# Patient Record
Sex: MALE | Race: WHITE | NOT HISPANIC OR LATINO | Employment: OTHER | URBAN - METROPOLITAN AREA
[De-identification: names, ages, dates, MRNs, and addresses within clinical notes are randomized per-mention and may not be internally consistent; named-entity substitution may affect disease eponyms.]

---

## 2023-02-27 LAB
ALANINE AMINOTRANSFERASE (SGPT) (U/L) IN SER/PLAS: NORMAL
ALBUMIN (G/DL) IN SER/PLAS: NORMAL
ALKALINE PHOSPHATASE (U/L) IN SER/PLAS: NORMAL
ANION GAP IN SER/PLAS: NORMAL
ASPARTATE AMINOTRANSFERASE (SGOT) (U/L) IN SER/PLAS: NORMAL
BASOPHILS (10*3/UL) IN BLOOD BY AUTOMATED COUNT: NORMAL
BASOPHILS/100 LEUKOCYTES IN BLOOD BY AUTOMATED COUNT: NORMAL
BILIRUBIN TOTAL (MG/DL) IN SER/PLAS: NORMAL
CALCIUM (MG/DL) IN SER/PLAS: NORMAL
CARBON DIOXIDE, TOTAL (MMOL/L) IN SER/PLAS: NORMAL
CHLORIDE (MMOL/L) IN SER/PLAS: NORMAL
CREATININE (MG/DL) IN SER/PLAS: NORMAL
EOSINOPHILS (10*3/UL) IN BLOOD BY AUTOMATED COUNT: NORMAL
EOSINOPHILS/100 LEUKOCYTES IN BLOOD BY AUTOMATED COUNT: NORMAL
ERYTHROCYTE DISTRIBUTION WIDTH (RATIO) BY AUTOMATED COUNT: NORMAL
ERYTHROCYTE MEAN CORPUSCULAR HEMOGLOBIN CONCENTRATION (G/DL) BY AUTOMATED: NORMAL
ERYTHROCYTE MEAN CORPUSCULAR VOLUME (FL) BY AUTOMATED COUNT: NORMAL
ERYTHROCYTES (10*6/UL) IN BLOOD BY AUTOMATED COUNT: NORMAL
GFR FEMALE: NORMAL
GFR MALE: NORMAL
GLUCOSE (MG/DL) IN SER/PLAS: NORMAL
HEMATOCRIT (%) IN BLOOD BY AUTOMATED COUNT: NORMAL
HEMOGLOBIN (G/DL) IN BLOOD: NORMAL
IMMATURE GRANULOCYTES/100 LEUKOCYTES IN BLOOD BY AUTOMATED COUNT: NORMAL
LEUKOCYTES (10*3/UL) IN BLOOD BY AUTOMATED COUNT: NORMAL
LYMPHOCYTES (10*3/UL) IN BLOOD BY AUTOMATED COUNT: NORMAL
LYMPHOCYTES/100 LEUKOCYTES IN BLOOD BY AUTOMATED COUNT: NORMAL
MANUAL DIFFERENTIAL Y/N: NORMAL
MONOCYTES (10*3/UL) IN BLOOD BY AUTOMATED COUNT: NORMAL
MONOCYTES/100 LEUKOCYTES IN BLOOD BY AUTOMATED COUNT: NORMAL
NEUTROPHILS (10*3/UL) IN BLOOD BY AUTOMATED COUNT: NORMAL
NEUTROPHILS/100 LEUKOCYTES IN BLOOD BY AUTOMATED COUNT: NORMAL
NRBC (PER 100 WBCS) BY AUTOMATED COUNT: NORMAL
PLATELETS (10*3/UL) IN BLOOD AUTOMATED COUNT: NORMAL
POTASSIUM (MMOL/L) IN SER/PLAS: NORMAL
PROTEIN TOTAL: NORMAL
SODIUM (MMOL/L) IN SER/PLAS: NORMAL
UREA NITROGEN (MG/DL) IN SER/PLAS: NORMAL

## 2023-09-13 VITALS — WEIGHT: 162.04 LBS | BODY MASS INDEX: 25.43 KG/M2 | HEIGHT: 67 IN

## 2023-09-13 DIAGNOSIS — C15.9 STAGE IV MALIGNANT NEOPLASM OF ESOPHAGUS (MULTI): ICD-10-CM

## 2023-09-13 DIAGNOSIS — C78.7 METASTASES TO THE LIVER (MULTI): Primary | ICD-10-CM

## 2023-09-21 RX ORDER — FLUOROURACIL 50 MG/ML
400 INJECTION, SOLUTION INTRAVENOUS ONCE
Status: CANCELLED | OUTPATIENT
Start: 2023-10-30

## 2023-09-21 RX ORDER — PROCHLORPERAZINE EDISYLATE 5 MG/ML
10 INJECTION INTRAMUSCULAR; INTRAVENOUS EVERY 6 HOURS PRN
Status: CANCELLED | OUTPATIENT
Start: 2024-02-12

## 2023-09-21 RX ORDER — DIPHENHYDRAMINE HYDROCHLORIDE 50 MG/ML
50 INJECTION INTRAMUSCULAR; INTRAVENOUS AS NEEDED
Status: CANCELLED | OUTPATIENT
Start: 2024-01-08

## 2023-09-21 RX ORDER — LORAZEPAM 2 MG/ML
1 INJECTION INTRAMUSCULAR AS NEEDED
Status: CANCELLED | OUTPATIENT
Start: 2023-11-13

## 2023-09-21 RX ORDER — DIPHENHYDRAMINE HYDROCHLORIDE 50 MG/ML
50 INJECTION INTRAMUSCULAR; INTRAVENOUS AS NEEDED
Status: CANCELLED | OUTPATIENT
Start: 2023-10-16

## 2023-09-21 RX ORDER — PROCHLORPERAZINE MALEATE 10 MG
10 TABLET ORAL EVERY 6 HOURS PRN
Status: CANCELLED | OUTPATIENT
Start: 2024-03-04

## 2023-09-21 RX ORDER — LORAZEPAM 2 MG/ML
1 INJECTION INTRAMUSCULAR AS NEEDED
Status: CANCELLED | OUTPATIENT
Start: 2023-10-30

## 2023-09-21 RX ORDER — ALBUTEROL SULFATE 0.83 MG/ML
3 SOLUTION RESPIRATORY (INHALATION) AS NEEDED
Status: CANCELLED | OUTPATIENT
Start: 2023-11-13

## 2023-09-21 RX ORDER — FLUOROURACIL 50 MG/ML
400 INJECTION, SOLUTION INTRAVENOUS ONCE
Status: CANCELLED | OUTPATIENT
Start: 2023-12-25

## 2023-09-21 RX ORDER — PALONOSETRON 0.05 MG/ML
0.25 INJECTION, SOLUTION INTRAVENOUS ONCE
Status: CANCELLED | OUTPATIENT
Start: 2024-01-22

## 2023-09-21 RX ORDER — FAMOTIDINE 10 MG/ML
20 INJECTION INTRAVENOUS ONCE AS NEEDED
Status: CANCELLED | OUTPATIENT
Start: 2024-01-22

## 2023-09-21 RX ORDER — PALONOSETRON 0.05 MG/ML
0.25 INJECTION, SOLUTION INTRAVENOUS ONCE
Status: CANCELLED | OUTPATIENT
Start: 2023-12-11

## 2023-09-21 RX ORDER — ALBUTEROL SULFATE 0.83 MG/ML
3 SOLUTION RESPIRATORY (INHALATION) AS NEEDED
Status: CANCELLED | OUTPATIENT
Start: 2023-10-16

## 2023-09-21 RX ORDER — EPINEPHRINE 0.3 MG/.3ML
0.3 INJECTION SUBCUTANEOUS EVERY 5 MIN PRN
Status: CANCELLED | OUTPATIENT
Start: 2023-10-16

## 2023-09-21 RX ORDER — FLUOROURACIL 50 MG/ML
400 INJECTION, SOLUTION INTRAVENOUS ONCE
Status: CANCELLED | OUTPATIENT
Start: 2023-10-16

## 2023-09-21 RX ORDER — EPINEPHRINE 0.3 MG/.3ML
0.3 INJECTION SUBCUTANEOUS EVERY 5 MIN PRN
Status: CANCELLED | OUTPATIENT
Start: 2024-03-04

## 2023-09-21 RX ORDER — PALONOSETRON 0.05 MG/ML
0.25 INJECTION, SOLUTION INTRAVENOUS ONCE
Status: CANCELLED | OUTPATIENT
Start: 2024-02-12

## 2023-09-21 RX ORDER — PROCHLORPERAZINE EDISYLATE 5 MG/ML
10 INJECTION INTRAMUSCULAR; INTRAVENOUS EVERY 6 HOURS PRN
Status: CANCELLED | OUTPATIENT
Start: 2023-12-11

## 2023-09-21 RX ORDER — PROCHLORPERAZINE MALEATE 10 MG
10 TABLET ORAL EVERY 6 HOURS PRN
Status: CANCELLED | OUTPATIENT
Start: 2023-12-25

## 2023-09-21 RX ORDER — PALONOSETRON 0.05 MG/ML
0.25 INJECTION, SOLUTION INTRAVENOUS ONCE
Status: CANCELLED | OUTPATIENT
Start: 2023-11-13

## 2023-09-21 RX ORDER — DIPHENHYDRAMINE HYDROCHLORIDE 50 MG/ML
50 INJECTION INTRAMUSCULAR; INTRAVENOUS AS NEEDED
Status: CANCELLED | OUTPATIENT
Start: 2024-03-04

## 2023-09-21 RX ORDER — DIPHENHYDRAMINE HYDROCHLORIDE 50 MG/ML
50 INJECTION INTRAMUSCULAR; INTRAVENOUS AS NEEDED
Status: CANCELLED | OUTPATIENT
Start: 2023-12-25

## 2023-09-21 RX ORDER — DIPHENHYDRAMINE HYDROCHLORIDE 50 MG/ML
50 INJECTION INTRAMUSCULAR; INTRAVENOUS AS NEEDED
Status: CANCELLED | OUTPATIENT
Start: 2024-01-22

## 2023-09-21 RX ORDER — PALONOSETRON 0.05 MG/ML
0.25 INJECTION, SOLUTION INTRAVENOUS ONCE
Status: CANCELLED | OUTPATIENT
Start: 2023-10-30

## 2023-09-21 RX ORDER — FAMOTIDINE 10 MG/ML
20 INJECTION INTRAVENOUS ONCE AS NEEDED
Status: CANCELLED | OUTPATIENT
Start: 2024-02-12

## 2023-09-21 RX ORDER — FLUOROURACIL 50 MG/ML
400 INJECTION, SOLUTION INTRAVENOUS ONCE
Status: CANCELLED | OUTPATIENT
Start: 2024-03-04

## 2023-09-21 RX ORDER — DIPHENHYDRAMINE HYDROCHLORIDE 50 MG/ML
50 INJECTION INTRAMUSCULAR; INTRAVENOUS AS NEEDED
Status: CANCELLED | OUTPATIENT
Start: 2023-11-13

## 2023-09-21 RX ORDER — PROCHLORPERAZINE EDISYLATE 5 MG/ML
10 INJECTION INTRAMUSCULAR; INTRAVENOUS EVERY 6 HOURS PRN
Status: CANCELLED | OUTPATIENT
Start: 2023-11-27

## 2023-09-21 RX ORDER — PROCHLORPERAZINE MALEATE 10 MG
10 TABLET ORAL EVERY 6 HOURS PRN
Status: CANCELLED | OUTPATIENT
Start: 2023-11-27

## 2023-09-21 RX ORDER — PROCHLORPERAZINE EDISYLATE 5 MG/ML
10 INJECTION INTRAMUSCULAR; INTRAVENOUS EVERY 6 HOURS PRN
Status: CANCELLED | OUTPATIENT
Start: 2024-01-08

## 2023-09-21 RX ORDER — LORAZEPAM 2 MG/ML
1 INJECTION INTRAMUSCULAR AS NEEDED
Status: CANCELLED | OUTPATIENT
Start: 2024-01-08

## 2023-09-21 RX ORDER — PROCHLORPERAZINE EDISYLATE 5 MG/ML
10 INJECTION INTRAMUSCULAR; INTRAVENOUS EVERY 6 HOURS PRN
Status: CANCELLED | OUTPATIENT
Start: 2023-10-30

## 2023-09-21 RX ORDER — FAMOTIDINE 10 MG/ML
20 INJECTION INTRAVENOUS ONCE AS NEEDED
Status: CANCELLED | OUTPATIENT
Start: 2024-03-04

## 2023-09-21 RX ORDER — EPINEPHRINE 0.3 MG/.3ML
0.3 INJECTION SUBCUTANEOUS EVERY 5 MIN PRN
Status: CANCELLED | OUTPATIENT
Start: 2023-12-25

## 2023-09-21 RX ORDER — PALONOSETRON 0.05 MG/ML
0.25 INJECTION, SOLUTION INTRAVENOUS ONCE
Status: CANCELLED | OUTPATIENT
Start: 2023-10-16

## 2023-09-21 RX ORDER — PROCHLORPERAZINE EDISYLATE 5 MG/ML
10 INJECTION INTRAMUSCULAR; INTRAVENOUS EVERY 6 HOURS PRN
Status: CANCELLED | OUTPATIENT
Start: 2024-03-04

## 2023-09-21 RX ORDER — PROCHLORPERAZINE EDISYLATE 5 MG/ML
10 INJECTION INTRAMUSCULAR; INTRAVENOUS EVERY 6 HOURS PRN
Status: CANCELLED | OUTPATIENT
Start: 2023-12-25

## 2023-09-21 RX ORDER — EPINEPHRINE 0.3 MG/.3ML
0.3 INJECTION SUBCUTANEOUS EVERY 5 MIN PRN
Status: CANCELLED | OUTPATIENT
Start: 2024-01-08

## 2023-09-21 RX ORDER — ALBUTEROL SULFATE 0.83 MG/ML
3 SOLUTION RESPIRATORY (INHALATION) AS NEEDED
Status: CANCELLED | OUTPATIENT
Start: 2023-11-27

## 2023-09-21 RX ORDER — PROCHLORPERAZINE MALEATE 10 MG
10 TABLET ORAL EVERY 6 HOURS PRN
Status: CANCELLED | OUTPATIENT
Start: 2023-10-16

## 2023-09-21 RX ORDER — EPINEPHRINE 0.3 MG/.3ML
0.3 INJECTION SUBCUTANEOUS EVERY 5 MIN PRN
Status: CANCELLED | OUTPATIENT
Start: 2023-11-13

## 2023-09-21 RX ORDER — PALONOSETRON 0.05 MG/ML
0.25 INJECTION, SOLUTION INTRAVENOUS ONCE
Status: CANCELLED | OUTPATIENT
Start: 2023-11-27

## 2023-09-21 RX ORDER — PROCHLORPERAZINE MALEATE 10 MG
10 TABLET ORAL EVERY 6 HOURS PRN
Status: CANCELLED | OUTPATIENT
Start: 2023-10-30

## 2023-09-21 RX ORDER — FLUOROURACIL 50 MG/ML
400 INJECTION, SOLUTION INTRAVENOUS ONCE
Status: CANCELLED | OUTPATIENT
Start: 2023-12-11

## 2023-09-21 RX ORDER — FAMOTIDINE 10 MG/ML
20 INJECTION INTRAVENOUS ONCE AS NEEDED
Status: CANCELLED | OUTPATIENT
Start: 2023-10-30

## 2023-09-21 RX ORDER — ALBUTEROL SULFATE 0.83 MG/ML
3 SOLUTION RESPIRATORY (INHALATION) AS NEEDED
Status: CANCELLED | OUTPATIENT
Start: 2023-12-25

## 2023-09-21 RX ORDER — ALBUTEROL SULFATE 0.83 MG/ML
3 SOLUTION RESPIRATORY (INHALATION) AS NEEDED
Status: CANCELLED | OUTPATIENT
Start: 2024-01-08

## 2023-09-21 RX ORDER — DIPHENHYDRAMINE HYDROCHLORIDE 50 MG/ML
50 INJECTION INTRAMUSCULAR; INTRAVENOUS AS NEEDED
Status: CANCELLED | OUTPATIENT
Start: 2024-02-12

## 2023-09-21 RX ORDER — DIPHENHYDRAMINE HYDROCHLORIDE 50 MG/ML
50 INJECTION INTRAMUSCULAR; INTRAVENOUS AS NEEDED
Status: CANCELLED | OUTPATIENT
Start: 2023-11-27

## 2023-09-21 RX ORDER — PROCHLORPERAZINE EDISYLATE 5 MG/ML
10 INJECTION INTRAMUSCULAR; INTRAVENOUS EVERY 6 HOURS PRN
Status: CANCELLED | OUTPATIENT
Start: 2023-10-16

## 2023-09-21 RX ORDER — PROCHLORPERAZINE MALEATE 10 MG
10 TABLET ORAL EVERY 6 HOURS PRN
Status: CANCELLED | OUTPATIENT
Start: 2024-02-12

## 2023-09-21 RX ORDER — EPINEPHRINE 0.3 MG/.3ML
0.3 INJECTION SUBCUTANEOUS EVERY 5 MIN PRN
Status: CANCELLED | OUTPATIENT
Start: 2023-11-27

## 2023-09-21 RX ORDER — FLUOROURACIL 50 MG/ML
400 INJECTION, SOLUTION INTRAVENOUS ONCE
Status: CANCELLED | OUTPATIENT
Start: 2024-01-22

## 2023-09-21 RX ORDER — EPINEPHRINE 0.3 MG/.3ML
0.3 INJECTION SUBCUTANEOUS EVERY 5 MIN PRN
Status: CANCELLED | OUTPATIENT
Start: 2024-01-22

## 2023-09-21 RX ORDER — PROCHLORPERAZINE MALEATE 10 MG
10 TABLET ORAL EVERY 6 HOURS PRN
Status: CANCELLED | OUTPATIENT
Start: 2024-01-08

## 2023-09-21 RX ORDER — FAMOTIDINE 10 MG/ML
20 INJECTION INTRAVENOUS ONCE AS NEEDED
Status: CANCELLED | OUTPATIENT
Start: 2023-11-27

## 2023-09-21 RX ORDER — DIPHENHYDRAMINE HYDROCHLORIDE 50 MG/ML
50 INJECTION INTRAMUSCULAR; INTRAVENOUS AS NEEDED
Status: CANCELLED | OUTPATIENT
Start: 2023-10-30

## 2023-09-21 RX ORDER — ALBUTEROL SULFATE 0.83 MG/ML
3 SOLUTION RESPIRATORY (INHALATION) AS NEEDED
Status: CANCELLED | OUTPATIENT
Start: 2023-10-30

## 2023-09-21 RX ORDER — LORAZEPAM 2 MG/ML
1 INJECTION INTRAMUSCULAR AS NEEDED
Status: CANCELLED | OUTPATIENT
Start: 2023-10-16

## 2023-09-21 RX ORDER — ALBUTEROL SULFATE 0.83 MG/ML
3 SOLUTION RESPIRATORY (INHALATION) AS NEEDED
Status: CANCELLED | OUTPATIENT
Start: 2023-12-11

## 2023-09-21 RX ORDER — PALONOSETRON 0.05 MG/ML
0.25 INJECTION, SOLUTION INTRAVENOUS ONCE
Status: CANCELLED | OUTPATIENT
Start: 2023-12-25

## 2023-09-21 RX ORDER — LORAZEPAM 2 MG/ML
1 INJECTION INTRAMUSCULAR AS NEEDED
Status: CANCELLED | OUTPATIENT
Start: 2024-01-22

## 2023-09-21 RX ORDER — FLUOROURACIL 50 MG/ML
400 INJECTION, SOLUTION INTRAVENOUS ONCE
Status: CANCELLED | OUTPATIENT
Start: 2023-11-13

## 2023-09-21 RX ORDER — PROCHLORPERAZINE MALEATE 10 MG
10 TABLET ORAL EVERY 6 HOURS PRN
Status: CANCELLED | OUTPATIENT
Start: 2024-01-22

## 2023-09-21 RX ORDER — EPINEPHRINE 0.3 MG/.3ML
0.3 INJECTION SUBCUTANEOUS EVERY 5 MIN PRN
Status: CANCELLED | OUTPATIENT
Start: 2023-12-11

## 2023-09-21 RX ORDER — LORAZEPAM 2 MG/ML
1 INJECTION INTRAMUSCULAR AS NEEDED
Status: CANCELLED | OUTPATIENT
Start: 2023-12-25

## 2023-09-21 RX ORDER — LORAZEPAM 2 MG/ML
1 INJECTION INTRAMUSCULAR AS NEEDED
Status: CANCELLED | OUTPATIENT
Start: 2024-03-04

## 2023-09-21 RX ORDER — FAMOTIDINE 10 MG/ML
20 INJECTION INTRAVENOUS ONCE AS NEEDED
Status: CANCELLED | OUTPATIENT
Start: 2023-10-16

## 2023-09-21 RX ORDER — LORAZEPAM 2 MG/ML
1 INJECTION INTRAMUSCULAR AS NEEDED
Status: CANCELLED | OUTPATIENT
Start: 2023-10-02

## 2023-09-21 RX ORDER — FAMOTIDINE 10 MG/ML
20 INJECTION INTRAVENOUS ONCE AS NEEDED
Status: CANCELLED | OUTPATIENT
Start: 2023-12-25

## 2023-09-21 RX ORDER — PROCHLORPERAZINE MALEATE 10 MG
10 TABLET ORAL EVERY 6 HOURS PRN
Status: CANCELLED | OUTPATIENT
Start: 2023-11-13

## 2023-09-21 RX ORDER — ALBUTEROL SULFATE 0.83 MG/ML
3 SOLUTION RESPIRATORY (INHALATION) AS NEEDED
Status: CANCELLED | OUTPATIENT
Start: 2024-02-12

## 2023-09-21 RX ORDER — FAMOTIDINE 10 MG/ML
20 INJECTION INTRAVENOUS ONCE AS NEEDED
Status: CANCELLED | OUTPATIENT
Start: 2023-12-11

## 2023-09-21 RX ORDER — LORAZEPAM 2 MG/ML
1 INJECTION INTRAMUSCULAR AS NEEDED
Status: CANCELLED | OUTPATIENT
Start: 2024-02-12

## 2023-09-21 RX ORDER — DIPHENHYDRAMINE HYDROCHLORIDE 50 MG/ML
50 INJECTION INTRAMUSCULAR; INTRAVENOUS AS NEEDED
Status: CANCELLED | OUTPATIENT
Start: 2023-12-11

## 2023-09-21 RX ORDER — FAMOTIDINE 10 MG/ML
20 INJECTION INTRAVENOUS ONCE AS NEEDED
Status: CANCELLED | OUTPATIENT
Start: 2023-11-13

## 2023-09-21 RX ORDER — EPINEPHRINE 0.3 MG/.3ML
0.3 INJECTION SUBCUTANEOUS EVERY 5 MIN PRN
Status: CANCELLED | OUTPATIENT
Start: 2023-10-30

## 2023-09-21 RX ORDER — EPINEPHRINE 0.3 MG/.3ML
0.3 INJECTION SUBCUTANEOUS EVERY 5 MIN PRN
Status: CANCELLED | OUTPATIENT
Start: 2024-02-12

## 2023-09-21 RX ORDER — FLUOROURACIL 50 MG/ML
400 INJECTION, SOLUTION INTRAVENOUS ONCE
Status: CANCELLED | OUTPATIENT
Start: 2023-11-27

## 2023-09-21 RX ORDER — FLUOROURACIL 50 MG/ML
400 INJECTION, SOLUTION INTRAVENOUS ONCE
Status: CANCELLED | OUTPATIENT
Start: 2024-01-08

## 2023-09-21 RX ORDER — PROCHLORPERAZINE MALEATE 10 MG
10 TABLET ORAL EVERY 6 HOURS PRN
Status: CANCELLED | OUTPATIENT
Start: 2023-12-11

## 2023-09-21 RX ORDER — PALONOSETRON 0.05 MG/ML
0.25 INJECTION, SOLUTION INTRAVENOUS ONCE
Status: CANCELLED | OUTPATIENT
Start: 2024-01-08

## 2023-09-21 RX ORDER — ALBUTEROL SULFATE 0.83 MG/ML
3 SOLUTION RESPIRATORY (INHALATION) AS NEEDED
Status: CANCELLED | OUTPATIENT
Start: 2024-01-22

## 2023-09-21 RX ORDER — LORAZEPAM 2 MG/ML
1 INJECTION INTRAMUSCULAR AS NEEDED
Status: CANCELLED | OUTPATIENT
Start: 2023-12-11

## 2023-09-21 RX ORDER — PROCHLORPERAZINE EDISYLATE 5 MG/ML
10 INJECTION INTRAMUSCULAR; INTRAVENOUS EVERY 6 HOURS PRN
Status: CANCELLED | OUTPATIENT
Start: 2024-01-22

## 2023-09-21 RX ORDER — ALBUTEROL SULFATE 0.83 MG/ML
3 SOLUTION RESPIRATORY (INHALATION) AS NEEDED
Status: CANCELLED | OUTPATIENT
Start: 2024-03-04

## 2023-09-21 RX ORDER — FLUOROURACIL 50 MG/ML
400 INJECTION, SOLUTION INTRAVENOUS ONCE
Status: CANCELLED | OUTPATIENT
Start: 2024-02-12

## 2023-09-21 RX ORDER — PALONOSETRON 0.05 MG/ML
0.25 INJECTION, SOLUTION INTRAVENOUS ONCE
Status: CANCELLED | OUTPATIENT
Start: 2024-03-04

## 2023-09-21 RX ORDER — FAMOTIDINE 10 MG/ML
20 INJECTION INTRAVENOUS ONCE AS NEEDED
Status: CANCELLED | OUTPATIENT
Start: 2024-01-08

## 2023-09-21 RX ORDER — PROCHLORPERAZINE EDISYLATE 5 MG/ML
10 INJECTION INTRAMUSCULAR; INTRAVENOUS EVERY 6 HOURS PRN
Status: CANCELLED | OUTPATIENT
Start: 2023-11-13

## 2023-09-21 RX ORDER — LORAZEPAM 2 MG/ML
1 INJECTION INTRAMUSCULAR AS NEEDED
Status: CANCELLED | OUTPATIENT
Start: 2023-11-27

## 2023-09-25 PROBLEM — K21.9 GASTROESOPHAGEAL REFLUX DISEASE: Status: ACTIVE | Noted: 2023-09-25

## 2023-09-25 PROBLEM — R07.89 CHEST HEAVINESS: Status: ACTIVE | Noted: 2023-09-25

## 2023-09-25 PROBLEM — K40.31: Status: ACTIVE | Noted: 2023-09-25

## 2023-09-25 RX ORDER — OXYCODONE HYDROCHLORIDE 10 MG/1
10 TABLET ORAL EVERY 8 HOURS PRN
COMMUNITY
Start: 2023-08-14 | End: 2023-10-30 | Stop reason: ALTCHOICE

## 2023-09-25 RX ORDER — CLOTRIMAZOLE 10 MG/1
LOZENGE ORAL; TOPICAL
COMMUNITY
Start: 2016-09-30 | End: 2023-11-02 | Stop reason: ALTCHOICE

## 2023-09-25 RX ORDER — MULTIVIT-MIN/IRON FUM/FOLIC AC 7.5 MG-4
2 TABLET ORAL EVERY MORNING
Status: ON HOLD | COMMUNITY

## 2023-09-25 RX ORDER — OXYCODONE AND ACETAMINOPHEN 5; 325 MG/1; MG/1
1 TABLET ORAL EVERY 6 HOURS
COMMUNITY
Start: 2014-11-12 | End: 2023-11-02 | Stop reason: ALTCHOICE

## 2023-09-25 RX ORDER — PANTOPRAZOLE SODIUM 40 MG/1
40 TABLET, DELAYED RELEASE ORAL 2 TIMES DAILY
COMMUNITY
End: 2024-01-26

## 2023-09-25 RX ORDER — LIDOCAINE HYDROCHLORIDE 20 MG/ML
SOLUTION ORAL; TOPICAL
COMMUNITY
Start: 2023-03-07 | End: 2023-11-13 | Stop reason: HOSPADM

## 2023-09-25 RX ORDER — METOPROLOL TARTRATE 25 MG/1
25 TABLET, FILM COATED ORAL EVERY 12 HOURS
COMMUNITY
Start: 2014-11-07 | End: 2023-11-02 | Stop reason: ALTCHOICE

## 2023-09-25 RX ORDER — CODEINE PHOSPHATE AND GUAIFENESIN 10; 100 MG/5ML; MG/5ML
10-15 SOLUTION ORAL NIGHTLY PRN
COMMUNITY
End: 2023-11-05

## 2023-09-25 RX ORDER — POTASSIUM CHLORIDE 750 MG/1
10 TABLET, EXTENDED RELEASE ORAL DAILY
COMMUNITY
Start: 2023-04-20 | End: 2023-11-05

## 2023-09-25 RX ORDER — CHOLECALCIFEROL (VITAMIN D3) 50 MCG
TABLET ORAL
COMMUNITY
End: 2023-11-28 | Stop reason: ALTCHOICE

## 2023-09-25 RX ORDER — LIDOCAINE AND PRILOCAINE 25; 25 MG/G; MG/G
CREAM TOPICAL
COMMUNITY
Start: 2023-01-30 | End: 2023-11-05

## 2023-09-25 RX ORDER — MELOXICAM 15 MG/1
TABLET ORAL
COMMUNITY
End: 2023-11-02 | Stop reason: ALTCHOICE

## 2023-09-25 RX ORDER — APIXABAN 5 MG/1
TABLET, FILM COATED ORAL
COMMUNITY
Start: 2023-07-26 | End: 2023-11-05

## 2023-09-25 RX ORDER — ATORVASTATIN CALCIUM 40 MG/1
40 TABLET, FILM COATED ORAL DAILY
COMMUNITY
Start: 2014-11-07 | End: 2023-11-02 | Stop reason: ALTCHOICE

## 2023-09-25 RX ORDER — OXYCODONE HCL 20 MG/1
TABLET, FILM COATED, EXTENDED RELEASE ORAL
COMMUNITY
Start: 2016-09-26 | End: 2023-10-30 | Stop reason: ALTCHOICE

## 2023-09-25 RX ORDER — SUCRALFATE 1 G/1
1 TABLET ORAL DAILY PRN
COMMUNITY
Start: 2023-06-02 | End: 2024-05-24 | Stop reason: HOSPADM

## 2023-09-25 RX ORDER — GABAPENTIN 300 MG/1
CAPSULE ORAL
COMMUNITY
Start: 2023-07-31 | End: 2023-11-02 | Stop reason: ALTCHOICE

## 2023-09-25 RX ORDER — BACLOFEN 10 MG/1
TABLET ORAL
COMMUNITY
Start: 2016-06-30 | End: 2023-11-02 | Stop reason: ALTCHOICE

## 2023-09-25 RX ORDER — ALBUTEROL SULFATE 90 UG/1
1-2 AEROSOL, METERED RESPIRATORY (INHALATION)
COMMUNITY
Start: 2018-03-06 | End: 2023-11-05

## 2023-09-28 ENCOUNTER — TELEPHONE (OUTPATIENT)
Dept: HEMATOLOGY/ONCOLOGY | Facility: CLINIC | Age: 63
End: 2023-09-28
Payer: COMMERCIAL

## 2023-10-01 ENCOUNTER — NUTRITION (OUTPATIENT)
Dept: HEMATOLOGY/ONCOLOGY | Facility: CLINIC | Age: 63
End: 2023-10-01
Payer: COMMERCIAL

## 2023-10-02 ENCOUNTER — INFUSION (OUTPATIENT)
Dept: HEMATOLOGY/ONCOLOGY | Facility: CLINIC | Age: 63
End: 2023-10-02
Payer: COMMERCIAL

## 2023-10-02 ENCOUNTER — OFFICE VISIT (OUTPATIENT)
Dept: HEMATOLOGY/ONCOLOGY | Facility: CLINIC | Age: 63
End: 2023-10-02
Payer: COMMERCIAL

## 2023-10-02 ENCOUNTER — LAB (OUTPATIENT)
Dept: LAB | Facility: HOSPITAL | Age: 63
End: 2023-10-02
Payer: COMMERCIAL

## 2023-10-02 VITALS
WEIGHT: 164.46 LBS | OXYGEN SATURATION: 99 % | DIASTOLIC BLOOD PRESSURE: 76 MMHG | TEMPERATURE: 98.1 F | BODY MASS INDEX: 25.69 KG/M2 | SYSTOLIC BLOOD PRESSURE: 128 MMHG | HEART RATE: 79 BPM

## 2023-10-02 VITALS — BODY MASS INDEX: 25.81 KG/M2 | HEIGHT: 67 IN | WEIGHT: 164.46 LBS

## 2023-10-02 DIAGNOSIS — C78.7 METASTASES TO THE LIVER (MULTI): ICD-10-CM

## 2023-10-02 DIAGNOSIS — C78.7 METASTASES TO THE LIVER (MULTI): Primary | ICD-10-CM

## 2023-10-02 DIAGNOSIS — C15.9 STAGE IV MALIGNANT NEOPLASM OF ESOPHAGUS (MULTI): ICD-10-CM

## 2023-10-02 LAB
ALBUMIN SERPL BCP-MCNC: 3.8 G/DL (ref 3.4–5)
ALP SERPL-CCNC: 159 U/L (ref 33–136)
ALT SERPL W P-5'-P-CCNC: 23 U/L (ref 10–52)
ANION GAP SERPL CALC-SCNC: 13 MMOL/L (ref 10–20)
AST SERPL W P-5'-P-CCNC: 34 U/L (ref 9–39)
BASOPHILS # BLD AUTO: 0.01 X10*3/UL (ref 0–0.1)
BASOPHILS NFR BLD AUTO: 0.3 %
BILIRUB SERPL-MCNC: 0.8 MG/DL (ref 0–1.2)
BUN SERPL-MCNC: 14 MG/DL (ref 6–23)
CALCIUM SERPL-MCNC: 9 MG/DL (ref 8.6–10.3)
CHLORIDE SERPL-SCNC: 106 MMOL/L (ref 98–107)
CO2 SERPL-SCNC: 26 MMOL/L (ref 21–32)
CREAT SERPL-MCNC: 0.77 MG/DL (ref 0.5–1.3)
EOSINOPHIL # BLD AUTO: 0.05 X10*3/UL (ref 0–0.7)
EOSINOPHIL NFR BLD AUTO: 1.4 %
ERYTHROCYTE [DISTWIDTH] IN BLOOD BY AUTOMATED COUNT: 14.6 % (ref 11.5–14.5)
GFR SERPL CREATININE-BSD FRML MDRD: >90 ML/MIN/1.73M*2
GLUCOSE SERPL-MCNC: 107 MG/DL (ref 74–99)
HCT VFR BLD AUTO: 34.1 % (ref 41–52)
HGB BLD-MCNC: 11.3 G/DL (ref 13.5–17.5)
IMM GRANULOCYTES # BLD AUTO: 0.01 X10*3/UL (ref 0–0.7)
IMM GRANULOCYTES NFR BLD AUTO: 0.3 % (ref 0–0.9)
LYMPHOCYTES # BLD AUTO: 0.62 X10*3/UL (ref 1.2–4.8)
LYMPHOCYTES NFR BLD AUTO: 17.5 %
MCH RBC QN AUTO: 28.7 PG (ref 26–34)
MCHC RBC AUTO-ENTMCNC: 33.1 G/DL (ref 32–36)
MCV RBC AUTO: 87 FL (ref 80–100)
MONOCYTES # BLD AUTO: 0.33 X10*3/UL (ref 0.1–1)
MONOCYTES NFR BLD AUTO: 9.3 %
NEUTROPHILS # BLD AUTO: 2.53 X10*3/UL (ref 1.2–7.7)
NEUTROPHILS NFR BLD AUTO: 71.2 %
NRBC BLD-RTO: ABNORMAL /100{WBCS}
PLATELET # BLD AUTO: 69 X10*3/UL (ref 150–450)
PMV BLD AUTO: 10 FL (ref 7.5–11.5)
POTASSIUM SERPL-SCNC: 3.6 MMOL/L (ref 3.5–5.3)
PROT SERPL-MCNC: 6.6 G/DL (ref 6.4–8.2)
RBC # BLD AUTO: 3.94 X10*6/UL (ref 4.5–5.9)
SODIUM SERPL-SCNC: 141 MMOL/L (ref 136–145)
WBC # BLD AUTO: 3.6 X10*3/UL (ref 4.4–11.3)

## 2023-10-02 PROCEDURE — 99214 OFFICE O/P EST MOD 30 MIN: CPT | Performed by: INTERNAL MEDICINE

## 2023-10-02 PROCEDURE — 84075 ASSAY ALKALINE PHOSPHATASE: CPT

## 2023-10-02 PROCEDURE — 80053 COMPREHEN METABOLIC PANEL: CPT

## 2023-10-02 PROCEDURE — 2500000004 HC RX 250 GENERAL PHARMACY W/ HCPCS (ALT 636 FOR OP/ED): Performed by: INTERNAL MEDICINE

## 2023-10-02 PROCEDURE — 85025 COMPLETE CBC W/AUTO DIFF WBC: CPT

## 2023-10-02 PROCEDURE — 96411 CHEMO IV PUSH ADDL DRUG: CPT

## 2023-10-02 PROCEDURE — 96367 TX/PROPH/DG ADDL SEQ IV INF: CPT

## 2023-10-02 PROCEDURE — 36415 COLL VENOUS BLD VENIPUNCTURE: CPT

## 2023-10-02 PROCEDURE — 96375 TX/PRO/DX INJ NEW DRUG ADDON: CPT

## 2023-10-02 PROCEDURE — 2500000004 HC RX 250 GENERAL PHARMACY W/ HCPCS (ALT 636 FOR OP/ED)

## 2023-10-02 PROCEDURE — 96413 CHEMO IV INFUSION 1 HR: CPT

## 2023-10-02 RX ORDER — DEXAMETHASONE SODIUM PHOSPHATE 100 MG/10ML
8 INJECTION INTRAMUSCULAR; INTRAVENOUS ONCE
Status: CANCELLED | OUTPATIENT
Start: 2023-10-02

## 2023-10-02 RX ORDER — PROCHLORPERAZINE MALEATE 10 MG
10 TABLET ORAL EVERY 6 HOURS PRN
Status: DISCONTINUED | OUTPATIENT
Start: 2023-10-02 | End: 2023-10-02 | Stop reason: HOSPADM

## 2023-10-02 RX ORDER — FAMOTIDINE 10 MG/ML
20 INJECTION INTRAVENOUS ONCE AS NEEDED
Status: DISCONTINUED | OUTPATIENT
Start: 2023-10-02 | End: 2023-10-02 | Stop reason: HOSPADM

## 2023-10-02 RX ORDER — HEPARIN 100 UNIT/ML
SYRINGE INTRAVENOUS
Status: COMPLETED
Start: 2023-10-02 | End: 2023-10-02

## 2023-10-02 RX ORDER — EPINEPHRINE 0.3 MG/.3ML
0.3 INJECTION SUBCUTANEOUS EVERY 5 MIN PRN
Status: DISCONTINUED | OUTPATIENT
Start: 2023-10-02 | End: 2023-10-02 | Stop reason: HOSPADM

## 2023-10-02 RX ORDER — DIPHENHYDRAMINE HYDROCHLORIDE 50 MG/ML
50 INJECTION INTRAMUSCULAR; INTRAVENOUS AS NEEDED
Status: DISCONTINUED | OUTPATIENT
Start: 2023-10-02 | End: 2023-10-02 | Stop reason: HOSPADM

## 2023-10-02 RX ORDER — DEXAMETHASONE SODIUM PHOSPHATE 4 MG/ML
8 INJECTION, SOLUTION INTRA-ARTICULAR; INTRALESIONAL; INTRAMUSCULAR; INTRAVENOUS; SOFT TISSUE ONCE
Status: COMPLETED | OUTPATIENT
Start: 2023-10-02 | End: 2023-10-02

## 2023-10-02 RX ORDER — FLUOROURACIL 50 MG/ML
400 INJECTION, SOLUTION INTRAVENOUS ONCE
Status: COMPLETED | OUTPATIENT
Start: 2023-10-02 | End: 2023-10-02

## 2023-10-02 RX ORDER — ALBUTEROL SULFATE 0.83 MG/ML
3 SOLUTION RESPIRATORY (INHALATION) AS NEEDED
Status: DISCONTINUED | OUTPATIENT
Start: 2023-10-02 | End: 2023-10-02 | Stop reason: HOSPADM

## 2023-10-02 RX ORDER — PALONOSETRON 0.05 MG/ML
0.25 INJECTION, SOLUTION INTRAVENOUS ONCE
Status: COMPLETED | OUTPATIENT
Start: 2023-10-02 | End: 2023-10-02

## 2023-10-02 RX ORDER — PROCHLORPERAZINE EDISYLATE 5 MG/ML
10 INJECTION INTRAMUSCULAR; INTRAVENOUS EVERY 6 HOURS PRN
Status: DISCONTINUED | OUTPATIENT
Start: 2023-10-02 | End: 2023-10-02 | Stop reason: HOSPADM

## 2023-10-02 RX ADMIN — DEXAMETHASONE SODIUM PHOSPHATE 8 MG: 4 INJECTION, SOLUTION INTRAMUSCULAR; INTRAVENOUS at 11:50

## 2023-10-02 RX ADMIN — FLUOROURACIL 750 MG: 50 INJECTION, SOLUTION INTRAVENOUS at 15:05

## 2023-10-02 RX ADMIN — PALONOSETRON 250 MCG: 0.05 INJECTION, SOLUTION INTRAVENOUS at 11:46

## 2023-10-02 RX ADMIN — LEUCOVORIN CALCIUM 748 MG: 350 INJECTION, POWDER, LYOPHILIZED, FOR SOLUTION INTRAMUSCULAR; INTRAVENOUS at 14:09

## 2023-10-02 RX ADMIN — TRASTUZUMAB-ANNS 300 MG: 150 INJECTION, POWDER, LYOPHILIZED, FOR SOLUTION INTRAVENOUS at 13:15

## 2023-10-02 RX ADMIN — SODIUM CHLORIDE, PRESERVATIVE FREE: 5 INJECTION INTRAVENOUS at 15:20

## 2023-10-02 ASSESSMENT — PROMIS GLOBAL HEALTH SCALE
IN GENERAL, WOULD YOU SAY YOUR QUALITY OF LIFE IS...[ON A SCALE OF 1 (POOR) TO 5 (EXCELLENT)]: GOOD
TO WHAT EXTENT ARE YOU ABLE TO CARRY OUT YOUR EVERYDAY PHYSICAL ACTIVITIES SUCH AS WALKING, CLIMBING STAIRS, CARRYING GROCERIES, OR MOVING A CHAIR [ON A SCALE OF 1 (NOT AT ALL) TO 5 (COMPLETELY)]?: MOSTLY
IN GENERAL, HOW WOULD YOU RATE YOUR MENTAL HEALTH, INCLUDING YOUR MOOD AND YOUR ABILITY TO THINK [ON A SCALE OF 1 (POOR) TO 5 (EXCELLENT)]?: GOOD
IN GENERAL, PLEASE RATE HOW WELL YOU CARRY OUT YOUR USUAL SOCIAL ACTIVITIES (INCLUDES ACTIVITIES AT HOME, AT WORK, AND IN YOUR COMMUNITY, AND RESPONSIBILITIES AS A PARENT, CHILD, SPOUSE, EMPLOYEE, FRIEND, ETC) [ON A SCALE OF 1 (POOR) TO 5 (EXCELLENT)]?: VERY GOOD
IN THE PAST 7 DAYS, HOW WOULD YOU RATE YOUR FATIGUE ON AVERAGE [ON A SCALE FROM 1 (NONE) TO 5 (VERY SEVERE)]?: MILD
IN THE PAST 7 DAYS, HOW OFTEN HAVE YOU BEEN BOTHERED BY EMOTIONAL PROBLEMS, SUCH AS FEELING ANXIOUS, DEPRESSED, OR IRRITABLE [ON A SCALE FROM 1 (NEVER) TO 5 (ALWAYS)]?: RARELY
IN GENERAL, HOW WOULD YOU RATE YOUR PHYSICAL HEALTH [ON A SCALE OF 1 (POOR) TO 5 (EXCELLENT)]?: FAIR
IN THE PAST 7 DAYS, HOW WOULD YOU RATE YOUR PAIN ON AVERAGE [ON A SCALE FROM 0 (NO PAIN) TO 10 (WORST IMAGINABLE PAIN)]?: 2
IN GENERAL, HOW WOULD YOU RATE YOUR SATISFACTION WITH YOUR SOCIAL ACTIVITIES AND RELATIONSHIPS [ON A SCALE OF 1 (POOR) TO 5 (EXCELLENT)]?: VERY GOOD

## 2023-10-02 ASSESSMENT — PAIN SCALES - GENERAL: PAINLEVEL: 2

## 2023-10-02 NOTE — PROGRESS NOTES
Visit Type: Follow Up Visit      Cancer History:   Treatment Synopsis:    GETACHEW MG is a 62 year old Male who was consulted by the primary team for abdominal pain.  He presented to the emergency room with intractable abdominal pain and  radiographic imaging revealed that he had extensive liver metastases.  CT scan was associated with thickening of the esophageal lining and was also noted to have portal vein thrombosis.  Hematology oncology was consulted.     Patient seen and history confirmed.  He has a family history of his father having been diagnosed also with colon\esophageal cancer in the past.  At this time there is no definitive pathological diagnosis and I discussed with him IR guided liver biopsy  versus endoscopic biopsy of the esophageal lesion.  I have subsequently discussed with gastroenterology in regard to the biopsy and EGD is planned.  I will hold off IR guided liver biopsy until the results of EGD and biopsy results are known.  Patient  to follow in the outpatient setting.  I will discuss with him anticoagulation in the outpatient setting.  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      GI endoscopy reports esophageal mass which has been biopsied.      Patient seen today 1/19/2023: Clinical diagnosis is that of metastatic esophageal carcinoma HER2 positive.  PET scan has been ordered Mediport placement also ordered.  Plan is to treat with 5-FU leucovorin and oxaliplatin based therapy with trastuzumab.   Patient has signed consent at this time.  Patient has significant abdominal pain and early satiety.  PET CT scan will reveal anatomy and also metabolic activity in that regard.  I discussed that after the PET CT scan if indicated patient may have radiation  oncology evaluation for palliative radiation therapy if so indicated.     For C1 FOLFOX Herceptin today 1/30/2023.        History of  Present Illness:      ID Statement:    GETACHEW MG is a 62 year old Male        Interval History:    Patient presents for treatment accompanied by his wife. On assessment, overall reports feeling well aside from worsening neuropathy. He started Gabapentin 300 mg  ER last week and reports no difference in neuropathy. Reports taste buds are improving, eating meat again. Otherwise doing well.      Review of Systems:   Review of Systems:    Afebrile.  Patient on opiate pain medication appears to have slight alteration in mental status related to opiate pain medication and anxiety.  Complaining of heat  and cold sensation sweaty in the palms.  CBC ordered today 1/19/2023.  Hold Eliquis.     ·  Constitutional POSITIVE: Anorexia, Weight Loss     NEGATIVE: Fever, Chills, Malaise      · Eyes NEGATIVE: Blurry Vision, Drainage, Diploplia, Redness, Vision Loss/ Change      · ENMT NEGATIVE: Nasal Discharge, Nasal Congestion, Ear Pain, Mouth Pain, Throat Pain      · Respiratory NEGATIVE: Dry Cough, Productive Cough, Hemoptysis, Wheezing, Shortness of Breath      · Cardiology NEGATIVE: Chest Pain, Dyspnea on Exertion, Orthopnea, Palpitations, Syncope      ·  Gastrointestinal POSITIVE: Abdominal Pain      · Genitourinary NEGATIVE: Discharge, Dysuria, Flank Pain, Frequency, Hematuria      · Musculoskeletal NEGATIVE: Decreased ROM, Pain, Swelling, Stiffness, Weakness      ·  Skin POSITIVE: Rash     NEGATIVE: Mass, Pain, Pruritus, Ulcer            Allergies and Intolerances:       Allergies:         NKDA: Active         Bee Stings: Environment, Anaphylaxis, Active     Outpatient Medication Profile:  * Patient Currently Takes Medications as of 21-Aug-2023 09:05 documented in Structured Notes         oxyCODONE 10 mg oral tablet : Last Dose Taken:  , 1 tab(s) orally every 8 hours, As Needed , Start Date: 14-Aug-2023         gabapentin 300 mg oral capsule: Last Dose Taken:  , 2 cap(s) orally once  a day (at bedtime) , Start Date:  31-Jul-2023         Ativan 0.5 mg oral tablet: Last Dose Taken:  , Take one tablet 30 mins  before the procedure and repeat 5 mins before procedure. Extra tablet to be used PRN. No driving day of procedure., Start Date: 19-Jun-2023         amoxicillin-clavulanate 875 mg-125 mg oral tablet: Last Dose Taken:   , 1 tab(s) orally every 12 hours , Start Date: 17-Jun-2023         BMX (Benadryl, Maalox, Viscous Lidocaine) : Last Dose Taken:  , 10 milliliter(s)  orally 3 times a day, As Needed , Start Date: 08-May-2023         sucralfate 1 g oral tablet: Last Dose Taken:  , 1 tab(s) orally once  a day , Start Date: 24-Apr-2023         pantoprazole 40 mg oral delayed release tablet: Last Dose Taken:  , 1  tab(s) orally 2 times a day x 90 days , Start Date: 27-Mar-2023         Potassium Chloride (Eqv-Klor-Con 10) 10 mEq oral tablet, extended release : Last Dose Taken:  , 1 tab(s) orally once a day , Start Date: 15-Mar-2023         Eliquis 5 mg oral tablet: Last Dose Taken:  , 2 tab(s) orally 2 times  a day , Start Date: 13-Feb-2023         lidocaine-prilocaine 2.5%-2.5% topical cream: Last Dose Taken:  , Apply  topically to affected area 30 minutes prior to mediport access as needed, Start Date: 30-Jan-2023         LORazepam 1 mg oral tablet: Last Dose Taken:  , 1 tab(s) orally once  a day, As Needed                  Last OARRS fill: 1/30/23 #2 for 2 days                  Pt not yet stated         Multiple Vitamins with Minerals oral tablet: Last Dose Taken:  , 2 tab(s)  orally once a day         ascorbic acid: Last Dose Taken:  , 2 tab(s) orally once a day                  Pt unsure of strength         cholecalciferol oral tablet: Last Dose Taken:  , 2 tab(s) orally once  a day                  Pt unsure of stregth         chemotherapy: Last Dose Taken:  , Monday-Wednesday pt wears a pump, the  following monday start infusions         Augmentin 875 mg-125 mg oral tablet: Last Dose Taken:  , 1 tab(s) orally  every 12 hours              Medical History:         Thrombocytopenia: ICD-10: D69.6, Status: Active         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      Social History:   Social Substance History:  ·  Social History denies smoking, alcohol and drug use   ·  Smoking Status never smoker (1)   ·  Alcohol Use occasionally            Vitals and Measurements:   Vitals: Temp: 36.8  HR: 92  RR: 18  BP: 119/70  SPO2%:   98   Measurements: HT(cm): 170.4  WT(kg): 71.1  BSA: 1.83   BMI:  24.4      Physical Exam:      Constitutional: Deferred due to telehealth         Lab Results:     ·  Results        CBC date/time       WBC     HGB     HCT     PLT     Neut      21-Aug-2023 09:38   3.5(L)  11.9(L) 35.9(L) 91(L)   2.49     BMP date/time       NA              K               CL              CO2             BUN             CREAT             21-Aug-2023 09:38   139             3.6             106             N/A             15              0.81     LDH date/time       LDH     25-May-2023 05:58   N/A     Assessment and Plan:      Assessment and Plan:   Assessment:    GETACHEW MG is a 62 year old Male who was consulted by the primary team for abdominal pain.  He presented to the emergency room with intractable abdominal pain and  radiographic imaging revealed that he had extensive liver metastases.  CT scan was associated with thickening of the esophageal lining and was also noted to have portal vein thrombosis.  Hematology oncology was consulted.     Patient seen and history confirmed.  He has a family history of his father having been diagnosed also with colon\esophageal cancer in the past.  At this time there is no definitive pathological diagnosis and I  discussed with him IR guided liver biopsy  versus endoscopic biopsy of the esophageal lesion.  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     Had endoscopic biopsy of the esophageal mass which confirmed esophageal adenocarcinoma. 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  GI endoscopy reports esophageal mass which has been biopsied.      Patient seen today 1/19/2023: Clinical diagnosis is that of metastatic esophageal carcinoma HER2 positive.  PET scan has been ordered Mediport placement also ordered.  Plan is to treat with 5-FU  leucovorin and oxaliplatin based therapy with trastuzumab.  Patient has signed consent at this time.  Patient has significant abdominal pain and early satiety.  PET CT scan will reveal anatomy and also  metabolic activity in that regard.  I discussed that after the PET CT scan if indicated patient may have radiation oncology evaluation for palliative radiation therapy if so indicated.       For C1 FOLFOX Herceptin today 1/30/2023. PET 1/27/23: Unsigned: IMPRESSION:  [Intense hypermetabolic distal esophageal lesion  consistent with patient's reported history of esophageal adenocarcinoma.] Numerous centrally photopenic and peripherally intensely hypermetabolic liver lesions consistent with hepatic metastasis. Numerous moderate to intensely hypermetabolic periaortic  and aortocaval lymph nodes consistent with metastatic ralph involvement.  Single left pulmonary ligament lymph node with mild hypermetabolic activity which may represent additional metastatic ralph involvement.  Patient complains of shortness of breath  and CTA ordered stat.  Ultrasound of abdomen ordered on account of abdominal distention.  I will follow closely every 2 weeks.     CTA confirms bilateral pulmonary embolism.  Recommend patient placed on direct oral anticoagulation.       With Eliquis patient continue follow-up in the outpatient setting.  Complains of  nosebleeds as needed.     2/13/2023: C2 2/13/23: Orders placed: Patient much improved: 5-FU leucovorin and oxaliplatin based therapy with trastuzumab. Continue Eliquis.  02/27/2023:: No complaints of note.  Patient feels much improved.  Shortness of breath secondary to bilateral PEs is much improved on Eliquis.  Patient actually as per his wife doing chores around  the house.  Has a slight bilateral tremor.  He is cleared for chemotherapy CBC has been reviewed.  I will see him before his next chemotherapy in 2 weeks I want to evaluate status of his tremor and to see whether there is any other symptoms of note.   We will continue chemotherapy with trastuzumab FOLFOX.  C3.     3/13/2023: C1  1/30/23 C2 2/13/2023 C3  2/27/2023 C4 3/13/2023: Trastuzumab FOLFOX: No complaints: Oxali dosed at 65mg/M2 today: Proceed with chemotherapy: No pain anywhere today.        04/10/2023 C6 trastuzumab FOLFOX today.  Proceed with chemotherapy at this time.  CT chest abdomen and pelvis ordered.     4/20/2023: CT c/a/p w/good response in pulmonary and liver nodules, adenopathy seen in the abdomen and presumed adrenal metastasis.     4/24/2023: Cycle 7 trastuzumab FOLFOX 04/24/2023.      5/8/2023: Proceed with cycle 8 of FOLFOX + Herceptin. Due to mild neutropenia and thrombocytopenia, Oxaliplatin at 65 mg/m2 and will hold 5FU pump this cycle. ontinue w/nystatin, MMW and pp for n/v. IVF 2x/week. Will try to obtain precert for Neupogen  ppx.      06/05/2023 patient presents for cycle 10 Herceptin plus FOLFOX.  Much improved has neutropenia.  We will proceed with therapy today.  Platelet count is 64 guidelines indicate 75,000 lower limit.  We will approve and proceed with chemotherapy today.     06/19/2023: Patient presents for cycle 10 Herceptin FOLFOX.  Has a rash on his chest.  No itching.  We will review the rash in the middle of the week when he comes for 5-FU pump discontinuation.  Platelet count is slightly below 75 at 72,000.   Proceed  with chemotherapy.  We will continue to follow.  PET CT ordered.     07/03/2023: Patient presents today for cycle 12 Herceptin FOLFOX.  Complaining of neuropathy and desirous of after 12 cycles going on less stringent regimen.  We will continue Herceptin plus 5-FU push after cycle 12 of Herceptin FOLFOX.  Cleared for chemotherapy  today.  PET CT discussed.  Patient had a extremely markedly good response with resolution of most of her hepatic lesions and reduction in all areas of initial PET avidity.     7/31/2023: will hold cycle 2 of Herceptin + 5FU pump due to thrombocytopenia. Gabapentin 300 mg ER bedtime for neuropathy. Continue KCL PO. RTC in 1 week for treatment.      8/7/2023: Will continue cycle 2 of maintenance Herceptin + 5FU today (as per Dr. Blake, ok to treat with plt count of 69). Wll increase Gabapentin to 600 mg ER bedtime.      RTCin 2 weeks.     08/21/2023:PET CT discussed.  Patient had a extremely markedly good response with resolution of most of her hepatic lesions and reduction in all areas of initial PET avidity.  Proceed with chemotherapy Herceptin and 5-FU.  Plan is to continue until unacceptable  toxicity or progression of disease.  Visit Type: Follow Up Visit      Cancer History:   Treatment Synopsis:    GETACHEW MG is a 62 year old Male who was consulted by the primary team for abdominal pain.  He presented to the emergency room with intractable abdominal pain and  radiographic imaging revealed that he had extensive liver metastases.  CT scan was associated with thickening of the esophageal lining and was also noted to have portal vein thrombosis.  Hematology oncology was consulted.     Patient seen and history confirmed.  He has a family history of his father having been diagnosed also with colon\esophageal cancer in the past.  At this time there is no definitive pathological diagnosis and I discussed with him IR guided liver biopsy  versus endoscopic biopsy of the esophageal  lesion.  I have subsequently discussed with gastroenterology in regard to the biopsy and EGD is planned.  I will hold off IR guided liver biopsy until the results of EGD and biopsy results are known.  Patient  to follow in the outpatient setting.  I will discuss with him anticoagulation in the outpatient setting.  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      GI endoscopy reports esophageal mass which has been biopsied.      Patient seen today 1/19/2023: Clinical diagnosis is that of metastatic esophageal carcinoma HER2 positive.  PET scan has been ordered Mediport placement also ordered.  Plan is to treat with 5-FU leucovorin and oxaliplatin based therapy with trastuzumab.   Patient has signed consent at this time.  Patient has significant abdominal pain and early satiety.  PET CT scan will reveal anatomy and also metabolic activity in that regard.  I discussed that after the PET CT scan if indicated patient may have radiation  oncology evaluation for palliative radiation therapy if so indicated.     For C1 FOLFOX Herceptin today 1/30/2023.        History of Present Illness:      ID Statement:    GETACHEW MG is a 62 year old Male        Interval History:    Patient presents for treatment accompanied by his wife. On assessment, overall reports feeling well aside from worsening neuropathy. He started Gabapentin 300 mg  ER last week and reports no difference in neuropathy. Reports taste buds are improving, eating meat again. Otherwise doing well.      Review of Systems:   Review of Systems:    Afebrile.  Patient on opiate pain medication appears to have slight alteration in mental status related to opiate pain medication and anxiety.  Complaining of heat  and cold sensation sweaty in the palms.  CBC ordered today 1/19/2023.  Hold Eliquis.     ·  Constitutional POSITIVE: Anorexia, Weight Loss     NEGATIVE:  Fever, Chills, Malaise      · Eyes NEGATIVE: Blurry Vision, Drainage, Diploplia, Redness, Vision Loss/ Change      · ENMT NEGATIVE: Nasal Discharge, Nasal Congestion, Ear Pain, Mouth Pain, Throat Pain      · Respiratory NEGATIVE: Dry Cough, Productive Cough, Hemoptysis, Wheezing, Shortness of Breath      · Cardiology NEGATIVE: Chest Pain, Dyspnea on Exertion, Orthopnea, Palpitations, Syncope      ·  Gastrointestinal POSITIVE: Abdominal Pain      · Genitourinary NEGATIVE: Discharge, Dysuria, Flank Pain, Frequency, Hematuria      · Musculoskeletal NEGATIVE: Decreased ROM, Pain, Swelling, Stiffness, Weakness      ·  Skin POSITIVE: Rash     NEGATIVE: Mass, Pain, Pruritus, Ulcer            Allergies and Intolerances:       Allergies:         NKDA: Active         Bee Stings: Environment, Anaphylaxis, Active     Outpatient Medication Profile:  * Patient Currently Takes Medications as of 21-Aug-2023 09:05 documented in Structured Notes         oxyCODONE 10 mg oral tablet : Last Dose Taken:  , 1 tab(s) orally every 8 hours, As Needed , Start Date: 14-Aug-2023         gabapentin 300 mg oral capsule: Last Dose Taken:  , 2 cap(s) orally once  a day (at bedtime) , Start Date: 31-Jul-2023         Ativan 0.5 mg oral tablet: Last Dose Taken:  , Take one tablet 30 mins  before the procedure and repeat 5 mins before procedure. Extra tablet to be used PRN. No driving day of procedure., Start Date: 19-Jun-2023         amoxicillin-clavulanate 875 mg-125 mg oral tablet: Last Dose Taken:   , 1 tab(s) orally every 12 hours , Start Date: 17-Jun-2023         BMX (Benadryl, Maalox, Viscous Lidocaine) : Last Dose Taken:  , 10 milliliter(s)  orally 3 times a day, As Needed , Start Date: 08-May-2023         sucralfate 1 g oral tablet: Last Dose Taken:  , 1 tab(s) orally once  a day , Start Date: 24-Apr-2023         pantoprazole 40 mg oral delayed release tablet: Last Dose Taken:  , 1  tab(s) orally 2 times a day x 90 days , Start Date:  27-Mar-2023         Potassium Chloride (Eqv-Klor-Con 10) 10 mEq oral tablet, extended release : Last Dose Taken:  , 1 tab(s) orally once a day , Start Date: 15-Mar-2023         Eliquis 5 mg oral tablet: Last Dose Taken:  , 2 tab(s) orally 2 times  a day , Start Date: 13-Feb-2023         lidocaine-prilocaine 2.5%-2.5% topical cream: Last Dose Taken:  , Apply  topically to affected area 30 minutes prior to mediport access as needed, Start Date: 30-Jan-2023         LORazepam 1 mg oral tablet: Last Dose Taken:  , 1 tab(s) orally once  a day, As Needed                  Last OARRS fill: 1/30/23 #2 for 2 days                  Pt not yet stated         Multiple Vitamins with Minerals oral tablet: Last Dose Taken:  , 2 tab(s)  orally once a day         ascorbic acid: Last Dose Taken:  , 2 tab(s) orally once a day                  Pt unsure of strength         cholecalciferol oral tablet: Last Dose Taken:  , 2 tab(s) orally once  a day                  Pt unsure of stregth         chemotherapy: Last Dose Taken:  , Monday-Wednesday pt wears a pump, the  following monday start infusions         Augmentin 875 mg-125 mg oral tablet: Last Dose Taken:  , 1 tab(s) orally  every 12 hours             Medical History:         Thrombocytopenia: ICD-10: D69.6, Status: Active         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      Social History:   Social Substance History:  ·  Social History denies smoking, alcohol and drug use   ·  Smoking Status never smoker (1)   ·  Alcohol Use occasionally            Vitals and Measurements:   Vitals: Temp: 36.8  HR: 92  RR: 18  BP: 119/70  SPO2%:   98   Measurements: HT(cm): 170.4  WT(kg): 71.1  BSA: 1.83    BMI:  24.4      Physical Exam:      Constitutional: Deferred due to telehealth         Lab Results:     ·  Results        CBC date/time       WBC     HGB     HCT     PLT     Neut      21-Aug-2023 09:38   3.5(L)  11.9(L) 35.9(L) 91(L)   2.49     BMP date/time       NA              K               CL              CO2             BUN             CREAT             21-Aug-2023 09:38   139             3.6             106             N/A             15              0.81     LDH date/time       LDH     25-May-2023 05:58   N/A     Assessment and Plan:      Assessment and Plan:   Assessment:    GETACHEW MG is a 62 year old Male who was consulted by the primary team for abdominal pain.  He presented to the emergency room with intractable abdominal pain and  radiographic imaging revealed that he had extensive liver metastases.  CT scan was associated with thickening of the esophageal lining and was also noted to have portal vein thrombosis.  Hematology oncology was consulted.     Patient seen and history confirmed.  He has a family history of his father having been diagnosed also with colon\esophageal cancer in the past.  At this time there is no definitive pathological diagnosis and I discussed with him IR guided liver biopsy  versus endoscopic biopsy of the esophageal lesion.  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     Had endoscopic biopsy of the esophageal mass which confirmed esophageal adenocarcinoma. 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  GI endoscopy reports esophageal mass which has been biopsied.      Patient seen today 1/19/2023: Clinical diagnosis is that of metastatic esophageal carcinoma HER2 positive.  PET scan has been ordered Mediport placement also ordered.  Plan is to treat with 5-FU  leucovorin and oxaliplatin based therapy with trastuzumab.  Patient has signed consent at this time.  Patient has significant abdominal pain and early  satiety.  PET CT scan will reveal anatomy and also  metabolic activity in that regard.  I discussed that after the PET CT scan if indicated patient may have radiation oncology evaluation for palliative radiation therapy if so indicated.       For C1 FOLFOX Herceptin today 1/30/2023. PET 1/27/23: Unsigned: IMPRESSION:  [Intense hypermetabolic distal esophageal lesion  consistent with patient's reported history of esophageal adenocarcinoma.] Numerous centrally photopenic and peripherally intensely hypermetabolic liver lesions consistent with hepatic metastasis. Numerous moderate to intensely hypermetabolic periaortic  and aortocaval lymph nodes consistent with metastatic ralph involvement.  Single left pulmonary ligament lymph node with mild hypermetabolic activity which may represent additional metastatic ralph involvement.  Patient complains of shortness of breath  and CTA ordered stat.  Ultrasound of abdomen ordered on account of abdominal distention.  I will follow closely every 2 weeks.     CTA confirms bilateral pulmonary embolism.  Recommend patient placed on direct oral anticoagulation.       With Eliquis patient continue follow-up in the outpatient setting.  Complains of nosebleeds as needed.     2/13/2023: C2 2/13/23: Orders placed: Patient much improved: 5-FU leucovorin and oxaliplatin based therapy with trastuzumab. Continue Eliquis.  02/27/2023:: No complaints of note.  Patient feels much improved.  Shortness of breath secondary to bilateral PEs is much improved on Eliquis.  Patient actually as per his wife doing chores around  the house.  Has a slight bilateral tremor.  He is cleared for chemotherapy CBC has been reviewed.  I will see him before his next chemotherapy in 2 weeks I want to evaluate status of his tremor and to see whether there is any other symptoms of note.   We will continue chemotherapy with trastuzumab FOLFOX.  C3.     3/13/2023: C1  1/30/23 C2 2/13/2023 C3  2/27/2023 C4 3/13/2023:  Trastuzumab FOLFOX: No complaints: Oxali dosed at 65mg/M2 today: Proceed with chemotherapy: No pain anywhere today.        04/10/2023 C6 trastuzumab FOLFOX today.  Proceed with chemotherapy at this time.  CT chest abdomen and pelvis ordered.     4/20/2023: CT c/a/p w/good response in pulmonary and liver nodules, adenopathy seen in the abdomen and presumed adrenal metastasis.     4/24/2023: Cycle 7 trastuzumab FOLFOX 04/24/2023.      5/8/2023: Proceed with cycle 8 of FOLFOX + Herceptin. Due to mild neutropenia and thrombocytopenia, Oxaliplatin at 65 mg/m2 and will hold 5FU pump this cycle. ontinue w/nystatin, MMW and pp for n/v. IVF 2x/week. Will try to obtain precert for Neupogen  ppx.      06/05/2023 patient presents for cycle 10 Herceptin plus FOLFOX.  Much improved has neutropenia.  We will proceed with therapy today.  Platelet count is 64 guidelines indicate 75,000 lower limit.  We will approve and proceed with chemotherapy today.     06/19/2023: Patient presents for cycle 10 Herceptin FOLFOX.  Has a rash on his chest.  No itching.  We will review the rash in the middle of the week when he comes for 5-FU pump discontinuation.  Platelet count is slightly below 75 at 72,000.  Proceed  with chemotherapy.  We will continue to follow.  PET CT ordered.     07/03/2023: Patient presents today for cycle 12 Herceptin FOLFOX.  Complaining of neuropathy and desirous of after 12 cycles going on less stringent regimen.  We will continue Herceptin plus 5-FU push after cycle 12 of Herceptin FOLFOX.  Cleared for chemotherapy  today.  PET CT discussed.  Patient had a extremely markedly good response with resolution of most of her hepatic lesions and reduction in all areas of initial PET avidity.     7/31/2023: will hold cycle 2 of Herceptin + 5FU pump due to thrombocytopenia. Gabapentin 300 mg ER bedtime for neuropathy. Continue KCL PO. RTC in 1 week for treatment.      8/7/2023: Will continue cycle 2 of maintenance Herceptin +  5FU today (as per Dr. Blake, ok to treat with plt count of 69). Wll increase Gabapentin to 600 mg ER bedtime.      RTCin 2 weeks.     08/21/2023: PET CT discussed.  Patient had a extremely markedly good response with resolution of most of her hepatic lesions and reduction in all areas of initial PET avidity.  Proceed with chemotherapy Herceptin and 5-FU.  Plan is to continue until unacceptable  toxicity or progression of disease.    10/2/2023: Patient continues chemotherapy for metastatic esophageal cancer plus Herceptin and 5-FU leucovorin based treatment at this time.  CT scan chest abdomen and pelvis reviewed with patient showing disease response to chemotherapy.  Cleared for chemotherapy today.

## 2023-10-02 NOTE — PROGRESS NOTES
Patient ID: Massimo Garcia is a 62 y.o. male.    Subjective    HPI      Objective    BSA: 1.88 meters squared  /76 (BP Location: Right arm, Patient Position: Sitting)   Pulse 79   Temp 36.7 °C (98.1 °F) (Temporal)   Wt 74.6 kg (164 lb 7.4 oz)   SpO2 99%   BMI 25.69 kg/m²      Physical Exam    Performance Status:  {ECOG performance status:61342}      Assessment/Plan        Cancer Staging   No matching staging information was found for the patient.    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

## 2023-10-02 NOTE — PROGRESS NOTES
"NUTRITION Follow-up NOTE  Reason for Visit:  Massimo Garcia is a 62 y.o. male who presents for HER2 positive adenocarcinoma of the esophagus, metastatic to  liver. Being seen for early satiety and abdominal pain.     Anthropometrics:  Anthropometrics  Height: 170.4 cm (5' 7.09\")  Weight: 74.6 kg (164 lb 7.4 oz)  BMI (Calculated): 25.69  IBW/kg (Dietitian Calculated): 68 kg  Weight Change  Weight History / % Weight Change: Stable since 9/18/23  Significant Weight Loss: No  Interpretation of Weight Loss: Other (see comment) (previous weight loss in 3 months from April to July 2023; Recently 3% weight loss in the last 6 months, noted)        Wt Readings from Last 10 Encounters:   10/02/23 74.6 kg (164 lb 7.4 oz)   10/02/23 74.6 kg (164 lb 7.4 oz)   09/13/23 73.5 kg (162 lb 0.6 oz)   06/27/23 72.8 kg (160 lb 7.9 oz)   03/29/23 78.6 kg (173 lb 4.5 oz)   03/27/23 76.4 kg (168 lb 6.9 oz)   03/15/23 78.2 kg (172 lb 6.4 oz)   03/13/23 77 kg (169 lb 12.1 oz)   03/01/23 78.2 kg (172 lb 6.4 oz)   02/27/23 77 kg (169 lb 12.1 oz)         Food And Nutrient Intake:  Food and Nutrient History  Food and Nutrient History: Taste is good, has desire to eat Meals include: cream of wheat (honey), chicken thighs for dinner; ate a late lunch - bologna cheese sandwich, ice cream; 4-5 bottles tea/ water daily, not avoiding anything, sometimes more like 4-5 meals a day; uses miralax as needed for constipation  Energy Intake: Good > 75 %  GI Symptoms: constipation  GI Symptoms greater than 2 weeks: intermittent  Oral Problems: denies           Food Supplement Intake  Oral Nutrition Supplements: Boost Very High Calorie (has at home- was consuming previously but had not need to use recently.)           Nutrition Focused Physical Exam:  Subcutaneous Fat Loss  Orbital Fat Pads: Mild-Moderate (slight dark circles and slight hollowing)  Buccal Fat Pads: Mild-Moderate (flat cheeks, minimal bounce)  Triceps: Defer  Ribs: Defer  Muscle Wasting  Temporalis: " "Mild-Moderate (slight depression)  Pectoralis (Clavicular Region): Defer  Deltoid/Trapezius: Defer  Interosseous: Defer  Trapezius/Infraspinatus/Supraspinatus (Scapular Region): Defer  Quadriceps: Defer  Gastrocnemius: Defer  Edema  Edema: none  Physical Findings (Nutrition Deficiency/Toxicity)  Hair: Negative  Eyes: Negative  Mouth: Negative  Nails: Negative  Skin: Negative        Energy Needs  Calculated Energy Needs Using Equations  Height: 170.4 cm (5' 7.09\")  Weight Used for Equation Calculations: 74.6 kg (164 lb 7.4 oz)  [place] Equation (Overweight or Obese Patients): 1496  Estimated Energy Needs  Total Energy Estimated Needs (kCal): 2238 kCal  Total Estimated Energy Need per Day (kCal/kg): 30 kCal/kg  Estimated Fluid Needs  Total Fluid Estimated Needs (mL): 2300 mL  Estimated Protein Needs  Total Protein Estimated Needs (g): 89.52 g  Total Protein Estimated Needs (g/kg): 1.2 g/kg        Diagnosis   Malnutrition Diagnosis  Patient has Malnutrition Diagnosis: No  Nutrition Diagnosis  Patient has Nutrition Diagnosis: Yes  Diagnosis Status (1): New  Nutrition Diagnosis 1: Predicted inadequate energy intake  Related to (1): pathophysiology of diease/ treatment  As Evidenced by (1): pt with mild/ moderate muscle/ adipose losses and metastatic esophageal cancer undergoing chemotherapy with potential for nutrition impact symptoms.    Interventions/Recommendations   Food and Nutrition Delivery  Meals & Snacks: Energy-modified diet, Protein-modified diet  Goals: Provided education previously on high calorie/ high proteins foods and how to incorporate. Pt will be able to add high calorie foods and high protein foods to most meals and snacks. Use of ONS as needed depending on oral intake.        There are no Patient Instructions on file for this visit.    Monitoring and Evaluation   Food/Nutrient Related History Monitoring  Monitoring and Evaluation Plan: Energy intake, Protein intake  Energy Intake: Estimated " energy intake  Criteria: will incorporate high calorie options at least 75% of the time  Estimated protein intake: Estimated protein intake  Criteria: will incorporate protein source at least 50% of the time at meals and snacks        Time Spent  Prep time on day of patient encounter: 10 minutes  Time spent directly with patient, family or caregiver: 10 minutes  Additional Time Spent on Patient Care Activities: 0 minutes  Documentation Time: 10 minutes  Other Time Spent: 0 minutes  Total: 30 minutes

## 2023-10-04 ENCOUNTER — APPOINTMENT (OUTPATIENT)
Dept: HEMATOLOGY/ONCOLOGY | Facility: CLINIC | Age: 63
End: 2023-10-04
Payer: COMMERCIAL

## 2023-10-12 ENCOUNTER — PHARMACY VISIT (OUTPATIENT)
Dept: PHARMACY | Facility: CLINIC | Age: 63
End: 2023-10-12
Payer: MEDICARE

## 2023-10-12 PROCEDURE — RXMED WILLOW AMBULATORY MEDICATION CHARGE

## 2023-10-16 ENCOUNTER — OFFICE VISIT (OUTPATIENT)
Dept: HEMATOLOGY/ONCOLOGY | Facility: CLINIC | Age: 63
End: 2023-10-16
Payer: COMMERCIAL

## 2023-10-16 ENCOUNTER — INFUSION (OUTPATIENT)
Dept: HEMATOLOGY/ONCOLOGY | Facility: CLINIC | Age: 63
End: 2023-10-16
Payer: COMMERCIAL

## 2023-10-16 VITALS
RESPIRATION RATE: 18 BRPM | HEART RATE: 92 BPM | HEIGHT: 68 IN | SYSTOLIC BLOOD PRESSURE: 125 MMHG | WEIGHT: 160.05 LBS | DIASTOLIC BLOOD PRESSURE: 74 MMHG | BODY MASS INDEX: 24.26 KG/M2 | OXYGEN SATURATION: 99 % | TEMPERATURE: 97.5 F

## 2023-10-16 DIAGNOSIS — C15.9 STAGE IV MALIGNANT NEOPLASM OF ESOPHAGUS (MULTI): Primary | ICD-10-CM

## 2023-10-16 DIAGNOSIS — C15.9 STAGE IV MALIGNANT NEOPLASM OF ESOPHAGUS (MULTI): ICD-10-CM

## 2023-10-16 DIAGNOSIS — C78.7 METASTASES TO THE LIVER (MULTI): ICD-10-CM

## 2023-10-16 LAB
ALBUMIN SERPL BCP-MCNC: 3.9 G/DL (ref 3.4–5)
ALP SERPL-CCNC: 212 U/L (ref 33–136)
ALT SERPL W P-5'-P-CCNC: 38 U/L (ref 10–52)
ANION GAP SERPL CALC-SCNC: 14 MMOL/L (ref 10–20)
AST SERPL W P-5'-P-CCNC: 47 U/L (ref 9–39)
BASOPHILS # BLD AUTO: 0.01 X10*3/UL (ref 0–0.1)
BASOPHILS NFR BLD AUTO: 0.1 %
BILIRUB SERPL-MCNC: 0.9 MG/DL (ref 0–1.2)
BUN SERPL-MCNC: 14 MG/DL (ref 6–23)
CALCIUM SERPL-MCNC: 9 MG/DL (ref 8.6–10.3)
CHLORIDE SERPL-SCNC: 106 MMOL/L (ref 98–107)
CO2 SERPL-SCNC: 24 MMOL/L (ref 21–32)
CREAT SERPL-MCNC: 0.78 MG/DL (ref 0.5–1.3)
EOSINOPHIL # BLD AUTO: 0.04 X10*3/UL (ref 0–0.7)
EOSINOPHIL NFR BLD AUTO: 0.5 %
ERYTHROCYTE [DISTWIDTH] IN BLOOD BY AUTOMATED COUNT: 14.7 % (ref 11.5–14.5)
GFR SERPL CREATININE-BSD FRML MDRD: >90 ML/MIN/1.73M*2
GLUCOSE SERPL-MCNC: 205 MG/DL (ref 74–99)
HCT VFR BLD AUTO: 35.5 % (ref 41–52)
HGB BLD-MCNC: 12.1 G/DL (ref 13.5–17.5)
IMM GRANULOCYTES # BLD AUTO: 0.01 X10*3/UL (ref 0–0.7)
IMM GRANULOCYTES NFR BLD AUTO: 0.1 % (ref 0–0.9)
LYMPHOCYTES # BLD AUTO: 0.75 X10*3/UL (ref 1.2–4.8)
LYMPHOCYTES NFR BLD AUTO: 8.5 %
MCH RBC QN AUTO: 29.3 PG (ref 26–34)
MCHC RBC AUTO-ENTMCNC: 34.1 G/DL (ref 32–36)
MCV RBC AUTO: 86 FL (ref 80–100)
MONOCYTES # BLD AUTO: 0.53 X10*3/UL (ref 0.1–1)
MONOCYTES NFR BLD AUTO: 6 %
NEUTROPHILS # BLD AUTO: 7.46 X10*3/UL (ref 1.2–7.7)
NEUTROPHILS NFR BLD AUTO: 84.8 %
PLATELET # BLD AUTO: 84 X10*3/UL (ref 150–450)
PMV BLD AUTO: 10.7 FL (ref 7.5–11.5)
POTASSIUM SERPL-SCNC: 3.5 MMOL/L (ref 3.5–5.3)
PROT SERPL-MCNC: 6.6 G/DL (ref 6.4–8.2)
RBC # BLD AUTO: 4.13 X10*6/UL (ref 4.5–5.9)
SODIUM SERPL-SCNC: 140 MMOL/L (ref 136–145)
WBC # BLD AUTO: 8.8 X10*3/UL (ref 4.4–11.3)

## 2023-10-16 PROCEDURE — 80053 COMPREHEN METABOLIC PANEL: CPT

## 2023-10-16 PROCEDURE — 96375 TX/PRO/DX INJ NEW DRUG ADDON: CPT

## 2023-10-16 PROCEDURE — 99214 OFFICE O/P EST MOD 30 MIN: CPT | Mod: 25 | Performed by: INTERNAL MEDICINE

## 2023-10-16 PROCEDURE — 96413 CHEMO IV INFUSION 1 HR: CPT

## 2023-10-16 PROCEDURE — 2500000004 HC RX 250 GENERAL PHARMACY W/ HCPCS (ALT 636 FOR OP/ED): Performed by: INTERNAL MEDICINE

## 2023-10-16 PROCEDURE — 85025 COMPLETE CBC W/AUTO DIFF WBC: CPT

## 2023-10-16 PROCEDURE — 96367 TX/PROPH/DG ADDL SEQ IV INF: CPT

## 2023-10-16 PROCEDURE — 99214 OFFICE O/P EST MOD 30 MIN: CPT | Performed by: INTERNAL MEDICINE

## 2023-10-16 PROCEDURE — 96411 CHEMO IV PUSH ADDL DRUG: CPT

## 2023-10-16 PROCEDURE — 1036F TOBACCO NON-USER: CPT | Performed by: INTERNAL MEDICINE

## 2023-10-16 PROCEDURE — 96372 THER/PROPH/DIAG INJ SC/IM: CPT

## 2023-10-16 RX ORDER — ALBUTEROL SULFATE 0.83 MG/ML
3 SOLUTION RESPIRATORY (INHALATION) AS NEEDED
Status: DISCONTINUED | OUTPATIENT
Start: 2023-10-16 | End: 2023-10-16 | Stop reason: HOSPADM

## 2023-10-16 RX ORDER — HEPARIN 100 UNIT/ML
500 SYRINGE INTRAVENOUS AS NEEDED
Status: DISCONTINUED | OUTPATIENT
Start: 2023-10-16 | End: 2023-10-16 | Stop reason: HOSPADM

## 2023-10-16 RX ORDER — ACETAMINOPHEN 325 MG/1
650 TABLET ORAL ONCE
Status: COMPLETED | OUTPATIENT
Start: 2023-10-16 | End: 2023-10-16

## 2023-10-16 RX ORDER — HEPARIN SODIUM,PORCINE/PF 10 UNIT/ML
50 SYRINGE (ML) INTRAVENOUS AS NEEDED
Status: CANCELLED | OUTPATIENT
Start: 2023-10-16

## 2023-10-16 RX ORDER — PROCHLORPERAZINE EDISYLATE 5 MG/ML
10 INJECTION INTRAMUSCULAR; INTRAVENOUS EVERY 6 HOURS PRN
Status: DISCONTINUED | OUTPATIENT
Start: 2023-10-16 | End: 2023-10-16 | Stop reason: HOSPADM

## 2023-10-16 RX ORDER — PROCHLORPERAZINE MALEATE 10 MG
10 TABLET ORAL EVERY 6 HOURS PRN
Status: DISCONTINUED | OUTPATIENT
Start: 2023-10-16 | End: 2023-10-16 | Stop reason: HOSPADM

## 2023-10-16 RX ORDER — CYANOCOBALAMIN 1000 UG/ML
1000 INJECTION, SOLUTION INTRAMUSCULAR; SUBCUTANEOUS ONCE
Status: COMPLETED | OUTPATIENT
Start: 2023-10-16 | End: 2023-10-16

## 2023-10-16 RX ORDER — PALONOSETRON 0.05 MG/ML
0.25 INJECTION, SOLUTION INTRAVENOUS ONCE
Status: COMPLETED | OUTPATIENT
Start: 2023-10-16 | End: 2023-10-16

## 2023-10-16 RX ORDER — DIPHENHYDRAMINE HYDROCHLORIDE 50 MG/ML
50 INJECTION INTRAMUSCULAR; INTRAVENOUS AS NEEDED
Status: CANCELLED | OUTPATIENT
Start: 2023-10-16

## 2023-10-16 RX ORDER — EPINEPHRINE 0.3 MG/.3ML
0.3 INJECTION SUBCUTANEOUS EVERY 5 MIN PRN
Status: CANCELLED | OUTPATIENT
Start: 2023-10-16

## 2023-10-16 RX ORDER — DEXAMETHASONE SODIUM PHOSPHATE 4 MG/ML
8 INJECTION, SOLUTION INTRA-ARTICULAR; INTRALESIONAL; INTRAMUSCULAR; INTRAVENOUS; SOFT TISSUE ONCE
Status: COMPLETED | OUTPATIENT
Start: 2023-10-16 | End: 2023-10-16

## 2023-10-16 RX ORDER — FAMOTIDINE 10 MG/ML
20 INJECTION INTRAVENOUS ONCE AS NEEDED
Status: CANCELLED | OUTPATIENT
Start: 2023-11-13

## 2023-10-16 RX ORDER — DIPHENHYDRAMINE HYDROCHLORIDE 50 MG/ML
50 INJECTION INTRAMUSCULAR; INTRAVENOUS AS NEEDED
Status: CANCELLED | OUTPATIENT
Start: 2023-11-13

## 2023-10-16 RX ORDER — ALBUTEROL SULFATE 0.83 MG/ML
3 SOLUTION RESPIRATORY (INHALATION) AS NEEDED
Status: CANCELLED | OUTPATIENT
Start: 2023-11-13

## 2023-10-16 RX ORDER — ALBUTEROL SULFATE 0.83 MG/ML
3 SOLUTION RESPIRATORY (INHALATION) AS NEEDED
Status: CANCELLED | OUTPATIENT
Start: 2023-10-16

## 2023-10-16 RX ORDER — EPINEPHRINE 0.3 MG/.3ML
0.3 INJECTION SUBCUTANEOUS EVERY 5 MIN PRN
Status: CANCELLED | OUTPATIENT
Start: 2023-11-13

## 2023-10-16 RX ORDER — EPINEPHRINE 0.3 MG/.3ML
0.3 INJECTION SUBCUTANEOUS EVERY 5 MIN PRN
Status: DISCONTINUED | OUTPATIENT
Start: 2023-10-16 | End: 2023-10-16 | Stop reason: HOSPADM

## 2023-10-16 RX ORDER — FAMOTIDINE 10 MG/ML
20 INJECTION INTRAVENOUS ONCE AS NEEDED
Status: CANCELLED | OUTPATIENT
Start: 2023-10-16

## 2023-10-16 RX ORDER — DIPHENHYDRAMINE HYDROCHLORIDE 50 MG/ML
25 INJECTION INTRAMUSCULAR; INTRAVENOUS ONCE
Status: COMPLETED | OUTPATIENT
Start: 2023-10-16 | End: 2023-10-16

## 2023-10-16 RX ORDER — CYANOCOBALAMIN 1000 UG/ML
1000 INJECTION, SOLUTION INTRAMUSCULAR; SUBCUTANEOUS ONCE
Status: CANCELLED | OUTPATIENT
Start: 2023-11-13

## 2023-10-16 RX ORDER — FLUOROURACIL 50 MG/ML
400 INJECTION, SOLUTION INTRAVENOUS ONCE
Status: COMPLETED | OUTPATIENT
Start: 2023-10-16 | End: 2023-10-16

## 2023-10-16 RX ORDER — FAMOTIDINE 10 MG/ML
20 INJECTION INTRAVENOUS ONCE AS NEEDED
Status: DISCONTINUED | OUTPATIENT
Start: 2023-10-16 | End: 2023-10-16 | Stop reason: HOSPADM

## 2023-10-16 RX ORDER — DIPHENHYDRAMINE HYDROCHLORIDE 50 MG/ML
50 INJECTION INTRAMUSCULAR; INTRAVENOUS AS NEEDED
Status: DISCONTINUED | OUTPATIENT
Start: 2023-10-16 | End: 2023-10-16 | Stop reason: HOSPADM

## 2023-10-16 RX ORDER — LORAZEPAM 2 MG/ML
1 INJECTION INTRAMUSCULAR AS NEEDED
Status: DISCONTINUED | OUTPATIENT
Start: 2023-10-16 | End: 2023-10-16 | Stop reason: HOSPADM

## 2023-10-16 RX ORDER — HEPARIN 100 UNIT/ML
500 SYRINGE INTRAVENOUS AS NEEDED
Status: CANCELLED | OUTPATIENT
Start: 2023-10-16

## 2023-10-16 RX ADMIN — PALONOSETRON 250 MCG: 0.05 INJECTION, SOLUTION INTRAVENOUS at 09:56

## 2023-10-16 RX ADMIN — LEUCOVORIN CALCIUM 750 MG: 500 INJECTION, POWDER, LYOPHILIZED, FOR SOLUTION INTRAMUSCULAR; INTRAVENOUS at 11:40

## 2023-10-16 RX ADMIN — DIPHENHYDRAMINE HYDROCHLORIDE 25 MG: 50 INJECTION, SOLUTION INTRAMUSCULAR; INTRAVENOUS at 10:49

## 2023-10-16 RX ADMIN — CYANOCOBALAMIN 1000 MCG: 1000 INJECTION INTRAMUSCULAR; SUBCUTANEOUS at 11:40

## 2023-10-16 RX ADMIN — DEXAMETHASONE SODIUM PHOSPHATE 8 MG: 4 INJECTION INTRA-ARTICULAR; INTRALESIONAL; INTRAMUSCULAR; INTRAVENOUS; SOFT TISSUE at 09:56

## 2023-10-16 RX ADMIN — TRASTUZUMAB-ANNS 300 MG: 150 INJECTION, POWDER, LYOPHILIZED, FOR SOLUTION INTRAVENOUS at 11:02

## 2023-10-16 RX ADMIN — ACETAMINOPHEN 650 MG: 325 TABLET ORAL at 10:49

## 2023-10-16 RX ADMIN — FLUOROURACIL 750 MG: 50 INJECTION, SOLUTION INTRAVENOUS at 12:20

## 2023-10-16 ASSESSMENT — COLUMBIA-SUICIDE SEVERITY RATING SCALE - C-SSRS
1. IN THE PAST MONTH, HAVE YOU WISHED YOU WERE DEAD OR WISHED YOU COULD GO TO SLEEP AND NOT WAKE UP?: NO
6. HAVE YOU EVER DONE ANYTHING, STARTED TO DO ANYTHING, OR PREPARED TO DO ANYTHING TO END YOUR LIFE?: NO
2. HAVE YOU ACTUALLY HAD ANY THOUGHTS OF KILLING YOURSELF?: NO

## 2023-10-16 ASSESSMENT — ENCOUNTER SYMPTOMS
LOSS OF SENSATION IN FEET: 0
DEPRESSION: 0
OCCASIONAL FEELINGS OF UNSTEADINESS: 0

## 2023-10-16 ASSESSMENT — PATIENT HEALTH QUESTIONNAIRE - PHQ9
SUM OF ALL RESPONSES TO PHQ9 QUESTIONS 1 AND 2: 0
2. FEELING DOWN, DEPRESSED OR HOPELESS: NOT AT ALL
1. LITTLE INTEREST OR PLEASURE IN DOING THINGS: NOT AT ALL

## 2023-10-16 ASSESSMENT — PAIN SCALES - GENERAL: PAINLEVEL: 2

## 2023-10-16 NOTE — PATIENT INSTRUCTIONS
Today you met with Dr. Blake.  Hoping your pre-meds return to normal and today's treatment.  Dr. Blake will order imaging in December with and MD to follow to review.  Call for any concerns.

## 2023-10-16 NOTE — PROGRESS NOTES
Patient ID: Massimo Garcia is a 62 y.o. male.  Referring Physician: Tomasa Blake MD  [id number] [place] [address]  Primary Care Provider: DO Ian Anna    HPI  Cancer History:   Treatment Synopsis:    MASSIMO GARCIA is a 62 year old Male who was consulted by the primary team for abdominal pain.  He presented to the emergency room with intractable abdominal pain and  radiographic imaging revealed that he had extensive liver metastases.  CT scan was associated with thickening of the esophageal lining and was also noted to have portal vein thrombosis.  Hematology oncology was consulted.     Patient seen and history confirmed.  He has a family history of his father having been diagnosed also with colon\esophageal cancer in the past.  At this time there is no definitive pathological diagnosis and I discussed with him IR guided liver biopsy  versus endoscopic biopsy of the esophageal lesion.  I have subsequently discussed with gastroenterology in regard to the biopsy and EGD is planned.  I will hold off IR guided liver biopsy until the results of EGD and biopsy results are known.  Patient  to follow in the outpatient setting.  I will discuss with him anticoagulation in the outpatient setting.  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      GI endoscopy reports esophageal mass which has been biopsied.      Patient seen today 1/19/2023: Clinical diagnosis is that of metastatic esophageal carcinoma HER2 positive.  PET scan has been ordered Mediport placement also ordered.  Plan is to treat with 5-FU leucovorin and oxaliplatin based therapy with trastuzumab.   Patient has signed consent at this time.  Patient has significant abdominal pain and early satiety.  PET CT scan will reveal anatomy and also metabolic activity in that regard.  I discussed that after the PET CT  "scan if indicated patient may have radiation  oncology evaluation for palliative radiation therapy if so indicated.     For C1 FOLFOX Herceptin today 1/30/2023.  Review of Systems   Constitutional: Negative.    HENT:  Negative.     Eyes: Negative.    Respiratory: Negative.     Cardiovascular: Negative.         Objective   BSA: 1.86 meters squared  /74 (BP Location: Right arm)   Pulse 92   Temp 36.4 °C (97.5 °F)   Resp 18   Ht (S) 1.716 m (5' 7.56\")   Wt 72.6 kg (160 lb 0.9 oz)   SpO2 99%   BMI 24.65 kg/m²     No family history on file.  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  He presented to the emergency room with intractable abdominal pain and  radiographic imaging revealed that he had extensive liver metastases.  CT scan was associated with thickening of the esophageal lining and was also noted to have portal vein thrombosis.  Hematology oncology was consulted.     Patient seen and history confirmed.  He has a family history of his father having been diagnosed also with colon\esophageal cancer in the past.  At this time there is no definitive pathological diagnosis and I discussed with him IR guided liver biopsy  versus endoscopic biopsy of the esophageal lesion.  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     Had endoscopic biopsy of the esophageal mass which confirmed esophageal " adenocarcinoma. 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  GI endoscopy reports esophageal mass which has been biopsied.      Patient seen today 1/19/2023: Clinical diagnosis is that of metastatic esophageal carcinoma HER2 positive.  PET scan has been ordered Mediport placement also ordered.  Plan is to treat with 5-FU  leucovorin and oxaliplatin based therapy with trastuzumab.  Patient has signed consent at this time.  Patient has significant abdominal pain and early satiety.  PET CT scan will reveal anatomy and also  metabolic activity in that regard.  I discussed that after the PET CT scan if indicated patient may have radiation oncology evaluation for palliative radiation therapy if so indicated.       For C1 FOLFOX Herceptin today 1/30/2023. PET 1/27/23: Unsigned: IMPRESSION:  [Intense hypermetabolic distal esophageal lesion  consistent with patient's reported history of esophageal adenocarcinoma.] Numerous centrally photopenic and peripherally intensely hypermetabolic liver lesions consistent with hepatic metastasis. Numerous moderate to intensely hypermetabolic periaortic  and aortocaval lymph nodes consistent with metastatic ralph involvement.  Single left pulmonary ligament lymph node with mild hypermetabolic activity which may represent additional metastatic ralph involvement.  Patient complains of shortness of breath  and CTA ordered stat.  Ultrasound of abdomen ordered on account of abdominal distention.  I will follow closely every 2 weeks.     CTA confirms bilateral pulmonary embolism.  Recommend patient placed on direct oral anticoagulation.       With Eliquis patient continue follow-up in the outpatient setting.  Complains of nosebleeds as needed.     2/13/2023: C2 2/13/23: Orders placed: Patient much improved: 5-FU leucovorin and oxaliplatin based therapy with trastuzumab. Continue Eliquis.  02/27/2023:: No complaints of note.  Patient feels much improved.  Shortness of breath  secondary to bilateral PEs is much improved on Eliquis.  Patient actually as per his wife doing chores around  the house.  Has a slight bilateral tremor.  He is cleared for chemotherapy CBC has been reviewed.  I will see him before his next chemotherapy in 2 weeks I want to evaluate status of his tremor and to see whether there is any other symptoms of note.   We will continue chemotherapy with trastuzumab FOLFOX.  C3.     3/13/2023: C1  1/30/23 C2 2/13/2023 C3  2/27/2023 C4 3/13/2023: Trastuzumab FOLFOX: No complaints: Oxali dosed at 65mg/M2 today: Proceed with chemotherapy: No pain anywhere today.        04/10/2023 C6 trastuzumab FOLFOX today.  Proceed with chemotherapy at this time.  CT chest abdomen and pelvis ordered.     4/20/2023: CT c/a/p w/good response in pulmonary and liver nodules, adenopathy seen in the abdomen and presumed adrenal metastasis.     4/24/2023: Cycle 7 trastuzumab FOLFOX 04/24/2023.      5/8/2023: Proceed with cycle 8 of FOLFOX + Herceptin. Due to mild neutropenia and thrombocytopenia, Oxaliplatin at 65 mg/m2 and will hold 5FU pump this cycle. ontinue w/nystatin, MMW and pp for n/v. IVF 2x/week. Will try to obtain precert for Neupogen  ppx.      06/05/2023 patient presents for cycle 10 Herceptin plus FOLFOX.  Much improved has neutropenia.  We will proceed with therapy today.  Platelet count is 64 guidelines indicate 75,000 lower limit.  We will approve and proceed with chemotherapy today.     06/19/2023: Patient presents for cycle 10 Herceptin FOLFOX.  Has a rash on his chest.  No itching.  We will review the rash in the middle of the week when he comes for 5-FU pump discontinuation.  Platelet count is slightly below 75 at 72,000.  Proceed  with chemotherapy.  We will continue to follow.  PET CT ordered.     07/03/2023: Patient presents today for cycle 12 Herceptin FOLFOX.  Complaining of neuropathy and desirous of after 12 cycles going on less stringent regimen.  We will continue  Herceptin plus 5-FU push after cycle 12 of Herceptin FOLFOX.  Cleared for chemotherapy  today.  PET CT discussed.  Patient had a extremely markedly good response with resolution of most of her hepatic lesions and reduction in all areas of initial PET avidity.     7/31/2023: will hold cycle 2 of Herceptin + 5FU pump due to thrombocytopenia. Gabapentin 300 mg ER bedtime for neuropathy. Continue KCL PO. RTC in 1 week for treatment.      8/7/2023: Will continue cycle 2 of maintenance Herceptin + 5FU today (as per Dr. Blake, ok to treat with plt count of 69). Wll increase Gabapentin to 600 mg ER bedtime.      RTCin 2 weeks.     08/21/2023:PET CT discussed.  Patient had a extremely markedly good response with resolution of most of her hepatic lesions and reduction in all areas of initial PET avidity.  Proceed with chemotherapy Herceptin and 5-FU.  Plan is to continue until unacceptable  toxicity or progression of disease.  Visit Type: Follow Up Visit      Cancer History:   Treatment Synopsis:    GETACHEW MG is a 62 year old Male who was consulted by the primary team for abdominal pain.  He presented to the emergency room with intractable abdominal pain and  radiographic imaging revealed that he had extensive liver metastases.  CT scan was associated with thickening of the esophageal lining and was also noted to have portal vein thrombosis.  Hematology oncology was consulted.     Patient seen and history confirmed.  He has a family history of his father having been diagnosed also with colon\esophageal cancer in the past.  At this time there is no definitive pathological diagnosis and I discussed with him IR guided liver biopsy  versus endoscopic biopsy of the esophageal lesion.  I have subsequently discussed with gastroenterology in regard to the biopsy and EGD is planned.  I will hold off IR guided liver biopsy until the results of EGD and biopsy results are known.  Patient  to follow in the outpatient setting.  I will  discuss with him anticoagulation in the outpatient setting.  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      GI endoscopy reports esophageal mass which has been biopsied.      Patient seen today 1/19/2023: Clinical diagnosis is that of metastatic esophageal carcinoma HER2 positive.  PET scan has been ordered Mediport placement also ordered.  Plan is to treat with 5-FU leucovorin and oxaliplatin based therapy with trastuzumab.   Patient has signed consent at this time.  Patient has significant abdominal pain and early satiety.  PET CT scan will reveal anatomy and also metabolic activity in that regard.  I discussed that after the PET CT scan if indicated patient may have radiation  oncology evaluation for palliative radiation therapy if so indicated.     For C1 FOLFOX Herceptin today 1/30/2023.    Massimo Garcia  reports that he has quit smoking. His smoking use included cigarettes. He has never been exposed to tobacco smoke. He has never used smokeless tobacco.  He  reports that he does not currently use alcohol.  He  reports no history of drug use.    Physical Exam  Constitutional:       Appearance: Normal appearance.   HENT:      Head: Normocephalic and atraumatic.      Nose: Nose normal.   Cardiovascular:      Rate and Rhythm: Normal rate and regular rhythm.   Musculoskeletal:      Cervical back: Normal range of motion and neck supple.   Neurological:      Mental Status: He is alert.         Performance Status:  Symptomatic; fully ambulatory    Assessment/Plan    Continue with Trastuzumab 5FU.  CT ordered.      Diagnoses and all orders for this visit:  Metastases to the liver (CMS/HCC)  -     Clinic Appointment Request  -     CT chest abdomen pelvis w and wo IV contrast; Future  -     Clinic Appointment Request Follow Up; Future  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)  -     Clinic Appointment  Request  -     CT chest abdomen pelvis w and wo IV contrast; Future  -     Clinic Appointment Request Follow Up; Future             Davis-Sidney Blake MD

## 2023-10-16 NOTE — SIGNIFICANT EVENT
10/16/23 0857   Prechemo Checklist   Has the patient been in the hospital, ED, or urgent care since last date of service No   Chemo/Immuno Consent Signed Yes   Protocol/Indications Verified Yes   Confirmed to previous date/time of medication Yes   Compared to previous dose Yes   All medications are dated accurately Yes   Pregnancy Test Negative Not applicable   Parameters Met Yes   Provider Notified Yes   Provider Name Hayden   Is Patient Proceeding With Treatment? Yes   BSA/Weight-Height Verified Yes   Dose Calculations Verified Yes

## 2023-10-17 ENCOUNTER — PHARMACY VISIT (OUTPATIENT)
Dept: PHARMACY | Facility: CLINIC | Age: 63
End: 2023-10-17
Payer: MEDICARE

## 2023-10-17 PROCEDURE — RXMED WILLOW AMBULATORY MEDICATION CHARGE

## 2023-10-18 ASSESSMENT — ENCOUNTER SYMPTOMS
CONSTITUTIONAL NEGATIVE: 1
RESPIRATORY NEGATIVE: 1
EYES NEGATIVE: 1
CARDIOVASCULAR NEGATIVE: 1

## 2023-10-23 ENCOUNTER — PHARMACY VISIT (OUTPATIENT)
Dept: PHARMACY | Facility: CLINIC | Age: 63
End: 2023-10-23
Payer: MEDICARE

## 2023-10-23 ENCOUNTER — TELEPHONE (OUTPATIENT)
Dept: HEMATOLOGY/ONCOLOGY | Facility: CLINIC | Age: 63
End: 2023-10-23
Payer: COMMERCIAL

## 2023-10-23 DIAGNOSIS — C15.9 STAGE IV MALIGNANT NEOPLASM OF ESOPHAGUS (MULTI): ICD-10-CM

## 2023-10-23 PROCEDURE — RXMED WILLOW AMBULATORY MEDICATION CHARGE

## 2023-10-23 NOTE — TELEPHONE ENCOUNTER
Pt called and told per Dr. Blake to call his PCP and see if he will refill his pain medication.  Told to call if PCP will not fill.  Also discussed possibility of seeing Palliative Med. And he was open to this.

## 2023-10-24 RX ORDER — DEXTROMETHORPHAN HYDROBROMIDE, GUAIFENESIN 5; 100 MG/5ML; MG/5ML
650 LIQUID ORAL ONCE
Status: CANCELLED
Start: 2024-01-08 | End: 2024-01-08

## 2023-10-24 RX ORDER — DEXAMETHASONE SODIUM PHOSPHATE 4 MG/ML
8 INJECTION, SOLUTION INTRA-ARTICULAR; INTRALESIONAL; INTRAMUSCULAR; INTRAVENOUS; SOFT TISSUE ONCE
Status: CANCELLED | OUTPATIENT
Start: 2024-01-22

## 2023-10-24 RX ORDER — DIPHENHYDRAMINE HYDROCHLORIDE 50 MG/ML
25 INJECTION INTRAMUSCULAR; INTRAVENOUS ONCE
Status: CANCELLED
Start: 2023-11-27 | End: 2023-11-27

## 2023-10-24 RX ORDER — DEXAMETHASONE SODIUM PHOSPHATE 4 MG/ML
8 INJECTION, SOLUTION INTRA-ARTICULAR; INTRALESIONAL; INTRAMUSCULAR; INTRAVENOUS; SOFT TISSUE ONCE
Status: CANCELLED | OUTPATIENT
Start: 2023-11-27

## 2023-10-24 RX ORDER — DIPHENHYDRAMINE HYDROCHLORIDE 50 MG/ML
25 INJECTION INTRAMUSCULAR; INTRAVENOUS ONCE
Status: CANCELLED
Start: 2024-01-08 | End: 2024-01-08

## 2023-10-24 RX ORDER — DEXTROMETHORPHAN HYDROBROMIDE, GUAIFENESIN 5; 100 MG/5ML; MG/5ML
650 LIQUID ORAL ONCE
Status: CANCELLED
Start: 2023-12-25 | End: 2023-12-25

## 2023-10-24 RX ORDER — DEXTROMETHORPHAN HYDROBROMIDE, GUAIFENESIN 5; 100 MG/5ML; MG/5ML
650 LIQUID ORAL ONCE
Status: CANCELLED
Start: 2023-11-13 | End: 2023-11-13

## 2023-10-24 RX ORDER — DEXAMETHASONE SODIUM PHOSPHATE 4 MG/ML
8 INJECTION, SOLUTION INTRA-ARTICULAR; INTRALESIONAL; INTRAMUSCULAR; INTRAVENOUS; SOFT TISSUE ONCE
Status: CANCELLED | OUTPATIENT
Start: 2023-12-25

## 2023-10-24 RX ORDER — DIPHENHYDRAMINE HYDROCHLORIDE 50 MG/ML
25 INJECTION INTRAMUSCULAR; INTRAVENOUS ONCE
Status: CANCELLED
Start: 2023-11-13 | End: 2023-11-13

## 2023-10-24 RX ORDER — DEXAMETHASONE SODIUM PHOSPHATE 4 MG/ML
8 INJECTION, SOLUTION INTRA-ARTICULAR; INTRALESIONAL; INTRAMUSCULAR; INTRAVENOUS; SOFT TISSUE ONCE
Status: CANCELLED | OUTPATIENT
Start: 2024-01-08

## 2023-10-24 RX ORDER — DEXAMETHASONE SODIUM PHOSPHATE 4 MG/ML
8 INJECTION, SOLUTION INTRA-ARTICULAR; INTRALESIONAL; INTRAMUSCULAR; INTRAVENOUS; SOFT TISSUE ONCE
Status: CANCELLED | OUTPATIENT
Start: 2023-12-11

## 2023-10-24 RX ORDER — DEXAMETHASONE SODIUM PHOSPHATE 4 MG/ML
8 INJECTION, SOLUTION INTRA-ARTICULAR; INTRALESIONAL; INTRAMUSCULAR; INTRAVENOUS; SOFT TISSUE ONCE
Status: CANCELLED | OUTPATIENT
Start: 2023-11-13

## 2023-10-24 RX ORDER — DIPHENHYDRAMINE HYDROCHLORIDE 50 MG/ML
25 INJECTION INTRAMUSCULAR; INTRAVENOUS ONCE
Status: CANCELLED
Start: 2024-01-22 | End: 2024-01-22

## 2023-10-24 RX ORDER — DEXTROMETHORPHAN HYDROBROMIDE, GUAIFENESIN 5; 100 MG/5ML; MG/5ML
650 LIQUID ORAL ONCE
Status: CANCELLED
Start: 2023-12-11 | End: 2023-12-11

## 2023-10-24 RX ORDER — DEXTROMETHORPHAN HYDROBROMIDE, GUAIFENESIN 5; 100 MG/5ML; MG/5ML
650 LIQUID ORAL ONCE
Status: CANCELLED
Start: 2023-11-27 | End: 2023-11-27

## 2023-10-24 RX ORDER — DEXTROMETHORPHAN HYDROBROMIDE, GUAIFENESIN 5; 100 MG/5ML; MG/5ML
650 LIQUID ORAL ONCE
Status: CANCELLED
Start: 2024-01-22 | End: 2024-01-22

## 2023-10-24 RX ORDER — DIPHENHYDRAMINE HYDROCHLORIDE 50 MG/ML
25 INJECTION INTRAMUSCULAR; INTRAVENOUS ONCE
Status: CANCELLED
Start: 2023-12-25 | End: 2023-12-25

## 2023-10-24 RX ORDER — DIPHENHYDRAMINE HYDROCHLORIDE 50 MG/ML
25 INJECTION INTRAMUSCULAR; INTRAVENOUS ONCE
Status: CANCELLED
Start: 2023-12-11 | End: 2023-12-11

## 2023-10-30 ENCOUNTER — INFUSION (OUTPATIENT)
Dept: HEMATOLOGY/ONCOLOGY | Facility: CLINIC | Age: 63
End: 2023-10-30
Payer: COMMERCIAL

## 2023-10-30 ENCOUNTER — OFFICE VISIT (OUTPATIENT)
Dept: HEMATOLOGY/ONCOLOGY | Facility: CLINIC | Age: 63
End: 2023-10-30
Payer: COMMERCIAL

## 2023-10-30 VITALS
WEIGHT: 160.83 LBS | DIASTOLIC BLOOD PRESSURE: 77 MMHG | TEMPERATURE: 98.6 F | OXYGEN SATURATION: 100 % | RESPIRATION RATE: 18 BRPM | HEART RATE: 93 BPM | SYSTOLIC BLOOD PRESSURE: 124 MMHG | BODY MASS INDEX: 24.77 KG/M2

## 2023-10-30 DIAGNOSIS — C78.7 METASTASES TO THE LIVER (MULTI): ICD-10-CM

## 2023-10-30 DIAGNOSIS — C15.9 STAGE IV MALIGNANT NEOPLASM OF ESOPHAGUS (MULTI): ICD-10-CM

## 2023-10-30 DIAGNOSIS — C15.9 STAGE IV MALIGNANT NEOPLASM OF ESOPHAGUS (MULTI): Primary | ICD-10-CM

## 2023-10-30 LAB
ALBUMIN SERPL BCP-MCNC: 4 G/DL (ref 3.4–5)
ALP SERPL-CCNC: 177 U/L (ref 33–136)
ALT SERPL W P-5'-P-CCNC: 31 U/L (ref 10–52)
ANION GAP SERPL CALC-SCNC: 12 MMOL/L (ref 10–20)
AST SERPL W P-5'-P-CCNC: 38 U/L (ref 9–39)
BASOPHILS # BLD AUTO: 0.02 X10*3/UL (ref 0–0.1)
BASOPHILS NFR BLD AUTO: 0.3 %
BILIRUB SERPL-MCNC: 1.2 MG/DL (ref 0–1.2)
BUN SERPL-MCNC: 13 MG/DL (ref 6–23)
CALCIUM SERPL-MCNC: 9.5 MG/DL (ref 8.6–10.3)
CHLORIDE SERPL-SCNC: 105 MMOL/L (ref 98–107)
CO2 SERPL-SCNC: 25 MMOL/L (ref 21–32)
CREAT SERPL-MCNC: 0.81 MG/DL (ref 0.5–1.3)
EOSINOPHIL # BLD AUTO: 0.06 X10*3/UL (ref 0–0.7)
EOSINOPHIL NFR BLD AUTO: 0.8 %
ERYTHROCYTE [DISTWIDTH] IN BLOOD BY AUTOMATED COUNT: 15 % (ref 11.5–14.5)
GFR SERPL CREATININE-BSD FRML MDRD: >90 ML/MIN/1.73M*2
GLUCOSE SERPL-MCNC: 153 MG/DL (ref 74–99)
HCT VFR BLD AUTO: 36.9 % (ref 41–52)
HGB BLD-MCNC: 12.4 G/DL (ref 13.5–17.5)
IMM GRANULOCYTES # BLD AUTO: 0.01 X10*3/UL (ref 0–0.7)
IMM GRANULOCYTES NFR BLD AUTO: 0.1 % (ref 0–0.9)
LYMPHOCYTES # BLD AUTO: 1.02 X10*3/UL (ref 1.2–4.8)
LYMPHOCYTES NFR BLD AUTO: 13.7 %
MAGNESIUM SERPL-MCNC: 1.89 MG/DL (ref 1.6–2.4)
MCH RBC QN AUTO: 28.8 PG (ref 26–34)
MCHC RBC AUTO-ENTMCNC: 33.6 G/DL (ref 32–36)
MCV RBC AUTO: 86 FL (ref 80–100)
MONOCYTES # BLD AUTO: 0.55 X10*3/UL (ref 0.1–1)
MONOCYTES NFR BLD AUTO: 7.4 %
NEUTROPHILS # BLD AUTO: 5.79 X10*3/UL (ref 1.2–7.7)
NEUTROPHILS NFR BLD AUTO: 77.7 %
PLATELET # BLD AUTO: 110 X10*3/UL (ref 150–450)
PMV BLD AUTO: 10.3 FL (ref 7.5–11.5)
POTASSIUM SERPL-SCNC: 3.3 MMOL/L (ref 3.5–5.3)
PROT SERPL-MCNC: 7.2 G/DL (ref 6.4–8.2)
RBC # BLD AUTO: 4.3 X10*6/UL (ref 4.5–5.9)
SODIUM SERPL-SCNC: 139 MMOL/L (ref 136–145)
WBC # BLD AUTO: 7.5 X10*3/UL (ref 4.4–11.3)

## 2023-10-30 PROCEDURE — 80053 COMPREHEN METABOLIC PANEL: CPT

## 2023-10-30 PROCEDURE — 99214 OFFICE O/P EST MOD 30 MIN: CPT | Performed by: INTERNAL MEDICINE

## 2023-10-30 PROCEDURE — 96375 TX/PRO/DX INJ NEW DRUG ADDON: CPT | Mod: INF

## 2023-10-30 PROCEDURE — 1036F TOBACCO NON-USER: CPT | Performed by: INTERNAL MEDICINE

## 2023-10-30 PROCEDURE — 2500000004 HC RX 250 GENERAL PHARMACY W/ HCPCS (ALT 636 FOR OP/ED): Mod: JZ | Performed by: INTERNAL MEDICINE

## 2023-10-30 PROCEDURE — 85025 COMPLETE CBC W/AUTO DIFF WBC: CPT

## 2023-10-30 PROCEDURE — 2500000004 HC RX 250 GENERAL PHARMACY W/ HCPCS (ALT 636 FOR OP/ED): Performed by: INTERNAL MEDICINE

## 2023-10-30 PROCEDURE — 83735 ASSAY OF MAGNESIUM: CPT

## 2023-10-30 PROCEDURE — 2500000004 HC RX 250 GENERAL PHARMACY W/ HCPCS (ALT 636 FOR OP/ED)

## 2023-10-30 PROCEDURE — 2500000004 HC RX 250 GENERAL PHARMACY W/ HCPCS (ALT 636 FOR OP/ED): Mod: JW | Performed by: INTERNAL MEDICINE

## 2023-10-30 PROCEDURE — 96413 CHEMO IV INFUSION 1 HR: CPT

## 2023-10-30 PROCEDURE — 96411 CHEMO IV PUSH ADDL DRUG: CPT

## 2023-10-30 PROCEDURE — 96367 TX/PROPH/DG ADDL SEQ IV INF: CPT

## 2023-10-30 RX ORDER — POTASSIUM CHLORIDE 750 MG/1
10 TABLET, FILM COATED, EXTENDED RELEASE ORAL DAILY
Qty: 30 TABLET | Refills: 3 | Status: SHIPPED | OUTPATIENT
Start: 2023-10-30 | End: 2023-11-05

## 2023-10-30 RX ORDER — PROCHLORPERAZINE MALEATE 10 MG
10 TABLET ORAL EVERY 6 HOURS PRN
Status: DISCONTINUED | OUTPATIENT
Start: 2023-10-30 | End: 2023-10-30 | Stop reason: HOSPADM

## 2023-10-30 RX ORDER — LORAZEPAM 2 MG/ML
1 INJECTION INTRAMUSCULAR AS NEEDED
Status: DISCONTINUED | OUTPATIENT
Start: 2023-10-30 | End: 2023-10-30 | Stop reason: HOSPADM

## 2023-10-30 RX ORDER — OXYCODONE HYDROCHLORIDE 10 MG/1
10 TABLET ORAL EVERY 8 HOURS PRN
Qty: 20 TABLET | Refills: 0 | Status: SHIPPED | OUTPATIENT
Start: 2023-10-30 | End: 2023-11-02 | Stop reason: ALTCHOICE

## 2023-10-30 RX ORDER — ACETAMINOPHEN 325 MG/1
650 TABLET ORAL ONCE
Status: COMPLETED | OUTPATIENT
Start: 2023-10-30 | End: 2023-10-30

## 2023-10-30 RX ORDER — DIPHENHYDRAMINE HYDROCHLORIDE 50 MG/ML
50 INJECTION INTRAMUSCULAR; INTRAVENOUS AS NEEDED
Status: DISCONTINUED | OUTPATIENT
Start: 2023-10-30 | End: 2023-10-30 | Stop reason: HOSPADM

## 2023-10-30 RX ORDER — PALONOSETRON 0.05 MG/ML
0.25 INJECTION, SOLUTION INTRAVENOUS ONCE
Status: COMPLETED | OUTPATIENT
Start: 2023-10-30 | End: 2023-10-30

## 2023-10-30 RX ORDER — EPINEPHRINE 0.3 MG/.3ML
0.3 INJECTION SUBCUTANEOUS EVERY 5 MIN PRN
Status: DISCONTINUED | OUTPATIENT
Start: 2023-10-30 | End: 2023-10-30 | Stop reason: HOSPADM

## 2023-10-30 RX ORDER — DEXAMETHASONE SODIUM PHOSPHATE 4 MG/ML
8 INJECTION, SOLUTION INTRA-ARTICULAR; INTRALESIONAL; INTRAMUSCULAR; INTRAVENOUS; SOFT TISSUE ONCE
Status: COMPLETED | OUTPATIENT
Start: 2023-10-30 | End: 2023-10-30

## 2023-10-30 RX ORDER — ALBUTEROL SULFATE 0.83 MG/ML
3 SOLUTION RESPIRATORY (INHALATION) AS NEEDED
Status: DISCONTINUED | OUTPATIENT
Start: 2023-10-30 | End: 2023-10-30 | Stop reason: HOSPADM

## 2023-10-30 RX ORDER — HEPARIN 100 UNIT/ML
SYRINGE INTRAVENOUS
Status: COMPLETED
Start: 2023-10-30 | End: 2023-10-30

## 2023-10-30 RX ORDER — FAMOTIDINE 10 MG/ML
20 INJECTION INTRAVENOUS ONCE AS NEEDED
Status: DISCONTINUED | OUTPATIENT
Start: 2023-10-30 | End: 2023-10-30 | Stop reason: HOSPADM

## 2023-10-30 RX ORDER — FLUOROURACIL 50 MG/ML
400 INJECTION, SOLUTION INTRAVENOUS ONCE
Status: COMPLETED | OUTPATIENT
Start: 2023-10-30 | End: 2023-10-30

## 2023-10-30 RX ORDER — DIPHENHYDRAMINE HYDROCHLORIDE 50 MG/ML
25 INJECTION INTRAMUSCULAR; INTRAVENOUS ONCE
Status: COMPLETED | OUTPATIENT
Start: 2023-10-30 | End: 2023-10-30

## 2023-10-30 RX ORDER — PROCHLORPERAZINE EDISYLATE 5 MG/ML
10 INJECTION INTRAMUSCULAR; INTRAVENOUS EVERY 6 HOURS PRN
Status: DISCONTINUED | OUTPATIENT
Start: 2023-10-30 | End: 2023-10-30 | Stop reason: HOSPADM

## 2023-10-30 RX ADMIN — HEPARIN 500 UNITS: 100 SYRINGE at 13:48

## 2023-10-30 RX ADMIN — LEUCOVORIN CALCIUM 740 MG: 350 INJECTION, POWDER, LYOPHILIZED, FOR SOLUTION INTRAMUSCULAR; INTRAVENOUS at 12:36

## 2023-10-30 RX ADMIN — DIPHENHYDRAMINE HYDROCHLORIDE 25 MG: 50 INJECTION INTRAMUSCULAR; INTRAVENOUS at 10:40

## 2023-10-30 RX ADMIN — DEXAMETHASONE SODIUM PHOSPHATE 8 MG: 4 INJECTION INTRA-ARTICULAR; INTRALESIONAL; INTRAMUSCULAR; INTRAVENOUS; SOFT TISSUE at 10:39

## 2023-10-30 RX ADMIN — PALONOSETRON 250 MCG: 0.05 INJECTION, SOLUTION INTRAVENOUS at 10:39

## 2023-10-30 RX ADMIN — TRASTUZUMAB-ANNS 300.3 MG: 150 INJECTION, POWDER, LYOPHILIZED, FOR SOLUTION INTRAVENOUS at 11:43

## 2023-10-30 RX ADMIN — FLUOROURACIL 750 MG: 50 INJECTION, SOLUTION INTRAVENOUS at 13:45

## 2023-10-30 RX ADMIN — ACETAMINOPHEN 650 MG: 325 TABLET ORAL at 10:38

## 2023-10-30 ASSESSMENT — ENCOUNTER SYMPTOMS
LOSS OF SENSATION IN FEET: 1
OCCASIONAL FEELINGS OF UNSTEADINESS: 0
DEPRESSION: 0

## 2023-10-30 ASSESSMENT — PAIN SCALES - GENERAL: PAINLEVEL: 3

## 2023-10-30 NOTE — PROGRESS NOTES
Pt received herceptin, leucovorin and 5-fu push without incident. Positive Blood return prior to and upon completion of each chemotherapy. Reviewed updated schedule with patient and wife including MRI and Echo. aCndace FERRARO stated she would place a reminder to check results of MRI and have Dr. Blake followup. Educated pt on increase in dosage of potassium. Confirmed it was sent to pharmacy. Pt left infusion center with wife stable.

## 2023-10-30 NOTE — PROGRESS NOTES
Patient ID: Massimo Garcia is a 62 y.o. male.    Subjective    HPI  62-year-old male with metastatic stage IV esophageal cancer HER2 positive.  Has done well on trastuzumab FOLFOX regimen.  Today complains of dizziness and presyncopal symptoms.  Metastatic burden is liver some lung lesions as well as abdominal lymphadenopathy.  We will exclude metastatic disease to the brain by ordering an MRI of the brain with and without contrast.  Patient scheduled for chemotherapy with FOLFOX trastuzumab today cleared for therapy CBC reviewed CMP reviewed.  Patient also complaining of abdominal pain And pain relief with oxycodone prescribed.  Patient to establish care with pain management.    Objective    BSA: 1.87 meters squared  /77 (BP Location: Left arm, Patient Position: Sitting, BP Cuff Size: Adult)   Pulse 93   Temp 37 °C (98.6 °F) (Temporal)   Resp 18   Wt 73 kg (160 lb 13.2 oz)   SpO2 100%   BMI 24.77 kg/m²      Physical Exam  Constitutional:       Appearance: Normal appearance.   HENT:      Head: Normocephalic and atraumatic.      Mouth/Throat:      Mouth: Mucous membranes are dry.   Abdominal:      General: Abdomen is flat.   Musculoskeletal:         General: Normal range of motion.   Neurological:      Mental Status: He is alert.         Performance Status:  Symptomatic; fully ambulatory      Assessment/Plan       Cancer Staging   No matching staging information was found for the patient.    Oncology History   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)   9/13/2023 Initial Diagnosis    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)     9/13/2023 -  Chemotherapy    Trastuzumab + mFOLFOX6 (Fluorouracil Continuous Infusion / Leucovorin / Oxaliplatin), 14 Day Cycles     Metastases to the liver (CMS/HCC)   9/13/2023 Initial Diagnosis    Metastases to the liver (CMS/HCC)     9/13/2023 -  Chemotherapy    Trastuzumab + mFOLFOX6 (Fluorouracil Continuous Infusion / Leucovorin / Oxaliplatin), 14 Day Cycles        Currently being presents for chemotherapy trastuzumab FOLFOX.  Cleared for therapy today.  Noted to have hypokalemia and oral potassium will be prescribed and patient to take daily.    As per HPI patient has presyncopal symptoms MRI of the brain ordered.  Patient will be seen after MRI has been resulted.  Continue with therapy today.  Diagnoses and all orders for this visit:  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)  -     Clinic Appointment Request  -     MR brain w and wo IV contrast; Future  -     potassium chloride CR (Klor-Con) 10 mEq ER tablet; TAKE 1 TABLET BY MOUTH ONCE DAILY  -     Magnesium; Future  Metastases to the liver (CMS/HCC)  -     Clinic Appointment Request  -     MR brain w and wo IV contrast; Future  -     potassium chloride CR (Klor-Con) 10 mEq ER tablet; TAKE 1 TABLET BY MOUTH ONCE DAILY  -     Magnesium; Future           Davis-Sidney Blake MD

## 2023-10-30 NOTE — PROGRESS NOTES
"Pt c/o feeling of \"fogginess\" in brain. Some episodes of lightheadedness and dizziness while sitting. Appetite and hydration WNL. Numbness and tingling same as previous assessments and not increasing in intensity. Bilateral feet feel like \"walking on blisters\" which has been unchanged. Dr. Blake aware of all the above symptoms.   "

## 2023-10-30 NOTE — PROGRESS NOTES
Talked to Dr. Blake again because pt had an syncopal episode sitting in chair before infusion even started. He described it as lightheadedness, hot flush but no heart palpitations. Potassium = 3.3. Dr. Blake stated he would order an MRI and increase home po potassium, but to proceed with treatment. Educated pt and wife on plan of care.

## 2023-10-30 NOTE — PATIENT INSTRUCTIONS
Today you met with Dr. Blake.  He will refill your pain meds until you see Palliative Medicine this week.  Dr. Blake will see you next after the CT in December.

## 2023-11-02 ENCOUNTER — PHARMACY VISIT (OUTPATIENT)
Dept: PHARMACY | Facility: CLINIC | Age: 63
End: 2023-11-02
Payer: MEDICARE

## 2023-11-02 ENCOUNTER — OFFICE VISIT (OUTPATIENT)
Dept: HEMATOLOGY/ONCOLOGY | Facility: CLINIC | Age: 63
End: 2023-11-02
Payer: COMMERCIAL

## 2023-11-02 VITALS
SYSTOLIC BLOOD PRESSURE: 147 MMHG | BODY MASS INDEX: 25.27 KG/M2 | OXYGEN SATURATION: 100 % | DIASTOLIC BLOOD PRESSURE: 68 MMHG | WEIGHT: 164.02 LBS | HEART RATE: 91 BPM | RESPIRATION RATE: 17 BRPM | TEMPERATURE: 97.9 F

## 2023-11-02 DIAGNOSIS — Z51.5 PALLIATIVE CARE ENCOUNTER: Primary | ICD-10-CM

## 2023-11-02 DIAGNOSIS — R04.0 EPISTAXIS: ICD-10-CM

## 2023-11-02 DIAGNOSIS — T40.2X5A THERAPEUTIC OPIOID-INDUCED CONSTIPATION (OIC): ICD-10-CM

## 2023-11-02 DIAGNOSIS — C15.9 STAGE IV MALIGNANT NEOPLASM OF ESOPHAGUS (MULTI): ICD-10-CM

## 2023-11-02 DIAGNOSIS — K59.03 THERAPEUTIC OPIOID-INDUCED CONSTIPATION (OIC): ICD-10-CM

## 2023-11-02 DIAGNOSIS — G62.0 DRUG-INDUCED POLYNEUROPATHY (MULTI): ICD-10-CM

## 2023-11-02 DIAGNOSIS — G89.3 CANCER RELATED PAIN: ICD-10-CM

## 2023-11-02 LAB
AMPHETAMINES UR QL SCN: ABNORMAL
BARBITURATES UR QL SCN: ABNORMAL
BENZODIAZ UR QL SCN: ABNORMAL
BZE UR QL SCN: ABNORMAL
CANNABINOIDS UR QL SCN: ABNORMAL
FENTANYL+NORFENTANYL UR QL SCN: ABNORMAL
OPIATES UR QL SCN: ABNORMAL
OXYCODONE+OXYMORPHONE UR QL SCN: ABNORMAL
PCP UR QL SCN: ABNORMAL

## 2023-11-02 PROCEDURE — 80361 OPIATES 1 OR MORE: CPT | Performed by: NURSE PRACTITIONER

## 2023-11-02 PROCEDURE — 80307 DRUG TEST PRSMV CHEM ANLYZR: CPT | Performed by: NURSE PRACTITIONER

## 2023-11-02 PROCEDURE — RXMED WILLOW AMBULATORY MEDICATION CHARGE

## 2023-11-02 PROCEDURE — 99205 OFFICE O/P NEW HI 60 MIN: CPT | Performed by: NURSE PRACTITIONER

## 2023-11-02 PROCEDURE — 80349 CANNABINOIDS NATURAL: CPT | Performed by: NURSE PRACTITIONER

## 2023-11-02 PROCEDURE — 1036F TOBACCO NON-USER: CPT | Performed by: NURSE PRACTITIONER

## 2023-11-02 PROCEDURE — 99215 OFFICE O/P EST HI 40 MIN: CPT | Performed by: NURSE PRACTITIONER

## 2023-11-02 PROCEDURE — 80365 DRUG SCREENING OXYCODONE: CPT | Performed by: NURSE PRACTITIONER

## 2023-11-02 RX ORDER — NALOXONE HYDROCHLORIDE 4 MG/.1ML
4 SPRAY NASAL AS NEEDED
Qty: 2 EACH | Refills: 0 | Status: SHIPPED | OUTPATIENT
Start: 2023-11-02 | End: 2023-11-13 | Stop reason: HOSPADM

## 2023-11-02 RX ORDER — LACTULOSE 10 G/15ML
20 SOLUTION ORAL 4 TIMES DAILY PRN
Qty: 960 ML | Refills: 5 | Status: ON HOLD | OUTPATIENT
Start: 2023-11-02 | End: 2024-03-19 | Stop reason: ENTERED-IN-ERROR

## 2023-11-02 RX ORDER — FENTANYL 12.5 UG/1
1 PATCH TRANSDERMAL
Qty: 10 PATCH | Refills: 0 | Status: SHIPPED | OUTPATIENT
Start: 2023-11-02 | End: 2023-12-04 | Stop reason: SDUPTHER

## 2023-11-02 RX ORDER — GABAPENTIN 300 MG/1
300 CAPSULE ORAL 2 TIMES DAILY
Qty: 60 CAPSULE | Refills: 0 | Status: SHIPPED | OUTPATIENT
Start: 2023-11-02 | End: 2024-01-22 | Stop reason: WASHOUT

## 2023-11-02 RX ORDER — OXYCODONE HYDROCHLORIDE 10 MG/1
10 TABLET ORAL EVERY 4 HOURS PRN
Qty: 180 TABLET | Refills: 0 | Status: SHIPPED | OUTPATIENT
Start: 2023-11-02 | End: 2023-11-13 | Stop reason: HOSPADM

## 2023-11-02 ASSESSMENT — PAIN SCALES - GENERAL: PAINLEVEL: 2

## 2023-11-02 ASSESSMENT — ENCOUNTER SYMPTOMS
DEPRESSION: 0
LOSS OF SENSATION IN FEET: 0
OCCASIONAL FEELINGS OF UNSTEADINESS: 0

## 2023-11-02 NOTE — PATIENT INSTRUCTIONS
For your bowels:    -take colace 100mg 2x/day as needed. This is your stool softener.     -take senna 8.6mg, 1-2tabs, 1-2x/day as needed. This is your mild laxative.     -take bisacodyl 10mg daily at bedtime as needed. Mild laxative.     -if your hydration status is good, you may take miralax daily.     -if no bowel movement x2 days, take milk of magnesia 15mL up to 4x/day    -if no bowel movement x3 days, take lactulose 20grams every 4 hrs x 4 doses, or until bowel movement achieved    2. For your pain:     -start fentanyl patch 12mcg every 3 days. Needs to be applied to an area of your body with some fat tissue. Do not submerge in water, but can remain in place for showers. You may sleepy for the first few days you wear it, this is normal. If day 7 and still excessively tired, call me.     -take oxycodone 10mg every 4 hrs as needed.

## 2023-11-02 NOTE — PROGRESS NOTES
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 male with PmHx of HTN, and recently diagnosed metastatic esophageal cancer.     He presents to supportive oncology clinic for pain and symptom management.     Patient presents for new patient visit accompanied by his wife, Jane, today.  He complains of pain in his abdomen, describing it as a constant aching pain.  Some days are worse than others, but pain is typically in the lower and mid abdominal area.  He is currently taking oxycodone 10 mg 2-3 times per day, and also takes Tylenol as needed.  He does have neuropathy in bilateral hands and feet, although he notes that hands have felt a little bit better recently.  His feet feel like he is \"walking on blisters.\"  He is moving his bowels every day, taking senna as needed, and drinking cider.  He is having intermittent nausea, no vomiting.  He recently tried a scopolamine patch due to prolonged QTc.  He currently denies symptoms of reflux.  Appetite is okay, not great, he has lost a few pounds over the past few months, but reports having lost a significant amount of weight over the past 6 months.  His mood is good, patient states he has a good mindset.  He is sleeping okay, does note difficulty falling asleep, but does not think pain is a factor.  Sometimes his stomach \"does not feel right\" and he needs to change positions frequently during the night.  His " energy levels are pretty good, he continues to do yard work, states that he has a busy body and always has something to do.  Also notes more frequent bloody noses lately.    Pain Assessment:  Pain Score:  3  Location:  abdomen      Symptom Assessment:  Pain:somewhat  Headache: none  Dizziness:none  Lack of energy: none  Difficulty sleeping: a little  Worrying: none  Anxiety: none  Depression: none  Pain in mouth/swallowing: none  Dry mouth: none  Taste changes: none  Shortness of breath: none  Lack of appetite: a little   Nausea: a little  Vomiting: none  Constipation: none  Diarrhea: none  Sore muscles: none  Numbness or tingling in hands/feet/other: somewhat  Weight loss: a little      Information obtained from: interview of patient and interview of family  ______________________________________________________________________     Oncology History   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)   9/13/2023 Initial Diagnosis    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)     9/13/2023 -  Chemotherapy    Trastuzumab + mFOLFOX6 (Fluorouracil Continuous Infusion / Leucovorin / Oxaliplatin), 14 Day Cycles     Metastases to the liver (CMS/HCC)   9/13/2023 Initial Diagnosis    Metastases to the liver (CMS/HCC)     9/13/2023 -  Chemotherapy    Trastuzumab + mFOLFOX6 (Fluorouracil Continuous Infusion / Leucovorin / Oxaliplatin), 14 Day Cycles         Past Medical History:   Diagnosis Date    Essential (primary) hypertension 12/21/2013    Hypertension    Personal history of other diseases of the circulatory system     History of right bundle branch block (RBBB)     Past Surgical History:   Procedure Laterality Date    TOTAL KNEE ARTHROPLASTY  09/30/2016    Total Knee Replacement Left    TOTAL KNEE ARTHROPLASTY  09/30/2016    Total Knee Replacement Right     No family history on file.     SOCIAL HISTORY  - (27 yrs), lives in a house with his wife. One daughter at home currently (24 yo). Children: three- 2  daughters (Abbey and Annia) and 1 son (Gigi); 2 dogs (theresa and xiomarag), 2 cats   -he and his wife both just retired, worked together at [employer], he worked in fire safety  Social History: quit smoking cigarettes, previously smoked cigars here and there. Hx of ETOH abuse x 20 yrs (a few 12 packs/ week), no alcohol since January 2023. No illicits. Has medical marijuana card.     REVIEW OF SYSTEMS  Review of systems negative unless noted in HPI.       Objective     Palliative Performance Scale % (PPS)       Current Outpatient Medications   Medication Instructions    albuterol (Proventil HFA) 90 mcg/actuation inhaler 1-2 puffs, inhalation, EVERY 4 TO 6 HOURS AS NEEDED.    apixaban (Eliquis) 5 mg tablet TAKE 2 TABLETS BY MOUTH TWO TIMES A DAY    apixaban (Eliquis) 5 mg tablet TAKE 1 TABLET BY MOUTH TWO TIMES A DAY    atorvastatin (LIPITOR) 40 mg, oral, Daily    baclofen (Lioresal) 10 mg tablet oral    cholecalciferol (Thera-D) 50 MCG (2000 UT) tablet oral    clotrimazole (Mycelex) 10 mg debby ALLOW 1 TO DISSOLVE SLOWLY IN MOUTH 5 TIMES DAILY AS DIRECTED.    codeine-guaifenesin (Robitussin-AC)  mg/5 mL syrup 10-15 mL, oral, Nightly PRN    Eliquis 5 mg tablet     gabapentin (Neurontin) 300 mg capsule     gabapentin (Neurontin) 300 mg capsule TAKE 2 CAPSULES BY MOUTH ONCE DAILY AT BEDTIME    gabapentin (Neurontin) 300 mg capsule TAKE 1 CAPSULE BY MOUTH EVERY NIGHT AT BEDTIME    Lidocaine Viscous 2 % solution     lidocaine-prilocaine (Emla) 2.5-2.5 % cream Topical,  Apply topically to affected area 30 minutes prior to mediport access as needed    meloxicam (Mobic) 15 mg tablet No dose, route, or frequency recorded.    metoprolol tartrate (LOPRESSOR) 25 mg, oral, Every 12 hours    multivitamin with minerals (multivit-min-iron fum-folic ac) tablet 2 tablets, oral, Daily    oxyCODONE (ROXICODONE) 10 mg, oral, Every 8 hours PRN    oxyCODONE-acetaminophen (Percocet) 5-325 mg tablet 1 tablet, oral, Every 6 hours     pantoprazole (PROTONIX) 40 mg, oral, 2 times daily    potassium chloride CR (Klor-Con) 10 mEq ER tablet TAKE 1 TABLET BY MOUTH ONCE DAILY    potassium chloride CR (Klor-Con) 10 mEq ER tablet TAKE 1 TABLET BY MOUTH ONCE DAILY    potassium chloride CR 10 mEq ER tablet 10 mEq, oral, Daily    sucralfate (CARAFATE) 1 g, oral, Daily       Allergies:   Allergies   Allergen Reactions    Bee Venom Protein (Honey Bee) Anaphylaxis                PHYSICAL EXAMINATION  Vital Signs:   Vital signs reviewed  There were no vitals filed for this visit.  Pain Score:           Physical Exam  Vitals reviewed.   Constitutional:       Appearance: He is ill-appearing.   HENT:      Head: Normocephalic.   Cardiovascular:      Rate and Rhythm: Normal rate and regular rhythm.      Pulses: Normal pulses.      Heart sounds: Normal heart sounds.   Pulmonary:      Effort: Pulmonary effort is normal.      Breath sounds: Normal breath sounds.   Abdominal:      General: Abdomen is flat. Bowel sounds are normal.      Palpations: Abdomen is soft.   Musculoskeletal:         General: Normal range of motion.   Skin:     General: Skin is warm and dry.   Neurological:      General: No focal deficit present.      Mental Status: He is alert and oriented to person, place, and time. Mental status is at baseline.   Psychiatric:         Mood and Affect: Mood normal.         Behavior: Behavior normal.         Thought Content: Thought content normal.         Judgment: Judgment normal.         ASSESSMENT/PLAN    Pain  Pain is: cancer related pain  Type: visceral  Pain control: sub-optimally controlled  Home regimen:   -start fentanyl patch 12mcg q72hrs. Rx sent today.   -increase oxycodone 10mg to q4hrs PRN. Rx sent today.   -continue tylenol PRN   Intolerances/previously tried:   -previously received morphine with poor efficacy (while inpatient)    Opioid Use  Medication Management:   - OARRS report reviewed with no aberrant behavior; consistent with   prescriptions/records and patient history  - MED 43.  Overdose Risk Score 240.   This has been discussed with patient.   - We will continue to closely monitor the patient for signs of prescription misuse including UDS, OARRS review and subjective reports at each visit.  - no concurrent benzodiazepine use   - I am a provider who either is or has consulted and collaborated with a provider certified in Hospice and Palliative Medicine and have conducted a face-face visit and examination for this patient.  - Routine Urine Drug Screen completed 11/2/23 appropriately positive for opioids and negative for illicit substances  - Controlled Substance Agreement completed 11/2/23  - Specifically discussed that controlled substance prescriptions will only be provided by our group as outlined in the completed agreement  - Prescribed naloxone 11/2/23  - Red Flags: hx of ETOH abuse    Neuropathy b/l hands and feet  -continue gabapentin 300mg bid. Rx sent for fill on 11/21.     Nausea/ Reflux   Intermittent nausea without vomiting related to chemotherapy and opioids   -continue protonix daily  -continue carafate PRN    Constipation   At risk for constipation related to opioids,  currently not constipated  -educated pt on importance of maintaining regular bowel schedule  Current regimen:   -start colace 100mg bid PRN.   -start senna 8.6mg, 1-2tabs, 1-2x/day  -start MOM 15mL 4x/day PRN  -start lactulose 20grams q4hrs x 4 doses PRN     Decreased appetite  Related to malignancy, chemotherapy, and disease process  -encouraged smaller, more frequent meals through out the day  -encouraged use of supplements such as Boost, Ensure, Breeze  -encouraged use of anti-emetics around meal times   -pt interested in integrative medicine-- dietary changes, etc.   -referral to Dr. Mays's office     Epistaxis r/t thrombocytopenia  -start ocean spray PRN. Rx sent today.       Introduction to Supportive and Palliative Oncology:  Spoke with pt and wife     Introduced the role and philosophy of Supportive and Palliative oncology in the evaluation and management of symptoms during cancer treatment  Palliative care was introduced as a service for patients with serious illness to help with symptoms, assist with goals of care conversations, navigate complex decision making, improve quality of life for patients, and provide support both patients and families.  Patient seemed to appreciate the extra layer of support.    Advance Directives  Existence of Advance Directives:No - has interest  -SW referral for assitance with ADs  Decision maker: HCPOA is wife  Code Status: Full code        Next Follow-Up Visit:  Return to clinic in 1 month     Signature and billing  Thank you for allowing us to participate in the care of this patient. Recommendations will be communicated back to the consulting service by way of shared electronic medical record or face-to-face.    Medical complexity was high level due to due to complexity of problems, extensive data review, and high risk of management/treatment.  Time was spent on the following: Prep Time, Time Directly with Patient/Family/Caregiver, Documentation Time. Total time spent: 60 mins            Some elements copied from Dr. Blake's note on 10/30/23, the elements have been updated and all reflect current decision making from today, 11/2/2023.      Plan of Care discussed with: Patient and Family/Significant Other: wife      SIGNATURE: JASPER Ferguson    Contact information:  Supportive and Palliative Oncology  Monday-Friday 8 AM-5 PM  Phone:  [phone], press option #5, then option #1.   Or Epic Secure Chat

## 2023-11-03 ENCOUNTER — SOCIAL WORK (OUTPATIENT)
Dept: CASE MANAGEMENT | Facility: HOSPITAL | Age: 63
End: 2023-11-03
Payer: COMMERCIAL

## 2023-11-03 DIAGNOSIS — C15.9 STAGE IV MALIGNANT NEOPLASM OF ESOPHAGUS (MULTI): ICD-10-CM

## 2023-11-03 NOTE — PROGRESS NOTES
SW was asked to reach out to patient to discuss and provide information and resource materials on advance care planning.   SW introduced self and explained role within the multidisciplinary team at [facility]. SW discussed with patient the value and worth of initiating both the Living Will and the Durable Power of  of Healthcare. SW informed patient about the importance of having a discussion with family as to the patient's wishes. Following this discussion, documents can be initiated and do not need to be notarized. These can be witnessed by two unrelated people to the patient and health care proxy who are named in documents. SW provided educational materials and blank copies of both the Living Will and Durable Power of  for Healthcare documents along with SW contact information. Patient requested materials be left for him at the desk at [facility]: [facility] as he has other things to . SW available as needed. Patient appreciative of the follow up.

## 2023-11-05 ENCOUNTER — HOSPITAL ENCOUNTER (INPATIENT)
Facility: HOSPITAL | Age: 63
LOS: 7 days | Discharge: HOME | DRG: 243 | End: 2023-11-13
Attending: EMERGENCY MEDICINE | Admitting: STUDENT IN AN ORGANIZED HEALTH CARE EDUCATION/TRAINING PROGRAM
Payer: MEDICARE

## 2023-11-05 ENCOUNTER — APPOINTMENT (OUTPATIENT)
Dept: RADIOLOGY | Facility: HOSPITAL | Age: 63
DRG: 243 | End: 2023-11-05
Payer: MEDICARE

## 2023-11-05 DIAGNOSIS — I31.39 OTHER PERICARDIAL EFFUSION (NONINFLAMMATORY) (HHS-HCC): ICD-10-CM

## 2023-11-05 DIAGNOSIS — I47.20 V-TACH (MULTI): ICD-10-CM

## 2023-11-05 DIAGNOSIS — R00.1 BRADYCARDIA: ICD-10-CM

## 2023-11-05 DIAGNOSIS — E87.6 HYPOKALEMIA: ICD-10-CM

## 2023-11-05 DIAGNOSIS — I10 PRIMARY HYPERTENSION: ICD-10-CM

## 2023-11-05 DIAGNOSIS — R42 DIZZINESS: ICD-10-CM

## 2023-11-05 DIAGNOSIS — G89.3 CANCER RELATED PAIN: ICD-10-CM

## 2023-11-05 DIAGNOSIS — R07.89 CHEST HEAVINESS: Primary | ICD-10-CM

## 2023-11-05 DIAGNOSIS — I44.2 THIRD DEGREE HEART BLOCK (MULTI): ICD-10-CM

## 2023-11-05 LAB
ALBUMIN SERPL BCP-MCNC: 4.2 G/DL (ref 3.4–5)
ALP SERPL-CCNC: 169 U/L (ref 33–136)
ALT SERPL W P-5'-P-CCNC: 29 U/L (ref 10–52)
ANION GAP SERPL CALC-SCNC: 15 MMOL/L (ref 10–20)
APPEARANCE UR: ABNORMAL
AST SERPL W P-5'-P-CCNC: 32 U/L (ref 9–39)
BASOPHILS # BLD AUTO: 0.02 X10*3/UL (ref 0–0.1)
BASOPHILS NFR BLD AUTO: 0.3 %
BILIRUB SERPL-MCNC: 1.2 MG/DL (ref 0–1.2)
BILIRUB UR STRIP.AUTO-MCNC: NEGATIVE MG/DL
BUN SERPL-MCNC: 16 MG/DL (ref 6–23)
CALCIUM SERPL-MCNC: 9.8 MG/DL (ref 8.6–10.3)
CARDIAC TROPONIN I PNL SERPL HS: 6 NG/L (ref 0–20)
CARDIAC TROPONIN I PNL SERPL HS: 8 NG/L (ref 0–20)
CHLORIDE SERPL-SCNC: 102 MMOL/L (ref 98–107)
CO2 SERPL-SCNC: 24 MMOL/L (ref 21–32)
COLOR UR: YELLOW
CREAT SERPL-MCNC: 0.9 MG/DL (ref 0.5–1.3)
EOSINOPHIL # BLD AUTO: 0.04 X10*3/UL (ref 0–0.7)
EOSINOPHIL NFR BLD AUTO: 0.5 %
ERYTHROCYTE [DISTWIDTH] IN BLOOD BY AUTOMATED COUNT: 14.7 % (ref 11.5–14.5)
GFR SERPL CREATININE-BSD FRML MDRD: >90 ML/MIN/1.73M*2
GLUCOSE BLD MANUAL STRIP-MCNC: 122 MG/DL (ref 74–99)
GLUCOSE SERPL-MCNC: 122 MG/DL (ref 74–99)
GLUCOSE UR STRIP.AUTO-MCNC: NEGATIVE MG/DL
HCT VFR BLD AUTO: 34.8 % (ref 41–52)
HGB BLD-MCNC: 11.7 G/DL (ref 13.5–17.5)
HOLD SPECIMEN: NORMAL
IMM GRANULOCYTES # BLD AUTO: 0.03 X10*3/UL (ref 0–0.7)
IMM GRANULOCYTES NFR BLD AUTO: 0.4 % (ref 0–0.9)
KETONES UR STRIP.AUTO-MCNC: NEGATIVE MG/DL
LACTATE SERPL-SCNC: 1.8 MMOL/L (ref 0.4–2)
LEUKOCYTE ESTERASE UR QL STRIP.AUTO: NEGATIVE
LYMPHOCYTES # BLD AUTO: 1.11 X10*3/UL (ref 1.2–4.8)
LYMPHOCYTES NFR BLD AUTO: 15.2 %
MAGNESIUM SERPL-MCNC: 1.73 MG/DL (ref 1.6–2.4)
MCH RBC QN AUTO: 28.6 PG (ref 26–34)
MCHC RBC AUTO-ENTMCNC: 33.6 G/DL (ref 32–36)
MCV RBC AUTO: 85 FL (ref 80–100)
MONOCYTES # BLD AUTO: 0.58 X10*3/UL (ref 0.1–1)
MONOCYTES NFR BLD AUTO: 7.9 %
NEUTROPHILS # BLD AUTO: 5.52 X10*3/UL (ref 1.2–7.7)
NEUTROPHILS NFR BLD AUTO: 75.7 %
NITRITE UR QL STRIP.AUTO: NEGATIVE
NRBC BLD-RTO: 0 /100 WBCS (ref 0–0)
PH UR STRIP.AUTO: 9 [PH]
PLATELET # BLD AUTO: 119 X10*3/UL (ref 150–450)
POTASSIUM SERPL-SCNC: 3.2 MMOL/L (ref 3.5–5.3)
PROT SERPL-MCNC: 7.2 G/DL (ref 6.4–8.2)
PROT UR STRIP.AUTO-MCNC: NEGATIVE MG/DL
RBC # BLD AUTO: 4.09 X10*6/UL (ref 4.5–5.9)
RBC # UR STRIP.AUTO: NEGATIVE /UL
SODIUM SERPL-SCNC: 138 MMOL/L (ref 136–145)
SP GR UR STRIP.AUTO: 1
TSH SERPL-ACNC: 1.27 MIU/L (ref 0.44–3.98)
UROBILINOGEN UR STRIP.AUTO-MCNC: <2 MG/DL
WBC # BLD AUTO: 7.3 X10*3/UL (ref 4.4–11.3)

## 2023-11-05 PROCEDURE — 2500000004 HC RX 250 GENERAL PHARMACY W/ HCPCS (ALT 636 FOR OP/ED): Performed by: EMERGENCY MEDICINE

## 2023-11-05 PROCEDURE — 70450 CT HEAD/BRAIN W/O DYE: CPT | Performed by: RADIOLOGY

## 2023-11-05 PROCEDURE — 2550000001 HC RX 255 CONTRASTS: Performed by: EMERGENCY MEDICINE

## 2023-11-05 PROCEDURE — 84443 ASSAY THYROID STIM HORMONE: CPT

## 2023-11-05 PROCEDURE — 80053 COMPREHEN METABOLIC PANEL: CPT | Performed by: EMERGENCY MEDICINE

## 2023-11-05 PROCEDURE — 83735 ASSAY OF MAGNESIUM: CPT | Performed by: EMERGENCY MEDICINE

## 2023-11-05 PROCEDURE — 85025 COMPLETE CBC W/AUTO DIFF WBC: CPT | Performed by: EMERGENCY MEDICINE

## 2023-11-05 PROCEDURE — 2500000004 HC RX 250 GENERAL PHARMACY W/ HCPCS (ALT 636 FOR OP/ED)

## 2023-11-05 PROCEDURE — 2500000001 HC RX 250 WO HCPCS SELF ADMINISTERED DRUGS (ALT 637 FOR MEDICARE OP)

## 2023-11-05 PROCEDURE — 71045 X-RAY EXAM CHEST 1 VIEW: CPT | Mod: FY

## 2023-11-05 PROCEDURE — 74177 CT ABD & PELVIS W/CONTRAST: CPT

## 2023-11-05 PROCEDURE — 99223 1ST HOSP IP/OBS HIGH 75: CPT

## 2023-11-05 PROCEDURE — 82947 ASSAY GLUCOSE BLOOD QUANT: CPT

## 2023-11-05 PROCEDURE — 84484 ASSAY OF TROPONIN QUANT: CPT | Performed by: EMERGENCY MEDICINE

## 2023-11-05 PROCEDURE — 96374 THER/PROPH/DIAG INJ IV PUSH: CPT

## 2023-11-05 PROCEDURE — 83605 ASSAY OF LACTIC ACID: CPT | Performed by: EMERGENCY MEDICINE

## 2023-11-05 PROCEDURE — 83036 HEMOGLOBIN GLYCOSYLATED A1C: CPT | Mod: GEALAB

## 2023-11-05 PROCEDURE — 99285 EMERGENCY DEPT VISIT HI MDM: CPT | Mod: 25 | Performed by: EMERGENCY MEDICINE

## 2023-11-05 PROCEDURE — 74177 CT ABD & PELVIS W/CONTRAST: CPT | Performed by: RADIOLOGY

## 2023-11-05 PROCEDURE — 71045 X-RAY EXAM CHEST 1 VIEW: CPT | Performed by: RADIOLOGY

## 2023-11-05 PROCEDURE — 36415 COLL VENOUS BLD VENIPUNCTURE: CPT | Performed by: EMERGENCY MEDICINE

## 2023-11-05 PROCEDURE — 70450 CT HEAD/BRAIN W/O DYE: CPT

## 2023-11-05 PROCEDURE — G0378 HOSPITAL OBSERVATION PER HR: HCPCS

## 2023-11-05 PROCEDURE — 81003 URINALYSIS AUTO W/O SCOPE: CPT | Performed by: EMERGENCY MEDICINE

## 2023-11-05 RX ORDER — SODIUM CHLORIDE 0.9 % (FLUSH) 0.9 %
10 SYRINGE (ML) INJECTION EVERY 12 HOURS
Status: DISCONTINUED | OUTPATIENT
Start: 2023-11-05 | End: 2023-11-13 | Stop reason: HOSPADM

## 2023-11-05 RX ORDER — KETOROLAC TROMETHAMINE 15 MG/ML
10 INJECTION, SOLUTION INTRAMUSCULAR; INTRAVENOUS ONCE
Status: CANCELLED | OUTPATIENT
Start: 2023-11-05 | End: 2023-11-05

## 2023-11-05 RX ORDER — POTASSIUM CHLORIDE 750 MG/1
10 TABLET, FILM COATED, EXTENDED RELEASE ORAL DAILY
Status: DISCONTINUED | OUTPATIENT
Start: 2023-11-06 | End: 2023-11-07

## 2023-11-05 RX ORDER — PANTOPRAZOLE SODIUM 40 MG/1
40 TABLET, DELAYED RELEASE ORAL 2 TIMES DAILY
Status: DISCONTINUED | OUTPATIENT
Start: 2023-11-05 | End: 2023-11-13 | Stop reason: HOSPADM

## 2023-11-05 RX ORDER — HYDROXYZINE HYDROCHLORIDE 25 MG/1
25 TABLET, FILM COATED ORAL 3 TIMES DAILY PRN
Status: DISCONTINUED | OUTPATIENT
Start: 2023-11-05 | End: 2023-11-07

## 2023-11-05 RX ORDER — OXYCODONE HYDROCHLORIDE 5 MG/1
10 TABLET ORAL EVERY 6 HOURS PRN
Status: DISCONTINUED | OUTPATIENT
Start: 2023-11-05 | End: 2023-11-08

## 2023-11-05 RX ORDER — SODIUM CHLORIDE 0.9 % (FLUSH) 0.9 %
10 SYRINGE (ML) INJECTION AS NEEDED
Status: DISCONTINUED | OUTPATIENT
Start: 2023-11-05 | End: 2023-11-13 | Stop reason: HOSPADM

## 2023-11-05 RX ORDER — KETOROLAC TROMETHAMINE 15 MG/ML
15 INJECTION, SOLUTION INTRAMUSCULAR; INTRAVENOUS ONCE
Status: COMPLETED | OUTPATIENT
Start: 2023-11-05 | End: 2023-11-05

## 2023-11-05 RX ORDER — GABAPENTIN 300 MG/1
300 CAPSULE ORAL 2 TIMES DAILY
Status: DISCONTINUED | OUTPATIENT
Start: 2023-11-05 | End: 2023-11-07

## 2023-11-05 RX ORDER — KETOROLAC TROMETHAMINE 10 MG/1
10 TABLET, FILM COATED ORAL ONCE
Status: COMPLETED | OUTPATIENT
Start: 2023-11-05 | End: 2023-11-05

## 2023-11-05 RX ORDER — POTASSIUM CHLORIDE 20 MEQ/1
40 TABLET, EXTENDED RELEASE ORAL DAILY
Status: DISCONTINUED | OUTPATIENT
Start: 2023-11-05 | End: 2023-11-07

## 2023-11-05 RX ORDER — ACETAMINOPHEN 325 MG/1
650 TABLET ORAL EVERY 6 HOURS PRN
Status: DISCONTINUED | OUTPATIENT
Start: 2023-11-05 | End: 2023-11-08

## 2023-11-05 RX ORDER — POLYETHYLENE GLYCOL 3350 17 G/17G
17 POWDER, FOR SOLUTION ORAL DAILY PRN
Status: DISCONTINUED | OUTPATIENT
Start: 2023-11-05 | End: 2023-11-13 | Stop reason: HOSPADM

## 2023-11-05 RX ORDER — OXYCODONE HYDROCHLORIDE 5 MG/1
5 TABLET ORAL EVERY 6 HOURS PRN
Status: DISCONTINUED | OUTPATIENT
Start: 2023-11-05 | End: 2023-11-08

## 2023-11-05 RX ORDER — SUCRALFATE 1 G/1
1 TABLET ORAL DAILY
Status: DISCONTINUED | OUTPATIENT
Start: 2023-11-06 | End: 2023-11-13 | Stop reason: HOSPADM

## 2023-11-05 RX ADMIN — APIXABAN 5 MG: 5 TABLET, FILM COATED ORAL at 20:34

## 2023-11-05 RX ADMIN — SODIUM CHLORIDE 1000 ML: 9 INJECTION, SOLUTION INTRAVENOUS at 15:30

## 2023-11-05 RX ADMIN — Medication 10 ML: at 20:36

## 2023-11-05 RX ADMIN — KETOROLAC TROMETHAMINE 10 MG: 10 TABLET, FILM COATED ORAL at 22:39

## 2023-11-05 RX ADMIN — HYDROXYZINE HYDROCHLORIDE 25 MG: 25 TABLET ORAL at 20:34

## 2023-11-05 RX ADMIN — KETOROLAC TROMETHAMINE 15 MG: 15 INJECTION, SOLUTION INTRAMUSCULAR; INTRAVENOUS at 15:58

## 2023-11-05 RX ADMIN — GABAPENTIN 300 MG: 300 CAPSULE ORAL at 20:34

## 2023-11-05 RX ADMIN — POTASSIUM CHLORIDE 40 MEQ: 1500 TABLET, EXTENDED RELEASE ORAL at 17:18

## 2023-11-05 RX ADMIN — OXYCODONE HYDROCHLORIDE 10 MG: 5 TABLET ORAL at 20:34

## 2023-11-05 RX ADMIN — PANTOPRAZOLE SODIUM 40 MG: 40 TABLET, DELAYED RELEASE ORAL at 20:34

## 2023-11-05 RX ADMIN — IOHEXOL 75 ML: 350 INJECTION, SOLUTION INTRAVENOUS at 16:39

## 2023-11-05 SDOH — SOCIAL STABILITY: SOCIAL INSECURITY: HAS ANYONE EVER THREATENED TO HURT YOUR FAMILY OR YOUR PETS?: NO

## 2023-11-05 SDOH — SOCIAL STABILITY: SOCIAL INSECURITY: DO YOU FEEL ANYONE HAS EXPLOITED OR TAKEN ADVANTAGE OF YOU FINANCIALLY OR OF YOUR PERSONAL PROPERTY?: NO

## 2023-11-05 SDOH — SOCIAL STABILITY: SOCIAL INSECURITY: DOES ANYONE TRY TO KEEP YOU FROM HAVING/CONTACTING OTHER FRIENDS OR DOING THINGS OUTSIDE YOUR HOME?: NO

## 2023-11-05 SDOH — SOCIAL STABILITY: SOCIAL INSECURITY: HAVE YOU HAD THOUGHTS OF HARMING ANYONE ELSE?: NO

## 2023-11-05 SDOH — SOCIAL STABILITY: SOCIAL INSECURITY: ARE YOU OR HAVE YOU BEEN THREATENED OR ABUSED PHYSICALLY, EMOTIONALLY, OR SEXUALLY BY ANYONE?: NO

## 2023-11-05 SDOH — SOCIAL STABILITY: SOCIAL INSECURITY: ARE THERE ANY APPARENT SIGNS OF INJURIES/BEHAVIORS THAT COULD BE RELATED TO ABUSE/NEGLECT?: NO

## 2023-11-05 SDOH — SOCIAL STABILITY: SOCIAL INSECURITY: WERE YOU ABLE TO COMPLETE ALL THE BEHAVIORAL HEALTH SCREENINGS?: YES

## 2023-11-05 SDOH — SOCIAL STABILITY: SOCIAL INSECURITY: ABUSE: ADULT

## 2023-11-05 SDOH — SOCIAL STABILITY: SOCIAL INSECURITY: DO YOU FEEL UNSAFE GOING BACK TO THE PLACE WHERE YOU ARE LIVING?: NO

## 2023-11-05 ASSESSMENT — COGNITIVE AND FUNCTIONAL STATUS - GENERAL
DAILY ACTIVITIY SCORE: 24
DAILY ACTIVITIY SCORE: 24
PATIENT BASELINE BEDBOUND: NO
MOBILITY SCORE: 24
MOBILITY SCORE: 24

## 2023-11-05 ASSESSMENT — PATIENT HEALTH QUESTIONNAIRE - PHQ9
2. FEELING DOWN, DEPRESSED OR HOPELESS: NOT AT ALL
1. LITTLE INTEREST OR PLEASURE IN DOING THINGS: NOT AT ALL
SUM OF ALL RESPONSES TO PHQ9 QUESTIONS 1 & 2: 0

## 2023-11-05 ASSESSMENT — COLUMBIA-SUICIDE SEVERITY RATING SCALE - C-SSRS
2. HAVE YOU ACTUALLY HAD ANY THOUGHTS OF KILLING YOURSELF?: NO
6. HAVE YOU EVER DONE ANYTHING, STARTED TO DO ANYTHING, OR PREPARED TO DO ANYTHING TO END YOUR LIFE?: NO
1. IN THE PAST MONTH, HAVE YOU WISHED YOU WERE DEAD OR WISHED YOU COULD GO TO SLEEP AND NOT WAKE UP?: NO

## 2023-11-05 ASSESSMENT — LIFESTYLE VARIABLES
HAVE PEOPLE ANNOYED YOU BY CRITICIZING YOUR DRINKING: NO
REASON UNABLE TO ASSESS: NO
HOW OFTEN DO YOU HAVE 6 OR MORE DRINKS ON ONE OCCASION: NEVER
AUDIT-C TOTAL SCORE: 0
HOW MANY STANDARD DRINKS CONTAINING ALCOHOL DO YOU HAVE ON A TYPICAL DAY: PATIENT DOES NOT DRINK
EVER FELT BAD OR GUILTY ABOUT YOUR DRINKING: NO
HOW OFTEN DO YOU HAVE A DRINK CONTAINING ALCOHOL: NEVER
EVER HAD A DRINK FIRST THING IN THE MORNING TO STEADY YOUR NERVES TO GET RID OF A HANGOVER: NO
SUBSTANCE_ABUSE_PAST_12_MONTHS: NO
HAVE YOU EVER FELT YOU SHOULD CUT DOWN ON YOUR DRINKING: NO
PRESCIPTION_ABUSE_PAST_12_MONTHS: NO
SKIP TO QUESTIONS 9-10: 1
AUDIT-C TOTAL SCORE: 0

## 2023-11-05 ASSESSMENT — PAIN SCALES - GENERAL
PAINLEVEL_OUTOF10: 7
PAINLEVEL_OUTOF10: 9
PAINLEVEL_OUTOF10: 5 - MODERATE PAIN
PAINLEVEL_OUTOF10: 10 - WORST POSSIBLE PAIN

## 2023-11-05 ASSESSMENT — PAIN - FUNCTIONAL ASSESSMENT
PAIN_FUNCTIONAL_ASSESSMENT: 0-10

## 2023-11-05 ASSESSMENT — ACTIVITIES OF DAILY LIVING (ADL)
GROOMING: INDEPENDENT
LACK_OF_TRANSPORTATION: NO
HEARING - RIGHT EAR: FUNCTIONAL
ADEQUATE_TO_COMPLETE_ADL: YES
FEEDING YOURSELF: INDEPENDENT
WALKS IN HOME: INDEPENDENT
DRESSING YOURSELF: INDEPENDENT
BATHING: INDEPENDENT
HEARING - LEFT EAR: FUNCTIONAL
TOILETING: INDEPENDENT
JUDGMENT_ADEQUATE_SAFELY_COMPLETE_DAILY_ACTIVITIES: YES
PATIENT'S MEMORY ADEQUATE TO SAFELY COMPLETE DAILY ACTIVITIES?: YES

## 2023-11-05 NOTE — Clinical Note
Temporary pacemaker left in place secured. Temporary pacemaker location through right internal jugular vein. Temporary pacemaker connected to demand mode. Heart rate: 50. Current 3 (mA). Centimeters 38.

## 2023-11-05 NOTE — Clinical Note
Temporary pacemaker tested. Temporary pacemaker location through right internal jugular vein. Temporary pacemaker connected to demand mode. Heart rate: 100. Current 5 (mA).

## 2023-11-05 NOTE — Clinical Note
Temporary pacemaker repositioned. Temporary pacemaker location through right internal jugular vein. Temporary pacemaker connected to demand mode.

## 2023-11-05 NOTE — Clinical Note
The ECG shows a paced rhythm. Pacer turned on. The patient has temporary pacemaker. Pacemaker at 3 mA. Pacer on demand mode. ECG rate  = 50 bpm.

## 2023-11-05 NOTE — Clinical Note
Patient Clipped and Prepped: right groin and right neck. Prepped with ChloraPrep, a minimum of 3 minute dry time, longer if needed, no pooling noted, patient draped in sterile fashion.

## 2023-11-05 NOTE — Clinical Note
Temporary pacemaker tested. Temporary pacemaker location through right internal jugular vein. Temporary pacemaker connected to demand mode. Heart rate: 120. Current 1 (mA).

## 2023-11-05 NOTE — Clinical Note
Patient Clipped and Prepped: left chest and left neck. Prepped with ChloraPrep, a minimum of 3 minute dry time, longer if needed, no pooling noted, patient draped in sterile fashion.

## 2023-11-05 NOTE — Clinical Note
"Massimo Garcia is a 62 year old male with a past medical history significant for metastatic stage IV esophageal cancer HER2 positive with liver mets (currently on chemo with trastuzumab FOLFOX regimen), hx PE and portal vein thrombosis on Eliquis who [facility] on 11/5/23 with dizzy spells. Last week he felt lightheaded and had a headache which improved then came back. Last chemo session was about one week ago on 10/30/23. . Also admits to abdominal pain.     12 Point ROS negative unless otherwise specified above.     ED COURSE:   VS: T 99.0 F, HR 81, RR 18, /80, SpO2 99% RA     Labs  - CBC: Hgb 11.7, Plt 119   - CMP: Glucose 122, K 3.2,    - Urine Analysis: Hazy, pH 9.0     Imaging:  - EKG: Normal sinus rhythm rate 77 bpm with RBBB (similar to previous)   - CT head wo con: Unremarkable   - Chest X-ray: Unremarkable   - CT abd pel w/con: Diffuse hepatic metastatic disease is not significantly changed from   9/27/23 allowing for technical differences. Retroperitoneal and Mireille portal lymphadenopathy has mildly increased from 9/27/23. Mild distal colonic diverticulosis. Too small to characterize right renal hypodensity. Mild splenomegaly. A periaortic lymph node and aortocaval lymph node or mildly increased in size from the previous study.     Interventions: Toradol 15mg IV, NS 1L bolus, Kcl 40mEq tablet     Past Medical History: Metastatic stage IV esophageal cancer HER2 positive with liver mets, hx PE and portal vein thrombosis on Eliquis   Past Surgical History: total knee replacements b/l. shoulder replacements b/l   Allergies: Bee sting causing anaphylaxis   Family History: Esophageal cancer (father)   Home Medications: See med rec     Social History:  - Coming from home, lives with wife   - Tobacco: Smoked \"20 cigars a year\", quit Jan 2023  - Alcohol: Drank 3-4 beers per day for 30 years, quit Jan 2023   - Illicit Drug: Denies     Code Status: Full     Massimo Garcia is a 62 year old male with a " past medical history significant for metastatic stage IV esophageal cancer HER2 positive with liver mets (currently on chemo with trastuzumab FOLFOX regimen), hx PE and portal vein thrombosis on Eliquis who presented to [facility] ED on 11/5/23 with dizzy spells. Admitted for further management of dizziness.

## 2023-11-05 NOTE — ED PROVIDER NOTES
HPI   Chief Complaint   Patient presents with    Dizziness     Heart palpitations, sweatiness        Massimo is a 62-year-old male presents emergency room with a complaint of dizzy spells.  Last week he felt little lightheaded had a headache seem to feel better and then the symptoms came back.  He complains of some stomach pains and a headache.  Got worse since last night.  He notes that he his last chemo was about a week ago.  He says it happens when he sitting happens when he is laying down.  History of esophageal and liver cancer which she is getting chemo.  No diarrhea occasional nausea no vomiting.  He still feels little lightheaded right now.  No spinning sensation.  No chest pain.  History is from patient and significant other and EMR.                          Mendoza Coma Scale Score: 15                  Patient History   Past Medical History:   Diagnosis Date    Essential (primary) hypertension 12/21/2013    Hypertension    Personal history of other diseases of the circulatory system     History of right bundle branch block (RBBB)     Past Surgical History:   Procedure Laterality Date    TOTAL KNEE ARTHROPLASTY  09/30/2016    Total Knee Replacement Left    TOTAL KNEE ARTHROPLASTY  09/30/2016    Total Knee Replacement Right     No family history on file.  Social History     Tobacco Use    Smoking status: Former     Types: Cigarettes     Passive exposure: Never    Smokeless tobacco: Never   Substance Use Topics    Alcohol use: Not Currently    Drug use: Never       Physical Exam   ED Triage Vitals [11/05/23 1402]   Temp Heart Rate Resp BP   37.2 °C (99 °F) 81 18 138/80      SpO2 Temp src Heart Rate Source Patient Position   99 % -- -- --      BP Location FiO2 (%)     -- --       Physical Exam  Vitals reviewed.   Constitutional:       General: He is awake.      Appearance: Normal appearance.   HENT:      Head: Normocephalic.      Nose: Nose normal.   Cardiovascular:      Rate and Rhythm: Normal rate and regular  rhythm.   Pulmonary:      Effort: Pulmonary effort is normal.      Breath sounds: Normal breath sounds.   Abdominal:      Palpations: Abdomen is soft.   Musculoskeletal:      Cervical back: Normal range of motion.   Skin:     General: Skin is warm.      Capillary Refill: Capillary refill takes less than 2 seconds.   Neurological:      Mental Status: He is alert.         ED Course & MDM   ED Course as of 11/21/23 0557   Sun Nov 05, 2023   1450 EKG done at 1358 interpreted by myself shows normal sinus rhythm rate of 77 bpm with right bundle branch block.  This is similar to old EKG May 22, 2023 which also showed a bundle. [RZ]   1453 Patient will be worked up in ED for his lightheadedness.  He will be given some IV fluids labs and imaging requested.  His abdomen is soft nondistended but he complains of discomfort we will also have a CT. [RZ]   1558 Repeat EKG done at 1527 interpreted by me shows normal sinus rhythm rate of 73 bpm with a right bundle branch block similar to previous no obvious ischemia [RZ]   1642 Went to CT scan low back had another episode last 30 seconds where he felt lightheaded.  It went away and he is awaiting results. [RZ]   1757 Long discussion about the patient and results.  Cancer is essentially unchanged according to the imaging.  Patient is very concerned about the symptoms that keep recurring.  They seem to be happening more frequently.  I did not witness it but it happened while he was here in the ED.  We talked about the risk benefits alternatives of staying versus going home and decided ultimately to put him in overnight for further evaluation.  I spoke to the hospitalist Dr. Carty who agrees with plans to observe.  Patient and family updated. [RZ]      ED Course User Index  [RZ] Gavin Batres MD         Diagnoses as of 11/21/23 0557   Hypokalemia   Dizziness   Bradycardia       Medical Decision Making      Procedure  Procedures     Gavin Batres MD  11/05/23 0973        Gavin Batres MD  11/21/23 0557

## 2023-11-05 NOTE — ED TRIAGE NOTES
Pt arrived to ED via triage for complaints of heart palpitations, dizziness and sweaty for about a week

## 2023-11-06 ENCOUNTER — APPOINTMENT (OUTPATIENT)
Dept: RADIOLOGY | Facility: HOSPITAL | Age: 63
DRG: 243 | End: 2023-11-06
Payer: MEDICARE

## 2023-11-06 ENCOUNTER — DOCUMENTATION (OUTPATIENT)
Dept: CARDIOLOGY | Facility: HOSPITAL | Age: 63
End: 2023-11-06
Payer: MEDICARE

## 2023-11-06 PROBLEM — R00.1 BRADYCARDIA: Status: ACTIVE | Noted: 2023-11-06

## 2023-11-06 PROBLEM — I44.2 THIRD DEGREE HEART BLOCK (MULTI): Status: ACTIVE | Noted: 2023-11-06

## 2023-11-06 LAB
ALBUMIN SERPL BCP-MCNC: 3.7 G/DL (ref 3.4–5)
ANION GAP SERPL CALC-SCNC: 12 MMOL/L (ref 10–20)
BUN SERPL-MCNC: 17 MG/DL (ref 6–23)
CALCIUM SERPL-MCNC: 9.1 MG/DL (ref 8.6–10.3)
CARBOXYTHC UR-MCNC: 474 NG/ML
CHLORIDE SERPL-SCNC: 108 MMOL/L (ref 98–107)
CO2 SERPL-SCNC: 24 MMOL/L (ref 21–32)
CREAT SERPL-MCNC: 1.02 MG/DL (ref 0.5–1.3)
ERYTHROCYTE [DISTWIDTH] IN BLOOD BY AUTOMATED COUNT: 15.5 % (ref 11.5–14.5)
EST. AVERAGE GLUCOSE BLD GHB EST-MCNC: 94 MG/DL
GFR SERPL CREATININE-BSD FRML MDRD: 82 ML/MIN/1.73M*2
GLUCOSE SERPL-MCNC: 71 MG/DL (ref 74–99)
HBA1C MFR BLD: 4.9 %
HCT VFR BLD AUTO: 33.4 % (ref 41–52)
HGB BLD-MCNC: 10.9 G/DL (ref 13.5–17.5)
MAGNESIUM SERPL-MCNC: 1.88 MG/DL (ref 1.6–2.4)
MCH RBC QN AUTO: 28.6 PG (ref 26–34)
MCHC RBC AUTO-ENTMCNC: 32.6 G/DL (ref 32–36)
MCV RBC AUTO: 88 FL (ref 80–100)
NRBC BLD-RTO: 0 /100 WBCS (ref 0–0)
PHOSPHATE SERPL-MCNC: 4.8 MG/DL (ref 2.5–4.9)
PLATELET # BLD AUTO: 99 X10*3/UL (ref 150–450)
POTASSIUM SERPL-SCNC: 3.9 MMOL/L (ref 3.5–5.3)
RBC # BLD AUTO: 3.81 X10*6/UL (ref 4.5–5.9)
SODIUM SERPL-SCNC: 140 MMOL/L (ref 136–145)
WBC # BLD AUTO: 4.7 X10*3/UL (ref 4.4–11.3)

## 2023-11-06 PROCEDURE — 80069 RENAL FUNCTION PANEL: CPT

## 2023-11-06 PROCEDURE — 83735 ASSAY OF MAGNESIUM: CPT

## 2023-11-06 PROCEDURE — 2500000001 HC RX 250 WO HCPCS SELF ADMINISTERED DRUGS (ALT 637 FOR MEDICARE OP)

## 2023-11-06 PROCEDURE — 2500000004 HC RX 250 GENERAL PHARMACY W/ HCPCS (ALT 636 FOR OP/ED)

## 2023-11-06 PROCEDURE — 2060000001 HC INTERMEDIATE ICU ROOM DAILY

## 2023-11-06 PROCEDURE — A9575 INJ GADOTERATE MEGLUMI 0.1ML: HCPCS | Performed by: STUDENT IN AN ORGANIZED HEALTH CARE EDUCATION/TRAINING PROGRAM

## 2023-11-06 PROCEDURE — 87476 LYME DIS DNA AMP PROBE: CPT

## 2023-11-06 PROCEDURE — 70553 MRI BRAIN STEM W/O & W/DYE: CPT

## 2023-11-06 PROCEDURE — 99223 1ST HOSP IP/OBS HIGH 75: CPT | Performed by: STUDENT IN AN ORGANIZED HEALTH CARE EDUCATION/TRAINING PROGRAM

## 2023-11-06 PROCEDURE — 2550000001 HC RX 255 CONTRASTS: Performed by: STUDENT IN AN ORGANIZED HEALTH CARE EDUCATION/TRAINING PROGRAM

## 2023-11-06 PROCEDURE — 99233 SBSQ HOSP IP/OBS HIGH 50: CPT | Performed by: STUDENT IN AN ORGANIZED HEALTH CARE EDUCATION/TRAINING PROGRAM

## 2023-11-06 PROCEDURE — 70553 MRI BRAIN STEM W/O & W/DYE: CPT | Performed by: STUDENT IN AN ORGANIZED HEALTH CARE EDUCATION/TRAINING PROGRAM

## 2023-11-06 PROCEDURE — 99222 1ST HOSP IP/OBS MODERATE 55: CPT | Performed by: INTERNAL MEDICINE

## 2023-11-06 PROCEDURE — 85027 COMPLETE CBC AUTOMATED: CPT

## 2023-11-06 RX ORDER — LORAZEPAM 2 MG/ML
0.5 INJECTION INTRAMUSCULAR
Status: COMPLETED | OUTPATIENT
Start: 2023-11-06 | End: 2023-11-06

## 2023-11-06 RX ORDER — GADOTERATE MEGLUMINE 376.9 MG/ML
0.2 INJECTION INTRAVENOUS
Status: COMPLETED | OUTPATIENT
Start: 2023-11-06 | End: 2023-11-06

## 2023-11-06 RX ORDER — LORAZEPAM 0.5 MG/1
0.5 TABLET ORAL
Status: ACTIVE | OUTPATIENT
Start: 2023-11-06 | End: 2023-11-06

## 2023-11-06 RX ADMIN — OXYCODONE HYDROCHLORIDE 10 MG: 5 TABLET ORAL at 20:43

## 2023-11-06 RX ADMIN — Medication 10 ML: at 09:09

## 2023-11-06 RX ADMIN — LORAZEPAM 0.5 MG: 2 INJECTION INTRAMUSCULAR; INTRAVENOUS at 09:14

## 2023-11-06 RX ADMIN — Medication 10 ML: at 20:42

## 2023-11-06 RX ADMIN — APIXABAN 5 MG: 5 TABLET, FILM COATED ORAL at 20:42

## 2023-11-06 RX ADMIN — POTASSIUM CHLORIDE 40 MEQ: 1500 TABLET, EXTENDED RELEASE ORAL at 09:08

## 2023-11-06 RX ADMIN — SUCRALFATE 1 G: 1 TABLET ORAL at 09:08

## 2023-11-06 RX ADMIN — GADOTERATE MEGLUMINE 14 ML: 376.9 INJECTION INTRAVENOUS at 09:45

## 2023-11-06 RX ADMIN — GABAPENTIN 300 MG: 300 CAPSULE ORAL at 09:08

## 2023-11-06 RX ADMIN — APIXABAN 5 MG: 5 TABLET, FILM COATED ORAL at 09:08

## 2023-11-06 RX ADMIN — POTASSIUM CHLORIDE 10 MEQ: 750 TABLET, EXTENDED RELEASE ORAL at 09:09

## 2023-11-06 RX ADMIN — PANTOPRAZOLE SODIUM 40 MG: 40 TABLET, DELAYED RELEASE ORAL at 09:08

## 2023-11-06 RX ADMIN — PANTOPRAZOLE SODIUM 40 MG: 40 TABLET, DELAYED RELEASE ORAL at 20:42

## 2023-11-06 ASSESSMENT — ACTIVITIES OF DAILY LIVING (ADL): LACK_OF_TRANSPORTATION: NO

## 2023-11-06 ASSESSMENT — PAIN SCALES - GENERAL: PAINLEVEL_OUTOF10: 3

## 2023-11-06 NOTE — HOSPITAL COURSE
Massimo Garcia is a 62 y.o. 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  Patient follows up with heme-onc (Dr. Blake) when he gets his chemotherapy regimen.  Last infusion was 10/30.  Patient stated that he was stable however in the last 7 days prior to presentation, he has been having recurrent episodes of dizzy spells.       Patient was initially admitted to the floors, with telemetry was noted to be consistent with symptomatic bradycardia.  Electrophysiologist was contacted, and after evaluation of EKG strip and telemetry readings, a diagnosis of third-degree heart block was made.  Patient was scheduled to have a pacemaker placed on Thursday 11/9.  However while on the floors, patient's was noted to bradycardia down with symptomatic pauses of about 5 seconds.  A rapid was called, and patient was evaluated.  Decision was made to transfer patient to the ICU for further monitoring.      While in the ICU, patient was noted to bradycardia down initially to 35 bpm and then subsequently had a sinus pause of about 3 seconds.  The transvenous pacer was activated initially at 10 MV, however there was no capture mechanically.  Patient was titrated up up until about 120 mV with capture was consistent at a rate of 70 bpm.  Patient was given 0.2 mg of Dilaudid for the discomfort and pain.  Cardiology was reconsulted, and decision was made for a stat transvenous pacer.  Transvenous pacemaker was placed on 11/8/2023.  Subsequently a dual-chamber permanent pacemaker was placed with Dr. Anderson on 11/9/2023.  He received 1 dose of intraoperative cefazolin at this time.  There were no intraoperative complications, and the patient tolerated the procedure well.  However, upon return to the ICU after Cath Lab the patient had a  10-minute isolated episode of left sided severe chest pain.  No inciting trauma, or significant events.  Patient was sitting in bed when the pain began.  The pain was resolved with administration of Percocet and Dilaudid.  There were no further episodes of chest pain.  Upon discharge it will be important to follow-up with the wound clinic for pacemaker check.  At this time the patient can be scheduled for future cardiology appointments.  Due to the patient's history of portal vein thrombosis, it would be essential to restart anticoagulation.  Patient was previously on Eliquis 5 mg twice daily, and was transitioned to heparin drip before pacemaker placement.  When cleared by cardiology or upon discharge patient can be started on previous Eliquis 5 mg twice daily    On the morning of 11/10/2023, chest x-ray was ordered to confirm placement of the pacemaker and the patient was found to have small left-sided pneumothorax. The patient remained asymptomatic and did not have any complaints of shortness of breath, dyspnea or respiratory complications.  He continued to saturate at 97 on room air.  With the new pneumothorax patient was started on nonrebreather and repeat chest x-ray was ordered for 1200.  Repeat chest x-ray showed no improvement in the pneumothorax.  The lateral pneumothorax measurement at 1.2 cm remained similar to previous CXR.  There was questionable expansion of the pneumothorax in the left lung apex.  To continue with monitoring, a repeat chest x-ray was ordered for 1600.  If there was notable improvement in the pneumothorax, the patient could be transferred to the floor.  If there was worsening of the pneumothorax, the patient will receive a chest tube.

## 2023-11-06 NOTE — PROGRESS NOTES
"Massimo Garcia is a 62 y.o. male on day 0 of admission presenting with Dizziness.    Subjective   Patient seen at bedside.  Patient had a heart rate drop in 30s seen on telemetry.  The patient remained asymptomatic and states he feels back to baseline.       Objective     Physical Exam  Constitutional: Appears stated age. In NAD.   HEENT: NC/AT, EOMI, clear sclera, moist mucous membranes, NGT intact  CV: RRR, No M/R/G  PULM: CTAB, no coughing or wheezing  ABDOMEN: Soft, non distended, no TTP. + BSx4.  SKIN: Normal Color, Warm, Dry, No Rashes   EXTREMITIES: Non-Tender, Full ROM, No Pedal Edema  NEURO: A&O x 4, nonfocal neurological exam.  PSYCH: Normal Mood & Behavior     Last Recorded Vitals  Blood pressure 127/74, pulse 77, temperature 35.9 °C (96.6 °F), temperature source Temporal, resp. rate 18, height 1.727 m (5' 7.99\"), weight 70.2 kg (154 lb 12.2 oz), SpO2 97 %.  Intake/Output last 3 Shifts:  I/O last 3 completed shifts:  In: 1010 (14.4 mL/kg) [I.V.:10 (0.1 mL/kg); IV Piggyback:1000]  Out: - (0 mL/kg)   Weight: 70.2 kg     Relevant Results                             Assessment/Plan   Principal Problem:    Dizziness    Massimo Garcia is a 62 year old male with a past medical history significant for metastatic stage IV esophageal cancer HER2 positive with liver mets (currently on chemo with trastuzumab FOLFOX regimen), hx PE and portal vein thrombosis on Eliquis who presented to [facility] ED on 11/5/23 with dizzy spells. Admitted for further management of dizziness.      Acute Medical Issues:      # Dizziness / Pre-Syncope   # Symptomatic bradycardia  # Possibly medication Induced   - HR in 30s seen on telemtry.  Bradycardia congruent with reported history  - Currently asymptomatic while in the hospital   - Most recent Echo 8/31/23: EF 60%, mild MR   - MRI brain showed no acute lesions or metastasis   - On telemetry   -Echo ordered  -Cardiology consulted     #Anxiety  -Wife states he is much more anxious than " baseline  -hydroxyzine prn     #Cancer pain management  - baseline pain med oxycodone 10 bid  -tylenol, oxycodone 5, oxycodone 10 prn for pain       #Neuropathy  - Continue Gabapentin 300 mg bid     #GI ppx  -Protonix 40 mg bids     #DVT ppx  -Previous history of PE in January 2/2 cancer  - Continue home Eliquis     Chronic Medical Issues:      # Metastatic stage IV Esophageal Cancer   - Follows with Heme/Onc Dr. Blake   - Last chemo 10/30 with Trastuzumab FOLFOX   -Lidocaine cream for port use  -sucralfate 1 gram daily  -Oncology following     #hypokalemia  - Kcl 10 mEq bid     Fluids: PRN bolus   Electrolytes: replete as needed   Nutrition: Regular   GI PPx: None DVT   PPx: Eliquis   Oxygenation: RA   Antibiotics: None   Code: Full     Dispo: Admitted for dizziness/pre-syncope, possibly medication induced. eLOS < 48 hrs.            Artie Marks, DO

## 2023-11-06 NOTE — PROGRESS NOTES
11/06/23 1338   Discharge Planning   Living Arrangements Spouse/significant other   Support Systems Spouse/significant other   Assistance Needed Alert and oirented x 3, Indpendent with ADL's, Drives, No DME used   Type of Residence Private residence   Number of Stairs to Enter Residence 3   Number of Stairs Within Residence 10   Do you have animals or pets at home? Yes   Type of Animals or Pets 2 dogs, 2 cats, and [place]   Who is requesting discharge planning? Provider   Home or Post Acute Services None   Patient expects to be discharged to: Home with spouse   Does the patient need discharge transport arranged? No   Financial Resource Strain   How hard is it for you to pay for the very basics like food, housing, medical care, and heating? Not hard   Housing Stability   In the last 12 months, was there a time when you were not able to pay the mortgage or rent on time? N   In the last 12 months, how many places have you lived? 1   In the last 12 months, was there a time when you did not have a steady place to sleep or slept in a shelter (including now)? N   Transportation Needs   In the past 12 months, has lack of transportation kept you from medical appointments or from getting medications? no   In the past 12 months, has lack of transportation kept you from meetings, work, or from getting things needed for daily living? No

## 2023-11-06 NOTE — CONSULTS
Reason For Consult  Frequency of falls.  Patient has stage IV esophageal cancer.  Currently on chemotherapy Herceptin based.  Trastuzumab plus FOLFOX 6.    History Of Present Illness  Massimo Garcia is a 62 y.o. male presenting with metastatic stage IV esophageal cancer.  Initially seen in the clinic and work-up including full CT scans and PET scanning revealing metastatic disease.  Patient has done well on systemic palliative chemotherapy as the tumor is HER2 positive and anti-HER2 therapy seems to be very effective in this patient.  More recently he has developed dizziness and as per patient frequency of falls.  This raises the concern for intracranial metastatic disease and when seen this morning he gave me a history of associated musculoskeletal this metria associated with his symptoms.    Differential diagnosis includes seizure disorder.  Also noted is electrolyte abnormalities associated with a seizure disorder.  Patient has recently been placed on oxycodone and dysmetria or movement disorder associated with opiate pain administration is also a differential.     Past Medical History  He has a past medical history of Essential (primary) hypertension (12/21/2013) and Personal history of other diseases of the circulatory system.    Surgical History  He has a past surgical history that includes Total knee arthroplasty (09/30/2016) and Total knee arthroplasty (09/30/2016).     Social History  He reports that he has quit smoking. His smoking use included cigarettes. He has never been exposed to tobacco smoke. He has never used smokeless tobacco. He reports that he does not currently use alcohol. He reports that he does not use drugs.    Family History  No family history on file.     Allergies  Bee venom protein (honey bee)    Review of Systems  No cough no nausea no diarrhea no vomiting no chest pain or palpitations.  Patient concerned about symptoms of loss of consciousness and falling.     Physical Exam  Pupils equal  "round reactive to light and accommodation extraocular muscles are intact.    Chest good air entry bilaterally.  Abdomen is soft nontender.  CNS patient is lucid.   no urinary obstruction of note.  GI no nausea no vomiting.  No lymphadenopathy of note.     Last Recorded Vitals  Blood pressure 127/74, pulse 77, temperature 35.9 °C (96.6 °F), temperature source Temporal, resp. rate 18, height 1.727 m (5' 7.99\"), weight 70.2 kg (154 lb 12.2 oz), SpO2 97 %.    Relevant Results  MR brain w and wo IV contrast    Result Date: 11/6/2023  Interpreted By:  Aba Serarno, STUDY: MR BRAIN W AND WO IV CONTRAST;  11/6/2023 9:58 am   INDICATION: Signs/Symptoms: Esophageal cancer with mets,dizziness.   COMPARISON: Head CT, 11/05/2023   ACCESSION NUMBER(S): [id number]   ORDERING CLINICIAN: TARI LEES   TECHNIQUE: Axial T2, FLAIR, DWI, gradient echo T2 and T1 weighted images of the brain were acquired. Post contrast T1 weighted images were acquired after administration of 14 mL Dotarem gadolinium based intravenous contrast. Image quality is somewhat degraded by motion.   FINDINGS: CSF Spaces: The ventricles, sulci and basal cisterns are within normal limits. No abnormal extra-axial collection   Parenchyma: There is no restricted diffusion to suggest acute infarction.  Nonspecific T2 hyperintense signal in the cerebral white matter, mild for age. No evidence of intracranial hemorrhage. There is no mass effect or midline shift. No abnormal enhancement.   Paranasal Sinuses and Mastoids: Scattered paranasal sinus mucosal thickening. The mastoid air cells are clear.   Orbits: Bilateral lens replacement.       Mildly motion limited exam, no acute intracranial pathology or evidence of intracranial metastatic disease.   MACRO: None   Signed by: Aba Serrano 11/6/2023 12:38 PM Dictation workstation:   [id number]    CT abdomen pelvis w IV contrast    Result Date: 11/5/2023  Interpreted By:  Rosalio Bishop, STUDY: CT ABDOMEN " PELVIS W IV CONTRAST;  11/5/2023 4:37 pm   INDICATION: Signs/Symptoms:abd pain.   COMPARISON: None.   ACCESSION NUMBER(S): [id number]   ORDERING CLINICIAN: GLORIA SLOAN   TECHNIQUE: Contiguous axial CT sections are performed from the lung bases to the lesser trochanters following the uneventful administration of 75 cc of intravenous Omnipaque 350.   The study is supplemented with coronal and sagittal reformatted images.   FINDINGS: The lung bases are clear bilaterally.   There are multilevel discogenic degenerative changes of the lumbar spine with dextroscoliosis. There is multilevel bulging disc and central canal narrowing, more pronounced at L2-3 and L3-4. The visualized osseous structures are intact. There are mild osteoarthritic changes of the hips and sacroiliac joints.   There are multiple hypodense peripherally enhancing masses throughout the liver. The largest is identified centrally in the right hepatic lobe and measures 3.3 x 2.8 cm in diameter. Allowing for differences in sectioning plane and some differences in the phase of contrast enhancement, these findings are not appreciably altered from the previous study of 09/27/2023. The appearance is compatible with metastatic disease. The liver has a slightly lobular contour.   The spleen is mildly enlarged and unchanged. The spleen uniformly enhances. There is no space-occupying mass.   There is some nodularity of the adrenal glands which is unchanged from the previous study.   The gallbladder and pancreas are unremarkable and unchanged.   The kidneys enhance symmetrically and are symmetric in size. There is a too small to characterize hypodensity in the mid right kidney posteriorly measuring 5 mm. There is no renal calculus or hydronephrosis bilaterally. The perinephric fat is preserved. There is no hydroureter or obstructing ureteral calculus. The urinary bladder is not opacified though is partially distended and otherwise unremarkable. There is no  bladder calculus.   The abdominal aorta is of normal caliber and enhancement and enhances normally. The IVC is unremarkable.   There is a pathologically enlarged lymph node just anterior to the left of the upper aorta measuring 1.9 x 1.6 cm in diameter. This finding is unchanged from 09/27/2023. A portacaval lymph node has slightly increased in size and measures 2.3 x 1.5 cm. A portacaval lymph node is also mildly increased in the previous study and measures 2.8 x 2.0 cm in diameter.   There is no bowel distension or infiltration of the bowel mesentery. The appendix has a normal appearance. There are multiple diverticula in the distal colon though no evidence of diverticulitis. There is no free air free fluid collection in the abdomen or pelvis. There is no herniated bowel. The prostate is unremarkable.   A 7 mm sclerotic focus is identified in the right sacrum at the S2 level which could reflect a bone island and is unchanged.       Diffuse hepatic metastatic disease is not significantly changed from 09/27/2023 allowing for technical differences. Retroperitoneal and Mireille portal lymphadenopathy has mildly increased from 09/27/2023.   Mild distal colonic diverticulosis.   Too small to characterize right renal hypodensity.   Mild splenomegaly.   A periaortic lymph node and aortocaval lymph node or mildly increased in size from the previous study   Signed by: Rosalio Bishop 11/5/2023 5:19 PM Dictation workstation:   [id number]    CT head wo IV contrast    Result Date: 11/5/2023  Interpreted By:  Rosalio Bishop, STUDY: CT HEAD WO IV CONTRAST;  11/5/2023 4:37 pm   INDICATION: HA and lightheaded.   COMPARISON: None.   ACCESSION NUMBER(S): [id number]   ORDERING CLINICIAN: GLORIA SLOAN   TECHNIQUE: Contiguous unenhanced axial CT sections are performed from the skull base to the vertex.   FINDINGS: The osseous structures are intact. The visualized portions of the paranasal sinuses and mastoid air cells are  clear.   The cortical sulci and CSF spaces are symmetric in appearance. There is no sign of parenchymal hematoma or dense extra-axial fluid collection. There is no localized edema, mass effect, or shift of the midline. The gray matter/white-matter differentiation is preserved.       No CT evidence of acute intracranial abnormality.   Signed by: Rosalio Bishop 11/5/2023 5:08 PM Dictation workstation:   [id number]    XR chest 1 view    Result Date: 11/5/2023  Interpreted By:  Sumit Melgar, STUDY: XR CHEST 1 VIEW  11/5/2023 3:02 pm   INDICATION: Signs/Symptoms:Dizzy   COMPARISON: 05/22/2023   ACCESSION NUMBER(S): [id number]   ORDERING CLINICIAN: GLORIA SLOAN   TECHNIQUE: A single AP portable radiograph of the chest was obtained.   FINDINGS: Multiple cardiac monitoring leads are seen over the chest.  A right internal jugular implanted port catheter is unchanged in position. No focal infiltrate, pleural effusion or pneumothorax is identified. The cardiac silhouette is within normal limits for size.       No focal infiltrate or pneumothorax is identified.   MACRO: None.   Signed by: Sumit Melgar 11/5/2023 3:04 PM Dictation workstation:   [id number]       Assessment/Plan     Massimo Garcia is a 62 y.o. male presenting with metastatic stage IV esophageal cancer.  Initially seen in the clinic and work-up including full CT scans and PET scanning revealing metastatic disease.  Patient has done well on systemic palliative chemotherapy as the tumor is HER2 positive and anti-HER2 therapy seems to be very effective in this patient.  More recently he has developed dizziness and as per patient frequency of falls.    1: Etiology of patient's falls remains elusive but there are 3 differentials electrolyte abnormalities including magnesium potassium and calcium dysfunction: Patient is on opiate pain medications and dysmetria secondary to opiate pain medications is a definite differential in this patient: Third differential  includes a seizure disorder NOS.    MRI of the brain thus far is negative for identifying cerebral metastases.  I will defer to the primary team in terms of electrolyte correction but patient may benefit from neurological evaluation at this time to rule out a paraneoplastic phenomenon versus possible seizure disorder.    I spent 30 minutes in the professional and overall care of this patient.      Tomasa Blake MD

## 2023-11-06 NOTE — H&P
History Of Present Illness  Massimo Garcia is a 62 year old male with a past medical history significant for metastatic stage IV esophageal cancer HER2 positive with liver mets (currently on chemo with trastuzumab FOLFOX regimen), hx PE and portal vein thrombosis on Eliquis who presented to [facility] ED on 11/5/23 with dizzy spells. Last week he started to experience brief episodes of feeling lightheaded lasting between 2-6 seconds each the day before his last chemo treatment 10/30.  These were concerning and consistent in nature so he reported this to his oncologist Dr. Blake who ordered an MRI of the patient's brain.  Patient was able to go about his normal daily function in between episodes.  He decided to come to the hospital due to the consistent nature of his symptoms.       During his episodes he feels States that he loses conciousness during these episodes but he does not appear to lose consciousness to family and friends.  States that he has a headache in between episodes.  Most events are felt when he is laying down or sitting. He does not feel these events when he is being active.  Stated that when he feels these events he feels warm throughout his body and his palms become sweaty. He states he feels more anxious than normal and that he has abdominal pain at base line but it feels a little worse than normal.  Patient denies double vision, blurry vision or spinning sensation.    12 Point ROS negative unless otherwise specified above.     ED COURSE: VS: T 99.0 F, HR 81, RR 18, /80, SpO2 99% RA     Labs   - CBC: Hgb 11.7, Plt 119   - CMP: Glucose 122, K 3.2,    - Urine Analysis: Hazy, pH 9.0     Imaging:   - EKG: Normal sinus rhythm rate 77 bpm with RBBB (similar to previous)   - CT head wo con: Unremarkable   - Chest X-ray: Unremarkable   - CT abd pel w/con: Diffuse hepatic metastatic disease is not significantly changed from 9/27/23 allowing for technical differences. Retroperitoneal and Mireille portal  "lymphadenopathy has mildly increased from 9/27/23. Mild distal colonic diverticulosis. Too small to characterize right renal hypodensity. Mild splenomegaly. A periaortic lymph node and aortocaval lymph node or mildly increased in size from the previous study.     Interventions: Toradol 15mg IV, NS 1L bolus, Kcl 40mEq tablet     Past Medical History: Metastatic stage IV esophageal cancer HER2 positive with liver mets, hx PE and portal vein thrombosis on Eliquis Past Surgical History: total knee replacements b/l. shoulder replacements b/l Allergies: Bee sting causing anaphylaxis Family History: Esophageal cancer (father)     Home Medications: See med rec     Social History:   - Coming from home, lives with wife   - Tobacco: Smoked \"20 cigars a year\", quit Jan 2023   - Alcohol: Drank 3-4 beers per day for 30 years, quit Jan 2023   - Illicit Drug: CBD oil    Code Status: Full        Physical Exam  Constitutional: Appears stated age. In NAD.   HEENT: NC/AT, EOMI, clear sclera, moist mucous membranes, NGT intact  CV: RRR, No M/R/G  PULM: CTAB, no coughing or wheezing  ABDOMEN: Soft, non distended, tender to palpation RLQ. + BSx4.  SKIN: Normal Color, Warm, Dry, No Rashes   EXTREMITIES: Non-Tender, Full ROM, No Pedal Edema  NEURO: A&O x 4, nonfocal neurological exam.  PSYCH: Normal Mood & Behavior      Last Recorded Vitals  Blood pressure 138/72, pulse 78, temperature 36.7 °C (98.1 °F), temperature source Temporal, resp. rate 20, height 1.727 m (5' 8\"), weight 74.8 kg (165 lb), SpO2 98 %.    Relevant Results        CXR  No focal infiltrate or pneumothorax is identified.     CT head w/o  No CT evidence of acute intracranial abnormality.     CT abdomen w/  Diffuse hepatic metastatic disease is not significantly changed from  09/27/2023 allowing for technical differences. Retroperitoneal and  Mireille portal lymphadenopathy has mildly increased from 09/27/2023.      Mild distal colonic diverticulosis.      Too small to " characterize right renal hypodensity.      Mild splenomegaly.      A periaortic lymph node and aortocaval lymph node or mildly increased  in size from the previous study             Assessment/Plan   Principal Problem:    Dizziness      Massimo Garcia is a 62 year old male with a past medical history significant for metastatic stage IV esophageal cancer HER2 positive with liver mets (currently on chemo with trastuzumab FOLFOX regimen), hx PE and portal vein thrombosis on Eliquis who presented to [facility] ED on 11/5/23 with dizzy spells. Admitted for further management of dizziness.     Acute Medical Issues:     # Dizziness / Pre-Syncope   # Rule out Seizure?   # Medication Induced   - Concern for vasovagal, although BP elevated  - May be chemo induced or caused by autonomic neuropathy   - CT head in ED unremarkable   - Most recent Echo 8/31/23: EF 60%, mild MR   - Given 1L NS in ED   - MRI brain w and wo con scheduled outpatient, will order   - Othostats ordered   - Check TSH, HgbA1c   - On telemetry     #Anxiety  -Wife states he is much more anxious than baseline  -May be playing into his dizziness episodes    #Cancer pain management  -Oxycodone 10mg bid at home baseline  -Start Tylenol    #Neuropathy  - Continue Gabapentin 300 mg bid    #GI ppx  -Protonix 40 mg bids    #DVT ppx  -Previous history of PE in January 2/2 cancer  - Continue home Eliquis    Chronic Medical Issues:     # Metastatic stage IV Esophageal Cancer   - Follows with Heme/Onc Dr. Blake   - Last chemo 10/30 with Trastuzumab FOLFOX   - Consider Heme/Onc consult   -Lidocaine cream for port use  -sucralfate 1 gram daily    #hypokalemia  - Kcl 10 mEq bid    Fluids: PRN bolus   Electrolytes: replete as needed   Nutrition: Regular   GI PPx: None DVT   PPx: Eliquis   Oxygenation: RA   Antibiotics: None   Code: Full    Dispo: Admitted for dizziness/pre-syncope, possibly medication induced. eLOS < 48 hrs.            Artie Marks DO

## 2023-11-07 ENCOUNTER — APPOINTMENT (OUTPATIENT)
Dept: HEMATOLOGY/ONCOLOGY | Facility: CLINIC | Age: 63
End: 2023-11-07
Payer: COMMERCIAL

## 2023-11-07 ENCOUNTER — PHARMACY VISIT (OUTPATIENT)
Dept: PHARMACY | Facility: CLINIC | Age: 63
End: 2023-11-07
Payer: MEDICARE

## 2023-11-07 ENCOUNTER — HOSPITAL ENCOUNTER (OUTPATIENT)
Dept: CARDIOLOGY | Facility: HOSPITAL | Age: 63
Discharge: HOME | End: 2023-11-07
Payer: MEDICARE

## 2023-11-07 ENCOUNTER — APPOINTMENT (OUTPATIENT)
Dept: CARDIOLOGY | Facility: HOSPITAL | Age: 63
DRG: 243 | End: 2023-11-07
Payer: MEDICARE

## 2023-11-07 DIAGNOSIS — R00.1 BRADYCARDIA: Primary | ICD-10-CM

## 2023-11-07 LAB
ALBUMIN SERPL BCP-MCNC: 3.8 G/DL (ref 3.4–5)
ANION GAP SERPL CALC-SCNC: 11 MMOL/L (ref 10–20)
AORTIC VALVE MEAN GRADIENT: 4
AORTIC VALVE PEAK VELOCITY: 1.23
AV PEAK GRADIENT: 6.1
AVA (PEAK VEL): 2.82
AVA (VTI): 2.74
BUN SERPL-MCNC: 15 MG/DL (ref 6–23)
CALCIUM SERPL-MCNC: 8.9 MG/DL (ref 8.6–10.3)
CHLORIDE SERPL-SCNC: 105 MMOL/L (ref 98–107)
CO2 SERPL-SCNC: 26 MMOL/L (ref 21–32)
CREAT SERPL-MCNC: 0.84 MG/DL (ref 0.5–1.3)
EJECTION FRACTION APICAL 4 CHAMBER: 56.1
EJECTION FRACTION: 56
ERYTHROCYTE [DISTWIDTH] IN BLOOD BY AUTOMATED COUNT: 15.5 % (ref 11.5–14.5)
GFR SERPL CREATININE-BSD FRML MDRD: >90 ML/MIN/1.73M*2
GLUCOSE BLD MANUAL STRIP-MCNC: 108 MG/DL (ref 74–99)
GLUCOSE BLD MANUAL STRIP-MCNC: 191 MG/DL (ref 74–99)
GLUCOSE SERPL-MCNC: 80 MG/DL (ref 74–99)
HCT VFR BLD AUTO: 33.3 % (ref 41–52)
HGB BLD-MCNC: 11.2 G/DL (ref 13.5–17.5)
LEFT ATRIUM VOLUME AREA LENGTH INDEX BSA: 30.1
LEFT VENTRICLE INTERNAL DIMENSION DIASTOLE: 4.52 (ref 3.5–6)
LEFT VENTRICULAR OUTFLOW TRACT DIAMETER: 2.1
MAGNESIUM SERPL-MCNC: 1.7 MG/DL (ref 1.6–2.4)
MCH RBC QN AUTO: 29.3 PG (ref 26–34)
MCHC RBC AUTO-ENTMCNC: 33.6 G/DL (ref 32–36)
MCV RBC AUTO: 87 FL (ref 80–100)
MITRAL VALVE E/A RATIO: 0.7
MITRAL VALVE E/E' RATIO: 5.44
NRBC BLD-RTO: 0 /100 WBCS (ref 0–0)
PHOSPHATE SERPL-MCNC: 4.3 MG/DL (ref 2.5–4.9)
PLATELET # BLD AUTO: 84 X10*3/UL (ref 150–450)
POTASSIUM SERPL-SCNC: 3.7 MMOL/L (ref 3.5–5.3)
RBC # BLD AUTO: 3.82 X10*6/UL (ref 4.5–5.9)
RIGHT VENTRICLE FREE WALL PEAK S': 11
RIGHT VENTRICLE PEAK SYSTOLIC PRESSURE: 18.5
SODIUM SERPL-SCNC: 138 MMOL/L (ref 136–145)
TRICUSPID ANNULAR PLANE SYSTOLIC EXCURSION: 2.2
WBC # BLD AUTO: 5 X10*3/UL (ref 4.4–11.3)

## 2023-11-07 PROCEDURE — 99291 CRITICAL CARE FIRST HOUR: CPT | Performed by: INTERNAL MEDICINE

## 2023-11-07 PROCEDURE — 85027 COMPLETE CBC AUTOMATED: CPT

## 2023-11-07 PROCEDURE — C1769 GUIDE WIRE: HCPCS | Performed by: INTERNAL MEDICINE

## 2023-11-07 PROCEDURE — 2500000001 HC RX 250 WO HCPCS SELF ADMINISTERED DRUGS (ALT 637 FOR MEDICARE OP): Performed by: INTERNAL MEDICINE

## 2023-11-07 PROCEDURE — 2500000004 HC RX 250 GENERAL PHARMACY W/ HCPCS (ALT 636 FOR OP/ED)

## 2023-11-07 PROCEDURE — 87081 CULTURE SCREEN ONLY: CPT | Mod: GEALAB

## 2023-11-07 PROCEDURE — 2500000004 HC RX 250 GENERAL PHARMACY W/ HCPCS (ALT 636 FOR OP/ED): Performed by: INTERNAL MEDICINE

## 2023-11-07 PROCEDURE — 82947 ASSAY GLUCOSE BLOOD QUANT: CPT

## 2023-11-07 PROCEDURE — 33210 INSERT ELECTRD/PM CATH SNGL: CPT | Performed by: INTERNAL MEDICINE

## 2023-11-07 PROCEDURE — 99233 SBSQ HOSP IP/OBS HIGH 50: CPT

## 2023-11-07 PROCEDURE — 80069 RENAL FUNCTION PANEL: CPT

## 2023-11-07 PROCEDURE — C1894 INTRO/SHEATH, NON-LASER: HCPCS | Performed by: INTERNAL MEDICINE

## 2023-11-07 PROCEDURE — 83735 ASSAY OF MAGNESIUM: CPT

## 2023-11-07 PROCEDURE — 2500000001 HC RX 250 WO HCPCS SELF ADMINISTERED DRUGS (ALT 637 FOR MEDICARE OP)

## 2023-11-07 PROCEDURE — 36415 COLL VENOUS BLD VENIPUNCTURE: CPT

## 2023-11-07 PROCEDURE — 76000 FLUOROSCOPY <1 HR PHYS/QHP: CPT | Performed by: INTERNAL MEDICINE

## 2023-11-07 PROCEDURE — 93306 TTE W/DOPPLER COMPLETE: CPT

## 2023-11-07 PROCEDURE — 2500000005 HC RX 250 GENERAL PHARMACY W/O HCPCS: Performed by: INTERNAL MEDICINE

## 2023-11-07 PROCEDURE — 93010 ELECTROCARDIOGRAM REPORT: CPT | Performed by: INTERNAL MEDICINE

## 2023-11-07 PROCEDURE — 93005 ELECTROCARDIOGRAM TRACING: CPT

## 2023-11-07 PROCEDURE — 93005 ELECTROCARDIOGRAM TRACING: CPT | Mod: MUE

## 2023-11-07 PROCEDURE — 93306 TTE W/DOPPLER COMPLETE: CPT | Performed by: STUDENT IN AN ORGANIZED HEALTH CARE EDUCATION/TRAINING PROGRAM

## 2023-11-07 PROCEDURE — 99292 CRITICAL CARE ADDL 30 MIN: CPT | Performed by: STUDENT IN AN ORGANIZED HEALTH CARE EDUCATION/TRAINING PROGRAM

## 2023-11-07 PROCEDURE — RXMED WILLOW AMBULATORY MEDICATION CHARGE

## 2023-11-07 PROCEDURE — 2500000001 HC RX 250 WO HCPCS SELF ADMINISTERED DRUGS (ALT 637 FOR MEDICARE OP): Performed by: STUDENT IN AN ORGANIZED HEALTH CARE EDUCATION/TRAINING PROGRAM

## 2023-11-07 PROCEDURE — 2720000007 HC OR 272 NO HCPCS: Performed by: INTERNAL MEDICINE

## 2023-11-07 PROCEDURE — 99291 CRITICAL CARE FIRST HOUR: CPT | Performed by: STUDENT IN AN ORGANIZED HEALTH CARE EDUCATION/TRAINING PROGRAM

## 2023-11-07 PROCEDURE — 76942 ECHO GUIDE FOR BIOPSY: CPT | Performed by: INTERNAL MEDICINE

## 2023-11-07 PROCEDURE — 2020000001 HC ICU ROOM DAILY

## 2023-11-07 PROCEDURE — 99152 MOD SED SAME PHYS/QHP 5/>YRS: CPT | Performed by: INTERNAL MEDICINE

## 2023-11-07 PROCEDURE — 5A1213Z PERFORMANCE OF CARDIAC PACING, INTERMITTENT: ICD-10-PCS | Performed by: STUDENT IN AN ORGANIZED HEALTH CARE EDUCATION/TRAINING PROGRAM

## 2023-11-07 RX ORDER — POTASSIUM CHLORIDE 20 MEQ/1
20 TABLET, EXTENDED RELEASE ORAL DAILY
Status: DISCONTINUED | OUTPATIENT
Start: 2023-11-07 | End: 2023-11-07

## 2023-11-07 RX ORDER — HYDROMORPHONE HYDROCHLORIDE 1 MG/ML
0.2 INJECTION, SOLUTION INTRAMUSCULAR; INTRAVENOUS; SUBCUTANEOUS ONCE
Status: COMPLETED | OUTPATIENT
Start: 2023-11-07 | End: 2023-11-07

## 2023-11-07 RX ORDER — MORPHINE SULFATE 2 MG/ML
2 INJECTION, SOLUTION INTRAMUSCULAR; INTRAVENOUS ONCE
Status: COMPLETED | OUTPATIENT
Start: 2023-11-07 | End: 2023-11-07

## 2023-11-07 RX ORDER — GABAPENTIN 100 MG/1
100 CAPSULE ORAL 2 TIMES DAILY
Status: DISCONTINUED | OUTPATIENT
Start: 2023-11-07 | End: 2023-11-13 | Stop reason: HOSPADM

## 2023-11-07 RX ORDER — MIDAZOLAM HYDROCHLORIDE 1 MG/ML
INJECTION INTRAMUSCULAR; INTRAVENOUS AS NEEDED
Status: DISCONTINUED | OUTPATIENT
Start: 2023-11-07 | End: 2023-11-07 | Stop reason: HOSPADM

## 2023-11-07 RX ORDER — LIDOCAINE HYDROCHLORIDE 20 MG/ML
INJECTION, SOLUTION INFILTRATION; PERINEURAL AS NEEDED
Status: DISCONTINUED | OUTPATIENT
Start: 2023-11-07 | End: 2023-11-07 | Stop reason: HOSPADM

## 2023-11-07 RX ORDER — SODIUM CHLORIDE 9 MG/ML
INJECTION, SOLUTION INTRAVENOUS CONTINUOUS PRN
Status: COMPLETED | OUTPATIENT
Start: 2023-11-07 | End: 2023-11-07

## 2023-11-07 RX ORDER — ATROPINE SULFATE 0.1 MG/ML
INJECTION INTRAVENOUS
Status: DISPENSED
Start: 2023-11-07 | End: 2023-11-08

## 2023-11-07 RX ORDER — GABAPENTIN 100 MG/1
100 CAPSULE ORAL ONCE
Status: COMPLETED | OUTPATIENT
Start: 2023-11-07 | End: 2023-11-07

## 2023-11-07 RX ADMIN — PANTOPRAZOLE SODIUM 40 MG: 40 TABLET, DELAYED RELEASE ORAL at 20:03

## 2023-11-07 RX ADMIN — Medication 10 ML: at 07:55

## 2023-11-07 RX ADMIN — APIXABAN 5 MG: 5 TABLET, FILM COATED ORAL at 07:54

## 2023-11-07 RX ADMIN — APIXABAN 5 MG: 5 TABLET, FILM COATED ORAL at 20:03

## 2023-11-07 RX ADMIN — GABAPENTIN 100 MG: 100 CAPSULE ORAL at 20:06

## 2023-11-07 RX ADMIN — HYDROMORPHONE HYDROCHLORIDE 0.2 MG: 1 INJECTION, SOLUTION INTRAMUSCULAR; INTRAVENOUS; SUBCUTANEOUS at 16:10

## 2023-11-07 RX ADMIN — OXYCODONE HYDROCHLORIDE 10 MG: 5 TABLET ORAL at 20:03

## 2023-11-07 RX ADMIN — SUCRALFATE 1 G: 1 TABLET ORAL at 07:53

## 2023-11-07 RX ADMIN — Medication 10 ML: at 20:30

## 2023-11-07 RX ADMIN — POTASSIUM CHLORIDE 20 MEQ: 1500 TABLET, EXTENDED RELEASE ORAL at 12:59

## 2023-11-07 RX ADMIN — PANTOPRAZOLE SODIUM 40 MG: 40 TABLET, DELAYED RELEASE ORAL at 07:54

## 2023-11-07 RX ADMIN — GABAPENTIN 100 MG: 100 CAPSULE ORAL at 14:53

## 2023-11-07 RX ADMIN — MORPHINE SULFATE 2 MG: 2 INJECTION, SOLUTION INTRAMUSCULAR; INTRAVENOUS at 23:58

## 2023-11-07 RX ADMIN — HYDROXYZINE HYDROCHLORIDE 25 MG: 25 TABLET ORAL at 12:59

## 2023-11-07 ASSESSMENT — COGNITIVE AND FUNCTIONAL STATUS - GENERAL
MOBILITY SCORE: 24
DAILY ACTIVITIY SCORE: 24

## 2023-11-07 ASSESSMENT — PAIN SCALES - GENERAL
PAINLEVEL_OUTOF10: 0 - NO PAIN
PAINLEVEL_OUTOF10: 0 - NO PAIN
PAINLEVEL_OUTOF10: 6
PAINLEVEL_OUTOF10: 0 - NO PAIN
PAINLEVEL_OUTOF10: 0 - NO PAIN

## 2023-11-07 ASSESSMENT — ENCOUNTER SYMPTOMS
DIZZINESS: 1
WEAKNESS: 1
LIGHT-HEADEDNESS: 1
APNEA: 0
ABDOMINAL DISTENTION: 0
ARTHRALGIAS: 0
ACTIVITY CHANGE: 0
CHILLS: 0
CHEST TIGHTNESS: 0
ABDOMINAL PAIN: 0
APPETITE CHANGE: 0
DIFFICULTY URINATING: 0
VOMITING: 0
HEADACHES: 1
DYSURIA: 0
BACK PAIN: 0
NAUSEA: 1
CONFUSION: 0
AGITATION: 0

## 2023-11-07 ASSESSMENT — PAIN - FUNCTIONAL ASSESSMENT
PAIN_FUNCTIONAL_ASSESSMENT: 0-10

## 2023-11-07 NOTE — POST-PROCEDURE NOTE
Physician Transition of Care Summary  Invasive Cardiovascular Lab    Procedure Date: 11/7/2023  Attending:    * Alexis Shook - Primary  Resident/Fellow/Other Assistant: Surgeon(s) and Role:    Indications:   Pre-op Diagnosis     * Bradycardia [R00.1]     * Third degree heart block (CMS/HCC) [I44.2]    Post-procedure diagnosis:   Post-op Diagnosis     * Bradycardia [R00.1]     * Third degree heart block (CMS/HCC) [I44.2]    Procedure(s):     * Temporary Pacemaker Insertion      Procedure Findings:   Complete heart block    Description of the Procedure:   TVP    Complications:   None    Stents/Implants:       Anticoagulation/Antiplatelet Plan:   None    Estimated Blood Loss:   5 mL    Anesthesia: Moderate Sedation Anesthesia Staff: No anesthesia staff entered.    Any Specimen(s) Removed:   No specimens collected during this procedure.    Disposition:   Sp TVP. PPM per Dr. Anderson. At 0.1 mA, still did not lose capture. Set to 50 bpm at 3 mA.      Electronically signed by: Alexis Shook MD, 11/7/2023 5:00 PM

## 2023-11-07 NOTE — SIGNIFICANT EVENT
Pt transferred to icu 7 from [facility].  Residents at bedside with patient.  Pt connected to external pacer.  Residents notified cardiology of pauses and given EKG's and strips of pauses.  Family at bedside.

## 2023-11-07 NOTE — CONSULTS
Inpatient consult to Cardiology  Consult performed by: Corazon Marx PA-C  Consult ordered by: Serge Carty MD        History Of Present Illness:    Massimo Garcia is a 62 y.o. male presenting with concern for near syncope/ loss of consciousness. Report of bradycardia in ED and after admission w/ concern for CHB, unable to verify with printed strips. Patient currently Denies any chest pain, chest pressure, palpitations, dizziness, cough, shortness of breath, orthopnea, edema.       Last Recorded Vitals:  Vitals:    11/06/23 1347 11/06/23 2002 11/07/23 0523 11/07/23 1133   BP: 127/74 138/73 117/53 125/81   BP Location:    Left arm   Patient Position:    Sitting   Pulse: 77 85 74 84   Resp: 18 22 20 19   Temp: 35.9 °C (96.6 °F) 36.6 °C (97.9 °F) 36.7 °C (98.1 °F) 36 °C (96.8 °F)   TempSrc: Temporal  Temporal Temporal   SpO2: 97% 99% 98% 98%   Weight:       Height:           Last Labs:  CBC - 11/7/2023:  5:31 AM  5.0 11.2 84    33.3      CMP - 11/7/2023:  5:31 AM  8.9 7.2 32 --- 1.2   4.3 3.8 29 169      PTT - 1/8/2023:  3:00 PM  1.3   15.6 36     Troponin I, High Sensitivity   Date/Time Value Ref Range Status   11/05/2023 04:08 PM 6 0 - 20 ng/L Final   11/05/2023 02:56 PM 8 0 - 20 ng/L Final     Troponin I   Date/Time Value Ref Range Status   05/22/2023 09:58 PM 9 0 - 20 ng/L Final     Comment:     .  Less than 99th percentile of normal range cutoff-  Female and children under 18 years old <14 ng/L; Male <21 ng/L: Negative  Repeat testing should be performed if clinically indicated.   .  Female and children under 18 years old 14-50 ng/L; Male 21-50 ng/L:  Consistent with possible cardiac damage and possible increased clinical   risk. Serial measurements may help to assess extent of myocardial damage.   .  >50 ng/L: Consistent with cardiac damage, increased clinical risk and  myocardial infarction. Serial measurements may help assess extent of   myocardial damage.   .   NOTE: Children less than 1 year old may have  higher baseline troponin   levels and results should be interpreted in conjunction with the overall   clinical context.   .  NOTE: Troponin I testing is performed using a different   testing methodology at [facility] than at other   system [facility]. Direct result comparisons should only   be made within the same method.     01/31/2023 06:51 PM 84 (H) 0 - 20 ng/L Final     Comment:     .  Less than 99th percentile of normal range cutoff-  Female and children under 18 years old <14 ng/L; Male <21 ng/L: Negative  Repeat testing should be performed if clinically indicated.   .  Female and children under 18 years old 14-50 ng/L; Male 21-50 ng/L:  Consistent with possible cardiac damage and possible increased clinical   risk. Serial measurements may help to assess extent of myocardial damage.   .  >50 ng/L: Consistent with cardiac damage, increased clinical risk and  myocardial infarction. Serial measurements may help assess extent of   myocardial damage.   .   NOTE: Children less than 1 year old may have higher baseline troponin   levels and results should be interpreted in conjunction with the overall   clinical context.   .  NOTE: Troponin I testing is performed using a different   testing methodology at [facility] than at other   [facility]. Direct result comparisons should only   be made within the same method.  This is a critical result.    Per Laboratory policy, critical results for this test   only qualify to the call list once per 24 hours.      01/31/2023 02:40 PM 72 (H) 0 - 20 ng/L Final     Comment:     .  Less than 99th percentile of normal range cutoff-  Female and children under 18 years old <14 ng/L; Male <21 ng/L: Negative  Repeat testing should be performed if clinically indicated.   .  Female and children under 18 years old 14-50 ng/L; Male 21-50 ng/L:  Consistent with possible cardiac damage and possible increased clinical   risk. Serial measurements may help to  assess extent of myocardial damage.   .  >50 ng/L: Consistent with cardiac damage, increased clinical risk and  myocardial infarction. Serial measurements may help assess extent of   myocardial damage.   .   NOTE: Children less than 1 year old may have higher baseline troponin   levels and results should be interpreted in conjunction with the overall   clinical context.   .  NOTE: Troponin I testing is performed using a different   testing methodology at [facility] than at other   system hospitals. Direct result comparisons should only   be made within the same method.  This is a critical result.    Per Laboratory policy, critical results for this test   only qualify to the call list once per 24 hours.        BNP   Date/Time Value Ref Range Status   01/31/2023 12:55 PM 37 0 - 99 pg/mL Final     Comment:     .  <100 pg/mL - Heart failure unlikely  100-299 pg/mL - Intermediate probability of acute heart  .               failure exacerbation. Correlate with clinical  .               context and patient history.    >=300 pg/mL - Heart Failure likely. Correlate with clinical  .               context and patient history.  BNP testing is performed using different testing   methodology at [facility] than at other   system hospitals. Direct result comparisons should   only be made within the same method.     01/08/2023 01:00 PM 32 0 - 99 pg/mL Final     Comment:     .  <100 pg/mL - Heart failure unlikely  100-299 pg/mL - Intermediate probability of acute heart  .               failure exacerbation. Correlate with clinical  .               context and patient history.    >=300 pg/mL - Heart Failure likely. Correlate with clinical  .               context and patient history.  BNP testing is performed using different testing   methodology at [facility] than at other   system hospitals. Direct result comparisons should   only be made within the same method.       Hemoglobin A1C    Date/Time Value Ref Range Status   11/05/2023 02:56 PM 4.9 see below % Final      Last I/O:  I/O last 3 completed shifts:  In: 490 (7 mL/kg) [P.O.:480; I.V.:10 (0.1 mL/kg)]  Out: - (0 mL/kg)   Weight: 70.2 kg     Past Cardiology Tests (Last 3 Years):  EKG:  No results found for this or any previous visit from the past 1095 days.    Echo:  Transthoracic Echo (TTE) Complete 11/7/2023  CONCLUSIONS:   1. Left ventricular systolic function is normal with a 55-60% estimated ejection fraction.   2. Spectral Doppler shows an impaired relaxation pattern of left ventricular diastolic filling.    Ejection Fractions:  EF   Date/Time Value Ref Range Status   11/07/2023 11:31 AM 56       Cath:  No results found for this or any previous visit from the past 1095 days.    Stress Test:  No results found for this or any previous visit from the past 1095 days.    Cardiac Imaging:  No results found for this or any previous visit from the past 1095 days.      Past Medical History:  He has a past medical history of Essential (primary) hypertension (12/21/2013) and Personal history of other diseases of the circulatory system.    Past Surgical History:  He has a past surgical history that includes Total knee arthroplasty (09/30/2016) and Total knee arthroplasty (09/30/2016).      Social History:  He reports that he has quit smoking. His smoking use included cigarettes. He has never been exposed to tobacco smoke. He has never used smokeless tobacco. He reports that he does not currently use alcohol. He reports that he does not use drugs.    Family History:  No family history on file.     Allergies:  Bee venom protein (honey bee)    Inpatient Medications:  Scheduled medications   Medication Dose Route Frequency    apixaban  5 mg oral BID    gabapentin  100 mg oral BID    gabapentin  100 mg oral Once    pantoprazole  40 mg oral BID    perflutren lipid microspheres  0.5-10 mL of dilution intravenous Once in imaging    perflutren protein A  microsphere  0.5 mL intravenous Once in imaging    potassium chloride CR  20 mEq oral Daily    sodium chloride 0.9%  10 mL intra-catheter q12h    sucralfate  1 g oral Daily    sulfur hexafluoride microsphr  2 mL intravenous Once in imaging     PRN medications   Medication    acetaminophen    alteplase    hydrOXYzine HCL    oxyCODONE    oxyCODONE    polyethylene glycol    sodium chloride 0.9%     Continuous Medications   Medication Dose Last Rate     Outpatient Medications:  Current Outpatient Medications   Medication Instructions    apixaban (Eliquis) 5 mg tablet TAKE 1 TABLET BY MOUTH TWO TIMES A DAY    cholecalciferol (Thera-D) 50 MCG (2000 UT) tablet oral    fentaNYL (Duragesic) 12 mcg/hr patch 1 patch, transdermal, Every 72 hours    gabapentin (NEURONTIN) 300 mg, oral, 2 times daily    lactulose 20 g, oral, 4 times daily PRN    Lidocaine Viscous 2 % solution     multivitamin with minerals (multivit-min-iron fum-folic ac) tablet 2 tablets, oral, Daily    oxyCODONE (ROXICODONE) 10 mg, oral, Every 4 hours PRN    pantoprazole (PROTONIX) 40 mg, oral, 2 times daily    potassium chloride CR (Klor-Con) 10 mEq ER tablet TAKE 1 TABLET BY MOUTH ONCE DAILY    sodium chloride (Ocean) 0.65 % nasal spray 1 spray, Each Nostril, As needed    sucralfate (CARAFATE) 1 g, oral, Daily       Physical Exam:  Physical Exam  Constitutional:       Appearance: Normal appearance.   HENT:      Head: Normocephalic.      Nose: Nose normal.      Mouth/Throat:      Mouth: Mucous membranes are dry.   Eyes:      Conjunctiva/sclera: Conjunctivae normal.   Neck:      Comments: No JVD  Cardiovascular:      Rate and Rhythm: Normal rate and regular rhythm.      Heart sounds: No murmur heard.     No friction rub. No gallop.   Pulmonary:      Effort: Pulmonary effort is normal.      Breath sounds: Normal breath sounds.   Abdominal:      Palpations: Abdomen is soft.   Musculoskeletal:         General: No swelling. Normal range of motion.   Skin:      General: Skin is warm and dry.   Neurological:      General: No focal deficit present.      Mental Status: He is alert and oriented to person, place, and time. Mental status is at baseline.   Psychiatric:         Mood and Affect: Mood normal.         Behavior: Behavior normal.            Assessment/Plan   Massimo Garcia is a 61 yo male with a PMH of metastatic esophageal cancer, portal vein thrombosis who was admitted w/ complaint of near syncope, bradycardia, c/f CHB.     #Bradycardia, symptomatic  -Likely related to polypharmacy  -EP consulted  -Echo recommended, reviewed with normal LVSF, diastolic dysfunction  -Recommend Zio at discharge    Implantable Port 01/24/23 Right Chest Single lumen port (Active)   Site Assessment Clean;Intact;Dry 11/06/23 2338   Dressing Status Clean;Dry 11/06/23 2338   Number of days: 287       Code Status:  Full Code    I spent  minutes in the professional and overall care of this patient.        Corazon Marx PA-C

## 2023-11-07 NOTE — PROGRESS NOTES
"Massimo Garcia is a 62 y.o. male on day 1 of admission presenting with Third degree heart block (CMS/HCC).    Subjective   Patient  had no acute events overnight with no episodes of bradycardia seen on telemetry.    This morning the patient was seen at bedside.  He is concerned about getting a pacemaker and wants to be sure that is what is causing his symptoms.  He would like to go down on gabapentin to see if that is causing his symptoms.       Objective     Physical Exam  Constitutional: Appears stated age. In NAD.   HEENT: NC/AT, EOMI, clear sclera, moist mucous membranes, poor dentition.  CV: RRR, No M/R/G  PULM: CTAB, no coughing or wheezing  ABDOMEN: Soft, non distended, no TTP. + BSx4.  SKIN: Normal Color, Warm, Dry, No Rashes   EXTREMITIES: Non-Tender, Full ROM, No Pedal Edema  NEURO: A&O x 4, nonfocal neurological exam.  PSYCH: Normal Mood & Behavior     Last Recorded Vitals  Blood pressure 125/81, pulse 84, temperature 36 °C (96.8 °F), temperature source Temporal, resp. rate 19, height 1.727 m (5' 7.99\"), weight 70.2 kg (154 lb 12.2 oz), SpO2 98 %.  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 Admitted for further management of dizziness.      Acute Medical Issues:      # Dizziness / Pre-Syncope   # Symptomatic bradycardia  # Possibly medication Induced   - HR in 30s seen on telemtry.  Bradycardia congruent with reported history  - Currently " asymptomatic while in the hospital   - Most recent Echo 8/31/23: EF 60%, mild MR   - MRI brain showed no acute lesions or metastasis   - On telemetry   -Echo today, impaired relaxation of L ventricle, EF 55-60%  -Cardiology consulted, will give them yesterdays telemetry readings     #Anxiety  -Wife states he is much more anxious than baseline  -hydroxyzine prn     #Cancer pain management  - baseline pain med oxycodone 10 bid  -tylenol, oxycodone 5, oxycodone 10 prn for pain       #Neuropathy  - Reduce Gabapentin to 100 mg bid     #GI ppx  -Protonix 40 mg bids     #DVT ppx  -Previous history of PE in January 2/2 cancer  - Continue home Eliquis     Chronic Medical Issues:      # Metastatic stage IV Esophageal Cancer   - Follows with Heme/Onc Dr. Blake   - Last chemo 10/30 with Trastuzumab FOLFOX   -Lidocaine cream for port use  -sucralfate 1 gram daily  -Oncology following     #hypokalemia  - Kcl 10 mEq bid     Fluids: PRN bolus   Electrolytes: replete as needed   Nutrition: Regular   GI PPx: None DVT   PPx: Eliquis   Oxygenation: RA   Antibiotics: None   Code: Full     Dispo: Admitted for dizziness/pre-syncope, possibly medication induced. eLOS < 48 hrs.            Artie Marks DO

## 2023-11-07 NOTE — CARE PLAN
The patient's goals for the shift include  no dizziness    The clinical goals for the shift include Pt will have no bradycardia symptoms throughout shift  Pt transferred to icu for closer monitoring for bradycardia

## 2023-11-07 NOTE — H&P (VIEW-ONLY)
Inpatient consult to Cardiology  Consult performed by: Elias Connolly MD  Consult ordered by: Serge Carty MD        History Of Present Illness:    Massimo Garcia is a 62 y.o. 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 States that he has a headache in between episodes.  Most events are felt when he is laying down or sitting. He does not feel these events when he is being active.  States that during these events he feels warm sensation.     EP was consulted for episodes of slow heart rates, CHB. No strips available demonstrating bradycardia or CHB. ECGs show sinus rhythm, normal RI interval, RBBB, LPFB, normal HR. Labs with hypokalemia, treated with IV K. Patient positive for THC on 11/2/2023. Home meds could also be causing bradycardia/hypotension. Echo was performed but not read.      Past Medical History: Metastatic stage IV esophageal cancer HER2 positive with liver mets, hx PE and portal vein thrombosis on Eliquis Past Surgical History: total knee replacements b/l. shoulder replacements b/l Allergies: Bee sting causing anaphylaxis Family History: Esophageal cancer (father)      Home Medications: See med rec .     Last Recorded Vitals:  Vitals:    11/06/23 1033 11/06/23 1034 11/06/23 1347 11/06/23 2002   BP: 130/70 154/77 127/74 138/73   Patient Position: Sitting Standing     Pulse: 90 100 77 85   Resp:   18 22   Temp:   35.9 °C (96.6 °F) 36.6 °C (97.9 °F)   TempSrc:   Temporal    SpO2:   97% 99%   Weight:       Height:           Last Labs:  CBC - 11/6/2023:  6:20 AM  4.7 10.9 99    33.4      CMP - 11/6/2023:  6:20 AM  9.1 7.2 32 --- 1.2   4.8 3.7 29 169      PTT - 1/8/2023:  3:00 PM  1.3   15.6 36     Troponin I, High Sensitivity    Date/Time Value Ref Range Status   11/05/2023 04:08 PM 6 0 - 20 ng/L Final   11/05/2023 02:56 PM 8 0 - 20 ng/L Final     Troponin I   Date/Time Value Ref Range Status   05/22/2023 09:58 PM 9 0 - 20 ng/L Final     Comment:     .  Less than 99th percentile of normal range cutoff-  Female and children under 18 years old <14 ng/L; Male <21 ng/L: Negative  Repeat testing should be performed if clinically indicated.   .  Female and children under 18 years old 14-50 ng/L; Male 21-50 ng/L:  Consistent with possible cardiac damage and possible increased clinical   risk. Serial measurements may help to assess extent of myocardial damage.   .  >50 ng/L: Consistent with cardiac damage, increased clinical risk and  myocardial infarction. Serial measurements may help assess extent of   myocardial damage.   .   NOTE: Children less than 1 year old may have higher baseline troponin   levels and results should be interpreted in conjunction with the overall   clinical context.   .  NOTE: Troponin I testing is performed using a different   testing methodology at [facility] than at [facility]. Direct result comparisons should only   be made within the same method.     01/31/2023 06:51 PM 84 (H) 0 - 20 ng/L Final     Comment:     .  Less than 99th percentile of normal range cutoff-  Female and children under 18 years old <14 ng/L; Male <21 ng/L: Negative  Repeat testing should be performed if clinically indicated.   .  Female and children under 18 years old 14-50 ng/L; Male 21-50 ng/L:  Consistent with possible cardiac damage and possible increased clinical   risk. Serial measurements may help to assess extent of myocardial damage.   .  >50 ng/L: Consistent with cardiac damage, increased clinical risk and  myocardial infarction. Serial measurements may help assess extent of   myocardial damage.   .   NOTE: Children less than 1 year old may have higher baseline troponin   levels and results should be  interpreted in conjunction with the overall   clinical context.   .  NOTE: Troponin I testing is performed using a different   testing methodology at [facility] than at other   system hospitals. Direct result comparisons should only   be made within the same method.  This is a critical result.    Per Laboratory policy, critical results for this test   only qualify to the call list once per 24 hours.      01/31/2023 02:40 PM 72 (H) 0 - 20 ng/L Final     Comment:     .  Less than 99th percentile of normal range cutoff-  Female and children under 18 years old <14 ng/L; Male <21 ng/L: Negative  Repeat testing should be performed if clinically indicated.   .  Female and children under 18 years old 14-50 ng/L; Male 21-50 ng/L:  Consistent with possible cardiac damage and possible increased clinical   risk. Serial measurements may help to assess extent of myocardial damage.   .  >50 ng/L: Consistent with cardiac damage, increased clinical risk and  myocardial infarction. Serial measurements may help assess extent of   myocardial damage.   .   NOTE: Children less than 1 year old may have higher baseline troponin   levels and results should be interpreted in conjunction with the overall   clinical context.   .  NOTE: Troponin I testing is performed using a different   testing methodology at [facility] than at other   [facility]. Direct result comparisons should only   be made within the same method.  This is a critical result.    Per Laboratory policy, critical results for this test   only qualify to the call list once per 24 hours.        BNP   Date/Time Value Ref Range Status   01/31/2023 12:55 PM 37 0 - 99 pg/mL Final     Comment:     .  <100 pg/mL - Heart failure unlikely  100-299 pg/mL - Intermediate probability of acute heart  .               failure exacerbation. Correlate with clinical  .               context and patient history.    >=300 pg/mL - Heart Failure likely. Correlate with  clinical  .               context and patient history.  BNP testing is performed using different testing   methodology at [facility] than at other   system hospitals. Direct result comparisons should   only be made within the same method.     01/08/2023 01:00 PM 32 0 - 99 pg/mL Final     Comment:     .  <100 pg/mL - Heart failure unlikely  100-299 pg/mL - Intermediate probability of acute heart  .               failure exacerbation. Correlate with clinical  .               context and patient history.    >=300 pg/mL - Heart Failure likely. Correlate with clinical  .               context and patient history.  BNP testing is performed using different testing   methodology at [facility] than at other   system hospitals. Direct result comparisons should   only be made within the same method.       Hemoglobin A1C   Date/Time Value Ref Range Status   11/05/2023 02:56 PM 4.9 see below % Final      Last I/O:  I/O last 3 completed shifts:  In: 1490 (21.2 mL/kg) [P.O.:480; I.V.:10 (0.1 mL/kg); IV Piggyback:1000]  Out: - (0 mL/kg)   Weight: 70.2 kg       Past Medical History:  He has a past medical history of Essential (primary) hypertension (12/21/2013) and Personal history of other diseases of the circulatory system.    Past Surgical History:  He has a past surgical history that includes Total knee arthroplasty (09/30/2016) and Total knee arthroplasty (09/30/2016).      Social History:  He reports that he has quit smoking. His smoking use included cigarettes. He has never been exposed to tobacco smoke. He has never used smokeless tobacco. He reports that he does not currently use alcohol. He reports that he does not use drugs.    Family History:  No family history on file.     Allergies:  Bee venom protein (honey bee)    Inpatient Medications:  Scheduled medications   Medication Dose Route Frequency    apixaban  5 mg oral BID    gabapentin  300 mg oral BID    pantoprazole  40 mg oral BID     perflutren lipid microspheres  0.5-10 mL of dilution intravenous Once in imaging    perflutren protein A microsphere  0.5 mL intravenous Once in imaging    potassium chloride CR  40 mEq oral Daily    potassium chloride CR  10 mEq oral Daily    sodium chloride 0.9%  10 mL intra-catheter q12h    sucralfate  1 g oral Daily    sulfur hexafluoride microsphr  2 mL intravenous Once in imaging     PRN medications   Medication    acetaminophen    alteplase    hydrOXYzine HCL    oxyCODONE    oxyCODONE    polyethylene glycol    sodium chloride 0.9%     Continuous Medications   Medication Dose Last Rate     Outpatient Medications:  Current Outpatient Medications   Medication Instructions    apixaban (Eliquis) 5 mg tablet TAKE 1 TABLET BY MOUTH TWO TIMES A DAY    cholecalciferol (Thera-D) 50 MCG (2000 UT) tablet oral    gabapentin (NEURONTIN) 300 mg, oral, 2 times daily    Lidocaine Viscous 2 % solution     multivitamin with minerals (multivit-min-iron fum-folic ac) tablet 2 tablets, oral, Daily    oxyCODONE (ROXICODONE) 10 mg, oral, Every 4 hours PRN    pantoprazole (PROTONIX) 40 mg, oral, 2 times daily    potassium chloride CR (Klor-Con) 10 mEq ER tablet TAKE 1 TABLET BY MOUTH ONCE DAILY    sodium chloride (Ocean) 0.65 % nasal spray 1 spray, Each Nostril, As needed    sucralfate (CARAFATE) 1 g, oral, Daily       12 Point ROS negative unless otherwise specified above.     Physical Exam  Constitutional: Appears stated age. In NAD.   HEENT: NC/AT, EOMI, clear sclera, moist mucous membranes, NGT intact  CV: RRR, No M/R/G  PULM: CTAB, no coughing or wheezing  ABDOMEN: Soft, non distended, tender to palpation RLQ. + BSx4.  SKIN: Normal Color, Warm, Dry, No Rashes   EXTREMITIES: Non-Tender, Full ROM, No Pedal Edema  NEURO: A&O x 4, nonfocal neurological exam.  PSYCH: Normal Mood & Behavior         Assessment/Plan   EP was consulted for episodes of slow heart rates, CHB. No strips available demonstrating bradycardia or CHB. ECGs show  sinus rhythm, normal NC interval, RBBB, LPFB, normal HR. Labs with hypokalemia, treated with IV K. Patient positive for THC on 11/2/2023. Home meds could also be causing bradycardia/hypotension. Echo was performed but not read.    Patient currently on telemetry. No clear indication for a pacemaker at this moment. Will reassess.    Implantable Port 01/24/23 Right Chest Single lumen port (Active)   Site Assessment Clean;Dry;Intact 11/06/23 1600   Dressing Status Clean;Dry 11/06/23 1600   Number of days: 286       Code Status:  Full Code          Elias Anderson MD

## 2023-11-07 NOTE — SIGNIFICANT EVENT
"Subjective Data:  Pt dizzy, diaphoretic, uncomfortable, feels as if he may pass out.   Notified by nursing staff that patient reported he \"felt it coming on again\". HR then began to drop, CHB noted on telemetry. Occurred 15:07, 15:21, 15:32. Transcutaneous pacing pads applied, patient transferred to ICU. Interventionalist and cath lab as well as ICU team notified. Upon arrival to ICU patient noted to have increased episode of CHB. Trancutaneous pacing initiated by primary team.     Objective Data:  Last Recorded Vitals:  Vitals:    11/06/23 2002 11/07/23 0523 11/07/23 1133 11/07/23 1600   BP:  117/53 125/81 169/74   BP Location:   Left arm    Patient Position:   Sitting    Pulse:  74 84 110   Resp: 22 20 19    Temp: 36.6 °C (97.9 °F) 36.7 °C (98.1 °F) 36 °C (96.8 °F)    TempSrc:  Temporal Temporal    SpO2:  98% 98% 100%   Weight:       Height:           Last Labs:  CBC - 11/7/2023:  5:31 AM  5.0 11.2 84    33.3      CMP - 11/7/2023:  5:31 AM  8.9 7.2 32 --- 1.2   4.3 3.8 29 169      PTT - 1/8/2023:  3:00 PM  1.3   15.6 36     TROPHS   Date/Time Value Ref Range Status   11/05/2023 04:08 PM 6 0 - 20 ng/L Final   11/05/2023 02:56 PM 8 0 - 20 ng/L Final   05/22/2023 09:58 PM 9 0 - 20 ng/L Final     Comment:     .  Less than 99th percentile of normal range cutoff-  Female and children under 18 years old <14 ng/L; Male <21 ng/L: Negative  Repeat testing should be performed if clinically indicated.   .  Female and children under 18 years old 14-50 ng/L; Male 21-50 ng/L:  Consistent with possible cardiac damage and possible increased clinical   risk. Serial measurements may help to assess extent of myocardial damage.   .  >50 ng/L: Consistent with cardiac damage, increased clinical risk and  myocardial infarction. Serial measurements may help assess extent of   myocardial damage.   .   NOTE: Children less than 1 year old may have higher baseline troponin   levels and results should be interpreted in conjunction with the " overall   clinical context.   .  NOTE: Troponin I testing is performed using a different   testing methodology at [facility] than at other   system [facility]. Direct result comparisons should only   be made within the same method.     01/31/2023 06:51 PM 84 0 - 20 ng/L Final     Comment:     .  Less than 99th percentile of normal range cutoff-  Female and children under 18 years old <14 ng/L; Male <21 ng/L: Negative  Repeat testing should be performed if clinically indicated.   .  Female and children under 18 years old 14-50 ng/L; Male 21-50 ng/L:  Consistent with possible cardiac damage and possible increased clinical   risk. Serial measurements may help to assess extent of myocardial damage.   .  >50 ng/L: Consistent with cardiac damage, increased clinical risk and  myocardial infarction. Serial measurements may help assess extent of   myocardial damage.   .   NOTE: Children less than 1 year old may have higher baseline troponin   levels and results should be interpreted in conjunction with the overall   clinical context.   .  NOTE: Troponin I testing is performed using a different   testing methodology at [facility] than at other   [facility]. Direct result comparisons should only   be made within the same method.  This is a critical result.    Per Laboratory policy, critical results for this test   only qualify to the call list once per 24 hours.      01/31/2023 02:40 PM 72 0 - 20 ng/L Final     Comment:     .  Less than 99th percentile of normal range cutoff-  Female and children under 18 years old <14 ng/L; Male <21 ng/L: Negative  Repeat testing should be performed if clinically indicated.   .  Female and children under 18 years old 14-50 ng/L; Male 21-50 ng/L:  Consistent with possible cardiac damage and possible increased clinical   risk. Serial measurements may help to assess extent of myocardial damage.   .  >50 ng/L: Consistent with cardiac damage, increased clinical  risk and  myocardial infarction. Serial measurements may help assess extent of   myocardial damage.   .   NOTE: Children less than 1 year old may have higher baseline troponin   levels and results should be interpreted in conjunction with the overall   clinical context.   .  NOTE: Troponin I testing is performed using a different   testing methodology at [facility] than at other   system hospitals. Direct result comparisons should only   be made within the same method.  This is a critical result.    Per Laboratory policy, critical results for this test   only qualify to the call list once per 24 hours.        BNP   Date/Time Value Ref Range Status   01/31/2023 12:55 PM 37 0 - 99 pg/mL Final     Comment:     .  <100 pg/mL - Heart failure unlikely  100-299 pg/mL - Intermediate probability of acute heart  .               failure exacerbation. Correlate with clinical  .               context and patient history.    >=300 pg/mL - Heart Failure likely. Correlate with clinical  .               context and patient history.  BNP testing is performed using different testing   methodology at [facility] than at other   system hospitals. Direct result comparisons should   only be made within the same method.     01/08/2023 01:00 PM 32 0 - 99 pg/mL Final     Comment:     .  <100 pg/mL - Heart failure unlikely  100-299 pg/mL - Intermediate probability of acute heart  .               failure exacerbation. Correlate with clinical  .               context and patient history.    >=300 pg/mL - Heart Failure likely. Correlate with clinical  .               context and patient history.  BNP testing is performed using different testing   methodology at [facility] than at other   system hospitals. Direct result comparisons should   only be made within the same method.       HGBA1C   Date/Time Value Ref Range Status   11/05/2023 02:56 PM 4.9 see below % Final      Last I/O:  I/O last 3 completed  shifts:  In: 490 (7 mL/kg) [P.O.:480; I.V.:10 (0.1 mL/kg)]  Out: - (0 mL/kg)   Weight: 70.2 kg       Inpatient Medications:  Scheduled medications   Medication Dose Route Frequency    apixaban  5 mg oral BID    atropine        gabapentin  100 mg oral BID    HYDROmorphone  0.2 mg intravenous Once    pantoprazole  40 mg oral BID    perflutren lipid microspheres  0.5-10 mL of dilution intravenous Once in imaging    perflutren protein A microsphere  0.5 mL intravenous Once in imaging    potassium chloride CR  20 mEq oral Daily    sodium chloride 0.9%  10 mL intra-catheter q12h    sucralfate  1 g oral Daily    sulfur hexafluoride microsphr  2 mL intravenous Once in imaging     PRN medications   Medication    acetaminophen    alteplase    atropine    hydrOXYzine HCL    oxyCODONE    oxyCODONE    polyethylene glycol    sodium chloride 0.9%     Continuous Medications   Medication Dose Last Rate     Implantable Port 01/24/23 Right Chest Single lumen port (Active)   Site Assessment Dry;Clean 11/07/23 0800   Dressing Status Clean 11/07/23 0800   Number of days: 287       Code Status:  Full Code    Disposition: Patient requires more than 2 inpatient days. This critically ill patient continues to be at-risk for clinically significant deterioration / failure due to the above mentioned dysfunctional, unstable organ systems.  I have personally identified and managed all complex critical care issues to prevent aforementioned clinical deterioration.  Critical care time is spent at bedside and/or the immediate area and has included, but is not limited to, the review of diagnostic tests, labs, radiographs, serial assessments of hemodynamics, respiratory status, ventilatory management, and family updates.  Time spent in procedures and teaching are reported separately.   -Critical Care 45 minutes    Code Status: FULL          Corazon Marx PA-C

## 2023-11-07 NOTE — CONSULTS
Inpatient consult to Cardiology  Consult performed by: Elias Connolly MD  Consult ordered by: Serge Carty MD        History Of Present Illness:    Massimo Garcia is a 62 y.o. 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 States that he has a headache in between episodes.  Most events are felt when he is laying down or sitting. He does not feel these events when he is being active.  States that during these events he feels warm sensation.     EP was consulted for episodes of slow heart rates, CHB. No strips available demonstrating bradycardia or CHB. ECGs show sinus rhythm, normal PA interval, RBBB, LPFB, normal HR. Labs with hypokalemia, treated with IV K. Patient positive for THC on 11/2/2023. Home meds could also be causing bradycardia/hypotension. Echo was performed but not read.      Past Medical History: Metastatic stage IV esophageal cancer HER2 positive with liver mets, hx PE and portal vein thrombosis on Eliquis Past Surgical History: total knee replacements b/l. shoulder replacements b/l Allergies: Bee sting causing anaphylaxis Family History: Esophageal cancer (father)      Home Medications: See med rec .     Last Recorded Vitals:  Vitals:    11/06/23 1033 11/06/23 1034 11/06/23 1347 11/06/23 2002   BP: 130/70 154/77 127/74 138/73   Patient Position: Sitting Standing     Pulse: 90 100 77 85   Resp:   18 22   Temp:   35.9 °C (96.6 °F) 36.6 °C (97.9 °F)   TempSrc:   Temporal    SpO2:   97% 99%   Weight:       Height:           Last Labs:  CBC - 11/6/2023:  6:20 AM  4.7 10.9 99    33.4      CMP - 11/6/2023:  6:20 AM  9.1 7.2 32 --- 1.2   4.8 3.7 29 169      PTT - 1/8/2023:  3:00 PM  1.3   15.6 36     Troponin I, High Sensitivity    Date/Time Value Ref Range Status   11/05/2023 04:08 PM 6 0 - 20 ng/L Final   11/05/2023 02:56 PM 8 0 - 20 ng/L Final     Troponin I   Date/Time Value Ref Range Status   05/22/2023 09:58 PM 9 0 - 20 ng/L Final     Comment:     .  Less than 99th percentile of normal range cutoff-  Female and children under 18 years old <14 ng/L; Male <21 ng/L: Negative  Repeat testing should be performed if clinically indicated.   .  Female and children under 18 years old 14-50 ng/L; Male 21-50 ng/L:  Consistent with possible cardiac damage and possible increased clinical   risk. Serial measurements may help to assess extent of myocardial damage.   .  >50 ng/L: Consistent with cardiac damage, increased clinical risk and  myocardial infarction. Serial measurements may help assess extent of   myocardial damage.   .   NOTE: Children less than 1 year old may have higher baseline troponin   levels and results should be interpreted in conjunction with the overall   clinical context.   .  NOTE: Troponin I testing is performed using a different   testing methodology at [facility] than at [facility]. Direct result comparisons should only   be made within the same method.     01/31/2023 06:51 PM 84 (H) 0 - 20 ng/L Final     Comment:     .  Less than 99th percentile of normal range cutoff-  Female and children under 18 years old <14 ng/L; Male <21 ng/L: Negative  Repeat testing should be performed if clinically indicated.   .  Female and children under 18 years old 14-50 ng/L; Male 21-50 ng/L:  Consistent with possible cardiac damage and possible increased clinical   risk. Serial measurements may help to assess extent of myocardial damage.   .  >50 ng/L: Consistent with cardiac damage, increased clinical risk and  myocardial infarction. Serial measurements may help assess extent of   myocardial damage.   .   NOTE: Children less than 1 year old may have higher baseline troponin   levels and results should be  interpreted in conjunction with the overall   clinical context.   .  NOTE: Troponin I testing is performed using a different   testing methodology at [facility] than at other   system hospitals. Direct result comparisons should only   be made within the same method.  This is a critical result.    Per Laboratory policy, critical results for this test   only qualify to the call list once per 24 hours.      01/31/2023 02:40 PM 72 (H) 0 - 20 ng/L Final     Comment:     .  Less than 99th percentile of normal range cutoff-  Female and children under 18 years old <14 ng/L; Male <21 ng/L: Negative  Repeat testing should be performed if clinically indicated.   .  Female and children under 18 years old 14-50 ng/L; Male 21-50 ng/L:  Consistent with possible cardiac damage and possible increased clinical   risk. Serial measurements may help to assess extent of myocardial damage.   .  >50 ng/L: Consistent with cardiac damage, increased clinical risk and  myocardial infarction. Serial measurements may help assess extent of   myocardial damage.   .   NOTE: Children less than 1 year old may have higher baseline troponin   levels and results should be interpreted in conjunction with the overall   clinical context.   .  NOTE: Troponin I testing is performed using a different   testing methodology at [facility] than at other   [facility]. Direct result comparisons should only   be made within the same method.  This is a critical result.    Per Laboratory policy, critical results for this test   only qualify to the call list once per 24 hours.        BNP   Date/Time Value Ref Range Status   01/31/2023 12:55 PM 37 0 - 99 pg/mL Final     Comment:     .  <100 pg/mL - Heart failure unlikely  100-299 pg/mL - Intermediate probability of acute heart  .               failure exacerbation. Correlate with clinical  .               context and patient history.    >=300 pg/mL - Heart Failure likely. Correlate with  clinical  .               context and patient history.  BNP testing is performed using different testing   methodology at [facility] than at other   system hospitals. Direct result comparisons should   only be made within the same method.     01/08/2023 01:00 PM 32 0 - 99 pg/mL Final     Comment:     .  <100 pg/mL - Heart failure unlikely  100-299 pg/mL - Intermediate probability of acute heart  .               failure exacerbation. Correlate with clinical  .               context and patient history.    >=300 pg/mL - Heart Failure likely. Correlate with clinical  .               context and patient history.  BNP testing is performed using different testing   methodology at [facility] than at other   system hospitals. Direct result comparisons should   only be made within the same method.       Hemoglobin A1C   Date/Time Value Ref Range Status   11/05/2023 02:56 PM 4.9 see below % Final      Last I/O:  I/O last 3 completed shifts:  In: 1490 (21.2 mL/kg) [P.O.:480; I.V.:10 (0.1 mL/kg); IV Piggyback:1000]  Out: - (0 mL/kg)   Weight: 70.2 kg       Past Medical History:  He has a past medical history of Essential (primary) hypertension (12/21/2013) and Personal history of other diseases of the circulatory system.    Past Surgical History:  He has a past surgical history that includes Total knee arthroplasty (09/30/2016) and Total knee arthroplasty (09/30/2016).      Social History:  He reports that he has quit smoking. His smoking use included cigarettes. He has never been exposed to tobacco smoke. He has never used smokeless tobacco. He reports that he does not currently use alcohol. He reports that he does not use drugs.    Family History:  No family history on file.     Allergies:  Bee venom protein (honey bee)    Inpatient Medications:  Scheduled medications   Medication Dose Route Frequency    apixaban  5 mg oral BID    gabapentin  300 mg oral BID    pantoprazole  40 mg oral BID     perflutren lipid microspheres  0.5-10 mL of dilution intravenous Once in imaging    perflutren protein A microsphere  0.5 mL intravenous Once in imaging    potassium chloride CR  40 mEq oral Daily    potassium chloride CR  10 mEq oral Daily    sodium chloride 0.9%  10 mL intra-catheter q12h    sucralfate  1 g oral Daily    sulfur hexafluoride microsphr  2 mL intravenous Once in imaging     PRN medications   Medication    acetaminophen    alteplase    hydrOXYzine HCL    oxyCODONE    oxyCODONE    polyethylene glycol    sodium chloride 0.9%     Continuous Medications   Medication Dose Last Rate     Outpatient Medications:  Current Outpatient Medications   Medication Instructions    apixaban (Eliquis) 5 mg tablet TAKE 1 TABLET BY MOUTH TWO TIMES A DAY    cholecalciferol (Thera-D) 50 MCG (2000 UT) tablet oral    gabapentin (NEURONTIN) 300 mg, oral, 2 times daily    Lidocaine Viscous 2 % solution     multivitamin with minerals (multivit-min-iron fum-folic ac) tablet 2 tablets, oral, Daily    oxyCODONE (ROXICODONE) 10 mg, oral, Every 4 hours PRN    pantoprazole (PROTONIX) 40 mg, oral, 2 times daily    potassium chloride CR (Klor-Con) 10 mEq ER tablet TAKE 1 TABLET BY MOUTH ONCE DAILY    sodium chloride (Ocean) 0.65 % nasal spray 1 spray, Each Nostril, As needed    sucralfate (CARAFATE) 1 g, oral, Daily       12 Point ROS negative unless otherwise specified above.     Physical Exam  Constitutional: Appears stated age. In NAD.   HEENT: NC/AT, EOMI, clear sclera, moist mucous membranes, NGT intact  CV: RRR, No M/R/G  PULM: CTAB, no coughing or wheezing  ABDOMEN: Soft, non distended, tender to palpation RLQ. + BSx4.  SKIN: Normal Color, Warm, Dry, No Rashes   EXTREMITIES: Non-Tender, Full ROM, No Pedal Edema  NEURO: A&O x 4, nonfocal neurological exam.  PSYCH: Normal Mood & Behavior         Assessment/Plan   EP was consulted for episodes of slow heart rates, CHB. No strips available demonstrating bradycardia or CHB. ECGs show  sinus rhythm, normal [place] interval, RBBB, LPFB, normal HR. Labs with hypokalemia, treated with IV K. Patient positive for THC on 11/2/2023. Home meds could also be causing bradycardia/hypotension. Echo was performed but not read.    Patient currently on telemetry. No clear indication for a pacemaker at this moment. Will reassess.    Implantable Port 01/24/23 Right Chest Single lumen port (Active)   Site Assessment Clean;Dry;Intact 11/06/23 1600   Dressing Status Clean;Dry 11/06/23 1600   Number of days: 286       Code Status:  Full Code          Elias Anderson MD

## 2023-11-07 NOTE — INTERVAL H&P NOTE
H&P reviewed. The patient was examined and there are no changes to the H&P.    Patient with high grade AV block since 11/5/2023, multiple episodes today. Plan for transvenous pacemaker. Risks, benefits, and alternatives explained in full. Written informed consent obtained and placed in the chart. Proceed with TVP.

## 2023-11-07 NOTE — CONSULTS
Consults    Reason For Consult  Symptomatic bradycardia    History Of Present Illness  Massimo Garcia is a 62 y.o. 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  Patient follows up with heme-onc (Dr. Blake) when he gets his chemotherapy regimen.  Last infusion was 10/30.  Patient stated that he was stable however in the last 7 days prior to presentation, he has been having recurrent episodes of dizzy spells    Patient was evaluated in the heme-onc clinic, where an MRI of the head was ordered for same complaint.  He however at that time was stable enough to go about with his regular ADLs.  However 2 days prior to presentation patient noted worsening dizziness, nausea, and concerns for loss of consciousness.  He states that episodes where interjected with his periods of severe headache.  There is associated warmth and diaphoresis with episodes.  Patient states that he is also having anxiety on account of symptoms.  While he denies blurring of vision, tinnitus, admits to nausea but denies vomiting.  There is no chest pain, shortness of breath, change in bowel habits, abdominal distention, or concerns for melena stools.    Patient was initially admitted to the floors, with telemetry was noted to be consistent with symptomatic bradycardia.  Electrophysiologist was contacted, and after evaluation of EKG strip and telemetry readings, a diagnosis of third-degree heart block was made.  Patient was scheduled to have a pacemaker placed on Thursday 11/9.  However while on the floors, patient's was noted to bradycardia down with symptomatic pauses of about 5 seconds.  A rapid was called, and patient was evaluated.  Decision was made to transfer patient to the ICU for further monitoring.  While in the ICU, patient was noted to  bradycardia down initially to 35 bpm and then subsequently had a sinus pause of about 3 seconds.  The transvenous pacer was activated initially at 10 MV, however there was no capture mechanically.  Patient was titrated up up until about 120 mV with capture was consistent at a rate of 70 bpm.  Patient was given 0.2 mg of Dilaudid for the discomfort and pain.  Cardiology was reconsulted, and decision was made for a stat transvenous pacer.     Past Medical History  He has a past medical history of Essential (primary) hypertension (12/21/2013) and Personal history of other diseases of the circulatory system.    Surgical History  He has a past surgical history that includes Total knee arthroplasty (09/30/2016) and Total knee arthroplasty (09/30/2016).     Social History  He reports that he has quit smoking. His smoking use included cigarettes. He has never been exposed to tobacco smoke. He has never used smokeless tobacco. He reports that he does not currently use alcohol. He reports that he does not use drugs.    Family History  No family history on file.     Allergies  Bee venom protein (honey bee)    Review of Systems  Review of Systems   Constitutional:  Negative for activity change, appetite change and chills.   HENT:  Negative for congestion.    Respiratory:  Negative for apnea and chest tightness.    Cardiovascular:  Negative for chest pain and leg swelling.   Gastrointestinal:  Positive for nausea. Negative for abdominal distention, abdominal pain and vomiting.   Genitourinary:  Negative for difficulty urinating and dysuria.   Musculoskeletal:  Negative for arthralgias and back pain.   Neurological:  Positive for dizziness, syncope, weakness, light-headedness and headaches.   Psychiatric/Behavioral:  Negative for agitation, behavioral problems and confusion.          Physical Exam  Physical Exam  Constitutional:       Appearance: Normal appearance.   Cardiovascular:      Rate and Rhythm: Regular rhythm.  Bradycardia present.      Pulses: Normal pulses.      Heart sounds: Normal heart sounds.   Pulmonary:      Effort: Pulmonary effort is normal.      Breath sounds: Normal breath sounds.   Neurological:      General: No focal deficit present.      Mental Status: He is alert and oriented to person, place, and time.   Psychiatric:         Mood and Affect: Mood normal.          Last Recorded Vitals  /75   Pulse (!) 115   Temp 36 °C (96.8 °F) (Temporal)   Resp 20   Wt 73 kg (160 lb 15 oz)   SpO2 100%     Relevant Results  Results for orders placed or performed during the hospital encounter of 11/05/23 (from the past 24 hour(s))   CBC   Result Value Ref Range    WBC 5.0 4.4 - 11.3 x10*3/uL    nRBC 0.0 0.0 - 0.0 /100 WBCs    RBC 3.82 (L) 4.50 - 5.90 x10*6/uL    Hemoglobin 11.2 (L) 13.5 - 17.5 g/dL    Hematocrit 33.3 (L) 41.0 - 52.0 %    MCV 87 80 - 100 fL    MCH 29.3 26.0 - 34.0 pg    MCHC 33.6 32.0 - 36.0 g/dL    RDW 15.5 (H) 11.5 - 14.5 %    Platelets 84 (L) 150 - 450 x10*3/uL   Renal function panel   Result Value Ref Range    Glucose 80 74 - 99 mg/dL    Sodium 138 136 - 145 mmol/L    Potassium 3.7 3.5 - 5.3 mmol/L    Chloride 105 98 - 107 mmol/L    Bicarbonate 26 21 - 32 mmol/L    Anion Gap 11 10 - 20 mmol/L    Urea Nitrogen 15 6 - 23 mg/dL    Creatinine 0.84 0.50 - 1.30 mg/dL    eGFR >90 >60 mL/min/1.73m*2    Calcium 8.9 8.6 - 10.3 mg/dL    Phosphorus 4.3 2.5 - 4.9 mg/dL    Albumin 3.8 3.4 - 5.0 g/dL   Magnesium   Result Value Ref Range    Magnesium 1.70 1.60 - 2.40 mg/dL   Transthoracic Echo (TTE) Complete   Result Value Ref Range    AV pk ana 1.23     AV mn grad 4.0     LVOT diam 2.10     LV biplane EF 56     MV avg E/e' ratio 5.44     MV E/A ratio 0.70     LA vol index A/L 30.1     Tricuspid annular plane systolic excursion 2.2     RV free wall pk S' 11.00     LVIDd 4.52     RVSP 18.5     Aortic Valve Area by Continuity of VTI 2.74     Aortic Valve Area by Continuity of Peak Velocity 2.82     AV pk grad  6.1     LV A4C EF 56.1    POCT GLUCOSE   Result Value Ref Range    POCT Glucose 108 (H) 74 - 99 mg/dL   POCT GLUCOSE   Result Value Ref Range    POCT Glucose 191 (H) 74 - 99 mg/dL      Transthoracic Echo (TTE) Complete    Result Date: 11/7/2023   [facility], [address] [address]               Tel [phone] and Fax [phone] TRANSTHORACIC ECHOCARDIOGRAM REPORT  Patient Name:      GETACHEW Lira Physician:    [id number] Lior Hunter MD Study Date:        11/7/2023            Ordering Provider:    [id number] DEVON CHEUNG MRN/PID:           [id number]             Fellow: Accession#:        [id number]         Nurse: Date of Birth/Age: 1960 / 62      Sonographer:          Ceci Hui RDCS                    years Gender:            M                    Additional Staff: Height:            170.18 cm            Admit Date:           11/5/2023 Weight:            69.85 kg             Admission Status:     Inpatient -                                                               Routine BSA:               1.81 m2              Encounter#:           [id number]                                         Department Location:  [facility] Non                                                               Invasive Blood Pressure: 154 /77 mmHg Study Type:    TRANSTHORACIC ECHO (TTE) COMPLETE Diagnosis/ICD: Dizziness and giddiness-R42 Indication:    Dizziness CPT Code:      Echo Complete w Full Doppler-[id number] Patient History: Pertinent History: Cancer and PE. Chemotherapy. Study Detail: The following Echo studies were performed: 2D, M-Mode, Doppler and               color flow. Technically challenging study due to prominent lung               artifact. The patient was awake.  PHYSICIAN INTERPRETATION: Left Ventricle: The left ventricular systolic function is  normal, with an estimated ejection fraction of 55-60%. The left ventricular cavity size is normal. Spectral Doppler shows an impaired relaxation pattern of left ventricular diastolic filling. Left Atrium: The left atrium is normal in size. Right Ventricle: The right ventricle is normal in size. There is normal right ventricular global systolic function. Right Atrium: The right atrium is normal in size. Aortic Valve: The aortic valve was not well visualized. There is no evidence of aortic valve stenosis. There is trivial aortic valve regurgitation. The peak instantaneous gradient of the aortic valve is 6.1 mmHg. The mean gradient of the aortic valve is 4.0 mmHg. Mitral Valve: The mitral valve is mildly thickened. There is mild mitral valve regurgitation. Tricuspid Valve: The tricuspid valve is structurally normal. There is trace tricuspid regurgitation. The right ventricular systolic pressure is unable to be estimated. Pulmonic Valve: The pulmonic valve is not well visualized. There is physiologic pulmonic valve regurgitation. Pericardium: There is no pericardial effusion noted. Aorta: The aortic root is normal. Systemic Veins: The inferior vena cava appears to be of normal size. There is IVC inspiratory collapse greater than 50%. In comparison to the previous echocardiogram(s): Compared with study from 8/31/2023, no significant change.  CONCLUSIONS:  1. Left ventricular systolic function is normal with a 55-60% estimated ejection fraction.  2. Spectral Doppler shows an impaired relaxation pattern of left ventricular diastolic filling. QUANTITATIVE DATA SUMMARY: 2D MEASUREMENTS:                          Normal Ranges: Ao Root d:     3.70 cm   (2.0-3.7cm) IVSd:          1.03 cm   (0.6-1.1cm) LVPWd:         0.98 cm   (0.6-1.1cm) LVIDd:         4.52 cm   (3.9-5.9cm) LVIDs:         3.16 cm LV Mass Index: 86.0 g/m2 LV % FS        30.1 % LA VOLUME:                               Normal Ranges: LA Vol A4C:        54.1 ml     (22+/-6mL/m2) LA Vol A2C:        53.9 ml LA Vol BP:         54.4 ml LA Vol Index A4C:  29.9ml/m2 LA Vol Index A2C:  29.8 ml/m2 LA Vol Index BP:   30.1 ml/m2 LA Area A4C:       19.3 cm2 LA Area A2C:       19.4 cm2 LA Major Axis A4C: 5.8 cm LA Major Axis A2C: 5.9 cm LA Volume Index:   27.6 ml/m2 LA Vol A4C:        48.0 ml LA Vol A2C:        51.0 ml RA VOLUME BY A/L METHOD:                       Normal Ranges: RA Area A4C: 16.0 cm2 M-MODE MEASUREMENTS:                  Normal Ranges: Ao Root: 4.00 cm (2.0-3.7cm) LAs:     3.00 cm (2.7-4.0cm) AORTA MEASUREMENTS:                      Normal Ranges: Ao Sinus, d: 3.70 cm (2.1-3.5cm) LV SYSTOLIC FUNCTION BY 2D PLANIMETRY (MOD):                     Normal Ranges: EF-A4C View: 56.1 % (>=55%) EF-A2C View: 56.1 % EF-Biplane:  56.1 % LV DIASTOLIC FUNCTION:                               Normal Ranges: MV Peak E:        0.54 m/s    (0.7-1.2 m/s) MV Peak A:        0.78 m/s    (0.42-0.7 m/s) E/A Ratio:        0.70        (1.0-2.2) MV e'             0.10 m/s    (>8.0) MV lateral e'     0.10 m/s MV medial e'      0.08 m/s MV A Dur:         121.00 msec E/e' Ratio:       5.44        (<8.0) PulmV Sys Angelito:    57.80 cm/s PulmV Gillette Angelito:   40.10 cm/s PulmV S/D Angelito:    1.40 PulmV A Revs Angelito: 32.00 cm/s PulmV A Revs Dur: 135.00 msec MITRAL VALVE:                Normal Ranges: MV DT: 92 msec (150-240msec) AORTIC VALVE:                                   Normal Ranges: AoV Vmax:                1.23 m/s (<=1.7m/s) AoV Peak P.1 mmHg (<20mmHg) AoV Mean P.0 mmHg (1.7-11.5mmHg) LVOT Max Angelito:            1.00 m/s (<=1.1m/s) AoV VTI:                 22.50 cm (18-25cm) LVOT VTI:                17.80 cm LVOT Diameter:           2.10 cm  (1.8-2.4cm) AoV Area, VTI:           2.74 cm2 (2.5-5.5cm2) AoV Area,Vmax:           2.82 cm2 (2.5-4.5cm2) AoV Dimensionless Index: 0.79  RIGHT VENTRICLE: RV Basal 3.71 cm RV Mid   2.74 cm RV Major 8.1 cm TAPSE:   22.0 mm RV s'    0.11 m/s  TRICUSPID VALVE/RVSP:                             Normal Ranges: Peak TR Velocity: 1.97 m/s RV Syst Pressure: 18.5 mmHg (< 30mmHg) IVC Diam:         1.56 cm PULMONIC VALVE:                      Normal Ranges: PV Max Angelito: 0.8 m/s  (0.6-0.9m/s) PV Max P.7 mmHg Pulmonary Veins: PulmV A Revs Dur: 135.00 msec PulmV A Revs Angelito: 32.00 cm/s PulmV Gillette Angelito:   40.10 cm/s PulmV S/D Angelito:    1.40 PulmV Sys Angelito:    57.80 cm/s  [id number] Lior Hunter MD Electronically signed on 2023 at 12:11:27 PM  ** Final **     MR brain w and wo IV contrast    Result Date: 2023  Interpreted By:  Aba Serrano, STUDY: MR BRAIN W AND WO IV CONTRAST;  2023 9:58 am   INDICATION: Signs/Symptoms: Esophageal cancer with mets,dizziness.   COMPARISON: Head CT, 2023   ACCESSION NUMBER(S): [id number]   ORDERING CLINICIAN: TARI LEES   TECHNIQUE: Axial T2, FLAIR, DWI, gradient echo T2 and T1 weighted images of the brain were acquired. Post contrast T1 weighted images were acquired after administration of 14 mL Dotarem gadolinium based intravenous contrast. Image quality is somewhat degraded by motion.   FINDINGS: CSF Spaces: The ventricles, sulci and basal cisterns are within normal limits. No abnormal extra-axial collection   Parenchyma: There is no restricted diffusion to suggest acute infarction.  Nonspecific T2 hyperintense signal in the cerebral white matter, mild for age. No evidence of intracranial hemorrhage. There is no mass effect or midline shift. No abnormal enhancement.   Paranasal Sinuses and Mastoids: Scattered paranasal sinus mucosal thickening. The mastoid air cells are clear.   Orbits: Bilateral lens replacement.       Mildly motion limited exam, no acute intracranial pathology or evidence of intracranial metastatic disease.   MACRO: None   Signed by: Aba Serrano 2023 12:38 PM Dictation workstation:   [id number]    CT abdomen pelvis w IV contrast    Result Date: 2023  Interpreted By:   Rosalio Bishop, STUDY: CT ABDOMEN PELVIS W IV CONTRAST;  11/5/2023 4:37 pm   INDICATION: Signs/Symptoms:abd pain.   COMPARISON: None.   ACCESSION NUMBER(S): [id number]   ORDERING CLINICIAN: GLORIA SLOAN   TECHNIQUE: Contiguous axial CT sections are performed from the lung bases to the lesser trochanters following the uneventful administration of 75 cc of intravenous Omnipaque 350.   The study is supplemented with coronal and sagittal reformatted images.   FINDINGS: The lung bases are clear bilaterally.   There are multilevel discogenic degenerative changes of the lumbar spine with dextroscoliosis. There is multilevel bulging disc and central canal narrowing, more pronounced at L2-3 and L3-4. The visualized osseous structures are intact. There are mild osteoarthritic changes of the hips and sacroiliac joints.   There are multiple hypodense peripherally enhancing masses throughout the liver. The largest is identified centrally in the right hepatic lobe and measures 3.3 x 2.8 cm in diameter. Allowing for differences in sectioning plane and some differences in the phase of contrast enhancement, these findings are not appreciably altered from the previous study of 09/27/2023. The appearance is compatible with metastatic disease. The liver has a slightly lobular contour.   The spleen is mildly enlarged and unchanged. The spleen uniformly enhances. There is no space-occupying mass.   There is some nodularity of the adrenal glands which is unchanged from the previous study.   The gallbladder and pancreas are unremarkable and unchanged.   The kidneys enhance symmetrically and are symmetric in size. There is a too small to characterize hypodensity in the mid right kidney posteriorly measuring 5 mm. There is no renal calculus or hydronephrosis bilaterally. The perinephric fat is preserved. There is no hydroureter or obstructing ureteral calculus. The urinary bladder is not opacified though is partially distended  and otherwise unremarkable. There is no bladder calculus.   The abdominal aorta is of normal caliber and enhancement and enhances normally. The IVC is unremarkable.   There is a pathologically enlarged lymph node just anterior to the left of the upper aorta measuring 1.9 x 1.6 cm in diameter. This finding is unchanged from 09/27/2023. A portacaval lymph node has slightly increased in size and measures 2.3 x 1.5 cm. A portacaval lymph node is also mildly increased in the previous study and measures 2.8 x 2.0 cm in diameter.   There is no bowel distension or infiltration of the bowel mesentery. The appendix has a normal appearance. There are multiple diverticula in the distal colon though no evidence of diverticulitis. There is no free air free fluid collection in the abdomen or pelvis. There is no herniated bowel. The prostate is unremarkable.   A 7 mm sclerotic focus is identified in the right sacrum at the S2 level which could reflect a bone island and is unchanged.       Diffuse hepatic metastatic disease is not significantly changed from 09/27/2023 allowing for technical differences. Retroperitoneal and Mireille portal lymphadenopathy has mildly increased from 09/27/2023.   Mild distal colonic diverticulosis.   Too small to characterize right renal hypodensity.   Mild splenomegaly.   A periaortic lymph node and aortocaval lymph node or mildly increased in size from the previous study   Signed by: Rosalio Bishop 11/5/2023 5:19 PM Dictation workstation:   [id number]    CT head wo IV contrast    Result Date: 11/5/2023  Interpreted By:  Rosalio Bishop, STUDY: CT HEAD WO IV CONTRAST;  11/5/2023 4:37 pm   INDICATION: HA and lightheaded.   COMPARISON: None.   ACCESSION NUMBER(S): [id number]   ORDERING CLINICIAN: GLORIA SLOAN   TECHNIQUE: Contiguous unenhanced axial CT sections are performed from the skull base to the vertex.   FINDINGS: The osseous structures are intact. The visualized portions of the  paranasal sinuses and mastoid air cells are clear.   The cortical sulci and CSF spaces are symmetric in appearance. There is no sign of parenchymal hematoma or dense extra-axial fluid collection. There is no localized edema, mass effect, or shift of the midline. The gray matter/white-matter differentiation is preserved.       No CT evidence of acute intracranial abnormality.   Signed by: Rosalio Bishop 11/5/2023 5:08 PM Dictation workstation:   [id number]    XR chest 1 view    Result Date: 11/5/2023  Interpreted By:  Sumit Melgar, STUDY: XR CHEST 1 VIEW  11/5/2023 3:02 pm   INDICATION: Signs/Symptoms:Dizzy   COMPARISON: 05/22/2023   ACCESSION NUMBER(S): [id number]   ORDERING CLINICIAN: GLORIA SLOAN   TECHNIQUE: A single AP portable radiograph of the chest was obtained.   FINDINGS: Multiple cardiac monitoring leads are seen over the chest.  A right internal jugular implanted port catheter is unchanged in position. No focal infiltrate, pleural effusion or pneumothorax is identified. The cardiac silhouette is within normal limits for size.       No focal infiltrate or pneumothorax is identified.   MACRO: None.   Signed by: Sumit Melgar 11/5/2023 3:04 PM Dictation workstation:   [id number]       Assessment/Plan   Massimo Garcia is a 62 year old male with a past medical history significant for metastatic stage IV esophageal cancer HER2 positive with liver mets (currently on chemo with trastuzumab FOLFOX regimen), hx PE and portal vein thrombosis on Eliquis who presented to [facility] ED on 11/5/23 with dizzy spells. Admitted for further management of dizziness.     Neurology   #Dizziness and Lightheadedness  #Nausea  - Likely in the setting of symptomatic bradycardia  - Will control dizziness with management of for symptomatic bradycardia   - Consider antiemetics if persisting nausea     #Neuropathy   - Continue gabapentin at 100mg BID     Cardiology  #Symptomatic Bradycardia   - Patients having recurrent episodes  of btradycardia with HR in the range of 35-49  - Associated sinus pauses on telemetry with longest pause being about 5secs  - etiology is unknown at this time, there is no concern for b-blocker or lymes. Patient however has been on trastuzumab for an esophageal cancer, which may have had some cardio toxicity   - s/p transcutaneous pacing with voltage of 120mV and mechanical capture with rate of 80bpm   - Pending TVP   - For PPM 11/9     #HTN  - patient has a hx of HTN, not currently on medications   - BP currently in the 140's systolic   - Will monitor at this time     Respiratory   No active issues at this time     Gastroenterology   #GI ppx  -Protonix 40 mg daily     Hematology/Oncology   # Metastatic stage IV Esophageal Cancer   - Follows with Heme/Onc Dr. Blake   - Last chemo 10/30 with Trastuzumab FOLFOX   - Lidocaine cream for port use  -Oxycodone 10mg bid at home baseline for Cancer pain management    #Anemia   - Hb is 11.2  - Will monitor at this time     #Hx of Portal Vein thrombosis   - Continue Eliquis 5mg BID     Renal   #hypokalemia   - Monitor and replete     Fluids: PRN bolus   Electrolytes: replete as needed   Nutrition: Regular   GI PPx: None   DVT PPx: Eliquis   Lines: PIV   Antibiotics: None   Code: Full    Dispo: Patient is admitted for the management of symptomatic bradycardia s/p TVP, will continue to monitor in the ICU pending PPM. Estimated LOS >2 days     Aleks Amado MD

## 2023-11-07 NOTE — PRE-SEDATION DOCUMENTATION
Sedation Plan    ASA 3     Mallampati class: II.    Risks, benefits, and alternatives discussed with patient.

## 2023-11-08 ENCOUNTER — HOSPITAL ENCOUNTER (OUTPATIENT)
Dept: RADIOLOGY | Facility: HOSPITAL | Age: 63
Discharge: HOME | End: 2023-11-08
Payer: COMMERCIAL

## 2023-11-08 ENCOUNTER — APPOINTMENT (OUTPATIENT)
Dept: RADIOLOGY | Facility: HOSPITAL | Age: 63
DRG: 243 | End: 2023-11-08
Payer: MEDICARE

## 2023-11-08 DIAGNOSIS — R00.1 BRADYCARDIA: Primary | ICD-10-CM

## 2023-11-08 LAB
ALBUMIN SERPL BCP-MCNC: 3.9 G/DL (ref 3.4–5)
ANION GAP SERPL CALC-SCNC: 13 MMOL/L (ref 10–20)
BUN SERPL-MCNC: 15 MG/DL (ref 6–23)
CALCIUM SERPL-MCNC: 9.2 MG/DL (ref 8.6–10.3)
CHLORIDE SERPL-SCNC: 106 MMOL/L (ref 98–107)
CO2 SERPL-SCNC: 24 MMOL/L (ref 21–32)
CREAT SERPL-MCNC: 0.86 MG/DL (ref 0.5–1.3)
ERYTHROCYTE [DISTWIDTH] IN BLOOD BY AUTOMATED COUNT: 15.7 % (ref 11.5–14.5)
GFR SERPL CREATININE-BSD FRML MDRD: >90 ML/MIN/1.73M*2
GLUCOSE SERPL-MCNC: 78 MG/DL (ref 74–99)
HCT VFR BLD AUTO: 36.8 % (ref 41–52)
HGB BLD-MCNC: 12.6 G/DL (ref 13.5–17.5)
MAGNESIUM SERPL-MCNC: 1.73 MG/DL (ref 1.6–2.4)
MCH RBC QN AUTO: 29.2 PG (ref 26–34)
MCHC RBC AUTO-ENTMCNC: 34.2 G/DL (ref 32–36)
MCV RBC AUTO: 85 FL (ref 80–100)
NRBC BLD-RTO: 0 /100 WBCS (ref 0–0)
PHOSPHATE SERPL-MCNC: 3.8 MG/DL (ref 2.5–4.9)
PLATELET # BLD AUTO: 132 X10*3/UL (ref 150–450)
POTASSIUM SERPL-SCNC: 3.4 MMOL/L (ref 3.5–5.3)
RBC # BLD AUTO: 4.31 X10*6/UL (ref 4.5–5.9)
SODIUM SERPL-SCNC: 140 MMOL/L (ref 136–145)
UFH PPP CHRO-ACNC: 1.1 IU/ML
UFH PPP CHRO-ACNC: 1.1 IU/ML
UFH PPP CHRO-ACNC: 1.4 IU/ML
WBC # BLD AUTO: 9.5 X10*3/UL (ref 4.4–11.3)

## 2023-11-08 PROCEDURE — 2020000001 HC ICU ROOM DAILY

## 2023-11-08 PROCEDURE — 2500000001 HC RX 250 WO HCPCS SELF ADMINISTERED DRUGS (ALT 637 FOR MEDICARE OP)

## 2023-11-08 PROCEDURE — 99232 SBSQ HOSP IP/OBS MODERATE 35: CPT | Performed by: PHYSICIAN ASSISTANT

## 2023-11-08 PROCEDURE — 2500000004 HC RX 250 GENERAL PHARMACY W/ HCPCS (ALT 636 FOR OP/ED)

## 2023-11-08 PROCEDURE — 99291 CRITICAL CARE FIRST HOUR: CPT | Performed by: INTERNAL MEDICINE

## 2023-11-08 PROCEDURE — 71045 X-RAY EXAM CHEST 1 VIEW: CPT | Performed by: RADIOLOGY

## 2023-11-08 PROCEDURE — 2500000001 HC RX 250 WO HCPCS SELF ADMINISTERED DRUGS (ALT 637 FOR MEDICARE OP): Performed by: INTERNAL MEDICINE

## 2023-11-08 PROCEDURE — 85027 COMPLETE CBC AUTOMATED: CPT | Performed by: INTERNAL MEDICINE

## 2023-11-08 PROCEDURE — 2500000004 HC RX 250 GENERAL PHARMACY W/ HCPCS (ALT 636 FOR OP/ED): Performed by: INTERNAL MEDICINE

## 2023-11-08 PROCEDURE — 71045 X-RAY EXAM CHEST 1 VIEW: CPT | Mod: FY

## 2023-11-08 PROCEDURE — 2500000005 HC RX 250 GENERAL PHARMACY W/O HCPCS

## 2023-11-08 PROCEDURE — 83735 ASSAY OF MAGNESIUM: CPT | Performed by: INTERNAL MEDICINE

## 2023-11-08 PROCEDURE — 80069 RENAL FUNCTION PANEL: CPT | Performed by: INTERNAL MEDICINE

## 2023-11-08 PROCEDURE — 85520 HEPARIN ASSAY: CPT

## 2023-11-08 PROCEDURE — 37799 UNLISTED PX VASCULAR SURGERY: CPT

## 2023-11-08 PROCEDURE — 37799 UNLISTED PX VASCULAR SURGERY: CPT | Performed by: INTERNAL MEDICINE

## 2023-11-08 RX ORDER — HEPARIN SODIUM 5000 [USP'U]/ML
4000 INJECTION, SOLUTION INTRAVENOUS; SUBCUTANEOUS ONCE
Status: DISCONTINUED | OUTPATIENT
Start: 2023-11-08 | End: 2023-11-08

## 2023-11-08 RX ORDER — POTASSIUM CHLORIDE 20 MEQ/1
40 TABLET, EXTENDED RELEASE ORAL ONCE
Status: COMPLETED | OUTPATIENT
Start: 2023-11-08 | End: 2023-11-08

## 2023-11-08 RX ORDER — LIDOCAINE 560 MG/1
2 PATCH PERCUTANEOUS; TOPICAL; TRANSDERMAL DAILY
Status: DISCONTINUED | OUTPATIENT
Start: 2023-11-08 | End: 2023-11-13 | Stop reason: HOSPADM

## 2023-11-08 RX ORDER — HYDROMORPHONE HYDROCHLORIDE 1 MG/ML
0.2 INJECTION, SOLUTION INTRAMUSCULAR; INTRAVENOUS; SUBCUTANEOUS
Status: DISCONTINUED | OUTPATIENT
Start: 2023-11-08 | End: 2023-11-13 | Stop reason: HOSPADM

## 2023-11-08 RX ORDER — SODIUM CHLORIDE 0.9 % (FLUSH) 0.9 %
10 SYRINGE (ML) INJECTION EVERY 12 HOURS
Status: DISCONTINUED | OUTPATIENT
Start: 2023-11-08 | End: 2023-11-10

## 2023-11-08 RX ORDER — OXYCODONE AND ACETAMINOPHEN 5; 325 MG/1; MG/1
2 TABLET ORAL EVERY 6 HOURS PRN
Status: DISCONTINUED | OUTPATIENT
Start: 2023-11-08 | End: 2023-11-09

## 2023-11-08 RX ORDER — HYDROMORPHONE HYDROCHLORIDE 1 MG/ML
0.2 INJECTION, SOLUTION INTRAMUSCULAR; INTRAVENOUS; SUBCUTANEOUS ONCE
Status: COMPLETED | OUTPATIENT
Start: 2023-11-08 | End: 2023-11-08

## 2023-11-08 RX ORDER — HEPARIN SODIUM 10000 [USP'U]/100ML
0-4000 INJECTION, SOLUTION INTRAVENOUS CONTINUOUS
Status: DISCONTINUED | OUTPATIENT
Start: 2023-11-08 | End: 2023-11-10

## 2023-11-08 RX ADMIN — PANTOPRAZOLE SODIUM 40 MG: 40 TABLET, DELAYED RELEASE ORAL at 08:06

## 2023-11-08 RX ADMIN — LIDOCAINE 2 PATCH: 4 PATCH TOPICAL at 11:21

## 2023-11-08 RX ADMIN — PANTOPRAZOLE SODIUM 40 MG: 40 TABLET, DELAYED RELEASE ORAL at 20:39

## 2023-11-08 RX ADMIN — APIXABAN 5 MG: 5 TABLET, FILM COATED ORAL at 08:06

## 2023-11-08 RX ADMIN — HYDROMORPHONE HYDROCHLORIDE 0.2 MG: 1 INJECTION, SOLUTION INTRAMUSCULAR; INTRAVENOUS; SUBCUTANEOUS at 06:30

## 2023-11-08 RX ADMIN — OXYCODONE HYDROCHLORIDE AND ACETAMINOPHEN 2 TABLET: 5; 325 TABLET ORAL at 18:52

## 2023-11-08 RX ADMIN — HYDROMORPHONE HYDROCHLORIDE 0.2 MG: 1 INJECTION, SOLUTION INTRAMUSCULAR; INTRAVENOUS; SUBCUTANEOUS at 23:27

## 2023-11-08 RX ADMIN — HEPARIN SODIUM 900 UNITS/HR: 10000 INJECTION, SOLUTION INTRAVENOUS at 11:22

## 2023-11-08 RX ADMIN — HYDROMORPHONE HYDROCHLORIDE 0.2 MG: 1 INJECTION, SOLUTION INTRAMUSCULAR; INTRAVENOUS; SUBCUTANEOUS at 16:02

## 2023-11-08 RX ADMIN — SALINE NASAL SPRAY 1 SPRAY: 1.5 SOLUTION NASAL at 13:31

## 2023-11-08 RX ADMIN — GABAPENTIN 100 MG: 100 CAPSULE ORAL at 20:39

## 2023-11-08 RX ADMIN — POTASSIUM CHLORIDE 40 MEQ: 1500 TABLET, EXTENDED RELEASE ORAL at 08:05

## 2023-11-08 RX ADMIN — SUCRALFATE 1 G: 1 TABLET ORAL at 08:05

## 2023-11-08 RX ADMIN — Medication 10 ML: at 11:26

## 2023-11-08 RX ADMIN — Medication 10 ML: at 08:06

## 2023-11-08 RX ADMIN — GABAPENTIN 100 MG: 100 CAPSULE ORAL at 08:06

## 2023-11-08 RX ADMIN — Medication 10 ML: at 22:15

## 2023-11-08 RX ADMIN — OXYCODONE HYDROCHLORIDE AND ACETAMINOPHEN 2 TABLET: 5; 325 TABLET ORAL at 11:20

## 2023-11-08 ASSESSMENT — PAIN - FUNCTIONAL ASSESSMENT
PAIN_FUNCTIONAL_ASSESSMENT: 0-10

## 2023-11-08 ASSESSMENT — PAIN SCALES - GENERAL
PAINLEVEL_OUTOF10: 8
PAINLEVEL_OUTOF10: 0 - NO PAIN
PAINLEVEL_OUTOF10: 6
PAINLEVEL_OUTOF10: 8
PAINLEVEL_OUTOF10: 10 - WORST POSSIBLE PAIN
PAINLEVEL_OUTOF10: 3
PAINLEVEL_OUTOF10: 7
PAINLEVEL_OUTOF10: 10 - WORST POSSIBLE PAIN
PAINLEVEL_OUTOF10: 0 - NO PAIN
PAINLEVEL_OUTOF10: 5 - MODERATE PAIN

## 2023-11-08 NOTE — PROGRESS NOTES
"Massimo Garcia is a 62 y.o. male on day 2 of admission presenting with Third degree heart block (CMS/HCC).    Subjective   Pt reports that he did not sleep well due to pain overnight. He states the pain is from the transcutaneous pacing and he is having chest and rib pain as a result of the pacing. He received 0.2 mg Dilaudid this AM with significant relief of pain. He does not believe the Oxy 10mg is helping him.     Pt had 2 BM yesterday and is making adequate urine output. Pt is otherwise denying any dizziness, nausea, vomiting, or diarrhea.    Objective     Physical Exam  HENT:      Head: Normocephalic and atraumatic.      Mouth/Throat:      Mouth: Mucous membranes are moist.      Pharynx: Oropharynx is clear.   Eyes:      Pupils: Pupils are equal, round, and reactive to light.   Cardiovascular:      Rate and Rhythm: Bradycardia present.      Comments: Right sided chemo port and right internal jugular line in place. Clean and dry.    Pulmonary:      Effort: Pulmonary effort is normal.      Breath sounds: Normal breath sounds.   Abdominal:      General: Abdomen is flat.      Palpations: Abdomen is soft.   Skin:     General: Skin is warm and dry.   Neurological:      Mental Status: He is alert.     Last Recorded Vitals  Blood pressure 105/57, pulse 50, temperature 37.1 °C (98.8 °F), temperature source Temporal, resp. rate 15, height 1.727 m (5' 7.99\"), weight 70.7 kg (155 lb 13.8 oz), SpO2 100 %.  Intake/Output last 3 Shifts:  I/O last 3 completed shifts:  In: 450 (6.4 mL/kg) [P.O.:410; I.V.:40 (0.6 mL/kg)]  Out: 705 (10 mL/kg) [Urine:700 (0.3 mL/kg/hr); Blood:5]  Weight: 70.7 kg     Relevant Results  Scheduled medications  [Held by provider] apixaban, 5 mg, oral, BID  gabapentin, 100 mg, oral, BID  lidocaine, 2 patch, transdermal, Daily  pantoprazole, 40 mg, oral, BID  perflutren lipid microspheres, 0.5-10 mL of dilution, intravenous, Once in imaging  perflutren protein A microsphere, 0.5 mL, intravenous, Once in " imaging  sodium chloride 0.9%, 10 mL, intra-catheter, q12h  sodium chloride 0.9%, 10 mL, intra-catheter, q12h  sucralfate, 1 g, oral, Daily  sulfur hexafluoride microsphr, 2 mL, intravenous, Once in imaging    Continuous medications  heparin, 0-4,000 Units/hr    PRN medications  PRN medications: alteplase, HYDROmorphone, oxyCODONE-acetaminophen, polyethylene glycol, sodium chloride 0.9%  Results for orders placed or performed during the hospital encounter of 11/05/23 (from the past 24 hour(s))   Transthoracic Echo (TTE) Complete   Result Value Ref Range    AV pk ana 1.23     AV mn grad 4.0     LVOT diam 2.10     LV biplane EF 56     MV avg E/e' ratio 5.44     MV E/A ratio 0.70     LA vol index A/L 30.1     Tricuspid annular plane systolic excursion 2.2     RV free wall pk S' 11.00     LVIDd 4.52     RVSP 18.5     Aortic Valve Area by Continuity of VTI 2.74     Aortic Valve Area by Continuity of Peak Velocity 2.82     AV pk grad 6.1     LV A4C EF 56.1    POCT GLUCOSE   Result Value Ref Range    POCT Glucose 108 (H) 74 - 99 mg/dL   POCT GLUCOSE   Result Value Ref Range    POCT Glucose 191 (H) 74 - 99 mg/dL   CBC   Result Value Ref Range    WBC 9.5 4.4 - 11.3 x10*3/uL    nRBC 0.0 0.0 - 0.0 /100 WBCs    RBC 4.31 (L) 4.50 - 5.90 x10*6/uL    Hemoglobin 12.6 (L) 13.5 - 17.5 g/dL    Hematocrit 36.8 (L) 41.0 - 52.0 %    MCV 85 80 - 100 fL    MCH 29.2 26.0 - 34.0 pg    MCHC 34.2 32.0 - 36.0 g/dL    RDW 15.7 (H) 11.5 - 14.5 %    Platelets 132 (L) 150 - 450 x10*3/uL   Renal Function Panel   Result Value Ref Range    Glucose 78 74 - 99 mg/dL    Sodium 140 136 - 145 mmol/L    Potassium 3.4 (L) 3.5 - 5.3 mmol/L    Chloride 106 98 - 107 mmol/L    Bicarbonate 24 21 - 32 mmol/L    Anion Gap 13 10 - 20 mmol/L    Urea Nitrogen 15 6 - 23 mg/dL    Creatinine 0.86 0.50 - 1.30 mg/dL    eGFR >90 >60 mL/min/1.73m*2    Calcium 9.2 8.6 - 10.3 mg/dL    Phosphorus 3.8 2.5 - 4.9 mg/dL    Albumin 3.9 3.4 - 5.0 g/dL   Magnesium   Result Value Ref  Range    Magnesium 1.73 1.60 - 2.40 mg/dL    XR chest 1 view    Result Date: 11/8/2023  Interpreted By:  Piyush Isidro, STUDY: XR CHEST 1 VIEW;  11/8/2023 6:20 am   INDICATION: Signs/Symptoms:TVP location.   COMPARISON: 11/05/2023 portable AP chest x-ray and the 04/19/2023 enhanced thoracic CT.   ACCESSION NUMBER(S): [id number]   ORDERING CLINICIAN: RICH WREN   FINDINGS: Portable AP semi-erect chest x-ray 11/08/2023:   Port projected over the lateral right upper lobe with its catheter tip over the SVC. A new right supraclavicular catheter has its tip at or near the SVC origin. A lead extends inferiorly from this with its tip projected over the base of the heart, most likely in the inferior right ventricle.   CARDIOMEDIASTINAL SILHOUETTE: Cardiomediastinal silhouette is normal in size and configuration. Coronary artery calcification demonstrated on CT is difficult to appreciate.   LUNGS: New 10 mm left and 5 mm right lower lobe nodules andtiny bilateral lung nodules reported on the 09/27/2023 abdominal and 04/19/2023 thoracic CTs respectively can not be identified, but would probably go undetected on this portable chest x-ray. Normal pulmonary inflation without evidence of an infiltrate. Sharp lateral costophrenic angles. No pneumothorax is apparent.   ABDOMEN: No remarkable upper abdominal findings.   BONES: No acute osseous changes. Bilateral total shoulder prostheses, minimal scoliosis and tiny to moderate size spinal osteophytes are noted.       1.  No acute disease. Lung metastases demonstrated on prior studies can not be appreciated.       MACRO: None   Signed by: Piyush Isidro 11/8/2023 9:49 AM Dictation workstation:   [id number]    Transthoracic Echo (TTE) Complete    Result Date: 11/7/2023   [facility], [address] [address]               Tel [phone] and Fax [phone] TRANSTHORACIC ECHOCARDIOGRAM REPORT  Patient Name:      GETACHEW MG             Reading Physician:    [id number] Lior Hunter MD Study Date:        11/7/2023            Ordering Provider:    [id number] DEVON CHEUNG MRN/PID:           [id number]             Fellow: Accession#:        [id number]         Nurse: Date of Birth/Age: 1960 / 62      Sonographer:          Ceci Hui RDCS                    years Gender:            M                    Additional Staff: Height:            170.18 cm            Admit Date:           11/5/2023 Weight:            69.85 kg             Admission Status:     Inpatient -                                                               Routine BSA:               1.81 m2              Encounter#:           [id number]                                         Department Location:  [facility] Non                                                               Invasive Blood Pressure: 154 /77 mmHg Study Type:    TRANSTHORACIC ECHO (TTE) COMPLETE Diagnosis/ICD: Dizziness and giddiness-R42 Indication:    Dizziness CPT Code:      Echo Complete w Full Doppler-[id number] Patient History: Pertinent History: Cancer and PE. Chemotherapy. Study Detail: The following Echo studies were performed: 2D, M-Mode, Doppler and               color flow. Technically challenging study due to prominent lung               artifact. The patient was awake.  PHYSICIAN INTERPRETATION: Left Ventricle: The left ventricular systolic function is normal, with an estimated ejection fraction of 55-60%. The left ventricular cavity size is normal. Spectral Doppler shows an impaired relaxation pattern of left ventricular diastolic filling. Left Atrium: The left atrium is normal in size. Right Ventricle: The right ventricle is normal in size. There is normal right ventricular global systolic function. Right Atrium: The right atrium is normal in size. Aortic Valve: The aortic valve was not  well visualized. There is no evidence of aortic valve stenosis. There is trivial aortic valve regurgitation. The peak instantaneous gradient of the aortic valve is 6.1 mmHg. The mean gradient of the aortic valve is 4.0 mmHg. Mitral Valve: The mitral valve is mildly thickened. There is mild mitral valve regurgitation. Tricuspid Valve: The tricuspid valve is structurally normal. There is trace tricuspid regurgitation. The right ventricular systolic pressure is unable to be estimated. Pulmonic Valve: The pulmonic valve is not well visualized. There is physiologic pulmonic valve regurgitation. Pericardium: There is no pericardial effusion noted. Aorta: The aortic root is normal. Systemic Veins: The inferior vena cava appears to be of normal size. There is IVC inspiratory collapse greater than 50%. In comparison to the previous echocardiogram(s): Compared with study from 8/31/2023, no significant change.  CONCLUSIONS:  1. Left ventricular systolic function is normal with a 55-60% estimated ejection fraction.  2. Spectral Doppler shows an impaired relaxation pattern of left ventricular diastolic filling. QUANTITATIVE DATA SUMMARY: 2D MEASUREMENTS:                          Normal Ranges: Ao Root d:     3.70 cm   (2.0-3.7cm) IVSd:          1.03 cm   (0.6-1.1cm) LVPWd:         0.98 cm   (0.6-1.1cm) LVIDd:         4.52 cm   (3.9-5.9cm) LVIDs:         3.16 cm LV Mass Index: 86.0 g/m2 LV % FS        30.1 % LA VOLUME:                               Normal Ranges: LA Vol A4C:        54.1 ml    (22+/-6mL/m2) LA Vol A2C:        53.9 ml LA Vol BP:         54.4 ml LA Vol Index A4C:  29.9ml/m2 LA Vol Index A2C:  29.8 ml/m2 LA Vol Index BP:   30.1 ml/m2 LA Area A4C:       19.3 cm2 LA Area A2C:       19.4 cm2 LA Major Axis A4C: 5.8 cm LA Major Axis A2C: 5.9 cm LA Volume Index:   27.6 ml/m2 LA Vol A4C:        48.0 ml LA Vol A2C:        51.0 ml RA VOLUME BY A/L METHOD:                       Normal Ranges: RA Area A4C: 16.0 cm2 M-MODE  MEASUREMENTS:                  Normal Ranges: Ao Root: 4.00 cm (2.0-3.7cm) LAs:     3.00 cm (2.7-4.0cm) AORTA MEASUREMENTS:                      Normal Ranges: Ao Sinus, d: 3.70 cm (2.1-3.5cm) LV SYSTOLIC FUNCTION BY 2D PLANIMETRY (MOD):                     Normal Ranges: EF-A4C View: 56.1 % (>=55%) EF-A2C View: 56.1 % EF-Biplane:  56.1 % LV DIASTOLIC FUNCTION:                               Normal Ranges: MV Peak E:        0.54 m/s    (0.7-1.2 m/s) MV Peak A:        0.78 m/s    (0.42-0.7 m/s) E/A Ratio:        0.70        (1.0-2.2) MV e'             0.10 m/s    (>8.0) MV lateral e'     0.10 m/s MV medial e'      0.08 m/s MV A Dur:         121.00 msec E/e' Ratio:       5.44        (<8.0) PulmV Sys Angelito:    57.80 cm/s PulmV Gillette Angelito:   40.10 cm/s PulmV S/D Angelito:    1.40 PulmV A Revs Angelito: 32.00 cm/s PulmV A Revs Dur: 135.00 msec MITRAL VALVE:                Normal Ranges: MV DT: 92 msec (150-240msec) AORTIC VALVE:                                   Normal Ranges: AoV Vmax:                1.23 m/s (<=1.7m/s) AoV Peak P.1 mmHg (<20mmHg) AoV Mean P.0 mmHg (1.7-11.5mmHg) LVOT Max Angelito:            1.00 m/s (<=1.1m/s) AoV VTI:                 22.50 cm (18-25cm) LVOT VTI:                17.80 cm LVOT Diameter:           2.10 cm  (1.8-2.4cm) AoV Area, VTI:           2.74 cm2 (2.5-5.5cm2) AoV Area,Vmax:           2.82 cm2 (2.5-4.5cm2) AoV Dimensionless Index: 0.79  RIGHT VENTRICLE: RV Basal 3.71 cm RV Mid   2.74 cm RV Major 8.1 cm TAPSE:   22.0 mm RV s'    0.11 m/s TRICUSPID VALVE/RVSP:                             Normal Ranges: Peak TR Velocity: 1.97 m/s RV Syst Pressure: 18.5 mmHg (< 30mmHg) IVC Diam:         1.56 cm PULMONIC VALVE:                      Normal Ranges: PV Max Angelito: 0.8 m/s  (0.6-0.9m/s) PV Max P.7 mmHg Pulmonary Veins: PulmV A Revs Dur: 135.00 msec PulmV A Revs Angelito: 32.00 cm/s PulmV Gillette Angelito:   40.10 cm/s PulmV S/D Angelito:    1.40 PulmV Sys Angelito:    57.80 cm/s  [id number] Lior Hunter  MD Electronically signed on 11/7/2023 at 12:11:27 PM  ** Final **     MR brain w and wo IV contrast    Result Date: 11/6/2023  Interpreted By:  Aba Serrano, STUDY: MR BRAIN W AND WO IV CONTRAST;  11/6/2023 9:58 am   INDICATION: Signs/Symptoms: Esophageal cancer with mets,dizziness.   COMPARISON: Head CT, 11/05/2023   ACCESSION NUMBER(S): [id number]   ORDERING CLINICIAN: TARI LEES   TECHNIQUE: Axial T2, FLAIR, DWI, gradient echo T2 and T1 weighted images of the brain were acquired. Post contrast T1 weighted images were acquired after administration of 14 mL Dotarem gadolinium based intravenous contrast. Image quality is somewhat degraded by motion.   FINDINGS: CSF Spaces: The ventricles, sulci and basal cisterns are within normal limits. No abnormal extra-axial collection   Parenchyma: There is no restricted diffusion to suggest acute infarction.  Nonspecific T2 hyperintense signal in the cerebral white matter, mild for age. No evidence of intracranial hemorrhage. There is no mass effect or midline shift. No abnormal enhancement.   Paranasal Sinuses and Mastoids: Scattered paranasal sinus mucosal thickening. The mastoid air cells are clear.   Orbits: Bilateral lens replacement.       Mildly motion limited exam, no acute intracranial pathology or evidence of intracranial metastatic disease.   MACRO: None   Signed by: Aba Serrano 11/6/2023 12:38 PM Dictation workstation:   [id number]    CT abdomen pelvis w IV contrast    Result Date: 11/5/2023  Interpreted By:  Rosalio Bishop, STUDY: CT ABDOMEN PELVIS W IV CONTRAST;  11/5/2023 4:37 pm   INDICATION: Signs/Symptoms:abd pain.   COMPARISON: None.   ACCESSION NUMBER(S): [id number]   ORDERING CLINICIAN: GLORIA SLOAN   TECHNIQUE: Contiguous axial CT sections are performed from the lung bases to the lesser trochanters following the uneventful administration of 75 cc of intravenous Omnipaque 350.   The study is supplemented with coronal and sagittal  reformatted images.   FINDINGS: The lung bases are clear bilaterally.   There are multilevel discogenic degenerative changes of the lumbar spine with dextroscoliosis. There is multilevel bulging disc and central canal narrowing, more pronounced at L2-3 and L3-4. The visualized osseous structures are intact. There are mild osteoarthritic changes of the hips and sacroiliac joints.   There are multiple hypodense peripherally enhancing masses throughout the liver. The largest is identified centrally in the right hepatic lobe and measures 3.3 x 2.8 cm in diameter. Allowing for differences in sectioning plane and some differences in the phase of contrast enhancement, these findings are not appreciably altered from the previous study of 09/27/2023. The appearance is compatible with metastatic disease. The liver has a slightly lobular contour.   The spleen is mildly enlarged and unchanged. The spleen uniformly enhances. There is no space-occupying mass.   There is some nodularity of the adrenal glands which is unchanged from the previous study.   The gallbladder and pancreas are unremarkable and unchanged.   The kidneys enhance symmetrically and are symmetric in size. There is a too small to characterize hypodensity in the mid right kidney posteriorly measuring 5 mm. There is no renal calculus or hydronephrosis bilaterally. The perinephric fat is preserved. There is no hydroureter or obstructing ureteral calculus. The urinary bladder is not opacified though is partially distended and otherwise unremarkable. There is no bladder calculus.   The abdominal aorta is of normal caliber and enhancement and enhances normally. The IVC is unremarkable.   There is a pathologically enlarged lymph node just anterior to the left of the upper aorta measuring 1.9 x 1.6 cm in diameter. This finding is unchanged from 09/27/2023. A portacaval lymph node has slightly increased in size and measures 2.3 x 1.5 cm. A portacaval lymph node is also  mildly increased in the previous study and measures 2.8 x 2.0 cm in diameter.   There is no bowel distension or infiltration of the bowel mesentery. The appendix has a normal appearance. There are multiple diverticula in the distal colon though no evidence of diverticulitis. There is no free air free fluid collection in the abdomen or pelvis. There is no herniated bowel. The prostate is unremarkable.   A 7 mm sclerotic focus is identified in the right sacrum at the S2 level which could reflect a bone island and is unchanged.       Diffuse hepatic metastatic disease is not significantly changed from 09/27/2023 allowing for technical differences. Retroperitoneal and Mireille portal lymphadenopathy has mildly increased from 09/27/2023.   Mild distal colonic diverticulosis.   Too small to characterize right renal hypodensity.   Mild splenomegaly.   A periaortic lymph node and aortocaval lymph node or mildly increased in size from the previous study   Signed by: Rosalio Bishop 11/5/2023 5:19 PM Dictation workstation:   [id number]    CT head wo IV contrast    Result Date: 11/5/2023  Interpreted By:  Rosalio Bishop, STUDY: CT HEAD WO IV CONTRAST;  11/5/2023 4:37 pm   INDICATION: HA and lightheaded.   COMPARISON: None.   ACCESSION NUMBER(S): [id number]   ORDERING CLINICIAN: GLORIA SLOAN   TECHNIQUE: Contiguous unenhanced axial CT sections are performed from the skull base to the vertex.   FINDINGS: The osseous structures are intact. The visualized portions of the paranasal sinuses and mastoid air cells are clear.   The cortical sulci and CSF spaces are symmetric in appearance. There is no sign of parenchymal hematoma or dense extra-axial fluid collection. There is no localized edema, mass effect, or shift of the midline. The gray matter/white-matter differentiation is preserved.       No CT evidence of acute intracranial abnormality.   Signed by: Rosalio Bishop 11/5/2023 5:08 PM Dictation workstation:    [id number]    XR chest 1 view    Result Date: 11/5/2023  Interpreted By:  Sumit Melgar, STUDY: XR CHEST 1 VIEW  11/5/2023 3:02 pm   INDICATION: Signs/Symptoms:Dizzy   COMPARISON: 05/22/2023   ACCESSION NUMBER(S): [id number]   ORDERING CLINICIAN: GLORIA SLOAN   TECHNIQUE: A single AP portable radiograph of the chest was obtained.   FINDINGS: Multiple cardiac monitoring leads are seen over the chest.  A right internal jugular implanted port catheter is unchanged in position. No focal infiltrate, pleural effusion or pneumothorax is identified. The cardiac silhouette is within normal limits for size.       No focal infiltrate or pneumothorax is identified.   MACRO: None.   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 Pt was admitted to the ICU for symptomatic bradycardia progressing to complete heart block requiring transvenous pacing, and is now pending permanent pacemaker placement.      Neurology   #Dizziness and Lightheadedness (Resolved)  #Nausea (Resolved)  - Likely in the setting of symptomatic bradycardia  - Will control dizziness with management of for symptomatic bradycardia   - Consider antiemetics if persisting nausea      #Neuropathy   - Continue gabapentin at 100mg BID      Cardiology  #Symptomatic Bradycardia   - Patients having recurrent episodes of bradycardia with HR in the range of 35-49  - Associated sinus pauses on telemetry with longest pause being about 5 seconds  - Etiology is unknown at this time, there is no concern for b-blocker or  sherrie. Patient however has been on trastuzumab for an esophageal cancer, which may lead to some cardio toxicity   - s/p transcutaneous pacing with voltage of 120mV and mechanical capture with rate of 80bpm   - s/p TVP 11/8  - For PPM on 11/9      #HTN  - Patient has a hx of HTN, not currently on medications   - Currently normotensive  - Will monitor at this time      Respiratory   No active issues at this time      Gastroenterology   #GI ppx  -Protonix 40 mg BID     Hematology/Oncology   # Metastatic stage IV Esophageal Cancer   - Follows with Heme/Onc Dr. Blake   - Last chemo 10/30 with Trastuzumab FOLFOX   - Lidocaine cream for port use  - Oxycodone 10mg bid at home baseline for Cancer pain management  - Pain regimen:   - Percocet 5-325mg, 2 tabs q6h prn for severe pain   - Dilaudid 0.2mg q3h prn for breakthrough pain   - Lidocaine patch, 2, at sites of transcutaneous pad placement    #Anemia   - Hb is 12.6, uptrending  - Will monitor at this time      #Hx of Portal Vein thrombosis   - Hold Eliquis for PPM tomorrow     Renal   #Hypokalemia   - Monitor and replete      Fluids: PRN bolus   Electrolytes: replete as needed   Nutrition: Regular, NPO after midnight   GI PPx: Protonix 40mg BID  DVT PPx: Eliquis, hold for PPM tomorrow. Heparin drip.  Lines: right sided chemo port, right internal jugular access  Antibiotics: None   Code: Full     Dispo: Patient is admitted for the management of symptomatic bradycardia s/p TVP and we will continue to monitor in the ICU pending PPM, scheduled for 11/9. Estimated LOS >2 days.    Patient was seen and staffed with attending physician Dr. Payton.     Jaylyn Mancuso,   Family Medicine, PGY-2

## 2023-11-08 NOTE — PROGRESS NOTES
11/08/23 1315   Discharge Planning   Living Arrangements Spouse/significant other   Support Systems Spouse/significant other   Assistance Needed Alert and oirented x 3, Indpendent with ADL's, Drives, No DME used   Type of Residence Private residence   Number of Stairs to Enter Residence 3   Number of Stairs Within Residence 10   Do you have animals or pets at home? Yes   Type of Animals or Pets 2 dogs, 2 cats, and [place]   Who is requesting discharge planning? Provider   Home or Post Acute Services None   Patient expects to be discharged to: Home with spouse and no DC needs   Does the patient need discharge transport arranged? No

## 2023-11-08 NOTE — PROGRESS NOTES
Subjective Data:  Patient complains of pain in R side of neck at site insertion of temp pacing wire. Denies any chest pain, chest pressure, palpitations, dizziness, cough, shortness of breath, orthopnea, edema.      Overnight Events:    Reports poor sleep quality.   *See clinical event notes.      Objective Data:  Last Recorded Vitals:  Vitals:    11/08/23 1000 11/08/23 1100 11/08/23 1200 11/08/23 1300   BP: 105/57 106/65 125/76 121/65   BP Location:       Patient Position:       Pulse: 50 50 50 50   Resp: 15 16 16 16   Temp:       TempSrc:       SpO2: 100% 100% 99% 98%   Weight:       Height:           Last Labs:  CBC - 11/8/2023:  5:01 AM  9.5 12.6 132    36.8      CMP - 11/8/2023:  5:01 AM  9.2 7.2 32 --- 1.2   3.8 3.9 29 169      PTT - 1/8/2023:  3:00 PM  1.3   15.6 36     TROPHS   Date/Time Value Ref Range Status   11/05/2023 04:08 PM 6 0 - 20 ng/L Final   11/05/2023 02:56 PM 8 0 - 20 ng/L Final   05/22/2023 09:58 PM 9 0 - 20 ng/L Final     Comment:     .  Less than 99th percentile of normal range cutoff-  Female and children under 18 years old <14 ng/L; Male <21 ng/L: Negative  Repeat testing should be performed if clinically indicated.   .  Female and children under 18 years old 14-50 ng/L; Male 21-50 ng/L:  Consistent with possible cardiac damage and possible increased clinical   risk. Serial measurements may help to assess extent of myocardial damage.   .  >50 ng/L: Consistent with cardiac damage, increased clinical risk and  myocardial infarction. Serial measurements may help assess extent of   myocardial damage.   .   NOTE: Children less than 1 year old may have higher baseline troponin   levels and results should be interpreted in conjunction with the overall   clinical context.   .  NOTE: Troponin I testing is performed using a different   testing methodology at [facility] than at other   [facility] hospitals. Direct result comparisons should only   be made within the same method.      01/31/2023 06:51 PM 84 0 - 20 ng/L Final     Comment:     .  Less than 99th percentile of normal range cutoff-  Female and children under 18 years old <14 ng/L; Male <21 ng/L: Negative  Repeat testing should be performed if clinically indicated.   .  Female and children under 18 years old 14-50 ng/L; Male 21-50 ng/L:  Consistent with possible cardiac damage and possible increased clinical   risk. Serial measurements may help to assess extent of myocardial damage.   .  >50 ng/L: Consistent with cardiac damage, increased clinical risk and  myocardial infarction. Serial measurements may help assess extent of   myocardial damage.   .   NOTE: Children less than 1 year old may have higher baseline troponin   levels and results should be interpreted in conjunction with the overall   clinical context.   .  NOTE: Troponin I testing is performed using a different   testing methodology at [facility] than at other   [facility]. Direct result comparisons should only   be made within the same method.  This is a critical result.    Per Laboratory policy, critical results for this test   only qualify to the call list once per 24 hours.      01/31/2023 02:40 PM 72 0 - 20 ng/L Final     Comment:     .  Less than 99th percentile of normal range cutoff-  Female and children under 18 years old <14 ng/L; Male <21 ng/L: Negative  Repeat testing should be performed if clinically indicated.   .  Female and children under 18 years old 14-50 ng/L; Male 21-50 ng/L:  Consistent with possible cardiac damage and possible increased clinical   risk. Serial measurements may help to assess extent of myocardial damage.   .  >50 ng/L: Consistent with cardiac damage, increased clinical risk and  myocardial infarction. Serial measurements may help assess extent of   myocardial damage.   .   NOTE: Children less than 1 year old may have higher baseline troponin   levels and results should be interpreted in conjunction with the overall    clinical context.   .  NOTE: Troponin I testing is performed using a different   testing methodology at [facility] than at other   [facility] hospitals. Direct result comparisons should only   be made within the same method.  This is a critical result.    Per Laboratory policy, critical results for this test   only qualify to the call list once per 24 hours.        BNP   Date/Time Value Ref Range Status   01/31/2023 12:55 PM 37 0 - 99 pg/mL Final     Comment:     .  <100 pg/mL - Heart failure unlikely  100-299 pg/mL - Intermediate probability of acute heart  .               failure exacerbation. Correlate with clinical  .               context and patient history.    >=300 pg/mL - Heart Failure likely. Correlate with clinical  .               context and patient history.  BNP testing is performed using different testing   methodology at [facility] than at other   [facility] hospitals. Direct result comparisons should   only be made within the same method.     01/08/2023 01:00 PM 32 0 - 99 pg/mL Final     Comment:     .  <100 pg/mL - Heart failure unlikely  100-299 pg/mL - Intermediate probability of acute heart  .               failure exacerbation. Correlate with clinical  .               context and patient history.    >=300 pg/mL - Heart Failure likely. Correlate with clinical  .               context and patient history.  BNP testing is performed using different testing   methodology at [facility] than at other   [facility] hospitals. Direct result comparisons should   only be made within the same method.       HGBA1C   Date/Time Value Ref Range Status   11/05/2023 02:56 PM 4.9 see below % Final      Last I/O:  I/O last 3 completed shifts:  In: 450 (6.4 mL/kg) [P.O.:410; I.V.:40 (0.6 mL/kg)]  Out: 705 (10 mL/kg) [Urine:700 (0.3 mL/kg/hr); Blood:5]  Weight: 70.7 kg     Past Cardiology Tests (Last 3 Years):  EKG:  No results found for this or any previous visit from the past 1095  days.    Echo:  Transthoracic Echo (TTE) Complete 11/7/2023  CONCLUSIONS:   1. Left ventricular systolic function is normal with a 55-60% estimated ejection fraction.   2. Spectral Doppler shows an impaired relaxation pattern of left ventricular diastolic filling.    Ejection Fractions:  EF   Date/Time Value Ref Range Status   11/07/2023 11:31 AM 56       Cath:  No results found for this or any previous visit from the past 1095 days.    Stress Test:  No results found for this or any previous visit from the past 1095 days.    Cardiac Imaging:  No results found for this or any previous visit from the past 1095 days.      Inpatient Medications:  Scheduled medications   Medication Dose Route Frequency    [Held by provider] apixaban  5 mg oral BID    gabapentin  100 mg oral BID    lidocaine  2 patch transdermal Daily    pantoprazole  40 mg oral BID    perflutren lipid microspheres  0.5-10 mL of dilution intravenous Once in imaging    perflutren protein A microsphere  0.5 mL intravenous Once in imaging    sodium chloride 0.9%  10 mL intra-catheter q12h    sodium chloride 0.9%  10 mL intra-catheter q12h    sucralfate  1 g oral Daily    sulfur hexafluoride microsphr  2 mL intravenous Once in imaging     PRN medications   Medication    alteplase    HYDROmorphone    oxyCODONE-acetaminophen    polyethylene glycol    sodium chloride    sodium chloride 0.9%     Continuous Medications   Medication Dose Last Rate    heparin  0-4,000 Units/hr 900 Units/hr (11/08/23 1218)       Physical Exam:  Physical Exam  Constitutional:       Appearance: Normal appearance.   HENT:      Head: Normocephalic.      Mouth/Throat:      Mouth: Mucous membranes are moist.   Eyes:      Conjunctiva/sclera: Conjunctivae normal.   Neck:      Comments: Temp pacing wire insertion site at internal jugular c/d/I.  Cardiovascular:      Rate and Rhythm: Normal rate and regular rhythm.      Heart sounds: No murmur heard.     No friction rub. No gallop.    Pulmonary:      Effort: Pulmonary effort is normal.      Breath sounds: Normal breath sounds.   Abdominal:      Palpations: Abdomen is soft.   Musculoskeletal:         General: No swelling. Normal range of motion.   Skin:     General: Skin is warm and dry.   Neurological:      General: No focal deficit present.      Mental Status: He is alert and oriented to person, place, and time. Mental status is at baseline.   Psychiatric:         Mood and Affect: Mood normal.         Behavior: Behavior normal.            Assessment/Plan     Massimo Garcia is a 63 yo male with a PMH of metastatic esophageal cancer, portal vein thrombosis who was admitted w/ complaint of near syncope, bradycardia c/f CHB. Yesterday afternoon patient with progressive episodes of complete heart block, symptomatic. Required transfer to ICU transcutaneous pacing.      #Symptomatic Bradycardia  #Complete heart block  -EP consulted, Plan for PPM Thursday  -Temp pacing 100% @ 50 bpm  -NPO @ midnight.   -Echo recommended, reviewed with normal LVSF, diastolic dysfunction  -Will follow     Pacer Wires (Active)   Site Assessment Clean;Dry;Intact 11/08/23 0800   Dressing Status Clean;Dry 11/08/23 0800   Number of days: 1       Venous Sheath Other (Comment) Right Other (Comment) (Active)   Site Assessment Clean;Dry;Intact 11/08/23 0800   Number of days: 1       Implantable Port 01/24/23 Right Chest Single lumen port (Active)   Line Necessity Intravenous fluid therapy 11/08/23 0800   Site Assessment Clean;Dry;Intact 11/08/23 0800   Dressing Status Clean;Dry 11/08/23 0800   Number of days: 288       Code Status:  Full Code        Corazon Marx PA-C

## 2023-11-09 ENCOUNTER — APPOINTMENT (OUTPATIENT)
Dept: RADIOLOGY | Facility: HOSPITAL | Age: 63
DRG: 243 | End: 2023-11-09
Payer: MEDICARE

## 2023-11-09 ENCOUNTER — TELEPHONE (OUTPATIENT)
Dept: HEMATOLOGY/ONCOLOGY | Facility: CLINIC | Age: 63
End: 2023-11-09
Payer: COMMERCIAL

## 2023-11-09 DIAGNOSIS — C15.9 STAGE IV MALIGNANT NEOPLASM OF ESOPHAGUS (MULTI): ICD-10-CM

## 2023-11-09 LAB
ALBUMIN SERPL BCP-MCNC: 3.7 G/DL (ref 3.4–5)
ANION GAP SERPL CALC-SCNC: 14 MMOL/L (ref 10–20)
BUN SERPL-MCNC: 19 MG/DL (ref 6–23)
CALCIUM SERPL-MCNC: 8.9 MG/DL (ref 8.6–10.3)
CARDIAC TROPONIN I PNL SERPL HS: 61 NG/L (ref 0–20)
CHLORIDE SERPL-SCNC: 106 MMOL/L (ref 98–107)
CO2 SERPL-SCNC: 24 MMOL/L (ref 21–32)
CREAT SERPL-MCNC: 0.76 MG/DL (ref 0.5–1.3)
ERYTHROCYTE [DISTWIDTH] IN BLOOD BY AUTOMATED COUNT: 15.9 % (ref 11.5–14.5)
GFR SERPL CREATININE-BSD FRML MDRD: >90 ML/MIN/1.73M*2
GLUCOSE SERPL-MCNC: 84 MG/DL (ref 74–99)
HCT VFR BLD AUTO: 34.8 % (ref 41–52)
HGB BLD-MCNC: 12 G/DL (ref 13.5–17.5)
MAGNESIUM SERPL-MCNC: 1.67 MG/DL (ref 1.6–2.4)
MCH RBC QN AUTO: 30 PG (ref 26–34)
MCHC RBC AUTO-ENTMCNC: 34.5 G/DL (ref 32–36)
MCV RBC AUTO: 87 FL (ref 80–100)
NRBC BLD-RTO: 0 /100 WBCS (ref 0–0)
PHOSPHATE SERPL-MCNC: 3.2 MG/DL (ref 2.5–4.9)
PLATELET # BLD AUTO: 109 X10*3/UL (ref 150–450)
POTASSIUM SERPL-SCNC: 3.5 MMOL/L (ref 3.5–5.3)
RBC # BLD AUTO: 4 X10*6/UL (ref 4.5–5.9)
SODIUM SERPL-SCNC: 140 MMOL/L (ref 136–145)
UFH PPP CHRO-ACNC: 0.5 IU/ML
WBC # BLD AUTO: 8.3 X10*3/UL (ref 4.4–11.3)

## 2023-11-09 PROCEDURE — 37799 UNLISTED PX VASCULAR SURGERY: CPT | Performed by: INTERNAL MEDICINE

## 2023-11-09 PROCEDURE — 82565 ASSAY OF CREATININE: CPT | Performed by: INTERNAL MEDICINE

## 2023-11-09 PROCEDURE — 2500000001 HC RX 250 WO HCPCS SELF ADMINISTERED DRUGS (ALT 637 FOR MEDICARE OP)

## 2023-11-09 PROCEDURE — 85520 HEPARIN ASSAY: CPT | Performed by: INTERNAL MEDICINE

## 2023-11-09 PROCEDURE — 37799 UNLISTED PX VASCULAR SURGERY: CPT

## 2023-11-09 PROCEDURE — 2500000004 HC RX 250 GENERAL PHARMACY W/ HCPCS (ALT 636 FOR OP/ED): Performed by: INTERNAL MEDICINE

## 2023-11-09 PROCEDURE — 71045 X-RAY EXAM CHEST 1 VIEW: CPT | Mod: FY

## 2023-11-09 PROCEDURE — C1785 PMKR, DUAL, RATE-RESP: HCPCS | Performed by: STUDENT IN AN ORGANIZED HEALTH CARE EDUCATION/TRAINING PROGRAM

## 2023-11-09 PROCEDURE — 99152 MOD SED SAME PHYS/QHP 5/>YRS: CPT | Performed by: STUDENT IN AN ORGANIZED HEALTH CARE EDUCATION/TRAINING PROGRAM

## 2023-11-09 PROCEDURE — 33234 REMOVAL OF PACEMAKER SYSTEM: CPT | Performed by: STUDENT IN AN ORGANIZED HEALTH CARE EDUCATION/TRAINING PROGRAM

## 2023-11-09 PROCEDURE — 99291 CRITICAL CARE FIRST HOUR: CPT | Performed by: INTERNAL MEDICINE

## 2023-11-09 PROCEDURE — C1898 LEAD, PMKR, OTHER THAN TRANS: HCPCS | Performed by: STUDENT IN AN ORGANIZED HEALTH CARE EDUCATION/TRAINING PROGRAM

## 2023-11-09 PROCEDURE — 2500000004 HC RX 250 GENERAL PHARMACY W/ HCPCS (ALT 636 FOR OP/ED)

## 2023-11-09 PROCEDURE — 2500000005 HC RX 250 GENERAL PHARMACY W/O HCPCS

## 2023-11-09 PROCEDURE — 93010 ELECTROCARDIOGRAM REPORT: CPT | Performed by: INTERNAL MEDICINE

## 2023-11-09 PROCEDURE — 02H63JZ INSERTION OF PACEMAKER LEAD INTO RIGHT ATRIUM, PERCUTANEOUS APPROACH: ICD-10-PCS | Performed by: STUDENT IN AN ORGANIZED HEALTH CARE EDUCATION/TRAINING PROGRAM

## 2023-11-09 PROCEDURE — 84484 ASSAY OF TROPONIN QUANT: CPT

## 2023-11-09 PROCEDURE — 85027 COMPLETE CBC AUTOMATED: CPT | Performed by: INTERNAL MEDICINE

## 2023-11-09 PROCEDURE — 02HK3JZ INSERTION OF PACEMAKER LEAD INTO RIGHT VENTRICLE, PERCUTANEOUS APPROACH: ICD-10-PCS | Performed by: STUDENT IN AN ORGANIZED HEALTH CARE EDUCATION/TRAINING PROGRAM

## 2023-11-09 PROCEDURE — 33208 INSRT HEART PM ATRIAL & VENT: CPT | Performed by: STUDENT IN AN ORGANIZED HEALTH CARE EDUCATION/TRAINING PROGRAM

## 2023-11-09 PROCEDURE — 99233 SBSQ HOSP IP/OBS HIGH 50: CPT | Performed by: INTERNAL MEDICINE

## 2023-11-09 PROCEDURE — 99232 SBSQ HOSP IP/OBS MODERATE 35: CPT | Performed by: PHYSICIAN ASSISTANT

## 2023-11-09 PROCEDURE — 99153 MOD SED SAME PHYS/QHP EA: CPT | Performed by: STUDENT IN AN ORGANIZED HEALTH CARE EDUCATION/TRAINING PROGRAM

## 2023-11-09 PROCEDURE — 0JH606Z INSERTION OF PACEMAKER, DUAL CHAMBER INTO CHEST SUBCUTANEOUS TISSUE AND FASCIA, OPEN APPROACH: ICD-10-PCS | Performed by: STUDENT IN AN ORGANIZED HEALTH CARE EDUCATION/TRAINING PROGRAM

## 2023-11-09 PROCEDURE — 83735 ASSAY OF MAGNESIUM: CPT | Performed by: INTERNAL MEDICINE

## 2023-11-09 PROCEDURE — 2780000003 HC OR 278 NO HCPCS: Performed by: STUDENT IN AN ORGANIZED HEALTH CARE EDUCATION/TRAINING PROGRAM

## 2023-11-09 PROCEDURE — 2500000005 HC RX 250 GENERAL PHARMACY W/O HCPCS: Performed by: STUDENT IN AN ORGANIZED HEALTH CARE EDUCATION/TRAINING PROGRAM

## 2023-11-09 PROCEDURE — 2750000001 HC OR 275 NO HCPCS: Performed by: STUDENT IN AN ORGANIZED HEALTH CARE EDUCATION/TRAINING PROGRAM

## 2023-11-09 PROCEDURE — C1892 INTRO/SHEATH,FIXED,PEEL-AWAY: HCPCS | Performed by: STUDENT IN AN ORGANIZED HEALTH CARE EDUCATION/TRAINING PROGRAM

## 2023-11-09 PROCEDURE — 2720000007 HC OR 272 NO HCPCS: Performed by: STUDENT IN AN ORGANIZED HEALTH CARE EDUCATION/TRAINING PROGRAM

## 2023-11-09 PROCEDURE — 2500000001 HC RX 250 WO HCPCS SELF ADMINISTERED DRUGS (ALT 637 FOR MEDICARE OP): Performed by: INTERNAL MEDICINE

## 2023-11-09 PROCEDURE — 2020000001 HC ICU ROOM DAILY

## 2023-11-09 PROCEDURE — 2500000004 HC RX 250 GENERAL PHARMACY W/ HCPCS (ALT 636 FOR OP/ED): Performed by: STUDENT IN AN ORGANIZED HEALTH CARE EDUCATION/TRAINING PROGRAM

## 2023-11-09 PROCEDURE — 71045 X-RAY EXAM CHEST 1 VIEW: CPT | Performed by: RADIOLOGY

## 2023-11-09 DEVICE — LEAD 5076-52 CAPSUREFIX NOVUS US EN
Type: IMPLANTABLE DEVICE | Site: CHEST | Status: FUNCTIONAL
Brand: CAPSUREFIX® NOVUS

## 2023-11-09 DEVICE — LEAD 5076-58 CAPSUREFIX NOVUS US EN
Type: IMPLANTABLE DEVICE | Site: CHEST | Status: FUNCTIONAL
Brand: CAPSUREFIX® NOVUS

## 2023-11-09 DEVICE — IPG W1DR01 AZURE XT DR MRI WL USA BCP
Type: IMPLANTABLE DEVICE | Site: CHEST | Status: FUNCTIONAL
Brand: AZURE™ XT DR MRI SURESCAN™

## 2023-11-09 RX ORDER — NITROGLYCERIN 0.4 MG/1
0.4 TABLET SUBLINGUAL EVERY 5 MIN PRN
Status: DISCONTINUED | OUTPATIENT
Start: 2023-11-09 | End: 2023-11-13 | Stop reason: HOSPADM

## 2023-11-09 RX ORDER — SODIUM CHLORIDE 9 MG/ML
INJECTION, SOLUTION INTRAVENOUS CONTINUOUS PRN
Status: COMPLETED | OUTPATIENT
Start: 2023-11-09 | End: 2023-11-09

## 2023-11-09 RX ORDER — LIDOCAINE HYDROCHLORIDE 20 MG/ML
INJECTION, SOLUTION INFILTRATION; PERINEURAL AS NEEDED
Status: DISCONTINUED | OUTPATIENT
Start: 2023-11-09 | End: 2023-11-09 | Stop reason: HOSPADM

## 2023-11-09 RX ORDER — FENTANYL CITRATE 50 UG/ML
INJECTION, SOLUTION INTRAMUSCULAR; INTRAVENOUS AS NEEDED
Status: DISCONTINUED | OUTPATIENT
Start: 2023-11-09 | End: 2023-11-09 | Stop reason: HOSPADM

## 2023-11-09 RX ORDER — OXYCODONE AND ACETAMINOPHEN 5; 325 MG/1; MG/1
1 TABLET ORAL EVERY 6 HOURS
Status: DISCONTINUED | OUTPATIENT
Start: 2023-11-09 | End: 2023-11-10

## 2023-11-09 RX ORDER — CEFAZOLIN SODIUM 2 G/100ML
INJECTION, SOLUTION INTRAVENOUS CONTINUOUS PRN
Status: COMPLETED | OUTPATIENT
Start: 2023-11-09 | End: 2023-11-09

## 2023-11-09 RX ORDER — MIDAZOLAM HYDROCHLORIDE 1 MG/ML
INJECTION, SOLUTION INTRAMUSCULAR; INTRAVENOUS AS NEEDED
Status: DISCONTINUED | OUTPATIENT
Start: 2023-11-09 | End: 2023-11-09 | Stop reason: HOSPADM

## 2023-11-09 RX ORDER — HYDROMORPHONE HYDROCHLORIDE 1 MG/ML
0.2 INJECTION, SOLUTION INTRAMUSCULAR; INTRAVENOUS; SUBCUTANEOUS
Status: DISCONTINUED | OUTPATIENT
Start: 2023-11-09 | End: 2023-11-13 | Stop reason: HOSPADM

## 2023-11-09 RX ORDER — NITROGLYCERIN 0.4 MG/1
TABLET SUBLINGUAL
Status: COMPLETED
Start: 2023-11-09 | End: 2023-11-09

## 2023-11-09 RX ADMIN — Medication 10 ML: at 09:49

## 2023-11-09 RX ADMIN — OXYCODONE HYDROCHLORIDE AND ACETAMINOPHEN 1 TABLET: 5; 325 TABLET ORAL at 18:06

## 2023-11-09 RX ADMIN — HYDROMORPHONE HYDROCHLORIDE 0.2 MG: 1 INJECTION, SOLUTION INTRAMUSCULAR; INTRAVENOUS; SUBCUTANEOUS at 05:11

## 2023-11-09 RX ADMIN — Medication 10 ML: at 08:28

## 2023-11-09 RX ADMIN — NITROGLYCERIN 0.4 MG: 0.4 TABLET SUBLINGUAL at 17:52

## 2023-11-09 RX ADMIN — LIDOCAINE 2 PATCH: 4 PATCH TOPICAL at 08:25

## 2023-11-09 RX ADMIN — PANTOPRAZOLE SODIUM 40 MG: 40 TABLET, DELAYED RELEASE ORAL at 20:20

## 2023-11-09 RX ADMIN — GABAPENTIN 100 MG: 100 CAPSULE ORAL at 08:25

## 2023-11-09 RX ADMIN — HYDROMORPHONE HYDROCHLORIDE 0.2 MG: 1 INJECTION, SOLUTION INTRAMUSCULAR; INTRAVENOUS; SUBCUTANEOUS at 17:47

## 2023-11-09 RX ADMIN — Medication 10 ML: at 20:23

## 2023-11-09 RX ADMIN — PANTOPRAZOLE SODIUM 40 MG: 40 TABLET, DELAYED RELEASE ORAL at 08:25

## 2023-11-09 RX ADMIN — NITROGLYCERIN 0.4 MG: 0.4 TABLET SUBLINGUAL at 17:58

## 2023-11-09 RX ADMIN — GABAPENTIN 100 MG: 100 CAPSULE ORAL at 20:21

## 2023-11-09 RX ADMIN — HYDROMORPHONE HYDROCHLORIDE 0.2 MG: 1 INJECTION, SOLUTION INTRAMUSCULAR; INTRAVENOUS; SUBCUTANEOUS at 21:58

## 2023-11-09 RX ADMIN — SUCRALFATE 1 G: 1 TABLET ORAL at 08:25

## 2023-11-09 ASSESSMENT — PAIN SCALES - GENERAL
PAINLEVEL_OUTOF10: 4
PAINLEVEL_OUTOF10: 10 - WORST POSSIBLE PAIN
PAINLEVEL_OUTOF10: 6
PAINLEVEL_OUTOF10: 8

## 2023-11-09 ASSESSMENT — PAIN DESCRIPTION - LOCATION: LOCATION: CHEST

## 2023-11-09 ASSESSMENT — PAIN - FUNCTIONAL ASSESSMENT
PAIN_FUNCTIONAL_ASSESSMENT: 0-10
PAIN_FUNCTIONAL_ASSESSMENT: 0-10

## 2023-11-09 NOTE — PROGRESS NOTES
"Massimo Garcia is a 62 y.o. male on day 3 of admission presenting with Third degree heart block (CMS/HCC).    Subjective   Pt reports that his pain is adequately controlled on his current regimen. Rates his pain as 3-4/10. Patient denies any dizziness, nausea, vomiting, or diarrhea.     Objective     Physical Exam  HENT:      Head: Normocephalic and atraumatic.      Nose: Nose normal.   Eyes:      Pupils: Pupils are equal, round, and reactive to light.   Cardiovascular:      Rate and Rhythm: Normal rate and regular rhythm.   Pulmonary:      Effort: Pulmonary effort is normal.      Breath sounds: Normal breath sounds. No wheezing.   Abdominal:      General: Abdomen is flat. Bowel sounds are normal.      Palpations: Abdomen is soft.   Skin:     General: Skin is warm and dry.   Neurological:      General: No focal deficit present.      Mental Status: He is alert and oriented to person, place, and time.   Psychiatric:         Mood and Affect: Mood normal.         Behavior: Behavior normal.     Last Recorded Vitals  Blood pressure 114/58, pulse 50, temperature 37.1 °C (98.8 °F), resp. rate 16, height 1.727 m (5' 7.99\"), weight 71.6 kg (157 lb 13.6 oz), SpO2 96 %.  Intake/Output last 3 Shifts:  I/O last 3 completed shifts:  In: 320.9 (4.5 mL/kg) [P.O.:170; I.V.:150.9 (2.1 mL/kg)]  Out: 1075 (15 mL/kg) [Urine:1075 (0.4 mL/kg/hr)]  Weight: 71.6 kg     Relevant Results  Scheduled medications  [Held by provider] apixaban, 5 mg, oral, BID  gabapentin, 100 mg, oral, BID  lidocaine, 2 patch, transdermal, Daily  pantoprazole, 40 mg, oral, BID  perflutren lipid microspheres, 0.5-10 mL of dilution, intravenous, Once in imaging  perflutren protein A microsphere, 0.5 mL, intravenous, Once in imaging  sodium chloride 0.9%, 10 mL, intra-catheter, q12h  sodium chloride 0.9%, 10 mL, intra-catheter, q12h  sucralfate, 1 g, oral, Daily  sulfur hexafluoride microsphr, 2 mL, intravenous, Once in imaging      Continuous medications  [Held by " provider] heparin, 0-4,000 Units/hr, Last Rate: Stopped (11/09/23 0955)      PRN medications  PRN medications: alteplase, HYDROmorphone, oxyCODONE-acetaminophen, polyethylene glycol, sodium chloride, sodium chloride 0.9%  Results for orders placed or performed during the hospital encounter of 11/05/23 (from the past 24 hour(s))   Heparin Assay, UFH   Result Value Ref Range    Heparin Unfractionated 1.4 (HH) See Comment Below for Therapeutic Ranges IU/mL   Heparin Assay, UFH   Result Value Ref Range    Heparin Unfractionated 1.1 (HH) See Comment Below for Therapeutic Ranges IU/mL   Heparin Assay, UFH   Result Value Ref Range    Heparin Unfractionated 1.1 (HH) See Comment Below for Therapeutic Ranges IU/mL   Heparin Assay, UFH   Result Value Ref Range    Heparin Unfractionated 0.5 See Comment Below for Therapeutic Ranges IU/mL   CBC   Result Value Ref Range    WBC 8.3 4.4 - 11.3 x10*3/uL    nRBC 0.0 0.0 - 0.0 /100 WBCs    RBC 4.00 (L) 4.50 - 5.90 x10*6/uL    Hemoglobin 12.0 (L) 13.5 - 17.5 g/dL    Hematocrit 34.8 (L) 41.0 - 52.0 %    MCV 87 80 - 100 fL    MCH 30.0 26.0 - 34.0 pg    MCHC 34.5 32.0 - 36.0 g/dL    RDW 15.9 (H) 11.5 - 14.5 %    Platelets 109 (L) 150 - 450 x10*3/uL   Renal Function Panel   Result Value Ref Range    Glucose 84 74 - 99 mg/dL    Sodium 140 136 - 145 mmol/L    Potassium 3.5 3.5 - 5.3 mmol/L    Chloride 106 98 - 107 mmol/L    Bicarbonate 24 21 - 32 mmol/L    Anion Gap 14 10 - 20 mmol/L    Urea Nitrogen 19 6 - 23 mg/dL    Creatinine 0.76 0.50 - 1.30 mg/dL    eGFR >90 >60 mL/min/1.73m*2    Calcium 8.9 8.6 - 10.3 mg/dL    Phosphorus 3.2 2.5 - 4.9 mg/dL    Albumin 3.7 3.4 - 5.0 g/dL   Magnesium   Result Value Ref Range    Magnesium 1.67 1.60 - 2.40 mg/dL     XR chest 1 view    Result Date: 11/8/2023  Interpreted By:  Piyush Isidro, STUDY: XR CHEST 1 VIEW;  11/8/2023 6:20 am   INDICATION: Signs/Symptoms:TVP location.   COMPARISON: 11/05/2023 portable AP chest x-ray and the 04/19/2023 enhanced  thoracic CT.   ACCESSION NUMBER(S): [id number]   ORDERING CLINICIAN: RICH WREN   FINDINGS: Portable AP semi-erect chest x-ray 11/08/2023:   Port projected over the lateral right upper lobe with its catheter tip over the SVC. A new right supraclavicular catheter has its tip at or near the SVC origin. A lead extends inferiorly from this with its tip projected over the base of the heart, most likely in the inferior right ventricle.   CARDIOMEDIASTINAL SILHOUETTE: Cardiomediastinal silhouette is normal in size and configuration. Coronary artery calcification demonstrated on CT is difficult to appreciate.   LUNGS: New 10 mm left and 5 mm right lower lobe nodules andtiny bilateral lung nodules reported on the 09/27/2023 abdominal and 04/19/2023 thoracic CTs respectively can not be identified, but would probably go undetected on this portable chest x-ray. Normal pulmonary inflation without evidence of an infiltrate. Sharp lateral costophrenic angles. No pneumothorax is apparent.   ABDOMEN: No remarkable upper abdominal findings.   BONES: No acute osseous changes. Bilateral total shoulder prostheses, minimal scoliosis and tiny to moderate size spinal osteophytes are noted.       1.  No acute disease. Lung metastases demonstrated on prior studies can not be appreciated.       MACRO: None   Signed by: Piyush Isidro 11/8/2023 9:49 AM Dictation workstation:   [id number]    Transthoracic Echo (TTE) Complete    Result Date: 11/7/2023   [facility], [address] [place] [address]               Tel [phone] and Fax [phone] TRANSTHORACIC ECHOCARDIOGRAM REPORT  Patient Name:      GETACHEW Lira Physician:    [id number] Lior Hunter MD Study Date:        11/7/2023            Ordering Provider:    [id number] DEVON CHEUNG MRN/PID:           [id number]              Fellow: Accession#:        [id number]         Nurse: Date of Birth/Age: 1960 / 62      Sonographer:          Ceci Hui RDCS                    years Gender:            M                    Additional Staff: Height:            170.18 cm            Admit Date:           11/5/2023 Weight:            69.85 kg             Admission Status:     Inpatient -                                                               Routine BSA:               1.81 m2              Encounter#:           [id number]                                         Department Location:  [facility]                                                               Invasive Blood Pressure: 154 /77 mmHg Study Type:    TRANSTHORACIC ECHO (TTE) COMPLETE Diagnosis/ICD: Dizziness and giddiness-R42 Indication:    Dizziness CPT Code:      Echo Complete w Full [id number] Patient History: Pertinent History: Cancer and PE. Chemotherapy. Study Detail: The following Echo studies were performed: 2D, M-Mode, Doppler and               color flow. Technically challenging study due to prominent lung               artifact. The patient was awake.  PHYSICIAN INTERPRETATION: Left Ventricle: The left ventricular systolic function is normal, with an estimated ejection fraction of 55-60%. The left ventricular cavity size is normal. Spectral Doppler shows an impaired relaxation pattern of left ventricular diastolic filling. Left Atrium: The left atrium is normal in size. Right Ventricle: The right ventricle is normal in size. There is normal right ventricular global systolic function. Right Atrium: The right atrium is normal in size. Aortic Valve: The aortic valve was not well visualized. There is no evidence of aortic valve stenosis. There is trivial aortic valve regurgitation. The peak instantaneous gradient of the aortic valve is 6.1 mmHg. The mean gradient of the aortic valve is 4.0 mmHg. Mitral Valve: The mitral valve is mildly thickened. There is mild  mitral valve regurgitation. Tricuspid Valve: The tricuspid valve is structurally normal. There is trace tricuspid regurgitation. The right ventricular systolic pressure is unable to be estimated. Pulmonic Valve: The pulmonic valve is not well visualized. There is physiologic pulmonic valve regurgitation. Pericardium: There is no pericardial effusion noted. Aorta: The aortic root is normal. Systemic Veins: The inferior vena cava appears to be of normal size. There is IVC inspiratory collapse greater than 50%. In comparison to the previous echocardiogram(s): Compared with study from 8/31/2023, no significant change.  CONCLUSIONS:  1. Left ventricular systolic function is normal with a 55-60% estimated ejection fraction.  2. Spectral Doppler shows an impaired relaxation pattern of left ventricular diastolic filling. QUANTITATIVE DATA SUMMARY: 2D MEASUREMENTS:                          Normal Ranges: Ao Root d:     3.70 cm   (2.0-3.7cm) IVSd:          1.03 cm   (0.6-1.1cm) LVPWd:         0.98 cm   (0.6-1.1cm) LVIDd:         4.52 cm   (3.9-5.9cm) LVIDs:         3.16 cm LV Mass Index: 86.0 g/m2 LV % FS        30.1 % LA VOLUME:                               Normal Ranges: LA Vol A4C:        54.1 ml    (22+/-6mL/m2) LA Vol A2C:        53.9 ml LA Vol BP:         54.4 ml LA Vol Index A4C:  29.9ml/m2 LA Vol Index A2C:  29.8 ml/m2 LA Vol Index BP:   30.1 ml/m2 LA Area A4C:       19.3 cm2 LA Area A2C:       19.4 cm2 LA Major Axis A4C: 5.8 cm LA Major Axis A2C: 5.9 cm LA Volume Index:   27.6 ml/m2 LA Vol A4C:        48.0 ml LA Vol A2C:        51.0 ml RA VOLUME BY A/L METHOD:                       Normal Ranges: RA Area A4C: 16.0 cm2 M-MODE MEASUREMENTS:                  Normal Ranges: Ao Root: 4.00 cm (2.0-3.7cm) LAs:     3.00 cm (2.7-4.0cm) AORTA MEASUREMENTS:                      Normal Ranges: Ao Sinus, d: 3.70 cm (2.1-3.5cm) LV SYSTOLIC FUNCTION BY 2D PLANIMETRY (MOD):                     Normal Ranges: EF-A4C View: 56.1 %  (>=55%) EF-A2C View: 56.1 % EF-Biplane:  56.1 % LV DIASTOLIC FUNCTION:                               Normal Ranges: MV Peak E:        0.54 m/s    (0.7-1.2 m/s) MV Peak A:        0.78 m/s    (0.42-0.7 m/s) E/A Ratio:        0.70        (1.0-2.2) MV e'             0.10 m/s    (>8.0) MV lateral e'     0.10 m/s MV medial e'      0.08 m/s MV A Dur:         121.00 msec E/e' Ratio:       5.44        (<8.0) PulmV Sys Angelito:    57.80 cm/s PulmV Gillette Angelito:   40.10 cm/s PulmV S/D Angelito:    1.40 PulmV A Revs Angelito: 32.00 cm/s PulmV A Revs Dur: 135.00 msec MITRAL VALVE:                Normal Ranges: MV DT: 92 msec (150-240msec) AORTIC VALVE:                                   Normal Ranges: AoV Vmax:                1.23 m/s (<=1.7m/s) AoV Peak P.1 mmHg (<20mmHg) AoV Mean P.0 mmHg (1.7-11.5mmHg) LVOT Max Angelito:            1.00 m/s (<=1.1m/s) AoV VTI:                 22.50 cm (18-25cm) LVOT VTI:                17.80 cm LVOT Diameter:           2.10 cm  (1.8-2.4cm) AoV Area, VTI:           2.74 cm2 (2.5-5.5cm2) AoV Area,Vmax:           2.82 cm2 (2.5-4.5cm2) AoV Dimensionless Index: 0.79  RIGHT VENTRICLE: RV Basal 3.71 cm RV Mid   2.74 cm RV Major 8.1 cm TAPSE:   22.0 mm RV s'    0.11 m/s TRICUSPID VALVE/RVSP:                             Normal Ranges: Peak TR Velocity: 1.97 m/s RV Syst Pressure: 18.5 mmHg (< 30mmHg) IVC Diam:         1.56 cm PULMONIC VALVE:                      Normal Ranges: PV Max Angelito: 0.8 m/s  (0.6-0.9m/s) PV Max P.7 mmHg Pulmonary Veins: PulmV A Revs Dur: 135.00 msec PulmV A Revs Angelito: 32.00 cm/s PulmV Gillette Angelito:   40.10 cm/s PulmV S/D Angelito:    1.40 PulmV Sys Angelito:    57.80 cm/s  [id number] Lior Hunter MD Electronically signed on 2023 at 12:11:27 PM  ** Final **     MR brain w and wo IV contrast    Result Date: 2023  Interpreted By:  Aba Serrano, STUDY: MR BRAIN W AND WO IV CONTRAST;  2023 9:58 am   INDICATION: Signs/Symptoms: Esophageal cancer with mets,dizziness.    COMPARISON: Head CT, 11/05/2023   ACCESSION NUMBER(S): [id number]   ORDERING CLINICIAN: TARI LEES   TECHNIQUE: Axial T2, FLAIR, DWI, gradient echo T2 and T1 weighted images of the brain were acquired. Post contrast T1 weighted images were acquired after administration of 14 mL Dotarem gadolinium based intravenous contrast. Image quality is somewhat degraded by motion.   FINDINGS: CSF Spaces: The ventricles, sulci and basal cisterns are within normal limits. No abnormal extra-axial collection   Parenchyma: There is no restricted diffusion to suggest acute infarction.  Nonspecific T2 hyperintense signal in the cerebral white matter, mild for age. No evidence of intracranial hemorrhage. There is no mass effect or midline shift. No abnormal enhancement.   Paranasal Sinuses and Mastoids: Scattered paranasal sinus mucosal thickening. The mastoid air cells are clear.   Orbits: Bilateral lens replacement.       Mildly motion limited exam, no acute intracranial pathology or evidence of intracranial metastatic disease.   MACRO: None   Signed by: Aba Serrano 11/6/2023 12:38 PM Dictation workstation:   [id number]    CT abdomen pelvis w IV contrast    Result Date: 11/5/2023  Interpreted By:  Rosalio Bishop, STUDY: CT ABDOMEN PELVIS W IV CONTRAST;  11/5/2023 4:37 pm   INDICATION: Signs/Symptoms:abd pain.   COMPARISON: None.   ACCESSION NUMBER(S): [id number]   ORDERING CLINICIAN: GLORIA SLOAN   TECHNIQUE: Contiguous axial CT sections are performed from the lung bases to the lesser trochanters following the uneventful administration of 75 cc of intravenous Omnipaque 350.   The study is supplemented with coronal and sagittal reformatted images.   FINDINGS: The lung bases are clear bilaterally.   There are multilevel discogenic degenerative changes of the lumbar spine with dextroscoliosis. There is multilevel bulging disc and central canal narrowing, more pronounced at L2-3 and L3-4. The visualized osseous  structures are intact. There are mild osteoarthritic changes of the hips and sacroiliac joints.   There are multiple hypodense peripherally enhancing masses throughout the liver. The largest is identified centrally in the right hepatic lobe and measures 3.3 x 2.8 cm in diameter. Allowing for differences in sectioning plane and some differences in the phase of contrast enhancement, these findings are not appreciably altered from the previous study of 09/27/2023. The appearance is compatible with metastatic disease. The liver has a slightly lobular contour.   The spleen is mildly enlarged and unchanged. The spleen uniformly enhances. There is no space-occupying mass.   There is some nodularity of the adrenal glands which is unchanged from the previous study.   The gallbladder and pancreas are unremarkable and unchanged.   The kidneys enhance symmetrically and are symmetric in size. There is a too small to characterize hypodensity in the mid right kidney posteriorly measuring 5 mm. There is no renal calculus or hydronephrosis bilaterally. The perinephric fat is preserved. There is no hydroureter or obstructing ureteral calculus. The urinary bladder is not opacified though is partially distended and otherwise unremarkable. There is no bladder calculus.   The abdominal aorta is of normal caliber and enhancement and enhances normally. The IVC is unremarkable.   There is a pathologically enlarged lymph node just anterior to the left of the upper aorta measuring 1.9 x 1.6 cm in diameter. This finding is unchanged from 09/27/2023. A portacaval lymph node has slightly increased in size and measures 2.3 x 1.5 cm. A portacaval lymph node is also mildly increased in the previous study and measures 2.8 x 2.0 cm in diameter.   There is no bowel distension or infiltration of the bowel mesentery. The appendix has a normal appearance. There are multiple diverticula in the distal colon though no evidence of diverticulitis. There is no  free air free fluid collection in the abdomen or pelvis. There is no herniated bowel. The prostate is unremarkable.   A 7 mm sclerotic focus is identified in the right sacrum at the S2 level which could reflect a bone island and is unchanged.       Diffuse hepatic metastatic disease is not significantly changed from 09/27/2023 allowing for technical differences. Retroperitoneal and Mireille portal lymphadenopathy has mildly increased from 09/27/2023.   Mild distal colonic diverticulosis.   Too small to characterize right renal hypodensity.   Mild splenomegaly.   A periaortic lymph node and aortocaval lymph node or mildly increased in size from the previous study   Signed by: Rosalio Bishop 11/5/2023 5:19 PM Dictation workstation:   [id number]    CT head wo IV contrast    Result Date: 11/5/2023  Interpreted By:  Rosalio Bishop, STUDY: CT HEAD WO IV CONTRAST;  11/5/2023 4:37 pm   INDICATION: HA and lightheaded.   COMPARISON: None.   ACCESSION NUMBER(S): [id number]   ORDERING CLINICIAN: GLORIA SLOAN   TECHNIQUE: Contiguous unenhanced axial CT sections are performed from the skull base to the vertex.   FINDINGS: The osseous structures are intact. The visualized portions of the paranasal sinuses and mastoid air cells are clear.   The cortical sulci and CSF spaces are symmetric in appearance. There is no sign of parenchymal hematoma or dense extra-axial fluid collection. There is no localized edema, mass effect, or shift of the midline. The gray matter/white-matter differentiation is preserved.       No CT evidence of acute intracranial abnormality.   Signed by: Rosalio Bishop 11/5/2023 5:08 PM Dictation workstation:   [id number]    XR chest 1 view    Result Date: 11/5/2023  Interpreted By:  Sumit Melgar, STUDY: XR CHEST 1 VIEW  11/5/2023 3:02 pm   INDICATION: Signs/Symptoms:Dizzy   COMPARISON: 05/22/2023   ACCESSION NUMBER(S): [id number]   ORDERING CLINICIAN: GLORIA SLOAN   TECHNIQUE: A  single AP portable radiograph of the chest was obtained.   FINDINGS: Multiple cardiac monitoring leads are seen over the chest.  A right internal jugular implanted port catheter is unchanged in position. No focal infiltrate, pleural effusion or pneumothorax is identified. The cardiac silhouette is within normal limits for size.       No focal infiltrate or pneumothorax is identified.   MACRO: None.   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 Pt was admitted to the ICU for symptomatic bradycardia progressing to complete heart block requiring transvenous pacing, and is now pending permanent pacemaker placement.      Neurology   #Dizziness and Lightheadedness (Resolved)  #Nausea (Resolved)  - Likely in the setting of symptomatic bradycardia  - Will control dizziness with management of for symptomatic bradycardia   - Consider antiemetics if persisting nausea      #Neuropathy   - Continue gabapentin at 100mg BID      Cardiology  #Symptomatic Bradycardia   - Patients having recurrent episodes of bradycardia with HR in the range of 35-49  - Associated sinus pauses on telemetry with longest pause being about 5 seconds  - Etiology is unknown at this time, there is no concern for b-blocker or lymes. Patient however has been on trastuzumab for an esophageal cancer, which may lead to some cardio toxicity   - s/p transcutaneous pacing with voltage of 120mV and mechanical capture with rate of 80bpm   - s/p TVP 11/8  - For PPM this AM     #HTN  - Patient has a hx of HTN, not  currently on medications   - Currently normotensive  - Will monitor at this time      Respiratory   No active issues at this time      Gastroenterology   #GI ppx  -Protonix 40 mg BID     Hematology/Oncology   # Metastatic stage IV Esophageal Cancer   - Follows with Heme/Onc Dr. Blake   - Last chemo 10/30 with Trastuzumab FOLFOX   - Lidocaine cream for port use  - Oxycodone 10mg bid at home baseline for Cancer pain management  - Pain regimen:              - Percocet 5-325mg, 2 tabs q6h prn for severe pain              - Dilaudid 0.2mg q3h prn for breakthrough pain              - Lidocaine patch, 2, at sites of transcutaneous pad placement     #Anemia   - Hb is 12, stable  - Will monitor at this time      #Hx of Portal Vein thrombosis   - Hold Eliquis for PPM tomorrow  - Heparin gtt, hold for PPM     Renal   #Hypokalemia   - Monitor and replete      Fluids: PRN bolus   Electrolytes: replete as needed   Nutrition: Regular, NPO after midnight   GI PPx: Protonix 40mg BID  DVT PPx: Eliquis, hold for PPM tomorrow. Heparin drip, hold for PPM.  Lines: right sided chemo port, right internal jugular access  Antibiotics: None   Code: Full     Dispo: Patient is admitted for the management of symptomatic bradycardia s/p TVP and we will continue to monitor in the ICU pending PPM, scheduled for this AM.     Patient was seen and staffed with attending physician Dr. Payton.    Jaylyn Mancuso, DO  Family Medicine, PGY-2

## 2023-11-09 NOTE — POST-PROCEDURE NOTE
Physician Transition of Care Summary  Invasive Cardiovascular Lab    Procedure Date: 11/9/2023  Attending:    * Elias Connolly - Primary  Resident/Fellow/Other Assistant: Surgeon(s) and Role:    Indications:   Pre-op Diagnosis     * Bradycardia [R00.1]     * Third degree heart block (CMS/HCC) [I44.2]    Post-procedure diagnosis:   Post-op Diagnosis     * Bradycardia [R00.1]     * Third degree heart block (CMS/HCC) [I44.2]    Procedure(s):     * PPM IMPLANT DUAL      Procedure Findings:   See below    Description of the Procedure:   Dual chamber pacemaker implantation    Procedures:  Implant of dual chamber PPM ([id number])    Patient history:  Please refer to the detailed history and physical on the patient's medical chart.     Procedure narrative:  The patient was in the fasting state. A grounding pad was placed. The patient was set up for continuous monitoring of surface 12 lead ECG and pulse oximetry. Blood pressure was monitored. The procedure was performed under moderate sedation. The left upper chest was prepped and draped in the usual sterile fashion. Local anesthesia: After preoperative IV antibiotic was completely infused, subcutaneous tissues just medial to the left deltopectoral area, were infiltrated with bupivacaine for local anesthesia.   Using a scalpel, an incision was made, which was extended to the left prepectoral fascia using blunt dissection. A pulse generator pocket was created.  Under Fluoroscopic a Micropuncture needle was used to access the Left Axillary vein using Seldinger technique.   A 7 F sheath was placed over of the guidewires. The RV pacing lead was then advanced to the heart via fluoroscopic guidance. The ventricle was mapped, and the lead was fixed to the right ventricular apex. After lead placement, appropriate sensing and thresholds were obtained. The sheath was peeled away.  A second 7 F sheath was advanced over the guidewire, and the dilator and guidewire were removed. Under  fluoroscopic guidance, a pacing lead was advanced to the heart, and the atrium was mapped. The lead was fixed to the right atrial appendage. The sheath was peeled away.  The leads were sutured in place to the pectoralis muscle using 0 Tycron suture. Lead measurements were obtained.  A dual chamber Medtronic pacemaker pulse generator was attached to the leads and implanted. A Tyrx pouch was used.  The device was interrogated and its parameters recorded; telemetered electrograms and pacing and sensing thresholds were measured.   The pocket was flushed with Vancomycin solution. The wound was closed in three layers using. a 0 Vicryl interrupted layer, followed by a 3.0 Vicryl  continuous layer, and a 4.0 Vicryl continuous layer for the subcuticular layer. The pocket was oozing despite use of cautery. Saida hemostatic powder was used. Surgical glue, Steri-Strips and a dressing were applied. A pressure dressing was applied.    Final Implant Settings:    Lead Parameters  Atrial: 494 ohms, P wave 3.6 mV, threshold 0.5 V@0.4msec  RVent: 760 ohms, R wave 11.8 mV, threshold 0.5 V@0.4msec,    Summary:  Successful implantation of a Medtronic dual chamber pacemaker.      Patient Instructions:  Please do not lift left arm above shoulder level for 4-5 weeks  No repetitive motion or lifting heavy weight for 4-5 weeks  No driving, alcohol or making legal decisions for 24 hours.  Keep wound completely dry for 7 days; may shower but keep bandage as dry as possible.  No soaking in hot tubs or baths for 10 days. May sponge bath  Keep bandage on incision for 1 week.  Allow steristrips underneath to fall off naturally.    Please call our office if you notice any discharge or swelling around incision or fever.    Follow up:   The patient should be alert for bleeding, swelling, or signs of infection. The patient should call the electrophysiologist immediately if symptoms recur, or for any problems. The patient and family (with HIPPA  consent)  have been instructed accordingly.   Follow up in Clinic for wound in 2-3 weeks. Device clinic follow up will then be scheduled. Remote monitoring will be instituted if possible.     See complete procedural log and parameters.     Complications:   None    Stents/Implants:   Cardiovascular Implants       Pacemaker    Lead, Capsurefix Novus, 58 Cm - [id number] - Implanted        Inventory item: LEAD, CAPSUREFIX NOVUS, 58 CM Model/Cat number: 5076-58    Serial number: [id number] : [employer]    Lot number: [id number] Device identifier: [id number]    Implant Date: 11/9/2023        As of 11/9/2023       Status: Implanted                      Lead, Capsurefix Novus, 52 Cm - [id number] - Implanted        Inventory item: LEAD, CAPSUREFIX NOVUS, 52 CM Model/Cat number: 5076-52    Serial number: [id number] : [employer]    Lot number: [id number] Device identifier: [id number]    Implant Date: 11/9/2023        As of 11/9/2023       Status: Implanted                      Pacemaker, Dual Chamber, Oral Mri Xt Dr - [id number] - Implanted        Inventory item: PACEMAKER, DUAL CHAMBER, ABIEL MRI XT DR Model/Cat number: W1DR01    Serial number: [id number] : [employer]    Lot number: [id number] Device identifier: [id number]    Implant Date: 11/9/2023        As of 11/9/2023       Status: Implanted                            Estimated Blood Loss:   50 mL    Anesthesia: Moderate Sedation Anesthesia Staff: No anesthesia staff entered.    Any Specimen(s) Removed:   No specimens collected during this procedure.        Electronically signed by: Elias Anderson MD, 11/9/2023 5:55 PM

## 2023-11-09 NOTE — TELEPHONE ENCOUNTER
Called and spoke to Jane, pt's wife.  Per Dr. Blake, pt is to have treatment delayed by 1 week.  Told appointment schedulers will be calling to schedule appt.  Jane verbalized understanding.

## 2023-11-09 NOTE — PROGRESS NOTES
Subjective Data:  Patient with improved rest. Complains of chest discomfort relieved with lidocaine patch. Denies any chest pressure, palpitations, dizziness, cough, shortness of breath, orthopnea, edema.      Overnight Events:    No acute events overnight.      Objective Data:  Last Recorded Vitals:  Vitals:    11/09/23 1100 11/09/23 1200 11/09/23 1300 11/09/23 1400   BP: 127/65 121/61 125/81 137/73   Patient Position: Lying      Pulse: 53 53 56 79   Resp: 13 10 13 17   Temp:   37.2 °C (99 °F)    TempSrc:   Tympanic    SpO2: 99% 98% 100% 100%   Weight:       Height:           Last Labs:  CBC - 11/9/2023:  5:47 AM  8.3 12.0 109    34.8      CMP - 11/9/2023:  5:47 AM  8.9 7.2 32 --- 1.2   3.2 3.7 29 169      PTT - 1/8/2023:  3:00 PM  1.3   15.6 36     TROPHS   Date/Time Value Ref Range Status   11/05/2023 04:08 PM 6 0 - 20 ng/L Final   11/05/2023 02:56 PM 8 0 - 20 ng/L Final   05/22/2023 09:58 PM 9 0 - 20 ng/L Final     Comment:     .  Less than 99th percentile of normal range cutoff-  Female and children under 18 years old <14 ng/L; Male <21 ng/L: Negative  Repeat testing should be performed if clinically indicated.   .  Female and children under 18 years old 14-50 ng/L; Male 21-50 ng/L:  Consistent with possible cardiac damage and possible increased clinical   risk. Serial measurements may help to assess extent of myocardial damage.   .  >50 ng/L: Consistent with cardiac damage, increased clinical risk and  myocardial infarction. Serial measurements may help assess extent of   myocardial damage.   .   NOTE: Children less than 1 year old may have higher baseline troponin   levels and results should be interpreted in conjunction with the overall   clinical context.   .  NOTE: Troponin I testing is performed using a different   testing methodology at [facility] than at other   [facility]. Direct result comparisons should only   be made within the same method.     01/31/2023 06:51 PM 84 0 - 20 ng/L  Final     Comment:     .  Less than 99th percentile of normal range cutoff-  Female and children under 18 years old <14 ng/L; Male <21 ng/L: Negative  Repeat testing should be performed if clinically indicated.   .  Female and children under 18 years old 14-50 ng/L; Male 21-50 ng/L:  Consistent with possible cardiac damage and possible increased clinical   risk. Serial measurements may help to assess extent of myocardial damage.   .  >50 ng/L: Consistent with cardiac damage, increased clinical risk and  myocardial infarction. Serial measurements may help assess extent of   myocardial damage.   .   NOTE: Children less than 1 year old may have higher baseline troponin   levels and results should be interpreted in conjunction with the overall   clinical context.   .  NOTE: Troponin I testing is performed using a different   testing methodology at [facility] than at other   [facility]. Direct result comparisons should only   be made within the same method.  This is a critical result.    Per Laboratory policy, critical results for this test   only qualify to the call list once per 24 hours.      01/31/2023 02:40 PM 72 0 - 20 ng/L Final     Comment:     .  Less than 99th percentile of normal range cutoff-  Female and children under 18 years old <14 ng/L; Male <21 ng/L: Negative  Repeat testing should be performed if clinically indicated.   .  Female and children under 18 years old 14-50 ng/L; Male 21-50 ng/L:  Consistent with possible cardiac damage and possible increased clinical   risk. Serial measurements may help to assess extent of myocardial damage.   .  >50 ng/L: Consistent with cardiac damage, increased clinical risk and  myocardial infarction. Serial measurements may help assess extent of   myocardial damage.   .   NOTE: Children less than 1 year old may have higher baseline troponin   levels and results should be interpreted in conjunction with the overall   clinical context.   .  NOTE:  Troponin I testing is performed using a different   testing methodology at [facility] than at other   [facility]. Direct result comparisons should only   be made within the same method.  This is a critical result.    Per Laboratory policy, critical results for this test   only qualify to the call list once per 24 hours.        BNP   Date/Time Value Ref Range Status   01/31/2023 12:55 PM 37 0 - 99 pg/mL Final     Comment:     .  <100 pg/mL - Heart failure unlikely  100-299 pg/mL - Intermediate probability of acute heart  .               failure exacerbation. Correlate with clinical  .               context and patient history.    >=300 pg/mL - Heart Failure likely. Correlate with clinical  .               context and patient history.  BNP testing is performed using different testing   methodology at [facility] than at other   [facility] hospitals. Direct result comparisons should   only be made within the same method.     01/08/2023 01:00 PM 32 0 - 99 pg/mL Final     Comment:     .  <100 pg/mL - Heart failure unlikely  100-299 pg/mL - Intermediate probability of acute heart  .               failure exacerbation. Correlate with clinical  .               context and patient history.    >=300 pg/mL - Heart Failure likely. Correlate with clinical  .               context and patient history.  BNP testing is performed using different testing   methodology at [facility] than at other   [facility]. Direct result comparisons should   only be made within the same method.       HGBA1C   Date/Time Value Ref Range Status   11/05/2023 02:56 PM 4.9 see below % Final      Last I/O:  I/O last 3 completed shifts:  In: 320.9 (4.5 mL/kg) [P.O.:170; I.V.:150.9 (2.1 mL/kg)]  Out: 1075 (15 mL/kg) [Urine:1075 (0.4 mL/kg/hr)]  Weight: 71.6 kg     Past Cardiology Tests (Last 3 Years):  EKG:  No results found for this or any previous visit from the past 1095 days.    Echo:  Transthoracic Echo  (TTE) Complete 11/7/2023    Ejection Fractions:  EF   Date/Time Value Ref Range Status   11/07/2023 11:31 AM 56       Cath:  No results found for this or any previous visit from the past 1095 days.    Stress Test:  No results found for this or any previous visit from the past 1095 days.    Cardiac Imaging:  No results found for this or any previous visit from the past 1095 days.      Inpatient Medications:  Scheduled medications   Medication Dose Route Frequency    [Held by provider] apixaban  5 mg oral BID    gabapentin  100 mg oral BID    lidocaine  2 patch transdermal Daily    pantoprazole  40 mg oral BID    perflutren lipid microspheres  0.5-10 mL of dilution intravenous Once in imaging    perflutren protein A microsphere  0.5 mL intravenous Once in imaging    sodium chloride 0.9%  10 mL intra-catheter q12h    sodium chloride 0.9%  10 mL intra-catheter q12h    sucralfate  1 g oral Daily    sulfur hexafluoride microsphr  2 mL intravenous Once in imaging     PRN medications   Medication    alteplase    HYDROmorphone    oxyCODONE-acetaminophen    polyethylene glycol    sodium chloride    sodium chloride 0.9%     Continuous Medications   Medication Dose Last Rate    [Held by provider] heparin  0-4,000 Units/hr Stopped (11/09/23 0955)       Physical Exam:  Physical Exam  Constitutional:       Appearance: Normal appearance.   HENT:      Head: Normocephalic.      Nose: Nose normal.      Mouth/Throat:      Mouth: Mucous membranes are moist.   Eyes:      Conjunctiva/sclera: Conjunctivae normal.   Neck:      Comments: internal jugular dressing c/d/I.  Cardiovascular:      Heart sounds: No murmur heard.     No friction rub. No gallop.      Comments: Paced on tele.     Pulmonary:      Effort: Pulmonary effort is normal.      Breath sounds: Normal breath sounds.   Abdominal:      Palpations: Abdomen is soft.   Musculoskeletal:         General: No swelling. Normal range of motion.   Skin:     General: Skin is warm and dry.    Neurological:      General: No focal deficit present.      Mental Status: He is alert. Mental status is at baseline.   Psychiatric:         Mood and Affect: Mood normal.         Behavior: Behavior normal.            Assessment/Plan     Massimo Garcia is a 61 yo male with a PMH of metastatic esophageal cancer, portal vein thrombosis who was admitted w/ complaint of near syncope, bradycardia c/f CHB. Yesterday afternoon patient with progressive episodes of complete heart block, symptomatic. Required transfer to ICU transcutaneous pacing. NPO for BiV PPM today.      #Symptomatic Bradycardia  #Complete heart block  -EP consulted, Plan for PPM today  -Temp pacing nearly 100%  -Echo recommended, reviewed with normal LVSF, diastolic dysfunction  -Will follow     Pacer Wires (Active)   Site Assessment Clean;Intact;Dry 11/09/23 1425   Dressing Status Clean;Dry 11/09/23 1425   Number of days: 2       Venous Sheath Other (Comment) Right Other (Comment) (Active)   Site Assessment Clean;Dry;Intact 11/09/23 0830   Line Status Intact and in place 11/09/23 0830   Dressing Occlusive 11/09/23 0830   Dressing Status Clean;Dry;Intact 11/09/23 0830   Number of days: 2       Implantable Port 01/24/23 Right Chest Single lumen port (Active)   Line Necessity Intravenous fluid therapy 11/09/23 0800   Site Assessment Clean;Dry;Intact 11/09/23 0800   Dressing Status Clean;Dry 11/09/23 0800   Number of days: 289       Code Status:  Full Code          Corazon Marx PA-C

## 2023-11-10 ENCOUNTER — APPOINTMENT (OUTPATIENT)
Dept: RADIOLOGY | Facility: HOSPITAL | Age: 63
DRG: 243 | End: 2023-11-10
Payer: MEDICARE

## 2023-11-10 ENCOUNTER — APPOINTMENT (OUTPATIENT)
Dept: CARDIOLOGY | Facility: HOSPITAL | Age: 63
DRG: 243 | End: 2023-11-10
Payer: MEDICARE

## 2023-11-10 LAB
6MAM UR CFM-MCNC: <25 NG/ML
ALBUMIN SERPL BCP-MCNC: 4 G/DL (ref 3.4–5)
ANION GAP SERPL CALC-SCNC: 14 MMOL/L (ref 10–20)
B BURGDOR DNA SPEC QL NAA+PROBE: NOT DETECTED
BUN SERPL-MCNC: 20 MG/DL (ref 6–23)
CALCIUM SERPL-MCNC: 9.3 MG/DL (ref 8.6–10.3)
CHLORIDE SERPL-SCNC: 106 MMOL/L (ref 98–107)
CO2 SERPL-SCNC: 23 MMOL/L (ref 21–32)
CODEINE UR CFM-MCNC: <50 NG/ML
CREAT SERPL-MCNC: 0.79 MG/DL (ref 0.5–1.3)
ERYTHROCYTE [DISTWIDTH] IN BLOOD BY AUTOMATED COUNT: 15.8 % (ref 11.5–14.5)
GFR SERPL CREATININE-BSD FRML MDRD: >90 ML/MIN/1.73M*2
GLUCOSE BLD MANUAL STRIP-MCNC: 125 MG/DL (ref 74–99)
GLUCOSE SERPL-MCNC: 86 MG/DL (ref 74–99)
HCT VFR BLD AUTO: 37.9 % (ref 41–52)
HGB BLD-MCNC: 12.8 G/DL (ref 13.5–17.5)
HYDROCODONE CTO UR CFM-MCNC: <25 NG/ML
HYDROMORPHONE UR CFM-MCNC: <25 NG/ML
MAGNESIUM SERPL-MCNC: 1.8 MG/DL (ref 1.6–2.4)
MCH RBC QN AUTO: 29.2 PG (ref 26–34)
MCHC RBC AUTO-ENTMCNC: 33.8 G/DL (ref 32–36)
MCV RBC AUTO: 86 FL (ref 80–100)
MORPHINE UR CFM-MCNC: <50 NG/ML
NORHYDROCODONE UR CFM-MCNC: <25 NG/ML
NOROXYCODONE UR CFM-MCNC: >1000 NG/ML
NRBC BLD-RTO: 0 /100 WBCS (ref 0–0)
OXYCODONE UR CFM-MCNC: 686 NG/ML
OXYMORPHONE UR CFM-MCNC: 1212 NG/ML
PHOSPHATE SERPL-MCNC: 4.2 MG/DL (ref 2.5–4.9)
PLATELET # BLD AUTO: 114 X10*3/UL (ref 150–450)
POTASSIUM SERPL-SCNC: 3.2 MMOL/L (ref 3.5–5.3)
RBC # BLD AUTO: 4.39 X10*6/UL (ref 4.5–5.9)
SODIUM SERPL-SCNC: 140 MMOL/L (ref 136–145)
SPECIMEN SOURCE: NORMAL
STAPHYLOCOCCUS SPEC CULT: NORMAL
WBC # BLD AUTO: 9.9 X10*3/UL (ref 4.4–11.3)

## 2023-11-10 PROCEDURE — 99291 CRITICAL CARE FIRST HOUR: CPT | Performed by: INTERNAL MEDICINE

## 2023-11-10 PROCEDURE — 2060000001 HC INTERMEDIATE ICU ROOM DAILY

## 2023-11-10 PROCEDURE — 2500000001 HC RX 250 WO HCPCS SELF ADMINISTERED DRUGS (ALT 637 FOR MEDICARE OP): Performed by: INTERNAL MEDICINE

## 2023-11-10 PROCEDURE — 2500000001 HC RX 250 WO HCPCS SELF ADMINISTERED DRUGS (ALT 637 FOR MEDICARE OP)

## 2023-11-10 PROCEDURE — 85027 COMPLETE CBC AUTOMATED: CPT | Performed by: INTERNAL MEDICINE

## 2023-11-10 PROCEDURE — 99232 SBSQ HOSP IP/OBS MODERATE 35: CPT | Performed by: PHYSICIAN ASSISTANT

## 2023-11-10 PROCEDURE — 83735 ASSAY OF MAGNESIUM: CPT | Performed by: INTERNAL MEDICINE

## 2023-11-10 PROCEDURE — 2500000004 HC RX 250 GENERAL PHARMACY W/ HCPCS (ALT 636 FOR OP/ED): Performed by: INTERNAL MEDICINE

## 2023-11-10 PROCEDURE — 82947 ASSAY GLUCOSE BLOOD QUANT: CPT

## 2023-11-10 PROCEDURE — 71045 X-RAY EXAM CHEST 1 VIEW: CPT | Mod: 77,FY

## 2023-11-10 PROCEDURE — 2500000005 HC RX 250 GENERAL PHARMACY W/O HCPCS

## 2023-11-10 PROCEDURE — 71045 X-RAY EXAM CHEST 1 VIEW: CPT

## 2023-11-10 PROCEDURE — 93308 TTE F-UP OR LMTD: CPT

## 2023-11-10 PROCEDURE — 71045 X-RAY EXAM CHEST 1 VIEW: CPT | Mod: FY

## 2023-11-10 PROCEDURE — 37799 UNLISTED PX VASCULAR SURGERY: CPT | Performed by: INTERNAL MEDICINE

## 2023-11-10 PROCEDURE — 1200000002 HC GENERAL ROOM WITH TELEMETRY DAILY

## 2023-11-10 PROCEDURE — 80069 RENAL FUNCTION PANEL: CPT | Performed by: INTERNAL MEDICINE

## 2023-11-10 PROCEDURE — 2500000004 HC RX 250 GENERAL PHARMACY W/ HCPCS (ALT 636 FOR OP/ED)

## 2023-11-10 PROCEDURE — 2500000005 HC RX 250 GENERAL PHARMACY W/O HCPCS: Performed by: INTERNAL MEDICINE

## 2023-11-10 PROCEDURE — 93308 TTE F-UP OR LMTD: CPT | Performed by: STUDENT IN AN ORGANIZED HEALTH CARE EDUCATION/TRAINING PROGRAM

## 2023-11-10 PROCEDURE — 71045 X-RAY EXAM CHEST 1 VIEW: CPT | Mod: REPEAT PROCEDURE BY ANOTHER PHYSICIAN | Performed by: RADIOLOGY

## 2023-11-10 RX ORDER — LORAZEPAM 0.5 MG/1
0.5 TABLET ORAL ONCE
Status: COMPLETED | OUTPATIENT
Start: 2023-11-10 | End: 2023-11-10

## 2023-11-10 RX ORDER — OXYCODONE AND ACETAMINOPHEN 5; 325 MG/1; MG/1
1 TABLET ORAL EVERY 6 HOURS PRN
Status: DISCONTINUED | OUTPATIENT
Start: 2023-11-10 | End: 2023-11-13 | Stop reason: HOSPADM

## 2023-11-10 RX ADMIN — OXYCODONE HYDROCHLORIDE AND ACETAMINOPHEN 1 TABLET: 5; 325 TABLET ORAL at 18:51

## 2023-11-10 RX ADMIN — APIXABAN 5 MG: 5 TABLET, FILM COATED ORAL at 08:33

## 2023-11-10 RX ADMIN — Medication 10 ML: at 22:07

## 2023-11-10 RX ADMIN — LORAZEPAM 0.5 MG: 0.5 TABLET ORAL at 23:11

## 2023-11-10 RX ADMIN — PANTOPRAZOLE SODIUM 40 MG: 40 TABLET, DELAYED RELEASE ORAL at 08:33

## 2023-11-10 RX ADMIN — GABAPENTIN 100 MG: 100 CAPSULE ORAL at 21:00

## 2023-11-10 RX ADMIN — Medication 10 ML: at 08:39

## 2023-11-10 RX ADMIN — GABAPENTIN 100 MG: 100 CAPSULE ORAL at 08:33

## 2023-11-10 RX ADMIN — HYDROMORPHONE HYDROCHLORIDE 0.2 MG: 1 INJECTION, SOLUTION INTRAMUSCULAR; INTRAVENOUS; SUBCUTANEOUS at 14:38

## 2023-11-10 RX ADMIN — PANTOPRAZOLE SODIUM 40 MG: 40 TABLET, DELAYED RELEASE ORAL at 22:06

## 2023-11-10 RX ADMIN — OXYCODONE HYDROCHLORIDE AND ACETAMINOPHEN 1 TABLET: 5; 325 TABLET ORAL at 04:41

## 2023-11-10 RX ADMIN — APIXABAN 5 MG: 5 TABLET, FILM COATED ORAL at 22:06

## 2023-11-10 RX ADMIN — HYDROMORPHONE HYDROCHLORIDE 0.2 MG: 1 INJECTION, SOLUTION INTRAMUSCULAR; INTRAVENOUS; SUBCUTANEOUS at 23:11

## 2023-11-10 RX ADMIN — SUCRALFATE 1 G: 1 TABLET ORAL at 08:33

## 2023-11-10 RX ADMIN — Medication 10 L/MIN: at 10:07

## 2023-11-10 RX ADMIN — LIDOCAINE 2 PATCH: 4 PATCH TOPICAL at 08:35

## 2023-11-10 ASSESSMENT — PAIN SCALES - GENERAL
PAINLEVEL_OUTOF10: 0 - NO PAIN
PAINLEVEL_OUTOF10: 6
PAINLEVEL_OUTOF10: 2
PAINLEVEL_OUTOF10: 0 - NO PAIN

## 2023-11-10 ASSESSMENT — PAIN - FUNCTIONAL ASSESSMENT
PAIN_FUNCTIONAL_ASSESSMENT: 0-10

## 2023-11-10 ASSESSMENT — ENCOUNTER SYMPTOMS
WOUND: 0
EYE PAIN: 0
COUGH: 0
POLYDIPSIA: 0
BLOOD IN STOOL: 0
PHOTOPHOBIA: 0
FREQUENCY: 0
WHEEZING: 0
SLEEP DISTURBANCE: 0
MYALGIAS: 0
EYE REDNESS: 0
CHILLS: 0
POLYPHAGIA: 0
DIZZINESS: 0
CONSTIPATION: 0
ABDOMINAL PAIN: 0
FEVER: 0
CONFUSION: 0
VOMITING: 0
FLANK PAIN: 0
PALPITATIONS: 0
BRUISES/BLEEDS EASILY: 0
SHORTNESS OF BREATH: 0
DIARRHEA: 0
ARTHRALGIAS: 0
WEAKNESS: 0
NAUSEA: 0
JOINT SWELLING: 0
HEMATURIA: 0
HEADACHES: 0

## 2023-11-10 ASSESSMENT — COGNITIVE AND FUNCTIONAL STATUS - GENERAL
DAILY ACTIVITIY SCORE: 24
MOBILITY SCORE: 24

## 2023-11-10 ASSESSMENT — PAIN DESCRIPTION - LOCATION
LOCATION: BACK
LOCATION: BACK

## 2023-11-10 NOTE — PROGRESS NOTES
11/10/23 1499   Discharge Planning   Living Arrangements Spouse/significant other   Support Systems Spouse/significant other   Assistance Needed Alert and oirented x 3, Indpendent with ADL's, Drives, No DME used   Type of Residence Private residence   Number of Stairs to Enter Residence 3   Number of Stairs Within Residence 10   Do you have animals or pets at home? Yes   Type of Animals or Pets 2 dogs, 2 cats, and [place]   Who is requesting discharge planning? Provider   Home or Post Acute Services None   Patient expects to be discharged to: Home no needs   Does the patient need discharge transport arranged? No

## 2023-11-10 NOTE — PROGRESS NOTES
Massimo Garcia is a 62 y.o. male on day 4 of admission presenting with Third degree heart block (CMS/HCC) now day 1 status post dual-chamber permanent pacemaker placement.      Subjective   No acute events overnight.  Patient had dual-chamber permanent pacemaker placement on 11/9/2023.  He had one 10-minute episode of left-sided chest pain that occurred when turning to the ICU after Cath Lab.  The pain was relieved with Percocet and Dilaudid. His chest pain is now resolved, he has had no further episodes. States he is having bowel movements and making urine.  Denies any visual changes, chest pain, shortness of breath, cough, palpitations, abdominal pain, change in urinary or bowel frequency, new onset weakness.    Review of Systems   Constitutional:  Negative for chills and fever.   HENT:  Negative for hearing loss and mouth sores.    Eyes:  Negative for photophobia, pain and redness.   Respiratory:  Negative for cough, shortness of breath and wheezing.    Cardiovascular:  Negative for chest pain and palpitations.   Gastrointestinal:  Negative for abdominal pain, blood in stool, constipation, diarrhea, nausea and vomiting.   Endocrine: Negative for cold intolerance, heat intolerance, polydipsia, polyphagia and polyuria.   Genitourinary:  Negative for flank pain, frequency, hematuria and urgency.   Musculoskeletal:  Negative for arthralgias, joint swelling and myalgias.   Skin:  Negative for rash and wound.   Allergic/Immunologic: Negative for environmental allergies, food allergies and immunocompromised state.   Neurological:  Negative for dizziness, weakness and headaches.   Hematological:  Does not bruise/bleed easily.   Psychiatric/Behavioral:  Negative for confusion, self-injury, sleep disturbance and suicidal ideas.           Objective     Last Recorded Vitals  /66 (BP Location: Right arm, Patient Position: Lying)   Pulse 81   Temp 37.3 °C (99.1 °F)   Resp 15   Wt 71.1 kg (156 lb 12 oz)   SpO2 100%      Physical Exam  Constitutional:       General: He is not in acute distress.  HENT:      Head: Normocephalic and atraumatic.      Nose: Nose normal.   Cardiovascular:      Rate and Rhythm: Normal rate and regular rhythm.      Heart sounds: Normal heart sounds.      Comments: Right-sided Chemo-Port with right internal jugular access.  The site is clean dry and intact with no obvious signs of active drainage, bleeding, induration, or signs of infection.      Left-sided pacemaker incision wound covered with bandages.  The site is clean and dry.  There is no signs of active drainage, or bleeding.  Pulmonary:      Effort: Pulmonary effort is normal. No respiratory distress.      Breath sounds: Normal breath sounds. No stridor.   Abdominal:      General: Bowel sounds are normal. There is no distension.      Palpations: Abdomen is soft. There is no mass.      Tenderness: There is no abdominal tenderness.   Musculoskeletal:         General: Normal range of motion.      Right lower leg: No edema.      Left lower leg: No edema.   Skin:     General: Skin is warm.      Capillary Refill: Capillary refill takes less than 2 seconds.   Neurological:      General: No focal deficit present.      Mental Status: He is alert and oriented to person, place, and time. Mental status is at baseline.   Psychiatric:         Mood and Affect: Mood normal.         Behavior: Behavior normal.         Thought Content: Thought content normal.         Judgment: Judgment normal.         Intake/Output last 3 Shifts:    Intake/Output Summary (Last 24 hours) at 11/10/2023 1256  Last data filed at 11/10/2023 0500  Gross per 24 hour   Intake 325.46 ml   Output 905 ml   Net -579.54 ml       Weight: 74.8 kg (165 lb) (11/05/23 1402)  Daily Weight  11/10/23 : 71.1 kg (156 lb 12 oz)      Labs  Results for orders placed or performed during the hospital encounter of 11/05/23 (from the past 24 hour(s))   Troponin I, High Sensitivity   Result Value Ref Range     Troponin I, High Sensitivity 61 (HH) 0 - 20 ng/L   CBC   Result Value Ref Range    WBC 9.9 4.4 - 11.3 x10*3/uL    nRBC 0.0 0.0 - 0.0 /100 WBCs    RBC 4.39 (L) 4.50 - 5.90 x10*6/uL    Hemoglobin 12.8 (L) 13.5 - 17.5 g/dL    Hematocrit 37.9 (L) 41.0 - 52.0 %    MCV 86 80 - 100 fL    MCH 29.2 26.0 - 34.0 pg    MCHC 33.8 32.0 - 36.0 g/dL    RDW 15.8 (H) 11.5 - 14.5 %    Platelets 114 (L) 150 - 450 x10*3/uL   Renal Function Panel   Result Value Ref Range    Glucose 86 74 - 99 mg/dL    Sodium 140 136 - 145 mmol/L    Potassium 3.2 (L) 3.5 - 5.3 mmol/L    Chloride 106 98 - 107 mmol/L    Bicarbonate 23 21 - 32 mmol/L    Anion Gap 14 10 - 20 mmol/L    Urea Nitrogen 20 6 - 23 mg/dL    Creatinine 0.79 0.50 - 1.30 mg/dL    eGFR >90 >60 mL/min/1.73m*2    Calcium 9.3 8.6 - 10.3 mg/dL    Phosphorus 4.2 2.5 - 4.9 mg/dL    Albumin 4.0 3.4 - 5.0 g/dL   Magnesium   Result Value Ref Range    Magnesium 1.80 1.60 - 2.40 mg/dL       Image Results  Transthoracic Echo (TTE) [facility], [address] [place] [address]                Tel [phone] and Fax [phone]    TRANSTHORACIC ECHOCARDIOGRAM REPORT       Patient Name:      GETACHEW MG            Reading           [id number] Lior Hunter                                          Physician:        MD  Study Date:        11/10/2023           Ordering          [id number] LIOR HUNTER                                          Provider:  MRN/PID:           [id number]             Fellow:  Accession#:        [id number]         Nurse:  Date of Birth/Age: 1960 / 62      Sonographer:      Merced Goyal RDCS                     years  Gender:            M                    Additional Staff:  Height:            170.18 cm            Admit Date:  Weight:            80.74 kg             Admission Status:  BSA:               1.92 m2              Encounter#:       [id number]    Study Type:    TRANSTHORACIC ECHO (TTE) LIMITED  Diagnosis/ICD: Other pericardial effusion  (noninflammatory)-I31.39  CPT Code:      Echo Limited-28089   Study Detail: The following Echo studies were performed: 2D, M-Mode and Doppler.       PHYSICIAN INTERPRETATION:  Left Ventricle: The left ventricular systolic function is normal. The left ventricular cavity size was not assessed. Left ventricular diastolic filling was not assessed.  Left Atrium: The left atrium was not assessed.  Right Ventricle: The right ventricle was not assessed. Right ventricular systolic function not assessed. A device is visualized in the right ventricle.  Right Atrium: The right atrium was not assessed. There is a device visualized in the right atrium.  Aortic Valve: The aortic valve was not assessed. Aortic valve regurgitation was not assessed.  Mitral Valve: The mitral valve was not assessed. Mitral valve regurgitation was not assessed.  Tricuspid Valve: The tricuspid valve was not assessed. Tricuspid regurgitation was not assessed.  Pulmonic Valve: The pulmonic valve was not assessed. Pulmonic valve regurgitation was not assessed.  Pericardium: There is no pericardial effusion noted.  Aorta: The aortic root was not assessed.  Systemic Veins: The inferior vena cava appears to be of normal size. There is IVC inspiratory collapse greater than 50%.       CONCLUSIONS:   1. Left ventricular systolic function is normal.   2. There is no pericardial effusion noted.    QUANTITATIVE DATA SUMMARY:  TRICUSPID VALVE/RVSP:                    Normal Ranges:  IVC Diam: 1.60 cm       [id number] Lior Hunter MD  Electronically signed on 11/10/2023 at 12:43:58 PM    ** Final (Updated) **  XR chest 1 view  Narrative: Interpreted By:  Lucas Lambert,   STUDY:  Chest dated  11/10/2023.      INDICATION:  Signs/Symptoms:pacer placement      COMPARISON:  Chest dated 11/09/2023.      ACCESSION NUMBER(S):  [id number]      ORDERING CLINICIAN:  BENJAMIN LAYTON      TECHNIQUE:  One view of the chest.      FINDINGS:  There is a small left pneumothorax measuring  approximately 1.2 cm  from the lung margin to the thoracic cage.  The cardiomediastinal  silhouette is  not enlarged.There is a pulse generator on the left  chest wall with leads tracking over the heart. There are bilateral  shoulder replacements.      Impression: Small left pneumothorax. Short-term follow-up radiograph is  recommended to reassess.      MACRO:  Lucas Lambert discussed the significance and urgency of this  critical finding by EPIC chat with  ARTIE WEIR on 11/10/2023 at  8:56 am.  (**-RCF-**) Findings:  See findings.      Signed by: Lucas Lambert 11/10/2023 8:57 AM  Dictation workstation:   [id number]        Current Facility-Administered Medications:     apixaban (Eliquis) tablet 5 mg, 5 mg, oral, BID, Rosarioife KIMI Amado MD, 5 mg at 11/10/23 0833    gabapentin (Neurontin) capsule 100 mg, 100 mg, oral, BID, Alexis Shook MD, 100 mg at 11/10/23 0833    HYDROmorphone (Dilaudid) injection 0.2 mg, 0.2 mg, intravenous, q3h PRN, Jaylyn Mancuso DO, 0.2 mg at 11/09/23 2158    HYDROmorphone (Dilaudid) injection 0.2 mg, 0.2 mg, intravenous, q3h PRN, Elias Connolly MD    lidocaine 4 % patch 2 patch, 2 patch, transdermal, Daily, Jaylyn Mancuso DO, 2 patch at 11/10/23 0835    nitroglycerin (Nitrostat) SL tablet 0.4 mg, 0.4 mg, sublingual, q5 min PRN, Aleks Amado MD, 0.4 mg at 11/09/23 1758    oxyCODONE-acetaminophen (Percocet) 5-325 mg per tablet 1 tablet, 1 tablet, oral, q6h PRN, Artie Weir MD, 1 tablet at 11/10/23 0441    oxygen (O2) therapy, , inhalation, Continuous PRN - O2/gases, Tim Payton MD, 10 L/min at 11/10/23 1007    pantoprazole (ProtoNix) EC tablet 40 mg, 40 mg, oral, BID, Alexis Shook MD, 40 mg at 11/10/23 0833    perflutren lipid microspheres (Definity) injection 0.5-10 mL of dilution, 0.5-10 mL of dilution, intravenous, Once in imaging, Alexis KHOI Shook MD    perflutren protein A microsphere (Optison) injection 0.5 mL, 0.5 mL, intravenous, Once in imaging, Alexis  KHOI Shook MD    polyethylene glycol (Glycolax, Miralax) packet 17 g, 17 g, oral, Daily PRN, Alexis Shook MD    sodium chloride (Ocean) 0.65 % nasal spray 1 spray, 1 spray, Each Nostril, PRN, Aleks Amado MD, 1 spray at 11/08/23 1331    sodium chloride 0.9% flush 10 mL, 10 mL, intra-catheter, q12h, Alexis Shook MD, 10 mL at 11/10/23 0839    sodium chloride 0.9% flush 10 mL, 10 mL, intra-catheter, PRN, Alexis Shook MD    sodium chloride 0.9% flush 10 mL, 10 mL, intra-catheter, q12h, Aleks Amado MD, 10 mL at 11/09/23 0949    sucralfate (Carafate) tablet 1 g, 1 g, oral, Daily, Alexis Shook MD, 1 g at 11/10/23 0833    sulfur hexafluoride microsphr (Lumason) injection 24.28 mg, 2 mL, intravenous, Once in imaging, Alexis Shook MD        Assessment/Plan   Massimo Garcia is a 62 y.o. male on day 4 admitted for Third degree heart block (CMS/HCC).  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 Pt was admitted to the ICU for symptomatic bradycardia progressing to complete heart block requiring transvenous pacing.  He is now day 1 status post dual-chamber permanent pacemaker placement done by Dr. Anderson on 11/9/2023.     Neuro/psych   #Neuropathy   - Continue gabapentin at 100mg BID      #Dizziness and Lightheadedness (Resolved)  #Nausea (Resolved)  -Likely secondary to symptomatic bradycardia and complete heart block    Cardiovascular  #Symptomatic bradycardia   #New onset complete heart block  - Patients having recurrent episodes of bradycardia with HR in the range of 35-49  - Associated sinus pauses on telemetry with longest pause being about 5 seconds  - Etiology is unknown at this time, there is no concern for b-blocker or lymes. Patient however has been on trastuzumab  for an esophageal cancer, which may lead to some cardio toxicity   - s/p transcutaneous pacing with voltage of 120mV and mechanical capture with rate of 80bpm   - Transvenous pacemaker on 11/8/2023  - Dual-chamber permanent pacemaker placement with Dr. Anderson on 11/9/2023  - Received 1 dose of intraoperative cefazolin  - Interrogation of PPM  - 1 view chest x-ray ordered at 0600 on 11/10/2023 for pacemaker placement, which showed small left-sided pneumothorax  -Repeat imaging showed slight advancement in pneumothorax, will obtain serial imaging.  See pulmonary for more information     #HTN  - Patient has a hx of HTN, not currently on medications   - Currently normotensive  - Will monitor at this time      Pulmonary   #Left sided pneumothorax status post pacemaker placement  -Chest x-ray from 0600 on 11/10/2023 showed small left-sided pneumothorax measuring 1.2 cm laterally  -Bedside ultrasound showed no lung sliding with evidence of barcode sign  -Patient remains asymptomatic, does not complain of any dyspnea, shortness of breath  -Placed on nonrebreather at 0930  -Repeat chest x-ray at 1230 showed similar lateral measurement at 1.2 cm, with questionable enlargement of pneumothorax in the left apex  -Will repeat chest x-ray at 1600     GI/  #GI ppx  -Protonix 40 mg BID     Hematology/Oncology   # Metastatic stage IV Esophageal Cancer   - Follows with Heme/Onc Dr. Blkae   - Last chemo 10/30 with Trastuzumab FOLFOX   - Lidocaine cream for port use  - Oxycodone 10mg bid at home baseline for Cancer pain management  - Pain regimen:              - Percocet 5-325mg, 2 tabs q6h prn for severe pain              - Dilaudid 0.2mg q3h prn for breakthrough pain              - Lidocaine patch, 2, at sites of transcutaneous pad placement     #Anemia   - Hb is 12.8 and stable  - Will monitor at this time      #Hx of Portal Vein thrombosis   - Continuing to hold home Eliquis 5 mg twice daily   - Last received Eliquis 11/8 at  0800  - Will need to restart anticoag with cardio approval or upon discharge    Infectious disease  -No active issues    Renal   #Hypokalemia   - Monitor and replete     MSK/skin  -No active issues      Fluids: PRN bolus   Electrolytes: replete as needed  Nutrition: Regular  GI PPx: Protonix 40mg BID  DVT PPx: Restarted home Eliquis 5 mg twice daily  Lines: right sided chemo port, right internal jugular access  Antibiotics: Completed intraoperative cefazolin  Code: Full     Dispo: Mr. Garcia is a 62-year-old male admitted to the ICU for management of symptomatic bradycardia, found to be in complete heart block now status post permanent pacemaker placement on 11/9/2023.  Small left-sided pneumothorax on chest x-ray.  Started on nonrebreather and will repeat chest x-ray.  If pneumothorax is completely resolved, can discharge to home.  If pneumothorax is still present but not worsened, transferred to floor.  If pneumothorax is worsened, will place chest tube and monitor in ICU      Darian Levy, DO  PGY-1

## 2023-11-10 NOTE — PROGRESS NOTES
Subjective Data:  Patient reports single episode of L sided chest pain, sharp in nature after PPM insertion. Currently Denies any chest pain, chest pressure, palpitations, dizziness, cough, shortness of breath, orthopnea, edema.      Overnight Events:    See above.      Objective Data:  Last Recorded Vitals:  Vitals:    11/10/23 0911 11/10/23 1000 11/10/23 1100 11/10/23 1200   BP: 139/74 123/67 120/86 116/66   BP Location:  Right arm Right arm Right arm   Patient Position:  Lying Lying Lying   Pulse: 82 82 83 81   Resp: 17 13 13 15   Temp:       TempSrc:       SpO2: 100% 100%     Weight: 71.1 kg (156 lb 12 oz)      Height:           Last Labs:  CBC - 11/10/2023:  4:50 AM  9.9 12.8 114    37.9      CMP - 11/10/2023:  4:50 AM  9.3 7.2 32 --- 1.2   4.2 4.0 29 169      PTT - 1/8/2023:  3:00 PM  1.3   15.6 36     TROPHS   Date/Time Value Ref Range Status   11/09/2023 05:58 PM 61 0 - 20 ng/L Final   11/05/2023 04:08 PM 6 0 - 20 ng/L Final   11/05/2023 02:56 PM 8 0 - 20 ng/L Final     BNP   Date/Time Value Ref Range Status   01/31/2023 12:55 PM 37 0 - 99 pg/mL Final     Comment:     .  <100 pg/mL - Heart failure unlikely  100-299 pg/mL - Intermediate probability of acute heart  .               failure exacerbation. Correlate with clinical  .               context and patient history.    >=300 pg/mL - Heart Failure likely. Correlate with clinical  .               context and patient history.  BNP testing is performed using different testing   methodology at [facility] than at other   [facility] hospitals. Direct result comparisons should   only be made within the same method.     01/08/2023 01:00 PM 32 0 - 99 pg/mL Final     Comment:     .  <100 pg/mL - Heart failure unlikely  100-299 pg/mL - Intermediate probability of acute heart  .               failure exacerbation. Correlate with clinical  .               context and patient history.    >=300 pg/mL - Heart Failure likely. Correlate with clinical  .                context and patient history.  BNP testing is performed using different testing   methodology at [facility] than at other   [facility] hospitals. Direct result comparisons should   only be made within the same method.       HGBA1C   Date/Time Value Ref Range Status   11/05/2023 02:56 PM 4.9 see below % Final      Last I/O:  I/O last 3 completed shifts:  In: 406.4 (5 mL/kg) [P.O.:200; I.V.:206.4 (2.5 mL/kg)]  Out: 1805 (22.3 mL/kg) [Urine:1800 (0.6 mL/kg/hr); Blood:5]  Weight: 81.1 kg     Past Cardiology Tests (Last 3 Years):  EKG:  No results found for this or any previous visit from the past 1095 days.    Echo:  Transthoracic Echo (TTE) Complete 11/7/2023    Ejection Fractions:  EF   Date/Time Value Ref Range Status   11/07/2023 11:31 AM 56       Cath:  No results found for this or any previous visit from the past 1095 days.    Stress Test:  No results found for this or any previous visit from the past 1095 days.    Cardiac Imaging:  No results found for this or any previous visit from the past 1095 days.      Inpatient Medications:  Scheduled medications   Medication Dose Route Frequency    apixaban  5 mg oral BID    gabapentin  100 mg oral BID    lidocaine  2 patch transdermal Daily    pantoprazole  40 mg oral BID    perflutren lipid microspheres  0.5-10 mL of dilution intravenous Once in imaging    perflutren protein A microsphere  0.5 mL intravenous Once in imaging    sodium chloride 0.9%  10 mL intra-catheter q12h    sodium chloride 0.9%  10 mL intra-catheter q12h    sucralfate  1 g oral Daily    sulfur hexafluoride microsphr  2 mL intravenous Once in imaging     PRN medications   Medication    HYDROmorphone    HYDROmorphone    nitroglycerin    oxyCODONE-acetaminophen    oxygen    polyethylene glycol    sodium chloride    sodium chloride 0.9%     Continuous Medications   Medication Dose Last Rate       Physical Exam:  Physical Exam  Constitutional:       Appearance: Normal appearance.   HENT:       Head: Normocephalic.      Nose: Nose normal.      Mouth/Throat:      Mouth: Mucous membranes are moist.   Eyes:      Conjunctiva/sclera: Conjunctivae normal.   Neck:      Comments: No JVD. R internal jugular site c/d/I.   Cardiovascular:      Rate and Rhythm: Normal rate and regular rhythm.      Heart sounds: No murmur heard.     No friction rub. No gallop.   Pulmonary:      Effort: Pulmonary effort is normal.   Abdominal:      Palpations: Abdomen is soft.   Musculoskeletal:         General: No swelling. Normal range of motion.   Skin:     General: Skin is warm and dry.   Neurological:      General: No focal deficit present.      Mental Status: He is alert. Mental status is at baseline.   Psychiatric:         Mood and Affect: Mood normal.         Behavior: Behavior normal.            Assessment/Plan     Massimo Garcia is a 63 yo male with a PMH of metastatic esophageal cancer, portal vein thrombosis who was admitted w/ complaint of near syncope, bradycardia c/f CHB. Yesterday afternoon patient with progressive episodes of complete heart block, symptomatic. Required transfer to ICU transcutaneous pacing. NPO for BiV PPM today.      #Symptomatic Bradycardia  #Complete heart block s/p PPM insertion 11/9/23  #L Pneumothorax    -PPM insertion 11/9/23  -Echo recommended, reviewed with normal LVSF, diastolic dysfunction  -Repeated echo with no effusion  -CXR this AM with small ptx, serial imaging. HD stable.     Will sign off   Thank you for the consult     Implantable Port 01/24/23 Right Chest Single lumen port (Active)   Line Necessity Intravenous fluid therapy 11/10/23 0900   Site Assessment Clean;Dry;Intact 11/10/23 0900   Dressing Status Clean;Dry 11/10/23 0900   Number of days: 290       Code Status:  Full Code        Corazon Marx PA-C

## 2023-11-11 ENCOUNTER — APPOINTMENT (OUTPATIENT)
Dept: RADIOLOGY | Facility: HOSPITAL | Age: 63
DRG: 243 | End: 2023-11-11
Payer: MEDICARE

## 2023-11-11 LAB
ALBUMIN SERPL BCP-MCNC: 3.8 G/DL (ref 3.4–5)
ALP SERPL-CCNC: 166 U/L (ref 33–136)
ALT SERPL W P-5'-P-CCNC: 19 U/L (ref 10–52)
ANION GAP SERPL CALC-SCNC: 14 MMOL/L (ref 10–20)
AST SERPL W P-5'-P-CCNC: 27 U/L (ref 9–39)
BILIRUB SERPL-MCNC: 1.1 MG/DL (ref 0–1.2)
BUN SERPL-MCNC: 18 MG/DL (ref 6–23)
CALCIUM SERPL-MCNC: 9.3 MG/DL (ref 8.6–10.3)
CHLORIDE SERPL-SCNC: 107 MMOL/L (ref 98–107)
CO2 SERPL-SCNC: 24 MMOL/L (ref 21–32)
CREAT SERPL-MCNC: 0.68 MG/DL (ref 0.5–1.3)
ERYTHROCYTE [DISTWIDTH] IN BLOOD BY AUTOMATED COUNT: 15.4 % (ref 11.5–14.5)
GFR SERPL CREATININE-BSD FRML MDRD: >90 ML/MIN/1.73M*2
GLUCOSE SERPL-MCNC: 94 MG/DL (ref 74–99)
HCT VFR BLD AUTO: 35.8 % (ref 41–52)
HGB BLD-MCNC: 12.2 G/DL (ref 13.5–17.5)
MAGNESIUM SERPL-MCNC: 1.75 MG/DL (ref 1.6–2.4)
MCH RBC QN AUTO: 28.8 PG (ref 26–34)
MCHC RBC AUTO-ENTMCNC: 34.1 G/DL (ref 32–36)
MCV RBC AUTO: 85 FL (ref 80–100)
NRBC BLD-RTO: 0 /100 WBCS (ref 0–0)
PHOSPHATE SERPL-MCNC: 3.7 MG/DL (ref 2.5–4.9)
PLATELET # BLD AUTO: 85 X10*3/UL (ref 150–450)
POTASSIUM SERPL-SCNC: 3.3 MMOL/L (ref 3.5–5.3)
PROT SERPL-MCNC: 6.3 G/DL (ref 6.4–8.2)
RBC # BLD AUTO: 4.23 X10*6/UL (ref 4.5–5.9)
SODIUM SERPL-SCNC: 142 MMOL/L (ref 136–145)
WBC # BLD AUTO: 8.2 X10*3/UL (ref 4.4–11.3)

## 2023-11-11 PROCEDURE — 71045 X-RAY EXAM CHEST 1 VIEW: CPT | Mod: FY

## 2023-11-11 PROCEDURE — 71045 X-RAY EXAM CHEST 1 VIEW: CPT | Mod: 76

## 2023-11-11 PROCEDURE — 71045 X-RAY EXAM CHEST 1 VIEW: CPT | Performed by: RADIOLOGY

## 2023-11-11 PROCEDURE — 2500000001 HC RX 250 WO HCPCS SELF ADMINISTERED DRUGS (ALT 637 FOR MEDICARE OP)

## 2023-11-11 PROCEDURE — 80053 COMPREHEN METABOLIC PANEL: CPT

## 2023-11-11 PROCEDURE — 2060000001 HC INTERMEDIATE ICU ROOM DAILY

## 2023-11-11 PROCEDURE — 85027 COMPLETE CBC AUTOMATED: CPT | Performed by: INTERNAL MEDICINE

## 2023-11-11 PROCEDURE — 2500000004 HC RX 250 GENERAL PHARMACY W/ HCPCS (ALT 636 FOR OP/ED): Performed by: STUDENT IN AN ORGANIZED HEALTH CARE EDUCATION/TRAINING PROGRAM

## 2023-11-11 PROCEDURE — 99233 SBSQ HOSP IP/OBS HIGH 50: CPT | Performed by: INTERNAL MEDICINE

## 2023-11-11 PROCEDURE — 1200000002 HC GENERAL ROOM WITH TELEMETRY DAILY

## 2023-11-11 PROCEDURE — 99291 CRITICAL CARE FIRST HOUR: CPT | Performed by: STUDENT IN AN ORGANIZED HEALTH CARE EDUCATION/TRAINING PROGRAM

## 2023-11-11 PROCEDURE — 84100 ASSAY OF PHOSPHORUS: CPT | Performed by: INTERNAL MEDICINE

## 2023-11-11 PROCEDURE — 83735 ASSAY OF MAGNESIUM: CPT | Performed by: INTERNAL MEDICINE

## 2023-11-11 PROCEDURE — 2500000001 HC RX 250 WO HCPCS SELF ADMINISTERED DRUGS (ALT 637 FOR MEDICARE OP): Performed by: INTERNAL MEDICINE

## 2023-11-11 PROCEDURE — 37799 UNLISTED PX VASCULAR SURGERY: CPT

## 2023-11-11 PROCEDURE — 2500000004 HC RX 250 GENERAL PHARMACY W/ HCPCS (ALT 636 FOR OP/ED): Performed by: INTERNAL MEDICINE

## 2023-11-11 PROCEDURE — 2500000005 HC RX 250 GENERAL PHARMACY W/O HCPCS

## 2023-11-11 PROCEDURE — 71045 X-RAY EXAM CHEST 1 VIEW: CPT | Mod: REPEAT PROCEDURE BY SAME PHYSICIAN | Performed by: RADIOLOGY

## 2023-11-11 RX ORDER — LORAZEPAM 0.5 MG/1
0.5 TABLET ORAL ONCE
Status: COMPLETED | OUTPATIENT
Start: 2023-11-11 | End: 2023-11-11

## 2023-11-11 RX ORDER — LORAZEPAM 0.5 MG/1
TABLET ORAL
Status: COMPLETED
Start: 2023-11-11 | End: 2023-11-11

## 2023-11-11 RX ADMIN — LORAZEPAM 0.5 MG: 0.5 TABLET ORAL at 21:07

## 2023-11-11 RX ADMIN — GABAPENTIN 100 MG: 100 CAPSULE ORAL at 08:40

## 2023-11-11 RX ADMIN — OXYCODONE HYDROCHLORIDE AND ACETAMINOPHEN 1 TABLET: 5; 325 TABLET ORAL at 05:59

## 2023-11-11 RX ADMIN — PANTOPRAZOLE SODIUM 40 MG: 40 TABLET, DELAYED RELEASE ORAL at 21:45

## 2023-11-11 RX ADMIN — Medication 10 ML: at 08:53

## 2023-11-11 RX ADMIN — HYDROMORPHONE HYDROCHLORIDE 0.2 MG: 1 INJECTION, SOLUTION INTRAMUSCULAR; INTRAVENOUS; SUBCUTANEOUS at 23:47

## 2023-11-11 RX ADMIN — LIDOCAINE 2 PATCH: 4 PATCH TOPICAL at 08:40

## 2023-11-11 RX ADMIN — PANTOPRAZOLE SODIUM 40 MG: 40 TABLET, DELAYED RELEASE ORAL at 08:40

## 2023-11-11 RX ADMIN — APIXABAN 5 MG: 5 TABLET, FILM COATED ORAL at 08:40

## 2023-11-11 RX ADMIN — GABAPENTIN 100 MG: 100 CAPSULE ORAL at 21:46

## 2023-11-11 RX ADMIN — APIXABAN 5 MG: 5 TABLET, FILM COATED ORAL at 21:46

## 2023-11-11 RX ADMIN — OXYCODONE HYDROCHLORIDE AND ACETAMINOPHEN 1 TABLET: 5; 325 TABLET ORAL at 15:27

## 2023-11-11 RX ADMIN — SUCRALFATE 1 G: 1 TABLET ORAL at 08:40

## 2023-11-11 RX ADMIN — HYDROMORPHONE HYDROCHLORIDE 0.2 MG: 1 INJECTION, SOLUTION INTRAMUSCULAR; INTRAVENOUS; SUBCUTANEOUS at 16:57

## 2023-11-11 ASSESSMENT — ENCOUNTER SYMPTOMS
NAUSEA: 0
JOINT SWELLING: 0
CHILLS: 0
BLOOD IN STOOL: 0
SHORTNESS OF BREATH: 0
FEVER: 0
SLEEP DISTURBANCE: 0
WHEEZING: 0
MYALGIAS: 0
CONFUSION: 0
WEAKNESS: 0
POLYDIPSIA: 0
POLYPHAGIA: 0
VOMITING: 0
BRUISES/BLEEDS EASILY: 0
COUGH: 0
PHOTOPHOBIA: 0
DIZZINESS: 0
HEMATURIA: 0
FLANK PAIN: 0
CONSTIPATION: 0
PALPITATIONS: 0
ABDOMINAL PAIN: 0
WOUND: 0
ARTHRALGIAS: 0
HEADACHES: 0
FREQUENCY: 0
DIARRHEA: 0

## 2023-11-11 ASSESSMENT — PAIN - FUNCTIONAL ASSESSMENT: PAIN_FUNCTIONAL_ASSESSMENT: 0-10

## 2023-11-11 ASSESSMENT — PAIN SCALES - GENERAL
PAINLEVEL_OUTOF10: 3
PAINLEVEL_OUTOF10: 0 - NO PAIN
PAINLEVEL_OUTOF10: 5 - MODERATE PAIN
PAINLEVEL_OUTOF10: 0 - NO PAIN
PAINLEVEL_OUTOF10: 0 - NO PAIN
PAINLEVEL_OUTOF10: 5 - MODERATE PAIN

## 2023-11-11 NOTE — PROGRESS NOTES
Subjective Data:  No events.  He had an anxiety attack   He has intemittent pleurtic chest pain improves with pain killers    Overnight Events:    Tele with rare pvcs, one NSVT 5 beats (he felt it)      Objective Data:  Last Recorded Vitals:  Vitals:    11/11/23 0500 11/11/23 0600 11/11/23 0900 11/11/23 0957   BP: 100/70 93/65 101/67    BP Location:   Left arm    Patient Position:   Sitting    Pulse: 83 97 80    Resp: 16 15 20    Temp:   37 °C (98.6 °F)    TempSrc:   Temporal    SpO2: 96% 95% 94%    Weight:  70.6 kg (155 lb 10.3 oz)  70.6 kg (155 lb 10.3 oz)   Height:           Last Labs:  CBC - 11/11/2023:  5:44 AM  8.2 12.2 85    35.8      CMP - 11/11/2023:  5:44 AM  9.3 6.3 27 --- 1.1   3.7 3.8 19 166      PTT - 1/8/2023:  3:00 PM  1.3   15.6 36     TROPHS   Date/Time Value Ref Range Status   11/09/2023 05:58 PM 61 0 - 20 ng/L Final   11/05/2023 04:08 PM 6 0 - 20 ng/L Final   11/05/2023 02:56 PM 8 0 - 20 ng/L Final     BNP   Date/Time Value Ref Range Status   01/31/2023 12:55 PM 37 0 - 99 pg/mL Final     Comment:     .  <100 pg/mL - Heart failure unlikely  100-299 pg/mL - Intermediate probability of acute heart  .               failure exacerbation. Correlate with clinical  .               context and patient history.    >=300 pg/mL - Heart Failure likely. Correlate with clinical  .               context and patient history.  BNP testing is performed using different testing   methodology at [facility] than at other   [facility] hospitals. Direct result comparisons should   only be made within the same method.     01/08/2023 01:00 PM 32 0 - 99 pg/mL Final     Comment:     .  <100 pg/mL - Heart failure unlikely  100-299 pg/mL - Intermediate probability of acute heart  .               failure exacerbation. Correlate with clinical  .               context and patient history.    >=300 pg/mL - Heart Failure likely. Correlate with clinical  .               context and patient history.  BNP testing is  performed using different testing   methodology at [facility] than at other   [facility]. Direct result comparisons should   only be made within the same method.       HGBA1C   Date/Time Value Ref Range Status   11/05/2023 02:56 PM 4.9 see below % Final      Last I/O:  I/O last 3 completed shifts:  In: 680 (9.6 mL/kg) [P.O.:680]  Out: 900 (12.7 mL/kg) [Urine:900 (0.4 mL/kg/hr)]  Weight: 70.6 kg     Past Cardiology Tests (Last 3 Years):  EKG:  No results found for this or any previous visit from the past 1095 days.    Echo:  Transthoracic Echo (TTE) Limited 11/10/2023      Transthoracic Echo (TTE) Complete 11/7/2023    Ejection Fractions:  EF   Date/Time Value Ref Range Status   11/07/2023 11:31 AM 56       Cath:  No results found for this or any previous visit from the past 1095 days.    Stress Test:  No results found for this or any previous visit from the past 1095 days.    Cardiac Imaging:  No results found for this or any previous visit from the past 1095 days.      Inpatient Medications:  Scheduled medications   Medication Dose Route Frequency    apixaban  5 mg oral BID    gabapentin  100 mg oral BID    lidocaine  2 patch transdermal Daily    pantoprazole  40 mg oral BID    perflutren lipid microspheres  0.5-10 mL of dilution intravenous Once in imaging    perflutren protein A microsphere  0.5 mL intravenous Once in imaging    sodium chloride 0.9%  10 mL intra-catheter q12h    sucralfate  1 g oral Daily    sulfur hexafluoride microsphr  2 mL intravenous Once in imaging     PRN medications   Medication    HYDROmorphone    HYDROmorphone    nitroglycerin    oxyCODONE-acetaminophen    oxygen    polyethylene glycol    sodium chloride    sodium chloride 0.9%     Continuous Medications   Medication Dose Last Rate       Constitutional:       Appearance: Normal appearance.   HENT:      Head: Normocephalic.      Nose: Nose normal.      Mouth/Throat:      Mouth: Mucous membranes are moist.   Eyes:       Conjunctiva/sclera: Conjunctivae normal.   Neck:      Comments: No JVD  Cardiovascular:      Rate and Rhythm: Normal rate and regular rhythm.      Heart sounds: No murmur heard.     No friction rub. No gallop.   Pulmonary:      Effort: Pulmonary effort is normal.   Abdominal:      Palpations: Abdomen is soft.   Musculoskeletal:         General: No swelling. Normal range of motion.   Skin:     General: Skin is warm and dry.   Neurological:      General: No focal deficit present.      Mental Status: He is alert. Mental status is at baseline.   Psychiatric:         Mood and Affect: Mood normal.         Behavior: Behavior normal.      Assessment/Plan   Massimo Garcia is a 61 yo male with a PMH of metastatic esophageal cancer, portal vein thrombosis who was admitted w/ complaint of near syncope, bradycardia c/f CHB. Yesterday afternoon patient with progressive episodes of complete heart block, symptomatic. Required transfer to ICU transcutaneous pacing. NPO for BiV PPM today.      #Symptomatic Bradycardia, complete heart block s/p PPM insertion 11/9/23. resolved  #L Pneumothorax, slowly improving   #Pleuritic chest pain     -PPM insertion 11/9/23  -Repeated echo with no effusion  -CXR this AM with small ptx, serial imaging. HD stable. Further management per ICU team  -prn pain killers   -has f/u scheduled on 11/28    Critical care time 50 mins    Discussed with Dr. uK    Will sign off       Implantable Port 01/24/23 Right Chest Single lumen port (Active)   Site Assessment Clean;Dry;Intact 11/11/23 0800   Dressing Status Clean;Dry;Occlusive 11/11/23 0800   Number of days: 291       Code Status:  Full Code    I spent  minutes in the professional and overall care of this patient.        Lior Hunter MD

## 2023-11-11 NOTE — CARE PLAN
Problem: Pain - Adult  Goal: Verbalizes/displays adequate comfort level or baseline comfort level  Outcome: Progressing     Problem: Pain - Adult  Goal: Verbalizes/displays adequate comfort level or baseline comfort level  Outcome: Progressing     Problem: ACS/CP/NSTEMI/STEMI  Goal: Verbalize understanding of procedures/devices  Outcome: Progressing     Problem: ACS/CP/NSTEMI/STEMI  Goal: Verbalize understanding of procedures/devices  Outcome: Progressing     Problem: Safety  Goal: Patient will be injury free during hospitalization  Outcome: Progressing     Problem: Safety  Goal: I will remain free of falls  Outcome: Progressing     Problem: ACS/CP/NSTEMI/STEMI  Goal: Verbalize understanding of procedures/devices  Outcome: Progressing     Problem: ACS/CP/NSTEMI/STEMI  Goal: Verbalize understanding of procedures/devices  Outcome: Progressing     Problem: Safety - Adult  Goal: Free from fall injury  Outcome: Progressing   The patient's goals for the shift include      The clinical goals for the shift include Patient will remain HDS throughout shift.    Over the shift, the patient did not make progress toward the following goals. Barriers to progression include . Recommendations to address these barriers include .

## 2023-11-11 NOTE — PROGRESS NOTES
Massimo Garcia is a 62 y.o. male on day 5 of admission presenting with Third degree heart block (CMS/HCC) now day 1 status post dual-chamber permanent pacemaker placement.      Subjective   No acute events overnight.  Patient had dual-chamber permanent pacemaker placement on 11/9/2023.   Denies any visual changes, chest pain, shortness of breath, cough, palpitations, abdominal pain, change in urinary or bowel frequency, new onset weakness.    Review of Systems   Constitutional:  Negative for chills and fever.   HENT:  Negative for hearing loss and mouth sores.    Eyes:  Negative for photophobia.   Respiratory:  Negative for cough, shortness of breath and wheezing.    Cardiovascular:  Negative for chest pain and palpitations.   Gastrointestinal:  Negative for abdominal pain, blood in stool, constipation, diarrhea, nausea and vomiting.   Endocrine: Negative for cold intolerance, heat intolerance, polydipsia, polyphagia and polyuria.   Genitourinary:  Negative for flank pain, frequency, hematuria and urgency.   Musculoskeletal:  Negative for arthralgias, joint swelling and myalgias.   Skin:  Negative for rash and wound.   Allergic/Immunologic: Negative for environmental allergies, food allergies and immunocompromised state.   Neurological:  Negative for dizziness, weakness and headaches.   Hematological:  Does not bruise/bleed easily.   Psychiatric/Behavioral:  Negative for confusion, self-injury, sleep disturbance and suicidal ideas.           Objective     Last Recorded Vitals  /67 (BP Location: Left arm, Patient Position: Sitting)   Pulse 90   Temp 37 °C (98.6 °F) (Temporal)   Resp 18   Wt 70.6 kg (155 lb 10.3 oz)   SpO2 100%     Physical Exam  Constitutional:       General: He is not in acute distress.  HENT:      Head: Normocephalic and atraumatic.      Nose: Nose normal.   Cardiovascular:      Rate and Rhythm: Normal rate and regular rhythm.      Heart sounds: Normal heart sounds.      Comments: Right-sided  Chemo-Port with right internal jugular access.  The site is clean dry and intact with no obvious signs of active drainage, bleeding, induration, or signs of infection.      Left-sided pacemaker incision wound covered with bandages.  The site is clean and dry.  There is no signs of active drainage, or bleeding.  Pulmonary:      Breath sounds: Normal breath sounds. No stridor.   Abdominal:      General: Bowel sounds are normal. There is no distension.      Palpations: Abdomen is soft. There is no mass.      Tenderness: There is no abdominal tenderness.   Musculoskeletal:         General: Normal range of motion.      Right lower leg: No edema.      Left lower leg: No edema.   Skin:     General: Skin is warm.      Capillary Refill: Capillary refill takes less than 2 seconds.   Neurological:      General: No focal deficit present.      Mental Status: He is alert and oriented to person, place, and time. Mental status is at baseline.   Psychiatric:         Mood and Affect: Mood normal.         Behavior: Behavior normal.         Thought Content: Thought content normal.         Judgment: Judgment normal.         Intake/Output last 3 Shifts:    Intake/Output Summary (Last 24 hours) at 11/11/2023 1333  Last data filed at 11/11/2023 0957  Gross per 24 hour   Intake 960 ml   Output 300 ml   Net 660 ml         Weight: 74.8 kg (165 lb) (11/05/23 1402)  Daily Weight  11/11/23 : 70.6 kg (155 lb 10.3 oz)      Labs  Results for orders placed or performed during the hospital encounter of 11/05/23 (from the past 24 hour(s))   POCT GLUCOSE   Result Value Ref Range    POCT Glucose 125 (H) 74 - 99 mg/dL   CBC   Result Value Ref Range    WBC 8.2 4.4 - 11.3 x10*3/uL    nRBC 0.0 0.0 - 0.0 /100 WBCs    RBC 4.23 (L) 4.50 - 5.90 x10*6/uL    Hemoglobin 12.2 (L) 13.5 - 17.5 g/dL    Hematocrit 35.8 (L) 41.0 - 52.0 %    MCV 85 80 - 100 fL    MCH 28.8 26.0 - 34.0 pg    MCHC 34.1 32.0 - 36.0 g/dL    RDW 15.4 (H) 11.5 - 14.5 %    Platelets 85 (L) 150 -  450 x10*3/uL   Magnesium   Result Value Ref Range    Magnesium 1.75 1.60 - 2.40 mg/dL   Comprehensive Metabolic Panel   Result Value Ref Range    Glucose 94 74 - 99 mg/dL    Sodium 142 136 - 145 mmol/L    Potassium 3.3 (L) 3.5 - 5.3 mmol/L    Chloride 107 98 - 107 mmol/L    Bicarbonate 24 21 - 32 mmol/L    Anion Gap 14 10 - 20 mmol/L    Urea Nitrogen 18 6 - 23 mg/dL    Creatinine 0.68 0.50 - 1.30 mg/dL    eGFR >90 >60 mL/min/1.73m*2    Calcium 9.3 8.6 - 10.3 mg/dL    Albumin 3.8 3.4 - 5.0 g/dL    Alkaline Phosphatase 166 (H) 33 - 136 U/L    Total Protein 6.3 (L) 6.4 - 8.2 g/dL    AST 27 9 - 39 U/L    Bilirubin, Total 1.1 0.0 - 1.2 mg/dL    ALT 19 10 - 52 U/L   Phosphorus   Result Value Ref Range    Phosphorus 3.7 2.5 - 4.9 mg/dL       Image Results  XR chest 1 view  Narrative: Interpreted By:  Crissy Grey,   STUDY:  XR CHEST 1 VIEW;  11/10/2023 10:00 pm      INDICATION:  Signs/Symptoms:chest pain, pneumothorax, rule out worsening pneumo      COMPARISON:  Chest x-ray earlier same date 11/10/2023      ACCESSION NUMBER(S):  [id number]      ORDERING CLINICIAN:  BENJAMIN LAYTON      TECHNIQUE:  Portable upright frontal view of the chest was obtained .      FINDINGS:  There is a left-sided pacemaker.  There is a right-sided Port-A-Cath with the tip overlying the region  of the SVC.      The cardiomediastinal silhouette is within normal limits.      Redemonstration of small-moderate left-sided pneumothorax, appears  mildly increased along the lateral aspect. There is mild atelectasis  at the left lung base. No sizable pleural effusion. There is trace  pneumothorax at the right upper lung.      Orthopedic hardware noted at the bilateral shoulders.      Impression: 1. Redemonstration of small-moderate left-sided pneumothorax, appears  mildly increased along the lateral aspect.  2. Trace right pneumothorax.  3. Mild atelectasis at the left lung base.              MACRO:  None.      Signed by: Crissy Grey 11/10/2023  11:08 PM  Dictation workstation:   [id number]  XR chest 1 view  Narrative: Interpreted By:  Lucas Lambert,   STUDY:  Chest dated  11/10/2023.      INDICATION:  Signs/Symptoms:follow up pneumothorax      COMPARISON:  Chest from earlier on same date.      ACCESSION NUMBER(S):  [id number]      ORDERING CLINICIAN:  FALGUNI WHITING      TECHNIQUE:  One view of the chest.      FINDINGS:  There is a small left pneumothorax. It appears to be similar than the  prior exam. No effusion is evident. The cardiomediastinal silhouette  is  not enlarged.There is a pulse generator on the right chest wall  wtih leads tracking over the heart.There is similar appearance to  right chest wall port. Surgical changes seen of the shoulders.  Degenerative changes seen of the spine and shoulders.      Impression: Similar appearance of small left pneumothorax. Continued monitoring  is recommended.      MACRO:  None      Signed by: Lucas Lambert 11/10/2023 4:25 PM  Dictation workstation:   [id number]  XR chest 1 view  Narrative: Interpreted By:  Lucas Lambert,   STUDY:  Chest dated  11/10/2023.      INDICATION:  Signs/Symptoms:evaluation of pneumothorax      COMPARISON:  Chest from earlier on same date.      ACCESSION NUMBER(S):  [id number]      ORDERING CLINICIAN:  RICH WREN      TECHNIQUE:  One view of the chest.      FINDINGS:  There is redemonstration of a small left pneumothorax. It appears to  be more conspicuous particularly at the lung apex; however, the  current radiograph is up right and prior radiograph with semi  upright. The cardiomediastinal silhouette is  not enlarged.There is a  pulse generator on the left chest wall with leads tracking over the  heart.Degenerative changes seen of the spine and shoulders. Surgical  changes seen of the shoulders. There is a port on the right chest  wall with the tip of the catheter projecting over the mid SVC.      Impression: Given differences in technique the small right  pneumothorax may be  similar to slightly larger than the prior exam.      MACRO:  None      Signed by: Lucas Lambert 11/10/2023 4:23 PM  Dictation workstation:   [id number]  Transthoracic Echo (TTE) Limited     [facility], [address] [place] [address]                Tel [phone] and Fax [phone]    TRANSTHORACIC ECHOCARDIOGRAM REPORT       Patient Name:      GETACHEW MG            Soraya Physician:    Mahamed Key MD  Study Date:        11/10/2023           Ordering Provider:    Mahamed KEY  MRN/PID:           [id number]             Fellow:  Accession#:        [id number]         Nurse:  Date of Birth/Age: 1960 / 62      Sonographer:          Merced collins RDCS  Gender:            M                    Additional Staff:  Height:            170.18 cm            Admit Date:           11/5/2023  Weight:            80.74 kg             Admission Status:     Inpatient -                                                                Routine  BSA:               1.92 m2              Encounter#:           [id number]                                          Department Location:  [facility] ICU  Blood Pressure: 125 /68 mmHg    Study Type:    TRANSTHORACIC ECHO (TTE) LIMITED  Diagnosis/ICD: Other pericardial effusion (noninflammatory)-I31.39  Indication:    Pericardial effusion  CPT Code:      Echo Limited-90593   Study Detail: The following Echo studies were performed: 2D and M-Mode.       PHYSICIAN INTERPRETATION:  Left Ventricle: The left ventricular systolic function is normal. The left ventricular cavity size was not assessed. Left ventricular diastolic filling was not assessed.  Left Atrium: The left atrium was not assessed.  Right Ventricle: The right ventricle was not assessed.  Right ventricular systolic function not assessed. A device is visualized in the right ventricle.  Right Atrium: The right atrium was not assessed. There is a device visualized in the right atrium.  Aortic Valve: The aortic valve was not assessed. Aortic valve regurgitation was not assessed.  Mitral Valve: The mitral valve was not assessed. Mitral valve regurgitation was not assessed.  Tricuspid Valve: The tricuspid valve was not assessed. Tricuspid regurgitation was not assessed.  Pulmonic Valve: The pulmonic valve was not assessed. Pulmonic valve regurgitation was not assessed.  Pericardium: There is no pericardial effusion noted.  Aorta: The aortic root was not assessed.  Systemic Veins: The inferior vena cava appears to be of normal size. There is IVC inspiratory collapse greater than 50%.       CONCLUSIONS:   1. Left ventricular systolic function is normal.   2. There is no pericardial effusion noted.    QUANTITATIVE DATA SUMMARY:  TRICUSPID VALVE/RVSP:                    Normal Ranges:  IVC Diam: 1.60 cm       [id number] Lior Hunter MD  Electronically signed on 11/10/2023 at 2:31:02 PM       ** Final (Updated) **  XR chest 1 view  Narrative: Interpreted By:  Lucas Lambert,   STUDY:  Chest dated  11/10/2023.      INDICATION:  Signs/Symptoms:pacer placement      COMPARISON:  Chest dated 11/09/2023.      ACCESSION NUMBER(S):  [id number]      ORDERING CLINICIAN:  BENJAMIN LAYTON      TECHNIQUE:  One view of the chest.      FINDINGS:  There is a small left pneumothorax measuring approximately 1.2 cm  from the lung margin to the thoracic cage.  The cardiomediastinal  silhouette is  not enlarged.There is a pulse generator on the left  chest wall with leads tracking over the heart. There are bilateral  shoulder replacements.      Impression: Small left pneumothorax. Short-term follow-up radiograph is  recommended to reassess.      MACRO:  Lucas Lambert discussed the significance and urgency of this  critical finding by  EPIC chat with  ARTIE WEIR on 11/10/2023 at  8:56 am.  (**-F-**) Findings:  See findings.      Signed by: Lucas Fausto 11/10/2023 8:57 AM  Dictation workstation:   [id number]        Current Facility-Administered Medications:     apixaban (Eliquis) tablet 5 mg, 5 mg, oral, BID, Rafitaemppatrickife KIMI Amado MD, 5 mg at 11/11/23 0840    gabapentin (Neurontin) capsule 100 mg, 100 mg, oral, BID, Alexis Shook MD, 100 mg at 11/11/23 0840    HYDROmorphone (Dilaudid) injection 0.2 mg, 0.2 mg, intravenous, q3h PRN, Jaylyn Mancuso DO, 0.2 mg at 11/10/23 2311    HYDROmorphone (Dilaudid) injection 0.2 mg, 0.2 mg, intravenous, q3h PRN, Elias Connolly MD    lidocaine 4 % patch 2 patch, 2 patch, transdermal, Daily, Jaylyn Mancuso DO, 2 patch at 11/11/23 0840    nitroglycerin (Nitrostat) SL tablet 0.4 mg, 0.4 mg, sublingual, q5 min PRN, Aleks Amado MD, 0.4 mg at 11/09/23 1758    oxyCODONE-acetaminophen (Percocet) 5-325 mg per tablet 1 tablet, 1 tablet, oral, q6h PRN, Artie Weir MD, 1 tablet at 11/11/23 0559    [START ON 11/12/2023] oxygen (O2) therapy, , inhalation, Continuous - 02/gases, Tim Payton MD    pantoprazole (ProtoNix) EC tablet 40 mg, 40 mg, oral, BID, Alexis Shook MD, 40 mg at 11/11/23 0840    perflutren lipid microspheres (Definity) injection 0.5-10 mL of dilution, 0.5-10 mL of dilution, intravenous, Once in imaging, Alexis Shook MD    perflutren protein A microsphere (Optison) injection 0.5 mL, 0.5 mL, intravenous, Once in imaging, Alexis Shook MD    polyethylene glycol (Glycolax, Miralax) packet 17 g, 17 g, oral, Daily PRN, Alexis Shook MD    sodium chloride (Ocean) 0.65 % nasal spray 1 spray, 1 spray, Each Nostril, PRN, Aleks Amado MD, 1 spray at 11/08/23 1331    sodium chloride 0.9% flush 10 mL, 10 mL, intra-catheter, q12h, Alexis Shook MD, 10 mL at 11/11/23 0853    sodium chloride 0.9% flush 10 mL, 10 mL, intra-catheter, PRN, Alexis Shook MD, 10 mL at  11/10/23 2207    sucralfate (Carafate) tablet 1 g, 1 g, oral, Daily, Alexis Shook MD, 1 g at 11/11/23 0840    sulfur hexafluoride microsphr (Lumason) injection 24.28 mg, 2 mL, intravenous, Once in imaging, Alexis Shook MD        Assessment/Plan   Massimo Garcia is a 62 y.o. male on day 5 admitted for Third degree heart block (CMS/HCC).  He has a past medical history significant for metastatic stage IV esophageal cancer HER2 positive with liver mets (currently on chemo with trastuzumab FOLFOX regimen), hx PE and portal vein thrombosis on Eliquis who presented to [facility] ED on 11/5/23 with c/o syncope. Pt was admitted to the ICU for symptomatic bradycardia progressing to complete heart block requiring transvenous pacing.  He is now day 1 status post dual-chamber permanent pacemaker placement done by Dr. Anderson on 11/9/2023.     Neuro/psych   #Neuropathy   - Continue gabapentin at 100mg BID      #Dizziness and Lightheadedness (Resolved)  #Nausea (Resolved)  -Likely secondary to symptomatic bradycardia and complete heart block    Cardiovascular  #Symptomatic bradycardia   #New onset complete heart block  - Patients having recurrent episodes of bradycardia with HR in the range of 35-49  - Associated sinus pauses on telemetry with longest pause being about 5 seconds  - Etiology is unknown at this time, there is no concern for b-blocker or lymes. Patient however has been on trastuzumab for an esophageal cancer, which may lead to some cardio toxicity   - s/p transcutaneous pacing with voltage of 120mV and mechanical capture with rate of 80bpm   - Transvenous pacemaker on 11/8/2023  - Dual-chamber permanent pacemaker placement with Dr. Anderson on 11/9/2023  - Received 1 dose of intraoperative cefazolin  - Interrogation of PPM  - 1 view chest x-ray ordered at 0600 on 11/10/2023 for pacemaker placement, which showed small left-sided pneumothorax       #HTN  - Patient has a hx of HTN, not currently on medications   -  Currently normotensive  - Will monitor at this time      Pulmonary   #Left sided pneumothorax status post pacemaker placement  -Chest x-ray from 0600 on 11/10/2023 showed small left-sided pneumothorax measuring 1.2 cm laterally  -Bedside ultrasound showed no lung sliding with evidence of barcode sign  -Patient remains asymptomatic, does not complain of any dyspnea, shortness of breath  -Placed on nonrebreather at 0930  -Repeat chest x-ray at 1230 showed similar lateral measurement at 1.2 cm, with questionable enlargement of pneumothorax in the left apex  -Chest X ray at 4 pm 11/11/23      GI/  #GI ppx  -Protonix 40 mg BID     Hematology/Oncology   # Metastatic stage IV Esophageal Cancer   - Follows with Heme/Onc Dr. Blake   - Last chemo 10/30 with Trastuzumab FOLFOX   - Lidocaine cream for port use  - Oxycodone 10mg bid at home baseline for Cancer pain management  - Pain regimen:              - Percocet 5-325mg, 2 tabs q6h prn for severe pain              - Dilaudid 0.2mg q3h prn for breakthrough pain              - Lidocaine patch, 2, at sites of transcutaneous pad placement     #Anemia   - Hb is  and stable  - Will monitor at this time      #Hx of Portal Vein thrombosis   - Continuing to hold home Eliquis 5 mg twice daily   - Last received Eliquis 11/8 at 0800  - Will need to restart anticoag with cardio approval or upon discharge    Infectious disease  -No active issues    Renal   #Hypokalemia   - Monitor and replete     MSK/skin  -No active issues      Fluids: PRN bolus   Electrolytes: replete as needed  Nutrition: Regular  GI PPx: Protonix 40mg BID  DVT PPx: Restarted home Eliquis 5 mg twice daily  Lines: right sided chemo port, right internal jugular access  Antibiotics: Completed intraoperative cefazolin  Code: Full     Dispo: Mr. Garcia is a 62-year-old male admitted to the ICU for management of symptomatic bradycardia, found to be in complete heart block now status post permanent pacemaker placement on  11/9/2023.  Small left-sided pneumothorax on chest x-ray.  Continue nonrebreather and will repeat chest x-ray    Radha Gar MD

## 2023-11-12 ENCOUNTER — APPOINTMENT (OUTPATIENT)
Dept: RADIOLOGY | Facility: HOSPITAL | Age: 63
DRG: 243 | End: 2023-11-12
Payer: MEDICARE

## 2023-11-12 LAB
ALBUMIN SERPL BCP-MCNC: 3.6 G/DL (ref 3.4–5)
ANION GAP SERPL CALC-SCNC: 13 MMOL/L (ref 10–20)
BUN SERPL-MCNC: 17 MG/DL (ref 6–23)
CALCIUM SERPL-MCNC: 9.1 MG/DL (ref 8.6–10.3)
CHLORIDE SERPL-SCNC: 107 MMOL/L (ref 98–107)
CO2 SERPL-SCNC: 26 MMOL/L (ref 21–32)
CREAT SERPL-MCNC: 0.75 MG/DL (ref 0.5–1.3)
ERYTHROCYTE [DISTWIDTH] IN BLOOD BY AUTOMATED COUNT: 15.4 % (ref 11.5–14.5)
GFR SERPL CREATININE-BSD FRML MDRD: >90 ML/MIN/1.73M*2
GLUCOSE SERPL-MCNC: 84 MG/DL (ref 74–99)
HCT VFR BLD AUTO: 33.8 % (ref 41–52)
HGB BLD-MCNC: 11.6 G/DL (ref 13.5–17.5)
MAGNESIUM SERPL-MCNC: 1.65 MG/DL (ref 1.6–2.4)
MCH RBC QN AUTO: 29.3 PG (ref 26–34)
MCHC RBC AUTO-ENTMCNC: 34.3 G/DL (ref 32–36)
MCV RBC AUTO: 85 FL (ref 80–100)
NRBC BLD-RTO: 0 /100 WBCS (ref 0–0)
PHOSPHATE SERPL-MCNC: 4.1 MG/DL (ref 2.5–4.9)
PLATELET # BLD AUTO: 74 X10*3/UL (ref 150–450)
POTASSIUM SERPL-SCNC: 3.5 MMOL/L (ref 3.5–5.3)
RBC # BLD AUTO: 3.96 X10*6/UL (ref 4.5–5.9)
SODIUM SERPL-SCNC: 142 MMOL/L (ref 136–145)
WBC # BLD AUTO: 6.8 X10*3/UL (ref 4.4–11.3)

## 2023-11-12 PROCEDURE — 37799 UNLISTED PX VASCULAR SURGERY: CPT | Performed by: INTERNAL MEDICINE

## 2023-11-12 PROCEDURE — 2500000001 HC RX 250 WO HCPCS SELF ADMINISTERED DRUGS (ALT 637 FOR MEDICARE OP): Performed by: INTERNAL MEDICINE

## 2023-11-12 PROCEDURE — 2500000004 HC RX 250 GENERAL PHARMACY W/ HCPCS (ALT 636 FOR OP/ED): Performed by: STUDENT IN AN ORGANIZED HEALTH CARE EDUCATION/TRAINING PROGRAM

## 2023-11-12 PROCEDURE — 99233 SBSQ HOSP IP/OBS HIGH 50: CPT | Performed by: INTERNAL MEDICINE

## 2023-11-12 PROCEDURE — 83735 ASSAY OF MAGNESIUM: CPT | Performed by: INTERNAL MEDICINE

## 2023-11-12 PROCEDURE — 1200000002 HC GENERAL ROOM WITH TELEMETRY DAILY

## 2023-11-12 PROCEDURE — 85027 COMPLETE CBC AUTOMATED: CPT | Performed by: INTERNAL MEDICINE

## 2023-11-12 PROCEDURE — 2500000004 HC RX 250 GENERAL PHARMACY W/ HCPCS (ALT 636 FOR OP/ED): Performed by: INTERNAL MEDICINE

## 2023-11-12 PROCEDURE — 71046 X-RAY EXAM CHEST 2 VIEWS: CPT | Performed by: RADIOLOGY

## 2023-11-12 PROCEDURE — 2500000001 HC RX 250 WO HCPCS SELF ADMINISTERED DRUGS (ALT 637 FOR MEDICARE OP)

## 2023-11-12 PROCEDURE — 2500000005 HC RX 250 GENERAL PHARMACY W/O HCPCS

## 2023-11-12 PROCEDURE — 71046 X-RAY EXAM CHEST 2 VIEWS: CPT | Mod: FY

## 2023-11-12 PROCEDURE — 2500000004 HC RX 250 GENERAL PHARMACY W/ HCPCS (ALT 636 FOR OP/ED)

## 2023-11-12 PROCEDURE — 93010 ELECTROCARDIOGRAM REPORT: CPT | Performed by: INTERNAL MEDICINE

## 2023-11-12 PROCEDURE — 80069 RENAL FUNCTION PANEL: CPT | Performed by: INTERNAL MEDICINE

## 2023-11-12 RX ORDER — ACETAMINOPHEN 325 MG/1
650 TABLET ORAL EVERY 6 HOURS PRN
Status: DISCONTINUED | OUTPATIENT
Start: 2023-11-12 | End: 2023-11-13 | Stop reason: HOSPADM

## 2023-11-12 RX ORDER — METOPROLOL SUCCINATE 25 MG/1
25 TABLET, EXTENDED RELEASE ORAL DAILY
Status: DISCONTINUED | OUTPATIENT
Start: 2023-11-12 | End: 2023-11-13 | Stop reason: HOSPADM

## 2023-11-12 RX ADMIN — Medication 10 ML: at 23:32

## 2023-11-12 RX ADMIN — METOPROLOL SUCCINATE 25 MG: 25 TABLET, FILM COATED, EXTENDED RELEASE ORAL at 16:31

## 2023-11-12 RX ADMIN — GABAPENTIN 100 MG: 100 CAPSULE ORAL at 09:50

## 2023-11-12 RX ADMIN — OXYCODONE HYDROCHLORIDE AND ACETAMINOPHEN 1 TABLET: 5; 325 TABLET ORAL at 16:04

## 2023-11-12 RX ADMIN — SUCRALFATE 1 G: 1 TABLET ORAL at 09:50

## 2023-11-12 RX ADMIN — HYDROMORPHONE HYDROCHLORIDE 0.2 MG: 1 INJECTION, SOLUTION INTRAMUSCULAR; INTRAVENOUS; SUBCUTANEOUS at 22:46

## 2023-11-12 RX ADMIN — Medication 10 ML: at 16:33

## 2023-11-12 RX ADMIN — APIXABAN 5 MG: 5 TABLET, FILM COATED ORAL at 22:49

## 2023-11-12 RX ADMIN — HYDROMORPHONE HYDROCHLORIDE 0.2 MG: 1 INJECTION, SOLUTION INTRAMUSCULAR; INTRAVENOUS; SUBCUTANEOUS at 06:14

## 2023-11-12 RX ADMIN — HYDROMORPHONE HYDROCHLORIDE 0.2 MG: 1 INJECTION, SOLUTION INTRAMUSCULAR; INTRAVENOUS; SUBCUTANEOUS at 16:31

## 2023-11-12 RX ADMIN — PANTOPRAZOLE SODIUM 40 MG: 40 TABLET, DELAYED RELEASE ORAL at 22:48

## 2023-11-12 RX ADMIN — GABAPENTIN 100 MG: 100 CAPSULE ORAL at 22:48

## 2023-11-12 RX ADMIN — APIXABAN 5 MG: 5 TABLET, FILM COATED ORAL at 09:50

## 2023-11-12 RX ADMIN — PANTOPRAZOLE SODIUM 40 MG: 40 TABLET, DELAYED RELEASE ORAL at 09:50

## 2023-11-12 RX ADMIN — LIDOCAINE 2 PATCH: 4 PATCH TOPICAL at 09:49

## 2023-11-12 ASSESSMENT — COGNITIVE AND FUNCTIONAL STATUS - GENERAL: MOBILITY SCORE: 24

## 2023-11-12 ASSESSMENT — PAIN - FUNCTIONAL ASSESSMENT
PAIN_FUNCTIONAL_ASSESSMENT: 0-10
PAIN_FUNCTIONAL_ASSESSMENT: CRIES (CRYING REQUIRES OXYGEN INCREASED VITAL SIGNS EXPRESSION SLEEP)

## 2023-11-12 ASSESSMENT — PAIN SCALES - GENERAL
PAINLEVEL_OUTOF10: 5 - MODERATE PAIN
PAINLEVEL_OUTOF10: 0 - NO PAIN

## 2023-11-12 NOTE — PROGRESS NOTES
Massimo Garcia is a 62 y.o. male on day 6 of admission presenting with Third degree heart block (CMS/HCC).      Subjective   Patient seen and evaluated today. Patient had a 6 beat run of vtach overnight which was symptomatic and spontaneously resolved. Had a repeat episode this am     Objective     Last Recorded Vitals  /75 (BP Location: Right arm, Patient Position: Lying)   Pulse 95   Temp 36.4 °C (97.5 °F) (Temporal)   Resp 20   Wt 70 kg (154 lb 5.2 oz)   SpO2 97%   Intake/Output last 3 Shifts:    Intake/Output Summary (Last 24 hours) at 11/12/2023 1555  Last data filed at 11/12/2023 1200  Gross per 24 hour   Intake 600 ml   Output 0 ml   Net 600 ml       Admission Weight  Weight: 74.8 kg (165 lb) (11/05/23 1402)    Daily Weight  11/12/23 : 70 kg (154 lb 5.2 oz)    Image Results  XR chest 2 views  Narrative: Interpreted By:  Justyn Rascon,   STUDY:  XR CHEST 2 VIEWS;  11/12/2023 7:35 am      INDICATION:  Signs/Symptoms:Pneumothorax.      COMPARISON:  11/11/2023      ACCESSION NUMBER(S):  [id number]      ORDERING CLINICIAN:  DAKOTA HERNANDEZ      FINDINGS:  Stable small left pneumothorax. Remainder of the exam is stable.      Impression: Stable small left pneumothorax.      Signed by: Justyn Rascon 11/12/2023 1:06 PM  Dictation workstation:   [id number]  XR chest 1 view  Narrative: Interpreted By:  Justyn Rascon,   STUDY:  XR CHEST 1 VIEW;  11/11/2023 4:08 pm      INDICATION:  Signs/Symptoms:Pneumothorax.      COMPARISON:  11/11/2023      ACCESSION NUMBER(S):  [id number]      ORDERING CLINICIAN:  DAKOTA HERNANDEZ      FINDINGS:  No consolidation. No pleural effusion. Stable small left  pneumothorax. Right-sided Port-A-Cath unchanged. Left-sided cardiac  device unchanged. Streaky left lower lung zone atelectasis again  noted. Normal heart size. No acute osseous abnormality.      Impression: Stable small left pneumothorax.      Signed by: Justyn Rascon 11/12/2023 1:06 PM  Dictation  workstation:   [id number]      Physical Exam  Constitutional:       Appearance: Normal appearance.   Cardiovascular:      Rate and Rhythm: Regular rhythm. Tachycardia present.      Pulses: Normal pulses.      Heart sounds: Normal heart sounds.   Pulmonary:      Effort: Pulmonary effort is normal.      Breath sounds: Normal breath sounds.   Abdominal:      General: Abdomen is flat.      Palpations: Abdomen is soft.   Skin:     General: Skin is warm and dry.   Neurological:      General: No focal deficit present.      Mental Status: He is alert and oriented to person, place, and time.         Relevant Results  Results for orders placed or performed during the hospital encounter of 11/05/23 (from the past 24 hour(s))   CBC   Result Value Ref Range    WBC 6.8 4.4 - 11.3 x10*3/uL    nRBC 0.0 0.0 - 0.0 /100 WBCs    RBC 3.96 (L) 4.50 - 5.90 x10*6/uL    Hemoglobin 11.6 (L) 13.5 - 17.5 g/dL    Hematocrit 33.8 (L) 41.0 - 52.0 %    MCV 85 80 - 100 fL    MCH 29.3 26.0 - 34.0 pg    MCHC 34.3 32.0 - 36.0 g/dL    RDW 15.4 (H) 11.5 - 14.5 %    Platelets 74 (L) 150 - 450 x10*3/uL   Renal Function Panel   Result Value Ref Range    Glucose 84 74 - 99 mg/dL    Sodium 142 136 - 145 mmol/L    Potassium 3.5 3.5 - 5.3 mmol/L    Chloride 107 98 - 107 mmol/L    Bicarbonate 26 21 - 32 mmol/L    Anion Gap 13 10 - 20 mmol/L    Urea Nitrogen 17 6 - 23 mg/dL    Creatinine 0.75 0.50 - 1.30 mg/dL    eGFR >90 >60 mL/min/1.73m*2    Calcium 9.1 8.6 - 10.3 mg/dL    Phosphorus 4.1 2.5 - 4.9 mg/dL    Albumin 3.6 3.4 - 5.0 g/dL   Magnesium   Result Value Ref Range    Magnesium 1.65 1.60 - 2.40 mg/dL        Assessment/Plan   Massimo Garcia is a 62 y.o. male on day 5 admitted for Third degree heart block (CMS/HCC).  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 Pt was admitted to the ICU for symptomatic bradycardia progressing to complete heart block requiring transvenous pacing.  He is now day 1 status post dual-chamber permanent pacemaker placement done by Dr. Anderson on 11/9/2023.     Neuro/psych   #Neuropathy   - Continue gabapentin at 100mg BID      #Dizziness and Lightheadedness (Resolved)  #Nausea (Resolved)  -Likely secondary to symptomatic bradycardia and complete heart block     Cardiovascular  #Symptomatic bradycardia   #New onset complete heart block  - Transvenous pacemaker on 11/8/2023  - Dual-chamber permanent pacemaker placement with Dr. Anderson on 11/9/2023  - Received 1 dose of intraoperative cefazolin  - Interrogation of PPM    #Runs of Vtach   - Patient has a new runs of Vtach  - discussed with cardiologists, recommended small metoprolol if persisting   - Patient states that he is symptomatic and wants to be monitored overnight     #HTN  - Patient has a hx of HTN, not currently on medications   - Currently normotensive  - Will monitor at this time      Pulmonary   #Left sided pneumothorax status post pacemaker placement  -Chest x-ray from 0600 on 11/10/2023 showed small left-sided pneumothorax measuring 1.2 cm laterally  -Bedside ultrasound showed no lung sliding with evidence of barcode sign  -Patient remains asymptomatic, does not complain of any dyspnea, shortness of breath  -Placed on nonrebreather at 0930  -Repeat chest x-ray at 1230 showed similar lateral measurement at 1.2 cm, with questionable enlargement of pneumothorax in the left apex  -Chest X ray at 4 pm 11/11/23      GI/  #GI ppx  -Protonix 40 mg BID     Hematology/Oncology   # Metastatic stage IV Esophageal Cancer   - Follows with Heme/Onc Dr. Blake   - Last chemo 10/30 with Trastuzumab FOLFOX   - Lidocaine cream for port use  - Oxycodone 10mg bid at home baseline for Cancer pain management  - Pain regimen:              - Percocet 5-325mg, 2 tabs q6h  prn for severe pain              - Dilaudid 0.2mg q3h prn for breakthrough pain              - Lidocaine patch, 2, at sites of transcutaneous pad placement     #Anemia   - Hb is  and stable  - Will monitor at this time      #Hx of Portal Vein thrombosis   - Continuing  Eliquis 5 mg twice daily      Infectious disease  -No active issues     Renal   #Hypokalemia   - Monitor and replete      MSK/skin  -No active issues        Fluids: PRN bolus   Electrolytes: replete as needed  Nutrition: Regular  GI PPx: Protonix 40mg BID  DVT PPx: Restarted home Eliquis 5 mg twice daily  Lines: right sided chemo port, right internal jugular access  Antibiotics: Completed intraoperative cefazolin  Code: Full     Dispo: Mr. Garcia is a 62-year-old male admitted to the ICU for management of symptomatic bradycardia, found to be in complete heart block now status post permanent pacemaker placement on 11/9/2023.  Small left-sided pneumothorax on chest x-ray.  Patient had runs of vtach, discussed with cardiologists (Dr Hunter and Dr Omalley, no concerns at this time)       Aleks Amado MD

## 2023-11-12 NOTE — DISCHARGE SUMMARY
Discharge Diagnosis  Third degree heart block (CMS/HCC)    Issues Requiring Follow-Up  Follow up Pulmonology Clinic for repeat CXR     Discharge Meds     Your medication list        START taking these medications        Instructions Last Dose Given Next Dose Due   metoprolol succinate XL 25 mg 24 hr tablet  Commonly known as: Toprol-XL  Start taking on: November 14, 2023      Take 1 tablet (25 mg) by mouth once daily. Do not crush or chew. Do not start before November 14, 2023.              CONTINUE taking these medications        Instructions Last Dose Given Next Dose Due   Deep Sea Nasal 0.65 % nasal spray  Generic drug: sodium chloride      Administer 1 spray into each nostril if needed for congestion (or nasal dryness/ bleeding).       Eliquis 5 mg tablet  Generic drug: apixaban      TAKE 1 TABLET BY MOUTH TWO TIMES A DAY       fentaNYL 12 mcg/hr patch  Commonly known as: Duragesic      Place 1 patch over 72 hours on the skin every 3rd day.       gabapentin 300 mg capsule  Commonly known as: Neurontin      Take 1 capsule (300 mg) by mouth 2 times a day.       lactulose 20 gram/30 mL oral solution      Take 30 mL (20 g) by mouth 4 times a day as needed (for constipation, if no relief from colace/ senna/ milk of magnesia).       multivitamin with minerals tablet           pantoprazole 40 mg EC tablet  Commonly known as: ProtoNix           potassium chloride CR 10 mEq ER tablet  Commonly known as: Klor-Con      TAKE 1 TABLET BY MOUTH ONCE DAILY       sucralfate 1 gram tablet  Commonly known as: Carafate           Thera-D 50 MCG (2000 UT) tablet  Generic drug: cholecalciferol                  STOP taking these medications      Lidocaine Viscous 2 % solution  Generic drug: lidocaine        naloxone 4 mg/0.1 mL nasal spray  Commonly known as: Narcan        oxyCODONE 10 mg immediate release tablet  Commonly known as: Roxicodone                  Where to Get Your Medications        These medications were sent to   [facility]  [address] [address] [address]      Hours: 9 AM to 5 PM Mon-Fri Phone: [phone]   metoprolol succinate XL 25 mg 24 hr tablet         Test Results Pending At Discharge  Pending Labs       No current pending labs.            Hospital Course  Massimo Garcia is a 62 y.o. 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  Patient follows up with heme-onc (Dr. Blake) when he gets his chemotherapy regimen.  Last infusion was 10/30.  Patient stated that he was stable however in the last 7 days prior to presentation, he has been having recurrent episodes of dizzy spells.       Patient was initially admitted to the floors, with telemetry was noted to be consistent with symptomatic bradycardia.  Electrophysiologist was contacted, and after evaluation of EKG strip and telemetry readings, a diagnosis of third-degree heart block was made.  Patient was scheduled to have a pacemaker placed on Thursday 11/9.  However while on the floors, patient's was noted to bradycardia down with symptomatic pauses of about 5 seconds.  A rapid was called, and patient was evaluated.  Decision was made to transfer patient to the ICU for further monitoring.      While in the ICU, patient was noted to bradycardia down initially to 35 bpm and then subsequently had a sinus pause of about 3 seconds.  The transvenous pacer was activated initially at 10 MV, however there was no capture mechanically.  Patient was titrated up up until about 120 mV with capture was consistent at a rate of 70 bpm.  Patient was given 0.2 mg of Dilaudid for the discomfort and pain.  Cardiology was reconsulted, and decision was made for a stat transvenous pacer.  Transvenous pacemaker was placed on 11/8/2023.  Subsequently a dual-chamber permanent pacemaker  was placed with Dr. Anderson on 11/9/2023.  He received 1 dose of intraoperative cefazolin at this time.  There were no intraoperative complications, and the patient tolerated the procedure well.  However, upon return to the ICU after Cath Lab the patient had a 10-minute isolated episode of left sided severe chest pain.  No inciting trauma, or significant events.  Patient was sitting in bed when the pain began.  The pain was resolved with administration of Percocet and Dilaudid.  There were no further episodes of chest pain.  Upon discharge it will be important to follow-up with the wound clinic for pacemaker check.  At this time the patient can be scheduled for future cardiology appointments.  Due to the patient's history of portal vein thrombosis, it would be essential to restart anticoagulation.  Patient was previously on Eliquis 5 mg twice daily, and was transitioned to heparin drip before pacemaker placement.  When cleared by cardiology or upon discharge patient can be started on previous Eliquis 5 mg twice daily    On the morning of 11/10/2023, chest x-ray was ordered to confirm placement of the pacemaker and the patient was found to have small left-sided pneumothorax. The patient remained asymptomatic and did not have any complaints of shortness of breath, dyspnea or respiratory complications.  He continued to saturate at 97 on room air.  With the new pneumothorax patient was started on nonrebreather and repeat chest x-ray was ordered for 1200.  Repeat chest x-ray showed no improvement in the pneumothorax.  The lateral pneumothorax measurement at 1.2 cm remained similar to previous CXR.  There was questionable expansion of the pneumothorax in the left lung apex.  To continue with monitoring, a repeat chest x-ray was ordered for 1600.  If there was notable improvement in the pneumothorax, the patient could be transferred to the floor.  If there was worsening of the pneumothorax, the patient will receive a  chest tube. Patients pneumothorax was monitored with serial CXR, with no evidence for expansion. He was subsequently discharged and advised to follow up in the pulmonology clinic     Pertinent Physical Exam At Time of Discharge  Physical Exam  Constitutional:       Appearance: Normal appearance.   Cardiovascular:      Rate and Rhythm: Normal rate and regular rhythm.      Heart sounds: Normal heart sounds.   Pulmonary:      Breath sounds: Normal breath sounds.   Abdominal:      General: Abdomen is flat.      Palpations: Abdomen is soft.   Skin:     General: Skin is warm and dry.   Neurological:      General: No focal deficit present.      Mental Status: He is alert and oriented to person, place, and time.   Psychiatric:         Mood and Affect: Mood normal.         Behavior: Behavior normal.         Outpatient Follow-Up  Future Appointments   Date Time Provider Department Center   11/20/2023  9:30 AM INF 13 GEAUGA SCCGEAINF [facility]   11/28/2023 11:00 AM ALFREDITO Evans-CNP GEACR1 [facility]   12/1/2023 10:00 AM ALFREDITO Ferguson-CNP SCCGEAPAL1 None   12/4/2023  9:00 AM INF 07 GEAUGA SCCGEAINF [facility]   12/5/2023  9:00 AM GEA CT 1 GEACT [facility] [place]    12/18/2023  9:30 AM MD SILVA KothariGEAMOC1 [facility]   12/18/2023 10:00 AM INF 04 GEAUGA SCCGEAINF [facility]   1/2/2024  9:30 AM PETER Jean-BaptisteGEAMOC1 [facility]   1/2/2024  9:30 AM INF 19 GEAUGA [facility]GEAINF [facility]   1/15/2024  9:30 AM Gloria Chand PA-C SCCGEAMOC1 [facility]   1/15/2024 10:00 AM INF 04 GEAUGA SCCGEAINF [facility]   1/29/2024  8:30 AM MD JOSE FRANCISCO KothariOCBekah [facility]   1/29/2024  9:00 AM INF 02 GEAUGA SCCGEAINF [facility]   2/12/2024  9:00 AM MD JOSE FRANCISCO KothariOCBekah [facility]   2/12/2024  9:30 AM INF 19 GEAUGA SCCGEAINF [facility]   2/26/2024  9:00 AM Tomasa Blake MD SCCGEAMOC1 [facility]   2/26/2024  9:30 AM INF 19 GEAUGA SCCGEAINF [facility] KIMI Amado MD

## 2023-11-12 NOTE — CARE PLAN
The patient's goals for the shift include      The clinical goals for the shift include patient will have deecreased anxiety during this shift    .

## 2023-11-13 ENCOUNTER — APPOINTMENT (OUTPATIENT)
Dept: RADIOLOGY | Facility: HOSPITAL | Age: 63
DRG: 243 | End: 2023-11-13
Payer: MEDICARE

## 2023-11-13 ENCOUNTER — APPOINTMENT (OUTPATIENT)
Dept: HEMATOLOGY/ONCOLOGY | Facility: CLINIC | Age: 63
End: 2023-11-13
Payer: COMMERCIAL

## 2023-11-13 ENCOUNTER — PHARMACY VISIT (OUTPATIENT)
Dept: PHARMACY | Facility: CLINIC | Age: 63
End: 2023-11-13
Payer: MEDICARE

## 2023-11-13 VITALS
TEMPERATURE: 97.9 F | SYSTOLIC BLOOD PRESSURE: 135 MMHG | HEIGHT: 68 IN | HEART RATE: 79 BPM | DIASTOLIC BLOOD PRESSURE: 73 MMHG | RESPIRATION RATE: 11 BRPM | WEIGHT: 150.79 LBS | BODY MASS INDEX: 22.85 KG/M2 | OXYGEN SATURATION: 95 %

## 2023-11-13 DIAGNOSIS — J93.83 OTHER PNEUMOTHORAX: ICD-10-CM

## 2023-11-13 PROBLEM — I44.2 THIRD DEGREE HEART BLOCK (MULTI): Status: RESOLVED | Noted: 2023-11-06 | Resolved: 2023-11-13

## 2023-11-13 PROBLEM — R00.1 BRADYCARDIA: Status: RESOLVED | Noted: 2023-11-06 | Resolved: 2023-11-13

## 2023-11-13 PROBLEM — R42 DIZZINESS: Status: RESOLVED | Noted: 2023-11-05 | Resolved: 2023-11-13

## 2023-11-13 LAB
ALBUMIN SERPL BCP-MCNC: 3.7 G/DL (ref 3.4–5)
ANION GAP SERPL CALC-SCNC: 11 MMOL/L (ref 10–20)
ATRIAL RATE: 100 BPM
ATRIAL RATE: 96 BPM
BUN SERPL-MCNC: 20 MG/DL (ref 6–23)
CALCIUM SERPL-MCNC: 9.4 MG/DL (ref 8.6–10.3)
CHLORIDE SERPL-SCNC: 106 MMOL/L (ref 98–107)
CO2 SERPL-SCNC: 28 MMOL/L (ref 21–32)
CREAT SERPL-MCNC: 0.77 MG/DL (ref 0.5–1.3)
ERYTHROCYTE [DISTWIDTH] IN BLOOD BY AUTOMATED COUNT: 15.5 % (ref 11.5–14.5)
GFR SERPL CREATININE-BSD FRML MDRD: >90 ML/MIN/1.73M*2
GLUCOSE BLD MANUAL STRIP-MCNC: 118 MG/DL (ref 74–99)
GLUCOSE SERPL-MCNC: 69 MG/DL (ref 74–99)
HCT VFR BLD AUTO: 35.2 % (ref 41–52)
HGB BLD-MCNC: 11.8 G/DL (ref 13.5–17.5)
INR PPP: 1.6 (ref 0.9–1.1)
MAGNESIUM SERPL-MCNC: 1.84 MG/DL (ref 1.6–2.4)
MCH RBC QN AUTO: 28.7 PG (ref 26–34)
MCHC RBC AUTO-ENTMCNC: 33.5 G/DL (ref 32–36)
MCV RBC AUTO: 86 FL (ref 80–100)
NRBC BLD-RTO: 0 /100 WBCS (ref 0–0)
P AXIS: 58 DEGREES
P AXIS: 62 DEGREES
P OFFSET: 182 MS
P OFFSET: 188 MS
P ONSET: 121 MS
P ONSET: 122 MS
PHOSPHATE SERPL-MCNC: 4.7 MG/DL (ref 2.5–4.9)
PLATELET # BLD AUTO: 96 X10*3/UL (ref 150–450)
POTASSIUM SERPL-SCNC: 3.3 MMOL/L (ref 3.5–5.3)
PR INTERVAL: 178 MS
PR INTERVAL: 180 MS
PROTHROMBIN TIME: 17.6 SECONDS (ref 9.8–12.8)
Q ONSET: 210 MS
Q ONSET: 212 MS
QRS COUNT: 16 BEATS
QRS COUNT: 16 BEATS
QRS DURATION: 160 MS
QRS DURATION: 170 MS
QT INTERVAL: 398 MS
QT INTERVAL: 436 MS
QTC CALCULATION(BAZETT): 513 MS
QTC CALCULATION(BAZETT): 550 MS
QTC FREDERICIA: 472 MS
QTC FREDERICIA: 510 MS
R AXIS: 101 DEGREES
R AXIS: 105 DEGREES
RBC # BLD AUTO: 4.11 X10*6/UL (ref 4.5–5.9)
SODIUM SERPL-SCNC: 142 MMOL/L (ref 136–145)
T AXIS: 14 DEGREES
T AXIS: 6 DEGREES
T OFFSET: 411 MS
T OFFSET: 428 MS
VENTRICULAR RATE: 100 BPM
VENTRICULAR RATE: 96 BPM
WBC # BLD AUTO: 7.5 X10*3/UL (ref 4.4–11.3)

## 2023-11-13 PROCEDURE — 99239 HOSP IP/OBS DSCHRG MGMT >30: CPT | Performed by: INTERNAL MEDICINE

## 2023-11-13 PROCEDURE — 80069 RENAL FUNCTION PANEL: CPT | Performed by: INTERNAL MEDICINE

## 2023-11-13 PROCEDURE — 85610 PROTHROMBIN TIME: CPT

## 2023-11-13 PROCEDURE — 2500000004 HC RX 250 GENERAL PHARMACY W/ HCPCS (ALT 636 FOR OP/ED): Performed by: INTERNAL MEDICINE

## 2023-11-13 PROCEDURE — 2500000001 HC RX 250 WO HCPCS SELF ADMINISTERED DRUGS (ALT 637 FOR MEDICARE OP): Performed by: INTERNAL MEDICINE

## 2023-11-13 PROCEDURE — 71046 X-RAY EXAM CHEST 2 VIEWS: CPT | Mod: FY

## 2023-11-13 PROCEDURE — 2500000004 HC RX 250 GENERAL PHARMACY W/ HCPCS (ALT 636 FOR OP/ED)

## 2023-11-13 PROCEDURE — 85027 COMPLETE CBC AUTOMATED: CPT | Performed by: INTERNAL MEDICINE

## 2023-11-13 PROCEDURE — 2500000001 HC RX 250 WO HCPCS SELF ADMINISTERED DRUGS (ALT 637 FOR MEDICARE OP)

## 2023-11-13 PROCEDURE — RXMED WILLOW AMBULATORY MEDICATION CHARGE

## 2023-11-13 PROCEDURE — 37799 UNLISTED PX VASCULAR SURGERY: CPT | Performed by: INTERNAL MEDICINE

## 2023-11-13 PROCEDURE — 71046 X-RAY EXAM CHEST 2 VIEWS: CPT | Performed by: RADIOLOGY

## 2023-11-13 PROCEDURE — 2500000005 HC RX 250 GENERAL PHARMACY W/O HCPCS

## 2023-11-13 PROCEDURE — 82947 ASSAY GLUCOSE BLOOD QUANT: CPT

## 2023-11-13 PROCEDURE — 83735 ASSAY OF MAGNESIUM: CPT | Performed by: INTERNAL MEDICINE

## 2023-11-13 RX ORDER — HEPARIN SODIUM (PORCINE) LOCK FLUSH IV SOLN 100 UNIT/ML 100 UNIT/ML
5 SOLUTION INTRAVENOUS AS NEEDED
Status: DISCONTINUED | OUTPATIENT
Start: 2023-11-13 | End: 2023-11-13 | Stop reason: HOSPADM

## 2023-11-13 RX ORDER — NALOXONE HYDROCHLORIDE 4 MG/.1ML
4 SPRAY NASAL AS NEEDED
Qty: 2 EACH | Refills: 0 | Status: CANCELLED | OUTPATIENT
Start: 2023-11-13

## 2023-11-13 RX ORDER — POTASSIUM CHLORIDE 20 MEQ/1
40 TABLET, EXTENDED RELEASE ORAL 2 TIMES DAILY
Status: DISCONTINUED | OUTPATIENT
Start: 2023-11-13 | End: 2023-11-13 | Stop reason: HOSPADM

## 2023-11-13 RX ORDER — METOPROLOL SUCCINATE 25 MG/1
25 TABLET, EXTENDED RELEASE ORAL DAILY
Qty: 30 TABLET | Refills: 0 | Status: SHIPPED | OUTPATIENT
Start: 2023-11-14 | End: 2023-12-19 | Stop reason: SDUPTHER

## 2023-11-13 RX ADMIN — SUCRALFATE 1 G: 1 TABLET ORAL at 09:00

## 2023-11-13 RX ADMIN — LIDOCAINE 2 PATCH: 4 PATCH TOPICAL at 09:00

## 2023-11-13 RX ADMIN — Medication 10 ML: at 08:59

## 2023-11-13 RX ADMIN — GABAPENTIN 100 MG: 100 CAPSULE ORAL at 09:00

## 2023-11-13 RX ADMIN — METOPROLOL SUCCINATE 25 MG: 25 TABLET, FILM COATED, EXTENDED RELEASE ORAL at 08:59

## 2023-11-13 RX ADMIN — OXYCODONE HYDROCHLORIDE AND ACETAMINOPHEN 1 TABLET: 5; 325 TABLET ORAL at 09:03

## 2023-11-13 RX ADMIN — APIXABAN 5 MG: 5 TABLET, FILM COATED ORAL at 08:59

## 2023-11-13 RX ADMIN — HEPARIN SODIUM (PORCINE) LOCK FLUSH IV SOLN 100 UNIT/ML 500 UNITS: 100 SOLUTION at 12:50

## 2023-11-13 RX ADMIN — PANTOPRAZOLE SODIUM 40 MG: 40 TABLET, DELAYED RELEASE ORAL at 09:00

## 2023-11-13 RX ADMIN — POTASSIUM CHLORIDE 40 MEQ: 1500 TABLET, EXTENDED RELEASE ORAL at 09:00

## 2023-11-13 ASSESSMENT — PAIN SCALES - GENERAL
PAINLEVEL_OUTOF10: 2
PAINLEVEL_OUTOF10: 2

## 2023-11-13 ASSESSMENT — COGNITIVE AND FUNCTIONAL STATUS - GENERAL: MOBILITY SCORE: 24

## 2023-11-13 ASSESSMENT — PAIN - FUNCTIONAL ASSESSMENT: PAIN_FUNCTIONAL_ASSESSMENT: 0-10

## 2023-11-13 NOTE — CARE PLAN
The patient's goals for the shift include      The clinical goals for the shift include pt will be hemodynamically stable  .

## 2023-11-14 ENCOUNTER — APPOINTMENT (OUTPATIENT)
Dept: CARDIOLOGY | Facility: HOSPITAL | Age: 63
End: 2023-11-14
Payer: COMMERCIAL

## 2023-11-14 ENCOUNTER — DOCUMENTATION (OUTPATIENT)
Dept: PRIMARY CARE | Facility: CLINIC | Age: 63
End: 2023-11-14
Payer: COMMERCIAL

## 2023-11-14 ENCOUNTER — PATIENT OUTREACH (OUTPATIENT)
Dept: PRIMARY CARE | Facility: CLINIC | Age: 63
End: 2023-11-14
Payer: COMMERCIAL

## 2023-11-14 NOTE — PROGRESS NOTES
Discharge Facility: [facility]  Discharge Diagnosis: Third degree heart block  Admission Date: 11/05/2023  Discharge Date: 11/13/2023    PCP Appointment Date: Tasked to office, no contact  Specialist Appointment Date: Hem/Onc 11/20/2023, Cardiology 11/28/2023, Palliative Care 12/01/2023  Hospital Encounter and Summary: Linked

## 2023-11-15 ENCOUNTER — PHARMACY VISIT (OUTPATIENT)
Dept: PHARMACY | Facility: CLINIC | Age: 63
End: 2023-11-15
Payer: MEDICARE

## 2023-11-15 ENCOUNTER — HOSPITAL ENCOUNTER (OUTPATIENT)
Dept: RADIOLOGY | Facility: HOSPITAL | Age: 63
Discharge: HOME | End: 2023-11-15
Payer: COMMERCIAL

## 2023-11-15 DIAGNOSIS — J93.83 OTHER PNEUMOTHORAX: ICD-10-CM

## 2023-11-15 PROCEDURE — 71046 X-RAY EXAM CHEST 2 VIEWS: CPT | Performed by: RADIOLOGY

## 2023-11-15 PROCEDURE — 71046 X-RAY EXAM CHEST 2 VIEWS: CPT

## 2023-11-16 ENCOUNTER — TELEPHONE (OUTPATIENT)
Dept: HEMATOLOGY/ONCOLOGY | Facility: CLINIC | Age: 63
End: 2023-11-16
Payer: COMMERCIAL

## 2023-11-16 DIAGNOSIS — F32.A ANXIETY AND DEPRESSION: Primary | ICD-10-CM

## 2023-11-16 DIAGNOSIS — F41.9 ANXIETY AND DEPRESSION: Primary | ICD-10-CM

## 2023-11-16 RX ORDER — LORAZEPAM 0.5 MG/1
0.5 TABLET ORAL 3 TIMES DAILY PRN
Qty: 90 TABLET | Refills: 0 | Status: SHIPPED | OUTPATIENT
Start: 2023-11-16 | End: 2023-12-04 | Stop reason: SDUPTHER

## 2023-11-20 ENCOUNTER — INFUSION (OUTPATIENT)
Dept: HEMATOLOGY/ONCOLOGY | Facility: CLINIC | Age: 63
End: 2023-11-20
Payer: MEDICARE

## 2023-11-20 VITALS
OXYGEN SATURATION: 99 % | RESPIRATION RATE: 17 BRPM | TEMPERATURE: 97.5 F | HEART RATE: 86 BPM | BODY MASS INDEX: 24.19 KG/M2 | WEIGHT: 159.06 LBS | SYSTOLIC BLOOD PRESSURE: 152 MMHG | DIASTOLIC BLOOD PRESSURE: 78 MMHG

## 2023-11-20 DIAGNOSIS — C15.9 STAGE IV MALIGNANT NEOPLASM OF ESOPHAGUS (MULTI): ICD-10-CM

## 2023-11-20 DIAGNOSIS — C78.7 METASTASES TO THE LIVER (MULTI): ICD-10-CM

## 2023-11-20 LAB
ALBUMIN SERPL BCP-MCNC: 3.9 G/DL (ref 3.4–5)
ALP SERPL-CCNC: 211 U/L (ref 33–136)
ALT SERPL W P-5'-P-CCNC: 38 U/L (ref 10–52)
ANION GAP SERPL CALC-SCNC: 19 MMOL/L (ref 10–20)
AST SERPL W P-5'-P-CCNC: 46 U/L (ref 9–39)
BASOPHILS # BLD AUTO: 0.02 X10*3/UL (ref 0–0.1)
BASOPHILS NFR BLD AUTO: 0.4 %
BILIRUB SERPL-MCNC: 0.6 MG/DL (ref 0–1.2)
BUN SERPL-MCNC: 13 MG/DL (ref 6–23)
CALCIUM SERPL-MCNC: 9.4 MG/DL (ref 8.6–10.3)
CHLORIDE SERPL-SCNC: 104 MMOL/L (ref 98–107)
CO2 SERPL-SCNC: 23 MMOL/L (ref 21–32)
CREAT SERPL-MCNC: 0.78 MG/DL (ref 0.5–1.3)
EOSINOPHIL # BLD AUTO: 0.07 X10*3/UL (ref 0–0.7)
EOSINOPHIL NFR BLD AUTO: 1.3 %
ERYTHROCYTE [DISTWIDTH] IN BLOOD BY AUTOMATED COUNT: 14.8 % (ref 11.5–14.5)
GFR SERPL CREATININE-BSD FRML MDRD: >90 ML/MIN/1.73M*2
GLUCOSE SERPL-MCNC: 130 MG/DL (ref 74–99)
HCT VFR BLD AUTO: 33.6 % (ref 41–52)
HGB BLD-MCNC: 11.3 G/DL (ref 13.5–17.5)
IMM GRANULOCYTES # BLD AUTO: 0.02 X10*3/UL (ref 0–0.7)
IMM GRANULOCYTES NFR BLD AUTO: 0.4 % (ref 0–0.9)
LYMPHOCYTES # BLD AUTO: 0.8 X10*3/UL (ref 1.2–4.8)
LYMPHOCYTES NFR BLD AUTO: 15.2 %
MCH RBC QN AUTO: 29.3 PG (ref 26–34)
MCHC RBC AUTO-ENTMCNC: 33.6 G/DL (ref 32–36)
MCV RBC AUTO: 87 FL (ref 80–100)
MONOCYTES # BLD AUTO: 0.36 X10*3/UL (ref 0.1–1)
MONOCYTES NFR BLD AUTO: 6.8 %
NEUTROPHILS # BLD AUTO: 3.99 X10*3/UL (ref 1.2–7.7)
NEUTROPHILS NFR BLD AUTO: 75.9 %
PLATELET # BLD AUTO: 91 X10*3/UL (ref 150–450)
POTASSIUM SERPL-SCNC: 3.7 MMOL/L (ref 3.5–5.3)
PROT SERPL-MCNC: 6.2 G/DL (ref 6.4–8.2)
RBC # BLD AUTO: 3.86 X10*6/UL (ref 4.5–5.9)
SODIUM SERPL-SCNC: 142 MMOL/L (ref 136–145)
WBC # BLD AUTO: 5.3 X10*3/UL (ref 4.4–11.3)

## 2023-11-20 PROCEDURE — 2500000004 HC RX 250 GENERAL PHARMACY W/ HCPCS (ALT 636 FOR OP/ED)

## 2023-11-20 PROCEDURE — 96372 THER/PROPH/DIAG INJ SC/IM: CPT

## 2023-11-20 PROCEDURE — 80048 BASIC METABOLIC PNL TOTAL CA: CPT

## 2023-11-20 PROCEDURE — 96375 TX/PRO/DX INJ NEW DRUG ADDON: CPT | Mod: INF

## 2023-11-20 PROCEDURE — 2500000004 HC RX 250 GENERAL PHARMACY W/ HCPCS (ALT 636 FOR OP/ED): Mod: JZ | Performed by: INTERNAL MEDICINE

## 2023-11-20 PROCEDURE — 85025 COMPLETE CBC W/AUTO DIFF WBC: CPT

## 2023-11-20 PROCEDURE — 2500000004 HC RX 250 GENERAL PHARMACY W/ HCPCS (ALT 636 FOR OP/ED): Mod: JZ,JG | Performed by: INTERNAL MEDICINE

## 2023-11-20 PROCEDURE — 2500000004 HC RX 250 GENERAL PHARMACY W/ HCPCS (ALT 636 FOR OP/ED): Performed by: INTERNAL MEDICINE

## 2023-11-20 PROCEDURE — 96367 TX/PROPH/DG ADDL SEQ IV INF: CPT

## 2023-11-20 PROCEDURE — 96413 CHEMO IV INFUSION 1 HR: CPT

## 2023-11-20 PROCEDURE — 36415 COLL VENOUS BLD VENIPUNCTURE: CPT

## 2023-11-20 PROCEDURE — 84075 ASSAY ALKALINE PHOSPHATASE: CPT

## 2023-11-20 PROCEDURE — 96411 CHEMO IV PUSH ADDL DRUG: CPT

## 2023-11-20 RX ORDER — FAMOTIDINE 10 MG/ML
20 INJECTION INTRAVENOUS ONCE AS NEEDED
Status: DISCONTINUED | OUTPATIENT
Start: 2023-11-20 | End: 2023-11-20 | Stop reason: HOSPADM

## 2023-11-20 RX ORDER — PALONOSETRON 0.05 MG/ML
0.25 INJECTION, SOLUTION INTRAVENOUS ONCE
Status: COMPLETED | OUTPATIENT
Start: 2023-11-20 | End: 2023-11-20

## 2023-11-20 RX ORDER — FAMOTIDINE 10 MG/ML
20 INJECTION INTRAVENOUS ONCE AS NEEDED
Status: CANCELLED | OUTPATIENT
Start: 2023-12-11

## 2023-11-20 RX ORDER — CYANOCOBALAMIN 1000 UG/ML
1000 INJECTION, SOLUTION INTRAMUSCULAR; SUBCUTANEOUS ONCE
Status: COMPLETED | OUTPATIENT
Start: 2023-11-20 | End: 2023-11-20

## 2023-11-20 RX ORDER — CYANOCOBALAMIN 1000 UG/ML
1000 INJECTION, SOLUTION INTRAMUSCULAR; SUBCUTANEOUS ONCE
Status: CANCELLED | OUTPATIENT
Start: 2023-12-11

## 2023-11-20 RX ORDER — FLUOROURACIL 50 MG/ML
400 INJECTION, SOLUTION INTRAVENOUS ONCE
Status: COMPLETED | OUTPATIENT
Start: 2023-11-20 | End: 2023-11-20

## 2023-11-20 RX ORDER — ALBUTEROL SULFATE 0.83 MG/ML
3 SOLUTION RESPIRATORY (INHALATION) AS NEEDED
Status: DISCONTINUED | OUTPATIENT
Start: 2023-11-20 | End: 2023-11-20 | Stop reason: HOSPADM

## 2023-11-20 RX ORDER — PROCHLORPERAZINE EDISYLATE 5 MG/ML
10 INJECTION INTRAMUSCULAR; INTRAVENOUS EVERY 6 HOURS PRN
Status: DISCONTINUED | OUTPATIENT
Start: 2023-11-20 | End: 2023-11-20 | Stop reason: HOSPADM

## 2023-11-20 RX ORDER — HEPARIN 100 UNIT/ML
500 SYRINGE INTRAVENOUS AS NEEDED
Status: CANCELLED | OUTPATIENT
Start: 2023-11-20

## 2023-11-20 RX ORDER — DIPHENHYDRAMINE HYDROCHLORIDE 50 MG/ML
50 INJECTION INTRAMUSCULAR; INTRAVENOUS AS NEEDED
Status: DISCONTINUED | OUTPATIENT
Start: 2023-11-20 | End: 2023-11-20 | Stop reason: HOSPADM

## 2023-11-20 RX ORDER — DIPHENHYDRAMINE HYDROCHLORIDE 50 MG/ML
50 INJECTION INTRAMUSCULAR; INTRAVENOUS AS NEEDED
Status: CANCELLED | OUTPATIENT
Start: 2023-12-11

## 2023-11-20 RX ORDER — DEXAMETHASONE SODIUM PHOSPHATE 4 MG/ML
8 INJECTION, SOLUTION INTRA-ARTICULAR; INTRALESIONAL; INTRAMUSCULAR; INTRAVENOUS; SOFT TISSUE ONCE
Status: COMPLETED | OUTPATIENT
Start: 2023-11-20 | End: 2023-11-20

## 2023-11-20 RX ORDER — PROCHLORPERAZINE MALEATE 10 MG
10 TABLET ORAL EVERY 6 HOURS PRN
Status: DISCONTINUED | OUTPATIENT
Start: 2023-11-20 | End: 2023-11-20 | Stop reason: HOSPADM

## 2023-11-20 RX ORDER — ALBUTEROL SULFATE 0.83 MG/ML
3 SOLUTION RESPIRATORY (INHALATION) AS NEEDED
Status: CANCELLED | OUTPATIENT
Start: 2023-12-11

## 2023-11-20 RX ORDER — DIPHENHYDRAMINE HYDROCHLORIDE 50 MG/ML
25 INJECTION INTRAMUSCULAR; INTRAVENOUS ONCE
Status: COMPLETED | OUTPATIENT
Start: 2023-11-20 | End: 2023-11-20

## 2023-11-20 RX ORDER — EPINEPHRINE 0.3 MG/.3ML
0.3 INJECTION SUBCUTANEOUS EVERY 5 MIN PRN
Status: CANCELLED | OUTPATIENT
Start: 2023-12-11

## 2023-11-20 RX ORDER — ACETAMINOPHEN 325 MG/1
650 TABLET ORAL ONCE
Status: COMPLETED | OUTPATIENT
Start: 2023-11-20 | End: 2023-11-20

## 2023-11-20 RX ORDER — HEPARIN SODIUM,PORCINE/PF 10 UNIT/ML
50 SYRINGE (ML) INTRAVENOUS AS NEEDED
Status: CANCELLED | OUTPATIENT
Start: 2023-11-20

## 2023-11-20 RX ORDER — EPINEPHRINE 0.3 MG/.3ML
0.3 INJECTION SUBCUTANEOUS EVERY 5 MIN PRN
Status: DISCONTINUED | OUTPATIENT
Start: 2023-11-20 | End: 2023-11-20 | Stop reason: HOSPADM

## 2023-11-20 RX ORDER — LORAZEPAM 2 MG/ML
1 INJECTION INTRAMUSCULAR AS NEEDED
Status: DISCONTINUED | OUTPATIENT
Start: 2023-11-20 | End: 2023-11-20 | Stop reason: HOSPADM

## 2023-11-20 RX ORDER — HEPARIN 100 UNIT/ML
SYRINGE INTRAVENOUS
Status: COMPLETED
Start: 2023-11-20 | End: 2023-11-20

## 2023-11-20 RX ADMIN — SODIUM CHLORIDE, PRESERVATIVE FREE: 5 INJECTION INTRAVENOUS at 15:30

## 2023-11-20 RX ADMIN — DIPHENHYDRAMINE HYDROCHLORIDE 25 MG: 50 INJECTION INTRAMUSCULAR; INTRAVENOUS at 12:23

## 2023-11-20 RX ADMIN — ACETAMINOPHEN 650 MG: 325 TABLET ORAL at 12:15

## 2023-11-20 RX ADMIN — CYANOCOBALAMIN 1000 MCG: 1000 INJECTION INTRAMUSCULAR; SUBCUTANEOUS at 12:31

## 2023-11-20 RX ADMIN — PALONOSETRON 250 MCG: 0.05 INJECTION, SOLUTION INTRAVENOUS at 12:19

## 2023-11-20 RX ADMIN — DEXAMETHASONE SODIUM PHOSPHATE 8 MG: 4 INJECTION INTRA-ARTICULAR; INTRALESIONAL; INTRAMUSCULAR; INTRAVENOUS; SOFT TISSUE at 12:21

## 2023-11-20 RX ADMIN — LEUCOVORIN CALCIUM 748 MG: 350 INJECTION, POWDER, LYOPHILIZED, FOR SOLUTION INTRAMUSCULAR; INTRAVENOUS at 14:08

## 2023-11-20 RX ADMIN — TRASTUZUMAB 300.3 MG: 150 INJECTION, POWDER, LYOPHILIZED, FOR SOLUTION INTRAVENOUS at 13:22

## 2023-11-20 RX ADMIN — FLUOROURACIL 750 MG: 50 INJECTION, SOLUTION INTRAVENOUS at 15:05

## 2023-11-20 ASSESSMENT — PAIN SCALES - GENERAL: PAINLEVEL: 2

## 2023-11-20 NOTE — POST-PROCEDURE NOTE
Physician Transition of Care Summary  Invasive Cardiovascular Lab    Procedure Date: 11/09/2023  Attending:    * Elias Connolly - Primary  Resident/Fellow/Other Assistant: Surgeon(s) and Role:    Indications:   Pre-op Diagnosis     * Bradycardia [R00.1]     * Third degree heart block (CMS/HCC) [I44.2]    Post-procedure diagnosis:   Post-op Diagnosis     * Bradycardia [R00.1]     * Third degree heart block (CMS/HCC) [I44.2]    Procedure(s):     * PPM IMPLANT DUAL  AK INS NEW/RPLCMT PRM PM W/TRANSV ELTRD ATRIAL&VENT [55632]  AK MOD SED SAME PHYS/QHP INITIAL 15 MINS 5/> YRS [43780]  AK MOD SED SAME PHYS/QHP EACH ADDL 15 MINS [16511]    Procedure Findings:   See below    Description of the Procedure:   Dual chamber pacemaker implantation    Procedures:  Implant of dual chamber PPM (46899)    Patient history:  Please refer to the detailed history and physical on the patient's medical chart.     Procedure narrative:  The patient was in the fasting state with a temporary pacing wire from right internal jugular. A grounding pad was placed. The patient was set up for continuous monitoring of surface 12 lead ECG and pulse oximetry. Blood pressure was monitored. The procedure was performed under moderate sedation. The left upper chest was prepped and draped in the usual sterile fashion. Local anesthesia: After preoperative IV antibiotic was completely infused, subcutaneous tissues just medial to the left deltopectoral area, were infiltrated with bupivacaine for local anesthesia.   Using a scalpel, an incision was made, which was extended to the left prepectoral fascia using blunt dissection. A pulse generator pocket was created.  After dissecting the deltopectoral groove, no cephalic vein was found. Under Fluoroscopic a Micropuncture needle was used to access the Left Subclavian vein using Seldinger technique.   A 7 F sheath was placed over of the guidewires. The RV pacing lead was then advanced to the heart via  fluoroscopic guidance. The ventricle was mapped, and the lead was fixed to the right ventricular apex. After lead placement, appropriate sensing and thresholds were obtained. The sheath was peeled away.  A second 7 F sheath was advanced over the guidewire, and the dilator and guidewire were removed. Under fluoroscopic guidance, a pacing lead was advanced to the heart, and the atrium was mapped. The lead was fixed to the right atrial appendage. The sheath was peeled away.  The leads were sutured in place to the pectoralis muscle using 0 Tycron suture. Lead measurements were obtained.  A dual chamber pacemaker pulse generator was attached to the leads and implanted.  The device was interrogated and its parameters recorded; telemetered electrograms and pacing and sensing thresholds were measured.   The pocket was flushed with Vancomycin solution. The wound was closed in three layers using. a 0 Vicryl interrupted layer, followed by a 3.0 Vicryl  continuous layer, and a 4.0 Vicryl continuous layer for the subcuticular layer. Steri-Strips and a dressing were applied.   Under fluoroscopy, the temporary pacing wire was removed.    Final Implant Settings:      Lead Parameters  Atrial: 694ohms, P wave 3.6mV, threshold 0.7V@0.4msec  RVent: 952ohms, R wave 13mV, threshold 0.6V@0.4msec,    Summary:  Successful implantation of a dual chamber pacemaker.      Patient Instructions:  Please do not lift left arm above shoulder level for 4-5 weeks  No repetitive motion or lifting heavy weight for 4-5 weeks  No driving, alcohol or making legal decisions for 24 hours.  Keep wound completely dry for 7 days; may shower but keep bandage as dry as possible.  No soaking in hot tubs or baths for 10 days. May sponge bath  Keep bandage on incision for 1 week.  Allow steristrips underneath to fall off naturally.    Please call our office if you notice any discharge or swelling around incision or fever.    Follow up:   The patient should be alert  for bleeding, swelling, or signs of infection. The patient should call the electrophysiologist immediately if symptoms recur, or for any problems. The patient and family (with HIPPA consent)  have been instructed accordingly.   Follow up in Clinic for wound check as scheduled. Device clinic follow up will then be scheduled. Remote monitoring will be instituted if possible.     See complete procedural log and parameters.      Stents/Implants:   Cardiovascular Implants       Pacemaker    Lead, Capsurefix Novus, 58 Cm - [id number] - [id number] - Implanted        Inventory item: LEAD, CAPSUREFIX NOVUS, 58 CM Model/Cat number: 5076-58    Serial number: [id number] : [employer]    Lot number: [id number] Device identifier: [id number]    Implant Date: 11/9/2023        As of 11/9/2023       Status: Implanted                      Lead, Capsurefix Novus, 52 Cm - [id number] - [id number] - Implanted        Inventory item: LEAD, CAPSUREFIX NOVUS, 52 CM Model/Cat number: [id number]    Serial number: [id number] : [employer]    Lot number: [id number] Device identifier: [id number]    Implant Date: 11/9/2023        As of 11/9/2023       Status: Implanted                      Pacemaker, Dual Chamber, [employer] [address] Xt Dr - [id number] - [id number] - Implanted        Inventory item: PACEMAKER, DUAL CHAMBER, ABIEL MRI XT DR Model/Cat number: W1DR01    Serial number: [id number] : [employer]    Lot number: [id number] Device identifier: [id number]    Implant Date: 11/9/2023        As of 11/9/2023       Status: Implanted                              Estimated Blood Loss:   30 mL    Anesthesia: Moderate Sedation Anesthesia Staff: No anesthesia staff entered.    Any Specimen(s) Removed:   No specimens collected during this procedure.        Electronically signed by: Elias Anderson MD, 11/20/2023 3:13 PM

## 2023-11-20 NOTE — SIGNIFICANT EVENT
11/20/23 1136   Prechemo Checklist   Has the patient been in the hospital, ED, or urgent care since last date of service Yes  (Per Dr Blake, ok to treat today.)   Chemo/Immuno Consent Signed Yes   Protocol/Indications Verified Yes   Confirmed to previous date/time of medication Yes   Compared to previous dose Yes   All medications are dated accurately Yes   Pregnancy Test Negative Not applicable   Parameters Met Yes   Provider Notified Yes   Is Patient Proceeding With Treatment? Yes   Dose Calculations Verified Yes

## 2023-11-21 ENCOUNTER — OFFICE VISIT (OUTPATIENT)
Dept: PRIMARY CARE | Facility: CLINIC | Age: 63
End: 2023-11-21
Payer: MEDICARE

## 2023-11-21 ENCOUNTER — HOSPITAL ENCOUNTER (OUTPATIENT)
Dept: CARDIOLOGY | Facility: HOSPITAL | Age: 63
Discharge: HOME | End: 2023-11-21
Payer: MEDICARE

## 2023-11-21 VITALS
DIASTOLIC BLOOD PRESSURE: 92 MMHG | SYSTOLIC BLOOD PRESSURE: 150 MMHG | HEIGHT: 68 IN | BODY MASS INDEX: 24.61 KG/M2 | TEMPERATURE: 98.7 F | HEART RATE: 84 BPM | WEIGHT: 162.4 LBS | OXYGEN SATURATION: 100 %

## 2023-11-21 DIAGNOSIS — Z09 HOSPITAL DISCHARGE FOLLOW-UP: Primary | ICD-10-CM

## 2023-11-21 LAB
ATRIAL RATE: 49 BPM
Q ONSET: 191 MS
QRS COUNT: 16 BEATS
QRS DURATION: 166 MS
QT INTERVAL: 444 MS
QTC CALCULATION(BAZETT): 563 MS
QTC FREDERICIA: 520 MS
R AXIS: 73 DEGREES
T AXIS: -28 DEGREES
T OFFSET: 413 MS
VENTRICULAR RATE: 97 BPM

## 2023-11-21 PROCEDURE — 1036F TOBACCO NON-USER: CPT | Performed by: FAMILY MEDICINE

## 2023-11-21 PROCEDURE — 1111F DSCHRG MED/CURRENT MED MERGE: CPT | Performed by: FAMILY MEDICINE

## 2023-11-21 PROCEDURE — 93005 ELECTROCARDIOGRAM TRACING: CPT

## 2023-11-21 PROCEDURE — 99213 OFFICE O/P EST LOW 20 MIN: CPT | Performed by: FAMILY MEDICINE

## 2023-11-21 PROCEDURE — 1125F AMNT PAIN NOTED PAIN PRSNT: CPT | Performed by: FAMILY MEDICINE

## 2023-11-21 PROCEDURE — 1159F MED LIST DOCD IN RCRD: CPT | Performed by: FAMILY MEDICINE

## 2023-11-21 NOTE — PROGRESS NOTES
I reviewed and examined the patient. I was present for the key exam elements, and I fully participated in the patient's care. I discussed the management of the care with the resident. I have personally reviewed the pertinent labs and imaging, as well as recent notes, with the patient. I have reviewed the note above and agree with the resident's medical decision making as documented in the resident's note, in addition to the following comments / findings:     Agree with the rest of the plan outlined below by resident physician. No red flags.      The patient understands and agrees to the assessment and plan of care. Patient has also agreed to follow up and comply with the treatment and evaluation as recommended today. Patient was instructed to call the office at [phone] should questions arise regarding their treatment or care.     Dayne Santamaria DO, FAOASM  Family Medicine   [address], [address]  [place] [address]     Dayne Santamaria DO

## 2023-11-21 NOTE — PROGRESS NOTES
"Subjective   Patient ID: Massimo Garcia is a 63 y.o. male who presents for Follow-up (Follow up for being in hospital 3rd degree heart block and pacemaker installed).    Massimo Garcia is a 62-year-old male with past medical history significant for metastatic stage IV esophageal cancer with mets to the liver (on chemotherapy, trastuzumab) who presents to the office for hospital follow-up.  On November 5 patient presented to the ER with dizziness heart palpitations and sweatiness. Patient also with symptomatic bradycardia which eventually progressed to a complete heart block.  Patient was transcutaneously paced and transferred to the ICU.  Patient had a transvenous pacer placed and ultimately a permanent pacemaker placed.  After pacemaker placement patient also developed a left apical pneumothorax that was monitored via serial x-rays.  Most recent x-ray shows that the pneumothorax has resolved.  Patient continues to feel rundown, fatigued, having some discomfort in the site of pacemaker placement.  Patient has a follow-up appointment with electrophysiology on November 28.  Patient also had chemotherapy yesterday.  Patient has a PET scan in December.  It was postulated that the complete heart block could be due to cardiotoxicity from Trastuzumab.  Patient had an otherwise normal echo in the ICU and was negative for Lyme's disease.    Denies new onset headaches, fever, chills, n/v/d, chest pain, SOB, abdominal pain, urinary symptoms, and lower extremity edema.     Review of Systems  All other systems have been reviewed and are negative.    Visit Vitals  BP (!) 150/92 Comment: Repeat 124/70   Pulse 84   Temp 37.1 °C (98.7 °F)   Ht 1.727 m (5' 8\")   Wt 73.7 kg (162 lb 6.4 oz)   SpO2 100%   BMI 24.69 kg/m²   Smoking Status Former   BSA 1.88 m²       Objective   Physical Exam  General: Alert and oriented. Appears well-nourished and in no acute distress.  Eyes: PERRLA. EOMI.  Head/neck: Normocephalic. Supple.  Lymphatics: No cervical " lymphadenopathy.  Respiratory/Thorax: Clear to auscultation bilaterally. No wheezing.   Cardiovascular: Regular rate and rhythm. No murmurs. Site of pacemaker placement healing appropriately. No warmth or erythema. Mild ecchymosis in appropriate stage of healing.   Gastrointestinal: Soft, nontender, nondistended. +BS   Musculoskeletal: ROM intact. No joint swelling. Normal strength   Extremities: Warm and well perfused. No peripheral edema.  Neurological: No gross neurologic deficits.   Psychological: Appropriate mood and affect.   Skin: No visible rashes or lesions.     Assessment/Plan     No red flags.     Problem List Items Addressed This Visit    None  Visit Diagnoses       Hospital discharge follow-up    -  Primary          - Continue medications as prescribed following discharge from hospital  - Encouraged patient to keep appt with electrophysiology on 11/28  - Continue to follow up with Oncology - Dr. Blake  - RTC for annual physical, or sooner if needed    I have personally reviewed all available pertinent labs, imaging, and consult notes with the patient. All questions and concerns were addressed. Patient verbalizes understanding instructions and agrees with established plan of care.     Patient was seen  and discussed with attending physician Dr. Santamaria.    Jaylyn Mancuso, DO  Family Medicine, PGY-2

## 2023-11-22 ENCOUNTER — PHARMACY VISIT (OUTPATIENT)
Dept: PHARMACY | Facility: CLINIC | Age: 63
End: 2023-11-22
Payer: MEDICARE

## 2023-11-22 ENCOUNTER — PATIENT OUTREACH (OUTPATIENT)
Dept: PRIMARY CARE | Facility: CLINIC | Age: 63
End: 2023-11-22
Payer: COMMERCIAL

## 2023-11-22 PROCEDURE — RXMED WILLOW AMBULATORY MEDICATION CHARGE

## 2023-11-27 ENCOUNTER — APPOINTMENT (OUTPATIENT)
Dept: HEMATOLOGY/ONCOLOGY | Facility: CLINIC | Age: 63
End: 2023-11-27
Payer: COMMERCIAL

## 2023-11-27 NOTE — PROGRESS NOTES
Electrophysiology Follow up Visit    Massimo Garcia is a 63 y.o. year old male patient with a PMH of metastatic esophageal cancer, portal vein thrombosis. He was admitted with concern for near syncope/ loss of consciousness. Report of bradycardia in ED and after admission with concern for CHB, unable to verify with printed strips. While on telemetry had recurrent episode of bradycardia with high degree AV block and had TVP placed. He had [employer] dual chamber pacemaker placed on 11/9/2023.     Patient here for follow up post pacemaker implant.  He reports he has been feeling well with no further lightheadedness or near syncope.  He does report more fatigue since he was discharged from the hospital.  He is adhere to activity restrictions.    Medical History  He has a past medical history of Essential (primary) hypertension (12/21/2013) and Personal history of other diseases of the circulatory system.    Social History   reports that he has quit smoking. His smoking use included cigarettes. He has never been exposed to tobacco smoke. He has never used smokeless tobacco. He reports that he does not currently use alcohol. He reports current drug use. Drug: Marijuana.      FamHx: No family history on file.    Allergies   Allergen Reactions    Bee Venom Protein (Honey Bee) Anaphylaxis        Outpatient Medications:  Current Outpatient Medications   Medication Instructions    apixaban (Eliquis) 5 mg tablet TAKE 1 TABLET BY MOUTH TWO TIMES A DAY    cholecalciferol (Thera-D) 50 MCG (2000 UT) tablet oral    fentaNYL (Duragesic) 12 mcg/hr patch 1 patch, transdermal, Every 72 hours    gabapentin (NEURONTIN) 300 mg, oral, 2 times daily    lactulose 20 g, oral, 4 times daily PRN    LORazepam (ATIVAN) 0.5 mg, oral, 3 times daily PRN    metoprolol succinate XL (TOPROL-XL) 25 mg, oral, Daily, Do not crush or chew.    multivitamin with minerals (multivit-min-iron fum-folic ac) tablet 2 tablets, oral, Daily    pantoprazole (PROTONIX) 40  "mg, oral, 2 times daily    potassium chloride CR (Klor-Con) 10 mEq ER tablet TAKE 1 TABLET BY MOUTH ONCE DAILY    sodium chloride (Ocean) 0.65 % nasal spray 1 spray, Each Nostril, As needed    sucralfate (CARAFATE) 1 g, oral, Daily         Last Recorded Vitals: .vital      11/13/2023     8:00 AM 11/13/2023     9:00 AM 11/13/2023    10:00 AM 11/13/2023    10:25 AM 11/13/2023    11:00 AM 11/20/2023     9:39 AM 11/21/2023     1:42 PM   Vitals   Systolic 113 111 135   152 150   Diastolic 87 76 73   78 92   Heart Rate 75 80 76  79 86 84   Temp 36.6 °C (97.9 °F)     36.4 °C (97.5 °F) 37.1 °C (98.7 °F)   Resp 12 13 15  11 17    Height (in)       1.727 m (5' 8\")   Weight (lb)    150.79  159.06 162.4   BMI    22.93 kg/m2  24.19 kg/m2 24.69 kg/m2   BSA (m2)    1.81 m2  1.86 m2 1.88 m2   Visit Report       Report    Visit Vitals  Smoking Status Former        Physical Exam  Constitutional:       Appearance: Normal appearance.   Eyes:      Pupils: Pupils are equal, round, and reactive to light.   Cardiovascular:      Rate and Rhythm: Normal rate and regular rhythm.      Heart sounds: Normal heart sounds.   Pulmonary:      Effort: Pulmonary effort is normal.      Breath sounds: Normal breath sounds.   Musculoskeletal:         General: Normal range of motion.   Skin:     General: Skin is warm and dry.   Neurological:      Mental Status: He is alert and oriented to person, place, and time.      My Interpretation of Reviewed Study(s):  Echo(November/2023):   CONCLUSIONS:   1. Left ventricular systolic function is normal with a 55-60% estimated ejection fraction.   2. Spectral Doppler shows an impaired relaxation pattern of left ventricular diastolic filling.    ECGs (reviewed and my interpretation):   11/28/2023 sinus rhythm with rate of 82 bpm with RBBB    Lab Results   Component Value Date    WBC 5.3 11/20/2023    HGB 11.3 (L) 11/20/2023    HCT 33.6 (L) 11/20/2023    PLT 91 (L) 11/20/2023    ALT 38 11/20/2023    AST 46 (H) " 11/20/2023     11/20/2023    K 3.7 11/20/2023     11/20/2023    CREATININE 0.78 11/20/2023    BUN 13 11/20/2023    CO2 23 11/20/2023    TSH 1.27 11/05/2023    INR 1.6 (H) 11/13/2023       Assessment  Complete heart block s/p pacemaker implant on 11/9/2023.  ECG today shows sinus rhythm.  No device check able to be reviewed.  Patient is linked with his emmie on his phone for pacemaker monitoring.  Left chest site incision well-approximated with no hematoma or drainage.  We reviewed activity restrictions that we will continue for the next few weeks.  I reviewed medications which should include metoprolol which may be causing his fatigue.  Reviewing hospital notes looks that he was tachycardic so metoprolol was started.  I encouraged patient to try to continue to take the low-dose metoprolol.  Patient is scheduled for in clinic device check in January.  We will continue to follow patient through device clinic.  He will continue to follow with general cardiology as scheduled.    PLAN  Device clinic appointment in January is scheduled  We will continue to follow patient through device clinic      Exclusive of any other services or procedures performed, IJulisa, ALFREDITO-CNP , spent 30 minutes in duration for this visit today.  This time consisted of chart review, obtaining history, and/or performing the exam as documented above as well as documenting the clinical information for the encounter in the electronic record, discussing treatment options, plans, and/or goals with patient, family, and/or caregiver, refilling medications, updating the electronic record, ordering medicines, lab work, imaging, referrals, and/or procedures as documented above and communicating with other [facility] professionals. I have discussed the results of laboratory, radiology, and cardiology studies with the patient and their family/caregiver.

## 2023-11-28 ENCOUNTER — OFFICE VISIT (OUTPATIENT)
Dept: CARDIOLOGY | Facility: HOSPITAL | Age: 63
End: 2023-11-28
Payer: MEDICARE

## 2023-11-28 VITALS
WEIGHT: 162.04 LBS | SYSTOLIC BLOOD PRESSURE: 115 MMHG | OXYGEN SATURATION: 94 % | DIASTOLIC BLOOD PRESSURE: 74 MMHG | BODY MASS INDEX: 24.64 KG/M2 | HEART RATE: 78 BPM

## 2023-11-28 DIAGNOSIS — Z95.0 PRESENCE OF CARDIAC PACEMAKER: Primary | ICD-10-CM

## 2023-11-28 DIAGNOSIS — R07.89 CHEST HEAVINESS: ICD-10-CM

## 2023-11-28 DIAGNOSIS — I44.2 COMPLETE HEART BLOCK (MULTI): ICD-10-CM

## 2023-11-28 PROCEDURE — 1125F AMNT PAIN NOTED PAIN PRSNT: CPT | Performed by: NURSE PRACTITIONER

## 2023-11-28 PROCEDURE — 1036F TOBACCO NON-USER: CPT | Performed by: NURSE PRACTITIONER

## 2023-11-28 PROCEDURE — 99214 OFFICE O/P EST MOD 30 MIN: CPT | Mod: PO | Performed by: NURSE PRACTITIONER

## 2023-11-28 PROCEDURE — 1111F DSCHRG MED/CURRENT MED MERGE: CPT | Performed by: NURSE PRACTITIONER

## 2023-11-28 PROCEDURE — 99214 OFFICE O/P EST MOD 30 MIN: CPT | Performed by: NURSE PRACTITIONER

## 2023-11-28 PROCEDURE — 1159F MED LIST DOCD IN RCRD: CPT | Performed by: NURSE PRACTITIONER

## 2023-11-28 PROCEDURE — 93005 ELECTROCARDIOGRAM TRACING: CPT | Mod: PO | Performed by: NURSE PRACTITIONER

## 2023-11-28 RX ORDER — OXYCODONE HCL 10 MG/1
TABLET, FILM COATED, EXTENDED RELEASE ORAL
COMMUNITY
End: 2023-12-01 | Stop reason: WASHOUT

## 2023-12-01 ENCOUNTER — OFFICE VISIT (OUTPATIENT)
Dept: PALLIATIVE MEDICINE | Facility: CLINIC | Age: 63
End: 2023-12-01
Payer: MEDICARE

## 2023-12-01 VITALS
BODY MASS INDEX: 25.07 KG/M2 | DIASTOLIC BLOOD PRESSURE: 73 MMHG | SYSTOLIC BLOOD PRESSURE: 127 MMHG | RESPIRATION RATE: 17 BRPM | TEMPERATURE: 98.1 F | HEART RATE: 80 BPM | WEIGHT: 164.9 LBS | OXYGEN SATURATION: 99 %

## 2023-12-01 DIAGNOSIS — F41.9 ANXIETY AND DEPRESSION: ICD-10-CM

## 2023-12-01 DIAGNOSIS — Z51.5 PALLIATIVE CARE ENCOUNTER: Primary | ICD-10-CM

## 2023-12-01 DIAGNOSIS — F32.A ANXIETY AND DEPRESSION: ICD-10-CM

## 2023-12-01 DIAGNOSIS — C15.9 STAGE IV MALIGNANT NEOPLASM OF ESOPHAGUS (MULTI): ICD-10-CM

## 2023-12-01 DIAGNOSIS — G89.3 CANCER RELATED PAIN: ICD-10-CM

## 2023-12-01 PROCEDURE — 99214 OFFICE O/P EST MOD 30 MIN: CPT | Performed by: NURSE PRACTITIONER

## 2023-12-01 PROCEDURE — 1036F TOBACCO NON-USER: CPT | Performed by: NURSE PRACTITIONER

## 2023-12-01 PROCEDURE — 1159F MED LIST DOCD IN RCRD: CPT | Performed by: NURSE PRACTITIONER

## 2023-12-01 PROCEDURE — 1125F AMNT PAIN NOTED PAIN PRSNT: CPT | Performed by: NURSE PRACTITIONER

## 2023-12-01 PROCEDURE — 1111F DSCHRG MED/CURRENT MED MERGE: CPT | Performed by: NURSE PRACTITIONER

## 2023-12-01 ASSESSMENT — ENCOUNTER SYMPTOMS
LOSS OF SENSATION IN FEET: 0
OCCASIONAL FEELINGS OF UNSTEADINESS: 0
DEPRESSION: 0

## 2023-12-01 ASSESSMENT — PAIN SCALES - GENERAL: PAINLEVEL: 3

## 2023-12-01 NOTE — PROGRESS NOTES
"SUPPORTIVE AND PALLIATIVE ONCOLOGY CONSULT - OUTPATIENT      SERVICE DATE: 12/1/2023    Cancer History  Stage IV metastatic esophageal cancer dx Sept 2023  -mets to liver, lung, abd lymphadenopathy  -treated with FOLFOX and trastuzumab    Subjective   HISTORY OF PRESENT ILLNESS: Massimo Garcia is a 63 y.o. male with PmHx of HTN, and recently diagnosed metastatic esophageal cancer.     He presents to supportive oncology clinic for pain and symptom management.     Patient presents for new patient visit accompanied by his wife, Jane, today.  He complains of pain in his abdomen, describing it as a constant aching pain.  Some days are worse than others, but pain is typically in the lower and mid abdominal area.  He is currently taking oxycodone 10 mg 2-3 times per day, and also takes Tylenol as needed.  He does have neuropathy in bilateral hands and feet, although he notes that hands have felt a little bit better recently.  His feet feel like he is \"walking on blisters.\"  He is moving his bowels every day, taking senna as needed, and drinking cider.  He is having intermittent nausea, no vomiting.  He recently tried a scopolamine patch due to prolonged QTc.  He currently denies symptoms of reflux.  Appetite is okay, not great, he has lost a few pounds over the past few months, but reports having lost a significant amount of weight over the past 6 months.  His mood is good, patient states he has a good mindset.  He is sleeping okay, does note difficulty falling asleep, but does not think pain is a factor.  Sometimes his stomach \"does not feel right\" and he needs to change positions frequently during the night.  His energy levels are pretty good, he continues to do yard work, states that he has a busy body and always has something to do.  Also notes more frequent bloody noses lately.    Pain Assessment:  Pain Score:   3  Location: Generalized      Symptom Assessment:  Pain:somewhat  Headache: none  Dizziness:none  Lack of " energy: none  Difficulty sleeping: a little  Worrying: none  Anxiety: none  Depression: none  Pain in mouth/swallowing: none  Dry mouth: none  Taste changes: none  Shortness of breath: none  Lack of appetite: a little   Nausea: a little  Vomiting: none  Constipation: none  Diarrhea: none  Sore muscles: none  Numbness or tingling in hands/feet/other: somewhat  Weight loss: a little      Information obtained from: interview of patient and interview of family  ______________________________________________________________________     Oncology History   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)   9/13/2023 Initial Diagnosis    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)     9/13/2023 -  Chemotherapy    Trastuzumab + mFOLFOX6 (Fluorouracil Continuous Infusion / Leucovorin / Oxaliplatin), 14 Day Cycles     Metastases to the liver (CMS/HCC)   9/13/2023 Initial Diagnosis    Metastases to the liver (CMS/HCC)     9/13/2023 -  Chemotherapy    Trastuzumab + mFOLFOX6 (Fluorouracil Continuous Infusion / Leucovorin / Oxaliplatin), 14 Day Cycles         Past Medical History:   Diagnosis Date    Essential (primary) hypertension 12/21/2013    Hypertension    Personal history of other diseases of the circulatory system     History of right bundle branch block (RBBB)     Past Surgical History:   Procedure Laterality Date    CARDIAC ELECTROPHYSIOLOGY PROCEDURE N/A 11/7/2023    Procedure: Temporary Pacemaker Insertion;  Surgeon: Alexis Shook MD;  Location: [facility] Cardiac Cath Lab;  Service: Cardiovascular;  Laterality: N/A;    CARDIAC ELECTROPHYSIOLOGY PROCEDURE Left 11/9/2023    Procedure: PPM IMPLANT DUAL;  Surgeon: Elias Connolly MD;  Location: [facility] Cardiac Cath Lab;  Service: Electrophysiology;  Laterality: Left;    TOTAL KNEE ARTHROPLASTY  09/30/2016    Total Knee Replacement Left    TOTAL KNEE ARTHROPLASTY  09/30/2016    Total Knee Replacement Right     No family history on file.     SOCIAL HISTORY  -  (27 yrs), lives in a house with his wife. One daughter at home currently (24 yo). Children: three- 2 daughters (Abbey and Annia) and 1 son (Gigi); 2 dogs (theresa and patricia), 2 cats   -he and his wife both just retired, worked together at [employer], he worked in fire safety  Social History: quit smoking cigarettes, previously smoked cigars here and there. Hx of ETOH abuse x 20 yrs (a few 12 packs/ week), no alcohol since January 2023. No illicits. Has medical marijuana card.     REVIEW OF SYSTEMS  Review of systems negative unless noted in HPI.       Objective     Palliative Performance Scale % (PPS)       Current Outpatient Medications   Medication Instructions    apixaban (Eliquis) 5 mg tablet TAKE 1 TABLET BY MOUTH TWO TIMES A DAY    fentaNYL (Duragesic) 12 mcg/hr patch 1 patch, transdermal, Every 72 hours    gabapentin (NEURONTIN) 300 mg, oral, 2 times daily    lactulose 20 g, oral, 4 times daily PRN    LORazepam (ATIVAN) 0.5 mg, oral, 3 times daily PRN    metoprolol succinate XL (TOPROL-XL) 25 mg, oral, Daily, Do not crush or chew.    multivitamin with minerals (multivit-min-iron fum-folic ac) tablet 2 tablets, oral, Daily    pantoprazole (PROTONIX) 40 mg, oral, 2 times daily    potassium chloride CR (Klor-Con) 10 mEq ER tablet TAKE 1 TABLET BY MOUTH ONCE DAILY    sodium chloride (Ocean) 0.65 % nasal spray 1 spray, Each Nostril, As needed    sucralfate (CARAFATE) 1 g, oral, Daily       Allergies:   Allergies   Allergen Reactions    Bee Venom Protein (Honey Bee) Anaphylaxis                PHYSICAL EXAMINATION  Vital Signs:   Vital signs reviewed  Vitals:    12/01/23 1024   BP: 127/73   Pulse: 80   Resp: 17   Temp: 36.7 °C (98.1 °F)   SpO2: 99%     Pain Score:   3         Physical Exam  Vitals reviewed.   Constitutional:       Appearance: He is ill-appearing.   HENT:      Head: Normocephalic.   Cardiovascular:      Rate and Rhythm: Normal rate and regular rhythm.      Pulses: Normal pulses.      Heart sounds:  Normal heart sounds.   Pulmonary:      Effort: Pulmonary effort is normal.      Breath sounds: Normal breath sounds.   Abdominal:      General: Abdomen is flat. Bowel sounds are normal.      Palpations: Abdomen is soft.   Musculoskeletal:         General: Normal range of motion.   Skin:     General: Skin is warm and dry.   Neurological:      General: No focal deficit present.      Mental Status: He is alert and oriented to person, place, and time. Mental status is at baseline.   Psychiatric:         Mood and Affect: Mood normal.         Behavior: Behavior normal.         Thought Content: Thought content normal.         Judgment: Judgment normal.         ASSESSMENT/PLAN    Pain  Pain is: cancer related pain  Type: visceral  Pain control: sub-optimally controlled  Home regimen:   -continue fentanyl patch 12mcg q72hrs. Rx sent for fill on 12/6  -continue oxycodone 10mg q4hrs PRN. Rx sent for fill on 12/6.    -continue tylenol PRN   Intolerances/previously tried:   -previously received morphine with poor efficacy (while inpatient)    Opioid Use  Medication Management:   - OARRS report reviewed with no aberrant behavior; consistent with  prescriptions/records and patient history  - MED 43.  Overdose Risk Score 240.   This has been discussed with patient.   - We will continue to closely monitor the patient for signs of prescription misuse including UDS, OARRS review and subjective reports at each visit.  - no concurrent benzodiazepine use   - I am a provider who either is or has consulted and collaborated with a provider certified in Hospice and Palliative Medicine and have conducted a face-face visit and examination for this patient.  - Routine Urine Drug Screen completed 11/2/23 appropriately positive for opioids and negative for illicit substances  - Controlled Substance Agreement completed 11/2/23  - Specifically discussed that controlled substance prescriptions will only be provided by our group as outlined in the  completed agreement  - Prescribed naloxone 11/2/23  - Red Flags: hx of ETOH abuse    Neuropathy b/l hands and feet  -continue gabapentin 300mg bid. Rx sent for fill on 11/21.     Nausea/ Reflux   Intermittent nausea without vomiting related to chemotherapy and opioids   -continue protonix daily  -continue carafate PRN    Constipation   At risk for constipation related to opioids,  currently not constipated  -educated pt on importance of maintaining regular bowel schedule  Current regimen:   -start colace 100mg bid PRN.   -start senna 8.6mg, 1-2tabs, 1-2x/day  -start MOM 15mL 4x/day PRN  -start lactulose 20grams q4hrs x 4 doses PRN     Decreased appetite  Related to malignancy, chemotherapy, and disease process  -encouraged smaller, more frequent meals through out the day  -encouraged use of supplements such as Boost, Ensure, Breeze  -encouraged use of anti-emetics around meal times   -pt interested in integrative medicine-- dietary changes, etc.   -referral to Dr. Mays's office     Epistaxis r/t thrombocytopenia  -start ocean spray PRN. Rx sent today.     Mood  -continue ativan 1mg tid PRN. Rx sent for fill on 12/15.       Introduction to Supportive and Palliative Oncology:  Spoke with pt and wife    Introduced the role and philosophy of Supportive and Palliative oncology in the evaluation and management of symptoms during cancer treatment  Palliative care was introduced as a service for patients with serious illness to help with symptoms, assist with goals of care conversations, navigate complex decision making, improve quality of life for patients, and provide support both patients and families.  Patient seemed to appreciate the extra layer of support.    Advance Directives  Existence of Advance Directives:No - has interest  -SW referral for assitance with ADs  Decision maker: DOUGLAS is wife  Code Status: Full code        Next Follow-Up Visit:  Return to clinic in 1 month     Signature and billing  Thank you for  allowing us to participate in the care of this patient. Recommendations will be communicated back to the consulting service by way of shared electronic medical record or face-to-face.    Medical complexity was moderate level due to due to complexity of problems, extensive data review, and high risk of management/treatment.  Time was spent on the following: Prep Time, Time Directly with Patient/Family/Caregiver, Documentation Time. Total time spent: 30 mins            Some elements copied from Dr. Blake's note on 10/30/23, the elements have been updated and all reflect current decision making from today, 12/4/2023.      Plan of Care discussed with: Patient and Family/Significant Other: wife      SIGNATURE: JASPER Ferguson    Contact information:  Supportive and Palliative Oncology  Monday-Friday 8 AM-5 PM  Phone:  [phone], press option #5, then option #1.   Or Epic Secure Chat

## 2023-12-04 ENCOUNTER — INFUSION (OUTPATIENT)
Dept: HEMATOLOGY/ONCOLOGY | Facility: CLINIC | Age: 63
End: 2023-12-04
Payer: MEDICARE

## 2023-12-04 ENCOUNTER — NUTRITION (OUTPATIENT)
Dept: HEMATOLOGY/ONCOLOGY | Facility: CLINIC | Age: 63
End: 2023-12-04
Payer: COMMERCIAL

## 2023-12-04 ENCOUNTER — OFFICE VISIT (OUTPATIENT)
Dept: PULMONOLOGY | Facility: CLINIC | Age: 63
End: 2023-12-04
Payer: MEDICARE

## 2023-12-04 ENCOUNTER — APPOINTMENT (OUTPATIENT)
Dept: PULMONOLOGY | Facility: CLINIC | Age: 63
End: 2023-12-04
Payer: MEDICARE

## 2023-12-04 VITALS
DIASTOLIC BLOOD PRESSURE: 82 MMHG | BODY MASS INDEX: 25.11 KG/M2 | WEIGHT: 163 LBS | OXYGEN SATURATION: 99 % | SYSTOLIC BLOOD PRESSURE: 146 MMHG | HEART RATE: 94 BPM | RESPIRATION RATE: 16 BRPM

## 2023-12-04 VITALS
WEIGHT: 162.48 LBS | OXYGEN SATURATION: 98 % | RESPIRATION RATE: 17 BRPM | DIASTOLIC BLOOD PRESSURE: 82 MMHG | SYSTOLIC BLOOD PRESSURE: 143 MMHG | TEMPERATURE: 97.9 F | HEART RATE: 78 BPM | BODY MASS INDEX: 24.7 KG/M2

## 2023-12-04 VITALS — BODY MASS INDEX: 24.62 KG/M2 | HEIGHT: 68 IN | WEIGHT: 162.48 LBS

## 2023-12-04 DIAGNOSIS — C15.9 STAGE IV MALIGNANT NEOPLASM OF ESOPHAGUS (MULTI): ICD-10-CM

## 2023-12-04 DIAGNOSIS — C78.7 METASTASES TO THE LIVER (MULTI): ICD-10-CM

## 2023-12-04 DIAGNOSIS — J95.811 POSTPROCEDURAL PNEUMOTHORAX: Primary | ICD-10-CM

## 2023-12-04 PROBLEM — J93.9 PNEUMOTHORAX: Status: ACTIVE | Noted: 2023-12-04

## 2023-12-04 PROBLEM — J93.9 PNEUMOTHORAX: Status: RESOLVED | Noted: 2023-12-04 | Resolved: 2023-12-04

## 2023-12-04 LAB
ALBUMIN SERPL BCP-MCNC: 4.3 G/DL (ref 3.4–5)
ALP SERPL-CCNC: 174 U/L (ref 33–136)
ALT SERPL W P-5'-P-CCNC: 37 U/L (ref 10–52)
ANION GAP SERPL CALC-SCNC: 13 MMOL/L (ref 10–20)
AST SERPL W P-5'-P-CCNC: 41 U/L (ref 9–39)
BASOPHILS # BLD AUTO: 0.01 X10*3/UL (ref 0–0.1)
BASOPHILS NFR BLD AUTO: 0.2 %
BILIRUB SERPL-MCNC: 1.3 MG/DL (ref 0–1.2)
BUN SERPL-MCNC: 18 MG/DL (ref 6–23)
CALCIUM SERPL-MCNC: 9.1 MG/DL (ref 8.6–10.3)
CHLORIDE SERPL-SCNC: 104 MMOL/L (ref 98–107)
CO2 SERPL-SCNC: 27 MMOL/L (ref 21–32)
CREAT SERPL-MCNC: 0.74 MG/DL (ref 0.5–1.3)
EOSINOPHIL # BLD AUTO: 0.12 X10*3/UL (ref 0–0.7)
EOSINOPHIL NFR BLD AUTO: 1.9 %
ERYTHROCYTE [DISTWIDTH] IN BLOOD BY AUTOMATED COUNT: 14.9 % (ref 11.5–14.5)
GFR SERPL CREATININE-BSD FRML MDRD: >90 ML/MIN/1.73M*2
GLUCOSE SERPL-MCNC: 98 MG/DL (ref 74–99)
HCT VFR BLD AUTO: 34.6 % (ref 41–52)
HGB BLD-MCNC: 11.5 G/DL (ref 13.5–17.5)
IMM GRANULOCYTES # BLD AUTO: 0.02 X10*3/UL (ref 0–0.7)
IMM GRANULOCYTES NFR BLD AUTO: 0.3 % (ref 0–0.9)
LYMPHOCYTES # BLD AUTO: 0.82 X10*3/UL (ref 1.2–4.8)
LYMPHOCYTES NFR BLD AUTO: 13 %
MCH RBC QN AUTO: 29.4 PG (ref 26–34)
MCHC RBC AUTO-ENTMCNC: 33.2 G/DL (ref 32–36)
MCV RBC AUTO: 89 FL (ref 80–100)
MONOCYTES # BLD AUTO: 0.56 X10*3/UL (ref 0.1–1)
MONOCYTES NFR BLD AUTO: 8.9 %
NEUTROPHILS # BLD AUTO: 4.78 X10*3/UL (ref 1.2–7.7)
NEUTROPHILS NFR BLD AUTO: 75.7 %
PLATELET # BLD AUTO: 81 X10*3/UL (ref 150–450)
POTASSIUM SERPL-SCNC: 3.6 MMOL/L (ref 3.5–5.3)
PROT SERPL-MCNC: 6.3 G/DL (ref 6.4–8.2)
RBC # BLD AUTO: 3.91 X10*6/UL (ref 4.5–5.9)
SODIUM SERPL-SCNC: 140 MMOL/L (ref 136–145)
WBC # BLD AUTO: 6.3 X10*3/UL (ref 4.4–11.3)

## 2023-12-04 PROCEDURE — 2500000004 HC RX 250 GENERAL PHARMACY W/ HCPCS (ALT 636 FOR OP/ED): Performed by: INTERNAL MEDICINE

## 2023-12-04 PROCEDURE — 1159F MED LIST DOCD IN RCRD: CPT | Performed by: INTERNAL MEDICINE

## 2023-12-04 PROCEDURE — 1160F RVW MEDS BY RX/DR IN RCRD: CPT | Performed by: INTERNAL MEDICINE

## 2023-12-04 PROCEDURE — 1111F DSCHRG MED/CURRENT MED MERGE: CPT | Performed by: INTERNAL MEDICINE

## 2023-12-04 PROCEDURE — 85025 COMPLETE CBC W/AUTO DIFF WBC: CPT | Performed by: INTERNAL MEDICINE

## 2023-12-04 PROCEDURE — 80053 COMPREHEN METABOLIC PANEL: CPT | Performed by: INTERNAL MEDICINE

## 2023-12-04 PROCEDURE — 96411 CHEMO IV PUSH ADDL DRUG: CPT

## 2023-12-04 PROCEDURE — 96413 CHEMO IV INFUSION 1 HR: CPT

## 2023-12-04 PROCEDURE — 96367 TX/PROPH/DG ADDL SEQ IV INF: CPT

## 2023-12-04 PROCEDURE — 99212 OFFICE O/P EST SF 10 MIN: CPT | Performed by: INTERNAL MEDICINE

## 2023-12-04 PROCEDURE — 2500000004 HC RX 250 GENERAL PHARMACY W/ HCPCS (ALT 636 FOR OP/ED): Mod: JZ | Performed by: INTERNAL MEDICINE

## 2023-12-04 PROCEDURE — 96375 TX/PRO/DX INJ NEW DRUG ADDON: CPT | Mod: INF

## 2023-12-04 PROCEDURE — 2500000004 HC RX 250 GENERAL PHARMACY W/ HCPCS (ALT 636 FOR OP/ED)

## 2023-12-04 PROCEDURE — 1126F AMNT PAIN NOTED NONE PRSNT: CPT | Performed by: INTERNAL MEDICINE

## 2023-12-04 RX ORDER — FENTANYL 12.5 UG/1
1 PATCH TRANSDERMAL
Qty: 10 PATCH | Refills: 0 | Status: SHIPPED | OUTPATIENT
Start: 2023-12-04 | End: 2024-01-03

## 2023-12-04 RX ORDER — FAMOTIDINE 10 MG/ML
20 INJECTION INTRAVENOUS ONCE AS NEEDED
Status: DISCONTINUED | OUTPATIENT
Start: 2023-12-04 | End: 2023-12-04 | Stop reason: HOSPADM

## 2023-12-04 RX ORDER — HEPARIN SODIUM,PORCINE/PF 10 UNIT/ML
50 SYRINGE (ML) INTRAVENOUS AS NEEDED
Status: CANCELLED | OUTPATIENT
Start: 2023-12-04

## 2023-12-04 RX ORDER — DEXAMETHASONE SODIUM PHOSPHATE 4 MG/ML
INJECTION, SOLUTION INTRA-ARTICULAR; INTRALESIONAL; INTRAMUSCULAR; INTRAVENOUS; SOFT TISSUE
Status: COMPLETED
Start: 2023-12-04 | End: 2023-12-04

## 2023-12-04 RX ORDER — ACETAMINOPHEN 325 MG/1
TABLET ORAL
Status: DISCONTINUED
Start: 2023-12-04 | End: 2023-12-04 | Stop reason: HOSPADM

## 2023-12-04 RX ORDER — DIPHENHYDRAMINE HYDROCHLORIDE 50 MG/ML
25 INJECTION INTRAMUSCULAR; INTRAVENOUS ONCE
Status: COMPLETED | OUTPATIENT
Start: 2023-12-04 | End: 2023-12-04

## 2023-12-04 RX ORDER — OXYCODONE HYDROCHLORIDE 10 MG/1
10 TABLET ORAL EVERY 4 HOURS PRN
Qty: 180 TABLET | Refills: 0 | Status: SHIPPED | OUTPATIENT
Start: 2023-12-04 | End: 2024-01-05 | Stop reason: SDUPTHER

## 2023-12-04 RX ORDER — PALONOSETRON 0.05 MG/ML
INJECTION, SOLUTION INTRAVENOUS
Status: DISCONTINUED
Start: 2023-12-04 | End: 2023-12-04 | Stop reason: HOSPADM

## 2023-12-04 RX ORDER — PROCHLORPERAZINE MALEATE 10 MG
10 TABLET ORAL EVERY 6 HOURS PRN
Status: DISCONTINUED | OUTPATIENT
Start: 2023-12-04 | End: 2023-12-04 | Stop reason: HOSPADM

## 2023-12-04 RX ORDER — PROCHLORPERAZINE EDISYLATE 5 MG/ML
10 INJECTION INTRAMUSCULAR; INTRAVENOUS EVERY 6 HOURS PRN
Status: DISCONTINUED | OUTPATIENT
Start: 2023-12-04 | End: 2023-12-04 | Stop reason: HOSPADM

## 2023-12-04 RX ORDER — ACETAMINOPHEN 325 MG/1
650 TABLET ORAL ONCE
Status: COMPLETED | OUTPATIENT
Start: 2023-12-04 | End: 2023-12-04

## 2023-12-04 RX ORDER — HEPARIN 100 UNIT/ML
500 SYRINGE INTRAVENOUS AS NEEDED
Status: CANCELLED | OUTPATIENT
Start: 2023-12-04

## 2023-12-04 RX ORDER — DEXAMETHASONE SODIUM PHOSPHATE 4 MG/ML
8 INJECTION, SOLUTION INTRA-ARTICULAR; INTRALESIONAL; INTRAMUSCULAR; INTRAVENOUS; SOFT TISSUE ONCE
Status: COMPLETED | OUTPATIENT
Start: 2023-12-04 | End: 2023-12-04

## 2023-12-04 RX ORDER — LORAZEPAM 0.5 MG/1
0.5 TABLET ORAL 3 TIMES DAILY PRN
Qty: 90 TABLET | Refills: 0 | Status: SHIPPED | OUTPATIENT
Start: 2023-12-04 | End: 2024-01-05 | Stop reason: SDUPTHER

## 2023-12-04 RX ORDER — FLUOROURACIL 50 MG/ML
400 INJECTION, SOLUTION INTRAVENOUS ONCE
Status: COMPLETED | OUTPATIENT
Start: 2023-12-04 | End: 2023-12-04

## 2023-12-04 RX ORDER — LORAZEPAM 2 MG/ML
1 INJECTION INTRAMUSCULAR AS NEEDED
Status: DISCONTINUED | OUTPATIENT
Start: 2023-12-04 | End: 2023-12-04 | Stop reason: HOSPADM

## 2023-12-04 RX ORDER — DIPHENHYDRAMINE HYDROCHLORIDE 50 MG/ML
INJECTION INTRAMUSCULAR; INTRAVENOUS
Status: COMPLETED
Start: 2023-12-04 | End: 2023-12-04

## 2023-12-04 RX ORDER — ALBUTEROL SULFATE 0.83 MG/ML
3 SOLUTION RESPIRATORY (INHALATION) AS NEEDED
Status: DISCONTINUED | OUTPATIENT
Start: 2023-12-04 | End: 2023-12-04 | Stop reason: HOSPADM

## 2023-12-04 RX ORDER — EPINEPHRINE 0.3 MG/.3ML
0.3 INJECTION SUBCUTANEOUS EVERY 5 MIN PRN
Status: DISCONTINUED | OUTPATIENT
Start: 2023-12-04 | End: 2023-12-04 | Stop reason: HOSPADM

## 2023-12-04 RX ORDER — DIPHENHYDRAMINE HYDROCHLORIDE 50 MG/ML
50 INJECTION INTRAMUSCULAR; INTRAVENOUS AS NEEDED
Status: DISCONTINUED | OUTPATIENT
Start: 2023-12-04 | End: 2023-12-04 | Stop reason: HOSPADM

## 2023-12-04 RX ORDER — HEPARIN 100 UNIT/ML
500 SYRINGE INTRAVENOUS AS NEEDED
Status: DISCONTINUED | OUTPATIENT
Start: 2023-12-04 | End: 2023-12-04 | Stop reason: HOSPADM

## 2023-12-04 RX ORDER — PALONOSETRON 0.05 MG/ML
0.25 INJECTION, SOLUTION INTRAVENOUS ONCE
Status: COMPLETED | OUTPATIENT
Start: 2023-12-04 | End: 2023-12-04

## 2023-12-04 RX ADMIN — DIPHENHYDRAMINE HYDROCHLORIDE 25 MG: 50 INJECTION INTRAMUSCULAR; INTRAVENOUS at 11:33

## 2023-12-04 RX ADMIN — HEPARIN 500 UNITS: 100 SYRINGE at 13:33

## 2023-12-04 RX ADMIN — DEXAMETHASONE SODIUM PHOSPHATE 8 MG: 4 INJECTION INTRA-ARTICULAR; INTRALESIONAL; INTRAMUSCULAR; INTRAVENOUS; SOFT TISSUE at 11:15

## 2023-12-04 RX ADMIN — TRASTUZUMAB 300 MG: 150 INJECTION, POWDER, LYOPHILIZED, FOR SOLUTION INTRAVENOUS at 11:49

## 2023-12-04 RX ADMIN — DEXAMETHASONE SODIUM PHOSPHATE 8 MG: 4 INJECTION, SOLUTION INTRA-ARTICULAR; INTRALESIONAL; INTRAMUSCULAR; INTRAVENOUS; SOFT TISSUE at 11:15

## 2023-12-04 RX ADMIN — ACETAMINOPHEN 650 MG: 325 TABLET ORAL at 11:10

## 2023-12-04 RX ADMIN — PALONOSETRON 250 MCG: 0.05 INJECTION, SOLUTION INTRAVENOUS at 11:10

## 2023-12-04 RX ADMIN — FLUOROURACIL 750 MG: 50 INJECTION, SOLUTION INTRAVENOUS at 13:18

## 2023-12-04 RX ADMIN — LEUCOVORIN CALCIUM 740 MG: 350 INJECTION, POWDER, LYOPHILIZED, FOR SOLUTION INTRAMUSCULAR; INTRAVENOUS at 12:35

## 2023-12-04 ASSESSMENT — COLUMBIA-SUICIDE SEVERITY RATING SCALE - C-SSRS
2. HAVE YOU ACTUALLY HAD ANY THOUGHTS OF KILLING YOURSELF?: NO
6. HAVE YOU EVER DONE ANYTHING, STARTED TO DO ANYTHING, OR PREPARED TO DO ANYTHING TO END YOUR LIFE?: NO

## 2023-12-04 ASSESSMENT — PATIENT HEALTH QUESTIONNAIRE - PHQ9
1. LITTLE INTEREST OR PLEASURE IN DOING THINGS: NOT AT ALL
2. FEELING DOWN, DEPRESSED OR HOPELESS: NOT AT ALL
SUM OF ALL RESPONSES TO PHQ9 QUESTIONS 1 AND 2: 0

## 2023-12-04 ASSESSMENT — ENCOUNTER SYMPTOMS
RESPIRATORY NEGATIVE: 1
FEVER: 0
SHORTNESS OF BREATH: 0
GASTROINTESTINAL NEGATIVE: 1
DEPRESSION: 0
CHILLS: 0
CARDIOVASCULAR NEGATIVE: 1
NEUROLOGICAL NEGATIVE: 1
PSYCHIATRIC NEGATIVE: 1
COUGH: 0

## 2023-12-04 ASSESSMENT — PAIN SCALES - GENERAL
PAINLEVEL: 2
PAINLEVEL: 0-NO PAIN

## 2023-12-04 NOTE — PROGRESS NOTES
"NUTRITION Follow-up NOTE  Nutrition Assessment     Reason for Visit:  Massimo Garcia is a 63 y.o. male who presents for HER2 positive adenocarcinoma of the esophagus, metastatic to  liver. Being seen for early satiety and abdominal pain.     Visited with pt and wife today for follow-up.     Anthropometrics:  Anthropometrics  Height: 171.6 cm (5' 7.56\")  Weight: 73.7 kg (162 lb 7.7 oz)  BMI (Calculated): 25.03  IBW/kg (Dietitian Calculated): 68 kg  Weight Change  Weight History / % Weight Change: Some minor weight loss noted in the last 2 months, 1kg- otherwise weight remains fairly stable  Significant Weight Loss: No        Wt Readings from Last 20 Encounters:   12/04/23 73.7 kg (162 lb 7.7 oz)   12/04/23 73.7 kg (162 lb 7.7 oz)   12/01/23 74.8 kg (164 lb 14.5 oz)   11/28/23 73.5 kg (162 lb 0.6 oz)   11/21/23 73.7 kg (162 lb 6.4 oz)   11/20/23 72.1 kg (159 lb 1 oz)   11/13/23 68.4 kg (150 lb 12.7 oz)   11/02/23 74.4 kg (164 lb 0.4 oz)   10/30/23 73 kg (160 lb 13.2 oz)   10/16/23 72.6 kg (160 lb 0.9 oz)   10/02/23 74.6 kg (164 lb 7.4 oz)   10/02/23 74.6 kg (164 lb 7.4 oz)   09/13/23 73.5 kg (162 lb 0.6 oz)   06/27/23 72.8 kg (160 lb 7.9 oz)   03/29/23 78.6 kg (173 lb 4.5 oz)   03/27/23 76.4 kg (168 lb 6.9 oz)   03/15/23 78.2 kg (172 lb 6.4 oz)   03/13/23 77 kg (169 lb 12.1 oz)   03/01/23 78.2 kg (172 lb 6.4 oz)   02/27/23 77 kg (169 lb 12.1 oz)       Food And Nutrient Intake:  Food and Nutrient History  Food and Nutrient History: More fatigued. Boost every day. Feel appetite is better than before but not as good as it was prior to recent cardiac event.  Energy Intake: Fair 50-75 %  Fluid Intake: tea, water, [employer]  GI Symptoms: nausea  GI Symptoms greater than 2 weeks: intermittent  Oral Problems: denies     Food Intake  Meal 1: B = Cream of wheat; milk  and honey  Meal 2: L = snacks: nuts  Meal 3: D = filet (5oz) and corn     Food Supplement Intake  Oral Nutrition Supplements: Boost Very High Calorie (smoothies " "a couple times a week)           Nutrition Focused Physical Exam: Completed 10/2/23           Energy Needs  Calculated Energy Needs Using Equations  Height: 171.6 cm (5' 7.56\")  Weight Used for Equation Calculations: 74.6 kg (164 lb 7.4 oz)  Shazia- [place]offey Equation (Overweight or Obese Patients): 1498  Estimated Energy Needs  Total Energy Estimated Needs (kCal): 2238 kCal  Total Estimated Energy Need per Day (kCal/kg): 30 kCal/kg  Estimated Fluid Needs  Total Fluid Estimated Needs (mL): 2300 mL  Estimated Protein Needs  Total Protein Estimated Needs (g): 89.52 g  Total Protein Estimated Needs (g/kg): 1.2 g/kg        Nutrition Diagnosis      Nutrition Diagnosis  Patient has Nutrition Diagnosis: Yes  Diagnosis Status (1): Ongoing  Nutrition Diagnosis 1: Predicted inadequate energy intake  Related to (1): pathophysiology of diease/ treatment  As Evidenced by (1): pt with mild/ moderate muscle/ adipose losses and metastatic esophageal cancer undergoing chemotherapy with potential for nutrition impact symptoms.    Nutrition Interventions/Recommendations   Food and Nutrition Delivery  Meals & Snacks: Energy-modified diet, Protein-modified diet  Goals: Continue high calorie/ high proteins foods as frequently as possible. Continue ONS to help maintain/ gain weight following cardiac event.  Medical Food Supplement: Boost Very High Calorie  Goals: Daily in addition to meals/ snacks        There are no Patient Instructions on file for this visit.    Nutrition Monitoring and Evaluation   Food/Nutrient Related History Monitoring  Monitoring and Evaluation Plan: Energy intake, Protein intake  Energy Intake: Estimated energy intake  Criteria: will incorporate high calorie options at least 75% of the time  Estimated protein intake: Estimated protein intake  Criteria: will incorporate protein source at least 50% of the time at meals and snacks  Additional Plan: Pt has been able to meet goals at least 50% of the time; portions " have declined slightly s/p recent hospitalization but are slowly improving per pt report.        Time Spent  Prep time on day of patient encounter: 10 minutes  Time spent directly with patient, family or caregiver: 10 minutes  Additional Time Spent on Patient Care Activities: 0 minutes  Documentation Time: 10 minutes  Other Time Spent: 0 minutes  Total: 30 minutes

## 2023-12-04 NOTE — PROGRESS NOTES
Subjective   Patient ID: Massimo Garcia is a 63 y.o. male who presents for Lung Eval.  H/o esophageal Ca here for follow-up PTX.  Pt had a PTX in setting of recent PPM placement.  Repeat CXR showed resolution of PTX.  No fevers, chills, cough.  No chest pain.  ET is about unrestricted.    Minimal smoker of cigars.  Does vape CBD occasionally.  Fire .  Has dogs,. Cats and fish.  No family history of lung disease.          Review of Systems   Constitutional:  Negative for chills and fever.   Respiratory: Negative.  Negative for cough and shortness of breath.    Cardiovascular: Negative.    Gastrointestinal: Negative.    Skin:  Negative for rash.   Neurological: Negative.    Psychiatric/Behavioral: Negative.     All other systems reviewed and are negative.      Objective   Physical Exam  Vitals reviewed.   Constitutional:       Appearance: Normal appearance.   HENT:      Head: Normocephalic and atraumatic.   Eyes:      Extraocular Movements: Extraocular movements intact.   Cardiovascular:      Rate and Rhythm: Normal rate and regular rhythm.      Heart sounds: Normal heart sounds.   Pulmonary:      Effort: Pulmonary effort is normal.      Breath sounds: Normal breath sounds.   Abdominal:      Palpations: Abdomen is soft.      Tenderness: There is no abdominal tenderness.   Musculoskeletal:      Cervical back: Normal range of motion.   Skin:     General: Skin is warm.   Neurological:      General: No focal deficit present.      Mental Status: He is alert and oriented to person, place, and time. Mental status is at baseline.   Psychiatric:         Mood and Affect: Mood normal.         Behavior: Behavior normal.         Assessment/Plan   Problem List Items Addressed This Visit       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)    Relevant Orders    US abdomen limited liver    RESOLVED: Pneumothorax - Primary     PTX 2/2 PPM insertion.  Resolved.  No further workup necessary.          RTC as needed

## 2023-12-04 NOTE — PROGRESS NOTES
December 4, 2023 at 10:47 AM    Chemotherapy second verification performed and pharmacist reviewed all pertinent medications, laboratory values, and disease parameters for all oncology treatment medications to ensure appropriate therapy.    I Emil Chris, PharmD hereby acknowledge that the treatment plan indicated above has been verified for correctness to the best of my ability on 12/4/2023 at 10:47 AM.    Comments:

## 2023-12-05 ENCOUNTER — HOSPITAL ENCOUNTER (OUTPATIENT)
Dept: RADIOLOGY | Facility: HOSPITAL | Age: 63
Discharge: HOME | End: 2023-12-05
Payer: MEDICARE

## 2023-12-05 ENCOUNTER — ANCILLARY PROCEDURE (OUTPATIENT)
Dept: RADIOLOGY | Facility: HOSPITAL | Age: 63
End: 2023-12-05
Payer: MEDICARE

## 2023-12-05 DIAGNOSIS — C78.7 METASTASES TO THE LIVER (MULTI): ICD-10-CM

## 2023-12-05 DIAGNOSIS — C15.9 STAGE IV MALIGNANT NEOPLASM OF ESOPHAGUS (MULTI): ICD-10-CM

## 2023-12-05 PROCEDURE — 76705 ECHO EXAM OF ABDOMEN: CPT | Performed by: RADIOLOGY

## 2023-12-05 PROCEDURE — 2550000001 HC RX 255 CONTRASTS: Performed by: INTERNAL MEDICINE

## 2023-12-05 PROCEDURE — 71260 CT THORAX DX C+: CPT | Performed by: RADIOLOGY

## 2023-12-05 PROCEDURE — 74177 CT ABD & PELVIS W/CONTRAST: CPT

## 2023-12-05 PROCEDURE — 76705 ECHO EXAM OF ABDOMEN: CPT

## 2023-12-05 PROCEDURE — 2500000004 HC RX 250 GENERAL PHARMACY W/ HCPCS (ALT 636 FOR OP/ED)

## 2023-12-05 PROCEDURE — 74177 CT ABD & PELVIS W/CONTRAST: CPT | Performed by: RADIOLOGY

## 2023-12-05 RX ORDER — HEPARIN SODIUM,PORCINE/PF 10 UNIT/ML
50 SYRINGE (ML) INTRAVENOUS EVERY 12 HOURS
Status: CANCELLED | OUTPATIENT
Start: 2023-12-05

## 2023-12-05 RX ORDER — HEPARIN 100 UNIT/ML
SYRINGE INTRAVENOUS
Status: COMPLETED
Start: 2023-12-05 | End: 2023-12-05

## 2023-12-05 RX ORDER — HEPARIN SODIUM,PORCINE/PF 10 UNIT/ML
SYRINGE (ML) INTRAVENOUS
Status: DISPENSED
Start: 2023-12-05 | End: 2023-12-05

## 2023-12-05 RX ORDER — HEPARIN SODIUM 1000 [USP'U]/ML
INJECTION, SOLUTION INTRAVENOUS; SUBCUTANEOUS
Status: DISPENSED
Start: 2023-12-05 | End: 2023-12-05

## 2023-12-05 RX ADMIN — SODIUM CHLORIDE, PRESERVATIVE FREE: 5 INJECTION INTRAVENOUS at 09:30

## 2023-12-05 RX ADMIN — IOHEXOL 75 ML: 350 INJECTION, SOLUTION INTRAVENOUS at 09:31

## 2023-12-05 NOTE — OP NOTE
Temporary Pacemaker Insertion Operative Note     Date: 2023  OR Location: [facility] Cardiac Cath Lab    Name: Massimo Garcia, : 1960, Age: 63 y.o., MRN: [id number], Sex: male    Diagnosis  Pre-op Diagnosis     * Bradycardia [R00.1]     * Third degree heart block (CMS/HCC) [I44.2] Post-op Diagnosis     * Bradycardia [R00.1]     * Third degree heart block (CMS/HCC) [I44.2]     Procedures    * Temporary Pacemaker Insertion    Surgeons      * Alexis Shook - Primary    Resident/Fellow/Other Assistant:  Surgeon(s) and Role:    Procedure Summary  Anesthesia: Moderate Sedation  ASA: ASA status not filed in the log.  Anesthesia Staff: No anesthesia staff entered.  Estimated Blood Loss: 5 mL  Intra-op Medications:   Medication Name Total Dose   midazolam (Versed) injection 2 mg   sodium chloride 0.9% infusion Cannot be calculated   lidocaine (Xylocaine) 20 mg/mL (2 %) injection 5 mL         Intraprocedure I/O Totals       None           Specimen: No specimens collected     Staff:   Circulator: Piyush Forbes RN; Ravi Padilla RN  Scrub Person: Yifan Turpin, RT         Drains and/or Catheters: * None in log *    Tourniquet Times:         Implants:     Findings: Complete heart block    Indications: Massimo Garcia is an 63 y.o. male who is having surgery for Bradycardia [R00.1]  Third degree heart block (CMS/HCC) [I44.2].     The patient was seen in the preoperative area. The risks, benefits, complications, treatment options, non-operative alternatives, expected recovery and outcomes were discussed with the patient. Written informed consent obtained and placed in the chart.    Procedure Details: Local anesthesia with 2% lidocaine was given subcutaneously over the venous access site. Using a modified Seldinger technique, a 6 Australian sheath was introduced over a J-tipped guidewire. A 5 Australian temporary pacemaker was advanced to the right ventricle. Threshold capture was determined to be 0.2 mA.       Complications:  None;  patient tolerated the procedure well.    Disposition:  ICU  Condition:  Stable; will need evaluation with electrophysiology for permanent pacemaker placement          Additional Details: None    Attending Attestation: I was present and scrubbed for the entire procedure.    Alexisradha Shook  Phone Number: [phone]

## 2023-12-11 ENCOUNTER — APPOINTMENT (OUTPATIENT)
Dept: HEMATOLOGY/ONCOLOGY | Facility: CLINIC | Age: 63
End: 2023-12-11
Payer: COMMERCIAL

## 2023-12-12 ENCOUNTER — PATIENT OUTREACH (OUTPATIENT)
Dept: PRIMARY CARE | Facility: CLINIC | Age: 63
End: 2023-12-12
Payer: COMMERCIAL

## 2023-12-12 DIAGNOSIS — F32.A ANXIETY AND DEPRESSION: ICD-10-CM

## 2023-12-12 DIAGNOSIS — F41.9 ANXIETY AND DEPRESSION: ICD-10-CM

## 2023-12-12 DIAGNOSIS — G89.3 CANCER RELATED PAIN: ICD-10-CM

## 2023-12-12 DIAGNOSIS — Z51.5 PALLIATIVE CARE ENCOUNTER: ICD-10-CM

## 2023-12-12 RX ORDER — FENTANYL 12.5 UG/1
1 PATCH TRANSDERMAL
Qty: 10 PATCH | Refills: 0 | Status: CANCELLED | OUTPATIENT
Start: 2023-12-12 | End: 2024-01-11

## 2023-12-12 RX ORDER — LORAZEPAM 0.5 MG/1
0.5 TABLET ORAL 3 TIMES DAILY PRN
Qty: 90 TABLET | Refills: 0 | Status: CANCELLED | OUTPATIENT
Start: 2023-12-12 | End: 2024-03-11

## 2023-12-13 ENCOUNTER — HOSPITAL ENCOUNTER (OUTPATIENT)
Dept: CARDIOLOGY | Facility: HOSPITAL | Age: 63
Discharge: HOME | End: 2023-12-13
Payer: MEDICARE

## 2023-12-13 ENCOUNTER — TELEPHONE (OUTPATIENT)
Dept: HEMATOLOGY/ONCOLOGY | Facility: CLINIC | Age: 63
End: 2023-12-13
Payer: COMMERCIAL

## 2023-12-13 PROCEDURE — 93005 ELECTROCARDIOGRAM TRACING: CPT

## 2023-12-13 NOTE — TELEPHONE ENCOUNTER
I spoke with pharmacy and the Fentanyl can be filled. The Ativan cannot be filled until 12/15 as that is what it is dated for. Jane has been updated.

## 2023-12-18 ENCOUNTER — NUTRITION (OUTPATIENT)
Dept: HEMATOLOGY/ONCOLOGY | Facility: CLINIC | Age: 63
End: 2023-12-18
Payer: COMMERCIAL

## 2023-12-18 ENCOUNTER — OFFICE VISIT (OUTPATIENT)
Dept: HEMATOLOGY/ONCOLOGY | Facility: CLINIC | Age: 63
End: 2023-12-18
Payer: MEDICARE

## 2023-12-18 ENCOUNTER — INFUSION (OUTPATIENT)
Dept: HEMATOLOGY/ONCOLOGY | Facility: CLINIC | Age: 63
End: 2023-12-18
Payer: MEDICARE

## 2023-12-18 VITALS
TEMPERATURE: 98.2 F | HEART RATE: 75 BPM | DIASTOLIC BLOOD PRESSURE: 71 MMHG | WEIGHT: 165.9 LBS | BODY MASS INDEX: 25.55 KG/M2 | SYSTOLIC BLOOD PRESSURE: 122 MMHG | RESPIRATION RATE: 18 BRPM | OXYGEN SATURATION: 97 %

## 2023-12-18 VITALS — BODY MASS INDEX: 25.14 KG/M2 | HEIGHT: 68 IN | WEIGHT: 165.9 LBS

## 2023-12-18 DIAGNOSIS — C15.9 STAGE IV MALIGNANT NEOPLASM OF ESOPHAGUS (MULTI): ICD-10-CM

## 2023-12-18 DIAGNOSIS — C78.7 METASTASES TO THE LIVER (MULTI): ICD-10-CM

## 2023-12-18 DIAGNOSIS — F41.9 ANXIETY AND DEPRESSION: ICD-10-CM

## 2023-12-18 DIAGNOSIS — F32.A ANXIETY AND DEPRESSION: ICD-10-CM

## 2023-12-18 DIAGNOSIS — I47.20 V-TACH (MULTI): ICD-10-CM

## 2023-12-18 DIAGNOSIS — I10 PRIMARY HYPERTENSION: ICD-10-CM

## 2023-12-18 LAB
ALBUMIN SERPL BCP-MCNC: 3.9 G/DL (ref 3.4–5)
ALP SERPL-CCNC: 174 U/L (ref 33–136)
ALT SERPL W P-5'-P-CCNC: 35 U/L (ref 10–52)
ANION GAP SERPL CALC-SCNC: 15 MMOL/L (ref 10–20)
AST SERPL W P-5'-P-CCNC: 43 U/L (ref 9–39)
BASOPHILS # BLD AUTO: 0.02 X10*3/UL (ref 0–0.1)
BASOPHILS NFR BLD AUTO: 0.4 %
BILIRUB SERPL-MCNC: 0.6 MG/DL (ref 0–1.2)
BUN SERPL-MCNC: 17 MG/DL (ref 6–23)
CALCIUM SERPL-MCNC: 8.9 MG/DL (ref 8.6–10.3)
CHLORIDE SERPL-SCNC: 105 MMOL/L (ref 98–107)
CO2 SERPL-SCNC: 26 MMOL/L (ref 21–32)
CREAT SERPL-MCNC: 0.85 MG/DL (ref 0.5–1.3)
EOSINOPHIL # BLD AUTO: 0.07 X10*3/UL (ref 0–0.7)
EOSINOPHIL NFR BLD AUTO: 1.3 %
ERYTHROCYTE [DISTWIDTH] IN BLOOD BY AUTOMATED COUNT: 14.5 % (ref 11.5–14.5)
GFR SERPL CREATININE-BSD FRML MDRD: >90 ML/MIN/1.73M*2
GLUCOSE SERPL-MCNC: 127 MG/DL (ref 74–99)
HCT VFR BLD AUTO: 33.6 % (ref 41–52)
HGB BLD-MCNC: 11.2 G/DL (ref 13.5–17.5)
IMM GRANULOCYTES # BLD AUTO: 0.02 X10*3/UL (ref 0–0.7)
IMM GRANULOCYTES NFR BLD AUTO: 0.4 % (ref 0–0.9)
LYMPHOCYTES # BLD AUTO: 0.86 X10*3/UL (ref 1.2–4.8)
LYMPHOCYTES NFR BLD AUTO: 15.6 %
MCH RBC QN AUTO: 29.9 PG (ref 26–34)
MCHC RBC AUTO-ENTMCNC: 33.3 G/DL (ref 32–36)
MCV RBC AUTO: 90 FL (ref 80–100)
MONOCYTES # BLD AUTO: 0.46 X10*3/UL (ref 0.1–1)
MONOCYTES NFR BLD AUTO: 8.3 %
NEUTROPHILS # BLD AUTO: 4.08 X10*3/UL (ref 1.2–7.7)
NEUTROPHILS NFR BLD AUTO: 74 %
NRBC BLD-RTO: ABNORMAL /100{WBCS}
PLATELET # BLD AUTO: 75 X10*3/UL (ref 150–450)
POTASSIUM SERPL-SCNC: 3.8 MMOL/L (ref 3.5–5.3)
PROT SERPL-MCNC: 6.4 G/DL (ref 6.4–8.2)
RBC # BLD AUTO: 3.75 X10*6/UL (ref 4.5–5.9)
RBC MORPH BLD: NORMAL
SODIUM SERPL-SCNC: 142 MMOL/L (ref 136–145)
WBC # BLD AUTO: 5.5 X10*3/UL (ref 4.4–11.3)

## 2023-12-18 PROCEDURE — 96367 TX/PROPH/DG ADDL SEQ IV INF: CPT

## 2023-12-18 PROCEDURE — 2500000004 HC RX 250 GENERAL PHARMACY W/ HCPCS (ALT 636 FOR OP/ED): Performed by: INTERNAL MEDICINE

## 2023-12-18 PROCEDURE — 96413 CHEMO IV INFUSION 1 HR: CPT

## 2023-12-18 PROCEDURE — 96375 TX/PRO/DX INJ NEW DRUG ADDON: CPT | Mod: INF

## 2023-12-18 PROCEDURE — 80053 COMPREHEN METABOLIC PANEL: CPT | Performed by: INTERNAL MEDICINE

## 2023-12-18 PROCEDURE — 96372 THER/PROPH/DIAG INJ SC/IM: CPT

## 2023-12-18 PROCEDURE — 96411 CHEMO IV PUSH ADDL DRUG: CPT

## 2023-12-18 PROCEDURE — 99214 OFFICE O/P EST MOD 30 MIN: CPT | Performed by: INTERNAL MEDICINE

## 2023-12-18 PROCEDURE — 1159F MED LIST DOCD IN RCRD: CPT | Performed by: INTERNAL MEDICINE

## 2023-12-18 PROCEDURE — 2500000004 HC RX 250 GENERAL PHARMACY W/ HCPCS (ALT 636 FOR OP/ED): Mod: JZ | Performed by: INTERNAL MEDICINE

## 2023-12-18 PROCEDURE — 1125F AMNT PAIN NOTED PAIN PRSNT: CPT | Performed by: INTERNAL MEDICINE

## 2023-12-18 PROCEDURE — 1160F RVW MEDS BY RX/DR IN RCRD: CPT | Performed by: INTERNAL MEDICINE

## 2023-12-18 PROCEDURE — 85025 COMPLETE CBC W/AUTO DIFF WBC: CPT | Performed by: INTERNAL MEDICINE

## 2023-12-18 RX ORDER — EPINEPHRINE 0.3 MG/.3ML
0.3 INJECTION SUBCUTANEOUS EVERY 5 MIN PRN
Status: DISCONTINUED | OUTPATIENT
Start: 2023-12-18 | End: 2023-12-18 | Stop reason: HOSPADM

## 2023-12-18 RX ORDER — ALBUTEROL SULFATE 0.83 MG/ML
3 SOLUTION RESPIRATORY (INHALATION) AS NEEDED
Status: CANCELLED | OUTPATIENT
Start: 2024-01-15

## 2023-12-18 RX ORDER — METOPROLOL SUCCINATE 25 MG/1
25 TABLET, EXTENDED RELEASE ORAL DAILY
Qty: 30 TABLET | Refills: 0 | Status: CANCELLED | OUTPATIENT
Start: 2023-12-18 | End: 2024-01-17

## 2023-12-18 RX ORDER — DIPHENHYDRAMINE HYDROCHLORIDE 50 MG/ML
25 INJECTION INTRAMUSCULAR; INTRAVENOUS ONCE
Status: COMPLETED | OUTPATIENT
Start: 2023-12-18 | End: 2023-12-18

## 2023-12-18 RX ORDER — PROCHLORPERAZINE MALEATE 10 MG
10 TABLET ORAL EVERY 6 HOURS PRN
Status: DISCONTINUED | OUTPATIENT
Start: 2023-12-18 | End: 2023-12-18 | Stop reason: HOSPADM

## 2023-12-18 RX ORDER — LORAZEPAM 2 MG/ML
1 INJECTION INTRAMUSCULAR AS NEEDED
Status: DISCONTINUED | OUTPATIENT
Start: 2023-12-18 | End: 2023-12-18 | Stop reason: HOSPADM

## 2023-12-18 RX ORDER — HEPARIN SODIUM,PORCINE/PF 10 UNIT/ML
50 SYRINGE (ML) INTRAVENOUS AS NEEDED
Status: DISCONTINUED | OUTPATIENT
Start: 2023-12-18 | End: 2023-12-18 | Stop reason: HOSPADM

## 2023-12-18 RX ORDER — FAMOTIDINE 10 MG/ML
20 INJECTION INTRAVENOUS ONCE AS NEEDED
Status: CANCELLED | OUTPATIENT
Start: 2024-01-15

## 2023-12-18 RX ORDER — DIPHENHYDRAMINE HYDROCHLORIDE 50 MG/ML
50 INJECTION INTRAMUSCULAR; INTRAVENOUS AS NEEDED
Status: CANCELLED | OUTPATIENT
Start: 2024-01-15

## 2023-12-18 RX ORDER — LORAZEPAM 0.5 MG/1
0.5 TABLET ORAL 3 TIMES DAILY PRN
Qty: 90 TABLET | Refills: 0 | Status: CANCELLED | OUTPATIENT
Start: 2023-12-18 | End: 2024-03-17

## 2023-12-18 RX ORDER — HEPARIN 100 UNIT/ML
500 SYRINGE INTRAVENOUS AS NEEDED
Status: CANCELLED | OUTPATIENT
Start: 2023-12-18

## 2023-12-18 RX ORDER — CYANOCOBALAMIN 1000 UG/ML
1000 INJECTION, SOLUTION INTRAMUSCULAR; SUBCUTANEOUS ONCE
Status: CANCELLED | OUTPATIENT
Start: 2024-01-15

## 2023-12-18 RX ORDER — PROCHLORPERAZINE EDISYLATE 5 MG/ML
10 INJECTION INTRAMUSCULAR; INTRAVENOUS EVERY 6 HOURS PRN
Status: DISCONTINUED | OUTPATIENT
Start: 2023-12-18 | End: 2023-12-18 | Stop reason: HOSPADM

## 2023-12-18 RX ORDER — HEPARIN 100 UNIT/ML
500 SYRINGE INTRAVENOUS AS NEEDED
Status: DISCONTINUED | OUTPATIENT
Start: 2023-12-18 | End: 2023-12-18 | Stop reason: HOSPADM

## 2023-12-18 RX ORDER — ALBUTEROL SULFATE 0.83 MG/ML
3 SOLUTION RESPIRATORY (INHALATION) AS NEEDED
Status: DISCONTINUED | OUTPATIENT
Start: 2023-12-18 | End: 2023-12-18 | Stop reason: HOSPADM

## 2023-12-18 RX ORDER — EPINEPHRINE 0.3 MG/.3ML
0.3 INJECTION SUBCUTANEOUS EVERY 5 MIN PRN
Status: CANCELLED | OUTPATIENT
Start: 2024-01-15

## 2023-12-18 RX ORDER — FLUOROURACIL 50 MG/ML
400 INJECTION, SOLUTION INTRAVENOUS ONCE
Status: COMPLETED | OUTPATIENT
Start: 2023-12-18 | End: 2023-12-18

## 2023-12-18 RX ORDER — DEXAMETHASONE SODIUM PHOSPHATE 4 MG/ML
8 INJECTION, SOLUTION INTRA-ARTICULAR; INTRALESIONAL; INTRAMUSCULAR; INTRAVENOUS; SOFT TISSUE ONCE
Status: COMPLETED | OUTPATIENT
Start: 2023-12-18 | End: 2023-12-18

## 2023-12-18 RX ORDER — HEPARIN SODIUM,PORCINE/PF 10 UNIT/ML
50 SYRINGE (ML) INTRAVENOUS AS NEEDED
Status: CANCELLED | OUTPATIENT
Start: 2023-12-18

## 2023-12-18 RX ORDER — DIPHENHYDRAMINE HYDROCHLORIDE 50 MG/ML
50 INJECTION INTRAMUSCULAR; INTRAVENOUS AS NEEDED
Status: DISCONTINUED | OUTPATIENT
Start: 2023-12-18 | End: 2023-12-18 | Stop reason: HOSPADM

## 2023-12-18 RX ORDER — FAMOTIDINE 10 MG/ML
20 INJECTION INTRAVENOUS ONCE AS NEEDED
Status: DISCONTINUED | OUTPATIENT
Start: 2023-12-18 | End: 2023-12-18 | Stop reason: HOSPADM

## 2023-12-18 RX ORDER — CYANOCOBALAMIN 1000 UG/ML
1000 INJECTION, SOLUTION INTRAMUSCULAR; SUBCUTANEOUS ONCE
Status: COMPLETED | OUTPATIENT
Start: 2023-12-18 | End: 2023-12-18

## 2023-12-18 RX ORDER — PALONOSETRON 0.05 MG/ML
0.25 INJECTION, SOLUTION INTRAVENOUS ONCE
Status: COMPLETED | OUTPATIENT
Start: 2023-12-18 | End: 2023-12-18

## 2023-12-18 RX ORDER — ACETAMINOPHEN 325 MG/1
650 TABLET ORAL ONCE
Status: COMPLETED | OUTPATIENT
Start: 2023-12-18 | End: 2023-12-18

## 2023-12-18 RX ADMIN — HEPARIN 500 UNITS: 100 SYRINGE at 14:56

## 2023-12-18 RX ADMIN — FLUOROURACIL 750 MG: 50 INJECTION, SOLUTION INTRAVENOUS at 14:41

## 2023-12-18 RX ADMIN — ACETAMINOPHEN 650 MG: 325 TABLET ORAL at 11:43

## 2023-12-18 RX ADMIN — TRASTUZUMAB 300 MG: 150 INJECTION, POWDER, LYOPHILIZED, FOR SOLUTION INTRAVENOUS at 13:05

## 2023-12-18 RX ADMIN — CYANOCOBALAMIN 1000 MCG: 1000 INJECTION, SOLUTION INTRAMUSCULAR at 14:41

## 2023-12-18 RX ADMIN — LEUCOVORIN CALCIUM 740 MG: 350 INJECTION, POWDER, LYOPHILIZED, FOR SOLUTION INTRAMUSCULAR; INTRAVENOUS at 13:53

## 2023-12-18 RX ADMIN — DEXAMETHASONE SODIUM PHOSPHATE 8 MG: 4 INJECTION INTRA-ARTICULAR; INTRALESIONAL; INTRAMUSCULAR; INTRAVENOUS; SOFT TISSUE at 11:44

## 2023-12-18 RX ADMIN — DIPHENHYDRAMINE HYDROCHLORIDE 25 MG: 50 INJECTION, SOLUTION INTRAMUSCULAR; INTRAVENOUS at 12:06

## 2023-12-18 RX ADMIN — PALONOSETRON 250 MCG: 0.05 INJECTION, SOLUTION INTRAVENOUS at 11:44

## 2023-12-18 ASSESSMENT — PAIN SCALES - GENERAL: PAINLEVEL: 2

## 2023-12-18 ASSESSMENT — ENCOUNTER SYMPTOMS
DEPRESSION: 0
LOSS OF SENSATION IN FEET: 0
OCCASIONAL FEELINGS OF UNSTEADINESS: 0

## 2023-12-18 NOTE — PROGRESS NOTES
Patient ID: Massimo Garcia is a 63 y.o. male.  Referring Physician: Tomasa Blake MD  [id number] [place] [address]  Primary Care Provider: Dayne Santamaria DO  Visit Type:  Follow Up         Subjective    HPI    Expand All Collapse All    Patient ID: Massimo Garcia is a 62 y.o. male.        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  Has done well on trastuzumab FOLFOX regimen.  Today complains of dizziness and presyncopal symptoms.  Metastatic burden is liver some lung lesions as well as abdominal lymphadenopathy.        LIVER:  Liver measures 14.7 cm in length and demonstrates mildly diffuse  coarsened hepatic echotexture with mild nodular contour which may  indicate cirrhotic morphology. There is an indeterminate hypoechoic  lesion within the right hepatic lobe measuring up. 3.6 x 3.2 x 3.0 cm  and a 2nd 2.6 x 1.8 x 2.7 cm indeterminate heterogeneous hypoechoic  lesion within the left hepatic lobe. Additional scatter presumed  hepatic cysts, largest measuring up to 2.4 x 2.1 x 1.8 cm      GALLBLADDER:  Contracted. No obvious cholelithiasis.      BILIARY SYSTEM:  No evidence of intra hepatic biliary dilatation is identified; the  common bile duct measures 13.2 mm.          PANCREAS:  The pancreas is poorly visualized due to overlying bowel gas.      RIGHT KIDNEY:  The right kidney measures 11.5 cm in length. No hydronephrosis or  renal calculi are seen.          IMPRESSION:  Cirrhotic hepatic morphology with indeterminate hypoechoic lesions of  the liver as above, largest measuring up to 3.6 x 3.2 x 3.0 cm.  Hepatic mass protocol MRI advised for further assessment.      MACRO:  Critical Finding:  See findings. Notification was initiated on  12/6/2023    Review of Systems   Constitutional: Negative.    HENT:  Negative.     Eyes: Negative.    Respiratory:  Positive for cough.         Objective   BSA: 1.89 meters squared  BP  122/71 (BP Location: Right arm, Patient Position: Sitting, BP Cuff Size: Adult)   Pulse 75   Temp 36.8 °C (98.2 °F) (Temporal)   Resp 18   Wt 75.3 kg (165 lb 14.3 oz)   SpO2 97%   BMI 25.55 kg/m²      has a past medical history of Essential (primary) hypertension (12/21/2013), Personal history of other diseases of the circulatory system, and Pneumothorax (12/04/2023).   has a past surgical history that includes Total knee arthroplasty (09/30/2016); Total knee arthroplasty (09/30/2016); Cardiac electrophysiology procedure (N/A, 11/7/2023); and Cardiac electrophysiology procedure (Left, 11/9/2023).  No family history on file.  Oncology History   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)   9/13/2023 Initial Diagnosis    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)     9/13/2023 -  Chemotherapy    Trastuzumab + mFOLFOX6 (Fluorouracil Continuous Infusion / Leucovorin / Oxaliplatin), 14 Day Cycles     Metastases to the liver (CMS/HCC)   9/13/2023 Initial Diagnosis    Metastases to the liver (CMS/HCC)     9/13/2023 -  Chemotherapy    Trastuzumab + mFOLFOX6 (Fluorouracil Continuous Infusion / Leucovorin / Oxaliplatin), 14 Day Cycles         Massimo Garcia  reports that he has been smoking cigarettes and cigars. He has never been exposed to tobacco smoke. He has never used smokeless tobacco.  He  reports that he does not currently use alcohol.  He  reports current drug use. Drug: Marijuana.    Physical Exam  Constitutional:       Appearance: Normal appearance.   HENT:      Head: Normocephalic and atraumatic.   Eyes:      Extraocular Movements: Extraocular movements intact.      Pupils: Pupils are equal, round, and reactive to light.   Musculoskeletal:      Cervical back: Normal range of motion and neck supple.   Neurological:      Mental Status: He is alert.         WBC   Date/Time Value Ref Range Status   12/04/2023 09:37 AM 6.3 4.4 - 11.3 x10*3/uL Final   11/20/2023 09:58 AM 5.3 4.4 - 11.3 x10*3/uL Final    11/13/2023 05:20 AM 7.5 4.4 - 11.3 x10*3/uL Final     nRBC   Date Value Ref Range Status   11/13/2023 0.0 0.0 - 0.0 /100 WBCs Final   11/12/2023 0.0 0.0 - 0.0 /100 WBCs Final   11/11/2023 0.0 0.0 - 0.0 /100 WBCs Final     RBC   Date Value Ref Range Status   12/04/2023 3.91 (L) 4.50 - 5.90 x10*6/uL Final   11/20/2023 3.86 (L) 4.50 - 5.90 x10*6/uL Final   11/13/2023 4.11 (L) 4.50 - 5.90 x10*6/uL Final     Hemoglobin   Date Value Ref Range Status   12/04/2023 11.5 (L) 13.5 - 17.5 g/dL Final   11/20/2023 11.3 (L) 13.5 - 17.5 g/dL Final   11/13/2023 11.8 (L) 13.5 - 17.5 g/dL Final     Hematocrit   Date Value Ref Range Status   12/04/2023 34.6 (L) 41.0 - 52.0 % Final   11/20/2023 33.6 (L) 41.0 - 52.0 % Final   11/13/2023 35.2 (L) 41.0 - 52.0 % Final     MCV   Date/Time Value Ref Range Status   12/04/2023 09:37 AM 89 80 - 100 fL Final   11/20/2023 09:58 AM 87 80 - 100 fL Final   11/13/2023 05:20 AM 86 80 - 100 fL Final     MCH   Date/Time Value Ref Range Status   12/04/2023 09:37 AM 29.4 26.0 - 34.0 pg Final   11/20/2023 09:58 AM 29.3 26.0 - 34.0 pg Final   11/13/2023 05:20 AM 28.7 26.0 - 34.0 pg Final     MCHC   Date/Time Value Ref Range Status   12/04/2023 09:37 AM 33.2 32.0 - 36.0 g/dL Final   11/20/2023 09:58 AM 33.6 32.0 - 36.0 g/dL Final   11/13/2023 05:20 AM 33.5 32.0 - 36.0 g/dL Final     RDW   Date/Time Value Ref Range Status   12/04/2023 09:37 AM 14.9 (H) 11.5 - 14.5 % Final   11/20/2023 09:58 AM 14.8 (H) 11.5 - 14.5 % Final   11/13/2023 05:20 AM 15.5 (H) 11.5 - 14.5 % Final     Platelets   Date/Time Value Ref Range Status   12/04/2023 09:37 AM 81 (L) 150 - 450 x10*3/uL Final   11/20/2023 09:58 AM 91 (L) 150 - 450 x10*3/uL Final   11/13/2023 05:20 AM 96 (L) 150 - 450 x10*3/uL Final     MPV   Date/Time Value Ref Range Status   10/30/2023 09:13 AM 10.3 7.5 - 11.5 fL Final   10/16/2023 08:34 AM 10.7 7.5 - 11.5 fL Final   10/02/2023 09:08 AM 10.0 7.5 - 11.5 fL Final     Neutrophils %   Date/Time Value Ref Range  Status   12/04/2023 09:37 AM 75.7 40.0 - 80.0 % Final   11/20/2023 09:58 AM 75.9 40.0 - 80.0 % Final   11/05/2023 02:56 PM 75.7 40.0 - 80.0 % Final     Immature Granulocytes %, Automated   Date/Time Value Ref Range Status   12/04/2023 09:37 AM 0.3 0.0 - 0.9 % Final     Comment:     Immature Granulocyte Count (IG) includes promyelocytes, myelocytes and metamyelocytes but does not include bands. Percent differential counts (%) should be interpreted in the context of the absolute cell counts (cells/UL).   11/20/2023 09:58 AM 0.4 0.0 - 0.9 % Final     Comment:     Immature Granulocyte Count (IG) includes promyelocytes, myelocytes and metamyelocytes but does not include bands. Percent differential counts (%) should be interpreted in the context of the absolute cell counts (cells/UL).   11/05/2023 02:56 PM 0.4 0.0 - 0.9 % Final     Comment:     Immature Granulocyte Count (IG) includes promyelocytes, myelocytes and metamyelocytes but does not include bands. Percent differential counts (%) should be interpreted in the context of the absolute cell counts (cells/UL).     Lymphocytes %   Date/Time Value Ref Range Status   12/04/2023 09:37 AM 13.0 13.0 - 44.0 % Final   11/20/2023 09:58 AM 15.2 13.0 - 44.0 % Final   11/05/2023 02:56 PM 15.2 13.0 - 44.0 % Final     Monocytes %   Date/Time Value Ref Range Status   12/04/2023 09:37 AM 8.9 2.0 - 10.0 % Final   11/20/2023 09:58 AM 6.8 2.0 - 10.0 % Final   11/05/2023 02:56 PM 7.9 2.0 - 10.0 % Final     Eosinophils %   Date/Time Value Ref Range Status   12/04/2023 09:37 AM 1.9 0.0 - 6.0 % Final   11/20/2023 09:58 AM 1.3 0.0 - 6.0 % Final   11/05/2023 02:56 PM 0.5 0.0 - 6.0 % Final     Basophils %   Date/Time Value Ref Range Status   12/04/2023 09:37 AM 0.2 0.0 - 2.0 % Final   11/20/2023 09:58 AM 0.4 0.0 - 2.0 % Final   11/05/2023 02:56 PM 0.3 0.0 - 2.0 % Final     Neutrophils Absolute   Date/Time Value Ref Range Status   12/04/2023 09:37 AM 4.78 1.20 - 7.70 x10*3/uL Final     Comment:  "    Percent differential counts (%) should be interpreted in the context of the absolute cell counts (cells/uL).   11/20/2023 09:58 AM 3.99 1.20 - 7.70 x10*3/uL Final     Comment:     Percent differential counts (%) should be interpreted in the context of the absolute cell counts (cells/uL).   11/05/2023 02:56 PM 5.52 1.20 - 7.70 x10*3/uL Final     Comment:     Percent differential counts (%) should be interpreted in the context of the absolute cell counts (cells/uL).     Immature Granulocytes Absolute, Automated   Date/Time Value Ref Range Status   12/04/2023 09:37 AM 0.02 0.00 - 0.70 x10*3/uL Final   11/20/2023 09:58 AM 0.02 0.00 - 0.70 x10*3/uL Final   11/05/2023 02:56 PM 0.03 0.00 - 0.70 x10*3/uL Final     Lymphocytes Absolute   Date/Time Value Ref Range Status   12/04/2023 09:37 AM 0.82 (L) 1.20 - 4.80 x10*3/uL Final   11/20/2023 09:58 AM 0.80 (L) 1.20 - 4.80 x10*3/uL Final   11/05/2023 02:56 PM 1.11 (L) 1.20 - 4.80 x10*3/uL Final     Monocytes Absolute   Date/Time Value Ref Range Status   12/04/2023 09:37 AM 0.56 0.10 - 1.00 x10*3/uL Final   11/20/2023 09:58 AM 0.36 0.10 - 1.00 x10*3/uL Final   11/05/2023 02:56 PM 0.58 0.10 - 1.00 x10*3/uL Final     Eosinophils Absolute   Date/Time Value Ref Range Status   12/04/2023 09:37 AM 0.12 0.00 - 0.70 x10*3/uL Final   11/20/2023 09:58 AM 0.07 0.00 - 0.70 x10*3/uL Final   11/05/2023 02:56 PM 0.04 0.00 - 0.70 x10*3/uL Final     Basophils Absolute   Date/Time Value Ref Range Status   12/04/2023 09:37 AM 0.01 0.00 - 0.10 x10*3/uL Final   11/20/2023 09:58 AM 0.02 0.00 - 0.10 x10*3/uL Final   11/05/2023 02:56 PM 0.02 0.00 - 0.10 x10*3/uL Final       No components found for: \"PT\"  aPTT   Date/Time Value Ref Range Status   01/08/2023 03:00 PM 36 26 - 39 sec Final     Comment:       THE APTT IS NO LONGER USED FOR MONITORING     UNFRACTIONATED HEPARIN THERAPY.    FOR MONITORING HEPARIN THERAPY,     USE THE HEPARIN ASSAY.         Assessment/Plan      Patient with stage IV " esophageal cancer with a history of both hepatic and pulmonary metastases at initial diagnosis.  Recently complaining of right upper quadrant pain and today left upper quadrant pain and some nondescript abdominal pains.  Ultrasonography of the right upper quadrant and abdomen has been ordered since his last visit and it has been reported as showing    IMPRESSION:  Cirrhotic hepatic morphology with indeterminate hypoechoic lesions of  the liver as above, largest measuring up to 3.6 x 3.2 x 3.0 cm.  Hepatic mass protocol MRI advised for further assessment.    The recommendation by radiology is hepatic mass with protocol MRI advised for further assess assessment.  This has been ordered and patient will be seen after the MRI for continued evaluation.  Previous scans however showed stability in the hepatic lesions.  It is not clear whether these are progressive or whether these are stable.  MRI will settle the question most likely.  Patient to continue with current regimen trastuzumab 5-FU and oxaliplatin.  I will continue to consider upgrading his treatment to trastuzumab-based capecitabine therapy.     Diagnoses and all orders for this visit:  Metastases to the liver (CMS/HCC)  -     Clinic Appointment Request  -     Disability Placard  -     MR liver w EOVIST contrast; Future  -     MR abdomen w and wo IV contrast; Future  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)  -     Clinic Appointment Request  -     Disability Placard  -     MR liver w EOVIST contrast; Future  -     MR abdomen w and wo IV contrast; Future           Davis-Sidney Blake MD

## 2023-12-18 NOTE — PROGRESS NOTES
"NUTRITION Follow-up NOTE    Nutrition Assessment     Reason for Visit:  Massimo Garcia is a 63 y.o. male who presents for HER2 positive adenocarcinoma of the esophagus, metastatic to  liver. Being seen for early satiety and abdominal pain.     Visited with pt and wife today for weight check/ follow-up.     Lab Results   Component Value Date/Time    GLUCOSE 127 (H) 12/18/2023 0952     12/18/2023 0952    K 3.8 12/18/2023 0952     12/18/2023 0952    CO2 26 12/18/2023 0952    ANIONGAP 15 12/18/2023 0952    BUN 17 12/18/2023 0952    CREATININE 0.85 12/18/2023 0952    EGFR >90 12/18/2023 0952    CALCIUM 8.9 12/18/2023 0952    ALBUMIN 3.9 12/18/2023 0952    ALKPHOS 174 (H) 12/18/2023 0952    PROT 6.4 12/18/2023 0952    AST 43 (H) 12/18/2023 0952    BILITOT 0.6 12/18/2023 0952    ALT 35 12/18/2023 0952     Lab Results   Component Value Date    HGBA1C 4.9 11/05/2023      Lab Results   Component Value Date    POCGLU 118 (H) 11/13/2023    POCGLU 125 (H) 11/10/2023    POCGLU 191 (H) 11/07/2023    POCGLU 108 (H) 11/07/2023    POCGLU 122 (H) 11/05/2023    GLUCOSE 127 (H) 12/18/2023    GLUCOSE 98 12/04/2023    GLUCOSE 130 (H) 11/20/2023    GLUCOSE 69 (L) 11/13/2023    GLUCOSE 84 11/12/2023     Anthropometrics:  Anthropometrics  Height: 171.6 cm (5' 7.56\")  Weight: 75.3 kg (165 lb 14.3 oz)  BMI (Calculated): 25.55  IBW/kg (Dietitian Calculated): 68 kg  Weight Change  Weight History / % Weight Change: 2kg weight increase  Significant Weight Loss: No        Wt Readings from Last 10 Encounters:   12/18/23 75.3 kg (165 lb 14.3 oz)   12/18/23 75.3 kg (165 lb 14.3 oz)   12/04/23 73.9 kg (163 lb)   12/04/23 73.7 kg (162 lb 7.7 oz)   12/04/23 73.7 kg (162 lb 7.7 oz)   12/01/23 74.8 kg (164 lb 14.5 oz)   11/28/23 73.5 kg (162 lb 0.6 oz)   11/21/23 73.7 kg (162 lb 6.4 oz)   11/20/23 72.1 kg (159 lb 1 oz)   11/13/23 68.4 kg (150 lb 12.7 oz)        Food And Nutrient Intake:  Food and Nutrient History  Food and Nutrient History: Pt " "states that appetite is good- has been craving sweets and had questions regarding sugar and cancer.  Energy Intake: Fair 50-75 %, Good > 75 %  Fluid Intake: tea, water, [employer], boost                                                  Food Supplement Intake  Oral Nutrition Supplements: Boost Very High Calorie           Nutrition Focused Physical Exam Findings:  defer: Completed 10/2/2023                        Energy Needs  Calculated Energy Needs Using Equations  Height: 171.6 cm (5' 7.56\")  Weight Used for Equation Calculations: 74.6 kg (164 lb 7.4 oz)  [place]- St. Jeor Equation (Overweight or Obese Patients): 1498  Estimated Energy Needs  Total Energy Estimated Needs (kCal): 2238 kCal  Total Estimated Energy Need per Day (kCal/kg): 30 kCal/kg  Estimated Fluid Needs  Total Fluid Estimated Needs (mL): 2300 mL  Estimated Protein Needs  Total Protein Estimated Needs (g): 89.52 g  Total Protein Estimated Needs (g/kg): 1.2 g/kg        Nutrition Diagnosis   Malnutrition Diagnosis  Patient has Malnutrition Diagnosis: No    Nutrition Diagnosis  Patient has Nutrition Diagnosis: Yes  Diagnosis Status (1): Ongoing  Nutrition Diagnosis 1: Predicted inadequate energy intake  Related to (1): pathophysiology of diease/ treatment  As Evidenced by (1): pt with mild/ moderate muscle/ adipose losses and metastatic esophageal cancer undergoing chemotherapy with potential for nutrition impact symptoms.    Nutrition Interventions/Recommendations   Nutrition Prescription  Individualized Nutrition Prescription Provided for : Nutrition during cancer treatment, mgmt of nutrition impact symptoms    Food and Nutrition Delivery  Food and Nutrition Delivery  Meals & Snacks: Energy-modified diet, Protein-modified diet  Goals: Continue high calorie/ high proteins foods as frequently as possible. Continue ONS to help maintain/ gain weight following cardiac event. Discussed added sugars in diet in moderation= sweets/ treats occasionally is " reynold. Reviewed BGs and WNL.  Medical Food Supplement: Boost Very High Calorie  Goals: Daily in addition to meals/ snacks    Nutrition Education  Nutrition Education  Nutrition Education Content: Content related nutrition education  Goals: High calorie high protein diet    Coordination of Care       There are no Patient Instructions on file for this visit.    Nutrition Monitoring and Evaluation   Food/Nutrient Related History Monitoring  Monitoring and Evaluation Plan: Energy intake, Protein intake  Energy Intake: Estimated energy intake  Criteria: will incorporate high calorie options at least 75% of the time  Estimated protein intake: Estimated protein intake  Criteria: will incorporate protein source at least 50% of the time at meals and snacks  Additional Plan: Pt has been able to meet goals at least 75% of the time; ONS to help meet needs; Added sugars/ sweets in moderation        Time Spent  Prep time on day of patient encounter: 5 minutes  Time spent directly with patient, family or caregiver: 10 minutes  Additional Time Spent on Patient Care Activities: 0 minutes  Documentation Time: 5 minutes  Other Time Spent: 0 minutes  Total: 20 minutes

## 2023-12-18 NOTE — SIGNIFICANT EVENT
12/18/23 1134   Prechemo Checklist   Has the patient been in the hospital, ED, or urgent care since last date of service No   Chemo/Immuno Consent Signed Yes   Protocol/Indications Verified Yes   Confirmed to previous date/time of medication Yes   Compared to previous dose Yes   All medications are dated accurately Yes   Pregnancy Test Negative Not applicable   Parameters Met Yes   Is Patient Proceeding With Treatment? Yes   BSA/Weight-Height Verified Yes   Dose Calculations Verified Yes

## 2023-12-18 NOTE — PROGRESS NOTES
1500 - Trastuzumab and 5FU infusions tolerated without incident. Schedule reviewed then Dc'd via independent / ambulatory

## 2023-12-19 ENCOUNTER — TELEPHONE (OUTPATIENT)
Dept: INTENSIVE CARE | Facility: HOSPITAL | Age: 63
End: 2023-12-19
Payer: COMMERCIAL

## 2023-12-19 DIAGNOSIS — I47.20 V-TACH (MULTI): ICD-10-CM

## 2023-12-19 DIAGNOSIS — I10 PRIMARY HYPERTENSION: ICD-10-CM

## 2023-12-19 RX ORDER — METOPROLOL SUCCINATE 25 MG/1
25 TABLET, EXTENDED RELEASE ORAL DAILY
Qty: 30 TABLET | Refills: 0 | Status: CANCELLED | OUTPATIENT
Start: 2023-12-18 | End: 2024-01-17

## 2023-12-19 NOTE — TELEPHONE ENCOUNTER
Ativan was already sent to pharmacy for fill date 12/15/23. I called spouse and updated her. I also confirmed Oxycodone was sent for fill date 23 and would  today if they did not . Spouse will call pharmacy for these med refills and call us back if any issues.

## 2023-12-22 ENCOUNTER — APPOINTMENT (OUTPATIENT)
Dept: CARDIOLOGY | Facility: HOSPITAL | Age: 63
End: 2023-12-22
Payer: MEDICARE

## 2023-12-22 ENCOUNTER — APPOINTMENT (OUTPATIENT)
Dept: RADIOLOGY | Facility: HOSPITAL | Age: 63
End: 2023-12-22
Payer: MEDICARE

## 2023-12-22 ENCOUNTER — PHARMACY VISIT (OUTPATIENT)
Dept: PHARMACY | Facility: CLINIC | Age: 63
End: 2023-12-22
Payer: MEDICARE

## 2023-12-22 ENCOUNTER — HOSPITAL ENCOUNTER (EMERGENCY)
Facility: HOSPITAL | Age: 63
Discharge: HOME | End: 2023-12-22
Attending: EMERGENCY MEDICINE
Payer: MEDICARE

## 2023-12-22 VITALS
HEART RATE: 85 BPM | WEIGHT: 165 LBS | SYSTOLIC BLOOD PRESSURE: 153 MMHG | HEIGHT: 68 IN | TEMPERATURE: 97 F | BODY MASS INDEX: 25.01 KG/M2 | OXYGEN SATURATION: 99 % | RESPIRATION RATE: 16 BRPM | DIASTOLIC BLOOD PRESSURE: 77 MMHG

## 2023-12-22 DIAGNOSIS — C15.9 MALIGNANT NEOPLASM OF ESOPHAGUS, UNSPECIFIED LOCATION (MULTI): ICD-10-CM

## 2023-12-22 DIAGNOSIS — Z87.09 HISTORY OF PNEUMOTHORAX: Primary | ICD-10-CM

## 2023-12-22 DIAGNOSIS — Z86.711 HISTORY OF PULMONARY EMBOLISM: ICD-10-CM

## 2023-12-22 LAB
ALBUMIN SERPL BCP-MCNC: 4.2 G/DL (ref 3.4–5)
ALP SERPL-CCNC: 152 U/L (ref 33–136)
ALT SERPL W P-5'-P-CCNC: 28 U/L (ref 10–52)
ANION GAP SERPL CALC-SCNC: 17 MMOL/L (ref 10–20)
APTT PPP: 47 SECONDS (ref 27–38)
AST SERPL W P-5'-P-CCNC: 29 U/L (ref 9–39)
BASOPHILS # BLD AUTO: 0.01 X10*3/UL (ref 0–0.1)
BASOPHILS NFR BLD AUTO: 0.2 %
BILIRUB SERPL-MCNC: 1.2 MG/DL (ref 0–1.2)
BUN SERPL-MCNC: 20 MG/DL (ref 6–23)
CALCIUM SERPL-MCNC: 9.6 MG/DL (ref 8.6–10.3)
CARDIAC TROPONIN I PNL SERPL HS: 4 NG/L (ref 0–20)
CARDIAC TROPONIN I PNL SERPL HS: 4 NG/L (ref 0–20)
CHLORIDE SERPL-SCNC: 101 MMOL/L (ref 98–107)
CO2 SERPL-SCNC: 22 MMOL/L (ref 21–32)
CREAT SERPL-MCNC: 0.8 MG/DL (ref 0.5–1.3)
EOSINOPHIL # BLD AUTO: 0.06 X10*3/UL (ref 0–0.7)
EOSINOPHIL NFR BLD AUTO: 0.9 %
ERYTHROCYTE [DISTWIDTH] IN BLOOD BY AUTOMATED COUNT: 14.2 % (ref 11.5–14.5)
GFR SERPL CREATININE-BSD FRML MDRD: >90 ML/MIN/1.73M*2
GLUCOSE SERPL-MCNC: 189 MG/DL (ref 74–99)
HCT VFR BLD AUTO: 36 % (ref 41–52)
HGB BLD-MCNC: 12.5 G/DL (ref 13.5–17.5)
IMM GRANULOCYTES # BLD AUTO: 0.02 X10*3/UL (ref 0–0.7)
IMM GRANULOCYTES NFR BLD AUTO: 0.3 % (ref 0–0.9)
INR PPP: 1.7 (ref 0.9–1.1)
LYMPHOCYTES # BLD AUTO: 0.92 X10*3/UL (ref 1.2–4.8)
LYMPHOCYTES NFR BLD AUTO: 13.8 %
MAGNESIUM SERPL-MCNC: 1.69 MG/DL (ref 1.6–2.4)
MCH RBC QN AUTO: 29.6 PG (ref 26–34)
MCHC RBC AUTO-ENTMCNC: 34.7 G/DL (ref 32–36)
MCV RBC AUTO: 85 FL (ref 80–100)
MONOCYTES # BLD AUTO: 0.36 X10*3/UL (ref 0.1–1)
MONOCYTES NFR BLD AUTO: 5.4 %
NEUTROPHILS # BLD AUTO: 5.29 X10*3/UL (ref 1.2–7.7)
NEUTROPHILS NFR BLD AUTO: 79.4 %
NRBC BLD-RTO: 0 /100 WBCS (ref 0–0)
PLATELET # BLD AUTO: 98 X10*3/UL (ref 150–450)
POTASSIUM SERPL-SCNC: 3.3 MMOL/L (ref 3.5–5.3)
PROT SERPL-MCNC: 7 G/DL (ref 6.4–8.2)
PROTHROMBIN TIME: 18.7 SECONDS (ref 9.8–12.8)
RBC # BLD AUTO: 4.23 X10*6/UL (ref 4.5–5.9)
SODIUM SERPL-SCNC: 137 MMOL/L (ref 136–145)
WBC # BLD AUTO: 6.7 X10*3/UL (ref 4.4–11.3)

## 2023-12-22 PROCEDURE — 71045 X-RAY EXAM CHEST 1 VIEW: CPT | Performed by: RADIOLOGY

## 2023-12-22 PROCEDURE — 2550000001 HC RX 255 CONTRASTS: Performed by: EMERGENCY MEDICINE

## 2023-12-22 PROCEDURE — 99284 EMERGENCY DEPT VISIT MOD MDM: CPT | Performed by: EMERGENCY MEDICINE

## 2023-12-22 PROCEDURE — 96375 TX/PRO/DX INJ NEW DRUG ADDON: CPT

## 2023-12-22 PROCEDURE — RXMED WILLOW AMBULATORY MEDICATION CHARGE

## 2023-12-22 PROCEDURE — 84484 ASSAY OF TROPONIN QUANT: CPT | Performed by: NURSE PRACTITIONER

## 2023-12-22 PROCEDURE — 93005 ELECTROCARDIOGRAM TRACING: CPT

## 2023-12-22 PROCEDURE — 96372 THER/PROPH/DIAG INJ SC/IM: CPT

## 2023-12-22 PROCEDURE — 96374 THER/PROPH/DIAG INJ IV PUSH: CPT | Mod: 59

## 2023-12-22 PROCEDURE — 71045 X-RAY EXAM CHEST 1 VIEW: CPT

## 2023-12-22 PROCEDURE — 85730 THROMBOPLASTIN TIME PARTIAL: CPT | Performed by: NURSE PRACTITIONER

## 2023-12-22 PROCEDURE — 80053 COMPREHEN METABOLIC PANEL: CPT | Performed by: NURSE PRACTITIONER

## 2023-12-22 PROCEDURE — 2500000004 HC RX 250 GENERAL PHARMACY W/ HCPCS (ALT 636 FOR OP/ED): Performed by: NURSE PRACTITIONER

## 2023-12-22 PROCEDURE — 85025 COMPLETE CBC W/AUTO DIFF WBC: CPT | Performed by: NURSE PRACTITIONER

## 2023-12-22 PROCEDURE — 83735 ASSAY OF MAGNESIUM: CPT | Performed by: NURSE PRACTITIONER

## 2023-12-22 PROCEDURE — 71275 CT ANGIOGRAPHY CHEST: CPT | Performed by: RADIOLOGY

## 2023-12-22 PROCEDURE — 71275 CT ANGIOGRAPHY CHEST: CPT

## 2023-12-22 RX ORDER — HEPARIN 100 UNIT/ML
1 SYRINGE INTRAVENOUS ONCE
Status: DISCONTINUED | OUTPATIENT
Start: 2023-12-22 | End: 2023-12-22

## 2023-12-22 RX ORDER — HYDROMORPHONE HYDROCHLORIDE 1 MG/ML
0.6 INJECTION, SOLUTION INTRAMUSCULAR; INTRAVENOUS; SUBCUTANEOUS ONCE
Status: COMPLETED | OUTPATIENT
Start: 2023-12-22 | End: 2023-12-22

## 2023-12-22 RX ORDER — TIZANIDINE HYDROCHLORIDE 2 MG/1
4 CAPSULE, GELATIN COATED ORAL 2 TIMES DAILY
Qty: 40 CAPSULE | Refills: 0 | Status: ON HOLD | OUTPATIENT
Start: 2023-12-22 | End: 2024-03-19 | Stop reason: ENTERED-IN-ERROR

## 2023-12-22 RX ORDER — METOPROLOL SUCCINATE 25 MG/1
25 TABLET, EXTENDED RELEASE ORAL DAILY
Qty: 30 TABLET | Refills: 1 | Status: SHIPPED | OUTPATIENT
Start: 2023-12-22 | End: 2024-02-19 | Stop reason: SDUPTHER

## 2023-12-22 RX ORDER — HEPARIN 100 UNIT/ML
5 SYRINGE INTRAVENOUS ONCE
Status: COMPLETED | OUTPATIENT
Start: 2023-12-22 | End: 2023-12-22

## 2023-12-22 RX ORDER — ORPHENADRINE CITRATE 30 MG/ML
60 INJECTION INTRAMUSCULAR; INTRAVENOUS ONCE
Status: COMPLETED | OUTPATIENT
Start: 2023-12-22 | End: 2023-12-22

## 2023-12-22 RX ADMIN — IOHEXOL 69 ML: 350 INJECTION, SOLUTION INTRAVENOUS at 13:05

## 2023-12-22 RX ADMIN — ORPHENADRINE CITRATE 60 MG: 60 INJECTION INTRAMUSCULAR; INTRAVENOUS at 14:23

## 2023-12-22 RX ADMIN — HEPARIN 500 UNITS: 100 SYRINGE at 14:24

## 2023-12-22 RX ADMIN — HYDROMORPHONE HYDROCHLORIDE 0.6 MG: 1 INJECTION, SOLUTION INTRAMUSCULAR; INTRAVENOUS; SUBCUTANEOUS at 11:39

## 2023-12-22 ASSESSMENT — ENCOUNTER SYMPTOMS
EYES NEGATIVE: 1
COUGH: 1
CONSTITUTIONAL NEGATIVE: 1

## 2023-12-22 ASSESSMENT — COLUMBIA-SUICIDE SEVERITY RATING SCALE - C-SSRS
6. HAVE YOU EVER DONE ANYTHING, STARTED TO DO ANYTHING, OR PREPARED TO DO ANYTHING TO END YOUR LIFE?: NO
2. HAVE YOU ACTUALLY HAD ANY THOUGHTS OF KILLING YOURSELF?: NO
1. IN THE PAST MONTH, HAVE YOU WISHED YOU WERE DEAD OR WISHED YOU COULD GO TO SLEEP AND NOT WAKE UP?: NO

## 2023-12-22 ASSESSMENT — LIFESTYLE VARIABLES
REASON UNABLE TO ASSESS: YES
HAVE YOU EVER FELT YOU SHOULD CUT DOWN ON YOUR DRINKING: NO
EVER HAD A DRINK FIRST THING IN THE MORNING TO STEADY YOUR NERVES TO GET RID OF A HANGOVER: NO
HAVE PEOPLE ANNOYED YOU BY CRITICIZING YOUR DRINKING: NO
EVER FELT BAD OR GUILTY ABOUT YOUR DRINKING: NO

## 2023-12-22 ASSESSMENT — PAIN DESCRIPTION - LOCATION: LOCATION: RIB CAGE

## 2023-12-22 ASSESSMENT — PAIN - FUNCTIONAL ASSESSMENT: PAIN_FUNCTIONAL_ASSESSMENT: 0-10

## 2023-12-22 ASSESSMENT — PAIN SCALES - GENERAL: PAINLEVEL_OUTOF10: 6

## 2023-12-22 ASSESSMENT — PAIN DESCRIPTION - PAIN TYPE: TYPE: ACUTE PAIN

## 2023-12-22 NOTE — ED PROVIDER NOTES
HPI   Chief Complaint   Patient presents with    Chest Pain    Shortness of Breath       63-year-old male with a history of pneumothorax, esophageal cancer metastatic to the liver, and pulmonary embolism presents today with dyspnea, and chest pain.  He rates his chest pain 5 out of 10.  He is on Eliquis.  The pain radiates to his back.  He denies hemoptysis.  He denies posterior calf pain.  He denies abdominal pain, nausea, or vomiting.  His son is one of our nurses in our emergency department he is bedside with patient.  Patient denies any recent trauma or fall.  He denies headache or confusion.  He denies unilateral weakness or facial droop.  He denies any neurodeficit.      History provided by:  Patient and relative   used: No                        Mendoza Coma Scale Score: 15                  Patient History   Past Medical History:   Diagnosis Date    Essential (primary) hypertension 12/21/2013    Hypertension    Personal history of other diseases of the circulatory system     History of right bundle branch block (RBBB)    Pneumothorax 12/04/2023     Past Surgical History:   Procedure Laterality Date    CARDIAC ELECTROPHYSIOLOGY PROCEDURE N/A 11/7/2023    Procedure: Temporary Pacemaker Insertion;  Surgeon: Alexis Shook MD;  Location: [facility] Cardiac Cath Lab;  Service: Cardiovascular;  Laterality: N/A;    CARDIAC ELECTROPHYSIOLOGY PROCEDURE Left 11/9/2023    Procedure: PPM IMPLANT DUAL;  Surgeon: Elias Connolly MD;  Location: [facility] Cardiac Cath Lab;  Service: Electrophysiology;  Laterality: Left;    TOTAL KNEE ARTHROPLASTY  09/30/2016    Total Knee Replacement Left    TOTAL KNEE ARTHROPLASTY  09/30/2016    Total Knee Replacement Right     No family history on file.  Social History     Tobacco Use    Smoking status: Some Days     Types: Cigarettes, Cigars     Passive exposure: Never    Smokeless tobacco: Never   Vaping Use    Vaping Use: Some days    Substances: THC, CBD   Substance Use  Topics    Alcohol use: Not Currently    Drug use: Yes     Types: Marijuana     Comment: medical marjuana card       Physical Exam   ED Triage Vitals   Temp Pulse Resp BP   -- -- -- --      SpO2 Temp src Heart Rate Source Patient Position   -- -- -- --      BP Location FiO2 (%)     -- --       Physical Exam  Constitutional:       Appearance: Normal appearance.   HENT:      Head: Normocephalic and atraumatic.      Right Ear: Tympanic membrane normal.      Left Ear: Tympanic membrane normal.      Nose: Nose normal.      Mouth/Throat:      Mouth: Mucous membranes are moist.   Eyes:      Pupils: Pupils are equal, round, and reactive to light.   Cardiovascular:      Rate and Rhythm: Normal rate and regular rhythm.   Pulmonary:      Effort: Pulmonary effort is normal.      Breath sounds: Normal breath sounds.   Abdominal:      General: Abdomen is flat.      Palpations: Abdomen is soft.   Musculoskeletal:         General: Normal range of motion.      Cervical back: Neck supple.   Skin:     General: Skin is warm and dry.      Capillary Refill: Capillary refill takes less than 2 seconds.   Neurological:      General: No focal deficit present.      Mental Status: He is alert and oriented to person, place, and time.   Psychiatric:         Mood and Affect: Mood normal.         Behavior: Behavior normal.         ED Course & MDM   Diagnoses as of 12/22/23 1506   History of pneumothorax   History of pulmonary embolism   Malignant neoplasm of esophagus, unspecified location (CMS/HCC)       Medical Decision Making  EKG is sinus rhythm with an occasional PVC.  This is unchanged from prior EKG.  QRS is narrow.  P waves are tied to the QRS.  No ST elevation or depression.  HI interval is normal.  QT corrected is slightly prolonged and this is also seen on prior EKG.  Interpreted both by myself and attending.  Vital signs were rechecked 4 times.  At patient's request she was placed on 2 L nasal cannula.  His port was accessed.  His  initial troponin was only 4.  His second troponin was only 4.  His metabolic panel had a glucose of 189 and a potassium of 3.3.  His magnesium was normal.  His coag had an INR 1.7 and he is on Eliquis.  His CBC was negative for leukocytosis or left shift.  His hemoglobin and hematocrit were stable at 12.5 and 36.  CT angio chest for pulmonary embolism was negative for pulmonary embolism and negative for dissection.  The esophageal carcinoma with metastatic paraspinous lymph node 1 may have increased somewhat in size.  There is liver metastasis and portocaval adenopathy was also noted.  Cirrhosis and probable portal hypertension with splenomegaly was also noted.  This was discussed with family.  Chest x-ray initially was negative for pneumothorax.  Patient was seen and staffed with attending and I wrote a letter to his oncologist COLEMAN for close follow-up and she acknowledged my letter.  She understands the importance of close follow-up we could not ascertain why patient was having this pain in his left upper but he already has oxycodone and fentanyl patches to manage his pain from his metastatic esophageal cancer.  Return precautions reviewed and safely discharged home.    Amount and/or Complexity of Data Reviewed  ECG/medicine tests: ordered and independent interpretation performed.     Details: EKG is 91 bpm.  No ST elevation or depression.  The T wave is inverted in V2 and V3 but it was inverted in November 28 EKG as well.  This is unchanged from prior EKG.  Patient has a right bundle branch block.  ME interval is normal.  QT corrected is prolonged at 504 ms and it was prolonged in November 28 EKG.  Interpreted both by attending and myself.        Procedure  Procedures     David Dacosta, APRN-CNP  12/22/23 4913

## 2023-12-26 ENCOUNTER — HOSPITAL ENCOUNTER (OUTPATIENT)
Facility: HOSPITAL | Age: 63
Setting detail: OBSERVATION
Discharge: HOME | End: 2023-12-27
Attending: STUDENT IN AN ORGANIZED HEALTH CARE EDUCATION/TRAINING PROGRAM | Admitting: STUDENT IN AN ORGANIZED HEALTH CARE EDUCATION/TRAINING PROGRAM
Payer: MEDICARE

## 2023-12-26 ENCOUNTER — APPOINTMENT (OUTPATIENT)
Dept: CARDIOLOGY | Facility: HOSPITAL | Age: 63
End: 2023-12-26
Payer: MEDICARE

## 2023-12-26 ENCOUNTER — APPOINTMENT (OUTPATIENT)
Dept: RADIOLOGY | Facility: HOSPITAL | Age: 63
End: 2023-12-26
Payer: MEDICARE

## 2023-12-26 ENCOUNTER — APPOINTMENT (OUTPATIENT)
Dept: HEMATOLOGY/ONCOLOGY | Facility: CLINIC | Age: 63
End: 2023-12-26
Payer: COMMERCIAL

## 2023-12-26 ENCOUNTER — TELEPHONE (OUTPATIENT)
Dept: HEMATOLOGY/ONCOLOGY | Facility: CLINIC | Age: 63
End: 2023-12-26
Payer: COMMERCIAL

## 2023-12-26 DIAGNOSIS — C15.9 STAGE IV MALIGNANT NEOPLASM OF ESOPHAGUS (MULTI): ICD-10-CM

## 2023-12-26 DIAGNOSIS — Z87.09 HISTORY OF PNEUMOTHORAX: ICD-10-CM

## 2023-12-26 DIAGNOSIS — R55 NEAR SYNCOPE: Primary | ICD-10-CM

## 2023-12-26 DIAGNOSIS — Z86.711 HISTORY OF PULMONARY EMBOLISM: ICD-10-CM

## 2023-12-26 LAB
ALBUMIN SERPL BCP-MCNC: 4.3 G/DL (ref 3.4–5)
ALP SERPL-CCNC: 148 U/L (ref 33–136)
ALT SERPL W P-5'-P-CCNC: 26 U/L (ref 10–52)
ANION GAP SERPL CALC-SCNC: 15 MMOL/L (ref 10–20)
APTT PPP: 48 SECONDS (ref 27–38)
AST SERPL W P-5'-P-CCNC: 38 U/L (ref 9–39)
BASOPHILS # BLD AUTO: 0.02 X10*3/UL (ref 0–0.1)
BASOPHILS NFR BLD AUTO: 0.2 %
BILIRUB SERPL-MCNC: 0.8 MG/DL (ref 0–1.2)
BUN SERPL-MCNC: 16 MG/DL (ref 6–23)
CALCIUM SERPL-MCNC: 9.7 MG/DL (ref 8.6–10.3)
CARDIAC TROPONIN I PNL SERPL HS: 6 NG/L (ref 0–20)
CARDIAC TROPONIN I PNL SERPL HS: 7 NG/L (ref 0–20)
CHLORIDE SERPL-SCNC: 102 MMOL/L (ref 98–107)
CO2 SERPL-SCNC: 25 MMOL/L (ref 21–32)
CREAT SERPL-MCNC: 0.88 MG/DL (ref 0.5–1.3)
EOSINOPHIL # BLD AUTO: 0.05 X10*3/UL (ref 0–0.7)
EOSINOPHIL NFR BLD AUTO: 0.6 %
ERYTHROCYTE [DISTWIDTH] IN BLOOD BY AUTOMATED COUNT: 14.5 % (ref 11.5–14.5)
FLUAV RNA RESP QL NAA+PROBE: NOT DETECTED
FLUBV RNA RESP QL NAA+PROBE: NOT DETECTED
GFR SERPL CREATININE-BSD FRML MDRD: >90 ML/MIN/1.73M*2
GLUCOSE SERPL-MCNC: 109 MG/DL (ref 74–99)
HCT VFR BLD AUTO: 34.6 % (ref 41–52)
HGB BLD-MCNC: 11.8 G/DL (ref 13.5–17.5)
IMM GRANULOCYTES # BLD AUTO: 0.04 X10*3/UL (ref 0–0.7)
IMM GRANULOCYTES NFR BLD AUTO: 0.5 % (ref 0–0.9)
INR PPP: 1.5 (ref 0.9–1.1)
LYMPHOCYTES # BLD AUTO: 0.92 X10*3/UL (ref 1.2–4.8)
LYMPHOCYTES NFR BLD AUTO: 10.9 %
MCH RBC QN AUTO: 29.4 PG (ref 26–34)
MCHC RBC AUTO-ENTMCNC: 34.1 G/DL (ref 32–36)
MCV RBC AUTO: 86 FL (ref 80–100)
MONOCYTES # BLD AUTO: 0.86 X10*3/UL (ref 0.1–1)
MONOCYTES NFR BLD AUTO: 10.2 %
NEUTROPHILS # BLD AUTO: 6.57 X10*3/UL (ref 1.2–7.7)
NEUTROPHILS NFR BLD AUTO: 77.6 %
NRBC BLD-RTO: 0 /100 WBCS (ref 0–0)
PLATELET # BLD AUTO: 125 X10*3/UL (ref 150–450)
POTASSIUM SERPL-SCNC: 3.3 MMOL/L (ref 3.5–5.3)
PROT SERPL-MCNC: 6.9 G/DL (ref 6.4–8.2)
PROTHROMBIN TIME: 17 SECONDS (ref 9.8–12.8)
RBC # BLD AUTO: 4.01 X10*6/UL (ref 4.5–5.9)
SARS-COV-2 RNA RESP QL NAA+PROBE: NOT DETECTED
SODIUM SERPL-SCNC: 139 MMOL/L (ref 136–145)
WBC # BLD AUTO: 8.5 X10*3/UL (ref 4.4–11.3)

## 2023-12-26 PROCEDURE — 93005 ELECTROCARDIOGRAM TRACING: CPT

## 2023-12-26 PROCEDURE — 71046 X-RAY EXAM CHEST 2 VIEWS: CPT | Mod: FOREIGN READ | Performed by: RADIOLOGY

## 2023-12-26 PROCEDURE — 80053 COMPREHEN METABOLIC PANEL: CPT | Performed by: PHYSICIAN ASSISTANT

## 2023-12-26 PROCEDURE — 71275 CT ANGIOGRAPHY CHEST: CPT

## 2023-12-26 PROCEDURE — 84484 ASSAY OF TROPONIN QUANT: CPT | Performed by: PHYSICIAN ASSISTANT

## 2023-12-26 PROCEDURE — 85610 PROTHROMBIN TIME: CPT | Performed by: PHYSICIAN ASSISTANT

## 2023-12-26 PROCEDURE — 2500000001 HC RX 250 WO HCPCS SELF ADMINISTERED DRUGS (ALT 637 FOR MEDICARE OP): Performed by: PHYSICIAN ASSISTANT

## 2023-12-26 PROCEDURE — 71275 CT ANGIOGRAPHY CHEST: CPT | Performed by: STUDENT IN AN ORGANIZED HEALTH CARE EDUCATION/TRAINING PROGRAM

## 2023-12-26 PROCEDURE — 2550000001 HC RX 255 CONTRASTS: Performed by: STUDENT IN AN ORGANIZED HEALTH CARE EDUCATION/TRAINING PROGRAM

## 2023-12-26 PROCEDURE — 85730 THROMBOPLASTIN TIME PARTIAL: CPT | Performed by: PHYSICIAN ASSISTANT

## 2023-12-26 PROCEDURE — 99285 EMERGENCY DEPT VISIT HI MDM: CPT | Performed by: STUDENT IN AN ORGANIZED HEALTH CARE EDUCATION/TRAINING PROGRAM

## 2023-12-26 PROCEDURE — 85025 COMPLETE CBC W/AUTO DIFF WBC: CPT | Performed by: PHYSICIAN ASSISTANT

## 2023-12-26 PROCEDURE — 87636 SARSCOV2 & INF A&B AMP PRB: CPT | Performed by: PHYSICIAN ASSISTANT

## 2023-12-26 PROCEDURE — 71046 X-RAY EXAM CHEST 2 VIEWS: CPT | Mod: FR

## 2023-12-26 PROCEDURE — 2500000004 HC RX 250 GENERAL PHARMACY W/ HCPCS (ALT 636 FOR OP/ED): Mod: MUE | Performed by: STUDENT IN AN ORGANIZED HEALTH CARE EDUCATION/TRAINING PROGRAM

## 2023-12-26 RX ORDER — NITROGLYCERIN 0.4 MG/1
0.4 TABLET SUBLINGUAL ONCE
Status: COMPLETED | OUTPATIENT
Start: 2023-12-26 | End: 2023-12-26

## 2023-12-26 RX ORDER — CHOLECALCIFEROL (VITAMIN D3) 50 MCG
100 TABLET ORAL EVERY MORNING
Status: ON HOLD | COMMUNITY

## 2023-12-26 RX ORDER — POTASSIUM CHLORIDE 20 MEQ/1
20 TABLET, EXTENDED RELEASE ORAL DAILY
Status: DISCONTINUED | OUTPATIENT
Start: 2023-12-26 | End: 2023-12-27 | Stop reason: HOSPADM

## 2023-12-26 RX ADMIN — IOHEXOL 75 ML: 350 INJECTION, SOLUTION INTRAVENOUS at 19:34

## 2023-12-26 RX ADMIN — NITROGLYCERIN 0.4 MG: 0.4 TABLET SUBLINGUAL at 17:59

## 2023-12-26 RX ADMIN — POTASSIUM CHLORIDE 20 MEQ: 1500 TABLET, EXTENDED RELEASE ORAL at 21:22

## 2023-12-26 ASSESSMENT — PAIN DESCRIPTION - LOCATION: LOCATION: CHEST

## 2023-12-26 ASSESSMENT — PAIN SCALES - GENERAL
PAINLEVEL_OUTOF10: 6
PAINLEVEL_OUTOF10: 2

## 2023-12-26 ASSESSMENT — PAIN DESCRIPTION - DESCRIPTORS
DESCRIPTORS: BURNING;ACHING
DESCRIPTORS: ACHING

## 2023-12-26 ASSESSMENT — COLUMBIA-SUICIDE SEVERITY RATING SCALE - C-SSRS
1. IN THE PAST MONTH, HAVE YOU WISHED YOU WERE DEAD OR WISHED YOU COULD GO TO SLEEP AND NOT WAKE UP?: NO
2. HAVE YOU ACTUALLY HAD ANY THOUGHTS OF KILLING YOURSELF?: NO
6. HAVE YOU EVER DONE ANYTHING, STARTED TO DO ANYTHING, OR PREPARED TO DO ANYTHING TO END YOUR LIFE?: NO

## 2023-12-26 ASSESSMENT — PAIN DESCRIPTION - PAIN TYPE: TYPE: ACUTE PAIN

## 2023-12-26 ASSESSMENT — PAIN DESCRIPTION - ORIENTATION: ORIENTATION: MID

## 2023-12-26 ASSESSMENT — PAIN - FUNCTIONAL ASSESSMENT: PAIN_FUNCTIONAL_ASSESSMENT: 0-10

## 2023-12-26 NOTE — ED PROVIDER NOTES
HPI   Chief Complaint   Patient presents with    Chest Pain     Onset 2 hrs ago - 1500 -.  Mid sternal 6/10.  Head feels flushed       This is a 63-year-old male with PMH metastatic esophageal cancer, pneumothorax, VTE on Eliquis presenting for evaluation of sudden onset midsternal sharp nonradiating nonpleuritic nonexertional chest pain that started about an hour prior to arrival when he came home after dinner.  He took three 0.5 mg Ativan PTA but it did not alleviate.  Had an episode of feeling flushed and diaphoretic.  Denies shortness of breath, palpitations, vomiting, syncope.      History provided by:  Patient   used: No                        Mendoza Coma Scale Score: 15                  Patient History   Past Medical History:   Diagnosis Date    Essential (primary) hypertension 12/21/2013    Hypertension    Personal history of other diseases of the circulatory system     History of right bundle branch block (RBBB)    Pneumothorax 12/04/2023     Past Surgical History:   Procedure Laterality Date    CARDIAC ELECTROPHYSIOLOGY PROCEDURE N/A 11/7/2023    Procedure: Temporary Pacemaker Insertion;  Surgeon: Alexis Shook MD;  Location: [facility] Cardiac Cath Lab;  Service: Cardiovascular;  Laterality: N/A;    CARDIAC ELECTROPHYSIOLOGY PROCEDURE Left 11/9/2023    Procedure: PPM IMPLANT DUAL;  Surgeon: Elias Connolly MD;  Location: [facility] Cardiac Cath Lab;  Service: Electrophysiology;  Laterality: Left;    TOTAL KNEE ARTHROPLASTY  09/30/2016    Total Knee Replacement Left    TOTAL KNEE ARTHROPLASTY  09/30/2016    Total Knee Replacement Right     No family history on file.  Social History     Tobacco Use    Smoking status: Some Days     Types: Cigarettes, Cigars     Passive exposure: Never    Smokeless tobacco: Never   Vaping Use    Vaping Use: Some days    Substances: THC, CBD   Substance Use Topics    Alcohol use: Not Currently    Drug use: Yes     Types: Marijuana     Comment: medical marjuana  card       Physical Exam   ED Triage Vitals [12/26/23 1704]   Temp Heart Rate Resp BP   36.9 °C (98.4 °F) 93 20 151/90      SpO2 Temp Source Heart Rate Source Patient Position   100 % Skin Monitor --      BP Location FiO2 (%)     -- --       Physical Exam    General: Vitals noted, no distress. Afebrile  Neck: Trachea midline. No JVD appreciated.  Cardiac: Regular rate and rhythm. No murmur  Pulmonary: Lungs clear bilaterally with good aeration. No rales, rhonchi or wheezing  Abdomen: Soft. Nontender  Extremities: No peripheral edema  Skin: No rash  Neuro: No focal neurologic deficits    ED Course & MDM        Medical Decision Making  DDx: ACS, PE, dissection, bertha-/myocarditis, pneumothorax, pneumonia, dysrhythmia, mechanical chest wall pain    Patient is visibly nontoxic-appearing no apparent distress.  Stable hemodynamically.  EKG largely unchanged from baseline.  Nitro was ordered.  Patient is anticoagulated but is high risk so we will obtain CT PE study and chest x-ray and laboratory evaluation.  This visit was staffed with the attending physician Dr. Malik who will determine further care and disposition.      Disclaimer: This note was dictated using speech recognition software. An attempt at proofreading was made to minimize errors. Minor errors in transcription may be present. Please call if questions.    Amount and/or Complexity of Data Reviewed  Labs: ordered.  Radiology: ordered.  ECG/medicine tests: ordered and independent interpretation performed.     Details: EKG interpreted by me: Sinus tachycardia.  Rate 106.  Right bundle elysia block.  Normal axis.  QTc 518 ms.  No STEMI.  EKG interpreted by me: Normal sinus rhythm.  Rate 91.  Normal axis.  Right bundle branch block.  QTc 523 ms.  No STEMI.        Procedure  Procedures     Ajay R WirePETER  12/26/23 1852       Ajay R Wire, PA-C  12/26/23 1900

## 2023-12-27 ENCOUNTER — APPOINTMENT (OUTPATIENT)
Dept: CARDIOLOGY | Facility: HOSPITAL | Age: 63
End: 2023-12-27
Payer: MEDICARE

## 2023-12-27 VITALS
OXYGEN SATURATION: 99 % | RESPIRATION RATE: 18 BRPM | HEIGHT: 71 IN | WEIGHT: 167.8 LBS | DIASTOLIC BLOOD PRESSURE: 65 MMHG | HEART RATE: 68 BPM | TEMPERATURE: 97.5 F | SYSTOLIC BLOOD PRESSURE: 112 MMHG | BODY MASS INDEX: 23.49 KG/M2

## 2023-12-27 LAB
ANION GAP SERPL CALC-SCNC: 12 MMOL/L (ref 10–20)
ATRIAL RATE: 106 BPM
ATRIAL RATE: 117 BPM
ATRIAL RATE: 77 BPM
BNP SERPL-MCNC: 27 PG/ML (ref 0–99)
BUN SERPL-MCNC: 17 MG/DL (ref 6–23)
CALCIUM SERPL-MCNC: 9.3 MG/DL (ref 8.6–10.3)
CHLORIDE SERPL-SCNC: 105 MMOL/L (ref 98–107)
CHOLEST SERPL-MCNC: 145 MG/DL (ref 0–199)
CHOLESTEROL/HDL RATIO: 2.9
CO2 SERPL-SCNC: 27 MMOL/L (ref 21–32)
CREAT SERPL-MCNC: 0.87 MG/DL (ref 0.5–1.3)
ERYTHROCYTE [DISTWIDTH] IN BLOOD BY AUTOMATED COUNT: 14.6 % (ref 11.5–14.5)
GFR SERPL CREATININE-BSD FRML MDRD: >90 ML/MIN/1.73M*2
GLUCOSE SERPL-MCNC: 83 MG/DL (ref 74–99)
HCT VFR BLD AUTO: 33.7 % (ref 41–52)
HDLC SERPL-MCNC: 49.9 MG/DL
HGB BLD-MCNC: 11.4 G/DL (ref 13.5–17.5)
LDLC SERPL CALC-MCNC: 81 MG/DL
MCH RBC QN AUTO: 29.2 PG (ref 26–34)
MCHC RBC AUTO-ENTMCNC: 33.8 G/DL (ref 32–36)
MCV RBC AUTO: 86 FL (ref 80–100)
NON HDL CHOLESTEROL: 95 MG/DL (ref 0–149)
NRBC BLD-RTO: 0 /100 WBCS (ref 0–0)
P AXIS: -7 DEGREES
P AXIS: 19 DEGREES
P OFFSET: 165 MS
P OFFSET: 165 MS
P ONSET: 110 MS
P ONSET: 117 MS
PLATELET # BLD AUTO: 98 X10*3/UL (ref 150–450)
POTASSIUM SERPL-SCNC: 3.7 MMOL/L (ref 3.5–5.3)
PR INTERVAL: 186 MS
PR INTERVAL: 202 MS
Q ONSET: 196 MS
Q ONSET: 210 MS
Q ONSET: 211 MS
QRS COUNT: 13 BEATS
QRS COUNT: 17 BEATS
QRS COUNT: 19 BEATS
QRS DURATION: 144 MS
QRS DURATION: 158 MS
QRS DURATION: 166 MS
QT INTERVAL: 378 MS
QT INTERVAL: 430 MS
QT INTERVAL: 456 MS
QTC CALCULATION(BAZETT): 502 MS
QTC CALCULATION(BAZETT): 516 MS
QTC CALCULATION(BAZETT): 589 MS
QTC FREDERICIA: 456 MS
QTC FREDERICIA: 495 MS
QTC FREDERICIA: 531 MS
R AXIS: -42 DEGREES
R AXIS: -45 DEGREES
R AXIS: 100 DEGREES
RBC # BLD AUTO: 3.9 X10*6/UL (ref 4.5–5.9)
SODIUM SERPL-SCNC: 140 MMOL/L (ref 136–145)
T AXIS: -29 DEGREES
T AXIS: 106 DEGREES
T AXIS: 16 DEGREES
T OFFSET: 399 MS
T OFFSET: 411 MS
T OFFSET: 439 MS
TRIGL SERPL-MCNC: 70 MG/DL (ref 0–149)
VENTRICULAR RATE: 106 BPM
VENTRICULAR RATE: 113 BPM
VENTRICULAR RATE: 77 BPM
VLDL: 14 MG/DL (ref 0–40)
WBC # BLD AUTO: 5 X10*3/UL (ref 4.4–11.3)

## 2023-12-27 PROCEDURE — 85027 COMPLETE CBC AUTOMATED: CPT

## 2023-12-27 PROCEDURE — 99236 HOSP IP/OBS SAME DATE HI 85: CPT

## 2023-12-27 PROCEDURE — 80061 LIPID PANEL: CPT

## 2023-12-27 PROCEDURE — 2500000004 HC RX 250 GENERAL PHARMACY W/ HCPCS (ALT 636 FOR OP/ED): Performed by: STUDENT IN AN ORGANIZED HEALTH CARE EDUCATION/TRAINING PROGRAM

## 2023-12-27 PROCEDURE — G0378 HOSPITAL OBSERVATION PER HR: HCPCS

## 2023-12-27 PROCEDURE — 2500000004 HC RX 250 GENERAL PHARMACY W/ HCPCS (ALT 636 FOR OP/ED): Mod: MUE

## 2023-12-27 PROCEDURE — 93005 ELECTROCARDIOGRAM TRACING: CPT

## 2023-12-27 PROCEDURE — 2500000004 HC RX 250 GENERAL PHARMACY W/ HCPCS (ALT 636 FOR OP/ED)

## 2023-12-27 PROCEDURE — 83880 ASSAY OF NATRIURETIC PEPTIDE: CPT

## 2023-12-27 PROCEDURE — 80048 BASIC METABOLIC PNL TOTAL CA: CPT

## 2023-12-27 PROCEDURE — 2500000001 HC RX 250 WO HCPCS SELF ADMINISTERED DRUGS (ALT 637 FOR MEDICARE OP)

## 2023-12-27 PROCEDURE — 87081 CULTURE SCREEN ONLY: CPT | Mod: GEALAB

## 2023-12-27 RX ORDER — MULTIVIT-MIN/IRON FUM/FOLIC AC 7.5 MG-4
1 TABLET ORAL DAILY
Status: DISCONTINUED | OUTPATIENT
Start: 2023-12-27 | End: 2023-12-27 | Stop reason: HOSPADM

## 2023-12-27 RX ORDER — POTASSIUM CHLORIDE 750 MG/1
10 TABLET, FILM COATED, EXTENDED RELEASE ORAL DAILY
Status: DISCONTINUED | OUTPATIENT
Start: 2023-12-27 | End: 2023-12-27 | Stop reason: HOSPADM

## 2023-12-27 RX ORDER — HEPARIN 100 UNIT/ML
5 SYRINGE INTRAVENOUS ONCE
Status: COMPLETED | OUTPATIENT
Start: 2023-12-27 | End: 2023-12-27

## 2023-12-27 RX ORDER — POTASSIUM CHLORIDE 20 MEQ/1
40 TABLET, EXTENDED RELEASE ORAL ONCE
Status: DISCONTINUED | OUTPATIENT
Start: 2023-12-27 | End: 2023-12-27 | Stop reason: HOSPADM

## 2023-12-27 RX ORDER — LORAZEPAM 0.5 MG/1
0.5 TABLET ORAL 3 TIMES DAILY PRN
Status: DISCONTINUED | OUTPATIENT
Start: 2023-12-27 | End: 2023-12-27 | Stop reason: HOSPADM

## 2023-12-27 RX ORDER — OXYCODONE HYDROCHLORIDE 5 MG/1
10 TABLET ORAL EVERY 4 HOURS PRN
Status: DISCONTINUED | OUTPATIENT
Start: 2023-12-27 | End: 2023-12-27 | Stop reason: HOSPADM

## 2023-12-27 RX ORDER — TIZANIDINE 4 MG/1
4 TABLET ORAL 2 TIMES DAILY
Status: DISCONTINUED | OUTPATIENT
Start: 2023-12-27 | End: 2023-12-27

## 2023-12-27 RX ORDER — HEPARIN 100 UNIT/ML
SYRINGE INTRAVENOUS
Status: DISCONTINUED
Start: 2023-12-27 | End: 2023-12-27 | Stop reason: HOSPADM

## 2023-12-27 RX ORDER — SUCRALFATE 1 G/1
1 TABLET ORAL EVERY MORNING
Status: DISCONTINUED | OUTPATIENT
Start: 2023-12-27 | End: 2023-12-27 | Stop reason: HOSPADM

## 2023-12-27 RX ORDER — FENTANYL 12.5 UG/1
1 PATCH TRANSDERMAL
Status: DISCONTINUED | OUTPATIENT
Start: 2023-12-27 | End: 2023-12-27 | Stop reason: HOSPADM

## 2023-12-27 RX ORDER — PANTOPRAZOLE SODIUM 40 MG/1
40 TABLET, DELAYED RELEASE ORAL 2 TIMES DAILY
Status: DISCONTINUED | OUTPATIENT
Start: 2023-12-27 | End: 2023-12-27 | Stop reason: HOSPADM

## 2023-12-27 RX ORDER — CHOLECALCIFEROL (VITAMIN D3) 25 MCG
4000 TABLET ORAL EVERY MORNING
Status: DISCONTINUED | OUTPATIENT
Start: 2023-12-27 | End: 2023-12-27 | Stop reason: HOSPADM

## 2023-12-27 RX ORDER — SODIUM CHLORIDE 0.9 % (FLUSH) 0.9 %
10 SYRINGE (ML) INJECTION AS NEEDED
Status: DISCONTINUED | OUTPATIENT
Start: 2023-12-27 | End: 2023-12-27 | Stop reason: HOSPADM

## 2023-12-27 RX ORDER — METOPROLOL SUCCINATE 25 MG/1
25 TABLET, EXTENDED RELEASE ORAL DAILY
Status: DISCONTINUED | OUTPATIENT
Start: 2023-12-27 | End: 2023-12-27 | Stop reason: HOSPADM

## 2023-12-27 RX ORDER — SODIUM CHLORIDE 0.9 % (FLUSH) 0.9 %
10 SYRINGE (ML) INJECTION EVERY 12 HOURS
Status: DISCONTINUED | OUTPATIENT
Start: 2023-12-27 | End: 2023-12-27 | Stop reason: HOSPADM

## 2023-12-27 RX ADMIN — Medication 10 ML: at 08:56

## 2023-12-27 RX ADMIN — POTASSIUM CHLORIDE 20 MEQ: 1500 TABLET, EXTENDED RELEASE ORAL at 08:22

## 2023-12-27 RX ADMIN — PANTOPRAZOLE SODIUM 40 MG: 40 TABLET, DELAYED RELEASE ORAL at 08:22

## 2023-12-27 RX ADMIN — TIZANIDINE 4 MG: 4 TABLET ORAL at 08:22

## 2023-12-27 RX ADMIN — FENTANYL 1 PATCH: 12 PATCH, EXTENDED RELEASE TRANSDERMAL at 08:55

## 2023-12-27 RX ADMIN — PANTOPRAZOLE SODIUM 40 MG: 40 TABLET, DELAYED RELEASE ORAL at 00:50

## 2023-12-27 RX ADMIN — SUCRALFATE 1 G: 1 TABLET ORAL at 08:22

## 2023-12-27 RX ADMIN — METOPROLOL SUCCINATE 25 MG: 25 TABLET, EXTENDED RELEASE ORAL at 08:22

## 2023-12-27 RX ADMIN — Medication 500 UNITS: at 16:51

## 2023-12-27 RX ADMIN — OXYCODONE HYDROCHLORIDE 10 MG: 5 TABLET ORAL at 00:50

## 2023-12-27 RX ADMIN — APIXABAN 5 MG: 5 TABLET, FILM COATED ORAL at 08:22

## 2023-12-27 RX ADMIN — POTASSIUM CHLORIDE 10 MEQ: 750 TABLET, EXTENDED RELEASE ORAL at 08:25

## 2023-12-27 RX ADMIN — APIXABAN 5 MG: 5 TABLET, FILM COATED ORAL at 00:50

## 2023-12-27 RX ADMIN — Medication 4000 UNITS: at 08:22

## 2023-12-27 RX ADMIN — Medication 1 TABLET: at 08:22

## 2023-12-27 SDOH — SOCIAL STABILITY: SOCIAL INSECURITY: ARE THERE ANY APPARENT SIGNS OF INJURIES/BEHAVIORS THAT COULD BE RELATED TO ABUSE/NEGLECT?: NO

## 2023-12-27 SDOH — SOCIAL STABILITY: SOCIAL INSECURITY: DO YOU FEEL UNSAFE GOING BACK TO THE PLACE WHERE YOU ARE LIVING?: NO

## 2023-12-27 SDOH — SOCIAL STABILITY: SOCIAL INSECURITY: DOES ANYONE TRY TO KEEP YOU FROM HAVING/CONTACTING OTHER FRIENDS OR DOING THINGS OUTSIDE YOUR HOME?: NO

## 2023-12-27 SDOH — SOCIAL STABILITY: SOCIAL INSECURITY: HAVE YOU HAD THOUGHTS OF HARMING ANYONE ELSE?: NO

## 2023-12-27 SDOH — SOCIAL STABILITY: SOCIAL INSECURITY: ABUSE: ADULT

## 2023-12-27 SDOH — SOCIAL STABILITY: SOCIAL INSECURITY: HAS ANYONE EVER THREATENED TO HURT YOUR FAMILY OR YOUR PETS?: NO

## 2023-12-27 SDOH — SOCIAL STABILITY: SOCIAL INSECURITY: DO YOU FEEL ANYONE HAS EXPLOITED OR TAKEN ADVANTAGE OF YOU FINANCIALLY OR OF YOUR PERSONAL PROPERTY?: NO

## 2023-12-27 SDOH — SOCIAL STABILITY: SOCIAL INSECURITY: ARE YOU OR HAVE YOU BEEN THREATENED OR ABUSED PHYSICALLY, EMOTIONALLY, OR SEXUALLY BY ANYONE?: NO

## 2023-12-27 SDOH — SOCIAL STABILITY: SOCIAL INSECURITY: WERE YOU ABLE TO COMPLETE ALL THE BEHAVIORAL HEALTH SCREENINGS?: YES

## 2023-12-27 ASSESSMENT — PATIENT HEALTH QUESTIONNAIRE - PHQ9
2. FEELING DOWN, DEPRESSED OR HOPELESS: NOT AT ALL
SUM OF ALL RESPONSES TO PHQ9 QUESTIONS 1 & 2: 0
1. LITTLE INTEREST OR PLEASURE IN DOING THINGS: NOT AT ALL

## 2023-12-27 ASSESSMENT — ACTIVITIES OF DAILY LIVING (ADL)
PATIENT'S MEMORY ADEQUATE TO SAFELY COMPLETE DAILY ACTIVITIES?: YES
HEARING - RIGHT EAR: FUNCTIONAL
TOILETING: INDEPENDENT
JUDGMENT_ADEQUATE_SAFELY_COMPLETE_DAILY_ACTIVITIES: YES
ADEQUATE_TO_COMPLETE_ADL: YES
LACK_OF_TRANSPORTATION: NO
BATHING: INDEPENDENT
DRESSING YOURSELF: INDEPENDENT
GROOMING: INDEPENDENT
FEEDING YOURSELF: INDEPENDENT
LACK_OF_TRANSPORTATION: NO
HEARING - LEFT EAR: FUNCTIONAL
WALKS IN HOME: INDEPENDENT

## 2023-12-27 ASSESSMENT — COGNITIVE AND FUNCTIONAL STATUS - GENERAL
DAILY ACTIVITIY SCORE: 24
MOBILITY SCORE: 24
DAILY ACTIVITIY SCORE: 24
MOBILITY SCORE: 24
PATIENT BASELINE BEDBOUND: NO

## 2023-12-27 ASSESSMENT — LIFESTYLE VARIABLES
HOW OFTEN DO YOU HAVE 6 OR MORE DRINKS ON ONE OCCASION: NEVER
HOW MANY STANDARD DRINKS CONTAINING ALCOHOL DO YOU HAVE ON A TYPICAL DAY: PATIENT DOES NOT DRINK
HOW OFTEN DO YOU HAVE A DRINK CONTAINING ALCOHOL: NEVER
AUDIT-C TOTAL SCORE: 0
SKIP TO QUESTIONS 9-10: 1
AUDIT-C TOTAL SCORE: 0

## 2023-12-27 ASSESSMENT — PAIN SCALES - GENERAL: PAINLEVEL_OUTOF10: 2

## 2023-12-27 NOTE — DISCHARGE SUMMARY
Discharge Diagnosis  Near syncope    Issues Requiring Follow-Up  Chest palpitations/pain     Discharge Meds     Your medication list        CHANGE how you take these medications        Instructions Last Dose Given Next Dose Due   fentaNYL 12 mcg/hr patch  Commonly known as: Duragesic  What changed: additional instructions      Place 1 patch over 72 hours on the skin every 3rd day.       LORazepam 0.5 mg tablet  Commonly known as: Ativan  What changed: additional instructions      Take 1 tablet (0.5 mg) by mouth 3 times a day as needed for anxiety.       oxyCODONE 10 mg immediate release tablet  Commonly known as: Roxicodone  What changed: additional instructions      Take 1 tablet (10 mg) by mouth every 4 hours if needed for severe pain (7 - 10).       potassium chloride CR 10 mEq ER tablet  Commonly known as: Klor-Con  What changed: when to take this      TAKE 1 TABLET BY MOUTH ONCE DAILY       tiZANidine 2 mg capsule  Commonly known as: Zanaflex  What changed:   when to take this  reasons to take this      Take 2 capsules (4 mg) by mouth 2 times a day for 10 days.              CONTINUE taking these medications        Instructions Last Dose Given Next Dose Due   cholecalciferol 50 MCG (2000 UT) tablet  Commonly known as: Vitamin D-3           Deep Sea Nasal 0.65 % nasal spray  Generic drug: sodium chloride      Administer 1 spray into each nostril if needed for congestion (or nasal dryness/ bleeding).       Eliquis 5 mg tablet  Generic drug: apixaban      TAKE 1 TABLET BY MOUTH TWO TIMES A DAY       gabapentin 300 mg capsule  Commonly known as: Neurontin      Take 1 capsule (300 mg) by mouth 2 times a day.       lactulose 20 gram/30 mL oral solution      Take 30 mL (20 g) by mouth 4 times a day as needed (for constipation, if no relief from colace/ senna/ milk of magnesia).       medical cannabis           metoprolol succinate XL 25 mg 24 hr tablet  Commonly known as: Toprol-XL      Take 1 tablet (25 mg) by mouth  once daily. Do not crush or chew.       multivitamin with minerals tablet           pantoprazole 40 mg EC tablet  Commonly known as: ProtoNix           sucralfate 1 gram tablet  Commonly known as: Carafate                    Test Results Pending At Discharge  Pending Labs       Order Current Status    Staphylococcus Aureus/MRSA Colonization, Culture In process            Hospital Course  Massimo Garcia is a 63 y.o. male with PMH of PPM (placed in November), esophageal cancer, PE, and portal vein thrombosis (on Eliquis) who presented to the hospital for near syncopal episode with chest pain. On admission, labs were all unremarkable, CT chest angio showed pulmonary nodules and metastatic disease but no PE. Cardiology was consulted for pacemaker device interrogation which did not show any significant events. Orthostats were also done and was negative. Patient symptoms have resolved since admission and states he is feeling well now. He denies any other symptoms currently like CP, sob, cough, n/v, f/c, headaches, or dizziness. He is stable for discharge at this time. Pt was reassured that his symptoms likely were not cardiogenic in nature and may be related to his anxiety. Plan discussed with patient and he is agreeable. He is made aware to return to the ED with any new or worsening symptoms.     Pertinent Physical Exam At Time of Discharge  Physical Exam    Constitutional: Appears stated age. In NAD.   HEENT: NC/AT, EOMI, mild scleral icterus, moist mucous membranes  CV: RRR, No M/R/G  PULM: CTAB, no coughing or wheezing  ABDOMEN: Soft, ND. No TTP.   SKIN: Normal Color, Warm, Dry, No Rashes   EXTREMITIES: Non-Tender, Full ROM, No Pedal Edema  NEURO: A&O x 4, nonfocal neurological exam.  PSYCH: Normal Mood & Behavior     Outpatient Follow-Up  Future Appointments   Date Time Provider Department Center   1/2/2024  9:30 AM Gloria Chand PA-C [id number] [facility]   1/2/2024  9:30 AM INF 19 GEAUGA [facility]   1/3/2024  2:10 PM  Tim Payton MD [id number] [facility]   1/5/2024 10:00 AM Sugar Hernandez, APRN-CNP [id number] None   1/8/2024  1:00 PM Chidi Mays MD DOPLenny [facility]   1/15/2024  9:30 AM Gloria Chand PA-C [id number] [facility]   1/15/2024 10:00 AM INF 04 GEAUGA [facility]   1/29/2024  8:30 AM Tomasa Blake MD [email or web address] [facility]   1/29/2024  9:00 AM INF 02 GEAUGA [facility]   2/12/2024  9:00 AM Tomasa Blake MD [email or web address] [facility]   2/12/2024  9:30 AM INF 19 GEAUGA [facility]   2/26/2024  9:00 AM Tomasa Blake MD [email or web address] [facility]   2/26/2024  9:30 AM INF 19 GEAUGA [facility]   3/5/2024 10:40 AM [id number]

## 2023-12-27 NOTE — HOSPITAL COURSE
Massimo Garcia is a 63 y.o. male with PMH of PPM (placed in November), esophageal cancer, PE, and portal vein thrombosis (on Eliquis) who presented to the hospital for near syncopal episode with chest pain. On admission, labs were all unremarkable, CT chest angio showed pulmonary nodules and metastatic disease but no PE. Cardiology was consulted for pacemaker device interrogation which did not show any significant events. Orthostats were also done and was negative. Patient symptoms have resolved since admission and states he is feeling well now. He denies any other symptoms currently like CP, sob, cough, n/v, f/c, headaches, or dizziness. He is stable for discharge at this time. Pt was reassured that his symptoms likely were not cardiogenic in nature and may be related to his anxiety. Plan discussed with patient and he is agreeable. He is made aware to return to the ED with any new or worsening symptoms.

## 2023-12-27 NOTE — H&P
Patient: Massimo Garcia Age: 63 y.o.   Gender: male Room[facility]bed: [facility]     Attending: Serge Carty MD  Code Status:  Prior    Chief Complaint   Patient: Massimo Garcia is a 63 y.o. male who presented to the hospital for near syncopal episode with chest pain    HPI   Massimo Garcia is a 63 y.o. male with PMH of PPM (placed in November), esophageal cancer, PE, and portal vein thrombosis (on Eliquis) who presented to the hospital for near syncopal episode with chest pain.  Stated he had several episodes like this before his pacemaker was placed in November but since then he has only had minor episodes that have been related to anxiety and generally resolve with ativan, although he has never had chest pain with these episodes.  He stated that it started today about 4 pm after he was urinating he was walking around and felt his heart was fluttering.  He felt hot and started sweating. His hands felt cool.  He states he had some tunneling of his vision.  States that these symptoms lasted until he had been in the ED for 3 hours then they resolved.    His chest pain is located mid sternum, he denies radiating pain.  He took 0.5 mg of ativan x3 without relief.  He had no change in symptoms with position or movement of his arms.  His symptoms were not reproduced on palpation.  Did say he was SOB during this episode. Negative for headache, cough, abdominal pain, nausea/vomiting, constipation, diarrhea, or dysuria.      A 12 point ROS was performed and is negative unless otherwise stated in the ROS      ED Course   Vitals - /79   Pulse 81   Temp 36.9 °C (98.4 °F) (Skin)   Resp 11   Wt 70.3 kg (155 lb)   SpO2 94%     Labs:   Results from last 72 hours   Lab Units 12/26/23  1813   SODIUM mmol/L 139   POTASSIUM mmol/L 3.3*   CHLORIDE mmol/L 102   CO2 mmol/L 25   BUN mg/dL 16   CREATININE mg/dL 0.88   GLUCOSE mg/dL 109*   CALCIUM mg/dL 9.7   ANION GAP mmol/L 15   EGFR mL/min/1.73m*2 >90      Results from last 72 hours   Lab Units  "12/26/23  1813   WBC AUTO x10*3/uL 8.5   HEMOGLOBIN g/dL 11.8*   HEMATOCRIT % 34.6*   PLATELETS AUTO x10*3/uL 125*   NEUTROS PCT AUTO % 77.6   LYMPHS PCT AUTO % 10.9   MONOS PCT AUTO % 10.2   EOS PCT AUTO % 0.6      Lab Results   Component Value Date    CALCIUM 9.7 12/26/2023    PHOS 4.7 11/13/2023      No results found for: \"CRP\"   [unfilled]     Micro/ID:   No results found for the last 90 days.                   No lab exists for component: \"AGALPCRNB\"   .ID  Lab Results   Component Value Date    BLOODCULT  05/22/2023     No Growth at 1 days~No Growth at 2 days~No Growth at 3 days~NO GROWTH at 4 days - FINAL REPORT       Imaging:   No orders to display     Encounter Date: 12/22/23   ECG 12 lead   Result Value    Ventricular Rate 77    Atrial Rate 77    MD Interval 154    QRS Duration 158    QT Interval 444    QTC Calculation(Bazett) 502    P Axis 46    R Axis 102    T Axis 62    QRS Count 13    Q Onset 214    P Onset 137    P Offset 185    T Offset 436    QTC Fredericia 482    Narrative    Sinus rhythm with occasional Premature ventricular complexes  Right bundle branch block  Abnormal ECG  When compared with ECG of 22-DEC-2023 11:15, (unconfirmed)  Premature ventricular complexes are now Present     Transthoracic Echo (TTE) Limited    Result Date: 11/10/2023   [facility], [address] [address]               Tel [phone] and Fax [phone] TRANSTHORACIC ECHOCARDIOGRAM REPORT  Patient Name:      GETACHEW Lira Physician:    Mahamed Key MD Study Date:        11/10/2023           Ordering Provider:    Mahamed KEY MRN/PID:           [id number]             Fellow: Accession#:        [id number]         Nurse: Date of Birth/Age: 1960 / 62      Sonographer:          Merced collins               "                        [facility] Gender:            M                    Additional Staff: Height:            170.18 cm            Admit Date:           11/5/2023 Weight:            80.74 kg             Admission Status:     Inpatient -                                                               Routine BSA:               1.92 m2              Encounter#:           [id number]                                         Department Location:  [facility] Blood Pressure: 125 /68 mmHg Study Type:    TRANSTHORACIC ECHO (TTE) LIMITED Diagnosis/ICD: Other pericardial effusion (noninflammatory)-I31.39 Indication:    Pericardial effusion CPT Code:      Echo Limited-24883  Study Detail: The following Echo studies were performed: 2D and M-Mode.  PHYSICIAN INTERPRETATION: Left Ventricle: The left ventricular systolic function is normal. The left ventricular cavity size was not assessed. Left ventricular diastolic filling was not assessed. Left Atrium: The left atrium was not assessed. Right Ventricle: The right ventricle was not assessed. Right ventricular systolic function not assessed. A device is visualized in the right ventricle. Right Atrium: The right atrium was not assessed. There is a device visualized in the right atrium. Aortic Valve: The aortic valve was not assessed. Aortic valve regurgitation was not assessed. Mitral Valve: The mitral valve was not assessed. Mitral valve regurgitation was not assessed. Tricuspid Valve: The tricuspid valve was not assessed. Tricuspid regurgitation was not assessed. Pulmonic Valve: The pulmonic valve was not assessed. Pulmonic valve regurgitation was not assessed. Pericardium: There is no pericardial effusion noted. Aorta: The aortic root was not assessed. Systemic Veins: The inferior vena cava appears to be of normal size. There is IVC inspiratory collapse greater than 50%.  CONCLUSIONS:  1. Left ventricular systolic function is normal.  2. There is no pericardial effusion noted.  QUANTITATIVE DATA SUMMARY: TRICUSPID VALVE/RVSP:                   Normal Ranges: IVC Diam: 1.60 cm  Mahamed Hunter MD Electronically signed on 11/10/2023 at 2:31:02 PM  ** Final (Updated) **     Transthoracic Echo (TTE) Complete    Result Date: 11/7/2023   [facility], [address] [place] [address]               Tel [phone] and Fax [phone] TRANSTHORACIC ECHOCARDIOGRAM REPORT  Patient Name:      GETACHEW ANANDKHRIS Lira Physician:    Mahamed Hunter MD Study Date:        11/7/2023            Ordering Provider:    [id number] DEVON CHEUNG MRN/PID:           [id number]             Fellow: Accession#:        [id number]         Nurse: Date of Birth/Age: 1960 / 62      Sonographer:          Ceci Hui RDCS                    years Gender:            M                    Additional Staff: Height:            170.18 cm            Admit Date:           11/5/2023 Weight:            69.85 kg             Admission Status:     Inpatient -                                                               Routine BSA:               1.81 m2              Encounter#:           [id number]                                         Department Location:  [facility] Non                                                               Invasive Blood Pressure: 154 /77 mmHg Study Type:    TRANSTHORACIC ECHO (TTE) COMPLETE Diagnosis/ICD: Dizziness and giddiness-R42 Indication:    Dizziness CPT Code:      Echo Complete w Full Doppler-80459 Patient History: Pertinent History: Cancer and PE. Chemotherapy. Study Detail: The following Echo studies were performed: 2D, M-Mode, Doppler and               color flow. Technically challenging study due to prominent lung               artifact. The patient was awake.  PHYSICIAN INTERPRETATION: Left Ventricle: The left ventricular systolic  function is normal, with an estimated ejection fraction of 55-60%. The left ventricular cavity size is normal. Spectral Doppler shows an impaired relaxation pattern of left ventricular diastolic filling. Left Atrium: The left atrium is normal in size. Right Ventricle: The right ventricle is normal in size. There is normal right ventricular global systolic function. Right Atrium: The right atrium is normal in size. Aortic Valve: The aortic valve was not well visualized. There is no evidence of aortic valve stenosis. There is trivial aortic valve regurgitation. The peak instantaneous gradient of the aortic valve is 6.1 mmHg. The mean gradient of the aortic valve is 4.0 mmHg. Mitral Valve: The mitral valve is mildly thickened. There is mild mitral valve regurgitation. Tricuspid Valve: The tricuspid valve is structurally normal. There is trace tricuspid regurgitation. The right ventricular systolic pressure is unable to be estimated. Pulmonic Valve: The pulmonic valve is not well visualized. There is physiologic pulmonic valve regurgitation. Pericardium: There is no pericardial effusion noted. Aorta: The aortic root is normal. Systemic Veins: The inferior vena cava appears to be of normal size. There is IVC inspiratory collapse greater than 50%. In comparison to the previous echocardiogram(s): Compared with study from 8/31/2023, no significant change.  CONCLUSIONS:  1. Left ventricular systolic function is normal with a 55-60% estimated ejection fraction.  2. Spectral Doppler shows an impaired relaxation pattern of left ventricular diastolic filling. QUANTITATIVE DATA SUMMARY: 2D MEASUREMENTS:                          Normal Ranges: Ao Root d:     3.70 cm   (2.0-3.7cm) IVSd:          1.03 cm   (0.6-1.1cm) LVPWd:         0.98 cm   (0.6-1.1cm) LVIDd:         4.52 cm   (3.9-5.9cm) LVIDs:         3.16 cm LV Mass Index: 86.0 g/m2 LV % FS        30.1 % LA VOLUME:                               Normal Ranges: LA Vol A4C:         54.1 ml    (22+/-6mL/m2) LA Vol A2C:        53.9 ml LA Vol BP:         54.4 ml LA Vol Index A4C:  29.9ml/m2 LA Vol Index A2C:  29.8 ml/m2 LA Vol Index BP:   30.1 ml/m2 LA Area A4C:       19.3 cm2 LA Area A2C:       19.4 cm2 LA Major Axis A4C: 5.8 cm LA Major Axis A2C: 5.9 cm LA Volume Index:   27.6 ml/m2 LA Vol A4C:        48.0 ml LA Vol A2C:        51.0 ml RA VOLUME BY A/L METHOD:                       Normal Ranges: RA Area A4C: 16.0 cm2 M-MODE MEASUREMENTS:                  Normal Ranges: Ao Root: 4.00 cm (2.0-3.7cm) LAs:     3.00 cm (2.7-4.0cm) AORTA MEASUREMENTS:                      Normal Ranges: Ao Sinus, d: 3.70 cm (2.1-3.5cm) LV SYSTOLIC FUNCTION BY 2D PLANIMETRY (MOD):                     Normal Ranges: EF-A4C View: 56.1 % (>=55%) EF-A2C View: 56.1 % EF-Biplane:  56.1 % LV DIASTOLIC FUNCTION:                               Normal Ranges: MV Peak E:        0.54 m/s    (0.7-1.2 m/s) MV Peak A:        0.78 m/s    (0.42-0.7 m/s) E/A Ratio:        0.70        (1.0-2.2) MV e'             0.10 m/s    (>8.0) MV lateral e'     0.10 m/s MV medial e'      0.08 m/s MV A Dur:         121.00 msec E/e' Ratio:       5.44        (<8.0) PulmV Sys Angelito:    57.80 cm/s PulmV Gillette Angelito:   40.10 cm/s PulmV S/D Angelito:    1.40 PulmV A Revs Angelito: 32.00 cm/s PulmV A Revs Dur: 135.00 msec MITRAL VALVE:                Normal Ranges: MV DT: 92 msec (150-240msec) AORTIC VALVE:                                   Normal Ranges: AoV Vmax:                1.23 m/s (<=1.7m/s) AoV Peak P.1 mmHg (<20mmHg) AoV Mean P.0 mmHg (1.7-11.5mmHg) LVOT Max Angelito:            1.00 m/s (<=1.1m/s) AoV VTI:                 22.50 cm (18-25cm) LVOT VTI:                17.80 cm LVOT Diameter:           2.10 cm  (1.8-2.4cm) AoV Area, VTI:           2.74 cm2 (2.5-5.5cm2) AoV Area,Vmax:           2.82 cm2 (2.5-4.5cm2) AoV Dimensionless Index: 0.79  RIGHT VENTRICLE: RV Basal 3.71 cm RV Mid   2.74 cm RV Major 8.1 cm TAPSE:   22.0 mm RV s'     0.11 m/s TRICUSPID VALVE/RVSP:                             Normal Ranges: Peak TR Velocity: 1.97 m/s RV Syst Pressure: 18.5 mmHg (< 30mmHg) IVC Diam:         1.56 cm PULMONIC VALVE:                      Normal Ranges: PV Max Angelito: 0.8 m/s  (0.6-0.9m/s) PV Max P.7 mmHg Pulmonary Veins: PulmV A Revs Dur: 135.00 msec PulmV A Revs Angelito: 32.00 cm/s PulmV Gillette Angelito:   40.10 cm/s PulmV S/D Angelito:    1.40 PulmV Sys Angelito:    57.80 cm/s  [id number] Lior Hunter MD Electronically signed on 2023 at 12:11:27 PM  ** Final **    CT angio chest for pulmonary embolism    Result Date: 2023  Interpreted By:  Brittney Orellana, STUDY: CT ANGIO CHEST FOR PULMONARY EMBOLISM;  2023 7:40 pm   INDICATION: Signs/Symptoms:history of esophageal CA, prior VTE, active cp.   COMPARISON: CT PE dated 2023; CT of the chest dated 2023;   ACCESSION NUMBER(S): [id number]   ORDERING CLINICIAN: AREN MAIN   TECHNIQUE: Helical data acquisition of the chest was obtained after intravenous administration of 75 mLof Omnipaque 350, as per PE protocol. Images were reformatted in coronal and sagittal planes. Axial and coronal maximum intensity projection (MIP) images were created and reviewed.   FINDINGS: POTENTIAL LIMITATIONS OF THE STUDY: Exam is degraded by motion, somewhat limiting evaluation of the smaller vessels in the lower lobes.   HEART AND VESSELS: Within limitations of the study, no filling defect is identified in the main pulmonary artery or its larger branches down to the segmental level. Subsegmental vessels in the lower lobes are suboptimally evaluated. Main pulmonary artery and its branches are normal in caliber.   The thoracic aorta normal in course and caliber.Mild vascular calcifications are similar in appearance to prior exam.Although, the study is not tailored for evaluation of aorta, there is no definite evidence of acute aortic pathology. No coronary artery calcifications are seen. Please note, the study is  not optimized for evaluation of coronary arteries.   The cardiac chambers are not enlarged.There are no findings to suggest right heart strain. There is a left subclavian approach dual chamber cardiac pacemaker/ICD seen in placewith leads terminating within right atrium and apical right ventricle. There is no pericardial effusion seen.   MEDIASTINUM AND ALESSANDRO, LOWER NECK AND AXILLA: The visualized thyroid gland is within normal limits. Several mildly prominent mediastinal lymph nodes are unchanged in appearance to prior exam. No new mediastinal lymphadenopathy is evident. Mild wall thickening in the distal esophagus with metastatic lower paraspinal lymph nodes appear similar to prior exam.   LUNGS AND AIRWAYS: The trachea and central airways are patent. No endobronchial lesion is seen.   Subtle subpleural arterial and bud opacities in the upper lungs bilaterally are similar to prior study.   Several solid nodules in the lingula and left lower lobe (series 405, image 205) are similar in appearance to prior exam. No new consolidation or pleural effusion is evident.   UPPER ABDOMEN: Several low-density metastatic liver lesions are similar in appearance to prior exam.   No new abnormalities identified in the subdiaphragmatic visualized abdomen.   CHEST WALL AND OSSEOUS STRUCTURES: Chest wall is within normal limits. No acute osseous pathology.There are no suspicious osseous lesions.Multilevel degenerative changes of the thoracic spine is similar in appearance to prior exam.       1. No evidence of acute pulmonary embolism although evaluation of the smaller subsegmental vessels is somewhat limited by motion. No evidence of new consolidation or pleural effusion. 2. Several solid pulmonary nodules in the lower lobes bilaterally, likely representing metastatic disease, are unchanged in appearance to prior exam. 3. Mild wall thickening of the distal esophagus with several enlarged paraspinal lymph nodes is similar to prior  study. 4. Several low-density metastatic liver lesions are similar in appearance to prior exam on 12/22/2023.     MACRO: None   Signed by: Brittney Orellana 12/26/2023 8:14 PM Dictation workstation:   [id number]    XR chest 2 views    Result Date: 12/26/2023  STUDY: Chest Radiographs;  12/26/2023 at 5:22 PM. INDICATION: Chest pain. COMPARISON: XR chest 12/22/2023, 11/15/2023, 11/13/2023, 11/12/2023. ACCESSION NUMBER(S): [id number] ORDERING CLINICIAN: KERMIT CHEN TECHNIQUE:  Frontal and lateral chest (three images). FINDINGS: CARDIOMEDIASTINAL SILHOUETTE: Cardiomediastinal silhouette is normal in size and configuration. Left subclavian pacer device and right internal jugular [id number] remains stable.  LUNGS: Lungs are clear.  Lung volumes are somewhat increased.  ABDOMEN: No remarkable upper abdominal findings.  BONES: No acute osseous changes.  Bilateral shoulder hemiarthroplasties demonstrated.    No acute cardiopulmonary disease. Signed by James Puente DO      Interventions:  Medications   potassium chloride CR (Klor-Con M20) ER tablet 20 mEq (20 mEq oral Given 12/26/23 2122)   nitroglycerin (Nitrostat) SL tablet 0.4 mg (0.4 mg sublingual Given 12/26/23 1759)   iohexol (OMNIPaque) 350 mg iodine/mL solution 75 mL (75 mL intravenous Given 12/26/23 1934)     Past Medical History     Past Medical History:   Diagnosis Date    Essential (primary) hypertension 12/21/2013    Hypertension    Personal history of other diseases of the circulatory system     History of right bundle branch block (RBBB)    Pneumothorax 12/04/2023        Surgical History     Past Surgical History:   Procedure Laterality Date    CARDIAC ELECTROPHYSIOLOGY PROCEDURE N/A 11/7/2023    Procedure: Temporary Pacemaker Insertion;  Surgeon: Alexis Shook MD;  Location: [facility] Cardiac Cath Lab;  Service: Cardiovascular;  Laterality: N/A;    CARDIAC ELECTROPHYSIOLOGY PROCEDURE Left 11/9/2023    Procedure: PPM IMPLANT DUAL;  Surgeon:  Elias Connolly MD;  Location: [facility] Cardiac Cath Lab;  Service: Electrophysiology;  Laterality: Left;    TOTAL KNEE ARTHROPLASTY  09/30/2016    Total Knee Replacement Left    TOTAL KNEE ARTHROPLASTY  09/30/2016    Total Knee Replacement Right       Family History   No family history on file.    Social History     Social History     Socioeconomic History    Marital status:      Spouse name: Not on file    Number of children: Not on file    Years of education: Not on file    Highest education level: Not on file   Occupational History    Not on file   Tobacco Use    Smoking status: Some Days     Types: Cigarettes, Cigars     Passive exposure: Never    Smokeless tobacco: Never   Vaping Use    Vaping Use: Some days    Substances: THC, CBD   Substance and Sexual Activity    Alcohol use: Not Currently    Drug use: Yes     Types: Marijuana     Comment: medical marjuana card    Sexual activity: Not on file   Other Topics Concern    Not on file   Social History Narrative    Not on file     Social Determinants of Health     Financial Resource Strain: Low Risk  (11/6/2023)    Overall Financial Resource Strain (CARDIA)     Difficulty of Paying Living Expenses: Not hard at all   Food Insecurity: Not on file   Transportation Needs: No Transportation Needs (11/6/2023)    PRAPARE - Transportation     Lack of Transportation (Medical): No     Lack of Transportation (Non-Medical): No   Physical Activity: Not on file   Stress: Not on file   Social Connections: Not on file   Intimate Partner Violence: Not on file   Housing Stability: Low Risk  (11/6/2023)    Housing Stability Vital Sign     Unable to Pay for Housing in the Last Year: No     Number of Places Lived in the Last Year: 1     Unstable Housing in the Last Year: No       Tobacco Use: High Risk (12/22/2023)    Patient History     Smoking Tobacco Use: Some Days     Smokeless Tobacco Use: Never     Passive Exposure: Never        Social History     Substance and Sexual  "Activity   Alcohol Use Not Currently        Allergies     Allergies   Allergen Reactions    Bee Venom Protein (Honey Bee) Anaphylaxis        Objective    Physical Exam   Constitutional: Appears stated age. In NAD.   HEENT: NC/AT, EOMI, mild scleral icterus, moist mucous membranes  CV: RRR, No M/R/G  PULM: CTAB, no coughing or wheezing  ABDOMEN: Soft, ND. No TTP. + BSx4.  SKIN: Normal Color, Warm, Dry, No Rashes   EXTREMITIES: Non-Tender, Full ROM, No Pedal Edema  NEURO: A&O x 4, nonfocal neurological exam.  PSYCH: Normal Mood & Behavior     Visit Vitals  /79   Pulse 81   Temp 36.9 °C (98.4 °F) (Skin)   Resp 11   Ht 1.803 m (5' 11\")   Wt 70.3 kg (155 lb)   SpO2 94%   BMI 21.62 kg/m²   Smoking Status Some Days   BSA 1.88 m²        No intake or output data in the 24 hours ending 12/27/23 0009          Meds    Scheduled medications  potassium chloride CR, 20 mEq, oral, Daily        Continuous medications       PRN medications       Assessment and Plan    Massimo Garcia is a 63 y.o. male with PMH of PPM (placed in November), esophageal cancer, PE, and portal vein thrombosis (on Eliquis) who presented to the hospital for near syncopal episode with chest pain. Treating with metoprolol.    Acute medical issues  #near syncopal episode with chest pain  #possible ? prinzmetal angina, drug induced, muscular pain, anxiety  -Pacemaker placed in November  -Will schedule tizanidine, not taking often with increase in muscle pain  -Will schedule metoprolol, was not taking as prescribed due to pharmacy issues  -Pace maker interrogation  -consider cardiology referral  -Telemetry    #Esophogeal cancer  -Fentanyl patch, oxycodone for pain  -Managed by Dr. Blake  -Treated with 5-Fluorouracil and oxaliplatin        Chronic medical issues  #Anxiety  -0.5 Ativan    # vitamin/electrolyte deficiency  -daily multivitamin, potassium chloride    #GERD  -Protonix, sucralfate      Fluids: PO  Electrolytes: Replete as needed   Nutrition: " regular  GI Ppx: Protonix  DVT Ppx: Eliquis    Oxygenation: None  Antibiotics: None    Dispo: Massimo Garcia is a 63 y.o. male with PMH of PPM (placed in November), esophageal cancer, PE, and portal vein thrombosis (on Eliquis) who presented to the hospital for near syncopal episode with chest pain. Treating with metoprolol. ELOS <48hrs    Artie Marks DO

## 2023-12-27 NOTE — CONSULTS
Consults  History Of Present Illness:    Massimo Garcia is a 63 y.o. male presenting with ***.     Last Recorded Vitals:  Vitals:    12/27/23 0345 12/27/23 1218 12/27/23 1219 12/27/23 1220   BP: 110/67 115/67 111/70 112/65   Patient Position:  Lying Sitting Standing   Pulse: 74 65 66 68   Resp: 18 18 18 18   Temp: 36.4 °C (97.5 °F) 36.4 °C (97.5 °F)     TempSrc:       SpO2: 99% 99%     Weight:       Height:           Last Labs:  CBC - 12/27/2023:  7:21 AM  5.0 11.4 98    33.7      CMP - 12/27/2023:  7:21 AM  9.3 6.9 38 --- 0.8   4.7 4.3 26 148      PTT - 12/26/2023:  6:13 PM  1.5   17.0 48     Troponin I, High Sensitivity   Date/Time Value Ref Range Status   12/26/2023 06:56 PM 7 0 - 20 ng/L Final   12/26/2023 06:13 PM 6 0 - 20 ng/L Final   12/22/2023 12:29 PM 4 0 - 20 ng/L Final     BNP   Date/Time Value Ref Range Status   12/27/2023 07:21 AM 27 0 - 99 pg/mL Final   01/31/2023 12:55 PM 37 0 - 99 pg/mL Final     Comment:     .  <100 pg/mL - Heart failure unlikely  100-299 pg/mL - Intermediate probability of acute heart  .               failure exacerbation. Correlate with clinical  .               context and patient history.    >=300 pg/mL - Heart Failure likely. Correlate with clinical  .               context and patient history.  BNP testing is performed using different testing   methodology at [facility] than at other   [facility]. Direct result comparisons should   only be made within the same method.     01/08/2023 01:00 PM 32 0 - 99 pg/mL Final     Comment:     .  <100 pg/mL - Heart failure unlikely  100-299 pg/mL - Intermediate probability of acute heart  .               failure exacerbation. Correlate with clinical  .               context and patient history.    >=300 pg/mL - Heart Failure likely. Correlate with clinical  .               context and patient history.  BNP testing is performed using different testing   methodology at [facility] than at other   [facility].  Direct result comparisons should   only be made within the same method.       Hemoglobin A1C   Date/Time Value Ref Range Status   11/05/2023 02:56 PM 4.9 see below % Final     LDL Calculated   Date/Time Value Ref Range Status   12/27/2023 07:21 AM 81 <=99 mg/dL Final     Comment:                                 Near   Borderline      AGE      Desirable  Optimal    High     High     Very High     0-19 Y     0 - 109     ---    110-129   >/= 130     ----    20-24 Y     0 - 119     ---    120-159   >/= 160     ----      >24 Y     0 -  99   100-129  130-159   160-189     >/=190       VLDL   Date/Time Value Ref Range Status   12/27/2023 07:21 AM 14 0 - 40 mg/dL Final      Last I/O:  No intake/output data recorded.    Past Cardiology Tests (Last 3 Years):  EKG:  Electrocardiogram, 12-lead PRN ACS symptoms 12/27/2023      ECG 12 lead 12/26/2023 (Preliminary)      ECG 12 lead 12/26/2023 (Preliminary)      ECG 12 lead 12/22/2023 (Preliminary)      ECG 12 lead 12/22/2023 (Preliminary)      ECG 12 lead 12/13/2023      ECG 12 lead 12/13/2023      Electrocardiogram, 12-lead PRN ACS symptoms 11/21/2023      ECG 12 Lead       ECG 12 Lead       ECG 12 lead 11/07/2023      ECG 12 lead 11/07/2023      ECG 12 Lead     Echo:  Transthoracic Echo (TTE) Limited 11/10/2023      Transthoracic Echo (TTE) Complete 11/07/2023    Ejection Fractions:  EF   Date/Time Value Ref Range Status   11/07/2023 11:31 AM 56       Cath:  No results found for this or any previous visit from the past 1095 days.    Stress Test:  No results found for this or any previous visit from the past 1095 days.    Cardiac Imaging:  No results found for this or any previous visit from the past 1095 days.      Past Medical History:  He has a past medical history of Essential (primary) hypertension (12/21/2013), Personal history of other diseases of the circulatory system, and Pneumothorax (12/04/2023).    Past Surgical History:  He has a past surgical history that includes  Total knee arthroplasty (09/30/2016); Total knee arthroplasty (09/30/2016); Cardiac electrophysiology procedure (N/A, 11/7/2023); and Cardiac electrophysiology procedure (Left, 11/9/2023).      Social History:  He reports that he has been smoking cigarettes and cigars. He has never been exposed to tobacco smoke. He has never used smokeless tobacco. He reports that he does not currently use alcohol. He reports current drug use. Drug: Marijuana.    Family History:  No family history on file.     Allergies:  Bee venom protein (honey bee)    Inpatient Medications:  Scheduled medications   Medication Dose Route Frequency    apixaban  5 mg oral BID    cholecalciferol  4,000 Units oral q AM    fentaNYL  1 patch transdermal q72h    metoprolol succinate XL  25 mg oral Daily    multivitamin with minerals  1 tablet oral Daily    pantoprazole  40 mg oral BID    potassium chloride CR  20 mEq oral Daily    potassium chloride CR  40 mEq oral Once    potassium chloride CR  10 mEq oral Daily    sodium chloride 0.9%  10 mL intra-catheter q12h    sucralfate  1 g oral q AM     PRN medications   Medication    alteplase    LORazepam    oxyCODONE    sodium chloride 0.9%     Continuous Medications   Medication Dose Last Rate     Outpatient Medications:  Current Outpatient Medications   Medication Instructions    apixaban (Eliquis) 5 mg tablet TAKE 1 TABLET BY MOUTH TWO TIMES A DAY    cholecalciferol (VITAMIN D-3) 100 mcg, oral, Every morning    fentaNYL (Duragesic) 12 mcg/hr patch 1 patch, transdermal, Every 72 hours    gabapentin (NEURONTIN) 300 mg, oral, 2 times daily    lactulose 20 g, oral, 4 times daily PRN    LORazepam (ATIVAN) 0.5 mg, oral, 3 times daily PRN    medical cannabis 1 each, oral, Last OARRS fills:<BR>12/9/23 Oint Top Admin-16.6-30 Mjm 590.00/ 2 days<BR>12/9/23 Oint Top Admin-16.6-30 Mjm 590.00/ 2 days<BR>12/3/23 Tin Oral Admin-16.6-30 Mjm 590.00/ 2 days    metoprolol succinate XL (TOPROL-XL) 25 mg, oral, Daily, Do not  crush or chew.    multivitamin with minerals (multivit-min-iron fum-folic ac) tablet 2 tablets, oral, Daily    oxyCODONE (ROXICODONE) 10 mg, oral, Every 4 hours PRN    pantoprazole (PROTONIX) 40 mg, oral, 2 times daily    potassium chloride CR (Klor-Con) 10 mEq ER tablet TAKE 1 TABLET BY MOUTH ONCE DAILY    sodium chloride (Ocean) 0.65 % nasal spray 1 spray, Each Nostril, As needed    sucralfate (CARAFATE) 1 g, oral, Every morning    tiZANidine (ZANAFLEX) 4 mg, oral, 2 times daily       Physical Exam:  ***     Assessment/Plan   ***  Implantable Port 01/24/23 Right Chest Single lumen port (Active)   Line Necessity Intravenous fluid therapy 12/27/23 0800   Site Assessment Clean;Dry 12/27/23 0800   Dressing Status Clean;Dry 12/27/23 0800   Number of days: 337       Code Status:  Full Code    I spent *** minutes in the professional and overall care of this patient.        Kiesha Stephenson, APRN-CNP

## 2023-12-27 NOTE — PROGRESS NOTES
Pharmacy Medication History Review    Massimo Garcia is a 63 y.o. male admitted for Near syncope. Pharmacy reviewed the patient's [id number] medications and allergies for accuracy.    The list below reflectives the updated PTA list. Please review each medication in order reconciliation for additional clarification and justification.  (Not in a hospital admission)       The list below reflectives the updated allergy list. Please review each documented allergy for additional clarification and justification.  Allergies  Reviewed by Brent M Politzer, EMT on 12/26/2023        Severity Reactions Comments    Bee Venom Protein (honey Bee) High Anaphylaxis             Below are additional concerns with the patient's PTA list.      Norma Fontanez CPhT

## 2023-12-27 NOTE — CARE PLAN
The patient's goals for the shift include      The clinical goals for the shift include patient will have no chest pain this shift

## 2023-12-27 NOTE — PROGRESS NOTES
12/27/23 0915   Discharge Planning   Living Arrangements Spouse/significant other;Children  (Home with spouse and daughter)   Support Systems Spouse/significant other   Assistance Needed X3, independent in ADLs, ambulates without assistive devices, drives, No O2 baseline   Type of Residence Private residence   Number of Stairs to Enter Residence 4   Number of Stairs Within Residence 14  (1 Flight of stairs to basement)   Do you have animals or pets at home? Yes   Type of Animals or Pets 2 dogs, 1 cat   Who is requesting discharge planning? Provider   Home or Post Acute Services None   Patient expects to be discharged to: Home with family, no needs-denied need for HHC   Does the patient need discharge transport arranged? No   Financial Resource Strain   How hard is it for you to pay for the very basics like food, housing, medical care, and heating? Not hard   Housing Stability   In the last 12 months, was there a time when you were not able to pay the mortgage or rent on time? N   In the last 12 months, how many places have you lived? 1   In the last 12 months, was there a time when you did not have a steady place to sleep or slept in a shelter (including now)? N   Transportation Needs   In the past 12 months, has lack of transportation kept you from medical appointments or from getting medications? no   In the past 12 months, has lack of transportation kept you from meetings, work, or from getting things needed for daily living? No

## 2023-12-27 NOTE — SIGNIFICANT EVENT
Subjective:   Pt seen and examined at bedside. He reports feeling well this morning and denies any chest pain at this time. He denies any new or worsening symptoms like SOB, cough, n/v, f/c, headaches, or abdominal pain.     PE:  Constitutional: Appears stated age. In NAD.   HEENT: NC/AT, EOMI, mild scleral icterus, moist mucous membranes  CV: RRR, No M/R/G  PULM: CTAB, no coughing or wheezing  ABDOMEN: Soft, ND. No TTP.   SKIN: Normal Color, Warm, Dry, No Rashes   EXTREMITIES: Non-Tender, Full ROM, No Pedal Edema  NEURO: A&O x 4, nonfocal neurological exam.  PSYCH: Normal Mood & Behavior    A/P:  Massimo Garcia is a 63 y.o. male with PMH of PPM (placed in November), esophageal cancer, PE, and portal vein thrombosis (on Eliquis) who presented to the hospital for near syncopal episode with chest pain. Cardiology consulted for device interrogation, on metoprolol.      Acute medical issues  #near syncopal episode with chest pain  #possible ? prinzmetal angina, drug induced, muscular pain, anxiety  -Pacemaker placed in November  -Cardiology consulted for pacemaker interrogation.   -Orthostats ordered  -will discontinue tizanidine.   -On metoprolol succinate 25 mg PO daily.   -Telemetry     #Esophogeal cancer  -Fentanyl patch, oxycodone for pain  -Managed by Dr. Blake  -Treated with 5-Fluorouracil and oxaliplatin     Chronic medical issues  #Anxiety  -0.5 Ativan     # vitamin/electrolyte deficiency  -daily multivitamin, potassium chloride     #GERD  -Protonix, sucralfate     Fluids: PO  Electrolytes: Replete as needed   Nutrition: regular  GI Ppx: Protonix  DVT Ppx: Eliquis  Oxygenation: None  Antibiotics: None     Dispo: Massimo Garcia is a 63 y.o. male with PMH of PPM (placed in November), esophageal cancer, PE, and portal vein thrombosis (on Eliquis) who presented to the hospital for near syncopal episode with chest pain. Cardiology consulted for device interrogation.

## 2023-12-28 ENCOUNTER — DOCUMENTATION (OUTPATIENT)
Dept: PRIMARY CARE | Facility: CLINIC | Age: 63
End: 2023-12-28
Payer: COMMERCIAL

## 2023-12-28 ENCOUNTER — HOSPITAL ENCOUNTER (OUTPATIENT)
Dept: CARDIOLOGY | Facility: CLINIC | Age: 63
Discharge: HOME | End: 2023-12-28
Payer: MEDICARE

## 2023-12-28 ENCOUNTER — PATIENT OUTREACH (OUTPATIENT)
Dept: PRIMARY CARE | Facility: CLINIC | Age: 63
End: 2023-12-28
Payer: COMMERCIAL

## 2023-12-28 DIAGNOSIS — C15.9 STAGE IV MALIGNANT NEOPLASM OF ESOPHAGUS (MULTI): Primary | ICD-10-CM

## 2023-12-28 DIAGNOSIS — I44.2 CHB (COMPLETE HEART BLOCK) (MULTI): ICD-10-CM

## 2023-12-28 NOTE — SIGNIFICANT EVENT
Follow Up Phone Call    Outgoing phone call    Spoke to: Massimo Garcia Relationship:self   Phone number: [phone]      Outcome: contacted patient/ family   Chief Complaint   Patient presents with    Chest Pain     Onset 2 hrs ago - 1500 -.  Mid sternal 6/10.  Head feels flushed          Diagnosis:Not applicable    Follow Up Phone Call    Outgoing phone call    Spoke to: Massimo Garcia Relationship:self   Phone number: [phone]      Outcome: contacted patient/ family   Chief Complaint   Patient presents with    Chest Pain     Onset 2 hrs ago - 1500 -.  Mid sternal 6/10.  Head feels flushed          Diagnosis:Not applicable    States he is feeling better. No further questions or concerns.

## 2023-12-28 NOTE — PROGRESS NOTES
Discharge Facility: [facility]  Discharge Diagnosis: Near syncope  Admission Date: 12/27/2023  Discharge Date: 12/27/2023    PCP Appointment Date: Tasked to office  Specialist Appointment Date: Cardiology 12/27/2023. Hem/Onc 01/02/2024,  Pulmonology 01/03/2024, Palliative Med 01/05/2024  Hospital Encounter and Summary: Linked    See discharge assessment below for further details    Engagement  Call Start Time: 1340 (12/28/2023  1:43 PM)    Medications  Medications reviewed with patient/caregiver?: Yes (12/28/2023  1:43 PM)  Is the patient having any side effects they believe may be caused by any medication additions or changes?: No (12/28/2023  1:43 PM)  Does the patient have all medications ordered at discharge?: Not applicable (12/28/2023  1:43 PM)  Prescription Comments: No new scripts given at discharge (12/28/2023  1:43 PM)  Is the patient taking all medications as directed (includes completed medication regime)?: Yes (12/28/2023  1:43 PM)  Medication Comments: Patient and wife deny any issues affording medication (12/28/2023  1:43 PM)    Appointments  Does the patient have a primary care provider?: Yes (12/28/2023  1:43 PM)  Care Management Interventions: -- (Tasked to office) (12/28/2023  1:43 PM)    Self Management  What is the home health agency?: N/A (12/28/2023  1:43 PM)  What Durable Medical Equipment (DME) was ordered?: N/A (12/28/2023  1:43 PM)    Patient Teaching  Does the patient have access to their discharge instructions?: Yes (12/28/2023  1:43 PM)  Care Management Interventions: Reviewed instructions with patient (12/28/2023  1:43 PM)  What is the patient's perception of their health status since discharge?: Improving (12/28/2023  1:43 PM)  Is the patient/caregiver able to teach back the hierarchy of who to call/visit for symptoms/problems? PCP, Specialist, Home Health nurse, Urgent Care, ED, 911: Yes (12/28/2023  1:43 PM)  Patient/Caregiver Education Comments: CM spoke to patient and wife today  via phone. They states that he is doing okay at home. No new scripts were given at discharge and they are aware of the dosage changes on existing medication. PCP follow up has been tasked to the office. They were thanful for this call. (12/28/2023  1:43 PM)

## 2023-12-29 LAB — STAPHYLOCOCCUS SPEC CULT: ABNORMAL

## 2023-12-29 NOTE — TELEPHONE ENCOUNTER
----- Message from Jeni San sent at 12/28/2023  1:49 PM EST -----  Regarding: TCM Appointment    Can you please contact pt to schedule hosp  follow up within 14 days of discharge.  This patient was discharged from: [facility]  Discharge diagnosis:  Near syncope   Date of discharge: 12/27/2023        Thank you

## 2023-12-29 NOTE — TELEPHONE ENCOUNTER
Called patient to see if he wanted to do a hospital follow up with the office and he stated that he has appointments with other doctors to try to figure out what is going on.  If he feels that he needs to come in to the office he stated that he will contact us.

## 2024-01-01 ENCOUNTER — DOCUMENTATION (OUTPATIENT)
Dept: HEMATOLOGY/ONCOLOGY | Facility: CLINIC | Age: 64
End: 2024-01-01
Payer: MEDICARE

## 2024-01-01 ENCOUNTER — APPOINTMENT (OUTPATIENT)
Dept: RADIATION ONCOLOGY | Facility: HOSPITAL | Age: 64
End: 2024-01-01
Payer: MEDICARE

## 2024-01-01 ENCOUNTER — APPOINTMENT (OUTPATIENT)
Dept: CARDIOLOGY | Facility: HOSPITAL | Age: 64
DRG: 808 | End: 2024-01-01
Payer: MEDICARE

## 2024-01-01 ENCOUNTER — APPOINTMENT (OUTPATIENT)
Dept: CARDIOLOGY | Facility: CLINIC | Age: 64
End: 2024-01-01
Payer: MEDICARE

## 2024-01-01 ENCOUNTER — NURSING HOME VISIT (OUTPATIENT)
Dept: POST ACUTE CARE | Facility: EXTERNAL LOCATION | Age: 64
End: 2024-01-01
Payer: MEDICARE

## 2024-01-01 ENCOUNTER — APPOINTMENT (OUTPATIENT)
Dept: RADIOLOGY | Facility: HOSPITAL | Age: 64
DRG: 808 | End: 2024-01-01
Payer: MEDICARE

## 2024-01-01 ENCOUNTER — LAB REQUISITION (OUTPATIENT)
Dept: LAB | Facility: HOSPITAL | Age: 64
End: 2024-01-01

## 2024-01-01 ENCOUNTER — HOSPITAL ENCOUNTER (INPATIENT)
Facility: HOSPITAL | Age: 64
LOS: 3 days | End: 2024-07-07
Attending: EMERGENCY MEDICINE | Admitting: INTERNAL MEDICINE
Payer: MEDICARE

## 2024-01-01 ENCOUNTER — DOCUMENTATION (OUTPATIENT)
Dept: RADIATION ONCOLOGY | Facility: HOSPITAL | Age: 64
End: 2024-01-01

## 2024-01-01 ENCOUNTER — HOSPITAL ENCOUNTER (INPATIENT)
Facility: HOSPITAL | Age: 64
End: 2024-01-01
Attending: INTERNAL MEDICINE | Admitting: INTERNAL MEDICINE
Payer: MEDICARE

## 2024-01-01 VITALS
BODY MASS INDEX: 23.86 KG/M2 | TEMPERATURE: 98.1 F | HEIGHT: 68 IN | DIASTOLIC BLOOD PRESSURE: 44 MMHG | RESPIRATION RATE: 12 BRPM | HEART RATE: 66 BPM | OXYGEN SATURATION: 13 % | SYSTOLIC BLOOD PRESSURE: 73 MMHG | WEIGHT: 157.41 LBS

## 2024-01-01 DIAGNOSIS — G89.3 CANCER ASSOCIATED PAIN: ICD-10-CM

## 2024-01-01 DIAGNOSIS — I48.91 ATRIAL FIBRILLATION, UNSPECIFIED TYPE (MULTI): ICD-10-CM

## 2024-01-01 DIAGNOSIS — I81 PORTAL VEIN THROMBOSIS: ICD-10-CM

## 2024-01-01 DIAGNOSIS — B37.9 YEAST INFECTION: ICD-10-CM

## 2024-01-01 DIAGNOSIS — K52.9 NONINFECTIVE GASTROENTERITIS AND COLITIS, UNSPECIFIED: ICD-10-CM

## 2024-01-01 DIAGNOSIS — Z95.0 PRESENCE OF CARDIAC PACEMAKER: ICD-10-CM

## 2024-01-01 DIAGNOSIS — C15.9 STAGE IV MALIGNANT NEOPLASM OF ESOPHAGUS (MULTI): ICD-10-CM

## 2024-01-01 DIAGNOSIS — F41.9 ANXIETY: ICD-10-CM

## 2024-01-01 DIAGNOSIS — I10 ESSENTIAL (PRIMARY) HYPERTENSION: ICD-10-CM

## 2024-01-01 DIAGNOSIS — C78.1 SECONDARY MALIGNANT NEOPLASM OF MEDIASTINUM (MULTI): ICD-10-CM

## 2024-01-01 DIAGNOSIS — E46 PROTEIN-CALORIE MALNUTRITION, UNSPECIFIED SEVERITY (MULTI): ICD-10-CM

## 2024-01-01 DIAGNOSIS — I44.2 CHB (COMPLETE HEART BLOCK) (MULTI): ICD-10-CM

## 2024-01-01 DIAGNOSIS — C78.7 SECONDARY MALIGNANT NEOPLASM OF LIVER AND INTRAHEPATIC BILE DUCT (MULTI): ICD-10-CM

## 2024-01-01 DIAGNOSIS — I26.99 PE (PULMONARY THROMBOEMBOLISM) (MULTI): ICD-10-CM

## 2024-01-01 DIAGNOSIS — E87.1 HYPO-OSMOLALITY AND HYPONATREMIA: ICD-10-CM

## 2024-01-01 DIAGNOSIS — F41.8 MIXED ANXIETY DEPRESSIVE DISORDER: ICD-10-CM

## 2024-01-01 DIAGNOSIS — R06.02 SHORTNESS OF BREATH: Primary | ICD-10-CM

## 2024-01-01 DIAGNOSIS — Z85.01 PERSONAL HISTORY OF MALIGNANT NEOPLASM OF ESOPHAGUS: ICD-10-CM

## 2024-01-01 DIAGNOSIS — I45.9 HEART BLOCK: ICD-10-CM

## 2024-01-01 DIAGNOSIS — C15.9 MALIGNANT NEOPLASM OF ESOPHAGUS, UNSPECIFIED (MULTI): ICD-10-CM

## 2024-01-01 DIAGNOSIS — M48.05 SPINAL STENOSIS OF THORACOLUMBAR REGION: ICD-10-CM

## 2024-01-01 DIAGNOSIS — M62.81 MUSCLE WEAKNESS (GENERALIZED): Primary | ICD-10-CM

## 2024-01-01 DIAGNOSIS — S21.209S: ICD-10-CM

## 2024-01-01 DIAGNOSIS — C15.9 PRIMARY MALIGNANT NEOPLASM OF ESOPHAGUS (MULTI): ICD-10-CM

## 2024-01-01 DIAGNOSIS — K58.9 IRRITABLE BOWEL SYNDROME, UNSPECIFIED TYPE: ICD-10-CM

## 2024-01-01 DIAGNOSIS — E56.9 VITAMIN DEFICIENCY: ICD-10-CM

## 2024-01-01 LAB
ABO GROUP (TYPE) IN BLOOD: NORMAL
ALBUMIN SERPL BCP-MCNC: 2.2 G/DL (ref 3.4–5)
ALBUMIN SERPL BCP-MCNC: 2.5 G/DL (ref 3.4–5)
ALBUMIN SERPL BCP-MCNC: 2.7 G/DL (ref 3.4–5)
ALP SERPL-CCNC: 196 U/L (ref 33–136)
ALP SERPL-CCNC: 250 U/L (ref 33–136)
ALP SERPL-CCNC: 298 U/L (ref 33–136)
ALT SERPL W P-5'-P-CCNC: 29 U/L (ref 10–52)
ALT SERPL W P-5'-P-CCNC: 35 U/L (ref 10–52)
ALT SERPL W P-5'-P-CCNC: 48 U/L (ref 10–52)
ANION GAP BLDV CALCULATED.4IONS-SCNC: 16 MMOL/L (ref 10–25)
ANION GAP SERPL CALC-SCNC: 10 MMOL/L (ref 10–20)
ANION GAP SERPL CALC-SCNC: 12 MMOL/L (ref 10–20)
ANION GAP SERPL CALC-SCNC: 15 MMOL/L (ref 10–20)
ANION GAP SERPL CALC-SCNC: 16 MMOL/L (ref 10–20)
ANTIBODY SCREEN: NORMAL
APPEARANCE UR: CLEAR
AST SERPL W P-5'-P-CCNC: 47 U/L (ref 9–39)
AST SERPL W P-5'-P-CCNC: 63 U/L (ref 9–39)
AST SERPL W P-5'-P-CCNC: 66 U/L (ref 9–39)
BACTERIA BLD CULT: NORMAL
BASE EXCESS BLDV CALC-SCNC: -1.5 MMOL/L (ref -2–3)
BASOPHILS # BLD AUTO: 0 X10*3/UL (ref 0–0.1)
BASOPHILS # BLD AUTO: 0.03 X10*3/UL (ref 0–0.1)
BASOPHILS NFR BLD AUTO: 0 %
BASOPHILS NFR BLD AUTO: 0.3 %
BILIRUB SERPL-MCNC: 1 MG/DL (ref 0–1.2)
BILIRUB SERPL-MCNC: 1 MG/DL (ref 0–1.2)
BILIRUB SERPL-MCNC: 1.3 MG/DL (ref 0–1.2)
BILIRUB UR STRIP.AUTO-MCNC: NEGATIVE MG/DL
BLOOD EXPIRATION DATE: NORMAL
BNP SERPL-MCNC: 39 PG/ML (ref 0–99)
BNP SERPL-MCNC: 46 PG/ML (ref 0–99)
BODY SURFACE AREA: 1.88 M2
BODY TEMPERATURE: ABNORMAL
BUN SERPL-MCNC: 53 MG/DL (ref 6–23)
BUN SERPL-MCNC: 53 MG/DL (ref 6–23)
BUN SERPL-MCNC: 56 MG/DL (ref 6–23)
BUN SERPL-MCNC: 66 MG/DL (ref 6–23)
CA-I BLDV-SCNC: 1.25 MMOL/L (ref 1.1–1.33)
CALCIUM SERPL-MCNC: 8.2 MG/DL (ref 8.6–10.3)
CALCIUM SERPL-MCNC: 8.2 MG/DL (ref 8.6–10.3)
CALCIUM SERPL-MCNC: 8.3 MG/DL (ref 8.6–10.3)
CALCIUM SERPL-MCNC: 8.3 MG/DL (ref 8.6–10.3)
CARDIAC TROPONIN I PNL SERPL HS: 42 NG/L (ref 0–20)
CARDIAC TROPONIN I PNL SERPL HS: 45 NG/L (ref 0–20)
CARDIAC TROPONIN I PNL SERPL HS: 51 NG/L (ref 0–20)
CHLORIDE BLDV-SCNC: 101 MMOL/L (ref 98–107)
CHLORIDE SERPL-SCNC: 101 MMOL/L (ref 98–107)
CHLORIDE SERPL-SCNC: 102 MMOL/L (ref 98–107)
CHLORIDE SERPL-SCNC: 103 MMOL/L (ref 98–107)
CHLORIDE SERPL-SCNC: 99 MMOL/L (ref 98–107)
CO2 SERPL-SCNC: 20 MMOL/L (ref 21–32)
CO2 SERPL-SCNC: 23 MMOL/L (ref 21–32)
CO2 SERPL-SCNC: 26 MMOL/L (ref 21–32)
CO2 SERPL-SCNC: 27 MMOL/L (ref 21–32)
COLOR UR: YELLOW
CREAT SERPL-MCNC: 0.53 MG/DL (ref 0.5–1.3)
CREAT SERPL-MCNC: 0.57 MG/DL (ref 0.5–1.3)
CREAT SERPL-MCNC: 0.59 MG/DL (ref 0.5–1.3)
CREAT SERPL-MCNC: 0.65 MG/DL (ref 0.5–1.3)
DISPENSE STATUS: NORMAL
EGFRCR SERPLBLD CKD-EPI 2021: >90 ML/MIN/1.73M*2
EOSINOPHIL # BLD AUTO: 0 X10*3/UL (ref 0–0.7)
EOSINOPHIL # BLD AUTO: 0.01 X10*3/UL (ref 0–0.7)
EOSINOPHIL NFR BLD AUTO: 0 %
EOSINOPHIL NFR BLD AUTO: 0.8 %
ERYTHROCYTE [DISTWIDTH] IN BLOOD BY AUTOMATED COUNT: 17.2 % (ref 11.5–14.5)
ERYTHROCYTE [DISTWIDTH] IN BLOOD BY AUTOMATED COUNT: 17.4 % (ref 11.5–14.5)
ERYTHROCYTE [DISTWIDTH] IN BLOOD BY AUTOMATED COUNT: 17.9 % (ref 11.5–14.5)
ERYTHROCYTE [DISTWIDTH] IN BLOOD BY AUTOMATED COUNT: 18 % (ref 11.5–14.5)
GLUCOSE BLDV-MCNC: 170 MG/DL (ref 74–99)
GLUCOSE SERPL-MCNC: 120 MG/DL (ref 74–99)
GLUCOSE SERPL-MCNC: 148 MG/DL (ref 74–99)
GLUCOSE SERPL-MCNC: 168 MG/DL (ref 74–99)
GLUCOSE SERPL-MCNC: 189 MG/DL (ref 74–99)
GLUCOSE UR STRIP.AUTO-MCNC: NORMAL MG/DL
HCO3 BLDV-SCNC: 20.4 MMOL/L (ref 22–26)
HCT VFR BLD AUTO: 23.2 % (ref 41–52)
HCT VFR BLD AUTO: 28.1 % (ref 41–52)
HCT VFR BLD AUTO: 31.2 % (ref 41–52)
HCT VFR BLD AUTO: 33.6 % (ref 41–52)
HCT VFR BLD EST: 29 % (ref 41–52)
HGB BLD-MCNC: 10.7 G/DL (ref 13.5–17.5)
HGB BLD-MCNC: 7.3 G/DL (ref 13.5–17.5)
HGB BLD-MCNC: 9.1 G/DL (ref 13.5–17.5)
HGB BLD-MCNC: 9.8 G/DL (ref 13.5–17.5)
HGB BLDV-MCNC: 9.8 G/DL (ref 13.5–17.5)
HOLD SPECIMEN: NORMAL
IMM GRANULOCYTES # BLD AUTO: 0.01 X10*3/UL (ref 0–0.7)
IMM GRANULOCYTES # BLD AUTO: 0.1 X10*3/UL (ref 0–0.7)
IMM GRANULOCYTES NFR BLD AUTO: 0.8 % (ref 0–0.9)
IMM GRANULOCYTES NFR BLD AUTO: 1 % (ref 0–0.9)
INHALED O2 CONCENTRATION: 0 %
INR PPP: 1.8 (ref 0.9–1.1)
KETONES UR STRIP.AUTO-MCNC: NEGATIVE MG/DL
LACTATE BLDV-SCNC: 3.3 MMOL/L (ref 0.4–2)
LACTATE SERPL-SCNC: 2.1 MMOL/L (ref 0.4–2)
LACTATE SERPL-SCNC: 2.8 MMOL/L (ref 0.4–2)
LACTATE SERPL-SCNC: 3.3 MMOL/L (ref 0.4–2)
LACTATE SERPL-SCNC: 3.5 MMOL/L (ref 0.4–2)
LEUKOCYTE ESTERASE UR QL STRIP.AUTO: NEGATIVE
LYMPHOCYTES # BLD AUTO: 0.05 X10*3/UL (ref 1.2–4.8)
LYMPHOCYTES # BLD AUTO: 0.06 X10*3/UL (ref 1.2–4.8)
LYMPHOCYTES NFR BLD AUTO: 0.5 %
LYMPHOCYTES NFR BLD AUTO: 4.9 %
MAGNESIUM SERPL-MCNC: 1.85 MG/DL (ref 1.6–2.4)
MAGNESIUM SERPL-MCNC: 1.94 MG/DL (ref 1.6–2.4)
MCH RBC QN AUTO: 28.1 PG (ref 26–34)
MCH RBC QN AUTO: 28.2 PG (ref 26–34)
MCH RBC QN AUTO: 28.8 PG (ref 26–34)
MCH RBC QN AUTO: 28.9 PG (ref 26–34)
MCHC RBC AUTO-ENTMCNC: 31.4 G/DL (ref 32–36)
MCHC RBC AUTO-ENTMCNC: 31.5 G/DL (ref 32–36)
MCHC RBC AUTO-ENTMCNC: 31.8 G/DL (ref 32–36)
MCHC RBC AUTO-ENTMCNC: 32.4 G/DL (ref 32–36)
MCV RBC AUTO: 89 FL (ref 80–100)
MCV RBC AUTO: 89 FL (ref 80–100)
MCV RBC AUTO: 90 FL (ref 80–100)
MCV RBC AUTO: 90 FL (ref 80–100)
MONOCYTES # BLD AUTO: 0.01 X10*3/UL (ref 0.1–1)
MONOCYTES # BLD AUTO: 0.02 X10*3/UL (ref 0.1–1)
MONOCYTES NFR BLD AUTO: 0.2 %
MONOCYTES NFR BLD AUTO: 0.8 %
MUCOUS THREADS #/AREA URNS AUTO: NORMAL /LPF
NEUTROPHILS # BLD AUTO: 1.13 X10*3/UL (ref 1.2–7.7)
NEUTROPHILS # BLD AUTO: 9.8 X10*3/UL (ref 1.2–7.7)
NEUTROPHILS NFR BLD AUTO: 92.7 %
NEUTROPHILS NFR BLD AUTO: 98 %
NITRITE UR QL STRIP.AUTO: NEGATIVE
NRBC BLD-RTO: 0 /100 WBCS (ref 0–0)
OXYHGB MFR BLDV: 52 % (ref 45–75)
PCO2 BLDV: 25 MM HG (ref 41–51)
PH BLDV: 7.52 PH (ref 7.33–7.43)
PH UR STRIP.AUTO: 5.5 [PH]
PHOSPHATE SERPL-MCNC: 3.5 MG/DL (ref 2.5–4.9)
PLATELET # BLD AUTO: 13 X10*3/UL (ref 150–450)
PLATELET # BLD AUTO: 26 X10*3/UL (ref 150–450)
PLATELET # BLD AUTO: 36 X10*3/UL (ref 150–450)
PLATELET # BLD AUTO: 59 X10*3/UL (ref 150–450)
PO2 BLDV: 35 MM HG (ref 35–45)
POTASSIUM BLDV-SCNC: 4 MMOL/L (ref 3.5–5.3)
POTASSIUM SERPL-SCNC: 3.8 MMOL/L (ref 3.5–5.3)
POTASSIUM SERPL-SCNC: 3.9 MMOL/L (ref 3.5–5.3)
POTASSIUM SERPL-SCNC: 4.3 MMOL/L (ref 3.5–5.3)
POTASSIUM SERPL-SCNC: 4.6 MMOL/L (ref 3.5–5.3)
PRODUCT BLOOD TYPE: 5100
PRODUCT CODE: NORMAL
PROT SERPL-MCNC: 4.4 G/DL (ref 6.4–8.2)
PROT SERPL-MCNC: 4.6 G/DL (ref 6.4–8.2)
PROT SERPL-MCNC: 4.7 G/DL (ref 6.4–8.2)
PROT UR STRIP.AUTO-MCNC: ABNORMAL MG/DL
PROTHROMBIN TIME: 20 SECONDS (ref 9.8–12.8)
RBC # BLD AUTO: 2.6 X10*6/UL (ref 4.5–5.9)
RBC # BLD AUTO: 3.15 X10*6/UL (ref 4.5–5.9)
RBC # BLD AUTO: 3.48 X10*6/UL (ref 4.5–5.9)
RBC # BLD AUTO: 3.72 X10*6/UL (ref 4.5–5.9)
RBC # UR STRIP.AUTO: ABNORMAL /UL
RBC #/AREA URNS AUTO: NORMAL /HPF
RBC MORPH BLD: NORMAL
RH FACTOR (ANTIGEN D): NORMAL
SAO2 % BLDV: 54 % (ref 45–75)
SODIUM BLDV-SCNC: 133 MMOL/L (ref 136–145)
SODIUM SERPL-SCNC: 132 MMOL/L (ref 136–145)
SODIUM SERPL-SCNC: 134 MMOL/L (ref 136–145)
SODIUM SERPL-SCNC: 135 MMOL/L (ref 136–145)
SODIUM SERPL-SCNC: 136 MMOL/L (ref 136–145)
SP GR UR STRIP.AUTO: 1.03
STAPHYLOCOCCUS SPEC CULT: NORMAL
UNIT ABO: NORMAL
UNIT NUMBER: NORMAL
UNIT RH: NORMAL
UNIT VOLUME: 263
UROBILINOGEN UR STRIP.AUTO-MCNC: NORMAL MG/DL
WBC # BLD AUTO: 0.1 X10*3/UL (ref 4.4–11.3)
WBC # BLD AUTO: 1.2 X10*3/UL (ref 4.4–11.3)
WBC # BLD AUTO: 10 X10*3/UL (ref 4.4–11.3)
WBC # BLD AUTO: 3.2 X10*3/UL (ref 4.4–11.3)
WBC #/AREA URNS AUTO: NORMAL /HPF

## 2024-01-01 PROCEDURE — 87040 BLOOD CULTURE FOR BACTERIA: CPT | Mod: OUT,GEALAB | Performed by: NURSE PRACTITIONER

## 2024-01-01 PROCEDURE — 1200000002 HC GENERAL ROOM WITH TELEMETRY DAILY

## 2024-01-01 PROCEDURE — 93005 ELECTROCARDIOGRAM TRACING: CPT

## 2024-01-01 PROCEDURE — 83735 ASSAY OF MAGNESIUM: CPT

## 2024-01-01 PROCEDURE — 99231 SBSQ HOSP IP/OBS SF/LOW 25: CPT

## 2024-01-01 PROCEDURE — 84484 ASSAY OF TROPONIN QUANT: CPT

## 2024-01-01 PROCEDURE — 2500000004 HC RX 250 GENERAL PHARMACY W/ HCPCS (ALT 636 FOR OP/ED)

## 2024-01-01 PROCEDURE — 84484 ASSAY OF TROPONIN QUANT: CPT | Performed by: PHYSICIAN ASSISTANT

## 2024-01-01 PROCEDURE — 84132 ASSAY OF SERUM POTASSIUM: CPT | Performed by: PHYSICIAN ASSISTANT

## 2024-01-01 PROCEDURE — 71045 X-RAY EXAM CHEST 1 VIEW: CPT | Performed by: RADIOLOGY

## 2024-01-01 PROCEDURE — 1100000001 HC PRIVATE ROOM DAILY

## 2024-01-01 PROCEDURE — 86901 BLOOD TYPING SEROLOGIC RH(D): CPT

## 2024-01-01 PROCEDURE — 83880 ASSAY OF NATRIURETIC PEPTIDE: CPT | Performed by: PHYSICIAN ASSISTANT

## 2024-01-01 PROCEDURE — 99310 SBSQ NF CARE HIGH MDM 45: CPT | Performed by: NURSE PRACTITIONER

## 2024-01-01 PROCEDURE — 85027 COMPLETE CBC AUTOMATED: CPT

## 2024-01-01 PROCEDURE — 83605 ASSAY OF LACTIC ACID: CPT | Performed by: PHYSICIAN ASSISTANT

## 2024-01-01 PROCEDURE — 2500000004 HC RX 250 GENERAL PHARMACY W/ HCPCS (ALT 636 FOR OP/ED): Performed by: INTERNAL MEDICINE

## 2024-01-01 PROCEDURE — 82565 ASSAY OF CREATININE: CPT | Mod: OUT | Performed by: NURSE PRACTITIONER

## 2024-01-01 PROCEDURE — 70450 CT HEAD/BRAIN W/O DYE: CPT

## 2024-01-01 PROCEDURE — 99238 HOSP IP/OBS DSCHRG MGMT 30/<: CPT | Performed by: INTERNAL MEDICINE

## 2024-01-01 PROCEDURE — 2500000001 HC RX 250 WO HCPCS SELF ADMINISTERED DRUGS (ALT 637 FOR MEDICARE OP)

## 2024-01-01 PROCEDURE — 96374 THER/PROPH/DIAG INJ IV PUSH: CPT

## 2024-01-01 PROCEDURE — 99223 1ST HOSP IP/OBS HIGH 75: CPT

## 2024-01-01 PROCEDURE — 83735 ASSAY OF MAGNESIUM: CPT | Performed by: PHYSICIAN ASSISTANT

## 2024-01-01 PROCEDURE — 2500000005 HC RX 250 GENERAL PHARMACY W/O HCPCS: Performed by: EMERGENCY MEDICINE

## 2024-01-01 PROCEDURE — 85025 COMPLETE CBC W/AUTO DIFF WBC: CPT | Mod: OUT | Performed by: NURSE PRACTITIONER

## 2024-01-01 PROCEDURE — 85025 COMPLETE CBC W/AUTO DIFF WBC: CPT | Performed by: PHYSICIAN ASSISTANT

## 2024-01-01 PROCEDURE — 99305 1ST NF CARE MODERATE MDM 35: CPT | Performed by: FAMILY MEDICINE

## 2024-01-01 PROCEDURE — 2500000005 HC RX 250 GENERAL PHARMACY W/O HCPCS

## 2024-01-01 PROCEDURE — 85610 PROTHROMBIN TIME: CPT | Performed by: PHYSICIAN ASSISTANT

## 2024-01-01 PROCEDURE — 85027 COMPLETE CBC AUTOMATED: CPT | Mod: OUT | Performed by: EMERGENCY MEDICINE

## 2024-01-01 PROCEDURE — 87040 BLOOD CULTURE FOR BACTERIA: CPT | Mod: GEALAB | Performed by: INTERNAL MEDICINE

## 2024-01-01 PROCEDURE — 71045 X-RAY EXAM CHEST 1 VIEW: CPT

## 2024-01-01 PROCEDURE — 80053 COMPREHEN METABOLIC PANEL: CPT

## 2024-01-01 PROCEDURE — 83605 ASSAY OF LACTIC ACID: CPT | Mod: OUT | Performed by: EMERGENCY MEDICINE

## 2024-01-01 PROCEDURE — 81001 URINALYSIS AUTO W/SCOPE: CPT | Performed by: PHYSICIAN ASSISTANT

## 2024-01-01 PROCEDURE — 2500000004 HC RX 250 GENERAL PHARMACY W/ HCPCS (ALT 636 FOR OP/ED): Performed by: PHYSICIAN ASSISTANT

## 2024-01-01 PROCEDURE — 99291 CRITICAL CARE FIRST HOUR: CPT | Performed by: PHYSICIAN ASSISTANT

## 2024-01-01 PROCEDURE — 96375 TX/PRO/DX INJ NEW DRUG ADDON: CPT

## 2024-01-01 PROCEDURE — 87081 CULTURE SCREEN ONLY: CPT | Mod: GEALAB

## 2024-01-01 PROCEDURE — 2500000002 HC RX 250 W HCPCS SELF ADMINISTERED DRUGS (ALT 637 FOR MEDICARE OP, ALT 636 FOR OP/ED)

## 2024-01-01 PROCEDURE — 82330 ASSAY OF CALCIUM: CPT | Performed by: PHYSICIAN ASSISTANT

## 2024-01-01 PROCEDURE — 70450 CT HEAD/BRAIN W/O DYE: CPT | Performed by: RADIOLOGY

## 2024-01-01 PROCEDURE — P9073 PLATELETS PHERESIS PATH REDU: HCPCS

## 2024-01-01 PROCEDURE — 83605 ASSAY OF LACTIC ACID: CPT | Mod: OUT | Performed by: NURSE PRACTITIONER

## 2024-01-01 PROCEDURE — 82374 ASSAY BLOOD CARBON DIOXIDE: CPT | Mod: OUT | Performed by: EMERGENCY MEDICINE

## 2024-01-01 PROCEDURE — 84100 ASSAY OF PHOSPHORUS: CPT

## 2024-01-01 PROCEDURE — 2500000004 HC RX 250 GENERAL PHARMACY W/ HCPCS (ALT 636 FOR OP/ED): Performed by: EMERGENCY MEDICINE

## 2024-01-01 PROCEDURE — 36430 TRANSFUSION BLD/BLD COMPNT: CPT

## 2024-01-01 PROCEDURE — 83880 ASSAY OF NATRIURETIC PEPTIDE: CPT | Mod: OUT | Performed by: NURSE PRACTITIONER

## 2024-01-01 RX ORDER — ACETAMINOPHEN 325 MG/1
650 TABLET ORAL EVERY 4 HOURS PRN
COMMUNITY
End: 2024-01-01 | Stop reason: HOSPADM

## 2024-01-01 RX ORDER — HYDROMORPHONE HYDROCHLORIDE 2 MG/1
4 TABLET ORAL EVERY 4 HOURS PRN
Status: DISCONTINUED | OUTPATIENT
Start: 2024-01-01 | End: 2024-01-01

## 2024-01-01 RX ORDER — MORPHINE SULFATE 2 MG/ML
2 INJECTION, SOLUTION INTRAMUSCULAR; INTRAVENOUS
Status: DISCONTINUED | OUTPATIENT
Start: 2024-01-01 | End: 2024-01-01

## 2024-01-01 RX ORDER — AMOXICILLIN 250 MG
2 CAPSULE ORAL 2 TIMES DAILY
COMMUNITY
End: 2024-01-01 | Stop reason: HOSPADM

## 2024-01-01 RX ORDER — ACETAMINOPHEN 325 MG/1
650 TABLET ORAL EVERY 4 HOURS PRN
Status: DISCONTINUED | OUTPATIENT
Start: 2024-01-01 | End: 2024-01-01

## 2024-01-01 RX ORDER — LORAZEPAM 2 MG/ML
0.5 INJECTION INTRAMUSCULAR EVERY 4 HOURS PRN
Status: DISCONTINUED | OUTPATIENT
Start: 2024-01-01 | End: 2024-01-01 | Stop reason: HOSPADM

## 2024-01-01 RX ORDER — LORAZEPAM 0.5 MG/1
0.5 TABLET ORAL EVERY 8 HOURS PRN
Status: DISCONTINUED | OUTPATIENT
Start: 2024-01-01 | End: 2024-01-01

## 2024-01-01 RX ORDER — HYDROMORPHONE HYDROCHLORIDE 1 MG/ML
1 INJECTION, SOLUTION INTRAMUSCULAR; INTRAVENOUS; SUBCUTANEOUS ONCE
Status: COMPLETED | OUTPATIENT
Start: 2024-01-01 | End: 2024-01-01

## 2024-01-01 RX ORDER — DULOXETIN HYDROCHLORIDE 60 MG/1
60 CAPSULE, DELAYED RELEASE ORAL DAILY
Status: DISCONTINUED | OUTPATIENT
Start: 2024-01-01 | End: 2024-01-01

## 2024-01-01 RX ORDER — HALOPERIDOL 5 MG/ML
1 INJECTION INTRAMUSCULAR EVERY 4 HOURS PRN
Status: DISCONTINUED | OUTPATIENT
Start: 2024-01-01 | End: 2024-01-01 | Stop reason: HOSPADM

## 2024-01-01 RX ORDER — MORPHINE SULFATE 2 MG/ML
2 INJECTION, SOLUTION INTRAMUSCULAR; INTRAVENOUS EVERY 4 HOURS
Status: DISCONTINUED | OUTPATIENT
Start: 2024-01-01 | End: 2024-01-01

## 2024-01-01 RX ORDER — EAR PLUGS
1 EACH OTIC (EAR)
Status: DISCONTINUED | OUTPATIENT
Start: 2024-01-01 | End: 2024-01-01 | Stop reason: HOSPADM

## 2024-01-01 RX ORDER — DEXAMETHASONE 2 MG/1
2 TABLET ORAL DAILY
Status: DISCONTINUED | OUTPATIENT
Start: 2024-01-01 | End: 2024-01-01

## 2024-01-01 RX ORDER — DOCUSATE SODIUM 100 MG/1
100 CAPSULE, LIQUID FILLED ORAL 2 TIMES DAILY
Status: DISCONTINUED | OUTPATIENT
Start: 2024-01-01 | End: 2024-01-01

## 2024-01-01 RX ORDER — KETOROLAC TROMETHAMINE 15 MG/ML
15 INJECTION, SOLUTION INTRAMUSCULAR; INTRAVENOUS EVERY 6 HOURS PRN
Status: DISCONTINUED | OUTPATIENT
Start: 2024-01-01 | End: 2024-01-01 | Stop reason: HOSPADM

## 2024-01-01 RX ORDER — POTASSIUM CHLORIDE 750 MG/1
10 TABLET, FILM COATED, EXTENDED RELEASE ORAL DAILY
Status: DISCONTINUED | OUTPATIENT
Start: 2024-01-01 | End: 2024-01-01

## 2024-01-01 RX ORDER — HYDROXYZINE HYDROCHLORIDE 25 MG/1
25 TABLET, FILM COATED ORAL EVERY 6 HOURS PRN
COMMUNITY
End: 2024-01-01 | Stop reason: HOSPADM

## 2024-01-01 RX ORDER — LORAZEPAM 2 MG/ML
2 INJECTION INTRAMUSCULAR EVERY 10 MIN PRN
Status: DISCONTINUED | OUTPATIENT
Start: 2024-01-01 | End: 2024-01-01 | Stop reason: HOSPADM

## 2024-01-01 RX ORDER — FUROSEMIDE 10 MG/ML
20 INJECTION INTRAMUSCULAR; INTRAVENOUS ONCE
Status: COMPLETED | OUTPATIENT
Start: 2024-01-01 | End: 2024-01-01

## 2024-01-01 RX ORDER — MORPHINE SULFATE/0.9% NACL/PF 1 MG/ML
0-10 PLASTIC BAG, INJECTION (ML) INTRAVENOUS CONTINUOUS
Status: DISCONTINUED | OUTPATIENT
Start: 2024-01-01 | End: 2024-01-01 | Stop reason: HOSPADM

## 2024-01-01 RX ORDER — MORPHINE SULFATE 4 MG/ML
4 INJECTION INTRAVENOUS
Status: DISCONTINUED | OUTPATIENT
Start: 2024-01-01 | End: 2024-01-01 | Stop reason: HOSPADM

## 2024-01-01 RX ORDER — LORAZEPAM 2 MG/ML
0.5 INJECTION INTRAMUSCULAR ONCE
Status: COMPLETED | OUTPATIENT
Start: 2024-01-01 | End: 2024-01-01

## 2024-01-01 RX ORDER — PANTOPRAZOLE SODIUM 40 MG/1
40 TABLET, DELAYED RELEASE ORAL
Status: DISCONTINUED | OUTPATIENT
Start: 2024-01-01 | End: 2024-01-01

## 2024-01-01 RX ORDER — GLYCOPYRROLATE 0.2 MG/ML
0.2 INJECTION INTRAMUSCULAR; INTRAVENOUS EVERY 4 HOURS PRN
Status: DISCONTINUED | OUTPATIENT
Start: 2024-01-01 | End: 2024-01-01 | Stop reason: HOSPADM

## 2024-01-01 RX ORDER — LACTULOSE 10 G/15ML
20 SOLUTION ORAL 3 TIMES DAILY PRN
Status: DISCONTINUED | OUTPATIENT
Start: 2024-01-01 | End: 2024-01-01

## 2024-01-01 RX ORDER — DICYCLOMINE HYDROCHLORIDE 10 MG/1
10 CAPSULE ORAL
Status: DISCONTINUED | OUTPATIENT
Start: 2024-01-01 | End: 2024-01-01

## 2024-01-01 RX ORDER — MORPHINE SULFATE 2 MG/ML
2 INJECTION, SOLUTION INTRAMUSCULAR; INTRAVENOUS ONCE
Status: DISCONTINUED | OUTPATIENT
Start: 2024-01-01 | End: 2024-01-01

## 2024-01-01 RX ORDER — CHOLECALCIFEROL (VITAMIN D3) 25 MCG
4000 TABLET ORAL EVERY MORNING
Status: DISCONTINUED | OUTPATIENT
Start: 2024-01-01 | End: 2024-01-01

## 2024-01-01 RX ORDER — FENTANYL 100 UG/H
1 PATCH TRANSDERMAL
COMMUNITY
End: 2024-01-01 | Stop reason: HOSPADM

## 2024-01-01 RX ORDER — ADHESIVE BANDAGE
30 BANDAGE TOPICAL DAILY PRN
COMMUNITY
End: 2024-01-01 | Stop reason: HOSPADM

## 2024-01-01 RX ORDER — NALOXONE HYDROCHLORIDE 0.4 MG/ML
0.2 INJECTION, SOLUTION INTRAMUSCULAR; INTRAVENOUS; SUBCUTANEOUS EVERY 5 MIN PRN
Status: DISCONTINUED | OUTPATIENT
Start: 2024-01-01 | End: 2024-01-01

## 2024-01-01 RX ORDER — AMOXICILLIN 250 MG
1 CAPSULE ORAL 2 TIMES DAILY
Status: DISCONTINUED | OUTPATIENT
Start: 2024-01-01 | End: 2024-01-01

## 2024-01-01 RX ORDER — ONDANSETRON 4 MG/1
4 TABLET, FILM COATED ORAL EVERY 6 HOURS PRN
Status: DISCONTINUED | OUTPATIENT
Start: 2024-01-01 | End: 2024-01-01

## 2024-01-01 RX ORDER — FENTANYL 100 UG/H
1 PATCH TRANSDERMAL
Status: DISCONTINUED | OUTPATIENT
Start: 2024-01-01 | End: 2024-01-01 | Stop reason: HOSPADM

## 2024-01-01 RX ORDER — POLYETHYLENE GLYCOL 3350 17 G/17G
17 POWDER, FOR SOLUTION ORAL 2 TIMES DAILY
Status: DISCONTINUED | OUTPATIENT
Start: 2024-01-01 | End: 2024-01-01

## 2024-01-01 RX ORDER — CYCLOBENZAPRINE HCL 10 MG
10 TABLET ORAL 3 TIMES DAILY
Status: DISCONTINUED | OUTPATIENT
Start: 2024-01-01 | End: 2024-01-01

## 2024-01-01 RX ORDER — ADHESIVE BANDAGE
30 BANDAGE TOPICAL DAILY PRN
Status: DISCONTINUED | OUTPATIENT
Start: 2024-01-01 | End: 2024-01-01

## 2024-01-01 RX ORDER — BISACODYL 10 MG/1
10 SUPPOSITORY RECTAL DAILY PRN
COMMUNITY
End: 2024-01-01 | Stop reason: HOSPADM

## 2024-01-01 RX ORDER — HYDROXYZINE HYDROCHLORIDE 25 MG/1
25 TABLET, FILM COATED ORAL EVERY 6 HOURS PRN
Status: DISCONTINUED | OUTPATIENT
Start: 2024-01-01 | End: 2024-01-01

## 2024-01-01 RX ORDER — ONDANSETRON HYDROCHLORIDE 2 MG/ML
4 INJECTION, SOLUTION INTRAVENOUS ONCE
Status: COMPLETED | OUTPATIENT
Start: 2024-01-01 | End: 2024-01-01

## 2024-01-01 RX ORDER — DEXAMETHASONE 2 MG/1
2 TABLET ORAL 2 TIMES DAILY
Status: DISCONTINUED | OUTPATIENT
Start: 2024-01-01 | End: 2024-01-01

## 2024-01-01 RX ADMIN — DICYCLOMINE HYDROCHLORIDE 10 MG: 10 CAPSULE ORAL at 09:28

## 2024-01-01 RX ADMIN — KETOROLAC TROMETHAMINE 15 MG: 15 INJECTION, SOLUTION INTRAMUSCULAR; INTRAVENOUS at 00:50

## 2024-01-01 RX ADMIN — MORPHINE SULFATE 4 MG: 4 INJECTION INTRAVENOUS at 20:28

## 2024-01-01 RX ADMIN — MORPHINE SULFATE 4 MG: 4 INJECTION INTRAVENOUS at 23:35

## 2024-01-01 RX ADMIN — Medication 2 L/MIN: at 19:28

## 2024-01-01 RX ADMIN — MORPHINE SULFATE 4 MG: 4 INJECTION INTRAVENOUS at 16:02

## 2024-01-01 RX ADMIN — CARBOXYMETHYLCELLULOSE SODIUM 1 DROP: 5 SOLUTION/ DROPS OPHTHALMIC at 08:03

## 2024-01-01 RX ADMIN — LORAZEPAM 0.5 MG: 0.5 TABLET ORAL at 12:37

## 2024-01-01 RX ADMIN — CARBOXYMETHYLCELLULOSE SODIUM 1 DROP: 5 SOLUTION/ DROPS OPHTHALMIC at 20:31

## 2024-01-01 RX ADMIN — MORPHINE SULFATE 4 MG: 4 INJECTION INTRAVENOUS at 02:00

## 2024-01-01 RX ADMIN — FUROSEMIDE 20 MG: 10 INJECTION, SOLUTION INTRAMUSCULAR; INTRAVENOUS at 20:26

## 2024-01-01 RX ADMIN — DOCUSATE SODIUM 100 MG: 100 CAPSULE, LIQUID FILLED ORAL at 01:57

## 2024-01-01 RX ADMIN — CARBOXYMETHYLCELLULOSE SODIUM 1 DROP: 5 SOLUTION/ DROPS OPHTHALMIC at 14:37

## 2024-01-01 RX ADMIN — LORAZEPAM 0.5 MG: 2 INJECTION INTRAMUSCULAR; INTRAVENOUS at 18:12

## 2024-01-01 RX ADMIN — LORAZEPAM 0.5 MG: 2 INJECTION INTRAMUSCULAR; INTRAVENOUS at 13:03

## 2024-01-01 RX ADMIN — LORAZEPAM 0.5 MG: 2 INJECTION INTRAMUSCULAR; INTRAVENOUS at 14:31

## 2024-01-01 RX ADMIN — DEXAMETHASONE 2 MG: 2 TABLET ORAL at 09:22

## 2024-01-01 RX ADMIN — MORPHINE SULFATE 2 MG: 2 INJECTION, SOLUTION INTRAMUSCULAR; INTRAVENOUS at 07:26

## 2024-01-01 RX ADMIN — Medication 2 L/MIN: at 05:30

## 2024-01-01 RX ADMIN — Medication 2 L/MIN: at 03:55

## 2024-01-01 RX ADMIN — LORAZEPAM 0.5 MG: 2 INJECTION INTRAMUSCULAR; INTRAVENOUS at 08:00

## 2024-01-01 RX ADMIN — LORAZEPAM 0.5 MG: 0.5 TABLET ORAL at 04:39

## 2024-01-01 RX ADMIN — DEXAMETHASONE 2 MG: 2 TABLET ORAL at 03:05

## 2024-01-01 RX ADMIN — PANTOPRAZOLE SODIUM 40 MG: 40 TABLET, DELAYED RELEASE ORAL at 09:28

## 2024-01-01 RX ADMIN — SENNOSIDES AND DOCUSATE SODIUM 1 TABLET: 50; 8.6 TABLET ORAL at 09:23

## 2024-01-01 RX ADMIN — Medication 2 L/MIN: at 03:00

## 2024-01-01 RX ADMIN — ONDANSETRON 4 MG: 2 INJECTION INTRAMUSCULAR; INTRAVENOUS at 22:33

## 2024-01-01 RX ADMIN — Medication 4000 UNITS: at 09:23

## 2024-01-01 RX ADMIN — DULOXETINE HYDROCHLORIDE 60 MG: 60 CAPSULE, DELAYED RELEASE ORAL at 09:23

## 2024-01-01 RX ADMIN — LORAZEPAM 0.5 MG: 2 INJECTION INTRAMUSCULAR; INTRAVENOUS at 02:14

## 2024-01-01 RX ADMIN — MORPHINE SULFATE 2 MG: 2 INJECTION, SOLUTION INTRAMUSCULAR; INTRAVENOUS at 15:23

## 2024-01-01 RX ADMIN — Medication 2 L/MIN: at 07:42

## 2024-01-01 RX ADMIN — Medication 2 L/MIN: at 02:46

## 2024-01-01 RX ADMIN — LORAZEPAM 0.5 MG: 2 INJECTION INTRAMUSCULAR; INTRAVENOUS at 18:07

## 2024-01-01 RX ADMIN — FENTANYL 1 PATCH: 100 PATCH TRANSDERMAL at 01:57

## 2024-01-01 RX ADMIN — SODIUM CHLORIDE 500 ML: 9 INJECTION, SOLUTION INTRAVENOUS at 23:12

## 2024-01-01 RX ADMIN — Medication 2 MG/HR: at 18:12

## 2024-01-01 RX ADMIN — HYDROMORPHONE HYDROCHLORIDE 4 MG: 2 TABLET ORAL at 03:10

## 2024-01-01 RX ADMIN — POTASSIUM CHLORIDE 10 MEQ: 750 TABLET, EXTENDED RELEASE ORAL at 09:23

## 2024-01-01 RX ADMIN — CYCLOBENZAPRINE 10 MG: 10 TABLET, FILM COATED ORAL at 09:23

## 2024-01-01 RX ADMIN — Medication 1 APPLICATION: at 22:13

## 2024-01-01 RX ADMIN — MORPHINE SULFATE 2 MG: 2 INJECTION, SOLUTION INTRAMUSCULAR; INTRAVENOUS at 14:37

## 2024-01-01 RX ADMIN — GLYCOPYRROLATE 0.2 MG: 0.2 INJECTION, SOLUTION INTRAMUSCULAR; INTRAVENOUS at 02:14

## 2024-01-01 RX ADMIN — MORPHINE SULFATE 4 MG: 4 INJECTION INTRAVENOUS at 17:24

## 2024-01-01 RX ADMIN — HYDROMORPHONE HYDROCHLORIDE 1 MG: 1 INJECTION, SOLUTION INTRAMUSCULAR; INTRAVENOUS; SUBCUTANEOUS at 21:38

## 2024-01-01 SDOH — SOCIAL STABILITY: SOCIAL INSECURITY: DO YOU FEEL UNSAFE GOING BACK TO THE PLACE WHERE YOU ARE LIVING?: NO

## 2024-01-01 SDOH — SOCIAL STABILITY: SOCIAL INSECURITY: ABUSE: ADULT

## 2024-01-01 SDOH — SOCIAL STABILITY: SOCIAL INSECURITY: HAVE YOU HAD ANY THOUGHTS OF HARMING ANYONE ELSE?: NO

## 2024-01-01 SDOH — SOCIAL STABILITY: SOCIAL INSECURITY: ARE THERE ANY APPARENT SIGNS OF INJURIES/BEHAVIORS THAT COULD BE RELATED TO ABUSE/NEGLECT?: NO

## 2024-01-01 SDOH — SOCIAL STABILITY: SOCIAL INSECURITY: DOES ANYONE TRY TO KEEP YOU FROM HAVING/CONTACTING OTHER FRIENDS OR DOING THINGS OUTSIDE YOUR HOME?: NO

## 2024-01-01 SDOH — SOCIAL STABILITY: SOCIAL INSECURITY: ARE YOU OR HAVE YOU BEEN THREATENED OR ABUSED PHYSICALLY, EMOTIONALLY, OR SEXUALLY BY ANYONE?: NO

## 2024-01-01 SDOH — SOCIAL STABILITY: SOCIAL INSECURITY: WERE YOU ABLE TO COMPLETE ALL THE BEHAVIORAL HEALTH SCREENINGS?: YES

## 2024-01-01 SDOH — SOCIAL STABILITY: SOCIAL INSECURITY: DO YOU FEEL ANYONE HAS EXPLOITED OR TAKEN ADVANTAGE OF YOU FINANCIALLY OR OF YOUR PERSONAL PROPERTY?: NO

## 2024-01-01 SDOH — SOCIAL STABILITY: SOCIAL INSECURITY: HAVE YOU HAD THOUGHTS OF HARMING ANYONE ELSE?: NO

## 2024-01-01 SDOH — SOCIAL STABILITY: SOCIAL INSECURITY: HAS ANYONE EVER THREATENED TO HURT YOUR FAMILY OR YOUR PETS?: NO

## 2024-01-01 ASSESSMENT — PAIN SCALES - PAIN ASSESSMENT IN ADVANCED DEMENTIA (PAINAD)
BREATHING: NOISY LABORED BREATHING, LONG PERIODS OF HYPERVENTILATION, CHEYNE-STOKES RESPIRATIONS
FACIALEXPRESSION: SMILING OR INEXPRESSIVE
FACIALEXPRESSION: SAD, FRIGHTENED, FROWN
TOTALSCORE: 8
NEGVOCALIZATION: OCCASIONAL MOAN/GROAN, LOW SPEECH, NEGATIVE/DISAPPROVING QUALITY
TOTALSCORE: MEDICATION (SEE MAR)
CONSOLABILITY: DISTRACTED OR REASSURED BY VOICE/TOUCH
TOTALSCORE: MEDICATION (SEE MAR)
CONSOLABILITY: DISTRACTED OR REASSURED BY VOICE/TOUCH
BREATHING: OCCASIONAL LABORED BREATHING, SHORT PERIOD OF HYPERVENTILATION
NEGVOCALIZATION: OCCASIONAL MOAN/GROAN, LOW SPEECH, NEGATIVE/DISAPPROVING QUALITY
BODYLANGUAGE: TENSE, DISTRESSED PACING, FIDGETING
NEGVOCALIZATION: REPEATED TROUBLED CALLING OUT, LOUD MOANING/GROANING, CRYING
CONSOLABILITY: NO NEED TO CONSOLE
TOTALSCORE: 7
BODYLANGUAGE: TENSE, DISTRESSED PACING, FIDGETING
BODYLANGUAGE: RIGID, FISTS CLENCHED, KNEES UP, PUSHING/PULLING AWAY, STRIKES OUT
FACIALEXPRESSION: SAD, FRIGHTENED, FROWN
NEGVOCALIZATION: OCCASIONAL MOAN/GROAN, LOW SPEECH, NEGATIVE/DISAPPROVING QUALITY
TOTALSCORE: 5
CONSOLABILITY: DISTRACTED OR REASSURED BY VOICE/TOUCH
BODYLANGUAGE: RELAXED
FACIALEXPRESSION: FACIAL GRIMACING
FACIALEXPRESSION: SMILING OR INEXPRESSIVE
CONSOLABILITY: DISTRACTED OR REASSURED BY VOICE/TOUCH
FACIALEXPRESSION: SAD, FRIGHTENED, FROWN
BODYLANGUAGE: RIGID, FISTS CLENCHED, KNEES UP, PUSHING/PULLING AWAY, STRIKES OUT
FACIALEXPRESSION: SAD, FRIGHTENED, FROWN
NEGVOCALIZATION: OCCASIONAL MOAN/GROAN, LOW SPEECH, NEGATIVE/DISAPPROVING QUALITY
BODYLANGUAGE: RIGID, FISTS CLENCHED, KNEES UP, PUSHING/PULLING AWAY, STRIKES OUT
BREATHING: NOISY LABORED BREATHING, LONG PERIODS OF HYPERVENTILATION, CHEYNE-STOKES RESPIRATIONS
BREATHING: OCCASIONAL LABORED BREATHING, SHORT PERIOD OF HYPERVENTILATION
CONSOLABILITY: NO NEED TO CONSOLE
BREATHING: OCCASIONAL LABORED BREATHING, SHORT PERIOD OF HYPERVENTILATION
TOTALSCORE: 7
TOTALSCORE: MEDICATION (SEE MAR)
BODYLANGUAGE: RELAXED
FACIALEXPRESSION: SAD, FRIGHTENED, FROWN
BODYLANGUAGE: TENSE, DISTRESSED PACING, FIDGETING
NEGVOCALIZATION: OCCASIONAL MOAN/GROAN, LOW SPEECH, NEGATIVE/DISAPPROVING QUALITY
TOTALSCORE: 6
CONSOLABILITY: DISTRACTED OR REASSURED BY VOICE/TOUCH
TOTALSCORE: MD NOTIFIED (COMMENT)
TOTALSCORE: 9
FACIALEXPRESSION: FACIAL GRIMACING
TOTALSCORE: MEDICATION (SEE MAR)
TOTALSCORE: 1
BODYLANGUAGE: RELAXED
BREATHING: NOISY LABORED BREATHING, LONG PERIODS OF HYPERVENTILATION, CHEYNE-STOKES RESPIRATIONS
CONSOLABILITY: UNABLE TO CONSOLE, DISTRACT OR REASSURE
BREATHING: NOISY LABORED BREATHING, LONG PERIODS OF HYPERVENTILATION, CHEYNE-STOKES RESPIRATIONS
NEGVOCALIZATION: REPEATED TROUBLED CALLING OUT, LOUD MOANING/GROANING, CRYING
CONSOLABILITY: DISTRACTED OR REASSURED BY VOICE/TOUCH

## 2024-01-01 ASSESSMENT — PAIN - FUNCTIONAL ASSESSMENT
PAIN_FUNCTIONAL_ASSESSMENT: 0-10
PAIN_FUNCTIONAL_ASSESSMENT: 0-10
PAIN_FUNCTIONAL_ASSESSMENT: PAINAD (PAIN ASSESSMENT IN ADVANCED DEMENTIA SCALE)
PAIN_FUNCTIONAL_ASSESSMENT: 0-10
PAIN_FUNCTIONAL_ASSESSMENT: PAINAD (PAIN ASSESSMENT IN ADVANCED DEMENTIA SCALE)
PAIN_FUNCTIONAL_ASSESSMENT: 0-10
PAIN_FUNCTIONAL_ASSESSMENT: 0-10

## 2024-01-01 ASSESSMENT — COGNITIVE AND FUNCTIONAL STATUS - GENERAL
DAILY ACTIVITIY SCORE: 6
STANDING UP FROM CHAIR USING ARMS: TOTAL
PATIENT BASELINE BEDBOUND: NO
PERSONAL GROOMING: TOTAL
EATING MEALS: TOTAL
TURNING FROM BACK TO SIDE WHILE IN FLAT BAD: A LOT
DAILY ACTIVITIY SCORE: 6
EATING MEALS: TOTAL
HELP NEEDED FOR BATHING: TOTAL
MOVING TO AND FROM BED TO CHAIR: TOTAL
MOVING FROM LYING ON BACK TO SITTING ON SIDE OF FLAT BED WITH BEDRAILS: TOTAL
EATING MEALS: A LOT
MOVING FROM LYING ON BACK TO SITTING ON SIDE OF FLAT BED WITH BEDRAILS: TOTAL
STANDING UP FROM CHAIR USING ARMS: A LOT
MOVING TO AND FROM BED TO CHAIR: TOTAL
MOVING TO AND FROM BED TO CHAIR: TOTAL
MOBILITY SCORE: 6
HELP NEEDED FOR BATHING: A LOT
DRESSING REGULAR LOWER BODY CLOTHING: TOTAL
WALKING IN HOSPITAL ROOM: TOTAL
WALKING IN HOSPITAL ROOM: TOTAL
TURNING FROM BACK TO SIDE WHILE IN FLAT BAD: TOTAL
TURNING FROM BACK TO SIDE WHILE IN FLAT BAD: TOTAL
MOBILITY SCORE: 6
DAILY ACTIVITIY SCORE: 6
WALKING IN HOSPITAL ROOM: TOTAL
TOILETING: TOTAL
PERSONAL GROOMING: TOTAL
DRESSING REGULAR LOWER BODY CLOTHING: TOTAL
STANDING UP FROM CHAIR USING ARMS: TOTAL
CLIMB 3 TO 5 STEPS WITH RAILING: TOTAL
TOILETING: TOTAL
DAILY ACTIVITIY SCORE: 12
STANDING UP FROM CHAIR USING ARMS: TOTAL
DRESSING REGULAR UPPER BODY CLOTHING: A LOT
CLIMB 3 TO 5 STEPS WITH RAILING: TOTAL
MOVING FROM LYING ON BACK TO SITTING ON SIDE OF FLAT BED WITH BEDRAILS: TOTAL
DRESSING REGULAR LOWER BODY CLOTHING: TOTAL
CLIMB 3 TO 5 STEPS WITH RAILING: TOTAL
PERSONAL GROOMING: TOTAL
DRESSING REGULAR UPPER BODY CLOTHING: TOTAL
TOILETING: TOTAL
MOVING TO AND FROM BED TO CHAIR: A LOT
HELP NEEDED FOR BATHING: TOTAL
DRESSING REGULAR UPPER BODY CLOTHING: TOTAL
DRESSING REGULAR UPPER BODY CLOTHING: TOTAL
EATING MEALS: TOTAL
PERSONAL GROOMING: A LOT
MOBILITY SCORE: 6
TURNING FROM BACK TO SIDE WHILE IN FLAT BAD: TOTAL
MOBILITY SCORE: 10
DRESSING REGULAR LOWER BODY CLOTHING: A LOT
WALKING IN HOSPITAL ROOM: TOTAL
TOILETING: A LOT
HELP NEEDED FOR BATHING: TOTAL
CLIMB 3 TO 5 STEPS WITH RAILING: TOTAL
MOVING FROM LYING ON BACK TO SITTING ON SIDE OF FLAT BED WITH BEDRAILS: A LOT

## 2024-01-01 ASSESSMENT — LIFESTYLE VARIABLES
EVER FELT BAD OR GUILTY ABOUT YOUR DRINKING: NO
EVER HAD A DRINK FIRST THING IN THE MORNING TO STEADY YOUR NERVES TO GET RID OF A HANGOVER: NO
HAVE YOU EVER FELT YOU SHOULD CUT DOWN ON YOUR DRINKING: NO
SKIP TO QUESTIONS 9-10: 1
HOW OFTEN DO YOU HAVE A DRINK CONTAINING ALCOHOL: NEVER
TOTAL SCORE: 0
AUDIT-C TOTAL SCORE: 0
HOW OFTEN DO YOU HAVE 6 OR MORE DRINKS ON ONE OCCASION: NEVER
HAVE PEOPLE ANNOYED YOU BY CRITICIZING YOUR DRINKING: NO
AUDIT-C TOTAL SCORE: 0
HOW MANY STANDARD DRINKS CONTAINING ALCOHOL DO YOU HAVE ON A TYPICAL DAY: PATIENT DOES NOT DRINK

## 2024-01-01 ASSESSMENT — RESPIRATORY DISTRESS OBSERVATION SCALE (RDOS)
RDOS TOTAL SCORE: 2
INVOLUNTARY NASAL FLARING: 0 - NONE
RESTLESS NONPURPOSEFUL MOVEMENTS: 1 - OCCASIONAL, SLIGHT MOVEMENTS
GRUNTING AT END OF EXPIRATION: 0 - NONE
ACCESSORY MUSCLE RISE IN CLAVICLE DURING INSPIRATION: 1 - SLIGHT RISE
LOOK OF FEAR: 0 - NONE
HEART RATE PER MINUTE: 0 - <90 BEATS
PARADOXICAL BREATHING PATTERN: 0 - NONE
RESPIRATORY RATE PER MINUTE: 0 - <19 BREATHS

## 2024-01-01 ASSESSMENT — PAIN SCALES - GENERAL
PAINLEVEL_OUTOF10: 7
PAINLEVEL_OUTOF10: 7
PAINLEVEL_OUTOF10: 5 - MODERATE PAIN
PAINLEVEL_OUTOF10: 0 - NO PAIN
PAINLEVEL_OUTOF10: 0 - NO PAIN
PAINLEVEL_OUTOF10: 7
PAINLEVEL_OUTOF10: 0 - NO PAIN
PAINLEVEL_OUTOF10: 0 - NO PAIN
PAINLEVEL_OUTOF10: 8
PAINLEVEL_OUTOF10: 10 - WORST POSSIBLE PAIN
PAINLEVEL_OUTOF10: 0 - NO PAIN
PAINLEVEL_OUTOF10: 0 - NO PAIN

## 2024-01-01 ASSESSMENT — ACTIVITIES OF DAILY LIVING (ADL)
PATIENT'S MEMORY ADEQUATE TO SAFELY COMPLETE DAILY ACTIVITIES?: YES
WALKS IN HOME: NEEDS ASSISTANCE
BATHING: NEEDS ASSISTANCE
JUDGMENT_ADEQUATE_SAFELY_COMPLETE_DAILY_ACTIVITIES: YES
LACK_OF_TRANSPORTATION: NO
HEARING - LEFT EAR: FUNCTIONAL
HEARING - RIGHT EAR: FUNCTIONAL
ADEQUATE_TO_COMPLETE_ADL: YES
DRESSING YOURSELF: NEEDS ASSISTANCE
GROOMING: NEEDS ASSISTANCE
TOILETING: NEEDS ASSISTANCE
ASSISTIVE_DEVICE: EYEGLASSES
FEEDING YOURSELF: NEEDS ASSISTANCE

## 2024-01-01 ASSESSMENT — PAIN DESCRIPTION - LOCATION
LOCATION: BACK
LOCATION: ABDOMEN
LOCATION: BACK

## 2024-01-01 ASSESSMENT — PAIN SCALES - WONG BAKER: WONGBAKER_NUMERICALRESPONSE: HURTS LITTLE MORE

## 2024-01-02 ENCOUNTER — OFFICE VISIT (OUTPATIENT)
Dept: HEMATOLOGY/ONCOLOGY | Facility: CLINIC | Age: 64
End: 2024-01-02
Payer: MEDICARE

## 2024-01-02 ENCOUNTER — INFUSION (OUTPATIENT)
Dept: HEMATOLOGY/ONCOLOGY | Facility: CLINIC | Age: 64
End: 2024-01-02
Payer: MEDICARE

## 2024-01-02 VITALS
RESPIRATION RATE: 16 BRPM | WEIGHT: 165.34 LBS | BODY MASS INDEX: 23.06 KG/M2 | OXYGEN SATURATION: 98 % | TEMPERATURE: 98.4 F | DIASTOLIC BLOOD PRESSURE: 55 MMHG | SYSTOLIC BLOOD PRESSURE: 90 MMHG | HEART RATE: 82 BPM

## 2024-01-02 DIAGNOSIS — E86.0 DEHYDRATION: ICD-10-CM

## 2024-01-02 DIAGNOSIS — C15.9 STAGE IV MALIGNANT NEOPLASM OF ESOPHAGUS (MULTI): ICD-10-CM

## 2024-01-02 DIAGNOSIS — C78.7 METASTASES TO THE LIVER (MULTI): ICD-10-CM

## 2024-01-02 LAB
ALBUMIN SERPL BCP-MCNC: 4.1 G/DL (ref 3.4–5)
ALP SERPL-CCNC: 157 U/L (ref 33–136)
ALT SERPL W P-5'-P-CCNC: 25 U/L (ref 10–52)
ANION GAP SERPL CALC-SCNC: 17 MMOL/L (ref 10–20)
AST SERPL W P-5'-P-CCNC: 34 U/L (ref 9–39)
BASOPHILS # BLD AUTO: 0.03 X10*3/UL (ref 0–0.1)
BASOPHILS NFR BLD AUTO: 0.5 %
BILIRUB SERPL-MCNC: 0.9 MG/DL (ref 0–1.2)
BUN SERPL-MCNC: 20 MG/DL (ref 6–23)
CALCIUM SERPL-MCNC: 9.4 MG/DL (ref 8.6–10.3)
CHLORIDE SERPL-SCNC: 106 MMOL/L (ref 98–107)
CO2 SERPL-SCNC: 22 MMOL/L (ref 21–32)
CREAT SERPL-MCNC: 0.93 MG/DL (ref 0.5–1.3)
EOSINOPHIL # BLD AUTO: 0.08 X10*3/UL (ref 0–0.7)
EOSINOPHIL NFR BLD AUTO: 1.3 %
ERYTHROCYTE [DISTWIDTH] IN BLOOD BY AUTOMATED COUNT: 14.5 % (ref 11.5–14.5)
GFR SERPL CREATININE-BSD FRML MDRD: >90 ML/MIN/1.73M*2
GLUCOSE SERPL-MCNC: 156 MG/DL (ref 74–99)
HCT VFR BLD AUTO: 34.9 % (ref 41–52)
HGB BLD-MCNC: 11.7 G/DL (ref 13.5–17.5)
IMM GRANULOCYTES # BLD AUTO: 0.02 X10*3/UL (ref 0–0.7)
IMM GRANULOCYTES NFR BLD AUTO: 0.3 % (ref 0–0.9)
LYMPHOCYTES # BLD AUTO: 1.05 X10*3/UL (ref 1.2–4.8)
LYMPHOCYTES NFR BLD AUTO: 16.5 %
MCH RBC QN AUTO: 29.6 PG (ref 26–34)
MCHC RBC AUTO-ENTMCNC: 33.5 G/DL (ref 32–36)
MCV RBC AUTO: 88 FL (ref 80–100)
MONOCYTES # BLD AUTO: 0.46 X10*3/UL (ref 0.1–1)
MONOCYTES NFR BLD AUTO: 7.2 %
NEUTROPHILS # BLD AUTO: 4.72 X10*3/UL (ref 1.2–7.7)
NEUTROPHILS NFR BLD AUTO: 74.2 %
PLATELET # BLD AUTO: 90 X10*3/UL (ref 150–450)
POTASSIUM SERPL-SCNC: 3.9 MMOL/L (ref 3.5–5.3)
PROT SERPL-MCNC: 6.5 G/DL (ref 6.4–8.2)
RBC # BLD AUTO: 3.95 X10*6/UL (ref 4.5–5.9)
SODIUM SERPL-SCNC: 141 MMOL/L (ref 136–145)
WBC # BLD AUTO: 6.4 X10*3/UL (ref 4.4–11.3)

## 2024-01-02 PROCEDURE — 96413 CHEMO IV INFUSION 1 HR: CPT

## 2024-01-02 PROCEDURE — 96375 TX/PRO/DX INJ NEW DRUG ADDON: CPT | Mod: INF

## 2024-01-02 PROCEDURE — 2500000004 HC RX 250 GENERAL PHARMACY W/ HCPCS (ALT 636 FOR OP/ED): Performed by: INTERNAL MEDICINE

## 2024-01-02 PROCEDURE — 1125F AMNT PAIN NOTED PAIN PRSNT: CPT | Performed by: PHYSICIAN ASSISTANT

## 2024-01-02 PROCEDURE — 96411 CHEMO IV PUSH ADDL DRUG: CPT

## 2024-01-02 PROCEDURE — 80053 COMPREHEN METABOLIC PANEL: CPT | Performed by: INTERNAL MEDICINE

## 2024-01-02 PROCEDURE — 99214 OFFICE O/P EST MOD 30 MIN: CPT | Performed by: PHYSICIAN ASSISTANT

## 2024-01-02 PROCEDURE — 2500000004 HC RX 250 GENERAL PHARMACY W/ HCPCS (ALT 636 FOR OP/ED): Mod: JZ | Performed by: INTERNAL MEDICINE

## 2024-01-02 PROCEDURE — 2500000004 HC RX 250 GENERAL PHARMACY W/ HCPCS (ALT 636 FOR OP/ED)

## 2024-01-02 PROCEDURE — 96367 TX/PROPH/DG ADDL SEQ IV INF: CPT

## 2024-01-02 PROCEDURE — 85025 COMPLETE CBC W/AUTO DIFF WBC: CPT | Performed by: INTERNAL MEDICINE

## 2024-01-02 PROCEDURE — 1159F MED LIST DOCD IN RCRD: CPT | Performed by: PHYSICIAN ASSISTANT

## 2024-01-02 RX ORDER — FAMOTIDINE 10 MG/ML
20 INJECTION INTRAVENOUS ONCE AS NEEDED
Status: CANCELLED | OUTPATIENT
Start: 2024-01-02

## 2024-01-02 RX ORDER — DIPHENHYDRAMINE HYDROCHLORIDE 50 MG/ML
25 INJECTION INTRAMUSCULAR; INTRAVENOUS ONCE
Status: COMPLETED | OUTPATIENT
Start: 2024-01-02 | End: 2024-01-02

## 2024-01-02 RX ORDER — DEXAMETHASONE SODIUM PHOSPHATE 4 MG/ML
8 INJECTION, SOLUTION INTRA-ARTICULAR; INTRALESIONAL; INTRAMUSCULAR; INTRAVENOUS; SOFT TISSUE ONCE
Status: COMPLETED | OUTPATIENT
Start: 2024-01-02 | End: 2024-01-02

## 2024-01-02 RX ORDER — DIPHENHYDRAMINE HYDROCHLORIDE 50 MG/ML
50 INJECTION INTRAMUSCULAR; INTRAVENOUS AS NEEDED
Status: CANCELLED | OUTPATIENT
Start: 2024-01-02

## 2024-01-02 RX ORDER — LORAZEPAM 2 MG/ML
1 INJECTION INTRAMUSCULAR AS NEEDED
Status: DISCONTINUED | OUTPATIENT
Start: 2024-01-02 | End: 2024-01-02 | Stop reason: HOSPADM

## 2024-01-02 RX ORDER — HEPARIN 100 UNIT/ML
SYRINGE INTRAVENOUS
Status: COMPLETED
Start: 2024-01-02 | End: 2024-01-02

## 2024-01-02 RX ORDER — EPINEPHRINE 0.3 MG/.3ML
0.3 INJECTION SUBCUTANEOUS EVERY 5 MIN PRN
Status: CANCELLED | OUTPATIENT
Start: 2024-01-02

## 2024-01-02 RX ORDER — ALBUTEROL SULFATE 0.83 MG/ML
3 SOLUTION RESPIRATORY (INHALATION) AS NEEDED
Status: DISCONTINUED | OUTPATIENT
Start: 2024-01-02 | End: 2024-01-02 | Stop reason: HOSPADM

## 2024-01-02 RX ORDER — ALBUTEROL SULFATE 0.83 MG/ML
3 SOLUTION RESPIRATORY (INHALATION) AS NEEDED
Status: CANCELLED | OUTPATIENT
Start: 2024-01-02

## 2024-01-02 RX ORDER — PROCHLORPERAZINE MALEATE 10 MG
10 TABLET ORAL EVERY 6 HOURS PRN
Status: DISCONTINUED | OUTPATIENT
Start: 2024-01-02 | End: 2024-01-02 | Stop reason: HOSPADM

## 2024-01-02 RX ORDER — FAMOTIDINE 10 MG/ML
20 INJECTION INTRAVENOUS ONCE AS NEEDED
Status: DISCONTINUED | OUTPATIENT
Start: 2024-01-02 | End: 2024-01-02 | Stop reason: HOSPADM

## 2024-01-02 RX ORDER — ACETAMINOPHEN 325 MG/1
650 TABLET ORAL ONCE
Status: COMPLETED | OUTPATIENT
Start: 2024-01-02 | End: 2024-01-02

## 2024-01-02 RX ORDER — FLUOROURACIL 50 MG/ML
400 INJECTION, SOLUTION INTRAVENOUS ONCE
Status: COMPLETED | OUTPATIENT
Start: 2024-01-02 | End: 2024-01-02

## 2024-01-02 RX ORDER — EPINEPHRINE 0.3 MG/.3ML
0.3 INJECTION SUBCUTANEOUS EVERY 5 MIN PRN
Status: DISCONTINUED | OUTPATIENT
Start: 2024-01-02 | End: 2024-01-02 | Stop reason: HOSPADM

## 2024-01-02 RX ORDER — PROCHLORPERAZINE EDISYLATE 5 MG/ML
10 INJECTION INTRAMUSCULAR; INTRAVENOUS EVERY 6 HOURS PRN
Status: DISCONTINUED | OUTPATIENT
Start: 2024-01-02 | End: 2024-01-02 | Stop reason: HOSPADM

## 2024-01-02 RX ORDER — DIPHENHYDRAMINE HYDROCHLORIDE 50 MG/ML
50 INJECTION INTRAMUSCULAR; INTRAVENOUS AS NEEDED
Status: DISCONTINUED | OUTPATIENT
Start: 2024-01-02 | End: 2024-01-02 | Stop reason: HOSPADM

## 2024-01-02 RX ORDER — PALONOSETRON 0.05 MG/ML
0.25 INJECTION, SOLUTION INTRAVENOUS ONCE
Status: COMPLETED | OUTPATIENT
Start: 2024-01-02 | End: 2024-01-02

## 2024-01-02 RX ADMIN — DEXTROSE MONOHYDRATE 740 MG: 50 INJECTION, SOLUTION INTRAVENOUS at 16:04

## 2024-01-02 RX ADMIN — FLUOROURACIL 750 MG: 50 INJECTION, SOLUTION INTRAVENOUS at 16:49

## 2024-01-02 RX ADMIN — ACETAMINOPHEN 650 MG: 325 TABLET ORAL at 13:21

## 2024-01-02 RX ADMIN — HEPARIN 500 UNITS: 100 SYRINGE at 17:01

## 2024-01-02 RX ADMIN — PALONOSETRON 250 MCG: 0.05 INJECTION, SOLUTION INTRAVENOUS at 13:22

## 2024-01-02 RX ADMIN — DEXAMETHASONE SODIUM PHOSPHATE 8 MG: 4 INJECTION INTRA-ARTICULAR; INTRALESIONAL; INTRAMUSCULAR; INTRAVENOUS; SOFT TISSUE at 13:24

## 2024-01-02 RX ADMIN — DIPHENHYDRAMINE HYDROCHLORIDE 25 MG: 50 INJECTION, SOLUTION INTRAMUSCULAR; INTRAVENOUS at 13:40

## 2024-01-02 RX ADMIN — TRASTUZUMAB 300 MG: 150 INJECTION, POWDER, LYOPHILIZED, FOR SOLUTION INTRAVENOUS at 15:05

## 2024-01-02 ASSESSMENT — ENCOUNTER SYMPTOMS
COUGH: 1
CONSTITUTIONAL NEGATIVE: 1
EYES NEGATIVE: 1

## 2024-01-02 ASSESSMENT — PAIN SCALES - GENERAL: PAINLEVEL: 2

## 2024-01-02 NOTE — SIGNIFICANT EVENT
01/02/24 1254   Prechemo Checklist   Has the patient been in the hospital, ED, or urgent care since last date of service Yes   Chemo/Immuno Consent Signed Yes   Protocol/Indications Verified Yes   Confirmed to previous date/time of medication Yes   Compared to previous dose Yes   All medications are dated accurately Yes   Pregnancy Test Negative Not applicable   Parameters Met Yes   Is Patient Proceeding With Treatment? Yes   BSA/Weight-Height Verified Yes   Dose Calculations Verified Yes

## 2024-01-02 NOTE — PROGRESS NOTES
Patient ID: Massimo Garcia is a 63 y.o. male.  Referring Physician: Tomasa Blake MD  [id number] [place] [address]  Primary Care Provider: Dayne Santamaria DO  Visit Type:  Follow Up         Subjective    HPI    Expand All Collapse All    Patient ID: Massimo Garcia is a 62 y.o. male.        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  Has done well on trastuzumab FOLFOX regimen.  Today complains of dizziness and presyncopal symptoms.  Metastatic burden is liver some lung lesions as well as abdominal lymphadenopathy.        LIVER:  Liver measures 14.7 cm in length and demonstrates mildly diffuse  coarsened hepatic echotexture with mild nodular contour which may  indicate cirrhotic morphology. There is an indeterminate hypoechoic  lesion within the right hepatic lobe measuring up. 3.6 x 3.2 x 3.0 cm  and a 2nd 2.6 x 1.8 x 2.7 cm indeterminate heterogeneous hypoechoic  lesion within the left hepatic lobe. Additional scatter presumed  hepatic cysts, largest measuring up to 2.4 x 2.1 x 1.8 cm      GALLBLADDER:  Contracted. No obvious cholelithiasis.      BILIARY SYSTEM:  No evidence of intra hepatic biliary dilatation is identified; the  common bile duct measures 13.2 mm.          PANCREAS:  The pancreas is poorly visualized due to overlying bowel gas.      RIGHT KIDNEY:  The right kidney measures 11.5 cm in length. No hydronephrosis or  renal calculi are seen.          IMPRESSION:  Cirrhotic hepatic morphology with indeterminate hypoechoic lesions of  the liver as above, largest measuring up to 3.6 x 3.2 x 3.0 cm.  Hepatic mass protocol MRI advised for further assessment.      MACRO:  Critical Finding:  See findings. Notification was initiated on  12/6/2023    Review of Systems   Constitutional: Negative.    HENT:  Negative.     Eyes: Negative.    Respiratory:  Positive for cough.         Objective   BSA: 1.94 meters squared  BP  90/55 (BP Location: Left arm)   Pulse 82   Temp 36.9 °C (98.4 °F)   Resp 16   Wt 75 kg (165 lb 5.5 oz)   SpO2 98%   BMI 23.06 kg/m²      has a past medical history of Essential (primary) hypertension (12/21/2013), Personal history of other diseases of the circulatory system, and Pneumothorax (12/04/2023).   has a past surgical history that includes Total knee arthroplasty (09/30/2016); Total knee arthroplasty (09/30/2016); Cardiac electrophysiology procedure (N/A, 11/7/2023); and Cardiac electrophysiology procedure (Left, 11/9/2023).  No family history on file.  Oncology History   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)   9/13/2023 Initial Diagnosis    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)     9/13/2023 -  Chemotherapy    Trastuzumab + mFOLFOX6 (Fluorouracil Continuous Infusion / Leucovorin / Oxaliplatin), 14 Day Cycles     Metastases to the liver (CMS/HCC)   9/13/2023 Initial Diagnosis    Metastases to the liver (CMS/HCC)     9/13/2023 -  Chemotherapy    Trastuzumab + mFOLFOX6 (Fluorouracil Continuous Infusion / Leucovorin / Oxaliplatin), 14 Day Cycles         Massimo Garcia  reports that he has been smoking cigarettes and cigars. He has never been exposed to tobacco smoke. He has never used smokeless tobacco.  He  reports that he does not currently use alcohol.  He  reports current drug use. Drug: Marijuana.    Physical Exam  Constitutional:       Appearance: Normal appearance.   HENT:      Head: Normocephalic and atraumatic.   Eyes:      Extraocular Movements: Extraocular movements intact.      Pupils: Pupils are equal, round, and reactive to light.   Musculoskeletal:      Cervical back: Normal range of motion and neck supple.   Neurological:      Mental Status: He is alert.         WBC   Date/Time Value Ref Range Status   01/02/2024 10:01 AM 6.4 4.4 - 11.3 x10*3/uL Final   12/27/2023 07:21 AM 5.0 4.4 - 11.3 x10*3/uL Final   12/26/2023 06:13 PM 8.5 4.4 - 11.3 x10*3/uL Final     nRBC    Date Value Ref Range Status   12/27/2023 0.0 0.0 - 0.0 /100 WBCs Final   12/26/2023 0.0 0.0 - 0.0 /100 WBCs Final   12/22/2023 0.0 0.0 - 0.0 /100 WBCs Final     RBC   Date Value Ref Range Status   01/02/2024 3.95 (L) 4.50 - 5.90 x10*6/uL Final   12/27/2023 3.90 (L) 4.50 - 5.90 x10*6/uL Final   12/26/2023 4.01 (L) 4.50 - 5.90 x10*6/uL Final     Hemoglobin   Date Value Ref Range Status   01/02/2024 11.7 (L) 13.5 - 17.5 g/dL Final   12/27/2023 11.4 (L) 13.5 - 17.5 g/dL Final   12/26/2023 11.8 (L) 13.5 - 17.5 g/dL Final     Hematocrit   Date Value Ref Range Status   01/02/2024 34.9 (L) 41.0 - 52.0 % Final   12/27/2023 33.7 (L) 41.0 - 52.0 % Final   12/26/2023 34.6 (L) 41.0 - 52.0 % Final     MCV   Date/Time Value Ref Range Status   01/02/2024 10:01 AM 88 80 - 100 fL Final   12/27/2023 07:21 AM 86 80 - 100 fL Final   12/26/2023 06:13 PM 86 80 - 100 fL Final     MCH   Date/Time Value Ref Range Status   01/02/2024 10:01 AM 29.6 26.0 - 34.0 pg Final   12/27/2023 07:21 AM 29.2 26.0 - 34.0 pg Final   12/26/2023 06:13 PM 29.4 26.0 - 34.0 pg Final     MCHC   Date/Time Value Ref Range Status   01/02/2024 10:01 AM 33.5 32.0 - 36.0 g/dL Final   12/27/2023 07:21 AM 33.8 32.0 - 36.0 g/dL Final   12/26/2023 06:13 PM 34.1 32.0 - 36.0 g/dL Final     RDW   Date/Time Value Ref Range Status   01/02/2024 10:01 AM 14.5 11.5 - 14.5 % Final   12/27/2023 07:21 AM 14.6 (H) 11.5 - 14.5 % Final   12/26/2023 06:13 PM 14.5 11.5 - 14.5 % Final     Platelets   Date/Time Value Ref Range Status   01/02/2024 10:01 AM 90 (L) 150 - 450 x10*3/uL Final   12/27/2023 07:21 AM 98 (L) 150 - 450 x10*3/uL Final   12/26/2023 06:13  (L) 150 - 450 x10*3/uL Final     MPV   Date/Time Value Ref Range Status   10/30/2023 09:13 AM 10.3 7.5 - 11.5 fL Final   10/16/2023 08:34 AM 10.7 7.5 - 11.5 fL Final   10/02/2023 09:08 AM 10.0 7.5 - 11.5 fL Final     Neutrophils %   Date/Time Value Ref Range Status   01/02/2024 10:01 AM 74.2 40.0 - 80.0 % Final   12/26/2023  06:13 PM 77.6 40.0 - 80.0 % Final   12/22/2023 11:31 AM 79.4 40.0 - 80.0 % Final     Immature Granulocytes %, Automated   Date/Time Value Ref Range Status   01/02/2024 10:01 AM 0.3 0.0 - 0.9 % Final     Comment:     Immature Granulocyte Count (IG) includes promyelocytes, myelocytes and metamyelocytes but does not include bands. Percent differential counts (%) should be interpreted in the context of the absolute cell counts (cells/UL).   12/26/2023 06:13 PM 0.5 0.0 - 0.9 % Final     Comment:     Immature Granulocyte Count (IG) includes promyelocytes, myelocytes and metamyelocytes but does not include bands. Percent differential counts (%) should be interpreted in the context of the absolute cell counts (cells/UL).   12/22/2023 11:31 AM 0.3 0.0 - 0.9 % Final     Comment:     Immature Granulocyte Count (IG) includes promyelocytes, myelocytes and metamyelocytes but does not include bands. Percent differential counts (%) should be interpreted in the context of the absolute cell counts (cells/UL).     Lymphocytes %   Date/Time Value Ref Range Status   01/02/2024 10:01 AM 16.5 13.0 - 44.0 % Final   12/26/2023 06:13 PM 10.9 13.0 - 44.0 % Final   12/22/2023 11:31 AM 13.8 13.0 - 44.0 % Final     Monocytes %   Date/Time Value Ref Range Status   01/02/2024 10:01 AM 7.2 2.0 - 10.0 % Final   12/26/2023 06:13 PM 10.2 2.0 - 10.0 % Final   12/22/2023 11:31 AM 5.4 2.0 - 10.0 % Final     Eosinophils %   Date/Time Value Ref Range Status   01/02/2024 10:01 AM 1.3 0.0 - 6.0 % Final   12/26/2023 06:13 PM 0.6 0.0 - 6.0 % Final   12/22/2023 11:31 AM 0.9 0.0 - 6.0 % Final     Basophils %   Date/Time Value Ref Range Status   01/02/2024 10:01 AM 0.5 0.0 - 2.0 % Final   12/26/2023 06:13 PM 0.2 0.0 - 2.0 % Final   12/22/2023 11:31 AM 0.2 0.0 - 2.0 % Final     Neutrophils Absolute   Date/Time Value Ref Range Status   01/02/2024 10:01 AM 4.72 1.20 - 7.70 x10*3/uL Final     Comment:     Percent differential counts (%) should be interpreted in the  "context of the absolute cell counts (cells/uL).   12/26/2023 06:13 PM 6.57 1.20 - 7.70 x10*3/uL Final     Comment:     Percent differential counts (%) should be interpreted in the context of the absolute cell counts (cells/uL).   12/22/2023 11:31 AM 5.29 1.20 - 7.70 x10*3/uL Final     Comment:     Percent differential counts (%) should be interpreted in the context of the absolute cell counts (cells/uL).     Immature Granulocytes Absolute, Automated   Date/Time Value Ref Range Status   01/02/2024 10:01 AM 0.02 0.00 - 0.70 x10*3/uL Final   12/26/2023 06:13 PM 0.04 0.00 - 0.70 x10*3/uL Final   12/22/2023 11:31 AM 0.02 0.00 - 0.70 x10*3/uL Final     Lymphocytes Absolute   Date/Time Value Ref Range Status   01/02/2024 10:01 AM 1.05 (L) 1.20 - 4.80 x10*3/uL Final   12/26/2023 06:13 PM 0.92 (L) 1.20 - 4.80 x10*3/uL Final   12/22/2023 11:31 AM 0.92 (L) 1.20 - 4.80 x10*3/uL Final     Monocytes Absolute   Date/Time Value Ref Range Status   01/02/2024 10:01 AM 0.46 0.10 - 1.00 x10*3/uL Final   12/26/2023 06:13 PM 0.86 0.10 - 1.00 x10*3/uL Final   12/22/2023 11:31 AM 0.36 0.10 - 1.00 x10*3/uL Final     Eosinophils Absolute   Date/Time Value Ref Range Status   01/02/2024 10:01 AM 0.08 0.00 - 0.70 x10*3/uL Final   12/26/2023 06:13 PM 0.05 0.00 - 0.70 x10*3/uL Final   12/22/2023 11:31 AM 0.06 0.00 - 0.70 x10*3/uL Final     Basophils Absolute   Date/Time Value Ref Range Status   01/02/2024 10:01 AM 0.03 0.00 - 0.10 x10*3/uL Final   12/26/2023 06:13 PM 0.02 0.00 - 0.10 x10*3/uL Final   12/22/2023 11:31 AM 0.01 0.00 - 0.10 x10*3/uL Final       No components found for: \"PT\"  aPTT   Date/Time Value Ref Range Status   12/26/2023 06:13 PM 48 (H) 27 - 38 seconds Final   12/22/2023 11:31 AM 47 (H) 27 - 38 seconds Final   01/08/2023 03:00 PM 36 26 - 39 sec Final     Comment:       THE APTT IS NO LONGER USED FOR MONITORING     UNFRACTIONATED HEPARIN THERAPY.    FOR MONITORING HEPARIN THERAPY,     USE THE HEPARIN ASSAY.   "       Assessment/Plan      Patient with stage IV esophageal cancer with a history of both hepatic and pulmonary metastases at initial diagnosis.  Recently complaining of right upper quadrant pain and today left upper quadrant pain and some nondescript abdominal pains.  Ultrasonography of the right upper quadrant and abdomen has been ordered since his last visit and it has been reported as showing    IMPRESSION:  Cirrhotic hepatic morphology with indeterminate hypoechoic lesions of  the liver as above, largest measuring up to 3.6 x 3.2 x 3.0 cm.  Hepatic mass protocol MRI advised for further assessment.    The recommendation by radiology is hepatic mass with protocol MRI advised for further assess assessment.  This has been ordered and patient will be seen after the MRI for continued evaluation.  Previous scans however showed stability in the hepatic lesions.  It is not clear whether these are progressive or whether these are stable.  MRI will settle the question most likely.  Patient to continue with current regimen trastuzumab 5-FU and oxaliplatin.  I will continue to consider upgrading his treatment to trastuzumab-based capecitabine therapy.     1/2/2024: Admitted in 12/2023 for near syncopal episode with chest pain. On admission, labs were all unremarkable, CT chest angio showed pulmonary nodules and metastatic disease but no PE. Cardiology was consulted for pacemaker device interrogation which did not show any significant events. Orthostats were also done and was negative. Patient reports that day he went to ED he was feeling well and had a great day out with his wife. When they came home he felt weak and had chest pain. Symptoms resolved on their own. Patient to have follow up with cardiology soon. Today, patient reports feeling well, eating and drinking appropriately. Weight stable. Will proceed with treatment today. Ordered follow up PET/CT and MRI liver (not scheduled due to pacemaker needing clearance). RTC  after imaging.     Patient verbalized understanding, and all his questions were answered to her satisfaction    30 min spent with patient greater than 50 % of which was spent in consultation and coordination of care.

## 2024-01-03 ENCOUNTER — OFFICE VISIT (OUTPATIENT)
Dept: PULMONOLOGY | Facility: CLINIC | Age: 64
End: 2024-01-03
Payer: MEDICARE

## 2024-01-03 VITALS
BODY MASS INDEX: 23.01 KG/M2 | DIASTOLIC BLOOD PRESSURE: 79 MMHG | WEIGHT: 165 LBS | SYSTOLIC BLOOD PRESSURE: 160 MMHG | OXYGEN SATURATION: 99 % | HEART RATE: 100 BPM | RESPIRATION RATE: 16 BRPM

## 2024-01-03 ASSESSMENT — PATIENT HEALTH QUESTIONNAIRE - PHQ9
1. LITTLE INTEREST OR PLEASURE IN DOING THINGS: NOT AT ALL
SUM OF ALL RESPONSES TO PHQ9 QUESTIONS 1 AND 2: 0
2. FEELING DOWN, DEPRESSED OR HOPELESS: NOT AT ALL

## 2024-01-03 ASSESSMENT — PAIN SCALES - GENERAL: PAINLEVEL: 0-NO PAIN

## 2024-01-03 ASSESSMENT — COLUMBIA-SUICIDE SEVERITY RATING SCALE - C-SSRS
6. HAVE YOU EVER DONE ANYTHING, STARTED TO DO ANYTHING, OR PREPARED TO DO ANYTHING TO END YOUR LIFE?: NO
2. HAVE YOU ACTUALLY HAD ANY THOUGHTS OF KILLING YOURSELF?: NO

## 2024-01-03 ASSESSMENT — ENCOUNTER SYMPTOMS: DEPRESSION: 0

## 2024-01-05 ENCOUNTER — TELEMEDICINE (OUTPATIENT)
Dept: PALLIATIVE MEDICINE | Facility: CLINIC | Age: 64
End: 2024-01-05
Payer: MEDICARE

## 2024-01-05 ENCOUNTER — TELEPHONE (OUTPATIENT)
Dept: PALLIATIVE MEDICINE | Facility: HOSPITAL | Age: 64
End: 2024-01-05
Payer: COMMERCIAL

## 2024-01-05 DIAGNOSIS — Z51.5 PALLIATIVE CARE ENCOUNTER: Primary | ICD-10-CM

## 2024-01-05 DIAGNOSIS — C15.9 STAGE IV MALIGNANT NEOPLASM OF ESOPHAGUS (MULTI): ICD-10-CM

## 2024-01-05 DIAGNOSIS — G89.3 CANCER RELATED PAIN: ICD-10-CM

## 2024-01-05 DIAGNOSIS — F41.9 ANXIETY AND DEPRESSION: ICD-10-CM

## 2024-01-05 DIAGNOSIS — F32.A ANXIETY AND DEPRESSION: ICD-10-CM

## 2024-01-05 PROCEDURE — 99213 OFFICE O/P EST LOW 20 MIN: CPT | Performed by: NURSE PRACTITIONER

## 2024-01-05 RX ORDER — OXYCODONE HYDROCHLORIDE 10 MG/1
10 TABLET ORAL EVERY 4 HOURS PRN
Qty: 180 TABLET | Refills: 0 | Status: SHIPPED | OUTPATIENT
Start: 2024-01-05 | End: 2024-02-08 | Stop reason: SDUPTHER

## 2024-01-05 RX ORDER — PAROXETINE 10 MG/1
10 TABLET, FILM COATED ORAL EVERY MORNING
Qty: 30 TABLET | Refills: 3 | Status: SHIPPED | OUTPATIENT
Start: 2024-01-05 | End: 2024-03-07 | Stop reason: HOSPADM

## 2024-01-05 RX ORDER — LORAZEPAM 0.5 MG/1
0.5 TABLET ORAL 3 TIMES DAILY PRN
Qty: 90 TABLET | Refills: 0 | Status: SHIPPED | OUTPATIENT
Start: 2024-01-05 | End: 2024-02-08 | Stop reason: DRUGHIGH

## 2024-01-05 RX ORDER — FENTANYL 25 UG/1
1 PATCH TRANSDERMAL
Qty: 10 PATCH | Refills: 0 | Status: SHIPPED | OUTPATIENT
Start: 2024-01-05 | End: 2024-01-09 | Stop reason: SDUPTHER

## 2024-01-05 NOTE — PROGRESS NOTES
SUPPORTIVE AND PALLIATIVE ONCOLOGY OUTPATIENT FOLLOW-UP      SERVICE DATE: 1/5/2024    Cancer History  Stage IV metastatic esophageal cancer dx Sept 2023  -mets to liver, lung, abd lymphadenopathy  -treated with FOLFOX and trastuzumab    Med Onc: [facility]       Subjective   HISTORY OF PRESENT ILLNESS: Massimo Garcia is a 63 y.o. male with PmHx of HTN, and recently diagnosed metastatic esophageal cancer.      He presents to supportive oncology clinic for pain and symptom management.    Spoke with pt on the phone for follow up today. Still having pain in his abdomen, rates it 3/10. Taking oxycodone 10mg 1-2x/day. Moving his bowels well, miralax is helping. No N/V. Appetite is pretty good. He has had an increase in his anxiety lately, taking ativan 0.5mg tid PRN. Overall sleeping well, occasionally has a rough night.      Pain Assessment:  Pain Score:  3  Location:  stomach  Education:      Symptom Assessment:  Pain:a little  Headache: none  Dizziness:none  Lack of energy: none  Difficulty sleeping: somewhat  Worrying: none  Anxiety: somewhat  Depression: none  Pain in mouth/swallowing: none  Dry mouth: none  Taste changes: none  Shortness of breath: none  Lack of appetite: none   Nausea: none  Vomiting: none  Constipation: none  Diarrhea: none  Sore muscles: none  Numbness or tingling in hands/feet/other: none  Weight loss: none      Information obtained from: interview of patient  ______________________________________________________________________        Objective                PHYSICAL EXAMINATION   Vital Signs:   Vital signs reviewed  There were no vitals filed for this visit.  Pain Score:        Physical Exam    ASSESSMENT/PLAN    Pain  Pain is: cancer related pain  Type: visceral  Pain control: sub-optimally controlled  Home regimen:   -continue fentanyl patch 25mcg q72hrs. Rx sent today.   -continue oxycodone 10mg q4hrs PRN. Rx sent for fill on 1/17   -continue tylenol PRN   Intolerances/previously tried:    -previously received morphine with poor efficacy (while inpatient)    Opioid Use  Medication Management:   - OARRS report reviewed with no aberrant behavior; consistent with  prescriptions/records and patient history  - .  Overdose Risk Score 460.   This has been discussed with patient.   - We will continue to closely monitor the patient for signs of prescription misuse including UDS, OARRS review and subjective reports at each visit.  - concurrent benzodiazepine use with ativan, educated pt on medication safety when used in combination with opiates    - I am a provider who either is or has consulted and collaborated with a provider certified in Hospice and Palliative Medicine and have conducted a face-face visit and examination for this patient.  - Routine Urine Drug Screen completed 11/2/23 appropriately positive for opioids and negative for illicit substances  - Controlled Substance Agreement completed 11/2/23  - Specifically discussed that controlled substance prescriptions will only be provided by our group as outlined in the completed agreement  - Prescribed naloxone 11/2/23  - Red Flags: hx of ETOH abuse     Neuropathy b/l hands and feet  -pt self discontinued gabapentin 300mg bid.     Nausea/ Reflux   Intermittent nausea without vomiting related to chemotherapy and opioids   -continue protonix daily  -continue carafate PRN    Constipation   At risk for constipation related to opioids,  currently not constipated  -educated pt on importance of maintaining regular bowel schedule  Current regimen:   -continue colace 100mg bid PRN.   -continue senna 8.6mg, 1-2tabs, 1-2x/day  -continueMOM 15mL 4x/day PRN  -continue lactulose 20grams q4hrs x 4 doses PRN     Altered Mood  Acute on chronic anxiety related to health concerns   controlled with home regimen  Home regimen:   -continue paxil 10mg daily. Rx sent today.   -continue ativan 0.5mg tid PRN. Rx sent for fill on 1/17.     Decreased appetite  Related to  malignancy, chemotherapy, and disease process  -encouraged smaller, more frequent meals through out the day  -encouraged use of supplements such as Boost, Ensure, Breeze  -encouraged use of anti-emetics around meal times   -pt interested in integrative medicine-- dietary changes, etc.   -referral to Dr. Mays's office     Advance Directives  Existence of Advance Directives:No - has interest  -SW referral for assitance with ADs  Decision maker: HCPOA is wife  Code Status: Full code        Next Follow-Up Visit:  Return to clinic in 1 month    Signature and billing  Medical complexity was low level due to due to complexity of problems, extensive data review, and high risk of management/treatment.  Time was spent on the following: Prep Time, Time Directly with Patient/Family/Caregiver, Documentation Time. Total time spent: 25 mins              Some elements copied from my note on 12/1/23, the elements have been updated and all reflect current decision making from today, 1/22/2024.      Plan of Care discussed with: Patient    SIGNATURE: ALFREDITO Ferguson-CNP    Contact information:  [facility]ive [facility] Pall[facility] Oncology  Monday-Friday 8 AM-5 PM  Phone:  [phone], press option #5, then option #1.   Or Epic Secure Chat

## 2024-01-05 NOTE — TELEPHONE ENCOUNTER
Pharmacy confirms no PA needed on Fentanyl dose increase but has to order in (patient will not need until next Wed.).

## 2024-01-08 ENCOUNTER — APPOINTMENT (OUTPATIENT)
Dept: INTEGRATIVE MEDICINE | Facility: CLINIC | Age: 64
End: 2024-01-08
Payer: MEDICARE

## 2024-01-08 ENCOUNTER — ALLIED HEALTH (OUTPATIENT)
Dept: INTEGRATIVE MEDICINE | Facility: CLINIC | Age: 64
End: 2024-01-08
Payer: MEDICARE

## 2024-01-08 ENCOUNTER — APPOINTMENT (OUTPATIENT)
Dept: HEMATOLOGY/ONCOLOGY | Facility: CLINIC | Age: 64
End: 2024-01-08
Payer: COMMERCIAL

## 2024-01-08 DIAGNOSIS — C15.9 STAGE IV MALIGNANT NEOPLASM OF ESOPHAGUS (MULTI): Primary | ICD-10-CM

## 2024-01-08 DIAGNOSIS — C78.7 METASTASES TO THE LIVER (MULTI): ICD-10-CM

## 2024-01-08 PROCEDURE — 99215 OFFICE O/P EST HI 40 MIN: CPT | Performed by: HOSPITALIST

## 2024-01-08 NOTE — PROGRESS NOTES
Patient ID: Massimo Garcia is a 63 y.o. male.  Referring Physician: No referring provider defined for this encounter.  Primary Care Provider: Dayne Santamaria DO  An interactive audio and video telecommunication system which permits real time communications between the patient (at the originating site) and provider (at the distant site) was utilized to provide this telehealth service.    Verbal consent was requested and obtained on this date for a telehealth visit.     CANCER HISTORY:   64 yo man with stage IV esophageal cancer met to liver/lungs, cirrhotic also on imaging    Treatment: 5FU/oxaliplatin/trastuzumab - stopped oxaliplatin 6 mo ago    1/2/24 near syncope  CT with pulm nodules, no PE  Cardiology eval neg  11/23 heart block - pacemaker placed    INTEGRATIVE HISTORY:  Symptoms:  Neuropathy - fentanyl patch for pain, oxycodone  Started 6 mo ago with tingling in feet etc.  Feels like walking on blisters, intermittent but more consistent  Tried gabapentin not helping    Stomach discomfort and fatigue    Diet: overall eating well, weight stable, usually cream of wheat in am, lunch with sandwich with some meat.  Eats what he can for supper    PA: overall trying to stay active, works with wood outside    Sleep: sleeping well usually    Stress: some anxiety, overall doing well, intermittent  Tries to manage through relaxation by lying down    Natural Products:    Balance of Nature vitamins  CBD    ROS:  no ha, visual symptoms, hearing loss  no sob, chest pain, palp  ROS o/w non contributory, please see HPI    Objective    BSA: There is no height or weight on file to calculate BSA.  There were no vitals taken for this visit.    PHYSICAL EXAM:  NAD, awake/alert  HEENT, NCAT, OP clear, no oral lesions  CTA bilat  RRR no mgr  Abd soft/nt/nd+bs  No c/c/e/ttp  Motor/sensory intact, CN 2-12 intact     RESULTS:  Lab Results   Component Value Date    WBC 6.4 01/02/2024    HGB 11.7 (L) 01/02/2024    HCT 34.9 (L) 01/02/2024    PLT  90 (L) 01/02/2024    CREATININE 0.93 01/02/2024    AST 34 01/02/2024       Assessment/Plan   Cancer Staging   No matching staging information was found for the patient.    CANCER SPECIFIC RECCS:  62 yo man with stage IV esophageal cancer met to liver/lungs, cirrhotic also on imaging    Treatment: 5FU/oxaliplatin/trastuzumab - stopped oxaliplatin 6 mo ago    1/2/24 near syncope  CT with pulm nodules, no PE  Cardiology eval neg  11/23 heart block - pacemaker placed    INTEGRATIVE HISTORY:  Symptoms:  Neuropathy - fentanyl patch for pain, oxycodone  Started 6 mo ago with tingling in feet etc.  Feels like walking on blisters, intermittent but more consistent    LIFESTYLE:  Diet - 5-9 fruits/veg/day (7 veg to 2 fruits)  Cruciferous Vegetables - Brussel Sprouts, Kale, Broccoli, Cauliflower  PHYSICAL ACTIVITY 30 MIN/DAY    Reading:  [employer]  Cancer Fighting Kitchen    Websites:  Cook for your Life  [email or web address]    SYMPTOM MANAGEMENT:  Neuropathy - acupuncture in Symptom Management  Scrambler therapy    Nausea - acupuncture  Keri - tea with honey  Acupressure    Anxiety - continue relaxation method    Follow up: Symptom management acupuncture    Thank you for consulting me in for this patient  Chidi Mays MD

## 2024-01-09 ENCOUNTER — TELEPHONE (OUTPATIENT)
Dept: ADMISSION | Facility: HOSPITAL | Age: 64
End: 2024-01-09

## 2024-01-09 ENCOUNTER — HOSPITAL ENCOUNTER (OUTPATIENT)
Dept: RADIOLOGY | Facility: HOSPITAL | Age: 64
Discharge: HOME | End: 2024-01-09
Payer: MEDICARE

## 2024-01-09 ENCOUNTER — PATIENT OUTREACH (OUTPATIENT)
Dept: PRIMARY CARE | Facility: CLINIC | Age: 64
End: 2024-01-09

## 2024-01-09 ENCOUNTER — INFUSION (OUTPATIENT)
Dept: HEMATOLOGY/ONCOLOGY | Facility: CLINIC | Age: 64
End: 2024-01-09
Payer: MEDICARE

## 2024-01-09 VITALS
WEIGHT: 164.9 LBS | DIASTOLIC BLOOD PRESSURE: 76 MMHG | BODY MASS INDEX: 23 KG/M2 | SYSTOLIC BLOOD PRESSURE: 131 MMHG | RESPIRATION RATE: 17 BRPM | OXYGEN SATURATION: 98 % | HEART RATE: 79 BPM | TEMPERATURE: 98.1 F

## 2024-01-09 DIAGNOSIS — C15.9 STAGE IV MALIGNANT NEOPLASM OF ESOPHAGUS (MULTI): ICD-10-CM

## 2024-01-09 DIAGNOSIS — G89.3 CANCER RELATED PAIN: ICD-10-CM

## 2024-01-09 PROCEDURE — 2500000004 HC RX 250 GENERAL PHARMACY W/ HCPCS (ALT 636 FOR OP/ED)

## 2024-01-09 PROCEDURE — 78815 PET IMAGE W/CT SKULL-THIGH: CPT | Mod: PET TUMOR SUBSQ TX STRATEGY | Performed by: RADIOLOGY

## 2024-01-09 PROCEDURE — 96523 IRRIG DRUG DELIVERY DEVICE: CPT

## 2024-01-09 PROCEDURE — A9552 F18 FDG: HCPCS | Mod: MUE | Performed by: PHYSICIAN ASSISTANT

## 2024-01-09 PROCEDURE — 3430000001 HC RX 343 DIAGNOSTIC RADIOPHARMACEUTICALS: Mod: MUE | Performed by: PHYSICIAN ASSISTANT

## 2024-01-09 PROCEDURE — 78815 PET IMAGE W/CT SKULL-THIGH: CPT | Mod: PS

## 2024-01-09 PROCEDURE — RXMED WILLOW AMBULATORY MEDICATION CHARGE

## 2024-01-09 RX ORDER — FENTANYL 25 UG/1
1 PATCH TRANSDERMAL
Qty: 10 PATCH | Refills: 0 | Status: SHIPPED | OUTPATIENT
Start: 2024-01-09 | End: 2024-02-08 | Stop reason: SDUPTHER

## 2024-01-09 RX ORDER — HEPARIN 100 UNIT/ML
500 SYRINGE INTRAVENOUS AS NEEDED
Status: CANCELLED | OUTPATIENT
Start: 2024-01-09

## 2024-01-09 RX ORDER — FLUDEOXYGLUCOSE F 18 200 MCI/ML
10.6 INJECTION, SOLUTION INTRAVENOUS
Status: COMPLETED | OUTPATIENT
Start: 2024-01-09 | End: 2024-01-09

## 2024-01-09 RX ORDER — HEPARIN SODIUM,PORCINE/PF 10 UNIT/ML
50 SYRINGE (ML) INTRAVENOUS AS NEEDED
Status: CANCELLED | OUTPATIENT
Start: 2024-01-09

## 2024-01-09 RX ORDER — HEPARIN 100 UNIT/ML
SYRINGE INTRAVENOUS
Status: COMPLETED
Start: 2024-01-09 | End: 2024-01-09

## 2024-01-09 RX ADMIN — FLUDEOXYGLUCOSE F 18 10.6 MILLICURIE: 200 INJECTION, SOLUTION INTRAVENOUS at 08:12

## 2024-01-09 RX ADMIN — HEPARIN 500 UNITS: 100 SYRINGE at 09:41

## 2024-01-09 ASSESSMENT — PAIN SCALES - GENERAL: PAINLEVEL: 0-NO PAIN

## 2024-01-09 NOTE — PROGRESS NOTES
14 day follow up call to patient after hospitalization. Patient did not see PCP within 14 days of discharge.  At time of outreach call the patient feels as if their condition has returned to baseline.    Patient is active with Palliative care. He states that he is doing okay at home.

## 2024-01-09 NOTE — TELEPHONE ENCOUNTER
Pt's [facility] pharmacy does not have fentanyl 25mcg in stock.  He asked to have recent prescription re-sent to [facility] retail pharmacy.

## 2024-01-10 ENCOUNTER — OFFICE VISIT (OUTPATIENT)
Dept: HEMATOLOGY/ONCOLOGY | Facility: CLINIC | Age: 64
End: 2024-01-10
Payer: MEDICARE

## 2024-01-10 ENCOUNTER — PHARMACY VISIT (OUTPATIENT)
Dept: PHARMACY | Facility: CLINIC | Age: 64
End: 2024-01-10
Payer: MEDICARE

## 2024-01-10 VITALS
WEIGHT: 164.24 LBS | TEMPERATURE: 98.4 F | RESPIRATION RATE: 18 BRPM | BODY MASS INDEX: 22.91 KG/M2 | HEART RATE: 85 BPM | SYSTOLIC BLOOD PRESSURE: 114 MMHG | DIASTOLIC BLOOD PRESSURE: 76 MMHG | OXYGEN SATURATION: 98 %

## 2024-01-10 DIAGNOSIS — C15.9 STAGE IV MALIGNANT NEOPLASM OF ESOPHAGUS (MULTI): ICD-10-CM

## 2024-01-10 LAB
ATRIAL RATE: 106 BPM
ATRIAL RATE: 77 BPM
ATRIAL RATE: 91 BPM
ATRIAL RATE: 91 BPM
P AXIS: 46 DEGREES
P AXIS: 52 DEGREES
P AXIS: 60 DEGREES
P AXIS: 87 DEGREES
P OFFSET: 176 MS
P OFFSET: 178 MS
P OFFSET: 183 MS
P OFFSET: 185 MS
P ONSET: 125 MS
P ONSET: 126 MS
P ONSET: 130 MS
P ONSET: 137 MS
PR INTERVAL: 154 MS
PR INTERVAL: 166 MS
PR INTERVAL: 168 MS
PR INTERVAL: 170 MS
Q ONSET: 208 MS
Q ONSET: 211 MS
Q ONSET: 214 MS
Q ONSET: 214 MS
QRS COUNT: 13 BEATS
QRS COUNT: 15 BEATS
QRS COUNT: 15 BEATS
QRS COUNT: 18 BEATS
QRS DURATION: 154 MS
QRS DURATION: 156 MS
QRS DURATION: 158 MS
QRS DURATION: 158 MS
QT INTERVAL: 390 MS
QT INTERVAL: 410 MS
QT INTERVAL: 426 MS
QT INTERVAL: 444 MS
QTC CALCULATION(BAZETT): 502 MS
QTC CALCULATION(BAZETT): 504 MS
QTC CALCULATION(BAZETT): 518 MS
QTC CALCULATION(BAZETT): 523 MS
QTC FREDERICIA: 471 MS
QTC FREDERICIA: 471 MS
QTC FREDERICIA: 482 MS
QTC FREDERICIA: 489 MS
R AXIS: 102 DEGREES
R AXIS: 105 DEGREES
T AXIS: 29 DEGREES
T AXIS: 54 DEGREES
T AXIS: 61 DEGREES
T AXIS: 62 DEGREES
T OFFSET: 403 MS
T OFFSET: 419 MS
T OFFSET: 424 MS
T OFFSET: 436 MS
VENTRICULAR RATE: 106 BPM
VENTRICULAR RATE: 77 BPM
VENTRICULAR RATE: 91 BPM
VENTRICULAR RATE: 91 BPM

## 2024-01-10 PROCEDURE — 99215 OFFICE O/P EST HI 40 MIN: CPT | Performed by: INTERNAL MEDICINE

## 2024-01-10 PROCEDURE — 1160F RVW MEDS BY RX/DR IN RCRD: CPT | Performed by: INTERNAL MEDICINE

## 2024-01-10 PROCEDURE — 1159F MED LIST DOCD IN RCRD: CPT | Performed by: INTERNAL MEDICINE

## 2024-01-10 PROCEDURE — 1125F AMNT PAIN NOTED PAIN PRSNT: CPT | Performed by: INTERNAL MEDICINE

## 2024-01-10 ASSESSMENT — ENCOUNTER SYMPTOMS
DEPRESSION: 0
OCCASIONAL FEELINGS OF UNSTEADINESS: 0
LOSS OF SENSATION IN FEET: 0

## 2024-01-10 ASSESSMENT — PAIN SCALES - GENERAL: PAINLEVEL: 0-NO PAIN

## 2024-01-10 NOTE — PATIENT INSTRUCTIONS
Today you met with your hematologist/oncologist.  Recent treatment options were discussed and questions answered.  Appointment for the next treatment is scheduled on 1/15.  While we appreciate that you verbalized understanding, if any questions arise after leaving, please do not hesitate to call the office to discuss.  [phone] Asha Albert

## 2024-01-10 NOTE — PROGRESS NOTES
Patient ID: Massimo Garcia is a 63 y.o. male.  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  Has done well on trastuzumab FOLFOX regimen.  Today complains of dizziness and presyncopal symptoms.  Metastatic burden is liver some lung lesions as well as abdominal lymphadenopathy.          LIVER:  Liver measures 14.7 cm in length and demonstrates mildly diffuse  coarsened hepatic echotexture with mild nodular contour which may  indicate cirrhotic morphology. There is an indeterminate hypoechoic  lesion within the right hepatic lobe measuring up. 3.6 x 3.2 x 3.0 cm  and a 2nd 2.6 x 1.8 x 2.7 cm indeterminate heterogeneous hypoechoic  lesion within the left hepatic lobe. Additional scatter presumed  hepatic cysts, largest measuring up to 2.4 x 2.1 x 1.8 cm      GALLBLADDER:  Contracted. No obvious cholelithiasis.      BILIARY SYSTEM:  No evidence of intra hepatic biliary dilatation is identified; the  common bile duct measures 13.2 mm.          PANCREAS:  The pancreas is poorly visualized due to overlying bowel gas.      RIGHT KIDNEY:  The right kidney measures 11.5 cm in length. No hydronephrosis or  renal calculi are seen.          IMPRESSION:  Cirrhotic hepatic morphology with indeterminate hypoechoic lesions of  the liver as above, largest measuring up to 3.6 x 3.2 x 3.0 cm.  Hepatic mass protocol MRI advised for further assessment.      MACRO:  Critical Finding:  See findings. Notification was initiated on  12/6/2023  Review of Systems - Oncology     Objective   BSA: 1.93 meters squared  /76 (BP Location: Left arm, Patient Position: Sitting, BP Cuff Size: Adult)   Pulse 85   Temp 36.9 °C (98.4 °F) (Temporal)   Resp 18   Wt 74.5 kg (164 lb 3.9 oz)   SpO2 98%   BMI 22.91 kg/m²      has a past medical history of  Essential (primary) hypertension (12/21/2013), Personal history of other diseases of the circulatory system, and Pneumothorax (12/04/2023).   has a past surgical history that includes Total knee arthroplasty (09/30/2016); Total knee arthroplasty (09/30/2016); Cardiac electrophysiology procedure (N/A, 11/7/2023); and Cardiac electrophysiology procedure (Left, 11/9/2023).  No family history on file.  Oncology History   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)   9/13/2023 Initial Diagnosis    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)     9/13/2023 -  Chemotherapy    Trastuzumab + mFOLFOX6 (Fluorouracil Continuous Infusion / Leucovorin / Oxaliplatin), 14 Day Cycles     Metastases to the liver (CMS/HCC)   9/13/2023 Initial Diagnosis    Metastases to the liver (CMS/HCC)     9/13/2023 -  Chemotherapy    Trastuzumab + mFOLFOX6 (Fluorouracil Continuous Infusion / Leucovorin / Oxaliplatin), 14 Day Cycles         Massimo Garcia  reports that he has been smoking cigarettes and cigars. He has never been exposed to tobacco smoke. He has never used smokeless tobacco.  He  reports that he does not currently use alcohol.  He  reports current drug use. Drug: Marijuana.    Physical Exam    WBC   Date/Time Value Ref Range Status   01/02/2024 10:01 AM 6.4 4.4 - 11.3 x10*3/uL Final   12/27/2023 07:21 AM 5.0 4.4 - 11.3 x10*3/uL Final   12/26/2023 06:13 PM 8.5 4.4 - 11.3 x10*3/uL Final     nRBC   Date Value Ref Range Status   12/27/2023 0.0 0.0 - 0.0 /100 WBCs Final   12/26/2023 0.0 0.0 - 0.0 /100 WBCs Final   12/22/2023 0.0 0.0 - 0.0 /100 WBCs Final     RBC   Date Value Ref Range Status   01/02/2024 3.95 (L) 4.50 - 5.90 x10*6/uL Final   12/27/2023 3.90 (L) 4.50 - 5.90 x10*6/uL Final   12/26/2023 4.01 (L) 4.50 - 5.90 x10*6/uL Final     Hemoglobin   Date Value Ref Range Status   01/02/2024 11.7 (L) 13.5 - 17.5 g/dL Final   12/27/2023 11.4 (L) 13.5 - 17.5 g/dL Final   12/26/2023 11.8 (L) 13.5 - 17.5 g/dL Final     Hematocrit    Date Value Ref Range Status   01/02/2024 34.9 (L) 41.0 - 52.0 % Final   12/27/2023 33.7 (L) 41.0 - 52.0 % Final   12/26/2023 34.6 (L) 41.0 - 52.0 % Final     MCV   Date/Time Value Ref Range Status   01/02/2024 10:01 AM 88 80 - 100 fL Final   12/27/2023 07:21 AM 86 80 - 100 fL Final   12/26/2023 06:13 PM 86 80 - 100 fL Final     MCH   Date/Time Value Ref Range Status   01/02/2024 10:01 AM 29.6 26.0 - 34.0 pg Final   12/27/2023 07:21 AM 29.2 26.0 - 34.0 pg Final   12/26/2023 06:13 PM 29.4 26.0 - 34.0 pg Final     MCHC   Date/Time Value Ref Range Status   01/02/2024 10:01 AM 33.5 32.0 - 36.0 g/dL Final   12/27/2023 07:21 AM 33.8 32.0 - 36.0 g/dL Final   12/26/2023 06:13 PM 34.1 32.0 - 36.0 g/dL Final     RDW   Date/Time Value Ref Range Status   01/02/2024 10:01 AM 14.5 11.5 - 14.5 % Final   12/27/2023 07:21 AM 14.6 (H) 11.5 - 14.5 % Final   12/26/2023 06:13 PM 14.5 11.5 - 14.5 % Final     Platelets   Date/Time Value Ref Range Status   01/02/2024 10:01 AM 90 (L) 150 - 450 x10*3/uL Final   12/27/2023 07:21 AM 98 (L) 150 - 450 x10*3/uL Final   12/26/2023 06:13  (L) 150 - 450 x10*3/uL Final     MPV   Date/Time Value Ref Range Status   10/30/2023 09:13 AM 10.3 7.5 - 11.5 fL Final   10/16/2023 08:34 AM 10.7 7.5 - 11.5 fL Final   10/02/2023 09:08 AM 10.0 7.5 - 11.5 fL Final     Neutrophils %   Date/Time Value Ref Range Status   01/02/2024 10:01 AM 74.2 40.0 - 80.0 % Final   12/26/2023 06:13 PM 77.6 40.0 - 80.0 % Final   12/22/2023 11:31 AM 79.4 40.0 - 80.0 % Final     Immature Granulocytes %, Automated   Date/Time Value Ref Range Status   01/02/2024 10:01 AM 0.3 0.0 - 0.9 % Final     Comment:     Immature Granulocyte Count (IG) includes promyelocytes, myelocytes and metamyelocytes but does not include bands. Percent differential counts (%) should be interpreted in the context of the absolute cell counts (cells/UL).   12/26/2023 06:13 PM 0.5 0.0 - 0.9 % Final     Comment:     Immature Granulocyte Count (IG) includes  promyelocytes, myelocytes and metamyelocytes but does not include bands. Percent differential counts (%) should be interpreted in the context of the absolute cell counts (cells/UL).   12/22/2023 11:31 AM 0.3 0.0 - 0.9 % Final     Comment:     Immature Granulocyte Count (IG) includes promyelocytes, myelocytes and metamyelocytes but does not include bands. Percent differential counts (%) should be interpreted in the context of the absolute cell counts (cells/UL).     Lymphocytes %   Date/Time Value Ref Range Status   01/02/2024 10:01 AM 16.5 13.0 - 44.0 % Final   12/26/2023 06:13 PM 10.9 13.0 - 44.0 % Final   12/22/2023 11:31 AM 13.8 13.0 - 44.0 % Final     Monocytes %   Date/Time Value Ref Range Status   01/02/2024 10:01 AM 7.2 2.0 - 10.0 % Final   12/26/2023 06:13 PM 10.2 2.0 - 10.0 % Final   12/22/2023 11:31 AM 5.4 2.0 - 10.0 % Final     Eosinophils %   Date/Time Value Ref Range Status   01/02/2024 10:01 AM 1.3 0.0 - 6.0 % Final   12/26/2023 06:13 PM 0.6 0.0 - 6.0 % Final   12/22/2023 11:31 AM 0.9 0.0 - 6.0 % Final     Basophils %   Date/Time Value Ref Range Status   01/02/2024 10:01 AM 0.5 0.0 - 2.0 % Final   12/26/2023 06:13 PM 0.2 0.0 - 2.0 % Final   12/22/2023 11:31 AM 0.2 0.0 - 2.0 % Final     Neutrophils Absolute   Date/Time Value Ref Range Status   01/02/2024 10:01 AM 4.72 1.20 - 7.70 x10*3/uL Final     Comment:     Percent differential counts (%) should be interpreted in the context of the absolute cell counts (cells/uL).   12/26/2023 06:13 PM 6.57 1.20 - 7.70 x10*3/uL Final     Comment:     Percent differential counts (%) should be interpreted in the context of the absolute cell counts (cells/uL).   12/22/2023 11:31 AM 5.29 1.20 - 7.70 x10*3/uL Final     Comment:     Percent differential counts (%) should be interpreted in the context of the absolute cell counts (cells/uL).     Immature Granulocytes Absolute, Automated   Date/Time Value Ref Range Status   01/02/2024 10:01 AM 0.02 0.00 - 0.70 x10*3/uL Final  "  12/26/2023 06:13 PM 0.04 0.00 - 0.70 x10*3/uL Final   12/22/2023 11:31 AM 0.02 0.00 - 0.70 x10*3/uL Final     Lymphocytes Absolute   Date/Time Value Ref Range Status   01/02/2024 10:01 AM 1.05 (L) 1.20 - 4.80 x10*3/uL Final   12/26/2023 06:13 PM 0.92 (L) 1.20 - 4.80 x10*3/uL Final   12/22/2023 11:31 AM 0.92 (L) 1.20 - 4.80 x10*3/uL Final     Monocytes Absolute   Date/Time Value Ref Range Status   01/02/2024 10:01 AM 0.46 0.10 - 1.00 x10*3/uL Final   12/26/2023 06:13 PM 0.86 0.10 - 1.00 x10*3/uL Final   12/22/2023 11:31 AM 0.36 0.10 - 1.00 x10*3/uL Final     Eosinophils Absolute   Date/Time Value Ref Range Status   01/02/2024 10:01 AM 0.08 0.00 - 0.70 x10*3/uL Final   12/26/2023 06:13 PM 0.05 0.00 - 0.70 x10*3/uL Final   12/22/2023 11:31 AM 0.06 0.00 - 0.70 x10*3/uL Final     Basophils Absolute   Date/Time Value Ref Range Status   01/02/2024 10:01 AM 0.03 0.00 - 0.10 x10*3/uL Final   12/26/2023 06:13 PM 0.02 0.00 - 0.10 x10*3/uL Final   12/22/2023 11:31 AM 0.01 0.00 - 0.10 x10*3/uL Final       No components found for: \"PT\"  aPTT   Date/Time Value Ref Range Status   12/26/2023 06:13 PM 48 (H) 27 - 38 seconds Final   12/22/2023 11:31 AM 47 (H) 27 - 38 seconds Final   01/08/2023 03:00 PM 36 26 - 39 sec Final     Comment:       THE APTT IS NO LONGER USED FOR MONITORING     UNFRACTIONATED HEPARIN THERAPY.    FOR MONITORING HEPARIN THERAPY,     USE THE HEPARIN ASSAY.         Assessment/Plan      Patient seen and PET CT scan radiography evaluated.  Patient showing evidence of disease progression.  Overall clinical radiographic impression is disease progression.  They have given these findings patient has been placed back on oxaliplatin 5-FU trastuzumab and leucovorin.  Patient's next chemotherapy will be with the addition of Oxaliplatin dosed at 85 mg/m² every 2 weeks.    Discussed with patient.  Patient had initial consent for FOLFOX.  And has not been reconsented but is concerned has been reemphasized.  Will continue to " follow.     Diagnoses and all orders for this visit:  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)  -     Clinic Appointment Request Chemo Follow Up; ROSETTA RODRIGUEZ MD

## 2024-01-11 ENCOUNTER — TELEPHONE (OUTPATIENT)
Dept: PRIMARY CARE | Facility: CLINIC | Age: 64
End: 2024-01-11
Payer: COMMERCIAL

## 2024-01-11 DIAGNOSIS — J01.20 ACUTE ETHMOIDAL SINUSITIS, RECURRENCE NOT SPECIFIED: Primary | ICD-10-CM

## 2024-01-11 RX ORDER — AMOXICILLIN AND CLAVULANATE POTASSIUM 875; 125 MG/1; MG/1
875 TABLET, FILM COATED ORAL 2 TIMES DAILY
Qty: 14 TABLET | Refills: 0 | Status: SHIPPED | OUTPATIENT
Start: 2024-01-11 | End: 2024-01-21

## 2024-01-11 NOTE — TELEPHONE ENCOUNTER
Patients wife called stating  has been sick for about a week.  Covid negative.  Terrible head congestion.  Drainage.  Not in his lungs.  Asking if you could call something in or if he could be seen?    ANTIONE Olsen

## 2024-01-15 ENCOUNTER — INFUSION (OUTPATIENT)
Dept: HEMATOLOGY/ONCOLOGY | Facility: CLINIC | Age: 64
End: 2024-01-15
Payer: MEDICARE

## 2024-01-15 ENCOUNTER — APPOINTMENT (OUTPATIENT)
Dept: HEMATOLOGY/ONCOLOGY | Facility: CLINIC | Age: 64
End: 2024-01-15
Payer: MEDICARE

## 2024-01-15 VITALS
RESPIRATION RATE: 17 BRPM | DIASTOLIC BLOOD PRESSURE: 76 MMHG | WEIGHT: 167.22 LBS | HEART RATE: 82 BPM | OXYGEN SATURATION: 99 % | BODY MASS INDEX: 23.32 KG/M2 | SYSTOLIC BLOOD PRESSURE: 145 MMHG | TEMPERATURE: 97.9 F

## 2024-01-15 DIAGNOSIS — C15.9 STAGE IV MALIGNANT NEOPLASM OF ESOPHAGUS (MULTI): ICD-10-CM

## 2024-01-15 DIAGNOSIS — C78.7 METASTASES TO THE LIVER (MULTI): ICD-10-CM

## 2024-01-15 LAB
ALBUMIN SERPL BCP-MCNC: 4 G/DL (ref 3.4–5)
ALP SERPL-CCNC: 190 U/L (ref 33–136)
ALT SERPL W P-5'-P-CCNC: 32 U/L (ref 10–52)
ANION GAP SERPL CALC-SCNC: 19 MMOL/L (ref 10–20)
AST SERPL W P-5'-P-CCNC: 43 U/L (ref 9–39)
BASOPHILS # BLD AUTO: 0.02 X10*3/UL (ref 0–0.1)
BASOPHILS NFR BLD AUTO: 0.2 %
BILIRUB SERPL-MCNC: 0.5 MG/DL (ref 0–1.2)
BUN SERPL-MCNC: 14 MG/DL (ref 6–23)
CALCIUM SERPL-MCNC: 9.2 MG/DL (ref 8.6–10.3)
CHLORIDE SERPL-SCNC: 104 MMOL/L (ref 98–107)
CO2 SERPL-SCNC: 22 MMOL/L (ref 21–32)
CREAT SERPL-MCNC: 0.86 MG/DL (ref 0.5–1.3)
EGFRCR SERPLBLD CKD-EPI 2021: >90 ML/MIN/1.73M*2
EOSINOPHIL # BLD AUTO: 0.11 X10*3/UL (ref 0–0.7)
EOSINOPHIL NFR BLD AUTO: 1.2 %
ERYTHROCYTE [DISTWIDTH] IN BLOOD BY AUTOMATED COUNT: 14.5 % (ref 11.5–14.5)
GLUCOSE SERPL-MCNC: 107 MG/DL (ref 74–99)
HCT VFR BLD AUTO: 35.3 % (ref 41–52)
HGB BLD-MCNC: 11.9 G/DL (ref 13.5–17.5)
IMM GRANULOCYTES # BLD AUTO: 0.08 X10*3/UL (ref 0–0.7)
IMM GRANULOCYTES NFR BLD AUTO: 0.9 % (ref 0–0.9)
LYMPHOCYTES # BLD AUTO: 1 X10*3/UL (ref 1.2–4.8)
LYMPHOCYTES NFR BLD AUTO: 10.8 %
MCH RBC QN AUTO: 30 PG (ref 26–34)
MCHC RBC AUTO-ENTMCNC: 33.7 G/DL (ref 32–36)
MCV RBC AUTO: 89 FL (ref 80–100)
MONOCYTES # BLD AUTO: 0.54 X10*3/UL (ref 0.1–1)
MONOCYTES NFR BLD AUTO: 5.9 %
NEUTROPHILS # BLD AUTO: 7.48 X10*3/UL (ref 1.2–7.7)
NEUTROPHILS NFR BLD AUTO: 81 %
PLATELET # BLD AUTO: 90 X10*3/UL (ref 150–450)
POTASSIUM SERPL-SCNC: 3.9 MMOL/L (ref 3.5–5.3)
PROT SERPL-MCNC: 6.3 G/DL (ref 6.4–8.2)
RBC # BLD AUTO: 3.97 X10*6/UL (ref 4.5–5.9)
SODIUM SERPL-SCNC: 141 MMOL/L (ref 136–145)
WBC # BLD AUTO: 9.2 X10*3/UL (ref 4.4–11.3)

## 2024-01-15 PROCEDURE — 96415 CHEMO IV INFUSION ADDL HR: CPT

## 2024-01-15 PROCEDURE — 96417 CHEMO IV INFUS EACH ADDL SEQ: CPT

## 2024-01-15 PROCEDURE — 96372 THER/PROPH/DIAG INJ SC/IM: CPT

## 2024-01-15 PROCEDURE — 96413 CHEMO IV INFUSION 1 HR: CPT

## 2024-01-15 PROCEDURE — 96375 TX/PRO/DX INJ NEW DRUG ADDON: CPT | Mod: INF

## 2024-01-15 PROCEDURE — 2500000004 HC RX 250 GENERAL PHARMACY W/ HCPCS (ALT 636 FOR OP/ED): Performed by: INTERNAL MEDICINE

## 2024-01-15 PROCEDURE — 2500000004 HC RX 250 GENERAL PHARMACY W/ HCPCS (ALT 636 FOR OP/ED)

## 2024-01-15 PROCEDURE — 96372 THER/PROPH/DIAG INJ SC/IM: CPT | Performed by: INTERNAL MEDICINE

## 2024-01-15 PROCEDURE — 84075 ASSAY ALKALINE PHOSPHATASE: CPT | Performed by: INTERNAL MEDICINE

## 2024-01-15 PROCEDURE — 85025 COMPLETE CBC W/AUTO DIFF WBC: CPT | Performed by: INTERNAL MEDICINE

## 2024-01-15 PROCEDURE — 96411 CHEMO IV PUSH ADDL DRUG: CPT

## 2024-01-15 PROCEDURE — 2500000004 HC RX 250 GENERAL PHARMACY W/ HCPCS (ALT 636 FOR OP/ED): Mod: JZ | Performed by: INTERNAL MEDICINE

## 2024-01-15 PROCEDURE — 96368 THER/DIAG CONCURRENT INF: CPT

## 2024-01-15 RX ORDER — ACETAMINOPHEN 325 MG/1
650 TABLET ORAL ONCE
Status: COMPLETED | OUTPATIENT
Start: 2024-01-15 | End: 2024-01-15

## 2024-01-15 RX ORDER — DEXTROMETHORPHAN HYDROBROMIDE, GUAIFENESIN 5; 100 MG/5ML; MG/5ML
650 LIQUID ORAL ONCE
Status: CANCELLED
Start: 2024-02-12 | End: 2024-02-05

## 2024-01-15 RX ORDER — HEPARIN SODIUM,PORCINE/PF 10 UNIT/ML
50 SYRINGE (ML) INTRAVENOUS AS NEEDED
Status: CANCELLED | OUTPATIENT
Start: 2024-01-15

## 2024-01-15 RX ORDER — CYANOCOBALAMIN 1000 UG/ML
1000 INJECTION, SOLUTION INTRAMUSCULAR; SUBCUTANEOUS ONCE
Status: CANCELLED | OUTPATIENT
Start: 2024-02-12

## 2024-01-15 RX ORDER — FAMOTIDINE 10 MG/ML
20 INJECTION INTRAVENOUS ONCE AS NEEDED
Status: DISCONTINUED | OUTPATIENT
Start: 2024-01-15 | End: 2024-01-15 | Stop reason: HOSPADM

## 2024-01-15 RX ORDER — PALONOSETRON 0.05 MG/ML
0.25 INJECTION, SOLUTION INTRAVENOUS ONCE
Status: COMPLETED | OUTPATIENT
Start: 2024-01-15 | End: 2024-01-15

## 2024-01-15 RX ORDER — HEPARIN 100 UNIT/ML
500 SYRINGE INTRAVENOUS AS NEEDED
Status: CANCELLED | OUTPATIENT
Start: 2024-01-15

## 2024-01-15 RX ORDER — FAMOTIDINE 10 MG/ML
20 INJECTION INTRAVENOUS ONCE AS NEEDED
Status: CANCELLED | OUTPATIENT
Start: 2024-02-12

## 2024-01-15 RX ORDER — DEXAMETHASONE SODIUM PHOSPHATE 4 MG/ML
8 INJECTION, SOLUTION INTRA-ARTICULAR; INTRALESIONAL; INTRAMUSCULAR; INTRAVENOUS; SOFT TISSUE ONCE
Status: COMPLETED | OUTPATIENT
Start: 2024-01-15 | End: 2024-01-15

## 2024-01-15 RX ORDER — FLUOROURACIL 50 MG/ML
400 INJECTION, SOLUTION INTRAVENOUS ONCE
Status: COMPLETED | OUTPATIENT
Start: 2024-01-15 | End: 2024-01-15

## 2024-01-15 RX ORDER — DIPHENHYDRAMINE HYDROCHLORIDE 50 MG/ML
50 INJECTION INTRAMUSCULAR; INTRAVENOUS AS NEEDED
Status: CANCELLED | OUTPATIENT
Start: 2024-02-12

## 2024-01-15 RX ORDER — HEPARIN 100 UNIT/ML
SYRINGE INTRAVENOUS
Status: COMPLETED
Start: 2024-01-15 | End: 2024-01-15

## 2024-01-15 RX ORDER — EPINEPHRINE 0.3 MG/.3ML
0.3 INJECTION SUBCUTANEOUS EVERY 5 MIN PRN
Status: DISCONTINUED | OUTPATIENT
Start: 2024-01-15 | End: 2024-01-15 | Stop reason: HOSPADM

## 2024-01-15 RX ORDER — ALBUTEROL SULFATE 0.83 MG/ML
3 SOLUTION RESPIRATORY (INHALATION) AS NEEDED
Status: DISCONTINUED | OUTPATIENT
Start: 2024-01-15 | End: 2024-01-15 | Stop reason: HOSPADM

## 2024-01-15 RX ORDER — EPINEPHRINE 0.3 MG/.3ML
0.3 INJECTION SUBCUTANEOUS EVERY 5 MIN PRN
Status: CANCELLED | OUTPATIENT
Start: 2024-02-12

## 2024-01-15 RX ORDER — ALBUTEROL SULFATE 0.83 MG/ML
3 SOLUTION RESPIRATORY (INHALATION) AS NEEDED
Status: CANCELLED | OUTPATIENT
Start: 2024-02-12

## 2024-01-15 RX ORDER — DIPHENHYDRAMINE HYDROCHLORIDE 50 MG/ML
50 INJECTION INTRAMUSCULAR; INTRAVENOUS AS NEEDED
Status: DISCONTINUED | OUTPATIENT
Start: 2024-01-15 | End: 2024-01-15 | Stop reason: HOSPADM

## 2024-01-15 RX ORDER — DIPHENHYDRAMINE HYDROCHLORIDE 50 MG/ML
25 INJECTION INTRAMUSCULAR; INTRAVENOUS ONCE
Status: CANCELLED
Start: 2024-02-12 | End: 2024-02-05

## 2024-01-15 RX ORDER — CYANOCOBALAMIN 1000 UG/ML
1000 INJECTION, SOLUTION INTRAMUSCULAR; SUBCUTANEOUS ONCE
Status: COMPLETED | OUTPATIENT
Start: 2024-01-15 | End: 2024-01-15

## 2024-01-15 RX ORDER — DEXAMETHASONE SODIUM PHOSPHATE 4 MG/ML
8 INJECTION, SOLUTION INTRA-ARTICULAR; INTRALESIONAL; INTRAMUSCULAR; INTRAVENOUS; SOFT TISSUE ONCE
Status: CANCELLED | OUTPATIENT
Start: 2024-02-12

## 2024-01-15 RX ORDER — PROCHLORPERAZINE EDISYLATE 5 MG/ML
10 INJECTION INTRAMUSCULAR; INTRAVENOUS EVERY 6 HOURS PRN
Status: DISCONTINUED | OUTPATIENT
Start: 2024-01-15 | End: 2024-01-15 | Stop reason: HOSPADM

## 2024-01-15 RX ORDER — DIPHENHYDRAMINE HYDROCHLORIDE 50 MG/ML
25 INJECTION INTRAMUSCULAR; INTRAVENOUS ONCE
Status: COMPLETED | OUTPATIENT
Start: 2024-01-15 | End: 2024-01-15

## 2024-01-15 RX ORDER — LORAZEPAM 2 MG/ML
1 INJECTION INTRAMUSCULAR AS NEEDED
Status: DISCONTINUED | OUTPATIENT
Start: 2024-01-15 | End: 2024-01-15 | Stop reason: HOSPADM

## 2024-01-15 RX ORDER — PROCHLORPERAZINE MALEATE 10 MG
10 TABLET ORAL EVERY 6 HOURS PRN
Status: DISCONTINUED | OUTPATIENT
Start: 2024-01-15 | End: 2024-01-15 | Stop reason: HOSPADM

## 2024-01-15 RX ADMIN — OXALIPLATIN 160 MG: 5 INJECTION, SOLUTION INTRAVENOUS at 14:12

## 2024-01-15 RX ADMIN — FLUOROURACIL 750 MG: 50 INJECTION, SOLUTION INTRAVENOUS at 16:43

## 2024-01-15 RX ADMIN — DEXAMETHASONE SODIUM PHOSPHATE 8 MG: 4 INJECTION INTRA-ARTICULAR; INTRALESIONAL; INTRAMUSCULAR; INTRAVENOUS; SOFT TISSUE at 12:20

## 2024-01-15 RX ADMIN — CYANOCOBALAMIN 1000 MCG: 1000 INJECTION INTRAMUSCULAR; SUBCUTANEOUS at 12:30

## 2024-01-15 RX ADMIN — PALONOSETRON 250 MCG: 0.05 INJECTION, SOLUTION INTRAVENOUS at 12:20

## 2024-01-15 RX ADMIN — LEUCOVORIN CALCIUM 740 MG: 350 INJECTION, POWDER, LYOPHILIZED, FOR SOLUTION INTRAMUSCULAR; INTRAVENOUS at 14:13

## 2024-01-15 RX ADMIN — ACETAMINOPHEN 650 MG: 325 TABLET ORAL at 12:20

## 2024-01-15 RX ADMIN — TRASTUZUMAB 300 MG: 150 INJECTION, POWDER, LYOPHILIZED, FOR SOLUTION INTRAVENOUS at 13:23

## 2024-01-15 RX ADMIN — DIPHENHYDRAMINE HYDROCHLORIDE 25 MG: 50 INJECTION INTRAMUSCULAR; INTRAVENOUS at 12:20

## 2024-01-15 RX ADMIN — HEPARIN 500 UNITS: 100 SYRINGE at 16:58

## 2024-01-15 ASSESSMENT — PAIN SCALES - GENERAL: PAINLEVEL: 2

## 2024-01-15 NOTE — SIGNIFICANT EVENT
01/15/24 1035   Prechemo Checklist   Has the patient been in the hospital, ED, or urgent care since last date of service No   Chemo/Immuno Consent Signed Yes   Protocol/Indications Verified Yes   Confirmed to previous date/time of medication Yes   Compared to previous dose Yes   All medications are dated accurately Yes   Pregnancy Test Negative Not applicable   Parameters Met Yes   Provider Notified Yes   Is Patient Proceeding With Treatment? Yes   BSA/Weight-Height Verified Yes   Dose Calculations Verified Yes

## 2024-01-17 ENCOUNTER — ALLIED HEALTH (OUTPATIENT)
Dept: INTEGRATIVE MEDICINE | Facility: CLINIC | Age: 64
End: 2024-01-17
Payer: MEDICARE

## 2024-01-17 DIAGNOSIS — C78.7 METASTASES TO THE LIVER (MULTI): ICD-10-CM

## 2024-01-17 DIAGNOSIS — C15.9 STAGE IV MALIGNANT NEOPLASM OF ESOPHAGUS (MULTI): Primary | ICD-10-CM

## 2024-01-17 PROCEDURE — 99214 OFFICE O/P EST MOD 30 MIN: CPT | Performed by: HOSPITALIST

## 2024-01-17 ASSESSMENT — ENCOUNTER SYMPTOMS
FEVER: 0
WHEEZING: 0
CHILLS: 0
PALPITATIONS: 0
SHORTNESS OF BREATH: 0
HEADACHES: 1
DIFFICULTY URINATING: 0
TROUBLE SWALLOWING: 0
RHINORRHEA: 1
COUGH: 0

## 2024-01-17 NOTE — PROGRESS NOTES
Patient ID: Massimo Garcia is a 63 y.o. male.  Referring Physician: No referring provider defined for this encounter.  Primary Care Provider: Dayne Santamaria DO    CANCER HISTORY:   64 yo man with stage IV esophageal cancer met to liver/lungs, cirrhotic also on imaging     Treatment: 5FU/oxaliplatin/trastuzumab - stopped oxaliplatin 6 mo ago     1/2/24 near syncope  CT with pulm nodules, no PE  Cardiology eval neg  11/23 heart block - pacemaker placed     INTEGRATIVE HISTORY:  Symptoms:  Neuropathy - fentanyl patch for pain, oxycodone  Started 6 mo ago with tingling in feet etc.  Feels like walking on blisters, intermittent but more consistent  Tried gabapentin not helping     Stomach discomfort and fatigue     Diet: overall eating well, weight stable, usually cream of wheat in am, lunch with sandwich with some meat.  Eats what he can for supper     PA: overall trying to stay active, works with wood outside    Sleep: sleeping well usually     Stress: some anxiety, overall doing well, intermittent  Tries to manage through relaxation by lying down     Natural Products:    Balance of Nature vitamins  CBD    ROS:  no ha, visual symptoms, hearing loss  no sob, chest pain, palp  ROS o/w non contributory, please see HPI    Objective    BSA: There is no height or weight on file to calculate BSA.  There were no vitals taken for this visit.    PHYSICAL EXAM:  NAD, awake/alert  HEENT, NCAT, OP clear, no oral lesions  CTA bilat  RRR no mgr  Abd soft/nt/nd+bs  No c/c/e/ttp  Motor/sensory intact, CN 2-12 intact     RESULTS:  Lab Results   Component Value Date    WBC 9.2 01/15/2024    HGB 11.9 (L) 01/15/2024    HCT 35.3 (L) 01/15/2024    PLT 90 (L) 01/15/2024    CREATININE 0.86 01/15/2024    AST 43 (H) 01/15/2024       Assessment/Plan   Cancer Staging   No matching staging information was found for the patient.    CANCER SPECIFIC RECCS:  64 yo man with stage IV esophageal cancer met to liver/lungs, cirrhotic also on imaging      Treatment: 5FU/oxaliplatin/trastuzumab - stopped oxaliplatin 6 mo ago     1/2/24 near syncope  CT with pulm nodules, no PE  Cardiology eval neg  11/23 heart block - pacemaker placed     INTEGRATIVE HISTORY:  Symptoms:  Neuropathy - fentanyl patch for pain, oxycodone  Started 6 mo ago with tingling in feet etc.  Feels like walking on blisters, intermittent but more consistent     LIFESTYLE:  Diet - 5-9 fruits/veg/day (7 veg to 2 fruits)  Cruciferous Vegetables - Brussel Sprouts, Kale, Broccoli, Cauliflower  PHYSICAL ACTIVITY 30 MIN/DAY     Reading:  [employer]  Cancer Fighting Kitchen     Websites:  [employer]  [email or web address]     SYMPTOM MANAGEMENT:  Neuropathy - acupuncture in Symptom Management  Scrambler therapy     Nausea - acupuncture  Keri - tea with honey  Acupressure     Anxiety - continue relaxation method     Follow up: Symptom management acupuncture     Thank you for consulting me in for this patient  Chidi Mays MD         SYMPTOM MANAGEMENT:  Integrative Oncology Symptom Management:    [facility] Integrative Oncology Symptom Management clinic offers multi-disciplinary supervised care of cancer patients using Integrative Modalities billed to insurance using NCCN and SIO/ASCO guideline-driven practices.  ESAS is obtained prior to and after each treatment by the practitioner    Symptoms Managed:  Neuropathy - bilat feet, some in fingertips/hands, used to be on oxaliplatin resumed after 6 mo off, improved overall  Gabapentin did not help    Nausea - currently ok since switching protocol incr gas, no vomiting    Anxiety  Fatigue - with therapy, 5/10    Natural Products utilized:  Balance of Nature vitamins  CBD    Integrative Treatment: Acupuncture  Session #: 1  Frequency: weekly    Referrals:   Recommendations:    Follow Up:  Symptom Management: weekly  Integrative Oncology:     I have personally seen the patient and supervised the treatment by the integrative  practitioner during this visit.  Pt had symptoms discussed and I was present for the patient's 60 minutes of direct patient care.

## 2024-01-17 NOTE — PROGRESS NOTES
Acupuncture Visit:     Subjective   Patient ID: Massiom Garcia is a 63 y.o. male who presents for No chief complaint on file.  Neuropathy -  Bottom of feet, equal both fee, feel in toes and to mid foot  Fingertips as well  Sicne starting chemo  Worse with recent protocol.   7/10 oftten  Can be more intense walkung  Does not afftect sleep much.   Gabapentin, no help   No OTC      Stomach discomfort - more gas recently  Worse since chemo on Monday  Swithced protocol  Bowels consistnet, bm daily  3-4/10  Some nausea, no vomiting    Diet:  Good, eating whatever he can    Fatigue- hard in winter in the cold  5-6/10  Worse in the evening    Supp-s  Balance in nature  Alive mult  Vitamin D  Vit c                      Review of Systems   Constitutional:  Negative for chills and fever.   HENT:  Positive for rhinorrhea and tinnitus. Negative for trouble swallowing.    Eyes:  Negative for visual disturbance.   Respiratory:  Negative for cough, shortness of breath and wheezing.    Cardiovascular:  Negative for chest pain and palpitations.        Has pacemaker, feels it occ   Genitourinary:  Negative for difficulty urinating.   Skin:  Negative for rash.   Neurological:  Positive for headaches (occasional).            Provider reviewed plan for the acupuncture session, precautions and contraindications. Patient/guardian/hospital staff has given consent to treat with full understanding of what to expect during the session. Before acupuncture began, provider explained to the patient to communicate at any time if the procedure was causing discomfort past their tolerance level. Patient agreed to advise acupuncturist. The acupuncturist counseled the patient on the risks of acupuncture treatment including pain, infection, bleeding, and no relief of pain. The patient was positioned comfortably. There was no evidence of infection at the site of needle insertions.    Objective   Physical Exam                             Assessment/Plan

## 2024-01-18 ASSESSMENT — PAIN SCALES - GENERAL: PAINLEVEL_OUTOF10: 3

## 2024-01-19 DIAGNOSIS — Z95.0 PACEMAKER: Primary | ICD-10-CM

## 2024-01-19 DIAGNOSIS — R00.1 BRADYCARDIA ON ECG: ICD-10-CM

## 2024-01-22 ENCOUNTER — OFFICE VISIT (OUTPATIENT)
Dept: PAIN MEDICINE | Facility: CLINIC | Age: 64
End: 2024-01-22
Payer: MEDICARE

## 2024-01-22 ENCOUNTER — APPOINTMENT (OUTPATIENT)
Dept: HEMATOLOGY/ONCOLOGY | Facility: CLINIC | Age: 64
End: 2024-01-22
Payer: COMMERCIAL

## 2024-01-22 VITALS
BODY MASS INDEX: 25.31 KG/M2 | RESPIRATION RATE: 18 BRPM | WEIGHT: 167 LBS | HEIGHT: 68 IN | SYSTOLIC BLOOD PRESSURE: 106 MMHG | HEART RATE: 80 BPM | DIASTOLIC BLOOD PRESSURE: 62 MMHG

## 2024-01-22 DIAGNOSIS — Z79.01 CURRENT USE OF LONG TERM ANTICOAGULATION: ICD-10-CM

## 2024-01-22 DIAGNOSIS — M79.672 BILATERAL FOOT PAIN: Primary | ICD-10-CM

## 2024-01-22 DIAGNOSIS — G62.0 CHEMOTHERAPY-INDUCED NEUROPATHY (MULTI): ICD-10-CM

## 2024-01-22 DIAGNOSIS — T45.1X5A CHEMOTHERAPY-INDUCED NEUROPATHY (MULTI): ICD-10-CM

## 2024-01-22 DIAGNOSIS — Z79.891 LONG TERM CURRENT USE OF OPIATE ANALGESIC: ICD-10-CM

## 2024-01-22 DIAGNOSIS — M79.671 BILATERAL FOOT PAIN: Primary | ICD-10-CM

## 2024-01-22 PROCEDURE — 1125F AMNT PAIN NOTED PAIN PRSNT: CPT | Performed by: ANESTHESIOLOGY

## 2024-01-22 PROCEDURE — 1159F MED LIST DOCD IN RCRD: CPT | Performed by: ANESTHESIOLOGY

## 2024-01-22 PROCEDURE — 1160F RVW MEDS BY RX/DR IN RCRD: CPT | Performed by: ANESTHESIOLOGY

## 2024-01-22 PROCEDURE — 99214 OFFICE O/P EST MOD 30 MIN: CPT | Performed by: ANESTHESIOLOGY

## 2024-01-22 PROCEDURE — 99204 OFFICE O/P NEW MOD 45 MIN: CPT | Performed by: ANESTHESIOLOGY

## 2024-01-22 ASSESSMENT — ENCOUNTER SYMPTOMS
CARDIOVASCULAR NEGATIVE: 1
EYES NEGATIVE: 1
NUMBNESS: 1
CONSTITUTIONAL NEGATIVE: 1
RESPIRATORY NEGATIVE: 1
ENDOCRINE NEGATIVE: 1
GASTROINTESTINAL NEGATIVE: 1
HEMATOLOGIC/LYMPHATIC NEGATIVE: 1
PSYCHIATRIC NEGATIVE: 1

## 2024-01-22 ASSESSMENT — PATIENT HEALTH QUESTIONNAIRE - PHQ9
SUM OF ALL RESPONSES TO PHQ9 QUESTIONS 1 AND 2: 0
1. LITTLE INTEREST OR PLEASURE IN DOING THINGS: NOT AT ALL
2. FEELING DOWN, DEPRESSED OR HOPELESS: NOT AT ALL

## 2024-01-22 ASSESSMENT — PAIN - FUNCTIONAL ASSESSMENT: PAIN_FUNCTIONAL_ASSESSMENT: 0-10

## 2024-01-22 ASSESSMENT — PAIN DESCRIPTION - DESCRIPTORS: DESCRIPTORS: BURNING;TINGLING;NUMBNESS

## 2024-01-22 ASSESSMENT — PAIN SCALES - GENERAL
PAINLEVEL: 4
PAINLEVEL_OUTOF10: 4

## 2024-01-22 ASSESSMENT — LIFESTYLE VARIABLES: TOTAL SCORE: 2

## 2024-01-22 NOTE — PROGRESS NOTES
"PAIN MANAGEMENT NEW PATIENT OFFICE NOTE    Date of Service: 1/22/2024    SUBJECTIVE    CHIEF COMPLAINT: BL foot pain    HISTORY OF PRESENT ILLNESS    Massimo Garcia is a 63 y.o. male with PMH CHB on BB s/p PPM, MDD, hx PE on ELIQUIS, chronic abdominal pain 2/2 ca on chronic opioids, OA S/P BL TKA/BL HSA, social cigar smoke who presents as with BL foot pain pain.    Pt describes BL foot neuropathic pain worsening in last 8 mo. Pt has been on chemo for metastatic esophageal ca to liver and lymph nodes. He is currently still on chemo. No current plan for RT or surgery. Neuropathic sx described as \"walking on blisters\" from mid foot to toes. Pain is worse with standing/walking and better off feet. Pt has similar sx in fingertips, but sx are worse in feet and he would like to focus on that.    Pt has tried Tylenol, NSAIDs, gabapentin, aquapuncture, opioids, m relaxant with minimal relief.     Pt denies new-onset weakness, bowel/bladder incontinence.  Pt denies recent infection, allergy to Latex/iodine/contrast. Patient is currently taking the following blood thinner(s): ELIQUIS    REVIEW OF SYSTEMS  Review of Systems   Constitutional: Negative.    HENT: Negative.     Eyes: Negative.    Respiratory: Negative.     Cardiovascular: Negative.    Gastrointestinal: Negative.    Endocrine: Negative.    Skin: Negative.    Neurological:  Positive for numbness.   Hematological: Negative.    Psychiatric/Behavioral: Negative.         PAST MEDICAL HISTORY  Past Medical History:   Diagnosis Date    Essential (primary) hypertension 12/21/2013    Hypertension    Pacemaker     Peripheral neuropathy     Personal history of other diseases of the circulatory system     History of right bundle branch block (RBBB)    Pneumothorax 12/04/2023     Past Surgical History:   Procedure Laterality Date    CARDIAC ELECTROPHYSIOLOGY PROCEDURE N/A 11/7/2023    Procedure: Temporary Pacemaker Insertion;  Surgeon: Alexis Shook MD;  Location: [facility] Cardiac Cath " Lab;  Service: Cardiovascular;  Laterality: N/A;    CARDIAC ELECTROPHYSIOLOGY PROCEDURE Left 11/9/2023    Procedure: PPM IMPLANT DUAL;  Surgeon: Elias Connolly MD;  Location: [facility] Cardiac [facility] Lab;  Service: Electrophysiology;  Laterality: Left;    TOTAL KNEE ARTHROPLASTY  09/30/2016    Total Knee Replacement Left    TOTAL KNEE ARTHROPLASTY  09/30/2016    Total Knee Replacement Right     Family History   Problem Relation Name Age of Onset    Cancer Father         CURRENT MEDICATIONS  Current Outpatient Medications   Medication Sig Dispense Refill    apixaban (Eliquis) 5 mg tablet TAKE 1 TABLET BY MOUTH TWO TIMES A DAY 60 tablet 6    cholecalciferol (Vitamin D-3) 50 MCG (2000 UT) tablet Take 2 tablets (100 mcg) by mouth once daily in the morning.      fentaNYL (Duragesic) 25 mcg/hr patch Place 1 patch over 72 hours on the skin every 3rd day. 10 patch 0    lactulose 20 gram/30 mL oral solution Take 30 mL (20 g) by mouth 4 times a day as needed (for constipation, if no relief from colace/ senna/ milk of magnesia). 960 mL 5    LORazepam (Ativan) 0.5 mg tablet Take 1 tablet (0.5 mg) by mouth 3 times a day as needed for anxiety. 90 tablet 0    medical cannabis Take 1 each by mouth. Last OARRS fills:  12/9/23 Oint Top Admin-16.6-30 Mjm 590.00/ 2 days  12/9/23 Oint Top Admin-16.6-30 Mjm 590.00/ 2 days  12/3/23 Tin Oral Admin-16.6-30 Mjm 590.00/ 2 days      metoprolol succinate XL (Toprol-XL) 25 mg 24 hr tablet Take 1 tablet (25 mg) by mouth once daily. Do not crush or chew. 30 tablet 1    multivitamin with minerals (multivit-min-iron fum-folic ac) tablet Take 2 tablets by mouth once daily.      oxyCODONE (Roxicodone) 10 mg immediate release tablet Take 1 tablet (10 mg) by mouth every 4 hours if needed for severe pain (7 - 10). 180 tablet 0    pantoprazole (ProtoNix) 40 mg EC tablet Take 1 tablet (40 mg) by mouth 2 times a day.      potassium chloride CR (Klor-Con) 10 mEq ER tablet TAKE 1 TABLET BY MOUTH ONCE DAILY  "(Patient taking differently: Take 1 tablet (10 mEq) by mouth once daily in the morning.) 30 tablet 3    sodium chloride (Ocean) 0.65 % nasal spray Administer 1 spray into each nostril if needed for congestion (or nasal dryness/ bleeding). 30 mL 12    sucralfate (Carafate) 1 gram tablet Take 1 tablet (1 g) by mouth once daily in the morning.      gabapentin (Neurontin) 300 mg capsule Take 1 capsule (300 mg) by mouth 2 times a day. (Patient not taking: Reported on 12/26/2023) 60 capsule 0    PARoxetine (Paxil) 10 mg tablet Take 1 tablet (10 mg) by mouth once daily in the morning. (Patient not taking: Reported on 1/22/2024) 30 tablet 3    tiZANidine (Zanaflex) 2 mg capsule Take 2 capsules (4 mg) by mouth 2 times a day for 10 days. 40 capsule 0     No current facility-administered medications for this visit.       ALLERGIES AND DRUG REACTIONS  Allergies   Allergen Reactions    Bee Venom Protein (Honey Bee) Anaphylaxis          OBJECTIVE  Visit Vitals  /62   Pulse 80   Resp 18   Ht 1.727 m (5' 8\")   Wt 75.8 kg (167 lb)   BMI 25.39 kg/m²   Smoking Status Former   BSA 1.91 m²       Last Recorded Pain Score (if available):                Physical Exam  General: Sitting in chair, NAD  Head: NCAT  Eyes: Sclera/conjunctiva clear, EOMI, PERRL  Nose/mouth: MMM  CV: Good distal pulses  Lungs: Good/equal chest excursion  Abdomen: Soft, ND  Ext: No cyanosis/edema  MSK: Able to move ext   Neuro: AAOx3   Dermatome sensation to light touch  LEFT L1 (lower pelvis/upper thigh): WNL    RIGHT L1: WNL      LEFT L2 (upper thigh): WNL       RIGHT: L2:WNL      LEFT L3 (medial knee): WNL       RIGHT L3: WNL      Dec'd sensation in BL stocking fashion    Motor strength  LEFT L2 (hip flexion): 5/5   RIGHT L2: 5/5  LEFT L3 (knee extension): 5/5     RIGHT L3: 5/5  LEFT L4 (dorsiflexion): 5/5     RIGHT L4: 5/5  LEFT L5 (EHL extension): 5/5     RIGHT L5: 5/5  LEFT S1 (plantarflexion): 5/5     RIGHT S1: 5/5  LEFT S2 (knee flexion): 5/5      " RIGHT S2: 5/5    Special testing  DTR unremarkable  Seated slump test neg BL  Clonus: neg BL  Babinski: neg BL    Psych: affect nl  Skin: no rash/lesions      REVIEW OF LABORATORY DATA  I have reviewed the following lab results:  WBC   Date Value Ref Range Status   01/15/2024 9.2 4.4 - 11.3 x10*3/uL Final     RBC   Date Value Ref Range Status   01/15/2024 3.97 (L) 4.50 - 5.90 x10*6/uL Final     Hemoglobin   Date Value Ref Range Status   01/15/2024 11.9 (L) 13.5 - 17.5 g/dL Final     Hematocrit   Date Value Ref Range Status   01/15/2024 35.3 (L) 41.0 - 52.0 % Final     MCV   Date Value Ref Range Status   01/15/2024 89 80 - 100 fL Final     MCH   Date Value Ref Range Status   01/15/2024 30.0 26.0 - 34.0 pg Final     MCHC   Date Value Ref Range Status   01/15/2024 33.7 32.0 - 36.0 g/dL Final     RDW   Date Value Ref Range Status   01/15/2024 14.5 11.5 - 14.5 % Final     Platelets   Date Value Ref Range Status   01/15/2024 90 (L) 150 - 450 x10*3/uL Final     MPV   Date Value Ref Range Status   10/30/2023 10.3 7.5 - 11.5 fL Final     Sodium   Date Value Ref Range Status   01/15/2024 141 136 - 145 mmol/L Final     Potassium   Date Value Ref Range Status   01/15/2024 3.9 3.5 - 5.3 mmol/L Final     Bicarbonate   Date Value Ref Range Status   01/15/2024 22 21 - 32 mmol/L Final     Urea Nitrogen   Date Value Ref Range Status   01/15/2024 14 6 - 23 mg/dL Final     Calcium   Date Value Ref Range Status   01/15/2024 9.2 8.6 - 10.3 mg/dL Final     Protime   Date Value Ref Range Status   12/26/2023 17.0 (H) 9.8 - 12.8 seconds Final     INR   Date Value Ref Range Status   12/26/2023 1.5 (H) 0.9 - 1.1 Final         REVIEW OF RADIOLOGY   I have reviewed the following:  Radiology Studies           N/A       ASSESSMENT & PLAN  Massimo Garcia is a 63 y.o. 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 Would need clearance to hold OAC                 Harpreet Rice MD  Anesthesiologist & Interventional Pain Physician   [facility] [place]  O: [phone]  F: [phone]  2:50 PM  01/22/24

## 2024-01-23 ENCOUNTER — HOSPITAL ENCOUNTER (OUTPATIENT)
Dept: CARDIOLOGY | Facility: HOSPITAL | Age: 64
Discharge: HOME | End: 2024-01-23
Payer: MEDICARE

## 2024-01-23 DIAGNOSIS — I44.2 CHB (COMPLETE HEART BLOCK) (MULTI): ICD-10-CM

## 2024-01-23 PROBLEM — M79.671 BILATERAL FOOT PAIN: Status: ACTIVE | Noted: 2024-01-23

## 2024-01-23 PROBLEM — Z79.891 LONG TERM CURRENT USE OF OPIATE ANALGESIC: Status: ACTIVE | Noted: 2024-01-23

## 2024-01-23 PROBLEM — G62.0 CHEMOTHERAPY-INDUCED NEUROPATHY (MULTI): Status: ACTIVE | Noted: 2024-01-23

## 2024-01-23 PROBLEM — M79.672 BILATERAL FOOT PAIN: Status: ACTIVE | Noted: 2024-01-23

## 2024-01-23 PROBLEM — T45.1X5A CHEMOTHERAPY-INDUCED NEUROPATHY (MULTI): Status: ACTIVE | Noted: 2024-01-23

## 2024-01-23 PROCEDURE — 93280 PM DEVICE PROGR EVAL DUAL: CPT | Performed by: NURSE PRACTITIONER

## 2024-01-23 PROCEDURE — 93280 PM DEVICE PROGR EVAL DUAL: CPT

## 2024-01-23 PROCEDURE — 93290 INTERROG DEV EVAL ICPMS IP: CPT | Performed by: NURSE PRACTITIONER

## 2024-01-25 ENCOUNTER — PATIENT OUTREACH (OUTPATIENT)
Dept: PRIMARY CARE | Facility: CLINIC | Age: 64
End: 2024-01-25
Payer: COMMERCIAL

## 2024-01-25 PROCEDURE — RXMED WILLOW AMBULATORY MEDICATION CHARGE

## 2024-01-26 ENCOUNTER — PHARMACY VISIT (OUTPATIENT)
Dept: PHARMACY | Facility: CLINIC | Age: 64
End: 2024-01-26
Payer: MEDICARE

## 2024-01-26 DIAGNOSIS — C78.7 METASTASES TO THE LIVER (MULTI): Primary | ICD-10-CM

## 2024-01-26 DIAGNOSIS — K21.00 GASTROESOPHAGEAL REFLUX DISEASE WITH ESOPHAGITIS WITHOUT HEMORRHAGE: ICD-10-CM

## 2024-01-26 RX ORDER — PANTOPRAZOLE SODIUM 40 MG/1
40 TABLET, DELAYED RELEASE ORAL 2 TIMES DAILY
Qty: 180 TABLET | Refills: 3 | Status: ON HOLD | OUTPATIENT
Start: 2024-01-26 | End: 2024-03-23 | Stop reason: SDUPTHER

## 2024-01-29 ENCOUNTER — INFUSION (OUTPATIENT)
Dept: HEMATOLOGY/ONCOLOGY | Facility: CLINIC | Age: 64
End: 2024-01-29
Payer: MEDICARE

## 2024-01-29 ENCOUNTER — OFFICE VISIT (OUTPATIENT)
Dept: HEMATOLOGY/ONCOLOGY | Facility: CLINIC | Age: 64
End: 2024-01-29
Payer: MEDICARE

## 2024-01-29 ENCOUNTER — NUTRITION (OUTPATIENT)
Dept: HEMATOLOGY/ONCOLOGY | Facility: CLINIC | Age: 64
End: 2024-01-29
Payer: COMMERCIAL

## 2024-01-29 VITALS — HEIGHT: 68 IN | BODY MASS INDEX: 25.23 KG/M2 | WEIGHT: 166.45 LBS

## 2024-01-29 VITALS
TEMPERATURE: 97.3 F | BODY MASS INDEX: 25.31 KG/M2 | HEART RATE: 83 BPM | WEIGHT: 166.45 LBS | OXYGEN SATURATION: 97 % | SYSTOLIC BLOOD PRESSURE: 133 MMHG | DIASTOLIC BLOOD PRESSURE: 74 MMHG | RESPIRATION RATE: 16 BRPM

## 2024-01-29 DIAGNOSIS — C78.7 METASTASES TO THE LIVER (MULTI): ICD-10-CM

## 2024-01-29 DIAGNOSIS — C15.9 STAGE IV MALIGNANT NEOPLASM OF ESOPHAGUS (MULTI): ICD-10-CM

## 2024-01-29 LAB
ALBUMIN SERPL BCP-MCNC: 3.9 G/DL (ref 3.4–5)
ALP SERPL-CCNC: 140 U/L (ref 33–136)
ALT SERPL W P-5'-P-CCNC: 24 U/L (ref 10–52)
ANION GAP SERPL CALC-SCNC: 13 MMOL/L (ref 10–20)
AST SERPL W P-5'-P-CCNC: 39 U/L (ref 9–39)
BASOPHILS # BLD AUTO: 0.03 X10*3/UL (ref 0–0.1)
BASOPHILS NFR BLD AUTO: 0.4 %
BILIRUB SERPL-MCNC: 1 MG/DL (ref 0–1.2)
BUN SERPL-MCNC: 14 MG/DL (ref 6–23)
CALCIUM SERPL-MCNC: 9.2 MG/DL (ref 8.6–10.3)
CHLORIDE SERPL-SCNC: 103 MMOL/L (ref 98–107)
CO2 SERPL-SCNC: 26 MMOL/L (ref 21–32)
CREAT SERPL-MCNC: 0.82 MG/DL (ref 0.5–1.3)
EGFRCR SERPLBLD CKD-EPI 2021: >90 ML/MIN/1.73M*2
EOSINOPHIL # BLD AUTO: 0.08 X10*3/UL (ref 0–0.7)
EOSINOPHIL NFR BLD AUTO: 1.1 %
ERYTHROCYTE [DISTWIDTH] IN BLOOD BY AUTOMATED COUNT: 14.3 % (ref 11.5–14.5)
GLUCOSE SERPL-MCNC: 126 MG/DL (ref 74–99)
HCT VFR BLD AUTO: 33.7 % (ref 41–52)
HGB BLD-MCNC: 11.5 G/DL (ref 13.5–17.5)
IMM GRANULOCYTES # BLD AUTO: 0.01 X10*3/UL (ref 0–0.7)
IMM GRANULOCYTES NFR BLD AUTO: 0.1 % (ref 0–0.9)
LYMPHOCYTES # BLD AUTO: 0.93 X10*3/UL (ref 1.2–4.8)
LYMPHOCYTES NFR BLD AUTO: 12.6 %
MCH RBC QN AUTO: 29.5 PG (ref 26–34)
MCHC RBC AUTO-ENTMCNC: 34.1 G/DL (ref 32–36)
MCV RBC AUTO: 86 FL (ref 80–100)
MONOCYTES # BLD AUTO: 0.56 X10*3/UL (ref 0.1–1)
MONOCYTES NFR BLD AUTO: 7.6 %
NEUTROPHILS # BLD AUTO: 5.79 X10*3/UL (ref 1.2–7.7)
NEUTROPHILS NFR BLD AUTO: 78.2 %
PLATELET # BLD AUTO: 71 X10*3/UL (ref 150–450)
POTASSIUM SERPL-SCNC: 3.4 MMOL/L (ref 3.5–5.3)
PROT SERPL-MCNC: 6.6 G/DL (ref 6.4–8.2)
RBC # BLD AUTO: 3.9 X10*6/UL (ref 4.5–5.9)
SODIUM SERPL-SCNC: 139 MMOL/L (ref 136–145)
WBC # BLD AUTO: 7.4 X10*3/UL (ref 4.4–11.3)

## 2024-01-29 PROCEDURE — 99215 OFFICE O/P EST HI 40 MIN: CPT | Performed by: INTERNAL MEDICINE

## 2024-01-29 PROCEDURE — 96368 THER/DIAG CONCURRENT INF: CPT

## 2024-01-29 PROCEDURE — 2500000004 HC RX 250 GENERAL PHARMACY W/ HCPCS (ALT 636 FOR OP/ED): Performed by: INTERNAL MEDICINE

## 2024-01-29 PROCEDURE — 96413 CHEMO IV INFUSION 1 HR: CPT

## 2024-01-29 PROCEDURE — 96417 CHEMO IV INFUS EACH ADDL SEQ: CPT

## 2024-01-29 PROCEDURE — 96415 CHEMO IV INFUSION ADDL HR: CPT

## 2024-01-29 PROCEDURE — 2500000004 HC RX 250 GENERAL PHARMACY W/ HCPCS (ALT 636 FOR OP/ED)

## 2024-01-29 PROCEDURE — 1160F RVW MEDS BY RX/DR IN RCRD: CPT | Performed by: INTERNAL MEDICINE

## 2024-01-29 PROCEDURE — 1126F AMNT PAIN NOTED NONE PRSNT: CPT | Performed by: INTERNAL MEDICINE

## 2024-01-29 PROCEDURE — 1159F MED LIST DOCD IN RCRD: CPT | Performed by: INTERNAL MEDICINE

## 2024-01-29 PROCEDURE — 80053 COMPREHEN METABOLIC PANEL: CPT | Performed by: INTERNAL MEDICINE

## 2024-01-29 PROCEDURE — 85025 COMPLETE CBC W/AUTO DIFF WBC: CPT | Performed by: INTERNAL MEDICINE

## 2024-01-29 PROCEDURE — 96375 TX/PRO/DX INJ NEW DRUG ADDON: CPT | Mod: INF

## 2024-01-29 PROCEDURE — 96411 CHEMO IV PUSH ADDL DRUG: CPT

## 2024-01-29 PROCEDURE — 2500000004 HC RX 250 GENERAL PHARMACY W/ HCPCS (ALT 636 FOR OP/ED): Mod: JZ | Performed by: INTERNAL MEDICINE

## 2024-01-29 RX ORDER — FLUOROURACIL 50 MG/ML
400 INJECTION, SOLUTION INTRAVENOUS ONCE
Status: COMPLETED | OUTPATIENT
Start: 2024-01-29 | End: 2024-01-29

## 2024-01-29 RX ORDER — DIPHENHYDRAMINE HYDROCHLORIDE 50 MG/ML
25 INJECTION INTRAMUSCULAR; INTRAVENOUS ONCE
Status: COMPLETED | OUTPATIENT
Start: 2024-01-29 | End: 2024-01-29

## 2024-01-29 RX ORDER — HEPARIN SODIUM,PORCINE/PF 10 UNIT/ML
50 SYRINGE (ML) INTRAVENOUS AS NEEDED
Status: CANCELLED | OUTPATIENT
Start: 2024-01-29

## 2024-01-29 RX ORDER — ALBUTEROL SULFATE 0.83 MG/ML
3 SOLUTION RESPIRATORY (INHALATION) AS NEEDED
Status: DISCONTINUED | OUTPATIENT
Start: 2024-01-29 | End: 2024-01-29 | Stop reason: HOSPADM

## 2024-01-29 RX ORDER — DEXAMETHASONE SODIUM PHOSPHATE 4 MG/ML
8 INJECTION, SOLUTION INTRA-ARTICULAR; INTRALESIONAL; INTRAMUSCULAR; INTRAVENOUS; SOFT TISSUE ONCE
Status: COMPLETED | OUTPATIENT
Start: 2024-01-29 | End: 2024-01-29

## 2024-01-29 RX ORDER — PROCHLORPERAZINE MALEATE 10 MG
10 TABLET ORAL EVERY 6 HOURS PRN
Status: DISCONTINUED | OUTPATIENT
Start: 2024-01-29 | End: 2024-01-29 | Stop reason: HOSPADM

## 2024-01-29 RX ORDER — LORAZEPAM 2 MG/ML
1 INJECTION INTRAMUSCULAR AS NEEDED
Status: DISCONTINUED | OUTPATIENT
Start: 2024-01-29 | End: 2024-01-29 | Stop reason: HOSPADM

## 2024-01-29 RX ORDER — HEPARIN 100 UNIT/ML
500 SYRINGE INTRAVENOUS AS NEEDED
Status: CANCELLED | OUTPATIENT
Start: 2024-01-29

## 2024-01-29 RX ORDER — HEPARIN 100 UNIT/ML
500 SYRINGE INTRAVENOUS AS NEEDED
Status: DISCONTINUED | OUTPATIENT
Start: 2024-01-29 | End: 2024-01-29 | Stop reason: HOSPADM

## 2024-01-29 RX ORDER — PALONOSETRON 0.05 MG/ML
0.25 INJECTION, SOLUTION INTRAVENOUS ONCE
Status: COMPLETED | OUTPATIENT
Start: 2024-01-29 | End: 2024-01-29

## 2024-01-29 RX ORDER — EPINEPHRINE 0.3 MG/.3ML
0.3 INJECTION SUBCUTANEOUS EVERY 5 MIN PRN
Status: DISCONTINUED | OUTPATIENT
Start: 2024-01-29 | End: 2024-01-29 | Stop reason: HOSPADM

## 2024-01-29 RX ORDER — ACETAMINOPHEN 325 MG/1
650 TABLET ORAL ONCE
Status: COMPLETED | OUTPATIENT
Start: 2024-01-29 | End: 2024-01-29

## 2024-01-29 RX ORDER — PROCHLORPERAZINE EDISYLATE 5 MG/ML
10 INJECTION INTRAMUSCULAR; INTRAVENOUS EVERY 6 HOURS PRN
Status: DISCONTINUED | OUTPATIENT
Start: 2024-01-29 | End: 2024-01-29 | Stop reason: HOSPADM

## 2024-01-29 RX ORDER — FAMOTIDINE 10 MG/ML
20 INJECTION INTRAVENOUS ONCE AS NEEDED
Status: DISCONTINUED | OUTPATIENT
Start: 2024-01-29 | End: 2024-01-29 | Stop reason: HOSPADM

## 2024-01-29 RX ORDER — DIPHENHYDRAMINE HYDROCHLORIDE 50 MG/ML
50 INJECTION INTRAMUSCULAR; INTRAVENOUS AS NEEDED
Status: DISCONTINUED | OUTPATIENT
Start: 2024-01-29 | End: 2024-01-29 | Stop reason: HOSPADM

## 2024-01-29 RX ADMIN — ACETAMINOPHEN 650 MG: 325 TABLET ORAL at 10:29

## 2024-01-29 RX ADMIN — TRASTUZUMAB 300 MG: 150 INJECTION, POWDER, LYOPHILIZED, FOR SOLUTION INTRAVENOUS at 11:39

## 2024-01-29 RX ADMIN — HEPARIN 500 UNITS: 100 SYRINGE at 15:02

## 2024-01-29 RX ADMIN — DIPHENHYDRAMINE HYDROCHLORIDE 25 MG: 50 INJECTION, SOLUTION INTRAMUSCULAR; INTRAVENOUS at 11:02

## 2024-01-29 RX ADMIN — DEXAMETHASONE SODIUM PHOSPHATE 8 MG: 4 INJECTION, SOLUTION INTRA-ARTICULAR; INTRALESIONAL; INTRAMUSCULAR; INTRAVENOUS; SOFT TISSUE at 10:37

## 2024-01-29 RX ADMIN — PALONOSETRON HYDROCHLORIDE 250 MCG: 0.25 INJECTION INTRAVENOUS at 10:34

## 2024-01-29 RX ADMIN — OXALIPLATIN 160 MG: 5 INJECTION, SOLUTION INTRAVENOUS at 12:22

## 2024-01-29 RX ADMIN — FLUOROURACIL 750 MG: 50 INJECTION, SOLUTION INTRAVENOUS at 15:00

## 2024-01-29 ASSESSMENT — PAIN SCALES - GENERAL: PAINLEVEL: 0-NO PAIN

## 2024-01-29 NOTE — SIGNIFICANT EVENT
01/29/24 0956   Prechemo Checklist   Has the patient been in the hospital, ED, or urgent care since last date of service No   Chemo/Immuno Consent Signed Yes   Protocol/Indications Verified Yes   Confirmed to previous date/time of medication Yes   Compared to previous dose Yes   All medications are dated accurately Yes   Parameters Met Yes   Provider Notified Yes   Is Patient Proceeding With Treatment? Yes   BSA/Weight-Height Verified Yes   Dose Calculations Verified Yes

## 2024-01-29 NOTE — PROGRESS NOTES
"NUTRITION Follow-up NOTE    Nutrition Assessment     Reason for Visit:  Massimo Garcia is a 63 y.o. male who presents for HER2 positive adenocarcinoma of the esophagus, metastatic to  liver. Being seen for early satiety and abdominal pain.     Pt with recent scans that showed progression - pt restarted FOLFOX with trastuzumab; started 1/15/24     Visited with pt and wife today for follow-up.     Lab Results   Component Value Date/Time    GLUCOSE 126 (H) 01/29/2024 0850     01/29/2024 0850    K 3.4 (L) 01/29/2024 0850     01/29/2024 0850    CO2 26 01/29/2024 0850    ANIONGAP 13 01/29/2024 0850    BUN 14 01/29/2024 0850    CREATININE 0.82 01/29/2024 0850    EGFR >90 01/29/2024 0850    CALCIUM 9.2 01/29/2024 0850    ALBUMIN 3.9 01/29/2024 0850    ALKPHOS 140 (H) 01/29/2024 0850    PROT 6.6 01/29/2024 0850    AST 39 01/29/2024 0850    BILITOT 1.0 01/29/2024 0850    ALT 24 01/29/2024 0850     No results found for: \"VITD25\"    Anthropometrics:  Anthropometrics  Height: 171.6 cm (5' 7.56\")  Weight: 75.5 kg (166 lb 7.2 oz)  BMI (Calculated): 25.64  IBW/kg (Dietitian Calculated): 68 kg  Weight Change  Weight History / % Weight Change: weight has remained stable over the last few weeks  Significant Weight Loss: No        Wt Readings from Last 10 Encounters:   01/29/24 75.5 kg (166 lb 7.2 oz)   01/29/24 75.5 kg (166 lb 7.2 oz)   01/22/24 75.8 kg (167 lb)   01/15/24 75.8 kg (167 lb 3.5 oz)   01/10/24 74.5 kg (164 lb 3.9 oz)   01/09/24 74.8 kg (164 lb 14.5 oz)   01/03/24 74.8 kg (165 lb)   01/02/24 75 kg (165 lb 5.5 oz)   12/27/23 76.1 kg (167 lb 12.8 oz)   12/22/23 74.8 kg (165 lb)        Food And Nutrient Intake:  Food and Nutrient History  Food and Nutrient History: Appetite is good. Last infusion - cold sensitivity very mild; s/p 1 cycle and doing well with restarting FOLFOX.  Energy Intake: Good > 75 %  Fluid Intake: Tea decaf, occasional boost VHC; water  GI Symptoms: none  Oral Problems: denies     Food " "Intake  Amount of Food: depends on day may have smaller meals or 3 meals  Meal 1: B = cream of wheat  Meal 2: L= just snacked on some cookies  Meal 3: D = 7-10 wings  Snacks: Ice cream, fruit, some nuts                                            Food Supplement Intake  Oral Nutrition Supplements: Boost Very High Calorie           Nutrition Focused Physical Exam Findings:  Completed 10/2/2023                        Energy Needs  Calculated Energy Needs Using Equations  Height: 171.6 cm (5' 7.56\")  Weight Used for Equation Calculations: 74.6 kg (164 lb 7.4 oz)  [place]- [place] Equation (Overweight or Obese Patients): 1498  Estimated Energy Needs  Total Energy Estimated Needs (kCal): 2238 kCal  Total Estimated Energy Need per Day (kCal/kg): 30 kCal/kg  Estimated Fluid Needs  Total Fluid Estimated Needs (mL): 2300 mL  Estimated Protein Needs  Total Protein Estimated Needs (g): 89.52 g  Total Protein Estimated Needs (g/kg): 1.2 g/kg        Nutrition Diagnosis   Malnutrition Diagnosis  Patient has Malnutrition Diagnosis: No    Nutrition Diagnosis  Patient has Nutrition Diagnosis: Yes  Diagnosis Status (1): Ongoing  Nutrition Diagnosis 1: Predicted inadequate energy intake  Related to (1): pathophysiology of diease/ treatment  As Evidenced by (1): pt with mild/ moderate muscle/ adipose losses and metastatic esophageal cancer undergoing chemotherapy with potential for nutrition impact symptoms.    Nutrition Interventions/Recommendations   Nutrition Prescription  Individualized Nutrition Prescription Provided for : Nutrition during cancer treatment, mgmt of nutrition impact symptoms    Food and Nutrition Delivery  Food and Nutrition Delivery  Meals & Snacks: Energy-modified diet, Protein-modified diet  Goals: Discussed/ reviewed cold sensitivity and anorexia that may come with restarting treatment. Reviewed dietary changes to help manage- pt understood- encouraged frequent meals/ snacks. ENcouraged used of high calorie " food options.  Medical Food Supplement: Boost Very High Calorie  Goals: May need to increase to daily; encouraged use in smoothies/ shakes/ jello/ pudding, etc.    Nutrition Education  Nutrition Education  Nutrition Education Content: Content related nutrition education  Goals: High calorie high protein diet    Coordination of Care       There are no Patient Instructions on file for this visit.    Nutrition Monitoring and Evaluation   Food/Nutrient Related History Monitoring  Monitoring and Evaluation Plan: Energy intake, Protein intake  Energy Intake: Estimated energy intake  Criteria: pt will meet >/= 75% of caloric needs  Estimated protein intake: Estimated protein intake  Criteria: will meet >/= 75% of protein needs  Additional Plan: Encouraged use of ONS, increase if appetite decreases  Body Composition/Growth/Weight History  Monitoring and Evaluation Plan: Weight  Weight: Measured weight  Criteria: maintain weight          Time Spent  Prep time on day of patient encounter: 10 minutes  Time spent directly with patient, family or caregiver: 10 minutes  Additional Time Spent on Patient Care Activities: 0 minutes  Documentation Time: 10 minutes  Other Time Spent: 0 minutes  Total: 30 minutes

## 2024-01-29 NOTE — PATIENT INSTRUCTIONS
Today you met with Dr. Blake.  No concerns.  Staying on treatment plan.  Hoping you have the MRI next week then you will see Dr. Blake with your next visit.  If No MRI the MD will be changed to after the reschedule.  Keep doing what you are doing.  Sounds like the Holistic procedures that you are participating are helping with your symptoms.      Please call the office for any questions or concerns.  We ask that you notify us 5-7 days before your medications need refilled.  [facility] is strongly encouraging all patients to access their [facility] for all results and to communicate with their provider for non-urgent messages.  If an urgent/same day/sick concern response is needed, please call the office at [phone]. Thank You!

## 2024-01-29 NOTE — PROGRESS NOTES
Patient ID: Massimo Garcia is a 63 y.o. male.  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  Has done well on trastuzumab FOLFOX regimen.  Today complains of dizziness and presyncopal symptoms.  Metastatic burden is liver some lung lesions as well as abdominal lymphadenopathy.          LIVER:  Liver measures 14.7 cm in length and demonstrates mildly diffuse  coarsened hepatic echotexture with mild nodular contour which may  indicate cirrhotic morphology. There is an indeterminate hypoechoic  lesion within the right hepatic lobe measuring up. 3.6 x 3.2 x 3.0 cm  and a 2nd 2.6 x 1.8 x 2.7 cm indeterminate heterogeneous hypoechoic  lesion within the left hepatic lobe. Additional scatter presumed  hepatic cysts, largest measuring up to 2.4 x 2.1 x 1.8 cm      GALLBLADDER:  Contracted. No obvious cholelithiasis.      BILIARY SYSTEM:  No evidence of intra hepatic biliary dilatation is identified; the  common bile duct measures 13.2 mm.          PANCREAS:  The pancreas is poorly visualized due to overlying bowel gas.      RIGHT KIDNEY:  The right kidney measures 11.5 cm in length. No hydronephrosis or  renal calculi are seen.          IMPRESSION:  Cirrhotic hepatic morphology with indeterminate hypoechoic lesions of  the liver as above, largest measuring up to 3.6 x 3.2 x 3.0 cm.  Hepatic mass protocol MRI advised for further assessment.      12/6/2023  Review of Systems   Constitutional: Negative.    HENT:  Negative.     Eyes: Negative.    Respiratory: Negative.     Cardiovascular: Negative.         Objective   BSA: There is no height or weight on file to calculate BSA.  There were no vitals taken for this visit.     has a past medical history of Essential (primary) hypertension (12/21/2013), Pacemaker,  Peripheral neuropathy, Personal history of other diseases of the circulatory system, and Pneumothorax (12/04/2023).   has a past surgical history that includes Total knee arthroplasty (09/30/2016); Total knee arthroplasty (09/30/2016); Cardiac electrophysiology procedure (N/A, 11/7/2023); and Cardiac electrophysiology procedure (Left, 11/9/2023).  Family History   Problem Relation Name Age of Onset    Cancer Father       Oncology History   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)   9/13/2023 Initial Diagnosis    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)     9/13/2023 -  Chemotherapy    Trastuzumab + mFOLFOX6 (Fluorouracil Continuous Infusion / Leucovorin / Oxaliplatin), 14 Day Cycles     Metastases to the liver (CMS/HCC)   9/13/2023 Initial Diagnosis    Metastases to the liver (CMS/HCC)     9/13/2023 -  Chemotherapy    Trastuzumab + mFOLFOX6 (Fluorouracil Continuous Infusion / Leucovorin / Oxaliplatin), 14 Day Cycles         Massimo Garcia  reports that he has quit smoking. His smoking use included cigarettes and cigars. He has never been exposed to tobacco smoke. He has never used smokeless tobacco.  He  reports that he does not currently use alcohol.  He  reports current drug use. Drug: Marijuana.    Physical Exam  Constitutional:       Appearance: Normal appearance.   HENT:      Head: Normocephalic and atraumatic.      Right Ear: Tympanic membrane normal.      Left Ear: Tympanic membrane normal.   Eyes:      Extraocular Movements: Extraocular movements intact.      Pupils: Pupils are equal, round, and reactive to light.   Cardiovascular:      Rate and Rhythm: Normal rate and regular rhythm.   Musculoskeletal:      Cervical back: Normal range of motion and neck supple.   Neurological:      Mental Status: He is alert.         WBC   Date/Time Value Ref Range Status   01/15/2024 09:54 AM 9.2 4.4 - 11.3 x10*3/uL Final   01/02/2024 10:01 AM 6.4 4.4 - 11.3 x10*3/uL Final   12/27/2023 07:21 AM 5.0 4.4 -  11.3 x10*3/uL Final     nRBC   Date Value Ref Range Status   12/27/2023 0.0 0.0 - 0.0 /100 WBCs Final   12/26/2023 0.0 0.0 - 0.0 /100 WBCs Final   12/22/2023 0.0 0.0 - 0.0 /100 WBCs Final     RBC   Date Value Ref Range Status   01/15/2024 3.97 (L) 4.50 - 5.90 x10*6/uL Final   01/02/2024 3.95 (L) 4.50 - 5.90 x10*6/uL Final   12/27/2023 3.90 (L) 4.50 - 5.90 x10*6/uL Final     Hemoglobin   Date Value Ref Range Status   01/15/2024 11.9 (L) 13.5 - 17.5 g/dL Final   01/02/2024 11.7 (L) 13.5 - 17.5 g/dL Final   12/27/2023 11.4 (L) 13.5 - 17.5 g/dL Final     Hematocrit   Date Value Ref Range Status   01/15/2024 35.3 (L) 41.0 - 52.0 % Final   01/02/2024 34.9 (L) 41.0 - 52.0 % Final   12/27/2023 33.7 (L) 41.0 - 52.0 % Final     MCV   Date/Time Value Ref Range Status   01/15/2024 09:54 AM 89 80 - 100 fL Final   01/02/2024 10:01 AM 88 80 - 100 fL Final   12/27/2023 07:21 AM 86 80 - 100 fL Final     MCH   Date/Time Value Ref Range Status   01/15/2024 09:54 AM 30.0 26.0 - 34.0 pg Final   01/02/2024 10:01 AM 29.6 26.0 - 34.0 pg Final   12/27/2023 07:21 AM 29.2 26.0 - 34.0 pg Final     MCHC   Date/Time Value Ref Range Status   01/15/2024 09:54 AM 33.7 32.0 - 36.0 g/dL Final   01/02/2024 10:01 AM 33.5 32.0 - 36.0 g/dL Final   12/27/2023 07:21 AM 33.8 32.0 - 36.0 g/dL Final     RDW   Date/Time Value Ref Range Status   01/15/2024 09:54 AM 14.5 11.5 - 14.5 % Final   01/02/2024 10:01 AM 14.5 11.5 - 14.5 % Final   12/27/2023 07:21 AM 14.6 (H) 11.5 - 14.5 % Final     Platelets   Date/Time Value Ref Range Status   01/15/2024 09:54 AM 90 (L) 150 - 450 x10*3/uL Final   01/02/2024 10:01 AM 90 (L) 150 - 450 x10*3/uL Final   12/27/2023 07:21 AM 98 (L) 150 - 450 x10*3/uL Final     MPV   Date/Time Value Ref Range Status   10/30/2023 09:13 AM 10.3 7.5 - 11.5 fL Final   10/16/2023 08:34 AM 10.7 7.5 - 11.5 fL Final   10/02/2023 09:08 AM 10.0 7.5 - 11.5 fL Final     Neutrophils %   Date/Time Value Ref Range Status   01/15/2024 09:54 AM 81.0 40.0 -  80.0 % Final   01/02/2024 10:01 AM 74.2 40.0 - 80.0 % Final   12/26/2023 06:13 PM 77.6 40.0 - 80.0 % Final     Immature Granulocytes %, Automated   Date/Time Value Ref Range Status   01/15/2024 09:54 AM 0.9 0.0 - 0.9 % Final     Comment:     Immature Granulocyte Count (IG) includes promyelocytes, myelocytes and metamyelocytes but does not include bands. Percent differential counts (%) should be interpreted in the context of the absolute cell counts (cells/UL).   01/02/2024 10:01 AM 0.3 0.0 - 0.9 % Final     Comment:     Immature Granulocyte Count (IG) includes promyelocytes, myelocytes and metamyelocytes but does not include bands. Percent differential counts (%) should be interpreted in the context of the absolute cell counts (cells/UL).   12/26/2023 06:13 PM 0.5 0.0 - 0.9 % Final     Comment:     Immature Granulocyte Count (IG) includes promyelocytes, myelocytes and metamyelocytes but does not include bands. Percent differential counts (%) should be interpreted in the context of the absolute cell counts (cells/UL).     Lymphocytes %   Date/Time Value Ref Range Status   01/15/2024 09:54 AM 10.8 13.0 - 44.0 % Final   01/02/2024 10:01 AM 16.5 13.0 - 44.0 % Final   12/26/2023 06:13 PM 10.9 13.0 - 44.0 % Final     Monocytes %   Date/Time Value Ref Range Status   01/15/2024 09:54 AM 5.9 2.0 - 10.0 % Final   01/02/2024 10:01 AM 7.2 2.0 - 10.0 % Final   12/26/2023 06:13 PM 10.2 2.0 - 10.0 % Final     Eosinophils %   Date/Time Value Ref Range Status   01/15/2024 09:54 AM 1.2 0.0 - 6.0 % Final   01/02/2024 10:01 AM 1.3 0.0 - 6.0 % Final   12/26/2023 06:13 PM 0.6 0.0 - 6.0 % Final     Basophils %   Date/Time Value Ref Range Status   01/15/2024 09:54 AM 0.2 0.0 - 2.0 % Final   01/02/2024 10:01 AM 0.5 0.0 - 2.0 % Final   12/26/2023 06:13 PM 0.2 0.0 - 2.0 % Final     Neutrophils Absolute   Date/Time Value Ref Range Status   01/15/2024 09:54 AM 7.48 1.20 - 7.70 x10*3/uL Final     Comment:     Percent differential counts (%)  "should be interpreted in the context of the absolute cell counts (cells/uL).   01/02/2024 10:01 AM 4.72 1.20 - 7.70 x10*3/uL Final     Comment:     Percent differential counts (%) should be interpreted in the context of the absolute cell counts (cells/uL).   12/26/2023 06:13 PM 6.57 1.20 - 7.70 x10*3/uL Final     Comment:     Percent differential counts (%) should be interpreted in the context of the absolute cell counts (cells/uL).     Immature Granulocytes Absolute, Automated   Date/Time Value Ref Range Status   01/15/2024 09:54 AM 0.08 0.00 - 0.70 x10*3/uL Final   01/02/2024 10:01 AM 0.02 0.00 - 0.70 x10*3/uL Final   12/26/2023 06:13 PM 0.04 0.00 - 0.70 x10*3/uL Final     Lymphocytes Absolute   Date/Time Value Ref Range Status   01/15/2024 09:54 AM 1.00 (L) 1.20 - 4.80 x10*3/uL Final   01/02/2024 10:01 AM 1.05 (L) 1.20 - 4.80 x10*3/uL Final   12/26/2023 06:13 PM 0.92 (L) 1.20 - 4.80 x10*3/uL Final     Monocytes Absolute   Date/Time Value Ref Range Status   01/15/2024 09:54 AM 0.54 0.10 - 1.00 x10*3/uL Final   01/02/2024 10:01 AM 0.46 0.10 - 1.00 x10*3/uL Final   12/26/2023 06:13 PM 0.86 0.10 - 1.00 x10*3/uL Final     Eosinophils Absolute   Date/Time Value Ref Range Status   01/15/2024 09:54 AM 0.11 0.00 - 0.70 x10*3/uL Final   01/02/2024 10:01 AM 0.08 0.00 - 0.70 x10*3/uL Final   12/26/2023 06:13 PM 0.05 0.00 - 0.70 x10*3/uL Final     Basophils Absolute   Date/Time Value Ref Range Status   01/15/2024 09:54 AM 0.02 0.00 - 0.10 x10*3/uL Final   01/02/2024 10:01 AM 0.03 0.00 - 0.10 x10*3/uL Final   12/26/2023 06:13 PM 0.02 0.00 - 0.10 x10*3/uL Final       No components found for: \"PT\"  aPTT   Date/Time Value Ref Range Status   12/26/2023 06:13 PM 48 (H) 27 - 38 seconds Final   12/22/2023 11:31 AM 47 (H) 27 - 38 seconds Final   01/08/2023 03:00 PM 36 26 - 39 sec Final     Comment:       THE APTT IS NO LONGER USED FOR MONITORING     UNFRACTIONATED HEPARIN THERAPY.    FOR MONITORING HEPARIN THERAPY,     USE THE HEPARIN " ASSAY.         Assessment/Plan      Patient seen and PET CT scan radiography evaluated.  Patient showing evidence of disease progression.  Overall clinical radiographic impression is disease progression.  They have given these findings patient has been placed back on oxaliplatin 5-FU trastuzumab and leucovorin.  Patient's next chemotherapy will be with the addition of anxiety dosed at 85 mg/m² every 2 weeks.     1/29/2024: Has received cycle 1 horizontally 5-FU trastuzumab and leucovorin.  Presents today for second cycle day 15.  No complaints no weight loss.  Slight fatigue but patient is very functional.  Will pursue 2 cycles and then we will reimage.     Diagnoses and all orders for this visit:  Metastases to the liver (CMS/HCC)  -     Clinic Appointment Request  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)  -     Clinic Appointment Request           Tomasa Blake MD

## 2024-01-31 ASSESSMENT — ENCOUNTER SYMPTOMS
CONSTITUTIONAL NEGATIVE: 1
CARDIOVASCULAR NEGATIVE: 1
EYES NEGATIVE: 1
RESPIRATORY NEGATIVE: 1

## 2024-02-01 ENCOUNTER — TELEPHONE (OUTPATIENT)
Dept: HEMATOLOGY/ONCOLOGY | Facility: CLINIC | Age: 64
End: 2024-02-01
Payer: COMMERCIAL

## 2024-02-01 ENCOUNTER — APPOINTMENT (OUTPATIENT)
Dept: RADIOLOGY | Facility: HOSPITAL | Age: 64
End: 2024-02-01
Payer: MEDICARE

## 2024-02-01 PROBLEM — I81 PORTAL VEIN THROMBOSIS: Status: ACTIVE | Noted: 2023-02-01

## 2024-02-01 PROBLEM — J20.9 ACUTE BRONCHITIS WITH BRONCHOSPASM: Status: ACTIVE | Noted: 2024-02-01

## 2024-02-01 PROBLEM — E87.20 ACIDOSIS, UNSPECIFIED: Status: ACTIVE | Noted: 2023-05-24

## 2024-02-01 PROBLEM — I47.20 VENTRICULAR TACHYCARDIA (MULTI): Status: ACTIVE | Noted: 2024-02-01

## 2024-02-01 PROBLEM — R04.0 EPISTAXIS: Status: ACTIVE | Noted: 2023-11-02

## 2024-02-01 PROBLEM — M19.019 OSTEOARTHRITIS OF SHOULDER: Status: ACTIVE | Noted: 2024-02-01

## 2024-02-01 PROBLEM — I10 HYPERTENSION: Status: ACTIVE | Noted: 2023-02-01

## 2024-02-01 PROBLEM — J02.9 ACUTE PHARYNGITIS: Status: ACTIVE | Noted: 2024-02-01

## 2024-02-01 PROBLEM — G62.0 DRUG-INDUCED POLYNEUROPATHY (MULTI): Status: ACTIVE | Noted: 2023-11-02

## 2024-02-01 PROBLEM — C78.7 MALIGNANT NEOPLASM METASTATIC TO LIVER (MULTI): Status: ACTIVE | Noted: 2023-09-13

## 2024-02-01 PROBLEM — B37.0 CANDIDIASIS OF MOUTH: Status: ACTIVE | Noted: 2024-02-01

## 2024-02-01 PROBLEM — T40.2X5A CONSTIPATION DUE TO OPIOID THERAPY: Status: ACTIVE | Noted: 2023-11-02

## 2024-02-01 PROBLEM — G62.2 NEUROPATHY DUE TO CHEMICAL SUBSTANCE (MULTI): Status: ACTIVE | Noted: 2024-01-23

## 2024-02-01 PROBLEM — K59.03 CONSTIPATION DUE TO OPIOID THERAPY: Status: ACTIVE | Noted: 2023-11-02

## 2024-02-01 PROBLEM — C15.9: Status: ACTIVE | Noted: 2023-01-08

## 2024-02-01 PROBLEM — Z20.822 CONTACT WITH AND (SUSPECTED) EXPOSURE TO COVID-19: Status: ACTIVE | Noted: 2023-05-24

## 2024-02-01 PROBLEM — K21.9 GERD (GASTROESOPHAGEAL REFLUX DISEASE): Status: ACTIVE | Noted: 2024-02-01

## 2024-02-01 PROBLEM — F41.8 MIXED ANXIETY DEPRESSIVE DISORDER: Status: ACTIVE | Noted: 2023-05-24

## 2024-02-01 PROBLEM — K22.9 LESION OF ESOPHAGUS: Status: ACTIVE | Noted: 2023-08-21

## 2024-02-01 PROBLEM — G89.3 PAIN DUE TO NEOPLASM: Status: ACTIVE | Noted: 2023-05-24

## 2024-02-01 PROBLEM — J95.89: Status: ACTIVE | Noted: 2024-02-01

## 2024-02-01 PROBLEM — R55 PRE-SYNCOPE: Status: ACTIVE | Noted: 2023-12-26

## 2024-02-01 PROBLEM — E87.6 HYPOKALEMIA: Status: ACTIVE | Noted: 2023-05-10

## 2024-02-01 PROBLEM — Z86.711 HISTORY OF PULMONARY EMBOLISM: Status: ACTIVE | Noted: 2023-02-01

## 2024-02-01 PROBLEM — K40.30 INGUINAL HERNIA WITH OBSTRUCTION: Status: ACTIVE | Noted: 2023-09-25

## 2024-02-01 PROBLEM — I31.39 NONINFLAMMATORY PERICARDIAL EFFUSION (HHS-HCC): Status: ACTIVE | Noted: 2024-02-01

## 2024-02-01 PROBLEM — Z96.612 STATUS POST LEFT SHOULDER HEMIARTHROPLASTY: Status: ACTIVE | Noted: 2021-03-03

## 2024-02-02 ENCOUNTER — APPOINTMENT (OUTPATIENT)
Dept: PALLIATIVE MEDICINE | Facility: CLINIC | Age: 64
End: 2024-02-02
Payer: MEDICARE

## 2024-02-05 ENCOUNTER — APPOINTMENT (OUTPATIENT)
Dept: HEMATOLOGY/ONCOLOGY | Facility: CLINIC | Age: 64
End: 2024-02-05
Payer: COMMERCIAL

## 2024-02-05 NOTE — TELEPHONE ENCOUNTER
Called and LM on well identified answering machine.  Asked to call and let us know he is feeling ok.  Contact information given.

## 2024-02-06 ENCOUNTER — NURSE TRIAGE (OUTPATIENT)
Dept: HEMATOLOGY/ONCOLOGY | Facility: CLINIC | Age: 64
End: 2024-02-06
Payer: MEDICARE

## 2024-02-06 DIAGNOSIS — R21 RASH: Primary | ICD-10-CM

## 2024-02-06 RX ORDER — DEXAMETHASONE 4 MG/1
4 TABLET ORAL DAILY
Qty: 10 TABLET | Refills: 0 | Status: SHIPPED | OUTPATIENT
Start: 2024-02-06 | End: 2024-03-07 | Stop reason: HOSPADM

## 2024-02-06 NOTE — TELEPHONE ENCOUNTER
Let pt know that Dr. Blake calling in steroid to his pharmacy and pt educated to call our office is sx worsen, Patient agreed to plan and verbalized understanding using teach back method.

## 2024-02-06 NOTE — TELEPHONE ENCOUNTER
Was unable to reach patient, left VM with our office contact information and encouraged pt to call our office back to further discuss.

## 2024-02-06 NOTE — TELEPHONE ENCOUNTER
Called and spoke to pt and his wife, he stated that he was recently out of town in [place] and about 4 days ago he started to get generalized hives, spoke to on call provider over the weekend and was advised to get benadryl and OTC hydrocortisone cream, both of these helped but pt still getting hives off and on, wondering if this could be related to getting oxaliplatin a couple weeks ago. Referred to Dr. Blake to advise how he would like to proceed.

## 2024-02-08 ENCOUNTER — TELEMEDICINE (OUTPATIENT)
Dept: PALLIATIVE MEDICINE | Facility: CLINIC | Age: 64
End: 2024-02-08
Payer: MEDICARE

## 2024-02-08 DIAGNOSIS — M79.2 NEUROPATHIC PAIN: ICD-10-CM

## 2024-02-08 DIAGNOSIS — C15.9 STAGE IV MALIGNANT NEOPLASM OF ESOPHAGUS (MULTI): ICD-10-CM

## 2024-02-08 DIAGNOSIS — F41.9 ANXIETY AND DEPRESSION: ICD-10-CM

## 2024-02-08 DIAGNOSIS — Z51.5 PALLIATIVE CARE ENCOUNTER: Primary | ICD-10-CM

## 2024-02-08 DIAGNOSIS — G89.3 CANCER RELATED PAIN: ICD-10-CM

## 2024-02-08 DIAGNOSIS — F32.A ANXIETY AND DEPRESSION: ICD-10-CM

## 2024-02-08 PROCEDURE — 1160F RVW MEDS BY RX/DR IN RCRD: CPT | Performed by: NURSE PRACTITIONER

## 2024-02-08 PROCEDURE — 99213 OFFICE O/P EST LOW 20 MIN: CPT | Performed by: NURSE PRACTITIONER

## 2024-02-08 PROCEDURE — 1159F MED LIST DOCD IN RCRD: CPT | Performed by: NURSE PRACTITIONER

## 2024-02-08 PROCEDURE — 1126F AMNT PAIN NOTED NONE PRSNT: CPT | Performed by: NURSE PRACTITIONER

## 2024-02-08 RX ORDER — FENTANYL 25 UG/1
1 PATCH TRANSDERMAL
Qty: 10 PATCH | Refills: 0 | Status: SHIPPED | OUTPATIENT
Start: 2024-02-08 | End: 2024-03-09

## 2024-02-08 RX ORDER — LORAZEPAM 1 MG/1
1 TABLET ORAL EVERY 8 HOURS PRN
Qty: 90 TABLET | Refills: 0 | Status: SHIPPED | OUTPATIENT
Start: 2024-02-08 | End: 2024-03-26 | Stop reason: SDUPTHER

## 2024-02-08 RX ORDER — OXYCODONE HYDROCHLORIDE 10 MG/1
10 TABLET ORAL EVERY 4 HOURS PRN
Qty: 180 TABLET | Refills: 0 | Status: SHIPPED | OUTPATIENT
Start: 2024-02-08 | End: 2024-03-09

## 2024-02-08 NOTE — PROGRESS NOTES
[facility]IVE [facility] PALLIATIVE ONCOLOGY OUTPATIENT FOLLOW-UP      SERVICE DATE: 2/8/2024    Cancer History  Stage IV metastatic esophageal cancer dx Sept 2023  -mets to liver, lung, abd lymphadenopathy  -treated with FOLFOX and trastuzumab  -PET/CT 12/26/23 shows evidence of disease progression  -s/p 1 cycle of oxaliplatin, 5-FU, trastuzumab, leucovorin    Med Onc: [facility]       Subjective   HISTORY OF PRESENT ILLNESS: Massimo Garcia is a 63 y.o. male with PmHx of HTN, and recently diagnosed metastatic esophageal cancer.      He presents to supportive oncology clinic for pain and symptom management.    Spoke with pt on the phone for follow up today. States that he recently developed hive like rash on back and bilateral arms, taking benadryl, decadron and using cortisone cream per onc. May be related to new treatment he started approx 1 month ago. Pain is stable, wearing fentanyl patch 25 mcg, taking oxycodone 10mg 1-2x/day. Also using medical THC. Neuropathy is the same, scheduled for scrambler therapy in July. Moving bowels well. Has nausea here and there, also having frequent hiccups. States they go away when he drinks milk. Appetite is stable. Anxiety is stable, never started paxil, managing with ativan daily.     Pain Assessment:  Pain Score:  2  Location:  stomach  Education:      Symptom Assessment:  Pain:a little  Headache: none  Dizziness:none  Lack of energy: none  Difficulty sleeping: somewhat  Worrying: none  Anxiety: somewhat  Depression: none  Pain in mouth/swallowing: none  Dry mouth: none  Taste changes: none  Shortness of breath: none  Lack of appetite: none   Nausea: none  Vomiting: none  Constipation: none  Diarrhea: none  Sore muscles: none  Numbness or tingling in hands/feet/other: none  Weight loss: none      Information obtained from: interview of patient  ______________________________________________________________________        Objective                PHYSICAL EXAMINATION   Vital Signs:    Vital signs reviewed  There were no vitals filed for this visit.  Pain Score:        Physical Exam    ASSESSMENT/PLAN    Pain  Pain is: cancer related pain  Type: visceral  Pain control: sub-optimally controlled  Home regimen:   -continue fentanyl patch 25mcg q72hrs. Rx sent today.   -continue oxycodone 10mg q4hrs PRN. Rx sent for fill on 2/15  -continue tylenol PRN   Intolerances/previously tried:   -previously received morphine with poor efficacy (while inpatient)    Opioid Use  Medication Management:   - OARRS report reviewed with no aberrant behavior; consistent with  prescriptions/records and patient history  - .  Overdose Risk Score 480.   This has been discussed with patient.   - We will continue to closely monitor the patient for signs of prescription misuse including UDS, OARRS review and subjective reports at each visit.  - concurrent benzodiazepine use with ativan, educated pt on medication safety when used in combination with opiates    - I am a provider who either is or has consulted and collaborated with a provider certified in Hospice and Palliative Medicine and have conducted a face-face visit and examination for this patient.  - Routine Urine Drug Screen completed 11/2/23 appropriately positive for opioids and negative for illicit substances  - Controlled Substance Agreement completed 11/2/23  - Specifically discussed that controlled substance prescriptions will only be provided by our group as outlined in the completed agreement  - Prescribed naloxone 11/2/23  - Red Flags: hx of ETOH abuse     Neuropathy b/l hands and feet  -pt self discontinued gabapentin 300mg bid.  -scheduled for scrambler therapy in July 2024     Nausea/ Reflux   Intermittent nausea without vomiting related to chemotherapy and opioids   -continue protonix daily  -continue carafate PRN    Constipation   At risk for constipation related to opioids,  currently not constipated  -educated pt on importance of maintaining  regular bowel schedule  Current regimen:   -continue colace 100mg bid PRN.   -continue senna 8.6mg, 1-2tabs, 1-2x/day  -continueMOM 15mL 4x/day PRN  -continue lactulose 20grams q4hrs x 4 doses PRN     Altered Mood  Acute on chronic anxiety related to health concerns   controlled with home regimen  Home regimen:   -pt never started paxil 10mg daily, would like to hold off for now  -increase ativan to 1mg tid PRN. Rx sent for fill on 2/11.    Decreased appetite  Related to malignancy, chemotherapy, and disease process  -encouraged smaller, more frequent meals through out the day  -encouraged use of supplements such as Boost, Ensure, Breeze  -encouraged use of anti-emetics around meal times   -pt interested in integrative medicine-- dietary changes, etc.   -referral to Dr. Mays's office     Advance Directives  Existence of Advance Directives:No - has interest  -SW referral for assitance with ADs  Decision maker: HCPOA is wife  Code Status: Full code        Next Follow-Up Visit:  Return to clinic in 1 month    Signature and billing  Medical complexity was low level due to due to complexity of problems, extensive data review, and high risk of management/treatment.  Time was spent on the following: Prep Time, Time Directly with Patient/Family/Caregiver, Documentation Time. Total time spent: 25 mins              Some elements copied from my note on 1/5/24, the elements have been updated and all reflect current decision making from today, 2/8/2024.      Plan of Care discussed with: Patient    SIGNATURE: ALFREDITO Ferguson-CNP    Contact information:  [facility] Pall[facility]  Monday-Friday 8 AM-5 PM  Phone:  [phone], press option #5, then option #1.   Or Epic Secure Chat

## 2024-02-09 ENCOUNTER — APPOINTMENT (OUTPATIENT)
Dept: CARDIOLOGY | Facility: HOSPITAL | Age: 64
End: 2024-02-09
Payer: MEDICARE

## 2024-02-09 ENCOUNTER — TELEPHONE (OUTPATIENT)
Dept: HEMATOLOGY/ONCOLOGY | Facility: CLINIC | Age: 64
End: 2024-02-09
Payer: MEDICARE

## 2024-02-09 ENCOUNTER — INFUSION (OUTPATIENT)
Dept: HEMATOLOGY/ONCOLOGY | Facility: CLINIC | Age: 64
End: 2024-02-09
Payer: MEDICARE

## 2024-02-09 ENCOUNTER — APPOINTMENT (OUTPATIENT)
Dept: RADIOLOGY | Facility: HOSPITAL | Age: 64
End: 2024-02-09
Payer: MEDICARE

## 2024-02-09 ENCOUNTER — HOSPITAL ENCOUNTER (EMERGENCY)
Facility: HOSPITAL | Age: 64
Discharge: HOME | End: 2024-02-09
Attending: STUDENT IN AN ORGANIZED HEALTH CARE EDUCATION/TRAINING PROGRAM
Payer: MEDICARE

## 2024-02-09 VITALS
RESPIRATION RATE: 18 BRPM | BODY MASS INDEX: 25.47 KG/M2 | SYSTOLIC BLOOD PRESSURE: 115 MMHG | WEIGHT: 165.34 LBS | TEMPERATURE: 98.1 F | DIASTOLIC BLOOD PRESSURE: 75 MMHG | OXYGEN SATURATION: 98 % | HEART RATE: 76 BPM

## 2024-02-09 VITALS
TEMPERATURE: 97.5 F | SYSTOLIC BLOOD PRESSURE: 135 MMHG | HEART RATE: 86 BPM | DIASTOLIC BLOOD PRESSURE: 75 MMHG | OXYGEN SATURATION: 97 % | RESPIRATION RATE: 17 BRPM | BODY MASS INDEX: 25.95 KG/M2 | HEIGHT: 67 IN | WEIGHT: 165.34 LBS

## 2024-02-09 DIAGNOSIS — C15.9 STAGE IV MALIGNANT NEOPLASM OF ESOPHAGUS (MULTI): ICD-10-CM

## 2024-02-09 DIAGNOSIS — C78.7 METASTASES TO THE LIVER (MULTI): ICD-10-CM

## 2024-02-09 DIAGNOSIS — E86.0 DEHYDRATION: ICD-10-CM

## 2024-02-09 DIAGNOSIS — R10.32 LEFT LOWER QUADRANT ABDOMINAL PAIN: Primary | ICD-10-CM

## 2024-02-09 DIAGNOSIS — C78.7 METASTASES TO THE LIVER (MULTI): Primary | ICD-10-CM

## 2024-02-09 LAB
ALBUMIN SERPL BCP-MCNC: 3.5 G/DL (ref 3.4–5)
ALP SERPL-CCNC: 127 U/L (ref 33–136)
ALT SERPL W P-5'-P-CCNC: 19 U/L (ref 10–52)
ANION GAP SERPL CALC-SCNC: 17 MMOL/L (ref 10–20)
APPEARANCE UR: ABNORMAL
AST SERPL W P-5'-P-CCNC: 41 U/L (ref 9–39)
BASOPHILS # BLD AUTO: 0.02 X10*3/UL (ref 0–0.1)
BASOPHILS NFR BLD AUTO: 0.4 %
BILIRUB DIRECT SERPL-MCNC: 0.1 MG/DL (ref 0–0.3)
BILIRUB SERPL-MCNC: 0.9 MG/DL (ref 0–1.2)
BILIRUB UR STRIP.AUTO-MCNC: NEGATIVE MG/DL
BUN SERPL-MCNC: 9 MG/DL (ref 6–23)
CALCIUM SERPL-MCNC: 8.5 MG/DL (ref 8.6–10.3)
CARDIAC TROPONIN I PNL SERPL HS: 8 NG/L (ref 0–20)
CHLORIDE SERPL-SCNC: 103 MMOL/L (ref 98–107)
CO2 SERPL-SCNC: 24 MMOL/L (ref 21–32)
COLOR UR: YELLOW
CREAT SERPL-MCNC: 0.73 MG/DL (ref 0.5–1.3)
EGFRCR SERPLBLD CKD-EPI 2021: >90 ML/MIN/1.73M*2
EOSINOPHIL # BLD AUTO: 0.06 X10*3/UL (ref 0–0.7)
EOSINOPHIL NFR BLD AUTO: 1.1 %
ERYTHROCYTE [DISTWIDTH] IN BLOOD BY AUTOMATED COUNT: 15.2 % (ref 11.5–14.5)
GLUCOSE SERPL-MCNC: 98 MG/DL (ref 74–99)
GLUCOSE UR STRIP.AUTO-MCNC: NEGATIVE MG/DL
HCT VFR BLD AUTO: 30.7 % (ref 41–52)
HGB BLD-MCNC: 10.6 G/DL (ref 13.5–17.5)
IMM GRANULOCYTES # BLD AUTO: 0.03 X10*3/UL (ref 0–0.7)
IMM GRANULOCYTES NFR BLD AUTO: 0.6 % (ref 0–0.9)
KETONES UR STRIP.AUTO-MCNC: NEGATIVE MG/DL
LACTATE SERPL-SCNC: 1.9 MMOL/L (ref 0.4–2)
LEUKOCYTE ESTERASE UR QL STRIP.AUTO: NEGATIVE
LIPASE SERPL-CCNC: 29 U/L (ref 9–82)
LYMPHOCYTES # BLD AUTO: 1.01 X10*3/UL (ref 1.2–4.8)
LYMPHOCYTES NFR BLD AUTO: 19 %
MAGNESIUM SERPL-MCNC: 1.63 MG/DL (ref 1.6–2.4)
MCH RBC QN AUTO: 29.6 PG (ref 26–34)
MCHC RBC AUTO-ENTMCNC: 34.5 G/DL (ref 32–36)
MCV RBC AUTO: 86 FL (ref 80–100)
MONOCYTES # BLD AUTO: 0.59 X10*3/UL (ref 0.1–1)
MONOCYTES NFR BLD AUTO: 11.1 %
NEUTROPHILS # BLD AUTO: 3.61 X10*3/UL (ref 1.2–7.7)
NEUTROPHILS NFR BLD AUTO: 67.8 %
NITRITE UR QL STRIP.AUTO: NEGATIVE
NRBC BLD-RTO: 0 /100 WBCS (ref 0–0)
PH UR STRIP.AUTO: 7 [PH]
PLATELET # BLD AUTO: 55 X10*3/UL (ref 150–450)
POTASSIUM SERPL-SCNC: 3.5 MMOL/L (ref 3.5–5.3)
PROT SERPL-MCNC: 7 G/DL (ref 6.4–8.2)
PROT UR STRIP.AUTO-MCNC: NEGATIVE MG/DL
RBC # BLD AUTO: 3.58 X10*6/UL (ref 4.5–5.9)
RBC # UR STRIP.AUTO: NEGATIVE /UL
SODIUM SERPL-SCNC: 140 MMOL/L (ref 136–145)
SP GR UR STRIP.AUTO: 1.01
UROBILINOGEN UR STRIP.AUTO-MCNC: <2 MG/DL
WBC # BLD AUTO: 5.3 X10*3/UL (ref 4.4–11.3)

## 2024-02-09 PROCEDURE — 80048 BASIC METABOLIC PNL TOTAL CA: CPT | Performed by: PHYSICIAN ASSISTANT

## 2024-02-09 PROCEDURE — 83735 ASSAY OF MAGNESIUM: CPT | Performed by: PHYSICIAN ASSISTANT

## 2024-02-09 PROCEDURE — 83605 ASSAY OF LACTIC ACID: CPT | Performed by: PHYSICIAN ASSISTANT

## 2024-02-09 PROCEDURE — 2500000004 HC RX 250 GENERAL PHARMACY W/ HCPCS (ALT 636 FOR OP/ED): Performed by: PHYSICIAN ASSISTANT

## 2024-02-09 PROCEDURE — 96375 TX/PRO/DX INJ NEW DRUG ADDON: CPT

## 2024-02-09 PROCEDURE — 81003 URINALYSIS AUTO W/O SCOPE: CPT | Performed by: PHYSICIAN ASSISTANT

## 2024-02-09 PROCEDURE — 93005 ELECTROCARDIOGRAM TRACING: CPT

## 2024-02-09 PROCEDURE — 2550000001 HC RX 255 CONTRASTS: Performed by: PHYSICIAN ASSISTANT

## 2024-02-09 PROCEDURE — 2500000004 HC RX 250 GENERAL PHARMACY W/ HCPCS (ALT 636 FOR OP/ED): Performed by: EMERGENCY MEDICINE

## 2024-02-09 PROCEDURE — 96361 HYDRATE IV INFUSION ADD-ON: CPT

## 2024-02-09 PROCEDURE — 2500000004 HC RX 250 GENERAL PHARMACY W/ HCPCS (ALT 636 FOR OP/ED): Performed by: INTERNAL MEDICINE

## 2024-02-09 PROCEDURE — 84484 ASSAY OF TROPONIN QUANT: CPT | Performed by: PHYSICIAN ASSISTANT

## 2024-02-09 PROCEDURE — 99285 EMERGENCY DEPT VISIT HI MDM: CPT | Mod: 25

## 2024-02-09 PROCEDURE — 74177 CT ABD & PELVIS W/CONTRAST: CPT | Mod: FOREIGN READ | Performed by: RADIOLOGY

## 2024-02-09 PROCEDURE — 85025 COMPLETE CBC W/AUTO DIFF WBC: CPT | Performed by: PHYSICIAN ASSISTANT

## 2024-02-09 PROCEDURE — 96374 THER/PROPH/DIAG INJ IV PUSH: CPT | Mod: 59

## 2024-02-09 PROCEDURE — 82248 BILIRUBIN DIRECT: CPT | Performed by: PHYSICIAN ASSISTANT

## 2024-02-09 PROCEDURE — 2500000004 HC RX 250 GENERAL PHARMACY W/ HCPCS (ALT 636 FOR OP/ED)

## 2024-02-09 PROCEDURE — 83690 ASSAY OF LIPASE: CPT | Performed by: PHYSICIAN ASSISTANT

## 2024-02-09 PROCEDURE — 96360 HYDRATION IV INFUSION INIT: CPT | Mod: INF

## 2024-02-09 PROCEDURE — 99284 EMERGENCY DEPT VISIT MOD MDM: CPT | Mod: 25 | Performed by: STUDENT IN AN ORGANIZED HEALTH CARE EDUCATION/TRAINING PROGRAM

## 2024-02-09 PROCEDURE — 74177 CT ABD & PELVIS W/CONTRAST: CPT

## 2024-02-09 RX ORDER — FAMOTIDINE 10 MG/ML
20 INJECTION INTRAVENOUS ONCE AS NEEDED
Status: CANCELLED | OUTPATIENT
Start: 2024-02-09

## 2024-02-09 RX ORDER — MORPHINE SULFATE 4 MG/ML
4 INJECTION INTRAVENOUS ONCE
Status: COMPLETED | OUTPATIENT
Start: 2024-02-09 | End: 2024-02-09

## 2024-02-09 RX ORDER — DIPHENHYDRAMINE HYDROCHLORIDE 50 MG/ML
50 INJECTION INTRAMUSCULAR; INTRAVENOUS AS NEEDED
Status: CANCELLED | OUTPATIENT
Start: 2024-02-09

## 2024-02-09 RX ORDER — HEPARIN 100 UNIT/ML
SYRINGE INTRAVENOUS
Status: COMPLETED
Start: 2024-02-09 | End: 2024-02-09

## 2024-02-09 RX ORDER — EPINEPHRINE 0.3 MG/.3ML
0.3 INJECTION SUBCUTANEOUS EVERY 5 MIN PRN
Status: CANCELLED | OUTPATIENT
Start: 2024-02-09

## 2024-02-09 RX ORDER — ALBUTEROL SULFATE 0.83 MG/ML
3 SOLUTION RESPIRATORY (INHALATION) AS NEEDED
Status: CANCELLED | OUTPATIENT
Start: 2024-02-09

## 2024-02-09 RX ORDER — ONDANSETRON HYDROCHLORIDE 2 MG/ML
4 INJECTION, SOLUTION INTRAVENOUS ONCE
Status: COMPLETED | OUTPATIENT
Start: 2024-02-09 | End: 2024-02-09

## 2024-02-09 RX ORDER — HEPARIN 100 UNIT/ML
500 SYRINGE INTRAVENOUS AS NEEDED
Status: CANCELLED | OUTPATIENT
Start: 2024-02-09

## 2024-02-09 RX ORDER — HEPARIN SODIUM,PORCINE/PF 10 UNIT/ML
50 SYRINGE (ML) INTRAVENOUS AS NEEDED
Status: CANCELLED | OUTPATIENT
Start: 2024-02-09

## 2024-02-09 RX ORDER — HEPARIN 100 UNIT/ML
5 SYRINGE INTRAVENOUS ONCE
Status: COMPLETED | OUTPATIENT
Start: 2024-02-09 | End: 2024-02-09

## 2024-02-09 RX ADMIN — ONDANSETRON 4 MG: 2 INJECTION INTRAMUSCULAR; INTRAVENOUS at 16:49

## 2024-02-09 RX ADMIN — SODIUM CHLORIDE 1000 ML: 9 INJECTION, SOLUTION INTRAVENOUS at 14:35

## 2024-02-09 RX ADMIN — MORPHINE SULFATE 4 MG: 4 INJECTION INTRAVENOUS at 16:54

## 2024-02-09 RX ADMIN — Medication 500 UNITS: at 20:14

## 2024-02-09 RX ADMIN — SODIUM CHLORIDE 1000 ML: 9 INJECTION, SOLUTION INTRAVENOUS at 16:56

## 2024-02-09 RX ADMIN — HEPARIN 500 UNITS: 100 SYRINGE at 14:35

## 2024-02-09 RX ADMIN — IOHEXOL 75 ML: 350 INJECTION, SOLUTION INTRAVENOUS at 18:22

## 2024-02-09 ASSESSMENT — PAIN SCALES - GENERAL
PAINLEVEL: 5
PAINLEVEL_OUTOF10: 0 - NO PAIN
PAINLEVEL_OUTOF10: 1
PAINLEVEL_OUTOF10: 6
PAINLEVEL_OUTOF10: 8

## 2024-02-09 ASSESSMENT — LIFESTYLE VARIABLES
HAVE PEOPLE ANNOYED YOU BY CRITICIZING YOUR DRINKING: NO
HAVE YOU EVER FELT YOU SHOULD CUT DOWN ON YOUR DRINKING: NO
EVER HAD A DRINK FIRST THING IN THE MORNING TO STEADY YOUR NERVES TO GET RID OF A HANGOVER: NO
EVER FELT BAD OR GUILTY ABOUT YOUR DRINKING: NO

## 2024-02-09 ASSESSMENT — PAIN DESCRIPTION - LOCATION
LOCATION: ABDOMEN

## 2024-02-09 ASSESSMENT — PAIN DESCRIPTION - DESCRIPTORS
DESCRIPTORS: SHARP
DESCRIPTORS: CRAMPING

## 2024-02-09 ASSESSMENT — PAIN - FUNCTIONAL ASSESSMENT
PAIN_FUNCTIONAL_ASSESSMENT: 0-10
PAIN_FUNCTIONAL_ASSESSMENT: 0-10

## 2024-02-09 ASSESSMENT — PAIN DESCRIPTION - ORIENTATION
ORIENTATION: LEFT;LOWER
ORIENTATION: LOWER;LEFT;RIGHT

## 2024-02-09 NOTE — ED PROVIDER NOTES
HPI   Chief Complaint   Patient presents with    Abdominal Pain     Sent from [facility] after getting fluids for lower abdominal pain.  Pain for several days       This is a 63-year-old male coming in for left lower quadrant abdominal pain.  He states that he has had it for the last week.  Currently rates it at a 5 or 6 out of 10.  He states he occasionally gets his pain however this 1 slightly feels different.  He has not been eating or drinking as much.  He does feel more fatigued.  He did recently switch chemotherapy and this is his third infusion of new chemotherapy that he is using to fight esophageal liver cancer.  He is still having normal bowel movements.  No urinary symptoms.  He denies any fevers.  No chills.  Patient reports no nausea or vomiting.      History provided by:  Patient and spouse                      No data recorded                   Patient History   Past Medical History:   Diagnosis Date    Essential (primary) hypertension 12/21/2013    Hypertension    Pacemaker     Peripheral neuropathy     Personal history of other diseases of the circulatory system     History of right bundle branch block (RBBB)    Pneumothorax 12/04/2023     Past Surgical History:   Procedure Laterality Date    CARDIAC ELECTROPHYSIOLOGY PROCEDURE N/A 11/7/2023    Procedure: Temporary Pacemaker Insertion;  Surgeon: Alexis Shook MD;  Location: [facility] Cardiac Cath Lab;  Service: Cardiovascular;  Laterality: N/A;    CARDIAC ELECTROPHYSIOLOGY PROCEDURE Left 11/9/2023    Procedure: PPM IMPLANT DUAL;  Surgeon: Elias Connolly MD;  Location: [facility] Cardiac Cath Lab;  Service: Electrophysiology;  Laterality: Left;    TOTAL KNEE ARTHROPLASTY  09/30/2016    Total Knee Replacement Left    TOTAL KNEE ARTHROPLASTY  09/30/2016    Total Knee Replacement Right     Family History   Problem Relation Name Age of Onset    Cancer Father       Social History     Tobacco Use    Smoking status: Former     Types: Cigarettes, Cigars     Passive  exposure: Never    Smokeless tobacco: Never   Vaping Use    Vaping Use: Unknown   Substance Use Topics    Alcohol use: Not Currently    Drug use: Yes     Types: Marijuana     [facility]       Physical Exam   ED Triage Vitals [02/09/24 1622]   Temperature Heart Rate Respirations BP   36.7 °C (98.1 °F) 73 20 115/73      Pulse Ox Temp Source Heart Rate Source Patient Position   100 % Skin Monitor Sitting      BP Location FiO2 (%)     Left arm --       Physical Exam  Vitals and nursing note reviewed.   Constitutional:       General: He is not in acute distress.     Appearance: Normal appearance. He is not toxic-appearing.   HENT:      Head: Normocephalic and atraumatic.      Nose: Nose normal.      Mouth/Throat:      Mouth: Mucous membranes are moist.      Pharynx: Oropharynx is clear.   Eyes:      Extraocular Movements: Extraocular movements intact.      Conjunctiva/sclera: Conjunctivae normal.      Pupils: Pupils are equal, round, and reactive to light.   Cardiovascular:      Rate and Rhythm: Regular rhythm.      Pulses: Normal pulses.      Heart sounds: Normal heart sounds.   Pulmonary:      Effort: Pulmonary effort is normal. No respiratory distress.      Breath sounds: Normal breath sounds.   Abdominal:      General: Abdomen is flat. Bowel sounds are normal.      Palpations: Abdomen is soft.      Tenderness: There is abdominal tenderness in the left lower quadrant.      Comments: Left lower quadrant abdominal pain as well as left mid abdominal pain.   Musculoskeletal:         General: Normal range of motion.      Cervical back: Normal range of motion and neck supple.   Skin:     General: Skin is warm and dry.      Coloration: Skin is not jaundiced or pale.      Findings: No bruising.   Neurological:      General: No focal deficit present.      Mental Status: He is alert and oriented to person, place, and time. Mental status is at baseline.   Psychiatric:         Mood and Affect: Mood normal.          Behavior: Behavior normal.         ED Course & MDM   Diagnoses as of 02/09/24 1906   Left lower quadrant abdominal pain       Medical Decision Making  Summary:  Medical Decision Making:   Patient presented as described in HPI. Patient case including ROS, PE, and treatment and plan discussed with ED attending if attached as cosigner. Results from labs and or imaging included below if completed. Massimo Garcia  is a 63 y.o. coming in for Patient presents with:  Abdominal Pain: Sent from [facility] after getting fluids for lower abdominal pain.  Pain for several days  .  Patient was sent over from [facility] for left lower quadrant abdominal pain.  Upon further questioning patient states that he does occasionally get left lower quadrant abdominal pain.  This is not completely new for him.  He has been present for a little bit of time as well for few days.  He denies any fevers or chills.  Patient states normal bowel movements.  No vomiting.  Patient has had a recent change in his chemotherapy medications.  He does wear fentanyl patch.  Patient reports that currently the pain is a 5 out of 10 and after IV morphine his pain came down to a 1 out of 10 and he states this is his baseline normal pain.  Due to his symptoms lab work was completed.  Lab work showed no acute concerning abnormalities.  Patient's white blood cell count was normal.  His electrolytes showed no abnormalities of concern.  H&H is stable.  CT scan showed possible illus however questionable and no other acute changes. Patient is not having NVD. He said pain improved and shared decision making is completed on disposition of discharge and follow up with oncologist and PCP.      Patient was advised to follow up with PCP or recommended provider in 2-3 days for another evaluation and exam. I advised patient/guardian to return or go to closest emergency room immediately if symptoms change, get worse, new symptoms develop prior to follow up. If there is  no improvement in symptoms in the next 24 hours they are advised to return for further evaluation and exam. I also explained the plan and treatment course. Patient/guardian is in agreement with plan, treatment course, and follow up and states verbally that they will comply.    Labs Reviewed  CBC WITH AUTO DIFFERENTIAL - Abnormal     WBC                           5.3                    nRBC                          0.0                    RBC                           3.58 (*)               Hemoglobin                    10.6 (*)               Hematocrit                    30.7 (*)               MCV                           86                     MCH                           29.6                   MCHC                          34.5                   RDW                           15.2 (*)               Platelets                     55 (*)                 Neutrophils %                 67.8                   Immature Granulocytes %, Automated   0.6                    Lymphocytes %                 19.0                   Monocytes %                   11.1                   Eosinophils %                 1.1                    Basophils %                   0.4                    Neutrophils Absolute          3.61                   Immature Granulocytes Absolute, Au*   0.03                   Lymphocytes Absolute          1.01 (*)               Monocytes Absolute            0.59                   Eosinophils Absolute          0.06                   Basophils Absolute            0.02                BASIC METABOLIC PANEL - Abnormal     Glucose                       98                     Sodium                        140                    Potassium                     3.5                    Chloride                      103                    Bicarbonate                   24                     Anion Gap                     17                     Urea Nitrogen                 9                      Creatinine                     0.73                   eGFR                          >90                    Calcium                       8.5 (*)             HEPATIC FUNCTION PANEL - Abnormal     Albumin                       3.5                    Bilirubin, Total              0.9                    Bilirubin, Direct             0.1                    Alkaline Phosphatase          127                    ALT                           19                     AST                           41 (*)                 Total Protein                 7.0                 URINALYSIS WITH REFLEX CULTURE AND MICROSCOPIC - Abnormal     Color, Urine                  Yellow                 Appearance, Urine             Hazy (*)               Specific Gravity, Urine       1.008                  pH, Urine                     7.0                    Protein, Urine                NEGATIVE                Glucose, Urine                NEGATIVE                Blood, Urine                  NEGATIVE                Ketones, Urine                NEGATIVE                Bilirubin, Urine              NEGATIVE                Urobilinogen, Urine           <2.0                   Nitrite, Urine                NEGATIVE                Leukocyte Esterase, Urine     NEGATIVE             MAGNESIUM - Normal     Magnesium                     1.63                LIPASE - Normal     Lipase                        29                       Narrative: Venipuncture immediately after or during the administration of Metamizole may lead to falsely low results. Testing should be performed immediately prior to Metamizole dosing.  LACTATE - Normal     Lactate                       1.9                      Narrative: Venipuncture immediately after or during the administration of Metamizole may lead to falsely low results. Testing should be performed immediately                prior to Metamizole dosing.  TROPONIN I, HIGH SENSITIVITY - Normal     Troponin I, High Sensitivity   8                         Narrative: Less than 99th percentile of normal range cutoff-                Female and children under 18 years old <14 ng/L; Male <21 ng/L: Negative                Repeat testing should be performed if clinically indicated.                                 Female and children under 18 years old 14-50 ng/L; Male 21-50 ng/L:                Consistent with possible cardiac damage and possible increased clinical                 risk. Serial measurements may help to assess extent of myocardial damage.                                 >50 ng/L: Consistent with cardiac damage, increased clinical risk and                myocardial infarction. Serial measurements may help assess extent of                 myocardial damage.                                  NOTE: Children less than 1 year old may have higher baseline troponin                 levels and results should be interpreted in conjunction with the overall                 clinical context.                                 NOTE: Troponin I testing is performed using a different                 testing methodology at [facility] than at other                 [facility]. Direct result comparisons should only                 be made within the same method.  URINALYSIS WITH REFLEX CULTURE AND MICROSCOPIC       Narrative: The following orders were created for panel order Urinalysis with Reflex Culture and Microscopic.                Procedure                               Abnormality         Status                                   ---------                               -----------         ------                                   Urinalysis with Reflex C...[[id number]]  Abnormal            Final result                             Extra Urine Gray Tube[[id number]]                            Final result                                             Please view results for these tests on the individual orders.  EXTRA URINE GRAY TUBE   CT abdomen pelvis w IV  contrast   Final Result    There is an increase in size and number of lung nodules in the lung    bases and new periaortic adenopathy in the lower chest when compared    to the prior CT of December 5.  Also increased is some adenopathy in    the periaortic upper abdomen.  The size and number of space-occupying    lesions in the liver has not changed significantly..  Significant    splenomegaly remains but the spleen is homogeneous in density.    On today's exam there appears to be a mild ileus in the small bowel    but no evidence of obstruction or inflammatory change in the bowel is    appreciated.    Signed by Artie Davis MD         Tests/Medications/Escalations of Care considered but not given: As in MDM    Patient care discussed with: N/A  Social Determinants affecting care: N/A    Final diagnosis and disposition as documented     Diagnoses as of 02/09/24 1907  Left lower quadrant abdominal pain       Homegoing. I discussed the differential; results and discharge plan with the patient and/or family/friend/caregiver if present.  I emphasized the importance of follow-up with the physician I referred them to in the timeframe recommended.  I explained reasons for the patient to return to the Emergency Department. They agreed that if they feel their condition is worsening or if they have any other concern they should call 911 immediately for further assistance. I gave the patient an opportunity to ask all questions they had and answered all of them accordingly. They understand return precautions and discharge instructions. The patient and/or family/friend/caregiver expressed understanding verbally and that they would comply.     Disposition: Discharge      This note has been transcribed using voice recognition and may contain grammatical errors, misplaced words, incorrect words, incorrect phrases or other errors.         Procedure  Procedures     Gavin Castro PA-C  02/13/24 1113

## 2024-02-09 NOTE — PROGRESS NOTES
1558. Seen by Dr Blake and determined he needs to go to the ED. MD to MD and RN to RN reports were given Pt to the ED via WC.

## 2024-02-09 NOTE — TELEPHONE ENCOUNTER
Pt to come in today for IVF per Dr. Blake, IVF ordered and CRN aware. Pt agreed to plan and verbalized understanding using teach back method.

## 2024-02-10 LAB — HOLD SPECIMEN: NORMAL

## 2024-02-12 ENCOUNTER — INFUSION (OUTPATIENT)
Dept: HEMATOLOGY/ONCOLOGY | Facility: CLINIC | Age: 64
End: 2024-02-12
Payer: MEDICARE

## 2024-02-12 ENCOUNTER — OFFICE VISIT (OUTPATIENT)
Dept: HEMATOLOGY/ONCOLOGY | Facility: CLINIC | Age: 64
End: 2024-02-12
Payer: MEDICARE

## 2024-02-12 VITALS
RESPIRATION RATE: 18 BRPM | SYSTOLIC BLOOD PRESSURE: 133 MMHG | DIASTOLIC BLOOD PRESSURE: 75 MMHG | BODY MASS INDEX: 26.01 KG/M2 | WEIGHT: 167.66 LBS | OXYGEN SATURATION: 97 % | HEART RATE: 61 BPM | TEMPERATURE: 96.8 F

## 2024-02-12 DIAGNOSIS — C15.9 STAGE IV MALIGNANT NEOPLASM OF ESOPHAGUS (MULTI): ICD-10-CM

## 2024-02-12 DIAGNOSIS — C15.9 STAGE IV MALIGNANT NEOPLASM OF ESOPHAGUS (MULTI): Primary | ICD-10-CM

## 2024-02-12 DIAGNOSIS — C78.7 METASTASES TO THE LIVER (MULTI): ICD-10-CM

## 2024-02-12 LAB
ALBUMIN SERPL BCP-MCNC: 3.7 G/DL (ref 3.4–5)
ALP SERPL-CCNC: 159 U/L (ref 33–136)
ALT SERPL W P-5'-P-CCNC: 26 U/L (ref 10–52)
ANION GAP SERPL CALC-SCNC: 12 MMOL/L (ref 10–20)
AST SERPL W P-5'-P-CCNC: 46 U/L (ref 9–39)
ATRIAL RATE: 66 BPM
BASOPHILS # BLD AUTO: 0.01 X10*3/UL (ref 0–0.1)
BASOPHILS NFR BLD AUTO: 0.1 %
BILIRUB SERPL-MCNC: 0.6 MG/DL (ref 0–1.2)
BUN SERPL-MCNC: 13 MG/DL (ref 6–23)
CALCIUM SERPL-MCNC: 9.4 MG/DL (ref 8.6–10.3)
CHLORIDE SERPL-SCNC: 105 MMOL/L (ref 98–107)
CO2 SERPL-SCNC: 23 MMOL/L (ref 21–32)
CREAT SERPL-MCNC: 0.76 MG/DL (ref 0.5–1.3)
EGFRCR SERPLBLD CKD-EPI 2021: >90 ML/MIN/1.73M*2
EOSINOPHIL # BLD AUTO: 0 X10*3/UL (ref 0–0.7)
EOSINOPHIL NFR BLD AUTO: 0 %
ERYTHROCYTE [DISTWIDTH] IN BLOOD BY AUTOMATED COUNT: 15.5 % (ref 11.5–14.5)
GLUCOSE SERPL-MCNC: 136 MG/DL (ref 74–99)
HCT VFR BLD AUTO: 32.8 % (ref 41–52)
HGB BLD-MCNC: 10.8 G/DL (ref 13.5–17.5)
IMM GRANULOCYTES # BLD AUTO: 0.02 X10*3/UL (ref 0–0.7)
IMM GRANULOCYTES NFR BLD AUTO: 0.2 % (ref 0–0.9)
LYMPHOCYTES # BLD AUTO: 0.72 X10*3/UL (ref 1.2–4.8)
LYMPHOCYTES NFR BLD AUTO: 8.1 %
MCH RBC QN AUTO: 29.3 PG (ref 26–34)
MCHC RBC AUTO-ENTMCNC: 32.9 G/DL (ref 32–36)
MCV RBC AUTO: 89 FL (ref 80–100)
MONOCYTES # BLD AUTO: 0.56 X10*3/UL (ref 0.1–1)
MONOCYTES NFR BLD AUTO: 6.3 %
NEUTROPHILS # BLD AUTO: 7.55 X10*3/UL (ref 1.2–7.7)
NEUTROPHILS NFR BLD AUTO: 85.3 %
P AXIS: 21 DEGREES
P OFFSET: 170 MS
P ONSET: 137 MS
PLATELET # BLD AUTO: 64 X10*3/UL (ref 150–450)
POTASSIUM SERPL-SCNC: 3.7 MMOL/L (ref 3.5–5.3)
PR INTERVAL: 152 MS
PROT SERPL-MCNC: 7.2 G/DL (ref 6.4–8.2)
Q ONSET: 213 MS
QRS COUNT: 11 BEATS
QRS DURATION: 152 MS
QT INTERVAL: 462 MS
QTC CALCULATION(BAZETT): 484 MS
QTC FREDERICIA: 477 MS
R AXIS: 102 DEGREES
RBC # BLD AUTO: 3.69 X10*6/UL (ref 4.5–5.9)
SODIUM SERPL-SCNC: 136 MMOL/L (ref 136–145)
T AXIS: 76 DEGREES
T OFFSET: 444 MS
VENTRICULAR RATE: 66 BPM
WBC # BLD AUTO: 8.9 X10*3/UL (ref 4.4–11.3)

## 2024-02-12 PROCEDURE — 2500000004 HC RX 250 GENERAL PHARMACY W/ HCPCS (ALT 636 FOR OP/ED): Performed by: INTERNAL MEDICINE

## 2024-02-12 PROCEDURE — 96415 CHEMO IV INFUSION ADDL HR: CPT

## 2024-02-12 PROCEDURE — 96411 CHEMO IV PUSH ADDL DRUG: CPT

## 2024-02-12 PROCEDURE — 3078F DIAST BP <80 MM HG: CPT | Performed by: INTERNAL MEDICINE

## 2024-02-12 PROCEDURE — 80053 COMPREHEN METABOLIC PANEL: CPT | Performed by: INTERNAL MEDICINE

## 2024-02-12 PROCEDURE — 96368 THER/DIAG CONCURRENT INF: CPT

## 2024-02-12 PROCEDURE — 96413 CHEMO IV INFUSION 1 HR: CPT

## 2024-02-12 PROCEDURE — 1160F RVW MEDS BY RX/DR IN RCRD: CPT | Performed by: INTERNAL MEDICINE

## 2024-02-12 PROCEDURE — 96375 TX/PRO/DX INJ NEW DRUG ADDON: CPT | Mod: INF

## 2024-02-12 PROCEDURE — 99215 OFFICE O/P EST HI 40 MIN: CPT | Performed by: INTERNAL MEDICINE

## 2024-02-12 PROCEDURE — 1159F MED LIST DOCD IN RCRD: CPT | Performed by: INTERNAL MEDICINE

## 2024-02-12 PROCEDURE — 2500000004 HC RX 250 GENERAL PHARMACY W/ HCPCS (ALT 636 FOR OP/ED)

## 2024-02-12 PROCEDURE — 1126F AMNT PAIN NOTED NONE PRSNT: CPT | Performed by: INTERNAL MEDICINE

## 2024-02-12 PROCEDURE — 96372 THER/PROPH/DIAG INJ SC/IM: CPT

## 2024-02-12 PROCEDURE — 96372 THER/PROPH/DIAG INJ SC/IM: CPT | Performed by: INTERNAL MEDICINE

## 2024-02-12 PROCEDURE — 3075F SYST BP GE 130 - 139MM HG: CPT | Performed by: INTERNAL MEDICINE

## 2024-02-12 PROCEDURE — 85025 COMPLETE CBC W/AUTO DIFF WBC: CPT | Performed by: INTERNAL MEDICINE

## 2024-02-12 PROCEDURE — 96417 CHEMO IV INFUS EACH ADDL SEQ: CPT

## 2024-02-12 PROCEDURE — 2500000004 HC RX 250 GENERAL PHARMACY W/ HCPCS (ALT 636 FOR OP/ED): Mod: JZ | Performed by: INTERNAL MEDICINE

## 2024-02-12 RX ORDER — CYANOCOBALAMIN 1000 UG/ML
1000 INJECTION, SOLUTION INTRAMUSCULAR; SUBCUTANEOUS ONCE
Status: COMPLETED | OUTPATIENT
Start: 2024-02-12 | End: 2024-02-12

## 2024-02-12 RX ORDER — DIPHENHYDRAMINE HYDROCHLORIDE 50 MG/ML
50 INJECTION INTRAMUSCULAR; INTRAVENOUS AS NEEDED
Status: DISCONTINUED | OUTPATIENT
Start: 2024-02-12 | End: 2024-02-13 | Stop reason: HOSPADM

## 2024-02-12 RX ORDER — EPINEPHRINE 0.3 MG/.3ML
0.3 INJECTION SUBCUTANEOUS EVERY 5 MIN PRN
Status: DISCONTINUED | OUTPATIENT
Start: 2024-02-12 | End: 2024-02-13 | Stop reason: HOSPADM

## 2024-02-12 RX ORDER — DIPHENHYDRAMINE HYDROCHLORIDE 50 MG/ML
50 INJECTION INTRAMUSCULAR; INTRAVENOUS AS NEEDED
Status: CANCELLED | OUTPATIENT
Start: 2024-03-11

## 2024-02-12 RX ORDER — PROCHLORPERAZINE EDISYLATE 5 MG/ML
10 INJECTION INTRAMUSCULAR; INTRAVENOUS EVERY 6 HOURS PRN
Status: DISCONTINUED | OUTPATIENT
Start: 2024-02-12 | End: 2024-02-13 | Stop reason: HOSPADM

## 2024-02-12 RX ORDER — CYANOCOBALAMIN 1000 UG/ML
1000 INJECTION, SOLUTION INTRAMUSCULAR; SUBCUTANEOUS ONCE
Status: CANCELLED | OUTPATIENT
Start: 2024-03-11

## 2024-02-12 RX ORDER — FAMOTIDINE 10 MG/ML
20 INJECTION INTRAVENOUS ONCE AS NEEDED
Status: CANCELLED | OUTPATIENT
Start: 2024-03-11

## 2024-02-12 RX ORDER — FLUOROURACIL 50 MG/ML
400 INJECTION, SOLUTION INTRAVENOUS ONCE
Status: COMPLETED | OUTPATIENT
Start: 2024-02-12 | End: 2024-02-12

## 2024-02-12 RX ORDER — PROCHLORPERAZINE MALEATE 10 MG
10 TABLET ORAL EVERY 6 HOURS PRN
Status: DISCONTINUED | OUTPATIENT
Start: 2024-02-12 | End: 2024-02-13 | Stop reason: HOSPADM

## 2024-02-12 RX ORDER — ALBUTEROL SULFATE 0.83 MG/ML
3 SOLUTION RESPIRATORY (INHALATION) AS NEEDED
Status: CANCELLED | OUTPATIENT
Start: 2024-03-11

## 2024-02-12 RX ORDER — EPINEPHRINE 0.3 MG/.3ML
0.3 INJECTION SUBCUTANEOUS EVERY 5 MIN PRN
Status: CANCELLED | OUTPATIENT
Start: 2024-03-11

## 2024-02-12 RX ORDER — DEXAMETHASONE SODIUM PHOSPHATE 4 MG/ML
8 INJECTION, SOLUTION INTRA-ARTICULAR; INTRALESIONAL; INTRAMUSCULAR; INTRAVENOUS; SOFT TISSUE ONCE
Status: COMPLETED | OUTPATIENT
Start: 2024-02-12 | End: 2024-02-12

## 2024-02-12 RX ORDER — DIPHENHYDRAMINE HYDROCHLORIDE 50 MG/ML
25 INJECTION INTRAMUSCULAR; INTRAVENOUS ONCE
Status: COMPLETED | OUTPATIENT
Start: 2024-02-12 | End: 2024-02-12

## 2024-02-12 RX ORDER — FAMOTIDINE 10 MG/ML
20 INJECTION INTRAVENOUS ONCE AS NEEDED
Status: DISCONTINUED | OUTPATIENT
Start: 2024-02-12 | End: 2024-02-13 | Stop reason: HOSPADM

## 2024-02-12 RX ORDER — ACETAMINOPHEN 325 MG/1
650 TABLET ORAL ONCE
Status: COMPLETED | OUTPATIENT
Start: 2024-02-12 | End: 2024-02-12

## 2024-02-12 RX ORDER — ALBUTEROL SULFATE 0.83 MG/ML
3 SOLUTION RESPIRATORY (INHALATION) AS NEEDED
Status: DISCONTINUED | OUTPATIENT
Start: 2024-02-12 | End: 2024-02-13 | Stop reason: HOSPADM

## 2024-02-12 RX ORDER — PALONOSETRON 0.05 MG/ML
0.25 INJECTION, SOLUTION INTRAVENOUS ONCE
Status: COMPLETED | OUTPATIENT
Start: 2024-02-12 | End: 2024-02-12

## 2024-02-12 RX ORDER — HEPARIN 100 UNIT/ML
SYRINGE INTRAVENOUS
Status: COMPLETED
Start: 2024-02-12 | End: 2024-02-12

## 2024-02-12 RX ORDER — LORAZEPAM 2 MG/ML
1 INJECTION INTRAMUSCULAR AS NEEDED
Status: DISCONTINUED | OUTPATIENT
Start: 2024-02-12 | End: 2024-02-13 | Stop reason: HOSPADM

## 2024-02-12 RX ADMIN — HEPARIN 500 UNITS: 100 SYRINGE at 16:00

## 2024-02-12 RX ADMIN — OXALIPLATIN 160 MG: 5 INJECTION, SOLUTION INTRAVENOUS at 13:08

## 2024-02-12 RX ADMIN — PALONOSETRON HYDROCHLORIDE 250 MCG: 0.25 INJECTION INTRAVENOUS at 11:17

## 2024-02-12 RX ADMIN — DIPHENHYDRAMINE HYDROCHLORIDE 25 MG: 50 INJECTION INTRAMUSCULAR; INTRAVENOUS at 11:43

## 2024-02-12 RX ADMIN — LEUCOVORIN CALCIUM 740 MG: 350 INJECTION, POWDER, LYOPHILIZED, FOR SUSPENSION INTRAMUSCULAR; INTRAVENOUS at 13:08

## 2024-02-12 RX ADMIN — DEXAMETHASONE SODIUM PHOSPHATE 8 MG: 4 INJECTION INTRA-ARTICULAR; INTRALESIONAL; INTRAMUSCULAR; INTRAVENOUS; SOFT TISSUE at 11:21

## 2024-02-12 RX ADMIN — FLUOROURACIL 750 MG: 50 INJECTION, SOLUTION INTRAVENOUS at 15:39

## 2024-02-12 RX ADMIN — ACETAMINOPHEN 650 MG: 325 TABLET ORAL at 11:17

## 2024-02-12 RX ADMIN — TRASTUZUMAB 300 MG: 150 INJECTION, POWDER, LYOPHILIZED, FOR SOLUTION INTRAVENOUS at 12:10

## 2024-02-12 RX ADMIN — CYANOCOBALAMIN 1000 MCG: 1000 INJECTION INTRAMUSCULAR; SUBCUTANEOUS at 11:28

## 2024-02-12 ASSESSMENT — PAIN SCALES - GENERAL: PAINLEVEL: 0-NO PAIN

## 2024-02-12 NOTE — PROGRESS NOTES
Patient ID: Massimo Garcia is a 63 y.o. male.  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  Has done well on trastuzumab FOLFOX regimen.  Today complains of dizziness and presyncopal symptoms. Metastatic burden is liver some lung lesions as well as abdominal lymphadenopathy.          LIVER:  Liver measures 14.7 cm in length and demonstrates mildly diffuse coarsened hepatic echotexture with mild nodular contour which may indicate cirrhotic morphology. There is an indeterminate hypoechoic  lesion within the right hepatic lobe measuring up. 3.6 x 3.2 x 3.0 cm and a 2nd 2.6 x 1.8 x 2.7 cm indeterminate heterogeneous hypoechoic lesion within the left hepatic lobe. Additional scatter presumed hepatic cysts, largest measuring up to 2.4 x 2.1 x 1.8 cm      GALLBLADDER:  Contracted. No obvious cholelithiasis.      BILIARY SYSTEM:  No evidence of intra hepatic biliary dilatation is identified; the common bile duct measures 13.2 mm.          PANCREAS:  The pancreas is poorly visualized due to overlying bowel gas.      RIGHT KIDNEY:  The right kidney measures 11.5 cm in length. No hydronephrosis or renal calculi are seen.          IMPRESSION:  Cirrhotic hepatic morphology with indeterminate hypoechoic lesions of the liver as above, largest measuring up to 3.6 x 3.2 x 3.0 cm. Hepatic mass protocol MRI advised for further assessment.  Review of Systems   Constitutional:  Positive for appetite change and fatigue.   HENT:  Negative.     Eyes: Negative.    Respiratory: Negative.     Cardiovascular: Negative.    Gastrointestinal:  Positive for abdominal pain.   Skin: Negative.         Objective   BSA: 1.9 meters squared  /75 (BP Location: Left arm, Patient Position: Sitting, BP Cuff Size: Adult)   Pulse 61   Temp  36 °C (96.8 °F) (Temporal)   Resp 18   Wt 76.1 kg (167 lb 10.6 oz)   SpO2 97%   BMI 26.01 kg/m²      has a past medical history of Essential (primary) hypertension (12/21/2013), Pacemaker, Peripheral neuropathy, Personal history of other diseases of the circulatory system, and Pneumothorax (12/04/2023).   has a past surgical history that includes Total knee arthroplasty (09/30/2016); Total knee arthroplasty (09/30/2016); Cardiac electrophysiology procedure (N/A, 11/7/2023); and Cardiac electrophysiology procedure (Left, 11/9/2023).  Family History   Problem Relation Name Age of Onset    Cancer Father       Oncology History   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)   9/13/2023 Initial Diagnosis    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)     9/13/2023 -  Chemotherapy    Trastuzumab + mFOLFOX6 (Fluorouracil Continuous Infusion / Leucovorin / Oxaliplatin), 14 Day Cycles     Metastases to the liver (CMS/HCC)   9/13/2023 Initial Diagnosis    Metastases to the liver (CMS/HCC)     9/13/2023 -  Chemotherapy    Trastuzumab + mFOLFOX6 (Fluorouracil Continuous Infusion / Leucovorin / Oxaliplatin), 14 Day Cycles         Massimo Garcia  reports that he has quit smoking. His smoking use included cigarettes and cigars. He has never been exposed to tobacco smoke. He has never used smokeless tobacco.  He  reports that he does not currently use alcohol.  He  reports current drug use. Drug: Marijuana.    Physical Exam  Constitutional:       Appearance: Normal appearance.   HENT:      Head: Normocephalic and atraumatic.   Eyes:      Extraocular Movements: Extraocular movements intact.      Pupils: Pupils are equal, round, and reactive to light.   Abdominal:      Tenderness: There is abdominal tenderness.   Neurological:      Mental Status: He is alert.         WBC   Date/Time Value Ref Range Status   02/12/2024 09:18 AM 8.9 4.4 - 11.3 x10*3/uL Final   02/09/2024 04:48 PM 5.3 4.4 - 11.3 x10*3/uL Final   01/29/2024  08:50 AM 7.4 4.4 - 11.3 x10*3/uL Final     nRBC   Date Value Ref Range Status   02/09/2024 0.0 0.0 - 0.0 /100 WBCs Final   12/27/2023 0.0 0.0 - 0.0 /100 WBCs Final   12/26/2023 0.0 0.0 - 0.0 /100 WBCs Final     RBC   Date Value Ref Range Status   02/12/2024 3.69 (L) 4.50 - 5.90 x10*6/uL Final   02/09/2024 3.58 (L) 4.50 - 5.90 x10*6/uL Final   01/29/2024 3.90 (L) 4.50 - 5.90 x10*6/uL Final     Hemoglobin   Date Value Ref Range Status   02/12/2024 10.8 (L) 13.5 - 17.5 g/dL Final   02/09/2024 10.6 (L) 13.5 - 17.5 g/dL Final   01/29/2024 11.5 (L) 13.5 - 17.5 g/dL Final     Hematocrit   Date Value Ref Range Status   02/12/2024 32.8 (L) 41.0 - 52.0 % Final   02/09/2024 30.7 (L) 41.0 - 52.0 % Final   01/29/2024 33.7 (L) 41.0 - 52.0 % Final     MCV   Date/Time Value Ref Range Status   02/12/2024 09:18 AM 89 80 - 100 fL Final   02/09/2024 04:48 PM 86 80 - 100 fL Final   01/29/2024 08:50 AM 86 80 - 100 fL Final     MCH   Date/Time Value Ref Range Status   02/12/2024 09:18 AM 29.3 26.0 - 34.0 pg Final   02/09/2024 04:48 PM 29.6 26.0 - 34.0 pg Final   01/29/2024 08:50 AM 29.5 26.0 - 34.0 pg Final     MCHC   Date/Time Value Ref Range Status   02/12/2024 09:18 AM 32.9 32.0 - 36.0 g/dL Final   02/09/2024 04:48 PM 34.5 32.0 - 36.0 g/dL Final   01/29/2024 08:50 AM 34.1 32.0 - 36.0 g/dL Final     RDW   Date/Time Value Ref Range Status   02/12/2024 09:18 AM 15.5 (H) 11.5 - 14.5 % Final   02/09/2024 04:48 PM 15.2 (H) 11.5 - 14.5 % Final   01/29/2024 08:50 AM 14.3 11.5 - 14.5 % Final     Platelets   Date/Time Value Ref Range Status   02/12/2024 09:18 AM 64 (L) 150 - 450 x10*3/uL Final   02/09/2024 04:48 PM 55 (L) 150 - 450 x10*3/uL Final   01/29/2024 08:50 AM 71 (L) 150 - 450 x10*3/uL Final     MPV   Date/Time Value Ref Range Status   10/30/2023 09:13 AM 10.3 7.5 - 11.5 fL Final   10/16/2023 08:34 AM 10.7 7.5 - 11.5 fL Final   10/02/2023 09:08 AM 10.0 7.5 - 11.5 fL Final     Neutrophils %   Date/Time Value Ref Range Status   02/12/2024  09:18 AM 85.3 40.0 - 80.0 % Final   02/09/2024 04:48 PM 67.8 40.0 - 80.0 % Final   01/29/2024 08:50 AM 78.2 40.0 - 80.0 % Final     Immature Granulocytes %, Automated   Date/Time Value Ref Range Status   02/12/2024 09:18 AM 0.2 0.0 - 0.9 % Final     Comment:     Immature Granulocyte Count (IG) includes promyelocytes, myelocytes and metamyelocytes but does not include bands. Percent differential counts (%) should be interpreted in the context of the absolute cell counts (cells/UL).   02/09/2024 04:48 PM 0.6 0.0 - 0.9 % Final     Comment:     Immature Granulocyte Count (IG) includes promyelocytes, myelocytes and metamyelocytes but does not include bands. Percent differential counts (%) should be interpreted in the context of the absolute cell counts (cells/UL).   01/29/2024 08:50 AM 0.1 0.0 - 0.9 % Final     Comment:     Immature Granulocyte Count (IG) includes promyelocytes, myelocytes and metamyelocytes but does not include bands. Percent differential counts (%) should be interpreted in the context of the absolute cell counts (cells/UL).     Lymphocytes %   Date/Time Value Ref Range Status   02/12/2024 09:18 AM 8.1 13.0 - 44.0 % Final   02/09/2024 04:48 PM 19.0 13.0 - 44.0 % Final   01/29/2024 08:50 AM 12.6 13.0 - 44.0 % Final     Monocytes %   Date/Time Value Ref Range Status   02/12/2024 09:18 AM 6.3 2.0 - 10.0 % Final   02/09/2024 04:48 PM 11.1 2.0 - 10.0 % Final   01/29/2024 08:50 AM 7.6 2.0 - 10.0 % Final     Eosinophils %   Date/Time Value Ref Range Status   02/12/2024 09:18 AM 0.0 0.0 - 6.0 % Final   02/09/2024 04:48 PM 1.1 0.0 - 6.0 % Final   01/29/2024 08:50 AM 1.1 0.0 - 6.0 % Final     Basophils %   Date/Time Value Ref Range Status   02/12/2024 09:18 AM 0.1 0.0 - 2.0 % Final   02/09/2024 04:48 PM 0.4 0.0 - 2.0 % Final   01/29/2024 08:50 AM 0.4 0.0 - 2.0 % Final     Neutrophils Absolute   Date/Time Value Ref Range Status   02/12/2024 09:18 AM 7.55 1.20 - 7.70 x10*3/uL Final     Comment:     Percent  "differential counts (%) should be interpreted in the context of the absolute cell counts (cells/uL).   02/09/2024 04:48 PM 3.61 1.20 - 7.70 x10*3/uL Final     Comment:     Percent differential counts (%) should be interpreted in the context of the absolute cell counts (cells/uL).   01/29/2024 08:50 AM 5.79 1.20 - 7.70 x10*3/uL Final     Comment:     Percent differential counts (%) should be interpreted in the context of the absolute cell counts (cells/uL).     Immature Granulocytes Absolute, Automated   Date/Time Value Ref Range Status   02/12/2024 09:18 AM 0.02 0.00 - 0.70 x10*3/uL Final   02/09/2024 04:48 PM 0.03 0.00 - 0.70 x10*3/uL Final   01/29/2024 08:50 AM 0.01 0.00 - 0.70 x10*3/uL Final     Lymphocytes Absolute   Date/Time Value Ref Range Status   02/12/2024 09:18 AM 0.72 (L) 1.20 - 4.80 x10*3/uL Final   02/09/2024 04:48 PM 1.01 (L) 1.20 - 4.80 x10*3/uL Final   01/29/2024 08:50 AM 0.93 (L) 1.20 - 4.80 x10*3/uL Final     Monocytes Absolute   Date/Time Value Ref Range Status   02/12/2024 09:18 AM 0.56 0.10 - 1.00 x10*3/uL Final   02/09/2024 04:48 PM 0.59 0.10 - 1.00 x10*3/uL Final   01/29/2024 08:50 AM 0.56 0.10 - 1.00 x10*3/uL Final     Eosinophils Absolute   Date/Time Value Ref Range Status   02/12/2024 09:18 AM 0.00 0.00 - 0.70 x10*3/uL Final   02/09/2024 04:48 PM 0.06 0.00 - 0.70 x10*3/uL Final   01/29/2024 08:50 AM 0.08 0.00 - 0.70 x10*3/uL Final     Basophils Absolute   Date/Time Value Ref Range Status   02/12/2024 09:18 AM 0.01 0.00 - 0.10 x10*3/uL Final   02/09/2024 04:48 PM 0.02 0.00 - 0.10 x10*3/uL Final   01/29/2024 08:50 AM 0.03 0.00 - 0.10 x10*3/uL Final       No components found for: \"PT\"  aPTT   Date/Time Value Ref Range Status   12/26/2023 06:13 PM 48 (H) 27 - 38 seconds Final   12/22/2023 11:31 AM 47 (H) 27 - 38 seconds Final   01/08/2023 03:00 PM 36 26 - 39 sec Final     Comment:       THE APTT IS NO LONGER USED FOR MONITORING     UNFRACTIONATED HEPARIN THERAPY.    FOR MONITORING HEPARIN " THERAPY,     USE THE HEPARIN ASSAY.         Assessment/Plan      Patient seen and PET CT scan radiography evaluated.  Patient showing evidence of disease progression.  Overall clinical radiographic impression is disease progression.  They have given these findings patient has been placed back on oxaliplatin 5-FU trastuzumab and leucovorin.  Patient's next chemotherapy will be with the addition of anxiety dosed at 85 mg/m² every 2 weeks.     1/29/2024: Has received cycle 1 horizontally 5-FU trastuzumab and leucovorin.  Presents today for second cycle day 15.  No complaints no weight loss.  Slight fatigue but patient is very functional.  Will pursue 2 cycles and then we will reimage.    Given the evidence of probable progression I have added pembrolizumab to the patient's treatment schedule.  As per NCCN guidelines patient is eligible to receive trastuzumab pembrolizumab 5-FU leucovorin and oxaliplatin.  Pembrolizumab is scheduled for every 42 days.     Diagnoses and all orders for this visit:  Metastases to the liver (CMS/HCC)  -     Clinic Appointment Request  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)  -     Clinic Appointment Request           Tomasa Blake MD

## 2024-02-12 NOTE — PROGRESS NOTES
Patient ID: Massimo Garcia is a 63 y.o. male.  Referring Physician: Tomasa Blake MD  [id number] [address]  Primary Care Provider: Dayne Santamaria DO  Visit Type:  Follow Up         Subjective    HPI    Review of Systems - Oncology     Objective   BSA: There is no height or weight on file to calculate BSA.  There were no vitals taken for this visit.     has a past medical history of Essential (primary) hypertension (12/21/2013), Pacemaker, Peripheral neuropathy, Personal history of other diseases of the circulatory system, and Pneumothorax (12/04/2023).   has a past surgical history that includes Total knee arthroplasty (09/30/2016); Total knee arthroplasty (09/30/2016); Cardiac electrophysiology procedure (N/A, 11/7/2023); and Cardiac electrophysiology procedure (Left, 11/9/2023).  Family History   Problem Relation Name Age of Onset    Cancer Father       Oncology History   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)   9/13/2023 Initial Diagnosis    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)     9/13/2023 -  Chemotherapy    Trastuzumab + mFOLFOX6 (Fluorouracil Continuous Infusion / Leucovorin / Oxaliplatin), 14 Day Cycles     Metastases to the liver (CMS/HCC)   9/13/2023 Initial Diagnosis    Metastases to the liver (CMS/HCC)     9/13/2023 -  Chemotherapy    Trastuzumab + mFOLFOX6 (Fluorouracil Continuous Infusion / Leucovorin / Oxaliplatin), 14 Day Cycles         Massimo Garcia  reports that he has quit smoking. His smoking use included cigarettes and cigars. He has never been exposed to tobacco smoke. He has never used smokeless tobacco.  He  reports that he does not currently use alcohol.  He  reports current drug use. Drug: Marijuana.    Physical Exam    WBC   Date/Time Value Ref Range Status   02/12/2024 09:18 AM 8.9 4.4 - 11.3 x10*3/uL Final   02/09/2024 04:48 PM 5.3 4.4 - 11.3 x10*3/uL Final   01/29/2024 08:50 AM 7.4 4.4 - 11.3 x10*3/uL Final     [facility]   Date Value  Ref Range Status   02/09/2024 0.0 0.0 - 0.0 /100 WBCs Final   12/27/2023 0.0 0.0 - 0.0 /100 WBCs Final   12/26/2023 0.0 0.0 - 0.0 /100 WBCs Final     RBC   Date Value Ref Range Status   02/12/2024 3.69 (L) 4.50 - 5.90 x10*6/uL Final   02/09/2024 3.58 (L) 4.50 - 5.90 x10*6/uL Final   01/29/2024 3.90 (L) 4.50 - 5.90 x10*6/uL Final     Hemoglobin   Date Value Ref Range Status   02/12/2024 10.8 (L) 13.5 - 17.5 g/dL Final   02/09/2024 10.6 (L) 13.5 - 17.5 g/dL Final   01/29/2024 11.5 (L) 13.5 - 17.5 g/dL Final     Hematocrit   Date Value Ref Range Status   02/12/2024 32.8 (L) 41.0 - 52.0 % Final   02/09/2024 30.7 (L) 41.0 - 52.0 % Final   01/29/2024 33.7 (L) 41.0 - 52.0 % Final     MCV   Date/Time Value Ref Range Status   02/12/2024 09:18 AM 89 80 - 100 fL Final   02/09/2024 04:48 PM 86 80 - 100 fL Final   01/29/2024 08:50 AM 86 80 - 100 fL Final     MCH   Date/Time Value Ref Range Status   02/12/2024 09:18 AM 29.3 26.0 - 34.0 pg Final   02/09/2024 04:48 PM 29.6 26.0 - 34.0 pg Final   01/29/2024 08:50 AM 29.5 26.0 - 34.0 pg Final     MCHC   Date/Time Value Ref Range Status   02/12/2024 09:18 AM 32.9 32.0 - 36.0 g/dL Final   02/09/2024 04:48 PM 34.5 32.0 - 36.0 g/dL Final   01/29/2024 08:50 AM 34.1 32.0 - 36.0 g/dL Final     RDW   Date/Time Value Ref Range Status   02/12/2024 09:18 AM 15.5 (H) 11.5 - 14.5 % Final   02/09/2024 04:48 PM 15.2 (H) 11.5 - 14.5 % Final   01/29/2024 08:50 AM 14.3 11.5 - 14.5 % Final     Platelets   Date/Time Value Ref Range Status   02/12/2024 09:18 AM 64 (L) 150 - 450 x10*3/uL Final   02/09/2024 04:48 PM 55 (L) 150 - 450 x10*3/uL Final   01/29/2024 08:50 AM 71 (L) 150 - 450 x10*3/uL Final     MPV   Date/Time Value Ref Range Status   10/30/2023 09:13 AM 10.3 7.5 - 11.5 fL Final   10/16/2023 08:34 AM 10.7 7.5 - 11.5 fL Final   10/02/2023 09:08 AM 10.0 7.5 - 11.5 fL Final     Neutrophils %   Date/Time Value Ref Range Status   02/12/2024 09:18 AM 85.3 40.0 - 80.0 % Final   02/09/2024 04:48 PM 67.8  40.0 - 80.0 % Final   01/29/2024 08:50 AM 78.2 40.0 - 80.0 % Final     Immature Granulocytes %, Automated   Date/Time Value Ref Range Status   02/12/2024 09:18 AM 0.2 0.0 - 0.9 % Final     Comment:     Immature Granulocyte Count (IG) includes promyelocytes, myelocytes and metamyelocytes but does not include bands. Percent differential counts (%) should be interpreted in the context of the absolute cell counts (cells/UL).   02/09/2024 04:48 PM 0.6 0.0 - 0.9 % Final     Comment:     Immature Granulocyte Count (IG) includes promyelocytes, myelocytes and metamyelocytes but does not include bands. Percent differential counts (%) should be interpreted in the context of the absolute cell counts (cells/UL).   01/29/2024 08:50 AM 0.1 0.0 - 0.9 % Final     Comment:     Immature Granulocyte Count (IG) includes promyelocytes, myelocytes and metamyelocytes but does not include bands. Percent differential counts (%) should be interpreted in the context of the absolute cell counts (cells/UL).     Lymphocytes %   Date/Time Value Ref Range Status   02/12/2024 09:18 AM 8.1 13.0 - 44.0 % Final   02/09/2024 04:48 PM 19.0 13.0 - 44.0 % Final   01/29/2024 08:50 AM 12.6 13.0 - 44.0 % Final     Monocytes %   Date/Time Value Ref Range Status   02/12/2024 09:18 AM 6.3 2.0 - 10.0 % Final   02/09/2024 04:48 PM 11.1 2.0 - 10.0 % Final   01/29/2024 08:50 AM 7.6 2.0 - 10.0 % Final     Eosinophils %   Date/Time Value Ref Range Status   02/12/2024 09:18 AM 0.0 0.0 - 6.0 % Final   02/09/2024 04:48 PM 1.1 0.0 - 6.0 % Final   01/29/2024 08:50 AM 1.1 0.0 - 6.0 % Final     Basophils %   Date/Time Value Ref Range Status   02/12/2024 09:18 AM 0.1 0.0 - 2.0 % Final   02/09/2024 04:48 PM 0.4 0.0 - 2.0 % Final   01/29/2024 08:50 AM 0.4 0.0 - 2.0 % Final     Neutrophils Absolute   Date/Time Value Ref Range Status   02/12/2024 09:18 AM 7.55 1.20 - 7.70 x10*3/uL Final     Comment:     Percent differential counts (%) should be interpreted in the context of the  "absolute cell counts (cells/uL).   02/09/2024 04:48 PM 3.61 1.20 - 7.70 x10*3/uL Final     Comment:     Percent differential counts (%) should be interpreted in the context of the absolute cell counts (cells/uL).   01/29/2024 08:50 AM 5.79 1.20 - 7.70 x10*3/uL Final     Comment:     Percent differential counts (%) should be interpreted in the context of the absolute cell counts (cells/uL).     Immature Granulocytes Absolute, Automated   Date/Time Value Ref Range Status   02/12/2024 09:18 AM 0.02 0.00 - 0.70 x10*3/uL Final   02/09/2024 04:48 PM 0.03 0.00 - 0.70 x10*3/uL Final   01/29/2024 08:50 AM 0.01 0.00 - 0.70 x10*3/uL Final     Lymphocytes Absolute   Date/Time Value Ref Range Status   02/12/2024 09:18 AM 0.72 (L) 1.20 - 4.80 x10*3/uL Final   02/09/2024 04:48 PM 1.01 (L) 1.20 - 4.80 x10*3/uL Final   01/29/2024 08:50 AM 0.93 (L) 1.20 - 4.80 x10*3/uL Final     Monocytes Absolute   Date/Time Value Ref Range Status   02/12/2024 09:18 AM 0.56 0.10 - 1.00 x10*3/uL Final   02/09/2024 04:48 PM 0.59 0.10 - 1.00 x10*3/uL Final   01/29/2024 08:50 AM 0.56 0.10 - 1.00 x10*3/uL Final     Eosinophils Absolute   Date/Time Value Ref Range Status   02/12/2024 09:18 AM 0.00 0.00 - 0.70 x10*3/uL Final   02/09/2024 04:48 PM 0.06 0.00 - 0.70 x10*3/uL Final   01/29/2024 08:50 AM 0.08 0.00 - 0.70 x10*3/uL Final     Basophils Absolute   Date/Time Value Ref Range Status   02/12/2024 09:18 AM 0.01 0.00 - 0.10 x10*3/uL Final   02/09/2024 04:48 PM 0.02 0.00 - 0.10 x10*3/uL Final   01/29/2024 08:50 AM 0.03 0.00 - 0.10 x10*3/uL Final       No components found for: \"PT\"  aPTT   Date/Time Value Ref Range Status   12/26/2023 06:13 PM 48 (H) 27 - 38 seconds Final   12/22/2023 11:31 AM 47 (H) 27 - 38 seconds Final   01/08/2023 03:00 PM 36 26 - 39 sec Final     Comment:       THE APTT IS NO LONGER USED FOR MONITORING     UNFRACTIONATED HEPARIN THERAPY.    FOR MONITORING HEPARIN THERAPY,     USE THE HEPARIN ASSAY.         Assessment/Plan         "            [facility]

## 2024-02-12 NOTE — SIGNIFICANT EVENT
02/12/24 1027   Prechemo Checklist   Has the patient been in the hospital, ED, or urgent care since last date of service Yes   Chemo/Immuno Consent Signed Yes   Protocol/Indications Verified Yes   Confirmed to previous date/time of medication Yes   Compared to previous dose Yes   All medications are dated accurately Yes   Pregnancy Test Negative Not applicable   Parameters Met Yes   Provider Notified Yes   Is Patient Proceeding With Treatment? Yes   BSA/Weight-Height Verified Yes   Dose Calculations Verified Yes

## 2024-02-12 NOTE — PATIENT INSTRUCTIONS
Today you met with Dr. Blake.  Your recent ER visit was discussed.  With the progression, Pembrolizumab will be added to your next cycle.  The Pembrolizumab will be every 6 weeks.  The FOLFOX will remain every 2 weeks as is.  You were consented for this new medication.  Information was sent to your MyChart regarding the new medication.    Please call the office for any questions or concerns.  We ask that you notify us 5-7 days before your medications need refilled.  [facility] is strongly encouraging all patients to access their MyChart for all results and to communicate with their provider for non-urgent messages.  If an urgent/same day/sick concern response is needed, please call the office at [phone]. Thank You!

## 2024-02-13 ENCOUNTER — TELEPHONE (OUTPATIENT)
Dept: HEMATOLOGY/ONCOLOGY | Facility: CLINIC | Age: 64
End: 2024-02-13
Payer: MEDICARE

## 2024-02-13 DIAGNOSIS — C15.9 STAGE IV MALIGNANT NEOPLASM OF ESOPHAGUS (MULTI): ICD-10-CM

## 2024-02-13 DIAGNOSIS — C78.7 METASTASES TO THE LIVER (MULTI): Primary | ICD-10-CM

## 2024-02-13 RX ORDER — FLUOROURACIL 50 MG/ML
400 INJECTION, SOLUTION INTRAVENOUS ONCE
Status: CANCELLED | OUTPATIENT
Start: 2024-04-01

## 2024-02-13 RX ORDER — DEXAMETHASONE SODIUM PHOSPHATE 4 MG/ML
8 INJECTION, SOLUTION INTRA-ARTICULAR; INTRALESIONAL; INTRAMUSCULAR; INTRAVENOUS; SOFT TISSUE ONCE
Status: CANCELLED | OUTPATIENT
Start: 2024-04-15

## 2024-02-13 RX ORDER — ALBUTEROL SULFATE 0.83 MG/ML
3 SOLUTION RESPIRATORY (INHALATION) AS NEEDED
Status: CANCELLED | OUTPATIENT
Start: 2024-04-29

## 2024-02-13 RX ORDER — DEXTROMETHORPHAN HYDROBROMIDE, GUAIFENESIN 5; 100 MG/5ML; MG/5ML
650 LIQUID ORAL ONCE
Status: CANCELLED
Start: 2024-04-15 | End: 2024-04-08

## 2024-02-13 RX ORDER — DIPHENHYDRAMINE HYDROCHLORIDE 50 MG/ML
25 INJECTION INTRAMUSCULAR; INTRAVENOUS ONCE
Status: CANCELLED
Start: 2024-04-15 | End: 2024-04-08

## 2024-02-13 RX ORDER — PALONOSETRON 0.05 MG/ML
0.25 INJECTION, SOLUTION INTRAVENOUS ONCE
Status: CANCELLED | OUTPATIENT
Start: 2024-03-18

## 2024-02-13 RX ORDER — LORAZEPAM 2 MG/ML
1 INJECTION INTRAMUSCULAR AS NEEDED
Status: CANCELLED | OUTPATIENT
Start: 2024-03-18

## 2024-02-13 RX ORDER — METOCLOPRAMIDE 10 MG/1
10 TABLET ORAL 4 TIMES DAILY PRN
Qty: 60 TABLET | Refills: 2 | Status: ON HOLD | OUTPATIENT
Start: 2024-02-13 | End: 2024-03-19 | Stop reason: ENTERED-IN-ERROR

## 2024-02-13 RX ORDER — FLUOROURACIL 50 MG/ML
400 INJECTION, SOLUTION INTRAVENOUS ONCE
Status: CANCELLED | OUTPATIENT
Start: 2024-04-15

## 2024-02-13 RX ORDER — DIPHENHYDRAMINE HYDROCHLORIDE 50 MG/ML
50 INJECTION INTRAMUSCULAR; INTRAVENOUS AS NEEDED
Status: CANCELLED | OUTPATIENT
Start: 2024-03-18

## 2024-02-13 RX ORDER — CHLORPROMAZINE HYDROCHLORIDE 50 MG/1
50 TABLET, FILM COATED ORAL 3 TIMES DAILY
Qty: 90 TABLET | Refills: 1 | Status: ON HOLD | OUTPATIENT
Start: 2024-02-13 | End: 2024-03-06 | Stop reason: ENTERED-IN-ERROR

## 2024-02-13 RX ORDER — DEXTROMETHORPHAN HYDROBROMIDE, GUAIFENESIN 5; 100 MG/5ML; MG/5ML
650 LIQUID ORAL ONCE
Status: CANCELLED
Start: 2024-04-01 | End: 2024-03-25

## 2024-02-13 RX ORDER — PALONOSETRON 0.05 MG/ML
0.25 INJECTION, SOLUTION INTRAVENOUS ONCE
Status: CANCELLED | OUTPATIENT
Start: 2024-04-15

## 2024-02-13 RX ORDER — LORAZEPAM 2 MG/ML
1 INJECTION INTRAMUSCULAR AS NEEDED
Status: CANCELLED | OUTPATIENT
Start: 2024-04-01

## 2024-02-13 RX ORDER — DEXTROMETHORPHAN HYDROBROMIDE, GUAIFENESIN 5; 100 MG/5ML; MG/5ML
650 LIQUID ORAL ONCE
Status: CANCELLED
Start: 2024-03-04 | End: 2024-02-26

## 2024-02-13 RX ORDER — FLUOROURACIL 50 MG/ML
400 INJECTION, SOLUTION INTRAVENOUS ONCE
Status: CANCELLED | OUTPATIENT
Start: 2024-04-29

## 2024-02-13 RX ORDER — DEXAMETHASONE SODIUM PHOSPHATE 4 MG/ML
8 INJECTION, SOLUTION INTRA-ARTICULAR; INTRALESIONAL; INTRAMUSCULAR; INTRAVENOUS; SOFT TISSUE ONCE
Status: CANCELLED | OUTPATIENT
Start: 2024-04-01

## 2024-02-13 RX ORDER — DEXAMETHASONE SODIUM PHOSPHATE 4 MG/ML
8 INJECTION, SOLUTION INTRA-ARTICULAR; INTRALESIONAL; INTRAMUSCULAR; INTRAVENOUS; SOFT TISSUE ONCE
Status: CANCELLED | OUTPATIENT
Start: 2024-03-04

## 2024-02-13 RX ORDER — DIPHENHYDRAMINE HYDROCHLORIDE 50 MG/ML
25 INJECTION INTRAMUSCULAR; INTRAVENOUS ONCE
Status: CANCELLED
Start: 2024-04-29 | End: 2024-04-22

## 2024-02-13 RX ORDER — FAMOTIDINE 10 MG/ML
20 INJECTION INTRAVENOUS ONCE AS NEEDED
Status: CANCELLED | OUTPATIENT
Start: 2024-04-15

## 2024-02-13 RX ORDER — EPINEPHRINE 0.3 MG/.3ML
0.3 INJECTION SUBCUTANEOUS EVERY 5 MIN PRN
Status: CANCELLED | OUTPATIENT
Start: 2024-04-29

## 2024-02-13 RX ORDER — PROCHLORPERAZINE MALEATE 10 MG
10 TABLET ORAL EVERY 6 HOURS PRN
Status: CANCELLED | OUTPATIENT
Start: 2024-04-01

## 2024-02-13 RX ORDER — ALBUTEROL SULFATE 0.83 MG/ML
3 SOLUTION RESPIRATORY (INHALATION) AS NEEDED
Status: CANCELLED | OUTPATIENT
Start: 2024-03-18

## 2024-02-13 RX ORDER — FAMOTIDINE 10 MG/ML
20 INJECTION INTRAVENOUS ONCE AS NEEDED
Status: CANCELLED | OUTPATIENT
Start: 2024-04-29

## 2024-02-13 RX ORDER — PROCHLORPERAZINE MALEATE 10 MG
10 TABLET ORAL EVERY 6 HOURS PRN
Status: CANCELLED | OUTPATIENT
Start: 2024-03-18

## 2024-02-13 RX ORDER — PROCHLORPERAZINE EDISYLATE 5 MG/ML
10 INJECTION INTRAMUSCULAR; INTRAVENOUS EVERY 6 HOURS PRN
Status: CANCELLED | OUTPATIENT
Start: 2024-04-29

## 2024-02-13 RX ORDER — FAMOTIDINE 10 MG/ML
20 INJECTION INTRAVENOUS ONCE AS NEEDED
Status: CANCELLED | OUTPATIENT
Start: 2024-03-18

## 2024-02-13 RX ORDER — DEXAMETHASONE SODIUM PHOSPHATE 4 MG/ML
8 INJECTION, SOLUTION INTRA-ARTICULAR; INTRALESIONAL; INTRAMUSCULAR; INTRAVENOUS; SOFT TISSUE ONCE
Status: CANCELLED | OUTPATIENT
Start: 2024-03-18

## 2024-02-13 RX ORDER — PROCHLORPERAZINE MALEATE 10 MG
10 TABLET ORAL EVERY 6 HOURS PRN
Status: CANCELLED | OUTPATIENT
Start: 2024-04-15

## 2024-02-13 RX ORDER — DIPHENHYDRAMINE HYDROCHLORIDE 50 MG/ML
50 INJECTION INTRAMUSCULAR; INTRAVENOUS AS NEEDED
Status: CANCELLED | OUTPATIENT
Start: 2024-04-15

## 2024-02-13 RX ORDER — EPINEPHRINE 0.3 MG/.3ML
0.3 INJECTION SUBCUTANEOUS EVERY 5 MIN PRN
Status: CANCELLED | OUTPATIENT
Start: 2024-04-15

## 2024-02-13 RX ORDER — EPINEPHRINE 0.3 MG/.3ML
0.3 INJECTION SUBCUTANEOUS EVERY 5 MIN PRN
Status: CANCELLED | OUTPATIENT
Start: 2024-03-18

## 2024-02-13 RX ORDER — PALONOSETRON 0.05 MG/ML
0.25 INJECTION, SOLUTION INTRAVENOUS ONCE
Status: CANCELLED | OUTPATIENT
Start: 2024-04-29

## 2024-02-13 RX ORDER — FLUOROURACIL 50 MG/ML
400 INJECTION, SOLUTION INTRAVENOUS ONCE
Status: CANCELLED | OUTPATIENT
Start: 2024-03-18

## 2024-02-13 RX ORDER — DIPHENHYDRAMINE HYDROCHLORIDE 50 MG/ML
50 INJECTION INTRAMUSCULAR; INTRAVENOUS AS NEEDED
Status: CANCELLED | OUTPATIENT
Start: 2024-04-01

## 2024-02-13 RX ORDER — PROCHLORPERAZINE EDISYLATE 5 MG/ML
10 INJECTION INTRAMUSCULAR; INTRAVENOUS EVERY 6 HOURS PRN
Status: CANCELLED | OUTPATIENT
Start: 2024-04-15

## 2024-02-13 RX ORDER — DIPHENHYDRAMINE HYDROCHLORIDE 50 MG/ML
50 INJECTION INTRAMUSCULAR; INTRAVENOUS AS NEEDED
Status: CANCELLED | OUTPATIENT
Start: 2024-04-29

## 2024-02-13 RX ORDER — DEXAMETHASONE SODIUM PHOSPHATE 4 MG/ML
8 INJECTION, SOLUTION INTRA-ARTICULAR; INTRALESIONAL; INTRAMUSCULAR; INTRAVENOUS; SOFT TISSUE ONCE
Status: CANCELLED | OUTPATIENT
Start: 2024-04-29

## 2024-02-13 RX ORDER — DEXTROMETHORPHAN HYDROBROMIDE, GUAIFENESIN 5; 100 MG/5ML; MG/5ML
650 LIQUID ORAL ONCE
Status: CANCELLED
Start: 2024-03-18 | End: 2024-03-11

## 2024-02-13 RX ORDER — DIPHENHYDRAMINE HYDROCHLORIDE 50 MG/ML
25 INJECTION INTRAMUSCULAR; INTRAVENOUS ONCE
Status: CANCELLED
Start: 2024-03-04 | End: 2024-02-26

## 2024-02-13 RX ORDER — FAMOTIDINE 10 MG/ML
20 INJECTION INTRAVENOUS ONCE AS NEEDED
Status: CANCELLED | OUTPATIENT
Start: 2024-04-01

## 2024-02-13 RX ORDER — DEXTROMETHORPHAN HYDROBROMIDE, GUAIFENESIN 5; 100 MG/5ML; MG/5ML
650 LIQUID ORAL ONCE
Status: CANCELLED
Start: 2024-04-29 | End: 2024-04-22

## 2024-02-13 RX ORDER — PALONOSETRON 0.05 MG/ML
0.25 INJECTION, SOLUTION INTRAVENOUS ONCE
Status: CANCELLED | OUTPATIENT
Start: 2024-04-01

## 2024-02-13 RX ORDER — DIPHENHYDRAMINE HYDROCHLORIDE 50 MG/ML
25 INJECTION INTRAMUSCULAR; INTRAVENOUS ONCE
Status: CANCELLED
Start: 2024-03-18 | End: 2024-03-11

## 2024-02-13 RX ORDER — PROCHLORPERAZINE EDISYLATE 5 MG/ML
10 INJECTION INTRAMUSCULAR; INTRAVENOUS EVERY 6 HOURS PRN
Status: CANCELLED | OUTPATIENT
Start: 2024-03-18

## 2024-02-13 RX ORDER — LORAZEPAM 2 MG/ML
1 INJECTION INTRAMUSCULAR AS NEEDED
Status: CANCELLED | OUTPATIENT
Start: 2024-04-29

## 2024-02-13 RX ORDER — EPINEPHRINE 0.3 MG/.3ML
0.3 INJECTION SUBCUTANEOUS EVERY 5 MIN PRN
Status: CANCELLED | OUTPATIENT
Start: 2024-04-01

## 2024-02-13 RX ORDER — PROCHLORPERAZINE MALEATE 10 MG
10 TABLET ORAL EVERY 6 HOURS PRN
Status: CANCELLED | OUTPATIENT
Start: 2024-04-29

## 2024-02-13 RX ORDER — ALBUTEROL SULFATE 0.83 MG/ML
3 SOLUTION RESPIRATORY (INHALATION) AS NEEDED
Status: CANCELLED | OUTPATIENT
Start: 2024-04-15

## 2024-02-13 RX ORDER — ALBUTEROL SULFATE 0.83 MG/ML
3 SOLUTION RESPIRATORY (INHALATION) AS NEEDED
Status: CANCELLED | OUTPATIENT
Start: 2024-04-01

## 2024-02-13 RX ORDER — LORAZEPAM 2 MG/ML
1 INJECTION INTRAMUSCULAR AS NEEDED
Status: CANCELLED | OUTPATIENT
Start: 2024-04-15

## 2024-02-13 RX ORDER — DIPHENHYDRAMINE HYDROCHLORIDE 50 MG/ML
25 INJECTION INTRAMUSCULAR; INTRAVENOUS ONCE
Status: CANCELLED
Start: 2024-04-01 | End: 2024-03-25

## 2024-02-13 RX ORDER — PROCHLORPERAZINE EDISYLATE 5 MG/ML
10 INJECTION INTRAMUSCULAR; INTRAVENOUS EVERY 6 HOURS PRN
Status: CANCELLED | OUTPATIENT
Start: 2024-04-01

## 2024-02-13 NOTE — TELEPHONE ENCOUNTER
Called and spoke to pt.  He states he has had hiccups since he started treatment, but they get worse after the treatment, they come and go, but mostly they are present.  Is there anything he can take for them?    He had a stool last evening with blood in it.  Pt states he was straining when pushing, had some constipation.  He had another stool this am, regular brown in color, maybe a dark spot, but no bleeding and did not strain.  He is taking senna now.  He has not had blood in a stool prior.  Just wanted to let you know.

## 2024-02-14 ASSESSMENT — ENCOUNTER SYMPTOMS
EYES NEGATIVE: 1
FATIGUE: 1
ABDOMINAL PAIN: 1
CARDIOVASCULAR NEGATIVE: 1
APPETITE CHANGE: 1
RESPIRATORY NEGATIVE: 1

## 2024-02-19 ENCOUNTER — APPOINTMENT (OUTPATIENT)
Dept: HEMATOLOGY/ONCOLOGY | Facility: CLINIC | Age: 64
End: 2024-02-19
Payer: COMMERCIAL

## 2024-02-19 DIAGNOSIS — I47.20 V-TACH (MULTI): ICD-10-CM

## 2024-02-19 DIAGNOSIS — I10 PRIMARY HYPERTENSION: ICD-10-CM

## 2024-02-19 PROCEDURE — RXMED WILLOW AMBULATORY MEDICATION CHARGE

## 2024-02-19 RX ORDER — METOPROLOL SUCCINATE 25 MG/1
25 TABLET, EXTENDED RELEASE ORAL DAILY
Qty: 90 TABLET | Refills: 0 | Status: SHIPPED | OUTPATIENT
Start: 2024-02-19 | End: 2024-05-24 | Stop reason: HOSPADM

## 2024-02-21 ENCOUNTER — ALLIED HEALTH (OUTPATIENT)
Dept: INTEGRATIVE MEDICINE | Facility: CLINIC | Age: 64
End: 2024-02-21
Payer: MEDICARE

## 2024-02-21 DIAGNOSIS — C78.7 METASTASES TO THE LIVER (MULTI): Primary | ICD-10-CM

## 2024-02-21 DIAGNOSIS — C15.9 STAGE IV MALIGNANT NEOPLASM OF ESOPHAGUS (MULTI): ICD-10-CM

## 2024-02-21 NOTE — PROGRESS NOTES
Patient ID: Massimo Garcia is a 63 y.o. male.  Referring Physician: No referring provider defined for this encounter.  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  Feels like walking on blisters, intermittent but more consistent  Tried gabapentin not helping     Stomach discomfort and fatigue     Diet: overall eating well, weight stable, usually cream of wheat in am, lunch with sandwich with some meat.  Eats what he can for supper     PA: overall trying to stay active, works with wood outside    Sleep: sleeping well usually     Stress: some anxiety, overall doing well, intermittent  Tries to manage through relaxation by lying down     Natural Products:    Balance of Nature vitamins  CBD    ROS:  no ha, visual symptoms, hearing loss  no sob, chest pain, palp  ROS o/w non contributory, please see HPI    Objective    BSA: There is no height or weight on file to calculate BSA.  There were no vitals taken for this visit.    PHYSICAL EXAM:  NAD, awake/alert  HEENT, NCAT, OP clear, no oral lesions  CTA bilat  RRR no mgr  Abd soft/nt/nd+bs  No c/c/e/ttp  Motor/sensory intact, CN 2-12 intact     RESULTS:  Lab Results   Component Value Date    WBC 8.9 02/12/2024    HGB 10.8 (L) 02/12/2024    HCT 32.8 (L) 02/12/2024    PLT 64 (L) 02/12/2024    CREATININE 0.76 02/12/2024    AST 46 (H) 02/12/2024       Assessment/Plan   Cancer Staging   No matching staging information was found for the patient.    CANCER SPECIFIC RECCS:  62 yo man with stage IV esophageal cancer met to liver/lungs, cirrhotic also on imaging      Treatment: 5FU/oxaliplatin/trastuzumab - stopped oxaliplatin 6 mo ago     1/2/24 near syncope  CT with pulm nodules, no PE  Cardiology eval neg  11/23 heart block - pacemaker placed     INTEGRATIVE HISTORY:  Symptoms:  Neuropathy - fentanyl patch for pain, oxycodone  Started 6 mo ago with tingling in feet etc.  Feels like walking on blisters, intermittent but more consistent     LIFESTYLE:  Diet - 5-9 fruits/veg/day (7 veg to 2 fruits)  Cruciferous Vegetables - Brussel Sprouts, Kale, Broccoli, Cauliflower  PHYSICAL ACTIVITY 30 MIN/DAY     Reading:  [employer]  Cancer Fighting Kitchen     Websites:  [employer]  [email or web address]     SYMPTOM MANAGEMENT:  Neuropathy - acupuncture in Symptom Management  Scrambler therapy     Nausea - acupuncture  Keri - tea with honey  Acupressure     Anxiety - continue relaxation method     Follow up: Symptom management acupuncture     Thank you for consulting me in for this patient  Chidi Mays MD            SYMPTOM MANAGEMENT:  Integrative Oncology Symptom Management:     [facility] Integrative Oncology Symptom Management clinic offers multi-disciplinary supervised care of cancer patients using Integrative Modalities billed to insurance using NCCN and SIO/ASCO guideline-driven practices.  ESAS is obtained prior to and after each treatment by the practitioner     Symptoms Managed:  Neuropathy - bilat feet, some in fingertips/hands, used to be on oxaliplatin resumed after 6 mo off, improved overall  Gabapentin did not help     Nausea - currently ok since switching protocol incr gas, no vomiting     Anxiety  Fatigue - with therapy, 5/10     Natural Products utilized:  Balance of Nature vitamins  CBD     Integrative Treatment: Acupuncture  Session #: 2  Frequency: weekly     Referrals:   Recommendations:     Follow Up:  Symptom Management: weekly  Integrative Oncology:      I have personally seen the patient and supervised the treatment by the  integrative practitioner during this visit.  Chidi Mays MD

## 2024-02-22 ENCOUNTER — PHARMACY VISIT (OUTPATIENT)
Dept: PHARMACY | Facility: CLINIC | Age: 64
End: 2024-02-22
Payer: MEDICARE

## 2024-02-22 ENCOUNTER — HOSPITAL ENCOUNTER (OUTPATIENT)
Dept: RADIOLOGY | Facility: HOSPITAL | Age: 64
Discharge: HOME | End: 2024-02-22
Payer: MEDICARE

## 2024-02-22 ENCOUNTER — INFUSION (OUTPATIENT)
Dept: HEMATOLOGY/ONCOLOGY | Facility: CLINIC | Age: 64
End: 2024-02-22
Payer: MEDICARE

## 2024-02-22 VITALS
RESPIRATION RATE: 17 BRPM | BODY MASS INDEX: 25.48 KG/M2 | OXYGEN SATURATION: 98 % | DIASTOLIC BLOOD PRESSURE: 71 MMHG | SYSTOLIC BLOOD PRESSURE: 110 MMHG | TEMPERATURE: 98.1 F | HEART RATE: 84 BPM | WEIGHT: 164.24 LBS

## 2024-02-22 VITALS — HEART RATE: 78 BPM | OXYGEN SATURATION: 99 %

## 2024-02-22 DIAGNOSIS — C78.7 METASTASES TO THE LIVER (MULTI): ICD-10-CM

## 2024-02-22 DIAGNOSIS — C15.9 STAGE IV MALIGNANT NEOPLASM OF ESOPHAGUS (MULTI): ICD-10-CM

## 2024-02-22 PROCEDURE — A9581 GADOXETATE DISODIUM INJ: HCPCS | Performed by: INTERNAL MEDICINE

## 2024-02-22 PROCEDURE — 96523 IRRIG DRUG DELIVERY DEVICE: CPT

## 2024-02-22 PROCEDURE — 2500000004 HC RX 250 GENERAL PHARMACY W/ HCPCS (ALT 636 FOR OP/ED): Performed by: INTERNAL MEDICINE

## 2024-02-22 PROCEDURE — 74183 MRI ABD W/O CNTR FLWD CNTR: CPT

## 2024-02-22 RX ORDER — HEPARIN 100 UNIT/ML
500 SYRINGE INTRAVENOUS AS NEEDED
Status: DISCONTINUED | OUTPATIENT
Start: 2024-02-22 | End: 2024-02-22 | Stop reason: HOSPADM

## 2024-02-22 RX ORDER — HEPARIN 100 UNIT/ML
500 SYRINGE INTRAVENOUS AS NEEDED
Status: CANCELLED | OUTPATIENT
Start: 2024-02-22

## 2024-02-22 RX ORDER — HEPARIN SODIUM,PORCINE/PF 10 UNIT/ML
50 SYRINGE (ML) INTRAVENOUS AS NEEDED
Status: CANCELLED | OUTPATIENT
Start: 2024-02-22

## 2024-02-22 RX ADMIN — GADOXETATE DISODIUM 1.87 MMOL: 181.43 INJECTION, SOLUTION INTRAVENOUS at 14:27

## 2024-02-22 RX ADMIN — HEPARIN 500 UNITS: 100 SYRINGE at 14:55

## 2024-02-22 ASSESSMENT — PAIN SCALES - GENERAL: PAINLEVEL: 0-NO PAIN

## 2024-02-26 ENCOUNTER — INFUSION (OUTPATIENT)
Dept: HEMATOLOGY/ONCOLOGY | Facility: CLINIC | Age: 64
End: 2024-02-26
Payer: MEDICARE

## 2024-02-26 ENCOUNTER — OFFICE VISIT (OUTPATIENT)
Dept: HEMATOLOGY/ONCOLOGY | Facility: CLINIC | Age: 64
End: 2024-02-26
Payer: MEDICARE

## 2024-02-26 VITALS
SYSTOLIC BLOOD PRESSURE: 106 MMHG | TEMPERATURE: 96.8 F | DIASTOLIC BLOOD PRESSURE: 71 MMHG | OXYGEN SATURATION: 97 % | BODY MASS INDEX: 25.41 KG/M2 | RESPIRATION RATE: 17 BRPM | HEART RATE: 85 BPM | WEIGHT: 163.8 LBS

## 2024-02-26 DIAGNOSIS — C78.7 METASTASES TO THE LIVER (MULTI): ICD-10-CM

## 2024-02-26 DIAGNOSIS — C15.9 STAGE IV MALIGNANT NEOPLASM OF ESOPHAGUS (MULTI): ICD-10-CM

## 2024-02-26 DIAGNOSIS — T36.5X5A ADVERSE EFFECT OF AMINOGLYCOSIDES, INITIAL ENCOUNTER: ICD-10-CM

## 2024-02-26 DIAGNOSIS — R04.0 EPISTAXIS: Primary | ICD-10-CM

## 2024-02-26 LAB
ALBUMIN SERPL BCP-MCNC: 3.5 G/DL (ref 3.4–5)
ALP SERPL-CCNC: 152 U/L (ref 33–136)
ALT SERPL W P-5'-P-CCNC: 31 U/L (ref 10–52)
ANION GAP SERPL CALC-SCNC: 11 MMOL/L (ref 10–20)
AST SERPL W P-5'-P-CCNC: 55 U/L (ref 9–39)
BASOPHILS # BLD AUTO: 0.03 X10*3/UL (ref 0–0.1)
BASOPHILS NFR BLD AUTO: 0.4 %
BILIRUB SERPL-MCNC: 1.5 MG/DL (ref 0–1.2)
BUN SERPL-MCNC: 11 MG/DL (ref 6–23)
CALCIUM SERPL-MCNC: 8.9 MG/DL (ref 8.6–10.3)
CHLORIDE SERPL-SCNC: 102 MMOL/L (ref 98–107)
CO2 SERPL-SCNC: 27 MMOL/L (ref 21–32)
CREAT SERPL-MCNC: 0.72 MG/DL (ref 0.5–1.3)
EGFRCR SERPLBLD CKD-EPI 2021: >90 ML/MIN/1.73M*2
EOSINOPHIL # BLD AUTO: 0.07 X10*3/UL (ref 0–0.7)
EOSINOPHIL NFR BLD AUTO: 0.9 %
ERYTHROCYTE [DISTWIDTH] IN BLOOD BY AUTOMATED COUNT: 15.5 % (ref 11.5–14.5)
GLUCOSE SERPL-MCNC: 138 MG/DL (ref 74–99)
HCT VFR BLD AUTO: 33.7 % (ref 41–52)
HGB BLD-MCNC: 11.3 G/DL (ref 13.5–17.5)
IMM GRANULOCYTES # BLD AUTO: 0.02 X10*3/UL (ref 0–0.7)
IMM GRANULOCYTES NFR BLD AUTO: 0.3 % (ref 0–0.9)
LYMPHOCYTES # BLD AUTO: 1.02 X10*3/UL (ref 1.2–4.8)
LYMPHOCYTES NFR BLD AUTO: 13.1 %
MCH RBC QN AUTO: 29.4 PG (ref 26–34)
MCHC RBC AUTO-ENTMCNC: 33.5 G/DL (ref 32–36)
MCV RBC AUTO: 88 FL (ref 80–100)
MONOCYTES # BLD AUTO: 0.76 X10*3/UL (ref 0.1–1)
MONOCYTES NFR BLD AUTO: 9.8 %
NEUTROPHILS # BLD AUTO: 5.87 X10*3/UL (ref 1.2–7.7)
NEUTROPHILS NFR BLD AUTO: 75.5 %
PLATELET # BLD AUTO: 57 X10*3/UL (ref 150–450)
POTASSIUM SERPL-SCNC: 3.5 MMOL/L (ref 3.5–5.3)
PROT SERPL-MCNC: 7 G/DL (ref 6.4–8.2)
RBC # BLD AUTO: 3.85 X10*6/UL (ref 4.5–5.9)
SODIUM SERPL-SCNC: 136 MMOL/L (ref 136–145)
TSH SERPL-ACNC: 2.27 MIU/L (ref 0.44–3.98)
WBC # BLD AUTO: 7.8 X10*3/UL (ref 4.4–11.3)

## 2024-02-26 PROCEDURE — 99215 OFFICE O/P EST HI 40 MIN: CPT | Performed by: INTERNAL MEDICINE

## 2024-02-26 PROCEDURE — 85025 COMPLETE CBC W/AUTO DIFF WBC: CPT | Performed by: INTERNAL MEDICINE

## 2024-02-26 PROCEDURE — 84443 ASSAY THYROID STIM HORMONE: CPT | Performed by: INTERNAL MEDICINE

## 2024-02-26 PROCEDURE — 36591 DRAW BLOOD OFF VENOUS DEVICE: CPT

## 2024-02-26 PROCEDURE — 1126F AMNT PAIN NOTED NONE PRSNT: CPT | Performed by: INTERNAL MEDICINE

## 2024-02-26 PROCEDURE — 2500000004 HC RX 250 GENERAL PHARMACY W/ HCPCS (ALT 636 FOR OP/ED): Performed by: INTERNAL MEDICINE

## 2024-02-26 PROCEDURE — 80053 COMPREHEN METABOLIC PANEL: CPT | Performed by: INTERNAL MEDICINE

## 2024-02-26 PROCEDURE — 1159F MED LIST DOCD IN RCRD: CPT | Performed by: INTERNAL MEDICINE

## 2024-02-26 PROCEDURE — 3074F SYST BP LT 130 MM HG: CPT | Performed by: INTERNAL MEDICINE

## 2024-02-26 PROCEDURE — 1160F RVW MEDS BY RX/DR IN RCRD: CPT | Performed by: INTERNAL MEDICINE

## 2024-02-26 PROCEDURE — 3078F DIAST BP <80 MM HG: CPT | Performed by: INTERNAL MEDICINE

## 2024-02-26 RX ORDER — HEPARIN 100 UNIT/ML
500 SYRINGE INTRAVENOUS AS NEEDED
Status: CANCELLED | OUTPATIENT
Start: 2024-02-26

## 2024-02-26 RX ORDER — HEPARIN SODIUM,PORCINE/PF 10 UNIT/ML
50 SYRINGE (ML) INTRAVENOUS AS NEEDED
Status: CANCELLED | OUTPATIENT
Start: 2024-02-26

## 2024-02-26 RX ORDER — PHYTONADIONE 5 MG/1
2.5 TABLET ORAL ONCE
Qty: 1 TABLET | Refills: 0 | Status: SHIPPED | OUTPATIENT
Start: 2024-02-26 | End: 2024-02-26

## 2024-02-26 RX ORDER — HEPARIN 100 UNIT/ML
500 SYRINGE INTRAVENOUS AS NEEDED
Status: DISCONTINUED | OUTPATIENT
Start: 2024-02-26 | End: 2024-02-26 | Stop reason: HOSPADM

## 2024-02-26 RX ADMIN — HEPARIN 500 UNITS: 100 SYRINGE at 10:30

## 2024-02-26 ASSESSMENT — NCCN CANCER DISTRESS MANAGEMENT: NCCN PHYSICAL CONCERNS: 3

## 2024-02-26 ASSESSMENT — PAIN SCALES - GENERAL: PAINLEVEL: 0-NO PAIN

## 2024-02-26 NOTE — PROGRESS NOTES
Patient ID: Massimo Garcia is a 63 y.o. male.  Referring Physician: Tomasa Blake MD  [id number] [place] [address]  Primary Care Provider: Dayne Santamaria DO  Visit Type:  Follow Up     Subjective    HPI  Episodic epistaxis:   62-year-old male with metastatic stage IV esophageal cancer HER2 positive.  Has done well on trastuzumab FOLFOX regimen.  Today complains of dizziness and presyncopal symptoms. Metastatic burden is liver some lung lesions as well as abdominal lymphadenopathy.          LIVER:  Liver measures 14.7 cm in length and demonstrates mildly diffuse coarsened hepatic echotexture with mild nodular contour which may indicate cirrhotic morphology. There is an indeterminate hypoechoic  lesion within the right hepatic lobe measuring up. 3.6 x 3.2 x 3.0 cm and a 2nd 2.6 x 1.8 x 2.7 cm indeterminate heterogeneous hypoechoic lesion within the left hepatic lobe. Additional scatter presumed hepatic cysts, largest measuring up to 2.4 x 2.1 x 1.8 cm      GALLBLADDER:  Contracted. No obvious cholelithiasis.      BILIARY SYSTEM:  No evidence of intra hepatic biliary dilatation is identified; the common bile duct measures 13.2 mm.          PANCREAS:  The pancreas is poorly visualized due to overlying bowel gas.      RIGHT KIDNEY:  The right kidney measures 11.5 cm in length. No hydronephrosis or renal calculi are seen.          IMPRESSION:  Cirrhotic hepatic morphology with indeterminate hypoechoic lesions of the liver as above, largest measuring up to 3.6 x 3.2 x 3.0 cm. Hepatic mass protocol MRI advised for further assessment.    Review of Systems - Oncology   IMPRESSION:  1.  Compared to CT 2.6 months ago, there is overall progression of  metastatic disease.  2. Although the majority hepatic masses appear diffusely necrotic and  without significant change in size compared to prior exam, there are  at least four enlarging viable hepatic metastases still present.  3. Abdominal  lymphadenopathy has progressed also.        Signed by: Sai Mccarthy 2/22/2024  Objective   BSA: 1.88 meters squared  /71 (BP Location: Left arm)   Pulse 85   Temp 36 °C (96.8 °F)   Resp 17   Wt 74.3 kg (163 lb 12.8 oz)   SpO2 97%   BMI 25.41 kg/m²      has a past medical history of Essential (primary) hypertension (12/21/2013), Pacemaker, Peripheral neuropathy, Personal history of other diseases of the circulatory system, and Pneumothorax (12/04/2023).   has a past surgical history that includes Total knee arthroplasty (09/30/2016); Total knee arthroplasty (09/30/2016); Cardiac electrophysiology procedure (N/A, 11/7/2023); and Cardiac electrophysiology procedure (Left, 11/9/2023).  Family History   Problem Relation Name Age of Onset    Cancer Father       Oncology History   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)   9/13/2023 Initial Diagnosis    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)     9/13/2023 -  Chemotherapy    Trastuzumab + mFOLFOX6 (Fluorouracil Continuous Infusion / Leucovorin / Oxaliplatin), 14 Day Cycles     Metastases to the liver (CMS/HCC)   9/13/2023 Initial Diagnosis    Metastases to the liver (CMS/HCC)     9/13/2023 -  Chemotherapy    Trastuzumab + mFOLFOX6 (Fluorouracil Continuous Infusion / Leucovorin / Oxaliplatin), 14 Day Cycles         Massimo Garcia  reports that he has quit smoking. His smoking use included cigarettes and cigars. He has never been exposed to tobacco smoke. He has never used smokeless tobacco.  He  reports that he does not currently use alcohol.  He  reports current drug use. Drug: Marijuana.    Physical Exam    WBC   Date/Time Value Ref Range Status   02/12/2024 09:18 AM 8.9 4.4 - 11.3 x10*3/uL Final   02/09/2024 04:48 PM 5.3 4.4 - 11.3 x10*3/uL Final   01/29/2024 08:50 AM 7.4 4.4 - 11.3 x10*3/uL Final     nRBC   Date Value Ref Range Status   02/09/2024 0.0 0.0 - 0.0 /100 WBCs Final   12/27/2023 0.0 0.0 - 0.0 /100 WBCs Final   12/26/2023 0.0 0.0 -  0.0 /100 WBCs Final     RBC   Date Value Ref Range Status   02/12/2024 3.69 (L) 4.50 - 5.90 x10*6/uL Final   02/09/2024 3.58 (L) 4.50 - 5.90 x10*6/uL Final   01/29/2024 3.90 (L) 4.50 - 5.90 x10*6/uL Final     Hemoglobin   Date Value Ref Range Status   02/12/2024 10.8 (L) 13.5 - 17.5 g/dL Final   02/09/2024 10.6 (L) 13.5 - 17.5 g/dL Final   01/29/2024 11.5 (L) 13.5 - 17.5 g/dL Final     Hematocrit   Date Value Ref Range Status   02/12/2024 32.8 (L) 41.0 - 52.0 % Final   02/09/2024 30.7 (L) 41.0 - 52.0 % Final   01/29/2024 33.7 (L) 41.0 - 52.0 % Final     MCV   Date/Time Value Ref Range Status   02/12/2024 09:18 AM 89 80 - 100 fL Final   02/09/2024 04:48 PM 86 80 - 100 fL Final   01/29/2024 08:50 AM 86 80 - 100 fL Final     MCH   Date/Time Value Ref Range Status   02/12/2024 09:18 AM 29.3 26.0 - 34.0 pg Final   02/09/2024 04:48 PM 29.6 26.0 - 34.0 pg Final   01/29/2024 08:50 AM 29.5 26.0 - 34.0 pg Final     MCHC   Date/Time Value Ref Range Status   02/12/2024 09:18 AM 32.9 32.0 - 36.0 g/dL Final   02/09/2024 04:48 PM 34.5 32.0 - 36.0 g/dL Final   01/29/2024 08:50 AM 34.1 32.0 - 36.0 g/dL Final     RDW   Date/Time Value Ref Range Status   02/12/2024 09:18 AM 15.5 (H) 11.5 - 14.5 % Final   02/09/2024 04:48 PM 15.2 (H) 11.5 - 14.5 % Final   01/29/2024 08:50 AM 14.3 11.5 - 14.5 % Final     Platelets   Date/Time Value Ref Range Status   02/12/2024 09:18 AM 64 (L) 150 - 450 x10*3/uL Final   02/09/2024 04:48 PM 55 (L) 150 - 450 x10*3/uL Final   01/29/2024 08:50 AM 71 (L) 150 - 450 x10*3/uL Final     MPV   Date/Time Value Ref Range Status   10/30/2023 09:13 AM 10.3 7.5 - 11.5 fL Final   10/16/2023 08:34 AM 10.7 7.5 - 11.5 fL Final   10/02/2023 09:08 AM 10.0 7.5 - 11.5 fL Final     Neutrophils %   Date/Time Value Ref Range Status   02/12/2024 09:18 AM 85.3 40.0 - 80.0 % Final   02/09/2024 04:48 PM 67.8 40.0 - 80.0 % Final   01/29/2024 08:50 AM 78.2 40.0 - 80.0 % Final     Immature Granulocytes %, Automated   Date/Time Value  Ref Range Status   02/12/2024 09:18 AM 0.2 0.0 - 0.9 % Final     Comment:     Immature Granulocyte Count (IG) includes promyelocytes, myelocytes and metamyelocytes but does not include bands. Percent differential counts (%) should be interpreted in the context of the absolute cell counts (cells/UL).   02/09/2024 04:48 PM 0.6 0.0 - 0.9 % Final     Comment:     Immature Granulocyte Count (IG) includes promyelocytes, myelocytes and metamyelocytes but does not include bands. Percent differential counts (%) should be interpreted in the context of the absolute cell counts (cells/UL).   01/29/2024 08:50 AM 0.1 0.0 - 0.9 % Final     Comment:     Immature Granulocyte Count (IG) includes promyelocytes, myelocytes and metamyelocytes but does not include bands. Percent differential counts (%) should be interpreted in the context of the absolute cell counts (cells/UL).     Lymphocytes %   Date/Time Value Ref Range Status   02/12/2024 09:18 AM 8.1 13.0 - 44.0 % Final   02/09/2024 04:48 PM 19.0 13.0 - 44.0 % Final   01/29/2024 08:50 AM 12.6 13.0 - 44.0 % Final     Monocytes %   Date/Time Value Ref Range Status   02/12/2024 09:18 AM 6.3 2.0 - 10.0 % Final   02/09/2024 04:48 PM 11.1 2.0 - 10.0 % Final   01/29/2024 08:50 AM 7.6 2.0 - 10.0 % Final     Eosinophils %   Date/Time Value Ref Range Status   02/12/2024 09:18 AM 0.0 0.0 - 6.0 % Final   02/09/2024 04:48 PM 1.1 0.0 - 6.0 % Final   01/29/2024 08:50 AM 1.1 0.0 - 6.0 % Final     Basophils %   Date/Time Value Ref Range Status   02/12/2024 09:18 AM 0.1 0.0 - 2.0 % Final   02/09/2024 04:48 PM 0.4 0.0 - 2.0 % Final   01/29/2024 08:50 AM 0.4 0.0 - 2.0 % Final     Neutrophils Absolute   Date/Time Value Ref Range Status   02/12/2024 09:18 AM 7.55 1.20 - 7.70 x10*3/uL Final     Comment:     Percent differential counts (%) should be interpreted in the context of the absolute cell counts (cells/uL).   02/09/2024 04:48 PM 3.61 1.20 - 7.70 x10*3/uL Final     Comment:     Percent differential  "counts (%) should be interpreted in the context of the absolute cell counts (cells/uL).   01/29/2024 08:50 AM 5.79 1.20 - 7.70 x10*3/uL Final     Comment:     Percent differential counts (%) should be interpreted in the context of the absolute cell counts (cells/uL).     Immature Granulocytes Absolute, Automated   Date/Time Value Ref Range Status   02/12/2024 09:18 AM 0.02 0.00 - 0.70 x10*3/uL Final   02/09/2024 04:48 PM 0.03 0.00 - 0.70 x10*3/uL Final   01/29/2024 08:50 AM 0.01 0.00 - 0.70 x10*3/uL Final     Lymphocytes Absolute   Date/Time Value Ref Range Status   02/12/2024 09:18 AM 0.72 (L) 1.20 - 4.80 x10*3/uL Final   02/09/2024 04:48 PM 1.01 (L) 1.20 - 4.80 x10*3/uL Final   01/29/2024 08:50 AM 0.93 (L) 1.20 - 4.80 x10*3/uL Final     Monocytes Absolute   Date/Time Value Ref Range Status   02/12/2024 09:18 AM 0.56 0.10 - 1.00 x10*3/uL Final   02/09/2024 04:48 PM 0.59 0.10 - 1.00 x10*3/uL Final   01/29/2024 08:50 AM 0.56 0.10 - 1.00 x10*3/uL Final     Eosinophils Absolute   Date/Time Value Ref Range Status   02/12/2024 09:18 AM 0.00 0.00 - 0.70 x10*3/uL Final   02/09/2024 04:48 PM 0.06 0.00 - 0.70 x10*3/uL Final   01/29/2024 08:50 AM 0.08 0.00 - 0.70 x10*3/uL Final     Basophils Absolute   Date/Time Value Ref Range Status   02/12/2024 09:18 AM 0.01 0.00 - 0.10 x10*3/uL Final   02/09/2024 04:48 PM 0.02 0.00 - 0.10 x10*3/uL Final   01/29/2024 08:50 AM 0.03 0.00 - 0.10 x10*3/uL Final       No components found for: \"PT\"  aPTT   Date/Time Value Ref Range Status   12/26/2023 06:13 PM 48 (H) 27 - 38 seconds Final   12/22/2023 11:31 AM 47 (H) 27 - 38 seconds Final   01/08/2023 03:00 PM 36 26 - 39 sec Final     Comment:       THE APTT IS NO LONGER USED FOR MONITORING     UNFRACTIONATED HEPARIN THERAPY.    FOR MONITORING HEPARIN THERAPY,     USE THE HEPARIN ASSAY.         Assessment/Plan      Given the evidence of probable progression I have added pembrolizumab to the patient's treatment schedule. As per NCCN guidelines " patient is eligible to receive trastuzumab pembrolizumab 5-FU leucovorin and oxaliplatin. Pembrolizumab is scheduled for every 42 days.      Diagnoses and all orders for this visit:  Epistaxis  -     phytonadione (Vitamin K) 5 mg tablet; Take 0.5 tablets (2.5 mg) by mouth 1 time for 1 dose.  -     Onco-Echo Complete (Strain & 3D); Future  Metastases to the liver (CMS/HCC)  -     Clinic Appointment Request  -     phytonadione (Vitamin K) 5 mg tablet; Take 0.5 tablets (2.5 mg) by mouth 1 time for 1 dose.  -     Infusion Appointment Request; Future  -     TSH with reflex to Free T4 if abnormal; Future  -     CBC and Auto Differential; Future  -     Comprehensive metabolic panel; Future  -     Onco-Echo Complete (Strain & 3D); Future  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)  -     Clinic Appointment Request  -     phytonadione (Vitamin K) 5 mg tablet; Take 0.5 tablets (2.5 mg) by mouth 1 time for 1 dose.  -     Infusion Appointment Request; Future  -     TSH with reflex to Free T4 if abnormal; Future  -     CBC and Auto Differential; Future  -     Comprehensive metabolic panel; Future  -     Onco-Echo Complete (Strain & 3D); Future  Adverse effect of aminoglycosides, initial encounter  -     Onco-Echo Complete (Strain & 3D); Future           Davis-Sidney Blake MD

## 2024-02-26 NOTE — PROGRESS NOTES
Patient ID: Massimo Garcia is a 63 y.o. male.  Referring Physician: Tomasa Blake MD  [id number] [place] [address]  Primary Care Provider: Dayne Santamaria DO  Visit Type:  Follow Up       Subjective    HPI    Review of Systems - Oncology     Objective   BSA: 1.88 meters squared  /71 (BP Location: Left arm)   Pulse 85   Temp 36 °C (96.8 °F)   Resp 17   Wt 74.3 kg (163 lb 12.8 oz)   SpO2 97%   BMI 25.41 kg/m²      has a past medical history of Essential (primary) hypertension (12/21/2013), Pacemaker, Peripheral neuropathy, Personal history of other diseases of the circulatory system, and Pneumothorax (12/04/2023).   has a past surgical history that includes Total knee arthroplasty (09/30/2016); Total knee arthroplasty (09/30/2016); Cardiac electrophysiology procedure (N/A, 11/7/2023); and Cardiac electrophysiology procedure (Left, 11/9/2023).  Family History   Problem Relation Name Age of Onset    Cancer Father       Oncology History   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)   9/13/2023 Initial Diagnosis    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)     9/13/2023 -  Chemotherapy    Trastuzumab + mFOLFOX6 (Fluorouracil Continuous Infusion / Leucovorin / Oxaliplatin), 14 Day Cycles     Metastases to the liver (CMS/HCC)   9/13/2023 Initial Diagnosis    Metastases to the liver (CMS/HCC)     9/13/2023 -  Chemotherapy    Trastuzumab + mFOLFOX6 (Fluorouracil Continuous Infusion / Leucovorin / Oxaliplatin), 14 Day Cycles         Massimo Garcia  reports that he has quit smoking. His smoking use included cigarettes and cigars. He has never been exposed to tobacco smoke. He has never used smokeless tobacco.  He  reports that he does not currently use alcohol.  He  reports current drug use. Drug: Marijuana.    Physical Exam    WBC   Date/Time Value Ref Range Status   02/12/2024 09:18 AM 8.9 4.4 - 11.3 x10*3/uL Final   02/09/2024 04:48 PM 5.3 4.4 - 11.3 x10*3/uL Final    01/29/2024 08:50 AM 7.4 4.4 - 11.3 x10*3/uL Final     nRBC   Date Value Ref Range Status   02/09/2024 0.0 0.0 - 0.0 /100 WBCs Final   12/27/2023 0.0 0.0 - 0.0 /100 WBCs Final   12/26/2023 0.0 0.0 - 0.0 /100 WBCs Final     RBC   Date Value Ref Range Status   02/12/2024 3.69 (L) 4.50 - 5.90 x10*6/uL Final   02/09/2024 3.58 (L) 4.50 - 5.90 x10*6/uL Final   01/29/2024 3.90 (L) 4.50 - 5.90 x10*6/uL Final     Hemoglobin   Date Value Ref Range Status   02/12/2024 10.8 (L) 13.5 - 17.5 g/dL Final   02/09/2024 10.6 (L) 13.5 - 17.5 g/dL Final   01/29/2024 11.5 (L) 13.5 - 17.5 g/dL Final     Hematocrit   Date Value Ref Range Status   02/12/2024 32.8 (L) 41.0 - 52.0 % Final   02/09/2024 30.7 (L) 41.0 - 52.0 % Final   01/29/2024 33.7 (L) 41.0 - 52.0 % Final     MCV   Date/Time Value Ref Range Status   02/12/2024 09:18 AM 89 80 - 100 fL Final   02/09/2024 04:48 PM 86 80 - 100 fL Final   01/29/2024 08:50 AM 86 80 - 100 fL Final     MCH   Date/Time Value Ref Range Status   02/12/2024 09:18 AM 29.3 26.0 - 34.0 pg Final   02/09/2024 04:48 PM 29.6 26.0 - 34.0 pg Final   01/29/2024 08:50 AM 29.5 26.0 - 34.0 pg Final     MCHC   Date/Time Value Ref Range Status   02/12/2024 09:18 AM 32.9 32.0 - 36.0 g/dL Final   02/09/2024 04:48 PM 34.5 32.0 - 36.0 g/dL Final   01/29/2024 08:50 AM 34.1 32.0 - 36.0 g/dL Final     RDW   Date/Time Value Ref Range Status   02/12/2024 09:18 AM 15.5 (H) 11.5 - 14.5 % Final   02/09/2024 04:48 PM 15.2 (H) 11.5 - 14.5 % Final   01/29/2024 08:50 AM 14.3 11.5 - 14.5 % Final     Platelets   Date/Time Value Ref Range Status   02/12/2024 09:18 AM 64 (L) 150 - 450 x10*3/uL Final   02/09/2024 04:48 PM 55 (L) 150 - 450 x10*3/uL Final   01/29/2024 08:50 AM 71 (L) 150 - 450 x10*3/uL Final     MPV   Date/Time Value Ref Range Status   10/30/2023 09:13 AM 10.3 7.5 - 11.5 fL Final   10/16/2023 08:34 AM 10.7 7.5 - 11.5 fL Final   10/02/2023 09:08 AM 10.0 7.5 - 11.5 fL Final     Neutrophils %   Date/Time Value Ref Range Status    02/12/2024 09:18 AM 85.3 40.0 - 80.0 % Final   02/09/2024 04:48 PM 67.8 40.0 - 80.0 % Final   01/29/2024 08:50 AM 78.2 40.0 - 80.0 % Final     Immature Granulocytes %, Automated   Date/Time Value Ref Range Status   02/12/2024 09:18 AM 0.2 0.0 - 0.9 % Final     Comment:     Immature Granulocyte Count (IG) includes promyelocytes, myelocytes and metamyelocytes but does not include bands. Percent differential counts (%) should be interpreted in the context of the absolute cell counts (cells/UL).   02/09/2024 04:48 PM 0.6 0.0 - 0.9 % Final     Comment:     Immature Granulocyte Count (IG) includes promyelocytes, myelocytes and metamyelocytes but does not include bands. Percent differential counts (%) should be interpreted in the context of the absolute cell counts (cells/UL).   01/29/2024 08:50 AM 0.1 0.0 - 0.9 % Final     Comment:     Immature Granulocyte Count (IG) includes promyelocytes, myelocytes and metamyelocytes but does not include bands. Percent differential counts (%) should be interpreted in the context of the absolute cell counts (cells/UL).     Lymphocytes %   Date/Time Value Ref Range Status   02/12/2024 09:18 AM 8.1 13.0 - 44.0 % Final   02/09/2024 04:48 PM 19.0 13.0 - 44.0 % Final   01/29/2024 08:50 AM 12.6 13.0 - 44.0 % Final     Monocytes %   Date/Time Value Ref Range Status   02/12/2024 09:18 AM 6.3 2.0 - 10.0 % Final   02/09/2024 04:48 PM 11.1 2.0 - 10.0 % Final   01/29/2024 08:50 AM 7.6 2.0 - 10.0 % Final     Eosinophils %   Date/Time Value Ref Range Status   02/12/2024 09:18 AM 0.0 0.0 - 6.0 % Final   02/09/2024 04:48 PM 1.1 0.0 - 6.0 % Final   01/29/2024 08:50 AM 1.1 0.0 - 6.0 % Final     Basophils %   Date/Time Value Ref Range Status   02/12/2024 09:18 AM 0.1 0.0 - 2.0 % Final   02/09/2024 04:48 PM 0.4 0.0 - 2.0 % Final   01/29/2024 08:50 AM 0.4 0.0 - 2.0 % Final     Neutrophils Absolute   Date/Time Value Ref Range Status   02/12/2024 09:18 AM 7.55 1.20 - 7.70 x10*3/uL Final     Comment:      "Percent differential counts (%) should be interpreted in the context of the absolute cell counts (cells/uL).   02/09/2024 04:48 PM 3.61 1.20 - 7.70 x10*3/uL Final     Comment:     Percent differential counts (%) should be interpreted in the context of the absolute cell counts (cells/uL).   01/29/2024 08:50 AM 5.79 1.20 - 7.70 x10*3/uL Final     Comment:     Percent differential counts (%) should be interpreted in the context of the absolute cell counts (cells/uL).     Immature Granulocytes Absolute, Automated   Date/Time Value Ref Range Status   02/12/2024 09:18 AM 0.02 0.00 - 0.70 x10*3/uL Final   02/09/2024 04:48 PM 0.03 0.00 - 0.70 x10*3/uL Final   01/29/2024 08:50 AM 0.01 0.00 - 0.70 x10*3/uL Final     Lymphocytes Absolute   Date/Time Value Ref Range Status   02/12/2024 09:18 AM 0.72 (L) 1.20 - 4.80 x10*3/uL Final   02/09/2024 04:48 PM 1.01 (L) 1.20 - 4.80 x10*3/uL Final   01/29/2024 08:50 AM 0.93 (L) 1.20 - 4.80 x10*3/uL Final     Monocytes Absolute   Date/Time Value Ref Range Status   02/12/2024 09:18 AM 0.56 0.10 - 1.00 x10*3/uL Final   02/09/2024 04:48 PM 0.59 0.10 - 1.00 x10*3/uL Final   01/29/2024 08:50 AM 0.56 0.10 - 1.00 x10*3/uL Final     Eosinophils Absolute   Date/Time Value Ref Range Status   02/12/2024 09:18 AM 0.00 0.00 - 0.70 x10*3/uL Final   02/09/2024 04:48 PM 0.06 0.00 - 0.70 x10*3/uL Final   01/29/2024 08:50 AM 0.08 0.00 - 0.70 x10*3/uL Final     Basophils Absolute   Date/Time Value Ref Range Status   02/12/2024 09:18 AM 0.01 0.00 - 0.10 x10*3/uL Final   02/09/2024 04:48 PM 0.02 0.00 - 0.10 x10*3/uL Final   01/29/2024 08:50 AM 0.03 0.00 - 0.10 x10*3/uL Final       No components found for: \"PT\"  aPTT   Date/Time Value Ref Range Status   12/26/2023 06:13 PM 48 (H) 27 - 38 seconds Final   12/22/2023 11:31 AM 47 (H) 27 - 38 seconds Final   01/08/2023 03:00 PM 36 26 - 39 sec Final     Comment:       THE APTT IS NO LONGER USED FOR MONITORING     UNFRACTIONATED HEPARIN THERAPY.    FOR MONITORING " HEPARIN THERAPY,     USE THE HEPARIN ASSAY.         Assessment/Plan                    Tomasa Blake MD

## 2024-02-26 NOTE — PATIENT INSTRUCTIONS
Today you met with Dr. Blake and your treatment will be delayed since your platelets are only 57.  We reviewed risk for bleeding and falls.  We will see you back in 1 week.    Please call the office for any questions or concerns.  We ask that you notify us 5-7 days before your medications need refilled.  [facility] is strongly encouraging all patients to access their MyChart for all results and to communicate with their provider for non-urgent messages.  If an urgent/same day/sick concern response is needed, please call the office at [phone]. Thank You!

## 2024-02-28 ENCOUNTER — HOSPITAL ENCOUNTER (EMERGENCY)
Facility: HOSPITAL | Age: 64
Discharge: HOME | End: 2024-02-28
Attending: EMERGENCY MEDICINE
Payer: MEDICARE

## 2024-02-28 ENCOUNTER — APPOINTMENT (OUTPATIENT)
Dept: RADIOLOGY | Facility: HOSPITAL | Age: 64
End: 2024-02-28
Payer: MEDICARE

## 2024-02-28 ENCOUNTER — HOSPITAL ENCOUNTER (OUTPATIENT)
Dept: CARDIOLOGY | Facility: HOSPITAL | Age: 64
Discharge: HOME | End: 2024-02-28
Payer: MEDICARE

## 2024-02-28 ENCOUNTER — TELEPHONE (OUTPATIENT)
Dept: HEMATOLOGY/ONCOLOGY | Facility: CLINIC | Age: 64
End: 2024-02-28

## 2024-02-28 ENCOUNTER — APPOINTMENT (OUTPATIENT)
Dept: CARDIOLOGY | Facility: HOSPITAL | Age: 64
End: 2024-02-28
Payer: MEDICARE

## 2024-02-28 VITALS
WEIGHT: 164 LBS | BODY MASS INDEX: 24.29 KG/M2 | OXYGEN SATURATION: 99 % | DIASTOLIC BLOOD PRESSURE: 62 MMHG | RESPIRATION RATE: 10 BRPM | TEMPERATURE: 98.7 F | SYSTOLIC BLOOD PRESSURE: 105 MMHG | HEIGHT: 69 IN | HEART RATE: 70 BPM

## 2024-02-28 DIAGNOSIS — C15.9 MALIGNANT NEOPLASM OF ESOPHAGUS, UNSPECIFIED LOCATION (MULTI): ICD-10-CM

## 2024-02-28 DIAGNOSIS — Z51.81 ENCOUNTER FOR THERAPEUTIC DRUG LEVEL MONITORING: ICD-10-CM

## 2024-02-28 DIAGNOSIS — R42 VERTIGO: Primary | ICD-10-CM

## 2024-02-28 DIAGNOSIS — C78.7 METASTASES TO THE LIVER (MULTI): ICD-10-CM

## 2024-02-28 DIAGNOSIS — R04.0 EPISTAXIS: ICD-10-CM

## 2024-02-28 DIAGNOSIS — S09.90XA INJURY OF HEAD, INITIAL ENCOUNTER: ICD-10-CM

## 2024-02-28 DIAGNOSIS — T36.5X5A ADVERSE EFFECT OF AMINOGLYCOSIDES, INITIAL ENCOUNTER: ICD-10-CM

## 2024-02-28 DIAGNOSIS — C15.9 STAGE IV MALIGNANT NEOPLASM OF ESOPHAGUS (MULTI): ICD-10-CM

## 2024-02-28 LAB
ALBUMIN SERPL BCP-MCNC: 3.4 G/DL (ref 3.4–5)
ALP SERPL-CCNC: 156 U/L (ref 33–136)
ALT SERPL W P-5'-P-CCNC: 27 U/L (ref 10–52)
ANION GAP SERPL CALC-SCNC: 10 MMOL/L (ref 10–20)
AST SERPL W P-5'-P-CCNC: 56 U/L (ref 9–39)
BASOPHILS # BLD AUTO: 0.02 X10*3/UL (ref 0–0.1)
BASOPHILS NFR BLD AUTO: 0.4 %
BILIRUB SERPL-MCNC: 1.2 MG/DL (ref 0–1.2)
BUN SERPL-MCNC: 12 MG/DL (ref 6–23)
CALCIUM SERPL-MCNC: 9.1 MG/DL (ref 8.6–10.3)
CARDIAC TROPONIN I PNL SERPL HS: 5 NG/L (ref 0–20)
CHLORIDE SERPL-SCNC: 103 MMOL/L (ref 98–107)
CO2 SERPL-SCNC: 27 MMOL/L (ref 21–32)
CREAT SERPL-MCNC: 0.71 MG/DL (ref 0.5–1.3)
EGFRCR SERPLBLD CKD-EPI 2021: >90 ML/MIN/1.73M*2
EOSINOPHIL # BLD AUTO: 0.05 X10*3/UL (ref 0–0.7)
EOSINOPHIL NFR BLD AUTO: 0.9 %
ERYTHROCYTE [DISTWIDTH] IN BLOOD BY AUTOMATED COUNT: 15.8 % (ref 11.5–14.5)
GIANT PLATELETS BLD QL SMEAR: NORMAL
GLUCOSE SERPL-MCNC: 127 MG/DL (ref 74–99)
HCT VFR BLD AUTO: 32.5 % (ref 41–52)
HGB BLD-MCNC: 10.8 G/DL (ref 13.5–17.5)
IMM GRANULOCYTES # BLD AUTO: 0.02 X10*3/UL (ref 0–0.7)
IMM GRANULOCYTES NFR BLD AUTO: 0.4 % (ref 0–0.9)
LIPASE SERPL-CCNC: 14 U/L (ref 9–82)
LYMPHOCYTES # BLD AUTO: 1.05 X10*3/UL (ref 1.2–4.8)
LYMPHOCYTES NFR BLD AUTO: 19.1 %
MAGNESIUM SERPL-MCNC: 1.8 MG/DL (ref 1.6–2.4)
MCH RBC QN AUTO: 29.3 PG (ref 26–34)
MCHC RBC AUTO-ENTMCNC: 33.2 G/DL (ref 32–36)
MCV RBC AUTO: 88 FL (ref 80–100)
MONOCYTES # BLD AUTO: 0.57 X10*3/UL (ref 0.1–1)
MONOCYTES NFR BLD AUTO: 10.4 %
NEUTROPHILS # BLD AUTO: 3.78 X10*3/UL (ref 1.2–7.7)
NEUTROPHILS NFR BLD AUTO: 68.8 %
NRBC BLD-RTO: 0 /100 WBCS (ref 0–0)
PLATELET # BLD AUTO: 63 X10*3/UL (ref 150–450)
POTASSIUM SERPL-SCNC: 3.7 MMOL/L (ref 3.5–5.3)
PROT SERPL-MCNC: 7.3 G/DL (ref 6.4–8.2)
RBC # BLD AUTO: 3.68 X10*6/UL (ref 4.5–5.9)
RBC MORPH BLD: NORMAL
SODIUM SERPL-SCNC: 136 MMOL/L (ref 136–145)
WBC # BLD AUTO: 5.5 X10*3/UL (ref 4.4–11.3)

## 2024-02-28 PROCEDURE — 70450 CT HEAD/BRAIN W/O DYE: CPT

## 2024-02-28 PROCEDURE — 93356 MYOCRD STRAIN IMG SPCKL TRCK: CPT

## 2024-02-28 PROCEDURE — 93306 TTE W/DOPPLER COMPLETE: CPT | Performed by: INTERNAL MEDICINE

## 2024-02-28 PROCEDURE — 84484 ASSAY OF TROPONIN QUANT: CPT | Performed by: NURSE PRACTITIONER

## 2024-02-28 PROCEDURE — 71045 X-RAY EXAM CHEST 1 VIEW: CPT | Mod: FOREIGN READ | Performed by: RADIOLOGY

## 2024-02-28 PROCEDURE — 83690 ASSAY OF LIPASE: CPT | Performed by: NURSE PRACTITIONER

## 2024-02-28 PROCEDURE — 83735 ASSAY OF MAGNESIUM: CPT | Performed by: NURSE PRACTITIONER

## 2024-02-28 PROCEDURE — 2500000001 HC RX 250 WO HCPCS SELF ADMINISTERED DRUGS (ALT 637 FOR MEDICARE OP): Performed by: NURSE PRACTITIONER

## 2024-02-28 PROCEDURE — 84520 ASSAY OF UREA NITROGEN: CPT | Performed by: NURSE PRACTITIONER

## 2024-02-28 PROCEDURE — 85025 COMPLETE CBC W/AUTO DIFF WBC: CPT | Performed by: NURSE PRACTITIONER

## 2024-02-28 PROCEDURE — 93005 ELECTROCARDIOGRAM TRACING: CPT

## 2024-02-28 PROCEDURE — 2500000004 HC RX 250 GENERAL PHARMACY W/ HCPCS (ALT 636 FOR OP/ED): Performed by: NURSE PRACTITIONER

## 2024-02-28 PROCEDURE — 96375 TX/PRO/DX INJ NEW DRUG ADDON: CPT

## 2024-02-28 PROCEDURE — 99285 EMERGENCY DEPT VISIT HI MDM: CPT | Mod: 25

## 2024-02-28 PROCEDURE — 71045 X-RAY EXAM CHEST 1 VIEW: CPT

## 2024-02-28 PROCEDURE — 96374 THER/PROPH/DIAG INJ IV PUSH: CPT

## 2024-02-28 PROCEDURE — 72125 CT NECK SPINE W/O DYE: CPT

## 2024-02-28 PROCEDURE — 72125 CT NECK SPINE W/O DYE: CPT | Performed by: RADIOLOGY

## 2024-02-28 PROCEDURE — 93356 MYOCRD STRAIN IMG SPCKL TRCK: CPT | Performed by: INTERNAL MEDICINE

## 2024-02-28 PROCEDURE — 70450 CT HEAD/BRAIN W/O DYE: CPT | Performed by: RADIOLOGY

## 2024-02-28 RX ORDER — ONDANSETRON HYDROCHLORIDE 2 MG/ML
4 INJECTION, SOLUTION INTRAVENOUS ONCE
Status: DISCONTINUED | OUTPATIENT
Start: 2024-02-28 | End: 2024-02-28 | Stop reason: HOSPADM

## 2024-02-28 RX ORDER — MECLIZINE HYDROCHLORIDE 25 MG/1
25 TABLET ORAL 3 TIMES DAILY PRN
Qty: 30 TABLET | Refills: 0 | Status: SHIPPED | OUTPATIENT
Start: 2024-02-28 | End: 2024-03-09

## 2024-02-28 RX ORDER — HEPARIN 100 UNIT/ML
5 SYRINGE INTRAVENOUS ONCE
Status: COMPLETED | OUTPATIENT
Start: 2024-02-28 | End: 2024-02-28

## 2024-02-28 RX ORDER — MECLIZINE HYDROCHLORIDE 25 MG/1
25 TABLET ORAL ONCE
Status: COMPLETED | OUTPATIENT
Start: 2024-02-28 | End: 2024-02-28

## 2024-02-28 RX ORDER — LORAZEPAM 2 MG/ML
1 INJECTION INTRAMUSCULAR ONCE
Status: COMPLETED | OUTPATIENT
Start: 2024-02-28 | End: 2024-02-28

## 2024-02-28 RX ADMIN — MECLIZINE HYDROCHLORIDE 25 MG: 25 TABLET ORAL at 17:19

## 2024-02-28 RX ADMIN — SODIUM CHLORIDE 500 ML: 9 INJECTION, SOLUTION INTRAVENOUS at 15:25

## 2024-02-28 RX ADMIN — LORAZEPAM 1 MG: 2 INJECTION INTRAMUSCULAR; INTRAVENOUS at 15:23

## 2024-02-28 RX ADMIN — HEPARIN 500 UNITS: 100 SYRINGE at 17:20

## 2024-02-28 ASSESSMENT — LIFESTYLE VARIABLES
EVER HAD A DRINK FIRST THING IN THE MORNING TO STEADY YOUR NERVES TO GET RID OF A HANGOVER: NO
EVER FELT BAD OR GUILTY ABOUT YOUR DRINKING: NO
HAVE PEOPLE ANNOYED YOU BY CRITICIZING YOUR DRINKING: NO
HAVE YOU EVER FELT YOU SHOULD CUT DOWN ON YOUR DRINKING: NO

## 2024-02-28 ASSESSMENT — PAIN SCALES - GENERAL: PAINLEVEL_OUTOF10: 0 - NO PAIN

## 2024-02-28 ASSESSMENT — PAIN - FUNCTIONAL ASSESSMENT: PAIN_FUNCTIONAL_ASSESSMENT: 0-10

## 2024-02-28 NOTE — ED TRIAGE NOTES
Patient with a hx of esophageal and liver CA had chemo on Monday, since then he has felt light headed and weaker than normal. Patient states he also noticed a lump on the left side of his neck last night and his lab work showed a low platelet count.

## 2024-02-28 NOTE — ED NOTES
Pt came into the ED today due to his platelets are really low and he found a lump on the left lateral border of his neck.  The lump is tender to palpate  He has a HX of esophageal CA and was not able to have chemo Monday due to his blood results.  He also states that he hit his head on Saturday and he denies any LOC he is on Eliquis  He does state that he feels shaky at this time     Abilio Rodriguez RN  02/28/24 6433

## 2024-02-28 NOTE — TELEPHONE ENCOUNTER
Called back, spoke with patient's wife Jane, she said Massimo has red eye, looks like bleeding into his eye, blood streak, also, he woke up this morning with lump on the left side of his neck below the ear. The lump is hard and not painful. Patient c/o feeling nauseated, . Also, per his wife, feels like he slurring his words and sometimes feels unsteady. She said he is still taking Eliquis 5 mg. He took Vit K 1 tab yesterday. Referred to Dr Blake who recommended the patient to go to the ER immediately.

## 2024-02-28 NOTE — ED NOTES
Pt came into the ED due to he found a lump on the posterior lateral aspect of his neck.  It is tender to palpate.  He did not have chemo Monday as his platelet count was too low.  He also states that he hit his head on Saturday or Sunday and he is on Eliquis  He denies any LOC

## 2024-02-28 NOTE — TELEPHONE ENCOUNTER
Return  phone call to patient's wife. After explaining all the symptoms to Dr Blake slurring words and being unsteady on his feet, bleeding into the eye , low platelets count, Dr Blake strongly advice the patient to go to the emergency room. Patient's wife agree with the plan, and said they will go to [facility] ED.

## 2024-02-28 NOTE — ED PROVIDER NOTES
"HPI   Chief Complaint   Patient presents with    Dizziness       63-year-old male presents today with acute onset vertigo that started on Saturday.  He excellently struck his head on his car door on Saturday of last weekend.  He is on Eliquis and he took his Eliquis this morning.  He has had multiple episodes of vertigo but he is also feeling nauseous and he was sent in by his oncologist with concern for head injury on Eliquis.  Patient has a history of esophageal cancer with mets to his liver lungs and he is also cirrhotic.  He stopped his oxaliplatin 6 months ago.  He is on 5-FU/trastuzamab and they recently added pembrolizamab on 2/26/24.  He was actually supposed to start his new chemo medication on Monday but his platelets were too low and they were unable to give him his chemo medication.  He had a near syncopal event on January 2 and a CT was completed but there was no PE and noted lung nodule.  He had a history of heart block on November 23 of last year and a pacemaker was placed.  He denies chest pain.  He denies dyspnea.  He endorses posterior back pain but that is chronic.  He states that he presently feels like he is not \"chemo fog\".  He has not noted hard concerning nodule to the left lateral neck that he has not had before.  He is wondering if this is metastatic disease.  He endorses abdominal discomfort but presently denies right upper, right lower, or left lower pain and endorses left upper quadrant pain.  The lump on his neck is also painful to the touch.  He had an MRI today for staging his cancer.  It was completed at [facility].      History provided by:  Patient   used: No                        [place] Coma Scale Score: 15                     Patient History   Past Medical History:   Diagnosis Date    Essential (primary) hypertension 12/21/2013    Hypertension    Pacemaker     Peripheral neuropathy     Personal history of other diseases of the circulatory system  "    History of right bundle branch block (RBBB)    Pneumothorax 12/04/2023     Past Surgical History:   Procedure Laterality Date    CARDIAC ELECTROPHYSIOLOGY PROCEDURE N/A 11/7/2023    Procedure: Temporary Pacemaker Insertion;  Surgeon: Alexis Shook MD;  Location: [facility] Cardiac Cath Lab;  Service: Cardiovascular;  Laterality: N/A;    CARDIAC ELECTROPHYSIOLOGY PROCEDURE Left 11/9/2023    Procedure: PPM IMPLANT DUAL;  Surgeon: Elias Connolly MD;  Location: [facility] Cardiac Cath Lab;  Service: Electrophysiology;  Laterality: Left;    TOTAL KNEE ARTHROPLASTY  09/30/2016    Total Knee Replacement Left    TOTAL KNEE ARTHROPLASTY  09/30/2016    Total Knee Replacement Right     Family History   Problem Relation Name Age of Onset    Cancer Father       Social History     Tobacco Use    Smoking status: Former     Types: Cigarettes, Cigars     Passive exposure: Never    Smokeless tobacco: Never   Vaping Use    Vaping Use: Unknown   Substance Use Topics    Alcohol use: Not Currently    Drug use: Yes     Types: Marijuana     Comment: medical [facility] card       Physical Exam   ED Triage Vitals [02/28/24 1419]   Temperature Heart Rate Respirations BP   37.1 °C (98.7 °F) 74 18 107/66      Pulse Ox Temp Source Heart Rate Source Patient Position   97 % Temporal Monitor --      BP Location FiO2 (%)     -- --       Physical Exam  Constitutional:       Appearance: Normal appearance.   HENT:      Head: Normocephalic and atraumatic.      Right Ear: Tympanic membrane normal.      Left Ear: Tympanic membrane normal.      Nose: Nose normal.      Mouth/Throat:      Mouth: Mucous membranes are dry.   Eyes:      Extraocular Movements: Extraocular movements intact.      Pupils: Pupils are equal, round, and reactive to light.   Neck:      Comments: There is a large lump appreciated to the lateral aspect of his neck  Cardiovascular:      Rate and Rhythm: Normal rate and regular rhythm.      Pulses: Normal pulses.      Heart sounds: Normal heart  sounds.   Pulmonary:      Effort: Pulmonary effort is normal.      Breath sounds: Normal breath sounds.   Abdominal:      General: Abdomen is flat.      Palpations: Abdomen is soft.      Tenderness: There is abdominal tenderness.      Comments: Tenderness only to the left upper quadrant note appears dull during palpation   Musculoskeletal:         General: Normal range of motion.      Cervical back: Normal range of motion. Tenderness present.   Skin:     General: Skin is warm and dry.      Capillary Refill: Capillary refill takes less than 2 seconds.   Neurological:      General: No focal deficit present.      Mental Status: He is alert and oriented to person, place, and time.   Psychiatric:         Mood and Affect: Mood normal.         Behavior: Behavior normal.         ED Course & MDM   Diagnoses as of 02/28/24 1807   Vertigo   Malignant neoplasm of esophagus, unspecified location (CMS/HCC)   Injury of head, initial encounter       Medical Decision Making  I notified attending that this patient struck his head on Saturday on his car door and he is on Eliquis he took this morning.  I did not call in HIA but staffed with attending.  Basic labs were ordered.  Tympanic membrane was normal.  He had a history of epistaxis on Monday but I did not appreciate any blood to the nares or oropharynx.  His skin was normal in color and his conjunctiva was normal in color.  He was alert and oriented and there was no neurodeficit during exam.  His EKG was normal sinus rhythm and did not appear to be paced at this time although he does have a pacemaker.  It is unchanged from his prior EKG.  Basic labs were ordered.  Chest x-ray CT head and CT cervical and I put a note on the cervical to ask if they felt he needed contrast for this hard mass that was appreciated to his neck.    Following update sent to Dr Sawant who indicated that he does not believe [facility] or our hospital could do anything extra for patient at this time.  He  offered patient discharge home and follow-up with his service.  Both patient and wife are comfortable with this plan.  He received 1 dose of meclizine before discharge and then I sent meclizine to his pharmacy.  Patient was seen and staffed with attending.    Darshan Blake Massimo Garcia is here patient who we are treating for metastatic esophageal cancer. He came in feeling weak and dizzy. He also has a mass that was appreciated to the left lateral neck and it shows concern for metastatic disease now at level 4 and level 5 lymph nodes on the left side of his neck. These are new. His vital signs are very stable. He does not appear to be in acute distress. He just had an echocardiogram completed today at [facility]. He was also concerned that he bumped his head on his door jam on Saturday and he was weak and feeling nauseous. I did a CT of his head and there was no acute intercranial bleed at this time. He is on Eliquis and he took his Eliquis this morning. His last chemo was 3 weeks ago. He was post to receive chemo on Monday I believe you are going to start him on Pembrolizamab on Monday but that infusion did not take place. His esophageal cancer already has mets to the liver lungs and is cirrhotic. He had a CT for another near syncopal episode on January 2 and there was no PE on CT. His pacemaker was placed on November 23, 2023. Would appreciate your input on your patient if at all possible. Thanks Joseph KONG    Amount and/or Complexity of Data Reviewed  Labs: ordered.  Radiology: ordered and independent interpretation performed.  ECG/medicine tests: ordered and independent interpretation performed.     Details: EKG is 70 bpm.  Right axis.  No ST elevation or depression.  Unchanged from prior EKG.  GA interval is normal.  QT corrected is normal.  Interpreted both by attending and myself.        Procedure  Procedures     David Dacosta, AFLREDITO-CNP  02/28/24 4848

## 2024-02-29 LAB
AORTIC VALVE MEAN GRADIENT: 2.8 MMHG
AORTIC VALVE PEAK VELOCITY: 1.19 M/S
AV PEAK GRADIENT: 5.7 MMHG
AVA (PEAK VEL): 3.97 CM2
AVA (VTI): 4.12 CM2
GLOBAL LONGITUDINAL STRAIN: 17.7 %
LEFT ATRIUM VOLUME AREA LENGTH INDEX BSA: 21.3 ML/M2
LEFT VENTRICLE INTERNAL DIMENSION DIASTOLE: 4.9 CM (ref 3.5–6)
LEFT VENTRICULAR OUTFLOW TRACT DIAMETER: 2.3 CM
MITRAL VALVE E/A RATIO: 0.81
MITRAL VALVE E/E' RATIO: 9
RIGHT VENTRICLE FREE WALL PEAK S': 12 CM/S
RIGHT VENTRICLE PEAK SYSTOLIC PRESSURE: 24.1 MMHG
TRICUSPID ANNULAR PLANE SYSTOLIC EXCURSION: 2.1 CM

## 2024-02-29 NOTE — TELEPHONE ENCOUNTER
Called patient's wife this morning asking her what had happened in ER visit yesterday. She said Massimo had CT scans and that redness in his eye was just irritation not a bleed. I told her to hold Eliquis till I talk to Dr Blake today.   After talking to Dr Blake, he said to hold Eliquis due to low platelets. Platelets were redrawn in ED 63,000, were still low.   I called back to patient's wife told her to hold Eliquis till Monday. Dr Blake will reevaluate blood test results.Patient's wife verbalized understanding and agree with the plan of care.

## 2024-03-03 LAB
ATRIAL RATE: 70 BPM
P AXIS: 10 DEGREES
P OFFSET: 173 MS
P ONSET: 131 MS
PR INTERVAL: 164 MS
Q ONSET: 213 MS
QRS COUNT: 12 BEATS
QRS DURATION: 152 MS
QT INTERVAL: 434 MS
QTC CALCULATION(BAZETT): 468 MS
QTC FREDERICIA: 457 MS
R AXIS: 102 DEGREES
T AXIS: 56 DEGREES
T OFFSET: 430 MS
VENTRICULAR RATE: 70 BPM

## 2024-03-04 ENCOUNTER — INFUSION (OUTPATIENT)
Dept: HEMATOLOGY/ONCOLOGY | Facility: CLINIC | Age: 64
End: 2024-03-04
Payer: MEDICARE

## 2024-03-04 ENCOUNTER — OFFICE VISIT (OUTPATIENT)
Dept: HEMATOLOGY/ONCOLOGY | Facility: CLINIC | Age: 64
End: 2024-03-04
Payer: MEDICARE

## 2024-03-04 ENCOUNTER — HOSPITAL ENCOUNTER (INPATIENT)
Facility: HOSPITAL | Age: 64
LOS: 2 days | Discharge: HOME | DRG: 872 | End: 2024-03-07
Attending: STUDENT IN AN ORGANIZED HEALTH CARE EDUCATION/TRAINING PROGRAM | Admitting: STUDENT IN AN ORGANIZED HEALTH CARE EDUCATION/TRAINING PROGRAM
Payer: MEDICARE

## 2024-03-04 ENCOUNTER — SOCIAL WORK (OUTPATIENT)
Dept: CASE MANAGEMENT | Facility: HOSPITAL | Age: 64
End: 2024-03-04
Payer: MEDICARE

## 2024-03-04 VITALS
BODY MASS INDEX: 24.29 KG/M2 | SYSTOLIC BLOOD PRESSURE: 124 MMHG | OXYGEN SATURATION: 99 % | TEMPERATURE: 98.2 F | DIASTOLIC BLOOD PRESSURE: 78 MMHG | WEIGHT: 164.46 LBS | RESPIRATION RATE: 18 BRPM | HEART RATE: 80 BPM

## 2024-03-04 DIAGNOSIS — C78.7 METASTASES TO THE LIVER (MULTI): ICD-10-CM

## 2024-03-04 DIAGNOSIS — C78.7 MALIGNANT NEOPLASM METASTATIC TO LIVER (MULTI): Primary | ICD-10-CM

## 2024-03-04 DIAGNOSIS — C15.9 PRIMARY MALIGNANT NEOPLASM OF ESOPHAGUS (MULTI): ICD-10-CM

## 2024-03-04 DIAGNOSIS — C15.9 STAGE IV MALIGNANT NEOPLASM OF ESOPHAGUS (MULTI): ICD-10-CM

## 2024-03-04 DIAGNOSIS — A41.9 SEPSIS, DUE TO UNSPECIFIED ORGANISM, UNSPECIFIED WHETHER ACUTE ORGAN DYSFUNCTION PRESENT (MULTI): Primary | ICD-10-CM

## 2024-03-04 DIAGNOSIS — K52.9 ENTERITIS: ICD-10-CM

## 2024-03-04 DIAGNOSIS — C78.7 MALIGNANT NEOPLASM METASTATIC TO LIVER (MULTI): ICD-10-CM

## 2024-03-04 DIAGNOSIS — E86.0 DEHYDRATION: ICD-10-CM

## 2024-03-04 DIAGNOSIS — R93.89 ABNORMAL CT SCAN: ICD-10-CM

## 2024-03-04 DIAGNOSIS — Z85.01 HISTORY OF ESOPHAGEAL CANCER: ICD-10-CM

## 2024-03-04 DIAGNOSIS — A41.9 SEPSIS (MULTI): ICD-10-CM

## 2024-03-04 LAB
ALBUMIN SERPL BCP-MCNC: 3.4 G/DL (ref 3.4–5)
ALP SERPL-CCNC: 175 U/L (ref 33–136)
ALT SERPL W P-5'-P-CCNC: 24 U/L (ref 10–52)
ANION GAP SERPL CALC-SCNC: 11 MMOL/L (ref 10–20)
AST SERPL W P-5'-P-CCNC: 59 U/L (ref 9–39)
BASOPHILS # BLD AUTO: 0.01 X10*3/UL (ref 0–0.1)
BASOPHILS NFR BLD AUTO: 0.2 %
BILIRUB SERPL-MCNC: 0.8 MG/DL (ref 0–1.2)
BUN SERPL-MCNC: 12 MG/DL (ref 6–23)
CALCIUM SERPL-MCNC: 9 MG/DL (ref 8.6–10.3)
CHLORIDE SERPL-SCNC: 104 MMOL/L (ref 98–107)
CO2 SERPL-SCNC: 26 MMOL/L (ref 21–32)
CREAT SERPL-MCNC: 0.71 MG/DL (ref 0.5–1.3)
EGFRCR SERPLBLD CKD-EPI 2021: >90 ML/MIN/1.73M*2
EOSINOPHIL # BLD AUTO: 0.07 X10*3/UL (ref 0–0.7)
EOSINOPHIL NFR BLD AUTO: 1.6 %
ERYTHROCYTE [DISTWIDTH] IN BLOOD BY AUTOMATED COUNT: 15.7 % (ref 11.5–14.5)
GLUCOSE SERPL-MCNC: 128 MG/DL (ref 74–99)
HCT VFR BLD AUTO: 31.7 % (ref 41–52)
HGB BLD-MCNC: 10.6 G/DL (ref 13.5–17.5)
IMM GRANULOCYTES # BLD AUTO: 0.03 X10*3/UL (ref 0–0.7)
IMM GRANULOCYTES NFR BLD AUTO: 0.7 % (ref 0–0.9)
LYMPHOCYTES # BLD AUTO: 0.79 X10*3/UL (ref 1.2–4.8)
LYMPHOCYTES NFR BLD AUTO: 18.3 %
MCH RBC QN AUTO: 29.4 PG (ref 26–34)
MCHC RBC AUTO-ENTMCNC: 33.4 G/DL (ref 32–36)
MCV RBC AUTO: 88 FL (ref 80–100)
MONOCYTES # BLD AUTO: 0.48 X10*3/UL (ref 0.1–1)
MONOCYTES NFR BLD AUTO: 11.1 %
NEUTROPHILS # BLD AUTO: 2.93 X10*3/UL (ref 1.2–7.7)
NEUTROPHILS NFR BLD AUTO: 68.1 %
PLATELET # BLD AUTO: 70 X10*3/UL (ref 150–450)
POTASSIUM SERPL-SCNC: 3.7 MMOL/L (ref 3.5–5.3)
PROT SERPL-MCNC: 7.3 G/DL (ref 6.4–8.2)
RBC # BLD AUTO: 3.6 X10*6/UL (ref 4.5–5.9)
SODIUM SERPL-SCNC: 137 MMOL/L (ref 136–145)
TSH SERPL-ACNC: 0.45 MIU/L (ref 0.44–3.98)
WBC # BLD AUTO: 4.3 X10*3/UL (ref 4.4–11.3)

## 2024-03-04 PROCEDURE — 99285 EMERGENCY DEPT VISIT HI MDM: CPT

## 2024-03-04 PROCEDURE — 2500000004 HC RX 250 GENERAL PHARMACY W/ HCPCS (ALT 636 FOR OP/ED): Performed by: INTERNAL MEDICINE

## 2024-03-04 PROCEDURE — 84443 ASSAY THYROID STIM HORMONE: CPT | Performed by: INTERNAL MEDICINE

## 2024-03-04 PROCEDURE — 99215 OFFICE O/P EST HI 40 MIN: CPT | Mod: 25 | Performed by: INTERNAL MEDICINE

## 2024-03-04 PROCEDURE — 96413 CHEMO IV INFUSION 1 HR: CPT

## 2024-03-04 PROCEDURE — 99291 CRITICAL CARE FIRST HOUR: CPT | Performed by: STUDENT IN AN ORGANIZED HEALTH CARE EDUCATION/TRAINING PROGRAM

## 2024-03-04 PROCEDURE — 96368 THER/DIAG CONCURRENT INF: CPT

## 2024-03-04 PROCEDURE — 2500000004 HC RX 250 GENERAL PHARMACY W/ HCPCS (ALT 636 FOR OP/ED): Performed by: PHYSICIAN ASSISTANT

## 2024-03-04 PROCEDURE — 2500000001 HC RX 250 WO HCPCS SELF ADMINISTERED DRUGS (ALT 637 FOR MEDICARE OP): Performed by: INTERNAL MEDICINE

## 2024-03-04 PROCEDURE — 85025 COMPLETE CBC W/AUTO DIFF WBC: CPT | Performed by: INTERNAL MEDICINE

## 2024-03-04 PROCEDURE — 96375 TX/PRO/DX INJ NEW DRUG ADDON: CPT | Mod: INF

## 2024-03-04 PROCEDURE — 3074F SYST BP LT 130 MM HG: CPT | Performed by: INTERNAL MEDICINE

## 2024-03-04 PROCEDURE — 1160F RVW MEDS BY RX/DR IN RCRD: CPT | Performed by: INTERNAL MEDICINE

## 2024-03-04 PROCEDURE — 1126F AMNT PAIN NOTED NONE PRSNT: CPT | Performed by: INTERNAL MEDICINE

## 2024-03-04 PROCEDURE — 3078F DIAST BP <80 MM HG: CPT | Performed by: INTERNAL MEDICINE

## 2024-03-04 PROCEDURE — 96411 CHEMO IV PUSH ADDL DRUG: CPT

## 2024-03-04 PROCEDURE — 99215 OFFICE O/P EST HI 40 MIN: CPT | Performed by: INTERNAL MEDICINE

## 2024-03-04 PROCEDURE — 87637 SARSCOV2&INF A&B&RSV AMP PRB: CPT | Performed by: PHYSICIAN ASSISTANT

## 2024-03-04 PROCEDURE — 1159F MED LIST DOCD IN RCRD: CPT | Performed by: INTERNAL MEDICINE

## 2024-03-04 PROCEDURE — 2500000004 HC RX 250 GENERAL PHARMACY W/ HCPCS (ALT 636 FOR OP/ED): Mod: JZ,JG | Performed by: INTERNAL MEDICINE

## 2024-03-04 PROCEDURE — 80053 COMPREHEN METABOLIC PANEL: CPT | Performed by: INTERNAL MEDICINE

## 2024-03-04 PROCEDURE — 96417 CHEMO IV INFUS EACH ADDL SEQ: CPT

## 2024-03-04 PROCEDURE — 96415 CHEMO IV INFUSION ADDL HR: CPT

## 2024-03-04 RX ORDER — ALBUTEROL SULFATE 0.83 MG/ML
3 SOLUTION RESPIRATORY (INHALATION) AS NEEDED
Status: CANCELLED | OUTPATIENT
Start: 2024-03-11

## 2024-03-04 RX ORDER — FAMOTIDINE 10 MG/ML
20 INJECTION INTRAVENOUS ONCE AS NEEDED
Status: DISCONTINUED | OUTPATIENT
Start: 2024-03-04 | End: 2024-03-04 | Stop reason: HOSPADM

## 2024-03-04 RX ORDER — EPINEPHRINE 0.3 MG/.3ML
0.3 INJECTION SUBCUTANEOUS EVERY 5 MIN PRN
Status: DISCONTINUED | OUTPATIENT
Start: 2024-03-04 | End: 2024-03-04 | Stop reason: HOSPADM

## 2024-03-04 RX ORDER — FAMOTIDINE 10 MG/ML
20 INJECTION INTRAVENOUS ONCE AS NEEDED
Status: CANCELLED | OUTPATIENT
Start: 2024-03-04

## 2024-03-04 RX ORDER — ALBUTEROL SULFATE 0.83 MG/ML
3 SOLUTION RESPIRATORY (INHALATION) AS NEEDED
Status: DISCONTINUED | OUTPATIENT
Start: 2024-03-04 | End: 2024-03-04 | Stop reason: HOSPADM

## 2024-03-04 RX ORDER — EPINEPHRINE 0.3 MG/.3ML
0.3 INJECTION SUBCUTANEOUS EVERY 5 MIN PRN
OUTPATIENT
Start: 2024-03-04

## 2024-03-04 RX ORDER — FAMOTIDINE 10 MG/ML
20 INJECTION INTRAVENOUS ONCE AS NEEDED
OUTPATIENT
Start: 2024-03-04

## 2024-03-04 RX ORDER — DIPHENHYDRAMINE HYDROCHLORIDE 50 MG/ML
50 INJECTION INTRAMUSCULAR; INTRAVENOUS AS NEEDED
Status: CANCELLED | OUTPATIENT
Start: 2024-03-04

## 2024-03-04 RX ORDER — PROCHLORPERAZINE MALEATE 10 MG
10 TABLET ORAL EVERY 6 HOURS PRN
Status: DISCONTINUED | OUTPATIENT
Start: 2024-03-04 | End: 2024-03-04 | Stop reason: HOSPADM

## 2024-03-04 RX ORDER — EPINEPHRINE 0.3 MG/.3ML
0.3 INJECTION SUBCUTANEOUS EVERY 5 MIN PRN
Status: CANCELLED | OUTPATIENT
Start: 2024-03-04

## 2024-03-04 RX ORDER — CYANOCOBALAMIN 1000 UG/ML
1000 INJECTION, SOLUTION INTRAMUSCULAR; SUBCUTANEOUS ONCE
Status: CANCELLED | OUTPATIENT
Start: 2024-03-11

## 2024-03-04 RX ORDER — FLUOROURACIL 50 MG/ML
400 INJECTION, SOLUTION INTRAVENOUS ONCE
Status: COMPLETED | OUTPATIENT
Start: 2024-03-04 | End: 2024-03-04

## 2024-03-04 RX ORDER — PALONOSETRON 0.05 MG/ML
0.25 INJECTION, SOLUTION INTRAVENOUS ONCE
Status: COMPLETED | OUTPATIENT
Start: 2024-03-04 | End: 2024-03-04

## 2024-03-04 RX ORDER — EPINEPHRINE 0.3 MG/.3ML
0.3 INJECTION SUBCUTANEOUS EVERY 5 MIN PRN
Status: CANCELLED | OUTPATIENT
Start: 2024-03-11

## 2024-03-04 RX ORDER — HEPARIN SODIUM,PORCINE/PF 10 UNIT/ML
50 SYRINGE (ML) INTRAVENOUS AS NEEDED
Status: CANCELLED | OUTPATIENT
Start: 2024-03-04

## 2024-03-04 RX ORDER — DIPHENHYDRAMINE HYDROCHLORIDE 50 MG/ML
50 INJECTION INTRAMUSCULAR; INTRAVENOUS AS NEEDED
OUTPATIENT
Start: 2024-03-04

## 2024-03-04 RX ORDER — ALBUTEROL SULFATE 0.83 MG/ML
3 SOLUTION RESPIRATORY (INHALATION) AS NEEDED
Status: CANCELLED | OUTPATIENT
Start: 2024-03-04

## 2024-03-04 RX ORDER — ALBUTEROL SULFATE 0.83 MG/ML
3 SOLUTION RESPIRATORY (INHALATION) AS NEEDED
OUTPATIENT
Start: 2024-03-04

## 2024-03-04 RX ORDER — HEPARIN 100 UNIT/ML
500 SYRINGE INTRAVENOUS AS NEEDED
Status: DISCONTINUED | OUTPATIENT
Start: 2024-03-04 | End: 2024-03-04 | Stop reason: HOSPADM

## 2024-03-04 RX ORDER — HEPARIN 100 UNIT/ML
500 SYRINGE INTRAVENOUS AS NEEDED
Status: CANCELLED | OUTPATIENT
Start: 2024-03-04

## 2024-03-04 RX ORDER — ACETAMINOPHEN 325 MG/1
650 TABLET ORAL ONCE
Status: COMPLETED | OUTPATIENT
Start: 2024-03-04 | End: 2024-03-04

## 2024-03-04 RX ORDER — DEXAMETHASONE SODIUM PHOSPHATE 4 MG/ML
8 INJECTION, SOLUTION INTRA-ARTICULAR; INTRALESIONAL; INTRAMUSCULAR; INTRAVENOUS; SOFT TISSUE ONCE
Status: COMPLETED | OUTPATIENT
Start: 2024-03-04 | End: 2024-03-04

## 2024-03-04 RX ORDER — DIPHENHYDRAMINE HYDROCHLORIDE 50 MG/ML
25 INJECTION INTRAMUSCULAR; INTRAVENOUS ONCE
Status: COMPLETED | OUTPATIENT
Start: 2024-03-04 | End: 2024-03-04

## 2024-03-04 RX ORDER — FAMOTIDINE 10 MG/ML
20 INJECTION INTRAVENOUS ONCE AS NEEDED
Status: CANCELLED | OUTPATIENT
Start: 2024-03-11

## 2024-03-04 RX ORDER — PROCHLORPERAZINE EDISYLATE 5 MG/ML
10 INJECTION INTRAMUSCULAR; INTRAVENOUS EVERY 6 HOURS PRN
Status: DISCONTINUED | OUTPATIENT
Start: 2024-03-04 | End: 2024-03-04 | Stop reason: HOSPADM

## 2024-03-04 RX ORDER — DIPHENHYDRAMINE HYDROCHLORIDE 50 MG/ML
50 INJECTION INTRAMUSCULAR; INTRAVENOUS AS NEEDED
Status: DISCONTINUED | OUTPATIENT
Start: 2024-03-04 | End: 2024-03-04 | Stop reason: HOSPADM

## 2024-03-04 RX ORDER — DIPHENHYDRAMINE HYDROCHLORIDE 50 MG/ML
50 INJECTION INTRAMUSCULAR; INTRAVENOUS AS NEEDED
Status: CANCELLED | OUTPATIENT
Start: 2024-03-11

## 2024-03-04 RX ORDER — LORAZEPAM 2 MG/ML
1 INJECTION INTRAMUSCULAR AS NEEDED
Status: DISCONTINUED | OUTPATIENT
Start: 2024-03-04 | End: 2024-03-04 | Stop reason: HOSPADM

## 2024-03-04 RX ADMIN — PALONOSETRON 250 MCG: 0.05 INJECTION, SOLUTION INTRAVENOUS at 12:48

## 2024-03-04 RX ADMIN — TRASTUZUMAB 300.3 MG: 150 INJECTION, POWDER, LYOPHILIZED, FOR SOLUTION INTRAVENOUS at 13:22

## 2024-03-04 RX ADMIN — SODIUM CHLORIDE 400 MG: 9 INJECTION, SOLUTION INTRAVENOUS at 11:57

## 2024-03-04 RX ADMIN — HEPARIN 500 UNITS: 100 SYRINGE at 16:32

## 2024-03-04 RX ADMIN — OXALIPLATIN 125 MG: 5 INJECTION, SOLUTION INTRAVENOUS at 14:14

## 2024-03-04 RX ADMIN — DEXTROSE MONOHYDRATE 740 MG: 50 INJECTION, SOLUTION INTRAVENOUS at 14:14

## 2024-03-04 RX ADMIN — DEXAMETHASONE SODIUM PHOSPHATE 8 MG: 4 INJECTION INTRA-ARTICULAR; INTRALESIONAL; INTRAMUSCULAR; INTRAVENOUS; SOFT TISSUE at 12:51

## 2024-03-04 RX ADMIN — ACETAMINOPHEN 650 MG: 325 TABLET ORAL at 11:35

## 2024-03-04 RX ADMIN — FLUOROURACIL 750 MG: 50 INJECTION, SOLUTION INTRAVENOUS at 16:32

## 2024-03-04 RX ADMIN — SODIUM CHLORIDE 500 ML: 9 INJECTION, SOLUTION INTRAVENOUS at 11:23

## 2024-03-04 RX ADMIN — ACETAMINOPHEN 650 MG: 325 TABLET ORAL at 23:36

## 2024-03-04 RX ADMIN — DIPHENHYDRAMINE HYDROCHLORIDE 25 MG: 50 INJECTION INTRAMUSCULAR; INTRAVENOUS at 12:43

## 2024-03-04 RX ADMIN — PROCHLORPERAZINE MALEATE 10 MG: 10 TABLET ORAL at 16:21

## 2024-03-04 ASSESSMENT — PAIN DESCRIPTION - DESCRIPTORS: DESCRIPTORS: BURNING

## 2024-03-04 ASSESSMENT — PAIN DESCRIPTION - LOCATION: LOCATION: ABDOMEN

## 2024-03-04 ASSESSMENT — PAIN SCALES - GENERAL
PAINLEVEL_OUTOF10: 6
PAINLEVEL: 0-NO PAIN

## 2024-03-04 ASSESSMENT — PAIN - FUNCTIONAL ASSESSMENT: PAIN_FUNCTIONAL_ASSESSMENT: 0-10

## 2024-03-04 NOTE — PATIENT INSTRUCTIONS
Today you met with Dr. Blake.  You will have your regular treatment plus adding the Keytruda today.  We reviewed the common side effects of the Keytruda.  Our fingers are crossed that this extra addition will show some good results.  Dr. Blake will see you back in 4 weeks however your treatment will remain every 2 weeks.  Call for any concerns.  Continue to watch for bleeding.      Please call the office for any questions or concerns.  We ask that you notify us 5-7 days before your medications need refilled.  [facility] is strongly encouraging all patients to access their MyChart for all results and to communicate with their provider for non-urgent messages.  If an urgent/same day/sick concern response is needed, please call the office at [phone]. Thank You!

## 2024-03-04 NOTE — SIGNIFICANT EVENT
03/04/24 1045   Prechemo Checklist   Has the patient been in the hospital, ED, or urgent care since last date of service Yes   Chemo/Immuno Consent Signed Yes   Protocol/Indications Verified Yes   Confirmed to previous date/time of medication Yes   Compared to previous dose Yes   All medications are dated accurately Yes   Pregnancy Test Negative Not applicable   Parameters Met Yes   Provider Notified Yes   Is Patient Proceeding With Treatment? Yes   BSA/Weight-Height Verified Yes   Dose Calculations Verified Yes

## 2024-03-04 NOTE — PROGRESS NOTES
Patient ID: Massimo Garcia is a 63 y.o. male.  Referring Physician: Tomasa Blake MD  [id number] [place] [address]  Primary Care Provider: Dayne Santamaria DO  Visit Type:  Follow Up     Subjective    HPI  Episodic epistaxis: Hold anticoagulation.    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  Has done well on trastuzumab FOLFOX regimen.  Today complains of dizziness and presyncopal symptoms. Metastatic burden is liver some lung lesions as well as abdominal lymphadenopathy.        LIVER:  Liver measures 14.7 cm in length and demonstrates mildly diffuse coarsened hepatic echotexture with mild nodular contour which may indicate cirrhotic morphology. There is an indeterminate hypoechoic  lesion within the right hepatic lobe measuring up. 3.6 x 3.2 x 3.0 cm and a 2nd 2.6 x 1.8 x 2.7 cm indeterminate heterogeneous hypoechoic lesion within the left hepatic lobe. Additional scatter presumed hepatic cysts, largest measuring up to 2.4 x 2.1 x 1.8 cm      GALLBLADDER:  Contracted. No obvious cholelithiasis.      BILIARY SYSTEM:  No evidence of intra hepatic biliary dilatation is identified; the common bile duct measures 13.2 mm.  PANCREAS:  The pancreas is poorly visualized due to overlying bowel gas.    RIGHT KIDNEY:  The right kidney measures 11.5 cm in length. No hydronephrosis or renal calculi are seen.       IMPRESSION:  Cirrhotic hepatic morphology with indeterminate hypoechoic lesions of the liver as above, largest measuring up to 3.6 x 3.2 x 3.0 cm. Hepatic mass protocol MRI advised for further assessment.    Review of Systems   Constitutional: Negative.    HENT:  Negative.     Eyes: Negative.    Respiratory: Negative.     Gastrointestinal:  Positive for abdominal pain.        Objective   BSA: 1.91 meters squared  /78 (BP Location: Left arm, Patient Position: Sitting, BP Cuff Size: Adult)   Pulse 80   Temp 36.8 °C (98.2 °F) (Temporal)   Resp 18   Wt  74.6 kg (164 lb 7.4 oz)   SpO2 99%   BMI 24.29 kg/m²      has a past medical history of Essential (primary) hypertension (12/21/2013), Pacemaker, Peripheral neuropathy, Personal history of other diseases of the circulatory system, and Pneumothorax (12/04/2023).   has a past surgical history that includes Total knee arthroplasty (09/30/2016); Total knee arthroplasty (09/30/2016); Cardiac electrophysiology procedure (N/A, 11/7/2023); and Cardiac electrophysiology procedure (Left, 11/9/2023).  Family History   Problem Relation Name Age of Onset    Cancer Father       Oncology History   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)   9/13/2023 Initial Diagnosis    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)     9/13/2023 -  Chemotherapy    Trastuzumab + mFOLFOX6 (Fluorouracil Continuous Infusion / Leucovorin / Oxaliplatin), 14 Day Cycles     Metastases to the liver (CMS/HCC)   9/13/2023 Initial Diagnosis    Metastases to the liver (CMS/HCC)     9/13/2023 -  Chemotherapy    Trastuzumab + mFOLFOX6 (Fluorouracil Continuous Infusion / Leucovorin / Oxaliplatin), 14 Day Cycles         Massimo Garcia  reports that he has quit smoking. His smoking use included cigarettes and cigars. He has never been exposed to tobacco smoke. He has never used smokeless tobacco.  He  reports that he does not currently use alcohol.  He  reports current drug use. Drug: Marijuana.    Physical Exam  Constitutional:       Appearance: Normal appearance. He is ill-appearing.   HENT:      Head: Normocephalic and atraumatic.   Eyes:      Extraocular Movements: Extraocular movements intact.      Pupils: Pupils are equal, round, and reactive to light.   Neurological:      Mental Status: He is alert.         WBC   Date/Time Value Ref Range Status   02/28/2024 03:18 PM 5.5 4.4 - 11.3 x10*3/uL Final   02/26/2024 09:48 AM 7.8 4.4 - 11.3 x10*3/uL Final   02/12/2024 09:18 AM 8.9 4.4 - 11.3 x10*3/uL Final     nRBC   Date Value Ref Range Status    02/28/2024 0.0 0.0 - 0.0 /100 WBCs Final   02/09/2024 0.0 0.0 - 0.0 /100 WBCs Final   12/27/2023 0.0 0.0 - 0.0 /100 WBCs Final     RBC   Date Value Ref Range Status   02/28/2024 3.68 (L) 4.50 - 5.90 x10*6/uL Final   02/26/2024 3.85 (L) 4.50 - 5.90 x10*6/uL Final   02/12/2024 3.69 (L) 4.50 - 5.90 x10*6/uL Final     Hemoglobin   Date Value Ref Range Status   02/28/2024 10.8 (L) 13.5 - 17.5 g/dL Final   02/26/2024 11.3 (L) 13.5 - 17.5 g/dL Final   02/12/2024 10.8 (L) 13.5 - 17.5 g/dL Final     Hematocrit   Date Value Ref Range Status   02/28/2024 32.5 (L) 41.0 - 52.0 % Final   02/26/2024 33.7 (L) 41.0 - 52.0 % Final   02/12/2024 32.8 (L) 41.0 - 52.0 % Final     MCV   Date/Time Value Ref Range Status   02/28/2024 03:18 PM 88 80 - 100 fL Final   02/26/2024 09:48 AM 88 80 - 100 fL Final   02/12/2024 09:18 AM 89 80 - 100 fL Final     MCH   Date/Time Value Ref Range Status   02/28/2024 03:18 PM 29.3 26.0 - 34.0 pg Final   02/26/2024 09:48 AM 29.4 26.0 - 34.0 pg Final   02/12/2024 09:18 AM 29.3 26.0 - 34.0 pg Final     MCHC   Date/Time Value Ref Range Status   02/28/2024 03:18 PM 33.2 32.0 - 36.0 g/dL Final   02/26/2024 09:48 AM 33.5 32.0 - 36.0 g/dL Final   02/12/2024 09:18 AM 32.9 32.0 - 36.0 g/dL Final     RDW   Date/Time Value Ref Range Status   02/28/2024 03:18 PM 15.8 (H) 11.5 - 14.5 % Final   02/26/2024 09:48 AM 15.5 (H) 11.5 - 14.5 % Final   02/12/2024 09:18 AM 15.5 (H) 11.5 - 14.5 % Final     Platelets   Date/Time Value Ref Range Status   02/28/2024 03:18 PM 63 (L) 150 - 450 x10*3/uL Final     Comment:     Platelet count verified by smear review.   02/26/2024 09:48 AM 57 (L) 150 - 450 x10*3/uL Final   02/12/2024 09:18 AM 64 (L) 150 - 450 x10*3/uL Final     MPV   Date/Time Value Ref Range Status   10/30/2023 09:13 AM 10.3 7.5 - 11.5 fL Final   10/16/2023 08:34 AM 10.7 7.5 - 11.5 fL Final   10/02/2023 09:08 AM 10.0 7.5 - 11.5 fL Final     Neutrophils %   Date/Time Value Ref Range Status   02/28/2024 03:18 PM 68.8  40.0 - 80.0 % Final   02/26/2024 09:48 AM 75.5 40.0 - 80.0 % Final   02/12/2024 09:18 AM 85.3 40.0 - 80.0 % Final     Immature Granulocytes %, Automated   Date/Time Value Ref Range Status   02/28/2024 03:18 PM 0.4 0.0 - 0.9 % Final     Comment:     Immature Granulocyte Count (IG) includes promyelocytes, myelocytes and metamyelocytes but does not include bands. Percent differential counts (%) should be interpreted in the context of the absolute cell counts (cells/UL).   02/26/2024 09:48 AM 0.3 0.0 - 0.9 % Final     Comment:     Immature Granulocyte Count (IG) includes promyelocytes, myelocytes and metamyelocytes but does not include bands. Percent differential counts (%) should be interpreted in the context of the absolute cell counts (cells/UL).   02/12/2024 09:18 AM 0.2 0.0 - 0.9 % Final     Comment:     Immature Granulocyte Count (IG) includes promyelocytes, myelocytes and metamyelocytes but does not include bands. Percent differential counts (%) should be interpreted in the context of the absolute cell counts (cells/UL).     Lymphocytes %   Date/Time Value Ref Range Status   02/28/2024 03:18 PM 19.1 13.0 - 44.0 % Final   02/26/2024 09:48 AM 13.1 13.0 - 44.0 % Final   02/12/2024 09:18 AM 8.1 13.0 - 44.0 % Final     Monocytes %   Date/Time Value Ref Range Status   02/28/2024 03:18 PM 10.4 2.0 - 10.0 % Final   02/26/2024 09:48 AM 9.8 2.0 - 10.0 % Final   02/12/2024 09:18 AM 6.3 2.0 - 10.0 % Final     Eosinophils %   Date/Time Value Ref Range Status   02/28/2024 03:18 PM 0.9 0.0 - 6.0 % Final   02/26/2024 09:48 AM 0.9 0.0 - 6.0 % Final   02/12/2024 09:18 AM 0.0 0.0 - 6.0 % Final     Basophils %   Date/Time Value Ref Range Status   02/28/2024 03:18 PM 0.4 0.0 - 2.0 % Final   02/26/2024 09:48 AM 0.4 0.0 - 2.0 % Final   02/12/2024 09:18 AM 0.1 0.0 - 2.0 % Final     Neutrophils Absolute   Date/Time Value Ref Range Status   02/28/2024 03:18 PM 3.78 1.20 - 7.70 x10*3/uL Final     Comment:     Percent differential counts  "(%) should be interpreted in the context of the absolute cell counts (cells/uL).   02/26/2024 09:48 AM 5.87 1.20 - 7.70 x10*3/uL Final     Comment:     Percent differential counts (%) should be interpreted in the context of the absolute cell counts (cells/uL).   02/12/2024 09:18 AM 7.55 1.20 - 7.70 x10*3/uL Final     Comment:     Percent differential counts (%) should be interpreted in the context of the absolute cell counts (cells/uL).     Immature Granulocytes Absolute, Automated   Date/Time Value Ref Range Status   02/28/2024 03:18 PM 0.02 0.00 - 0.70 x10*3/uL Final   02/26/2024 09:48 AM 0.02 0.00 - 0.70 x10*3/uL Final   02/12/2024 09:18 AM 0.02 0.00 - 0.70 x10*3/uL Final     Lymphocytes Absolute   Date/Time Value Ref Range Status   02/28/2024 03:18 PM 1.05 (L) 1.20 - 4.80 x10*3/uL Final   02/26/2024 09:48 AM 1.02 (L) 1.20 - 4.80 x10*3/uL Final   02/12/2024 09:18 AM 0.72 (L) 1.20 - 4.80 x10*3/uL Final     Monocytes Absolute   Date/Time Value Ref Range Status   02/28/2024 03:18 PM 0.57 0.10 - 1.00 x10*3/uL Final     Comment:     Automated WBC differential has been confirmed by manual smear.   02/26/2024 09:48 AM 0.76 0.10 - 1.00 x10*3/uL Final   02/12/2024 09:18 AM 0.56 0.10 - 1.00 x10*3/uL Final     Eosinophils Absolute   Date/Time Value Ref Range Status   02/28/2024 03:18 PM 0.05 0.00 - 0.70 x10*3/uL Final   02/26/2024 09:48 AM 0.07 0.00 - 0.70 x10*3/uL Final   02/12/2024 09:18 AM 0.00 0.00 - 0.70 x10*3/uL Final     Basophils Absolute   Date/Time Value Ref Range Status   02/28/2024 03:18 PM 0.02 0.00 - 0.10 x10*3/uL Final   02/26/2024 09:48 AM 0.03 0.00 - 0.10 x10*3/uL Final   02/12/2024 09:18 AM 0.01 0.00 - 0.10 x10*3/uL Final       No components found for: \"PT\"  aPTT   Date/Time Value Ref Range Status   12/26/2023 06:13 PM 48 (H) 27 - 38 seconds Final   12/22/2023 11:31 AM 47 (H) 27 - 38 seconds Final   01/08/2023 03:00 PM 36 26 - 39 sec Final     Comment:       THE APTT IS NO LONGER USED FOR MONITORING     " UNFRACTIONATED HEPARIN THERAPY.    FOR MONITORING HEPARIN THERAPY,     USE THE HEPARIN ASSAY.         Assessment/Plan      Proceed with chemotherapy today: ECHO report reviewed. 3 cm left neck LN. Will monitor. No AMS at this time. Added pembrolizumab to the patient's treatment schedule. As per NCCN guidelines patient is eligible to receive trastuzumab pembrolizumab 5-FU leucovorin and oxaliplatin. Pembrolizumab is scheduled for every 42 days.      Diagnoses and all orders for this visit:  Malignant neoplasm metastatic to liver (CMS/HCC)  Metastases to the liver (CMS/HCC)  -     Clinic Appointment Request Chemo Follow Up; TOMASA RODRIGUEZ  -     Clinic Appointment Request Follow up; Future  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)  -     Clinic Appointment Request Chemo Follow Up; TOMASA RODRIGUEZ  -     Clinic Appointment Request Follow up; Future  Primary malignant neoplasm of esophagus (CMS/HCC)  Other orders  -     sodium chloride 0.9 % bolus 500 mL  -     dextrose 5 % in water (D5W) bolus  -     diphenhydrAMINE (BENADryl) injection 50 mg  -     methylPREDNISolone sod succinate (PF) (SOLU-Medrol) 40 mg/mL injection 40 mg  -     famotidine PF (Pepcid) injection 20 mg  -     EPINEPHrine (Epipen) injection syringe 0.3 mg  -     albuterol 2.5 mg /3 mL (0.083 %) nebulizer solution 3 mL  -     sodium chloride 0.9 % bolus 500 mL           Tomasa Rodriguez MD

## 2024-03-04 NOTE — PROGRESS NOTES
Met with pt and his daughter Abbey in infusion room to introduce self and check in. Pt reports that he lives with his wife Twila. Pt is retired and is independent in functioning. Pt denied any financial concerns, food insecurity, or SA issues. Discussed advanced directives, which pt is interested in. Provided pt with paper copy for he and family to look over.  Pt shared that he has been struggling emotionally. Pt says his sister-in-law passed away last week and services will be held this Thursday and Friday. Pt became emotional while discussing the loss; provided support. Shared info on the [facility] and encouraged him and family to reach out for support groups and other programs to help with coping during this difficult time. Provided pt with writer's contact information and encouraged them to reach out should any additional questions or concerns arise.   BERTIN Luong, LSW    3/18/2024: Met with pt and his wife in infusion room to check in. Pt says things have not improved much for him and he is struggling with hiccoughs for the last few weeks. Discussed the new treatment he is on and how it is important to maintain hope when facing a cancer diagnosis and treatment. Asked about his sister-in-laws service that he was eager to attend. Pt's wife says pt was hospitalized and he was not able to go to the service. Pt and his wife became a bit upset at this point and wished to be left alone. Ensured that they have writers contact info and encouraged them to reach out if SW can be of further assistance.   Clarita Alva MSW, LSW

## 2024-03-05 ENCOUNTER — APPOINTMENT (OUTPATIENT)
Dept: RADIOLOGY | Facility: HOSPITAL | Age: 64
DRG: 872 | End: 2024-03-05
Payer: MEDICARE

## 2024-03-05 ENCOUNTER — APPOINTMENT (OUTPATIENT)
Dept: CARDIOLOGY | Facility: HOSPITAL | Age: 64
End: 2024-03-05
Payer: MEDICARE

## 2024-03-05 ENCOUNTER — APPOINTMENT (OUTPATIENT)
Dept: CARDIOLOGY | Facility: HOSPITAL | Age: 64
DRG: 872 | End: 2024-03-05
Payer: MEDICARE

## 2024-03-05 PROBLEM — A41.9 SEPSIS (MULTI): Status: ACTIVE | Noted: 2024-03-05

## 2024-03-05 LAB
ALBUMIN SERPL BCP-MCNC: 3.3 G/DL (ref 3.4–5)
ALBUMIN SERPL BCP-MCNC: 3.3 G/DL (ref 3.4–5)
ALP SERPL-CCNC: 162 U/L (ref 33–136)
ALP SERPL-CCNC: 169 U/L (ref 33–136)
ALT SERPL W P-5'-P-CCNC: 33 U/L (ref 10–52)
ALT SERPL W P-5'-P-CCNC: 36 U/L (ref 10–52)
ANION GAP SERPL CALC-SCNC: 14 MMOL/L (ref 10–20)
ANION GAP SERPL CALC-SCNC: 18 MMOL/L (ref 10–20)
APPEARANCE UR: CLEAR
AST SERPL W P-5'-P-CCNC: 68 U/L (ref 9–39)
AST SERPL W P-5'-P-CCNC: 80 U/L (ref 9–39)
BASOPHILS # BLD AUTO: 0.01 X10*3/UL (ref 0–0.1)
BASOPHILS # BLD AUTO: 0.01 X10*3/UL (ref 0–0.1)
BASOPHILS NFR BLD AUTO: 0.1 %
BASOPHILS NFR BLD AUTO: 0.1 %
BILIRUB DIRECT SERPL-MCNC: 0.4 MG/DL (ref 0–0.3)
BILIRUB SERPL-MCNC: 1.4 MG/DL (ref 0–1.2)
BILIRUB SERPL-MCNC: 1.6 MG/DL (ref 0–1.2)
BILIRUB UR STRIP.AUTO-MCNC: NEGATIVE MG/DL
BUN SERPL-MCNC: 13 MG/DL (ref 6–23)
BUN SERPL-MCNC: 16 MG/DL (ref 6–23)
CALCIUM SERPL-MCNC: 8.7 MG/DL (ref 8.6–10.3)
CALCIUM SERPL-MCNC: 8.8 MG/DL (ref 8.6–10.3)
CHLORIDE SERPL-SCNC: 102 MMOL/L (ref 98–107)
CHLORIDE SERPL-SCNC: 103 MMOL/L (ref 98–107)
CO2 SERPL-SCNC: 20 MMOL/L (ref 21–32)
CO2 SERPL-SCNC: 21 MMOL/L (ref 21–32)
COLOR UR: YELLOW
CREAT SERPL-MCNC: 0.8 MG/DL (ref 0.5–1.3)
CREAT SERPL-MCNC: 0.86 MG/DL (ref 0.5–1.3)
CREAT SERPL-MCNC: 0.86 MG/DL (ref 0.5–1.3)
EGFRCR SERPLBLD CKD-EPI 2021: >90 ML/MIN/1.73M*2
EOSINOPHIL # BLD AUTO: 0 X10*3/UL (ref 0–0.7)
EOSINOPHIL # BLD AUTO: 0 X10*3/UL (ref 0–0.7)
EOSINOPHIL NFR BLD AUTO: 0 %
EOSINOPHIL NFR BLD AUTO: 0 %
ERYTHROCYTE [DISTWIDTH] IN BLOOD BY AUTOMATED COUNT: 15.4 % (ref 11.5–14.5)
ERYTHROCYTE [DISTWIDTH] IN BLOOD BY AUTOMATED COUNT: 15.5 % (ref 11.5–14.5)
FLUAV RNA RESP QL NAA+PROBE: NOT DETECTED
FLUBV RNA RESP QL NAA+PROBE: NOT DETECTED
GLUCOSE BLD MANUAL STRIP-MCNC: 119 MG/DL (ref 74–99)
GLUCOSE BLD MANUAL STRIP-MCNC: 145 MG/DL (ref 74–99)
GLUCOSE BLD MANUAL STRIP-MCNC: 149 MG/DL (ref 74–99)
GLUCOSE SERPL-MCNC: 139 MG/DL (ref 74–99)
GLUCOSE SERPL-MCNC: 145 MG/DL (ref 74–99)
GLUCOSE UR STRIP.AUTO-MCNC: NEGATIVE MG/DL
HAV IGM SER QL: NONREACTIVE
HBV CORE IGM SER QL: NONREACTIVE
HBV SURFACE AG SERPL QL IA: NONREACTIVE
HCT VFR BLD AUTO: 29.9 % (ref 41–52)
HCT VFR BLD AUTO: 30.3 % (ref 41–52)
HCV AB SER QL: NONREACTIVE
HGB BLD-MCNC: 10.1 G/DL (ref 13.5–17.5)
HGB BLD-MCNC: 10.1 G/DL (ref 13.5–17.5)
HOLD SPECIMEN: NORMAL
IMM GRANULOCYTES # BLD AUTO: 0.03 X10*3/UL (ref 0–0.7)
IMM GRANULOCYTES # BLD AUTO: 0.07 X10*3/UL (ref 0–0.7)
IMM GRANULOCYTES NFR BLD AUTO: 0.4 % (ref 0–0.9)
IMM GRANULOCYTES NFR BLD AUTO: 0.8 % (ref 0–0.9)
KETONES UR STRIP.AUTO-MCNC: NEGATIVE MG/DL
LACTATE SERPL-SCNC: 1.8 MMOL/L (ref 0.4–2)
LACTATE SERPL-SCNC: 2.9 MMOL/L (ref 0.4–2)
LACTATE SERPL-SCNC: 4.2 MMOL/L (ref 0.4–2)
LEUKOCYTE ESTERASE UR QL STRIP.AUTO: NEGATIVE
LIPASE SERPL-CCNC: 25 U/L (ref 9–82)
LYMPHOCYTES # BLD AUTO: 0.07 X10*3/UL (ref 1.2–4.8)
LYMPHOCYTES # BLD AUTO: 0.1 X10*3/UL (ref 1.2–4.8)
LYMPHOCYTES NFR BLD AUTO: 0.8 %
LYMPHOCYTES NFR BLD AUTO: 1.1 %
MAGNESIUM SERPL-MCNC: 1.27 MG/DL (ref 1.6–2.4)
MCH RBC QN AUTO: 29.9 PG (ref 26–34)
MCH RBC QN AUTO: 29.9 PG (ref 26–34)
MCHC RBC AUTO-ENTMCNC: 33.3 G/DL (ref 32–36)
MCHC RBC AUTO-ENTMCNC: 33.8 G/DL (ref 32–36)
MCV RBC AUTO: 89 FL (ref 80–100)
MCV RBC AUTO: 90 FL (ref 80–100)
MONOCYTES # BLD AUTO: 0.54 X10*3/UL (ref 0.1–1)
MONOCYTES # BLD AUTO: 0.75 X10*3/UL (ref 0.1–1)
MONOCYTES NFR BLD AUTO: 6.1 %
MONOCYTES NFR BLD AUTO: 8.9 %
NEUTROPHILS # BLD AUTO: 7.53 X10*3/UL (ref 1.2–7.7)
NEUTROPHILS # BLD AUTO: 8.15 X10*3/UL (ref 1.2–7.7)
NEUTROPHILS NFR BLD AUTO: 89.8 %
NEUTROPHILS NFR BLD AUTO: 91.9 %
NITRITE UR QL STRIP.AUTO: NEGATIVE
NRBC BLD-RTO: 0 /100 WBCS (ref 0–0)
NRBC BLD-RTO: 0 /100 WBCS (ref 0–0)
PH UR STRIP.AUTO: 5 [PH]
PLATELET # BLD AUTO: 52 X10*3/UL (ref 150–450)
PLATELET # BLD AUTO: 59 X10*3/UL (ref 150–450)
POTASSIUM SERPL-SCNC: 3.5 MMOL/L (ref 3.5–5.3)
POTASSIUM SERPL-SCNC: 3.6 MMOL/L (ref 3.5–5.3)
PROT SERPL-MCNC: 6.8 G/DL (ref 6.4–8.2)
PROT SERPL-MCNC: 6.9 G/DL (ref 6.4–8.2)
PROT UR STRIP.AUTO-MCNC: NEGATIVE MG/DL
RBC # BLD AUTO: 3.38 X10*6/UL (ref 4.5–5.9)
RBC # BLD AUTO: 3.38 X10*6/UL (ref 4.5–5.9)
RBC # UR STRIP.AUTO: NEGATIVE /UL
RSV RNA RESP QL NAA+PROBE: NOT DETECTED
SARS-COV-2 RNA RESP QL NAA+PROBE: NOT DETECTED
SODIUM SERPL-SCNC: 133 MMOL/L (ref 136–145)
SODIUM SERPL-SCNC: 137 MMOL/L (ref 136–145)
SP GR UR STRIP.AUTO: 1.04
UROBILINOGEN UR STRIP.AUTO-MCNC: <2 MG/DL
WBC # BLD AUTO: 8.4 X10*3/UL (ref 4.4–11.3)
WBC # BLD AUTO: 8.9 X10*3/UL (ref 4.4–11.3)

## 2024-03-05 PROCEDURE — 80074 ACUTE HEPATITIS PANEL: CPT | Mod: GEALAB

## 2024-03-05 PROCEDURE — 83605 ASSAY OF LACTIC ACID: CPT

## 2024-03-05 PROCEDURE — 1200000002 HC GENERAL ROOM WITH TELEMETRY DAILY

## 2024-03-05 PROCEDURE — 2500000002 HC RX 250 W HCPCS SELF ADMINISTERED DRUGS (ALT 637 FOR MEDICARE OP, ALT 636 FOR OP/ED)

## 2024-03-05 PROCEDURE — 2550000001 HC RX 255 CONTRASTS: Performed by: STUDENT IN AN ORGANIZED HEALTH CARE EDUCATION/TRAINING PROGRAM

## 2024-03-05 PROCEDURE — 82565 ASSAY OF CREATININE: CPT | Performed by: PHYSICIAN ASSISTANT

## 2024-03-05 PROCEDURE — 80053 COMPREHEN METABOLIC PANEL: CPT | Performed by: PHYSICIAN ASSISTANT

## 2024-03-05 PROCEDURE — 87506 IADNA-DNA/RNA PROBE TQ 6-11: CPT | Mod: GEALAB

## 2024-03-05 PROCEDURE — 85025 COMPLETE CBC W/AUTO DIFF WBC: CPT | Mod: 91

## 2024-03-05 PROCEDURE — 96376 TX/PRO/DX INJ SAME DRUG ADON: CPT

## 2024-03-05 PROCEDURE — 71045 X-RAY EXAM CHEST 1 VIEW: CPT

## 2024-03-05 PROCEDURE — 82565 ASSAY OF CREATININE: CPT | Mod: 91

## 2024-03-05 PROCEDURE — 74177 CT ABD & PELVIS W/CONTRAST: CPT

## 2024-03-05 PROCEDURE — 93010 ELECTROCARDIOGRAM REPORT: CPT | Performed by: INTERNAL MEDICINE

## 2024-03-05 PROCEDURE — 83605 ASSAY OF LACTIC ACID: CPT | Performed by: PHYSICIAN ASSISTANT

## 2024-03-05 PROCEDURE — 71045 X-RAY EXAM CHEST 1 VIEW: CPT | Mod: FOREIGN READ | Performed by: RADIOLOGY

## 2024-03-05 PROCEDURE — 99223 1ST HOSP IP/OBS HIGH 75: CPT | Performed by: STUDENT IN AN ORGANIZED HEALTH CARE EDUCATION/TRAINING PROGRAM

## 2024-03-05 PROCEDURE — 83605 ASSAY OF LACTIC ACID: CPT | Mod: 91 | Performed by: PHYSICIAN ASSISTANT

## 2024-03-05 PROCEDURE — 87040 BLOOD CULTURE FOR BACTERIA: CPT | Mod: GEALAB | Performed by: PHYSICIAN ASSISTANT

## 2024-03-05 PROCEDURE — 96367 TX/PROPH/DG ADDL SEQ IV INF: CPT

## 2024-03-05 PROCEDURE — 83690 ASSAY OF LIPASE: CPT | Performed by: PHYSICIAN ASSISTANT

## 2024-03-05 PROCEDURE — 83735 ASSAY OF MAGNESIUM: CPT

## 2024-03-05 PROCEDURE — 2500000004 HC RX 250 GENERAL PHARMACY W/ HCPCS (ALT 636 FOR OP/ED)

## 2024-03-05 PROCEDURE — 87493 C DIFF AMPLIFIED PROBE: CPT | Mod: GEALAB,59

## 2024-03-05 PROCEDURE — 74177 CT ABD & PELVIS W/CONTRAST: CPT | Mod: FOREIGN READ | Performed by: RADIOLOGY

## 2024-03-05 PROCEDURE — 85025 COMPLETE CBC W/AUTO DIFF WBC: CPT | Performed by: PHYSICIAN ASSISTANT

## 2024-03-05 PROCEDURE — 96372 THER/PROPH/DIAG INJ SC/IM: CPT

## 2024-03-05 PROCEDURE — 96375 TX/PRO/DX INJ NEW DRUG ADDON: CPT

## 2024-03-05 PROCEDURE — 87075 CULTR BACTERIA EXCEPT BLOOD: CPT | Mod: GEALAB,59,91 | Performed by: PHYSICIAN ASSISTANT

## 2024-03-05 PROCEDURE — 82947 ASSAY GLUCOSE BLOOD QUANT: CPT

## 2024-03-05 PROCEDURE — 96365 THER/PROPH/DIAG IV INF INIT: CPT

## 2024-03-05 PROCEDURE — 96366 THER/PROPH/DIAG IV INF ADDON: CPT

## 2024-03-05 PROCEDURE — 81003 URINALYSIS AUTO W/O SCOPE: CPT | Performed by: PHYSICIAN ASSISTANT

## 2024-03-05 PROCEDURE — 2500000001 HC RX 250 WO HCPCS SELF ADMINISTERED DRUGS (ALT 637 FOR MEDICARE OP)

## 2024-03-05 PROCEDURE — A4217 STERILE WATER/SALINE, 500 ML: HCPCS | Performed by: PHYSICIAN ASSISTANT

## 2024-03-05 PROCEDURE — 2500000004 HC RX 250 GENERAL PHARMACY W/ HCPCS (ALT 636 FOR OP/ED): Performed by: STUDENT IN AN ORGANIZED HEALTH CARE EDUCATION/TRAINING PROGRAM

## 2024-03-05 PROCEDURE — 99223 1ST HOSP IP/OBS HIGH 75: CPT | Performed by: INTERNAL MEDICINE

## 2024-03-05 PROCEDURE — 2500000004 HC RX 250 GENERAL PHARMACY W/ HCPCS (ALT 636 FOR OP/ED): Performed by: PHYSICIAN ASSISTANT

## 2024-03-05 PROCEDURE — 36415 COLL VENOUS BLD VENIPUNCTURE: CPT | Performed by: PHYSICIAN ASSISTANT

## 2024-03-05 PROCEDURE — 82248 BILIRUBIN DIRECT: CPT | Mod: GEALAB

## 2024-03-05 PROCEDURE — 82947 ASSAY GLUCOSE BLOOD QUANT: CPT | Mod: 91

## 2024-03-05 PROCEDURE — 93005 ELECTROCARDIOGRAM TRACING: CPT

## 2024-03-05 RX ORDER — METOCLOPRAMIDE 10 MG/1
10 TABLET ORAL 4 TIMES DAILY PRN
Status: DISCONTINUED | OUTPATIENT
Start: 2024-03-05 | End: 2024-03-07 | Stop reason: HOSPADM

## 2024-03-05 RX ORDER — ACETAMINOPHEN 500 MG
5 TABLET ORAL NIGHTLY
Status: DISCONTINUED | OUTPATIENT
Start: 2024-03-05 | End: 2024-03-07 | Stop reason: HOSPADM

## 2024-03-05 RX ORDER — AMOXICILLIN 250 MG
2 CAPSULE ORAL 2 TIMES DAILY
Status: DISCONTINUED | OUTPATIENT
Start: 2024-03-05 | End: 2024-03-06

## 2024-03-05 RX ORDER — OXYCODONE HYDROCHLORIDE 10 MG/1
10 TABLET ORAL EVERY 6 HOURS PRN
Status: DISCONTINUED | OUTPATIENT
Start: 2024-03-05 | End: 2024-03-07 | Stop reason: HOSPADM

## 2024-03-05 RX ORDER — HEPARIN 100 UNIT/ML
500 SYRINGE INTRAVENOUS ONCE AS NEEDED
Status: COMPLETED | OUTPATIENT
Start: 2024-03-05 | End: 2024-03-07

## 2024-03-05 RX ORDER — MAGNESIUM HYDROXIDE 2400 MG/10ML
10 SUSPENSION ORAL 2 TIMES DAILY PRN
Status: DISCONTINUED | OUTPATIENT
Start: 2024-03-05 | End: 2024-03-07 | Stop reason: HOSPADM

## 2024-03-05 RX ORDER — MAGNESIUM SULFATE HEPTAHYDRATE 40 MG/ML
2 INJECTION, SOLUTION INTRAVENOUS ONCE
Status: COMPLETED | OUTPATIENT
Start: 2024-03-05 | End: 2024-03-05

## 2024-03-05 RX ORDER — VANCOMYCIN HYDROCHLORIDE 1 G/200ML
1000 INJECTION, SOLUTION INTRAVENOUS EVERY 12 HOURS
Status: DISCONTINUED | OUTPATIENT
Start: 2024-03-05 | End: 2024-03-07 | Stop reason: HOSPADM

## 2024-03-05 RX ORDER — OXYCODONE HYDROCHLORIDE 5 MG/1
5 TABLET ORAL EVERY 6 HOURS PRN
Status: DISCONTINUED | OUTPATIENT
Start: 2024-03-05 | End: 2024-03-07 | Stop reason: HOSPADM

## 2024-03-05 RX ORDER — ENOXAPARIN SODIUM 100 MG/ML
40 INJECTION SUBCUTANEOUS EVERY 24 HOURS
Status: DISCONTINUED | OUTPATIENT
Start: 2024-03-05 | End: 2024-03-07 | Stop reason: HOSPADM

## 2024-03-05 RX ORDER — ONDANSETRON HYDROCHLORIDE 2 MG/ML
4 INJECTION, SOLUTION INTRAVENOUS ONCE
Status: COMPLETED | OUTPATIENT
Start: 2024-03-05 | End: 2024-03-05

## 2024-03-05 RX ORDER — POTASSIUM CHLORIDE 20 MEQ/1
40 TABLET, EXTENDED RELEASE ORAL ONCE
Status: COMPLETED | OUTPATIENT
Start: 2024-03-05 | End: 2024-03-05

## 2024-03-05 RX ORDER — LORAZEPAM 1 MG/1
1 TABLET ORAL EVERY 8 HOURS PRN
Status: DISCONTINUED | OUTPATIENT
Start: 2024-03-05 | End: 2024-03-07 | Stop reason: HOSPADM

## 2024-03-05 RX ORDER — CHLORPROMAZINE HYDROCHLORIDE 25 MG/1
50 TABLET, FILM COATED ORAL 3 TIMES DAILY
Status: DISCONTINUED | OUTPATIENT
Start: 2024-03-05 | End: 2024-03-07 | Stop reason: HOSPADM

## 2024-03-05 RX ORDER — METOPROLOL SUCCINATE 25 MG/1
25 TABLET, EXTENDED RELEASE ORAL DAILY
Status: DISCONTINUED | OUTPATIENT
Start: 2024-03-05 | End: 2024-03-07 | Stop reason: HOSPADM

## 2024-03-05 RX ORDER — POLYETHYLENE GLYCOL 3350 17 G/17G
17 POWDER, FOR SOLUTION ORAL DAILY
Status: DISCONTINUED | OUTPATIENT
Start: 2024-03-05 | End: 2024-03-06

## 2024-03-05 RX ORDER — SCOLOPAMINE TRANSDERMAL SYSTEM 1 MG/1
1 PATCH, EXTENDED RELEASE TRANSDERMAL
Status: DISCONTINUED | OUTPATIENT
Start: 2024-03-05 | End: 2024-03-07 | Stop reason: HOSPADM

## 2024-03-05 RX ORDER — PAROXETINE HYDROCHLORIDE 20 MG/1
10 TABLET, FILM COATED ORAL EVERY MORNING
Status: DISCONTINUED | OUTPATIENT
Start: 2024-03-05 | End: 2024-03-07 | Stop reason: HOSPADM

## 2024-03-05 RX ORDER — VANCOMYCIN HYDROCHLORIDE 1 G/20ML
INJECTION, POWDER, LYOPHILIZED, FOR SOLUTION INTRAVENOUS DAILY PRN
Status: DISCONTINUED | OUTPATIENT
Start: 2024-03-05 | End: 2024-03-07 | Stop reason: HOSPADM

## 2024-03-05 RX ORDER — ACETAMINOPHEN 325 MG/1
650 TABLET ORAL EVERY 8 HOURS PRN
Status: DISCONTINUED | OUTPATIENT
Start: 2024-03-05 | End: 2024-03-07 | Stop reason: HOSPADM

## 2024-03-05 RX ORDER — HEPARIN SODIUM,PORCINE/PF 10 UNIT/ML
50 SYRINGE (ML) INTRAVENOUS EVERY 12 HOURS
Status: DISCONTINUED | OUTPATIENT
Start: 2024-03-05 | End: 2024-03-07 | Stop reason: HOSPADM

## 2024-03-05 RX ORDER — ACETAMINOPHEN 325 MG/1
650 TABLET ORAL EVERY 6 HOURS PRN
Status: DISCONTINUED | OUTPATIENT
Start: 2024-03-05 | End: 2024-03-05

## 2024-03-05 RX ORDER — MORPHINE SULFATE 4 MG/ML
4 INJECTION INTRAVENOUS ONCE
Status: COMPLETED | OUTPATIENT
Start: 2024-03-05 | End: 2024-03-05

## 2024-03-05 RX ORDER — FENTANYL 25 UG/1
1 PATCH TRANSDERMAL
Status: DISCONTINUED | OUTPATIENT
Start: 2024-03-05 | End: 2024-03-07 | Stop reason: HOSPADM

## 2024-03-05 RX ORDER — PANTOPRAZOLE SODIUM 40 MG/1
40 TABLET, DELAYED RELEASE ORAL 2 TIMES DAILY
Status: DISCONTINUED | OUTPATIENT
Start: 2024-03-05 | End: 2024-03-07 | Stop reason: HOSPADM

## 2024-03-05 RX ORDER — OLANZAPINE 5 MG/1
5 TABLET ORAL DAILY PRN
Status: DISCONTINUED | OUTPATIENT
Start: 2024-03-05 | End: 2024-03-05

## 2024-03-05 RX ORDER — MECLIZINE HYDROCHLORIDE 25 MG/1
25 TABLET ORAL 3 TIMES DAILY PRN
Status: DISCONTINUED | OUTPATIENT
Start: 2024-03-05 | End: 2024-03-07 | Stop reason: HOSPADM

## 2024-03-05 RX ADMIN — MAGNESIUM SULFATE HEPTAHYDRATE 2 G: 2 INJECTION, SOLUTION INTRAVENOUS at 08:10

## 2024-03-05 RX ADMIN — ENOXAPARIN SODIUM 40 MG: 40 INJECTION SUBCUTANEOUS at 08:10

## 2024-03-05 RX ADMIN — SODIUM CHLORIDE 2244 ML: 9 INJECTION, SOLUTION INTRAVENOUS at 01:13

## 2024-03-05 RX ADMIN — Medication 5 MG: at 20:25

## 2024-03-05 RX ADMIN — PANTOPRAZOLE SODIUM 40 MG: 40 TABLET, DELAYED RELEASE ORAL at 20:25

## 2024-03-05 RX ADMIN — DOCUSATE SODIUM 50MG AND SENNOSIDES 8.6MG 2 TABLET: 8.6; 5 TABLET, FILM COATED ORAL at 11:08

## 2024-03-05 RX ADMIN — VANCOMYCIN HYDROCHLORIDE 2000 MG: 10 INJECTION, POWDER, LYOPHILIZED, FOR SOLUTION INTRAVENOUS at 01:14

## 2024-03-05 RX ADMIN — POLYETHYLENE GLYCOL 3350 17 G: 17 POWDER, FOR SOLUTION ORAL at 10:04

## 2024-03-05 RX ADMIN — PAROXETINE 10 MG: 10 TABLET, FILM COATED ORAL at 11:09

## 2024-03-05 RX ADMIN — PIPERACILLIN SODIUM AND TAZOBACTAM SODIUM 4.5 G: 4; .5 INJECTION, SOLUTION INTRAVENOUS at 11:30

## 2024-03-05 RX ADMIN — MORPHINE SULFATE 4 MG: 4 INJECTION INTRAVENOUS at 01:14

## 2024-03-05 RX ADMIN — LORAZEPAM 1 MG: 1 TABLET ORAL at 20:24

## 2024-03-05 RX ADMIN — OXYCODONE HYDROCHLORIDE 5 MG: 5 TABLET ORAL at 22:43

## 2024-03-05 RX ADMIN — MAGNESIUM SULFATE HEPTAHYDRATE 2 G: 2 INJECTION, SOLUTION INTRAVENOUS at 11:09

## 2024-03-05 RX ADMIN — ONDANSETRON 4 MG: 2 INJECTION INTRAMUSCULAR; INTRAVENOUS at 01:15

## 2024-03-05 RX ADMIN — IOHEXOL 75 ML: 350 INJECTION, SOLUTION INTRAVENOUS at 00:54

## 2024-03-05 RX ADMIN — POTASSIUM CHLORIDE 40 MEQ: 1500 TABLET, EXTENDED RELEASE ORAL at 10:05

## 2024-03-05 RX ADMIN — PANTOPRAZOLE SODIUM 40 MG: 40 TABLET, DELAYED RELEASE ORAL at 10:04

## 2024-03-05 RX ADMIN — PIPERACILLIN SODIUM AND TAZOBACTAM SODIUM 4.5 G: 4; .5 INJECTION, SOLUTION INTRAVENOUS at 20:24

## 2024-03-05 RX ADMIN — PIPERACILLIN SODIUM AND TAZOBACTAM SODIUM 4.5 G: 4; .5 INJECTION, SOLUTION INTRAVENOUS at 00:19

## 2024-03-05 RX ADMIN — OXYCODONE HYDROCHLORIDE 5 MG: 5 TABLET ORAL at 04:32

## 2024-03-05 RX ADMIN — OXYCODONE HYDROCHLORIDE 10 MG: 10 TABLET ORAL at 10:13

## 2024-03-05 RX ADMIN — PIPERACILLIN SODIUM AND TAZOBACTAM SODIUM 4.5 G: 4; .5 INJECTION, SOLUTION INTRAVENOUS at 07:03

## 2024-03-05 RX ADMIN — METOPROLOL SUCCINATE 25 MG: 50 TABLET, EXTENDED RELEASE ORAL at 10:05

## 2024-03-05 RX ADMIN — VANCOMYCIN HYDROCHLORIDE 1000 MG: 1 INJECTION, SOLUTION INTRAVENOUS at 14:16

## 2024-03-05 RX ADMIN — SODIUM CHLORIDE 1000 ML: 9 INJECTION, SOLUTION INTRAVENOUS at 00:05

## 2024-03-05 SDOH — SOCIAL STABILITY: SOCIAL INSECURITY: ABUSE: ADULT

## 2024-03-05 SDOH — SOCIAL STABILITY: SOCIAL INSECURITY: DOES ANYONE TRY TO KEEP YOU FROM HAVING/CONTACTING OTHER FRIENDS OR DOING THINGS OUTSIDE YOUR HOME?: NO

## 2024-03-05 SDOH — SOCIAL STABILITY: SOCIAL INSECURITY: DO YOU FEEL UNSAFE GOING BACK TO THE PLACE WHERE YOU ARE LIVING?: NO

## 2024-03-05 SDOH — SOCIAL STABILITY: SOCIAL INSECURITY: DO YOU FEEL ANYONE HAS EXPLOITED OR TAKEN ADVANTAGE OF YOU FINANCIALLY OR OF YOUR PERSONAL PROPERTY?: NO

## 2024-03-05 SDOH — SOCIAL STABILITY: SOCIAL INSECURITY: ARE THERE ANY APPARENT SIGNS OF INJURIES/BEHAVIORS THAT COULD BE RELATED TO ABUSE/NEGLECT?: NO

## 2024-03-05 SDOH — SOCIAL STABILITY: SOCIAL INSECURITY: HAVE YOU HAD THOUGHTS OF HARMING ANYONE ELSE?: NO

## 2024-03-05 SDOH — SOCIAL STABILITY: SOCIAL INSECURITY: HAS ANYONE EVER THREATENED TO HURT YOUR FAMILY OR YOUR PETS?: NO

## 2024-03-05 SDOH — SOCIAL STABILITY: SOCIAL INSECURITY: WERE YOU ABLE TO COMPLETE ALL THE BEHAVIORAL HEALTH SCREENINGS?: YES

## 2024-03-05 SDOH — SOCIAL STABILITY: SOCIAL INSECURITY: ARE YOU OR HAVE YOU BEEN THREATENED OR ABUSED PHYSICALLY, EMOTIONALLY, OR SEXUALLY BY ANYONE?: NO

## 2024-03-05 ASSESSMENT — COGNITIVE AND FUNCTIONAL STATUS - GENERAL
DAILY ACTIVITIY SCORE: 24
MOBILITY SCORE: 24
DAILY ACTIVITIY SCORE: 24
PATIENT BASELINE BEDBOUND: NO
MOBILITY SCORE: 24
DAILY ACTIVITIY SCORE: 24
MOBILITY SCORE: 24

## 2024-03-05 ASSESSMENT — LIFESTYLE VARIABLES
HOW OFTEN DO YOU HAVE A DRINK CONTAINING ALCOHOL: NEVER
SKIP TO QUESTIONS 9-10: 1
HOW MANY STANDARD DRINKS CONTAINING ALCOHOL DO YOU HAVE ON A TYPICAL DAY: PATIENT DOES NOT DRINK
AUDIT-C TOTAL SCORE: 0
EVER HAD A DRINK FIRST THING IN THE MORNING TO STEADY YOUR NERVES TO GET RID OF A HANGOVER: NO
EVER FELT BAD OR GUILTY ABOUT YOUR DRINKING: NO
AUDIT-C TOTAL SCORE: 0
HAVE YOU EVER FELT YOU SHOULD CUT DOWN ON YOUR DRINKING: NO
HOW OFTEN DO YOU HAVE 6 OR MORE DRINKS ON ONE OCCASION: NEVER
HAVE PEOPLE ANNOYED YOU BY CRITICIZING YOUR DRINKING: NO

## 2024-03-05 ASSESSMENT — PAIN SCALES - GENERAL
PAINLEVEL_OUTOF10: 4
PAINLEVEL_OUTOF10: 6

## 2024-03-05 ASSESSMENT — ACTIVITIES OF DAILY LIVING (ADL)
FEEDING YOURSELF: INDEPENDENT
GROOMING: INDEPENDENT
PATIENT'S MEMORY ADEQUATE TO SAFELY COMPLETE DAILY ACTIVITIES?: YES
JUDGMENT_ADEQUATE_SAFELY_COMPLETE_DAILY_ACTIVITIES: YES
ADEQUATE_TO_COMPLETE_ADL: YES
TOILETING: INDEPENDENT
LACK_OF_TRANSPORTATION: NO
HEARING - RIGHT EAR: FUNCTIONAL
WALKS IN HOME: INDEPENDENT
DRESSING YOURSELF: INDEPENDENT
BATHING: INDEPENDENT
HEARING - LEFT EAR: FUNCTIONAL
LACK_OF_TRANSPORTATION: NO

## 2024-03-05 ASSESSMENT — ENCOUNTER SYMPTOMS
CONSTITUTIONAL NEGATIVE: 1
DIFFICULTY URINATING: 0
RESPIRATORY NEGATIVE: 1
DIAPHORESIS: 1
CHILLS: 1
CONSTIPATION: 0
FREQUENCY: 0
ABDOMINAL PAIN: 1
SHORTNESS OF BREATH: 0
COUGH: 0
HEMATURIA: 0
EYES NEGATIVE: 1
CHEST TIGHTNESS: 0
FEVER: 1
DIARRHEA: 0
DYSURIA: 0
ABDOMINAL PAIN: 1

## 2024-03-05 ASSESSMENT — PATIENT HEALTH QUESTIONNAIRE - PHQ9
1. LITTLE INTEREST OR PLEASURE IN DOING THINGS: NOT AT ALL
SUM OF ALL RESPONSES TO PHQ9 QUESTIONS 1 & 2: 0
2. FEELING DOWN, DEPRESSED OR HOPELESS: NOT AT ALL

## 2024-03-05 ASSESSMENT — PAIN - FUNCTIONAL ASSESSMENT
PAIN_FUNCTIONAL_ASSESSMENT: 0-10
PAIN_FUNCTIONAL_ASSESSMENT: 0-10

## 2024-03-05 NOTE — H&P
Patient: Massimo Garcia  Room/bed: AC02/AC02  Admitted on: 3/4/2024    Age: 63 y.o. Gender: Male  Code Status:  Full Code   Admitting Dx: No admission diagnoses are documented for this encounter.    MRN: [id number]  PCP: Dayne Santamaria DO  Preferred Pharm: [unfilled]    Attending: Serge Carty MD Resident: Delvin Woo DO  TCC: No care team member to display      Chief Complaint   Patient: Massimo Garcia is a 63 y.o. male who presented to the hospital for fever and chills after chemotherapy infusion.    HPI   HPI: Patient is a 64yo male with pmhx of third degree heart block s/p PPM (placed in November), esophageal cancer with mets to liver and lungs, PE, peripheral neuropathy, and portal vein thrombosis (Eliquis recently held) presenting with fever and chills after chemotherapy infusion. Per patient, he received infusion of pembrolizumab, fluorouracil, and oxaliplatin yesterday morning at approximately 9 am and finished by 5 pm. Of note, patient reports that this is the first time he's been on pembrolizumab.    After his infusion, he returned home, wasn't feeling well and went to sleep. His wife woke him as she found that he was having chills, sweats, and a fever > 100.4 F. Additionally patient reports abdominal pain in the lower center area of his abdomen that was 5/10 then and 6/10 now. He denies any diarrhea or vomiting.    Per patient, oncology wanted to hold most of his anti-nausea medications due to a long QTc they spotted on his EKG. Additionally, he has been holding his Eliquis for at least a week after he was found to be thrombocytopenic.    ROS  Review of Systems   Constitutional:  Positive for chills, diaphoresis and fever.   Respiratory:  Negative for cough, chest tightness and shortness of breath.    Cardiovascular:  Negative for chest pain and leg swelling.   Gastrointestinal:  Positive for abdominal pain. Negative for constipation and diarrhea.   Genitourinary:  Negative for difficulty urinating,  "dysuria, frequency and hematuria.      ED Course: Patient with T 101.7 F, , RR 22 on arrival. Sepsis alert called. Patient given IV Vanc and Zosyn as well as a 1 L bolus of IV fluids. Further fluids not given due to patient remaining normotensive.  CT abdomen/pelvis w/ shows concern for possible low-grade infectious or inflammatory colitis (cocentric wall thickening of decompressed sigmoid colon) as well as findings consistent with patient's known malignancy and metastatic disease and progression of extensive periaortic and upper abdominal lymphadenopathy. Patient admitted to inpatient for further treatment.    ED Course   Vitals - /64   Pulse 88   Temp 37.8 °C (100 °F) (Temporal)   Resp 16   Wt 74.8 kg (165 lb)   SpO2 96%   Labs -   Lab Results   Component Value Date    WBC 8.4 03/05/2024    HGB 10.1 (L) 03/05/2024    HCT 29.9 (L) 03/05/2024    MCV 89 03/05/2024    PLT 59 (L) 03/05/2024     Lab Results   Component Value Date    GLUCOSE 145 (H) 03/05/2024    CALCIUM 8.8 03/05/2024     (L) 03/05/2024    K 3.5 03/05/2024    CO2 21 03/05/2024     03/05/2024    BUN 16 03/05/2024    CREATININE 0.86 03/05/2024    CREATININE 0.86 03/05/2024     Lab Results   Component Value Date    TROPHS 5 02/28/2024     Lab Results   Component Value Date    BNP 27 12/27/2023   No results found for: \"DDIMERVTE\"  Imaging -   CT abdomen pelvis w IV contrast   Final Result   Mild concentric wall thickening of the decompressed sigmoid colon   without significant inflammation.  Low-grade infectious or   inflammatory colitis is a possibility but not definitive.   Findings consistent with the patient's known malignancy and metastatic   disease with numerous pulmonary nodules, numerous hepatic metastases,   and extensive periaortic and upper abdominal lymphadenopathy which has   progressed when compared to the prior study dated January 9, 2024.   Mildly cirrhotic morphology of the liver with splenomegaly.   Signed by " Nik Alvarado      XR chest 1 view   Final Result   No definite acute cardiopulmonary disease.   Signed by Nik Alvarado         Interventions -   Medications   piperacillin-tazobactam-dextrose (Zosyn) IV 4.5 g (0 g intravenous Stopped 3/5/24 0049)   HYDROmorphone (Dilaudid) injection 0.4 mg (has no administration in time range)   oxyCODONE (Roxicodone) immediate release tablet 10 mg (has no administration in time range)   oxyCODONE (Roxicodone) immediate release tablet 5 mg (5 mg oral Given 3/5/24 0432)   scopolamine (Transderm-Scop) patch 1 patch (has no administration in time range)   OLANZapine (ZyPREXA) tablet 5 mg (has no administration in time range)   trimethobenzamide (Tigan) injection 200 mg (has no administration in time range)   polyethylene glycol (Glycolax, Miralax) packet 17 g (has no administration in time range)   sennosides-docusate sodium (Mireille-Colace) 8.6-50 mg per tablet 2 tablet (has no administration in time range)   magnesium hydroxide (Milk of Magnesia) 2,400 mg/10 mL suspension 10 mL (has no administration in time range)   sodium chloride 0.9 % bolus 1,000 mL (0 mL intravenous Stopped 3/5/24 0035)   acetaminophen (Tylenol) tablet 650 mg (650 mg oral Given 3/4/24 2336)   vancomycin (Vancocin) 2000 mg/500 ml in D5W 500 mL IV piggyback 2,000 mg (2,000 mg intravenous Given 3/5/24 0114)   morphine injection 4 mg (4 mg intravenous Given 3/5/24 0114)   ondansetron (Zofran) injection 4 mg (4 mg intravenous Given 3/5/24 0115)   sodium chloride 0.9 % bolus 2,244 mL (0 mL intravenous Stopped 3/5/24 0143)   iohexol (OMNIPaque) 350 mg iodine/mL solution 75 mL (75 mL intravenous Given 3/5/24 0054)       Past Medical History     Past Medical History:   Diagnosis Date    Essential (primary) hypertension 12/21/2013    Hypertension    Pacemaker     Peripheral neuropathy     Personal history of other diseases of the circulatory system     History of right bundle branch block (RBBB)    Pneumothorax 12/04/2023         Surgical History     Past Surgical History:   Procedure Laterality Date    CARDIAC ELECTROPHYSIOLOGY PROCEDURE N/A 11/7/2023    Procedure: Temporary Pacemaker Insertion;  Surgeon: Alexis Shook MD;  Location: [facility] Cardiac Cath Lab;  Service: Cardiovascular;  Laterality: N/A;    CARDIAC ELECTROPHYSIOLOGY PROCEDURE Left 11/9/2023    Procedure: PPM IMPLANT DUAL;  Surgeon: Elias Connolly MD;  Location: [facility] Cardiac Cath Lab;  Service: Electrophysiology;  Laterality: Left;    TOTAL KNEE ARTHROPLASTY  09/30/2016    Total Knee Replacement Left    TOTAL KNEE ARTHROPLASTY  09/30/2016    Total Knee Replacement Right       Family History     Family History   Problem Relation Name Age of Onset    Cancer Father         Social History     Social History     Socioeconomic History    Marital status:      Spouse name: Not on file    Number of children: Not on file    Years of education: Not on file    Highest education level: Not on file   Occupational History    Not on file   Tobacco Use    Smoking status: Former     Types: Cigarettes, Cigars     Passive exposure: Never    Smokeless tobacco: Never   Vaping Use    Vaping Use: Unknown   Substance and Sexual Activity    Alcohol use: Not Currently    Drug use: Yes     Types: Marijuana     Comment: medical marjuana card    Sexual activity: Not on file   Other Topics Concern    Not on file   Social History Narrative    Not on file     Social Determinants of Health     Financial Resource Strain: Low Risk  (12/27/2023)    Overall Financial Resource Strain (CARDIA)     Difficulty of Paying Living Expenses: Not hard at all   Food Insecurity: Not on file   Transportation Needs: No Transportation Needs (12/27/2023)    PRAPARE - Transportation     Lack of Transportation (Medical): No     Lack of Transportation (Non-Medical): No   Physical Activity: Not on file   Stress: Not on file   Social Connections: Not on file   Intimate Partner Violence: Not on file   Housing Stability:  "Low Risk  (12/27/2023)    Housing Stability Vital Sign     Unable to Pay for Housing in the Last Year: No     Number of Places Lived in the Last Year: 1     Unstable Housing in the Last Year: No       Tobacco Use: Medium Risk (2/28/2024)    Patient History     Smoking Tobacco Use: Former     Smokeless Tobacco Use: Never     Passive Exposure: Never        Social History     Substance and Sexual Activity   Alcohol Use Not Currently        Allergies     Allergies   Allergen Reactions    Bee Venom Protein (Honey Bee) Anaphylaxis        Objective    Physical Exam  Constitutional:       General: He is not in acute distress.     Appearance: He is ill-appearing.   HENT:      Mouth/Throat:      Mouth: Mucous membranes are moist.      Pharynx: Oropharynx is clear.   Eyes:      Conjunctiva/sclera: Conjunctivae normal.   Cardiovascular:      Rate and Rhythm: Normal rate and regular rhythm.      Pulses: Normal pulses.      Heart sounds: Normal heart sounds.   Pulmonary:      Effort: Pulmonary effort is normal.      Breath sounds: Normal breath sounds.   Abdominal:      General: Bowel sounds are decreased.      Tenderness: There is abdominal tenderness in the periumbilical area and left lower quadrant.      Comments: Palpable firm mass left lower quadrant.   Musculoskeletal:      Right lower leg: No edema.      Left lower leg: No edema.   Skin:     General: Skin is warm and dry.   Neurological:      Mental Status: He is alert and oriented to person, place, and time. Mental status is at baseline.   Psychiatric:         Mood and Affect: Mood normal.         Behavior: Behavior normal.          Visit Vitals  /64   Pulse 88   Temp 37.8 °C (100 °F) (Temporal)   Resp 16   Ht 1.702 m (5' 7\")   Wt 74.8 kg (165 lb)   SpO2 96%   BMI 25.84 kg/m²   Smoking Status Former   BSA 1.88 m²          Intake/Output Summary (Last 24 hours) at 3/5/2024 5489  Last data filed at 3/5/2024 0143  Gross per 24 hour   Intake 3344 ml   Output --   Net " "3344 ml             Meds    Scheduled medications  piperacillin-tazobactam, 4.5 g, intravenous, q6h  polyethylene glycol, 17 g, oral, Daily  sennosides-docusate sodium, 2 tablet, oral, BID      Continuous medications     PRN medications  PRN medications: HYDROmorphone, magnesium hydroxide, OLANZapine, oxyCODONE, oxyCODONE, scopolamine, trimethobenzamide     Labs:   Results from last 72 hours   Lab Units 03/05/24  0005 03/04/24  0946   SODIUM mmol/L 133* 137   POTASSIUM mmol/L 3.5 3.7   CHLORIDE mmol/L 102 104   CO2 mmol/L 21 26   BUN mg/dL 16 12   CREATININE mg/dL 0.86  0.86 0.71   GLUCOSE mg/dL 145* 128*   CALCIUM mg/dL 8.8 9.0   ANION GAP mmol/L 14 11   EGFR mL/min/1.73m*2 >90  >90 >90      Results from last 72 hours   Lab Units 03/05/24  0005 03/04/24  0946   WBC AUTO x10*3/uL 8.4 4.3*   HEMOGLOBIN g/dL 10.1* 10.6*   HEMATOCRIT % 29.9* 31.7*   PLATELETS AUTO x10*3/uL 59* 70*   NEUTROS PCT AUTO % 89.8 68.1   LYMPHS PCT AUTO % 0.8 18.3   MONOS PCT AUTO % 8.9 11.1   EOS PCT AUTO % 0.0 1.6      Lab Results   Component Value Date    CALCIUM 8.8 03/05/2024    PHOS 4.7 11/13/2023      No results found for: \"CRP\"    [unfilled]     Micro/ID:   Susceptibility data from last 90 days.  Collected Specimen Info Organism   12/27/23 Swab from Anterior Nares Methicillin Susceptible Staphylococcus aureus (MSSA)                    No lab exists for component: \"AGALPCRNB\"   .ID  Lab Results   Component Value Date    BLOODCULT Loaded on Instrument - Culture in progress 03/05/2024    BLOODCULT Loaded on Instrument - Culture in progress 03/05/2024       Images    CT abdomen pelvis w IV contrast    Result Date: 3/5/2024  STUDY: CT Abdomen and Pelvis with IV Contrast; 03/05/2024 1:05 am INDICATION: Left lower abdominal pain.  Nausea.  Fever. COMPARISON: MR Abdomen 02/22/2024;  CT A/P 02/09/2024. ACCESSION NUMBER(S): [id number] ORDERING CLINICIAN: BENJAMIN COBURN TECHNIQUE: CT of the abdomen and pelvis was performed.  Contiguous " axial images were obtained at 3 mm slice thickness through the abdomen and pelvis. Coronal and sagittal reconstructions at 3 mm slice thickness were performed.  Omnipaque 350, 75 mL was administered intravenously.  FINDINGS: LOWER CHEST: Cardiac size is normal with pacer leads in the right atrium and right ventricle.  No pericardial effusion.  Numerous pulmonary nodules are seen in the bilateral lung bases, measuring up to a maximum of approximately 1.2 cm in diameter.  ABDOMEN:  LIVER: No hepatomegaly.  Normal attenuation.  Numerous hypodense lesions are identified throughout the liver, suspicious for metastatic disease with the largest lesion centrally in the right hepatic lobe measuring 2.9 x 3.9 cm in diameter.  There is a nodular hepatic contour suggesting morphologic changes of cirrhosis.   BILE DUCTS: No intrahepatic or extrahepatic biliary ductal dilatation.  GALLBLADDER: Tiny stones are suspected in the gallbladder neck with mild pericholecystic fat stranding.    STOMACH: No abnormalities identified.  PANCREAS: There is moderate pancreatic atrophy.   SPLEEN: Spleen is mildly enlarged.   ADRENAL GLANDS: No thickening or nodules.  KIDNEYS AND URETERS: Kidneys are normal in size and location.  No renal or ureteral calculi.  PELVIS:  BLADDER: No abnormalities identified.  REPRODUCTIVE ORGANS: No abnormalities identified.  BOWEL: Appendix appears normal.  Terminal ileum is unremarkable.  Minimal diverticulosis is seen in the sigmoid colon.  Mild concentric wall thickening of the decompressed sigmoid colon is noted.   VESSELS: No abnormalities identified.  Mild calcified atheromatous plaque is seen in the abdominal aorta and iliac arteries.   PERITONEUM/RETROPERITONEUM/LYMPH NODES: No free fluid.  No pneumoperitoneum.  Hernia mesh is seen in the anterior aspect of the pelvis.  Extensive periaortic lymphadenopathy is identified with the largest node appearing necrotic and measuring approximately 1.8 x 2.6 cm in  diameter on axial image 70 of series 201 anterior to the mid abdominal aorta.   ABDOMINAL WALL: No abnormalities identified. SOFT TISSUES: Soft tissue densities are seen in the left paraspinal soft tissues adjacent to the lower thoracic spine measuring up to a maximum of 1.8 x 3.7 cm in diameter.   BONES: No acute fracture or aggressive osseous lesion.  There is a mild S-shaped curvature of the lower thoracic and lumbar spine with moderate multilevel disc disease throughout the visualized spine, most prominent at L3-4 and L4-5.    Mild concentric wall thickening of the decompressed sigmoid colon without significant inflammation.  Low-grade infectious or inflammatory colitis is a possibility but not definitive. Findings consistent with the patient's known malignancy and metastatic disease with numerous pulmonary nodules, numerous hepatic metastases, and extensive periaortic and upper abdominal lymphadenopathy which has progressed when compared to the prior study dated January 9, 2024. Mildly cirrhotic morphology of the liver with splenomegaly. Signed by Nik Alvarado    XR chest 1 view    Result Date: 3/5/2024  STUDY: Chest Radiograph;  3/5/2024 INDICATION: Sepsis. COMPARISON: 2/28/2024 XR Chest. ACCESSION NUMBER(S): [id number] ORDERING CLINICIAN: AFIA STALLINGS TECHNIQUE:  Frontal chest was obtained at 00:16 hours. FINDINGS: CARDIOMEDIASTINAL SILHOUETTE: Cardiomediastinal silhouette is normal in size and configuration. Right IJ Port-A-Cath is seen with the tip in the lower SVC.  LUNGS: Lungs are clear.  ABDOMEN: No remarkable upper abdominal findings.  BONES: No acute osseous changes.  Bilateral shoulder arthroplasties are noted.    No definite acute cardiopulmonary disease. Signed by Nik Alvarado    ECG 12 lead    Result Date: 3/3/2024  Normal sinus rhythm Rightward axis Nonspecific intraventricular block Abnormal ECG When compared with ECG of 09-FEB-2024 17:13, No significant change was found See ED provider note  for full interpretation and clinical correlation Confirmed by Kristie Syeks ([id number]) on 3/3/2024 1:46:23 PM    Onco-Echo Complete (Strain & 3D)    Result Date: 2/29/2024   [facility], [address] [place] [address]              Tel [phone] and Fax [phone] TRANSTHORACIC ECHOCARDIOGRAM REPORT  Patient Name:      GETACHEW Lira Physician:    [id number] Yosi Raymond MD Study Date:        2/28/2024            Ordering Provider:    [id number] ROSETTA RODRIGUEZ MRN/PID:           [id number]             Fellow: Accession#:        [id number]         Nurse: Date of Birth/Age: 1960 / 63      Sonographer:          Richy Allen RDCS                    years Gender:            M                    Additional Staff: Height:            170.18 cm            Admit Date: Weight:            80.74 kg             Admission Status:     Outpatient BSA / BMI:         1.92 m2 / 27.88      Encounter#:           [id number]                    kg/m2                                         Department Location:  [facility]                                                               Invasive Blood Pressure: 106 /71 mmHg Study Type:    ONCO-ECHO COMPLETE (STRAIN AND 3D) Diagnosis/ICD: Encounter for monitoring cardiotoxic drug therapy-Z51.81 Indication:    Cardiotoxic Miedication CPT Code:      Echo Complete w Full Doppler-16515  Study Detail: The following Echo studies were performed: 2D, M-Mode, Doppler,               Strain and color flow.  PHYSICIAN INTERPRETATION: Left Ventricle: The left ventricular systolic function is low normal, with an estimated ejection fraction of 50-55%. There are no regional wall motion abnormalities. The left ventricular cavity size is normal. Left Ventricular Global Longitudinal Strain - 17.7 %. Spectral Doppler shows a normal pattern of left  ventricular diastolic filling. Left Atrium: The left atrium is normal in size. Right Ventricle: The right ventricle is normal in size. There is normal right ventricular global systolic function. A device is visualized in the right ventricle. Right Atrium: The right atrium is normal in size. There is a device visualized in the right atrium. Aortic Valve: The aortic valve is trileaflet. There is no evidence of aortic valve regurgitation. The peak instantaneous gradient of the aortic valve is 5.7 mmHg. The mean gradient of the aortic valve is 2.8 mmHg. Mitral Valve: The mitral valve is normal in structure. There is trace mitral valve regurgitation. Tricuspid Valve: The tricuspid valve is structurally normal. There is trace tricuspid regurgitation. The Doppler estimated RVSP is within normal limits at 24.1 mmHg. Pulmonic Valve: The pulmonic valve is not well visualized. There is no indication of pulmonic valve regurgitation. Pericardium: There is no pericardial effusion noted. Aorta: The aortic root is normal. The aortic root is at the upper limits of normal size. Systemic Veins: The inferior vena cava size appears small. In comparison to the previous echocardiogram(s): Compared with study from 11/7/2023, the ejection fraction is slightly lower.  CONCLUSIONS:  1. Left ventricular systolic function is low normal with a 50-55% estimated ejection fraction.  2. RVSP within normal limits.  3. Left Ventricular Global Longitudinal Strain - 17.7 %. QUANTITATIVE DATA SUMMARY: 2D MEASUREMENTS:                          Normal Ranges: IVSd:          1.09 cm   (0.6-1.1cm) LVPWd:         0.68 cm   (0.6-1.1cm) LVIDd:         4.90 cm   (3.9-5.9cm) LVIDs:         2.77 cm LV Mass Index: 77.3 g/m2 LV % FS        43.6 % LA VOLUME:                               Normal Ranges: LA Vol A4C:        44.4 ml    (22+/-6mL/m2) LA Vol A2C:        33.9 ml LA Vol BP:         41.0 ml LA Vol Index A4C:  23.1ml/m2 LA Vol Index A2C:  17.6 ml/m2 LA Vol  Index BP:   21.3 ml/m2 LA Area A4C:       17.4 cm2 LA Area A2C:       14.4 cm2 LA Major Axis A4C: 5.8 cm LA Major Axis A2C: 5.2 cm LA Volume Index:   21.3 ml/m2 LA Vol A4C:        40.2 ml LA Vol A2C:        30.8 ml RA VOLUME BY A/L METHOD:                       Normal Ranges: RA Area A4C: 14.7 cm2 M-MODE MEASUREMENTS:              Normal Ranges: LAs: 3.24 cm (2.7-4.0cm) AORTA MEASUREMENTS:                      Normal Ranges: Ao Sinus, d: 3.60 cm (2.1-3.5cm) Ao STJ, d:   2.90 cm (1.7-3.4cm) LV SYSTOLIC FUNCTION BY 2D PLANIMETRY (MOD):                                          Normal Ranges: Global Longitudinal Strain (GLS): 17.7 % LV DIASTOLIC FUNCTION:                               Normal Ranges: MV Peak E:        0.54 m/s    (0.7-1.2 m/s) MV Peak A:        0.67 m/s    (0.42-0.7 m/s) E/A Ratio:        0.81        (1.0-2.2) MV e'             0.06 m/s    (>8.0) MV lateral e'     0.10 m/s MV medial e'      0.06 m/s MV A Dur:         140.72 msec E/e' Ratio:       9.00        (<8.0) a'                0.07 m/s PulmV Sys Angelito:    50.53 cm/s PulmV Gillette Angelito:   36.04 cm/s PulmV S/D Angelito:    1.40 PulmV A Revs Angelito: 31.15 cm/s PulmV A Revs Dur: 110.73 msec MITRAL VALVE:                 Normal Ranges: MV DT: 280 msec (150-240msec) AORTIC VALVE:                                   Normal Ranges: AoV Vmax:                1.19 m/s (<=1.7m/s) AoV Peak P.7 mmHg (<20mmHg) AoV Mean P.8 mmHg (1.7-11.5mmHg) LVOT Max Angelito:            1.14 m/s (<=1.1m/s) AoV VTI:                 21.18 cm (18-25cm) LVOT VTI:                21.02 cm LVOT Diameter:           2.30 cm  (1.8-2.4cm) AoV Area, VTI:           4.12 cm2 (2.5-5.5cm2) AoV Area,Vmax:           3.97 cm2 (2.5-4.5cm2) AoV Dimensionless Index: 0.99  RIGHT VENTRICLE: RV Basal 4.56 cm RV Mid   3.45 cm TAPSE:   21.0 mm RV s'    0.12 m/s TRICUSPID VALVE/RVSP:                             Normal Ranges: Peak TR Velocity: 2.30 m/s Est. RA Pressure: 3 mmHg RV Syst Pressure:  24.1 mmHg (< 30mmHg) IVC Diam:         1.00 cm PULMONIC VALVE:                         Normal Ranges: PV Accel Time: 83 msec  (>120ms) PV Max Angelito:    1.0 m/s  (0.6-0.9m/s) PV Max PG:     3.8 mmHg Pulmonary Veins: PulmV A Revs Dur: 110.73 msec PulmV A Revs Angelito: 31.15 cm/s PulmV Gillette Angelito:   36.04 cm/s PulmV S/D Angelito:    1.40 PulmV Sys Angelito:    50.53 cm/s  [id number] Yosi Raymond MD Electronically signed on 2/29/2024 at 5:48:19 PM  ** Final **     XR chest 1 view    Result Date: 2/28/2024  STUDY: Chest Radiograph;  2/28/2024 3:38 PM. INDICATION: Weakness.  History of stage IV esophageal cancer. COMPARISON: PET/CT 1/9/2024.  Chest radiographs and CTA chest 12/22/2023.  CT abdomen and pelvis June 9, 2024 ACCESSION NUMBER(S): [id number] ORDERING CLINICIAN: AFIA KOVACS TECHNIQUE:  Frontal chest was obtained at 15:37 hours. FINDINGS: CARDIOMEDIASTINAL SILHOUETTE: The cardiac silhouette is normal in size and configuration.  There is a left subclavian approach dual-chamber pacemaker with intact appearing leads extending to the projection of the right atrium and ventricle.  A right sided central venous powerport is in place with the tip extending to the right atrium.  These findings were also present previously.  Mediastinal adenopathy is not well visualized radiographically.  LUNGS: The pulmonary nodules seen on CT and PET/CT are not well visualized radiographically.  There is a questionable faint 7 mm nodular density in the left mid to lower lung field.  No dense consolidation, pleural effusion, or pneumothorax.  ABDOMEN: No remarkable upper abdominal findings.  BONES: No acute osseous changes.  Bilateral shoulder hemiarthroplasties also present previously.    1.Normal heart size with dual-chamber pacemaker and powerport in place. 2.Known pulmonary nodules not well visualized radiographically. There is a questionable faint 7 mm nodule in the left lung. Signed by Shaneka Pyle DO    CT cervical spine wo IV contrast    Result  Date: 2/28/2024  Interpreted By:  Gavin Dixon, STUDY: CT CERVICAL SPINE WO IV CONTRAST;  2/28/2024 3:47 pm   INDICATION: Signs/Symptoms:Patient has metastatic lung cancer and I feel a mass on the left lateral side of his neck.  Please let me know if he needs contrast I am trying to do it noncontrast at the same time we do the CT head.   COMPARISON: CT PA dated 12/26/2023.   ACCESSION NUMBER(S): [id number]   ORDERING CLINICIAN: AFIA KOVACS   TECHNIQUE: Axial CT images of the cervical spine are obtained. Axial, coronal and sagittal reconstructions are provided for review.   FINDINGS: Trauma: There is no evidence for an acute fracture of the cervical spine. No prevertebral soft tissue swelling.   Alignment: Equivocal/trace anterolisthesis of C7 on T1. Otherwise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

## 2024-03-05 NOTE — CONSULTS
"Vancomycin Dosing by Pharmacy- INITIAL    Massimo Garcia is a 63 y.o. year old male who Pharmacy has been consulted for vancomycin dosing for other abdominal infection/concern for sepsis/sepsis protocol ongoing . Based on the patient's indication and renal status this patient will be dosed based on a goal AUC of 500-600.     Renal function is currently stable; CrCl = 86 mL/min.    Visit Vitals  /64   Pulse 88   Temp 37.8 °C (100 °F) (Temporal)   Resp 16        Lab Results   Component Value Date    CREATININE 0.80 03/05/2024    CREATININE 0.86 03/05/2024    CREATININE 0.86 03/05/2024    CREATININE 0.71 03/04/2024        Patient weight is 74.8 kg.    No results found for: \"CULTURE\"     No intake/output data recorded.  [unfilled]    No results found for: \"PATIENTTEMP\"       Assessment/Plan     Patient has already been given a loading dose of 2,000 mg in the ER 3/5/24 at 01:14.  Will initiate vancomycin maintenance,  1,000 mg every 12 hours.    This dosing regimen is predicted by [employer] to result in the following pharmacokinetic parameters:  Regimen: 1000 mg IV every 12 hours.  Start time: 13:14 on 03/05/2024  Exposure target: AUC24 (range)500-600 mg/L.hr   AUC24,ss: 522 mg/L.hr  Probability of AUC24 > 400: 76 %  Ctrough,ss: 16.3 mg/L  Probability of Ctrough,ss > 20: 34 %  Probability of nephrotoxicity (Lodise VLAD 2009): 12 %      Follow-up level will be ordered on 3/6/24 at first am draw unless clinically indicated sooner.  Will continue to monitor renal function daily while on vancomycin and order serum creatinine at least every 48 hours if not already ordered.  Follow for continued vancomycin needs, clinical response, and signs/symptoms of toxicity.       Nicole Moore, PharmD       "

## 2024-03-05 NOTE — HOSPITAL COURSE
Brief HPI: Patient is a 62yo male with pmhx of third degree heart block s/p PPM (placed in November), esophageal cancer with mets to liver and lungs, PE, peripheral neuropathy, and portal vein thrombosis (Eliquis recently held) presenting with fever and chills after chemotherapy infusion.    After his infusion, he returned home, wasn't feeling well and went to sleep. His wife woke him and found that he was having chills, sweats, and a fever > 100.4 F. Additionally patient reports abdominal pain in the lower center area of his abdomen that was 5/10 then and 6/10 now. He denies any diarrhea or vomiting.      ED Course: Patient with T 101.7 F, , RR 22 on arrival. Sepsis alert called. Patient given IV Vanc and Zosyn as well as a 1 L bolus of IV fluids. Further fluids not given due to patient remaining normotensive.  CT abdomen/pelvis w/ shows concern for possible low-grade infectious or inflammatory colitis (cocentric wall thickening of decompressed sigmoid colon) as well as findings consistent with patient's known malignancy and metastatic disease and progression of extensive periaortic and upper abdominal lymphadenopathy. Patient admitted to inpatient for further treatment.    Hospital Course: patient admit reason r/o infection in setting of immunodefiency as he recently received an infusion consisting of: Oxaliplatin, fluoroscopy UreSil, leucovorin and trastuzumab on 3/4/2024.  WBC increased from 4.3-8.9 and trended back down to 4.8.  In light of his immunodeficiency status we continued to treat empirically with Vanco and Zosyn. Patient's oncologist was updated and consulted, agreed with empiric tx. Patient remained stable and was clear for discharge on 3/7 after blood cultures resulted negative for 48h. Patient was given Augmentin for 5 days on discharge.

## 2024-03-05 NOTE — NURSING NOTE
The patient arrived to  floor alert and oriented. Patient denies complaints. He is up in eating at this time.

## 2024-03-05 NOTE — PROGRESS NOTES
Massimo Garcia is a 63 y.o. male on day 0 of admission presenting with Sepsis (CMS/HCC).      Subjective   Patient was seen and examined this morning at bedside.  History confirmed with patient.  He states that his fevers, chills, nausea and abdominal pain started in the evening after he finished his chemotherapy treatment yesterday.  He reports that this is his first time receiving pembrolizumab.  At this time patient reports some mild abdominal discomfort.  He denies any vomiting or diarrhea.       Objective     Last Recorded Vitals  /64   Pulse 88   Temp 37.8 °C (100 °F) (Temporal)   Resp 16   Wt 74.8 kg (165 lb)   SpO2 98%   Intake/Output last 3 Shifts:    Intake/Output Summary (Last 24 hours) at 3/5/2024 1401  Last data filed at 3/5/2024 0143  Gross per 24 hour   Intake 3344 ml   Output --   Net 3344 ml       Admission Weight  Weight: 74.8 kg (165 lb) (03/04/24 2249)    Daily Weight  03/04/24 : 74.8 kg (165 lb)    Image Results  CT abdomen pelvis w IV contrast  Narrative: STUDY:  CT Abdomen and Pelvis with IV Contrast; 03/05/2024 1:05 am  INDICATION:  Left lower abdominal pain.  Nausea.  Fever.  COMPARISON:  MR Abdomen 02/22/2024;  CT A/P 02/09/2024.  ACCESSION NUMBER(S):  [id number]  ORDERING CLINICIAN:  BENJAMIN COBURN  TECHNIQUE:  CT of the abdomen and pelvis was performed.  Contiguous axial images  were obtained at 3 mm slice thickness through the abdomen and pelvis.   Coronal and sagittal reconstructions at 3 mm slice thickness were  performed.  Omnipaque 350, 75 mL was administered intravenously.    FINDINGS:  LOWER CHEST:  Cardiac size is normal with pacer leads in the right atrium and right  ventricle.  No pericardial effusion.  Numerous pulmonary nodules are  seen in the bilateral lung bases, measuring up to a maximum of  approximately 1.2 cm in diameter.    ABDOMEN:     LIVER:  No hepatomegaly.  Normal attenuation.  Numerous hypodense lesions are  identified throughout the liver, suspicious  for metastatic disease  with the largest lesion centrally in the right hepatic lobe measuring  2.9 x 3.9 cm in diameter.  There is a nodular hepatic contour  suggesting morphologic changes of cirrhosis.       BILE DUCTS:  No intrahepatic or extrahepatic biliary ductal dilatation.     GALLBLADDER:  Tiny stones are suspected in the gallbladder neck with mild  pericholecystic fat stranding.      STOMACH:  No abnormalities identified.     PANCREAS:  There is moderate pancreatic atrophy.       SPLEEN:  Spleen is mildly enlarged.       ADRENAL GLANDS:  No thickening or nodules.     KIDNEYS AND URETERS:  Kidneys are normal in size and location.  No renal or ureteral  calculi.     PELVIS:     BLADDER:  No abnormalities identified.     REPRODUCTIVE ORGANS:  No abnormalities identified.     BOWEL:  Appendix appears normal.  Terminal ileum is unremarkable.  Minimal  diverticulosis is seen in the sigmoid colon.  Mild concentric wall  thickening of the decompressed sigmoid colon is noted.       VESSELS:  No abnormalities identified.  Mild calcified atheromatous plaque is  seen in the abdominal aorta and iliac arteries.       PERITONEUM/RETROPERITONEUM/LYMPH NODES:  No free fluid.  No pneumoperitoneum.  Hernia mesh is seen in the  anterior aspect of the pelvis.    Extensive periaortic lymphadenopathy is identified with the largest  node appearing necrotic and measuring approximately 1.8 x 2.6 cm in  diameter on axial image 70 of series 201 anterior to the mid abdominal  aorta.       ABDOMINAL WALL:  No abnormalities identified.  SOFT TISSUES:   Soft tissue densities are seen in the left paraspinal soft tissues  adjacent to the lower thoracic spine measuring up to a maximum of 1.8  x 3.7 cm in diameter.       BONES:  No acute fracture or aggressive osseous lesion.  There is a mild  S-shaped curvature of the lower thoracic and lumbar spine with  moderate multilevel disc disease throughout the visualized spine, most  prominent at  L3-4 and L4-5.  Impression: Mild concentric wall thickening of the decompressed sigmoid colon  without significant inflammation.  Low-grade infectious or  inflammatory colitis is a possibility but not definitive.  Findings consistent with the patient's known malignancy and metastatic  disease with numerous pulmonary nodules, numerous hepatic metastases,  and extensive periaortic and upper abdominal lymphadenopathy which has  progressed when compared to the prior study dated January 9, 2024.  Mildly cirrhotic morphology of the liver with splenomegaly.  Signed by Nik Alvarado  XR chest 1 view  Narrative: STUDY:  Chest Radiograph;  3/5/2024  INDICATION:  Sepsis.  COMPARISON:  2/28/2024 XR Chest.  ACCESSION NUMBER(S):  [id number]  ORDERING CLINICIAN:  AFIA STALLINGS  TECHNIQUE:  Frontal chest was obtained at 00:16 hours.  FINDINGS:  CARDIOMEDIASTINAL SILHOUETTE:  Cardiomediastinal silhouette is normal in size and configuration.   Right IJ Port-A-Cath is seen with the tip in the lower SVC.     LUNGS:  Lungs are clear.     ABDOMEN:  No remarkable upper abdominal findings.     BONES:  No acute osseous changes.  Bilateral shoulder arthroplasties are  noted.  Impression: No definite acute cardiopulmonary disease.  Signed by Nik Alvarado      Physical Exam  General: Well developed patient, alert and oriented, NAD  HEENT: no lesions, no scleral icterus, moist mucous membranes  Neuro: A&Ox3, grossly normal  CV: RRR, nrl S1, S2, no murmur, rubs, or clicks  Resp: Good bilateral air entry. No crackles,  wheezing, or rhonchi  Abdomen: Non distended, + BS, soft, mild diffuse tenderness to palpation  Extremities: No lower extremity edema, + PP  Skin: No jaundice, no lesions   Psych: Appropriate mood and behavior    Relevant Results             Scheduled medications  [Held by provider] chlorproMAZINE, 50 mg, oral, TID  [Held by provider] enoxaparin, 40 mg, subcutaneous, q24h  fentaNYL, 1 patch, transdermal, q72h  heparin flush, 50  Units, intra-catheter, q12h  melatonin, 5 mg, oral, Nightly  metoprolol succinate XL, 25 mg, oral, Daily  pantoprazole, 40 mg, oral, BID  PARoxetine, 10 mg, oral, q AM  piperacillin-tazobactam, 4.5 g, intravenous, q6h  polyethylene glycol, 17 g, oral, Daily  sennosides-docusate sodium, 2 tablet, oral, BID  vancomycin, 1,000 mg, intravenous, q12h      Continuous medications     PRN medications  PRN medications: acetaminophen, heparin flush, HYDROmorphone, LORazepam, magnesium hydroxide, [Held by provider] meclizine, [Held by provider] metoclopramide, oxyCODONE, oxyCODONE, scopolamine, sodium chloride, trimethobenzamide, vancomycin    Results for orders placed or performed during the hospital encounter of 03/04/24 (from the past 24 hour(s))   Sars-CoV-2 and Influenza A/B PCR   Result Value Ref Range    Flu A Result Not Detected Not Detected    Flu B Result Not Detected Not Detected    Coronavirus 2019, PCR Not Detected Not Detected   RSV PCR   Result Value Ref Range    RSV PCR Not Detected Not Detected   CBC and Auto Differential   Result Value Ref Range    WBC 8.4 4.4 - 11.3 x10*3/uL    nRBC 0.0 0.0 - 0.0 /100 WBCs    RBC 3.38 (L) 4.50 - 5.90 x10*6/uL    Hemoglobin 10.1 (L) 13.5 - 17.5 g/dL    Hematocrit 29.9 (L) 41.0 - 52.0 %    MCV 89 80 - 100 fL    MCH 29.9 26.0 - 34.0 pg    MCHC 33.8 32.0 - 36.0 g/dL    RDW 15.4 (H) 11.5 - 14.5 %    Platelets 59 (L) 150 - 450 x10*3/uL    Neutrophils % 89.8 40.0 - 80.0 %    Immature Granulocytes %, Automated 0.4 0.0 - 0.9 %    Lymphocytes % 0.8 13.0 - 44.0 %    Monocytes % 8.9 2.0 - 10.0 %    Eosinophils % 0.0 0.0 - 6.0 %    Basophils % 0.1 0.0 - 2.0 %    Neutrophils Absolute 7.53 1.20 - 7.70 x10*3/uL    Immature Granulocytes Absolute, Automated 0.03 0.00 - 0.70 x10*3/uL    Lymphocytes Absolute 0.07 (L) 1.20 - 4.80 x10*3/uL    Monocytes Absolute 0.75 0.10 - 1.00 x10*3/uL    Eosinophils Absolute 0.00 0.00 - 0.70 x10*3/uL    Basophils Absolute 0.01 0.00 - 0.10 x10*3/uL    Comprehensive metabolic panel   Result Value Ref Range    Glucose 145 (H) 74 - 99 mg/dL    Sodium 133 (L) 136 - 145 mmol/L    Potassium 3.5 3.5 - 5.3 mmol/L    Chloride 102 98 - 107 mmol/L    Bicarbonate 21 21 - 32 mmol/L    Anion Gap 14 10 - 20 mmol/L    Urea Nitrogen 16 6 - 23 mg/dL    Creatinine 0.86 0.50 - 1.30 mg/dL    eGFR >90 >60 mL/min/1.73m*2    Calcium 8.8 8.6 - 10.3 mg/dL    Albumin 3.3 (L) 3.4 - 5.0 g/dL    Alkaline Phosphatase 169 (H) 33 - 136 U/L    Total Protein 6.9 6.4 - 8.2 g/dL    AST 80 (H) 9 - 39 U/L    Bilirubin, Total 1.6 (H) 0.0 - 1.2 mg/dL    ALT 36 10 - 52 U/L   Lipase   Result Value Ref Range    Lipase 25 9 - 82 U/L   Lactate   Result Value Ref Range    Lactate 4.2 (HH) 0.4 - 2.0 mmol/L   Blood Culture    Specimen: Peripheral Venipuncture; Blood culture   Result Value Ref Range    Blood Culture Loaded on Instrument - Culture in progress    Blood Culture    Specimen: Peripheral Venipuncture; Blood culture   Result Value Ref Range    Blood Culture Loaded on Instrument - Culture in progress    Creatinine, Serum   Result Value Ref Range    Creatinine 0.86 0.50 - 1.30 mg/dL    eGFR >90 >60 mL/min/1.73m*2   POCT GLUCOSE   Result Value Ref Range    POCT Glucose 145 (H) 74 - 99 mg/dL   Urinalysis with Reflex Culture and Microscopic   Result Value Ref Range    Color, Urine Yellow Straw, Yellow    Appearance, Urine Clear Clear    Specific Gravity, Urine 1.039 (N) 1.005 - 1.035    pH, Urine 5.0 5.0, 5.5, 6.0, 6.5, 7.0, 7.5, 8.0    Protein, Urine NEGATIVE NEGATIVE mg/dL    Glucose, Urine NEGATIVE NEGATIVE mg/dL    Blood, Urine NEGATIVE NEGATIVE    Ketones, Urine NEGATIVE NEGATIVE mg/dL    Bilirubin, Urine NEGATIVE NEGATIVE    Urobilinogen, Urine <2.0 <2.0 mg/dL    Nitrite, Urine NEGATIVE NEGATIVE    Leukocyte Esterase, Urine NEGATIVE NEGATIVE   Extra Urine Gray Tube   Result Value Ref Range    Extra Tube Hold for add-ons.    Lactate   Result Value Ref Range    Lactate 2.9 (H) 0.4 - 2.0 mmol/L   CBC  and Auto Differential   Result Value Ref Range    WBC 8.9 4.4 - 11.3 x10*3/uL    nRBC 0.0 0.0 - 0.0 /100 WBCs    RBC 3.38 (L) 4.50 - 5.90 x10*6/uL    Hemoglobin 10.1 (L) 13.5 - 17.5 g/dL    Hematocrit 30.3 (L) 41.0 - 52.0 %    MCV 90 80 - 100 fL    MCH 29.9 26.0 - 34.0 pg    MCHC 33.3 32.0 - 36.0 g/dL    RDW 15.5 (H) 11.5 - 14.5 %    Platelets 52 (L) 150 - 450 x10*3/uL    Neutrophils % 91.9 40.0 - 80.0 %    Immature Granulocytes %, Automated 0.8 0.0 - 0.9 %    Lymphocytes % 1.1 13.0 - 44.0 %    Monocytes % 6.1 2.0 - 10.0 %    Eosinophils % 0.0 0.0 - 6.0 %    Basophils % 0.1 0.0 - 2.0 %    Neutrophils Absolute 8.15 (H) 1.20 - 7.70 x10*3/uL    Immature Granulocytes Absolute, Automated 0.07 0.00 - 0.70 x10*3/uL    Lymphocytes Absolute 0.10 (L) 1.20 - 4.80 x10*3/uL    Monocytes Absolute 0.54 0.10 - 1.00 x10*3/uL    Eosinophils Absolute 0.00 0.00 - 0.70 x10*3/uL    Basophils Absolute 0.01 0.00 - 0.10 x10*3/uL   Comprehensive Metabolic Panel   Result Value Ref Range    Glucose 139 (H) 74 - 99 mg/dL    Sodium 137 136 - 145 mmol/L    Potassium 3.6 3.5 - 5.3 mmol/L    Chloride 103 98 - 107 mmol/L    Bicarbonate 20 (L) 21 - 32 mmol/L    Anion Gap 18 10 - 20 mmol/L    Urea Nitrogen 13 6 - 23 mg/dL    Creatinine 0.80 0.50 - 1.30 mg/dL    eGFR >90 >60 mL/min/1.73m*2    Calcium 8.7 8.6 - 10.3 mg/dL    Albumin 3.3 (L) 3.4 - 5.0 g/dL    Alkaline Phosphatase 162 (H) 33 - 136 U/L    Total Protein 6.8 6.4 - 8.2 g/dL    AST 68 (H) 9 - 39 U/L    Bilirubin, Total 1.4 (H) 0.0 - 1.2 mg/dL    ALT 33 10 - 52 U/L   Magnesium   Result Value Ref Range    Magnesium 1.27 (L) 1.60 - 2.40 mg/dL   Lactate   Result Value Ref Range    Lactate 1.8 0.4 - 2.0 mmol/L       Assessment/Plan      Massimo Garcia is a 63 y.o. male with pmhx of third degree heart block s/p PPM (placed in November 2024), esophageal cancer with mets to liver and lungs, PE, peripheral neuropathy, and portal vein thrombosis (Eliquis recently held) presenting with fever and chills after  chemotherapy infusion. Admitted for sepsis.     Acute medical issues  #Fever/chills  #Rule-out infection in setting of immunodeficiency  -Patient received infusion of oxaliplatin, fluorouracil, leucovorin, and trastuzumab on 3/4/24, developed fever/chills later in the evening  -Initial concern for sepsis on arrival (tachycardia, fever, tachypnea) -- resolved  -Low concern for neutropenic fever/neutropenic colitis given normal/slightly elevated absolute neutrophils  -Chemo port does not appear infected  -CTAP showed concern for potential sigmoid colitis however this is low likelihood  -CXR with no acute cardiopulmonary process  -Low suspicion for DVT/PE given no leg pain/swelling and no hypoxia/increased oxygen requirement  -No clear source of infection at this time. Will continue to monitor vitals and CBC  -WBC 4.3 >8.9  -Lactate: 4.2 >2.9 >1.8  -s/p IV Vanc and Zosyn, 1L IVF in ED  -Blood cultures collected -- PENDING  -Continuing empiric tx with IV Vanc and Zosyn, will dc Vanc if blood cxs negative for MRSA  -Stool pathogen PCR and C diff PCR if patient begins having loose stool  -Avoiding Tylenol as to not mask fever  -Updated and consulted patient's oncologist, Dr. Blake, appreciate recs      #Nausea/vomiting/abdominal pain, likely chemotherapy-induced  -hold home chlorpromazine and meclizine due to prolonged Qtc; discontinued olanzapine  -cont home scopolamine patch as first line; home ativan second line; IM Tigan prn third line  -Oxy 5 for mild and 10 for severe pain, IV 0.2 dilaudid breakthrough    #Thrombocytopenia  #Hx of portal vein thrombosis  -plt 52  -continuing to hold home Eliquis (reportedly held for >1 week)  -Holding DVT prophylaxis for now given high bleeding risk. Will resume lovenox if thrombocytopenia improves  -monitor    #Elevated transaminase  -AST increasing since January  -R Factor 0.6, suggestive of Cholestatic injury  -Cirrhotic changes on CT, known liver mets  -possible etiologies  include mets, or hepatotoxicity 2/2 chemotherapy  -monitor RFP, avoid hepatotoxic medications  -Ordered hepatitis panel       Chronic medical issues  #history of Vtach  -cont home metoprolol succ XL 25 mg daily     #Anxiety  -cont home ativan     #Depression  -cont home paroxetine     #GERD  -cont protonix 40 mg BID        Fluids: IV bolus prn  Electrolytes: replete prn  Nutrition: Adult regular  GI Ppx: Protonix 40 mg BID  DVT Ppx: holding      Oxygenation: RA  Antibiotics: IV Vanc and Zosyn     Dispo: 64yo M with hx of metastatic esophageal cancer presenting w/ nausea/vomiting/fever/chills after chemotherapy infusion. Concern for possible infectious etiology although no clear source. Treating empirically with IV Vanc and Zosyn, blood cultures pending. Consulted patient's oncologist, tre recs.      Franco Clay, DO  PGY-1 Transitional Year

## 2024-03-05 NOTE — ED PROVIDER NOTES
HPI   Chief Complaint   Patient presents with    Fever     Had chemo today and developed a fever of 101.5°f       History of present illness:  63-year-old male presents to the emergency room for complaints of fever and chills.  The patient states that he has stage IV esophageal cancer and just started Keytruda earlier today.  He states he received his most recent infusion he believes about 8 AM today.  He states this afternoon around 2 or 3:00 he began feeling body aches and chills and states he could not get warm.  He states he wrapped himself in blankets.  His wife states that she took his temperature at home and it was 101 degrees.  She states that he has not had any other symptoms but he states he has been coughing a little bit.  He states he has no other symptoms at this time.  Past medical history of neuropathy and hypertension.    Social history: Negative for alcohol and drug use.    Review of systems:   Gen.: No weight loss  Eyes: No vision loss, double vision  ENT: No pharyngitis, neck pain  Cardiac: No chest pain, palpitations, syncope  Pulmonary: No shortness of breath, cough, hemoptysis.   Heme/lymph: No swollen glands, bleeding.   GI: No abdominal pain, change in bowel habits, melena, hematemesis, hematochezia, nausea, vomiting, diarrhea.   : No discharge, dysuria, frequency, urgency, hematuria.   Musculoskeletal: No limb pain, joint pain, joint swelling.   Skin: No rashes.   Review of systems is otherwise negative unless stated above or in history of present illness.        Physical exam:  General: Vitals noted, no distress. Afebrile.   EENT: No lymphadenopathy   Cardiac: Regular, rate, rhythm, no murmur.   Pulmonary: Lungs clear bilaterally with good aeration. No adventitious breath sounds.   Abdomen: Soft, nonsurgical. Nontender. No peritoneal signs. Normoactive bowel sounds.   Extremities: No peripheral edema.   Skin: No rash.   Neuro: No focal neurologic deficits        Medical decision making:    Testing: Sepsis alert called  Treatment: Tylenol ordered IV fluids 1 L normal saline ordered, patient did not appear hypotensive at this time and no signs of shock, vancomycin and Zosyn IV started, COVID influenza testing started  Reevaluation:   Plan: 63-year-old male presents to the emergency room for complaints of fever and chills.  The patient states that he has stage IV esophageal cancer and just started Keytruda earlier today.  He states he received his most recent infusion he believes about 8 AM today.  He states this afternoon around 2 or 3:00 he began feeling body aches and chills and states he could not get warm.  He states he wrapped himself in blankets.  His wife states that she took his temperature at home and it was 101 degrees.  She states that he has not had any other symptoms but he states he has been coughing a little bit.  He states he has no other symptoms at this time.  Past medical history of neuropathy and hypertension.  On physical exam the patient has noted to be febrile at this time and is found laying on his left side.  The patient is complaining about diffuse abdominal tenderness at this time.  He has no pain to palpation across the abdomen lungs are clear to auscultation vitals are reassuring other than the fever and tachycardia.  Laboratory testing is pending at this time and I advised the oncoming attending physician of the test results returning so far.  He assumed care of the patient at this time.  Sepsis reperfusion exam had not been performed at the time of signout.  Impression:   1.  Sepsis  2.  Esophageal cancer          History provided by:  Patient   used: No                        No data recorded                   Patient History   Past Medical History:   Diagnosis Date    Essential (primary) hypertension 12/21/2013    Hypertension    Pacemaker     Peripheral neuropathy     Personal history of other diseases of the circulatory system     History of right  bundle branch block (RBBB)    Pneumothorax 12/04/2023     Past Surgical History:   Procedure Laterality Date    CARDIAC ELECTROPHYSIOLOGY PROCEDURE N/A 11/7/2023    Procedure: Temporary Pacemaker Insertion;  Surgeon: Alexis Shook MD;  Location: [facility];  Service: Cardiovascular;  Laterality: N/A;    CARDIAC ELECTROPHYSIOLOGY PROCEDURE Left 11/9/2023    Procedure: PPM IMPLANT DUAL;  Surgeon: Elias Connolly MD;  Location: [facility];  Service: Electrophysiology;  Laterality: Left;    TOTAL KNEE ARTHROPLASTY  09/30/2016    Total Knee Replacement Left    TOTAL KNEE ARTHROPLASTY  09/30/2016    Total Knee Replacement Right     Family History   Problem Relation Name Age of Onset    Cancer Father       Social History     Tobacco Use    Smoking status: Former     Types: Cigarettes, Cigars     Passive exposure: Never    Smokeless tobacco: Never   Vaping Use    Vaping Use: Unknown   Substance Use Topics    Alcohol use: Not Currently    Drug use: Yes     Types: Marijuana     Comment: medical marjuana card       Physical Exam   ED Triage Vitals [03/04/24 2249]   Temperature Heart Rate Respirations BP   (!) 38.7 °C (101.7 °F) (!) 125 (!) 22 110/66      Pulse Ox Temp Source Heart Rate Source Patient Position   97 % Skin Monitor Sitting      BP Location FiO2 (%)     Left arm --       Physical Exam    ED Course & MDM   ED Course as of 03/06/24 1218   Mon Mar 04, 2024   2353 Temperature(!): 38.7 °C (101.7 °F) [WL]   2353 Heart Rate(!): 125 [WL]   2353 Respirations(!): 22 [WL]   2354 This is a 63-year-old male present with chief complaint of fever.  Has history significant for esophageal cancer.  Arriving today with a fever tachycardic and tachypneic.  He is immunocompromise.  Was a sepsis alert.  Lab work and imaging of chest x-ray will be performed.  Was given broad-spectrum antibiotics in the form of Zosyn and vancomycin.  Was not given full sepsis bolus as blood pressure is normal at this time but  will start with 1 L. [WL]   Tue Mar 05, 2024   0050 EKG interpreted by me shows sinus tachycardia, right bundle branch block.  T wave abnormality inferiorly.  No STEMI.  Rate 109 bpm.  QTc 519, QTc 386.  Compared to prior EKG similar findings present [WL]   0052 Initially given a liter of fluids but given the lactate of 4.2 will put in for a full sepsis bolus. [WL]   0147 CT abdomen pelvis w IV contrast  Mild concentric wall thickening of the decompressed sigmoid colon  without significant inflammation.  Low-grade infectious or  inflammatory colitis is a possibility but not definitive.  Findings consistent with the patient's known malignancy and metastatic  disease with numerous pulmonary nodules, numerous hepatic metastases,  and extensive periaortic and upper abdominal lymphadenopathy which has  progressed when compared to the prior study dated January 9, 2024.  Mildly cirrhotic morphology of the liver with splenomegaly.   [WL]   0147 Was complaining of some abdominal pain so CT with contrast of abdomen pelvis was performed. [WL]   0155 Reperfusion exam performed.  Cap refill less than 2 seconds.  Blood pressure and MAP within normal limits.  No longer tachycardic and afebrile and respiratory rates improved.  Plan be to admit to medicine. [WL]   0244 Consult was made to hospitalist service and I spoke with Renetta crowell and agrees on admission. [WL]      ED Course User Index  [WL] David Mariscal DO         Diagnoses as of 03/06/24 1218   Sepsis (CMS/HCC)   Sepsis, due to unspecified organism, unspecified whether acute organ dysfunction present (CMS/HCC)   Enteritis   History of esophageal cancer   Abnormal CT scan       Medical Decision Making      Procedure  Procedures     Artie Pérez PA-C  03/06/24 1219

## 2024-03-05 NOTE — NURSING NOTE
0700  Care assumed ED holds patient.  Resting comfortable NAD wife at BS.  Placed in hospital bed for comfort.  Awaing IP bed

## 2024-03-05 NOTE — PROGRESS NOTES
Pharmacy Medication History Review    Massimo Garcia is a 63 y.o. male admitted for Sepsis (CMS/[facility]). Pharmacy reviewed the patient's vcqgw-bo-hxcrtdlur medications and allergies for accuracy.    The list below reflectives the updated PTA list. Please review each medication in order reconciliation for additional clarification and justification.  Prior to Admission medications    Medication Sig Start Date End Date Taking? Authorizing Provider   apixaban (Eliquis) 5 mg tablet TAKE 1 TABLET BY MOUTH TWO TIMES A DAY 12/28/23 12/26/24  Gloria Chand PA-C   chlorproMAZINE (Thorazine) 50 mg tablet Take 1 tablet (50 mg) by mouth 3 times a day.  Patient not taking: Reported on 3/5/2024 2/13/24 3/14/24  Tomasa Blake MD   cholecalciferol (Vitamin D-3) 50 MCG (2000 UT) tablet Take 2 tablets (100 mcg) by mouth once daily in the morning.    Historical Provider, MD   dexAMETHasone (Decadron) 4 mg tablet Take 1 tablet (4 mg) by mouth once daily for 10 days.  Patient not taking: Reported on 3/5/2024 2/6/24 2/16/24  Tomasa Blake MD   fentaNYL (Duragesic) 25 mcg/hr patch Place 1 patch over 72 hours on the skin every 3rd day.  Patient taking differently: Place 1 patch on the skin every 3rd day. Last OARRS fill: 2/8/24 #10 for 30 days 2/8/24 3/9/24  JASPER Ferguson   lactulose 20 gram/30 mL oral solution Take 30 mL (20 g) by mouth 4 times a day as needed (for constipation, if no relief from colace/ senna/ milk of magnesia). 11/2/23   JASPER Ferguson   LORazepam (Ativan) 1 mg tablet Take 1 tablet (1 mg) by mouth every 8 hours if needed for anxiety (nausea).  Patient taking differently: Take 1 tablet (1 mg) by mouth every 8 hours if needed for anxiety (nausea). Last OARRS fill: 2/11/24 #90 for 30 days 2/8/24 3/9/24  SugarJASPER Mendez   meclizine (Antivert) 25 mg tablet Take 1 tablet (25 mg) by mouth 3 times a day as needed for dizziness for up to 10 days. 2/28/24 3/9/24  David Dacosta, APRN-CNP    medical cannabis Take 1 each by mouth. Last OARRS fills:  1/18/24 Tin Oral Admin-3.67-0-120 Cbn Tincture 440/4 days  1/18/24 Tin Oral Admin-5.5-5.5-120 660/6 days  12/9/23 Oint Top Admin-16.6-30 Mjm 590.00/ 2 days    Historical Provider, MD   metoclopramide (Reglan) 10 mg tablet Take 1 tablet (10 mg) by mouth 4 times a day as needed (nausea). Take before meals and at bedtime. 2/13/24 5/7/24  Tomasa Blake MD   metoprolol succinate XL (Toprol-XL) 25 mg 24 hr tablet Take 1 tablet (25 mg) by mouth once daily. Do not crush or chew.  Patient taking differently: Take 1 tablet (25 mg) by mouth once daily in the morning. Do not crush or chew. 2/19/24 5/22/24  JASPER Evans   multivitamin with minerals (multivit-min-iron fum-folic ac) tablet Take 2 tablets by mouth once daily in the morning.    Historical Provider, MD   oxyCODONE (Roxicodone) 10 mg immediate release tablet Take 1 tablet (10 mg) by mouth every 4 hours if needed for severe pain (7 - 10).  Patient taking differently: Take 1 tablet (10 mg) by mouth every 4 hours if needed for severe pain (7 - 10). Last OARRS fill: 2/25/24 #180 for 30 days 2/8/24 3/9/24  JASPER Ferguson   pantoprazole (ProtoNix) 40 mg EC tablet TAKE 1 TABLET BY MOUTH TWICE A DAY 1/26/24   Tomasa Blake MD   PARoxetine (Paxil) 10 mg tablet Take 1 tablet (10 mg) by mouth once daily in the morning.  Patient not taking: Reported on 3/5/2024 1/5/24 2/4/24  JASPER Ferguson   potassium chloride CR (Klor-Con) 10 mEq ER tablet TAKE 1 TABLET BY MOUTH ONCE DAILY  Patient taking differently: Take 1 tablet (10 mEq) by mouth once daily in the morning. 7/31/23 7/30/24  Gloria Chand PA-C   sodium chloride (Ocean) 0.65 % nasal spray Administer 1 spray into each nostril if needed for congestion (or nasal dryness/ bleeding). 11/2/23 11/1/24  ALFREDITO Ferguson-CNP   sucralfate (Carafate) 1 gram tablet Take 1 tablet (1 g) by mouth once daily as needed. 6/2/23    Historical Provider, MD   tiZANidine (Zanaflex) 2 mg capsule Take 2 capsules (4 mg) by mouth 2 times a day for 10 days.  Patient taking differently: Take 2 capsules (4 mg) by mouth 2 times a day as needed. 12/22/23 1/1/24  ALFREDITO Jones-CNP        The list below reflectives the updated allergy list. Please review each documented allergy for additional clarification and justification.  Allergies  Reviewed by Norma Busby RN on 3/5/2024        Severity Reactions Comments    Bee Venom Protein (honey Bee) High Anaphylaxis             Below are additional concerns with the patient's PTA list.      Norma Fontanez CPhT

## 2024-03-05 NOTE — PROGRESS NOTES
03/05/24 0727   Discharge Planning   Living Arrangements Spouse/significant other   Support Systems Spouse/significant other   Assistance Needed A&OX3; independent with ADLs with no assistive devices; room air baseline and currently room air; on Eliquis prior to hospitalization   Type of Residence Private residence   Number of Stairs to Enter Residence 4   Number of Stairs Within Residence 14   Do you have animals or pets at home? Yes   Type of Animals or Pets 2 dogs, 2 cats   Who is requesting discharge planning? Provider   Patient expects to be discharged to: spouse denies any home going needs at this time   Does the patient need discharge transport arranged? Yes   RoundTrip coordination needed? Yes   Has discharge transport been arranged? No   Financial Resource Strain   How hard is it for you to pay for the very basics like food, housing, medical care, and heating? Not hard   Housing Stability   In the last 12 months, was there a time when you were not able to pay the mortgage or rent on time? N   In the last 12 months, how many places have you lived? 1   In the last 12 months, was there a time when you did not have a steady place to sleep or slept in a shelter (including now)? N   Transportation Needs   In the past 12 months, has lack of transportation kept you from medical appointments or from getting medications? no   In the past 12 months, has lack of transportation kept you from meetings, work, or from getting things needed for daily living? No     03/05/2024 0729am  Met with patient and patient's spouse bedside in ED (patient sleeping). At this time, spouse denies any home going needs.

## 2024-03-06 ENCOUNTER — APPOINTMENT (OUTPATIENT)
Dept: INTEGRATIVE MEDICINE | Facility: CLINIC | Age: 64
End: 2024-03-06
Payer: MEDICARE

## 2024-03-06 DIAGNOSIS — C78.7 METASTASES TO THE LIVER (MULTI): Primary | ICD-10-CM

## 2024-03-06 DIAGNOSIS — C15.9 STAGE IV MALIGNANT NEOPLASM OF ESOPHAGUS (MULTI): ICD-10-CM

## 2024-03-06 LAB
ALBUMIN SERPL BCP-MCNC: 3.1 G/DL (ref 3.4–5)
ALP SERPL-CCNC: 131 U/L (ref 33–136)
ALT SERPL W P-5'-P-CCNC: 31 U/L (ref 10–52)
ANION GAP SERPL CALC-SCNC: 10 MMOL/L (ref 10–20)
AST SERPL W P-5'-P-CCNC: 48 U/L (ref 9–39)
BASOPHILS # BLD AUTO: 0.01 X10*3/UL (ref 0–0.1)
BASOPHILS NFR BLD AUTO: 0.2 %
BILIRUB SERPL-MCNC: 1.8 MG/DL (ref 0–1.2)
BUN SERPL-MCNC: 12 MG/DL (ref 6–23)
C COLI+JEJ+UPSA DNA STL QL NAA+PROBE: NOT DETECTED
C DIF TOX TCDA+TCDB STL QL NAA+PROBE: NOT DETECTED
CALCIUM SERPL-MCNC: 8.3 MG/DL (ref 8.6–10.3)
CHLORIDE SERPL-SCNC: 104 MMOL/L (ref 98–107)
CO2 SERPL-SCNC: 25 MMOL/L (ref 21–32)
CREAT SERPL-MCNC: 0.73 MG/DL (ref 0.5–1.3)
EC STX1 GENE STL QL NAA+PROBE: NOT DETECTED
EC STX2 GENE STL QL NAA+PROBE: NOT DETECTED
EGFRCR SERPLBLD CKD-EPI 2021: >90 ML/MIN/1.73M*2
EOSINOPHIL # BLD AUTO: 0.04 X10*3/UL (ref 0–0.7)
EOSINOPHIL NFR BLD AUTO: 0.8 %
ERYTHROCYTE [DISTWIDTH] IN BLOOD BY AUTOMATED COUNT: 15.5 % (ref 11.5–14.5)
GLUCOSE BLD MANUAL STRIP-MCNC: 120 MG/DL (ref 74–99)
GLUCOSE BLD MANUAL STRIP-MCNC: 134 MG/DL (ref 74–99)
GLUCOSE BLD MANUAL STRIP-MCNC: 96 MG/DL (ref 74–99)
GLUCOSE SERPL-MCNC: 98 MG/DL (ref 74–99)
HCT VFR BLD AUTO: 28.7 % (ref 41–52)
HGB BLD-MCNC: 9.6 G/DL (ref 13.5–17.5)
IMM GRANULOCYTES # BLD AUTO: 0.02 X10*3/UL (ref 0–0.7)
IMM GRANULOCYTES NFR BLD AUTO: 0.4 % (ref 0–0.9)
LYMPHOCYTES # BLD AUTO: 0.24 X10*3/UL (ref 1.2–4.8)
LYMPHOCYTES NFR BLD AUTO: 5.1 %
MAGNESIUM SERPL-MCNC: 1.79 MG/DL (ref 1.6–2.4)
MCH RBC QN AUTO: 29.5 PG (ref 26–34)
MCHC RBC AUTO-ENTMCNC: 33.4 G/DL (ref 32–36)
MCV RBC AUTO: 88 FL (ref 80–100)
MONOCYTES # BLD AUTO: 0.39 X10*3/UL (ref 0.1–1)
MONOCYTES NFR BLD AUTO: 8.2 %
NEUTROPHILS # BLD AUTO: 4.05 X10*3/UL (ref 1.2–7.7)
NEUTROPHILS NFR BLD AUTO: 85.3 %
NOROVIRUS GI + GII RNA STL NAA+PROBE: NOT DETECTED
NRBC BLD-RTO: 0 /100 WBCS (ref 0–0)
PLATELET # BLD AUTO: 49 X10*3/UL (ref 150–450)
POTASSIUM SERPL-SCNC: 3.6 MMOL/L (ref 3.5–5.3)
PROT SERPL-MCNC: 6.5 G/DL (ref 6.4–8.2)
RBC # BLD AUTO: 3.25 X10*6/UL (ref 4.5–5.9)
RV RNA STL NAA+PROBE: NOT DETECTED
SALMONELLA DNA STL QL NAA+PROBE: NOT DETECTED
SHIGELLA DNA SPEC QL NAA+PROBE: NOT DETECTED
SODIUM SERPL-SCNC: 135 MMOL/L (ref 136–145)
V CHOLERAE DNA STL QL NAA+PROBE: NOT DETECTED
VANCOMYCIN SERPL-MCNC: 17.9 UG/ML (ref 5–20)
WBC # BLD AUTO: 4.8 X10*3/UL (ref 4.4–11.3)
Y ENTEROCOL DNA STL QL NAA+PROBE: NOT DETECTED

## 2024-03-06 PROCEDURE — 80202 ASSAY OF VANCOMYCIN: CPT

## 2024-03-06 PROCEDURE — 83735 ASSAY OF MAGNESIUM: CPT

## 2024-03-06 PROCEDURE — 2500000004 HC RX 250 GENERAL PHARMACY W/ HCPCS (ALT 636 FOR OP/ED)

## 2024-03-06 PROCEDURE — 80053 COMPREHEN METABOLIC PANEL: CPT

## 2024-03-06 PROCEDURE — 82947 ASSAY GLUCOSE BLOOD QUANT: CPT

## 2024-03-06 PROCEDURE — 85025 COMPLETE CBC W/AUTO DIFF WBC: CPT

## 2024-03-06 PROCEDURE — 1200000002 HC GENERAL ROOM WITH TELEMETRY DAILY

## 2024-03-06 PROCEDURE — 2500000001 HC RX 250 WO HCPCS SELF ADMINISTERED DRUGS (ALT 637 FOR MEDICARE OP)

## 2024-03-06 PROCEDURE — 2500000002 HC RX 250 W HCPCS SELF ADMINISTERED DRUGS (ALT 637 FOR MEDICARE OP, ALT 636 FOR OP/ED)

## 2024-03-06 PROCEDURE — 99232 SBSQ HOSP IP/OBS MODERATE 35: CPT | Performed by: STUDENT IN AN ORGANIZED HEALTH CARE EDUCATION/TRAINING PROGRAM

## 2024-03-06 PROCEDURE — 87081 CULTURE SCREEN ONLY: CPT | Mod: GEALAB

## 2024-03-06 RX ORDER — MAGNESIUM SULFATE HEPTAHYDRATE 40 MG/ML
2 INJECTION, SOLUTION INTRAVENOUS ONCE
Status: COMPLETED | OUTPATIENT
Start: 2024-03-06 | End: 2024-03-06

## 2024-03-06 RX ORDER — POTASSIUM CHLORIDE 750 MG/1
10 TABLET, FILM COATED, EXTENDED RELEASE ORAL DAILY
Qty: 30 TABLET | Refills: 3 | Status: SHIPPED | OUTPATIENT
Start: 2024-03-06 | End: 2024-03-15

## 2024-03-06 RX ADMIN — PIPERACILLIN SODIUM AND TAZOBACTAM SODIUM 4.5 G: 4; .5 INJECTION, SOLUTION INTRAVENOUS at 02:11

## 2024-03-06 RX ADMIN — PIPERACILLIN SODIUM AND TAZOBACTAM SODIUM 4.5 G: 4; .5 INJECTION, SOLUTION INTRAVENOUS at 07:50

## 2024-03-06 RX ADMIN — LORAZEPAM 1 MG: 1 TABLET ORAL at 16:00

## 2024-03-06 RX ADMIN — Medication 5 MG: at 21:02

## 2024-03-06 RX ADMIN — VANCOMYCIN HYDROCHLORIDE 1000 MG: 1 INJECTION, SOLUTION INTRAVENOUS at 00:17

## 2024-03-06 RX ADMIN — PANTOPRAZOLE SODIUM 40 MG: 40 TABLET, DELAYED RELEASE ORAL at 08:48

## 2024-03-06 RX ADMIN — METOPROLOL SUCCINATE 25 MG: 50 TABLET, EXTENDED RELEASE ORAL at 08:48

## 2024-03-06 RX ADMIN — VANCOMYCIN HYDROCHLORIDE 1000 MG: 1 INJECTION, SOLUTION INTRAVENOUS at 12:41

## 2024-03-06 RX ADMIN — OXYCODONE HYDROCHLORIDE 10 MG: 10 TABLET ORAL at 07:50

## 2024-03-06 RX ADMIN — PIPERACILLIN SODIUM AND TAZOBACTAM SODIUM 4.5 G: 4; .5 INJECTION, SOLUTION INTRAVENOUS at 14:04

## 2024-03-06 RX ADMIN — PIPERACILLIN SODIUM AND TAZOBACTAM SODIUM 4.5 G: 4; .5 INJECTION, SOLUTION INTRAVENOUS at 21:01

## 2024-03-06 RX ADMIN — FENTANYL 1 PATCH: 25 PATCH TRANSDERMAL at 11:11

## 2024-03-06 RX ADMIN — PANTOPRAZOLE SODIUM 40 MG: 40 TABLET, DELAYED RELEASE ORAL at 21:02

## 2024-03-06 RX ADMIN — LORAZEPAM 1 MG: 1 TABLET ORAL at 07:50

## 2024-03-06 RX ADMIN — MAGNESIUM SULFATE HEPTAHYDRATE 2 G: 2 INJECTION, SOLUTION INTRAVENOUS at 11:11

## 2024-03-06 ASSESSMENT — COGNITIVE AND FUNCTIONAL STATUS - GENERAL
MOBILITY SCORE: 24
DAILY ACTIVITIY SCORE: 24

## 2024-03-06 ASSESSMENT — PAIN SCALES - GENERAL
PAINLEVEL_OUTOF10: 7
PAINLEVEL_OUTOF10: 0 - NO PAIN

## 2024-03-06 ASSESSMENT — PAIN DESCRIPTION - LOCATION: LOCATION: ABDOMEN

## 2024-03-06 NOTE — CONSULTS
"Reason For Consult  Patient with metastatic esophageal cancer admitted because of presyncopal and syncope.  CT scan reviewed.  Shows evidence of progression patient has been on treatment for the past 12 months.  Currently has had chemotherapy escalated.    History Of Present Illness  Massimo Garcia is a 63 y.o. male presenting with possible sepsis abdominal pain with a CT scan suggestive of some abdominal infection.  And colitis.  Patient has esophageal malignancy.  Stage IV disease.  Recently 48 hours ago received chemotherapy and immunotherapy..     Past Medical History  He has a past medical history of Essential (primary) hypertension (12/21/2013), Pacemaker, Peripheral neuropathy, Personal history of other diseases of the circulatory system, and Pneumothorax (12/04/2023).    Surgical History  He has a past surgical history that includes Total knee arthroplasty (09/30/2016); Total knee arthroplasty (09/30/2016); Cardiac electrophysiology procedure (N/A, 11/7/2023); and Cardiac electrophysiology procedure (Left, 11/9/2023).     Social History  He reports that he has quit smoking. His smoking use included cigarettes and cigars. He has never been exposed to tobacco smoke. He has never used smokeless tobacco. He reports that he does not currently use alcohol. He reports current drug use. Drug: Marijuana.    Family History  Family History   Problem Relation Name Age of Onset    Cancer Father          Allergies  Bee venom protein (honey bee)    Review of Systems  Patient weak lying in bed no evidence of neuro impairment noted.  Palpation of the abdomen shows mild lower abdominal tenderness..     Physical Exam  Afebrile anicteric.  Chest good air entry bilaterally.  Lower abdominal tenderness on palpation.     Last Recorded Vitals  Blood pressure 122/73, pulse 79, temperature 36.6 °C (97.9 °F), temperature source Temporal, resp. rate 17, height 1.702 m (5' 7\"), weight 74.8 kg (165 lb), SpO2 97 %.    Relevant Results  Mild " concentric wall thickening of the decompressed sigmoid colon  without significant inflammation.  Low-grade infectious or  inflammatory colitis is a possibility but not definitive.  Findings consistent with the patient's known malignancy and metastatic  disease with numerous pulmonary nodules, numerous hepatic metastases,  and extensive periaortic and upper abdominal lymphadenopathy which has  progressed when compared to the prior study dated January 9, 2024.  Mildly cirrhotic morphology of the liver with splenomegaly.     Assessment/Plan     Mr. Massimo Garcia has stage IV esophageal malignancy.  With evidence of progression.  After 12 months of therapy.  Currently receiving chemoimmunotherapy and presenting with abdominal pain.  His treatment regimen has been escalated and he is receiving trastuzumab plus FOLFOX.    I concur with the management plan of antimicrobial agents.  Will follow alongside with you.    I spent 25 minutes in the professional and overall care of this patient.      Tomasa Blake MD

## 2024-03-06 NOTE — PROGRESS NOTES
Vancomycin Dosing by Pharmacy- FOLLOW UP  Massimo Garcia is a 63 y.o. year old male who Pharmacy has been consulted for vancomycin dosing for other abdominal infection . Based on the patient's indication and renal status this patient is being dosed based on a goal AUC of 400-600.     Renal function is currently stable.    Current vancomycin dose: 1000 mg given every 12 hours    Most recent random level: 17.9 mcg/mL    Visit Vitals  /74 (BP Location: Left arm, Patient Position: Lying)   Pulse 75   Temp 36.5 °C (97.7 °F) (Temporal)   Resp 18      Lab Results   Component Value Date    CREATININE 0.73 03/06/2024    CREATININE 0.80 03/05/2024    CREATININE 0.86 03/05/2024    CREATININE 0.86 03/05/2024      I/O last 3 completed shifts:  In: 3394 (45.3 mL/kg) [I.V.:50 (0.7 mL/kg); IV Piggyback:3344]  Out: - (0 mL/kg)   Weight: 74.8 kg       Assessment/Plan  Within goal AUC range. Continue current vancomycin regimen.  This dosing regimen is predicted by [employer] to result in the following pharmacokinetic parameters:  Regimen: 1000 mg IV every 12 hours.  Start time: 12:17 on 03/06/2024  Exposure target: AUC24 (range)400-600 mg/L.hr   AUC24,ss: 439 mg/L.hr  Probability of AUC24 > 400: 66 %  Ctrough,ss: 13.5 mg/L  Probability of Ctrough,ss > 20: 14 %  Probability of nephrotoxicity (Lodise VLAD 2009): 9 %  The next level will be obtained on 3/8 with AM labs. May be obtained sooner if clinically indicated.   Will continue to monitor renal function daily while on vancomycin and order serum creatinine at least every 48 hours if not already ordered.  Follow for continued vancomycin needs, clinical response, and signs/symptoms of toxicity.     Dee Cuevas, PharmD  PGY-1 Pharmacy Resident

## 2024-03-06 NOTE — PROGRESS NOTES
Massimo Garcia is a 63 y.o. male on day 1 of admission presenting with Sepsis (CMS/HCC).      Subjective   Patient was seen and examined this morning at bedside.  Patient states that he did have episodes of diarrhea but he thinks this is because he received Colace yesterday.  His main complaint today is frequent belching and hiccups which causes pain and is difficult to control.  He states this is related to his chemotherapy treatment.  Otherwise patient still having some abdominal discomfort but denies any pain.  Denies shortness of breath or chest pain, denies fevers or chills.  Patient and spouse are asking if he is able to go home today.       Objective     Last Recorded Vitals  /74 (BP Location: Left arm, Patient Position: Lying)   Pulse 75   Temp 36.5 °C (97.7 °F) (Temporal)   Resp 18   Wt 74.8 kg (165 lb)   SpO2 99%   Intake/Output last 3 Shifts:    Intake/Output Summary (Last 24 hours) at 3/6/2024 1429  Last data filed at 3/5/2024 1819  Gross per 24 hour   Intake 50 ml   Output --   Net 50 ml         Admission Weight  Weight: 74.8 kg (165 lb) (03/04/24 2249)    Daily Weight  03/04/24 : 74.8 kg (165 lb)    Image Results  ECG 12 Lead  Sinus tachycardia  Right bundle branch block  T wave abnormality, consider inferior ischemia  Abnormal ECG  When compared with ECG of 28-FEB-2024 15:10,  Vent. rate has increased BY  39 BPM  Right bundle branch block has replaced Nonspecific intraventricular block      Physical Exam  General: Well developed patient, alert and oriented, NAD  HEENT: no lesions, no scleral icterus, moist mucous membranes  Neuro: A&Ox3, grossly normal  CV: RRR, nrl S1, S2, no murmur, rubs, or clicks  Resp: Good bilateral air entry. No crackles,  wheezing, or rhonchi  Abdomen: Non distended, + BS, soft, No tenderness to palpation  Extremities: No lower extremity edema, + PP  Skin: No jaundice, no lesions   Psych: Appropriate mood and behavior    Relevant Results             Scheduled  medications  [Held by provider] chlorproMAZINE, 50 mg, oral, TID  [Held by provider] enoxaparin, 40 mg, subcutaneous, q24h  fentaNYL, 1 patch, transdermal, q72h  heparin flush, 50 Units, intra-catheter, q12h  melatonin, 5 mg, oral, Nightly  metoprolol succinate XL, 25 mg, oral, Daily  pantoprazole, 40 mg, oral, BID  PARoxetine, 10 mg, oral, q AM  piperacillin-tazobactam, 4.5 g, intravenous, q6h  vancomycin, 1,000 mg, intravenous, q12h      Continuous medications     PRN medications  PRN medications: [Held by provider] acetaminophen, alteplase, heparin flush, HYDROmorphone, LORazepam, magnesium hydroxide, [Held by provider] meclizine, [Held by provider] metoclopramide, oxyCODONE, oxyCODONE, scopolamine, sodium chloride, trimethobenzamide, vancomycin    Results for orders placed or performed during the hospital encounter of 03/04/24 (from the past 24 hour(s))   POCT GLUCOSE   Result Value Ref Range    POCT Glucose 119 (H) 74 - 99 mg/dL   POCT GLUCOSE   Result Value Ref Range    POCT Glucose 149 (H) 74 - 99 mg/dL   C. difficile, PCR    Specimen: Stool   Result Value Ref Range    C. difficile, PCR Not Detected Not Detected   CBC and Auto Differential   Result Value Ref Range    WBC 4.8 4.4 - 11.3 x10*3/uL    nRBC 0.0 0.0 - 0.0 /100 WBCs    RBC 3.25 (L) 4.50 - 5.90 x10*6/uL    Hemoglobin 9.6 (L) 13.5 - 17.5 g/dL    Hematocrit 28.7 (L) 41.0 - 52.0 %    MCV 88 80 - 100 fL    MCH 29.5 26.0 - 34.0 pg    MCHC 33.4 32.0 - 36.0 g/dL    RDW 15.5 (H) 11.5 - 14.5 %    Platelets 49 (L) 150 - 450 x10*3/uL    Neutrophils % 85.3 40.0 - 80.0 %    Immature Granulocytes %, Automated 0.4 0.0 - 0.9 %    Lymphocytes % 5.1 13.0 - 44.0 %    Monocytes % 8.2 2.0 - 10.0 %    Eosinophils % 0.8 0.0 - 6.0 %    Basophils % 0.2 0.0 - 2.0 %    Neutrophils Absolute 4.05 1.20 - 7.70 x10*3/uL    Immature Granulocytes Absolute, Automated 0.02 0.00 - 0.70 x10*3/uL    Lymphocytes Absolute 0.24 (L) 1.20 - 4.80 x10*3/uL    Monocytes Absolute 0.39 0.10 - 1.00  x10*3/uL    Eosinophils Absolute 0.04 0.00 - 0.70 x10*3/uL    Basophils Absolute 0.01 0.00 - 0.10 x10*3/uL   Comprehensive Metabolic Panel   Result Value Ref Range    Glucose 98 74 - 99 mg/dL    Sodium 135 (L) 136 - 145 mmol/L    Potassium 3.6 3.5 - 5.3 mmol/L    Chloride 104 98 - 107 mmol/L    Bicarbonate 25 21 - 32 mmol/L    Anion Gap 10 10 - 20 mmol/L    Urea Nitrogen 12 6 - 23 mg/dL    Creatinine 0.73 0.50 - 1.30 mg/dL    eGFR >90 >60 mL/min/1.73m*2    Calcium 8.3 (L) 8.6 - 10.3 mg/dL    Albumin 3.1 (L) 3.4 - 5.0 g/dL    Alkaline Phosphatase 131 33 - 136 U/L    Total Protein 6.5 6.4 - 8.2 g/dL    AST 48 (H) 9 - 39 U/L    Bilirubin, Total 1.8 (H) 0.0 - 1.2 mg/dL    ALT 31 10 - 52 U/L   Magnesium   Result Value Ref Range    Magnesium 1.79 1.60 - 2.40 mg/dL   Vancomycin   Result Value Ref Range    Vancomycin 17.9 5.0 - 20.0 ug/mL   POCT GLUCOSE   Result Value Ref Range    POCT Glucose 120 (H) 74 - 99 mg/dL   POCT GLUCOSE   Result Value Ref Range    POCT Glucose 96 74 - 99 mg/dL       Assessment/Plan      Massimo Garcia is a 63 y.o. male with pmhx of third degree heart block s/p PPM (placed in November 2024), esophageal cancer with mets to liver and lungs, PE, peripheral neuropathy, and portal vein thrombosis (Eliquis recently held) presenting with fever and chills after chemotherapy infusion. Admitted for sepsis.     Acute medical issues  #Fever/chills  #Rule-out infection in setting of immunodeficiency  -Patient received infusion of oxaliplatin, fluorouracil, leucovorin, and trastuzumab on 3/4/24, developed fever/chills later in the evening  -Initial concern for sepsis on arrival (tachycardia, fever, tachypnea) -- resolved  -Low concern for neutropenic fever/neutropenic colitis given normal/slightly elevated absolute neutrophils  -Chemo port does not appear infected  -CTAP showed concern for potential sigmoid colitis however this is low likelihood  -CXR with no acute cardiopulmonary process  -Low suspicion for DVT/PE  given no leg pain/swelling and no hypoxia/increased oxygen requirement  -No clear source of infection at this time. Will continue to monitor vitals and CBC  -WBC 4.3 >8.9 >4.8  -Lactate: 4.2 >2.9 >1.8  -s/p IV Vanc and Zosyn, 1L IVF in ED  -Blood cultures collected -- No growth x1 day  -Continuing empiric tx with IV Vanc and Zosyn  -Stool pathogen PCR and C diff PCR pending  -Updated and consulted patient's oncologist, Dr. Blake, appreciate recs  -Will continue to monitor overnight and if blood cultures remain negative on 3/7 plan to discharge patient home with 5 day course of Augmentin     #Nausea/vomiting/abdominal pain, likely chemotherapy-induced  -hold home chlorpromazine and meclizine due to prolonged Qtc; discontinued olanzapine  -cont home scopolamine patch as first line; home ativan second line; IM Tigan prn third line  -Oxy 5 for mild and 10 for severe pain, IV 0.2 dilaudid breakthrough    #Thrombocytopenia  #Hx of portal vein thrombosis  -plt 52 >48  -continuing to hold home Eliquis (reportedly held for >1 week)  -Holding DVT prophylaxis for now given high bleeding risk. Will resume lovenox if thrombocytopenia improves  -monitor    #Elevated transaminase  -AST increasing since January  -R Factor 0.6, suggestive of Cholestatic injury  -Cirrhotic changes on CT, known liver mets  -possible etiologies include mets, or hepatotoxicity 2/2 chemotherapy  -monitor RFP, avoid hepatotoxic medications  -hepatitis panel normal       Chronic medical issues  #history of Vtach  -cont home metoprolol succ XL 25 mg daily     #Anxiety  -cont home ativan     #Depression  -cont home paroxetine     #GERD  -cont protonix 40 mg BID        Fluids: IV bolus prn  Electrolytes: replete prn  Nutrition: Adult regular  GI Ppx: Protonix 40 mg BID  DVT Ppx: holding      Oxygenation: RA  Antibiotics: IV Vanc and Zosyn     Dispo: 62yo M with hx of metastatic esophageal cancer presenting w/ nausea/vomiting/fever/chills after chemotherapy  infusion. Concern for possible infectious etiology although no clear source. Treating empirically with IV Vanc and Zosyn, blood cultures NG x1day. Plan to discharge home on 3/7 with PO Augmentin if blood cultures remain negative.      Franco Clay,   PGY-1 Transitional Year

## 2024-03-06 NOTE — PROGRESS NOTES
03/06/24 1020   Discharge Planning   Living Arrangements Spouse/significant other   Support Systems Spouse/significant other   Assistance Needed A&OX3; independent with ADLs with no assistive devices; room air baseline and currently room air; on Eliquis prior to hospitalization   Type of Residence Private residence   Number of Stairs to Enter Residence 4   Number of Stairs Within Residence 14   Do you have animals or pets at home? Yes   Type of Animals or Pets 2 dogs, 2 cats   Who is requesting discharge planning? Provider   Patient expects to be discharged to: spouse denies any home going needs at this time   Does the patient need discharge transport arranged? Yes   RoundTrip coordination needed? Yes   Has discharge transport been arranged? No

## 2024-03-07 ENCOUNTER — PHARMACY VISIT (OUTPATIENT)
Dept: PHARMACY | Facility: CLINIC | Age: 64
End: 2024-03-07
Payer: MEDICARE

## 2024-03-07 VITALS
WEIGHT: 165 LBS | SYSTOLIC BLOOD PRESSURE: 98 MMHG | DIASTOLIC BLOOD PRESSURE: 64 MMHG | TEMPERATURE: 97.9 F | HEIGHT: 67 IN | BODY MASS INDEX: 25.9 KG/M2 | OXYGEN SATURATION: 99 % | HEART RATE: 72 BPM | RESPIRATION RATE: 18 BRPM

## 2024-03-07 LAB
ALBUMIN SERPL BCP-MCNC: 3 G/DL (ref 3.4–5)
ALP SERPL-CCNC: 123 U/L (ref 33–136)
ALT SERPL W P-5'-P-CCNC: 24 U/L (ref 10–52)
ANION GAP SERPL CALC-SCNC: 10 MMOL/L (ref 10–20)
AST SERPL W P-5'-P-CCNC: 33 U/L (ref 9–39)
BASOPHILS # BLD AUTO: 0.01 X10*3/UL (ref 0–0.1)
BASOPHILS NFR BLD AUTO: 0.3 %
BILIRUB SERPL-MCNC: 1.3 MG/DL (ref 0–1.2)
BUN SERPL-MCNC: 10 MG/DL (ref 6–23)
CALCIUM SERPL-MCNC: 8.4 MG/DL (ref 8.6–10.3)
CHLORIDE SERPL-SCNC: 104 MMOL/L (ref 98–107)
CO2 SERPL-SCNC: 27 MMOL/L (ref 21–32)
CREAT SERPL-MCNC: 0.75 MG/DL (ref 0.5–1.3)
EGFRCR SERPLBLD CKD-EPI 2021: >90 ML/MIN/1.73M*2
EOSINOPHIL # BLD AUTO: 0.14 X10*3/UL (ref 0–0.7)
EOSINOPHIL NFR BLD AUTO: 3.8 %
ERYTHROCYTE [DISTWIDTH] IN BLOOD BY AUTOMATED COUNT: 15.6 % (ref 11.5–14.5)
GLUCOSE SERPL-MCNC: 78 MG/DL (ref 74–99)
HCT VFR BLD AUTO: 27.9 % (ref 41–52)
HGB BLD-MCNC: 9.3 G/DL (ref 13.5–17.5)
IMM GRANULOCYTES # BLD AUTO: 0.01 X10*3/UL (ref 0–0.7)
IMM GRANULOCYTES NFR BLD AUTO: 0.3 % (ref 0–0.9)
LYMPHOCYTES # BLD AUTO: 0.55 X10*3/UL (ref 1.2–4.8)
LYMPHOCYTES NFR BLD AUTO: 15 %
MAGNESIUM SERPL-MCNC: 1.78 MG/DL (ref 1.6–2.4)
MCH RBC QN AUTO: 29.6 PG (ref 26–34)
MCHC RBC AUTO-ENTMCNC: 33.3 G/DL (ref 32–36)
MCV RBC AUTO: 89 FL (ref 80–100)
MONOCYTES # BLD AUTO: 0.3 X10*3/UL (ref 0.1–1)
MONOCYTES NFR BLD AUTO: 8.2 %
NEUTROPHILS # BLD AUTO: 2.66 X10*3/UL (ref 1.2–7.7)
NEUTROPHILS NFR BLD AUTO: 72.4 %
NRBC BLD-RTO: 0 /100 WBCS (ref 0–0)
PLATELET # BLD AUTO: 54 X10*3/UL (ref 150–450)
POTASSIUM SERPL-SCNC: 3.6 MMOL/L (ref 3.5–5.3)
PROT SERPL-MCNC: 6.3 G/DL (ref 6.4–8.2)
RBC # BLD AUTO: 3.14 X10*6/UL (ref 4.5–5.9)
SODIUM SERPL-SCNC: 137 MMOL/L (ref 136–145)
WBC # BLD AUTO: 3.7 X10*3/UL (ref 4.4–11.3)

## 2024-03-07 PROCEDURE — 85025 COMPLETE CBC W/AUTO DIFF WBC: CPT

## 2024-03-07 PROCEDURE — 2500000004 HC RX 250 GENERAL PHARMACY W/ HCPCS (ALT 636 FOR OP/ED)

## 2024-03-07 PROCEDURE — RXMED WILLOW AMBULATORY MEDICATION CHARGE

## 2024-03-07 PROCEDURE — 80053 COMPREHEN METABOLIC PANEL: CPT

## 2024-03-07 PROCEDURE — 99239 HOSP IP/OBS DSCHRG MGMT >30: CPT | Performed by: STUDENT IN AN ORGANIZED HEALTH CARE EDUCATION/TRAINING PROGRAM

## 2024-03-07 PROCEDURE — 2500000001 HC RX 250 WO HCPCS SELF ADMINISTERED DRUGS (ALT 637 FOR MEDICARE OP)

## 2024-03-07 PROCEDURE — 83735 ASSAY OF MAGNESIUM: CPT

## 2024-03-07 RX ORDER — AMOXICILLIN AND CLAVULANATE POTASSIUM 875; 125 MG/1; MG/1
1 TABLET, FILM COATED ORAL 2 TIMES DAILY
Qty: 10 TABLET | Refills: 0 | Status: SHIPPED | OUTPATIENT
Start: 2024-03-07 | End: 2024-03-23 | Stop reason: HOSPADM

## 2024-03-07 RX ORDER — LANOLIN ALCOHOL/MO/W.PET/CERES
400 CREAM (GRAM) TOPICAL DAILY
Status: DISCONTINUED | OUTPATIENT
Start: 2024-03-07 | End: 2024-03-07 | Stop reason: HOSPADM

## 2024-03-07 RX ADMIN — Medication 400 MG: at 09:14

## 2024-03-07 RX ADMIN — HEPARIN, PORCINE (PF) 10 UNIT/ML INTRAVENOUS SYRINGE 50 UNITS: at 05:52

## 2024-03-07 RX ADMIN — METOPROLOL SUCCINATE 25 MG: 50 TABLET, EXTENDED RELEASE ORAL at 09:14

## 2024-03-07 RX ADMIN — OXYCODONE HYDROCHLORIDE 10 MG: 10 TABLET ORAL at 07:59

## 2024-03-07 RX ADMIN — VANCOMYCIN HYDROCHLORIDE 1000 MG: 1 INJECTION, SOLUTION INTRAVENOUS at 00:24

## 2024-03-07 RX ADMIN — PIPERACILLIN SODIUM AND TAZOBACTAM SODIUM 4.5 G: 4; .5 INJECTION, SOLUTION INTRAVENOUS at 09:14

## 2024-03-07 RX ADMIN — HEPARIN 500 UNITS: 100 SYRINGE at 10:15

## 2024-03-07 RX ADMIN — PIPERACILLIN SODIUM AND TAZOBACTAM SODIUM 4.5 G: 4; .5 INJECTION, SOLUTION INTRAVENOUS at 02:47

## 2024-03-07 RX ADMIN — PANTOPRAZOLE SODIUM 40 MG: 40 TABLET, DELAYED RELEASE ORAL at 09:14

## 2024-03-07 ASSESSMENT — COGNITIVE AND FUNCTIONAL STATUS - GENERAL
DAILY ACTIVITIY SCORE: 24
MOBILITY SCORE: 24
MOBILITY SCORE: 24
DAILY ACTIVITIY SCORE: 24

## 2024-03-07 ASSESSMENT — PAIN SCALES - GENERAL
PAINLEVEL_OUTOF10: 8
PAINLEVEL_OUTOF10: 0 - NO PAIN

## 2024-03-07 ASSESSMENT — PAIN - FUNCTIONAL ASSESSMENT: PAIN_FUNCTIONAL_ASSESSMENT: 0-10

## 2024-03-07 ASSESSMENT — PAIN DESCRIPTION - LOCATION: LOCATION: ABDOMEN

## 2024-03-07 NOTE — CARE PLAN
The patient's goals for the shift include  rest    The clinical goals for the shift include pt will have managed pain throughout shift

## 2024-03-07 NOTE — DISCHARGE SUMMARY
Discharge Diagnosis  Fever  Metastatic esophageal cancer on chemotherapy  Immunosuppression  Thrombocytopenia  History of portal vein thrombosis    Issues Requiring Follow-Up  -Take 5 day course of Augmentin  -Follow-up with oncologist and upcoming chemotherapy infusions  -Follow-up with PCP    Discharge Meds     Your medication list        START taking these medications        Instructions Last Dose Given Next Dose Due   amoxicillin-pot clavulanate 875-125 mg tablet  Commonly known as: Augmentin      Take 1 tablet by mouth 2 times a day for 5 days.              CHANGE how you take these medications        Instructions Last Dose Given Next Dose Due   metoprolol succinate XL 25 mg 24 hr tablet  Commonly known as: Toprol-XL  What changed: when to take this      Take 1 tablet (25 mg) by mouth once daily. Do not crush or chew.       potassium chloride CR 10 mEq ER tablet  Commonly known as: Klor-Con  What changed: when to take this      TAKE 1 TABLET BY MOUTH ONCE DAILY       tiZANidine 2 mg capsule  Commonly known as: Zanaflex  What changed:   when to take this  reasons to take this      Take 2 capsules (4 mg) by mouth 2 times a day for 10 days.              CONTINUE taking these medications        Instructions Last Dose Given Next Dose Due   apixaban 5 mg tablet  Commonly known as: Eliquis      TAKE 1 TABLET BY MOUTH TWO TIMES A DAY       cholecalciferol 50 MCG (2000 UT) tablet  Commonly known as: Vitamin D-3           Deep Sea Nasal 0.65 % nasal spray  Generic drug: sodium chloride      Administer 1 spray into each nostril if needed for congestion (or nasal dryness/ bleeding).       fentaNYL 25 mcg/hr patch  Commonly known as: Duragesic      Place 1 patch over 72 hours on the skin every 3rd day.       lactulose 20 gram/30 mL oral solution      Take 30 mL (20 g) by mouth 4 times a day as needed (for constipation, if no relief from colace/ senna/ milk of magnesia).       LORazepam 1 mg tablet  Commonly known as:  Ativan      Take 1 tablet (1 mg) by mouth every 8 hours if needed for anxiety (nausea).       meclizine 25 mg tablet  Commonly known as: Antivert      Take 1 tablet (25 mg) by mouth 3 times a day as needed for dizziness for up to 10 days.       medical cannabis           metoclopramide 10 mg tablet  Commonly known as: Reglan      Take 1 tablet (10 mg) by mouth 4 times a day as needed (nausea). Take before meals and at bedtime.       multivitamin with minerals tablet           oxyCODONE 10 mg immediate release tablet  Commonly known as: Roxicodone      Take 1 tablet (10 mg) by mouth every 4 hours if needed for severe pain (7 - 10).       pantoprazole 40 mg EC tablet  Commonly known as: ProtoNix      TAKE 1 TABLET BY MOUTH TWICE A DAY       sucralfate 1 gram tablet  Commonly known as: Carafate                  STOP taking these medications      dexAMETHasone 4 mg tablet  Commonly known as: Decadron        PARoxetine 10 mg tablet  Commonly known as: Paxil        phytonadione 5 mg tablet  Commonly known as: Vitamin K                  Where to Get Your Medications        These medications were sent to [facility]/pharmacy #[id number] - [address] [address]      Phone: [phone]   potassium chloride CR 10 mEq ER tablet       These medications were sent to [facility]  [address] [facility] [address]      Hours: 9 AM to 5 PM Mon-Fri Phone: [phone]   amoxicillin-pot clavulanate 875-125 mg tablet         Test Results Pending At Discharge  Pending Labs       Order Current Status    Staphylococcus Aureus/MRSA Colonization, Culture In process    Blood Culture Preliminary result    Blood Culture Preliminary result            Hospital Course  Brief HPI: Patient is a 64yo male with pmhx of third degree heart block s/p PPM (placed in November), esophageal cancer with mets to liver and lungs, PE, peripheral neuropathy, and portal vein thrombosis (Eliquis recently held) presenting  with fever and chills after chemotherapy infusion.    After his infusion, he returned home, wasn't feeling well and went to sleep. His wife woke him and found that he was having chills, sweats, and a fever > 100.4 F. Additionally patient reports abdominal pain in the lower center area of his abdomen that was 5/10 then and 6/10 now. He denies any diarrhea or vomiting.      ED Course: Patient with T 101.7 F, , RR 22 on arrival. Sepsis alert called. Patient given IV Vanc and Zosyn as well as a 1 L bolus of IV fluids. Further fluids not given due to patient remaining normotensive.  CT abdomen/pelvis w/ shows concern for possible low-grade infectious or inflammatory colitis (cocentric wall thickening of decompressed sigmoid colon) as well as findings consistent with patient's known malignancy and metastatic disease and progression of extensive periaortic and upper abdominal lymphadenopathy. Patient admitted to inpatient for further treatment.    Hospital Course: patient admit reason r/o infection in setting of immunodefiency as he recently received an infusion consisting of: Oxaliplatin, fluoroscopy UreSil, leucovorin and trastuzumab on 3/4/2024.  WBC increased from 4.3-8.9 and trended back down to 4.8.  In light of his immunodeficiency status we continued to treat empirically with Vanco and Zosyn. Patient's oncologist was updated and consulted, agreed with empiric tx. Patient remained stable and was clear for discharge on 3/7 after blood cultures resulted negative for 48h. Patient was given Augmentin for 5 days on discharge.    Pertinent Physical Exam At Time of Discharge  Physical Exam  General: Well developed patient, alert and oriented, NAD  HEENT: no lesions, no scleral icterus, moist mucous membranes  Neuro: A&Ox3, grossly normal  CV: RRR, nrl S1, S2, no murmur, rubs, or clicks  Resp: Good bilateral air entry. No crackles,  wheezing, or rhonchi  Abdomen: Non distended, + BS, soft, No tenderness to  palpation  Extremities: No lower extremity edema, + PP  Skin: No jaundice, no lesions   Psych: Appropriate mood and behavior    Outpatient Follow-Up  Future Appointments   Date Time Provider Department [place]   3/13/2024 10:15 AM MD Jason Guevara [facility]   3/18/2024  9:30 AM INF 19 GEAUGA [facility]   3/28/2024 10:00 AM Sugar Hernandez APRN-CNP [facility]APAL1 None   4/1/2024  9:30 AM Tomasa Blake MD [facility]   4/1/2024 10:00 AM INF 04 GEAUGA [facility]   4/8/2024  9:30 AM INF 00 GEAUGA [facility]   4/15/2024  8:00 AM MD ANDREAS K[facility]ariAMOC1 [facility]   4/15/2024  8:30 AM INF 01 GEAUGA [facility]   4/29/2024  9:00 AM MD SILVA KothariGEAMOC1 [facility]   4/29/2024 10:00 AM INF 06 GEAUGA [facility]   7/1/2024 11:00 AM Kristie M JEANCARLOS'Whitaker, APRN-CNP [facility]   7/2/2024 11:00 AM Kristie M JEANCARLOS'Whitaker, APRN-CNP [facility]   7/3/2024 11:00 AM Kristie M JEANCARLOS'Whitaker, APRN-CNP [facility]   7/8/2024  8:30 AM Kristie M JEANCARLOS'Whitaker, APRN-CNP [facility]   7/9/2024  9:30 AM Kristie M O'Whitaker, APRN-CNP [facility]   7/10/2024  9:30 AM Kristie M O'Whitaker, APRN-CNP [facility]   7/11/2024  9:30 AM Kristie M JEANCARLOS'Whitaker, APRN-CNP [facility]   7/15/2024  9:30 AM Kristie M JEANCARLOS'Whitaker, APRN-CNP [facility]   -Follow-up with oncology  -Follow-up with PCP      Franco Clay DO  PGY-1 Transitional Year

## 2024-03-07 NOTE — DISCHARGE INSTRUCTIONS
1) Please, take your home medications as instructed after being discharged from the hospital.    NEW MEDICATIONS:  -Augmentin two times a day for 5 days      2) Please, follow-up with your primary care provider within 7 to 14 days after leaving the hospital. Please call your primary physician's office to schedule an appointment. Bring your photo ID and insurance card to your appointment.   [facility]  services can be reached at [phone] and appointment services can be reached at [phone].    3) If you experience any worsening symptoms or have any concerns, please contact your primary care provider to schedule an appointment. If you cannot get in touch with your primary care physician, please return to the nearest emergency room or urgent care clinic for an evaluation and treatment.    Thank you for choosing [facility] and allowing us to partake in your medical care!    - Your primary care inpatient team.

## 2024-03-07 NOTE — PROGRESS NOTES
03/07/24 1007   Discharge Planning   Living Arrangements Spouse/significant other   Support Systems Spouse/significant other   Assistance Needed Patient is A&Ox3, on room air at baseline, is independent with ADL's and uses no DME at home, drives, on Eliquis. Patient denies further needs upon discharge   Type of Residence Private residence   Number of Stairs to Enter Residence 4   Number of Stairs Within Residence 14   Do you have animals or pets at home? Yes   Type of Animals or Pets 2 dogs, 2 cats   Who is requesting discharge planning? Provider   Home or Post Acute Services None   Patient expects to be discharged to: Home no needs   Does the patient need discharge transport arranged? No   RoundTrip coordination needed? No   Has discharge transport been arranged? No     3/7/24 at 1008: Patient medically ready for discharge. Patient will discharge home with spouse and continues to deny any home going needs. Patient's spouse will provide transport home.

## 2024-03-08 ENCOUNTER — PATIENT OUTREACH (OUTPATIENT)
Dept: PRIMARY CARE | Facility: CLINIC | Age: 64
End: 2024-03-08
Payer: MEDICARE

## 2024-03-08 NOTE — SIGNIFICANT EVENT
Follow Up Phone Call    Outgoing phone call    Spoke to: Massimo Garcia Relationship:self   Phone number: [phone]      Outcome: I left a message on answering machine   Chief Complaint   Patient presents with    Fever     Had chemo today and developed a fever of 101.5°f          Diagnosis:Not applicable

## 2024-03-09 LAB
BACTERIA BLD CULT: NORMAL
BACTERIA BLD CULT: NORMAL
STAPHYLOCOCCUS SPEC CULT: NORMAL

## 2024-03-10 LAB
ATRIAL RATE: 109 BPM
P AXIS: 49 DEGREES
P OFFSET: 165 MS
P ONSET: 113 MS
PR INTERVAL: 156 MS
Q ONSET: 191 MS
QRS COUNT: 18 BEATS
QRS DURATION: 150 MS
QT INTERVAL: 386 MS
QTC CALCULATION(BAZETT): 519 MS
QTC FREDERICIA: 470 MS
R AXIS: 109 DEGREES
T AXIS: 29 DEGREES
T OFFSET: 384 MS
VENTRICULAR RATE: 109 BPM

## 2024-03-11 ENCOUNTER — TELEPHONE (OUTPATIENT)
Dept: HEMATOLOGY/ONCOLOGY | Facility: CLINIC | Age: 64
End: 2024-03-11
Payer: MEDICARE

## 2024-03-11 ENCOUNTER — APPOINTMENT (OUTPATIENT)
Dept: HEMATOLOGY/ONCOLOGY | Facility: CLINIC | Age: 64
End: 2024-03-11
Payer: MEDICARE

## 2024-03-11 ENCOUNTER — PHARMACY VISIT (OUTPATIENT)
Dept: PHARMACY | Facility: CLINIC | Age: 64
End: 2024-03-11
Payer: MEDICARE

## 2024-03-11 DIAGNOSIS — C78.7 METASTASES TO THE LIVER (MULTI): ICD-10-CM

## 2024-03-11 DIAGNOSIS — C15.9 STAGE IV MALIGNANT NEOPLASM OF ESOPHAGUS (MULTI): Primary | ICD-10-CM

## 2024-03-11 DIAGNOSIS — R06.6 HICCUPS: ICD-10-CM

## 2024-03-11 PROCEDURE — RXMED WILLOW AMBULATORY MEDICATION CHARGE

## 2024-03-11 RX ORDER — CHLORPROMAZINE HYDROCHLORIDE 25 MG/1
25 TABLET, FILM COATED ORAL 2 TIMES DAILY
Qty: 60 TABLET | Refills: 0 | Status: ON HOLD | OUTPATIENT
Start: 2024-03-11 | End: 2024-03-19 | Stop reason: ENTERED-IN-ERROR

## 2024-03-11 NOTE — TELEPHONE ENCOUNTER
Dr. Blake called pt and sent medications.  Magic Mouthwash sent to [facility] pharmacy.  Pt and wife aware M. Mouthwash was sent to [facility].  Thorazine sent to pharmacy per Dr. Blake.

## 2024-03-11 NOTE — SIGNIFICANT EVENT
Follow Up Phone Call    Outgoing phone call    Spoke to: Massimo Garcia Relationship:self   Phone number: [phone]      Outcome: contacted patient/ family   Chief Complaint   Patient presents with    Fever     Had chemo today and developed a fever of 101.5°f          Diagnosis:Not applicable    He currently has a call out to the oncologist. He has had hiccoughs for 6 days. No further questions or concerns.

## 2024-03-11 NOTE — TELEPHONE ENCOUNTER
Called and spoke to pt and his wife.  He was treated last Monday and had hiccoughs begin Tuesday.  He states he was in the hospital Monday through Thursday for fever.  Now, he has had hiccoughs almost non-stop.  He takes the Reglan twice per day and states it helps some, but has not taken 4 times per day as he could do because they are nervous about side effects and he thinks he feels a bit foggy after taking medication.      He is wondering if he can take something else for hiccoughs?  He does not mind if it makes him sleepy, and has not been sleeping well.

## 2024-03-11 NOTE — TELEPHONE ENCOUNTER
Called and spoke to pt and wife and verified that script can go to [facility] [place] for Magic Mouthwash.  Script faxed to pharmacy.  Pharmacy called and confirmed that they can process the mouthwash.

## 2024-03-13 ENCOUNTER — ALLIED HEALTH (OUTPATIENT)
Dept: INTEGRATIVE MEDICINE | Facility: CLINIC | Age: 64
End: 2024-03-13
Payer: MEDICARE

## 2024-03-13 DIAGNOSIS — C15.9 STAGE IV MALIGNANT NEOPLASM OF ESOPHAGUS (MULTI): ICD-10-CM

## 2024-03-13 DIAGNOSIS — C78.7 METASTASES TO THE LIVER (MULTI): Primary | ICD-10-CM

## 2024-03-13 PROCEDURE — 99213 OFFICE O/P EST LOW 20 MIN: CPT | Performed by: HOSPITALIST

## 2024-03-13 ASSESSMENT — ENCOUNTER SYMPTOMS
HEADACHES: 1
CHILLS: 0
DIFFICULTY URINATING: 0
RHINORRHEA: 1
FEVER: 0
TROUBLE SWALLOWING: 0
PALPITATIONS: 0
COUGH: 0
SHORTNESS OF BREATH: 0
WHEEZING: 0

## 2024-03-13 ASSESSMENT — PAIN SCALES - GENERAL: PAINLEVEL_OUTOF10: 2

## 2024-03-13 NOTE — PROGRESS NOTES
Acupuncture Visit:     Subjective   Patient ID: Massimo Garcia is a 63 y.o. male who presents for No chief complaint on file.  Some changes  A few days of improvement following tx, less neuropathy  5/10  Digestion OK  Hiccups last 8 days  BM- normal  Nausea- slight.  Sleep- ok overall.           Neuropathy -  Bottom of feet, equal both fee, feel in toes and to mid foot  Fingertips as well  Sicne starting chemo  Worse with recent protocol.   7/10 oftten  Can be more intense walkung  Does not afftect sleep much.   Gabapentin, no help   No OTC      Stomach discomfort - more gas recently  Worse since chemo on Monday  Swithced protocol  Bowels consistnet, bm daily  3-4/10  Some nausea, no vomiting    Diet:  Good, eating whatever he can    Fatigue- hard in winter in the cold  5-6/10  Worse in the evening    Supp-s  Balance in nature  Alive mult  Vitamin D  Vit c                   Pre-treatment Assessment  Pain Score: 6  Anxiety Level (0-10): 3  Stress Level (0-10): 2  Coping Level (0-10): 7  Depression Level (0-10): 2  Fatigue Level (0-10): 7  Nausea Level (0-10): 2  Wellbeing Level (0-10): 4    Review of Systems   Constitutional:  Negative for chills and fever.   HENT:  Positive for rhinorrhea and tinnitus. Negative for trouble swallowing.    Eyes:  Negative for visual disturbance.   Respiratory:  Negative for cough, shortness of breath and wheezing.    Cardiovascular:  Negative for chest pain and palpitations.        Has pacemaker, feels it occ   Genitourinary:  Negative for difficulty urinating.   Skin:  Negative for rash.   Neurological:  Positive for headaches (occasional).            Provider reviewed plan for the acupuncture session, precautions and contraindications. Patient/guardian/hospital staff has given consent to treat with full understanding of what to expect during the session. Before acupuncture began, provider explained to the patient to communicate at any time if the procedure was causing discomfort past  their tolerance level. Patient agreed to advise acupuncturist. The acupuncturist counseled the patient on the risks of acupuncture treatment including pain, infection, bleeding, and no relief of pain. The patient was positioned comfortably. There was no evidence of infection at the site of needle insertions.    Objective   Physical Exam              Acupuncture Treatment  Body Points - Bilateral: DU 20, LI4, HT7, SP6, KI6, LR2, Ba gayatri  Auricular Points: Yes  Auricular Points - Bilateral: BFA 1-3  Needle Count In: 23  Needle Count Out: 23  Needle Retention Time (min): 25 minutes         Post-treatment Assessment  Pain Score: 2  Anxiety Level (0-10): 2  Stress Level (0-10): 2  Coping Level (0-10): 7  Depression Level (0-10): 3  Fatigue Level (0-10): 3  Nausea Level (0-10): 1  Wellbeing Level (0-10): 3    Assessment/Plan

## 2024-03-13 NOTE — PROGRESS NOTES
Patient ID: Massimo Garcia is a 63 y.o. male.  Referring Physician: No referring provider defined for this encounter.  Primary Care Provider: Dayne Santamaria DO    CANCER HISTORY:   64 yo man with stage IV esophageal cancer met to liver/lungs, cirrhotic also on imaging     Treatment: 5FU/oxaliplatin/trastuzumab - stopped oxaliplatin 6 mo ago     1/2/24 near syncope  CT with pulm nodules, no PE  Cardiology eval neg  11/23 heart block - pacemaker placed     INTEGRATIVE HISTORY:  Symptoms:  Neuropathy - fentanyl patch for pain, oxycodone  Started 6 mo ago with tingling in feet etc.  Feels like walking on blisters, intermittent but more consistent  Tried gabapentin not helping     Stomach discomfort and fatigue     Diet: overall eating well, weight stable, usually cream of wheat in am, lunch with sandwich with some meat.  Eats what he can for supper     PA: overall trying to stay active, works with wood outside    Sleep: sleeping well usually     Stress: some anxiety, overall doing well, intermittent  Tries to manage through relaxation by lying down     Natural Products:    Balance of Nature vitamins  CBD    ROS:  no ha, visual symptoms, hearing loss  no sob, chest pain, palp  ROS o/w non contributory, please see HPI    Objective    BSA: There is no height or weight on file to calculate BSA.  There were no vitals taken for this visit.    PHYSICAL EXAM:  NAD, awake/alert  HEENT, NCAT, OP clear, no oral lesions  CTA bilat  RRR no mgr  Abd soft/nt/nd+bs  No c/c/e/ttp  Motor/sensory intact, CN 2-12 intact     RESULTS:  Lab Results   Component Value Date    WBC 3.7 (L) 03/07/2024    HGB 9.3 (L) 03/07/2024    HCT 27.9 (L) 03/07/2024    PLT 54 (L) 03/07/2024    CREATININE 0.75 03/07/2024    AST 33 03/07/2024       Assessment/Plan   Cancer Staging   No matching staging information was found for the patient.    CANCER SPECIFIC RECCS:  64 yo man with stage IV esophageal cancer met to liver/lungs, cirrhotic also on imaging      Treatment: 5FU/oxaliplatin/trastuzumab - stopped oxaliplatin 6 mo ago     1/2/24 near syncope  CT with pulm nodules, no PE  Cardiology eval neg  11/23 heart block - pacemaker placed     INTEGRATIVE HISTORY:  Symptoms:  Neuropathy - fentanyl patch for pain, oxycodone  Started 6 mo ago with tingling in feet etc.  Feels like walking on blisters, intermittent but more consistent     LIFESTYLE:  Diet - 5-9 fruits/veg/day (7 veg to 2 fruits)  Cruciferous Vegetables - Brussel Sprouts, Kale, Broccoli, Cauliflower  PHYSICAL ACTIVITY 30 MIN/DAY     Reading:  [employer]  Cancer Fighting [employer]     Websites:  [employer] for your Life  [email or web address]     SYMPTOM MANAGEMENT:  Neuropathy - acupuncture in Symptom Management  Numbness and some pain in toes  Scrambler therapy     Nausea - acupuncture  Keri - tea with honey  Acupressure     Anxiety - continue relaxation method     Follow up: Symptom management acupuncture     Thank you for consulting me in for this patient  Chidi Mays MD            SYMPTOM MANAGEMENT:  Integrative Oncology Symptom Management:     [facility] Integrative Oncology Symptom Management clinic offers multi-disciplinary supervised care of cancer patients using Integrative Modalities billed to insurance using NCCN and SIO/ASCO guideline-driven practices.  ESAS is obtained prior to and after each treatment by the practitioner     Symptoms Managed:  Neuropathy - bilat feet, some in fingertips/hands, used to be on oxaliplatin resumed after 6 mo off, improved overall  Gabapentin did not help  Feels with each step     Nausea - currently ok since switching protocol incr gas, no vomiting  Comes with the medication     Anxiety - not bad today, hiccups for 8 days  Fatigue - with therapy, 5/10, mildly increased     Natural Products utilized:  Balance of Nature vitamins  CBD     Integrative Treatment: Acupuncture  Session #: 3  Frequency: weekly     Referrals:   Recommendations:     Follow  Up:  Symptom Management: weekly  Integrative Oncology:      I have personally seen the patient and supervised the treatment by the integrative practitioner during this visit.  Chidi Mays MD

## 2024-03-14 DIAGNOSIS — C78.7 METASTASES TO THE LIVER (MULTI): ICD-10-CM

## 2024-03-14 DIAGNOSIS — C15.9 STAGE IV MALIGNANT NEOPLASM OF ESOPHAGUS (MULTI): ICD-10-CM

## 2024-03-15 RX ORDER — POTASSIUM CHLORIDE 750 MG/1
10 TABLET, EXTENDED RELEASE ORAL DAILY
Qty: 90 TABLET | Refills: 1 | Status: SHIPPED | OUTPATIENT
Start: 2024-03-15 | End: 2024-05-24 | Stop reason: HOSPADM

## 2024-03-18 ENCOUNTER — HOSPITAL ENCOUNTER (INPATIENT)
Facility: HOSPITAL | Age: 64
LOS: 5 days | Discharge: HOME | DRG: 871 | End: 2024-03-23
Attending: EMERGENCY MEDICINE | Admitting: INTERNAL MEDICINE
Payer: MEDICARE

## 2024-03-18 ENCOUNTER — APPOINTMENT (OUTPATIENT)
Dept: RADIOLOGY | Facility: HOSPITAL | Age: 64
DRG: 871 | End: 2024-03-18
Payer: MEDICARE

## 2024-03-18 ENCOUNTER — OFFICE VISIT (OUTPATIENT)
Dept: HEMATOLOGY/ONCOLOGY | Facility: CLINIC | Age: 64
End: 2024-03-18
Payer: MEDICARE

## 2024-03-18 ENCOUNTER — INFUSION (OUTPATIENT)
Dept: HEMATOLOGY/ONCOLOGY | Facility: CLINIC | Age: 64
End: 2024-03-18
Payer: MEDICARE

## 2024-03-18 ENCOUNTER — APPOINTMENT (OUTPATIENT)
Dept: CARDIOLOGY | Facility: HOSPITAL | Age: 64
DRG: 871 | End: 2024-03-18
Payer: MEDICARE

## 2024-03-18 VITALS
OXYGEN SATURATION: 99 % | WEIGHT: 159.72 LBS | TEMPERATURE: 97.7 F | HEART RATE: 85 BPM | RESPIRATION RATE: 18 BRPM | BODY MASS INDEX: 25.02 KG/M2 | DIASTOLIC BLOOD PRESSURE: 71 MMHG | SYSTOLIC BLOOD PRESSURE: 103 MMHG

## 2024-03-18 VITALS
DIASTOLIC BLOOD PRESSURE: 69 MMHG | HEART RATE: 99 BPM | RESPIRATION RATE: 18 BRPM | TEMPERATURE: 100 F | SYSTOLIC BLOOD PRESSURE: 121 MMHG

## 2024-03-18 DIAGNOSIS — C78.7 METASTASES TO THE LIVER (MULTI): ICD-10-CM

## 2024-03-18 DIAGNOSIS — I44.2 CHB (COMPLETE HEART BLOCK) (MULTI): ICD-10-CM

## 2024-03-18 DIAGNOSIS — R07.9 CHEST PAIN, UNSPECIFIED TYPE: ICD-10-CM

## 2024-03-18 DIAGNOSIS — C15.9 STAGE IV MALIGNANT NEOPLASM OF ESOPHAGUS (MULTI): ICD-10-CM

## 2024-03-18 DIAGNOSIS — K21.00 GASTROESOPHAGEAL REFLUX DISEASE WITH ESOPHAGITIS WITHOUT HEMORRHAGE: ICD-10-CM

## 2024-03-18 DIAGNOSIS — C15.9 PRIMARY MALIGNANT NEOPLASM OF ESOPHAGUS (MULTI): ICD-10-CM

## 2024-03-18 DIAGNOSIS — Z95.0 PRESENCE OF CARDIAC PACEMAKER: ICD-10-CM

## 2024-03-18 DIAGNOSIS — N30.90 CYSTITIS: Primary | ICD-10-CM

## 2024-03-18 LAB
ALBUMIN SERPL BCP-MCNC: 3.3 G/DL (ref 3.4–5)
ALBUMIN SERPL BCP-MCNC: 3.5 G/DL (ref 3.4–5)
ALP SERPL-CCNC: 156 U/L (ref 33–136)
ALP SERPL-CCNC: 184 U/L (ref 33–136)
ALT SERPL W P-5'-P-CCNC: 23 U/L (ref 10–52)
ALT SERPL W P-5'-P-CCNC: 29 U/L (ref 10–52)
ANION GAP SERPL CALC-SCNC: 12 MMOL/L (ref 10–20)
ANION GAP SERPL CALC-SCNC: 15 MMOL/L (ref 10–20)
APPEARANCE UR: ABNORMAL
AST SERPL W P-5'-P-CCNC: 127 U/L (ref 9–39)
AST SERPL W P-5'-P-CCNC: 64 U/L (ref 9–39)
BACTERIA #/AREA URNS AUTO: ABNORMAL /HPF
BASOPHILS # BLD AUTO: 0.01 X10*3/UL (ref 0–0.1)
BASOPHILS # BLD AUTO: 0.04 X10*3/UL (ref 0–0.1)
BASOPHILS NFR BLD AUTO: 0.1 %
BASOPHILS NFR BLD AUTO: 0.6 %
BILIRUB SERPL-MCNC: 1 MG/DL (ref 0–1.2)
BILIRUB SERPL-MCNC: 6.3 MG/DL (ref 0–1.2)
BILIRUB UR STRIP.AUTO-MCNC: NEGATIVE MG/DL
BNP SERPL-MCNC: 355 PG/ML (ref 0–99)
BUN SERPL-MCNC: 10 MG/DL (ref 6–23)
BUN SERPL-MCNC: 13 MG/DL (ref 6–23)
CALCIUM SERPL-MCNC: 9.1 MG/DL (ref 8.6–10.3)
CALCIUM SERPL-MCNC: 9.4 MG/DL (ref 8.6–10.3)
CARDIAC TROPONIN I PNL SERPL HS: 34 NG/L (ref 0–20)
CARDIAC TROPONIN I PNL SERPL HS: 50 NG/L (ref 0–20)
CHLORIDE SERPL-SCNC: 103 MMOL/L (ref 98–107)
CHLORIDE SERPL-SCNC: 105 MMOL/L (ref 98–107)
CK SERPL-CCNC: 116 U/L (ref 0–325)
CO2 SERPL-SCNC: 24 MMOL/L (ref 21–32)
CO2 SERPL-SCNC: 24 MMOL/L (ref 21–32)
COLOR UR: ABNORMAL
CREAT SERPL-MCNC: 0.76 MG/DL (ref 0.5–1.3)
CREAT SERPL-MCNC: 0.89 MG/DL (ref 0.5–1.3)
EGFRCR SERPLBLD CKD-EPI 2021: >90 ML/MIN/1.73M*2
EGFRCR SERPLBLD CKD-EPI 2021: >90 ML/MIN/1.73M*2
EOSINOPHIL # BLD AUTO: 0 X10*3/UL (ref 0–0.7)
EOSINOPHIL # BLD AUTO: 0.05 X10*3/UL (ref 0–0.7)
EOSINOPHIL NFR BLD AUTO: 0 %
EOSINOPHIL NFR BLD AUTO: 0.7 %
ERYTHROCYTE [DISTWIDTH] IN BLOOD BY AUTOMATED COUNT: 16.1 % (ref 11.5–14.5)
ERYTHROCYTE [DISTWIDTH] IN BLOOD BY AUTOMATED COUNT: 16.5 % (ref 11.5–14.5)
GLUCOSE SERPL-MCNC: 143 MG/DL (ref 74–99)
GLUCOSE SERPL-MCNC: 157 MG/DL (ref 74–99)
GLUCOSE UR STRIP.AUTO-MCNC: ABNORMAL MG/DL
HCT VFR BLD AUTO: 32.8 % (ref 41–52)
HCT VFR BLD AUTO: 33.9 % (ref 41–52)
HGB BLD-MCNC: 10.8 G/DL (ref 13.5–17.5)
HGB BLD-MCNC: 11.4 G/DL (ref 13.5–17.5)
IMM GRANULOCYTES # BLD AUTO: 0.02 X10*3/UL (ref 0–0.7)
IMM GRANULOCYTES # BLD AUTO: 0.1 X10*3/UL (ref 0–0.7)
IMM GRANULOCYTES NFR BLD AUTO: 0.3 % (ref 0–0.9)
IMM GRANULOCYTES NFR BLD AUTO: 0.8 % (ref 0–0.9)
KETONES UR STRIP.AUTO-MCNC: NEGATIVE MG/DL
LACTATE SERPL-SCNC: 3 MMOL/L (ref 0.4–2)
LACTATE SERPL-SCNC: 3.1 MMOL/L (ref 0.4–2)
LEUKOCYTE ESTERASE UR QL STRIP.AUTO: NEGATIVE
LIPASE SERPL-CCNC: 34 U/L (ref 9–82)
LYMPHOCYTES # BLD AUTO: 0.07 X10*3/UL (ref 1.2–4.8)
LYMPHOCYTES # BLD AUTO: 1.79 X10*3/UL (ref 1.2–4.8)
LYMPHOCYTES NFR BLD AUTO: 0.5 %
LYMPHOCYTES NFR BLD AUTO: 26.2 %
MAGNESIUM SERPL-MCNC: 1.25 MG/DL (ref 1.6–2.4)
MCH RBC QN AUTO: 29.8 PG (ref 26–34)
MCH RBC QN AUTO: 29.9 PG (ref 26–34)
MCHC RBC AUTO-ENTMCNC: 32.9 G/DL (ref 32–36)
MCHC RBC AUTO-ENTMCNC: 33.6 G/DL (ref 32–36)
MCV RBC AUTO: 89 FL (ref 80–100)
MCV RBC AUTO: 90 FL (ref 80–100)
MONOCYTES # BLD AUTO: 0.29 X10*3/UL (ref 0.1–1)
MONOCYTES # BLD AUTO: 0.63 X10*3/UL (ref 0.1–1)
MONOCYTES NFR BLD AUTO: 2.3 %
MONOCYTES NFR BLD AUTO: 9.2 %
MUCOUS THREADS #/AREA URNS AUTO: ABNORMAL /LPF
NEUTROPHILS # BLD AUTO: 12.39 X10*3/UL (ref 1.2–7.7)
NEUTROPHILS # BLD AUTO: 4.31 X10*3/UL (ref 1.2–7.7)
NEUTROPHILS NFR BLD AUTO: 63 %
NEUTROPHILS NFR BLD AUTO: 96.3 %
NITRITE UR QL STRIP.AUTO: POSITIVE
NRBC BLD-RTO: 0 /100 WBCS (ref 0–0)
PH UR STRIP.AUTO: 7 [PH]
PLATELET # BLD AUTO: 76 X10*3/UL (ref 150–450)
PLATELET # BLD AUTO: 87 X10*3/UL (ref 150–450)
POTASSIUM SERPL-SCNC: 3.3 MMOL/L (ref 3.5–5.3)
POTASSIUM SERPL-SCNC: 3.4 MMOL/L (ref 3.5–5.3)
PROT SERPL-MCNC: 6.7 G/DL (ref 6.4–8.2)
PROT SERPL-MCNC: 7.7 G/DL (ref 6.4–8.2)
PROT UR STRIP.AUTO-MCNC: ABNORMAL MG/DL
RBC # BLD AUTO: 3.63 X10*6/UL (ref 4.5–5.9)
RBC # BLD AUTO: 3.81 X10*6/UL (ref 4.5–5.9)
RBC # UR STRIP.AUTO: ABNORMAL /UL
RBC #/AREA URNS AUTO: ABNORMAL /HPF
SODIUM SERPL-SCNC: 138 MMOL/L (ref 136–145)
SODIUM SERPL-SCNC: 139 MMOL/L (ref 136–145)
SP GR UR STRIP.AUTO: 1.01
UROBILINOGEN UR STRIP.AUTO-MCNC: <2 MG/DL
WBC # BLD AUTO: 12.9 X10*3/UL (ref 4.4–11.3)
WBC # BLD AUTO: 6.8 X10*3/UL (ref 4.4–11.3)
WBC #/AREA URNS AUTO: ABNORMAL /HPF

## 2024-03-18 PROCEDURE — 81001 URINALYSIS AUTO W/SCOPE: CPT | Performed by: STUDENT IN AN ORGANIZED HEALTH CARE EDUCATION/TRAINING PROGRAM

## 2024-03-18 PROCEDURE — 2500000004 HC RX 250 GENERAL PHARMACY W/ HCPCS (ALT 636 FOR OP/ED): Performed by: EMERGENCY MEDICINE

## 2024-03-18 PROCEDURE — 96368 THER/DIAG CONCURRENT INF: CPT

## 2024-03-18 PROCEDURE — 87040 BLOOD CULTURE FOR BACTERIA: CPT | Mod: GEALAB | Performed by: STUDENT IN AN ORGANIZED HEALTH CARE EDUCATION/TRAINING PROGRAM

## 2024-03-18 PROCEDURE — 83690 ASSAY OF LIPASE: CPT | Performed by: STUDENT IN AN ORGANIZED HEALTH CARE EDUCATION/TRAINING PROGRAM

## 2024-03-18 PROCEDURE — 96372 THER/PROPH/DIAG INJ SC/IM: CPT | Mod: 59 | Performed by: INTERNAL MEDICINE

## 2024-03-18 PROCEDURE — 96367 TX/PROPH/DG ADDL SEQ IV INF: CPT

## 2024-03-18 PROCEDURE — 71275 CT ANGIOGRAPHY CHEST: CPT

## 2024-03-18 PROCEDURE — 71046 X-RAY EXAM CHEST 2 VIEWS: CPT

## 2024-03-18 PROCEDURE — 99285 EMERGENCY DEPT VISIT HI MDM: CPT | Mod: 25

## 2024-03-18 PROCEDURE — 93005 ELECTROCARDIOGRAM TRACING: CPT

## 2024-03-18 PROCEDURE — 96375 TX/PRO/DX INJ NEW DRUG ADDON: CPT

## 2024-03-18 PROCEDURE — 2500000001 HC RX 250 WO HCPCS SELF ADMINISTERED DRUGS (ALT 637 FOR MEDICARE OP)

## 2024-03-18 PROCEDURE — A4217 STERILE WATER/SALINE, 500 ML: HCPCS | Performed by: EMERGENCY MEDICINE

## 2024-03-18 PROCEDURE — 36415 COLL VENOUS BLD VENIPUNCTURE: CPT | Performed by: STUDENT IN AN ORGANIZED HEALTH CARE EDUCATION/TRAINING PROGRAM

## 2024-03-18 PROCEDURE — 1100000001 HC PRIVATE ROOM DAILY

## 2024-03-18 PROCEDURE — 96372 THER/PROPH/DIAG INJ SC/IM: CPT

## 2024-03-18 PROCEDURE — 80053 COMPREHEN METABOLIC PANEL: CPT | Performed by: INTERNAL MEDICINE

## 2024-03-18 PROCEDURE — 87086 URINE CULTURE/COLONY COUNT: CPT | Mod: GEALAB | Performed by: STUDENT IN AN ORGANIZED HEALTH CARE EDUCATION/TRAINING PROGRAM

## 2024-03-18 PROCEDURE — 1111F DSCHRG MED/CURRENT MED MERGE: CPT | Performed by: INTERNAL MEDICINE

## 2024-03-18 PROCEDURE — 99214 OFFICE O/P EST MOD 30 MIN: CPT | Performed by: INTERNAL MEDICINE

## 2024-03-18 PROCEDURE — 99214 OFFICE O/P EST MOD 30 MIN: CPT | Mod: 25 | Performed by: INTERNAL MEDICINE

## 2024-03-18 PROCEDURE — 96361 HYDRATE IV INFUSION ADD-ON: CPT

## 2024-03-18 PROCEDURE — 96413 CHEMO IV INFUSION 1 HR: CPT

## 2024-03-18 PROCEDURE — 83735 ASSAY OF MAGNESIUM: CPT

## 2024-03-18 PROCEDURE — 1160F RVW MEDS BY RX/DR IN RCRD: CPT | Performed by: INTERNAL MEDICINE

## 2024-03-18 PROCEDURE — 2500000004 HC RX 250 GENERAL PHARMACY W/ HCPCS (ALT 636 FOR OP/ED)

## 2024-03-18 PROCEDURE — 3074F SYST BP LT 130 MM HG: CPT | Performed by: INTERNAL MEDICINE

## 2024-03-18 PROCEDURE — 2500000001 HC RX 250 WO HCPCS SELF ADMINISTERED DRUGS (ALT 637 FOR MEDICARE OP): Performed by: STUDENT IN AN ORGANIZED HEALTH CARE EDUCATION/TRAINING PROGRAM

## 2024-03-18 PROCEDURE — 83605 ASSAY OF LACTIC ACID: CPT | Performed by: EMERGENCY MEDICINE

## 2024-03-18 PROCEDURE — 96415 CHEMO IV INFUSION ADDL HR: CPT

## 2024-03-18 PROCEDURE — 96366 THER/PROPH/DIAG IV INF ADDON: CPT

## 2024-03-18 PROCEDURE — 80053 COMPREHEN METABOLIC PANEL: CPT | Performed by: STUDENT IN AN ORGANIZED HEALTH CARE EDUCATION/TRAINING PROGRAM

## 2024-03-18 PROCEDURE — 2500000001 HC RX 250 WO HCPCS SELF ADMINISTERED DRUGS (ALT 637 FOR MEDICARE OP): Performed by: INTERNAL MEDICINE

## 2024-03-18 PROCEDURE — 2500000004 HC RX 250 GENERAL PHARMACY W/ HCPCS (ALT 636 FOR OP/ED): Performed by: INTERNAL MEDICINE

## 2024-03-18 PROCEDURE — 2550000001 HC RX 255 CONTRASTS: Performed by: EMERGENCY MEDICINE

## 2024-03-18 PROCEDURE — 3078F DIAST BP <80 MM HG: CPT | Performed by: INTERNAL MEDICINE

## 2024-03-18 PROCEDURE — 85025 COMPLETE CBC W/AUTO DIFF WBC: CPT | Performed by: INTERNAL MEDICINE

## 2024-03-18 PROCEDURE — 96417 CHEMO IV INFUS EACH ADDL SEQ: CPT

## 2024-03-18 PROCEDURE — 1159F MED LIST DOCD IN RCRD: CPT | Performed by: INTERNAL MEDICINE

## 2024-03-18 PROCEDURE — 36415 COLL VENOUS BLD VENIPUNCTURE: CPT

## 2024-03-18 PROCEDURE — 71275 CT ANGIOGRAPHY CHEST: CPT | Performed by: RADIOLOGY

## 2024-03-18 PROCEDURE — 83880 ASSAY OF NATRIURETIC PEPTIDE: CPT | Performed by: STUDENT IN AN ORGANIZED HEALTH CARE EDUCATION/TRAINING PROGRAM

## 2024-03-18 PROCEDURE — 84484 ASSAY OF TROPONIN QUANT: CPT | Performed by: STUDENT IN AN ORGANIZED HEALTH CARE EDUCATION/TRAINING PROGRAM

## 2024-03-18 PROCEDURE — 96365 THER/PROPH/DIAG IV INF INIT: CPT

## 2024-03-18 PROCEDURE — 80069 RENAL FUNCTION PANEL: CPT | Mod: CCI

## 2024-03-18 PROCEDURE — 83735 ASSAY OF MAGNESIUM: CPT | Performed by: STUDENT IN AN ORGANIZED HEALTH CARE EDUCATION/TRAINING PROGRAM

## 2024-03-18 PROCEDURE — 96375 TX/PRO/DX INJ NEW DRUG ADDON: CPT | Mod: INF

## 2024-03-18 PROCEDURE — 96376 TX/PRO/DX INJ SAME DRUG ADON: CPT

## 2024-03-18 PROCEDURE — 85025 COMPLETE CBC W/AUTO DIFF WBC: CPT | Performed by: STUDENT IN AN ORGANIZED HEALTH CARE EDUCATION/TRAINING PROGRAM

## 2024-03-18 PROCEDURE — 1125F AMNT PAIN NOTED PAIN PRSNT: CPT | Performed by: INTERNAL MEDICINE

## 2024-03-18 PROCEDURE — 2500000004 HC RX 250 GENERAL PHARMACY W/ HCPCS (ALT 636 FOR OP/ED): Performed by: STUDENT IN AN ORGANIZED HEALTH CARE EDUCATION/TRAINING PROGRAM

## 2024-03-18 PROCEDURE — 71046 X-RAY EXAM CHEST 2 VIEWS: CPT | Performed by: RADIOLOGY

## 2024-03-18 PROCEDURE — 82550 ASSAY OF CK (CPK): CPT | Performed by: STUDENT IN AN ORGANIZED HEALTH CARE EDUCATION/TRAINING PROGRAM

## 2024-03-18 RX ORDER — METOPROLOL SUCCINATE 25 MG/1
25 TABLET, EXTENDED RELEASE ORAL DAILY
Status: DISCONTINUED | OUTPATIENT
Start: 2024-03-19 | End: 2024-03-23 | Stop reason: HOSPADM

## 2024-03-18 RX ORDER — ACETAMINOPHEN 325 MG/1
650 TABLET ORAL EVERY 4 HOURS PRN
Status: DISCONTINUED | OUTPATIENT
Start: 2024-03-18 | End: 2024-03-23 | Stop reason: HOSPADM

## 2024-03-18 RX ORDER — OXYCODONE HYDROCHLORIDE 5 MG/1
5 TABLET ORAL EVERY 6 HOURS PRN
Status: DISCONTINUED | OUTPATIENT
Start: 2024-03-18 | End: 2024-03-23 | Stop reason: HOSPADM

## 2024-03-18 RX ORDER — ENOXAPARIN SODIUM 100 MG/ML
40 INJECTION SUBCUTANEOUS EVERY 24 HOURS
Status: DISCONTINUED | OUTPATIENT
Start: 2024-03-18 | End: 2024-03-23

## 2024-03-18 RX ORDER — ACETAMINOPHEN 325 MG/1
650 TABLET ORAL ONCE
Status: DISCONTINUED | OUTPATIENT
Start: 2024-03-18 | End: 2024-03-19

## 2024-03-18 RX ORDER — ALBUTEROL SULFATE 0.83 MG/ML
3 SOLUTION RESPIRATORY (INHALATION) AS NEEDED
Status: DISCONTINUED | OUTPATIENT
Start: 2024-03-18 | End: 2024-03-18 | Stop reason: HOSPADM

## 2024-03-18 RX ORDER — ALBUTEROL SULFATE 0.83 MG/ML
3 SOLUTION RESPIRATORY (INHALATION) AS NEEDED
Status: CANCELLED | OUTPATIENT
Start: 2024-04-08

## 2024-03-18 RX ORDER — MEPERIDINE HYDROCHLORIDE 25 MG/ML
INJECTION INTRAMUSCULAR; INTRAVENOUS; SUBCUTANEOUS
Status: COMPLETED
Start: 2024-03-18 | End: 2024-03-18

## 2024-03-18 RX ORDER — ACETAMINOPHEN 325 MG/1
650 TABLET ORAL ONCE
Status: COMPLETED | OUTPATIENT
Start: 2024-03-18 | End: 2024-03-18

## 2024-03-18 RX ORDER — DIPHENHYDRAMINE HYDROCHLORIDE 50 MG/ML
50 INJECTION INTRAMUSCULAR; INTRAVENOUS AS NEEDED
Status: DISCONTINUED | OUTPATIENT
Start: 2024-03-18 | End: 2024-03-18 | Stop reason: HOSPADM

## 2024-03-18 RX ORDER — EPINEPHRINE 0.3 MG/.3ML
0.3 INJECTION SUBCUTANEOUS EVERY 5 MIN PRN
Status: CANCELLED | OUTPATIENT
Start: 2024-04-08

## 2024-03-18 RX ORDER — TIZANIDINE 4 MG/1
4 TABLET ORAL 2 TIMES DAILY
Status: DISCONTINUED | OUTPATIENT
Start: 2024-03-18 | End: 2024-03-19

## 2024-03-18 RX ORDER — METOCLOPRAMIDE 10 MG/1
10 TABLET ORAL 4 TIMES DAILY PRN
Status: DISCONTINUED | OUTPATIENT
Start: 2024-03-18 | End: 2024-03-23 | Stop reason: HOSPADM

## 2024-03-18 RX ORDER — ACETAMINOPHEN 650 MG/1
650 SUPPOSITORY RECTAL EVERY 4 HOURS PRN
Status: DISCONTINUED | OUTPATIENT
Start: 2024-03-18 | End: 2024-03-23 | Stop reason: HOSPADM

## 2024-03-18 RX ORDER — LORAZEPAM 1 MG/1
1 TABLET ORAL EVERY 8 HOURS PRN
Status: DISCONTINUED | OUTPATIENT
Start: 2024-03-18 | End: 2024-03-23 | Stop reason: HOSPADM

## 2024-03-18 RX ORDER — MAGNESIUM SULFATE HEPTAHYDRATE 40 MG/ML
2 INJECTION, SOLUTION INTRAVENOUS ONCE
Status: COMPLETED | OUTPATIENT
Start: 2024-03-18 | End: 2024-03-18

## 2024-03-18 RX ORDER — FAMOTIDINE 10 MG/ML
20 INJECTION INTRAVENOUS ONCE AS NEEDED
Status: CANCELLED | OUTPATIENT
Start: 2024-04-08

## 2024-03-18 RX ORDER — CHLORPROMAZINE HYDROCHLORIDE 25 MG/1
25 TABLET, FILM COATED ORAL 2 TIMES DAILY
Status: DISCONTINUED | OUTPATIENT
Start: 2024-03-18 | End: 2024-03-19

## 2024-03-18 RX ORDER — FAMOTIDINE 10 MG/ML
20 INJECTION INTRAVENOUS ONCE AS NEEDED
Status: DISCONTINUED | OUTPATIENT
Start: 2024-03-18 | End: 2024-03-18 | Stop reason: HOSPADM

## 2024-03-18 RX ORDER — EPINEPHRINE 0.3 MG/.3ML
0.3 INJECTION SUBCUTANEOUS EVERY 5 MIN PRN
Status: DISCONTINUED | OUTPATIENT
Start: 2024-03-18 | End: 2024-03-18 | Stop reason: HOSPADM

## 2024-03-18 RX ORDER — DIPHENHYDRAMINE HYDROCHLORIDE 50 MG/ML
50 INJECTION INTRAMUSCULAR; INTRAVENOUS AS NEEDED
Status: CANCELLED | OUTPATIENT
Start: 2024-04-08

## 2024-03-18 RX ORDER — CEFTRIAXONE 1 G/50ML
1 INJECTION, SOLUTION INTRAVENOUS EVERY 24 HOURS
Status: DISCONTINUED | OUTPATIENT
Start: 2024-03-18 | End: 2024-03-19

## 2024-03-18 RX ORDER — HYDROMORPHONE HYDROCHLORIDE 1 MG/ML
1 INJECTION, SOLUTION INTRAMUSCULAR; INTRAVENOUS; SUBCUTANEOUS ONCE
Status: COMPLETED | OUTPATIENT
Start: 2024-03-18 | End: 2024-03-18

## 2024-03-18 RX ORDER — PROCHLORPERAZINE EDISYLATE 5 MG/ML
10 INJECTION INTRAMUSCULAR; INTRAVENOUS EVERY 6 HOURS PRN
Status: DISCONTINUED | OUTPATIENT
Start: 2024-03-18 | End: 2024-03-18 | Stop reason: HOSPADM

## 2024-03-18 RX ORDER — CYANOCOBALAMIN 1000 UG/ML
1000 INJECTION, SOLUTION INTRAMUSCULAR; SUBCUTANEOUS ONCE
Status: CANCELLED | OUTPATIENT
Start: 2024-04-08

## 2024-03-18 RX ORDER — LORAZEPAM 2 MG/ML
1 INJECTION INTRAMUSCULAR AS NEEDED
Status: DISCONTINUED | OUTPATIENT
Start: 2024-03-18 | End: 2024-03-18 | Stop reason: HOSPADM

## 2024-03-18 RX ORDER — DIPHENHYDRAMINE HYDROCHLORIDE 50 MG/ML
25 INJECTION INTRAMUSCULAR; INTRAVENOUS ONCE
Status: COMPLETED | OUTPATIENT
Start: 2024-03-18 | End: 2024-03-18

## 2024-03-18 RX ORDER — PANTOPRAZOLE SODIUM 40 MG/1
40 TABLET, DELAYED RELEASE ORAL 2 TIMES DAILY
Status: DISCONTINUED | OUTPATIENT
Start: 2024-03-18 | End: 2024-03-21

## 2024-03-18 RX ORDER — SUCRALFATE 1 G/1
1 TABLET ORAL
Status: DISCONTINUED | OUTPATIENT
Start: 2024-03-19 | End: 2024-03-23 | Stop reason: HOSPADM

## 2024-03-18 RX ORDER — TALC
3 POWDER (GRAM) TOPICAL DAILY
Status: DISCONTINUED | OUTPATIENT
Start: 2024-03-18 | End: 2024-03-23 | Stop reason: HOSPADM

## 2024-03-18 RX ORDER — CYANOCOBALAMIN 1000 UG/ML
1000 INJECTION, SOLUTION INTRAMUSCULAR; SUBCUTANEOUS ONCE
Status: COMPLETED | OUTPATIENT
Start: 2024-03-18 | End: 2024-03-18

## 2024-03-18 RX ORDER — ACETAMINOPHEN 325 MG/1
650 TABLET ORAL ONCE
Status: DISCONTINUED | OUTPATIENT
Start: 2024-03-18 | End: 2024-03-18

## 2024-03-18 RX ORDER — POTASSIUM CHLORIDE 14.9 MG/ML
20 INJECTION INTRAVENOUS ONCE
Status: COMPLETED | OUTPATIENT
Start: 2024-03-18 | End: 2024-03-18

## 2024-03-18 RX ORDER — FLUOROURACIL 50 MG/ML
400 INJECTION, SOLUTION INTRAVENOUS ONCE
Status: DISCONTINUED | OUTPATIENT
Start: 2024-03-18 | End: 2024-03-18 | Stop reason: HOSPADM

## 2024-03-18 RX ORDER — ACETAMINOPHEN 160 MG/5ML
650 SOLUTION ORAL EVERY 4 HOURS PRN
Status: DISCONTINUED | OUTPATIENT
Start: 2024-03-18 | End: 2024-03-23 | Stop reason: HOSPADM

## 2024-03-18 RX ORDER — NAPROXEN SODIUM 220 MG/1
324 TABLET, FILM COATED ORAL ONCE
Status: COMPLETED | OUTPATIENT
Start: 2024-03-18 | End: 2024-03-18

## 2024-03-18 RX ORDER — PROCHLORPERAZINE MALEATE 10 MG
10 TABLET ORAL EVERY 6 HOURS PRN
Status: DISCONTINUED | OUTPATIENT
Start: 2024-03-18 | End: 2024-03-18 | Stop reason: HOSPADM

## 2024-03-18 RX ORDER — DEXAMETHASONE SODIUM PHOSPHATE 4 MG/ML
8 INJECTION, SOLUTION INTRA-ARTICULAR; INTRALESIONAL; INTRAMUSCULAR; INTRAVENOUS; SOFT TISSUE ONCE
Status: COMPLETED | OUTPATIENT
Start: 2024-03-18 | End: 2024-03-18

## 2024-03-18 RX ORDER — HYDROMORPHONE HYDROCHLORIDE 1 MG/ML
INJECTION, SOLUTION INTRAMUSCULAR; INTRAVENOUS; SUBCUTANEOUS
Status: COMPLETED
Start: 2024-03-18 | End: 2024-03-18

## 2024-03-18 RX ORDER — PALONOSETRON 0.05 MG/ML
0.25 INJECTION, SOLUTION INTRAVENOUS ONCE
Status: COMPLETED | OUTPATIENT
Start: 2024-03-18 | End: 2024-03-18

## 2024-03-18 RX ADMIN — ASPIRIN 81 MG 324 MG: 81 TABLET ORAL at 20:30

## 2024-03-18 RX ADMIN — PALONOSETRON HYDROCHLORIDE 250 MCG: 0.25 INJECTION INTRAVENOUS at 10:39

## 2024-03-18 RX ADMIN — IOHEXOL 61 ML: 350 INJECTION, SOLUTION INTRAVENOUS at 18:57

## 2024-03-18 RX ADMIN — DIPHENHYDRAMINE HYDROCHLORIDE 25 MG: 50 INJECTION INTRAMUSCULAR; INTRAVENOUS at 11:05

## 2024-03-18 RX ADMIN — MEPERIDINE HYDROCHLORIDE 25 MG: 25 INJECTION INTRAMUSCULAR; INTRAVENOUS; SUBCUTANEOUS at 14:06

## 2024-03-18 RX ADMIN — TRASTUZUMAB 300.3 MG: 150 INJECTION, POWDER, LYOPHILIZED, FOR SOLUTION INTRAVENOUS at 11:45

## 2024-03-18 RX ADMIN — OXALIPLATIN 160 MG: 5 INJECTION, SOLUTION INTRAVENOUS at 12:27

## 2024-03-18 RX ADMIN — VANCOMYCIN HYDROCHLORIDE 2 G: 10 INJECTION, POWDER, LYOPHILIZED, FOR SOLUTION INTRAVENOUS at 19:12

## 2024-03-18 RX ADMIN — PROCHLORPERAZINE MALEATE 10 MG: 10 TABLET ORAL at 13:53

## 2024-03-18 RX ADMIN — ACETAMINOPHEN 650 MG: 325 TABLET ORAL at 15:10

## 2024-03-18 RX ADMIN — HYDROMORPHONE HYDROCHLORIDE 1 MG: 1 INJECTION, SOLUTION INTRAMUSCULAR; INTRAVENOUS; SUBCUTANEOUS at 17:22

## 2024-03-18 RX ADMIN — MAGNESIUM SULFATE HEPTAHYDRATE 2 G: 2 INJECTION, SOLUTION INTRAVENOUS at 21:27

## 2024-03-18 RX ADMIN — LEUCOVORIN CALCIUM 740 MG: 350 INJECTION, POWDER, LYOPHILIZED, FOR SOLUTION INTRAMUSCULAR; INTRAVENOUS at 12:26

## 2024-03-18 RX ADMIN — PIPERACILLIN SODIUM AND TAZOBACTAM SODIUM 4.5 G: 4; .5 INJECTION, SOLUTION INTRAVENOUS at 18:14

## 2024-03-18 RX ADMIN — CYANOCOBALAMIN 1000 MCG: 1000 INJECTION, SOLUTION INTRAMUSCULAR at 10:29

## 2024-03-18 RX ADMIN — HYDROMORPHONE HYDROCHLORIDE 0.4 MG: 1 INJECTION, SOLUTION INTRAMUSCULAR; INTRAVENOUS; SUBCUTANEOUS at 16:48

## 2024-03-18 RX ADMIN — LORAZEPAM 1 MG: 1 TABLET ORAL at 23:04

## 2024-03-18 RX ADMIN — CHLORPROMAZINE HYDROCHLORIDE 25 MG: 25 TABLET, FILM COATED ORAL at 23:23

## 2024-03-18 RX ADMIN — SODIUM CHLORIDE 500 ML: 9 INJECTION, SOLUTION INTRAVENOUS at 19:12

## 2024-03-18 RX ADMIN — SODIUM CHLORIDE 500 ML: 9 INJECTION, SOLUTION INTRAVENOUS at 16:47

## 2024-03-18 RX ADMIN — DEXAMETHASONE SODIUM PHOSPHATE 8 MG: 4 INJECTION INTRA-ARTICULAR; INTRALESIONAL; INTRAMUSCULAR; INTRAVENOUS; SOFT TISSUE at 10:41

## 2024-03-18 RX ADMIN — PANTOPRAZOLE SODIUM 40 MG: 40 TABLET, DELAYED RELEASE ORAL at 23:05

## 2024-03-18 RX ADMIN — POTASSIUM CHLORIDE 20 MEQ: 14.9 INJECTION, SOLUTION INTRAVENOUS at 19:12

## 2024-03-18 RX ADMIN — ACETAMINOPHEN 650 MG: 325 TABLET ORAL at 10:37

## 2024-03-18 RX ADMIN — Medication 3 MG: at 23:04

## 2024-03-18 RX ADMIN — CEFTRIAXONE SODIUM 1 G: 1 INJECTION, SOLUTION INTRAVENOUS at 23:12

## 2024-03-18 RX ADMIN — OXYCODONE HYDROCHLORIDE 5 MG: 5 TABLET ORAL at 23:21

## 2024-03-18 SDOH — SOCIAL STABILITY: SOCIAL INSECURITY: DO YOU FEEL ANYONE HAS EXPLOITED OR TAKEN ADVANTAGE OF YOU FINANCIALLY OR OF YOUR PERSONAL PROPERTY?: NO

## 2024-03-18 SDOH — SOCIAL STABILITY: SOCIAL INSECURITY: ABUSE: ADULT

## 2024-03-18 SDOH — SOCIAL STABILITY: SOCIAL INSECURITY: WERE YOU ABLE TO COMPLETE ALL THE BEHAVIORAL HEALTH SCREENINGS?: YES

## 2024-03-18 SDOH — SOCIAL STABILITY: SOCIAL INSECURITY: HAS ANYONE EVER THREATENED TO HURT YOUR FAMILY OR YOUR PETS?: NO

## 2024-03-18 SDOH — SOCIAL STABILITY: SOCIAL INSECURITY: DOES ANYONE TRY TO KEEP YOU FROM HAVING/CONTACTING OTHER FRIENDS OR DOING THINGS OUTSIDE YOUR HOME?: NO

## 2024-03-18 SDOH — SOCIAL STABILITY: SOCIAL INSECURITY: HAVE YOU HAD THOUGHTS OF HARMING ANYONE ELSE?: NO

## 2024-03-18 SDOH — SOCIAL STABILITY: SOCIAL INSECURITY: DO YOU FEEL UNSAFE GOING BACK TO THE PLACE WHERE YOU ARE LIVING?: NO

## 2024-03-18 SDOH — SOCIAL STABILITY: SOCIAL INSECURITY: ARE YOU OR HAVE YOU BEEN THREATENED OR ABUSED PHYSICALLY, EMOTIONALLY, OR SEXUALLY BY ANYONE?: NO

## 2024-03-18 SDOH — SOCIAL STABILITY: SOCIAL INSECURITY: ARE THERE ANY APPARENT SIGNS OF INJURIES/BEHAVIORS THAT COULD BE RELATED TO ABUSE/NEGLECT?: NO

## 2024-03-18 ASSESSMENT — PAIN - FUNCTIONAL ASSESSMENT
PAIN_FUNCTIONAL_ASSESSMENT: 0-10

## 2024-03-18 ASSESSMENT — COGNITIVE AND FUNCTIONAL STATUS - GENERAL
DAILY ACTIVITIY SCORE: 24
PATIENT BASELINE BEDBOUND: NO
MOBILITY SCORE: 24

## 2024-03-18 ASSESSMENT — LIFESTYLE VARIABLES
EVER FELT BAD OR GUILTY ABOUT YOUR DRINKING: NO
HOW OFTEN DO YOU HAVE A DRINK CONTAINING ALCOHOL: NEVER
AUDIT-C TOTAL SCORE: 0
HOW OFTEN DO YOU HAVE 6 OR MORE DRINKS ON ONE OCCASION: NEVER
EVER HAD A DRINK FIRST THING IN THE MORNING TO STEADY YOUR NERVES TO GET RID OF A HANGOVER: NO
SKIP TO QUESTIONS 9-10: 1
HOW MANY STANDARD DRINKS CONTAINING ALCOHOL DO YOU HAVE ON A TYPICAL DAY: PATIENT DOES NOT DRINK
AUDIT-C TOTAL SCORE: 0
HAVE YOU EVER FELT YOU SHOULD CUT DOWN ON YOUR DRINKING: NO
HAVE PEOPLE ANNOYED YOU BY CRITICIZING YOUR DRINKING: NO

## 2024-03-18 ASSESSMENT — ACTIVITIES OF DAILY LIVING (ADL)
BATHING: INDEPENDENT
FEEDING YOURSELF: INDEPENDENT
HEARING - LEFT EAR: FUNCTIONAL
JUDGMENT_ADEQUATE_SAFELY_COMPLETE_DAILY_ACTIVITIES: YES
DRESSING YOURSELF: INDEPENDENT
TOILETING: INDEPENDENT
HEARING - RIGHT EAR: FUNCTIONAL
ADEQUATE_TO_COMPLETE_ADL: YES
LACK_OF_TRANSPORTATION: NO
WALKS IN HOME: INDEPENDENT
PATIENT'S MEMORY ADEQUATE TO SAFELY COMPLETE DAILY ACTIVITIES?: YES
GROOMING: INDEPENDENT

## 2024-03-18 ASSESSMENT — PAIN SCALES - GENERAL
PAINLEVEL_OUTOF10: 7
PAINLEVEL: 1
PAINLEVEL_OUTOF10: 8
PAINLEVEL_OUTOF10: 5 - MODERATE PAIN

## 2024-03-18 ASSESSMENT — COLUMBIA-SUICIDE SEVERITY RATING SCALE - C-SSRS
6. HAVE YOU EVER DONE ANYTHING, STARTED TO DO ANYTHING, OR PREPARED TO DO ANYTHING TO END YOUR LIFE?: NO
2. HAVE YOU ACTUALLY HAD ANY THOUGHTS OF KILLING YOURSELF?: NO
2. HAVE YOU ACTUALLY HAD ANY THOUGHTS OF KILLING YOURSELF?: NO
6. HAVE YOU EVER DONE ANYTHING, STARTED TO DO ANYTHING, OR PREPARED TO DO ANYTHING TO END YOUR LIFE?: NO
1. IN THE PAST MONTH, HAVE YOU WISHED YOU WERE DEAD OR WISHED YOU COULD GO TO SLEEP AND NOT WAKE UP?: NO
1. IN THE PAST MONTH, HAVE YOU WISHED YOU WERE DEAD OR WISHED YOU COULD GO TO SLEEP AND NOT WAKE UP?: NO

## 2024-03-18 ASSESSMENT — PAIN SCALES - PAIN ASSESSMENT IN ADVANCED DEMENTIA (PAINAD)
CONSOLABILITY: DISTRACTED OR REASSURED BY VOICE/TOUCH
NEGVOCALIZATION: OCCASIONAL MOAN/GROAN, LOW SPEECH, NEGATIVE/DISAPPROVING QUALITY
TOTALSCORE: 5
BODYLANGUAGE: TENSE, DISTRESSED PACING, FIDGETING
FACIALEXPRESSION: SAD, FRIGHTENED, FROWN
BREATHING: OCCASIONAL LABORED BREATHING, SHORT PERIOD OF HYPERVENTILATION

## 2024-03-18 ASSESSMENT — PATIENT HEALTH QUESTIONNAIRE - PHQ9
SUM OF ALL RESPONSES TO PHQ9 QUESTIONS 1 & 2: 0
2. FEELING DOWN, DEPRESSED OR HOPELESS: NOT AT ALL
1. LITTLE INTEREST OR PLEASURE IN DOING THINGS: NOT AT ALL

## 2024-03-18 NOTE — SIGNIFICANT EVENT
03/18/24 1007   Prechemo Checklist   Has the patient been in the hospital, ED, or urgent care since last date of service Yes   Chemo/Immuno Consent Signed Yes   Protocol/Indications Verified Yes   Confirmed to previous date/time of medication Yes   Compared to previous dose Yes   All medications are dated accurately Yes   Pregnancy Test Negative Not applicable   Provider Notified Yes   Is Patient Proceeding With Treatment? Yes   BSA/Weight-Height Verified Yes   Dose Calculations Verified Yes

## 2024-03-18 NOTE — PROGRESS NOTES
Patient ID: Massimo Garcia is a 63 y.o. male.  Referring Physician: No referring provider defined for this encounter.  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  Has done well on trastuzumab FOLFOX regimen.  Today complains of dizziness and presyncopal symptoms. Metastatic burden is liver some lung lesions as well as abdominal lymphadenopathy.        LIVER:  Liver measures 14.7 cm in length and demonstrates mildly diffuse coarsened hepatic echotexture with mild nodular contour which may indicate cirrhotic morphology. There is an indeterminate hypoechoic  lesion within the right hepatic lobe measuring up. 3.6 x 3.2 x 3.0 cm and a 2nd 2.6 x 1.8 x 2.7 cm indeterminate heterogeneous hypoechoic lesion within the left hepatic lobe. Additional scatter presumed hepatic cysts, largest measuring up to 2.4 x 2.1 x 1.8 cm      GALLBLADDER:  Contracted. No obvious cholelithiasis.      BILIARY SYSTEM:  No evidence of intra hepatic biliary dilatation is identified; the common bile duct measures 13.2 mm.  PANCREAS:  The pancreas is poorly visualized due to overlying bowel gas.    RIGHT KIDNEY:  The right kidney measures 11.5 cm in length. No hydronephrosis or renal calculi are seen.       IMPRESSION:  Cirrhotic hepatic morphology with indeterminate hypoechoic lesions of the liver as above, largest measuring up to 3.6 x 3.2 x 3.0 cm. Hepatic mass protocol MRI advised for further assessment.  Review of Systems   Constitutional:  Positive for appetite change.   HENT:  Negative.     Eyes: Negative.    Respiratory: Negative.     Cardiovascular: Negative.         Objective   BSA: 1.85 meters squared  /71 (BP Location: Left arm, Patient Position: Sitting, BP Cuff Size: Adult)   Pulse 85   Temp 36.5 °C (97.7 °F) (Temporal)   Resp 18   Wt 72.4 kg (159 lb 11.6 oz)   SpO2 99%   BMI 25.02 kg/m²      has a past medical  history of Essential (primary) hypertension (12/21/2013), Pacemaker, Peripheral neuropathy, Personal history of other diseases of the circulatory system, and Pneumothorax (12/04/2023).   has a past surgical history that includes Total knee arthroplasty (09/30/2016); Total knee arthroplasty (09/30/2016); Cardiac electrophysiology procedure (N/A, 11/7/2023); and Cardiac electrophysiology procedure (Left, 11/9/2023).  Family History   Problem Relation Name Age of Onset    Cancer Father       Oncology History   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)   9/13/2023 Initial Diagnosis    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)     9/13/2023 -  Chemotherapy    Trastuzumab + mFOLFOX6 (Fluorouracil Continuous Infusion / Leucovorin / Oxaliplatin), 14 Day Cycles     Metastases to the liver (CMS/HCC)   9/13/2023 Initial Diagnosis    Metastases to the liver (CMS/HCC)     9/13/2023 -  Chemotherapy    Trastuzumab + mFOLFOX6 (Fluorouracil Continuous Infusion / Leucovorin / Oxaliplatin), 14 Day Cycles         Massimo Garcia  reports that he has quit smoking. His smoking use included cigarettes and cigars. He has never been exposed to tobacco smoke. He has never used smokeless tobacco.  He  reports that he does not currently use alcohol.  He  reports current drug use. Drug: Marijuana.    Physical Exam  Constitutional:       Appearance: Normal appearance.   HENT:      Head: Normocephalic and atraumatic.   Eyes:      Extraocular Movements: Extraocular movements intact.      Pupils: Pupils are equal, round, and reactive to light.   Neurological:      Mental Status: He is alert.         WBC   Date/Time Value Ref Range Status   03/18/2024 09:17 AM 6.8 4.4 - 11.3 x10*3/uL Final   03/07/2024 05:51 AM 3.7 (L) 4.4 - 11.3 x10*3/uL Final   03/06/2024 05:39 AM 4.8 4.4 - 11.3 x10*3/uL Final     nRBC   Date Value Ref Range Status   03/07/2024 0.0 0.0 - 0.0 /100 WBCs Final   03/06/2024 0.0 0.0 - 0.0 /100 WBCs Final   03/05/2024 0.0  0.0 - 0.0 /100 WBCs Final     RBC   Date Value Ref Range Status   03/18/2024 3.81 (L) 4.50 - 5.90 x10*6/uL Final   03/07/2024 3.14 (L) 4.50 - 5.90 x10*6/uL Final   03/06/2024 3.25 (L) 4.50 - 5.90 x10*6/uL Final     Hemoglobin   Date Value Ref Range Status   03/18/2024 11.4 (L) 13.5 - 17.5 g/dL Final   03/07/2024 9.3 (L) 13.5 - 17.5 g/dL Final   03/06/2024 9.6 (L) 13.5 - 17.5 g/dL Final     Hematocrit   Date Value Ref Range Status   03/18/2024 33.9 (L) 41.0 - 52.0 % Final   03/07/2024 27.9 (L) 41.0 - 52.0 % Final   03/06/2024 28.7 (L) 41.0 - 52.0 % Final     MCV   Date/Time Value Ref Range Status   03/18/2024 09:17 AM 89 80 - 100 fL Final   03/07/2024 05:51 AM 89 80 - 100 fL Final   03/06/2024 05:39 AM 88 80 - 100 fL Final     MCH   Date/Time Value Ref Range Status   03/18/2024 09:17 AM 29.9 26.0 - 34.0 pg Final   03/07/2024 05:51 AM 29.6 26.0 - 34.0 pg Final   03/06/2024 05:39 AM 29.5 26.0 - 34.0 pg Final     MCHC   Date/Time Value Ref Range Status   03/18/2024 09:17 AM 33.6 32.0 - 36.0 g/dL Final   03/07/2024 05:51 AM 33.3 32.0 - 36.0 g/dL Final   03/06/2024 05:39 AM 33.4 32.0 - 36.0 g/dL Final     RDW   Date/Time Value Ref Range Status   03/18/2024 09:17 AM 16.1 (H) 11.5 - 14.5 % Final   03/07/2024 05:51 AM 15.6 (H) 11.5 - 14.5 % Final   03/06/2024 05:39 AM 15.5 (H) 11.5 - 14.5 % Final     Platelets   Date/Time Value Ref Range Status   03/18/2024 09:17 AM 87 (L) 150 - 450 x10*3/uL Final   03/07/2024 05:51 AM 54 (L) 150 - 450 x10*3/uL Final   03/06/2024 05:39 AM 49 (L) 150 - 450 x10*3/uL Final     MPV   Date/Time Value Ref Range Status   10/30/2023 09:13 AM 10.3 7.5 - 11.5 fL Final   10/16/2023 08:34 AM 10.7 7.5 - 11.5 fL Final   10/02/2023 09:08 AM 10.0 7.5 - 11.5 fL Final     Neutrophils %   Date/Time Value Ref Range Status   03/18/2024 09:17 AM 63.0 40.0 - 80.0 % Final   03/07/2024 05:51 AM 72.4 40.0 - 80.0 % Final   03/06/2024 05:39 AM 85.3 40.0 - 80.0 % Final     Immature Granulocytes %, Automated   Date/Time  Value Ref Range Status   03/18/2024 09:17 AM 0.3 0.0 - 0.9 % Final     Comment:     Immature Granulocyte Count (IG) includes promyelocytes, myelocytes and metamyelocytes but does not include bands. Percent differential counts (%) should be interpreted in the context of the absolute cell counts (cells/UL).   03/07/2024 05:51 AM 0.3 0.0 - 0.9 % Final     Comment:     Immature Granulocyte Count (IG) includes promyelocytes, myelocytes and metamyelocytes but does not include bands. Percent differential counts (%) should be interpreted in the context of the absolute cell counts (cells/UL).   03/06/2024 05:39 AM 0.4 0.0 - 0.9 % Final     Comment:     Immature Granulocyte Count (IG) includes promyelocytes, myelocytes and metamyelocytes but does not include bands. Percent differential counts (%) should be interpreted in the context of the absolute cell counts (cells/UL).     Lymphocytes %   Date/Time Value Ref Range Status   03/18/2024 09:17 AM 26.2 13.0 - 44.0 % Final   03/07/2024 05:51 AM 15.0 13.0 - 44.0 % Final   03/06/2024 05:39 AM 5.1 13.0 - 44.0 % Final     Monocytes %   Date/Time Value Ref Range Status   03/18/2024 09:17 AM 9.2 2.0 - 10.0 % Final   03/07/2024 05:51 AM 8.2 2.0 - 10.0 % Final   03/06/2024 05:39 AM 8.2 2.0 - 10.0 % Final     Eosinophils %   Date/Time Value Ref Range Status   03/18/2024 09:17 AM 0.7 0.0 - 6.0 % Final   03/07/2024 05:51 AM 3.8 0.0 - 6.0 % Final   03/06/2024 05:39 AM 0.8 0.0 - 6.0 % Final     Basophils %   Date/Time Value Ref Range Status   03/18/2024 09:17 AM 0.6 0.0 - 2.0 % Final   03/07/2024 05:51 AM 0.3 0.0 - 2.0 % Final   03/06/2024 05:39 AM 0.2 0.0 - 2.0 % Final     Neutrophils Absolute   Date/Time Value Ref Range Status   03/18/2024 09:17 AM 4.31 1.20 - 7.70 x10*3/uL Final     Comment:     Percent differential counts (%) should be interpreted in the context of the absolute cell counts (cells/uL).   03/07/2024 05:51 AM 2.66 1.20 - 7.70 x10*3/uL Final     Comment:     Percent  "differential counts (%) should be interpreted in the context of the absolute cell counts (cells/uL).   03/06/2024 05:39 AM 4.05 1.20 - 7.70 x10*3/uL Final     Comment:     Percent differential counts (%) should be interpreted in the context of the absolute cell counts (cells/uL).     Immature Granulocytes Absolute, Automated   Date/Time Value Ref Range Status   03/18/2024 09:17 AM 0.02 0.00 - 0.70 x10*3/uL Final   03/07/2024 05:51 AM 0.01 0.00 - 0.70 x10*3/uL Final   03/06/2024 05:39 AM 0.02 0.00 - 0.70 x10*3/uL Final     Lymphocytes Absolute   Date/Time Value Ref Range Status   03/18/2024 09:17 AM 1.79 1.20 - 4.80 x10*3/uL Final   03/07/2024 05:51 AM 0.55 (L) 1.20 - 4.80 x10*3/uL Final   03/06/2024 05:39 AM 0.24 (L) 1.20 - 4.80 x10*3/uL Final     Monocytes Absolute   Date/Time Value Ref Range Status   03/18/2024 09:17 AM 0.63 0.10 - 1.00 x10*3/uL Final   03/07/2024 05:51 AM 0.30 0.10 - 1.00 x10*3/uL Final   03/06/2024 05:39 AM 0.39 0.10 - 1.00 x10*3/uL Final     Eosinophils Absolute   Date/Time Value Ref Range Status   03/18/2024 09:17 AM 0.05 0.00 - 0.70 x10*3/uL Final   03/07/2024 05:51 AM 0.14 0.00 - 0.70 x10*3/uL Final   03/06/2024 05:39 AM 0.04 0.00 - 0.70 x10*3/uL Final     Basophils Absolute   Date/Time Value Ref Range Status   03/18/2024 09:17 AM 0.04 0.00 - 0.10 x10*3/uL Final   03/07/2024 05:51 AM 0.01 0.00 - 0.10 x10*3/uL Final   03/06/2024 05:39 AM 0.01 0.00 - 0.10 x10*3/uL Final       No components found for: \"PT\"  aPTT   Date/Time Value Ref Range Status   12/26/2023 06:13 PM 48 (H) 27 - 38 seconds Final   12/22/2023 11:31 AM 47 (H) 27 - 38 seconds Final   01/08/2023 03:00 PM 36 26 - 39 sec Final     Comment:       THE APTT IS NO LONGER USED FOR MONITORING     UNFRACTIONATED HEPARIN THERAPY.    FOR MONITORING HEPARIN THERAPY,     USE THE HEPARIN ASSAY.         Assessment/Plan      Proceed with chemotherapy today: ECHO report reviewed. 3 cm left neck LN. Will monitor. No AMS at this time. Added " pembrolizumab to the patient's treatment schedule. As per NCCN guidelines patient is eligible to receive trastuzumab pembrolizumab 5-FU leucovorin and oxaliplatin. Pembrolizumab is scheduled for every 42 days.     Proceed with chemotherapy today. Has weight loss but looking better than last few weeks.     Diagnoses and all orders for this visit:  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)  -     Urinalysis with Reflex Culture and Microscopic; Future           Davis-Sidney Blake MD

## 2024-03-18 NOTE — PROGRESS NOTES
Adverse Event Note     Name:Massimo Garcia  : 1960  MRN: [id number]      Adverse Event:  Medication: Oxaliplatin  Administered Date/Time:   Reactions/Symptoms Started Time: 1401   Symptoms: (check all that apply)   [] Back Pain   [] Erythema Face     [] Hypotension     [x] Rash/Rigors [] Other   [] Bleeding    [] Erythema Hands  [] Itching  [] Swelling/Edema [] Unknown   [] Chest Pain [] Hives/Urticaria     [] Low platelet Ct  [] Syncope   [] Cytopenia  [] Hypertension       [] Neutropenia    Severity: Moderate   Provider Notified: Yes   Medications Given(Add all medications to the chart via , or treatment plan)   [] Acetaminophen-Tylenol       [] Famotidine-Pepcid  [] Nitroglycerine-NTG   [] Albuterol                               [] Hydrocortisone       [] Ondansetron-Zofran   [] Diphenhydramine-Benadryl  [] Lorazepam-Ativan  [] Naloxone-Narcan   [] Epinephrine-Epi                     [] Methylprednisone   Additional Details/ Comments: Demerol   1401: Patient started to rigoring and was c/o of feeling cold. Patient is alert and able to communicate.   1402: Infusion was stopped and called for more assistance in room. VS: 117/77, 36.8, 57, 18, unable to get pulse ox because of the rigors.   1403: Dr Blake was notified and came to bedside.   1404: Dr Blake ordered for 50 mg of IV demerol to be given.   1405: More blankets were given to patient and IVF fluids were started at 999.   1406: 50mg IV Demerol was administered.  1408: Rigors started to calm down and patient appeared to be not so cold. VS: 156/91, 100, 100% RA, 20  1413: Rigors stopped. Per Dr Blake, restart infusion in about 10 minutes at a slower rate. VS:  146/86, 95, 100% 18, 37.5  1428: Restarted infusion at double the rate. 131/86, 86, 99% RA, 37.4  1500: Patient tolerating infusion.  1523: Patient was resting and was woken up with chest discomfort. Infusion was stopped. Dr Blake was called back to room and ordered to discontinue the  treatment and for patient to go to ER for further evaluation.   1530: ER was called and given nurse to nurse report. Wife was also called and informed on 's condition and him going to the ER.

## 2024-03-18 NOTE — PATIENT INSTRUCTIONS
Today you met with Dr. Blake.  Your hiccups were discussed.  Please try the thorazine to help with the hiccups.  Call for any concerns.  You will see Dr. Blake in 4 weeks now.      Please call the office for any questions or concerns.  We ask that you notify us 5-7 days before your medications need refilled.  [facility] is strongly encouraging all patients to access their MyChart for all results and to communicate with their provider for non-urgent messages.  If an urgent/same day/sick concern response is needed, please call the office at [phone]. Thank You!

## 2024-03-19 ENCOUNTER — APPOINTMENT (OUTPATIENT)
Dept: RADIOLOGY | Facility: HOSPITAL | Age: 64
DRG: 871 | End: 2024-03-19
Payer: MEDICARE

## 2024-03-19 PROBLEM — D69.6 THROMBOCYTOPENIA (CMS-HCC): Status: ACTIVE | Noted: 2024-03-19

## 2024-03-19 PROBLEM — N17.9 AKI (ACUTE KIDNEY INJURY) (CMS-HCC): Status: ACTIVE | Noted: 2024-03-19

## 2024-03-19 PROBLEM — I26.99 PE (PULMONARY THROMBOEMBOLISM) (MULTI): Status: ACTIVE | Noted: 2024-03-19

## 2024-03-19 LAB
ALBUMIN SERPL BCP-MCNC: 2.4 G/DL (ref 3.4–5)
ALBUMIN SERPL BCP-MCNC: 2.7 G/DL (ref 3.4–5)
ALBUMIN SERPL BCP-MCNC: 3 G/DL (ref 3.4–5)
ANION GAP SERPL CALC-SCNC: 12 MMOL/L (ref 10–20)
ANION GAP SERPL CALC-SCNC: 15 MMOL/L (ref 10–20)
ANION GAP SERPL CALC-SCNC: 16 MMOL/L (ref 10–20)
APTT PPP: 31 SECONDS (ref 27–38)
BACTERIA UR CULT: NO GROWTH
BASOPHILS # BLD AUTO: 0.01 X10*3/UL (ref 0–0.1)
BASOPHILS # BLD AUTO: 0.02 X10*3/UL (ref 0–0.1)
BASOPHILS NFR BLD AUTO: 0.1 %
BASOPHILS NFR BLD AUTO: 0.3 %
BILIRUB DIRECT SERPL-MCNC: 1.5 MG/DL (ref 0–0.3)
BILIRUB SERPL-MCNC: 3.2 MG/DL (ref 0–1.2)
BUN SERPL-MCNC: 21 MG/DL (ref 6–23)
BUN SERPL-MCNC: 26 MG/DL (ref 6–23)
BUN SERPL-MCNC: 26 MG/DL (ref 6–23)
BURR CELLS BLD QL SMEAR: NORMAL
CALCIUM SERPL-MCNC: 7.8 MG/DL (ref 8.6–10.3)
CALCIUM SERPL-MCNC: 8 MG/DL (ref 8.6–10.3)
CALCIUM SERPL-MCNC: 9.2 MG/DL (ref 8.6–10.3)
CHLORIDE SERPL-SCNC: 104 MMOL/L (ref 98–107)
CHLORIDE SERPL-SCNC: 106 MMOL/L (ref 98–107)
CHLORIDE SERPL-SCNC: 108 MMOL/L (ref 98–107)
CK SERPL-CCNC: 108 U/L (ref 0–325)
CO2 SERPL-SCNC: 20 MMOL/L (ref 21–32)
CO2 SERPL-SCNC: 21 MMOL/L (ref 21–32)
CO2 SERPL-SCNC: 22 MMOL/L (ref 21–32)
CREAT SERPL-MCNC: 1.35 MG/DL (ref 0.5–1.3)
CREAT SERPL-MCNC: 1.42 MG/DL (ref 0.5–1.3)
CREAT SERPL-MCNC: 1.43 MG/DL (ref 0.5–1.3)
D DIMER PPP FEU-MCNC: ABNORMAL NG/ML FEU
DAT-POLYSPECIFIC: NORMAL
EGFRCR SERPLBLD CKD-EPI 2021: 54 ML/MIN/1.73M*2
EGFRCR SERPLBLD CKD-EPI 2021: 55 ML/MIN/1.73M*2
EGFRCR SERPLBLD CKD-EPI 2021: 58 ML/MIN/1.73M*2
EOSINOPHIL # BLD AUTO: 0 X10*3/UL (ref 0–0.7)
EOSINOPHIL # BLD AUTO: 0 X10*3/UL (ref 0–0.7)
EOSINOPHIL NFR BLD AUTO: 0 %
EOSINOPHIL NFR BLD AUTO: 0 %
ERYTHROCYTE [DISTWIDTH] IN BLOOD BY AUTOMATED COUNT: 16.9 % (ref 11.5–14.5)
ERYTHROCYTE [DISTWIDTH] IN BLOOD BY AUTOMATED COUNT: 17.1 % (ref 11.5–14.5)
FERRITIN SERPL-MCNC: 869 NG/ML (ref 20–300)
FIBRINOGEN PPP-MCNC: 153 MG/DL (ref 200–400)
GLUCOSE BLD MANUAL STRIP-MCNC: 123 MG/DL (ref 74–99)
GLUCOSE BLD MANUAL STRIP-MCNC: 136 MG/DL (ref 74–99)
GLUCOSE BLD MANUAL STRIP-MCNC: 142 MG/DL (ref 74–99)
GLUCOSE BLD MANUAL STRIP-MCNC: 144 MG/DL (ref 74–99)
GLUCOSE SERPL-MCNC: 120 MG/DL (ref 74–99)
GLUCOSE SERPL-MCNC: 136 MG/DL (ref 74–99)
GLUCOSE SERPL-MCNC: 174 MG/DL (ref 74–99)
HCT VFR BLD AUTO: 22.3 % (ref 41–52)
HCT VFR BLD AUTO: 27.4 % (ref 41–52)
HGB BLD-MCNC: 7.1 G/DL (ref 13.5–17.5)
HGB BLD-MCNC: 9 G/DL (ref 13.5–17.5)
HGB RETIC QN: 31 PG (ref 28–38)
HOLD SPECIMEN: NORMAL
HYPOCHROMIA BLD QL SMEAR: NORMAL
IMM GRANULOCYTES # BLD AUTO: 0.05 X10*3/UL (ref 0–0.7)
IMM GRANULOCYTES # BLD AUTO: 0.22 X10*3/UL (ref 0–0.7)
IMM GRANULOCYTES NFR BLD AUTO: 1.4 % (ref 0–0.9)
IMM GRANULOCYTES NFR BLD AUTO: 1.5 % (ref 0–0.9)
IMMATURE RETIC FRACTION: 21.2 %
INR PPP: 1.8 (ref 0.9–1.1)
LACTATE SERPL-SCNC: 1.7 MMOL/L (ref 0.4–2)
LACTATE SERPL-SCNC: 2 MMOL/L (ref 0.4–2)
LACTATE SERPL-SCNC: 3.5 MMOL/L (ref 0.4–2)
LDH SERPL L TO P-CCNC: 975 U/L (ref 84–246)
LYMPHOCYTES # BLD AUTO: 0.2 X10*3/UL (ref 1.2–4.8)
LYMPHOCYTES # BLD AUTO: 0.26 X10*3/UL (ref 1.2–4.8)
LYMPHOCYTES NFR BLD AUTO: 1.4 %
LYMPHOCYTES NFR BLD AUTO: 7.1 %
MAGNESIUM SERPL-MCNC: 1.56 MG/DL (ref 1.6–2.4)
MAGNESIUM SERPL-MCNC: 1.83 MG/DL (ref 1.6–2.4)
MCH RBC QN AUTO: 29.5 PG (ref 26–34)
MCH RBC QN AUTO: 29.9 PG (ref 26–34)
MCHC RBC AUTO-ENTMCNC: 31.8 G/DL (ref 32–36)
MCHC RBC AUTO-ENTMCNC: 32.8 G/DL (ref 32–36)
MCV RBC AUTO: 91 FL (ref 80–100)
MCV RBC AUTO: 93 FL (ref 80–100)
MONOCYTES # BLD AUTO: 0.35 X10*3/UL (ref 0.1–1)
MONOCYTES # BLD AUTO: 0.81 X10*3/UL (ref 0.1–1)
MONOCYTES NFR BLD AUTO: 5.7 %
MONOCYTES NFR BLD AUTO: 9.6 %
NEUTROPHILS # BLD AUTO: 13.05 X10*3/UL (ref 1.2–7.7)
NEUTROPHILS # BLD AUTO: 2.99 X10*3/UL (ref 1.2–7.7)
NEUTROPHILS NFR BLD AUTO: 81.6 %
NEUTROPHILS NFR BLD AUTO: 91.3 %
NRBC BLD-RTO: 0 /100 WBCS (ref 0–0)
NRBC BLD-RTO: 0 /100 WBCS (ref 0–0)
OVALOCYTES BLD QL SMEAR: NORMAL
OVALOCYTES BLD QL SMEAR: NORMAL
PHOSPHATE SERPL-MCNC: 2.9 MG/DL (ref 2.5–4.9)
PHOSPHATE SERPL-MCNC: 3.4 MG/DL (ref 2.5–4.9)
PHOSPHATE SERPL-MCNC: 3.4 MG/DL (ref 2.5–4.9)
PHOSPHATE SERPL-MCNC: <1 MG/DL (ref 2.5–4.9)
PLATELET # BLD AUTO: 17 X10*3/UL (ref 150–450)
PLATELET # BLD AUTO: 29 X10*3/UL (ref 150–450)
POLYCHROMASIA BLD QL SMEAR: NORMAL
POLYCHROMASIA BLD QL SMEAR: NORMAL
POTASSIUM SERPL-SCNC: 4.1 MMOL/L (ref 3.5–5.3)
POTASSIUM SERPL-SCNC: 4.2 MMOL/L (ref 3.5–5.3)
POTASSIUM SERPL-SCNC: 4.3 MMOL/L (ref 3.5–5.3)
PROTHROMBIN TIME: 20 SECONDS (ref 9.8–12.8)
RBC # BLD AUTO: 2.41 X10*6/UL (ref 4.5–5.9)
RBC # BLD AUTO: 3.01 X10*6/UL (ref 4.5–5.9)
RBC MORPH BLD: NORMAL
RBC MORPH BLD: NORMAL
RETICS #: 0.12 X10*6/UL (ref 0.02–0.12)
RETICS/RBC NFR AUTO: 4.1 % (ref 0.5–2)
SCHISTOCYTES BLD QL SMEAR: NORMAL
SODIUM SERPL-SCNC: 136 MMOL/L (ref 136–145)
SODIUM SERPL-SCNC: 138 MMOL/L (ref 136–145)
SODIUM SERPL-SCNC: 138 MMOL/L (ref 136–145)
VANCOMYCIN SERPL-MCNC: 8.2 UG/ML (ref 5–20)
WBC # BLD AUTO: 14.3 X10*3/UL (ref 4.4–11.3)
WBC # BLD AUTO: 3.7 X10*3/UL (ref 4.4–11.3)

## 2024-03-19 PROCEDURE — 2500000004 HC RX 250 GENERAL PHARMACY W/ HCPCS (ALT 636 FOR OP/ED)

## 2024-03-19 PROCEDURE — 2500000005 HC RX 250 GENERAL PHARMACY W/O HCPCS

## 2024-03-19 PROCEDURE — 85045 AUTOMATED RETICULOCYTE COUNT: CPT | Performed by: INTERNAL MEDICINE

## 2024-03-19 PROCEDURE — 85060 BLOOD SMEAR INTERPRETATION: CPT | Performed by: STUDENT IN AN ORGANIZED HEALTH CARE EDUCATION/TRAINING PROGRAM

## 2024-03-19 PROCEDURE — 82728 ASSAY OF FERRITIN: CPT

## 2024-03-19 PROCEDURE — 80069 RENAL FUNCTION PANEL: CPT

## 2024-03-19 PROCEDURE — 82947 ASSAY GLUCOSE BLOOD QUANT: CPT

## 2024-03-19 PROCEDURE — 83605 ASSAY OF LACTIC ACID: CPT

## 2024-03-19 PROCEDURE — 85730 THROMBOPLASTIN TIME PARTIAL: CPT

## 2024-03-19 PROCEDURE — 83615 LACTATE (LD) (LDH) ENZYME: CPT | Performed by: INTERNAL MEDICINE

## 2024-03-19 PROCEDURE — 83735 ASSAY OF MAGNESIUM: CPT

## 2024-03-19 PROCEDURE — 85025 COMPLETE CBC W/AUTO DIFF WBC: CPT

## 2024-03-19 PROCEDURE — 99223 1ST HOSP IP/OBS HIGH 75: CPT

## 2024-03-19 PROCEDURE — 83070 ASSAY OF HEMOSIDERIN QUAL: CPT | Mod: GEALAB | Performed by: INTERNAL MEDICINE

## 2024-03-19 PROCEDURE — 83010 ASSAY OF HAPTOGLOBIN QUANT: CPT

## 2024-03-19 PROCEDURE — 87081 CULTURE SCREEN ONLY: CPT | Mod: GEALAB

## 2024-03-19 PROCEDURE — 80202 ASSAY OF VANCOMYCIN: CPT | Performed by: EMERGENCY MEDICINE

## 2024-03-19 PROCEDURE — 84100 ASSAY OF PHOSPHORUS: CPT

## 2024-03-19 PROCEDURE — 74176 CT ABD & PELVIS W/O CONTRAST: CPT

## 2024-03-19 PROCEDURE — 82248 BILIRUBIN DIRECT: CPT

## 2024-03-19 PROCEDURE — 82247 BILIRUBIN TOTAL: CPT

## 2024-03-19 PROCEDURE — 2500000002 HC RX 250 W HCPCS SELF ADMINISTERED DRUGS (ALT 637 FOR MEDICARE OP, ALT 636 FOR OP/ED)

## 2024-03-19 PROCEDURE — 99223 1ST HOSP IP/OBS HIGH 75: CPT | Performed by: INTERNAL MEDICINE

## 2024-03-19 PROCEDURE — 85379 FIBRIN DEGRADATION QUANT: CPT

## 2024-03-19 PROCEDURE — 85025 COMPLETE CBC W/AUTO DIFF WBC: CPT | Performed by: INTERNAL MEDICINE

## 2024-03-19 PROCEDURE — 2020000001 HC ICU ROOM DAILY

## 2024-03-19 PROCEDURE — 74176 CT ABD & PELVIS W/O CONTRAST: CPT | Performed by: STUDENT IN AN ORGANIZED HEALTH CARE EDUCATION/TRAINING PROGRAM

## 2024-03-19 PROCEDURE — 85384 FIBRINOGEN ACTIVITY: CPT

## 2024-03-19 PROCEDURE — 85610 PROTHROMBIN TIME: CPT

## 2024-03-19 PROCEDURE — 82550 ASSAY OF CK (CPK): CPT

## 2024-03-19 PROCEDURE — 86880 COOMBS TEST DIRECT: CPT

## 2024-03-19 PROCEDURE — 2500000001 HC RX 250 WO HCPCS SELF ADMINISTERED DRUGS (ALT 637 FOR MEDICARE OP)

## 2024-03-19 RX ORDER — DEXTROSE 50 % IN WATER (D50W) INTRAVENOUS SYRINGE
25
Status: DISCONTINUED | OUTPATIENT
Start: 2024-03-19 | End: 2024-03-23 | Stop reason: HOSPADM

## 2024-03-19 RX ORDER — MAGNESIUM SULFATE HEPTAHYDRATE 40 MG/ML
2 INJECTION, SOLUTION INTRAVENOUS ONCE
Status: COMPLETED | OUTPATIENT
Start: 2024-03-19 | End: 2024-03-19

## 2024-03-19 RX ORDER — CEFTRIAXONE 2 G/50ML
2 INJECTION, SOLUTION INTRAVENOUS EVERY 24 HOURS
Status: DISCONTINUED | OUTPATIENT
Start: 2024-03-19 | End: 2024-03-19

## 2024-03-19 RX ORDER — DEXTROSE 50 % IN WATER (D50W) INTRAVENOUS SYRINGE
12.5
Status: DISCONTINUED | OUTPATIENT
Start: 2024-03-19 | End: 2024-03-23 | Stop reason: HOSPADM

## 2024-03-19 RX ORDER — CHLORPROMAZINE HYDROCHLORIDE 25 MG/1
25 TABLET, FILM COATED ORAL 2 TIMES DAILY PRN
Status: DISCONTINUED | OUTPATIENT
Start: 2024-03-19 | End: 2024-03-19

## 2024-03-19 RX ORDER — VANCOMYCIN HYDROCHLORIDE 1 G/20ML
INJECTION, POWDER, LYOPHILIZED, FOR SOLUTION INTRAVENOUS DAILY PRN
Status: DISCONTINUED | OUTPATIENT
Start: 2024-03-19 | End: 2024-03-22

## 2024-03-19 RX ORDER — SCOLOPAMINE TRANSDERMAL SYSTEM 1 MG/1
1 PATCH, EXTENDED RELEASE TRANSDERMAL
Status: DISCONTINUED | OUTPATIENT
Start: 2024-03-19 | End: 2024-03-23 | Stop reason: HOSPADM

## 2024-03-19 RX ORDER — SCOLOPAMINE TRANSDERMAL SYSTEM 1 MG/1
1 PATCH, EXTENDED RELEASE TRANSDERMAL
COMMUNITY
End: 2024-04-29 | Stop reason: SDUPTHER

## 2024-03-19 RX ORDER — FENTANYL 25 UG/1
1 PATCH TRANSDERMAL
COMMUNITY
End: 2024-04-04 | Stop reason: SDUPTHER

## 2024-03-19 RX ORDER — INSULIN LISPRO 100 [IU]/ML
0-10 INJECTION, SOLUTION INTRAVENOUS; SUBCUTANEOUS
Status: DISCONTINUED | OUTPATIENT
Start: 2024-03-19 | End: 2024-03-23 | Stop reason: HOSPADM

## 2024-03-19 RX ORDER — TIZANIDINE 4 MG/1
4 TABLET ORAL 2 TIMES DAILY PRN
Status: DISCONTINUED | OUTPATIENT
Start: 2024-03-19 | End: 2024-03-19

## 2024-03-19 RX ADMIN — SCOPOLAMINE 1 PATCH: 1.5 PATCH, EXTENDED RELEASE TRANSDERMAL at 21:48

## 2024-03-19 RX ADMIN — LORAZEPAM 1 MG: 1 TABLET ORAL at 20:09

## 2024-03-19 RX ADMIN — VANCOMYCIN HYDROCHLORIDE 1250 MG: 1.25 INJECTION, POWDER, LYOPHILIZED, FOR SOLUTION INTRAVENOUS at 21:17

## 2024-03-19 RX ADMIN — MAGNESIUM SULFATE HEPTAHYDRATE 2 G: 2 INJECTION, SOLUTION INTRAVENOUS at 18:04

## 2024-03-19 RX ADMIN — SODIUM CHLORIDE 1500 ML: 9 INJECTION, SOLUTION INTRAVENOUS at 01:01

## 2024-03-19 RX ADMIN — SODIUM CHLORIDE, POTASSIUM CHLORIDE, SODIUM LACTATE AND CALCIUM CHLORIDE 1000 ML: 600; 310; 30; 20 INJECTION, SOLUTION INTRAVENOUS at 09:51

## 2024-03-19 RX ADMIN — SODIUM CHLORIDE, POTASSIUM CHLORIDE, SODIUM LACTATE AND CALCIUM CHLORIDE 1000 ML: 600; 310; 30; 20 INJECTION, SOLUTION INTRAVENOUS at 15:41

## 2024-03-19 RX ADMIN — PIPERACILLIN SODIUM AND TAZOBACTAM SODIUM 3.38 G: 3; .375 INJECTION, SOLUTION INTRAVENOUS at 20:10

## 2024-03-19 RX ADMIN — SODIUM CHLORIDE, POTASSIUM CHLORIDE, SODIUM LACTATE AND CALCIUM CHLORIDE 1000 ML: 600; 310; 30; 20 INJECTION, SOLUTION INTRAVENOUS at 16:41

## 2024-03-19 RX ADMIN — PANTOPRAZOLE SODIUM 40 MG: 40 TABLET, DELAYED RELEASE ORAL at 09:48

## 2024-03-19 RX ADMIN — SODIUM CHLORIDE, SODIUM LACTATE, POTASSIUM CHLORIDE, AND CALCIUM CHLORIDE 1000 ML: 600; 310; 30; 20 INJECTION, SOLUTION INTRAVENOUS at 13:45

## 2024-03-19 RX ADMIN — CHLORPROMAZINE HYDROCHLORIDE 25 MG: 25 TABLET, FILM COATED ORAL at 09:47

## 2024-03-19 RX ADMIN — POTASSIUM PHOSPHATE, MONOBASIC AND POTASSIUM PHOSPHATE, DIBASIC 30 MMOL: 224; 236 INJECTION, SOLUTION, CONCENTRATE INTRAVENOUS at 02:51

## 2024-03-19 RX ADMIN — TIZANIDINE 4 MG: 4 TABLET ORAL at 09:48

## 2024-03-19 RX ADMIN — PANTOPRAZOLE SODIUM 40 MG: 40 TABLET, DELAYED RELEASE ORAL at 20:10

## 2024-03-19 RX ADMIN — Medication 3 MG: at 21:53

## 2024-03-19 ASSESSMENT — ACTIVITIES OF DAILY LIVING (ADL): LACK_OF_TRANSPORTATION: NO

## 2024-03-19 ASSESSMENT — ENCOUNTER SYMPTOMS
HEMATURIA: 1
CHEST TIGHTNESS: 0
FATIGUE: 1
DYSURIA: 1
FEVER: 1

## 2024-03-19 ASSESSMENT — PAIN - FUNCTIONAL ASSESSMENT
PAIN_FUNCTIONAL_ASSESSMENT: 0-10
PAIN_FUNCTIONAL_ASSESSMENT: 0-10

## 2024-03-19 ASSESSMENT — PAIN SCALES - GENERAL
PAINLEVEL_OUTOF10: 0 - NO PAIN
PAINLEVEL_OUTOF10: 4

## 2024-03-19 NOTE — HOSPITAL COURSE
HPI: Massimo Garcia is a 63 y.o. male with a PMH of third degree heart block s/p PPM (placed in November 2023), esophageal cancer with mets to liver and lungs on chemotherapy, PE, peripheral neuropathy, and portal vein thrombosis who presented to the hospital for sudden-onset chills, shivers, and chest pain while receiving chemotherapy. Patient's wife and daughter are present at bedside to supplement patient history.  Patient states that he was in the middle of receiving a run of chemotherapy when he started feeling chills and shivers.  His symptoms then progressed and he started experiening nonradiating substernal squeezing chest pains so he was brought in to the ER.  He additionally endorses having an episode of discolored urination during his chemo as well where it appeared red and green.      Of note, patient was previously admitted to the hospital 3/5-3/7 after experiencing fevers and chills during a cycle of chemotherapy.  At that time he was treated with vancomycin and zosyn during his inpatient stay and was discharged on a course of Augmentin without clear source of infection identified.    ED Course:  In the ED patient met sepsis criteria with mild tachycardia, one time fever, and soft blood pressures in the 100s/50s.  Workup was relevant for significant abnormalities in his CMP and CBC.  CMP demonstrated hypokalemia, hypomagnesemia, and a hyperbilirubinemia (6.3).  Initial troponins were 34 which increased to 50.  Lactate was initially elevated as well at 3.1.  CBC demonstrated a leukocytosis of 12.9 with a thrombocytopenia of 76 consistent with his recent baseline.  Imaging workup with CT PE and CXR was relevant for tiny nonocclusive emboli within the right lower lobe, and worsening of his known metastasis to his lungs.  UA also demonstrated 4+ bacteria and positive nitrites with negative LE and WBC.  Patient was started on vancomycin, zosyn, and given 1L IVF for presumed UTI sepsis.  He was not started on any  anticoagulation due to persistent thrombocytopenia and he was admitted to the floor.    Floor Course:  On arrival to the floor patient continued to be hypotensive requiring extensive administration of IVF.  Repeat CBC demonstrated worsening thrombocytopenia and anemia so DIC labs were ordered.  INR, fibrinogen, D-Dimer, and ferritin labs were all consistent with DIC.  He was also found to have a phosphate of < 1 which was repleted and was wnl on repeat RFP.  Throughout the day on 3/19 he received 4 additional liters of LR for hypotension and ICU Dr. Carson was consulted for potential transfer to the ICU for pressure support in the setting of sepsis.  Patient's blood pressure stabilized following the extensive fluid resuscitation he received on 3/19.  His symptoms improved significantly, his jaundice resolved, and his coagulopathy improved as well.  No obvious source of infection was found, as patient's blood cultures were negative, his CT Abd/Pelvis did not show any acute processes concerning for infection, and his urine culture was negative.  Discussion was had with oncology and infectious disease, and given that there is no obvious source of infection patient's presentation, sepsis, and nonimmune hemolysis were most likely a reaction to his oxaliplatin chemotherapy.  He continued to be stable and improve from 3/20-3/22 while on vancomycin, zosyn, and micofungin and the decision was made with ID to stop his antibiotics on 3/22 and continue to monitor him throughout the day to see if he continued to improve while off antibiotics.  Patient did well while off antibiotics, and decision was made to discharge him home on 3/23.  He was instructed to follow up closely with his oncologist for continued management of his active cancer as he will need a new chemotherapy regimen.    03/24. Eliquis was restart due to improve platelet count. He was discharge in stable condition and advise to follow up with his pcp and  hematologist

## 2024-03-19 NOTE — CONSULTS
Reason For Consult  metastatic esophageal cancer admitted with fever after chemotherapy    History Of Present Illness  Massimo Garcia is a 63 y.o. male with a past medical history of metastatic esophageal cancer currently on chemotherapy with FOLFOX plus trastuzumab, patient was yesterday in the infusion center and approximately after two thirds of his oxaliplatin infusion he developed the chills and rigors, he received Demerol with improvement, however he subsequently developed chest pain so he was sent to the emergency room, CT angiogram showed findings suspicious of a small nonocclusive emboli within the right lower lobe, in addition to significant interval progression of his disease with increasing lymphadenopathy    Today while he is on the floor he developed hypotension which was poorly responsive to fluids, he is now receiving 2 boluses with plans of transfer to the intensive care unit in case his blood pressure does not respond     His labs were significant for marked hyperbilirubinemia yesterday with total bili of 6.2, the repeat today is 3.2 without conjugated level of 1.5    His hemoglobin in the morning was 9.0 g/dL, platelets of 29,000, and total white blood cell of 14.3  His coags showed fibrinogen of 153, PT of 20, PTT of 31 and D-dimer of more than 81,000    He describes that he gets discoloration in his urine, urinalysis was positive for nitrite and 4+ bacteria    He had 2 loose bowel movements but no watery stools, denies overt bleeding    He is on vancomycin and Zosyn    Note that the patient had a similar admission after previous chemo infusion with fever during which he was managed for possible colitis     Past Medical History  He has a past medical history of Essential (primary) hypertension (12/21/2013), Pacemaker, Peripheral neuropathy, Personal history of other diseases of the circulatory system, and Pneumothorax (12/04/2023).    Surgical History  He has a past surgical history that includes  "Total knee arthroplasty (09/30/2016); Total knee arthroplasty (09/30/2016); Cardiac electrophysiology procedure (N/A, 11/7/2023); and Cardiac electrophysiology procedure (Left, 11/9/2023).     Social History  He reports that he has quit smoking. His smoking use included cigarettes and cigars. He has never been exposed to tobacco smoke. He has never used smokeless tobacco. He reports that he does not currently use alcohol. He reports current drug use. Drug: Marijuana.    Family History  Family History   Problem Relation Name Age of Onset    Cancer Father          Allergies  Bee venom protein (honey bee) and Oxaliplatin          Review of Systems  Conducted and negative except for HPI     Physical Exam  General: Lethargic, oriented x 3  HEENT: Icteric sclera  Abdomen: Soft and nontender   Heart: Regular no murmur  Lungs: Clear to auscultation  Extremities: No edema     Last Recorded Vitals  Blood pressure (!) 83/45, pulse 92, temperature 36.5 °C (97.7 °F), temperature source Temporal, resp. rate 17, height 1.727 m (5' 8\"), weight 74 kg (163 lb 2.3 oz), SpO2 97 %.    Relevant Results  Results for orders placed or performed during the hospital encounter of 03/18/24 (from the past 24 hour(s))   Troponin, High Sensitivity, 1 Hour   Result Value Ref Range    Troponin I, High Sensitivity 50 (H) 0 - 20 ng/L   Lactate   Result Value Ref Range    Lactate 3.0 (H) 0.4 - 2.0 mmol/L   Lactate   Result Value Ref Range    Lactate 3.5 (H) 0.4 - 2.0 mmol/L   Renal function panel   Result Value Ref Range    Glucose 174 (H) 74 - 99 mg/dL    Sodium 138 136 - 145 mmol/L    Potassium 4.2 3.5 - 5.3 mmol/L    Chloride 104 98 - 107 mmol/L    Bicarbonate 22 21 - 32 mmol/L    Anion Gap 16 10 - 20 mmol/L    Urea Nitrogen 21 6 - 23 mg/dL    Creatinine 1.35 (H) 0.50 - 1.30 mg/dL    eGFR 58 (L) >60 mL/min/1.73m*2    Calcium 9.2 8.6 - 10.3 mg/dL    Phosphorus <1.0 (LL) 2.5 - 4.9 mg/dL    Albumin 3.0 (L) 3.4 - 5.0 g/dL   Magnesium   Result Value Ref " Range    Magnesium 1.83 1.60 - 2.40 mg/dL   Green Top   Result Value Ref Range    Extra Tube Hold for add-ons.    CBC and Auto Differential   Result Value Ref Range    WBC 14.3 (H) 4.4 - 11.3 x10*3/uL    nRBC 0.0 0.0 - 0.0 /100 WBCs    RBC 3.01 (L) 4.50 - 5.90 x10*6/uL    Hemoglobin 9.0 (L) 13.5 - 17.5 g/dL    Hematocrit 27.4 (L) 41.0 - 52.0 %    MCV 91 80 - 100 fL    MCH 29.9 26.0 - 34.0 pg    MCHC 32.8 32.0 - 36.0 g/dL    RDW 16.9 (H) 11.5 - 14.5 %    Platelets 29 (LL) 150 - 450 x10*3/uL    Neutrophils % 91.3 40.0 - 80.0 %    Immature Granulocytes %, Automated 1.5 (H) 0.0 - 0.9 %    Lymphocytes % 1.4 13.0 - 44.0 %    Monocytes % 5.7 2.0 - 10.0 %    Eosinophils % 0.0 0.0 - 6.0 %    Basophils % 0.1 0.0 - 2.0 %    Neutrophils Absolute 13.05 (H) 1.20 - 7.70 x10*3/uL    Immature Granulocytes Absolute, Automated 0.22 0.00 - 0.70 x10*3/uL    Lymphocytes Absolute 0.20 (L) 1.20 - 4.80 x10*3/uL    Monocytes Absolute 0.81 0.10 - 1.00 x10*3/uL    Eosinophils Absolute 0.00 0.00 - 0.70 x10*3/uL    Basophils Absolute 0.02 0.00 - 0.10 x10*3/uL   Lactate   Result Value Ref Range    Lactate 2.0 0.4 - 2.0 mmol/L   Morphology   Result Value Ref Range    RBC Morphology See Below     Polychromasia Mild     Ovalocytes Few    Reticulocytes   Result Value Ref Range    Retic % 4.1 (H) 0.5 - 2.0 %    Retic Absolute 0.123 (H) 0.022 - 0.118 x10*6/uL    Reticulocyte Hemoglobin 31 28 - 38 pg    Immature Retic fraction 21.2 (H) <=16.0 %   POCT GLUCOSE   Result Value Ref Range    POCT Glucose 136 (H) 74 - 99 mg/dL   Phosphorus   Result Value Ref Range    Phosphorus 3.4 2.5 - 4.9 mg/dL   D-dimer, VTE Exclusion   Result Value Ref Range    D-Dimer, Quantitative VTE Exclusion 81,270 (H) <=500 ng/mL FEU   Ferritin   Result Value Ref Range    Ferritin 869 (H) 20 - 300 ng/mL   Bilirubin, Total   Result Value Ref Range    Bilirubin, Total 3.2 (H) 0.0 - 1.2 mg/dL   Renal function panel   Result Value Ref Range    Glucose 136 (H) 74 - 99 mg/dL    Sodium  136 136 - 145 mmol/L    Potassium 4.1 3.5 - 5.3 mmol/L    Chloride 108 (H) 98 - 107 mmol/L    Bicarbonate 20 (L) 21 - 32 mmol/L    Anion Gap 12 10 - 20 mmol/L    Urea Nitrogen 26 (H) 6 - 23 mg/dL    Creatinine 1.43 (H) 0.50 - 1.30 mg/dL    eGFR 54 (L) >60 mL/min/1.73m*2    Calcium 7.8 (L) 8.6 - 10.3 mg/dL    Phosphorus 3.4 2.5 - 4.9 mg/dL    Albumin 2.7 (L) 3.4 - 5.0 g/dL   Protime-INR   Result Value Ref Range    Protime 20.0 (H) 9.8 - 12.8 seconds    INR 1.8 (H) 0.9 - 1.1   APTT   Result Value Ref Range    aPTT 31 27 - 38 seconds   Fibrinogen   Result Value Ref Range    Fibrinogen 153 (L) 200 - 400 mg/dL   Bilirubin, Direct   Result Value Ref Range    Bilirubin, Direct 1.5 (H) 0.0 - 0.3 mg/dL   Creatine Kinase   Result Value Ref Range    Creatine Kinase 108 0 - 325 U/L   Lactate dehydrogenase   Result Value Ref Range     (H) 84 - 246 U/L   POCT GLUCOSE   Result Value Ref Range    POCT Glucose 123 (H) 74 - 99 mg/dL   Renal function panel   Result Value Ref Range    Glucose 120 (H) 74 - 99 mg/dL    Sodium 138 136 - 145 mmol/L    Potassium 4.3 3.5 - 5.3 mmol/L    Chloride 106 98 - 107 mmol/L    Bicarbonate 21 21 - 32 mmol/L    Anion Gap 15 10 - 20 mmol/L    Urea Nitrogen 26 (H) 6 - 23 mg/dL    Creatinine 1.42 (H) 0.50 - 1.30 mg/dL    eGFR 55 (L) >60 mL/min/1.73m*2    Calcium 8.0 (L) 8.6 - 10.3 mg/dL    Phosphorus 2.9 2.5 - 4.9 mg/dL    Albumin 2.4 (L) 3.4 - 5.0 g/dL   Magnesium   Result Value Ref Range    Magnesium 1.56 (L) 1.60 - 2.40 mg/dL   Vancomycin   Result Value Ref Range    Vancomycin 8.2 5.0 - 20.0 ug/mL   Lactate   Result Value Ref Range    Lactate 1.7 0.4 - 2.0 mmol/L   POCT GLUCOSE   Result Value Ref Range    POCT Glucose 142 (H) 74 - 99 mg/dL          Assessment/Plan     Fever and sepsis    The differential diagnosis includes infection of which the source could be UTI versus cholangitis, or reaction to the oxaliplatin although would not  expect hypotension at this time     Obtain CT abdomen and  pelvis  Continue broad-spectrum antibiotic with vancomycin and Zosyn  Pressor support if required  Follow cultures    2.  Thrombocytopenia  Baseline thrombocytopenia with worsening down to 29,000  Secondary to sepsis and labs also suggestive of DIC  Support with platelet transfusion if the count drops less than 20,000 and cryoprecipitate if the fibrinogen drops less than 100  Ordered blood smear review by path     3.  Anemia  Likely anemia of chronic disease/inflammation  The bilirubinemia was mixed so we will order hemolysis workup including LDH, reticulocyte, haptoglobin has been ordered, ordered DG  There are case reports of oxaliplatin induced immune hemolytic anemia  Monitor the hemoglobin and support with transfusion if hemoglobin drops less than 7 g/dL    4.  Metastatic esophageal cancer  His disease seems to be progressing on FOLFOX plus trastuzumab  At this time he wishes to continue cancer therapy when he stabilizes and to continue full supportive measures  His primary oncologist Dr. Blake was updated    5.  Pulmonary embolism  Previously on Eliquis  His platelets at this time are prohibitive of full dose anticoagulation, but we will follow to when it could be started  Consider Dopplers of the lower extremities    Thanks for the consult    I spent 80 minutes in the professional and overall care of this patient.      Bhavin Frankel MD

## 2024-03-19 NOTE — PROGRESS NOTES
"Vancomycin Dosing by Pharmacy- INITIAL    Massimo Garcia is a 63 y.o. year old male who Pharmacy has been consulted for vancomycin dosing for other sepsis of unknown origin . Based on the patient's indication and renal status this patient will be dosed based on a goal AUC of 400-600.     Renal function is currently declining, SCR 3/19 is 1.42, SCR 3/18 was 0.76.  Level drawn 3/19/24 at 1442hrs was 8.2.  One time dose of 1250mg today and pull level on 3/20 in AM.      Visit Vitals  BP (!) 83/45   Pulse 92   Temp 36.5 °C (97.7 °F) (Temporal)   Resp 17        Lab Results   Component Value Date    CREATININE 1.42 (H) 03/19/2024    CREATININE 1.43 (H) 03/19/2024    CREATININE 1.35 (H) 03/18/2024    CREATININE 0.89 03/18/2024        Patient weight is No results found for: \"PTWEIGHT\"    No results found for: \"CULTURE\"     I/O last 3 completed shifts:  In: 400 (5.4 mL/kg) [I.V.:50 (0.7 mL/kg); IV Piggyback:350]  Out: - (0 mL/kg)   Weight: 74 kg   [unfilled]    No results found for: \"PATIENTTEMP\"       Assessment/Plan     Patient has already been given a loading dose of 2000 mg.  Will initiate vancomycin maintenance, a one time dose of 1250 mg.    This dosing regimen is predicted by [employer] to result in the following pharmacokinetic parameters:    \"Loading dose: N/A  Regimen: 1250 mg IV every 24 hours.  Start time: 19:12 on 03/19/2024  Exposure target: AUC24 (range)400-600 mg/L.hr   AUC24,ss: 433 mg/L.hr  Probability of AUC24 > 400: 64 %  Ctrough,ss: 13.4 mg/L  Probability of Ctrough,ss > 20: 15 %  Probability of nephrotoxicity (Lodise VLAD 2009): 9 %\"    Follow-up level will be ordered on 3/20 at 0500hrs unless clinically indicated sooner.  Will continue to monitor renal function daily while on vancomycin and order serum creatinine at least every 48 hours if not already ordered.  Follow for continued vancomycin needs, clinical response, and signs/symptoms of toxicity.       Umair Abdullahi, PharmD       "

## 2024-03-19 NOTE — PROGRESS NOTES
EXAMINATION TYPE: XR chest 2V

 

DATE OF EXAM: 2/26/2018

 

COMPARISON: 4/17/2017

 

HISTORY: Weakness

 

TECHNIQUE:  Frontal and lateral views of the chest are obtained.

 

FINDINGS:  Heart and mediastinum are normal. Lungs are clear. Diaphragm is normal. Bony thorax appear
s intact.

 

IMPRESSION:  No active cardiopulmonary disease. No significant change compared to old exam. Pharmacy Medication History Review    Massimo Garcia is a 63 y.o. male admitted for Cystitis. Pharmacy reviewed the patient's [id number] medications and allergies for accuracy.    The list below reflectives the updated PTA list. Please review each medication in order reconciliation for additional clarification and justification.  Medications Prior to Admission   Medication Sig Dispense Refill Last Dose    [] amoxicillin-pot clavulanate (Augmentin) 875-125 mg tablet Take 1 tablet by mouth 2 times a day for 5 days. 10 tablet 0     apixaban (Eliquis) 5 mg tablet TAKE 1 TABLET BY MOUTH TWO TIMES A DAY 60 tablet 6     cholecalciferol (Vitamin D-3) 50 MCG (2000 UT) tablet Take 2 tablets (100 mcg) by mouth once daily in the morning.       diphenhydrAMINE-nystatin-dexAMETHasone-doxycycline in sterile water injection Use 15 mL in themouth or throat 4 times a day as needed for mucositis 250 mL 3 Past Week    fentaNYL (Duragesic) 25 mcg/hr patch Place 1 patch on the skin every 3rd day.       LORazepam (Ativan) 1 mg tablet Take 1 tablet (1 mg) by mouth every 8 hours if needed for anxiety (nausea). (Patient taking differently: Take 1 tablet (1 mg) by mouth every 8 hours if needed for anxiety (nausea). Last OARRS fill: 24 #90 for 30 days) 90 tablet 0     medical cannabis Take 1 each by mouth. Last OARRS fills:  24 Tin Oral Admin-3.67-0-120 Cbn Tincture 440/4 days  24 Tin Oral Admin-5.5-5.5-120 660/6 days  23 Oint Top Admin-16.6-30 Mjm 590.00/ 2 days   Past Month    metoprolol succinate XL (Toprol-XL) 25 mg 24 hr tablet Take 1 tablet (25 mg) by mouth once daily. Do not crush or chew. (Patient taking differently: Take 1 tablet (25 mg) by mouth once daily in the morning. Do not crush or chew.) 90 tablet 0     multivitamin with minerals (multivit-min-iron fum-folic ac) tablet Take 2 tablets by mouth once daily in the morning.       pantoprazole (ProtoNix) 40 mg EC tablet TAKE 1 TABLET BY MOUTH TWICE A  DAY (Patient taking differently: Take 1 tablet (40 mg) by mouth once daily in the morning. Take before meals.) 180 tablet 3     potassium chloride CR 10 mEq ER tablet TAKE 1 TABLET BY MOUTH EVERY DAY 90 tablet 1     scopolamine (Transderm-Scop) 1 mg over 3 days patch 3 day Place 1 patch on the skin every 3rd day.       sodium chloride (Ocean) 0.65 % nasal spray Administer 1 spray into each nostril if needed for congestion (or nasal dryness/ bleeding). 30 mL 12     sucralfate (Carafate) 1 gram tablet Take 1 tablet (1 g) by mouth once daily as needed.           The list below reflectives the updated allergy list. Please review each documented allergy for additional clarification and justification.  Allergies  Reviewed by Misty Hill RN on 3/18/2024        Severity Reactions Comments    Bee Venom Protein (honey Bee) High Anaphylaxis     Oxaliplatin Medium Other Rigors            Below are additional concerns with the patient's PTA list.  Confirmed medications with patient    Kade Edmondson RPh

## 2024-03-19 NOTE — PROGRESS NOTES
Massimo Garcia is a 63 y.o. male on day 1 of admission presenting with Cystitis.      Subjective   Patient was seen at the bedside this morning resting comfortably in no acute distress.  He states that his chest pain has resolved, and endorses feeling well other than continuing fatigue.  He denies any abdominal pain, nausea, vomiting, fevers, or chills this morning.     Of note, after rounds patient began getting more somnolent with soft blood pressures 70s-80s/40s-50s.  He also appeared more jaundiced at this time.  He was given additional fluid boluses is continuing to be treated for septic shock.       Objective     Last Recorded Vitals  BP 84/54 (BP Location: Right arm)   Pulse 99   Temp 36.8 °C (98.2 °F) (Temporal)   Resp 18   Wt 74 kg (163 lb 2.3 oz)   SpO2 95%   Intake/Output last 3 Shifts:    Intake/Output Summary (Last 24 hours) at 3/19/2024 1341  Last data filed at 3/18/2024 2342  Gross per 24 hour   Intake 400 ml   Output --   Net 400 ml       Admission Weight  Weight: 73.5 kg (162 lb) (03/18/24 1552)    Daily Weight  03/18/24 : 74 kg (163 lb 2.3 oz)    Image Results  CT angio chest for pulmonary embolism  Narrative: Interpreted By:  John Uribe,   STUDY:  CT ANGIO CHEST FOR PULMONARY EMBOLISM;  3/18/2024 6:57 pm      INDICATION:  Signs/Symptoms:cp, hx malginancy.      COMPARISON:  CTA chest 12/26/2023      ACCESSION NUMBER(S):  [id number]      ORDERING CLINICIAN:  YUNIOR MEDLEY      TECHNIQUE:  Contiguous axial images of the chest were obtained after the  intravenous administration of 61 mL Omnipaque 350 contrast using  angiographic PE protocol. Coronal and sagittal reformatted images  were reconstructed from the axial data. MIP images were created on an  independent workstation and reviewed.      FINDINGS:  PULMONARY ARTERIES: Adequate opacification to the level of the  proximal segmental arteries. Assessment of the distal segmental and  subsegmental arteries limited by mixing artifact and  respiratory  motion. Very subtle linear filling defect noted within the right  interlobar and lower lobar arteries suspicious for nonocclusive  emboli (series 403, image 86, series 401, image 164, and series 401  image 177). The main pulmonary artery is normal in diameter. No CT  evidence of right heart strain.      HEART: Borderline cardiomegaly. No significant coronary artery  calcifications. No significant pericardial effusion. Pacing leads are  noted within the right atrium and right ventricle.      VESSELS: Normal caliber aorta without dissection. No significant  aortic atherosclerosis.      MEDIASTINUM AND LYMPH NODES: Visualized thyroid is within normal  limits. Significant interval progression and number and size of  mediastinal and hilar lymphadenopathy compared to prior chest CT.  There is a right hilar node measuring up to 2.1 cm. A left hilar node  measures up to 2.2 cm. 2.2 cm subcarinal lymph node. No  pneumomediastinum. Mild wall thickening of the distal esophagus.      LUNG, AIRWAYS, AND PLEURA: Trachea and proximal mainstem bronchi are  patent. Significant interval increase in size and number pulmonary  nodules/metastases compared to prior chest CT from 12/26/2023 and  PET-CT 01/09/2024. Dominant examples include a 1.5 cm nodule in the  left upper lobe 1.1 cm nodule in the lingula, 1.2 cm perifissural  nodule in the superior segment left lower lobe, and numerous nodules  throughout the right lung. Dependent atelectatic changes. No pleural  effusion or pneumothorax.      OSSEOUS STRUCTURES: Status post bilateral humeral head prostheses. No  acute osseous abnormality identified. No overtly aggressive osseous  lesions are seen.      CHEST WALL SOFT TISSUES: No discernible abnormality.      UPPER ABDOMEN/OTHER: Numerous hepatic metastases are again noted.      Impression: 1. Findings suspicious for tiny nonocclusive emboli within the right  lower lobe.  2. Significant interval progression in size and  number of mediastinal  and bilateral hilar lymph nodes and numerous bilateral pulmonary  nodules compared to prior chest CT from December 2023 and PET-CT from  January 2024. These findings are consistent with progressive  metastases.  3. Mild wall thickening of the distal esophagus, this could represent  esophagitis or recurrent disease.      MACRO:  John Uribe discussed the significance and urgency of this critical  finding by telephone with  YUNIOR MEDLEY on 3/18/2024 at 7:45 pm.  (**-RCF-**) Findings:  See findings.      Signed by: John Uribe 3/18/2024 7:47 PM  Dictation workstation:   [id number]  XR chest 2 views  Narrative: Interpreted By:  Yefri Coulter,   STUDY:  XR CHEST 2 VIEWS;  3/18/2024 5:32 pm      INDICATION:  Signs/Symptoms:cp.      COMPARISON:  03/05/2024      ACCESSION NUMBER(S):  [id number]      ORDERING CLINICIAN:  YUNIOR MEDLEY      FINDINGS:      Left-sided dual lead pacemaker and right-sided implanted port in  stable position. The cardiac silhouette is unremarkable. Costophrenic  angles are sharp. The patient's known multiple pulmonary nodules are  redemonstrated. The trachea is midline. There is no pneumothorax.  Bilateral shoulder arthroplasty prosthesis in place.      Impression: 1.  Redemonstration of multiple bilateral pulmonary nodules          Signed by: Yefri Coulter 3/18/2024 6:19 PM  Dictation workstation:   [id number]      Physical Exam  Constitutional:       General: He is not in acute distress.     Appearance: He is not toxic-appearing.      Comments: Patient mildly jaundiced   HENT:      Head: Normocephalic and atraumatic.   Eyes:      Extraocular Movements: Extraocular movements intact.      Pupils: Pupils are equal, round, and reactive to light.   Cardiovascular:      Rate and Rhythm: Normal rate and regular rhythm.      Heart sounds: No murmur heard.     No gallop.      Comments: Port in place in right upper chest, and pacemaker in place in left upper chest  Pulmonary:       Effort: Pulmonary effort is normal. No respiratory distress.      Breath sounds: Normal breath sounds. No wheezing or rales.   Abdominal:      General: There is no distension.      Palpations: Abdomen is soft.      Tenderness: There is no abdominal tenderness.   Musculoskeletal:      Right lower leg: No edema.      Left lower leg: No edema.   Skin:     General: Skin is warm and dry.      Coloration: Skin is jaundiced.   Neurological:      General: No focal deficit present.      Mental Status: He is alert and oriented to person, place, and time. Mental status is at baseline.         Relevant Results  Scheduled medications  chlorproMAZINE, 25 mg, oral, BID  [Held by provider] enoxaparin, 40 mg, subcutaneous, q24h  insulin lispro, 0-10 Units, subcutaneous, With meals & nightly  lactated Ringer's, 1,000 mL, intravenous, Once  melatonin, 3 mg, oral, Daily  [Held by provider] metoprolol succinate XL, 25 mg, oral, Daily  pantoprazole, 40 mg, oral, BID  piperacillin-tazobactam, 3.375 g, intravenous, q6h  sucralfate, 1 g, oral, BID AC  tiZANidine, 4 mg, oral, BID      Continuous medications     PRN medications  PRN medications: acetaminophen **OR** acetaminophen **OR** acetaminophen, dextrose, dextrose, glucagon, LORazepam, metoclopramide, oxyCODONE, vancomycin  Results for orders placed or performed during the hospital encounter of 03/18/24 (from the past 24 hour(s))   CBC and Auto Differential   Result Value Ref Range    WBC 12.9 (H) 4.4 - 11.3 x10*3/uL    nRBC 0.0 0.0 - 0.0 /100 WBCs    RBC 3.63 (L) 4.50 - 5.90 x10*6/uL    Hemoglobin 10.8 (L) 13.5 - 17.5 g/dL    Hematocrit 32.8 (L) 41.0 - 52.0 %    MCV 90 80 - 100 fL    MCH 29.8 26.0 - 34.0 pg    MCHC 32.9 32.0 - 36.0 g/dL    RDW 16.5 (H) 11.5 - 14.5 %    Platelets 76 (L) 150 - 450 x10*3/uL    Neutrophils % 96.3 40.0 - 80.0 %    Immature Granulocytes %, Automated 0.8 0.0 - 0.9 %    Lymphocytes % 0.5 13.0 - 44.0 %    Monocytes % 2.3 2.0 - 10.0 %    Eosinophils % 0.0  0.0 - 6.0 %    Basophils % 0.1 0.0 - 2.0 %    Neutrophils Absolute 12.39 (H) 1.20 - 7.70 x10*3/uL    Immature Granulocytes Absolute, Automated 0.10 0.00 - 0.70 x10*3/uL    Lymphocytes Absolute 0.07 (L) 1.20 - 4.80 x10*3/uL    Monocytes Absolute 0.29 0.10 - 1.00 x10*3/uL    Eosinophils Absolute 0.00 0.00 - 0.70 x10*3/uL    Basophils Absolute 0.01 0.00 - 0.10 x10*3/uL   Comprehensive metabolic panel   Result Value Ref Range    Glucose 143 (H) 74 - 99 mg/dL    Sodium 139 136 - 145 mmol/L    Potassium 3.3 (L) 3.5 - 5.3 mmol/L    Chloride 103 98 - 107 mmol/L    Bicarbonate 24 21 - 32 mmol/L    Anion Gap 15 10 - 20 mmol/L    Urea Nitrogen 13 6 - 23 mg/dL    Creatinine 0.89 0.50 - 1.30 mg/dL    eGFR >90 >60 mL/min/1.73m*2    Calcium 9.4 8.6 - 10.3 mg/dL    Albumin 3.3 (L) 3.4 - 5.0 g/dL    Alkaline Phosphatase 184 (H) 33 - 136 U/L    Total Protein 6.7 6.4 - 8.2 g/dL     (H) 9 - 39 U/L    Bilirubin, Total 6.3 (H) 0.0 - 1.2 mg/dL    ALT 29 10 - 52 U/L   Lipase   Result Value Ref Range    Lipase 34 9 - 82 U/L   B-type natriuretic peptide   Result Value Ref Range     (H) 0 - 99 pg/mL   Troponin I, High Sensitivity, Initial   Result Value Ref Range    Troponin I, High Sensitivity 34 (H) 0 - 20 ng/L   Creatine Kinase   Result Value Ref Range    Creatine Kinase 116 0 - 325 U/L   Magnesium   Result Value Ref Range    Magnesium 1.25 (L) 1.60 - 2.40 mg/dL   Blood Culture    Specimen: Mediport; Blood culture   Result Value Ref Range    Blood Culture Loaded on Instrument - Culture in progress    Blood Culture    Specimen: Peripheral Venipuncture; Blood culture   Result Value Ref Range    Blood Culture Loaded on Instrument - Culture in progress    Lactate   Result Value Ref Range    Lactate 3.1 (H) 0.4 - 2.0 mmol/L   Urinalysis with Reflex Culture and Microscopic   Result Value Ref Range    Color, Urine Veena (N) Straw, Yellow    Appearance, Urine Hazy (N) Clear    Specific Gravity, Urine 1.008 1.005 - 1.035    pH, Urine  7.0 5.0, 5.5, 6.0, 6.5, 7.0, 7.5, 8.0    Protein, Urine 100 (2+) (N) NEGATIVE mg/dL    Glucose, Urine 50 (1+) (A) NEGATIVE mg/dL    Blood, Urine MODERATE (2+) (A) NEGATIVE    Ketones, Urine NEGATIVE NEGATIVE mg/dL    Bilirubin, Urine NEGATIVE NEGATIVE    Urobilinogen, Urine <2.0 <2.0 mg/dL    Nitrite, Urine POSITIVE (A) NEGATIVE    Leukocyte Esterase, Urine NEGATIVE NEGATIVE   Microscopic Only, Urine   Result Value Ref Range    WBC, Urine NONE 1-5, NONE /HPF    RBC, Urine 1-2 NONE, 1-2, 3-5 /HPF    Bacteria, Urine 4+ (A) NONE SEEN /HPF    Mucus, Urine FEW Reference range not established. /LPF   Troponin, High Sensitivity, 1 Hour   Result Value Ref Range    Troponin I, High Sensitivity 50 (H) 0 - 20 ng/L   Lactate   Result Value Ref Range    Lactate 3.0 (H) 0.4 - 2.0 mmol/L   Lactate   Result Value Ref Range    Lactate 3.5 (H) 0.4 - 2.0 mmol/L   Renal function panel   Result Value Ref Range    Glucose 174 (H) 74 - 99 mg/dL    Sodium 138 136 - 145 mmol/L    Potassium 4.2 3.5 - 5.3 mmol/L    Chloride 104 98 - 107 mmol/L    Bicarbonate 22 21 - 32 mmol/L    Anion Gap 16 10 - 20 mmol/L    Urea Nitrogen 21 6 - 23 mg/dL    Creatinine 1.35 (H) 0.50 - 1.30 mg/dL    eGFR 58 (L) >60 mL/min/1.73m*2    Calcium 9.2 8.6 - 10.3 mg/dL    Phosphorus <1.0 (LL) 2.5 - 4.9 mg/dL    Albumin 3.0 (L) 3.4 - 5.0 g/dL   Magnesium   Result Value Ref Range    Magnesium 1.83 1.60 - 2.40 mg/dL   Green Top   Result Value Ref Range    Extra Tube Hold for add-ons.    CBC and Auto Differential   Result Value Ref Range    WBC 14.3 (H) 4.4 - 11.3 x10*3/uL    nRBC 0.0 0.0 - 0.0 /100 WBCs    RBC 3.01 (L) 4.50 - 5.90 x10*6/uL    Hemoglobin 9.0 (L) 13.5 - 17.5 g/dL    Hematocrit 27.4 (L) 41.0 - 52.0 %    MCV 91 80 - 100 fL    MCH 29.9 26.0 - 34.0 pg    MCHC 32.8 32.0 - 36.0 g/dL    RDW 16.9 (H) 11.5 - 14.5 %    Platelets 29 (LL) 150 - 450 x10*3/uL    Neutrophils % 91.3 40.0 - 80.0 %    Immature Granulocytes %, Automated 1.5 (H) 0.0 - 0.9 %    Lymphocytes %  1.4 13.0 - 44.0 %    Monocytes % 5.7 2.0 - 10.0 %    Eosinophils % 0.0 0.0 - 6.0 %    Basophils % 0.1 0.0 - 2.0 %    Neutrophils Absolute 13.05 (H) 1.20 - 7.70 x10*3/uL    Immature Granulocytes Absolute, Automated 0.22 0.00 - 0.70 x10*3/uL    Lymphocytes Absolute 0.20 (L) 1.20 - 4.80 x10*3/uL    Monocytes Absolute 0.81 0.10 - 1.00 x10*3/uL    Eosinophils Absolute 0.00 0.00 - 0.70 x10*3/uL    Basophils Absolute 0.02 0.00 - 0.10 x10*3/uL   Lactate   Result Value Ref Range    Lactate 2.0 0.4 - 2.0 mmol/L   Morphology   Result Value Ref Range    RBC Morphology See Below     Polychromasia Mild     Ovalocytes Few    POCT GLUCOSE   Result Value Ref Range    POCT Glucose 136 (H) 74 - 99 mg/dL   Phosphorus   Result Value Ref Range    Phosphorus 3.4 2.5 - 4.9 mg/dL   D-dimer, VTE Exclusion   Result Value Ref Range    D-Dimer, Quantitative VTE Exclusion 81,270 (H) <=500 ng/mL FEU   Ferritin   Result Value Ref Range    Ferritin 869 (H) 20 - 300 ng/mL   Bilirubin, Total   Result Value Ref Range    Bilirubin, Total 3.2 (H) 0.0 - 1.2 mg/dL   Renal function panel   Result Value Ref Range    Glucose 136 (H) 74 - 99 mg/dL    Sodium 136 136 - 145 mmol/L    Potassium 4.1 3.5 - 5.3 mmol/L    Chloride 108 (H) 98 - 107 mmol/L    Bicarbonate 20 (L) 21 - 32 mmol/L    Anion Gap 12 10 - 20 mmol/L    Urea Nitrogen 26 (H) 6 - 23 mg/dL    Creatinine 1.43 (H) 0.50 - 1.30 mg/dL    eGFR 54 (L) >60 mL/min/1.73m*2    Calcium 7.8 (L) 8.6 - 10.3 mg/dL    Phosphorus 3.4 2.5 - 4.9 mg/dL    Albumin 2.7 (L) 3.4 - 5.0 g/dL   Protime-INR   Result Value Ref Range    Protime 20.0 (H) 9.8 - 12.8 seconds    INR 1.8 (H) 0.9 - 1.1   APTT   Result Value Ref Range    aPTT 31 27 - 38 seconds   Fibrinogen   Result Value Ref Range    Fibrinogen 153 (L) 200 - 400 mg/dL   Bilirubin, Direct   Result Value Ref Range    Bilirubin, Direct 1.5 (H) 0.0 - 0.3 mg/dL   Creatine Kinase   Result Value Ref Range    Creatine Kinase 108 0 - 325 U/L   POCT GLUCOSE   Result Value Ref  Range    POCT Glucose 123 (H) 74 - 99 mg/dL          Assessment/Plan      Massimo Garcia is a 63 y.o. male with PMHx of third degree heart block s/p PPM (placed in November 2023), esophageal cancer with mets to liver and lungs on chemotherapy, PE, peripheral neuropathy, and portal vein thrombosis  who is admitted to the hospital for septic shock     Acute Medical Issues   #Sepsis of uncertain etiology  -Patient with chills, fevers, and chest pain while receiving a chemotherapy treatment on 3/18   -Potentially due to adverse reaction to oxaliplatin chemo  -UA positive for bacteria, but negative LE, negative WBC and patient denying dysuria so UTI is a potential but unlikely cause of sepsis  -Patient given 1L of IVF in the ED and 1.5L IVF overnight  -Patient has continued to be hypotensive 70-80s/40-50s  -Worsening NORTH with uptrending Cr 0.89=>1.35=>1.43  -Improving lactate 3.1=>3.0=>3.5=>2.0    Plan:  -Switched antibiotics back to vanc/zosyn given persistent septic shock  -Follow up on blood culture and urine culture results  -Bedside POCUS performed showing collapsible IVC indicating patient can tolerate additional IVF  -Ordered another 1L bolus LR, will continue to monitor patient's blood pressure (4.5L fluids since admission)   -If pressures become unresponsive to fluids, will proceed with ICU admission for pressure support  -Repeat lactate ordered     #Thrombocytopenia  #Concern for DIC  -Patient with chronic thrombocytopenia baseline 70s-100s  -Platelets initially at baseline 79 on presentation, but dropped to 29  -Hb also dropped 10.8=>9.0  -DIC Labs D-Dimer, Fibrinogen, aPTT, PT, INR, ferritin ordered   D-Dimer: 81,270   Fibrinogen: 153   PT: 20.0   aPTT: 31   Ferritin 869    Plan:  -Elevated D-Dimer, PT, INR, ferritin, with decreased fibrinogen   -These findings are consistent with and concerning for development of DIC  -Haptoglobin pending  -DIC is most likely secondary to sepsis  -Continue to monitor closely for  signs of bleeding or clotting    #Hyperbilirubinemia  -Patient appears jaundiced with elevated bilirubin levels  -Total bilirubin baseline ~1  -T Bili 6.3 on presentation => 3.2  -Direct Bili 1.5    Plan:  -Patient with mixed direct and indirect hyperbilirubinemia, most likely a combination of hemolysis 2/2 DIC and known liver mets  -Will continue to monitor    #Hypophosphatemia  -PO4 < 1.0 on presentation  -Hypophosphatemia is potential cause of lactic acidosis and multi organ failure     Plan:  -Given 30 mmol KPO4  -Repeat phosphate 3.4  -Will continue to monitor with RFPs    #Esophageal Cancer on chemotherapy  -Patient with known history of esophageal cancer with metastasis to the liver and lung  -CT PE showing significant interval progression in size and number of mediastinal and bilateral hilar lymph nodes and numerous bilateral pulmonary nodules  -Follows with Dr. Blake  -Patient on active chemotherapy with trastuzumab, pembrolizumab, 5-FU leucovorin, and oxaliplatin.  -Has been on active chemotherapy since Jan 2023    Plan:  -Dr. Blake consulted  -Tylenol 650 for mild pain   -Oxycodone 5 for moderate-severe pain    #Chest pain secondary to chemotherapy reaction vs. PE  -Patient with history of prior PE in January 2023, and was on Eliquis until January 2024 when it was stopped due to thrombocytopenia  -Patient's chest pain began during chemotherapy treatment and  improved with meperidine. PE seen on CT angiogram.   -S/p meperidine  -PESI score of 105; intermediate risk   -Patient started on Lovenox DVT ppx->held due to platelets of 76. Reassess CBC in AM and consider restarting.   -Consider echo for L heart strain tomorrow  -Patient has no oxygen requirement     Plan:  -Given size of PE on CT PE and severe thrombocytopenia, currently holding anticoagulation     #Electrolyte Derangement  -Patient presented with hypokalemia and hypomagnesemia.   -S/p repletion, ordered repeat RFP and Mg levels for midnight after  repletion is complete    Plan:  -Mg and K levels have improved, will continue to monitor and replete as necessary     #Hyperglycemia  -Glucose uptrending overnight, now 174  -Placed mild sliding scale      Chronic Medical Issues  #Hiccups  -Has had hiccups for 10 days  -Continue home chlorpromazine PRN     #HTN  -Continue metoprolol succinate      #GERD  -Continue Protonix  -Continue sucralfate      #Anxiety  -Continue PRN Lorazepam     F: PO intake & IVF PRN   E: Replete PRN  N: Regular diet  GI ppx: Protonix 40 mg daily  DVT ppx: Lovenox subcutaneous--HELD  Antibiotics: Vancomycin and Zosyn  Tubes/Lines/Drains: PIVs     Code Status: Full Code   Emergency Contact: Extended Emergency Contact Information  Primary Emergency Contact: Jane Garcia  Address: [id number] [place] [address] of [facility]  Home Phone: [phone]  Mobile Phone: [phone]  Relation: Spouse  Secondary Emergency Contact: DA WORKMAN  Mobile Phone: [phone]  Relation: Daughter  Preferred language: English   needed? No      Disposition: 63 y.o.male admitted for sepsis secondary to UTI. Anticipate LOS > 2 Midnights.    Rusty Pat MD  Transitional Year Resident  PGY-1  Epic Chat

## 2024-03-19 NOTE — ED PROVIDER NOTES
HPI:  Massimo Garcia is a 63 y.o. with a history of esophageal malignancy with mets to the liver, lungs, on chemo, presenting to the emergency department complaining of chest pain.  Patient states chest pain started while he was at chemo today.  Endorses that he initially had diffuse chills, later followed by chest pain and was recommended to come to the ER.  He denies any recent fevers though wife at bedside states that he feels feverish.  Was recently admitted to the hospital in the setting of fever few weeks ago, discharged 10 days ago. States that he has not had any difficulty breathing, describes the pain as a pressure like sensation in the center of his chest.  Denies any worsening abdominal pain, states that he has noticed his urine has been dark.    Limitations to History: No limitations    External Records Reviewed: I reviewed recent and relevant outside records including: Recent discharge summary    ------------------------------------------------------------------------------------------------------------------------------------------    Physical Exam:    ED Triage Vitals [03/18/24 1552]   Temperature Heart Rate Respirations BP   37.5 °C (99.5 °F) 87 20 128/87      Pulse Ox Temp Source Heart Rate Source Patient Position   99 % Temporal -- --      BP Location FiO2 (%)     -- --         Gen: Alert, thin, chronically ill-appearing.  Vital signs on arrival documented as stable however after discussion with RN as pt feels warm, patient was febrile just above 38C.   Head/Neck: NCAT, neck w/ FROM  Eyes: EOMI, PERRL, anicteric sclerae, noninjected conjunctivae  Nose: Nares patent w/o rhinorrhea  Mouth:  dry MM, no OP lesions noted  Heart: RRR, well perfused  Lungs: CTA b/l no RRW, no increased work of breathing  Abdomen: soft, nondistended, no rebound or guarding   Extremities: Warm, well perfused. Compartments soft, nontender  Neurologic: Alert, symmetrical facies, phonates clearly, moves all extremities equally,  responsive to touch  Skin: warm, dry   Psychological: calm, cooperative     ------------------------------------------------------------------------------------------------------------------------------------------    Medical Decision Making  63-year-old male with past medical history of esophageal malignancy with metastasis on chemo presents to the emergency department complaining of chest pain.  Vital signs on arrival initially documented as afebrile however upon further discussion, is febrile 38.  Point-of-care ultrasound shows no pericardial effusion, EF appears maintained, no significant RV dilation appreciated, patient does have bilateral B-lines throughout the lungs, IVC appears plump.  Will gently rehydrate with 500 cc bolus of normal saline, close reevaluation.  Chest x-ray obtained showing multiple bilateral pulmonary nodules.  Will plan on CT PE.  Patient was found to be febrile, covered with broad-spectrum antibiotics vancomycin, Zosyn, given IV analgesia.  EKG shows no STEMI, labs show leukocytosis of 12.9, left shift, patient does have a UTI, found to be hypokalemic and hypomagnesemic, BNP is elevated, troponin 34, delta of 50, patient given 324 mg of aspirin.  States his pain is slightly improved.  CT PE read by radiology shows a questionable right lower lobe nonocclusive thrombus, with increased malignancy burden throughout the lungs.  Did inform patient and family of this at bedside.  Discussed with the medicine team who accepts patient for admission.    Diagnoses as of 03/18/24 2026   Cystitis   Chest pain, unspecified type        Procedures    EKG interpreted by myself: Ventricularly paced rhythm at 102 bpm, normal axis, does not meet STEMI criteria.    Chronic Medical Conditions Significantly Affecting Care: esophageal malignancy with mets to the liver, lungs     Clinical Impression: UTI, chest pain     Dispo: ADM     Discussed with ED Attending, Dr. Dubon        This note was dictated with  Voice Recognition software, please excuse any dictation errors.     Salvador Montague DO   Emergency Medicine, PGY3      Salvador Montague DO  Resident  03/18/24 2036

## 2024-03-19 NOTE — H&P
HPI    HPI: Massimo Garcia is a 63 y.o. male with a PMH of third degree heart block s/p PPM (placed in November 2023), esophageal cancer with mets to liver and lungs on chemotherapy, PE, peripheral neuropathy, and portal vein thrombosis  who presented to the hospital for sudden-onset chest pain. Patient's wife and daughter are present at bedside to supplement patient history.     The patient stated that he was receiving his chemotherapy treatment today and suddenly began to have chest pain in the center of his chest, followed by chills and shivers. He also notes that he has had hiccups for 10 days. He reports new-onset hematuria that he noticed today while at the chemotherapy center. Of note, the patient had temporary improvement in his chest pain after the chemotherapy was stopped and he was given meperidine. Patient endorses fevers, chills, and sweats. Denies palpitations. Patient states he is unsure of whether he had SOB during the episode but denies SOB currently. Currently endorses chest pain in the center of his chest that does not radiate. Patient denies coughing up blood. Patient's wife states that patient's lips became pale during the episode and have stayed that color since then. Patient denies pain or burning with urination. Endorses feelings of anxiety and hiccups currently. On exam, patient's heart is RRR and has clear lungs. Appears pale. Patient has tenderness to palpation of the lower abdomen, particularly on the left side. Appears tired and pale on exam. Patient is able to make urine and states he has to urinate during the exam.     In the ED, patient was imaged and found to have PE, esophageal wall thickening, and progressive metastases on CT Angiogram PE. Bilateral pulmonary nodules are seen on chest XR. Labs showed WBC 12.9. Mg 1.25, K+ 3.3. Lactate 3.1. UA showed 4+ bacteria.  ED started patient on vanc/zosyn and gave 1 L total of NS.       Chief Complaint   Patient presents with    Chest Pain      "Pt from [facility] for midsternal, non radiating chest pain that started towards the end of his treatment today. Was given Demerol that helped for a little bit, but then the pain returned.        ED Course   Vitals - /59   Pulse 92   Temp 36.4 °C (97.5 °F) (Temporal)   Resp 18   Wt 74 kg (163 lb 2.3 oz)   SpO2 96%   Labs -   Lab Results   Component Value Date    WBC 12.9 (H) 03/18/2024    HGB 10.8 (L) 03/18/2024    HCT 32.8 (L) 03/18/2024    MCV 90 03/18/2024    PLT 76 (L) 03/18/2024     Lab Results   Component Value Date    GLUCOSE 143 (H) 03/18/2024    CALCIUM 9.4 03/18/2024     03/18/2024    K 3.3 (L) 03/18/2024    CO2 24 03/18/2024     03/18/2024    BUN 13 03/18/2024    CREATININE 0.89 03/18/2024     Lab Results   Component Value Date    TROPHS 50 (H) 03/18/2024     Lab Results   Component Value Date     (H) 03/18/2024   No results found for: \"DDIMERVTE\"  Imaging -   CT angio chest for pulmonary embolism   Final Result   1. Findings suspicious for tiny nonocclusive emboli within the right   lower lobe.   2. Significant interval progression in size and number of mediastinal   and bilateral hilar lymph nodes and numerous bilateral pulmonary   nodules compared to prior chest CT from December 2023 and PET-CT from   January 2024. These findings are consistent with progressive   metastases.   3. Mild wall thickening of the distal esophagus, this could represent   esophagitis or recurrent disease.        MACRO:   John Uribe discussed the significance and urgency of this critical   finding by telephone with  YUNIOR MEDLEY on 3/18/2024 at 7:45 pm.   (**-RCF-**) Findings:  See findings.        Signed by: John Uribe 3/18/2024 7:47 PM   Dictation workstation:   [id number]      XR chest 2 views   Final Result   1.  Redemonstration of multiple bilateral pulmonary nodules             Signed by: Yefri Coulter 3/18/2024 6:19 PM   Dictation workstation:   [id number]       "   Interventions -   Medications   acetaminophen (Tylenol) tablet 650 mg (650 mg oral Not Given 3/18/24 1830)   magnesium sulfate IV 2 g (2 g intravenous New Bag 3/18/24 2127)   enoxaparin (Lovenox) syringe 40 mg (has no administration in time range)   melatonin tablet 3 mg (has no administration in time range)   LORazepam (Ativan) tablet 1 mg (has no administration in time range)   metoclopramide (Reglan) tablet 10 mg (has no administration in time range)   metoprolol succinate XL (Toprol-XL) 24 hr tablet 25 mg (has no administration in time range)   pantoprazole (ProtoNix) EC tablet 40 mg (has no administration in time range)   sucralfate (Carafate) tablet 1 g (has no administration in time range)   tiZANidine (Zanaflex) tablet 4 mg (has no administration in time range)   chlorproMAZINE (Thorazine) tablet 25 mg (has no administration in time range)   cefTRIAXone (Rocephin) IVPB 1 g (has no administration in time range)   HYDROmorphone (Dilaudid) injection 0.4 mg (0.4 mg intravenous Given 3/18/24 1648)   sodium chloride 0.9 % bolus 500 mL (0 mL intravenous Stopped 3/18/24 1819)   piperacillin-tazobactam-dextrose (Zosyn) IV 4.5 g (0 g intravenous Stopped 3/18/24 1900)   vancomycin (Vancocin) 2000 mg/500 ml in D5W 500 mL IV piggyback 2 g (2 g intravenous Given 3/18/24 1912)   HYDROmorphone (Dilaudid) injection 1 mg (1 mg intravenous Given 3/18/24 1722)   potassium chloride 20 mEq in 100 mL IV premix (0 mEq intravenous Stopped 3/18/24 2112)   sodium chloride 0.9 % bolus 500 mL (500 mL intravenous New Bag 3/18/24 1912)   iohexol (OMNIPaque) 350 mg iodine/mL solution 61 mL (61 mL intravenous Given 3/18/24 1857)   aspirin chewable tablet 324 mg (324 mg oral Given 3/18/24 2030)       Micro:   - Blood culture: Pending  - UA: 4+ Bacteria    ED Interventions: Started vanc/zosyn, gave 1 L NS total     ROS: 12 points review of system is negative except as stated in the HPI above.     Past Medical History   He has a past medical  history of Essential (primary) hypertension (12/21/2013), Pacemaker, Peripheral neuropathy, Personal history of other diseases of the circulatory system, and Pneumothorax (12/04/2023).  Surgical History     Past Surgical History:   Procedure Laterality Date    CARDIAC ELECTROPHYSIOLOGY PROCEDURE N/A 11/7/2023    Procedure: Temporary Pacemaker Insertion;  Surgeon: Alexis Shook MD;  Location: [facility] Cardiac Cath Lab;  Service: Cardiovascular;  Laterality: N/A;    CARDIAC ELECTROPHYSIOLOGY PROCEDURE Left 11/9/2023    Procedure: PPM IMPLANT DUAL;  Surgeon: Elias Connolly MD;  Location: [facility] Cardiac Cath Lab;  Service: Electrophysiology;  Laterality: Left;    TOTAL KNEE ARTHROPLASTY  09/30/2016    Total Knee Replacement Left    TOTAL KNEE ARTHROPLASTY  09/30/2016    Total Knee Replacement Right     Family History     Family History   Problem Relation Name Age of Onset    Cancer Father       Social History     Social History     Socioeconomic History    Marital status:      Spouse name: Not on file    Number of children: Not on file    Years of education: Not on file    Highest education level: Not on file   Occupational History    Not on file   Tobacco Use    Smoking status: Former     Types: Cigarettes, Cigars     Passive exposure: Never    Smokeless tobacco: Never   Vaping Use    Vaping Use: Unknown   Substance and Sexual Activity    Alcohol use: Not Currently    Drug use: Yes     Types: Marijuana     Comment: medical marjuana card    Sexual activity: Not on file   Other Topics Concern    Not on file   Social History Narrative    Not on file     Social Determinants of Health     Financial Resource Strain: Low Risk  (3/18/2024)    Overall Financial Resource Strain (CARDIA)     Difficulty of Paying Living Expenses: Not hard at all   Food Insecurity: Not on file   Transportation Needs: No Transportation Needs (3/18/2024)    PRAPARE - Transportation     Lack of Transportation (Medical): No     Lack of  "Transportation (Non-Medical): No   Physical Activity: Not on file   Stress: Not on file   Social Connections: Not on file   Intimate Partner Violence: Not on file   Housing Stability: Low Risk  (3/18/2024)    Housing Stability Vital Sign     Unable to Pay for Housing in the Last Year: No     Number of Places Lived in the Last Year: 1     Unstable Housing in the Last Year: No       Tobacco Use: Medium Risk (3/18/2024)    Patient History     Smoking Tobacco Use: Former     Smokeless Tobacco Use: Never     Passive Exposure: Never        Social History     Substance and Sexual Activity   Alcohol Use Not Currently      Allergies     Allergies   Allergen Reactions    Bee Venom Protein (Honey Bee) Anaphylaxis    Oxaliplatin Other     Rigors      Meds    Scheduled medications  acetaminophen, 650 mg, oral, Once  cefTRIAXone, 1 g, intravenous, q24h  chlorproMAZINE, 25 mg, oral, BID  enoxaparin, 40 mg, subcutaneous, q24h  magnesium sulfate, 2 g, intravenous, Once  melatonin, 3 mg, oral, Daily  [START ON 3/19/2024] metoprolol succinate XL, 25 mg, oral, Daily  pantoprazole, 40 mg, oral, BID  [START ON 3/19/2024] sucralfate, 1 g, oral, BID AC  tiZANidine, 4 mg, oral, BID      Continuous medications     PRN medications  PRN medications: LORazepam, metoclopramide   Objective     Vitals  Visit Vitals  /59   Pulse 92   Temp 36.4 °C (97.5 °F) (Temporal)   Resp 18   Ht 1.727 m (5' 8\")   Wt 74 kg (163 lb 2.3 oz)   SpO2 96%   BMI 24.81 kg/m²   Smoking Status Former   BSA 1.88 m²        Physical Examination:  Physical Exam  Constitutional:       Appearance: He is ill-appearing.   HENT:      Head: Normocephalic and atraumatic.   Cardiovascular:      Rate and Rhythm: Normal rate and regular rhythm.   Pulmonary:      Effort: Pulmonary effort is normal.      Breath sounds: Normal breath sounds.   Abdominal:      Tenderness: There is abdominal tenderness.   Skin:     Coloration: Skin is pale.   Neurological:      General: No focal " "deficit present.      Mental Status: He is oriented to person, place, and time.   Psychiatric:         Mood and Affect: Mood normal.         Behavior: Behavior normal.       I/Os    Intake/Output Summary (Last 24 hours) at 3/18/2024 2224  Last data filed at 3/18/2024 2112  Gross per 24 hour   Intake 300 ml   Output --   Net 300 ml       Labs:   Results from last 72 hours   Lab Units 03/18/24  1650 03/18/24  0917   SODIUM mmol/L 139 138   POTASSIUM mmol/L 3.3* 3.4*   CHLORIDE mmol/L 103 105   CO2 mmol/L 24 24   BUN mg/dL 13 10   CREATININE mg/dL 0.89 0.76   GLUCOSE mg/dL 143* 157*   CALCIUM mg/dL 9.4 9.1   ANION GAP mmol/L 15 12   EGFR mL/min/1.73m*2 >90 >90      Results from last 72 hours   Lab Units 03/18/24  1650 03/18/24  0917   WBC AUTO x10*3/uL 12.9* 6.8   HEMOGLOBIN g/dL 10.8* 11.4*   HEMATOCRIT % 32.8* 33.9*   PLATELETS AUTO x10*3/uL 76* 87*   NEUTROS PCT AUTO % 96.3 63.0   LYMPHS PCT AUTO % 0.5 26.2   MONOS PCT AUTO % 2.3 9.2   EOS PCT AUTO % 0.0 0.7      Lab Results   Component Value Date    CALCIUM 9.4 03/18/2024    PHOS 4.7 11/13/2023      No results found for: \"CRP\"   [unfilled]     Micro/ID:   Susceptibility data from last 90 days.  Collected Specimen Info Organism   12/27/23 Swab from Anterior Nares Methicillin Susceptible Staphylococcus aureus (MSSA)                    No lab exists for component: \"AGALPCRNB\"   .ID  Lab Results   Component Value Date    BLOODCULT No growth at 4 days -  FINAL REPORT 03/05/2024    BLOODCULT No growth at 4 days -  FINAL REPORT 03/05/2024     Images    CT angio chest for pulmonary embolism  Narrative: Interpreted By:  John Uribe,   STUDY:  CT ANGIO CHEST FOR PULMONARY EMBOLISM;  3/18/2024 6:57 pm      INDICATION:  Signs/Symptoms:cp, hx malginancy.      COMPARISON:  CTA chest 12/26/2023      ACCESSION NUMBER(S):  [id number]      ORDERING CLINICIAN:  YUNIOR MEDLEY      TECHNIQUE:  Contiguous axial images of the chest were obtained after the  intravenous " administration of 61 mL Omnipaque 350 contrast using  angiographic PE protocol. Coronal and sagittal reformatted images  were reconstructed from the axial data. MIP images were created on an  independent workstation and reviewed.      FINDINGS:  PULMONARY ARTERIES: Adequate opacification to the level of the  proximal segmental arteries. Assessment of the distal segmental and  subsegmental arteries limited by mixing artifact and respiratory  motion. Very subtle linear filling defect noted within the right  interlobar and lower lobar arteries suspicious for nonocclusive  emboli (series 403, image 86, series 401, image 164, and series 401  image 177). The main pulmonary artery is normal in diameter. No CT  evidence of right heart strain.      HEART: Borderline cardiomegaly. No significant coronary artery  calcifications. No significant pericardial effusion. Pacing leads are  noted within the right atrium and right ventricle.      VESSELS: Normal caliber aorta without dissection. No significant  aortic atherosclerosis.      MEDIASTINUM AND LYMPH NODES: Visualized thyroid is within normal  limits. Significant interval progression and number and size of  mediastinal and hilar lymphadenopathy compared to prior chest CT.  There is a right hilar node measuring up to 2.1 cm. A left hilar node  measures up to 2.2 cm. 2.2 cm subcarinal lymph node. No  pneumomediastinum. Mild wall thickening of the distal esophagus.      LUNG, AIRWAYS, AND PLEURA: Trachea and proximal mainstem bronchi are  patent. Significant interval increase in size and number pulmonary  nodules/metastases compared to prior chest CT from 12/26/2023 and  PET-CT 01/09/2024. Dominant examples include a 1.5 cm nodule in the  left upper lobe 1.1 cm nodule in the lingula, 1.2 cm perifissural  nodule in the superior segment left lower lobe, and numerous nodules  throughout the right lung. Dependent atelectatic changes. No pleural  effusion or pneumothorax.       OSSEOUS STRUCTURES: Status post bilateral humeral head prostheses. No  acute osseous abnormality identified. No overtly aggressive osseous  lesions are seen.      CHEST WALL SOFT TISSUES: No discernible abnormality.      UPPER ABDOMEN/OTHER: Numerous hepatic metastases are again noted.      Impression: 1. Findings suspicious for tiny nonocclusive emboli within the right  lower lobe.  2. Significant interval progression in size and number of mediastinal  and bilateral hilar lymph nodes and numerous bilateral pulmonary  nodules compared to prior chest CT from December 2023 and PET-CT from  January 2024. These findings are consistent with progressive  metastases.  3. Mild wall thickening of the distal esophagus, this could represent  esophagitis or recurrent disease.      MACRO:  John Uribe discussed the significance and urgency of this critical  finding by telephone with  YUNIOR MEDLEY on 3/18/2024 at 7:45 pm.  (**-RCF-**) Findings:  See findings.      Signed by: John Uribe 3/18/2024 7:47 PM  Dictation workstation:   [id number]  XR chest 2 views  Narrative: Interpreted By:  Yefri Coulter,   STUDY:  XR CHEST 2 VIEWS;  3/18/2024 5:32 pm      INDICATION:  Signs/Symptoms:cp.      COMPARISON:  03/05/2024      ACCESSION NUMBER(S):  [id number]      ORDERING CLINICIAN:  YUNIOR MEDLEY      FINDINGS:      Left-sided dual lead pacemaker and right-sided implanted port in  stable position. The cardiac silhouette is unremarkable. Costophrenic  angles are sharp. The patient's known multiple pulmonary nodules are  redemonstrated. The trachea is midline. There is no pneumothorax.  Bilateral shoulder arthroplasty prosthesis in place.      Impression: 1.  Redemonstration of multiple bilateral pulmonary nodules          Signed by: Yefri Coulter 3/18/2024 6:19 PM  Dictation workstation:   [id number]    Assessment and Plan      Massimo Garcia is a 63 y.o. male admitted for sepsis secondary to UTI.     Acute Medical Issues   #Sepsis  secondary to UTI  -Patient has hematuria, elevated WBC and Lactate, had tachypnea and tachycardia on presentation  -UA positive for 4+ bacteria  -Discontinued vanc/zosyn, switched to ceftriaxone  -Follow up on blood culture and urine culture results  -S/p 1 L NS in ED->Ordered additional 1.5 L bolus. Repeat lactate 4 hours after fluids are complete    #Chest pain secondary to chemotherapy reaction vs. PE  -Patient's chest pain began during chemotherapy treatment and  improved with meperidine. PE seen on CT angiogram.   -S/p meperidine  -PESI score of 105; intermediate risk   -Hold off on heparin gtt  -Patient started on Lovenox DVT ppx->held due to platelets of 76. Reassess CBC in AM and consider restarting.   -Consider echo for L heart strain tomorrow  -Patient has no oxygen requirement     #Electrolyte Derangement  -Patient presented with hypokalemia and hypomagnesemia.   -S/p repletion, ordered repeat RFP and Mg levels for midnight after repletion is complete-->Levels corrected, continue to monitor daily  -Monitor levels and correct as indicated  -Phosphorus<1 overnight->repleted    #High Glucose Readings  -Glucose uptrending overnight, now 174  -Placed mild sliding scale     Chronic Medical Issues  #Hiccups  -Has had hiccups for 10 days  -Continue home chlorpromazine. Monitor QTC    #HTN  -Continue metoprolol succinate     #GERD  -Continue Protonix  -Continue sucralfate     #Anxiety  -Continue PRN Lorazepam    #Esophageal Cancer  -Ordered SLP evaluation  -Soft food diet until after SLP eval  -Follows with Dr. Blake, consider consult   -Tylenol 650 for mild pain   -Oxycodone 5 for moderate-severe pain    F: PO intake & IVF PRN   E: Replete PRN  N: Soft food diet   GI ppx: Protonix 40 mg daily  DVT ppx: Lovenox subcutaneous--HELD  Antibiotics: Ceftriaxone  Tubes/Lines/Drains: PIVs    Code Status: Full Code   Emergency Contact: Extended Emergency Contact Information  Primary Emergency Contact: Jane Garcia  Address:  [address] [place] [address] [facility]  Home Phone: [phone]  Mobile Phone: [phone]  Relation: Spouse  Secondary Emergency Contact: DA WORKMAN  Mobile Phone: [phone]  Relation: Daughter  Preferred language: English   needed? No     Disposition: 63 y.o.male admitted for sepsis secondary to UTI. Anticipate LOS > 2 Midnights.    She Arias MD  PGY-1  Transitional Year Resident    Disclaimer: Documentation completed with the information available at the time of input. The times in the chart may not be reflective of actual patient care times, interventions, or procedures. Documentation occurs after the physical care of the patient.

## 2024-03-19 NOTE — CONSULTS
"    Department of Medicine  Division of Pulmonary, Critical Care, and Sleep Medicine  Location  [facility]    Reason for consult: sepsis concern for septic shock  Requesting physician: Rusty Pat MD     Physician HPI (3/19/2024):  63 y.o. year-old male admitted on 3/18/2024  3:50 PM for chest pain during chemotherapy. 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 Patient states that he has been having hiccups for the past 8-10 days and subsequently has been having poor PO intake.  Normally his blood pressure is in the 110s systolic. Yesterday he had bright red urine and today he had an episode of \"forest green\" urine. Currently he feels tired. He also feels like he has \"cotton mouth\" and that he feels dehydrated.    In the ER he was started on vancomycin and pip/tazo and received a total of 1000mL crystalloids. He did have a fever of 38.4. CTA was performed which was suggestive of a RLL filling defect and demonstrated multiple mets to the lung. Admission labs significant for WBC 12.9, PLT 76, LA of 3.5, D-dimer 81,270, fibrinogen 153. His lactic acid has since cleared to 1.7. He has since received a total of 4.5 liters since presentation. Floor team called for ICU admission due to persistently low BP, most recently 83/45. IVC was viewed on ultrasound and was collapsing.    In terms of his oncology history he has received trastuzumab, FOLFOX as well as pembrolizumab, 5-FU, leucovorin and oxaliplatin.    PMH:  Past Medical History:   Diagnosis Date    Essential (primary) hypertension 12/21/2013    Hypertension    Pacemaker     Peripheral neuropathy     Personal history of other diseases of the circulatory system     History of right bundle branch block (RBBB)    Pneumothorax 12/04/2023       PSH:  Past Surgical History:   Procedure Laterality Date    CARDIAC ELECTROPHYSIOLOGY PROCEDURE N/A 11/7/2023    Procedure: Temporary " Pacemaker Insertion;  Surgeon: Alexis Shook MD;  Location: [facility] Cardiac Cath Lab;  Service: Cardiovascular;  Laterality: N/A;    CARDIAC ELECTROPHYSIOLOGY PROCEDURE Left 11/9/2023    Procedure: PPM IMPLANT DUAL;  Surgeon: Elias Connolly MD;  Location: [facility] Cardiac Cath Lab;  Service: Electrophysiology;  Laterality: Left;    TOTAL KNEE ARTHROPLASTY  09/30/2016    Total Knee Replacement Left    TOTAL KNEE ARTHROPLASTY  09/30/2016    Total Knee Replacement Right       FHx:  Family History   Problem Relation Name Age of Onset    Cancer Father         Social Hx:  Social History     Socioeconomic History    Marital status:      Spouse name: None    Number of children: None    Years of education: None    Highest education level: None   Occupational History    None   Tobacco Use    Smoking status: Former     Types: Cigarettes, Cigars     Passive exposure: Never    Smokeless tobacco: Never   Vaping Use    Vaping Use: Unknown   Substance and Sexual Activity    Alcohol use: Not Currently    Drug use: Yes     Types: Marijuana     Comment: medical marjuana card    Sexual activity: None   Other Topics Concern    None   Social History Narrative    None     Social Determinants of Health     Financial Resource Strain: Low Risk  (3/19/2024)    Overall Financial Resource Strain (CARDIA)     Difficulty of Paying Living Expenses: Not hard at all   Food Insecurity: Not on file   Transportation Needs: No Transportation Needs (3/19/2024)    PRAPARE - Transportation     Lack of Transportation (Medical): No     Lack of Transportation (Non-Medical): No   Physical Activity: Not on file   Stress: Not on file   Social Connections: Not on file   Intimate Partner Violence: Not on file   Housing Stability: Low Risk  (3/19/2024)    Housing Stability Vital Sign     Unable to Pay for Housing in the Last Year: No     Number of Places Lived in the Last Year: 1     Unstable Housing in the Last Year: No       Immunization History:    There  is no immunization history on file for this patient.    Current Medications:    Current Facility-Administered Medications:     acetaminophen (Tylenol) tablet 650 mg, 650 mg, oral, q4h PRN **OR** acetaminophen (Tylenol) oral liquid 650 mg, 650 mg, nasogastric tube, q4h PRN **OR** acetaminophen (Tylenol) suppository 650 mg, 650 mg, rectal, q4h PRN, She Arias MD    dextrose 50 % injection 12.5 g, 12.5 g, intravenous, q15 min PRN, She Arias MD    dextrose 50 % injection 25 g, 25 g, intravenous, q15 min PRN, She Arias MD    [Held by provider] enoxaparin (Lovenox) syringe 40 mg, 40 mg, subcutaneous, q24h, She Arias MD    glucagon (Glucagen) injection 1 mg, 1 mg, intramuscular, q15 min PRN, She Arias MD    insulin lispro (HumaLOG) injection 0-10 Units, 0-10 Units, subcutaneous, With meals & nightly, She Arias MD    lactated Ringer's bolus 1,000 mL, 1,000 mL, intravenous, Once, Rusty Pat MD, Last Rate: 1,000 mL/hr at 03/19/24 1541, 1,000 mL at 03/19/24 1541    lactated Ringer's bolus 1,000 mL, 1,000 mL, intravenous, Once, Rusty Pat MD    LORazepam (Ativan) tablet 1 mg, 1 mg, oral, q8h PRN, She Arias MD, 1 mg at 03/18/24 2304    melatonin tablet 3 mg, 3 mg, oral, Daily, She Arias MD, 3 mg at 03/18/24 2304    metoclopramide (Reglan) tablet 10 mg, 10 mg, oral, 4x daily PRN, She Arias MD    [Held by provider] metoprolol succinate XL (Toprol-XL) 24 hr tablet 25 mg, 25 mg, oral, Daily, She Arias MD    oxyCODONE (Roxicodone) immediate release tablet 5 mg, 5 mg, oral, q6h PRN, She Arias MD, 5 mg at 03/18/24 2321    pantoprazole (ProtoNix) EC tablet 40 mg, 40 mg, oral, BID, She rAias MD, 40 mg at 03/19/24 0948    piperacillin-tazobactam-dextrose (Zosyn) IV 3.375 g, 3.375 g, intravenous, q6h, Artie Weir MD    sucralfate (Carafate) tablet 1 g, 1 g, oral, BID AC, She Arias MD    vancomycin (Vancocin) pharmacy to dose - pharmacy monitoring, ,  "miscellaneous, Daily PRN, Artie Weir MD     Drug Allergies/Intolerances:  Allergies   Allergen Reactions    Bee Venom Protein (Honey Bee) Anaphylaxis    Oxaliplatin Other     Rigors        Review of Systems:  Review of Systems   Constitutional:  Positive for fatigue and fever.   Respiratory:  Negative for chest tightness.    Genitourinary:  Positive for dysuria and hematuria.        All other review of systems are negative and/or non-contributory.    Physical Examination:  BP (!) 83/45   Pulse 92   Temp 36.5 °C (97.7 °F) (Temporal)   Resp 17   Ht 1.727 m (5' 8\")   Wt 74 kg (163 lb 2.3 oz)   SpO2 97%   BMI 24.81 kg/m²        GEN: appears tired, but is mentating appropriately  ENT: geographic tongue  CV: RRR, no m/g/r  LUNGS: good effort, clear bilaterally, no w/r/r  ABD: Soft, nontender  EXT: no edema, cyanosis, clubbing  NEURO: strength equal bilaterally, sensation grossly intact        Pulmonary Function Test Results     None    Exacerbation History     None    Imaging     CTA 3/18/2024:  1. Findings suspicious for tiny nonocclusive emboli within the right  lower lobe.  2. Significant interval progression in size and number of mediastinal  and bilateral hilar lymph nodes and numerous bilateral pulmonary  nodules compared to prior chest CT from December 2023 and PET-CT from  January 2024. These findings are consistent with progressive  metastases.  3. Mild wall thickening of the distal esophagus, this could represent  esophagitis or recurrent disease.    Bronchoscopy     None    Labs     Results for orders placed or performed during the hospital encounter of 03/18/24 (from the past 24 hour(s))   ECG 12 lead   Result Value Ref Range    Ventricular Rate 102 BPM    Atrial Rate 227 BPM    QRS Duration 166 ms    QT Interval 440 ms    QTC Calculation(Bazett) 573 ms    R Axis 28 degrees    T Axis 80 degrees    QRS Count 17 beats    Q Onset 190 ms    T Offset 410 ms    QTC Fredericia 525 ms   CBC and Auto " Differential   Result Value Ref Range    WBC 12.9 (H) 4.4 - 11.3 x10*3/uL    nRBC 0.0 0.0 - 0.0 /100 WBCs    RBC 3.63 (L) 4.50 - 5.90 x10*6/uL    Hemoglobin 10.8 (L) 13.5 - 17.5 g/dL    Hematocrit 32.8 (L) 41.0 - 52.0 %    MCV 90 80 - 100 fL    MCH 29.8 26.0 - 34.0 pg    MCHC 32.9 32.0 - 36.0 g/dL    RDW 16.5 (H) 11.5 - 14.5 %    Platelets 76 (L) 150 - 450 x10*3/uL    Neutrophils % 96.3 40.0 - 80.0 %    Immature Granulocytes %, Automated 0.8 0.0 - 0.9 %    Lymphocytes % 0.5 13.0 - 44.0 %    Monocytes % 2.3 2.0 - 10.0 %    Eosinophils % 0.0 0.0 - 6.0 %    Basophils % 0.1 0.0 - 2.0 %    Neutrophils Absolute 12.39 (H) 1.20 - 7.70 x10*3/uL    Immature Granulocytes Absolute, Automated 0.10 0.00 - 0.70 x10*3/uL    Lymphocytes Absolute 0.07 (L) 1.20 - 4.80 x10*3/uL    Monocytes Absolute 0.29 0.10 - 1.00 x10*3/uL    Eosinophils Absolute 0.00 0.00 - 0.70 x10*3/uL    Basophils Absolute 0.01 0.00 - 0.10 x10*3/uL   Comprehensive metabolic panel   Result Value Ref Range    Glucose 143 (H) 74 - 99 mg/dL    Sodium 139 136 - 145 mmol/L    Potassium 3.3 (L) 3.5 - 5.3 mmol/L    Chloride 103 98 - 107 mmol/L    Bicarbonate 24 21 - 32 mmol/L    Anion Gap 15 10 - 20 mmol/L    Urea Nitrogen 13 6 - 23 mg/dL    Creatinine 0.89 0.50 - 1.30 mg/dL    eGFR >90 >60 mL/min/1.73m*2    Calcium 9.4 8.6 - 10.3 mg/dL    Albumin 3.3 (L) 3.4 - 5.0 g/dL    Alkaline Phosphatase 184 (H) 33 - 136 U/L    Total Protein 6.7 6.4 - 8.2 g/dL     (H) 9 - 39 U/L    Bilirubin, Total 6.3 (H) 0.0 - 1.2 mg/dL    ALT 29 10 - 52 U/L   Lipase   Result Value Ref Range    Lipase 34 9 - 82 U/L   B-type natriuretic peptide   Result Value Ref Range     (H) 0 - 99 pg/mL   Troponin I, High Sensitivity, Initial   Result Value Ref Range    Troponin I, High Sensitivity 34 (H) 0 - 20 ng/L   Creatine Kinase   Result Value Ref Range    Creatine Kinase 116 0 - 325 U/L   Magnesium   Result Value Ref Range    Magnesium 1.25 (L) 1.60 - 2.40 mg/dL   Blood Culture    Specimen:  [facility]; Blood culture   Result Value Ref Range    Blood Culture Loaded on Instrument - Culture in progress    Blood Culture    Specimen: Peripheral Venipuncture; Blood culture   Result Value Ref Range    Blood Culture Loaded on Instrument - Culture in progress    Lactate   Result Value Ref Range    Lactate 3.1 (H) 0.4 - 2.0 mmol/L   Urinalysis with Reflex Culture and Microscopic   Result Value Ref Range    Color, Urine Veena (N) Straw, Yellow    Appearance, Urine Hazy (N) Clear    Specific Gravity, Urine 1.008 1.005 - 1.035    pH, Urine 7.0 5.0, 5.5, 6.0, 6.5, 7.0, 7.5, 8.0    Protein, Urine 100 (2+) (N) NEGATIVE mg/dL    Glucose, Urine 50 (1+) (A) NEGATIVE mg/dL    Blood, Urine MODERATE (2+) (A) NEGATIVE    Ketones, Urine NEGATIVE NEGATIVE mg/dL    Bilirubin, Urine NEGATIVE NEGATIVE    Urobilinogen, Urine <2.0 <2.0 mg/dL    Nitrite, Urine POSITIVE (A) NEGATIVE    Leukocyte Esterase, Urine NEGATIVE NEGATIVE   Microscopic Only, Urine   Result Value Ref Range    WBC, Urine NONE 1-5, NONE /HPF    RBC, Urine 1-2 NONE, 1-2, 3-5 /HPF    Bacteria, Urine 4+ (A) NONE SEEN /HPF    Mucus, Urine FEW Reference range not established. /LPF   Troponin, High Sensitivity, 1 Hour   Result Value Ref Range    Troponin I, High Sensitivity 50 (H) 0 - 20 ng/L   Lactate   Result Value Ref Range    Lactate 3.0 (H) 0.4 - 2.0 mmol/L   Lactate   Result Value Ref Range    Lactate 3.5 (H) 0.4 - 2.0 mmol/L   Renal function panel   Result Value Ref Range    Glucose 174 (H) 74 - 99 mg/dL    Sodium 138 136 - 145 mmol/L    Potassium 4.2 3.5 - 5.3 mmol/L    Chloride 104 98 - 107 mmol/L    Bicarbonate 22 21 - 32 mmol/L    Anion Gap 16 10 - 20 mmol/L    Urea Nitrogen 21 6 - 23 mg/dL    Creatinine 1.35 (H) 0.50 - 1.30 mg/dL    eGFR 58 (L) >60 mL/min/1.73m*2    Calcium 9.2 8.6 - 10.3 mg/dL    Phosphorus <1.0 (LL) 2.5 - 4.9 mg/dL    Albumin 3.0 (L) 3.4 - 5.0 g/dL   Magnesium   Result Value Ref Range    Magnesium 1.83 1.60 - 2.40 mg/dL   Green Top   Result  Value Ref Range    Extra Tube Hold for add-ons.    CBC and Auto Differential   Result Value Ref Range    WBC 14.3 (H) 4.4 - 11.3 x10*3/uL    nRBC 0.0 0.0 - 0.0 /100 WBCs    RBC 3.01 (L) 4.50 - 5.90 x10*6/uL    Hemoglobin 9.0 (L) 13.5 - 17.5 g/dL    Hematocrit 27.4 (L) 41.0 - 52.0 %    MCV 91 80 - 100 fL    MCH 29.9 26.0 - 34.0 pg    MCHC 32.8 32.0 - 36.0 g/dL    RDW 16.9 (H) 11.5 - 14.5 %    Platelets 29 (LL) 150 - 450 x10*3/uL    Neutrophils % 91.3 40.0 - 80.0 %    Immature Granulocytes %, Automated 1.5 (H) 0.0 - 0.9 %    Lymphocytes % 1.4 13.0 - 44.0 %    Monocytes % 5.7 2.0 - 10.0 %    Eosinophils % 0.0 0.0 - 6.0 %    Basophils % 0.1 0.0 - 2.0 %    Neutrophils Absolute 13.05 (H) 1.20 - 7.70 x10*3/uL    Immature Granulocytes Absolute, Automated 0.22 0.00 - 0.70 x10*3/uL    Lymphocytes Absolute 0.20 (L) 1.20 - 4.80 x10*3/uL    Monocytes Absolute 0.81 0.10 - 1.00 x10*3/uL    Eosinophils Absolute 0.00 0.00 - 0.70 x10*3/uL    Basophils Absolute 0.02 0.00 - 0.10 x10*3/uL   Lactate   Result Value Ref Range    Lactate 2.0 0.4 - 2.0 mmol/L   Morphology   Result Value Ref Range    RBC Morphology See Below     Polychromasia Mild     Ovalocytes Few    POCT GLUCOSE   Result Value Ref Range    POCT Glucose 136 (H) 74 - 99 mg/dL   Phosphorus   Result Value Ref Range    Phosphorus 3.4 2.5 - 4.9 mg/dL   D-dimer, VTE Exclusion   Result Value Ref Range    D-Dimer, Quantitative VTE Exclusion 81,270 (H) <=500 ng/mL FEU   Ferritin   Result Value Ref Range    Ferritin 869 (H) 20 - 300 ng/mL   Bilirubin, Total   Result Value Ref Range    Bilirubin, Total 3.2 (H) 0.0 - 1.2 mg/dL   Renal function panel   Result Value Ref Range    Glucose 136 (H) 74 - 99 mg/dL    Sodium 136 136 - 145 mmol/L    Potassium 4.1 3.5 - 5.3 mmol/L    Chloride 108 (H) 98 - 107 mmol/L    Bicarbonate 20 (L) 21 - 32 mmol/L    Anion Gap 12 10 - 20 mmol/L    Urea Nitrogen 26 (H) 6 - 23 mg/dL    Creatinine 1.43 (H) 0.50 - 1.30 mg/dL    eGFR 54 (L) >60 mL/min/1.73m*2     Calcium 7.8 (L) 8.6 - 10.3 mg/dL    Phosphorus 3.4 2.5 - 4.9 mg/dL    Albumin 2.7 (L) 3.4 - 5.0 g/dL   Protime-INR   Result Value Ref Range    Protime 20.0 (H) 9.8 - 12.8 seconds    INR 1.8 (H) 0.9 - 1.1   APTT   Result Value Ref Range    aPTT 31 27 - 38 seconds   Fibrinogen   Result Value Ref Range    Fibrinogen 153 (L) 200 - 400 mg/dL   Bilirubin, Direct   Result Value Ref Range    Bilirubin, Direct 1.5 (H) 0.0 - 0.3 mg/dL   Creatine Kinase   Result Value Ref Range    Creatine Kinase 108 0 - 325 U/L   POCT GLUCOSE   Result Value Ref Range    POCT Glucose 123 (H) 74 - 99 mg/dL   Renal function panel   Result Value Ref Range    Glucose 120 (H) 74 - 99 mg/dL    Sodium 138 136 - 145 mmol/L    Potassium 4.3 3.5 - 5.3 mmol/L    Chloride 106 98 - 107 mmol/L    Bicarbonate 21 21 - 32 mmol/L    Anion Gap 15 10 - 20 mmol/L    Urea Nitrogen 26 (H) 6 - 23 mg/dL    Creatinine 1.42 (H) 0.50 - 1.30 mg/dL    eGFR 55 (L) >60 mL/min/1.73m*2    Calcium 8.0 (L) 8.6 - 10.3 mg/dL    Phosphorus 2.9 2.5 - 4.9 mg/dL    Albumin 2.4 (L) 3.4 - 5.0 g/dL   Magnesium   Result Value Ref Range    Magnesium 1.56 (L) 1.60 - 2.40 mg/dL   Vancomycin   Result Value Ref Range    Vancomycin 8.2 5.0 - 20.0 ug/mL   Lactate   Result Value Ref Range    Lactate 1.7 0.4 - 2.0 mmol/L       Echocardiogram     2/26/2024   1. Left ventricular systolic function is low normal with a 50-55% estimated ejection fraction.   2. RVSP within normal limits.   3. Left Ventricular Global Longitudinal Strain - 17.7 %.    ASSESSMENT & PLAN        Summary:  63M with metastatic esophageal CA presenting with chest pain. Found to have severe sepsis likely secondary to UTI. He appears profoundly dehydrated. Despite 4.5L IVF in 24 hours he is still hypotensive. However, his lactic acidosis has cleared.    Problem list:  -severe sepsis  -hypovolemia  -UTI  -possible DIC  -NORTH  -thrombocytopenia    Recommendations:  -Please bolus the patient an additional 2 liters of crystalloids  now  -Will reassess hemodynamics after fluids completed. If MAP remains below 65, will then accept to ICU for initiation of norepinephrine  -continue broad spectrum antibiotics  -monitor for signs of bleeding given thrombocytopenia  -plan discussed with patient, his spouse, and primary team    Rakan Domínguez DO  Staff Physician - Pulmonary & Critical Care  03/19/24 3:45 PM

## 2024-03-19 NOTE — PROGRESS NOTES
03/19/24 0921   Discharge Planning   Living Arrangements Spouse/significant other   Support Systems Spouse/significant other   Assistance Needed A&OX3; independent with ADLs with no assistive devices; drives; room air baseline and room air now; chemo every other Mon at  [facility]   Type of Residence Private residence   Number of Stairs to Enter Residence 4   Number of Stairs Within Residence 14   Do you have animals or pets at home? Yes   Type of Animals or Pets 2 dogs, 2 cats   Who is requesting discharge planning? Provider   Patient expects to be discharged to: home no needs   Does the patient need discharge transport arranged? Yes   RoundTrip coordination needed? Yes   Has discharge transport been arranged? No   Financial Resource Strain   How hard is it for you to pay for the very basics like food, housing, medical care, and heating? Not hard   Housing Stability   In the last 12 months, was there a time when you were not able to pay the mortgage or rent on time? N   In the last 12 months, how many places have you lived? 1   In the last 12 months, was there a time when you did not have a steady place to sleep or slept in a shelter (including now)? N   Transportation Needs   In the past 12 months, has lack of transportation kept you from medical appointments or from getting medications? no   In the past 12 months, has lack of transportation kept you from meetings, work, or from getting things needed for daily living? No     03/19/2024 0922am  Spoke with patient's spouse bedside (patient in restroom). Spouse denies any home going needs at this time including home care. Patient independent and not home bound.

## 2024-03-20 ENCOUNTER — APPOINTMENT (OUTPATIENT)
Dept: CARDIOLOGY | Facility: CLINIC | Age: 64
End: 2024-03-20
Payer: MEDICARE

## 2024-03-20 LAB
ALBUMIN SERPL BCP-MCNC: 2.6 G/DL (ref 3.4–5)
ALBUMIN SERPL BCP-MCNC: 2.6 G/DL (ref 3.4–5)
ALP SERPL-CCNC: 109 U/L (ref 33–136)
ALT SERPL W P-5'-P-CCNC: 27 U/L (ref 10–52)
ANION GAP SERPL CALC-SCNC: 9 MMOL/L (ref 10–20)
APTT PPP: 26 SECONDS (ref 27–38)
AST SERPL W P-5'-P-CCNC: 75 U/L (ref 9–39)
BASOPHILS # BLD AUTO: 0.01 X10*3/UL (ref 0–0.1)
BASOPHILS NFR BLD AUTO: 0.3 %
BILIRUB DIRECT SERPL-MCNC: 0.5 MG/DL (ref 0–0.3)
BILIRUB SERPL-MCNC: 1.7 MG/DL (ref 0–1.2)
BODY SURFACE AREA: 1.88 M2
BUN SERPL-MCNC: 24 MG/DL (ref 6–23)
CALCIUM SERPL-MCNC: 8.2 MG/DL (ref 8.6–10.3)
CHLORIDE SERPL-SCNC: 112 MMOL/L (ref 98–107)
CO2 SERPL-SCNC: 23 MMOL/L (ref 21–32)
CREAT SERPL-MCNC: 1.43 MG/DL (ref 0.5–1.3)
EGFRCR SERPLBLD CKD-EPI 2021: 54 ML/MIN/1.73M*2
EOSINOPHIL # BLD AUTO: 0.05 X10*3/UL (ref 0–0.7)
EOSINOPHIL NFR BLD AUTO: 1.4 %
ERYTHROCYTE [DISTWIDTH] IN BLOOD BY AUTOMATED COUNT: 17.1 % (ref 11.5–14.5)
ERYTHROCYTE [DISTWIDTH] IN BLOOD BY AUTOMATED COUNT: 17.1 % (ref 11.5–14.5)
FIBRINOGEN PPP-MCNC: 159 MG/DL (ref 200–400)
GLUCOSE BLD MANUAL STRIP-MCNC: 102 MG/DL (ref 74–99)
GLUCOSE BLD MANUAL STRIP-MCNC: 116 MG/DL (ref 74–99)
GLUCOSE BLD MANUAL STRIP-MCNC: 132 MG/DL (ref 74–99)
GLUCOSE BLD MANUAL STRIP-MCNC: 166 MG/DL (ref 74–99)
GLUCOSE SERPL-MCNC: 96 MG/DL (ref 74–99)
HAPTOGLOB SERPL-MCNC: <10 MG/DL (ref 37–246)
HCT VFR BLD AUTO: 24.3 % (ref 41–52)
HCT VFR BLD AUTO: 24.8 % (ref 41–52)
HEMOSIDERIN UR QL MICRO: NEGATIVE
HGB BLD-MCNC: 7.9 G/DL (ref 13.5–17.5)
HGB BLD-MCNC: 8 G/DL (ref 13.5–17.5)
HGB RETIC QN: 33 PG (ref 28–38)
HOLD SPECIMEN: NORMAL
IMM GRANULOCYTES # BLD AUTO: 0.02 X10*3/UL (ref 0–0.7)
IMM GRANULOCYTES NFR BLD AUTO: 0.6 % (ref 0–0.9)
IMMATURE RETIC FRACTION: 14.7 %
INR PPP: 1.3 (ref 0.9–1.1)
LDH SERPL L TO P-CCNC: 598 U/L (ref 84–246)
LYMPHOCYTES # BLD AUTO: 0.41 X10*3/UL (ref 1.2–4.8)
LYMPHOCYTES NFR BLD AUTO: 11.7 %
MAGNESIUM SERPL-MCNC: 1.8 MG/DL (ref 1.6–2.4)
MCH RBC QN AUTO: 29.5 PG (ref 26–34)
MCH RBC QN AUTO: 29.8 PG (ref 26–34)
MCHC RBC AUTO-ENTMCNC: 32.3 G/DL (ref 32–36)
MCHC RBC AUTO-ENTMCNC: 32.5 G/DL (ref 32–36)
MCV RBC AUTO: 92 FL (ref 80–100)
MCV RBC AUTO: 92 FL (ref 80–100)
MONOCYTES # BLD AUTO: 0.46 X10*3/UL (ref 0.1–1)
MONOCYTES NFR BLD AUTO: 13.2 %
NEUTROPHILS # BLD AUTO: 2.54 X10*3/UL (ref 1.2–7.7)
NEUTROPHILS NFR BLD AUTO: 72.8 %
NRBC BLD-RTO: 0 /100 WBCS (ref 0–0)
NRBC BLD-RTO: 0.5 /100 WBCS (ref 0–0)
PATH REVIEW-CBC DIFFERENTIAL: NORMAL
PHOSPHATE SERPL-MCNC: 3.1 MG/DL (ref 2.5–4.9)
PLATELET # BLD AUTO: 24 X10*3/UL (ref 150–450)
PLATELET # BLD AUTO: 27 X10*3/UL (ref 150–450)
POTASSIUM SERPL-SCNC: 3.9 MMOL/L (ref 3.5–5.3)
PROT SERPL-MCNC: 5.7 G/DL (ref 6.4–8.2)
PROTHROMBIN TIME: 14.3 SECONDS (ref 9.8–12.8)
RBC # BLD AUTO: 2.65 X10*6/UL (ref 4.5–5.9)
RBC # BLD AUTO: 2.71 X10*6/UL (ref 4.5–5.9)
RETICS #: 0.11 X10*6/UL (ref 0.02–0.12)
RETICS/RBC NFR AUTO: 4 % (ref 0.5–2)
SODIUM SERPL-SCNC: 140 MMOL/L (ref 136–145)
VANCOMYCIN SERPL-MCNC: 17.2 UG/ML (ref 5–20)
WBC # BLD AUTO: 3.5 X10*3/UL (ref 4.4–11.3)
WBC # BLD AUTO: 3.8 X10*3/UL (ref 4.4–11.3)

## 2024-03-20 PROCEDURE — 99232 SBSQ HOSP IP/OBS MODERATE 35: CPT | Performed by: INTERNAL MEDICINE

## 2024-03-20 PROCEDURE — 80202 ASSAY OF VANCOMYCIN: CPT

## 2024-03-20 PROCEDURE — 83735 ASSAY OF MAGNESIUM: CPT

## 2024-03-20 PROCEDURE — 82040 ASSAY OF SERUM ALBUMIN: CPT | Performed by: INTERNAL MEDICINE

## 2024-03-20 PROCEDURE — 83615 LACTATE (LD) (LDH) ENZYME: CPT

## 2024-03-20 PROCEDURE — 2500000002 HC RX 250 W HCPCS SELF ADMINISTERED DRUGS (ALT 637 FOR MEDICARE OP, ALT 636 FOR OP/ED)

## 2024-03-20 PROCEDURE — 85384 FIBRINOGEN ACTIVITY: CPT | Performed by: INTERNAL MEDICINE

## 2024-03-20 PROCEDURE — 2500000004 HC RX 250 GENERAL PHARMACY W/ HCPCS (ALT 636 FOR OP/ED)

## 2024-03-20 PROCEDURE — 85045 AUTOMATED RETICULOCYTE COUNT: CPT

## 2024-03-20 PROCEDURE — 82947 ASSAY GLUCOSE BLOOD QUANT: CPT

## 2024-03-20 PROCEDURE — 85025 COMPLETE CBC W/AUTO DIFF WBC: CPT | Performed by: INTERNAL MEDICINE

## 2024-03-20 PROCEDURE — 2020000001 HC ICU ROOM DAILY

## 2024-03-20 PROCEDURE — 2500000001 HC RX 250 WO HCPCS SELF ADMINISTERED DRUGS (ALT 637 FOR MEDICARE OP)

## 2024-03-20 PROCEDURE — 2500000004 HC RX 250 GENERAL PHARMACY W/ HCPCS (ALT 636 FOR OP/ED): Performed by: INTERNAL MEDICINE

## 2024-03-20 PROCEDURE — 85027 COMPLETE CBC AUTOMATED: CPT

## 2024-03-20 PROCEDURE — 85730 THROMBOPLASTIN TIME PARTIAL: CPT | Performed by: INTERNAL MEDICINE

## 2024-03-20 PROCEDURE — 99233 SBSQ HOSP IP/OBS HIGH 50: CPT

## 2024-03-20 PROCEDURE — 85610 PROTHROMBIN TIME: CPT | Performed by: INTERNAL MEDICINE

## 2024-03-20 PROCEDURE — 80069 RENAL FUNCTION PANEL: CPT

## 2024-03-20 RX ORDER — FENTANYL 25 UG/1
1 PATCH TRANSDERMAL
Status: DISCONTINUED | OUTPATIENT
Start: 2024-03-20 | End: 2024-03-23 | Stop reason: HOSPADM

## 2024-03-20 RX ORDER — AMOXICILLIN 250 MG
2 CAPSULE ORAL NIGHTLY PRN
Status: DISCONTINUED | OUTPATIENT
Start: 2024-03-20 | End: 2024-03-23 | Stop reason: HOSPADM

## 2024-03-20 RX ADMIN — PANTOPRAZOLE SODIUM 40 MG: 40 TABLET, DELAYED RELEASE ORAL at 21:26

## 2024-03-20 RX ADMIN — VANCOMYCIN HYDROCHLORIDE 1250 MG: 1.25 INJECTION, POWDER, LYOPHILIZED, FOR SOLUTION INTRAVENOUS at 22:59

## 2024-03-20 RX ADMIN — OXYCODONE HYDROCHLORIDE 5 MG: 5 TABLET ORAL at 07:25

## 2024-03-20 RX ADMIN — Medication 3 MG: at 21:26

## 2024-03-20 RX ADMIN — PIPERACILLIN SODIUM AND TAZOBACTAM SODIUM 3.38 G: 3; .375 INJECTION, SOLUTION INTRAVENOUS at 18:53

## 2024-03-20 RX ADMIN — PANTOPRAZOLE SODIUM 40 MG: 40 TABLET, DELAYED RELEASE ORAL at 08:56

## 2024-03-20 RX ADMIN — PIPERACILLIN SODIUM AND TAZOBACTAM SODIUM 3.38 G: 3; .375 INJECTION, SOLUTION INTRAVENOUS at 12:23

## 2024-03-20 RX ADMIN — PIPERACILLIN SODIUM AND TAZOBACTAM SODIUM 3.38 G: 3; .375 INJECTION, SOLUTION INTRAVENOUS at 07:25

## 2024-03-20 RX ADMIN — SUCRALFATE 1 G: 1 TABLET ORAL at 16:34

## 2024-03-20 RX ADMIN — MICAFUNGIN SODIUM 100 MG: 100 INJECTION, POWDER, LYOPHILIZED, FOR SOLUTION INTRAVENOUS at 21:29

## 2024-03-20 RX ADMIN — FENTANYL 1 PATCH: 25 PATCH TRANSDERMAL at 10:05

## 2024-03-20 RX ADMIN — OXYCODONE HYDROCHLORIDE 5 MG: 5 TABLET ORAL at 14:45

## 2024-03-20 RX ADMIN — LORAZEPAM 1 MG: 1 TABLET ORAL at 21:39

## 2024-03-20 RX ADMIN — OXYCODONE HYDROCHLORIDE 5 MG: 5 TABLET ORAL at 21:39

## 2024-03-20 RX ADMIN — LORAZEPAM 1 MG: 1 TABLET ORAL at 14:45

## 2024-03-20 RX ADMIN — PIPERACILLIN SODIUM AND TAZOBACTAM SODIUM 3.38 G: 3; .375 INJECTION, SOLUTION INTRAVENOUS at 01:29

## 2024-03-20 ASSESSMENT — PAIN SCALES - GENERAL: PAINLEVEL_OUTOF10: 5 - MODERATE PAIN

## 2024-03-20 ASSESSMENT — ENCOUNTER SYMPTOMS
CARDIOVASCULAR NEGATIVE: 1
APPETITE CHANGE: 1
EYES NEGATIVE: 1
RESPIRATORY NEGATIVE: 1

## 2024-03-20 ASSESSMENT — ACTIVITIES OF DAILY LIVING (ADL): LACK_OF_TRANSPORTATION: NO

## 2024-03-20 NOTE — PROGRESS NOTES
"Massimo Garcia is a 63 y.o. male on day 2 of admission presenting with Cystitis.    Subjective   patient is doing better today  His BP ha stabilized after aggressive fluid hydration and was not eventually transferred to ICU   He has some abd discomfort today and nausea but no vomiting   Had a normal BM  Denies SOB  Afebrile     Objective     Physical Exam    General: Lethargic, oriented x 3  HEENT: Icteric sclera  Abdomen: Soft and nontender   Heart: Regular no murmur  Lungs: Clear to auscultation  Extremities: No edema    Last Recorded Vitals  Blood pressure 100/64, pulse 92, temperature 36.6 °C (97.9 °F), temperature source Temporal, resp. rate 18, height 1.727 m (5' 8\"), weight 74 kg (163 lb 2.3 oz), SpO2 100 %.  Intake/Output last 3 Shifts:  I/O last 3 completed shifts:  In: 1300 (17.6 mL/kg) [I.V.:50 (0.7 mL/kg); IV Piggyback:1250]  Out: 600 (8.1 mL/kg) [Urine:600 (0.2 mL/kg/hr)]  Weight: 74 kg     Relevant Results  Results for orders placed or performed during the hospital encounter of 03/18/24 (from the past 24 hour(s))   Direct Antiglobulin Test   Result Value Ref Range    DG-POLYSPECIFIC NEG    CBC and Auto Differential   Result Value Ref Range    WBC 3.7 (L) 4.4 - 11.3 x10*3/uL    nRBC 0.0 0.0 - 0.0 /100 WBCs    RBC 2.41 (L) 4.50 - 5.90 x10*6/uL    Hemoglobin 7.1 (L) 13.5 - 17.5 g/dL    Hematocrit 22.3 (L) 41.0 - 52.0 %    MCV 93 80 - 100 fL    MCH 29.5 26.0 - 34.0 pg    MCHC 31.8 (L) 32.0 - 36.0 g/dL    RDW 17.1 (H) 11.5 - 14.5 %    Platelets 17 (LL) 150 - 450 x10*3/uL    Neutrophils % 81.6 40.0 - 80.0 %    Immature Granulocytes %, Automated 1.4 (H) 0.0 - 0.9 %    Lymphocytes % 7.1 13.0 - 44.0 %    Monocytes % 9.6 2.0 - 10.0 %    Eosinophils % 0.0 0.0 - 6.0 %    Basophils % 0.3 0.0 - 2.0 %    Neutrophils Absolute 2.99 1.20 - 7.70 x10*3/uL    Immature Granulocytes Absolute, Automated 0.05 0.00 - 0.70 x10*3/uL    Lymphocytes Absolute 0.26 (L) 1.20 - 4.80 x10*3/uL    Monocytes Absolute 0.35 0.10 - 1.00 x10*3/uL "    Eosinophils Absolute 0.00 0.00 - 0.70 x10*3/uL    Basophils Absolute 0.01 0.00 - 0.10 x10*3/uL   Morphology   Result Value Ref Range    RBC Morphology See Below     Polychromasia Mild     Hypochromia Mild     RBC Fragments Few     Ovalocytes Few     Melissa Cells Few    POCT GLUCOSE   Result Value Ref Range    POCT Glucose 144 (H) 74 - 99 mg/dL   Hemosiderin, urine   Result Value Ref Range    Hemosiderin Qual, Urine Negative Negative   Renal function panel   Result Value Ref Range    Glucose 96 74 - 99 mg/dL    Sodium 140 136 - 145 mmol/L    Potassium 3.9 3.5 - 5.3 mmol/L    Chloride 112 (H) 98 - 107 mmol/L    Bicarbonate 23 21 - 32 mmol/L    Anion Gap 9 (L) 10 - 20 mmol/L    Urea Nitrogen 24 (H) 6 - 23 mg/dL    Creatinine 1.43 (H) 0.50 - 1.30 mg/dL    eGFR 54 (L) >60 mL/min/1.73m*2    Calcium 8.2 (L) 8.6 - 10.3 mg/dL    Phosphorus 3.1 2.5 - 4.9 mg/dL    Albumin 2.6 (L) 3.4 - 5.0 g/dL   Magnesium   Result Value Ref Range    Magnesium 1.80 1.60 - 2.40 mg/dL   CBC   Result Value Ref Range    WBC 3.8 (L) 4.4 - 11.3 x10*3/uL    nRBC 0.5 (H) 0.0 - 0.0 /100 WBCs    RBC 2.71 (L) 4.50 - 5.90 x10*6/uL    Hemoglobin 8.0 (L) 13.5 - 17.5 g/dL    Hematocrit 24.8 (L) 41.0 - 52.0 %    MCV 92 80 - 100 fL    MCH 29.5 26.0 - 34.0 pg    MCHC 32.3 32.0 - 36.0 g/dL    RDW 17.1 (H) 11.5 - 14.5 %    Platelets 24 (LL) 150 - 450 x10*3/uL   Vancomycin   Result Value Ref Range    Vancomycin 17.2 5.0 - 20.0 ug/mL   Lactate dehydrogenase   Result Value Ref Range     (H) 84 - 246 U/L   Reticulocytes   Result Value Ref Range    Retic % 4.0 (H) 0.5 - 2.0 %    Retic Absolute 0.107 0.022 - 0.118 x10*6/uL    Reticulocyte Hemoglobin 33 28 - 38 pg    Immature Retic fraction 14.7 <=16.0 %   PST Top   Result Value Ref Range    Extra Tube Hold for add-ons.    Hepatic function panel   Result Value Ref Range    Albumin 2.6 (L) 3.4 - 5.0 g/dL    Bilirubin, Total 1.7 (H) 0.0 - 1.2 mg/dL    Bilirubin, Direct 0.5 (H) 0.0 - 0.3 mg/dL    Alkaline  Phosphatase 109 33 - 136 U/L    ALT 27 10 - 52 U/L    AST 75 (H) 9 - 39 U/L    Total Protein 5.7 (L) 6.4 - 8.2 g/dL   POCT GLUCOSE   Result Value Ref Range    POCT Glucose 132 (H) 74 - 99 mg/dL   Cardiac Device Check - Remote   Result Value Ref Range    BSA 1.88 m2   POCT GLUCOSE   Result Value Ref Range    POCT Glucose 102 (H) 74 - 99 mg/dL   POCT GLUCOSE   Result Value Ref Range    POCT Glucose 116 (H) 74 - 99 mg/dL       Assessment/Plan   Principal Problem:    Cystitis  Active Problems:    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)    Chest heaviness    Chemotherapy-induced neuropathy (CMS/HCC)    Malignant neoplasm metastatic to liver (CMS/HCC)    Thrombocytopenia (CMS/HCC)    NORTH (acute kidney injury) (CMS/HCC)    PE (pulmonary thromboembolism) (CMS/HCC)    Fever and sepsis     The differential diagnosis includes infection of which the source could be UTI versus cholangitis, or reaction to the oxaliplatin although would not  expect hypotension at this time      Obtain CT abdomen and pelvis  Continue broad-spectrum antibiotic with vancomycin and Zosyn  Pressor support if required  Follow cultures    3/20/24- he responded to fluid resuscitation yesterday and sepsis seems to be resolving with normal BP today and no fever   Continue broad spectrum abx, urine culture negative and Bcx NGTD      2.  Thrombocytopenia  Baseline thrombocytopenia with worsening down to 29,000  Secondary to sepsis and labs also suggestive of DIC  Support with platelet transfusion if the count drops less than 20,000 and cryoprecipitate if the fibrinogen drops less than 100  Ordered blood smear review by path     3/20/24- platelets improved from yesterday afternoon count, continue to monitor and support as needed to keep > 20,000        3.  Anemia  Likely anemia of chronic disease/inflammation  The bilirubinemia was mixed so we will order hemolysis workup including LDH, reticulocyte, haptoglobin has been ordered, ordered DG  There  are case reports of oxaliplatin induced immune hemolytic anemia  Monitor the hemoglobin and support with transfusion if hemoglobin drops less than 7 g/dL    3/20- the work up suggests an element of non immune hemolysis, the hg improved today as well, we will repeat a cbc for this evening      4.  Metastatic esophageal cancer  His disease seems to be progressing on FOLFOX plus trastuzumab  At this time he wishes to continue cancer therapy when he stabilizes and to continue full supportive measures  His primary oncologist Dr. Blake was updated     5.  Pulmonary embolism  Previously on Eliquis  His platelets at this time are prohibitive of full dose anticoagulation, but we will follow to when it could be started  Consider Dopplers of the lower extremities    3/20- if the plts trend up to > 50,000 then anticoagulation can be resumed      Thanks for the consult       Bhavin Frankel MD

## 2024-03-20 NOTE — CONSULTS
Consults  Referred by EMILY Carty MD: Dayne Santamaria DO    Reason For Consult  Sepsis, source    History Of Present Illness  Massimo Garcia is a 63 y.o. male, hx of metastatic esophageal cancer on FOLFOX plus trastuzumab, hx of HTN, hx of a pacemaker, hx of a port, he was recently treated for possible colitis, he was admitted from the cancer center after he developed chills and fever during chemotherapy, he was dx with sepsis secondary to uti, his ct showed pe, his BC and urine culture are negative, today he was noted to be hypotensive, his antibiotics were broadened, the ct showed progression of his metastatic disease, distended gallbladder, the LFT are elevated, NO ABDOMINAL PAIN, HAS NAUSEA, no abdominal pain, has nausea, no emesis, no diarrhea, no rash, no cough or sob     Past Medical History  He has a past medical history of Essential (primary) hypertension (12/21/2013), Pacemaker, Peripheral neuropathy, Personal history of other diseases of the circulatory system, and Pneumothorax (12/04/2023).    Surgical History  He has a past surgical history that includes Total knee arthroplasty (09/30/2016); Total knee arthroplasty (09/30/2016); Cardiac electrophysiology procedure (N/A, 11/7/2023); and Cardiac electrophysiology procedure (Left, 11/9/2023).     Social History     Occupational History    Not on file   Tobacco Use    Smoking status: Former     Types: Cigarettes, Cigars     Passive exposure: Never    Smokeless tobacco: Never   Vaping Use    Vaping Use: Unknown   Substance and Sexual Activity    Alcohol use: Not Currently    Drug use: Yes     Types: Marijuana     Comment: medical marjuana card    Sexual activity: Not on file     Travel History   Travel since 02/20/24    No documented travel since 02/20/24            Family History  Family History   Problem Relation Name Age of Onset    Cancer Father       Allergies  Bee venom protein (honey bee) and Oxaliplatin       There is no immunization history on file  for this patient.  Pneumonia and influenza vaccines are planned  Tran fall risk 45, preventive protocol was implemented  Depression screen is negative    Medications  Home medications:  Medications Prior to Admission   Medication Sig Dispense Refill Last Dose    [] amoxicillin-pot clavulanate (Augmentin) 875-125 mg tablet Take 1 tablet by mouth 2 times a day for 5 days. 10 tablet 0     apixaban (Eliquis) 5 mg tablet TAKE 1 TABLET BY MOUTH TWO TIMES A DAY 60 tablet 6     cholecalciferol (Vitamin D-3) 50 MCG (2000 UT) tablet Take 2 tablets (100 mcg) by mouth once daily in the morning.       diphenhydrAMINE-nystatin-dexAMETHasone-doxycycline in sterile water injection Use 15 mL in themouth or throat 4 times a day as needed for mucositis 250 mL 3 Past Week    fentaNYL (Duragesic) 25 mcg/hr patch Place 1 patch on the skin every 3rd day.       LORazepam (Ativan) 1 mg tablet Take 1 tablet (1 mg) by mouth every 8 hours if needed for anxiety (nausea). (Patient taking differently: Take 1 tablet (1 mg) by mouth every 8 hours if needed for anxiety (nausea). Last OARRS fill: 24 #90 for 30 days) 90 tablet 0     medical cannabis Take 1 each by mouth. Last OARRS fills:  24 Tin Oral Admin-3.67-0-120 Cbn Tincture 440/4 days  24 Tin Oral Admin-5.5-5.5-120 660/6 days  23 Oint Top Admin-16.6-30 Mjm 590.00/ 2 days   Past Month    metoprolol succinate XL (Toprol-XL) 25 mg 24 hr tablet Take 1 tablet (25 mg) by mouth once daily. Do not crush or chew. (Patient taking differently: Take 1 tablet (25 mg) by mouth once daily in the morning. Do not crush or chew.) 90 tablet 0     multivitamin with minerals (multivit-min-iron fum-folic ac) tablet Take 2 tablets by mouth once daily in the morning.       pantoprazole (ProtoNix) 40 mg EC tablet TAKE 1 TABLET BY MOUTH TWICE A DAY (Patient taking differently: Take 1 tablet (40 mg) by mouth once daily in the morning. Take before meals.) 180 tablet 3     potassium chloride CR  10 mEq ER tablet TAKE 1 TABLET BY MOUTH EVERY DAY 90 tablet 1     scopolamine (Transderm-Scop) 1 mg over 3 days patch 3 day Place 1 patch on the skin every 3rd day.       sodium chloride (Ocean) 0.65 % nasal spray Administer 1 spray into each nostril if needed for congestion (or nasal dryness/ bleeding). 30 mL 12     sucralfate (Carafate) 1 gram tablet Take 1 tablet (1 g) by mouth once daily as needed.        Current medications:  Scheduled medications  [Held by provider] enoxaparin, 40 mg, subcutaneous, q24h  fentaNYL, 1 patch, transdermal, q72h  insulin lispro, 0-10 Units, subcutaneous, With meals & nightly  melatonin, 3 mg, oral, Daily  [Held by provider] metoprolol succinate XL, 25 mg, oral, Daily  pantoprazole, 40 mg, oral, BID  piperacillin-tazobactam, 3.375 g, intravenous, q6h  scopolamine, 1 patch, transdermal, q72h  sucralfate, 1 g, oral, BID AC  vancomycin, 1,250 mg, intravenous, Once      Continuous medications     PRN medications  PRN medications: acetaminophen **OR** acetaminophen **OR** acetaminophen, dextrose, dextrose, glucagon, LORazepam, metoclopramide, oxyCODONE, sennosides-docusate sodium, vancomycin    Review of Systems   All other systems reviewed and are negative.       Objective  Range of Vitals (last 24 hours)  Heart Rate:  [85-97]   Temp:  [36.4 °C (97.5 °F)-36.8 °C (98.2 °F)]   Resp:  [18]   BP: ()/(53-71)   SpO2:  [96 %-100 %]   Daily Weight  03/18/24 : 74 kg (163 lb 2.3 oz)    Body mass index is 24.81 kg/m².     Physical Exam  Constitutional:       Appearance: Normal appearance.   HENT:      Head: Normocephalic and atraumatic.      Mouth/Throat:      Mouth: Mucous membranes are moist.      Pharynx: Oropharynx is clear.   Eyes:      Pupils: Pupils are equal, round, and reactive to light.   Cardiovascular:      Rate and Rhythm: Normal rate and regular rhythm.      Heart sounds: Normal heart sounds.   Pulmonary:      Effort: Pulmonary effort is normal.      Breath sounds: Normal  "breath sounds.   Abdominal:      General: Abdomen is flat. Bowel sounds are normal.      Palpations: Abdomen is soft.   Musculoskeletal:      Cervical back: Normal range of motion.   Neurological:      Mental Status: He is alert.          Relevant Results  Outside Hospital Results  reviewed  Labs  Results from last 72 hours   Lab Units 03/20/24  0551 03/19/24  1813 03/19/24  0618 03/18/24  1650   WBC AUTO x10*3/uL 3.8* 3.7* 14.3* 12.9*   HEMOGLOBIN g/dL 8.0* 7.1* 9.0* 10.8*   HEMATOCRIT % 24.8* 22.3* 27.4* 32.8*   PLATELETS AUTO x10*3/uL 24* 17* 29* 76*   NEUTROS PCT AUTO %  --  81.6 91.3 96.3   LYMPHS PCT AUTO %  --  7.1 1.4 0.5   MONOS PCT AUTO %  --  9.6 5.7 2.3   EOS PCT AUTO %  --  0.0 0.0 0.0     Results from last 72 hours   Lab Units 03/20/24  0551 03/19/24  1449 03/19/24  1005   SODIUM mmol/L 140 138 136   POTASSIUM mmol/L 3.9 4.3 4.1   CHLORIDE mmol/L 112* 106 108*   CO2 mmol/L 23 21 20*   BUN mg/dL 24* 26* 26*   CREATININE mg/dL 1.43* 1.42* 1.43*   GLUCOSE mg/dL 96 120* 136*   CALCIUM mg/dL 8.2* 8.0* 7.8*   ANION GAP mmol/L 9* 15 12   EGFR mL/min/1.73m*2 54* 55* 54*   PHOSPHORUS mg/dL 3.1 2.9 3.4  3.4     Results from last 72 hours   Lab Units 03/20/24  0551 03/19/24  1449 03/19/24  1005 03/18/24  2327 03/18/24  1650 03/18/24  0917   ALK PHOS U/L 109  --   --   --  184* 156*   BILIRUBIN TOTAL mg/dL 1.7*  --  3.2*  --  6.3* 1.0   BILIRUBIN DIRECT mg/dL 0.5*  --  1.5*  --   --   --    PROTEIN TOTAL g/dL 5.7*  --   --   --  6.7 7.7   ALT U/L 27  --   --   --  29 23   AST U/L 75*  --   --   --  127* 64*   ALBUMIN g/dL 2.6*  2.6* 2.4* 2.7*   < > 3.3* 3.5    < > = values in this interval not displayed.     Estimated Creatinine Clearance: 49.8 mL/min (A) (by C-G formula based on SCr of 1.43 mg/dL (H)).  No results found for: \"CRP\", \"SEDRATE\"  No results found for: \"HIV1X2\", \"HIVCONF\", \"YYPEDU4OU\"  Hepatitis C AB   Date Value Ref Range Status   03/05/2024 Nonreactive Nonreactive Final     Comment:     Results " from patients taking biotin supplements or receiving high-dose biotin therapy should be interpreted with caution due to possible interference with this test. Providers may contact their local laboratory for further information.     Microbiology  Susceptibility data from last 90 days.  Collected Specimen Info Organism   12/27/23 Swab from Anterior Nares Methicillin Susceptible Staphylococcus aureus (MSSA)   Imaging  Reviewed       Assessment/Plan   Sepsis  Elevated LFT  Metastatic esophageal cancer on FOLFOX plus trastuzumab     Recommendations :  Continue Zosyn and Vancomycin  Add micafungin  Fungal screen  Biliary US / HIDA scan  Portal vein US  Blood culture from the port  Echo    I spent minutes in the professional and overall care of this patient.      Emelyn Fowler MD

## 2024-03-20 NOTE — PROGRESS NOTES
Vancomycin Dosing by Pharmacy- FOLLOW UP  Massimo Garcia is a 63 y.o. year old male who Pharmacy has been consulted for vancomycin dosing for other sepsis (source of infection is unknown . Based on the patient's indication and renal status this patient is being dosed based on a goal trough/random level of 15-20 (dosing by trough due to the NORTH).    Renal function is currently stable. Serum Cr on 3/18 was 0.89 increased to 1.35 same day; 1.35 > 1.43 > 1.42 > 1.43 (3/20).     Current vancomycin dose: 1250 mg given once on 3/19 (essentially, every 24 hours)    Most recent random level: 17.2 mcg/mL    Visit Vitals  /64 (BP Location: Left arm, Patient Position: Lying)   Pulse 92   Temp 36.6 °C (97.9 °F) (Temporal)   Resp 18      Lab Results   Component Value Date    CREATININE 1.43 (H) 03/20/2024    CREATININE 1.42 (H) 03/19/2024    CREATININE 1.43 (H) 03/19/2024    CREATININE 1.35 (H) 03/18/2024      I/O last 3 completed shifts:  In: 1300 (17.6 mL/kg) [I.V.:50 (0.7 mL/kg); IV Piggyback:1250]  Out: 600 (8.1 mL/kg) [Urine:600 (0.2 mL/kg/hr)]  Weight: 74 kg     Assessment/Plan  Within goal AUC range. Continue current vancomycin regimen. Entered a one time dose of 1250mg to be given on 3/20 at 21:00.  This dosing regimen is predicted by [employer] to result in the following pharmacokinetic parameters:  Will follow-up with trough level as patient has an NORTH  The next level will be obtained on 3/21 at 20:00. May be obtained sooner if clinically indicated.   Will continue to monitor renal function daily while on vancomycin and order serum creatinine at least every 48 hours if not already ordered.  Follow for continued vancomycin needs, clinical response, and signs/symptoms of toxicity.     Dee Cuevas, PharmD  PGY-1 Pharmacy Resident

## 2024-03-20 NOTE — PROGRESS NOTES
Massimo Garcia is a 63 y.o. male on day 2 of admission presenting with sepsis.      Subjective   Patient was seen at the bedside this morning resting comfortably in bed with no acute events overnight.  His blood pressures remained stable after receiving multiple boluses of fluids yesterday and he states that he feels better this morning.  He has more energy this morning and his jaundice has significantly improved as well.  He denies any nausea, abdominal pain, fevers, or chills.       Objective     Last Recorded Vitals  /64 (BP Location: Left arm, Patient Position: Lying)   Pulse 92   Temp 36.6 °C (97.9 °F) (Temporal)   Resp 18   Wt 74 kg (163 lb 2.3 oz)   SpO2 100%   Intake/Output last 3 Shifts:    Intake/Output Summary (Last 24 hours) at 3/20/2024 1302  Last data filed at 3/20/2024 0755  Gross per 24 hour   Intake 1050 ml   Output 600 ml   Net 450 ml         Admission Weight  Weight: 73.5 kg (162 lb) (03/18/24 1552)    Daily Weight  03/18/24 : 74 kg (163 lb 2.3 oz)    Image Results  CT abdomen pelvis wo IV contrast    Result Date: 3/20/2024  Interpreted By:  Sara Mills, STUDY: CT ABDOMEN PELVIS WO IV CONTRAST;  3/19/2024 5:54 pm   INDICATION: Signs/Symptoms:sepsis of uncertain etiology. History of esophageal cancer with mets to lungs and liver.   COMPARISON: CT abdomen pelvis dated 03/05/2024.   ACCESSION NUMBER(S): [id number]   ORDERING CLINICIAN: YARED FRANCES   TECHNIQUE: CT of the abdomen and pelvis was performed without administration of intravenous contrast. Contiguous axial images were obtained at 3 mm slice thickness through the abdomen and pelvis. Coronal and sagittal reconstructions at 3 mm slice thickness were performed.   FINDINGS: Please note that the evaluation of vessels, lymph nodes and organs is limited without intravenous contrast.   LOWER CHEST: There is persistent evidence of multiple pulmonary nodules in bilateral lower lobes, right middle lobe and left upper lobe included in the  field of view. There is interval increase in the size of pulmonary nodules compared to immediate prior CT examination dated 03/05/2024. For example the largest pulmonary nodule in the lingula abutting the left major fissure measures approximately 1.3 cm in length compared to 1 cm on prior CT examination. Another pulmonary nodule in the lateral aspect of superior segment of left lower lobe measures approximately 1 cm compared to 0.7 cm on prior CT examination. There is also interval evidence of increased interstitial septal thickening in bilateral lower lobes with peribronchial cuffing concerning for interstitial edema. There is also interval evidence of small bilateral pleural effusions, more pronounced on the left compared to the right. Visualized heart is enlarged demonstrating pacemaker leads in place. No pericardial effusion. Distal most thoracic esophagus appears grossly unremarkable.   ABDOMEN:   LIVER: Liver demonstrates multiple hypodense lesions with central hyperdensity. The exact comparison of these lesions is limited due to differences between noncontrast and postcontrast technique. Within this limitation, the largest lesion in the right lobe of liver in hepatic segment 7/8 measures approximately 4 cm compared to 3.1 cm on prior CT examination. Otherwise rest of the hypodense lesionsd are similar compared to prior CT examination. Liver demonstrates nodular contour concerning for cirrhosis.   BILE DUCTS: No intrahepatic or extrahepatic biliary ductal dilatation within limits of noncontrast technique.   GALLBLADDER: Gallbladder is moderately distended and demonstrates pericholecystic fluid which is similar compared to prior CT examination.   PANCREAS: The pancreas appears unremarkable without evidence of ductal dilatation or masses.   SPLEEN: Spleen is diffusely enlarged measuring up to 17 cm in AP dimension.   ADRENAL GLANDS: Bilateral adrenal glands appear normal.   KIDNEYS AND URETERS: Bilateral kidneys  demonstrate mild renal cortical hyperdensity, which could be related to retained contrast from prior CT examination. Otherwise bilateral kidneys are normal in size. No perinephric fluid collections are noted. No evidence of hydronephrosis.   PELVIS:   BLADDER: Urinary bladder is moderately distended and appears grossly unremarkable.   REPRODUCTIVE ORGANS: Prostate gland is mildly enlarged.   BOWEL: Stomach is moderately distended and demonstrates intraluminal hyperdense contents which are most likely favored to be related to ingested medication. Small bowel loops are normal in course and caliber and does not demonstrate segmental distention to suggest obstruction. Large bowel demonstrates small stool volume. There are few scattered diverticula in the sigmoid colon without acute diverticulitis. Appendix is visualized. There is persistent evidence of a calcified intraluminal density within the appendix, most likely favored to be related to inspissated contrast versus appendicolith. However no evidence of inflammatory changes to suggest acute appendicitis.   VESSELS: Mild atherosclerotic calcifications of the abdominal aorta without aneurysmal dilatation. IVC appears grossly unremarkable within limits of evaluation.   PERITONEUM/RETROPERITONEUM/LYMPH NODES: There is interval increase in diffuse mesenteric fat stranding throughout the peritoneum, more pronounced at the root of mesentery in the mid abdomen. No evidence of loculated fluid collections within limits of noncontrast technique. There is persistent evidence of multiple enlarged retroperitoneal lymph nodes in the para-aortic and aortocaval region. There is mild interval increase in the size of multiple lymph nodes in the para-aortic region. For example there is mild interval increase in the size of periaortic lymph node measuring approximately 2.2 x 1.8 cm compared to 1.5 x 1.1 cm on prior CT examination (as seen on axial image 66/161). The other enlarged  retroperitoneal lymph nodes are grossly similar in size within limitation of differences in contrast and noncontrast technique. The aortocaval lymph node is also enlarged measuring approximately 2.1 x 2 cm. There is also an enlarged periportal lymph node measuring approximately 4 x 2.5 cm, similar compared to prior CT examination.   ABDOMINAL WALL: There is mild interval increase in reticular subcutaneous fat stranding in the abdomen and pelvis, suggestive of body wall edema. There is a 1.5 cm soft tissue nodule in the left posterolateral gluteal subcutaneous soft tissues measuring approximately 1 x 0.6 cm. This demonstrates mild interval increase in size compared to prior CT examination and is indeterminate. Postsurgical changes of abdominal wall mesh repair are noted.   BONES: No suspicious osseous lesions within limits of CT evaluation. Severe multilevel discogenic degenerative changes of the lumbar spine.       1.  Overall interval progression of the metastatic disease with mild interval increase in the size of pulmonary nodules included in the field of view and mild interval increase in retroperitoneal lymph nodes compared to prior CT examination dated 03/05/2024. There is also mild questionable interval increase in the size of hepatic lesion within limitation of differences in postcontrast and noncontrast technique. Mild interval increase in the size of subcutaneous soft tissue nodule in the posterolateral aspect of the left gluteal region measuring approximately 1 x 0.6 cm compared to prior CT examination dated 03/05/2024. 2. Interval increase in mesenteric fat stranding/edema, body wall edema and interval development of small bilateral pleural effusions compared to prior CT examination, most likely favored to be related to 3rd spacing of fluids. Recommend correlation with fluid status. No definite evidence of loculated fluid collections. 3. Findings suggestive of hepatic cirrhosis with diffuse splenomegaly.    MACRO: None   Signed by: Sara Mills 3/20/2024 9:03 AM Dictation workstation:   [id number]    ECG 12 lead    Result Date: 3/19/2024  Ventricular-paced rhythm Abnormal ECG When compared with ECG of 05-MAR-2024 00:17, Electronic ventricular pacemaker has replaced Sinus rhythm    CT angio chest for pulmonary embolism    Result Date: 3/18/2024  Interpreted By:  John Uribe, STUDY: CT ANGIO CHEST FOR PULMONARY EMBOLISM;  3/18/2024 6:57 pm   INDICATION: Signs/Symptoms:cp, hx malginancy.   COMPARISON: CTA chest 12/26/2023   ACCESSION NUMBER(S): [id number]   ORDERING CLINICIAN: YUNIOR MEDLEY   TECHNIQUE: Contiguous axial images of the chest were obtained after the intravenous administration of 61 mL Omnipaque 350 contrast using angiographic PE protocol. Coronal and sagittal reformatted images were reconstructed from the axial data. MIP images were created on an independent workstation and reviewed.   FINDINGS: PULMONARY ARTERIES: Adequate opacification to the level of the proximal segmental arteries. Assessment of the distal segmental and subsegmental arteries limited by mixing artifact and respiratory motion. Very subtle linear filling defect noted within the right interlobar and lower lobar arteries suspicious for nonocclusive emboli (series 403, image 86, series 401, image 164, and series 401 image 177). The main pulmonary artery is normal in diameter. No CT evidence of right heart strain.   HEART: Borderline cardiomegaly. No significant coronary artery calcifications. No significant pericardial effusion. Pacing leads are noted within the right atrium and right ventricle.   VESSELS: Normal caliber aorta without dissection. No significant aortic atherosclerosis.   MEDIASTINUM AND LYMPH NODES: Visualized thyroid is within normal limits. Significant interval progression and number and size of mediastinal and hilar lymphadenopathy compared to prior chest CT. There is a right hilar node measuring up to 2.1 cm. A  left hilar node measures up to 2.2 cm. 2.2 cm subcarinal lymph node. No pneumomediastinum. Mild wall thickening of the distal esophagus.   LUNG, AIRWAYS, AND PLEURA: Trachea and proximal mainstem bronchi are patent. Significant interval increase in size and number pulmonary nodules/metastases compared to prior chest CT from 12/26/2023 and PET-CT 01/09/2024. Dominant examples include a 1.5 cm nodule in the left upper lobe 1.1 cm nodule in the lingula, 1.2 cm perifissural nodule in the superior segment left lower lobe, and numerous nodules throughout the right lung. Dependent atelectatic changes. No pleural effusion or pneumothorax.   OSSEOUS STRUCTURES: Status post bilateral humeral head prostheses. No acute osseous abnormality identified. No overtly aggressive osseous lesions are seen.   CHEST WALL SOFT TISSUES: No discernible abnormality.   UPPER ABDOMEN/OTHER: Numerous hepatic metastases are again noted.       1. Findings suspicious for tiny nonocclusive emboli within the right lower lobe. 2. Significant interval progression in size and number of mediastinal and bilateral hilar lymph nodes and numerous bilateral pulmonary nodules compared to prior chest CT from December 2023 and PET-CT from January 2024. These findings are consistent with progressive metastases. 3. Mild wall thickening of the distal esophagus, this could represent esophagitis or recurrent disease.   MACRO: John Uribe discussed the significance and urgency of this critical finding by telephone with  YUNIOR MEDLEY on 3/18/2024 at 7:45 pm. (**-RCF-**) Findings:  See findings.   Signed by: John Uribe 3/18/2024 7:47 PM Dictation workstation:   [id number]    XR chest 2 views    Result Date: 3/18/2024  Interpreted By:  Yefri Coulter, STUDY: XR CHEST 2 VIEWS;  3/18/2024 5:32 pm   INDICATION: Signs/Symptoms:cp.   COMPARISON: 03/05/2024   ACCESSION NUMBER(S): [id number]   ORDERING CLINICIAN: YUNIOR MEDLEY   FINDINGS:   Left-sided dual lead pacemaker  and right-sided implanted port in stable position. The cardiac silhouette is unremarkable. Costophrenic angles are sharp. The patient's known multiple pulmonary nodules are redemonstrated. The trachea is midline. There is no pneumothorax. Bilateral shoulder arthroplasty prosthesis in place.       1.  Redemonstration of multiple bilateral pulmonary nodules     Signed by: Yefri Coulter 3/18/2024 6:19 PM Dictation workstation:   [id number]       Physical Exam  Constitutional:       General: He is not in acute distress.     Appearance: He is not toxic-appearing.      Comments: Jaundice has improved   HENT:      Head: Normocephalic and atraumatic.   Eyes:      Extraocular Movements: Extraocular movements intact.      Pupils: Pupils are equal, round, and reactive to light.   Cardiovascular:      Rate and Rhythm: Normal rate and regular rhythm.      Heart sounds: No murmur heard.     No gallop.      Comments: Port in place in right upper chest, and pacemaker in place in left upper chest  Pulmonary:      Effort: Pulmonary effort is normal. No respiratory distress.      Breath sounds: Normal breath sounds. No wheezing or rales.   Abdominal:      General: There is no distension.      Palpations: Abdomen is soft.      Tenderness: There is no abdominal tenderness.   Musculoskeletal:      Right lower leg: No edema.      Left lower leg: No edema.   Skin:     General: Skin is warm and dry.      Coloration: Skin is not jaundiced.   Neurological:      General: No focal deficit present.      Mental Status: He is alert and oriented to person, place, and time. Mental status is at baseline.         Relevant Results  Scheduled medications  [Held by provider] enoxaparin, 40 mg, subcutaneous, q24h  fentaNYL, 1 patch, transdermal, q72h  insulin lispro, 0-10 Units, subcutaneous, With meals & nightly  melatonin, 3 mg, oral, Daily  [Held by provider] metoprolol succinate XL, 25 mg, oral, Daily  pantoprazole, 40 mg, oral,  BID  piperacillin-tazobactam, 3.375 g, intravenous, q6h  scopolamine, 1 patch, transdermal, q72h  sucralfate, 1 g, oral, BID AC      Continuous medications     PRN medications  PRN medications: acetaminophen **OR** acetaminophen **OR** acetaminophen, dextrose, dextrose, glucagon, LORazepam, metoclopramide, oxyCODONE, vancomycin  Results for orders placed or performed during the hospital encounter of 03/18/24 (from the past 24 hour(s))   Renal function panel   Result Value Ref Range    Glucose 120 (H) 74 - 99 mg/dL    Sodium 138 136 - 145 mmol/L    Potassium 4.3 3.5 - 5.3 mmol/L    Chloride 106 98 - 107 mmol/L    Bicarbonate 21 21 - 32 mmol/L    Anion Gap 15 10 - 20 mmol/L    Urea Nitrogen 26 (H) 6 - 23 mg/dL    Creatinine 1.42 (H) 0.50 - 1.30 mg/dL    eGFR 55 (L) >60 mL/min/1.73m*2    Calcium 8.0 (L) 8.6 - 10.3 mg/dL    Phosphorus 2.9 2.5 - 4.9 mg/dL    Albumin 2.4 (L) 3.4 - 5.0 g/dL   Magnesium   Result Value Ref Range    Magnesium 1.56 (L) 1.60 - 2.40 mg/dL   Vancomycin   Result Value Ref Range    Vancomycin 8.2 5.0 - 20.0 ug/mL   Lactate   Result Value Ref Range    Lactate 1.7 0.4 - 2.0 mmol/L   POCT GLUCOSE   Result Value Ref Range    POCT Glucose 142 (H) 74 - 99 mg/dL   Direct Antiglobulin Test   Result Value Ref Range    DG-POLYSPECIFIC NEG    CBC and Auto Differential   Result Value Ref Range    WBC 3.7 (L) 4.4 - 11.3 x10*3/uL    nRBC 0.0 0.0 - 0.0 /100 WBCs    RBC 2.41 (L) 4.50 - 5.90 x10*6/uL    Hemoglobin 7.1 (L) 13.5 - 17.5 g/dL    Hematocrit 22.3 (L) 41.0 - 52.0 %    MCV 93 80 - 100 fL    MCH 29.5 26.0 - 34.0 pg    MCHC 31.8 (L) 32.0 - 36.0 g/dL    RDW 17.1 (H) 11.5 - 14.5 %    Platelets 17 (LL) 150 - 450 x10*3/uL    Neutrophils % 81.6 40.0 - 80.0 %    Immature Granulocytes %, Automated 1.4 (H) 0.0 - 0.9 %    Lymphocytes % 7.1 13.0 - 44.0 %    Monocytes % 9.6 2.0 - 10.0 %    Eosinophils % 0.0 0.0 - 6.0 %    Basophils % 0.3 0.0 - 2.0 %    Neutrophils Absolute 2.99 1.20 - 7.70 x10*3/uL    Immature  Granulocytes Absolute, Automated 0.05 0.00 - 0.70 x10*3/uL    Lymphocytes Absolute 0.26 (L) 1.20 - 4.80 x10*3/uL    Monocytes Absolute 0.35 0.10 - 1.00 x10*3/uL    Eosinophils Absolute 0.00 0.00 - 0.70 x10*3/uL    Basophils Absolute 0.01 0.00 - 0.10 x10*3/uL   Morphology   Result Value Ref Range    RBC Morphology See Below     Polychromasia Mild     Hypochromia Mild     RBC Fragments Few     Ovalocytes Few     Melissa Cells Few    POCT GLUCOSE   Result Value Ref Range    POCT Glucose 144 (H) 74 - 99 mg/dL   Hemosiderin, urine   Result Value Ref Range    Hemosiderin Qual, Urine Negative Negative   Renal function panel   Result Value Ref Range    Glucose 96 74 - 99 mg/dL    Sodium 140 136 - 145 mmol/L    Potassium 3.9 3.5 - 5.3 mmol/L    Chloride 112 (H) 98 - 107 mmol/L    Bicarbonate 23 21 - 32 mmol/L    Anion Gap 9 (L) 10 - 20 mmol/L    Urea Nitrogen 24 (H) 6 - 23 mg/dL    Creatinine 1.43 (H) 0.50 - 1.30 mg/dL    eGFR 54 (L) >60 mL/min/1.73m*2    Calcium 8.2 (L) 8.6 - 10.3 mg/dL    Phosphorus 3.1 2.5 - 4.9 mg/dL    Albumin 2.6 (L) 3.4 - 5.0 g/dL   Magnesium   Result Value Ref Range    Magnesium 1.80 1.60 - 2.40 mg/dL   CBC   Result Value Ref Range    WBC 3.8 (L) 4.4 - 11.3 x10*3/uL    nRBC 0.5 (H) 0.0 - 0.0 /100 WBCs    RBC 2.71 (L) 4.50 - 5.90 x10*6/uL    Hemoglobin 8.0 (L) 13.5 - 17.5 g/dL    Hematocrit 24.8 (L) 41.0 - 52.0 %    MCV 92 80 - 100 fL    MCH 29.5 26.0 - 34.0 pg    MCHC 32.3 32.0 - 36.0 g/dL    RDW 17.1 (H) 11.5 - 14.5 %    Platelets 24 (LL) 150 - 450 x10*3/uL   Vancomycin   Result Value Ref Range    Vancomycin 17.2 5.0 - 20.0 ug/mL   Lactate dehydrogenase   Result Value Ref Range     (H) 84 - 246 U/L   Reticulocytes   Result Value Ref Range    Retic % 4.0 (H) 0.5 - 2.0 %    Retic Absolute 0.107 0.022 - 0.118 x10*6/uL    Reticulocyte Hemoglobin 33 28 - 38 pg    Immature Retic fraction 14.7 <=16.0 %   PST Top   Result Value Ref Range    Extra Tube Hold for add-ons.    Hepatic function panel   Result  Value Ref Range    Albumin 2.6 (L) 3.4 - 5.0 g/dL    Bilirubin, Total 1.7 (H) 0.0 - 1.2 mg/dL    Bilirubin, Direct 0.5 (H) 0.0 - 0.3 mg/dL    Alkaline Phosphatase 109 33 - 136 U/L    ALT 27 10 - 52 U/L    AST 75 (H) 9 - 39 U/L    Total Protein 5.7 (L) 6.4 - 8.2 g/dL   POCT GLUCOSE   Result Value Ref Range    POCT Glucose 132 (H) 74 - 99 mg/dL   Cardiac Device Check - Remote   Result Value Ref Range    BSA 1.88 m2   POCT GLUCOSE   Result Value Ref Range    POCT Glucose 102 (H) 74 - 99 mg/dL          Assessment/Plan      Massimo Garcia is a 63 y.o. male with PMHx of third degree heart block s/p PPM (placed in November 2023), esophageal cancer with mets to liver and lungs on chemotherapy, PE, peripheral neuropathy, and portal vein thrombosis  who is admitted to the hospital for septic shock     Acute Medical Issues   #Sepsis of uncertain etiology  -Patient with chills, fevers, and chest pain while receiving a chemotherapy treatment on 3/18   -Potentially due to adverse reaction to oxaliplatin chemo  -UA positive for bacteria, but negative LE, negative WBC and patient denying dysuria so UTI is a potential but unlikely cause of sepsis  -Patient given 1L of IVF in the ED and 1.5L IVF overnight  -Patient has continued to be hypotensive 70-80s/40-50s  -Worsening NORTH with uptrending Cr 0.89=>1.35=>1.43  -Improving lactate 3.1=>3.0=>3.5=>2.0=>1.7  -Urine culture negative  -Patient received ~4.5L of fluids on 3/19 for persistent hypotension   -Pressures improved on 3/20: 128/71 => 100/64  -CT Abd/Pelvis: Overall interval progression of metastatic disease to lungs, subcutaneous soft tissue of th left gluteal region, and liver.  Interval increase in mesenteric fat stranding/edema body wall edema, and small bilateral pleural effusions consistent with 3rd spacing of fluids    Plan:  -Continue with vanc/zosyn given persistent septic shock and while blood cultures pending  -Patient blood pressures currently stable.  Will continue to  monitor and administer additional fluid boluses if hypotension persists  -Consider ID consult given broad spectrum abx and potential infectious etiology of sepsis     #Thrombocytopenia  #Anemia  #Concern for DIC  -Patient with chronic thrombocytopenia baseline 70s-100s  -Platelets initially at baseline 79 on presentation, but dropped to 29  -Hb also dropped 10.8=>9.0  -Elevated D-Dimer, PT, INR, ferritin, with decreased fibrinogen   -These findings are consistent with and concerning for development of DIC  -Platelets stable at 24    Plan:  -Haptoglobin < 10  -DIC is most likely secondary to sepsis  -Continue to monitor closely for signs of bleeding or clotting  -Oncology consulted, appreciate recs:   -Platelet transfusion for < 20 and cryo if fibrinogen < 100   -There are case reports of oxaliplatin induced immune hemolytic anemia   -Transfuse for Hb < 7    #Hyperbilirubinemia  -Patient appears jaundiced with elevated bilirubin levels  -Total bilirubin baseline ~1  -T Bili 6.3 on presentation => 3.2  -Direct Bili 1.5    Plan:  -Patient with mixed direct and indirect hyperbilirubinemia, most likely a combination of hemolysis 2/2 DIC and known liver mets  -Jaundiced improved today, will continue to monitor    #Hypophosphatemia  -PO4 < 1.0 on presentation  -Hypophosphatemia is potential cause of lactic acidosis and multi organ failure     Plan:  -Given 30 mmol KPO4  -Repeat phosphate 3.4  -Will continue to monitor with RFPs    #Esophageal Cancer on chemotherapy  -Patient with known history of esophageal cancer with metastasis to the liver and lung  -CT PE showing significant interval progression in size and number of mediastinal and bilateral hilar lymph nodes and numerous bilateral pulmonary nodules  -Follows with Dr. Blake  -Patient on active chemotherapy with trastuzumab, pembrolizumab, 5-FU leucovorin, and oxaliplatin.  -Has been on active chemotherapy since Jan 2023    Plan:  -Dr. Blake consulted  -Tylenol 650 for  mild pain   -Oxycodone 5 for moderate-severe pain    #Chest pain secondary to chemotherapy reaction vs. PE  -Patient with history of prior PE in January 2023, and was on Eliquis until January 2024 when it was stopped due to thrombocytopenia  -Patient's chest pain began during chemotherapy treatment and  improved with meperidine. PE seen on CT angiogram.   -S/p meperidine  -PESI score of 105; intermediate risk   -Patient started on Lovenox DVT ppx->held due to platelets of 76. Reassess CBC in AM and consider restarting.   -Consider echo for L heart strain tomorrow  -Patient has no oxygen requirement     Plan:  -Given size of PE on CT PE and severe thrombocytopenia, currently holding anticoagulation     #Electrolyte Derangement  -Patient presented with hypokalemia and hypomagnesemia.   -S/p repletion, ordered repeat RFP and Mg levels for midnight after repletion is complete    Plan:  -Mg and K levels have improved, will continue to monitor and replete as necessary     #Hyperglycemia  -Glucose uptrending overnight, now 174  -Placed mild sliding scale      Chronic Medical Issues  #Hiccups  -Has had hiccups for 10 days  -Continue home chlorpromazine PRN     #HTN  -Continue metoprolol succinate      #GERD  -Continue Protonix  -Continue sucralfate      #Anxiety  -Continue PRN Lorazepam     F: PO intake & IVF PRN   E: Replete PRN  N: Regular diet  GI ppx: Protonix 40 mg daily  DVT ppx: Lovenox subcutaneous--HELD  Antibiotics: Vancomycin and Zosyn  Tubes/Lines/Drains: PIVs     Code Status: Full Code   Emergency Contact: Extended Emergency Contact Information  Primary Emergency Contact: Jane Garcia  Address: [id number] [place] [address]  Home Phone: [phone]  Mobile Phone: [phone]  Relation: Spouse  Secondary Emergency Contact: DA WORKMAN  Mobile Phone: [phone]  Relation: Daughter  Preferred language: English   needed? No      Disposition: 63 y.o.male  admitted for sepsis secondary to UTI. Anticipate LOS > 2 Midnights.    Rusty Pat MD  Transitional Year Resident  PGY-1  Epic Chat

## 2024-03-20 NOTE — PROGRESS NOTES
03/20/24 0828   Discharge Planning   Living Arrangements Spouse/significant other   Support Systems Spouse/significant other   Assistance Needed A&OX3; independent with ADLs with no assistive devices; drives; room air baseline and room air now; chemo every other Mon at  [facility]   Type of Residence Private residence   Number of Stairs to Enter Residence 4   Number of Stairs Within Residence 14   Do you have animals or pets at home? Yes   Type of Animals or Pets 2 dogs, 2 cats   Who is requesting discharge planning? Provider   Patient expects to be discharged to: home no needs   Does the patient need discharge transport arranged? Yes   RoundTrip coordination needed? Yes   Has discharge transport been arranged? No   Financial Resource Strain   How hard is it for you to pay for the very basics like food, housing, medical care, and heating? Not hard   Housing Stability   In the last 12 months, was there a time when you were not able to pay the mortgage or rent on time? N   In the last 12 months, how many places have you lived? 1   In the last 12 months, was there a time when you did not have a steady place to sleep or slept in a shelter (including now)? N   Transportation Needs   In the past 12 months, has lack of transportation kept you from medical appointments or from getting medications? no   In the past 12 months, has lack of transportation kept you from meetings, work, or from getting things needed for daily living? No

## 2024-03-21 ENCOUNTER — APPOINTMENT (OUTPATIENT)
Dept: RADIOLOGY | Facility: HOSPITAL | Age: 64
DRG: 871 | End: 2024-03-21
Payer: MEDICARE

## 2024-03-21 LAB
ALBUMIN SERPL BCP-MCNC: 2.5 G/DL (ref 3.4–5)
ALP SERPL-CCNC: 106 U/L (ref 33–136)
ALT SERPL W P-5'-P-CCNC: 22 U/L (ref 10–52)
ANION GAP SERPL CALC-SCNC: 11 MMOL/L (ref 10–20)
AST SERPL W P-5'-P-CCNC: 45 U/L (ref 9–39)
ATRIAL RATE: 227 BPM
BILIRUB DIRECT SERPL-MCNC: 0.4 MG/DL (ref 0–0.3)
BILIRUB SERPL-MCNC: 1.3 MG/DL (ref 0–1.2)
BUN SERPL-MCNC: 13 MG/DL (ref 6–23)
CALCIUM SERPL-MCNC: 8 MG/DL (ref 8.6–10.3)
CHLORIDE SERPL-SCNC: 107 MMOL/L (ref 98–107)
CO2 SERPL-SCNC: 24 MMOL/L (ref 21–32)
CREAT SERPL-MCNC: 1.4 MG/DL (ref 0.5–1.3)
EGFRCR SERPLBLD CKD-EPI 2021: 56 ML/MIN/1.73M*2
ERYTHROCYTE [DISTWIDTH] IN BLOOD BY AUTOMATED COUNT: 16.9 % (ref 11.5–14.5)
GLUCOSE BLD MANUAL STRIP-MCNC: 122 MG/DL (ref 74–99)
GLUCOSE BLD MANUAL STRIP-MCNC: 125 MG/DL (ref 74–99)
GLUCOSE BLD MANUAL STRIP-MCNC: 80 MG/DL (ref 74–99)
GLUCOSE BLD MANUAL STRIP-MCNC: 95 MG/DL (ref 74–99)
GLUCOSE SERPL-MCNC: 74 MG/DL (ref 74–99)
HCT VFR BLD AUTO: 26.2 % (ref 41–52)
HGB BLD-MCNC: 8.2 G/DL (ref 13.5–17.5)
MAGNESIUM SERPL-MCNC: 1.43 MG/DL (ref 1.6–2.4)
MCH RBC QN AUTO: 29.3 PG (ref 26–34)
MCHC RBC AUTO-ENTMCNC: 31.3 G/DL (ref 32–36)
MCV RBC AUTO: 94 FL (ref 80–100)
NRBC BLD-RTO: 0 /100 WBCS (ref 0–0)
PHOSPHATE SERPL-MCNC: 3.9 MG/DL (ref 2.5–4.9)
PLATELET # BLD AUTO: 34 X10*3/UL (ref 150–450)
POTASSIUM SERPL-SCNC: 3.9 MMOL/L (ref 3.5–5.3)
PROT SERPL-MCNC: 5.6 G/DL (ref 6.4–8.2)
Q ONSET: 190 MS
QRS COUNT: 17 BEATS
QRS DURATION: 166 MS
QT INTERVAL: 440 MS
QTC CALCULATION(BAZETT): 573 MS
QTC FREDERICIA: 525 MS
R AXIS: 28 DEGREES
RBC # BLD AUTO: 2.8 X10*6/UL (ref 4.5–5.9)
SODIUM SERPL-SCNC: 138 MMOL/L (ref 136–145)
STAPHYLOCOCCUS SPEC CULT: NORMAL
T AXIS: 80 DEGREES
T OFFSET: 410 MS
VANCOMYCIN TROUGH SERPL-MCNC: 10.9 UG/ML (ref 5–20)
VENTRICULAR RATE: 102 BPM
WBC # BLD AUTO: 3.1 X10*3/UL (ref 4.4–11.3)

## 2024-03-21 PROCEDURE — 76705 ECHO EXAM OF ABDOMEN: CPT | Performed by: RADIOLOGY

## 2024-03-21 PROCEDURE — 82248 BILIRUBIN DIRECT: CPT | Performed by: INTERNAL MEDICINE

## 2024-03-21 PROCEDURE — 2500000004 HC RX 250 GENERAL PHARMACY W/ HCPCS (ALT 636 FOR OP/ED)

## 2024-03-21 PROCEDURE — 99233 SBSQ HOSP IP/OBS HIGH 50: CPT

## 2024-03-21 PROCEDURE — 93975 VASCULAR STUDY: CPT | Performed by: RADIOLOGY

## 2024-03-21 PROCEDURE — 2500000004 HC RX 250 GENERAL PHARMACY W/ HCPCS (ALT 636 FOR OP/ED): Performed by: INTERNAL MEDICINE

## 2024-03-21 PROCEDURE — 82947 ASSAY GLUCOSE BLOOD QUANT: CPT

## 2024-03-21 PROCEDURE — 2500000001 HC RX 250 WO HCPCS SELF ADMINISTERED DRUGS (ALT 637 FOR MEDICARE OP)

## 2024-03-21 PROCEDURE — 85027 COMPLETE CBC AUTOMATED: CPT

## 2024-03-21 PROCEDURE — 84100 ASSAY OF PHOSPHORUS: CPT

## 2024-03-21 PROCEDURE — 76705 ECHO EXAM OF ABDOMEN: CPT

## 2024-03-21 PROCEDURE — 83735 ASSAY OF MAGNESIUM: CPT

## 2024-03-21 PROCEDURE — 80048 BASIC METABOLIC PNL TOTAL CA: CPT

## 2024-03-21 PROCEDURE — 99233 SBSQ HOSP IP/OBS HIGH 50: CPT | Performed by: STUDENT IN AN ORGANIZED HEALTH CARE EDUCATION/TRAINING PROGRAM

## 2024-03-21 PROCEDURE — 2020000001 HC ICU ROOM DAILY

## 2024-03-21 PROCEDURE — 80202 ASSAY OF VANCOMYCIN: CPT

## 2024-03-21 RX ORDER — LANOLIN ALCOHOL/MO/W.PET/CERES
400 CREAM (GRAM) TOPICAL ONCE
Status: COMPLETED | OUTPATIENT
Start: 2024-03-21 | End: 2024-03-21

## 2024-03-21 RX ORDER — FAMOTIDINE 20 MG/1
20 TABLET, FILM COATED ORAL 2 TIMES DAILY
Status: DISCONTINUED | OUTPATIENT
Start: 2024-03-21 | End: 2024-03-21

## 2024-03-21 RX ORDER — MAGNESIUM SULFATE HEPTAHYDRATE 40 MG/ML
2 INJECTION, SOLUTION INTRAVENOUS ONCE
Status: COMPLETED | OUTPATIENT
Start: 2024-03-21 | End: 2024-03-21

## 2024-03-21 RX ORDER — FAMOTIDINE 20 MG/1
20 TABLET, FILM COATED ORAL DAILY
Status: DISCONTINUED | OUTPATIENT
Start: 2024-03-22 | End: 2024-03-23 | Stop reason: HOSPADM

## 2024-03-21 RX ADMIN — PIPERACILLIN SODIUM AND TAZOBACTAM SODIUM 3.38 G: 3; .375 INJECTION, SOLUTION INTRAVENOUS at 01:50

## 2024-03-21 RX ADMIN — Medication 3 MG: at 19:55

## 2024-03-21 RX ADMIN — FAMOTIDINE 20 MG: 20 TABLET, FILM COATED ORAL at 08:41

## 2024-03-21 RX ADMIN — MICAFUNGIN SODIUM 100 MG: 100 INJECTION, POWDER, LYOPHILIZED, FOR SOLUTION INTRAVENOUS at 21:05

## 2024-03-21 RX ADMIN — Medication 400 MG: at 08:41

## 2024-03-21 RX ADMIN — MAGNESIUM SULFATE HEPTAHYDRATE 2 G: 2 INJECTION, SOLUTION INTRAVENOUS at 10:38

## 2024-03-21 RX ADMIN — VANCOMYCIN HYDROCHLORIDE 1250 MG: 1.25 INJECTION, POWDER, LYOPHILIZED, FOR SOLUTION INTRAVENOUS at 22:34

## 2024-03-21 RX ADMIN — OXYCODONE HYDROCHLORIDE 5 MG: 5 TABLET ORAL at 16:00

## 2024-03-21 RX ADMIN — LORAZEPAM 1 MG: 1 TABLET ORAL at 16:00

## 2024-03-21 RX ADMIN — PIPERACILLIN SODIUM AND TAZOBACTAM SODIUM 3.38 G: 3; .375 INJECTION, SOLUTION INTRAVENOUS at 06:18

## 2024-03-21 RX ADMIN — OXYCODONE HYDROCHLORIDE 5 MG: 5 TABLET ORAL at 22:38

## 2024-03-21 RX ADMIN — PIPERACILLIN SODIUM AND TAZOBACTAM SODIUM 3.38 G: 3; .375 INJECTION, SOLUTION INTRAVENOUS at 19:55

## 2024-03-21 RX ADMIN — PIPERACILLIN SODIUM AND TAZOBACTAM SODIUM 3.38 G: 3; .375 INJECTION, SOLUTION INTRAVENOUS at 12:47

## 2024-03-21 RX ADMIN — SUCRALFATE 1 G: 1 TABLET ORAL at 16:00

## 2024-03-21 RX ADMIN — SUCRALFATE 1 G: 1 TABLET ORAL at 06:18

## 2024-03-21 ASSESSMENT — COGNITIVE AND FUNCTIONAL STATUS - GENERAL
DAILY ACTIVITIY SCORE: 24
MOBILITY SCORE: 24

## 2024-03-21 ASSESSMENT — ENCOUNTER SYMPTOMS
MYALGIAS: 0
WHEEZING: 0
COUGH: 0
ARTHRALGIAS: 0
CONSTIPATION: 0
NERVOUS/ANXIOUS: 0
SINUS PAIN: 0
BRUISES/BLEEDS EASILY: 0
FEVER: 0
BLOOD IN STOOL: 0
CHILLS: 0
DIARRHEA: 0
WOUND: 0
VOMITING: 0
SHORTNESS OF BREATH: 0
DYSPHORIC MOOD: 0
FATIGUE: 1
NAUSEA: 0
DYSURIA: 0
FREQUENCY: 0
SINUS PRESSURE: 0
PALPITATIONS: 0

## 2024-03-21 ASSESSMENT — PAIN - FUNCTIONAL ASSESSMENT
PAIN_FUNCTIONAL_ASSESSMENT: 0-10

## 2024-03-21 ASSESSMENT — PAIN SCALES - GENERAL
PAINLEVEL_OUTOF10: 6
PAINLEVEL_OUTOF10: 0 - NO PAIN
PAINLEVEL_OUTOF10: 0 - NO PAIN
PAINLEVEL_OUTOF10: 5 - MODERATE PAIN

## 2024-03-21 ASSESSMENT — PAIN DESCRIPTION - LOCATION: LOCATION: BACK

## 2024-03-21 NOTE — CONSULTS
Inpatient consult to Oncology  Consult performed by: Bhavin Frankel MD  Consult ordered by: Serge Carty MD        Reason For Consult  Known metastatic esophageal cancer admitted with fever and SIRS + following chemotherapy.     History Of Present Illness  Massimo Garcia is a 63 y.o. male PMHX metastatic esophageal cancer on FOLFOX plus trastuzumab presenting with chemotherapy reaction and cystitis.    Patient reporting that he has continued to improve today.  No abdominal discomfort or nausea today.  Continues to have normal bowel output.  Denies blood in stool or urine.  Denies any new bruising.  Did have a brief episode of epistaxis this morning which resolved without aggressive intervention.      Past Medical History  He has a past medical history of Essential (primary) hypertension (12/21/2013), Pacemaker, Peripheral neuropathy, Personal history of other diseases of the circulatory system, and Pneumothorax (12/04/2023).    Surgical History  He has a past surgical history that includes Total knee arthroplasty (09/30/2016); Total knee arthroplasty (09/30/2016); Cardiac electrophysiology procedure (N/A, 11/7/2023); and Cardiac electrophysiology procedure (Left, 11/9/2023).     Social History  He reports that he has quit smoking. His smoking use included cigarettes and cigars. He has never been exposed to tobacco smoke. He has never used smokeless tobacco. He reports that he does not currently use alcohol. He reports current drug use. Drug: Marijuana.    Family History  Family History   Problem Relation Name Age of Onset    Cancer Father          Allergies  Bee venom protein (honey bee) and Oxaliplatin    Review of Systems   Constitutional:  Positive for fatigue. Negative for chills and fever.   HENT:  Positive for nosebleeds. Negative for sinus pressure and sinus pain.    Respiratory:  Negative for cough, shortness of breath and wheezing.    Cardiovascular:  Negative for chest pain, palpitations and leg swelling.  "  Gastrointestinal:  Negative for blood in stool, constipation, diarrhea, nausea and vomiting.   Genitourinary:  Negative for dysuria, frequency and urgency.   Musculoskeletal:  Negative for arthralgias and myalgias.   Skin:  Negative for pallor, rash and wound.   Hematological:  Does not bruise/bleed easily.   Psychiatric/Behavioral:  Negative for dysphoric mood. The patient is not nervous/anxious.         Physical Exam  Constitutional:       Appearance: Normal appearance.   HENT:      Head: Normocephalic and atraumatic.      Right Ear: External ear normal.      Left Ear: External ear normal.      Nose: Nose normal.      Mouth/Throat:      Mouth: Mucous membranes are moist.      Pharynx: Oropharynx is clear.   Eyes:      General: Scleral icterus present.      Extraocular Movements: Extraocular movements intact.   Cardiovascular:      Rate and Rhythm: Normal rate and regular rhythm.      Pulses: Normal pulses.      Heart sounds: Normal heart sounds.   Pulmonary:      Effort: Pulmonary effort is normal.      Breath sounds: Normal breath sounds. No rales.   Abdominal:      General: There is no distension.      Palpations: Abdomen is soft. There is no mass.      Tenderness: There is no abdominal tenderness.   Skin:     General: Skin is warm and dry.   Neurological:      Mental Status: He is alert and oriented to person, place, and time.   Psychiatric:         Mood and Affect: Mood normal.         Behavior: Behavior normal.        Last Recorded Vitals  Blood pressure 103/73, pulse 108, temperature 36.5 °C (97.7 °F), temperature source Temporal, resp. rate 16, height 1.727 m (5' 8\"), weight 74 kg (163 lb 2.3 oz), SpO2 98 %.    Relevant Results  Results for orders placed or performed during the hospital encounter of 03/18/24 (from the past 24 hour(s))   POCT GLUCOSE   Result Value Ref Range    POCT Glucose 116 (H) 74 - 99 mg/dL   CBC and Auto Differential   Result Value Ref Range    WBC 3.5 (L) 4.4 - 11.3 x10*3/uL    nRBC " 0.0 0.0 - 0.0 /100 WBCs    RBC 2.65 (L) 4.50 - 5.90 x10*6/uL    Hemoglobin 7.9 (L) 13.5 - 17.5 g/dL    Hematocrit 24.3 (L) 41.0 - 52.0 %    MCV 92 80 - 100 fL    MCH 29.8 26.0 - 34.0 pg    MCHC 32.5 32.0 - 36.0 g/dL    RDW 17.1 (H) 11.5 - 14.5 %    Platelets 27 (LL) 150 - 450 x10*3/uL    Neutrophils % 72.8 40.0 - 80.0 %    Immature Granulocytes %, Automated 0.6 0.0 - 0.9 %    Lymphocytes % 11.7 13.0 - 44.0 %    Monocytes % 13.2 2.0 - 10.0 %    Eosinophils % 1.4 0.0 - 6.0 %    Basophils % 0.3 0.0 - 2.0 %    Neutrophils Absolute 2.54 1.20 - 7.70 x10*3/uL    Immature Granulocytes Absolute, Automated 0.02 0.00 - 0.70 x10*3/uL    Lymphocytes Absolute 0.41 (L) 1.20 - 4.80 x10*3/uL    Monocytes Absolute 0.46 0.10 - 1.00 x10*3/uL    Eosinophils Absolute 0.05 0.00 - 0.70 x10*3/uL    Basophils Absolute 0.01 0.00 - 0.10 x10*3/uL   Protime-INR   Result Value Ref Range    Protime 14.3 (H) 9.8 - 12.8 seconds    INR 1.3 (H) 0.9 - 1.1   APTT   Result Value Ref Range    aPTT 26 (L) 27 - 38 seconds   Fibrinogen   Result Value Ref Range    Fibrinogen 159 (L) 200 - 400 mg/dL   POCT GLUCOSE   Result Value Ref Range    POCT Glucose 166 (H) 74 - 99 mg/dL   Magnesium   Result Value Ref Range    Magnesium 1.43 (L) 1.60 - 2.40 mg/dL   CBC   Result Value Ref Range    WBC 3.1 (L) 4.4 - 11.3 x10*3/uL    nRBC 0.0 0.0 - 0.0 /100 WBCs    RBC 2.80 (L) 4.50 - 5.90 x10*6/uL    Hemoglobin 8.2 (L) 13.5 - 17.5 g/dL    Hematocrit 26.2 (L) 41.0 - 52.0 %    MCV 94 80 - 100 fL    MCH 29.3 26.0 - 34.0 pg    MCHC 31.3 (L) 32.0 - 36.0 g/dL    RDW 16.9 (H) 11.5 - 14.5 %    Platelets 34 (LL) 150 - 450 x10*3/uL   Hepatic function panel   Result Value Ref Range    Albumin 2.5 (L) 3.4 - 5.0 g/dL    Bilirubin, Total 1.3 (H) 0.0 - 1.2 mg/dL    Bilirubin, Direct 0.4 (H) 0.0 - 0.3 mg/dL    Alkaline Phosphatase 106 33 - 136 U/L    ALT 22 10 - 52 U/L    AST 45 (H) 9 - 39 U/L    Total Protein 5.6 (L) 6.4 - 8.2 g/dL   Phosphorus   Result Value Ref Range    Phosphorus 3.9  2.5 - 4.9 mg/dL   Basic Metabolic Panel   Result Value Ref Range    Glucose 74 74 - 99 mg/dL    Sodium 138 136 - 145 mmol/L    Potassium 3.9 3.5 - 5.3 mmol/L    Chloride 107 98 - 107 mmol/L    Bicarbonate 24 21 - 32 mmol/L    Anion Gap 11 10 - 20 mmol/L    Urea Nitrogen 13 6 - 23 mg/dL    Creatinine 1.40 (H) 0.50 - 1.30 mg/dL    eGFR 56 (L) >60 mL/min/1.73m*2    Calcium 8.0 (L) 8.6 - 10.3 mg/dL   POCT GLUCOSE   Result Value Ref Range    POCT Glucose 80 74 - 99 mg/dL   POCT GLUCOSE   Result Value Ref Range    POCT Glucose 122 (H) 74 - 99 mg/dL          Assessment/Plan     Fever and sepsis  The differential diagnosis includes infection of which the source could be UTI versus cholangitis, or reaction to the oxaliplatin although would not  expect hypotension at this time      Obtain CT abdomen and pelvis  Continue broad-spectrum antibiotic with vancomycin and Zosyn  Pressor support if required  Follow cultures     3/20/24- he responded to fluid resuscitation yesterday and sepsis seems to be resolving with normal BP today and no fever   Continue broad spectrum abx, urine culture negative and Bcx NGTD     3/21/24 - Blood cultures NGTD.  Patient remains normotensive and afebrile.  Continue ABX per ID.      2.  Thrombocytopenia  Baseline thrombocytopenia with worsening down to 29,000  Secondary to sepsis and labs also suggestive of DIC  Support with platelet transfusion if the count drops less than 20,000 and cryoprecipitate if the fibrinogen drops less than 100  Ordered blood smear review by path      3/20/24- platelets improved from yesterday afternoon count, continue to monitor and support as needed to keep > 20,000     3/21/24 - platelets with ongoing improvement, continue to monitor and maintain above 20,000.         3.  Anemia  Likely anemia of chronic disease/inflammation  The bilirubinemia was mixed so we will order hemolysis workup including LDH, reticulocyte, haptoglobin has been ordered, ordered DG  There are  case reports of oxaliplatin induced immune hemolytic anemia  Monitor the hemoglobin and support with transfusion if hemoglobin drops less than 7 g/dL     3/20- the work up suggests an element of non immune hemolysis, the hg improved today as well, we will repeat a cbc for this evening     3/21 - Hemoglobin and hematocrit remain stable today.  Bilirubin and LDH downtrending.  Reticulocyte count remains elevated.  DG is negative.      4.  Metastatic esophageal cancer  His disease seems to be progressing on FOLFOX plus trastuzumab  At this time he wishes to continue cancer therapy when he stabilizes and to continue full supportive measures  His primary oncologist Dr. Blake was updated     5.  Pulmonary embolism  Previously on Eliquis  His platelets at this time are prohibitive of full dose anticoagulation, but we will follow to when it could be started  Consider Dopplers of the lower extremities     3/20- if the plts trend up to > 50,000 then anticoagulation can be resumed     3/21 - plt remains <50,000, continue to monitor and resume eliquis if >50,000    Patient discussed with attending physician Dr Frankel    Reviewed and approved by YASEMIN CABRERA on 3/21/24 at 3:39 PM.

## 2024-03-21 NOTE — ED PROCEDURE NOTE
Procedure  Critical Care    Performed by: David Mariscal DO  Authorized by: David Mariscal DO    Critical care provider statement:     Critical care time (minutes):  31    Critical care time was exclusive of:  Separately billable procedures and treating other patients    Critical care was necessary to treat or prevent imminent or life-threatening deterioration of the following conditions:  Sepsis    Critical care was time spent personally by me on the following activities:  Ordering and performing treatments and interventions, ordering and review of laboratory studies, ordering and review of radiographic studies, pulse oximetry, re-evaluation of patient's condition, review of old charts, examination of patient and evaluation of patient's response to treatment    Care discussed with: admitting provider                 David Mariscal DO  03/21/24 0521

## 2024-03-21 NOTE — PROGRESS NOTES
Massimo Garcia is a 63 y.o. male on day 3 of admission presenting with sepsis.      Subjective   Patient was seen at the bedside this morning resting comfortably in bed with no acute events overnight.  His blood pressures have continued to remain stable and he continues to feel improved.  He is no longer jaundiced, and is not complaining of any fevers, chills, nausea, vomiting, chest pain or shortness of breath.         Objective     Last Recorded Vitals  /73 (BP Location: Left arm, Patient Position: Lying)   Pulse 108   Temp 36.5 °C (97.7 °F) (Temporal)   Resp 16   Wt 74 kg (163 lb 2.3 oz)   SpO2 98%   Intake/Output last 3 Shifts:    Intake/Output Summary (Last 24 hours) at 3/21/2024 1315  Last data filed at 3/21/2024 1000  Gross per 24 hour   Intake 795 ml   Output --   Net 795 ml         Admission Weight  Weight: 73.5 kg (162 lb) (03/18/24 1552)    Daily Weight  03/18/24 : 74 kg (163 lb 2.3 oz)    Image Results  CT abdomen pelvis wo IV contrast    Result Date: 3/20/2024  Interpreted By:  Sara Mills, STUDY: CT ABDOMEN PELVIS WO IV CONTRAST;  3/19/2024 5:54 pm   INDICATION: Signs/Symptoms:sepsis of uncertain etiology. History of esophageal cancer with mets to lungs and liver.   COMPARISON: CT abdomen pelvis dated 03/05/2024.   ACCESSION NUMBER(S): [id number]   ORDERING CLINICIAN: YARED FRANCES   TECHNIQUE: CT of the abdomen and pelvis was performed without administration of intravenous contrast. Contiguous axial images were obtained at 3 mm slice thickness through the abdomen and pelvis. Coronal and sagittal reconstructions at 3 mm slice thickness were performed.   FINDINGS: Please note that the evaluation of vessels, lymph nodes and organs is limited without intravenous contrast.   LOWER CHEST: There is persistent evidence of multiple pulmonary nodules in bilateral lower lobes, right middle lobe and left upper lobe included in the field of view. There is interval increase in the size of pulmonary  nodules compared to immediate prior CT examination dated 03/05/2024. For example the largest pulmonary nodule in the lingula abutting the left major fissure measures approximately 1.3 cm in length compared to 1 cm on prior CT examination. Another pulmonary nodule in the lateral aspect of superior segment of left lower lobe measures approximately 1 cm compared to 0.7 cm on prior CT examination. There is also interval evidence of increased interstitial septal thickening in bilateral lower lobes with peribronchial cuffing concerning for interstitial edema. There is also interval evidence of small bilateral pleural effusions, more pronounced on the left compared to the right. Visualized heart is enlarged demonstrating pacemaker leads in place. No pericardial effusion. Distal most thoracic esophagus appears grossly unremarkable.   ABDOMEN:   LIVER: Liver demonstrates multiple hypodense lesions with central hyperdensity. The exact comparison of these lesions is limited due to differences between noncontrast and postcontrast technique. Within this limitation, the largest lesion in the right lobe of liver in hepatic segment 7/8 measures approximately 4 cm compared to 3.1 cm on prior CT examination. Otherwise rest of the hypodense lesionsd are similar compared to prior CT examination. Liver demonstrates nodular contour concerning for cirrhosis.   BILE DUCTS: No intrahepatic or extrahepatic biliary ductal dilatation within limits of noncontrast technique.   GALLBLADDER: Gallbladder is moderately distended and demonstrates pericholecystic fluid which is similar compared to prior CT examination.   PANCREAS: The pancreas appears unremarkable without evidence of ductal dilatation or masses.   SPLEEN: Spleen is diffusely enlarged measuring up to 17 cm in AP dimension.   ADRENAL GLANDS: Bilateral adrenal glands appear normal.   KIDNEYS AND URETERS: Bilateral kidneys demonstrate mild renal cortical hyperdensity, which could be  related to retained contrast from prior CT examination. Otherwise bilateral kidneys are normal in size. No perinephric fluid collections are noted. No evidence of hydronephrosis.   PELVIS:   BLADDER: Urinary bladder is moderately distended and appears grossly unremarkable.   REPRODUCTIVE ORGANS: Prostate gland is mildly enlarged.   BOWEL: Stomach is moderately distended and demonstrates intraluminal hyperdense contents which are most likely favored to be related to ingested medication. Small bowel loops are normal in course and caliber and does not demonstrate segmental distention to suggest obstruction. Large bowel demonstrates small stool volume. There are few scattered diverticula in the sigmoid colon without acute diverticulitis. Appendix is visualized. There is persistent evidence of a calcified intraluminal density within the appendix, most likely favored to be related to inspissated contrast versus appendicolith. However no evidence of inflammatory changes to suggest acute appendicitis.   VESSELS: Mild atherosclerotic calcifications of the abdominal aorta without aneurysmal dilatation. IVC appears grossly unremarkable within limits of evaluation.   PERITONEUM/RETROPERITONEUM/LYMPH NODES: There is interval increase in diffuse mesenteric fat stranding throughout the peritoneum, more pronounced at the root of mesentery in the mid abdomen. No evidence of loculated fluid collections within limits of noncontrast technique. There is persistent evidence of multiple enlarged retroperitoneal lymph nodes in the para-aortic and aortocaval region. There is mild interval increase in the size of multiple lymph nodes in the para-aortic region. For example there is mild interval increase in the size of periaortic lymph node measuring approximately 2.2 x 1.8 cm compared to 1.5 x 1.1 cm on prior CT examination (as seen on axial image 66/161). The other enlarged retroperitoneal lymph nodes are grossly similar in size within  limitation of differences in contrast and noncontrast technique. The aortocaval lymph node is also enlarged measuring approximately 2.1 x 2 cm. There is also an enlarged periportal lymph node measuring approximately 4 x 2.5 cm, similar compared to prior CT examination.   ABDOMINAL WALL: There is mild interval increase in reticular subcutaneous fat stranding in the abdomen and pelvis, suggestive of body wall edema. There is a 1.5 cm soft tissue nodule in the left posterolateral gluteal subcutaneous soft tissues measuring approximately 1 x 0.6 cm. This demonstrates mild interval increase in size compared to prior CT examination and is indeterminate. Postsurgical changes of abdominal wall mesh repair are noted.   BONES: No suspicious osseous lesions within limits of CT evaluation. Severe multilevel discogenic degenerative changes of the lumbar spine.       1.  Overall interval progression of the metastatic disease with mild interval increase in the size of pulmonary nodules included in the field of view and mild interval increase in retroperitoneal lymph nodes compared to prior CT examination dated 03/05/2024. There is also mild questionable interval increase in the size of hepatic lesion within limitation of differences in postcontrast and noncontrast technique. Mild interval increase in the size of subcutaneous soft tissue nodule in the posterolateral aspect of the left gluteal region measuring approximately 1 x 0.6 cm compared to prior CT examination dated 03/05/2024. 2. Interval increase in mesenteric fat stranding/edema, body wall edema and interval development of small bilateral pleural effusions compared to prior CT examination, most likely favored to be related to 3rd spacing of fluids. Recommend correlation with fluid status. No definite evidence of loculated fluid collections. 3. Findings suggestive of hepatic cirrhosis with diffuse splenomegaly.   MACRO: None   Signed by: Sara Mills 3/20/2024 9:03 AM  Dictation workstation:   [id number]    ECG 12 lead    Result Date: 3/19/2024  Ventricular-paced rhythm Abnormal ECG When compared with ECG of 05-MAR-2024 00:17, Electronic ventricular pacemaker has replaced Sinus rhythm    CT angio chest for pulmonary embolism    Result Date: 3/18/2024  Interpreted By:  John Uribe, STUDY: CT ANGIO CHEST FOR PULMONARY EMBOLISM;  3/18/2024 6:57 pm   INDICATION: Signs/Symptoms:cp, hx malginancy.   COMPARISON: CTA chest 12/26/2023   ACCESSION NUMBER(S): [id number]   ORDERING CLINICIAN: YUNIOR MEDLEY   TECHNIQUE: Contiguous axial images of the chest were obtained after the intravenous administration of 61 mL Omnipaque 350 contrast using angiographic PE protocol. Coronal and sagittal reformatted images were reconstructed from the axial data. MIP images were created on an independent workstation and reviewed.   FINDINGS: PULMONARY ARTERIES: Adequate opacification to the level of the proximal segmental arteries. Assessment of the distal segmental and subsegmental arteries limited by mixing artifact and respiratory motion. Very subtle linear filling defect noted within the right interlobar and lower lobar arteries suspicious for nonocclusive emboli (series 403, image 86, series 401, image 164, and series 401 image 177). The main pulmonary artery is normal in diameter. No CT evidence of right heart strain.   HEART: Borderline cardiomegaly. No significant coronary artery calcifications. No significant pericardial effusion. Pacing leads are noted within the right atrium and right ventricle.   VESSELS: Normal caliber aorta without dissection. No significant aortic atherosclerosis.   MEDIASTINUM AND LYMPH NODES: Visualized thyroid is within normal limits. Significant interval progression and number and size of mediastinal and hilar lymphadenopathy compared to prior chest CT. There is a right hilar node measuring up to 2.1 cm. A left hilar node measures up to 2.2 cm. 2.2 cm subcarinal lymph  node. No pneumomediastinum. Mild wall thickening of the distal esophagus.   LUNG, AIRWAYS, AND PLEURA: Trachea and proximal mainstem bronchi are patent. Significant interval increase in size and number pulmonary nodules/metastases compared to prior chest CT from 12/26/2023 and PET-CT 01/09/2024. Dominant examples include a 1.5 cm nodule in the left upper lobe 1.1 cm nodule in the lingula, 1.2 cm perifissural nodule in the superior segment left lower lobe, and numerous nodules throughout the right lung. Dependent atelectatic changes. No pleural effusion or pneumothorax.   OSSEOUS STRUCTURES: Status post bilateral humeral head prostheses. No acute osseous abnormality identified. No overtly aggressive osseous lesions are seen.   CHEST WALL SOFT TISSUES: No discernible abnormality.   UPPER ABDOMEN/OTHER: Numerous hepatic metastases are again noted.       1. Findings suspicious for tiny nonocclusive emboli within the right lower lobe. 2. Significant interval progression in size and number of mediastinal and bilateral hilar lymph nodes and numerous bilateral pulmonary nodules compared to prior chest CT from December 2023 and PET-CT from January 2024. These findings are consistent with progressive metastases. 3. Mild wall thickening of the distal esophagus, this could represent esophagitis or recurrent disease.   MACRO: John Uribe discussed the significance and urgency of this critical finding by telephone with  YUNIOR MEDLEY on 3/18/2024 at 7:45 pm. (**-RCF-**) Findings:  See findings.   Signed by: John Uribe 3/18/2024 7:47 PM Dictation workstation:   [id number]    XR chest 2 views    Result Date: 3/18/2024  Interpreted By:  Yefri Coulter, STUDY: XR CHEST 2 VIEWS;  3/18/2024 5:32 pm   INDICATION: Signs/Symptoms:cp.   COMPARISON: 03/05/2024   ACCESSION NUMBER(S): [id number]   ORDERING CLINICIAN: YUNIOR MEDLEY   FINDINGS:   Left-sided dual lead pacemaker and right-sided implanted port in stable position. The cardiac  silhouette is unremarkable. Costophrenic angles are sharp. The patient's known multiple pulmonary nodules are redemonstrated. The trachea is midline. There is no pneumothorax. Bilateral shoulder arthroplasty prosthesis in place.       1.  Redemonstration of multiple bilateral pulmonary nodules     Signed by: Yefri Coulter 3/18/2024 6:19 PM Dictation workstation:   [id number]       Physical Exam  Constitutional:       General: He is not in acute distress.     Appearance: He is not toxic-appearing.      Comments: Jaundice has resolved   HENT:      Head: Normocephalic and atraumatic.   Eyes:      Extraocular Movements: Extraocular movements intact.      Pupils: Pupils are equal, round, and reactive to light.   Cardiovascular:      Rate and Rhythm: Normal rate and regular rhythm.      Heart sounds: No murmur heard.     No gallop.      Comments: Port in place in right upper chest, and pacemaker in place in left upper chest  Pulmonary:      Effort: Pulmonary effort is normal. No respiratory distress.      Breath sounds: Normal breath sounds. No wheezing or rales.   Abdominal:      General: There is no distension.      Palpations: Abdomen is soft.      Tenderness: There is no abdominal tenderness.   Musculoskeletal:      Right lower leg: No edema.      Left lower leg: No edema.   Skin:     General: Skin is warm and dry.      Coloration: Skin is not jaundiced.   Neurological:      General: No focal deficit present.      Mental Status: He is alert and oriented to person, place, and time. Mental status is at baseline.         Relevant Results  Scheduled medications  [Held by provider] enoxaparin, 40 mg, subcutaneous, q24h  [START ON 3/22/2024] famotidine, 20 mg, oral, Daily  fentaNYL, 1 patch, transdermal, q72h  insulin lispro, 0-10 Units, subcutaneous, With meals & nightly  melatonin, 3 mg, oral, Daily  [Held by provider] metoprolol succinate XL, 25 mg, oral, Daily  micafungin, 100 mg, intravenous,  q24h  piperacillin-tazobactam, 3.375 g, intravenous, q6h  scopolamine, 1 patch, transdermal, q72h  sucralfate, 1 g, oral, BID AC      Continuous medications     PRN medications  PRN medications: acetaminophen **OR** acetaminophen **OR** acetaminophen, dextrose, dextrose, glucagon, LORazepam, metoclopramide, oxyCODONE, sennosides-docusate sodium, vancomycin  Results for orders placed or performed during the hospital encounter of 03/18/24 (from the past 24 hour(s))   POCT GLUCOSE   Result Value Ref Range    POCT Glucose 116 (H) 74 - 99 mg/dL   CBC and Auto Differential   Result Value Ref Range    WBC 3.5 (L) 4.4 - 11.3 x10*3/uL    nRBC 0.0 0.0 - 0.0 /100 WBCs    RBC 2.65 (L) 4.50 - 5.90 x10*6/uL    Hemoglobin 7.9 (L) 13.5 - 17.5 g/dL    Hematocrit 24.3 (L) 41.0 - 52.0 %    MCV 92 80 - 100 fL    MCH 29.8 26.0 - 34.0 pg    MCHC 32.5 32.0 - 36.0 g/dL    RDW 17.1 (H) 11.5 - 14.5 %    Platelets 27 (LL) 150 - 450 x10*3/uL    Neutrophils % 72.8 40.0 - 80.0 %    Immature Granulocytes %, Automated 0.6 0.0 - 0.9 %    Lymphocytes % 11.7 13.0 - 44.0 %    Monocytes % 13.2 2.0 - 10.0 %    Eosinophils % 1.4 0.0 - 6.0 %    Basophils % 0.3 0.0 - 2.0 %    Neutrophils Absolute 2.54 1.20 - 7.70 x10*3/uL    Immature Granulocytes Absolute, Automated 0.02 0.00 - 0.70 x10*3/uL    Lymphocytes Absolute 0.41 (L) 1.20 - 4.80 x10*3/uL    Monocytes Absolute 0.46 0.10 - 1.00 x10*3/uL    Eosinophils Absolute 0.05 0.00 - 0.70 x10*3/uL    Basophils Absolute 0.01 0.00 - 0.10 x10*3/uL   Protime-INR   Result Value Ref Range    Protime 14.3 (H) 9.8 - 12.8 seconds    INR 1.3 (H) 0.9 - 1.1   APTT   Result Value Ref Range    aPTT 26 (L) 27 - 38 seconds   Fibrinogen   Result Value Ref Range    Fibrinogen 159 (L) 200 - 400 mg/dL   POCT GLUCOSE   Result Value Ref Range    POCT Glucose 166 (H) 74 - 99 mg/dL   Magnesium   Result Value Ref Range    Magnesium 1.43 (L) 1.60 - 2.40 mg/dL   CBC   Result Value Ref Range    WBC 3.1 (L) 4.4 - 11.3 x10*3/uL    nRBC 0.0  0.0 - 0.0 /100 WBCs    RBC 2.80 (L) 4.50 - 5.90 x10*6/uL    Hemoglobin 8.2 (L) 13.5 - 17.5 g/dL    Hematocrit 26.2 (L) 41.0 - 52.0 %    MCV 94 80 - 100 fL    MCH 29.3 26.0 - 34.0 pg    MCHC 31.3 (L) 32.0 - 36.0 g/dL    RDW 16.9 (H) 11.5 - 14.5 %    Platelets 34 (LL) 150 - 450 x10*3/uL   Hepatic function panel   Result Value Ref Range    Albumin 2.5 (L) 3.4 - 5.0 g/dL    Bilirubin, Total 1.3 (H) 0.0 - 1.2 mg/dL    Bilirubin, Direct 0.4 (H) 0.0 - 0.3 mg/dL    Alkaline Phosphatase 106 33 - 136 U/L    ALT 22 10 - 52 U/L    AST 45 (H) 9 - 39 U/L    Total Protein 5.6 (L) 6.4 - 8.2 g/dL   Phosphorus   Result Value Ref Range    Phosphorus 3.9 2.5 - 4.9 mg/dL   Basic Metabolic Panel   Result Value Ref Range    Glucose 74 74 - 99 mg/dL    Sodium 138 136 - 145 mmol/L    Potassium 3.9 3.5 - 5.3 mmol/L    Chloride 107 98 - 107 mmol/L    Bicarbonate 24 21 - 32 mmol/L    Anion Gap 11 10 - 20 mmol/L    Urea Nitrogen 13 6 - 23 mg/dL    Creatinine 1.40 (H) 0.50 - 1.30 mg/dL    eGFR 56 (L) >60 mL/min/1.73m*2    Calcium 8.0 (L) 8.6 - 10.3 mg/dL   POCT GLUCOSE   Result Value Ref Range    POCT Glucose 80 74 - 99 mg/dL   POCT GLUCOSE   Result Value Ref Range    POCT Glucose 122 (H) 74 - 99 mg/dL          Assessment/Plan      Massimo Garcia is a 63 y.o. male with PMHx of third degree heart block s/p PPM (placed in November 2023), esophageal cancer with mets to liver and lungs on chemotherapy, PE, peripheral neuropathy, and portal vein thrombosis  who is admitted to the hospital for septic shock     Acute Medical Issues   #Sepsis of uncertain etiology  -Patient with chills, fevers, and chest pain while receiving a chemotherapy treatment on 3/18   -Potentially due to adverse reaction to oxaliplatin chemo  -UA positive for bacteria, but negative LE, negative WBC and patient denying dysuria so UTI is a potential but unlikely cause of sepsis  -Patient given 1L of IVF in the ED and 1.5L IVF overnight  -Patient has continued to be hypotensive  70-80s/40-50s  -Worsening NORTH with uptrending Cr 0.89=>1.35=>1.43  -Improving lactate 3.1=>3.0=>3.5=>2.0=>1.7  -Urine culture negative  -Patient received ~4.5L of fluids on 3/19 for persistent hypotension   -Pressures improved on 3/20: 128/71 => 100/64  -CT Abd/Pelvis: Overall interval progression of metastatic disease to lungs, subcutaneous soft tissue of th left gluteal region, and liver.  Interval increase in mesenteric fat stranding/edema body wall edema, and small bilateral pleural effusions consistent with 3rd spacing of fluids    Plan:  -Continue with vanc/zosyn given persistent septic shock and while blood cultures pending  -Patient blood pressures currently stable.  Will continue to monitor and administer additional fluid boluses if hypotension persists  -ID consulted: started patient on mycofungin  -Will obtain liver US per ID recommendations      #Thrombocytopenia  #Anemia  #Concern for DIC  -Patient with chronic thrombocytopenia baseline 70s-100s  -Platelets initially at baseline 79 on presentation, but dropped to 29  -Hb also dropped 10.8=>9.0  -Elevated D-Dimer, PT, INR, ferritin, with decreased fibrinogen   -These findings are consistent with and concerning for development of DIC  -INR improved to 1.3, with improved Fibrinogen, PT, and aPTT  -Anemia is most likely secondary to nonimmune hemolysis given elevated indirect bili, decreased haptoglobin, and negative DG    Plan:  -Haptoglobin < 10  -DIC is most likely secondary to sepsis  -Continue to monitor closely for signs of bleeding or clotting  -Oncology consulted, appreciate recs:   -Platelet transfusion for < 20 and cryo if fibrinogen < 100   -There are case reports of oxaliplatin induced immune hemolytic anemia   -Transfuse for Hb < 7    #Hyperbilirubinemia: RESOLVED  -Patient appears jaundiced with elevated bilirubin levels  -Total bilirubin baseline ~1  -T Bili 6.3 on presentation => 3.2  -Direct Bili 1.5  -Hyperbilirubinemia has resolved now  1.3.    -Patient with mixed direct and indirect hyperbilirubinemia, most likely a combination of hemolysis 2/2 DIC and known liver mets    Plan:  -Jaundiced has resolved today    #Hypophosphatemia: RESOLVED  -PO4 < 1.0 on presentation  -Hypophosphatemia is potential cause of lactic acidosis and multi organ failure     Plan:  -Given 30 mmol KPO4  -Repeat phosphate 3.4  -Will continue to monitor with RFPs    #Esophageal Cancer on chemotherapy  -Patient with known history of esophageal cancer with metastasis to the liver and lung  -CT PE showing significant interval progression in size and number of mediastinal and bilateral hilar lymph nodes and numerous bilateral pulmonary nodules  -Follows with Dr. Blake  -Patient on active chemotherapy with trastuzumab, pembrolizumab, 5-FU leucovorin, and oxaliplatin.  -Has been on active chemotherapy since Jan 2023    Plan:  -Dr. Blake consulted  -Tylenol 650 for mild pain   -Oxycodone 5 for moderate-severe pain    #Chest pain secondary to chemotherapy reaction vs. PE: RESOLVED  -Patient with history of prior PE in January 2023, and was on Eliquis until January 2024 when it was stopped due to thrombocytopenia  -Patient's chest pain began during chemotherapy treatment and  improved with meperidine. PE seen on CT angiogram.   -S/p meperidine  -PESI score of 105; intermediate risk   -Patient started on Lovenox DVT ppx->held due to platelets of 76. Reassess CBC in AM and consider restarting.   -Consider echo for L heart strain tomorrow  -Patient has no oxygen requirement     Plan:  -Given size of PE on CT PE and severe thrombocytopenia, currently holding anticoagulation until platelets > 50,000     #Electrolyte Derangement  -Patient presented with hypokalemia and hypomagnesemia.   -S/p repletion, ordered repeat RFP and Mg levels for midnight after repletion is complete    Plan:  -Mg and K levels have improved, will continue to monitor and replete as necessary      #Hyperglycemia  -Glucose uptrending overnight, now 174  -Placed mild sliding scale      Chronic Medical Issues  #Hiccups  -Has had hiccups for 10 days  -Continue home chlorpromazine PRN     #HTN  -Continue metoprolol succinate      #GERD  -Continue Protonix  -Continue sucralfate      #Anxiety  -Continue PRN Lorazepam     F: PO intake & IVF PRN   E: Replete PRN  N: Regular diet  GI ppx: Protonix 40 mg daily  DVT ppx: Lovenox subcutaneous--HELD  Antibiotics: Vancomycin, Zosyn, Micofungin  Tubes/Lines/Drains: PIVs     Code Status: Full Code   Emergency Contact: Extended Emergency Contact Information  Primary Emergency Contact: RadhaJane  Address: [address] [address]  Home Phone: [phone]  Mobile Phone: [phone]  Relation: Spouse  Secondary Emergency Contact: DA WORKMAN  Mobile Phone: [phone]  Relation: Daughter  Preferred language: English   needed? No      Disposition: 63 y.o.male admitted for sepsis secondary to UTI. Continuing to treat with IV antibiotics pending blood cultures finalize as negative.    Rusty Pat MD  Transitional Year Resident  PGY-1  Epic Chat

## 2024-03-21 NOTE — PROGRESS NOTES
"Massimo Garcia is a 63 y.o. male on day 3 of admission presenting with Cystitis.    Subjective   Massimo Garcia is a 63 y.o. male PMHX metastatic esophageal cancer on FOLFOX plus trastuzumab presenting with chemotherapy reaction and cystitis.     Patient reporting that he has continued to improve today.  No abdominal discomfort or nausea today.  Continues to have normal bowel output.  Denies blood in stool or urine.  Denies any new bruising.  Did have a brief episode of epistaxis this morning which resolved without aggressive intervention.       Objective     Physical Exam  Constitutional:       Appearance: Normal appearance.   HENT:      Head: Normocephalic and atraumatic.      Right Ear: External ear normal.      Left Ear: External ear normal.      Nose: Nose normal.      Mouth/Throat:      Mouth: Mucous membranes are moist.      Pharynx: Oropharynx is clear.   Eyes:      General: Scleral icterus present.      Extraocular Movements: Extraocular movements intact.   Cardiovascular:      Rate and Rhythm: Normal rate and regular rhythm.      Pulses: Normal pulses.      Heart sounds: Normal heart sounds.   Pulmonary:      Effort: Pulmonary effort is normal.      Breath sounds: Normal breath sounds. No rales.   Abdominal:      General: There is no distension.      Palpations: Abdomen is soft. There is no mass.      Tenderness: There is no abdominal tenderness.   Skin:     General: Skin is warm and dry.   Neurological:      Mental Status: He is alert and oriented to person, place, and time.   Psychiatric:         Mood and Affect: Mood normal.         Behavior: Behavior normal.     Last Recorded Vitals  Blood pressure 103/73, pulse 108, temperature 36.5 °C (97.7 °F), temperature source Temporal, resp. rate 16, height 1.727 m (5' 8\"), weight 74 kg (163 lb 2.3 oz), SpO2 98 %.  Intake/Output last 3 Shifts:  I/O last 3 completed shifts:  In: 1905 (25.7 mL/kg) [IV Piggyback:1905]  Out: 600 (8.1 mL/kg) [Urine:600 (0.2 mL/kg/hr)]  Weight: " 74 kg     Relevant Results  Results for orders placed or performed during the hospital encounter of 03/18/24 (from the past 24 hour(s))   POCT GLUCOSE   Result Value Ref Range    POCT Glucose 116 (H) 74 - 99 mg/dL   CBC and Auto Differential   Result Value Ref Range    WBC 3.5 (L) 4.4 - 11.3 x10*3/uL    nRBC 0.0 0.0 - 0.0 /100 WBCs    RBC 2.65 (L) 4.50 - 5.90 x10*6/uL    Hemoglobin 7.9 (L) 13.5 - 17.5 g/dL    Hematocrit 24.3 (L) 41.0 - 52.0 %    MCV 92 80 - 100 fL    MCH 29.8 26.0 - 34.0 pg    MCHC 32.5 32.0 - 36.0 g/dL    RDW 17.1 (H) 11.5 - 14.5 %    Platelets 27 (LL) 150 - 450 x10*3/uL    Neutrophils % 72.8 40.0 - 80.0 %    Immature Granulocytes %, Automated 0.6 0.0 - 0.9 %    Lymphocytes % 11.7 13.0 - 44.0 %    Monocytes % 13.2 2.0 - 10.0 %    Eosinophils % 1.4 0.0 - 6.0 %    Basophils % 0.3 0.0 - 2.0 %    Neutrophils Absolute 2.54 1.20 - 7.70 x10*3/uL    Immature Granulocytes Absolute, Automated 0.02 0.00 - 0.70 x10*3/uL    Lymphocytes Absolute 0.41 (L) 1.20 - 4.80 x10*3/uL    Monocytes Absolute 0.46 0.10 - 1.00 x10*3/uL    Eosinophils Absolute 0.05 0.00 - 0.70 x10*3/uL    Basophils Absolute 0.01 0.00 - 0.10 x10*3/uL   Protime-INR   Result Value Ref Range    Protime 14.3 (H) 9.8 - 12.8 seconds    INR 1.3 (H) 0.9 - 1.1   APTT   Result Value Ref Range    aPTT 26 (L) 27 - 38 seconds   Fibrinogen   Result Value Ref Range    Fibrinogen 159 (L) 200 - 400 mg/dL   POCT GLUCOSE   Result Value Ref Range    POCT Glucose 166 (H) 74 - 99 mg/dL   Magnesium   Result Value Ref Range    Magnesium 1.43 (L) 1.60 - 2.40 mg/dL   CBC   Result Value Ref Range    WBC 3.1 (L) 4.4 - 11.3 x10*3/uL    nRBC 0.0 0.0 - 0.0 /100 WBCs    RBC 2.80 (L) 4.50 - 5.90 x10*6/uL    Hemoglobin 8.2 (L) 13.5 - 17.5 g/dL    Hematocrit 26.2 (L) 41.0 - 52.0 %    MCV 94 80 - 100 fL    MCH 29.3 26.0 - 34.0 pg    MCHC 31.3 (L) 32.0 - 36.0 g/dL    RDW 16.9 (H) 11.5 - 14.5 %    Platelets 34 (LL) 150 - 450 x10*3/uL   Hepatic function panel   Result Value Ref  Range    Albumin 2.5 (L) 3.4 - 5.0 g/dL    Bilirubin, Total 1.3 (H) 0.0 - 1.2 mg/dL    Bilirubin, Direct 0.4 (H) 0.0 - 0.3 mg/dL    Alkaline Phosphatase 106 33 - 136 U/L    ALT 22 10 - 52 U/L    AST 45 (H) 9 - 39 U/L    Total Protein 5.6 (L) 6.4 - 8.2 g/dL   Phosphorus   Result Value Ref Range    Phosphorus 3.9 2.5 - 4.9 mg/dL   Basic Metabolic Panel   Result Value Ref Range    Glucose 74 74 - 99 mg/dL    Sodium 138 136 - 145 mmol/L    Potassium 3.9 3.5 - 5.3 mmol/L    Chloride 107 98 - 107 mmol/L    Bicarbonate 24 21 - 32 mmol/L    Anion Gap 11 10 - 20 mmol/L    Urea Nitrogen 13 6 - 23 mg/dL    Creatinine 1.40 (H) 0.50 - 1.30 mg/dL    eGFR 56 (L) >60 mL/min/1.73m*2    Calcium 8.0 (L) 8.6 - 10.3 mg/dL   POCT GLUCOSE   Result Value Ref Range    POCT Glucose 80 74 - 99 mg/dL   POCT GLUCOSE   Result Value Ref Range    POCT Glucose 122 (H) 74 - 99 mg/dL                  Assessment/Plan   Principal Problem:    Cystitis  Active Problems:    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)    Chest heaviness    Chemotherapy-induced neuropathy (CMS/HCC)    Malignant neoplasm metastatic to liver (CMS/HCC)    Thrombocytopenia (CMS/HCC)    NORTH (acute kidney injury) (CMS/HCC)    PE (pulmonary thromboembolism) (CMS/HCC)    Fever and sepsis  The differential diagnosis includes infection of which the source could be UTI versus cholangitis, or reaction to the oxaliplatin although would not  expect hypotension at this time      Obtain CT abdomen and pelvis  Continue broad-spectrum antibiotic with vancomycin and Zosyn  Pressor support if required  Follow cultures     3/20/24- he responded to fluid resuscitation yesterday and sepsis seems to be resolving with normal BP today and no fever   Continue broad spectrum abx, urine culture negative and Bcx NGTD      3/21/24 - Blood cultures NGTD.  Patient remains normotensive and afebrile.  Continue ABX per ID.      2.  Thrombocytopenia  Baseline thrombocytopenia with worsening down to  29,000  Secondary to sepsis and labs also suggestive of DIC  Support with platelet transfusion if the count drops less than 20,000 and cryoprecipitate if the fibrinogen drops less than 100  Ordered blood smear review by path      3/20/24- platelets improved from yesterday afternoon count, continue to monitor and support as needed to keep > 20,000      3/21/24 - platelets with ongoing improvement, continue to monitor and maintain above 20,000.      3.  Anemia  Likely anemia of chronic disease/inflammation  The bilirubinemia was mixed so we will order hemolysis workup including LDH, reticulocyte, haptoglobin has been ordered, ordered DG  There are case reports of oxaliplatin induced immune hemolytic anemia  Monitor the hemoglobin and support with transfusion if hemoglobin drops less than 7 g/dL     3/20- the work up suggests an element of non immune hemolysis, the hg improved today as well, we will repeat a cbc for this evening      3/21 - Hemoglobin and hematocrit remain stable today.  Bilirubin and LDH downtrending.  Reticulocyte count remains elevated.  DG is negative.      4.  Metastatic esophageal cancer  His disease seems to be progressing on FOLFOX plus trastuzumab  At this time he wishes to continue cancer therapy when he stabilizes and to continue full supportive measures  His primary oncologist Dr. Blake was updated     5.  Pulmonary embolism  Previously on Eliquis  His platelets at this time are prohibitive of full dose anticoagulation, but we will follow to when it could be started  Consider Dopplers of the lower extremities     3/20- if the plts trend up to > 50,000 then anticoagulation can be resumed      3/21 - plt remains <50,000, continue to monitor and resume eliquis if >50,000     Patient discussed with attending physician Dr Frankel     Reviewed and approved by YASEMIN CABRERA on 3/21/24 at 3:39 PM.       Yasemin Cabrera DO

## 2024-03-22 LAB
ALBUMIN SERPL BCP-MCNC: 2.8 G/DL (ref 3.4–5)
ALP SERPL-CCNC: 123 U/L (ref 33–136)
ALT SERPL W P-5'-P-CCNC: 21 U/L (ref 10–52)
ANION GAP SERPL CALC-SCNC: 10 MMOL/L (ref 10–20)
AST SERPL W P-5'-P-CCNC: 41 U/L (ref 9–39)
BILIRUB SERPL-MCNC: 1.2 MG/DL (ref 0–1.2)
BUN SERPL-MCNC: 12 MG/DL (ref 6–23)
CALCIUM SERPL-MCNC: 8.2 MG/DL (ref 8.6–10.3)
CHLORIDE SERPL-SCNC: 103 MMOL/L (ref 98–107)
CO2 SERPL-SCNC: 26 MMOL/L (ref 21–32)
CREAT SERPL-MCNC: 1.33 MG/DL (ref 0.5–1.3)
EGFRCR SERPLBLD CKD-EPI 2021: 59 ML/MIN/1.73M*2
ERYTHROCYTE [DISTWIDTH] IN BLOOD BY AUTOMATED COUNT: 16.7 % (ref 11.5–14.5)
GLUCOSE BLD MANUAL STRIP-MCNC: 120 MG/DL (ref 74–99)
GLUCOSE BLD MANUAL STRIP-MCNC: 125 MG/DL (ref 74–99)
GLUCOSE BLD MANUAL STRIP-MCNC: 148 MG/DL (ref 74–99)
GLUCOSE BLD MANUAL STRIP-MCNC: 63 MG/DL (ref 74–99)
GLUCOSE SERPL-MCNC: 69 MG/DL (ref 74–99)
HCT VFR BLD AUTO: 27 % (ref 41–52)
HGB BLD-MCNC: 8.8 G/DL (ref 13.5–17.5)
MAGNESIUM SERPL-MCNC: 1.77 MG/DL (ref 1.6–2.4)
MCH RBC QN AUTO: 29.6 PG (ref 26–34)
MCHC RBC AUTO-ENTMCNC: 32.6 G/DL (ref 32–36)
MCV RBC AUTO: 91 FL (ref 80–100)
NRBC BLD-RTO: 0 /100 WBCS (ref 0–0)
PHOSPHATE SERPL-MCNC: 3.9 MG/DL (ref 2.5–4.9)
PLATELET # BLD AUTO: 50 X10*3/UL (ref 150–450)
POTASSIUM SERPL-SCNC: 3.8 MMOL/L (ref 3.5–5.3)
PROT SERPL-MCNC: 6.1 G/DL (ref 6.4–8.2)
RBC # BLD AUTO: 2.97 X10*6/UL (ref 4.5–5.9)
SODIUM SERPL-SCNC: 135 MMOL/L (ref 136–145)
WBC # BLD AUTO: 4.2 X10*3/UL (ref 4.4–11.3)

## 2024-03-22 PROCEDURE — 82947 ASSAY GLUCOSE BLOOD QUANT: CPT

## 2024-03-22 PROCEDURE — 99233 SBSQ HOSP IP/OBS HIGH 50: CPT

## 2024-03-22 PROCEDURE — 2500000001 HC RX 250 WO HCPCS SELF ADMINISTERED DRUGS (ALT 637 FOR MEDICARE OP)

## 2024-03-22 PROCEDURE — 2500000004 HC RX 250 GENERAL PHARMACY W/ HCPCS (ALT 636 FOR OP/ED)

## 2024-03-22 PROCEDURE — 99232 SBSQ HOSP IP/OBS MODERATE 35: CPT | Performed by: INTERNAL MEDICINE

## 2024-03-22 PROCEDURE — 85027 COMPLETE CBC AUTOMATED: CPT

## 2024-03-22 PROCEDURE — 84075 ASSAY ALKALINE PHOSPHATASE: CPT | Performed by: INTERNAL MEDICINE

## 2024-03-22 PROCEDURE — RXMED WILLOW AMBULATORY MEDICATION CHARGE

## 2024-03-22 PROCEDURE — 84100 ASSAY OF PHOSPHORUS: CPT

## 2024-03-22 PROCEDURE — 97161 PT EVAL LOW COMPLEX 20 MIN: CPT | Mod: GP

## 2024-03-22 PROCEDURE — 83735 ASSAY OF MAGNESIUM: CPT

## 2024-03-22 PROCEDURE — 2020000001 HC ICU ROOM DAILY

## 2024-03-22 RX ORDER — PANTOPRAZOLE SODIUM 40 MG/1
40 TABLET, DELAYED RELEASE ORAL
Status: DISCONTINUED | OUTPATIENT
Start: 2024-03-22 | End: 2024-03-23 | Stop reason: HOSPADM

## 2024-03-22 RX ADMIN — SUCRALFATE 1 G: 1 TABLET ORAL at 06:27

## 2024-03-22 RX ADMIN — OXYCODONE HYDROCHLORIDE 5 MG: 5 TABLET ORAL at 08:20

## 2024-03-22 RX ADMIN — OXYCODONE HYDROCHLORIDE 5 MG: 5 TABLET ORAL at 14:27

## 2024-03-22 RX ADMIN — SCOPOLAMINE 1 PATCH: 1.5 PATCH, EXTENDED RELEASE TRANSDERMAL at 21:45

## 2024-03-22 RX ADMIN — Medication 3 MG: at 20:41

## 2024-03-22 RX ADMIN — PIPERACILLIN SODIUM AND TAZOBACTAM SODIUM 3.38 G: 3; .375 INJECTION, SOLUTION INTRAVENOUS at 01:02

## 2024-03-22 RX ADMIN — FAMOTIDINE 20 MG: 20 TABLET, FILM COATED ORAL at 08:20

## 2024-03-22 RX ADMIN — PANTOPRAZOLE SODIUM 40 MG: 40 TABLET, DELAYED RELEASE ORAL at 23:17

## 2024-03-22 RX ADMIN — LORAZEPAM 1 MG: 1 TABLET ORAL at 13:47

## 2024-03-22 RX ADMIN — SUCRALFATE 1 G: 1 TABLET ORAL at 15:41

## 2024-03-22 RX ADMIN — OXYCODONE HYDROCHLORIDE 5 MG: 5 TABLET ORAL at 22:30

## 2024-03-22 RX ADMIN — PIPERACILLIN SODIUM AND TAZOBACTAM SODIUM 3.38 G: 3; .375 INJECTION, SOLUTION INTRAVENOUS at 06:27

## 2024-03-22 ASSESSMENT — COGNITIVE AND FUNCTIONAL STATUS - GENERAL
MOBILITY SCORE: 24
DAILY ACTIVITIY SCORE: 24
DAILY ACTIVITIY SCORE: 24

## 2024-03-22 ASSESSMENT — PAIN DESCRIPTION - ORIENTATION: ORIENTATION: LOWER

## 2024-03-22 ASSESSMENT — ACTIVITIES OF DAILY LIVING (ADL)
LACK_OF_TRANSPORTATION: NO
ADL_ASSISTANCE: INDEPENDENT
ADLS_ADDRESSED: YES

## 2024-03-22 ASSESSMENT — PAIN SCALES - GENERAL
PAINLEVEL_OUTOF10: 2
PAINLEVEL_OUTOF10: 8
PAINLEVEL_OUTOF10: 0 - NO PAIN
PAINLEVEL_OUTOF10: 0 - NO PAIN

## 2024-03-22 ASSESSMENT — PAIN - FUNCTIONAL ASSESSMENT
PAIN_FUNCTIONAL_ASSESSMENT: 0-10

## 2024-03-22 ASSESSMENT — PAIN DESCRIPTION - DESCRIPTORS: DESCRIPTORS: ACHING;DISCOMFORT

## 2024-03-22 ASSESSMENT — PAIN DESCRIPTION - LOCATION: LOCATION: BACK

## 2024-03-22 NOTE — PROGRESS NOTES
"Vancomycin Dosing by Pharmacy- FOLLOW UP    Massimo Garcia is a 63 y.o. year old male who Pharmacy has been consulted for vancomycin dosing for other cysitis (unknown origin) . Based on the patient's indication and renal status this patient is being dosed based on a goal trough/random level of 10-15.     Renal function is currently improving.    Current vancomycin dose: 1250 mg given once.    Most recent random level: 10.9 mcg/mL    Visit Vitals  BP 98/66 (BP Location: Left arm, Patient Position: Lying)   Pulse 104   Temp 36.4 °C (97.5 °F) (Temporal)   Resp 18        Lab Results   Component Value Date    CREATININE 1.40 (H) 03/21/2024    CREATININE 1.43 (H) 03/20/2024    CREATININE 1.42 (H) 03/19/2024    CREATININE 1.43 (H) 03/19/2024        Patient weight is No results found for: \"PTWEIGHT\"    No results found for: \"CULTURE\"     I/O last 3 completed shifts:  In: 1435 (19.4 mL/kg) [P.O.:480; I.V.:100 (1.4 mL/kg); IV Piggyback:855]  Out: - (0 mL/kg)   Weight: 74 kg   [unfilled]    No results found for: \"PATIENTTEMP\"     Assessment/Plan    Within goal random/trough level  The next level will be obtained on 3/22/2024 at 20:00. May be obtained sooner if clinically indicated.   Will continue to monitor renal function daily while on vancomycin and order serum creatinine at least every 48 hours if not already ordered.  Follow for continued vancomycin needs, clinical response, and signs/symptoms of toxicity.       Suni Stanley, PharmD           "

## 2024-03-22 NOTE — CARE PLAN
The patient's goals for the shift include  patient will be able to rest tonight  The clinical goals for the shift include will have no complain of pain during this shift      Problem: Pain  Goal: My pain/discomfort is manageable  Outcome: Progressing     Problem: Safety  Goal: Patient will be injury free during hospitalization  Outcome: Progressing     Problem: Safety  Goal: I will remain free of falls  Outcome: Progressing     Problem: Daily Care  Goal: Daily care needs are met  Outcome: Progressing

## 2024-03-22 NOTE — PROGRESS NOTES
Massimo Garcia is a 63 y.o. male on day 4 of admission presenting with Cystitis.    Subjective   Interval History: no fever, no new complaints        Review of Systems    Objective   Range of Vitals (last 24 hours)  Heart Rate:  []   Temp:  [36.4 °C (97.5 °F)-36.5 °C (97.7 °F)]   Resp:  [16-18]   BP: ()/(66-78)   SpO2:  [96 %-99 %]   Daily Weight  03/18/24 : 74 kg (163 lb 2.3 oz)    Body mass index is 24.81 kg/m².    Physical Exam  Constitutional:       Appearance: Normal appearance.   HENT:      Head: Normocephalic and atraumatic.      Mouth/Throat:      Mouth: Mucous membranes are moist.      Pharynx: Oropharynx is clear.   Eyes:      Pupils: Pupils are equal, round, and reactive to light.   Cardiovascular:      Rate and Rhythm: Normal rate and regular rhythm.      Heart sounds: Normal heart sounds.   Pulmonary:      Effort: Pulmonary effort is normal.      Breath sounds: Normal breath sounds.   Abdominal:      General: Abdomen is flat. Bowel sounds are normal.      Palpations: Abdomen is soft.   Musculoskeletal:      Cervical back: Normal range of motion.   Neurological:      Mental Status: He is alert.         Antibiotics  HYDROmorphone (Dilaudid) injection 0.4 mg  sodium chloride 0.9 % bolus 500 mL  piperacillin-tazobactam-dextrose (Zosyn) IV 4.5 g  vancomycin (Vancocin) 2000 mg/500 ml in D5W 500 mL IV piggyback 2 g  acetaminophen (Tylenol) tablet 650 mg  HYDROmorphone (Dilaudid) injection 1 mg  HYDROmorphone (Dilaudid) injection  - Omnicell Override Pull  potassium chloride 20 mEq in 100 mL IV premix  acetaminophen (Tylenol) tablet 650 mg  sodium chloride 0.9 % bolus 500 mL  iohexol (OMNIPaque) 350 mg iodine/mL solution 61 mL  magnesium sulfate IV 2 g  aspirin chewable tablet 324 mg  enoxaparin (Lovenox) syringe 40 mg  melatonin tablet 3 mg  LORazepam (Ativan) tablet 1 mg  metoclopramide (Reglan) tablet 10 mg  metoprolol succinate XL (Toprol-XL) 24 hr tablet 25 mg  pantoprazole (ProtoNix) EC tablet 40  mg  sucralfate (Carafate) tablet 1 g  tiZANidine (Zanaflex) tablet 4 mg  chlorproMAZINE (Thorazine) tablet 25 mg  cefTRIAXone (Rocephin) IVPB 1 g  acetaminophen (Tylenol) tablet 650 mg  acetaminophen (Tylenol) oral liquid 650 mg  acetaminophen (Tylenol) suppository 650 mg  oxyCODONE (Roxicodone) immediate release tablet 5 mg  sodium chloride 0.9 % bolus 1,500 mL  potassium phosphates 30 mmol in dextrose 5 % in water (D5W) 250 mL IV  dextrose 50 % injection 25 g  glucagon (Glucagen) injection 1 mg  dextrose 50 % injection 12.5 g  insulin lispro (HumaLOG) injection 0-10 Units  apixaban (Eliquis) tablet 10 mg  apixaban (Eliquis) tablet 5 mg  lactated Ringer's bolus 1,000 mL      cefTRIAXone (Rocephin) 2 g IV in dextrose 5% 50 mL  piperacillin-tazobactam-dextrose (Zosyn) IV 3.375 g  lactated Ringer's bolus 1,000 mL  vancomycin (Vancocin) pharmacy to dose - pharmacy monitoring  tiZANidine (Zanaflex) tablet 4 mg  chlorproMAZINE (Thorazine) tablet 25 mg  lactated Ringer's bolus 1,000 mL  lactated Ringer's bolus 1,000 mL  vancomycin (Vancocin) in dextrose 5 % water (D5W) 250 mL IV 1,250 mg  magnesium sulfate IV 2 g  scopolamine (Transderm-Scop) patch 1 patch  fentaNYL (Duragesic) 25 mcg/hr patch 1 patch  sennosides-docusate sodium (Mireille-Colace) 8.6-50 mg per tablet 2 tablet  vancomycin (Vancocin) in dextrose 5 % water (D5W) 250 mL IV 1,250 mg  micafungin (Mycamine) 100 mg in dextrose 5 % in water (D5W) 100 mL IV  magnesium oxide (Mag-Ox) tablet 400 mg  famotidine (Pepcid) tablet 20 mg  magnesium sulfate IV 2 g  famotidine (Pepcid) tablet 20 mg  vancomycin (Vancocin) in dextrose 5 % water (D5W) 250 mL IV 1,250 mg  vancomycin (Vancocin) in dextrose 5 % water (D5W) 500 mL IV 1,500 mg      Relevant Results  Labs  Results from last 72 hours   Lab Units 03/22/24  0505 03/21/24  0617 03/20/24  1803 03/20/24  0551 03/19/24  1813   WBC AUTO x10*3/uL 4.2* 3.1* 3.5*   < > 3.7*   HEMOGLOBIN g/dL 8.8* 8.2* 7.9*   < > 7.1*   HEMATOCRIT %  "27.0* 26.2* 24.3*   < > 22.3*   PLATELETS AUTO x10*3/uL 50* 34* 27*   < > 17*   NEUTROS PCT AUTO %  --   --  72.8  --  81.6   LYMPHS PCT AUTO %  --   --  11.7  --  7.1   MONOS PCT AUTO %  --   --  13.2  --  9.6   EOS PCT AUTO %  --   --  1.4  --  0.0    < > = values in this interval not displayed.     Results from last 72 hours   Lab Units 03/22/24  0505 03/21/24  0617 03/20/24  0551   SODIUM mmol/L 135* 138 140   POTASSIUM mmol/L 3.8 3.9 3.9   CHLORIDE mmol/L 103 107 112*   CO2 mmol/L 26 24 23   BUN mg/dL 12 13 24*   CREATININE mg/dL 1.33* 1.40* 1.43*   GLUCOSE mg/dL 69* 74 96   CALCIUM mg/dL 8.2* 8.0* 8.2*   ANION GAP mmol/L 10 11 9*   EGFR mL/min/1.73m*2 59* 56* 54*   PHOSPHORUS mg/dL 3.9 3.9 3.1     Results from last 72 hours   Lab Units 03/22/24  0505 03/21/24  0617 03/20/24  0551   ALK PHOS U/L 123 106 109   BILIRUBIN TOTAL mg/dL 1.2 1.3* 1.7*   BILIRUBIN DIRECT mg/dL  --  0.4* 0.5*   PROTEIN TOTAL g/dL 6.1* 5.6* 5.7*   ALT U/L 21 22 27   AST U/L 41* 45* 75*   ALBUMIN g/dL 2.8* 2.5* 2.6*  2.6*     Estimated Creatinine Clearance: 53.6 mL/min (A) (by C-G formula based on SCr of 1.33 mg/dL (H)).  No results found for: \"CRP\"  Microbiology  Reviewed  Imaging  reviewed        Assessment/Plan   Sepsis, BC is negative  Elevated LFT, US no portal vein thrombosis  Metastatic esophageal cancer on FOLFOX plus trastuzumab      Recommendations :  Can stop the antibiotic   Discussed with the medical team      I spent minutes in the professional and overall care of this patient.      Emelyn Fowler MD  "

## 2024-03-22 NOTE — CARE PLAN
The patient's goals for the shift include      The clinical goals for the shift include Will have no c/o pain throughout shift    Over the shift, the patient did not make progress toward the following goals. Barriers to progression include . Recommendations to address these barriers include   Problem: Pain  Goal: My pain/discomfort is manageable  Outcome: Progressing  Flowsheets (Taken 3/22/2024 0019)  Resident's pain/discomfort is manageable: Include resident/family/caregiver in decisions related to pain management   .

## 2024-03-22 NOTE — PROGRESS NOTES
Massimo Garcia is a 63 y.o. male on day 4 of admission presenting with sepsis.      Subjective   Patient was seen at the bedside this morning resting comfortably in bed with no acute events overnight.  He continues to feel well without any concerns.        Objective     Last Recorded Vitals  /78 (BP Location: Left arm, Patient Position: Lying)   Pulse 100   Temp 36.5 °C (97.7 °F) (Temporal)   Resp 16   Wt 74 kg (163 lb 2.3 oz)   SpO2 99%   Intake/Output last 3 Shifts:    Intake/Output Summary (Last 24 hours) at 3/22/2024 1249  Last data filed at 3/22/2024 0200  Gross per 24 hour   Intake 555 ml   Output --   Net 555 ml         Admission Weight  Weight: 73.5 kg (162 lb) (03/18/24 1552)    Daily Weight  03/18/24 : 74 kg (163 lb 2.3 oz)    Physical Exam  Constitutional:       General: He is not in acute distress.     Appearance: He is not toxic-appearing.      Comments: Jaundice has resolved   HENT:      Head: Normocephalic and atraumatic.   Eyes:      Extraocular Movements: Extraocular movements intact.      Pupils: Pupils are equal, round, and reactive to light.   Cardiovascular:      Rate and Rhythm: Normal rate and regular rhythm.      Heart sounds: No murmur heard.     No gallop.      Comments: Port in place in right upper chest, and pacemaker in place in left upper chest  Pulmonary:      Effort: Pulmonary effort is normal. No respiratory distress.      Breath sounds: Normal breath sounds. No wheezing or rales.   Abdominal:      General: There is no distension.      Palpations: Abdomen is soft.      Tenderness: There is no abdominal tenderness.   Musculoskeletal:      Right lower leg: No edema.      Left lower leg: No edema.   Skin:     General: Skin is warm and dry.      Coloration: Skin is not jaundiced.   Neurological:      General: No focal deficit present.      Mental Status: He is alert and oriented to person, place, and time. Mental status is at baseline.         Relevant Results  Scheduled  medications  [Held by provider] enoxaparin, 40 mg, subcutaneous, q24h  famotidine, 20 mg, oral, Daily  fentaNYL, 1 patch, transdermal, q72h  insulin lispro, 0-10 Units, subcutaneous, With meals & nightly  melatonin, 3 mg, oral, Daily  [Held by provider] metoprolol succinate XL, 25 mg, oral, Daily  scopolamine, 1 patch, transdermal, q72h  sucralfate, 1 g, oral, BID AC      Continuous medications     PRN medications  PRN medications: acetaminophen **OR** acetaminophen **OR** acetaminophen, dextrose, dextrose, glucagon, LORazepam, metoclopramide, oxyCODONE, sennosides-docusate sodium  Results for orders placed or performed during the hospital encounter of 03/18/24 (from the past 24 hour(s))   POCT GLUCOSE   Result Value Ref Range    POCT Glucose 125 (H) 74 - 99 mg/dL   POCT GLUCOSE   Result Value Ref Range    POCT Glucose 95 74 - 99 mg/dL   Vancomycin, Trough   Result Value Ref Range    Vancomycin, Trough 10.9 5.0 - 20.0 ug/mL   Magnesium   Result Value Ref Range    Magnesium 1.77 1.60 - 2.40 mg/dL   CBC   Result Value Ref Range    WBC 4.2 (L) 4.4 - 11.3 x10*3/uL    nRBC 0.0 0.0 - 0.0 /100 WBCs    RBC 2.97 (L) 4.50 - 5.90 x10*6/uL    Hemoglobin 8.8 (L) 13.5 - 17.5 g/dL    Hematocrit 27.0 (L) 41.0 - 52.0 %    MCV 91 80 - 100 fL    MCH 29.6 26.0 - 34.0 pg    MCHC 32.6 32.0 - 36.0 g/dL    RDW 16.7 (H) 11.5 - 14.5 %    Platelets 50 (L) 150 - 450 x10*3/uL   Comprehensive metabolic panel   Result Value Ref Range    Glucose 69 (L) 74 - 99 mg/dL    Sodium 135 (L) 136 - 145 mmol/L    Potassium 3.8 3.5 - 5.3 mmol/L    Chloride 103 98 - 107 mmol/L    Bicarbonate 26 21 - 32 mmol/L    Anion Gap 10 10 - 20 mmol/L    Urea Nitrogen 12 6 - 23 mg/dL    Creatinine 1.33 (H) 0.50 - 1.30 mg/dL    eGFR 59 (L) >60 mL/min/1.73m*2    Calcium 8.2 (L) 8.6 - 10.3 mg/dL    Albumin 2.8 (L) 3.4 - 5.0 g/dL    Alkaline Phosphatase 123 33 - 136 U/L    Total Protein 6.1 (L) 6.4 - 8.2 g/dL    AST 41 (H) 9 - 39 U/L    Bilirubin, Total 1.2 0.0 - 1.2 mg/dL     ALT 21 10 - 52 U/L   Phosphorus   Result Value Ref Range    Phosphorus 3.9 2.5 - 4.9 mg/dL   POCT GLUCOSE   Result Value Ref Range    POCT Glucose 63 (L) 74 - 99 mg/dL   POCT GLUCOSE   Result Value Ref Range    POCT Glucose 120 (H) 74 - 99 mg/dL      US liver with doppler    Result Date: 3/21/2024    The echotexture of the liver is heterogeneous with no focal mass.   The main portal vein diameter of 15 mm is at the upper limits of normal. Portal and hepatic venous flow and hepatic arterial flow are within normal limits.    Assessment/Plan      Massimo Garcia is a 63 y.o. male with PMHx of third degree heart block s/p PPM (placed in November 2023), esophageal cancer with mets to liver and lungs on chemotherapy, PE, peripheral neuropathy, and portal vein thrombosis  who is admitted to the hospital for septic shock     Acute Medical Issues   #Sepsis of uncertain etiology  -Patient with chills, fevers, and chest pain while receiving a chemotherapy treatment on 3/18   -Potentially due to adverse reaction to oxaliplatin chemo  -UA positive for bacteria, but negative LE, negative WBC and patient denying dysuria so UTI is a potential but unlikely cause of sepsis  -Patient given 1L of IVF in the ED and 1.5L IVF overnight  -Patient has continued to be hypotensive 70-80s/40-50s  -Worsening NORTH with uptrending Cr 0.89=>1.35=>1.43  -Improving lactate 3.1=>3.0=>3.5=>2.0=>1.7  -Urine culture negative  -Patient received ~4.5L of fluids on 3/19 for persistent hypotension   -Pressures improved on 3/20: 128/71 => 100/64  -CT Abd/Pelvis: Overall interval progression of metastatic disease to lungs, subcutaneous soft tissue of th left gluteal region, and liver.  Interval increase in mesenteric fat stranding/edema body wall edema, and small bilateral pleural effusions consistent with 3rd spacing of fluids    Plan:  -Blood cultures negative to date x3 days.  Plan to discontinue antibiotics today, and monitor overnight with plan to discharge  tomorrow if patient remains well.  -Patient blood pressures continue to be stable  -ID consulted: Agree with stopping antibiotics    #Thrombocytopenia  #Anemia  #Concern for DIC  -Patient with chronic thrombocytopenia baseline 70s-100s  -Platelets initially at baseline 79 on presentation, but dropped to 29  -Hb also dropped 10.8=>9.0  -Elevated D-Dimer, PT, INR, ferritin, with decreased fibrinogen   -These findings are consistent with and concerning for development of DIC  -INR improved to 1.3, with improved Fibrinogen, PT, and aPTT  -Anemia is most likely secondary to nonimmune hemolysis given elevated indirect bili, decreased haptoglobin, and negative DG  -Haptoglobin < 10    Plan:  -DIC is most likely secondary to sepsis  -Continue to monitor closely for signs of bleeding or clotting  -Oncology consulted, appreciate recs:   -Platelet transfusion for < 20 and cryo if fibrinogen < 100   -There are case reports of oxaliplatin induced immune hemolytic anemia   -Transfuse for Hb < 7  -Platelets improved to 50, will discharge on Eliquis tomorrow if platelets continue to improve.    #Hyperbilirubinemia: RESOLVED  -Patient appears jaundiced with elevated bilirubin levels  -Total bilirubin baseline ~1  -T Bili 6.3 on presentation => 3.2  -Direct Bili 1.5  -Hyperbilirubinemia has resolved now 1.3.    -Patient with mixed direct and indirect hyperbilirubinemia, most likely a combination of hemolysis 2/2 DIC and known liver mets    Plan:  -Jaundiced has resolved today    #Hypophosphatemia: RESOLVED  -PO4 < 1.0 on presentation  -Hypophosphatemia is potential cause of lactic acidosis and multi organ failure     Plan:  -Given 30 mmol KPO4  -Repeat phosphate 3.4  -Will continue to monitor with RFPs    #Esophageal Cancer on chemotherapy  -Patient with known history of esophageal cancer with metastasis to the liver and lung  -CT PE showing significant interval progression in size and number of mediastinal and bilateral hilar lymph  nodes and numerous bilateral pulmonary nodules  -Follows with Dr. Blake  -Patient on active chemotherapy with trastuzumab, pembrolizumab, 5-FU leucovorin, and oxaliplatin.  -Has been on active chemotherapy since Jan 2023    Plan:  -Dr. Blake consulted  -Tylenol 650 for mild pain  -Oxycodone 5 for moderate-severe pain    #Chest pain secondary to chemotherapy reaction vs. PE: RESOLVED  -Patient with history of prior PE in January 2023, and was on Eliquis until January 2024 when it was stopped due to thrombocytopenia  -Patient's chest pain began during chemotherapy treatment and  improved with meperidine. PE seen on CT angiogram.   -S/p meperidine  -PESI score of 105; intermediate risk   -Patient started on Lovenox DVT ppx->held due to platelets of 76. Reassess CBC in AM and consider restarting.   -Consider echo for L heart strain tomorrow  -Patient has no oxygen requirement     Plan:  -Given size of PE on CT PE and severe thrombocytopenia, currently holding anticoagulation until platelets > 50,000     #Electrolyte Derangement  -Patient presented with hypokalemia and hypomagnesemia.   -S/p repletion, ordered repeat RFP and Mg levels for midnight after repletion is complete    Plan:  -Mg and K levels have improved, will continue to monitor and replete as necessary     #Hyperglycemia  -Glucose uptrending overnight, now 174  -Placed mild sliding scale      Chronic Medical Issues  #Hiccups  -Has had hiccups for 10 days  -Continue home chlorpromazine PRN     #HTN  -Continue metoprolol succinate      #GERD  -Continue Protonix  -Continue sucralfate      #Anxiety  -Continue PRN Lorazepam     F: PO intake & IVF PRN   E: Replete PRN  N: Regular diet  GI ppx: Protonix 40 mg daily  DVT ppx: Lovenox subcutaneous--HELD  Antibiotics: Vancomycin, Zosyn, Micofungin  Tubes/Lines/Drains: PIVs     Code Status: Full Code   Emergency Contact: Extended Emergency Contact Information  Primary Emergency Contact: Jane Garcia  Address: [id number]  [place] [address] [facility]  Home Phone: [phone]  Mobile Phone: [phone]  Relation: Spouse  Secondary Emergency Contact: JIDA  Mobile Phone: [phone]  Relation: Daughter  Preferred language: English   needed? No      Disposition: 63 y.o.male admitted for sepsis secondary to UTI. Continuing to treat with IV antibiotics pending blood cultures finalize as negative.    Rusty Pat MD  Transitional Year Resident  PGY-1  Epic Chat

## 2024-03-22 NOTE — PROGRESS NOTES
Physical Therapy    Physical Therapy Evaluation    Patient Name: Massimo Garcia  MRN: [id number]  Today's Date: 3/22/2024   Time Calculation  Start Time: 0912  Stop Time: 0927  Time Calculation (min): 15 min    Assessment/Plan   PT Assessment  Rehab Prognosis: Excellent  Barriers to Discharge: comorbidities  Evaluation/Treatment Tolerance: Patient tolerated treatment well  Medical Staff Made Aware: Yes  Strengths: Ability to acquire knowledge, Support of Caregivers  Barriers to Participation: Comorbidities  End of Session Communication: Bedside nurse  Assessment Comment: Patient tolerates mobility well, no concerns related to functional skills at this time. Patient also without concerns, remains active at home.  No skilled PT necessray  End of Session Patient Position: Up in chair, Alarm off, not on at start of session (Discussed with RN, no alarm necessary)  IP OR SWING BED PT PLAN  Inpatient or Swing Bed: Inpatient  PT Plan  PT Plan: PT Eval only  PT Eval Only Reason: At baseline function  PT Discharge Recommendations: No further acute PT  PT Recommended Transfer Status: Independent  PT - OK to Discharge: Yes (per PT POC)      Subjective   General Visit Information:  General  Reason for Referral: Impaired functional mobility; chest pain  Referred By: Serge Carty  Past Medical History Relevant to Rehab: third degree heart block s/p PPM (placed in November 2023), esophageal cancer with mets to liver and lungs on chemotherapy, PE, peripheral neuropathy, and portal vein thrombosis  Family/Caregiver Present: No  Prior to Session Communication: Bedside nurse  Patient Position Received: Bed, 3 rail up, Alarm off, not on at start of session  General Comment: Patient pleasant, cooperative, agreeable to PT assessment  Home Living:  Home Living  Type of Home: House  Lives With: Spouse  Home Adaptive Equipment: None  Home Layout: Two level, Stairs to alternate level with rails  Alternate Level Stairs-Rails: Right  Alternate Level  Stairs-Number of Steps: 12  Home Access: Stairs to enter with rails  Entrance Stairs-Rails: Right  Entrance Stairs-Number of Steps: 4  Home Living Comments: (+) driving, active  Prior Level of Function:  Prior Function Per Pt/Caregiver Report  Level of [place]: Independent with ADLs and functional transfers, Independent with homemaking with ambulation  ADL Assistance: Independent  Homemaking Assistance: Independent  Ambulatory Assistance: Independent  Precautions:  Precautions  Medical Precautions:  (IV, chemo protective precautions)  Vital Signs:       Objective   Pain:  Pain Assessment  Pain Assessment: 0-10  Pain Score: 0 - No pain  Cognition:  Cognition  Overall Cognitive Status: Within Functional Limits  Orientation Level: Oriented X4    General Assessments:                  Activity Tolerance  Endurance: Tolerates less than 10 min exercise, no significant change in vital signs    Sensation  Light Touch: No apparent deficits    Strength  Strength Comments: Functionally B LE 4+/5    Coordination  Movements are Fluid and Coordinated: Yes    Postural Control  Postural Control: Within Functional Limits    Static Sitting Balance  Static Sitting-Balance Support: No upper extremity supported, Feet supported  Static Sitting-Level of Assistance: Independent    Static Standing Balance  Static Standing-Balance Support: No upper extremity supported  Static Standing-Level of Assistance: Distant supervision  Functional Assessments:  ADL  ADL's Addressed: Yes (lower dressing: independent at EOB, upper dressing: independent)    Bed Mobility  Bed Mobility: Yes  Bed Mobility 1  Bed Mobility 1: Supine to sitting  Level of Assistance 1: Independent    Transfers  Transfer: Yes  Transfer 1  Transfer From 1: Sit to, Stand to  Transfer to 1: Sit, Stand  Technique 1: Sit to stand, Stand to sit  Transfer Level of Assistance 1: Independent    Ambulation/Gait Training  Ambulation/Gait Training Performed: Yes  Ambulation/Gait Training  1  Surface 1: Level tile  Device 1: No device  Assistance 1: Distant supervision (PT managed IV pole)  Quality of Gait 1:  (steady)  Comments/Distance (ft) 1: 15'    Outcome Measures:  [facility] Basic Mobility  Turning from your back to your side while in a flat bed without using bedrails: None  Moving from lying on your back to sitting on the side of a flat bed without using bedrails: None  Moving to and from bed to chair (including a wheelchair): None  Standing up from a chair using your arms (e.g. wheelchair or bedside chair): None  To walk in hospital room: None  Climbing 3-5 steps with railing: None  Basic Mobility - Total Score: 24    Education Documentation  Body Mechanics, taught by Sarah Will PT at 3/22/2024 10:17 AM.  Learner: Patient  Readiness: Acceptance  Method: Explanation  Response: Verbalizes Understanding    Mobility Training, taught by Sarah Will PT at 3/22/2024 10:17 AM.  Learner: Patient  Readiness: Acceptance  Method: Explanation  Response: Verbalizes Understanding    Body Mechanics, taught by Sarah Will PT at 3/22/2024 10:17 AM.  Learner: Patient  Readiness: Acceptance  Method: Explanation  Response: Verbalizes Understanding    Mobility Training, taught by Sarah Will PT at 3/22/2024 10:17 AM.  Learner: Patient  Readiness: Acceptance  Method: Explanation  Response: Verbalizes Understanding    Education Comments  No comments found.

## 2024-03-22 NOTE — PROGRESS NOTES
Vancomycin Dosing by Pharmacy- Cessation of Therapy    Consult to pharmacy for vancomycin dosing has been discontinued by the prescriber, pharmacy will sign off at this time.    Please call pharmacy if there are further questions or re-enter a consult if vancomycin is resumed.     James Mendoza, PharmD

## 2024-03-22 NOTE — PROGRESS NOTES
"Massimo Garcia is a 63 y.o. male on day 4 of admission presenting with Cystitis.    Subjective   He is still having decreased taste and some pain in the mouth  However able to have some p.o. intake  Denies vomiting  Has mild abdominal discomfort in the left side  Had a normal bowel movement today  Remains afebrile  Ambulated with physical therapy    Objective     Physical Exam    General: Lethargic, oriented x 3  HEENT: Icteric sclera  Abdomen: Soft and nontender   Heart: Regular no murmur  Lungs: Clear to auscultation  Extremities: No edema    Last Recorded Vitals  Blood pressure 116/69, pulse 103, temperature 37 °C (98.6 °F), temperature source Temporal, resp. rate 16, height 1.727 m (5' 8\"), weight 74 kg (163 lb 2.3 oz), SpO2 98 %.  Intake/Output last 3 Shifts:  I/O last 3 completed shifts:  In: 1690 (22.8 mL/kg) [P.O.:480; I.V.:100 (1.4 mL/kg); IV Piggyback:1110]  Out: - (0 mL/kg)   Weight: 74 kg     Relevant Results  Results for orders placed or performed during the hospital encounter of 03/18/24 (from the past 24 hour(s))   POCT GLUCOSE   Result Value Ref Range    POCT Glucose 125 (H) 74 - 99 mg/dL   POCT GLUCOSE   Result Value Ref Range    POCT Glucose 95 74 - 99 mg/dL   Vancomycin, Trough   Result Value Ref Range    Vancomycin, Trough 10.9 5.0 - 20.0 ug/mL   Magnesium   Result Value Ref Range    Magnesium 1.77 1.60 - 2.40 mg/dL   CBC   Result Value Ref Range    WBC 4.2 (L) 4.4 - 11.3 x10*3/uL    nRBC 0.0 0.0 - 0.0 /100 WBCs    RBC 2.97 (L) 4.50 - 5.90 x10*6/uL    Hemoglobin 8.8 (L) 13.5 - 17.5 g/dL    Hematocrit 27.0 (L) 41.0 - 52.0 %    MCV 91 80 - 100 fL    MCH 29.6 26.0 - 34.0 pg    MCHC 32.6 32.0 - 36.0 g/dL    RDW 16.7 (H) 11.5 - 14.5 %    Platelets 50 (L) 150 - 450 x10*3/uL   Comprehensive metabolic panel   Result Value Ref Range    Glucose 69 (L) 74 - 99 mg/dL    Sodium 135 (L) 136 - 145 mmol/L    Potassium 3.8 3.5 - 5.3 mmol/L    Chloride 103 98 - 107 mmol/L    Bicarbonate 26 21 - 32 mmol/L    Anion Gap " 10 10 - 20 mmol/L    Urea Nitrogen 12 6 - 23 mg/dL    Creatinine 1.33 (H) 0.50 - 1.30 mg/dL    eGFR 59 (L) >60 mL/min/1.73m*2    Calcium 8.2 (L) 8.6 - 10.3 mg/dL    Albumin 2.8 (L) 3.4 - 5.0 g/dL    Alkaline Phosphatase 123 33 - 136 U/L    Total Protein 6.1 (L) 6.4 - 8.2 g/dL    AST 41 (H) 9 - 39 U/L    Bilirubin, Total 1.2 0.0 - 1.2 mg/dL    ALT 21 10 - 52 U/L   Phosphorus   Result Value Ref Range    Phosphorus 3.9 2.5 - 4.9 mg/dL   POCT GLUCOSE   Result Value Ref Range    POCT Glucose 63 (L) 74 - 99 mg/dL   POCT GLUCOSE   Result Value Ref Range    POCT Glucose 120 (H) 74 - 99 mg/dL       Assessment/Plan   Principal Problem:    Cystitis  Active Problems:    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)    Chest heaviness    Chemotherapy-induced neuropathy (CMS/HCC)    Malignant neoplasm metastatic to liver (CMS/HCC)    Thrombocytopenia (CMS/HCC)    NORTH (acute kidney injury) (CMS/HCC)    PE (pulmonary thromboembolism) (CMS/HCC)    Fever and sepsis     The differential diagnosis includes infection of which the source could be UTI versus cholangitis, or reaction to the oxaliplatin although would not  expect hypotension at this time      Obtain CT abdomen and pelvis  Continue broad-spectrum antibiotic with vancomycin and Zosyn  Pressor support if required  Follow cultures    3/20/24- he responded to fluid resuscitation yesterday and sepsis seems to be resolving with normal BP today and no fever   Continue broad spectrum abx, urine culture negative and Bcx NGTD     3/22/2024-  Sepsis has mainly resolved, antibiotics will be discontinued today and he will opt to be observed off of that, with potential discharge tomorrow if he continues to do well  Cultures negative so far     2.  Thrombocytopenia  Baseline thrombocytopenia with worsening down to 29,000  Secondary to sepsis and labs also suggestive of DIC  Support with platelet transfusion if the count drops less than 20,000 and cryoprecipitate if the fibrinogen  drops less than 100  Ordered blood smear review by path     3/20/24- platelets improved from yesterday afternoon count, continue to monitor and support as needed to keep > 20,000   3/22/2024-platelets improved today to 50,000  He can be restarted on anticoagulation for his PE  Magic mouthwash for mucositis     3.  Anemia  Likely anemia of chronic disease/inflammation  The bilirubinemia was mixed so we will order hemolysis workup including LDH, reticulocyte, haptoglobin has been ordered, ordered DG  There are case reports of oxaliplatin induced immune hemolytic anemia  Monitor the hemoglobin and support with transfusion if hemoglobin drops less than 7 g/dL    3/20- the work up suggests an element of non immune hemolysis, the hg improved today as well, we will repeat a cbc for this evening     3/22/2024-hemoglobin stabilized today, with slight improvement to 8.8 g/dL     4.  Metastatic esophageal cancer  His disease seems to be progressing on FOLFOX plus trastuzumab  At this time he wishes to continue cancer therapy when he stabilizes and to continue full supportive measures  His primary oncologist Dr. Blake was updated    3/22/2024-patient to follow-up as outpatient with Dr. Blake and discuss next line of treatment     5.  Pulmonary embolism  Previously on Eliquis  His platelets at this time are prohibitive of full dose anticoagulation, but we will follow to when it could be started  Consider Dopplers of the lower extremities    3/20- if the plts trend up to > 50,000 then anticoagulation can be resumed   3/22/2024-platelets continue to improve, can resume anticoagulation      Thanks for the consult       Bhavin Frankel MD

## 2024-03-22 NOTE — PROGRESS NOTES
03/22/24 0902   Discharge Planning   Living Arrangements Spouse/significant other   Support Systems Spouse/significant other   Assistance Needed A&OX3; independent with ADLs with no assistive devices; drives; room air baseline and room air now; chemo every other Mon at  [facility]   Type of Residence Private residence   Number of Stairs to Enter Residence 4   Number of Stairs Within Residence 14   Do you have animals or pets at home? Yes   Type of Animals or Pets 2 dogs, 2 cats   Who is requesting discharge planning? Provider   Patient expects to be discharged to: home no need   Does the patient need discharge transport arranged? Yes   RoundTrip coordination needed? Yes   Has discharge transport been arranged? No   Financial Resource Strain   How hard is it for you to pay for the very basics like food, housing, medical care, and heating? Not hard   Housing Stability   In the last 12 months, was there a time when you were not able to pay the mortgage or rent on time? N   In the last 12 months, how many places have you lived? 1   In the last 12 months, was there a time when you did not have a steady place to sleep or slept in a shelter (including now)? N   Transportation Needs   In the past 12 months, has lack of transportation kept you from medical appointments or from getting medications? no   In the past 12 months, has lack of transportation kept you from meetings, work, or from getting things needed for daily living? No     03/22/2024 0902am  Spoke with patient (spouse on speaker phone while speaking with patient) and spouse and both still denying any discharge needs at this time.

## 2024-03-23 VITALS
DIASTOLIC BLOOD PRESSURE: 76 MMHG | HEART RATE: 92 BPM | TEMPERATURE: 98.1 F | OXYGEN SATURATION: 97 % | WEIGHT: 163.14 LBS | RESPIRATION RATE: 18 BRPM | SYSTOLIC BLOOD PRESSURE: 121 MMHG | HEIGHT: 68 IN | BODY MASS INDEX: 24.73 KG/M2

## 2024-03-23 PROBLEM — N17.9 AKI (ACUTE KIDNEY INJURY) (CMS-HCC): Status: RESOLVED | Noted: 2024-03-19 | Resolved: 2024-03-23

## 2024-03-23 PROBLEM — N30.90 CYSTITIS: Status: RESOLVED | Noted: 2024-03-18 | Resolved: 2024-03-23

## 2024-03-23 PROBLEM — R07.89 CHEST HEAVINESS: Status: RESOLVED | Noted: 2023-09-25 | Resolved: 2024-03-23

## 2024-03-23 LAB
ALBUMIN SERPL BCP-MCNC: 2.9 G/DL (ref 3.4–5)
ANION GAP SERPL CALC-SCNC: 9 MMOL/L (ref 10–20)
BACTERIA BLD CULT: NORMAL
BUN SERPL-MCNC: 12 MG/DL (ref 6–23)
CALCIUM SERPL-MCNC: 8.5 MG/DL (ref 8.6–10.3)
CHLORIDE SERPL-SCNC: 106 MMOL/L (ref 98–107)
CO2 SERPL-SCNC: 27 MMOL/L (ref 21–32)
CREAT SERPL-MCNC: 1.2 MG/DL (ref 0.5–1.3)
EGFRCR SERPLBLD CKD-EPI 2021: 67 ML/MIN/1.73M*2
ERYTHROCYTE [DISTWIDTH] IN BLOOD BY AUTOMATED COUNT: 17.3 % (ref 11.5–14.5)
GLUCOSE BLD MANUAL STRIP-MCNC: 111 MG/DL (ref 74–99)
GLUCOSE BLD MANUAL STRIP-MCNC: 91 MG/DL (ref 74–99)
GLUCOSE SERPL-MCNC: 73 MG/DL (ref 74–99)
HCT VFR BLD AUTO: 28.9 % (ref 41–52)
HGB BLD-MCNC: 9.3 G/DL (ref 13.5–17.5)
MAGNESIUM SERPL-MCNC: 1.61 MG/DL (ref 1.6–2.4)
MCH RBC QN AUTO: 29.5 PG (ref 26–34)
MCHC RBC AUTO-ENTMCNC: 32.2 G/DL (ref 32–36)
MCV RBC AUTO: 92 FL (ref 80–100)
NRBC BLD-RTO: 0 /100 WBCS (ref 0–0)
PHOSPHATE SERPL-MCNC: 4.1 MG/DL (ref 2.5–4.9)
PLATELET # BLD AUTO: 67 X10*3/UL (ref 150–450)
POTASSIUM SERPL-SCNC: 3.8 MMOL/L (ref 3.5–5.3)
RBC # BLD AUTO: 3.15 X10*6/UL (ref 4.5–5.9)
SODIUM SERPL-SCNC: 138 MMOL/L (ref 136–145)
WBC # BLD AUTO: 4.9 X10*3/UL (ref 4.4–11.3)

## 2024-03-23 PROCEDURE — 2500000001 HC RX 250 WO HCPCS SELF ADMINISTERED DRUGS (ALT 637 FOR MEDICARE OP): Performed by: STUDENT IN AN ORGANIZED HEALTH CARE EDUCATION/TRAINING PROGRAM

## 2024-03-23 PROCEDURE — 82947 ASSAY GLUCOSE BLOOD QUANT: CPT

## 2024-03-23 PROCEDURE — 99239 HOSP IP/OBS DSCHRG MGMT >30: CPT

## 2024-03-23 PROCEDURE — 80069 RENAL FUNCTION PANEL: CPT

## 2024-03-23 PROCEDURE — 2500000002 HC RX 250 W HCPCS SELF ADMINISTERED DRUGS (ALT 637 FOR MEDICARE OP, ALT 636 FOR OP/ED)

## 2024-03-23 PROCEDURE — 2500000004 HC RX 250 GENERAL PHARMACY W/ HCPCS (ALT 636 FOR OP/ED): Performed by: STUDENT IN AN ORGANIZED HEALTH CARE EDUCATION/TRAINING PROGRAM

## 2024-03-23 PROCEDURE — 2500000001 HC RX 250 WO HCPCS SELF ADMINISTERED DRUGS (ALT 637 FOR MEDICARE OP)

## 2024-03-23 PROCEDURE — 85027 COMPLETE CBC AUTOMATED: CPT

## 2024-03-23 PROCEDURE — 83735 ASSAY OF MAGNESIUM: CPT

## 2024-03-23 RX ORDER — PANTOPRAZOLE SODIUM 40 MG/1
40 TABLET, DELAYED RELEASE ORAL
Qty: 30 TABLET | Refills: 0 | Status: SHIPPED | OUTPATIENT
Start: 2024-03-23 | End: 2024-05-06 | Stop reason: HOSPADM

## 2024-03-23 RX ORDER — HEPARIN 100 UNIT/ML
5 SYRINGE INTRAVENOUS AS NEEDED
Status: DISCONTINUED | OUTPATIENT
Start: 2024-03-23 | End: 2024-03-23 | Stop reason: HOSPADM

## 2024-03-23 RX ADMIN — SUCRALFATE 1 G: 1 TABLET ORAL at 06:12

## 2024-03-23 RX ADMIN — OXYCODONE HYDROCHLORIDE 5 MG: 5 TABLET ORAL at 06:12

## 2024-03-23 RX ADMIN — LORAZEPAM 1 MG: 1 TABLET ORAL at 10:44

## 2024-03-23 RX ADMIN — HEPARIN 500 UNITS: 100 SYRINGE at 12:29

## 2024-03-23 RX ADMIN — FAMOTIDINE 20 MG: 20 TABLET, FILM COATED ORAL at 09:22

## 2024-03-23 RX ADMIN — APIXABAN 5 MG: 5 TABLET, FILM COATED ORAL at 09:22

## 2024-03-23 RX ADMIN — FENTANYL 1 PATCH: 25 PATCH TRANSDERMAL at 09:23

## 2024-03-23 ASSESSMENT — PAIN - FUNCTIONAL ASSESSMENT: PAIN_FUNCTIONAL_ASSESSMENT: 0-10

## 2024-03-23 ASSESSMENT — PAIN DESCRIPTION - DESCRIPTORS: DESCRIPTORS: ACHING;DISCOMFORT

## 2024-03-23 ASSESSMENT — PAIN SCALES - GENERAL: PAINLEVEL_OUTOF10: 7

## 2024-03-23 NOTE — DISCHARGE SUMMARY
Discharge Diagnosis  Chemotherapy induced DIC    Issues Requiring Follow-Up  Medication review  Test Results Pending At Discharge  Pending Labs       Order Current Status    Extra Urine Schilling Tube Collected (03/18/24 1488)    Urinalysis with Reflex Culture and Microscopic In process            Hospital Course  HPI: Massimo Garcia is a 63 y.o. male with a PMH of third degree heart block s/p PPM (placed in November 2023), esophageal cancer with mets to liver and lungs on chemotherapy, PE, peripheral neuropathy, and portal vein thrombosis who presented to the hospital for sudden-onset chills, shivers, and chest pain while receiving chemotherapy. Patient's wife and daughter are present at bedside to supplement patient history.  Patient states that he was in the middle of receiving a run of chemotherapy when he started feeling chills and shivers.  His symptoms then progressed and he started experiening nonradiating substernal squeezing chest pains so he was brought in to the ER.  He additionally endorses having an episode of discolored urination during his chemo as well where it appeared red and green.      Of note, patient was previously admitted to the hospital 3/5-3/7 after experiencing fevers and chills during a cycle of chemotherapy.  At that time he was treated with vancomycin and zosyn during his inpatient stay and was discharged on a course of Augmentin without clear source of infection identified.    ED Course:  In the ED patient met sepsis criteria with mild tachycardia, one time fever, and soft blood pressures in the 100s/50s.  Workup was relevant for significant abnormalities in his CMP and CBC.  CMP demonstrated hypokalemia, hypomagnesemia, and a hyperbilirubinemia (6.3).  Initial troponins were 34 which increased to 50.  Lactate was initially elevated as well at 3.1.  CBC demonstrated a leukocytosis of 12.9 with a thrombocytopenia of 76 consistent with his recent baseline.  Imaging workup with CT PE and CXR was  relevant for tiny nonocclusive emboli within the right lower lobe, and worsening of his known metastasis to his lungs.  UA also demonstrated 4+ bacteria and positive nitrites with negative LE and WBC.  Patient was started on vancomycin, zosyn, and given 1L IVF for presumed UTI sepsis.  He was not started on any anticoagulation due to persistent thrombocytopenia and he was admitted to the floor.    Floor Course:  On arrival to the floor patient continued to be hypotensive requiring extensive administration of IVF.  Repeat CBC demonstrated worsening thrombocytopenia and anemia so DIC labs were ordered.  INR, fibrinogen, D-Dimer, and ferritin labs were all consistent with DIC.  He was also found to have a phosphate of < 1 which was repleted and was wnl on repeat RFP.  Throughout the day on 3/19 he received 4 additional liters of LR for hypotension and ICU Dr. Carson was consulted for potential transfer to the ICU for pressure support in the setting of sepsis.  Patient's blood pressure stabilized following the extensive fluid resuscitation he received on 3/19.  His symptoms improved significantly, his jaundice resolved, and his coagulopathy improved as well.  No obvious source of infection was found, as patient's blood cultures were negative, his CT Abd/Pelvis did not show any acute processes concerning for infection, and his urine culture was negative.  Discussion was had with oncology and infectious disease, and given that there is no obvious source of infection patient's presentation, sepsis, and nonimmune hemolysis were most likely a reaction to his oxaliplatin chemotherapy.  He continued to be stable and improve from 3/20-3/22 while on vancomycin, zosyn, and micofungin and the decision was made with ID to stop his antibiotics on 3/22 and continue to monitor him throughout the day to see if he continued to improve while off antibiotics.  Patient did well while off antibiotics, and decision was made to discharge him  home on 3/23.  He was instructed to follow up closely with his oncologist for continued management of his active cancer as he will need a new chemotherapy regimen.    03/24. Eliquis was restart due to improve platelet count. He was discharge in stable condition and advise to follow up with his pcp and hematologist    Pertinent Physical Exam At Time of Discharge  Physical Exam  HENT:      Head: Normocephalic.   Cardiovascular:      Rate and Rhythm: Normal rate and regular rhythm.   Pulmonary:      Effort: Pulmonary effort is normal.   Abdominal:      General: Abdomen is flat. Bowel sounds are normal.      Palpations: Abdomen is soft.   Musculoskeletal:         General: Normal range of motion.   Skin:     General: Skin is warm.      Capillary Refill: Capillary refill takes less than 2 seconds.   Neurological:      Mental Status: He is alert.         Home Medications     Medication List      CHANGE how you take these medications     LORazepam 1 mg tablet; Commonly known as: Ativan; Take 1 tablet (1 mg)   by mouth every 8 hours if needed for anxiety (nausea).; What changed:   additional instructions   metoprolol succinate XL 25 mg 24 hr tablet; Commonly known as:   Toprol-XL; Take 1 tablet (25 mg) by mouth once daily. Do not crush or   chew.; What changed: when to take this     CONTINUE taking these medications     apixaban 5 mg tablet; Commonly known as: Eliquis; TAKE 1 TABLET BY MOUTH   TWO TIMES A DAY   chlorproMAZINE 25 mg tablet; Commonly known as: Thorazine; Take 1 tablet   (25 mg) by mouth 2 times a day.   cholecalciferol 50 MCG (2000 UT) tablet; Commonly known as: Vitamin D-3   Deep Sea Nasal 0.65 % nasal spray; Generic drug: sodium chloride;   Administer 1 spray into each nostril if needed for congestion (or nasal   dryness/ bleeding).   diphenhydrAMINE-nystatin-dexAMETHasone-doxycycline in sterile water   injection; Use 15 mL in the mouth or throat 4 times a day as needed for   mucositis   fentaNYL 25 mcg/hr  patch; Commonly known as: Duragesic   medical cannabis   multivitamin with minerals tablet   pantoprazole 40 mg EC tablet; Commonly known as: ProtoNix; Take 1 tablet   (40 mg) by mouth once daily in the morning. Take before meals.   potassium chloride CR 10 mEq ER tablet; Commonly known as: Klor-Con;   TAKE 1 TABLET BY MOUTH EVERY DAY   scopolamine 1 mg over 3 days patch 3 day; Commonly known as:   Transderm-Scop   sucralfate 1 gram tablet; Commonly known as: Carafate     STOP taking these medications     amoxicillin-pot clavulanate 875-125 mg tablet; Commonly known as:   Augmentin       Outpatient Follow-Up  Future Appointments   Date Time Provider Department Center   3/28/2024 10:00 AM JASPER Ferguson [email or web address] None   4/1/2024 10:00 AM INF 04 BIGG LOWE [facility]   4/15/2024  8:00 AM Tomasa Blake MD [email or web address]   4/15/2024  [facility]   4/29/2024  9:00 AM Tomasa Blake MD [email or web address]   4/29/2024 10:00 [facility]   7/1/2024 11:00 AM [facility]   7/2/2024 11:00 AM [facility]   7/3/2024 11:00 AM [facility]   7/8/2024  8:30 AM [facility]   7/9/2024  9:30 AM [facility]   7/10/2024  9:30 AM [facility]   7/11/2024  9:30 AM [facility]   7/15/2024  9:30 AM [facility] Cristofer Weir MD

## 2024-03-23 NOTE — PROGRESS NOTES
Massimo Garcia is a 63 y.o. male on day 5 of admission presenting with Cystitis.    Subjective   Interval History: no fever, no new complaints        Review of Systems    Objective   Range of Vitals (last 24 hours)  Heart Rate:  []   Temp:  [36.8 °C (98.2 °F)-37 °C (98.6 °F)]   Resp:  [16-20]   BP: (112-117)/(69-72)   SpO2:  [95 %-98 %]   Daily Weight  03/18/24 : 74 kg (163 lb 2.3 oz)    Body mass index is 24.81 kg/m².    Physical Exam  Constitutional:       Appearance: Normal appearance.   HENT:      Head: Normocephalic and atraumatic.      Mouth/Throat:      Mouth: Mucous membranes are moist.      Pharynx: Oropharynx is clear.   Eyes:      Pupils: Pupils are equal, round, and reactive to light.   Cardiovascular:      Rate and Rhythm: Normal rate and regular rhythm.      Heart sounds: Normal heart sounds.   Pulmonary:      Effort: Pulmonary effort is normal.      Breath sounds: Normal breath sounds.   Abdominal:      General: Abdomen is flat. Bowel sounds are normal.      Palpations: Abdomen is soft.   Musculoskeletal:      Cervical back: Normal range of motion.   Neurological:      Mental Status: He is alert.         Antibiotics  HYDROmorphone (Dilaudid) injection 0.4 mg  sodium chloride 0.9 % bolus 500 mL  piperacillin-tazobactam-dextrose (Zosyn) IV 4.5 g  vancomycin (Vancocin) 2000 mg/500 ml in D5W 500 mL IV piggyback 2 g  acetaminophen (Tylenol) tablet 650 mg  HYDROmorphone (Dilaudid) injection 1 mg  HYDROmorphone (Dilaudid) injection  - Omnicell Override Pull  potassium chloride 20 mEq in 100 mL IV premix  acetaminophen (Tylenol) tablet 650 mg  sodium chloride 0.9 % bolus 500 mL  iohexol (OMNIPaque) 350 mg iodine/mL solution 61 mL  magnesium sulfate IV 2 g  aspirin chewable tablet 324 mg  enoxaparin (Lovenox) syringe 40 mg  melatonin tablet 3 mg  LORazepam (Ativan) tablet 1 mg  metoclopramide (Reglan) tablet 10 mg  metoprolol succinate XL (Toprol-XL) 24 hr tablet 25 mg  pantoprazole (ProtoNix) EC tablet 40  mg  sucralfate (Carafate) tablet 1 g  tiZANidine (Zanaflex) tablet 4 mg  chlorproMAZINE (Thorazine) tablet 25 mg  cefTRIAXone (Rocephin) IVPB 1 g  acetaminophen (Tylenol) tablet 650 mg  acetaminophen (Tylenol) oral liquid 650 mg  acetaminophen (Tylenol) suppository 650 mg  oxyCODONE (Roxicodone) immediate release tablet 5 mg  sodium chloride 0.9 % bolus 1,500 mL  potassium phosphates 30 mmol in dextrose 5 % in water (D5W) 250 mL IV  dextrose 50 % injection 25 g  glucagon (Glucagen) injection 1 mg  dextrose 50 % injection 12.5 g  insulin lispro (HumaLOG) injection 0-10 Units  apixaban (Eliquis) tablet 10 mg  apixaban (Eliquis) tablet 5 mg  lactated Ringer's bolus 1,000 mL      cefTRIAXone (Rocephin) 2 g IV in dextrose 5% 50 mL  piperacillin-tazobactam-dextrose (Zosyn) IV 3.375 g  lactated Ringer's bolus 1,000 mL  vancomycin (Vancocin) pharmacy to dose - pharmacy monitoring  tiZANidine (Zanaflex) tablet 4 mg  chlorproMAZINE (Thorazine) tablet 25 mg  lactated Ringer's bolus 1,000 mL  lactated Ringer's bolus 1,000 mL  vancomycin (Vancocin) in dextrose 5 % water (D5W) 250 mL IV 1,250 mg  magnesium sulfate IV 2 g  scopolamine (Transderm-Scop) patch 1 patch  fentaNYL (Duragesic) 25 mcg/hr patch 1 patch  sennosides-docusate sodium (Mireille-Colace) 8.6-50 mg per tablet 2 tablet  vancomycin (Vancocin) in dextrose 5 % water (D5W) 250 mL IV 1,250 mg  micafungin (Mycamine) 100 mg in dextrose 5 % in water (D5W) 100 mL IV  magnesium oxide (Mag-Ox) tablet 400 mg  famotidine (Pepcid) tablet 20 mg  magnesium sulfate IV 2 g  famotidine (Pepcid) tablet 20 mg  vancomycin (Vancocin) in dextrose 5 % water (D5W) 250 mL IV 1,250 mg  vancomycin (Vancocin) in dextrose 5 % water (D5W) 500 mL IV 1,500 mg      Relevant Results  Labs  Results from last 72 hours   Lab Units 03/23/24  0622 03/22/24  0505 03/21/24  0617 03/20/24  1803   WBC AUTO x10*3/uL 4.9 4.2* 3.1* 3.5*   HEMOGLOBIN g/dL 9.3* 8.8* 8.2* 7.9*   HEMATOCRIT % 28.9* 27.0* 26.2* 24.3*  "  PLATELETS AUTO x10*3/uL 67* 50* 34* 27*   NEUTROS PCT AUTO %  --   --   --  72.8   LYMPHS PCT AUTO %  --   --   --  11.7   MONOS PCT AUTO %  --   --   --  13.2   EOS PCT AUTO %  --   --   --  1.4       Results from last 72 hours   Lab Units 03/23/24  0622 03/22/24  0505 03/21/24  0617   SODIUM mmol/L 138 135* 138   POTASSIUM mmol/L 3.8 3.8 3.9   CHLORIDE mmol/L 106 103 107   CO2 mmol/L 27 26 24   BUN mg/dL 12 12 13   CREATININE mg/dL 1.20 1.33* 1.40*   GLUCOSE mg/dL 73* 69* 74   CALCIUM mg/dL 8.5* 8.2* 8.0*   ANION GAP mmol/L 9* 10 11   EGFR mL/min/1.73m*2 67 59* 56*   PHOSPHORUS mg/dL 4.1 3.9 3.9       Results from last 72 hours   Lab Units 03/23/24  0622 03/22/24  0505 03/21/24  0617   ALK PHOS U/L  --  123 106   BILIRUBIN TOTAL mg/dL  --  1.2 1.3*   BILIRUBIN DIRECT mg/dL  --   --  0.4*   PROTEIN TOTAL g/dL  --  6.1* 5.6*   ALT U/L  --  21 22   AST U/L  --  41* 45*   ALBUMIN g/dL 2.9* 2.8* 2.5*       Estimated Creatinine Clearance: 59.4 mL/min (by C-G formula based on SCr of 1.2 mg/dL).  No results found for: \"CRP\"  Microbiology  Reviewed  Imaging  reviewed        Assessment/Plan   Sepsis, BC is negative  Elevated LFT, US no portal vein thrombosis  Metastatic esophageal cancer on FOLFOX plus trastuzumab      Recommendations :  Supportive care  Discussed with the medical team      I spent minutes in the professional and overall care of this patient.      Emelyn Fowler MD  "

## 2024-03-25 ENCOUNTER — APPOINTMENT (OUTPATIENT)
Dept: HEMATOLOGY/ONCOLOGY | Facility: CLINIC | Age: 64
End: 2024-03-25
Payer: MEDICARE

## 2024-03-26 ENCOUNTER — TELEPHONE (OUTPATIENT)
Dept: ADMISSION | Facility: HOSPITAL | Age: 64
End: 2024-03-26
Payer: MEDICARE

## 2024-03-26 ENCOUNTER — OFFICE VISIT (OUTPATIENT)
Dept: HEMATOLOGY/ONCOLOGY | Facility: CLINIC | Age: 64
End: 2024-03-26
Payer: MEDICARE

## 2024-03-26 VITALS
HEART RATE: 105 BPM | WEIGHT: 158.95 LBS | BODY MASS INDEX: 24.17 KG/M2 | SYSTOLIC BLOOD PRESSURE: 116 MMHG | TEMPERATURE: 97.3 F | DIASTOLIC BLOOD PRESSURE: 76 MMHG | OXYGEN SATURATION: 98 % | RESPIRATION RATE: 18 BRPM

## 2024-03-26 DIAGNOSIS — C15.9 STAGE IV MALIGNANT NEOPLASM OF ESOPHAGUS (MULTI): ICD-10-CM

## 2024-03-26 DIAGNOSIS — F32.A ANXIETY AND DEPRESSION: ICD-10-CM

## 2024-03-26 DIAGNOSIS — F41.9 ANXIETY AND DEPRESSION: ICD-10-CM

## 2024-03-26 PROCEDURE — 3074F SYST BP LT 130 MM HG: CPT | Performed by: INTERNAL MEDICINE

## 2024-03-26 PROCEDURE — 1160F RVW MEDS BY RX/DR IN RCRD: CPT | Performed by: INTERNAL MEDICINE

## 2024-03-26 PROCEDURE — 99213 OFFICE O/P EST LOW 20 MIN: CPT | Performed by: INTERNAL MEDICINE

## 2024-03-26 PROCEDURE — 3078F DIAST BP <80 MM HG: CPT | Performed by: INTERNAL MEDICINE

## 2024-03-26 PROCEDURE — 1159F MED LIST DOCD IN RCRD: CPT | Performed by: INTERNAL MEDICINE

## 2024-03-26 PROCEDURE — 1111F DSCHRG MED/CURRENT MED MERGE: CPT | Performed by: INTERNAL MEDICINE

## 2024-03-26 PROCEDURE — 1125F AMNT PAIN NOTED PAIN PRSNT: CPT | Performed by: INTERNAL MEDICINE

## 2024-03-26 RX ORDER — LORAZEPAM 1 MG/1
1 TABLET ORAL EVERY 8 HOURS PRN
Qty: 90 TABLET | Refills: 0 | Status: SHIPPED | OUTPATIENT
Start: 2024-03-26 | End: 2024-04-18 | Stop reason: SDUPTHER

## 2024-03-26 ASSESSMENT — NCCN CANCER DISTRESS MANAGEMENT
NCCN PHYSICAL CONCERNS: 8
NCCN PHYSICAL CONCERNS: 1
NCCN PHYSICAL CONCERNS: 2

## 2024-03-26 ASSESSMENT — ENCOUNTER SYMPTOMS
EYES NEGATIVE: 1
RESPIRATORY NEGATIVE: 1
CONSTITUTIONAL NEGATIVE: 1

## 2024-03-26 ASSESSMENT — PAIN SCALES - GENERAL: PAINLEVEL: 4

## 2024-03-26 NOTE — PROGRESS NOTES
Patient ID: Massimo Garcia is a 63 y.o. male.  Referring Physician: No referring provider defined for this encounter.  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  Has done well on trastuzumab FOLFOX regimen.  Today complains of dizziness and presyncopal symptoms. Metastatic burden is liver some lung lesions as well as abdominal lymphadenopathy.        LIVER:  Liver measures 14.7 cm in length and demonstrates mildly diffuse coarsened hepatic echotexture with mild nodular contour which may indicate cirrhotic morphology. There is an indeterminate hypoechoic  lesion within the right hepatic lobe measuring up. 3.6 x 3.2 x 3.0 cm and a 2nd 2.6 x 1.8 x 2.7 cm indeterminate heterogeneous hypoechoic lesion within the left hepatic lobe. Additional scatter presumed hepatic cysts, largest measuring up to 2.4 x 2.1 x 1.8 cm      GALLBLADDER:  Contracted. No obvious cholelithiasis.      BILIARY SYSTEM:  No evidence of intra hepatic biliary dilatation is identified; the common bile duct measures 13.2 mm.  PANCREAS:  The pancreas is poorly visualized due to overlying bowel gas.    RIGHT KIDNEY:  The right kidney measures 11.5 cm in length. No hydronephrosis or renal calculi are seen.       IMPRESSION:  Cirrhotic hepatic morphology with indeterminate hypoechoic lesions of the liver as above, largest measuring up to 3.6 x 3.2 x 3.0 cm. Hepatic mass protocol MRI advised for further assessment.  Review of Systems   Constitutional: Negative.    HENT:  Negative.     Eyes: Negative.    Respiratory: Negative.          Objective   BSA: 1.86 meters squared  /76 (BP Location: Left arm, Patient Position: Sitting, BP Cuff Size: Adult)   Pulse 105   Temp 36.3 °C (97.3 °F) (Temporal)   Resp 18   Wt 72.1 kg (158 lb 15.2 oz)   SpO2 98%   BMI 24.17 kg/m²      has a past medical history of Essential (primary) hypertension  (12/21/2013), Pacemaker, Peripheral neuropathy, Personal history of other diseases of the circulatory system, and Pneumothorax (12/04/2023).   has a past surgical history that includes Total knee arthroplasty (09/30/2016); Total knee arthroplasty (09/30/2016); Cardiac electrophysiology procedure (N/A, 11/7/2023); and Cardiac electrophysiology procedure (Left, 11/9/2023).  Family History   Problem Relation Name Age of Onset    Cancer Father       Oncology History   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)   9/13/2023 Initial Diagnosis    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)     9/13/2023 -  Chemotherapy    Trastuzumab + mFOLFOX6 (Fluorouracil Continuous Infusion / Leucovorin / Oxaliplatin), 14 Day Cycles     Metastases to the liver (CMS/HCC)   9/13/2023 Initial Diagnosis    Metastases to the liver (CMS/HCC)     9/13/2023 -  Chemotherapy    Trastuzumab + mFOLFOX6 (Fluorouracil Continuous Infusion / Leucovorin / Oxaliplatin), 14 Day Cycles         Massimo Garcia  reports that he has quit smoking. His smoking use included cigarettes and cigars. He has never been exposed to tobacco smoke. He has never used smokeless tobacco.  He  reports that he does not currently use alcohol.  He  reports current drug use. Drug: Marijuana.    Physical Exam  Constitutional:       Appearance: Normal appearance.   HENT:      Head: Normocephalic and atraumatic.   Eyes:      Extraocular Movements: Extraocular movements intact.      Pupils: Pupils are equal, round, and reactive to light.   Neurological:      Mental Status: He is alert.         WBC   Date/Time Value Ref Range Status   03/23/2024 06:22 AM 4.9 4.4 - 11.3 x10*3/uL Final   03/22/2024 05:05 AM 4.2 (L) 4.4 - 11.3 x10*3/uL Final   03/21/2024 06:17 AM 3.1 (L) 4.4 - 11.3 x10*3/uL Final     nRBC   Date Value Ref Range Status   03/23/2024 0.0 0.0 - 0.0 /100 WBCs Final   03/22/2024 0.0 0.0 - 0.0 /100 WBCs Final   03/21/2024 0.0 0.0 - 0.0 /100 WBCs Final     RBC   Date  Value Ref Range Status   03/23/2024 3.15 (L) 4.50 - 5.90 x10*6/uL Final   03/22/2024 2.97 (L) 4.50 - 5.90 x10*6/uL Final   03/21/2024 2.80 (L) 4.50 - 5.90 x10*6/uL Final     Hemoglobin   Date Value Ref Range Status   03/23/2024 9.3 (L) 13.5 - 17.5 g/dL Final   03/22/2024 8.8 (L) 13.5 - 17.5 g/dL Final   03/21/2024 8.2 (L) 13.5 - 17.5 g/dL Final     Hematocrit   Date Value Ref Range Status   03/23/2024 28.9 (L) 41.0 - 52.0 % Final   03/22/2024 27.0 (L) 41.0 - 52.0 % Final   03/21/2024 26.2 (L) 41.0 - 52.0 % Final     MCV   Date/Time Value Ref Range Status   03/23/2024 06:22 AM 92 80 - 100 fL Final   03/22/2024 05:05 AM 91 80 - 100 fL Final   03/21/2024 06:17 AM 94 80 - 100 fL Final     MCH   Date/Time Value Ref Range Status   03/23/2024 06:22 AM 29.5 26.0 - 34.0 pg Final   03/22/2024 05:05 AM 29.6 26.0 - 34.0 pg Final   03/21/2024 06:17 AM 29.3 26.0 - 34.0 pg Final     MCHC   Date/Time Value Ref Range Status   03/23/2024 06:22 AM 32.2 32.0 - 36.0 g/dL Final   03/22/2024 05:05 AM 32.6 32.0 - 36.0 g/dL Final   03/21/2024 06:17 AM 31.3 (L) 32.0 - 36.0 g/dL Final     RDW   Date/Time Value Ref Range Status   03/23/2024 06:22 AM 17.3 (H) 11.5 - 14.5 % Final   03/22/2024 05:05 AM 16.7 (H) 11.5 - 14.5 % Final   03/21/2024 06:17 AM 16.9 (H) 11.5 - 14.5 % Final     Platelets   Date/Time Value Ref Range Status   03/23/2024 06:22 AM 67 (L) 150 - 450 x10*3/uL Final   03/22/2024 05:05 AM 50 (L) 150 - 450 x10*3/uL Final   03/21/2024 06:17 AM 34 (LL) 150 - 450 x10*3/uL Final     MPV   Date/Time Value Ref Range Status   10/30/2023 09:13 AM 10.3 7.5 - 11.5 fL Final   10/16/2023 08:34 AM 10.7 7.5 - 11.5 fL Final   10/02/2023 09:08 AM 10.0 7.5 - 11.5 fL Final     Neutrophils %   Date/Time Value Ref Range Status   03/20/2024 06:03 PM 72.8 40.0 - 80.0 % Final   03/19/2024 06:13 PM 81.6 40.0 - 80.0 % Final   03/19/2024 06:18 AM 91.3 40.0 - 80.0 % Final     Immature Granulocytes %, Automated   Date/Time Value Ref Range Status   03/20/2024  06:03 PM 0.6 0.0 - 0.9 % Final     Comment:     Immature Granulocyte Count (IG) includes promyelocytes, myelocytes and metamyelocytes but does not include bands. Percent differential counts (%) should be interpreted in the context of the absolute cell counts (cells/UL).   03/19/2024 06:13 PM 1.4 (H) 0.0 - 0.9 % Final     Comment:     Immature Granulocyte Count (IG) includes promyelocytes, myelocytes and metamyelocytes but does not include bands. Percent differential counts (%) should be interpreted in the context of the absolute cell counts (cells/UL).   03/19/2024 06:18 AM 1.5 (H) 0.0 - 0.9 % Final     Comment:     Immature Granulocyte Count (IG) includes promyelocytes, myelocytes and metamyelocytes but does not include bands. Percent differential counts (%) should be interpreted in the context of the absolute cell counts (cells/UL).     Lymphocytes %   Date/Time Value Ref Range Status   03/20/2024 06:03 PM 11.7 13.0 - 44.0 % Final   03/19/2024 06:13 PM 7.1 13.0 - 44.0 % Final   03/19/2024 06:18 AM 1.4 13.0 - 44.0 % Final     Monocytes %   Date/Time Value Ref Range Status   03/20/2024 06:03 PM 13.2 2.0 - 10.0 % Final   03/19/2024 06:13 PM 9.6 2.0 - 10.0 % Final   03/19/2024 06:18 AM 5.7 2.0 - 10.0 % Final     Eosinophils %   Date/Time Value Ref Range Status   03/20/2024 06:03 PM 1.4 0.0 - 6.0 % Final   03/19/2024 06:13 PM 0.0 0.0 - 6.0 % Final   03/19/2024 06:18 AM 0.0 0.0 - 6.0 % Final     Basophils %   Date/Time Value Ref Range Status   03/20/2024 06:03 PM 0.3 0.0 - 2.0 % Final   03/19/2024 06:13 PM 0.3 0.0 - 2.0 % Final   03/19/2024 06:18 AM 0.1 0.0 - 2.0 % Final     Neutrophils Absolute   Date/Time Value Ref Range Status   03/20/2024 06:03 PM 2.54 1.20 - 7.70 x10*3/uL Final     Comment:     Percent differential counts (%) should be interpreted in the context of the absolute cell counts (cells/uL).   03/19/2024 06:13 PM 2.99 1.20 - 7.70 x10*3/uL Final     Comment:     Percent differential counts (%) should be  "interpreted in the context of the absolute cell counts (cells/uL).   03/19/2024 06:18 AM 13.05 (H) 1.20 - 7.70 x10*3/uL Final     Comment:     Percent differential counts (%) should be interpreted in the context of the absolute cell counts (cells/uL).     Immature Granulocytes Absolute, Automated   Date/Time Value Ref Range Status   03/20/2024 06:03 PM 0.02 0.00 - 0.70 x10*3/uL Final   03/19/2024 06:13 PM 0.05 0.00 - 0.70 x10*3/uL Final   03/19/2024 06:18 AM 0.22 0.00 - 0.70 x10*3/uL Final     Lymphocytes Absolute   Date/Time Value Ref Range Status   03/20/2024 06:03 PM 0.41 (L) 1.20 - 4.80 x10*3/uL Final   03/19/2024 06:13 PM 0.26 (L) 1.20 - 4.80 x10*3/uL Final   03/19/2024 06:18 AM 0.20 (L) 1.20 - 4.80 x10*3/uL Final     Monocytes Absolute   Date/Time Value Ref Range Status   03/20/2024 06:03 PM 0.46 0.10 - 1.00 x10*3/uL Final   03/19/2024 06:13 PM 0.35 0.10 - 1.00 x10*3/uL Final   03/19/2024 06:18 AM 0.81 0.10 - 1.00 x10*3/uL Final     Eosinophils Absolute   Date/Time Value Ref Range Status   03/20/2024 06:03 PM 0.05 0.00 - 0.70 x10*3/uL Final   03/19/2024 06:13 PM 0.00 0.00 - 0.70 x10*3/uL Final   03/19/2024 06:18 AM 0.00 0.00 - 0.70 x10*3/uL Final     Basophils Absolute   Date/Time Value Ref Range Status   03/20/2024 06:03 PM 0.01 0.00 - 0.10 x10*3/uL Final   03/19/2024 06:13 PM 0.01 0.00 - 0.10 x10*3/uL Final   03/19/2024 06:18 AM 0.02 0.00 - 0.10 x10*3/uL Final     Comment:     Automated WBC differential has been confirmed by manual smear.       No components found for: \"PT\"  aPTT   Date/Time Value Ref Range Status   03/20/2024 06:03 PM 26 (L) 27 - 38 seconds Final   03/19/2024 10:05 AM 31 27 - 38 seconds Final   12/26/2023 06:13 PM 48 (H) 27 - 38 seconds Final       Assessment/Plan      3 cm left neck LN. Will monitor. No AMS at this time. Added pembrolizumab to the patient's treatment schedule. As per NCCN guidelines patient is eligible to receive trastuzumab pembrolizumab 5-FU leucovorin and oxaliplatin. " Pembrolizumab is scheduled for every 42 days.      Presented today to discuss tolerance of chemotherapy.  Patient would not want to continue on anxiety platinum.  Regimen now would be trastuzumab pembrolizumab leucovorin plus 5-FU.  Patient has been on treatment with good performance status for the past 15 to 16 months.  Will reserve the option of single agent Enhertu in the future.  Continue therapy.             Tomasa Blake MD

## 2024-03-26 NOTE — SIGNIFICANT EVENT
Follow Up Phone Call    Outgoing phone call    Spoke to: Massimo Garcia Relationship:self   Phone number: [phone]      Outcome: Left message   Chief Complaint   Patient presents with    Chest Pain     Pt from [facility] for midsternal, non radiating chest pain that started towards the end of his treatment today. Was given Demerol that helped for a little bit, but then the pain returned.           Diagnosis:Not applicable

## 2024-03-26 NOTE — TELEPHONE ENCOUNTER
O[facility] report reviewed and reflects  prescription history, no aberrancy noted. Per OARRS, patient last filled 30 day supply on 2/11. Per last visit with Sugar Hernandez patient to continue lorazepam 1mg q8h prn. Patient with follow up visit scheduled with [facility] on 3/28. Patient updated that medication will be sent to [facility]. Refill request routed to covering provider.

## 2024-03-26 NOTE — PATIENT INSTRUCTIONS
Today you met with Dr. Blake.  Glad you all were having a discussion on your treatment.  Your oxaliplatin will be held.  The 5FU home pump will get added back in.  We are encouraging you to restart on the mouthwash to assist in the mouth sensations you are experiencing.     We will see you Monday.

## 2024-03-27 NOTE — SIGNIFICANT EVENT
Follow Up Phone Call    Outgoing phone call    Spoke to: Massimo Garcia Relationship:self   Phone number: [phone]      Outcome: contacted patient/ family   Chief Complaint   Patient presents with    Chest Pain     Pt from [facility] for midsternal, non radiating chest pain that started towards the end of his treatment today. Was given Demerol that helped for a little bit, but then the pain returned.           Diagnosis:Not applicable    States he is feeling better. Saw his doctor yesterday. No further questions or concerns.

## 2024-03-28 ENCOUNTER — APPOINTMENT (OUTPATIENT)
Dept: RADIOLOGY | Facility: HOSPITAL | Age: 64
End: 2024-03-28
Payer: MEDICARE

## 2024-03-28 ENCOUNTER — TELEPHONE (OUTPATIENT)
Dept: PALLIATIVE MEDICINE | Facility: CLINIC | Age: 64
End: 2024-03-28
Payer: MEDICARE

## 2024-03-28 ENCOUNTER — HOSPITAL ENCOUNTER (EMERGENCY)
Facility: HOSPITAL | Age: 64
Discharge: HOME | End: 2024-03-28
Attending: EMERGENCY MEDICINE
Payer: MEDICARE

## 2024-03-28 ENCOUNTER — APPOINTMENT (OUTPATIENT)
Dept: CARDIOLOGY | Facility: HOSPITAL | Age: 64
End: 2024-03-28
Payer: MEDICARE

## 2024-03-28 ENCOUNTER — APPOINTMENT (OUTPATIENT)
Dept: PALLIATIVE MEDICINE | Facility: CLINIC | Age: 64
End: 2024-03-28
Payer: MEDICARE

## 2024-03-28 ENCOUNTER — PHARMACY VISIT (OUTPATIENT)
Dept: PHARMACY | Facility: CLINIC | Age: 64
End: 2024-03-28
Payer: MEDICARE

## 2024-03-28 VITALS
HEIGHT: 69 IN | DIASTOLIC BLOOD PRESSURE: 80 MMHG | WEIGHT: 155 LBS | OXYGEN SATURATION: 97 % | BODY MASS INDEX: 22.96 KG/M2 | RESPIRATION RATE: 19 BRPM | HEART RATE: 103 BPM | SYSTOLIC BLOOD PRESSURE: 119 MMHG | TEMPERATURE: 98.2 F

## 2024-03-28 DIAGNOSIS — R10.12 LEFT UPPER QUADRANT ABDOMINAL PAIN: Primary | ICD-10-CM

## 2024-03-28 LAB
ALBUMIN SERPL BCP-MCNC: 3.4 G/DL (ref 3.4–5)
ALP SERPL-CCNC: 135 U/L (ref 33–136)
ALT SERPL W P-5'-P-CCNC: 16 U/L (ref 10–52)
ANION GAP SERPL CALC-SCNC: 14 MMOL/L (ref 10–20)
AST SERPL W P-5'-P-CCNC: 58 U/L (ref 9–39)
BASOPHILS # BLD AUTO: 0.04 X10*3/UL (ref 0–0.1)
BASOPHILS NFR BLD AUTO: 0.4 %
BILIRUB SERPL-MCNC: 1.3 MG/DL (ref 0–1.2)
BUN SERPL-MCNC: 14 MG/DL (ref 6–23)
CALCIUM SERPL-MCNC: 8.8 MG/DL (ref 8.6–10.3)
CHLORIDE SERPL-SCNC: 100 MMOL/L (ref 98–107)
CO2 SERPL-SCNC: 24 MMOL/L (ref 21–32)
CREAT SERPL-MCNC: 1.47 MG/DL (ref 0.5–1.3)
EGFRCR SERPLBLD CKD-EPI 2021: 53 ML/MIN/1.73M*2
EOSINOPHIL # BLD AUTO: 0.07 X10*3/UL (ref 0–0.7)
EOSINOPHIL NFR BLD AUTO: 0.7 %
ERYTHROCYTE [DISTWIDTH] IN BLOOD BY AUTOMATED COUNT: 16.1 % (ref 11.5–14.5)
GLUCOSE SERPL-MCNC: 109 MG/DL (ref 74–99)
HCT VFR BLD AUTO: 31.7 % (ref 41–52)
HGB BLD-MCNC: 10.3 G/DL (ref 13.5–17.5)
IMM GRANULOCYTES # BLD AUTO: 0.11 X10*3/UL (ref 0–0.7)
IMM GRANULOCYTES NFR BLD AUTO: 1.1 % (ref 0–0.9)
LACTATE SERPL-SCNC: 1.3 MMOL/L (ref 0.4–2)
LACTATE SERPL-SCNC: 2.1 MMOL/L (ref 0.4–2)
LIPASE SERPL-CCNC: 14 U/L (ref 9–82)
LYMPHOCYTES # BLD AUTO: 1.06 X10*3/UL (ref 1.2–4.8)
LYMPHOCYTES NFR BLD AUTO: 10.4 %
MAGNESIUM SERPL-MCNC: 1.69 MG/DL (ref 1.6–2.4)
MCH RBC QN AUTO: 29.1 PG (ref 26–34)
MCHC RBC AUTO-ENTMCNC: 32.5 G/DL (ref 32–36)
MCV RBC AUTO: 90 FL (ref 80–100)
MONOCYTES # BLD AUTO: 0.79 X10*3/UL (ref 0.1–1)
MONOCYTES NFR BLD AUTO: 7.7 %
NEUTROPHILS # BLD AUTO: 8.15 X10*3/UL (ref 1.2–7.7)
NEUTROPHILS NFR BLD AUTO: 79.7 %
NRBC BLD-RTO: 0 /100 WBCS (ref 0–0)
PLATELET # BLD AUTO: 104 X10*3/UL (ref 150–450)
POTASSIUM SERPL-SCNC: 3.5 MMOL/L (ref 3.5–5.3)
PROT SERPL-MCNC: 7 G/DL (ref 6.4–8.2)
RBC # BLD AUTO: 3.54 X10*6/UL (ref 4.5–5.9)
SODIUM SERPL-SCNC: 134 MMOL/L (ref 136–145)
WBC # BLD AUTO: 10.2 X10*3/UL (ref 4.4–11.3)

## 2024-03-28 PROCEDURE — 96376 TX/PRO/DX INJ SAME DRUG ADON: CPT

## 2024-03-28 PROCEDURE — 80053 COMPREHEN METABOLIC PANEL: CPT | Performed by: EMERGENCY MEDICINE

## 2024-03-28 PROCEDURE — 83690 ASSAY OF LIPASE: CPT | Performed by: EMERGENCY MEDICINE

## 2024-03-28 PROCEDURE — 85025 COMPLETE CBC W/AUTO DIFF WBC: CPT | Performed by: EMERGENCY MEDICINE

## 2024-03-28 PROCEDURE — 96361 HYDRATE IV INFUSION ADD-ON: CPT

## 2024-03-28 PROCEDURE — 74177 CT ABD & PELVIS W/CONTRAST: CPT | Performed by: RADIOLOGY

## 2024-03-28 PROCEDURE — 2500000004 HC RX 250 GENERAL PHARMACY W/ HCPCS (ALT 636 FOR OP/ED)

## 2024-03-28 PROCEDURE — 96374 THER/PROPH/DIAG INJ IV PUSH: CPT

## 2024-03-28 PROCEDURE — 93005 ELECTROCARDIOGRAM TRACING: CPT

## 2024-03-28 PROCEDURE — 96375 TX/PRO/DX INJ NEW DRUG ADDON: CPT

## 2024-03-28 PROCEDURE — 99285 EMERGENCY DEPT VISIT HI MDM: CPT | Mod: 25

## 2024-03-28 PROCEDURE — 2500000001 HC RX 250 WO HCPCS SELF ADMINISTERED DRUGS (ALT 637 FOR MEDICARE OP): Performed by: EMERGENCY MEDICINE

## 2024-03-28 PROCEDURE — 83735 ASSAY OF MAGNESIUM: CPT | Performed by: EMERGENCY MEDICINE

## 2024-03-28 PROCEDURE — 2500000004 HC RX 250 GENERAL PHARMACY W/ HCPCS (ALT 636 FOR OP/ED): Performed by: EMERGENCY MEDICINE

## 2024-03-28 PROCEDURE — 2550000001 HC RX 255 CONTRASTS: Performed by: EMERGENCY MEDICINE

## 2024-03-28 PROCEDURE — 74177 CT ABD & PELVIS W/CONTRAST: CPT

## 2024-03-28 PROCEDURE — 83605 ASSAY OF LACTIC ACID: CPT | Performed by: EMERGENCY MEDICINE

## 2024-03-28 RX ORDER — HYDROMORPHONE HYDROCHLORIDE 1 MG/ML
1 INJECTION, SOLUTION INTRAMUSCULAR; INTRAVENOUS; SUBCUTANEOUS ONCE
Status: COMPLETED | OUTPATIENT
Start: 2024-03-28 | End: 2024-03-28

## 2024-03-28 RX ORDER — HEPARIN 100 UNIT/ML
1 SYRINGE INTRAVENOUS ONCE
Status: COMPLETED | OUTPATIENT
Start: 2024-03-28 | End: 2024-03-28

## 2024-03-28 RX ORDER — HYDROMORPHONE HYDROCHLORIDE 1 MG/ML
INJECTION, SOLUTION INTRAMUSCULAR; INTRAVENOUS; SUBCUTANEOUS
Status: COMPLETED
Start: 2024-03-28 | End: 2024-03-28

## 2024-03-28 RX ORDER — DICYCLOMINE HYDROCHLORIDE 20 MG/1
20 TABLET ORAL 3 TIMES DAILY
Qty: 15 TABLET | Refills: 0 | Status: SHIPPED | OUTPATIENT
Start: 2024-03-28 | End: 2024-04-02

## 2024-03-28 RX ORDER — ONDANSETRON HYDROCHLORIDE 2 MG/ML
4 INJECTION, SOLUTION INTRAVENOUS ONCE
Status: COMPLETED | OUTPATIENT
Start: 2024-03-28 | End: 2024-03-28

## 2024-03-28 RX ORDER — DICYCLOMINE HYDROCHLORIDE 10 MG/1
10 CAPSULE ORAL ONCE
Status: COMPLETED | OUTPATIENT
Start: 2024-03-28 | End: 2024-03-28

## 2024-03-28 RX ADMIN — DICYCLOMINE HYDROCHLORIDE 10 MG: 10 CAPSULE ORAL at 14:48

## 2024-03-28 RX ADMIN — ONDANSETRON 4 MG: 2 INJECTION INTRAMUSCULAR; INTRAVENOUS at 12:02

## 2024-03-28 RX ADMIN — IOHEXOL 75 ML: 350 INJECTION, SOLUTION INTRAVENOUS at 13:42

## 2024-03-28 RX ADMIN — HYDROMORPHONE HYDROCHLORIDE 1 MG: 1 INJECTION, SOLUTION INTRAMUSCULAR; INTRAVENOUS; SUBCUTANEOUS at 14:02

## 2024-03-28 RX ADMIN — HEPARIN 100 UNITS: 100 SYRINGE at 14:58

## 2024-03-28 RX ADMIN — SODIUM CHLORIDE 500 ML: 9 INJECTION, SOLUTION INTRAVENOUS at 12:00

## 2024-03-28 RX ADMIN — HYDROMORPHONE HYDROCHLORIDE 1 MG: 1 INJECTION, SOLUTION INTRAMUSCULAR; INTRAVENOUS; SUBCUTANEOUS at 12:01

## 2024-03-28 ASSESSMENT — PAIN - FUNCTIONAL ASSESSMENT
PAIN_FUNCTIONAL_ASSESSMENT: 0-10
PAIN_FUNCTIONAL_ASSESSMENT: 0-10

## 2024-03-28 ASSESSMENT — PAIN DESCRIPTION - PAIN TYPE: TYPE: ACUTE PAIN

## 2024-03-28 ASSESSMENT — LIFESTYLE VARIABLES
EVER FELT BAD OR GUILTY ABOUT YOUR DRINKING: NO
HAVE YOU EVER FELT YOU SHOULD CUT DOWN ON YOUR DRINKING: NO
TOTAL SCORE: 0
HAVE PEOPLE ANNOYED YOU BY CRITICIZING YOUR DRINKING: NO
EVER HAD A DRINK FIRST THING IN THE MORNING TO STEADY YOUR NERVES TO GET RID OF A HANGOVER: NO

## 2024-03-28 ASSESSMENT — PAIN DESCRIPTION - FREQUENCY: FREQUENCY: CONSTANT/CONTINUOUS

## 2024-03-28 ASSESSMENT — PAIN DESCRIPTION - PROGRESSION: CLINICAL_PROGRESSION: GRADUALLY WORSENING

## 2024-03-28 ASSESSMENT — PAIN DESCRIPTION - ORIENTATION: ORIENTATION: LEFT

## 2024-03-28 ASSESSMENT — COLUMBIA-SUICIDE SEVERITY RATING SCALE - C-SSRS
2. HAVE YOU ACTUALLY HAD ANY THOUGHTS OF KILLING YOURSELF?: NO
1. IN THE PAST MONTH, HAVE YOU WISHED YOU WERE DEAD OR WISHED YOU COULD GO TO SLEEP AND NOT WAKE UP?: NO
6. HAVE YOU EVER DONE ANYTHING, STARTED TO DO ANYTHING, OR PREPARED TO DO ANYTHING TO END YOUR LIFE?: NO

## 2024-03-28 ASSESSMENT — PAIN DESCRIPTION - ONSET: ONSET: ONGOING

## 2024-03-28 ASSESSMENT — PAIN DESCRIPTION - DESCRIPTORS
DESCRIPTORS: ACHING;BURNING
DESCRIPTORS: ACHING

## 2024-03-28 ASSESSMENT — PAIN SCALES - GENERAL
PAINLEVEL_OUTOF10: 8
PAINLEVEL_OUTOF10: 7

## 2024-03-28 ASSESSMENT — PAIN DESCRIPTION - LOCATION: LOCATION: ABDOMEN

## 2024-03-28 NOTE — ED PROVIDER NOTES
HPI   Chief Complaint   Patient presents with    Abdominal Pain       Massimo is a 63-year-old male who presents with pain in his left upper abdomen.  He has a history of esophageal cancer treated with chemo and has a pacemaker.  He says a sharp shooting pain that comes and goes left upper abdomen that hurts quite a bit.  No nausea or vomiting.  He is slight limited historian due to the pain.  His wife assists with the history.  He denies any recent trauma or falls.  He had some vomiting the other day but none currently.                          [place] Coma Scale Score: 15                     Patient History   Past Medical History:   Diagnosis Date    Essential (primary) hypertension 12/21/2013    Hypertension    Pacemaker     Peripheral neuropathy     Personal history of other diseases of the circulatory system     History of right bundle branch block (RBBB)    Pneumothorax 12/04/2023     Past Surgical History:   Procedure Laterality Date    CARDIAC ELECTROPHYSIOLOGY PROCEDURE N/A 11/7/2023    Procedure: Temporary Pacemaker Insertion;  Surgeon: Alexis Shook MD;  Location: [facility] Cardiac [facility];  Service: Cardiovascular;  Laterality: N/A;    CARDIAC ELECTROPHYSIOLOGY PROCEDURE Left 11/9/2023    Procedure: PPM IMPLANT DUAL;  Surgeon: Elias Connolly MD;  Location: [facility] Cardiac Cath Lab;  Service: Electrophysiology;  Laterality: Left;    TOTAL KNEE ARTHROPLASTY  09/30/2016    Total Knee Replacement Left    TOTAL KNEE ARTHROPLASTY  09/30/2016    Total Knee Replacement Right     Family History   Problem Relation Name Age of Onset    Cancer Father       Social History     Tobacco Use    Smoking status: Former     Types: Cigarettes, Cigars     Passive exposure: Never    Smokeless tobacco: Never   Vaping Use    Vaping Use: Unknown   Substance Use Topics    Alcohol use: Not Currently    Drug use: Yes     Types: Marijuana     Comment: medical marjuana card       Physical Exam   ED Triage Vitals [03/28/24 1121]    Temperature Heart Rate Respirations BP   36.8 °C (98.2 °F) 73 20 142/69      Pulse Ox Temp Source Heart Rate Source Patient Position   100 % Temporal Monitor --      BP Location FiO2 (%)     -- --       Physical Exam  Vitals reviewed.   Constitutional:       General: He is awake.      Appearance: Normal appearance.   HENT:      Head: Normocephalic.      Nose: Nose normal.   Cardiovascular:      Rate and Rhythm: Normal rate and regular rhythm.   Pulmonary:      Effort: Pulmonary effort is normal.      Breath sounds: Normal breath sounds.   Abdominal:      Palpations: Abdomen is soft.      Comments: Patient has diffuse left upper abdominal pain.  No pain in right upper or bilateral lower abdominal.  Positive bowel sounds.   Musculoskeletal:      Cervical back: Normal range of motion.   Skin:     General: Skin is warm.      Capillary Refill: Capillary refill takes less than 2 seconds.   Neurological:      Mental Status: He is alert.         ED Course & MDM   ED Course as of 03/28/24 1702   Thu Mar 28, 2024   1149 Workup in the ED will consist of CT scan and labs.  Concern the patient may have intra-abdominal process.  Will be treated with Dilaudid which has helped in the past 1 mg and Zofran.  Also gentle fluids. [RZ]   1150 EKG interpreted by myself done at 1124 shows ventricular paced rhythm at 106 bpm this is similar previous EKG done March 18, 2024 except previously heart rate was 102. [RZ]   1440 Was improved.  Did require additional dose of pain medication.  Patient heart rate is less than 100 on my exam.  He will be given some Bentyl I discussed this with the pharmacist about different options.  He does not want to be admitted at this time.  He is improved I suspect his abdominal spasms.  No sign of obstruction.  Patient stable for discharge. [RZ]   1701 Spoke to Dr Quintero who agrees with plan and will follow up. Will cover with abx. [RZ]      ED Course User Index  [RZ] Gavin Batres MD          Diagnoses as of 03/28/24 1702   Left upper quadrant abdominal pain       Medical Decision Making      Procedure  Procedures     Gavin Batres MD  03/28/24 8366

## 2024-03-28 NOTE — TELEPHONE ENCOUNTER
Patient was scheduled to be seen in Supportive Oncology/Palliative Care clinic today by Sugar Hernandez CNP. Patient did not show up for appointment. Phone call to patient to reschedule missed appointment. Patient states he is having intense pain and is working on getting to the hospital. I asked if patient felt like he needed to call 911 but his spouse was with him and said she was helping him now. I will put a reminder on my calendar to check patient's chart in the future to reschedule the appointment.

## 2024-04-01 ENCOUNTER — INFUSION (OUTPATIENT)
Dept: HEMATOLOGY/ONCOLOGY | Facility: CLINIC | Age: 64
End: 2024-04-01
Payer: MEDICARE

## 2024-04-01 ENCOUNTER — OFFICE VISIT (OUTPATIENT)
Dept: HEMATOLOGY/ONCOLOGY | Facility: CLINIC | Age: 64
End: 2024-04-01
Payer: MEDICARE

## 2024-04-01 ENCOUNTER — APPOINTMENT (OUTPATIENT)
Dept: HEMATOLOGY/ONCOLOGY | Facility: CLINIC | Age: 64
End: 2024-04-01
Payer: MEDICARE

## 2024-04-01 VITALS
RESPIRATION RATE: 18 BRPM | BODY MASS INDEX: 23.49 KG/M2 | SYSTOLIC BLOOD PRESSURE: 112 MMHG | HEART RATE: 111 BPM | WEIGHT: 159.06 LBS | OXYGEN SATURATION: 98 % | TEMPERATURE: 98.1 F | DIASTOLIC BLOOD PRESSURE: 71 MMHG

## 2024-04-01 DIAGNOSIS — C15.9 STAGE IV MALIGNANT NEOPLASM OF ESOPHAGUS (MULTI): ICD-10-CM

## 2024-04-01 DIAGNOSIS — C78.7 METASTASES TO THE LIVER (MULTI): ICD-10-CM

## 2024-04-01 LAB
ALBUMIN SERPL BCP-MCNC: 3.3 G/DL (ref 3.4–5)
ALP SERPL-CCNC: 177 U/L (ref 33–136)
ALT SERPL W P-5'-P-CCNC: 16 U/L (ref 10–52)
ANION GAP SERPL CALC-SCNC: 13 MMOL/L (ref 10–20)
AST SERPL W P-5'-P-CCNC: 62 U/L (ref 9–39)
ATRIAL RATE: 86 BPM
BASOPHILS # BLD AUTO: 0.02 X10*3/UL (ref 0–0.1)
BASOPHILS NFR BLD AUTO: 0.3 %
BILIRUB SERPL-MCNC: 0.9 MG/DL (ref 0–1.2)
BUN SERPL-MCNC: 13 MG/DL (ref 6–23)
CALCIUM SERPL-MCNC: 8.9 MG/DL (ref 8.6–10.3)
CHLORIDE SERPL-SCNC: 102 MMOL/L (ref 98–107)
CO2 SERPL-SCNC: 25 MMOL/L (ref 21–32)
CREAT SERPL-MCNC: 1.05 MG/DL (ref 0.5–1.3)
EGFRCR SERPLBLD CKD-EPI 2021: 79 ML/MIN/1.73M*2
EOSINOPHIL # BLD AUTO: 0.08 X10*3/UL (ref 0–0.7)
EOSINOPHIL NFR BLD AUTO: 1.1 %
ERYTHROCYTE [DISTWIDTH] IN BLOOD BY AUTOMATED COUNT: 15.5 % (ref 11.5–14.5)
GLUCOSE SERPL-MCNC: 161 MG/DL (ref 74–99)
HCT VFR BLD AUTO: 31.1 % (ref 41–52)
HGB BLD-MCNC: 10 G/DL (ref 13.5–17.5)
IMM GRANULOCYTES # BLD AUTO: 0.04 X10*3/UL (ref 0–0.7)
IMM GRANULOCYTES NFR BLD AUTO: 0.5 % (ref 0–0.9)
LYMPHOCYTES # BLD AUTO: 0.86 X10*3/UL (ref 1.2–4.8)
LYMPHOCYTES NFR BLD AUTO: 11.5 %
MCH RBC QN AUTO: 28.9 PG (ref 26–34)
MCHC RBC AUTO-ENTMCNC: 32.2 G/DL (ref 32–36)
MCV RBC AUTO: 90 FL (ref 80–100)
MONOCYTES # BLD AUTO: 0.71 X10*3/UL (ref 0.1–1)
MONOCYTES NFR BLD AUTO: 9.5 %
NEUTROPHILS # BLD AUTO: 5.79 X10*3/UL (ref 1.2–7.7)
NEUTROPHILS NFR BLD AUTO: 77.1 %
PLATELET # BLD AUTO: 104 X10*3/UL (ref 150–450)
POTASSIUM SERPL-SCNC: 3.4 MMOL/L (ref 3.5–5.3)
PROT SERPL-MCNC: 7.2 G/DL (ref 6.4–8.2)
Q ONSET: 193 MS
QRS COUNT: 17 BEATS
QRS DURATION: 156 MS
QT INTERVAL: 434 MS
QTC CALCULATION(BAZETT): 576 MS
QTC FREDERICIA: 524 MS
R AXIS: -60 DEGREES
RBC # BLD AUTO: 3.46 X10*6/UL (ref 4.5–5.9)
SODIUM SERPL-SCNC: 137 MMOL/L (ref 136–145)
T AXIS: 78 DEGREES
T OFFSET: 410 MS
VENTRICULAR RATE: 106 BPM
WBC # BLD AUTO: 7.5 X10*3/UL (ref 4.4–11.3)

## 2024-04-01 PROCEDURE — 3078F DIAST BP <80 MM HG: CPT | Performed by: INTERNAL MEDICINE

## 2024-04-01 PROCEDURE — 99215 OFFICE O/P EST HI 40 MIN: CPT | Performed by: INTERNAL MEDICINE

## 2024-04-01 PROCEDURE — 96367 TX/PROPH/DG ADDL SEQ IV INF: CPT

## 2024-04-01 PROCEDURE — 96411 CHEMO IV PUSH ADDL DRUG: CPT

## 2024-04-01 PROCEDURE — 2500000004 HC RX 250 GENERAL PHARMACY W/ HCPCS (ALT 636 FOR OP/ED): Performed by: INTERNAL MEDICINE

## 2024-04-01 PROCEDURE — 96413 CHEMO IV INFUSION 1 HR: CPT

## 2024-04-01 PROCEDURE — 1125F AMNT PAIN NOTED PAIN PRSNT: CPT | Performed by: INTERNAL MEDICINE

## 2024-04-01 PROCEDURE — 1111F DSCHRG MED/CURRENT MED MERGE: CPT | Performed by: INTERNAL MEDICINE

## 2024-04-01 PROCEDURE — 3074F SYST BP LT 130 MM HG: CPT | Performed by: INTERNAL MEDICINE

## 2024-04-01 PROCEDURE — 1159F MED LIST DOCD IN RCRD: CPT | Performed by: INTERNAL MEDICINE

## 2024-04-01 PROCEDURE — 1160F RVW MEDS BY RX/DR IN RCRD: CPT | Performed by: INTERNAL MEDICINE

## 2024-04-01 PROCEDURE — 96375 TX/PRO/DX INJ NEW DRUG ADDON: CPT | Mod: INF

## 2024-04-01 RX ORDER — PROCHLORPERAZINE EDISYLATE 5 MG/ML
10 INJECTION INTRAMUSCULAR; INTRAVENOUS EVERY 6 HOURS PRN
Status: DISCONTINUED | OUTPATIENT
Start: 2024-04-01 | End: 2024-04-01 | Stop reason: HOSPADM

## 2024-04-01 RX ORDER — EPINEPHRINE 0.3 MG/.3ML
0.3 INJECTION SUBCUTANEOUS EVERY 5 MIN PRN
Status: DISCONTINUED | OUTPATIENT
Start: 2024-04-01 | End: 2024-04-01 | Stop reason: HOSPADM

## 2024-04-01 RX ORDER — FAMOTIDINE 10 MG/ML
20 INJECTION INTRAVENOUS ONCE AS NEEDED
Status: DISCONTINUED | OUTPATIENT
Start: 2024-04-01 | End: 2024-04-01 | Stop reason: HOSPADM

## 2024-04-01 RX ORDER — DEXAMETHASONE SODIUM PHOSPHATE 4 MG/ML
8 INJECTION, SOLUTION INTRA-ARTICULAR; INTRALESIONAL; INTRAMUSCULAR; INTRAVENOUS; SOFT TISSUE ONCE
Status: COMPLETED | OUTPATIENT
Start: 2024-04-01 | End: 2024-04-01

## 2024-04-01 RX ORDER — DIPHENHYDRAMINE HYDROCHLORIDE 50 MG/ML
25 INJECTION INTRAMUSCULAR; INTRAVENOUS ONCE
Status: COMPLETED | OUTPATIENT
Start: 2024-04-01 | End: 2024-04-01

## 2024-04-01 RX ORDER — HEPARIN SODIUM,PORCINE/PF 10 UNIT/ML
50 SYRINGE (ML) INTRAVENOUS AS NEEDED
Status: CANCELLED | OUTPATIENT
Start: 2024-04-01

## 2024-04-01 RX ORDER — DIPHENHYDRAMINE HYDROCHLORIDE 50 MG/ML
50 INJECTION INTRAMUSCULAR; INTRAVENOUS AS NEEDED
Status: DISCONTINUED | OUTPATIENT
Start: 2024-04-01 | End: 2024-04-01 | Stop reason: HOSPADM

## 2024-04-01 RX ORDER — HEPARIN 100 UNIT/ML
500 SYRINGE INTRAVENOUS AS NEEDED
Status: CANCELLED | OUTPATIENT
Start: 2024-04-01

## 2024-04-01 RX ORDER — ALBUTEROL SULFATE 0.83 MG/ML
3 SOLUTION RESPIRATORY (INHALATION) AS NEEDED
Status: DISCONTINUED | OUTPATIENT
Start: 2024-04-01 | End: 2024-04-01 | Stop reason: HOSPADM

## 2024-04-01 RX ORDER — ACETAMINOPHEN 325 MG/1
650 TABLET ORAL ONCE
Status: COMPLETED | OUTPATIENT
Start: 2024-04-01 | End: 2024-04-01

## 2024-04-01 RX ORDER — HEPARIN 100 UNIT/ML
500 SYRINGE INTRAVENOUS AS NEEDED
Status: DISCONTINUED | OUTPATIENT
Start: 2024-04-01 | End: 2024-04-01 | Stop reason: HOSPADM

## 2024-04-01 RX ORDER — FLUOROURACIL 50 MG/ML
400 INJECTION, SOLUTION INTRAVENOUS ONCE
Status: COMPLETED | OUTPATIENT
Start: 2024-04-01 | End: 2024-04-01

## 2024-04-01 RX ORDER — PALONOSETRON 0.05 MG/ML
0.25 INJECTION, SOLUTION INTRAVENOUS ONCE
Status: COMPLETED | OUTPATIENT
Start: 2024-04-01 | End: 2024-04-01

## 2024-04-01 RX ORDER — LORAZEPAM 2 MG/ML
1 INJECTION INTRAMUSCULAR AS NEEDED
Status: DISCONTINUED | OUTPATIENT
Start: 2024-04-01 | End: 2024-04-01 | Stop reason: HOSPADM

## 2024-04-01 RX ORDER — PROCHLORPERAZINE MALEATE 10 MG
10 TABLET ORAL EVERY 6 HOURS PRN
Status: DISCONTINUED | OUTPATIENT
Start: 2024-04-01 | End: 2024-04-01 | Stop reason: HOSPADM

## 2024-04-01 RX ADMIN — LEUCOVORIN CALCIUM 740 MG: 350 INJECTION, POWDER, LYOPHILIZED, FOR SOLUTION INTRAMUSCULAR; INTRAVENOUS at 13:04

## 2024-04-01 RX ADMIN — DIPHENHYDRAMINE HYDROCHLORIDE 25 MG: 50 INJECTION, SOLUTION INTRAMUSCULAR; INTRAVENOUS at 11:30

## 2024-04-01 RX ADMIN — ACETAMINOPHEN 650 MG: 325 TABLET ORAL at 11:26

## 2024-04-01 RX ADMIN — PALONOSETRON 250 MCG: 0.05 INJECTION, SOLUTION INTRAVENOUS at 11:26

## 2024-04-01 RX ADMIN — TRASTUZUMAB 300.3 MG: 150 INJECTION, POWDER, LYOPHILIZED, FOR SOLUTION INTRAVENOUS at 12:20

## 2024-04-01 RX ADMIN — FLUOROURACIL 4500 MG: 50 INJECTION, SOLUTION INTRAVENOUS at 14:04

## 2024-04-01 RX ADMIN — FLUOROURACIL 750 MG: 50 INJECTION, SOLUTION INTRAVENOUS at 13:46

## 2024-04-01 RX ADMIN — DEXAMETHASONE SODIUM PHOSPHATE 8 MG: 4 INJECTION INTRA-ARTICULAR; INTRALESIONAL; INTRAMUSCULAR; INTRAVENOUS; SOFT TISSUE at 11:28

## 2024-04-01 ASSESSMENT — PAIN SCALES - GENERAL: PAINLEVEL: 3

## 2024-04-01 NOTE — PROGRESS NOTES
1410. Infusions/ Injection tolerated without incident. Placed on CADD Pump w alarm trouble shooting techniques reviewed. Pt to return 4.3.24 at 1200 for Pump DC. Verbalizes understanding and agreement. Schedule reviewed then Dc'd via independent / ambulatory

## 2024-04-01 NOTE — PROGRESS NOTES
Patient ID: Massimo Garcia is a 63 y.o. male.  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  Has done well on trastuzumab FOLFOX regimen.  Today complains of dizziness and presyncopal symptoms. Metastatic burden is liver some lung lesions as well as abdominal lymphadenopathy.        LIVER:  Liver measures 14.7 cm in length and demonstrates mildly diffuse coarsened hepatic echotexture with mild nodular contour which may indicate cirrhotic morphology. There is an indeterminate hypoechoic  lesion within the right hepatic lobe measuring up. 3.6 x 3.2 x 3.0 cm and a 2nd 2.6 x 1.8 x 2.7 cm indeterminate heterogeneous hypoechoic lesion within the left hepatic lobe. Additional scatter presumed hepatic cysts, largest measuring up to 2.4 x 2.1 x 1.8 cm      GALLBLADDER:  Contracted. No obvious cholelithiasis.      BILIARY SYSTEM:  No evidence of intra hepatic biliary dilatation is identified; the common bile duct measures 13.2 mm.  PANCREAS:  The pancreas is poorly visualized due to overlying bowel gas.    RIGHT KIDNEY:  The right kidney measures 11.5 cm in length. No hydronephrosis or renal calculi are seen.       IMPRESSION:  Cirrhotic hepatic morphology with indeterminate hypoechoic lesions of the liver as above, largest measuring up to 3.6 x 3.2 x 3.0 cm. Hepatic mass protocol MRI advised for further assessment.  Review of Systems - Oncology     Objective   BSA: 1.87 meters squared  /71 (BP Location: Left arm, Patient Position: Sitting, BP Cuff Size: Adult)   Pulse (!) 111   Temp 36.7 °C (98.1 °F) (Temporal)   Resp 18   Wt 72.1 kg (159 lb 1 oz)   SpO2 98%   BMI 23.49 kg/m²      has a past medical history of Essential (primary) hypertension (12/21/2013), Pacemaker, Peripheral neuropathy, Personal  history of other diseases of the circulatory system, and Pneumothorax (12/04/2023).   has a past surgical history that includes Total knee arthroplasty (09/30/2016); Total knee arthroplasty (09/30/2016); Cardiac electrophysiology procedure (N/A, 11/7/2023); and Cardiac electrophysiology procedure (Left, 11/9/2023).  Family History   Problem Relation Name Age of Onset    Cancer Father       Oncology History   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)   9/13/2023 Initial Diagnosis    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)     9/13/2023 -  Chemotherapy    Trastuzumab + mFOLFOX6 (Fluorouracil Continuous Infusion / Leucovorin / Oxaliplatin), 14 Day Cycles     Metastases to the liver (CMS/HCC)   9/13/2023 Initial Diagnosis    Metastases to the liver (CMS/HCC)     9/13/2023 -  Chemotherapy    Trastuzumab + mFOLFOX6 (Fluorouracil Continuous Infusion / Leucovorin / Oxaliplatin), 14 Day Cycles         Massimo Garcia  reports that he has quit smoking. His smoking use included cigarettes and cigars. He has never been exposed to tobacco smoke. He has never used smokeless tobacco.  He  reports that he does not currently use alcohol.  He  reports current drug use. Drug: Marijuana.    Physical Exam    WBC   Date/Time Value Ref Range Status   03/28/2024 11:53 AM 10.2 4.4 - 11.3 x10*3/uL Final   03/23/2024 06:22 AM 4.9 4.4 - 11.3 x10*3/uL Final   03/22/2024 05:05 AM 4.2 (L) 4.4 - 11.3 x10*3/uL Final     nRBC   Date Value Ref Range Status   03/28/2024 0.0 0.0 - 0.0 /100 WBCs Final   03/23/2024 0.0 0.0 - 0.0 /100 WBCs Final   03/22/2024 0.0 0.0 - 0.0 /100 WBCs Final     RBC   Date Value Ref Range Status   03/28/2024 3.54 (L) 4.50 - 5.90 x10*6/uL Final   03/23/2024 3.15 (L) 4.50 - 5.90 x10*6/uL Final   03/22/2024 2.97 (L) 4.50 - 5.90 x10*6/uL Final     Hemoglobin   Date Value Ref Range Status   03/28/2024 10.3 (L) 13.5 - 17.5 g/dL Final   03/23/2024 9.3 (L) 13.5 - 17.5 g/dL Final   03/22/2024 8.8 (L) 13.5 - 17.5 g/dL  Final     Hematocrit   Date Value Ref Range Status   03/28/2024 31.7 (L) 41.0 - 52.0 % Final   03/23/2024 28.9 (L) 41.0 - 52.0 % Final   03/22/2024 27.0 (L) 41.0 - 52.0 % Final     MCV   Date/Time Value Ref Range Status   03/28/2024 11:53 AM 90 80 - 100 fL Final   03/23/2024 06:22 AM 92 80 - 100 fL Final   03/22/2024 05:05 AM 91 80 - 100 fL Final     MCH   Date/Time Value Ref Range Status   03/28/2024 11:53 AM 29.1 26.0 - 34.0 pg Final   03/23/2024 06:22 AM 29.5 26.0 - 34.0 pg Final   03/22/2024 05:05 AM 29.6 26.0 - 34.0 pg Final     MCHC   Date/Time Value Ref Range Status   03/28/2024 11:53 AM 32.5 32.0 - 36.0 g/dL Final   03/23/2024 06:22 AM 32.2 32.0 - 36.0 g/dL Final   03/22/2024 05:05 AM 32.6 32.0 - 36.0 g/dL Final     RDW   Date/Time Value Ref Range Status   03/28/2024 11:53 AM 16.1 (H) 11.5 - 14.5 % Final   03/23/2024 06:22 AM 17.3 (H) 11.5 - 14.5 % Final   03/22/2024 05:05 AM 16.7 (H) 11.5 - 14.5 % Final     Platelets   Date/Time Value Ref Range Status   03/28/2024 11:53  (L) 150 - 450 x10*3/uL Final     Comment:     Pt Hx   03/23/2024 06:22 AM 67 (L) 150 - 450 x10*3/uL Final   03/22/2024 05:05 AM 50 (L) 150 - 450 x10*3/uL Final     MPV   Date/Time Value Ref Range Status   10/30/2023 09:13 AM 10.3 7.5 - 11.5 fL Final   10/16/2023 08:34 AM 10.7 7.5 - 11.5 fL Final   10/02/2023 09:08 AM 10.0 7.5 - 11.5 fL Final     Neutrophils %   Date/Time Value Ref Range Status   03/28/2024 11:53 AM 79.7 40.0 - 80.0 % Final   03/20/2024 06:03 PM 72.8 40.0 - 80.0 % Final   03/19/2024 06:13 PM 81.6 40.0 - 80.0 % Final     Immature Granulocytes %, Automated   Date/Time Value Ref Range Status   03/28/2024 11:53 AM 1.1 (H) 0.0 - 0.9 % Final     Comment:     Immature Granulocyte Count (IG) includes promyelocytes, myelocytes and metamyelocytes but does not include bands. Percent differential counts (%) should be interpreted in the context of the absolute cell counts (cells/UL).   03/20/2024 06:03 PM 0.6 0.0 - 0.9 % Final      Comment:     Immature Granulocyte Count (IG) includes promyelocytes, myelocytes and metamyelocytes but does not include bands. Percent differential counts (%) should be interpreted in the context of the absolute cell counts (cells/UL).   03/19/2024 06:13 PM 1.4 (H) 0.0 - 0.9 % Final     Comment:     Immature Granulocyte Count (IG) includes promyelocytes, myelocytes and metamyelocytes but does not include bands. Percent differential counts (%) should be interpreted in the context of the absolute cell counts (cells/UL).     Lymphocytes %   Date/Time Value Ref Range Status   03/28/2024 11:53 AM 10.4 13.0 - 44.0 % Final   03/20/2024 06:03 PM 11.7 13.0 - 44.0 % Final   03/19/2024 06:13 PM 7.1 13.0 - 44.0 % Final     Monocytes %   Date/Time Value Ref Range Status   03/28/2024 11:53 AM 7.7 2.0 - 10.0 % Final   03/20/2024 06:03 PM 13.2 2.0 - 10.0 % Final   03/19/2024 06:13 PM 9.6 2.0 - 10.0 % Final     Eosinophils %   Date/Time Value Ref Range Status   03/28/2024 11:53 AM 0.7 0.0 - 6.0 % Final   03/20/2024 06:03 PM 1.4 0.0 - 6.0 % Final   03/19/2024 06:13 PM 0.0 0.0 - 6.0 % Final     Basophils %   Date/Time Value Ref Range Status   03/28/2024 11:53 AM 0.4 0.0 - 2.0 % Final   03/20/2024 06:03 PM 0.3 0.0 - 2.0 % Final   03/19/2024 06:13 PM 0.3 0.0 - 2.0 % Final     Neutrophils Absolute   Date/Time Value Ref Range Status   03/28/2024 11:53 AM 8.15 (H) 1.20 - 7.70 x10*3/uL Final     Comment:     Percent differential counts (%) should be interpreted in the context of the absolute cell counts (cells/uL).   03/20/2024 06:03 PM 2.54 1.20 - 7.70 x10*3/uL Final     Comment:     Percent differential counts (%) should be interpreted in the context of the absolute cell counts (cells/uL).   03/19/2024 06:13 PM 2.99 1.20 - 7.70 x10*3/uL Final     Comment:     Percent differential counts (%) should be interpreted in the context of the absolute cell counts (cells/uL).     Immature Granulocytes Absolute, Automated   Date/Time Value Ref Range  "Status   03/28/2024 11:53 AM 0.11 0.00 - 0.70 x10*3/uL Final   03/20/2024 06:03 PM 0.02 0.00 - 0.70 x10*3/uL Final   03/19/2024 06:13 PM 0.05 0.00 - 0.70 x10*3/uL Final     Lymphocytes Absolute   Date/Time Value Ref Range Status   03/28/2024 11:53 AM 1.06 (L) 1.20 - 4.80 x10*3/uL Final   03/20/2024 06:03 PM 0.41 (L) 1.20 - 4.80 x10*3/uL Final   03/19/2024 06:13 PM 0.26 (L) 1.20 - 4.80 x10*3/uL Final     Monocytes Absolute   Date/Time Value Ref Range Status   03/28/2024 11:53 AM 0.79 0.10 - 1.00 x10*3/uL Final   03/20/2024 06:03 PM 0.46 0.10 - 1.00 x10*3/uL Final   03/19/2024 06:13 PM 0.35 0.10 - 1.00 x10*3/uL Final     Eosinophils Absolute   Date/Time Value Ref Range Status   03/28/2024 11:53 AM 0.07 0.00 - 0.70 x10*3/uL Final   03/20/2024 06:03 PM 0.05 0.00 - 0.70 x10*3/uL Final   03/19/2024 06:13 PM 0.00 0.00 - 0.70 x10*3/uL Final     Basophils Absolute   Date/Time Value Ref Range Status   03/28/2024 11:53 AM 0.04 0.00 - 0.10 x10*3/uL Final   03/20/2024 06:03 PM 0.01 0.00 - 0.10 x10*3/uL Final   03/19/2024 06:13 PM 0.01 0.00 - 0.10 x10*3/uL Final       No components found for: \"PT\"  aPTT   Date/Time Value Ref Range Status   03/20/2024 06:03 PM 26 (L) 27 - 38 seconds Final   03/19/2024 10:05 AM 31 27 - 38 seconds Final   12/26/2023 06:13 PM 48 (H) 27 - 38 seconds Final       Assessment/Plan      3 cm left neck LN. Will monitor. No AMS at this time. Added pembrolizumab to the patient's treatment schedule. As per NCCN guidelines patient is eligible to receive trastuzumab pembrolizumab 5-FU leucovorin and oxaliplatin. Pembrolizumab is scheduled for every 42 days.      Presented today to discuss tolerance of chemotherapy.  Patient would not want to continue on anxiety platinum.  Regimen now would be trastuzumab pembrolizumab leucovorin plus 5-FU.  Patient has been on treatment with good performance status for the past 15 to 16 months.  Will reserve the option of single agent Enhertu in the future.  Continue therapy. " Oxali discontinued and patein ton 5FU Tras/Pembro.     Diagnoses and all orders for this visit:  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)  -     Clinic Appointment Request Chemo Follow Up; ROSETTA RODRIGUEZ MD

## 2024-04-01 NOTE — PATIENT INSTRUCTIONS
Today you met with Dr. Blake.  Plan- stop the oxaliplatin but add back the 5FU pump.  Fingers crossed you tolerate this regimen better.  Please do not hesitate to call for any concerns.      Please call the office for any questions or concerns.  Review your schedule prior to leaving [facility].  We ask that you notify us 5-7 days before your medications need refilled.   [facility] is strongly encouraging all patients to access their MyChart for all results and to communicate with their provider for non-urgent messages.  If an urgent/same day/sick concern response is needed, please call the office at [phone]. Thank You!

## 2024-04-01 NOTE — SIGNIFICANT EVENT
04/01/24 1215   Prechemo Checklist   Has the patient been in the hospital, ED, or urgent care since last date of service No   Chemo/Immuno Consent Signed Yes   Protocol/Indications Verified Yes   Confirmed to previous date/time of medication Yes   Compared to previous dose Yes   All medications are dated accurately Yes   Pregnancy Test Negative Not applicable   Parameters Met Yes   Provider Notified (!) No  (N/A)   Is Patient Proceeding With Treatment? Yes   BSA/Weight-Height Verified Yes   Dose Calculations Verified (current, total, cumulative) Yes

## 2024-04-03 ENCOUNTER — INFUSION (OUTPATIENT)
Dept: HEMATOLOGY/ONCOLOGY | Facility: CLINIC | Age: 64
End: 2024-04-03
Payer: MEDICARE

## 2024-04-03 VITALS
SYSTOLIC BLOOD PRESSURE: 119 MMHG | DIASTOLIC BLOOD PRESSURE: 75 MMHG | BODY MASS INDEX: 23.54 KG/M2 | RESPIRATION RATE: 19 BRPM | WEIGHT: 159.39 LBS | OXYGEN SATURATION: 97 % | TEMPERATURE: 97.2 F | HEART RATE: 72 BPM

## 2024-04-03 DIAGNOSIS — C15.9 STAGE IV MALIGNANT NEOPLASM OF ESOPHAGUS (MULTI): ICD-10-CM

## 2024-04-03 DIAGNOSIS — C78.7 METASTASES TO THE LIVER (MULTI): ICD-10-CM

## 2024-04-03 PROCEDURE — 2500000004 HC RX 250 GENERAL PHARMACY W/ HCPCS (ALT 636 FOR OP/ED): Performed by: INTERNAL MEDICINE

## 2024-04-03 PROCEDURE — 96523 IRRIG DRUG DELIVERY DEVICE: CPT

## 2024-04-03 RX ORDER — DIPHENHYDRAMINE HYDROCHLORIDE 50 MG/ML
50 INJECTION INTRAMUSCULAR; INTRAVENOUS AS NEEDED
OUTPATIENT
Start: 2024-04-03

## 2024-04-03 RX ORDER — ALBUTEROL SULFATE 0.83 MG/ML
3 SOLUTION RESPIRATORY (INHALATION) AS NEEDED
OUTPATIENT
Start: 2024-04-03

## 2024-04-03 RX ORDER — FAMOTIDINE 10 MG/ML
20 INJECTION INTRAVENOUS ONCE AS NEEDED
OUTPATIENT
Start: 2024-04-03

## 2024-04-03 RX ORDER — HEPARIN 100 UNIT/ML
500 SYRINGE INTRAVENOUS AS NEEDED
Status: CANCELLED | OUTPATIENT
Start: 2024-04-03

## 2024-04-03 RX ORDER — HEPARIN 100 UNIT/ML
500 SYRINGE INTRAVENOUS AS NEEDED
Status: DISCONTINUED | OUTPATIENT
Start: 2024-04-03 | End: 2024-04-03 | Stop reason: HOSPADM

## 2024-04-03 RX ORDER — HEPARIN SODIUM,PORCINE/PF 10 UNIT/ML
50 SYRINGE (ML) INTRAVENOUS AS NEEDED
Status: CANCELLED | OUTPATIENT
Start: 2024-04-03

## 2024-04-03 RX ORDER — HEPARIN 100 UNIT/ML
SYRINGE INTRAVENOUS
Status: DISCONTINUED
Start: 2024-04-03 | End: 2024-04-03 | Stop reason: HOSPADM

## 2024-04-03 RX ORDER — EPINEPHRINE 0.3 MG/.3ML
0.3 INJECTION SUBCUTANEOUS EVERY 5 MIN PRN
OUTPATIENT
Start: 2024-04-03

## 2024-04-03 RX ADMIN — HEPARIN 500 UNITS: 100 SYRINGE at 12:19

## 2024-04-03 ASSESSMENT — PAIN SCALES - GENERAL: PAINLEVEL: 2

## 2024-04-03 NOTE — PROGRESS NOTES
1255. Pt here for CADD Pump DC.  Denies feeling dizzy or lightheaded. HR in the 70's. Has been tolerating PO well so does not want IVF today. We discussed that he has a PRN order in case he needs them at another time. Schedule reviewed then Dc'd via independent / ambulatory

## 2024-04-04 ENCOUNTER — APPOINTMENT (OUTPATIENT)
Dept: RADIOLOGY | Facility: HOSPITAL | Age: 64
End: 2024-04-04
Payer: MEDICARE

## 2024-04-04 ENCOUNTER — APPOINTMENT (OUTPATIENT)
Dept: CARDIOLOGY | Facility: HOSPITAL | Age: 64
End: 2024-04-04
Payer: MEDICARE

## 2024-04-04 ENCOUNTER — HOSPITAL ENCOUNTER (EMERGENCY)
Facility: HOSPITAL | Age: 64
Discharge: HOME | End: 2024-04-04
Payer: MEDICARE

## 2024-04-04 ENCOUNTER — TELEPHONE (OUTPATIENT)
Dept: ADMISSION | Facility: HOSPITAL | Age: 64
End: 2024-04-04
Payer: MEDICARE

## 2024-04-04 ENCOUNTER — TELEPHONE (OUTPATIENT)
Dept: PALLIATIVE MEDICINE | Facility: CLINIC | Age: 64
End: 2024-04-04
Payer: MEDICARE

## 2024-04-04 VITALS
HEART RATE: 100 BPM | HEIGHT: 69 IN | BODY MASS INDEX: 23.55 KG/M2 | OXYGEN SATURATION: 98 % | DIASTOLIC BLOOD PRESSURE: 86 MMHG | TEMPERATURE: 98.2 F | WEIGHT: 159 LBS | RESPIRATION RATE: 18 BRPM | SYSTOLIC BLOOD PRESSURE: 126 MMHG

## 2024-04-04 DIAGNOSIS — G89.3 CANCER RELATED PAIN: Primary | ICD-10-CM

## 2024-04-04 DIAGNOSIS — R10.32 LEFT LOWER QUADRANT ABDOMINAL PAIN: Primary | ICD-10-CM

## 2024-04-04 LAB
ALBUMIN SERPL BCP-MCNC: 3.4 G/DL (ref 3.4–5)
ALP SERPL-CCNC: 221 U/L (ref 33–136)
ALT SERPL W P-5'-P-CCNC: 25 U/L (ref 10–52)
ANION GAP SERPL CALC-SCNC: 17 MMOL/L (ref 10–20)
AST SERPL W P-5'-P-CCNC: 60 U/L (ref 9–39)
BASOPHILS # BLD AUTO: 0.02 X10*3/UL (ref 0–0.1)
BASOPHILS NFR BLD AUTO: 0.3 %
BILIRUB SERPL-MCNC: 1.1 MG/DL (ref 0–1.2)
BNP SERPL-MCNC: 184 PG/ML (ref 0–99)
BUN SERPL-MCNC: 20 MG/DL (ref 6–23)
CALCIUM SERPL-MCNC: 8.7 MG/DL (ref 8.6–10.3)
CARDIAC TROPONIN I PNL SERPL HS: 14 NG/L (ref 0–20)
CHLORIDE SERPL-SCNC: 100 MMOL/L (ref 98–107)
CO2 SERPL-SCNC: 24 MMOL/L (ref 21–32)
CREAT SERPL-MCNC: 0.97 MG/DL (ref 0.5–1.3)
EGFRCR SERPLBLD CKD-EPI 2021: 87 ML/MIN/1.73M*2
EOSINOPHIL # BLD AUTO: 0.02 X10*3/UL (ref 0–0.7)
EOSINOPHIL NFR BLD AUTO: 0.3 %
ERYTHROCYTE [DISTWIDTH] IN BLOOD BY AUTOMATED COUNT: 15.4 % (ref 11.5–14.5)
GLUCOSE SERPL-MCNC: 125 MG/DL (ref 74–99)
HCT VFR BLD AUTO: 31.4 % (ref 41–52)
HGB BLD-MCNC: 10.3 G/DL (ref 13.5–17.5)
IMM GRANULOCYTES # BLD AUTO: 0.04 X10*3/UL (ref 0–0.7)
IMM GRANULOCYTES NFR BLD AUTO: 0.6 % (ref 0–0.9)
INR PPP: 1.9 (ref 0.9–1.1)
LACTATE SERPL-SCNC: 1.5 MMOL/L (ref 0.4–2)
LIPASE SERPL-CCNC: 11 U/L (ref 9–82)
LYMPHOCYTES # BLD AUTO: 0.51 X10*3/UL (ref 1.2–4.8)
LYMPHOCYTES NFR BLD AUTO: 7.1 %
MAGNESIUM SERPL-MCNC: 1.79 MG/DL (ref 1.6–2.4)
MCH RBC QN AUTO: 29.4 PG (ref 26–34)
MCHC RBC AUTO-ENTMCNC: 32.8 G/DL (ref 32–36)
MCV RBC AUTO: 90 FL (ref 80–100)
MONOCYTES # BLD AUTO: 0.09 X10*3/UL (ref 0.1–1)
MONOCYTES NFR BLD AUTO: 1.2 %
NEUTROPHILS # BLD AUTO: 6.54 X10*3/UL (ref 1.2–7.7)
NEUTROPHILS NFR BLD AUTO: 90.5 %
NRBC BLD-RTO: 0 /100 WBCS (ref 0–0)
PLATELET # BLD AUTO: 97 X10*3/UL (ref 150–450)
POTASSIUM SERPL-SCNC: 4 MMOL/L (ref 3.5–5.3)
PROT SERPL-MCNC: 7.4 G/DL (ref 6.4–8.2)
PROTHROMBIN TIME: 21.1 SECONDS (ref 9.8–12.8)
RBC # BLD AUTO: 3.5 X10*6/UL (ref 4.5–5.9)
RBC MORPH BLD: NORMAL
SODIUM SERPL-SCNC: 137 MMOL/L (ref 136–145)
WBC # BLD AUTO: 7.2 X10*3/UL (ref 4.4–11.3)

## 2024-04-04 PROCEDURE — 85025 COMPLETE CBC W/AUTO DIFF WBC: CPT | Performed by: HEALTH CARE PROVIDER

## 2024-04-04 PROCEDURE — 83880 ASSAY OF NATRIURETIC PEPTIDE: CPT | Performed by: HEALTH CARE PROVIDER

## 2024-04-04 PROCEDURE — 84484 ASSAY OF TROPONIN QUANT: CPT | Performed by: HEALTH CARE PROVIDER

## 2024-04-04 PROCEDURE — 85610 PROTHROMBIN TIME: CPT | Performed by: HEALTH CARE PROVIDER

## 2024-04-04 PROCEDURE — 93005 ELECTROCARDIOGRAM TRACING: CPT

## 2024-04-04 PROCEDURE — 96376 TX/PRO/DX INJ SAME DRUG ADON: CPT

## 2024-04-04 PROCEDURE — 80053 COMPREHEN METABOLIC PANEL: CPT | Performed by: HEALTH CARE PROVIDER

## 2024-04-04 PROCEDURE — 2500000004 HC RX 250 GENERAL PHARMACY W/ HCPCS (ALT 636 FOR OP/ED)

## 2024-04-04 PROCEDURE — 96374 THER/PROPH/DIAG INJ IV PUSH: CPT

## 2024-04-04 PROCEDURE — 74177 CT ABD & PELVIS W/CONTRAST: CPT

## 2024-04-04 PROCEDURE — 83690 ASSAY OF LIPASE: CPT | Performed by: HEALTH CARE PROVIDER

## 2024-04-04 PROCEDURE — 99285 EMERGENCY DEPT VISIT HI MDM: CPT | Mod: 25

## 2024-04-04 PROCEDURE — 2500000004 HC RX 250 GENERAL PHARMACY W/ HCPCS (ALT 636 FOR OP/ED): Performed by: HEALTH CARE PROVIDER

## 2024-04-04 PROCEDURE — 2550000001 HC RX 255 CONTRASTS: Performed by: HEALTH CARE PROVIDER

## 2024-04-04 PROCEDURE — 74177 CT ABD & PELVIS W/CONTRAST: CPT | Performed by: RADIOLOGY

## 2024-04-04 PROCEDURE — 83735 ASSAY OF MAGNESIUM: CPT | Performed by: HEALTH CARE PROVIDER

## 2024-04-04 PROCEDURE — 83605 ASSAY OF LACTIC ACID: CPT | Performed by: HEALTH CARE PROVIDER

## 2024-04-04 RX ORDER — HYDROMORPHONE HYDROCHLORIDE 1 MG/ML
1 INJECTION, SOLUTION INTRAMUSCULAR; INTRAVENOUS; SUBCUTANEOUS ONCE
Status: COMPLETED | OUTPATIENT
Start: 2024-04-04 | End: 2024-04-04

## 2024-04-04 RX ORDER — HEPARIN 100 UNIT/ML
SYRINGE INTRAVENOUS
Status: COMPLETED
Start: 2024-04-04 | End: 2024-04-04

## 2024-04-04 RX ORDER — DICYCLOMINE HYDROCHLORIDE 20 MG/1
20 TABLET ORAL 2 TIMES DAILY
Qty: 20 TABLET | Refills: 0 | Status: SHIPPED | OUTPATIENT
Start: 2024-04-04 | End: 2024-04-14

## 2024-04-04 RX ORDER — HYDROMORPHONE HYDROCHLORIDE 1 MG/ML
INJECTION, SOLUTION INTRAMUSCULAR; INTRAVENOUS; SUBCUTANEOUS
Status: COMPLETED
Start: 2024-04-04 | End: 2024-04-04

## 2024-04-04 RX ORDER — FENTANYL 25 UG/1
1 PATCH TRANSDERMAL
Qty: 10 PATCH | Refills: 0 | Status: SHIPPED | OUTPATIENT
Start: 2024-04-04 | End: 2024-04-19 | Stop reason: DRUGHIGH

## 2024-04-04 RX ORDER — HEPARIN 100 UNIT/ML
5 SYRINGE INTRAVENOUS ONCE
Status: COMPLETED | OUTPATIENT
Start: 2024-04-04 | End: 2024-04-04

## 2024-04-04 RX ADMIN — HEPARIN 500 UNITS: 100 SYRINGE at 21:25

## 2024-04-04 RX ADMIN — HYDROMORPHONE HYDROCHLORIDE 0.5 MG: 1 INJECTION, SOLUTION INTRAMUSCULAR; INTRAVENOUS; SUBCUTANEOUS at 21:25

## 2024-04-04 RX ADMIN — HYDROMORPHONE HYDROCHLORIDE 1 MG: 1 INJECTION, SOLUTION INTRAMUSCULAR; INTRAVENOUS; SUBCUTANEOUS at 19:18

## 2024-04-04 RX ADMIN — HYDROMORPHONE HYDROCHLORIDE 1 MG: 1 INJECTION, SOLUTION INTRAMUSCULAR; INTRAVENOUS; SUBCUTANEOUS at 18:36

## 2024-04-04 RX ADMIN — IOHEXOL 75 ML: 350 INJECTION, SOLUTION INTRAVENOUS at 18:55

## 2024-04-04 ASSESSMENT — PAIN SCALES - GENERAL
PAINLEVEL_OUTOF10: 4
PAINLEVEL_OUTOF10: 7
PAINLEVEL_OUTOF10: 3
PAINLEVEL_OUTOF10: 7

## 2024-04-04 ASSESSMENT — LIFESTYLE VARIABLES
EVER HAD A DRINK FIRST THING IN THE MORNING TO STEADY YOUR NERVES TO GET RID OF A HANGOVER: NO
TOTAL SCORE: 0
HAVE PEOPLE ANNOYED YOU BY CRITICIZING YOUR DRINKING: NO
EVER FELT BAD OR GUILTY ABOUT YOUR DRINKING: NO
HAVE YOU EVER FELT YOU SHOULD CUT DOWN ON YOUR DRINKING: NO

## 2024-04-04 ASSESSMENT — PAIN DESCRIPTION - ORIENTATION: ORIENTATION: LEFT

## 2024-04-04 ASSESSMENT — PAIN - FUNCTIONAL ASSESSMENT
PAIN_FUNCTIONAL_ASSESSMENT: 0-10

## 2024-04-04 ASSESSMENT — PAIN DESCRIPTION - LOCATION
LOCATION: ABDOMEN
LOCATION: ABDOMEN

## 2024-04-04 NOTE — ED PROVIDER NOTES
HPI   Chief Complaint   Patient presents with    Abdominal Pain     LUQ and LLQ x 1 week. Swollen at the area. On chemo for esophageal cancer last tx was Monday. Denies nausea/vomiting. Endorses constipation. Only abdominal surgical hx was a hernia repair        CC: abd pain  HPI:   63-year-old male with history of third degree heart block s/p PPM (placed in November 2023), esophageal cancer with mets to liver and lungs on chemotherapy, PE, peripheral neuropathy, and portal vein thrombosis.  Patient is mostly complaining of pain around the left lower quadrant left flank region, he was seen in the ED on 3/28/2024 and discharged home he has a fentanyl patch, scopolamine patch for nausea and is on oxycodone at home he does state he had a bowel movement this morning, he notes some soft tissue swelling around the left lower abdominal region left flank that was concerning and also some increased in that same area.  Denies having any fever, chills denies any vomiting or diarrhea no hemoptysis, hematemesis, hematochezia or melanotic stools.    Additional Limitations to History:   External Records Reviewed: I reviewed recent and relevant outside records including   History Obtained From:     Past Medical History: Per HPI  Medications: Reviewed in EMR and with patient  Allergies:  Reviewed in EMR  Past Surgical History:   Social History:     ------------------------------------------------------------------------------------------------------  Physical Exam:  --Vital signs reviewed in nursing triage note, EMR flow sheets, and at patient's bedside  GEN:  A&Ox3, no acute distress, appears comfortable.  Conversational and appropriate.  No confusion or gross mental status changes.  EYES: EOMI, non-injected sclera.  ENT: Moist mucous membranes, no apparent injuries or lesions.   CARDIO: Normal rate and regular rhythm. No murmurs, rubs, or gallops.  2+ equal pulses of the distal extremities.   PULM: Clear to auscultation  bilaterally. No rales, rhonchi, or wheezes. Good symmetric chest expansion.  GI: Soft, mild tenderness with palpation near the left lower quadrant, non-distended. No rebound tenderness or guarding.  SKIN: Warm and dry, no rashes or lesions.  MSK: ROM intact the extremities without contractures.   EXT: No peripheral edema, contusions, or wounds.   NEURO: Cranial nerves II-XII grossly intact. Sensation to light touch intact and equal bilaterally in upper and lower extremities.  Symmetric 5/5 strength in upper and lower extremities.  PSYCH: Appropriate mood and behavior, converses and responds appropriately during exam.  -------------------------------------------------------------------------------------------------------        Differential Diagnoses Considered:   Chronic Medical Conditions Significantly Affecting Care:   Diagnostic testing considered: [PERC, D-Dimer, PECARN, etc.]    - EKG interpreted by myself ventricular paced rhythm, ventricular rate 101 QRS duration 168 no prolonged QT/QTc comparable to prior ECG no acute ischemic changes noted  - I independently interpreted: [CXR, CT, POCUS, etc. including your interpretation]  - Labs notable for     Escalation of Care: Appropriate for   Social Determinants of Health Significantly Affecting Care: [Homelessness, lacking transportation, uninsured, unable to afford medications]  Prescription Drug Consideration: [Antibiotics, antivirals, pain medications, etc.]  Discussion of Management with Other Providers:  I discussed the patient/results with: [admitting team, consultant, radiologist, social work, EPAT, case management, PT/OT, RT, PCP, etc.]      Charles Hale PA-C                          [place] Coma Scale Score: 15                     Patient History   Past Medical History:   Diagnosis Date    Essential (primary) hypertension 12/21/2013    Hypertension    Pacemaker     Peripheral neuropathy     Personal history of other diseases of the circulatory system      History of right bundle branch block (RBBB)    Pneumothorax 12/04/2023     Past Surgical History:   Procedure Laterality Date    CARDIAC ELECTROPHYSIOLOGY PROCEDURE N/A 11/7/2023    Procedure: Temporary Pacemaker Insertion;  Surgeon: Alexis Shook MD;  Location: [facility];  Service: Cardiovascular;  Laterality: N/A;    CARDIAC ELECTROPHYSIOLOGY PROCEDURE Left 11/9/2023    Procedure: PPM IMPLANT DUAL;  Surgeon: Elias Connolly MD;  Location: [facility];  Service: Electrophysiology;  Laterality: Left;    TOTAL KNEE ARTHROPLASTY  09/30/2016    Total Knee Replacement Left    TOTAL KNEE ARTHROPLASTY  09/30/2016    Total Knee Replacement Right     Family History   Problem Relation Name Age of Onset    Cancer Father       Social History     Tobacco Use    Smoking status: Former     Types: Cigarettes, Cigars     Passive exposure: Never    Smokeless tobacco: Never   Vaping Use    Vaping Use: Unknown   Substance Use Topics    Alcohol use: Not Currently    Drug use: Yes     Types: Marijuana     Comment: medical peace card       Physical Exam   ED Triage Vitals [04/04/24 1655]   Temperature Heart Rate Respirations BP   36.8 °C (98.2 °F) (!) 105 18 121/75      Pulse Ox Temp Source Heart Rate Source Patient Position   98 % Skin Monitor Sitting      BP Location FiO2 (%)     Left arm --       Physical Exam    ED Course & MDM   Diagnoses as of 04/04/24 2353   Left lower quadrant abdominal pain       Medical Decision Making  63-year-old male with history of esophageal cancer with metastasis to the liver lungs currently on chemotherapy, portal vein thrombosis with recurrent left lower quadrant abdominal tenderness patient was given a total of 2.5 mg of Dilaudid, he is currently on a fentanyl patch and he has oxycodone at home does not appear to be pancytopenic, he is afebrile, slightly elevated BNP however remaining electrolytes appear unremarkable, small amount of free fluid noted in the pelvis likely  secondary to his malignancy findings were discussed with patient's oncologist okay to DC home he has hemodynamically stable nontoxic, in no acute distress no evidence suggesting acute surgical abdomen at this time.        Procedure  Procedures     Charles Hale PA-C  04/04/24 3084       Charles Hale PA-C  04/04/24 9482

## 2024-04-04 NOTE — TELEPHONE ENCOUNTER
Massimo Garcia called the refill line for fetanyl patch. Would like refills to be sent to [facility] pharmacy on file. Message sent to Palliative Care team to send in.

## 2024-04-04 NOTE — TELEPHONE ENCOUNTER
OARRS report reviewed and reflects  prescription history, no aberrancy noted. Per OARRS, patient last filled Fentanyl Patch  #10/30 day supply on 3/8/24. Per last visit with Sugar Hernandez NP on 2/8/24 patient to continue above. Patient with follow up visit scheduled with Provider Mary  on 4/11/24.  ( Patient had been hospitalized.)   Refill request routed to provider.

## 2024-04-04 NOTE — ED TRIAGE NOTES
Patient here for abdominal pain LUQ and LLQ x 1 week. Swollen at the area. On chemo for esophageal cancer last tx was Monday. Denies nausea/vomiting. Endorses constipation. Only abdominal surgical hx was a hernia repair

## 2024-04-04 NOTE — TELEPHONE ENCOUNTER
Phone call to patient to reschedule missed appointment last week with Sugar Hernandez CNP as he ended up going to ED. VM left asking patient to return our call to [phone], option #5 then option #1 to reschedule and if he had any refill needs.

## 2024-04-06 LAB
ATRIAL RATE: 89 BPM
Q ONSET: 189 MS
QRS COUNT: 17 BEATS
QRS DURATION: 168 MS
QT INTERVAL: 430 MS
QTC CALCULATION(BAZETT): 557 MS
QTC FREDERICIA: 511 MS
R AXIS: -64 DEGREES
T AXIS: 76 DEGREES
T OFFSET: 404 MS
VENTRICULAR RATE: 101 BPM

## 2024-04-08 ENCOUNTER — APPOINTMENT (OUTPATIENT)
Dept: HEMATOLOGY/ONCOLOGY | Facility: CLINIC | Age: 64
End: 2024-04-08
Payer: MEDICARE

## 2024-04-08 PROCEDURE — RXMED WILLOW AMBULATORY MEDICATION CHARGE

## 2024-04-11 ENCOUNTER — OFFICE VISIT (OUTPATIENT)
Dept: PALLIATIVE MEDICINE | Facility: CLINIC | Age: 64
End: 2024-04-11
Payer: MEDICARE

## 2024-04-11 VITALS
RESPIRATION RATE: 18 BRPM | OXYGEN SATURATION: 99 % | SYSTOLIC BLOOD PRESSURE: 125 MMHG | BODY MASS INDEX: 22.74 KG/M2 | TEMPERATURE: 97.7 F | DIASTOLIC BLOOD PRESSURE: 81 MMHG | WEIGHT: 153.99 LBS | HEART RATE: 101 BPM

## 2024-04-11 DIAGNOSIS — G89.3 CANCER RELATED PAIN: ICD-10-CM

## 2024-04-11 DIAGNOSIS — Z51.5 PALLIATIVE CARE ENCOUNTER: Primary | ICD-10-CM

## 2024-04-11 DIAGNOSIS — F41.9 ANXIETY AND DEPRESSION: ICD-10-CM

## 2024-04-11 DIAGNOSIS — C15.9 STAGE IV MALIGNANT NEOPLASM OF ESOPHAGUS (MULTI): ICD-10-CM

## 2024-04-11 DIAGNOSIS — F32.A ANXIETY AND DEPRESSION: ICD-10-CM

## 2024-04-11 PROCEDURE — 1125F AMNT PAIN NOTED PAIN PRSNT: CPT | Performed by: NURSE PRACTITIONER

## 2024-04-11 PROCEDURE — 3074F SYST BP LT 130 MM HG: CPT | Performed by: NURSE PRACTITIONER

## 2024-04-11 PROCEDURE — 99214 OFFICE O/P EST MOD 30 MIN: CPT | Performed by: NURSE PRACTITIONER

## 2024-04-11 PROCEDURE — 1160F RVW MEDS BY RX/DR IN RCRD: CPT | Performed by: NURSE PRACTITIONER

## 2024-04-11 PROCEDURE — 1159F MED LIST DOCD IN RCRD: CPT | Performed by: NURSE PRACTITIONER

## 2024-04-11 PROCEDURE — 3079F DIAST BP 80-89 MM HG: CPT | Performed by: NURSE PRACTITIONER

## 2024-04-11 PROCEDURE — 1111F DSCHRG MED/CURRENT MED MERGE: CPT | Performed by: NURSE PRACTITIONER

## 2024-04-11 ASSESSMENT — PAIN SCALES - GENERAL: PAINLEVEL: 2

## 2024-04-11 NOTE — PROGRESS NOTES
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 male with PmHx of HTN, and recently diagnosed metastatic esophageal cancer.      He presents to supportive oncology clinic for pain and symptom management.    Presents for follow up accompanied by his wife, Jane. Continues to have pain in stomach, specifically L lower abdomen. Sometimes has sharp, shooting pain under ribs. Wearing fentanyl patch 25mcg, taking oxycodone 10mg about 3-4x/day, and using bentyl PRN. Neuropathy is stable, scheduled for scrambler therapy in July. Moving his bowels about every 3-4 days, taking senna, MOM, and magnesium gummies PRN. No N/V, wore scopolamine patch during last chemo treatment with good success. Now just wearing it during treatment weeks. Appetite has been poor, had blisters on tongue and lips with oxaliplatin, tried BMX and magic mouth wash. Has been drinking Boost when he can. Mood is stable, takes ativan 1mg a few times a day, has been getting outside more lately. Sleeping well. Energy levels are stable.     Pain Assessment:  Pain Score:   2  Location: Abdomen  Education: Yes    Symptom Assessment:  Pain:a little  Headache: none  Dizziness:none  Lack of energy: none  Difficulty sleeping: somewhat  Worrying: none  Anxiety: somewhat  Depression: none  Pain in mouth/swallowing: none  Dry mouth: none  Taste changes: none  Shortness of breath: none  Lack of appetite: none   Nausea: none  Vomiting: none  Constipation: none  Diarrhea: none  Sore muscles: none  Numbness or tingling in hands/feet/other: none  Weight loss:  none      Information obtained from: interview of patient  ______________________________________________________________________        Objective                PHYSICAL EXAMINATION   Vital Signs:   Vital signs reviewed  Vitals:    04/11/24 0935   BP: 125/81   Pulse: 101   Resp: 18   Temp: 36.5 °C (97.7 °F)   SpO2: 99%     Pain Score:        Physical Exam  Vitals reviewed.   Constitutional:       Appearance: Normal appearance.   HENT:      Head: Normocephalic.   Cardiovascular:      Rate and Rhythm: Normal rate.   Pulmonary:      Effort: Pulmonary effort is normal.   Abdominal:      Palpations: Abdomen is soft.   Musculoskeletal:         General: Normal range of motion.   Skin:     General: Skin is warm and dry.   Neurological:      General: No focal deficit present.      Mental Status: He is alert and oriented to person, place, and time.   Psychiatric:         Mood and Affect: Mood normal.         Judgment: Judgment normal.         ASSESSMENT/PLAN    Pain  Pain is: cancer related pain  Type: visceral  Pain control: sub-optimally controlled  Home regimen:   -increase fentanyl patch to 50mcg q72hrs. Rx sent for fill on 4/19.   -continue oxycodone 10mg q4hrs PRN. No Rx needed today.   -continue bentyl PRN. No Rx needed today.   -continue tylenol PRN   Intolerances/previously tried:   -previously received morphine with poor efficacy (while inpatient)    Opioid Use  Medication Management:   - OARRS report reviewed with no aberrant behavior; consistent with  prescriptions/records and patient history  - MED 60.  Overdose Risk Score 470.   This has been discussed with patient.   - We will continue to closely monitor the patient for signs of prescription misuse including UDS, OARRS review and subjective reports at each visit.  - concurrent benzodiazepine use with ativan, educated pt on medication safety when used in combination with opiates    - I am a provider who either is or has consulted and collaborated with a  provider certified in Hospice and Palliative Medicine and have conducted a face-face visit and examination for this patient.  - Routine Urine Drug Screen completed 11/2/23 appropriately positive for opioids and negative for illicit substances  - Controlled Substance Agreement completed 11/2/23  - Specifically discussed that controlled substance prescriptions will only be provided by our group as outlined in the completed agreement  - Prescribed naloxone 11/2/23  - Red Flags: hx of ETOH abuse     Neuropathy b/l hands and feet  -pt self discontinued gabapentin 300mg bid.  -scheduled for scrambler therapy in July 2024     Nausea/ Reflux   Intermittent nausea without vomiting related to chemotherapy and opioids   -continue protonix 40mg daily. Rx sent today.   -continue scopolamine patch PRN. Rx sent today.   -continue carafate PRN    Constipation   At risk for constipation related to opioids,  currently not constipated  -educated pt on importance of maintaining regular bowel schedule  Current regimen:   -continue colace 100mg bid PRN.   -continue senna 8.6mg, 1-2tabs, 1-2x/day  -continueMOM 15mL 4x/day PRN  -continue lactulose 20grams q4hrs x 4 doses PRN     Altered Mood  Acute on chronic anxiety related to health concerns   controlled with home regimen  Home regimen:   -pt never started paxil 10mg daily, would like to hold off for now  -continue ativan 1mg tid PRN. Rx sent for fill on 4/24.    Decreased appetite  Related to malignancy, chemotherapy, and disease process  -encouraged smaller, more frequent meals through out the day  -encouraged use of supplements such as Boost, Ensure, Breeze  -encouraged use of anti-emetics around meal times   -pt interested in integrative medicine-- dietary changes, etc.   -referral to Dr. Mays's office     Advance Directives  Existence of Advance Directives:No - has interest  - referral for assitance with ADs  Decision maker: HCPOA is wife  Code Status: Full code        Next  Follow-Up Visit:  Return to clinic in 1 month    Signature and billing  Medical complexity was moderate level due to due to complexity of problems, extensive data review, and high risk of management/treatment.  Time was spent on the following: Prep Time, Time Directly with Patient/Family/Caregiver, Documentation Time. Total time spent: 35 mins              Some elements copied from my note on 2/8/24, the elements have been updated and all reflect current decision making from today, 4/11/2024.      Plan of Care discussed with: Patient, wife    SIGNATURE: ALFREDITO Ferguson-CNP    Contact information:  [facility]  Monday-Friday 8 AM-5 PM  Phone:  [phone], press option #5, then option #1.   Or Epic Secure Chat

## 2024-04-14 ENCOUNTER — APPOINTMENT (OUTPATIENT)
Dept: RADIOLOGY | Facility: HOSPITAL | Age: 64
End: 2024-04-14
Payer: MEDICARE

## 2024-04-14 ENCOUNTER — HOSPITAL ENCOUNTER (EMERGENCY)
Facility: HOSPITAL | Age: 64
Discharge: HOME | End: 2024-04-14
Attending: STUDENT IN AN ORGANIZED HEALTH CARE EDUCATION/TRAINING PROGRAM
Payer: MEDICARE

## 2024-04-14 VITALS
BODY MASS INDEX: 22.66 KG/M2 | HEART RATE: 99 BPM | TEMPERATURE: 97.7 F | SYSTOLIC BLOOD PRESSURE: 101 MMHG | HEIGHT: 69 IN | DIASTOLIC BLOOD PRESSURE: 71 MMHG | WEIGHT: 153 LBS | RESPIRATION RATE: 20 BRPM | OXYGEN SATURATION: 99 %

## 2024-04-14 DIAGNOSIS — K59.03 DRUG-INDUCED CONSTIPATION: Primary | ICD-10-CM

## 2024-04-14 DIAGNOSIS — R10.12 LEFT UPPER QUADRANT ABDOMINAL PAIN: ICD-10-CM

## 2024-04-14 LAB
ALBUMIN SERPL BCP-MCNC: 3.9 G/DL (ref 3.4–5)
ALP SERPL-CCNC: 182 U/L (ref 33–136)
ALT SERPL W P-5'-P-CCNC: 24 U/L (ref 10–52)
ANION GAP SERPL CALC-SCNC: 17 MMOL/L (ref 10–20)
APPEARANCE UR: ABNORMAL
AST SERPL W P-5'-P-CCNC: 63 U/L (ref 9–39)
BASOPHILS # BLD AUTO: 0.03 X10*3/UL (ref 0–0.1)
BASOPHILS NFR BLD AUTO: 0.5 %
BILIRUB SERPL-MCNC: 1.4 MG/DL (ref 0–1.2)
BILIRUB UR STRIP.AUTO-MCNC: NEGATIVE MG/DL
BUN SERPL-MCNC: 18 MG/DL (ref 6–23)
CALCIUM SERPL-MCNC: 9.3 MG/DL (ref 8.6–10.3)
CHLORIDE SERPL-SCNC: 102 MMOL/L (ref 98–107)
CO2 SERPL-SCNC: 24 MMOL/L (ref 21–32)
COLOR UR: ABNORMAL
CREAT SERPL-MCNC: 1 MG/DL (ref 0.5–1.3)
EGFRCR SERPLBLD CKD-EPI 2021: 84 ML/MIN/1.73M*2
EOSINOPHIL # BLD AUTO: 0.09 X10*3/UL (ref 0–0.7)
EOSINOPHIL NFR BLD AUTO: 1.4 %
ERYTHROCYTE [DISTWIDTH] IN BLOOD BY AUTOMATED COUNT: 15.8 % (ref 11.5–14.5)
GLUCOSE SERPL-MCNC: 99 MG/DL (ref 74–99)
GLUCOSE UR STRIP.AUTO-MCNC: NEGATIVE MG/DL
HCT VFR BLD AUTO: 32.9 % (ref 41–52)
HGB BLD-MCNC: 10.7 G/DL (ref 13.5–17.5)
IMM GRANULOCYTES # BLD AUTO: 0.03 X10*3/UL (ref 0–0.7)
IMM GRANULOCYTES NFR BLD AUTO: 0.5 % (ref 0–0.9)
KETONES UR STRIP.AUTO-MCNC: NEGATIVE MG/DL
LACTATE SERPL-SCNC: 1.7 MMOL/L (ref 0.4–2)
LEUKOCYTE ESTERASE UR QL STRIP.AUTO: NEGATIVE
LIPASE SERPL-CCNC: 17 U/L (ref 9–82)
LYMPHOCYTES # BLD AUTO: 0.9 X10*3/UL (ref 1.2–4.8)
LYMPHOCYTES NFR BLD AUTO: 14.2 %
MCH RBC QN AUTO: 28.9 PG (ref 26–34)
MCHC RBC AUTO-ENTMCNC: 32.5 G/DL (ref 32–36)
MCV RBC AUTO: 89 FL (ref 80–100)
MONOCYTES # BLD AUTO: 0.88 X10*3/UL (ref 0.1–1)
MONOCYTES NFR BLD AUTO: 13.8 %
NEUTROPHILS # BLD AUTO: 4.43 X10*3/UL (ref 1.2–7.7)
NEUTROPHILS NFR BLD AUTO: 69.6 %
NITRITE UR QL STRIP.AUTO: NEGATIVE
NRBC BLD-RTO: 0 /100 WBCS (ref 0–0)
PH UR STRIP.AUTO: 5 [PH]
PLATELET # BLD AUTO: 97 X10*3/UL (ref 150–450)
POTASSIUM SERPL-SCNC: 3.6 MMOL/L (ref 3.5–5.3)
PROT SERPL-MCNC: 7.7 G/DL (ref 6.4–8.2)
PROT UR STRIP.AUTO-MCNC: NEGATIVE MG/DL
RBC # BLD AUTO: 3.7 X10*6/UL (ref 4.5–5.9)
RBC # UR STRIP.AUTO: NEGATIVE /UL
SODIUM SERPL-SCNC: 139 MMOL/L (ref 136–145)
SP GR UR STRIP.AUTO: 1.02
UROBILINOGEN UR STRIP.AUTO-MCNC: <2 MG/DL
WBC # BLD AUTO: 6.4 X10*3/UL (ref 4.4–11.3)

## 2024-04-14 PROCEDURE — 81003 URINALYSIS AUTO W/O SCOPE: CPT | Performed by: PHYSICIAN ASSISTANT

## 2024-04-14 PROCEDURE — 85025 COMPLETE CBC W/AUTO DIFF WBC: CPT | Performed by: PHYSICIAN ASSISTANT

## 2024-04-14 PROCEDURE — 96361 HYDRATE IV INFUSION ADD-ON: CPT

## 2024-04-14 PROCEDURE — 36415 COLL VENOUS BLD VENIPUNCTURE: CPT | Performed by: PHYSICIAN ASSISTANT

## 2024-04-14 PROCEDURE — 74177 CT ABD & PELVIS W/CONTRAST: CPT | Performed by: RADIOLOGY

## 2024-04-14 PROCEDURE — 83605 ASSAY OF LACTIC ACID: CPT | Performed by: PHYSICIAN ASSISTANT

## 2024-04-14 PROCEDURE — 2500000001 HC RX 250 WO HCPCS SELF ADMINISTERED DRUGS (ALT 637 FOR MEDICARE OP): Performed by: PHYSICIAN ASSISTANT

## 2024-04-14 PROCEDURE — 99284 EMERGENCY DEPT VISIT MOD MDM: CPT | Mod: 25

## 2024-04-14 PROCEDURE — 83690 ASSAY OF LIPASE: CPT | Performed by: PHYSICIAN ASSISTANT

## 2024-04-14 PROCEDURE — 2550000001 HC RX 255 CONTRASTS: Performed by: PHYSICIAN ASSISTANT

## 2024-04-14 PROCEDURE — 74177 CT ABD & PELVIS W/CONTRAST: CPT

## 2024-04-14 PROCEDURE — 96376 TX/PRO/DX INJ SAME DRUG ADON: CPT

## 2024-04-14 PROCEDURE — 96375 TX/PRO/DX INJ NEW DRUG ADDON: CPT

## 2024-04-14 PROCEDURE — 2500000004 HC RX 250 GENERAL PHARMACY W/ HCPCS (ALT 636 FOR OP/ED): Performed by: PHYSICIAN ASSISTANT

## 2024-04-14 PROCEDURE — 80053 COMPREHEN METABOLIC PANEL: CPT | Performed by: PHYSICIAN ASSISTANT

## 2024-04-14 PROCEDURE — 96374 THER/PROPH/DIAG INJ IV PUSH: CPT

## 2024-04-14 RX ORDER — HEPARIN 100 UNIT/ML
5 SYRINGE INTRAVENOUS ONCE AS NEEDED
Status: DISCONTINUED | OUTPATIENT
Start: 2024-04-14 | End: 2024-04-14 | Stop reason: HOSPADM

## 2024-04-14 RX ORDER — SCOLOPAMINE TRANSDERMAL SYSTEM 1 MG/1
1 PATCH, EXTENDED RELEASE TRANSDERMAL
Status: DISCONTINUED | OUTPATIENT
Start: 2024-04-14 | End: 2024-04-14 | Stop reason: HOSPADM

## 2024-04-14 RX ORDER — HEPARIN 100 UNIT/ML
5 SYRINGE INTRAVENOUS ONCE
Status: DISCONTINUED | OUTPATIENT
Start: 2024-04-14 | End: 2024-04-14

## 2024-04-14 RX ORDER — ENEMA 19; 7 G/133ML; G/133ML
1 ENEMA RECTAL ONCE
Qty: 133 ML | Refills: 0 | Status: SHIPPED | OUTPATIENT
Start: 2024-04-14 | End: 2024-04-14

## 2024-04-14 RX ORDER — MORPHINE SULFATE 4 MG/ML
4 INJECTION INTRAVENOUS ONCE
Status: COMPLETED | OUTPATIENT
Start: 2024-04-14 | End: 2024-04-14

## 2024-04-14 RX ORDER — POLYETHYLENE GLYCOL 3350, SODIUM CHLORIDE, SODIUM BICARBONATE, POTASSIUM CHLORIDE 420; 11.2; 5.72; 1.48 G/4L; G/4L; G/4L; G/4L
4000 POWDER, FOR SOLUTION ORAL ONCE
Qty: 4000 ML | Refills: 0 | Status: COMPLETED | OUTPATIENT
Start: 2024-04-14 | End: 2024-04-14

## 2024-04-14 RX ORDER — HYDROMORPHONE HYDROCHLORIDE 1 MG/ML
1 INJECTION, SOLUTION INTRAMUSCULAR; INTRAVENOUS; SUBCUTANEOUS ONCE
Status: COMPLETED | OUTPATIENT
Start: 2024-04-14 | End: 2024-04-14

## 2024-04-14 RX ORDER — ONDANSETRON HYDROCHLORIDE 2 MG/ML
4 INJECTION, SOLUTION INTRAVENOUS ONCE
Status: DISCONTINUED | OUTPATIENT
Start: 2024-04-14 | End: 2024-04-14 | Stop reason: HOSPADM

## 2024-04-14 RX ADMIN — POLYETHYLENE GLYCOL 3350, SODIUM SULFATE ANHYDROUS, SODIUM BICARBONATE, SODIUM CHLORIDE, POTASSIUM CHLORIDE 4000 ML: 236; 22.74; 6.74; 5.86; 2.97 POWDER, FOR SOLUTION ORAL at 19:22

## 2024-04-14 RX ADMIN — HYDROMORPHONE HYDROCHLORIDE 0.5 MG: 1 INJECTION, SOLUTION INTRAMUSCULAR; INTRAVENOUS; SUBCUTANEOUS at 19:22

## 2024-04-14 RX ADMIN — IOHEXOL 75 ML: 350 INJECTION, SOLUTION INTRAVENOUS at 17:15

## 2024-04-14 RX ADMIN — MORPHINE SULFATE 4 MG: 4 INJECTION INTRAVENOUS at 16:15

## 2024-04-14 RX ADMIN — HYDROMORPHONE HYDROCHLORIDE 1 MG: 1 INJECTION, SOLUTION INTRAMUSCULAR; INTRAVENOUS; SUBCUTANEOUS at 17:31

## 2024-04-14 RX ADMIN — SODIUM CHLORIDE 1000 ML: 9 INJECTION, SOLUTION INTRAVENOUS at 16:33

## 2024-04-14 RX ADMIN — SCOPOLAMINE 1 PATCH: 1.5 PATCH, EXTENDED RELEASE TRANSDERMAL at 17:32

## 2024-04-14 ASSESSMENT — PAIN DESCRIPTION - ORIENTATION: ORIENTATION: LEFT

## 2024-04-14 ASSESSMENT — COLUMBIA-SUICIDE SEVERITY RATING SCALE - C-SSRS
6. HAVE YOU EVER DONE ANYTHING, STARTED TO DO ANYTHING, OR PREPARED TO DO ANYTHING TO END YOUR LIFE?: NO
1. IN THE PAST MONTH, HAVE YOU WISHED YOU WERE DEAD OR WISHED YOU COULD GO TO SLEEP AND NOT WAKE UP?: NO
2. HAVE YOU ACTUALLY HAD ANY THOUGHTS OF KILLING YOURSELF?: NO

## 2024-04-14 ASSESSMENT — LIFESTYLE VARIABLES
HAVE YOU EVER FELT YOU SHOULD CUT DOWN ON YOUR DRINKING: NO
EVER FELT BAD OR GUILTY ABOUT YOUR DRINKING: NO
EVER HAD A DRINK FIRST THING IN THE MORNING TO STEADY YOUR NERVES TO GET RID OF A HANGOVER: NO
TOTAL SCORE: 0
HAVE PEOPLE ANNOYED YOU BY CRITICIZING YOUR DRINKING: NO

## 2024-04-14 ASSESSMENT — PAIN DESCRIPTION - PAIN TYPE: TYPE: ACUTE PAIN

## 2024-04-14 ASSESSMENT — PAIN - FUNCTIONAL ASSESSMENT: PAIN_FUNCTIONAL_ASSESSMENT: 0-10

## 2024-04-14 ASSESSMENT — PAIN DESCRIPTION - LOCATION: LOCATION: RIB CAGE

## 2024-04-14 ASSESSMENT — PAIN SCALES - GENERAL: PAINLEVEL_OUTOF10: 7

## 2024-04-14 NOTE — ED PROVIDER NOTES
HPI   Chief Complaint   Patient presents with    Rib Injury     Left sided rib pain.  Chemo Pt.  Esophageal and liver CA.  Recurring pain from unknown etiology.       History of present illness:  62 yo male presents for complaints of left upper quadrant pain.  The patient states that he is currently being treated for stage IV esophageal cancer with metastases to the liver and out throughout the abdomen.  He states he was seen here about 10 days ago for worsening left upper abdominal pain and we felt was distention of his abdomen.  He states he has minimal eat today has had some nausea without vomiting but states he has also been having some constipation.  He states he last had a bowel movement close to 3 days ago.  He has been trying mag citrate at home as well as drinking apple cider and he has also tried Colace over-the-counter as well as Senokot with no relief.  He states that he has not had any other symptoms time denies any fevers at this time.  He states he is due to have chemotherapy again tomorrow.  He confirms that he is currently working with palliative care for pain control and states he is currently on fentanyl patches with oxycodone for breakthrough pain.  He states he has taken 2 doses of oxycodone today for breakthrough pain with minimal relief.    Social history: Negative for alcohol and drug use.    Review of systems:   Gen.: No weight loss, fatigue, anorexia, insomnia, fever.   Eyes: No vision loss, double vision, drainage, eye pain.   ENT: No pharyngitis, dry mouth.   Cardiac: No chest pain, palpitations, syncope, near syncope.   Pulmonary: No shortness of breath, cough, hemoptysis.   Heme/lymph: No swollen glands, fever, bleeding.   GI: No , melena, hematemesis, hematochezia,  vomiting, diarrhea.   : No discharge, dysuria, frequency, urgency, hematuria.   Musculoskeletal: No limb pain, joint pain, joint swelling.   Skin: No rashes.   Review of systems is otherwise negative unless stated above  or in history of present illness.        Physical exam:  General: Vitals noted,   EENT: No lymphadenopathy, well-hydrated  Cardiac: Regular, rate, rhythm, no murmur.   Pulmonary: Lungs clear bilaterally with good aeration. No adventitious breath sounds.   Abdomen: Soft, nonsurgical. Nontender. No peritoneal signs. Normoactive bowel sounds.   Extremities: No peripheral edema.    Skin: No rash.   Neuro: No focal neurologic deficits        Medical decision making:   Testing: CBC CMP lipase lactate urinalysis: No acute findings, CT scan abdomen pelvis with contrast shows known metastases, large stool burden but no other acute findings as they have read by radiology  Treatment: IV Dilaudid given at patient's request and scopolamine patch given as well  Reevaluation: Patient reports significant improvement in symptoms  Plan: Home-going.  Discussed differential. Will follow-up with the primary physician in the next 2-3 days. Return if worse. They understand return precautions and discharge instructions. Patient and family/friend/caregiver are in agreement with this plan. 64 yo male presents for complaints of left upper quadrant pain.  The patient states that he is currently being treated for stage IV esophageal cancer with metastases to the liver and out throughout the abdomen.  He states he was seen here about 10 days ago for worsening left upper abdominal pain and we felt was distention of his abdomen.  He states he has minimal eat today has had some nausea without vomiting but states he has also been having some constipation.  He states he last had a bowel movement close to 3 days ago.  He has been trying mag citrate at home as well as drinking apple cider and he has also tried Colace over-the-counter as well as Senokot with no relief.  He states that he has not had any other symptoms time denies any fevers at this time.  He states he is due to have chemotherapy again tomorrow.  He confirms that he is currently working  with palliative care for pain control and states he is currently on fentanyl patches with oxycodone for breakthrough pain.  He states he has taken 2 doses of oxycodone today for breakthrough pain with minimal relief.  On physical exam the patient does have some pain to palpation of the left upper abdomen and it does appear to be slightly distended on the left upper side there is no pain to palpation across the ribs no rashes appreciated on the skin.  Bowel sounds are active throughout.  Lungs are clear to auscultation bilaterally normal S1-S2 heart sounds appreciated.  I explained to the patient the test results and I believe that the constipation may be causing some of his pain but I am unsure where most of his pain is coming from I explained to him that it could just be that he is suffering from unfortunately the cancer pain.  He explained to me that he is due for chemotherapy tomorrow and that he is post be seen by palliative care later this week.  He did not want to be admitted at this time and I did offer him admission at this time.  We had discussion about risk first benefit at this time of staying and explained to him that we would be able to manage his pain better but he requested to just go home at this time.  I explained to him that I would like him to start on some GoLytely at this time to alleviate his stool burden as I believe this is contributing to some of his discomfort.  He was agreeable to this plan and he was given a GoLytely jug at this time.  I encouraged him to return in the event that he had any worsening symptoms and explained to him that he was welcome to return particularly if you want to be admitted for pain control.  Impression:   1.  Abdominal pain  2.  Esophageal catheter  3.  Constipation          History provided by:  Patient   used: No                        [place] Coma Scale Score: 15                     Patient History   Past Medical History:   Diagnosis Date     Essential (primary) hypertension 12/21/2013    Hypertension    Pacemaker     Peripheral neuropathy     Personal history of other diseases of the circulatory system     History of right bundle branch block (RBBB)    Pneumothorax 12/04/2023     Past Surgical History:   Procedure Laterality Date    CARDIAC ELECTROPHYSIOLOGY PROCEDURE N/A 11/7/2023    Procedure: Temporary Pacemaker Insertion;  Surgeon: Alexis Shook MD;  Location: [facility] Cardiac Cath Lab;  Service: Cardiovascular;  Laterality: N/A;    CARDIAC ELECTROPHYSIOLOGY PROCEDURE Left 11/9/2023    Procedure: PPM IMPLANT DUAL;  Surgeon: Elias Connolly MD;  Location: [facility] Cardiac Cath Lab;  Service: Electrophysiology;  Laterality: Left;    TOTAL KNEE ARTHROPLASTY  09/30/2016    Total Knee Replacement Left    TOTAL KNEE ARTHROPLASTY  09/30/2016    Total Knee Replacement Right     Family History   Problem Relation Name Age of Onset    Cancer Father       Social History     Tobacco Use    Smoking status: Former     Types: Cigarettes, Cigars     Passive exposure: Never    Smokeless tobacco: Never   Vaping Use    Vaping status: Unknown   Substance Use Topics    Alcohol use: Not Currently    Drug use: Yes     Types: Marijuana     Comment: medical marjuana card       Physical Exam   ED Triage Vitals [04/14/24 1526]   Temperature Heart Rate Respirations BP   36.5 °C (97.7 °F) 99 20 101/71      Pulse Ox Temp Source Heart Rate Source Patient Position   99 % Skin Monitor Sitting      BP Location FiO2 (%)     Left arm --       Physical Exam    ED Course & MDM   Diagnoses as of 04/15/24 1304   Drug-induced constipation   Left upper quadrant abdominal pain       Medical Decision Making      Procedure  Procedures     Artie Pérez PA-C  04/15/24 1304

## 2024-04-15 ENCOUNTER — OFFICE VISIT (OUTPATIENT)
Dept: HEMATOLOGY/ONCOLOGY | Facility: CLINIC | Age: 64
End: 2024-04-15
Payer: MEDICARE

## 2024-04-15 ENCOUNTER — INFUSION (OUTPATIENT)
Dept: HEMATOLOGY/ONCOLOGY | Facility: CLINIC | Age: 64
End: 2024-04-15
Payer: MEDICARE

## 2024-04-15 VITALS
BODY MASS INDEX: 22.9 KG/M2 | SYSTOLIC BLOOD PRESSURE: 97 MMHG | HEART RATE: 100 BPM | OXYGEN SATURATION: 99 % | DIASTOLIC BLOOD PRESSURE: 63 MMHG | TEMPERATURE: 97.3 F | WEIGHT: 155.09 LBS | RESPIRATION RATE: 18 BRPM

## 2024-04-15 DIAGNOSIS — C78.7 METASTASES TO THE LIVER (MULTI): ICD-10-CM

## 2024-04-15 DIAGNOSIS — C15.9 STAGE IV MALIGNANT NEOPLASM OF ESOPHAGUS (MULTI): ICD-10-CM

## 2024-04-15 LAB
ALBUMIN SERPL BCP-MCNC: 3.5 G/DL (ref 3.4–5)
ALP SERPL-CCNC: 157 U/L (ref 33–136)
ALT SERPL W P-5'-P-CCNC: 22 U/L (ref 10–52)
ANION GAP SERPL CALC-SCNC: 12 MMOL/L (ref 10–20)
AST SERPL W P-5'-P-CCNC: 62 U/L (ref 9–39)
BASOPHILS # BLD AUTO: 0.02 X10*3/UL (ref 0–0.1)
BASOPHILS NFR BLD AUTO: 0.5 %
BILIRUB SERPL-MCNC: 1.3 MG/DL (ref 0–1.2)
BUN SERPL-MCNC: 16 MG/DL (ref 6–23)
CALCIUM SERPL-MCNC: 9.1 MG/DL (ref 8.6–10.3)
CHLORIDE SERPL-SCNC: 104 MMOL/L (ref 98–107)
CO2 SERPL-SCNC: 25 MMOL/L (ref 21–32)
CREAT SERPL-MCNC: 0.93 MG/DL (ref 0.5–1.3)
EGFRCR SERPLBLD CKD-EPI 2021: >90 ML/MIN/1.73M*2
EOSINOPHIL # BLD AUTO: 0.09 X10*3/UL (ref 0–0.7)
EOSINOPHIL NFR BLD AUTO: 2.3 %
ERYTHROCYTE [DISTWIDTH] IN BLOOD BY AUTOMATED COUNT: 15.5 % (ref 11.5–14.5)
GLUCOSE SERPL-MCNC: 122 MG/DL (ref 74–99)
HCT VFR BLD AUTO: 29.8 % (ref 41–52)
HGB BLD-MCNC: 9.9 G/DL (ref 13.5–17.5)
HOLD SPECIMEN: NORMAL
IMM GRANULOCYTES # BLD AUTO: 0.01 X10*3/UL (ref 0–0.7)
IMM GRANULOCYTES NFR BLD AUTO: 0.3 % (ref 0–0.9)
LYMPHOCYTES # BLD AUTO: 0.55 X10*3/UL (ref 1.2–4.8)
LYMPHOCYTES NFR BLD AUTO: 13.9 %
MCH RBC QN AUTO: 29.5 PG (ref 26–34)
MCHC RBC AUTO-ENTMCNC: 33.2 G/DL (ref 32–36)
MCV RBC AUTO: 89 FL (ref 80–100)
MONOCYTES # BLD AUTO: 0.44 X10*3/UL (ref 0.1–1)
MONOCYTES NFR BLD AUTO: 11.1 %
NEUTROPHILS # BLD AUTO: 2.84 X10*3/UL (ref 1.2–7.7)
NEUTROPHILS NFR BLD AUTO: 71.9 %
PLATELET # BLD AUTO: 71 X10*3/UL (ref 150–450)
POTASSIUM SERPL-SCNC: 3.3 MMOL/L (ref 3.5–5.3)
PROT SERPL-MCNC: 7 G/DL (ref 6.4–8.2)
RBC # BLD AUTO: 3.36 X10*6/UL (ref 4.5–5.9)
SODIUM SERPL-SCNC: 138 MMOL/L (ref 136–145)
TSH SERPL-ACNC: 1.37 MIU/L (ref 0.44–3.98)
WBC # BLD AUTO: 4 X10*3/UL (ref 4.4–11.3)

## 2024-04-15 PROCEDURE — 1160F RVW MEDS BY RX/DR IN RCRD: CPT | Performed by: INTERNAL MEDICINE

## 2024-04-15 PROCEDURE — 96416 CHEMO PROLONG INFUSE W/PUMP: CPT

## 2024-04-15 PROCEDURE — 3078F DIAST BP <80 MM HG: CPT | Performed by: INTERNAL MEDICINE

## 2024-04-15 PROCEDURE — 3074F SYST BP LT 130 MM HG: CPT | Performed by: INTERNAL MEDICINE

## 2024-04-15 PROCEDURE — 99214 OFFICE O/P EST MOD 30 MIN: CPT | Performed by: INTERNAL MEDICINE

## 2024-04-15 PROCEDURE — 1111F DSCHRG MED/CURRENT MED MERGE: CPT | Performed by: INTERNAL MEDICINE

## 2024-04-15 PROCEDURE — 1125F AMNT PAIN NOTED PAIN PRSNT: CPT | Performed by: INTERNAL MEDICINE

## 2024-04-15 PROCEDURE — 96411 CHEMO IV PUSH ADDL DRUG: CPT

## 2024-04-15 PROCEDURE — 2500000004 HC RX 250 GENERAL PHARMACY W/ HCPCS (ALT 636 FOR OP/ED): Performed by: INTERNAL MEDICINE

## 2024-04-15 PROCEDURE — 96417 CHEMO IV INFUS EACH ADDL SEQ: CPT

## 2024-04-15 PROCEDURE — 96367 TX/PROPH/DG ADDL SEQ IV INF: CPT

## 2024-04-15 PROCEDURE — 1159F MED LIST DOCD IN RCRD: CPT | Performed by: INTERNAL MEDICINE

## 2024-04-15 PROCEDURE — 36415 COLL VENOUS BLD VENIPUNCTURE: CPT

## 2024-04-15 PROCEDURE — 96413 CHEMO IV INFUSION 1 HR: CPT

## 2024-04-15 PROCEDURE — 96375 TX/PRO/DX INJ NEW DRUG ADDON: CPT | Mod: INF

## 2024-04-15 RX ORDER — HEPARIN 100 UNIT/ML
500 SYRINGE INTRAVENOUS AS NEEDED
Status: DISCONTINUED | OUTPATIENT
Start: 2024-04-15 | End: 2024-04-15 | Stop reason: HOSPADM

## 2024-04-15 RX ORDER — PROCHLORPERAZINE EDISYLATE 5 MG/ML
10 INJECTION INTRAMUSCULAR; INTRAVENOUS EVERY 6 HOURS PRN
Status: DISCONTINUED | OUTPATIENT
Start: 2024-04-15 | End: 2024-04-15 | Stop reason: HOSPADM

## 2024-04-15 RX ORDER — HEPARIN 100 UNIT/ML
500 SYRINGE INTRAVENOUS AS NEEDED
Status: CANCELLED | OUTPATIENT
Start: 2024-04-15

## 2024-04-15 RX ORDER — HEPARIN SODIUM,PORCINE/PF 10 UNIT/ML
50 SYRINGE (ML) INTRAVENOUS AS NEEDED
Status: CANCELLED | OUTPATIENT
Start: 2024-04-15

## 2024-04-15 RX ORDER — ACETAMINOPHEN 325 MG/1
650 TABLET ORAL ONCE
Status: COMPLETED | OUTPATIENT
Start: 2024-04-15 | End: 2024-04-15

## 2024-04-15 RX ORDER — LORAZEPAM 2 MG/ML
1 INJECTION INTRAMUSCULAR AS NEEDED
Status: DISCONTINUED | OUTPATIENT
Start: 2024-04-15 | End: 2024-04-15 | Stop reason: HOSPADM

## 2024-04-15 RX ORDER — ALBUTEROL SULFATE 0.83 MG/ML
3 SOLUTION RESPIRATORY (INHALATION) AS NEEDED
Status: DISCONTINUED | OUTPATIENT
Start: 2024-04-15 | End: 2024-04-15 | Stop reason: HOSPADM

## 2024-04-15 RX ORDER — EPINEPHRINE 0.3 MG/.3ML
0.3 INJECTION SUBCUTANEOUS EVERY 5 MIN PRN
Status: DISCONTINUED | OUTPATIENT
Start: 2024-04-15 | End: 2024-04-15 | Stop reason: HOSPADM

## 2024-04-15 RX ORDER — FLUOROURACIL 50 MG/ML
400 INJECTION, SOLUTION INTRAVENOUS ONCE
Status: COMPLETED | OUTPATIENT
Start: 2024-04-15 | End: 2024-04-15

## 2024-04-15 RX ORDER — PROCHLORPERAZINE MALEATE 10 MG
10 TABLET ORAL EVERY 6 HOURS PRN
Status: DISCONTINUED | OUTPATIENT
Start: 2024-04-15 | End: 2024-04-15 | Stop reason: HOSPADM

## 2024-04-15 RX ORDER — DIPHENHYDRAMINE HYDROCHLORIDE 50 MG/ML
25 INJECTION INTRAMUSCULAR; INTRAVENOUS ONCE
Status: COMPLETED | OUTPATIENT
Start: 2024-04-15 | End: 2024-04-15

## 2024-04-15 RX ORDER — DIPHENHYDRAMINE HYDROCHLORIDE 50 MG/ML
50 INJECTION INTRAMUSCULAR; INTRAVENOUS AS NEEDED
Status: DISCONTINUED | OUTPATIENT
Start: 2024-04-15 | End: 2024-04-15 | Stop reason: HOSPADM

## 2024-04-15 RX ORDER — PALONOSETRON 0.05 MG/ML
0.25 INJECTION, SOLUTION INTRAVENOUS ONCE
Status: COMPLETED | OUTPATIENT
Start: 2024-04-15 | End: 2024-04-15

## 2024-04-15 RX ORDER — FAMOTIDINE 10 MG/ML
20 INJECTION INTRAVENOUS ONCE AS NEEDED
Status: DISCONTINUED | OUTPATIENT
Start: 2024-04-15 | End: 2024-04-15 | Stop reason: HOSPADM

## 2024-04-15 RX ADMIN — DIPHENHYDRAMINE HYDROCHLORIDE 25 MG: 50 INJECTION, SOLUTION INTRAMUSCULAR; INTRAVENOUS at 10:03

## 2024-04-15 RX ADMIN — FLUOROURACIL 750 MG: 50 INJECTION, SOLUTION INTRAVENOUS at 12:52

## 2024-04-15 RX ADMIN — ACETAMINOPHEN 650 MG: 325 TABLET ORAL at 10:03

## 2024-04-15 RX ADMIN — DEXAMETHASONE SODIUM PHOSPHATE 8 MG: 4 INJECTION INTRA-ARTICULAR; INTRALESIONAL; INTRAMUSCULAR; INTRAVENOUS; SOFT TISSUE at 10:00

## 2024-04-15 RX ADMIN — FLUOROURACIL 4500 MG: 50 INJECTION, SOLUTION INTRAVENOUS at 13:06

## 2024-04-15 RX ADMIN — SODIUM CHLORIDE 400 MG: 9 INJECTION, SOLUTION INTRAVENOUS at 10:38

## 2024-04-15 RX ADMIN — LEUCOVORIN CALCIUM 740 MG: 350 INJECTION, POWDER, LYOPHILIZED, FOR SOLUTION INTRAMUSCULAR; INTRAVENOUS at 12:09

## 2024-04-15 RX ADMIN — TRASTUZUMAB 300.3 MG: 150 INJECTION, POWDER, LYOPHILIZED, FOR SOLUTION INTRAVENOUS at 11:21

## 2024-04-15 RX ADMIN — PALONOSETRON 250 MCG: 0.05 INJECTION, SOLUTION INTRAVENOUS at 10:02

## 2024-04-15 ASSESSMENT — PAIN SCALES - GENERAL: PAINLEVEL: 3

## 2024-04-15 NOTE — PROGRESS NOTES
Patient ID: Massimo Garcia is a 63 y.o. male.  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  Has done well on trastuzumab FOLFOX regimen.  Today complains of dizziness and presyncopal symptoms. Metastatic burden is liver some lung lesions as well as abdominal lymphadenopathy.        LIVER:  Liver measures 14.7 cm in length and demonstrates mildly diffuse coarsened hepatic echotexture with mild nodular contour which may indicate cirrhotic morphology. There is an indeterminate hypoechoic  lesion within the right hepatic lobe measuring up. 3.6 x 3.2 x 3.0 cm and a 2nd 2.6 x 1.8 x 2.7 cm indeterminate heterogeneous hypoechoic lesion within the left hepatic lobe. Additional scatter presumed hepatic cysts, largest measuring up to 2.4 x 2.1 x 1.8 cm      GALLBLADDER:  Contracted. No obvious cholelithiasis.      BILIARY SYSTEM:  No evidence of intra hepatic biliary dilatation is identified; the common bile duct measures 13.2 mm.  PANCREAS:  The pancreas is poorly visualized due to overlying bowel gas.    RIGHT KIDNEY:  The right kidney measures 11.5 cm in length. No hydronephrosis or renal calculi are seen.       IMPRESSION:  Cirrhotic hepatic morphology with indeterminate hypoechoic lesions of the liver as above, largest measuring up to 3.6 x 3.2 x 3.0 cm. Hepatic mass protocol MRI advised for further assessment.  Review of Systems - Oncology     Objective   BSA: 1.85 meters squared  BP 97/63 (BP Location: Left arm, Patient Position: Sitting, BP Cuff Size: Adult)   Pulse 100   Temp 36.3 °C (97.3 °F) (Temporal)   Resp 18   Wt 70.4 kg (155 lb 1.5 oz)   SpO2 99%   BMI 22.90 kg/m²      has a past medical history of Essential (primary) hypertension (12/21/2013), Pacemaker, Peripheral neuropathy, Personal  history of other diseases of the circulatory system, and Pneumothorax (12/04/2023).   has a past surgical history that includes Total knee arthroplasty (09/30/2016); Total knee arthroplasty (09/30/2016); Cardiac electrophysiology procedure (N/A, 11/7/2023); and Cardiac electrophysiology procedure (Left, 11/9/2023).  Family History   Problem Relation Name Age of Onset    Cancer Father       Oncology History   Stage IV malignant neoplasm of esophagus (Multi)   9/13/2023 Initial Diagnosis    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)     9/13/2023 -  Chemotherapy    Trastuzumab + mFOLFOX6 (Fluorouracil Continuous Infusion / Leucovorin / Oxaliplatin), 14 Day Cycles     Metastases to the liver (Multi)   9/13/2023 Initial Diagnosis    Metastases to the liver (CMS/HCC)     9/13/2023 -  Chemotherapy    Trastuzumab + mFOLFOX6 (Fluorouracil Continuous Infusion / Leucovorin / Oxaliplatin), 14 Day Cycles         Massimo Garcia  reports that he has quit smoking. His smoking use included cigarettes and cigars. He has never been exposed to tobacco smoke. He has never used smokeless tobacco.  He  reports that he does not currently use alcohol.  He  reports current drug use. Drug: Marijuana.    Physical Exam    WBC   Date/Time Value Ref Range Status   04/14/2024 04:30 PM 6.4 4.4 - 11.3 x10*3/uL Final   04/04/2024 06:35 PM 7.2 4.4 - 11.3 x10*3/uL Final   04/01/2024 09:51 AM 7.5 4.4 - 11.3 x10*3/uL Final     nRBC   Date Value Ref Range Status   04/14/2024 0.0 0.0 - 0.0 /100 WBCs Final   04/04/2024 0.0 0.0 - 0.0 /100 WBCs Final   03/28/2024 0.0 0.0 - 0.0 /100 WBCs Final     RBC   Date Value Ref Range Status   04/14/2024 3.70 (L) 4.50 - 5.90 x10*6/uL Final   04/04/2024 3.50 (L) 4.50 - 5.90 x10*6/uL Final   04/01/2024 3.46 (L) 4.50 - 5.90 x10*6/uL Final     Hemoglobin   Date Value Ref Range Status   04/14/2024 10.7 (L) 13.5 - 17.5 g/dL Final   04/04/2024 10.3 (L) 13.5 - 17.5 g/dL Final   04/01/2024 10.0 (L) 13.5 - 17.5 g/dL Final      Hematocrit   Date Value Ref Range Status   04/14/2024 32.9 (L) 41.0 - 52.0 % Final   04/04/2024 31.4 (L) 41.0 - 52.0 % Final   04/01/2024 31.1 (L) 41.0 - 52.0 % Final     MCV   Date/Time Value Ref Range Status   04/14/2024 04:30 PM 89 80 - 100 fL Final   04/04/2024 06:35 PM 90 80 - 100 fL Final   04/01/2024 09:51 AM 90 80 - 100 fL Final     MCH   Date/Time Value Ref Range Status   04/14/2024 04:30 PM 28.9 26.0 - 34.0 pg Final   04/04/2024 06:35 PM 29.4 26.0 - 34.0 pg Final   04/01/2024 09:51 AM 28.9 26.0 - 34.0 pg Final     MCHC   Date/Time Value Ref Range Status   04/14/2024 04:30 PM 32.5 32.0 - 36.0 g/dL Final   04/04/2024 06:35 PM 32.8 32.0 - 36.0 g/dL Final   04/01/2024 09:51 AM 32.2 32.0 - 36.0 g/dL Final     RDW   Date/Time Value Ref Range Status   04/14/2024 04:30 PM 15.8 (H) 11.5 - 14.5 % Final   04/04/2024 06:35 PM 15.4 (H) 11.5 - 14.5 % Final   04/01/2024 09:51 AM 15.5 (H) 11.5 - 14.5 % Final     Platelets   Date/Time Value Ref Range Status   04/14/2024 04:30 PM 97 (L) 150 - 450 x10*3/uL Final   04/04/2024 06:35 PM 97 (L) 150 - 450 x10*3/uL Final     Comment:     Platelet count verified by smear review.   04/01/2024 09:51  (L) 150 - 450 x10*3/uL Final     MPV   Date/Time Value Ref Range Status   10/30/2023 09:13 AM 10.3 7.5 - 11.5 fL Final   10/16/2023 08:34 AM 10.7 7.5 - 11.5 fL Final   10/02/2023 09:08 AM 10.0 7.5 - 11.5 fL Final     Neutrophils %   Date/Time Value Ref Range Status   04/14/2024 04:30 PM 69.6 40.0 - 80.0 % Final   04/04/2024 06:35 PM 90.5 40.0 - 80.0 % Final   04/01/2024 09:51 AM 77.1 40.0 - 80.0 % Final     Immature Granulocytes %, Automated   Date/Time Value Ref Range Status   04/14/2024 04:30 PM 0.5 0.0 - 0.9 % Final     Comment:     Immature Granulocyte Count (IG) includes promyelocytes, myelocytes and metamyelocytes but does not include bands. Percent differential counts (%) should be interpreted in the context of the absolute cell counts (cells/UL).   04/04/2024 06:35 PM  0.6 0.0 - 0.9 % Final     Comment:     Immature Granulocyte Count (IG) includes promyelocytes, myelocytes and metamyelocytes but does not include bands. Percent differential counts (%) should be interpreted in the context of the absolute cell counts (cells/UL).   04/01/2024 09:51 AM 0.5 0.0 - 0.9 % Final     Comment:     Immature Granulocyte Count (IG) includes promyelocytes, myelocytes and metamyelocytes but does not include bands. Percent differential counts (%) should be interpreted in the context of the absolute cell counts (cells/UL).     Lymphocytes %   Date/Time Value Ref Range Status   04/14/2024 04:30 PM 14.2 13.0 - 44.0 % Final   04/04/2024 06:35 PM 7.1 13.0 - 44.0 % Final   04/01/2024 09:51 AM 11.5 13.0 - 44.0 % Final     Monocytes %   Date/Time Value Ref Range Status   04/14/2024 04:30 PM 13.8 2.0 - 10.0 % Final   04/04/2024 06:35 PM 1.2 2.0 - 10.0 % Final   04/01/2024 09:51 AM 9.5 2.0 - 10.0 % Final     Eosinophils %   Date/Time Value Ref Range Status   04/14/2024 04:30 PM 1.4 0.0 - 6.0 % Final   04/04/2024 06:35 PM 0.3 0.0 - 6.0 % Final   04/01/2024 09:51 AM 1.1 0.0 - 6.0 % Final     Basophils %   Date/Time Value Ref Range Status   04/14/2024 04:30 PM 0.5 0.0 - 2.0 % Final   04/04/2024 06:35 PM 0.3 0.0 - 2.0 % Final   04/01/2024 09:51 AM 0.3 0.0 - 2.0 % Final     Neutrophils Absolute   Date/Time Value Ref Range Status   04/14/2024 04:30 PM 4.43 1.20 - 7.70 x10*3/uL Final     Comment:     Percent differential counts (%) should be interpreted in the context of the absolute cell counts (cells/uL).   04/04/2024 06:35 PM 6.54 1.20 - 7.70 x10*3/uL Final     Comment:     Percent differential counts (%) should be interpreted in the context of the absolute cell counts (cells/uL).   04/01/2024 09:51 AM 5.79 1.20 - 7.70 x10*3/uL Final     Comment:     Percent differential counts (%) should be interpreted in the context of the absolute cell counts (cells/uL).     Immature Granulocytes Absolute, Automated  "  Date/Time Value Ref Range Status   04/14/2024 04:30 PM 0.03 0.00 - 0.70 x10*3/uL Final   04/04/2024 06:35 PM 0.04 0.00 - 0.70 x10*3/uL Final   04/01/2024 09:51 AM 0.04 0.00 - 0.70 x10*3/uL Final     Lymphocytes Absolute   Date/Time Value Ref Range Status   04/14/2024 04:30 PM 0.90 (L) 1.20 - 4.80 x10*3/uL Final   04/04/2024 06:35 PM 0.51 (L) 1.20 - 4.80 x10*3/uL Final   04/01/2024 09:51 AM 0.86 (L) 1.20 - 4.80 x10*3/uL Final     Monocytes Absolute   Date/Time Value Ref Range Status   04/14/2024 04:30 PM 0.88 0.10 - 1.00 x10*3/uL Final   04/04/2024 06:35 PM 0.09 (L) 0.10 - 1.00 x10*3/uL Final   04/01/2024 09:51 AM 0.71 0.10 - 1.00 x10*3/uL Final     Eosinophils Absolute   Date/Time Value Ref Range Status   04/14/2024 04:30 PM 0.09 0.00 - 0.70 x10*3/uL Final   04/04/2024 06:35 PM 0.02 0.00 - 0.70 x10*3/uL Final   04/01/2024 09:51 AM 0.08 0.00 - 0.70 x10*3/uL Final     Basophils Absolute   Date/Time Value Ref Range Status   04/14/2024 04:30 PM 0.03 0.00 - 0.10 x10*3/uL Final   04/04/2024 06:35 PM 0.02 0.00 - 0.10 x10*3/uL Final     Comment:     Automated WBC differential has been confirmed by manual smear.   04/01/2024 09:51 AM 0.02 0.00 - 0.10 x10*3/uL Final       No components found for: \"PT\"  aPTT   Date/Time Value Ref Range Status   03/20/2024 06:03 PM 26 (L) 27 - 38 seconds Final   03/19/2024 10:05 AM 31 27 - 38 seconds Final   12/26/2023 06:13 PM 48 (H) 27 - 38 seconds Final       Assessment/Plan      3 cm left neck LN. Will monitor. No AMS at this time. Added pembrolizumab to the patient's treatment schedule. As per NCCN guidelines patient is eligible to receive trastuzumab pembrolizumab 5-FU leucovorin and oxaliplatin. Pembrolizumab is scheduled for every 42 days.      Presented today to discuss tolerance of chemotherapy.  Patient would not want to continue on anxiety platinum.  Regimen now would be trastuzumab pembrolizumab leucovorin plus 5-FU.  Patient has been on treatment with good performance status for " the past 15 to 16 months.  Will reserve the option of single agent Enhertu in the future.  Continue therapy.    04/15/2024 patient seen today.  Will continue trastuzumab pembrolizumab leucovorin plus 5-FU.  Patient's intolerance to oxaliplatin is noted and this has been discontinued.  Overall stable disease stable in affect and performance status.    Cycle 15: Today: 4/15/24     Diagnoses and all orders for this visit:  Metastases to the liver (Multi)  -     Clinic Appointment Request  -     Infusion Appointment Request; Future  -     Clinic Appointment Request; Future  -     Infusion Appointment Request; Future  Stage IV malignant neoplasm of esophagus (Multi)  -     Clinic Appointment Request  -     Infusion Appointment Request; Future  -     Clinic Appointment Request; Future  -     Infusion Appointment Request; Future           Tomasa Blake MD

## 2024-04-15 NOTE — PATIENT INSTRUCTIONS
Today you met with Dr. Blake.  No changes.  Today is treatment with the Pembrolizumab.  We reviewed reaction signs and note to call sooner rather than later for any concerns.  We will see you in 2 cycles.     Please call the office for any questions or concerns.  Review your schedule prior to leaving [facility].  We ask that you notify us 5-7 days before your medications need refilled.   [facility] is strongly encouraging all patients to access their MyChart for all results and to communicate with their provider for non-urgent messages.  If an urgent/same day/sick concern response is needed, please call the office at [phone]. Thank You!

## 2024-04-15 NOTE — PROGRESS NOTES
1310. Tolerated infusions without incident. Connected to CADD Pump. Will return 4.17.24 at 1110. Pt aware and agreeable with this plan. Dc'd via independent / ambulatory

## 2024-04-15 NOTE — PROGRESS NOTES
Patient ID: Massimo Garcia is a 63 y.o. male.  Referring Physician: Tomasa Blake MD  [id number] [place] [address]  Primary Care Provider: Dayne Santamaria DO  Visit Type:  Follow Up     Subjective    HPI    Review of Systems - Oncology     Objective   BSA: There is no height or weight on file to calculate BSA.  There were no vitals taken for this visit.     has a past medical history of Essential (primary) hypertension (12/21/2013), Pacemaker, Peripheral neuropathy, Personal history of other diseases of the circulatory system, and Pneumothorax (12/04/2023).   has a past surgical history that includes Total knee arthroplasty (09/30/2016); Total knee arthroplasty (09/30/2016); Cardiac electrophysiology procedure (N/A, 11/7/2023); and Cardiac electrophysiology procedure (Left, 11/9/2023).  Family History   Problem Relation Name Age of Onset    Cancer Father       Oncology History   Stage IV malignant neoplasm of esophagus (Multi)   9/13/2023 Initial Diagnosis    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)     9/13/2023 -  Chemotherapy    Trastuzumab + mFOLFOX6 (Fluorouracil Continuous Infusion / Leucovorin / Oxaliplatin), 14 Day Cycles     Metastases to the liver (Multi)   9/13/2023 Initial Diagnosis    Metastases to the liver (CMS/HCC)     9/13/2023 -  Chemotherapy    Trastuzumab + mFOLFOX6 (Fluorouracil Continuous Infusion / Leucovorin / Oxaliplatin), 14 Day Cycles         Massimo Garcia  reports that he has quit smoking. His smoking use included cigarettes and cigars. He has never been exposed to tobacco smoke. He has never used smokeless tobacco.  He  reports that he does not currently use alcohol.  He  reports current drug use. Drug: Marijuana.    Physical Exam    WBC   Date/Time Value Ref Range Status   04/15/2024 08:30 AM 4.0 (L) 4.4 - 11.3 x10*3/uL Final   04/14/2024 04:30 PM 6.4 4.4 - 11.3 x10*3/uL Final   04/04/2024 06:35 PM 7.2 4.4 - 11.3 x10*3/uL Final     [facility]   Date Value Ref  Range Status   04/14/2024 0.0 0.0 - 0.0 /100 WBCs Final   04/04/2024 0.0 0.0 - 0.0 /100 WBCs Final   03/28/2024 0.0 0.0 - 0.0 /100 WBCs Final     RBC   Date Value Ref Range Status   04/15/2024 3.36 (L) 4.50 - 5.90 x10*6/uL Final   04/14/2024 3.70 (L) 4.50 - 5.90 x10*6/uL Final   04/04/2024 3.50 (L) 4.50 - 5.90 x10*6/uL Final     Hemoglobin   Date Value Ref Range Status   04/15/2024 9.9 (L) 13.5 - 17.5 g/dL Final   04/14/2024 10.7 (L) 13.5 - 17.5 g/dL Final   04/04/2024 10.3 (L) 13.5 - 17.5 g/dL Final     Hematocrit   Date Value Ref Range Status   04/15/2024 29.8 (L) 41.0 - 52.0 % Final   04/14/2024 32.9 (L) 41.0 - 52.0 % Final   04/04/2024 31.4 (L) 41.0 - 52.0 % Final     MCV   Date/Time Value Ref Range Status   04/15/2024 08:30 AM 89 80 - 100 fL Final   04/14/2024 04:30 PM 89 80 - 100 fL Final   04/04/2024 06:35 PM 90 80 - 100 fL Final     MCH   Date/Time Value Ref Range Status   04/15/2024 08:30 AM 29.5 26.0 - 34.0 pg Final   04/14/2024 04:30 PM 28.9 26.0 - 34.0 pg Final   04/04/2024 06:35 PM 29.4 26.0 - 34.0 pg Final     MCHC   Date/Time Value Ref Range Status   04/15/2024 08:30 AM 33.2 32.0 - 36.0 g/dL Final   04/14/2024 04:30 PM 32.5 32.0 - 36.0 g/dL Final   04/04/2024 06:35 PM 32.8 32.0 - 36.0 g/dL Final     RDW   Date/Time Value Ref Range Status   04/15/2024 08:30 AM 15.5 (H) 11.5 - 14.5 % Final   04/14/2024 04:30 PM 15.8 (H) 11.5 - 14.5 % Final   04/04/2024 06:35 PM 15.4 (H) 11.5 - 14.5 % Final     Platelets   Date/Time Value Ref Range Status   04/15/2024 08:30 AM 71 (L) 150 - 450 x10*3/uL Final   04/14/2024 04:30 PM 97 (L) 150 - 450 x10*3/uL Final   04/04/2024 06:35 PM 97 (L) 150 - 450 x10*3/uL Final     Comment:     Platelet count verified by smear review.     MPV   Date/Time Value Ref Range Status   10/30/2023 09:13 AM 10.3 7.5 - 11.5 fL Final   10/16/2023 08:34 AM 10.7 7.5 - 11.5 fL Final   10/02/2023 09:08 AM 10.0 7.5 - 11.5 fL Final     Neutrophils %   Date/Time Value Ref Range Status   04/15/2024  08:30 AM 71.9 40.0 - 80.0 % Final   04/14/2024 04:30 PM 69.6 40.0 - 80.0 % Final   04/04/2024 06:35 PM 90.5 40.0 - 80.0 % Final     Immature Granulocytes %, Automated   Date/Time Value Ref Range Status   04/15/2024 08:30 AM 0.3 0.0 - 0.9 % Final     Comment:     Immature Granulocyte Count (IG) includes promyelocytes, myelocytes and metamyelocytes but does not include bands. Percent differential counts (%) should be interpreted in the context of the absolute cell counts (cells/UL).   04/14/2024 04:30 PM 0.5 0.0 - 0.9 % Final     Comment:     Immature Granulocyte Count (IG) includes promyelocytes, myelocytes and metamyelocytes but does not include bands. Percent differential counts (%) should be interpreted in the context of the absolute cell counts (cells/UL).   04/04/2024 06:35 PM 0.6 0.0 - 0.9 % Final     Comment:     Immature Granulocyte Count (IG) includes promyelocytes, myelocytes and metamyelocytes but does not include bands. Percent differential counts (%) should be interpreted in the context of the absolute cell counts (cells/UL).     Lymphocytes %   Date/Time Value Ref Range Status   04/15/2024 08:30 AM 13.9 13.0 - 44.0 % Final   04/14/2024 04:30 PM 14.2 13.0 - 44.0 % Final   04/04/2024 06:35 PM 7.1 13.0 - 44.0 % Final     Monocytes %   Date/Time Value Ref Range Status   04/15/2024 08:30 AM 11.1 2.0 - 10.0 % Final   04/14/2024 04:30 PM 13.8 2.0 - 10.0 % Final   04/04/2024 06:35 PM 1.2 2.0 - 10.0 % Final     Eosinophils %   Date/Time Value Ref Range Status   04/15/2024 08:30 AM 2.3 0.0 - 6.0 % Final   04/14/2024 04:30 PM 1.4 0.0 - 6.0 % Final   04/04/2024 06:35 PM 0.3 0.0 - 6.0 % Final     Basophils %   Date/Time Value Ref Range Status   04/15/2024 08:30 AM 0.5 0.0 - 2.0 % Final   04/14/2024 04:30 PM 0.5 0.0 - 2.0 % Final   04/04/2024 06:35 PM 0.3 0.0 - 2.0 % Final     Neutrophils Absolute   Date/Time Value Ref Range Status   04/15/2024 08:30 AM 2.84 1.20 - 7.70 x10*3/uL Final     Comment:     Percent  "differential counts (%) should be interpreted in the context of the absolute cell counts (cells/uL).   04/14/2024 04:30 PM 4.43 1.20 - 7.70 x10*3/uL Final     Comment:     Percent differential counts (%) should be interpreted in the context of the absolute cell counts (cells/uL).   04/04/2024 06:35 PM 6.54 1.20 - 7.70 x10*3/uL Final     Comment:     Percent differential counts (%) should be interpreted in the context of the absolute cell counts (cells/uL).     Immature Granulocytes Absolute, Automated   Date/Time Value Ref Range Status   04/15/2024 08:30 AM 0.01 0.00 - 0.70 x10*3/uL Final   04/14/2024 04:30 PM 0.03 0.00 - 0.70 x10*3/uL Final   04/04/2024 06:35 PM 0.04 0.00 - 0.70 x10*3/uL Final     Lymphocytes Absolute   Date/Time Value Ref Range Status   04/15/2024 08:30 AM 0.55 (L) 1.20 - 4.80 x10*3/uL Final   04/14/2024 04:30 PM 0.90 (L) 1.20 - 4.80 x10*3/uL Final   04/04/2024 06:35 PM 0.51 (L) 1.20 - 4.80 x10*3/uL Final     Monocytes Absolute   Date/Time Value Ref Range Status   04/15/2024 08:30 AM 0.44 0.10 - 1.00 x10*3/uL Final   04/14/2024 04:30 PM 0.88 0.10 - 1.00 x10*3/uL Final   04/04/2024 06:35 PM 0.09 (L) 0.10 - 1.00 x10*3/uL Final     Eosinophils Absolute   Date/Time Value Ref Range Status   04/15/2024 08:30 AM 0.09 0.00 - 0.70 x10*3/uL Final   04/14/2024 04:30 PM 0.09 0.00 - 0.70 x10*3/uL Final   04/04/2024 06:35 PM 0.02 0.00 - 0.70 x10*3/uL Final     Basophils Absolute   Date/Time Value Ref Range Status   04/15/2024 08:30 AM 0.02 0.00 - 0.10 x10*3/uL Final   04/14/2024 04:30 PM 0.03 0.00 - 0.10 x10*3/uL Final   04/04/2024 06:35 PM 0.02 0.00 - 0.10 x10*3/uL Final     Comment:     Automated WBC differential has been confirmed by manual smear.       No components found for: \"PT\"  aPTT   Date/Time Value Ref Range Status   03/20/2024 06:03 PM 26 (L) 27 - 38 seconds Final   03/19/2024 10:05 AM 31 27 - 38 seconds Final   12/26/2023 06:13 PM 48 (H) 27 - 38 seconds Final       Assessment/Plan      OK to treat: " PLT 71:               INF 01 ANGIE

## 2024-04-17 ENCOUNTER — INFUSION (OUTPATIENT)
Dept: HEMATOLOGY/ONCOLOGY | Facility: CLINIC | Age: 64
End: 2024-04-17
Payer: MEDICARE

## 2024-04-17 VITALS
WEIGHT: 156.42 LBS | SYSTOLIC BLOOD PRESSURE: 115 MMHG | RESPIRATION RATE: 20 BRPM | OXYGEN SATURATION: 99 % | HEART RATE: 103 BPM | BODY MASS INDEX: 23.1 KG/M2 | DIASTOLIC BLOOD PRESSURE: 71 MMHG | TEMPERATURE: 97.7 F

## 2024-04-17 DIAGNOSIS — C15.9 STAGE IV MALIGNANT NEOPLASM OF ESOPHAGUS (MULTI): ICD-10-CM

## 2024-04-17 DIAGNOSIS — C78.7 METASTASES TO THE LIVER (MULTI): ICD-10-CM

## 2024-04-17 PROCEDURE — 96523 IRRIG DRUG DELIVERY DEVICE: CPT

## 2024-04-17 PROCEDURE — 96372 THER/PROPH/DIAG INJ SC/IM: CPT

## 2024-04-17 PROCEDURE — 2500000004 HC RX 250 GENERAL PHARMACY W/ HCPCS (ALT 636 FOR OP/ED): Performed by: INTERNAL MEDICINE

## 2024-04-17 RX ORDER — EPINEPHRINE 0.3 MG/.3ML
0.3 INJECTION SUBCUTANEOUS EVERY 5 MIN PRN
OUTPATIENT
Start: 2024-05-13

## 2024-04-17 RX ORDER — CYANOCOBALAMIN 1000 UG/ML
1000 INJECTION, SOLUTION INTRAMUSCULAR; SUBCUTANEOUS ONCE
Status: COMPLETED | OUTPATIENT
Start: 2024-04-17 | End: 2024-04-17

## 2024-04-17 RX ORDER — DIPHENHYDRAMINE HYDROCHLORIDE 50 MG/ML
50 INJECTION INTRAMUSCULAR; INTRAVENOUS AS NEEDED
OUTPATIENT
Start: 2024-05-13

## 2024-04-17 RX ORDER — ALBUTEROL SULFATE 0.83 MG/ML
3 SOLUTION RESPIRATORY (INHALATION) AS NEEDED
OUTPATIENT
Start: 2024-05-13

## 2024-04-17 RX ORDER — CYANOCOBALAMIN 1000 UG/ML
1000 INJECTION, SOLUTION INTRAMUSCULAR; SUBCUTANEOUS ONCE
OUTPATIENT
Start: 2024-05-13

## 2024-04-17 RX ORDER — FAMOTIDINE 10 MG/ML
20 INJECTION INTRAVENOUS ONCE AS NEEDED
OUTPATIENT
Start: 2024-05-13

## 2024-04-17 RX ADMIN — CYANOCOBALAMIN 1000 MCG: 1000 INJECTION INTRAMUSCULAR; SUBCUTANEOUS at 11:22

## 2024-04-17 ASSESSMENT — PAIN SCALES - GENERAL: PAINLEVEL: 3

## 2024-04-17 NOTE — PROGRESS NOTES
1130. Pt here for CADD Pump DC and B12 injection. CADD Pump is beeping and infusion completed. Schedule reviewed then Dc'd via independent / ambulatory

## 2024-04-18 ENCOUNTER — TELEPHONE (OUTPATIENT)
Dept: HEMATOLOGY/ONCOLOGY | Facility: CLINIC | Age: 64
End: 2024-04-18
Payer: MEDICARE

## 2024-04-18 DIAGNOSIS — F41.9 ANXIETY AND DEPRESSION: ICD-10-CM

## 2024-04-18 DIAGNOSIS — F32.A ANXIETY AND DEPRESSION: ICD-10-CM

## 2024-04-18 RX ORDER — LORAZEPAM 1 MG/1
1 TABLET ORAL EVERY 8 HOURS PRN
Qty: 90 TABLET | Refills: 0 | Status: SHIPPED | OUTPATIENT
Start: 2024-04-18 | End: 2024-05-31 | Stop reason: SDUPTHER

## 2024-04-19 ENCOUNTER — PHARMACY VISIT (OUTPATIENT)
Dept: PHARMACY | Facility: CLINIC | Age: 64
End: 2024-04-19
Payer: MEDICARE

## 2024-04-19 ENCOUNTER — TELEPHONE (OUTPATIENT)
Dept: PALLIATIVE MEDICINE | Facility: HOSPITAL | Age: 64
End: 2024-04-19
Payer: MEDICARE

## 2024-04-19 DIAGNOSIS — G89.3 CANCER RELATED PAIN: ICD-10-CM

## 2024-04-19 PROCEDURE — RXOTC WILLOW AMBULATORY OTC CHARGE

## 2024-04-19 RX ORDER — FENTANYL 50 UG/1
1 PATCH TRANSDERMAL
Qty: 10 PATCH | Refills: 0 | Status: SHIPPED | OUTPATIENT
Start: 2024-04-19 | End: 2024-05-24 | Stop reason: HOSPADM

## 2024-04-19 NOTE — TELEPHONE ENCOUNTER
[facility] report reviewed and reflects  prescription history, no aberrancy noted. Per [facility], patient last filled 30 day supply of 25 mcg fentanyl on 4/4. Per last visit with Sugar Hernandez patient to increase to fentanyl 50mcg q72h. Patient with follow up visit scheduled on 5/9. Patient updated that medication will be sent to [facility]. Refill request routed to provider.    Called [facility] to see if a PA was required.

## 2024-04-22 ENCOUNTER — APPOINTMENT (OUTPATIENT)
Dept: HEMATOLOGY/ONCOLOGY | Facility: CLINIC | Age: 64
End: 2024-04-22
Payer: MEDICARE

## 2024-04-24 ENCOUNTER — APPOINTMENT (OUTPATIENT)
Dept: RADIOLOGY | Facility: HOSPITAL | Age: 64
End: 2024-04-24
Payer: MEDICARE

## 2024-04-24 ENCOUNTER — HOSPITAL ENCOUNTER (EMERGENCY)
Facility: HOSPITAL | Age: 64
Discharge: HOME | End: 2024-04-24
Payer: MEDICARE

## 2024-04-24 VITALS
TEMPERATURE: 97.9 F | HEIGHT: 70 IN | WEIGHT: 160 LBS | SYSTOLIC BLOOD PRESSURE: 105 MMHG | DIASTOLIC BLOOD PRESSURE: 74 MMHG | RESPIRATION RATE: 16 BRPM | BODY MASS INDEX: 22.9 KG/M2 | HEART RATE: 81 BPM | OXYGEN SATURATION: 98 %

## 2024-04-24 DIAGNOSIS — K52.9 COLITIS: Primary | ICD-10-CM

## 2024-04-24 LAB
ALBUMIN SERPL BCP-MCNC: 3.8 G/DL (ref 3.4–5)
ALP SERPL-CCNC: 160 U/L (ref 33–136)
ALT SERPL W P-5'-P-CCNC: 23 U/L (ref 10–52)
ANION GAP SERPL CALC-SCNC: 12 MMOL/L (ref 10–20)
AST SERPL W P-5'-P-CCNC: 44 U/L (ref 9–39)
BASOPHILS # BLD AUTO: 0.01 X10*3/UL (ref 0–0.1)
BASOPHILS NFR BLD AUTO: 0.2 %
BILIRUB SERPL-MCNC: 1 MG/DL (ref 0–1.2)
BUN SERPL-MCNC: 14 MG/DL (ref 6–23)
CALCIUM SERPL-MCNC: 9.5 MG/DL (ref 8.6–10.3)
CHLORIDE SERPL-SCNC: 103 MMOL/L (ref 98–107)
CO2 SERPL-SCNC: 24 MMOL/L (ref 21–32)
CREAT SERPL-MCNC: 0.83 MG/DL (ref 0.5–1.3)
EGFRCR SERPLBLD CKD-EPI 2021: >90 ML/MIN/1.73M*2
EOSINOPHIL # BLD AUTO: 0.07 X10*3/UL (ref 0–0.7)
EOSINOPHIL NFR BLD AUTO: 1.6 %
ERYTHROCYTE [DISTWIDTH] IN BLOOD BY AUTOMATED COUNT: 15.4 % (ref 11.5–14.5)
GLUCOSE SERPL-MCNC: 94 MG/DL (ref 74–99)
HCT VFR BLD AUTO: 31.1 % (ref 41–52)
HGB BLD-MCNC: 10.3 G/DL (ref 13.5–17.5)
IMM GRANULOCYTES # BLD AUTO: 0.04 X10*3/UL (ref 0–0.7)
IMM GRANULOCYTES NFR BLD AUTO: 0.9 % (ref 0–0.9)
LACTATE SERPL-SCNC: 1.2 MMOL/L (ref 0.4–2)
LYMPHOCYTES # BLD AUTO: 0.69 X10*3/UL (ref 1.2–4.8)
LYMPHOCYTES NFR BLD AUTO: 15.9 %
MAGNESIUM SERPL-MCNC: 1.62 MG/DL (ref 1.6–2.4)
MCH RBC QN AUTO: 29.3 PG (ref 26–34)
MCHC RBC AUTO-ENTMCNC: 33.1 G/DL (ref 32–36)
MCV RBC AUTO: 89 FL (ref 80–100)
MONOCYTES # BLD AUTO: 0.69 X10*3/UL (ref 0.1–1)
MONOCYTES NFR BLD AUTO: 15.9 %
NEUTROPHILS # BLD AUTO: 2.85 X10*3/UL (ref 1.2–7.7)
NEUTROPHILS NFR BLD AUTO: 65.5 %
NRBC BLD-RTO: 0 /100 WBCS (ref 0–0)
PLATELET # BLD AUTO: 89 X10*3/UL (ref 150–450)
POTASSIUM SERPL-SCNC: 3.4 MMOL/L (ref 3.5–5.3)
PROT SERPL-MCNC: 7 G/DL (ref 6.4–8.2)
RBC # BLD AUTO: 3.51 X10*6/UL (ref 4.5–5.9)
SODIUM SERPL-SCNC: 136 MMOL/L (ref 136–145)
WBC # BLD AUTO: 4.4 X10*3/UL (ref 4.4–11.3)

## 2024-04-24 PROCEDURE — 80053 COMPREHEN METABOLIC PANEL: CPT | Performed by: PHYSICIAN ASSISTANT

## 2024-04-24 PROCEDURE — 85025 COMPLETE CBC W/AUTO DIFF WBC: CPT | Performed by: PHYSICIAN ASSISTANT

## 2024-04-24 PROCEDURE — 96376 TX/PRO/DX INJ SAME DRUG ADON: CPT

## 2024-04-24 PROCEDURE — 83735 ASSAY OF MAGNESIUM: CPT | Performed by: PHYSICIAN ASSISTANT

## 2024-04-24 PROCEDURE — 72131 CT LUMBAR SPINE W/O DYE: CPT | Mod: RCN | Performed by: RADIOLOGY

## 2024-04-24 PROCEDURE — 72131 CT LUMBAR SPINE W/O DYE: CPT | Mod: RCN

## 2024-04-24 PROCEDURE — 2550000001 HC RX 255 CONTRASTS: Performed by: PHYSICIAN ASSISTANT

## 2024-04-24 PROCEDURE — 2500000005 HC RX 250 GENERAL PHARMACY W/O HCPCS: Performed by: PHYSICIAN ASSISTANT

## 2024-04-24 PROCEDURE — 74177 CT ABD & PELVIS W/CONTRAST: CPT

## 2024-04-24 PROCEDURE — 2500000001 HC RX 250 WO HCPCS SELF ADMINISTERED DRUGS (ALT 637 FOR MEDICARE OP): Performed by: PHYSICIAN ASSISTANT

## 2024-04-24 PROCEDURE — 96374 THER/PROPH/DIAG INJ IV PUSH: CPT | Mod: 59

## 2024-04-24 PROCEDURE — 2500000004 HC RX 250 GENERAL PHARMACY W/ HCPCS (ALT 636 FOR OP/ED): Performed by: PHYSICIAN ASSISTANT

## 2024-04-24 PROCEDURE — 96375 TX/PRO/DX INJ NEW DRUG ADDON: CPT

## 2024-04-24 PROCEDURE — 74177 CT ABD & PELVIS W/CONTRAST: CPT | Performed by: RADIOLOGY

## 2024-04-24 PROCEDURE — 83605 ASSAY OF LACTIC ACID: CPT | Performed by: PHYSICIAN ASSISTANT

## 2024-04-24 PROCEDURE — 99285 EMERGENCY DEPT VISIT HI MDM: CPT | Mod: 25

## 2024-04-24 RX ORDER — HYDROMORPHONE HYDROCHLORIDE 1 MG/ML
1 INJECTION, SOLUTION INTRAMUSCULAR; INTRAVENOUS; SUBCUTANEOUS ONCE
Status: COMPLETED | OUTPATIENT
Start: 2024-04-24 | End: 2024-04-24

## 2024-04-24 RX ORDER — AMOXICILLIN AND CLAVULANATE POTASSIUM 875; 125 MG/1; MG/1
1 TABLET, FILM COATED ORAL ONCE
Status: COMPLETED | OUTPATIENT
Start: 2024-04-24 | End: 2024-04-24

## 2024-04-24 RX ORDER — LIDOCAINE 50 MG/G
1 PATCH TOPICAL DAILY
Qty: 30 PATCH | Refills: 0 | Status: SHIPPED | OUTPATIENT
Start: 2024-04-24 | End: 2024-05-24 | Stop reason: HOSPADM

## 2024-04-24 RX ORDER — CYCLOBENZAPRINE HCL 10 MG
10 TABLET ORAL ONCE
Status: COMPLETED | OUTPATIENT
Start: 2024-04-24 | End: 2024-04-24

## 2024-04-24 RX ORDER — HEPARIN 100 UNIT/ML
5 SYRINGE INTRAVENOUS ONCE
Status: COMPLETED | OUTPATIENT
Start: 2024-04-24 | End: 2024-04-24

## 2024-04-24 RX ORDER — LIDOCAINE 560 MG/1
1 PATCH PERCUTANEOUS; TOPICAL; TRANSDERMAL DAILY
Status: DISCONTINUED | OUTPATIENT
Start: 2024-04-24 | End: 2024-04-24 | Stop reason: HOSPADM

## 2024-04-24 RX ORDER — AMOXICILLIN AND CLAVULANATE POTASSIUM 875; 125 MG/1; MG/1
1 TABLET, FILM COATED ORAL EVERY 12 HOURS
Qty: 14 TABLET | Refills: 0 | Status: SHIPPED | OUTPATIENT
Start: 2024-04-24 | End: 2024-05-06 | Stop reason: HOSPADM

## 2024-04-24 RX ADMIN — HEPARIN 500 UNITS: 100 SYRINGE at 14:20

## 2024-04-24 RX ADMIN — AMOXICILLIN AND CLAVULANATE POTASSIUM 1 TABLET: 875; 125 TABLET, FILM COATED ORAL at 14:19

## 2024-04-24 RX ADMIN — LIDOCAINE 4% 1 PATCH: 40 PATCH TOPICAL at 14:20

## 2024-04-24 RX ADMIN — HYDROMORPHONE HYDROCHLORIDE 1 MG: 1 INJECTION, SOLUTION INTRAMUSCULAR; INTRAVENOUS; SUBCUTANEOUS at 12:43

## 2024-04-24 RX ADMIN — HYDROMORPHONE HYDROCHLORIDE 1 MG: 1 INJECTION, SOLUTION INTRAMUSCULAR; INTRAVENOUS; SUBCUTANEOUS at 14:20

## 2024-04-24 RX ADMIN — CYCLOBENZAPRINE HYDROCHLORIDE 10 MG: 10 TABLET, FILM COATED ORAL at 14:19

## 2024-04-24 RX ADMIN — IOHEXOL 75 ML: 350 INJECTION, SOLUTION INTRAVENOUS at 13:06

## 2024-04-24 ASSESSMENT — LIFESTYLE VARIABLES
EVER HAD A DRINK FIRST THING IN THE MORNING TO STEADY YOUR NERVES TO GET RID OF A HANGOVER: NO
HAVE YOU EVER FELT YOU SHOULD CUT DOWN ON YOUR DRINKING: NO
TOTAL SCORE: 0
HAVE PEOPLE ANNOYED YOU BY CRITICIZING YOUR DRINKING: NO
EVER FELT BAD OR GUILTY ABOUT YOUR DRINKING: NO

## 2024-04-24 ASSESSMENT — PAIN DESCRIPTION - PAIN TYPE: TYPE: ACUTE PAIN

## 2024-04-24 ASSESSMENT — PAIN - FUNCTIONAL ASSESSMENT: PAIN_FUNCTIONAL_ASSESSMENT: 0-10

## 2024-04-24 ASSESSMENT — PAIN SCALES - GENERAL: PAINLEVEL_OUTOF10: 7

## 2024-04-24 ASSESSMENT — PAIN DESCRIPTION - LOCATION: LOCATION: BACK

## 2024-04-24 ASSESSMENT — PAIN DESCRIPTION - ORIENTATION: ORIENTATION: LOWER

## 2024-04-24 ASSESSMENT — PAIN DESCRIPTION - DESCRIPTORS: DESCRIPTORS: ACHING

## 2024-04-24 NOTE — ED TRIAGE NOTES
Patient c/o low back pain that started at 0600 today, patient states it is a sharp, stabbing pain. Patient states he took an oxy, some tylenol and has a fentanyl patch which did relieve some of the pain.

## 2024-04-24 NOTE — ED PROVIDER NOTES
HPI   Chief Complaint   Patient presents with    Back Pain       83-year-old with history of liver cancer, metastatic presenting with left-sided back pain.  He states has been on and off but worse since he woke up this morning at 5 AM.  He denies any fevers chills at home.  No diarrhea.  Last bowel movement was yesterday.  No numbness or tingling of the lower extremities.  No urinary symptoms.  He is able to urinate and empty his bladder.  No urinary retention or incontinence.  No saddle anesthesia.                          Mendoza Coma Scale Score: 15                     Patient History   Past Medical History:   Diagnosis Date    Essential (primary) hypertension 12/21/2013    Hypertension    Pacemaker     Peripheral neuropathy     Personal history of other diseases of the circulatory system     History of right bundle branch block (RBBB)    Pneumothorax 12/04/2023     Past Surgical History:   Procedure Laterality Date    CARDIAC ELECTROPHYSIOLOGY PROCEDURE N/A 11/7/2023    Procedure: Temporary Pacemaker Insertion;  Surgeon: Alexis Shook MD;  Location: [facility];  Service: Cardiovascular;  Laterality: N/A;    CARDIAC ELECTROPHYSIOLOGY PROCEDURE Left 11/9/2023    Procedure: PPM IMPLANT DUAL;  Surgeon: Elias Connolly MD;  Location: [facility];  Service: Electrophysiology;  Laterality: Left;    TOTAL KNEE ARTHROPLASTY  09/30/2016    Total Knee Replacement Left    TOTAL KNEE ARTHROPLASTY  09/30/2016    Total Knee Replacement Right     Family History   Problem Relation Name Age of Onset    Cancer Father       Social History     Tobacco Use    Smoking status: Former     Types: Cigarettes, Cigars     Passive exposure: Never    Smokeless tobacco: Never   Vaping Use    Vaping status: Unknown   Substance Use Topics    Alcohol use: Not Currently    Drug use: Yes     Types: Marijuana     Comment: medical marjuana card       Physical Exam   ED Triage Vitals [04/24/24 1059]   Temperature Heart Rate  Respirations BP   36.6 °C (97.9 °F) 100 20 112/78      Pulse Ox Temp Source Heart Rate Source Patient Position   99 % Temporal Monitor --      BP Location FiO2 (%)     -- --       Physical Exam  Vitals reviewed.   Constitutional:       General: He is not in acute distress.  HENT:      Head: Normocephalic and atraumatic.      Nose: Nose normal.      Mouth/Throat:      Mouth: Mucous membranes are moist.      Pharynx: Oropharynx is clear.   Eyes:      Extraocular Movements: Extraocular movements intact.      Pupils: Pupils are equal, round, and reactive to light.   Cardiovascular:      Rate and Rhythm: Normal rate and regular rhythm.      Pulses: Normal pulses.      Heart sounds: Normal heart sounds. No murmur heard.     No friction rub. No gallop.   Pulmonary:      Effort: Pulmonary effort is normal. No respiratory distress.      Breath sounds: Normal breath sounds. No wheezing or rhonchi.   Chest:      Chest wall: No tenderness.   Abdominal:      General: There is no distension.      Palpations: Abdomen is soft.      Tenderness: There is abdominal tenderness.      Comments: LUQ abd pain, L lateral back discomfort.   Musculoskeletal:         General: Normal range of motion.      Cervical back: Normal range of motion. No tenderness.   Skin:     General: Skin is warm and dry.      Capillary Refill: Capillary refill takes less than 2 seconds.   Neurological:      General: No focal deficit present.      Mental Status: He is alert and oriented to person, place, and time.      Sensory: No sensory deficit.      Motor: No weakness.   Psychiatric:         Mood and Affect: Mood normal.         Behavior: Behavior normal.         ED Course & MDM   ED Course as of 04/24/24 1411   Wed Apr 24, 2024   1327 Lactate  Negative [MW]   1327 Magnesium  Normal [MW]   1327 CBC and Auto Differential(!)  Anemia at baseline.  No leukocytosis. [MW]   1328 PLATELETS (10*3/UL) IN BLOOD AUTOMATED COUNT, NICOLASA(!): 89  Baseline [MW]   1410 CT  lumbar spine wo IV contrast  No new bony changes [MW]   1410 CT abdomen pelvis w IV contrast  Left-sided colonic thickening.  No perforation or abscesses. [MW]      ED Course User Index  [MW] Yo Gonsalez PA-C         Diagnoses as of 04/24/24 1411   Colitis       Medical Decision Making  63-year-old with metastatic liver cancer senting with left-sided back and left upper quadrant pain.  No signs or symptoms of cauda equina.  Labs are reassuring.  CT lumbar spine with no new bony changes.  CT abdomen pelvis with left-sided colonic thickening consistent with colitis.  There is no perforation.  The patient is not septic.  At this time we will treat with oral Augmentin and discharged home.  Patient's pain was treated with Dilaudid in the emergency department.  He feels comfortable going home.  Encouraged to use MiraLAX twice a day for bowel regimen.        Procedure  Procedures     Yo Gonsalez PA-C  04/24/24 1412

## 2024-04-29 ENCOUNTER — INFUSION (OUTPATIENT)
Dept: HEMATOLOGY/ONCOLOGY | Facility: CLINIC | Age: 64
End: 2024-04-29
Payer: MEDICARE

## 2024-04-29 ENCOUNTER — SPECIALTY PHARMACY (OUTPATIENT)
Dept: PHARMACY | Facility: CLINIC | Age: 64
End: 2024-04-29

## 2024-04-29 ENCOUNTER — OFFICE VISIT (OUTPATIENT)
Dept: HEMATOLOGY/ONCOLOGY | Facility: CLINIC | Age: 64
End: 2024-04-29
Payer: MEDICARE

## 2024-04-29 VITALS
SYSTOLIC BLOOD PRESSURE: 106 MMHG | DIASTOLIC BLOOD PRESSURE: 72 MMHG | OXYGEN SATURATION: 99 % | RESPIRATION RATE: 18 BRPM | WEIGHT: 154.65 LBS | BODY MASS INDEX: 22.19 KG/M2 | TEMPERATURE: 97.5 F | HEART RATE: 99 BPM

## 2024-04-29 DIAGNOSIS — C15.9 STAGE IV MALIGNANT NEOPLASM OF ESOPHAGUS (MULTI): ICD-10-CM

## 2024-04-29 DIAGNOSIS — C78.7 METASTASES TO THE LIVER (MULTI): ICD-10-CM

## 2024-04-29 DIAGNOSIS — C15.9 STAGE IV MALIGNANT NEOPLASM OF ESOPHAGUS (MULTI): Primary | ICD-10-CM

## 2024-04-29 DIAGNOSIS — K52.1 DRUG-INDUCED COLITIS: ICD-10-CM

## 2024-04-29 DIAGNOSIS — C78.7 METASTASES TO THE LIVER (MULTI): Primary | ICD-10-CM

## 2024-04-29 DIAGNOSIS — T45.1X1A POISONING BY ANTINEOPLASTIC AND IMMUNOSUPPRESSIVE DRUGS, ACCIDENTAL (UNINTENTIONAL), INITIAL ENCOUNTER: ICD-10-CM

## 2024-04-29 LAB
ALBUMIN SERPL BCP-MCNC: 3.7 G/DL (ref 3.4–5)
ALP SERPL-CCNC: 140 U/L (ref 33–136)
ALT SERPL W P-5'-P-CCNC: 17 U/L (ref 10–52)
ANION GAP SERPL CALC-SCNC: 12 MMOL/L (ref 10–20)
AST SERPL W P-5'-P-CCNC: 49 U/L (ref 9–39)
BASOPHILS # BLD AUTO: 0.03 X10*3/UL (ref 0–0.1)
BASOPHILS NFR BLD AUTO: 0.7 %
BILIRUB SERPL-MCNC: 1.2 MG/DL (ref 0–1.2)
BUN SERPL-MCNC: 13 MG/DL (ref 6–23)
CALCIUM SERPL-MCNC: 9.4 MG/DL (ref 8.6–10.3)
CHLORIDE SERPL-SCNC: 103 MMOL/L (ref 98–107)
CO2 SERPL-SCNC: 26 MMOL/L (ref 21–32)
CREAT SERPL-MCNC: 0.9 MG/DL (ref 0.5–1.3)
EGFRCR SERPLBLD CKD-EPI 2021: >90 ML/MIN/1.73M*2
EOSINOPHIL # BLD AUTO: 0.08 X10*3/UL (ref 0–0.7)
EOSINOPHIL NFR BLD AUTO: 1.8 %
ERYTHROCYTE [DISTWIDTH] IN BLOOD BY AUTOMATED COUNT: 15.7 % (ref 11.5–14.5)
GLUCOSE SERPL-MCNC: 120 MG/DL (ref 74–99)
HCT VFR BLD AUTO: 30.4 % (ref 41–52)
HGB BLD-MCNC: 10 G/DL (ref 13.5–17.5)
IMM GRANULOCYTES # BLD AUTO: 0.01 X10*3/UL (ref 0–0.7)
IMM GRANULOCYTES NFR BLD AUTO: 0.2 % (ref 0–0.9)
LYMPHOCYTES # BLD AUTO: 0.45 X10*3/UL (ref 1.2–4.8)
LYMPHOCYTES NFR BLD AUTO: 10.2 %
MCH RBC QN AUTO: 29.2 PG (ref 26–34)
MCHC RBC AUTO-ENTMCNC: 32.9 G/DL (ref 32–36)
MCV RBC AUTO: 89 FL (ref 80–100)
MONOCYTES # BLD AUTO: 0.66 X10*3/UL (ref 0.1–1)
MONOCYTES NFR BLD AUTO: 15 %
NEUTROPHILS # BLD AUTO: 3.17 X10*3/UL (ref 1.2–7.7)
NEUTROPHILS NFR BLD AUTO: 72.1 %
PLATELET # BLD AUTO: 72 X10*3/UL (ref 150–450)
POTASSIUM SERPL-SCNC: 3.6 MMOL/L (ref 3.5–5.3)
PROT SERPL-MCNC: 6.8 G/DL (ref 6.4–8.2)
RBC # BLD AUTO: 3.43 X10*6/UL (ref 4.5–5.9)
SODIUM SERPL-SCNC: 137 MMOL/L (ref 136–145)
WBC # BLD AUTO: 4.4 X10*3/UL (ref 4.4–11.3)

## 2024-04-29 PROCEDURE — 1125F AMNT PAIN NOTED PAIN PRSNT: CPT | Performed by: INTERNAL MEDICINE

## 2024-04-29 PROCEDURE — 99215 OFFICE O/P EST HI 40 MIN: CPT | Mod: 25 | Performed by: INTERNAL MEDICINE

## 2024-04-29 PROCEDURE — 96413 CHEMO IV INFUSION 1 HR: CPT

## 2024-04-29 PROCEDURE — 85025 COMPLETE CBC W/AUTO DIFF WBC: CPT | Performed by: INTERNAL MEDICINE

## 2024-04-29 PROCEDURE — 99215 OFFICE O/P EST HI 40 MIN: CPT | Performed by: INTERNAL MEDICINE

## 2024-04-29 PROCEDURE — 2500000004 HC RX 250 GENERAL PHARMACY W/ HCPCS (ALT 636 FOR OP/ED): Mod: JG | Performed by: INTERNAL MEDICINE

## 2024-04-29 PROCEDURE — 96416 CHEMO PROLONG INFUSE W/PUMP: CPT

## 2024-04-29 PROCEDURE — 3078F DIAST BP <80 MM HG: CPT | Performed by: INTERNAL MEDICINE

## 2024-04-29 PROCEDURE — 96375 TX/PRO/DX INJ NEW DRUG ADDON: CPT | Mod: INF

## 2024-04-29 PROCEDURE — RXMED WILLOW AMBULATORY MEDICATION CHARGE

## 2024-04-29 PROCEDURE — 80053 COMPREHEN METABOLIC PANEL: CPT | Performed by: INTERNAL MEDICINE

## 2024-04-29 PROCEDURE — 96411 CHEMO IV PUSH ADDL DRUG: CPT

## 2024-04-29 PROCEDURE — 1160F RVW MEDS BY RX/DR IN RCRD: CPT | Performed by: INTERNAL MEDICINE

## 2024-04-29 PROCEDURE — 96367 TX/PROPH/DG ADDL SEQ IV INF: CPT

## 2024-04-29 PROCEDURE — 1159F MED LIST DOCD IN RCRD: CPT | Performed by: INTERNAL MEDICINE

## 2024-04-29 PROCEDURE — 2500000004 HC RX 250 GENERAL PHARMACY W/ HCPCS (ALT 636 FOR OP/ED): Performed by: INTERNAL MEDICINE

## 2024-04-29 PROCEDURE — 3074F SYST BP LT 130 MM HG: CPT | Performed by: INTERNAL MEDICINE

## 2024-04-29 RX ORDER — PALONOSETRON 0.05 MG/ML
0.25 INJECTION, SOLUTION INTRAVENOUS ONCE
Status: CANCELLED | OUTPATIENT
Start: 2024-05-13

## 2024-04-29 RX ORDER — DIPHENHYDRAMINE HYDROCHLORIDE 50 MG/ML
50 INJECTION INTRAMUSCULAR; INTRAVENOUS AS NEEDED
Status: DISCONTINUED | OUTPATIENT
Start: 2024-04-29 | End: 2024-04-29 | Stop reason: HOSPADM

## 2024-04-29 RX ORDER — LORAZEPAM 2 MG/ML
1 INJECTION INTRAMUSCULAR AS NEEDED
Status: CANCELLED | OUTPATIENT
Start: 2024-05-13

## 2024-04-29 RX ORDER — SCOLOPAMINE TRANSDERMAL SYSTEM 1 MG/1
1 PATCH, EXTENDED RELEASE TRANSDERMAL
Qty: 10 PATCH | Refills: 1 | Status: SHIPPED | OUTPATIENT
Start: 2024-04-29 | End: 2024-05-24 | Stop reason: HOSPADM

## 2024-04-29 RX ORDER — DEXTROMETHORPHAN HYDROBROMIDE, GUAIFENESIN 5; 100 MG/5ML; MG/5ML
650 LIQUID ORAL ONCE
Status: CANCELLED
Start: 2024-05-13 | End: 2024-05-13

## 2024-04-29 RX ORDER — PROCHLORPERAZINE EDISYLATE 5 MG/ML
10 INJECTION INTRAMUSCULAR; INTRAVENOUS EVERY 6 HOURS PRN
Status: CANCELLED | OUTPATIENT
Start: 2024-05-13

## 2024-04-29 RX ORDER — DIPHENHYDRAMINE HYDROCHLORIDE 50 MG/ML
25 INJECTION INTRAMUSCULAR; INTRAVENOUS ONCE
Status: CANCELLED
Start: 2024-05-13 | End: 2024-05-13

## 2024-04-29 RX ORDER — HEPARIN SODIUM,PORCINE/PF 10 UNIT/ML
50 SYRINGE (ML) INTRAVENOUS AS NEEDED
Status: CANCELLED | OUTPATIENT
Start: 2024-04-29

## 2024-04-29 RX ORDER — FLUOROURACIL 50 MG/ML
400 INJECTION, SOLUTION INTRAVENOUS ONCE
Status: COMPLETED | OUTPATIENT
Start: 2024-04-29 | End: 2024-04-29

## 2024-04-29 RX ORDER — HEPARIN 100 UNIT/ML
500 SYRINGE INTRAVENOUS AS NEEDED
Status: CANCELLED | OUTPATIENT
Start: 2024-04-29

## 2024-04-29 RX ORDER — PALONOSETRON 0.05 MG/ML
0.25 INJECTION, SOLUTION INTRAVENOUS ONCE
Status: COMPLETED | OUTPATIENT
Start: 2024-04-29 | End: 2024-04-29

## 2024-04-29 RX ORDER — EPINEPHRINE 0.3 MG/.3ML
0.3 INJECTION SUBCUTANEOUS EVERY 5 MIN PRN
Status: CANCELLED | OUTPATIENT
Start: 2024-05-13

## 2024-04-29 RX ORDER — DEXAMETHASONE SODIUM PHOSPHATE 4 MG/ML
8 INJECTION, SOLUTION INTRA-ARTICULAR; INTRALESIONAL; INTRAMUSCULAR; INTRAVENOUS; SOFT TISSUE ONCE
Status: CANCELLED | OUTPATIENT
Start: 2024-05-13

## 2024-04-29 RX ORDER — HEPARIN 100 UNIT/ML
500 SYRINGE INTRAVENOUS AS NEEDED
Status: DISCONTINUED | OUTPATIENT
Start: 2024-04-29 | End: 2024-04-29 | Stop reason: HOSPADM

## 2024-04-29 RX ORDER — EPINEPHRINE 0.3 MG/.3ML
0.3 INJECTION SUBCUTANEOUS EVERY 5 MIN PRN
Status: DISCONTINUED | OUTPATIENT
Start: 2024-04-29 | End: 2024-04-29 | Stop reason: HOSPADM

## 2024-04-29 RX ORDER — PROCHLORPERAZINE EDISYLATE 5 MG/ML
10 INJECTION INTRAMUSCULAR; INTRAVENOUS EVERY 6 HOURS PRN
Status: DISCONTINUED | OUTPATIENT
Start: 2024-04-29 | End: 2024-04-29 | Stop reason: HOSPADM

## 2024-04-29 RX ORDER — CYCLOSPORINE 25 MG/1
50 CAPSULE, GELATIN COATED ORAL DAILY
Qty: 60 CAPSULE | Refills: 3 | Status: SHIPPED | OUTPATIENT
Start: 2024-04-29 | End: 2024-05-06 | Stop reason: HOSPADM

## 2024-04-29 RX ORDER — DIPHENHYDRAMINE HYDROCHLORIDE 50 MG/ML
25 INJECTION INTRAMUSCULAR; INTRAVENOUS ONCE
Status: COMPLETED | OUTPATIENT
Start: 2024-04-29 | End: 2024-04-29

## 2024-04-29 RX ORDER — FLUOROURACIL 50 MG/ML
400 INJECTION, SOLUTION INTRAVENOUS ONCE
Status: CANCELLED | OUTPATIENT
Start: 2024-05-13

## 2024-04-29 RX ORDER — FAMOTIDINE 10 MG/ML
20 INJECTION INTRAVENOUS ONCE AS NEEDED
Status: CANCELLED | OUTPATIENT
Start: 2024-05-13

## 2024-04-29 RX ORDER — ALBUTEROL SULFATE 0.83 MG/ML
3 SOLUTION RESPIRATORY (INHALATION) AS NEEDED
Status: CANCELLED | OUTPATIENT
Start: 2024-05-13

## 2024-04-29 RX ORDER — FAMOTIDINE 10 MG/ML
20 INJECTION INTRAVENOUS ONCE AS NEEDED
Status: DISCONTINUED | OUTPATIENT
Start: 2024-04-29 | End: 2024-04-29 | Stop reason: HOSPADM

## 2024-04-29 RX ORDER — ACETAMINOPHEN 325 MG/1
650 TABLET ORAL ONCE
Status: COMPLETED | OUTPATIENT
Start: 2024-04-29 | End: 2024-04-29

## 2024-04-29 RX ORDER — LORAZEPAM 2 MG/ML
1 INJECTION INTRAMUSCULAR AS NEEDED
Status: DISCONTINUED | OUTPATIENT
Start: 2024-04-29 | End: 2024-04-29 | Stop reason: HOSPADM

## 2024-04-29 RX ORDER — ALBUTEROL SULFATE 0.83 MG/ML
3 SOLUTION RESPIRATORY (INHALATION) AS NEEDED
Status: DISCONTINUED | OUTPATIENT
Start: 2024-04-29 | End: 2024-04-29 | Stop reason: HOSPADM

## 2024-04-29 RX ORDER — PROCHLORPERAZINE MALEATE 5 MG
10 TABLET ORAL EVERY 6 HOURS PRN
Status: CANCELLED | OUTPATIENT
Start: 2024-05-13

## 2024-04-29 RX ORDER — PROCHLORPERAZINE MALEATE 10 MG
10 TABLET ORAL EVERY 6 HOURS PRN
Status: DISCONTINUED | OUTPATIENT
Start: 2024-04-29 | End: 2024-04-29 | Stop reason: HOSPADM

## 2024-04-29 RX ORDER — DIPHENHYDRAMINE HYDROCHLORIDE 50 MG/ML
50 INJECTION INTRAMUSCULAR; INTRAVENOUS AS NEEDED
Status: CANCELLED | OUTPATIENT
Start: 2024-05-13

## 2024-04-29 RX ADMIN — DEXAMETHASONE SODIUM PHOSPHATE 8 MG: 4 INJECTION, SOLUTION INTRA-ARTICULAR; INTRALESIONAL; INTRAMUSCULAR; INTRAVENOUS; SOFT TISSUE at 11:24

## 2024-04-29 RX ADMIN — ACETAMINOPHEN 650 MG: 325 TABLET ORAL at 11:21

## 2024-04-29 RX ADMIN — FLUOROURACIL 4500 MG: 50 INJECTION, SOLUTION INTRAVENOUS at 13:56

## 2024-04-29 RX ADMIN — FLUOROURACIL 750 MG: 50 INJECTION, SOLUTION INTRAVENOUS at 13:42

## 2024-04-29 RX ADMIN — PALONOSETRON HYDROCHLORIDE 250 MCG: 0.25 INJECTION INTRAVENOUS at 11:21

## 2024-04-29 RX ADMIN — DIPHENHYDRAMINE HYDROCHLORIDE 25 MG: 50 INJECTION, SOLUTION INTRAMUSCULAR; INTRAVENOUS at 11:29

## 2024-04-29 RX ADMIN — TRASTUZUMAB 300.3 MG: 150 INJECTION, POWDER, LYOPHILIZED, FOR SOLUTION INTRAVENOUS at 12:09

## 2024-04-29 RX ADMIN — LEUCOVORIN CALCIUM 740 MG: 350 INJECTION, POWDER, LYOPHILIZED, FOR SOLUTION INTRAMUSCULAR; INTRAVENOUS at 12:53

## 2024-04-29 ASSESSMENT — ENCOUNTER SYMPTOMS
RESPIRATORY NEGATIVE: 1
CONSTITUTIONAL NEGATIVE: 1

## 2024-04-29 ASSESSMENT — PAIN SCALES - GENERAL: PAINLEVEL: 2

## 2024-04-29 NOTE — PATIENT INSTRUCTIONS
Today you met with Dr. Blake.  He has ordered a medication called cyclosporine to take daily since you are showing signs of colitis.  You signed a consent for this.  Dr. Blake will see you next after the CT at the end of June.  Stay the course Massimo.  You are holding your own!  And you have a great support team around you.      Your medication is being processed through our  Specialty Pharmacy.  This pharmacy team will connect with your insurance for coverage, guide our office on which Specialty Pharmacy is contracted with your insurance, monitoring once treatment has begun and refills needed.  We ask that you connect with them for any questions or concerns.   Specialty Pharmacy- [phone].  Thank you.     Please call the office for any questions or concerns.  Review your schedule prior to leaving [facility].  We ask that you notify us 5-7 days before your medications need refilled.  [facility] is strongly encouraging all patients to access their MyChart for all results and to communicate with their provider for non-urgent messages.  If an urgent/same day/sick concern response is needed, please call the office at [phone]. Thank You!

## 2024-04-29 NOTE — PROGRESS NOTES
Patient ID: Massimo Garcia is a 63 y.o. male.  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  Has done well on trastuzumab FOLFOX regimen.  Today complains of dizziness and presyncopal symptoms. Metastatic burden is liver some lung lesions as well as abdominal lymphadenopathy.        LIVER:  Liver measures 14.7 cm in length and demonstrates mildly diffuse coarsened hepatic echotexture with mild nodular contour which may indicate cirrhotic morphology. There is an indeterminate hypoechoic  lesion within the right hepatic lobe measuring up. 3.6 x 3.2 x 3.0 cm and a 2nd 2.6 x 1.8 x 2.7 cm indeterminate heterogeneous hypoechoic lesion within the left hepatic lobe. Additional scatter presumed hepatic cysts, largest measuring up to 2.4 x 2.1 x 1.8 cm      GALLBLADDER:  Contracted. No obvious cholelithiasis.      BILIARY SYSTEM:  No evidence of intra hepatic biliary dilatation is identified; the common bile duct measures 13.2 mm.  PANCREAS:  The pancreas is poorly visualized due to overlying bowel gas.    RIGHT KIDNEY:  The right kidney measures 11.5 cm in length. No hydronephrosis or renal calculi are seen.       IMPRESSION:  Cirrhotic hepatic morphology with indeterminate hypoechoic lesions of the liver as above, largest measuring up to 3.6 x 3.2 x 3.0 cm. Hepatic mass protocol MRI advised for further assessment.  Review of Systems   Constitutional: Negative.    HENT:  Negative.     Respiratory: Negative.          Objective   BSA: There is no height or weight on file to calculate BSA.  There were no vitals taken for this visit.     has a past medical history of Essential (primary) hypertension (12/21/2013), Pacemaker, Peripheral neuropathy, Personal history of other diseases of the circulatory system, and  Pneumothorax (12/04/2023).   has a past surgical history that includes Total knee arthroplasty (09/30/2016); Total knee arthroplasty (09/30/2016); Cardiac electrophysiology procedure (N/A, 11/7/2023); and Cardiac electrophysiology procedure (Left, 11/9/2023).  Family History   Problem Relation Name Age of Onset    Cancer Father       Oncology History   Stage IV malignant neoplasm of esophagus (Multi)   9/13/2023 Initial Diagnosis    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)     9/13/2023 -  Chemotherapy    Trastuzumab + mFOLFOX6 (Fluorouracil Continuous Infusion / Leucovorin / Oxaliplatin), 14 Day Cycles     Metastases to the liver (Multi)   9/13/2023 Initial Diagnosis    Metastases to the liver (CMS/HCC)     9/13/2023 -  Chemotherapy    Trastuzumab + mFOLFOX6 (Fluorouracil Continuous Infusion / Leucovorin / Oxaliplatin), 14 Day Cycles         Massimo Garcia  reports that he has quit smoking. His smoking use included cigarettes and cigars. He has never been exposed to tobacco smoke. He has never used smokeless tobacco.  He  reports that he does not currently use alcohol.  He  reports current drug use. Drug: Marijuana.    Physical Exam  Constitutional:       Appearance: Normal appearance.   HENT:      Head: Normocephalic and atraumatic.   Eyes:      Extraocular Movements: Extraocular movements intact.      Pupils: Pupils are equal, round, and reactive to light.   Neck:      Comments: Left-sided cervical lymph node showing some reduction in size is evidencing disease response.  Neurological:      Mental Status: He is alert.         WBC   Date/Time Value Ref Range Status   04/24/2024 12:43 PM 4.4 4.4 - 11.3 x10*3/uL Final   04/15/2024 08:30 AM 4.0 (L) 4.4 - 11.3 x10*3/uL Final   04/14/2024 04:30 PM 6.4 4.4 - 11.3 x10*3/uL Final     nRBC   Date Value Ref Range Status   04/24/2024 0.0 0.0 - 0.0 /100 WBCs Final   04/14/2024 0.0 0.0 - 0.0 /100 WBCs Final   04/04/2024 0.0 0.0 - 0.0 /100 WBCs Final     RBC   Date Value  Ref Range Status   04/24/2024 3.51 (L) 4.50 - 5.90 x10*6/uL Final   04/15/2024 3.36 (L) 4.50 - 5.90 x10*6/uL Final   04/14/2024 3.70 (L) 4.50 - 5.90 x10*6/uL Final     Hemoglobin   Date Value Ref Range Status   04/24/2024 10.3 (L) 13.5 - 17.5 g/dL Final   04/15/2024 9.9 (L) 13.5 - 17.5 g/dL Final   04/14/2024 10.7 (L) 13.5 - 17.5 g/dL Final     Hematocrit   Date Value Ref Range Status   04/24/2024 31.1 (L) 41.0 - 52.0 % Final   04/15/2024 29.8 (L) 41.0 - 52.0 % Final   04/14/2024 32.9 (L) 41.0 - 52.0 % Final     MCV   Date/Time Value Ref Range Status   04/24/2024 12:43 PM 89 80 - 100 fL Final   04/15/2024 08:30 AM 89 80 - 100 fL Final   04/14/2024 04:30 PM 89 80 - 100 fL Final     MCH   Date/Time Value Ref Range Status   04/24/2024 12:43 PM 29.3 26.0 - 34.0 pg Final   04/15/2024 08:30 AM 29.5 26.0 - 34.0 pg Final   04/14/2024 04:30 PM 28.9 26.0 - 34.0 pg Final     MCHC   Date/Time Value Ref Range Status   04/24/2024 12:43 PM 33.1 32.0 - 36.0 g/dL Final   04/15/2024 08:30 AM 33.2 32.0 - 36.0 g/dL Final   04/14/2024 04:30 PM 32.5 32.0 - 36.0 g/dL Final     RDW   Date/Time Value Ref Range Status   04/24/2024 12:43 PM 15.4 (H) 11.5 - 14.5 % Final   04/15/2024 08:30 AM 15.5 (H) 11.5 - 14.5 % Final   04/14/2024 04:30 PM 15.8 (H) 11.5 - 14.5 % Final     Platelets   Date/Time Value Ref Range Status   04/24/2024 12:43 PM 89 (L) 150 - 450 x10*3/uL Final   04/15/2024 08:30 AM 71 (L) 150 - 450 x10*3/uL Final   04/14/2024 04:30 PM 97 (L) 150 - 450 x10*3/uL Final     MPV   Date/Time Value Ref Range Status   10/30/2023 09:13 AM 10.3 7.5 - 11.5 fL Final   10/16/2023 08:34 AM 10.7 7.5 - 11.5 fL Final   10/02/2023 09:08 AM 10.0 7.5 - 11.5 fL Final     Neutrophils %   Date/Time Value Ref Range Status   04/24/2024 12:43 PM 65.5 40.0 - 80.0 % Final   04/15/2024 08:30 AM 71.9 40.0 - 80.0 % Final   04/14/2024 04:30 PM 69.6 40.0 - 80.0 % Final     Immature Granulocytes %, Automated   Date/Time Value Ref Range Status   04/24/2024 12:43 PM  0.9 0.0 - 0.9 % Final     Comment:     Immature Granulocyte Count (IG) includes promyelocytes, myelocytes and metamyelocytes but does not include bands. Percent differential counts (%) should be interpreted in the context of the absolute cell counts (cells/UL).   04/15/2024 08:30 AM 0.3 0.0 - 0.9 % Final     Comment:     Immature Granulocyte Count (IG) includes promyelocytes, myelocytes and metamyelocytes but does not include bands. Percent differential counts (%) should be interpreted in the context of the absolute cell counts (cells/UL).   04/14/2024 04:30 PM 0.5 0.0 - 0.9 % Final     Comment:     Immature Granulocyte Count (IG) includes promyelocytes, myelocytes and metamyelocytes but does not include bands. Percent differential counts (%) should be interpreted in the context of the absolute cell counts (cells/UL).     Lymphocytes %   Date/Time Value Ref Range Status   04/24/2024 12:43 PM 15.9 13.0 - 44.0 % Final   04/15/2024 08:30 AM 13.9 13.0 - 44.0 % Final   04/14/2024 04:30 PM 14.2 13.0 - 44.0 % Final     Monocytes %   Date/Time Value Ref Range Status   04/24/2024 12:43 PM 15.9 2.0 - 10.0 % Final   04/15/2024 08:30 AM 11.1 2.0 - 10.0 % Final   04/14/2024 04:30 PM 13.8 2.0 - 10.0 % Final     Eosinophils %   Date/Time Value Ref Range Status   04/24/2024 12:43 PM 1.6 0.0 - 6.0 % Final   04/15/2024 08:30 AM 2.3 0.0 - 6.0 % Final   04/14/2024 04:30 PM 1.4 0.0 - 6.0 % Final     Basophils %   Date/Time Value Ref Range Status   04/24/2024 12:43 PM 0.2 0.0 - 2.0 % Final   04/15/2024 08:30 AM 0.5 0.0 - 2.0 % Final   04/14/2024 04:30 PM 0.5 0.0 - 2.0 % Final     Neutrophils Absolute   Date/Time Value Ref Range Status   04/24/2024 12:43 PM 2.85 1.20 - 7.70 x10*3/uL Final     Comment:     Percent differential counts (%) should be interpreted in the context of the absolute cell counts (cells/uL).   04/15/2024 08:30 AM 2.84 1.20 - 7.70 x10*3/uL Final     Comment:     Percent differential counts (%) should be interpreted in  "the context of the absolute cell counts (cells/uL).   04/14/2024 04:30 PM 4.43 1.20 - 7.70 x10*3/uL Final     Comment:     Percent differential counts (%) should be interpreted in the context of the absolute cell counts (cells/uL).     Immature Granulocytes Absolute, Automated   Date/Time Value Ref Range Status   04/24/2024 12:43 PM 0.04 0.00 - 0.70 x10*3/uL Final   04/15/2024 08:30 AM 0.01 0.00 - 0.70 x10*3/uL Final   04/14/2024 04:30 PM 0.03 0.00 - 0.70 x10*3/uL Final     Lymphocytes Absolute   Date/Time Value Ref Range Status   04/24/2024 12:43 PM 0.69 (L) 1.20 - 4.80 x10*3/uL Final   04/15/2024 08:30 AM 0.55 (L) 1.20 - 4.80 x10*3/uL Final   04/14/2024 04:30 PM 0.90 (L) 1.20 - 4.80 x10*3/uL Final     Monocytes Absolute   Date/Time Value Ref Range Status   04/24/2024 12:43 PM 0.69 0.10 - 1.00 x10*3/uL Final   04/15/2024 08:30 AM 0.44 0.10 - 1.00 x10*3/uL Final   04/14/2024 04:30 PM 0.88 0.10 - 1.00 x10*3/uL Final     Eosinophils Absolute   Date/Time Value Ref Range Status   04/24/2024 12:43 PM 0.07 0.00 - 0.70 x10*3/uL Final   04/15/2024 08:30 AM 0.09 0.00 - 0.70 x10*3/uL Final   04/14/2024 04:30 PM 0.09 0.00 - 0.70 x10*3/uL Final     Basophils Absolute   Date/Time Value Ref Range Status   04/24/2024 12:43 PM 0.01 0.00 - 0.10 x10*3/uL Final   04/15/2024 08:30 AM 0.02 0.00 - 0.10 x10*3/uL Final   04/14/2024 04:30 PM 0.03 0.00 - 0.10 x10*3/uL Final       No components found for: \"PT\"  aPTT   Date/Time Value Ref Range Status   03/20/2024 06:03 PM 26 (L) 27 - 38 seconds Final   03/19/2024 10:05 AM 31 27 - 38 seconds Final   12/26/2023 06:13 PM 48 (H) 27 - 38 seconds Final       Assessment/Plan      As per NCCN guidelines patient is eligible to receive trastuzumab pembrolizumab 5-FU leucovorin and oxaliplatin. Pembrolizumab is scheduled for every 42 days.  Prescribed because of evidence of disease progression.  Patient diagnosed 18 months ago.     Presented today to discuss tolerance of chemotherapy.  Patient would not " want to continue on anxiety platinum.  Regimen now would be trastuzumab pembrolizumab leucovorin plus 5-FU.  Patient has been on treatment with good performance status for the past 15 to 16 months.  Will reserve the option of single agent Enhertu in the future.  Continue therapy. Oxali discontinued and patient on 5FU Tras/Pembro.     Cycle 16 today that patient will be receiving 5-FU trastuzumab and pembrolizumab.  Colitis On Cyclosporine prescribed today: Scopolamine patch refilled.    IMPRESSION:  1. Rectosigmoid colon is contracted with suggestion of wall thickening not excluded without pericolonic fat stranding. Component of nonspecific colitis of of concern.      2. Hepatic metastatic disease.      3. Pulmonary metastatic disease as seen on the prior exam      4. Scattered lymphadenopathy in the retroperitoneum as previously described.    Signed by: Hugh Victoria 4/24/2024      Diagnoses and all orders for this visit:  Metastases to the liver (Multi)  -     Comprehensive Metabolic Panel; Future  -     CBC and Auto Differential; Future  -     Comprehensive metabolic panel; Future  -     Infusion Appointment Request; Future  -     cycloSPORINE (SandIMMUNE) 25 mg capsule; Take 2 capsules (50 mg) by mouth once daily.  Stage IV malignant neoplasm of esophagus (Multi)  -     Comprehensive Metabolic Panel; Future  -     CBC and Auto Differential; Future  -     Comprehensive metabolic panel; Future  -     Infusion Appointment Request; Future  -     cycloSPORINE (SandIMMUNE) 25 mg capsule; Take 2 capsules (50 mg) by mouth once daily.  Drug-induced colitis  -     cycloSPORINE (SandIMMUNE) 25 mg capsule; Take 2 capsules (50 mg) by mouth once daily.  Other orders  -     fluorouracil (Adrucil) 2,400 mg/m2 = 4,500 mg in sodium chloride 0.9% 138 mL IV via Home Infusion  -     acetaminophen (Tylenol 8 HOUR) ER tablet 650 mg  -     diphenhydrAMINE (BENADryl) injection 25 mg  -     palonosetron (Aloxi) injection 250 mcg  -      dexAMETHasone (Decadron) injection 8 mg  -     prochlorperazine (Compazine) tablet 10 mg  -     prochlorperazine (Compazine) injection 10 mg  -     fluorouracil (Adrucil) 2,400 mg/m2 = 4,500 mg in sodium chloride 0.9% 138 mL IV via Home Infusion  -     trastuzumab-anns (Kanjinti) 300.3 mg in sodium chloride 0.9% 264.3 mL IV  -     leucovorin 750 mg in dextrose 5 % in water (D5W) 100 mL IV  -     fluorouracil (Adrucil) IV syringe 750 mg  -     LORazepam (Ativan) injection 1 mg  -     sodium chloride 0.9 % bolus 500 mL  -     dextrose 5 % in water (D5W) bolus  -     diphenhydrAMINE (BENADryl) injection 50 mg  -     methylPREDNISolone sod succinate (SOLU-Medrol) 40 mg/mL injection 40 mg  -     famotidine PF (Pepcid) injection 20 mg  -     EPINEPHrine (Epipen) injection syringe 0.3 mg  -     albuterol 2.5 mg /3 mL (0.083 %) nebulizer solution 3 mL           Tomasa Blake MD

## 2024-05-01 ENCOUNTER — INFUSION (OUTPATIENT)
Dept: HEMATOLOGY/ONCOLOGY | Facility: CLINIC | Age: 64
End: 2024-05-01
Payer: MEDICARE

## 2024-05-01 VITALS
HEART RATE: 102 BPM | WEIGHT: 153.44 LBS | RESPIRATION RATE: 18 BRPM | BODY MASS INDEX: 22.02 KG/M2 | TEMPERATURE: 98.1 F | DIASTOLIC BLOOD PRESSURE: 68 MMHG | SYSTOLIC BLOOD PRESSURE: 104 MMHG | OXYGEN SATURATION: 100 %

## 2024-05-01 DIAGNOSIS — C78.7 METASTASES TO THE LIVER (MULTI): ICD-10-CM

## 2024-05-01 DIAGNOSIS — C15.9 STAGE IV MALIGNANT NEOPLASM OF ESOPHAGUS (MULTI): ICD-10-CM

## 2024-05-01 PROCEDURE — 2500000004 HC RX 250 GENERAL PHARMACY W/ HCPCS (ALT 636 FOR OP/ED): Performed by: INTERNAL MEDICINE

## 2024-05-01 PROCEDURE — 96523 IRRIG DRUG DELIVERY DEVICE: CPT

## 2024-05-01 RX ORDER — HEPARIN 100 UNIT/ML
500 SYRINGE INTRAVENOUS AS NEEDED
Status: DISCONTINUED | OUTPATIENT
Start: 2024-05-01 | End: 2024-05-01 | Stop reason: HOSPADM

## 2024-05-01 RX ORDER — HEPARIN 100 UNIT/ML
500 SYRINGE INTRAVENOUS AS NEEDED
OUTPATIENT
Start: 2024-05-01

## 2024-05-01 RX ORDER — HEPARIN SODIUM,PORCINE/PF 10 UNIT/ML
50 SYRINGE (ML) INTRAVENOUS AS NEEDED
OUTPATIENT
Start: 2024-05-01

## 2024-05-01 RX ADMIN — HEPARIN 500 UNITS: 100 SYRINGE at 12:15

## 2024-05-01 ASSESSMENT — PAIN SCALES - GENERAL: PAINLEVEL: 4

## 2024-05-02 ENCOUNTER — SPECIALTY PHARMACY (OUTPATIENT)
Dept: PHARMACY | Facility: CLINIC | Age: 64
End: 2024-05-02

## 2024-05-03 ENCOUNTER — APPOINTMENT (OUTPATIENT)
Dept: RADIOLOGY | Facility: HOSPITAL | Age: 64
DRG: 393 | End: 2024-05-03
Payer: MEDICARE

## 2024-05-03 ENCOUNTER — HOSPITAL ENCOUNTER (INPATIENT)
Facility: HOSPITAL | Age: 64
LOS: 2 days | Discharge: HOME | DRG: 393 | End: 2024-05-06
Attending: STUDENT IN AN ORGANIZED HEALTH CARE EDUCATION/TRAINING PROGRAM | Admitting: INTERNAL MEDICINE
Payer: MEDICARE

## 2024-05-03 DIAGNOSIS — C78.7 METASTASES TO THE LIVER (MULTI): ICD-10-CM

## 2024-05-03 DIAGNOSIS — K52.9 COLITIS: ICD-10-CM

## 2024-05-03 DIAGNOSIS — I10 HYPERTENSION, UNSPECIFIED TYPE: ICD-10-CM

## 2024-05-03 DIAGNOSIS — D69.6 THROMBOCYTOPENIA (CMS-HCC): ICD-10-CM

## 2024-05-03 DIAGNOSIS — K52.9 ENTEROCOLITIS: Primary | ICD-10-CM

## 2024-05-03 DIAGNOSIS — K21.00 GASTROESOPHAGEAL REFLUX DISEASE WITH ESOPHAGITIS WITHOUT HEMORRHAGE: ICD-10-CM

## 2024-05-03 DIAGNOSIS — C15.9 STAGE IV MALIGNANT NEOPLASM OF ESOPHAGUS (MULTI): ICD-10-CM

## 2024-05-03 LAB
ALBUMIN SERPL BCP-MCNC: 3.9 G/DL (ref 3.4–5)
ALP SERPL-CCNC: 142 U/L (ref 33–136)
ALT SERPL W P-5'-P-CCNC: 23 U/L (ref 10–52)
ANION GAP SERPL CALC-SCNC: 15 MMOL/L
APPEARANCE UR: ABNORMAL
AST SERPL W P-5'-P-CCNC: 39 U/L (ref 9–39)
BASOPHILS # BLD MANUAL: 0 X10*3/UL (ref 0–0.1)
BASOPHILS NFR BLD MANUAL: 0 %
BILIRUB SERPL-MCNC: 1.2 MG/DL (ref 0–1.2)
BILIRUB UR STRIP.AUTO-MCNC: NEGATIVE MG/DL
BUN SERPL-MCNC: 16 MG/DL (ref 6–23)
CALCIUM SERPL-MCNC: 9.3 MG/DL (ref 8.6–10.3)
CHLORIDE SERPL-SCNC: 104 MMOL/L (ref 98–107)
CO2 SERPL-SCNC: 21 MMOL/L (ref 21–32)
COLOR UR: YELLOW
CREAT SERPL-MCNC: 0.82 MG/DL (ref 0.5–1.3)
EGFRCR SERPLBLD CKD-EPI 2021: >90 ML/MIN/1.73M*2
EOSINOPHIL # BLD MANUAL: 0 X10*3/UL (ref 0–0.7)
EOSINOPHIL NFR BLD MANUAL: 0 %
ERYTHROCYTE [DISTWIDTH] IN BLOOD BY AUTOMATED COUNT: 15.5 % (ref 11.5–14.5)
GLUCOSE SERPL-MCNC: 98 MG/DL (ref 74–99)
GLUCOSE UR STRIP.AUTO-MCNC: NEGATIVE MG/DL
HCT VFR BLD AUTO: 25.1 % (ref 41–52)
HGB BLD-MCNC: 8.3 G/DL (ref 13.5–17.5)
IMM GRANULOCYTES # BLD AUTO: 0.05 X10*3/UL (ref 0–0.7)
IMM GRANULOCYTES NFR BLD AUTO: 1.1 % (ref 0–0.9)
KETONES UR STRIP.AUTO-MCNC: NEGATIVE MG/DL
LACTATE SERPL-SCNC: 2.6 MMOL/L (ref 0.4–2)
LEUKOCYTE ESTERASE UR QL STRIP.AUTO: ABNORMAL
LIPASE SERPL-CCNC: 27 U/L (ref 9–82)
LYMPHOCYTES # BLD MANUAL: 0.75 X10*3/UL (ref 1.2–4.8)
LYMPHOCYTES NFR BLD MANUAL: 17 %
MCH RBC QN AUTO: 29 PG (ref 26–34)
MCHC RBC AUTO-ENTMCNC: 33.1 G/DL (ref 32–36)
MCV RBC AUTO: 88 FL (ref 80–100)
MONOCYTES # BLD MANUAL: 0.04 X10*3/UL (ref 0.1–1)
MONOCYTES NFR BLD MANUAL: 1 %
MUCOUS THREADS #/AREA URNS AUTO: NORMAL /LPF
MYELOCYTES # BLD MANUAL: 0.04 X10*3/UL
MYELOCYTES NFR BLD MANUAL: 1 %
NEUTS SEG # BLD MANUAL: 3.56 X10*3/UL (ref 1.2–7)
NEUTS SEG NFR BLD MANUAL: 81 %
NITRITE UR QL STRIP.AUTO: NEGATIVE
NRBC BLD-RTO: 0 /100 WBCS (ref 0–0)
PH UR STRIP.AUTO: 7 [PH]
PLATELET # BLD AUTO: 74 X10*3/UL (ref 150–450)
POTASSIUM SERPL-SCNC: 3 MMOL/L (ref 3.5–5.3)
PROT SERPL-MCNC: 7 G/DL (ref 6.4–8.2)
PROT UR STRIP.AUTO-MCNC: NEGATIVE MG/DL
RBC # BLD AUTO: 2.86 X10*6/UL (ref 4.5–5.9)
RBC # UR STRIP.AUTO: NEGATIVE /UL
RBC #/AREA URNS AUTO: NORMAL /HPF
RBC MORPH BLD: ABNORMAL
SODIUM SERPL-SCNC: 137 MMOL/L (ref 136–145)
SP GR UR STRIP.AUTO: 1.01
TOTAL CELLS COUNTED BLD: 100
UROBILINOGEN UR STRIP.AUTO-MCNC: <2 MG/DL
WBC # BLD AUTO: 4.4 X10*3/UL (ref 4.4–11.3)
WBC #/AREA URNS AUTO: NORMAL /HPF

## 2024-05-03 PROCEDURE — 80053 COMPREHEN METABOLIC PANEL: CPT | Performed by: PHYSICIAN ASSISTANT

## 2024-05-03 PROCEDURE — 85007 BL SMEAR W/DIFF WBC COUNT: CPT | Performed by: PHYSICIAN ASSISTANT

## 2024-05-03 PROCEDURE — 85652 RBC SED RATE AUTOMATED: CPT

## 2024-05-03 PROCEDURE — 85027 COMPLETE CBC AUTOMATED: CPT | Performed by: PHYSICIAN ASSISTANT

## 2024-05-03 PROCEDURE — 2500000004 HC RX 250 GENERAL PHARMACY W/ HCPCS (ALT 636 FOR OP/ED): Performed by: PHYSICIAN ASSISTANT

## 2024-05-03 PROCEDURE — 81001 URINALYSIS AUTO W/SCOPE: CPT | Performed by: PHYSICIAN ASSISTANT

## 2024-05-03 PROCEDURE — 96375 TX/PRO/DX INJ NEW DRUG ADDON: CPT

## 2024-05-03 PROCEDURE — 74177 CT ABD & PELVIS W/CONTRAST: CPT

## 2024-05-03 PROCEDURE — 74177 CT ABD & PELVIS W/CONTRAST: CPT | Performed by: RADIOLOGY

## 2024-05-03 PROCEDURE — 99285 EMERGENCY DEPT VISIT HI MDM: CPT | Mod: 25

## 2024-05-03 PROCEDURE — 2550000001 HC RX 255 CONTRASTS: Performed by: PHYSICIAN ASSISTANT

## 2024-05-03 PROCEDURE — 87086 URINE CULTURE/COLONY COUNT: CPT | Mod: GEALAB | Performed by: PHYSICIAN ASSISTANT

## 2024-05-03 PROCEDURE — 83690 ASSAY OF LIPASE: CPT | Performed by: PHYSICIAN ASSISTANT

## 2024-05-03 PROCEDURE — 83605 ASSAY OF LACTIC ACID: CPT | Performed by: PHYSICIAN ASSISTANT

## 2024-05-03 PROCEDURE — 96376 TX/PRO/DX INJ SAME DRUG ADON: CPT

## 2024-05-03 PROCEDURE — 96374 THER/PROPH/DIAG INJ IV PUSH: CPT

## 2024-05-03 RX ORDER — HYDROMORPHONE HYDROCHLORIDE 1 MG/ML
1 INJECTION, SOLUTION INTRAMUSCULAR; INTRAVENOUS; SUBCUTANEOUS ONCE
Status: COMPLETED | OUTPATIENT
Start: 2024-05-03 | End: 2024-05-03

## 2024-05-03 RX ORDER — ONDANSETRON HYDROCHLORIDE 2 MG/ML
4 INJECTION, SOLUTION INTRAVENOUS ONCE
Status: DISCONTINUED | OUTPATIENT
Start: 2024-05-03 | End: 2024-05-03

## 2024-05-03 RX ORDER — PREDNISONE 20 MG/1
80 TABLET ORAL ONCE
Status: COMPLETED | OUTPATIENT
Start: 2024-05-03 | End: 2024-05-04

## 2024-05-03 RX ORDER — SCOLOPAMINE TRANSDERMAL SYSTEM 1 MG/1
1 PATCH, EXTENDED RELEASE TRANSDERMAL
Status: DISCONTINUED | OUTPATIENT
Start: 2024-05-03 | End: 2024-05-06 | Stop reason: HOSPADM

## 2024-05-03 RX ORDER — POTASSIUM CHLORIDE 14.9 MG/ML
20 INJECTION INTRAVENOUS
Status: COMPLETED | OUTPATIENT
Start: 2024-05-03 | End: 2024-05-04

## 2024-05-03 RX ADMIN — HYDROMORPHONE HYDROCHLORIDE 1 MG: 1 INJECTION, SOLUTION INTRAMUSCULAR; INTRAVENOUS; SUBCUTANEOUS at 23:38

## 2024-05-03 RX ADMIN — HYDROMORPHONE HYDROCHLORIDE 1 MG: 1 INJECTION, SOLUTION INTRAMUSCULAR; INTRAVENOUS; SUBCUTANEOUS at 22:06

## 2024-05-03 RX ADMIN — SODIUM CHLORIDE 1000 ML: 9 INJECTION, SOLUTION INTRAVENOUS at 22:06

## 2024-05-03 RX ADMIN — SCOPOLAMINE 1 PATCH: 1.5 PATCH, EXTENDED RELEASE TRANSDERMAL at 22:53

## 2024-05-03 RX ADMIN — POTASSIUM CHLORIDE 20 MEQ: 200 INJECTION, SOLUTION INTRAVENOUS at 23:38

## 2024-05-03 RX ADMIN — IOHEXOL 75 ML: 350 INJECTION, SOLUTION INTRAVENOUS at 22:33

## 2024-05-03 ASSESSMENT — COGNITIVE AND FUNCTIONAL STATUS - GENERAL
DAILY ACTIVITIY SCORE: 24
MOBILITY SCORE: 24

## 2024-05-03 ASSESSMENT — PAIN - FUNCTIONAL ASSESSMENT: PAIN_FUNCTIONAL_ASSESSMENT: 0-10

## 2024-05-03 ASSESSMENT — LIFESTYLE VARIABLES
HAVE YOU EVER FELT YOU SHOULD CUT DOWN ON YOUR DRINKING: NO
TOTAL SCORE: 0
EVER HAD A DRINK FIRST THING IN THE MORNING TO STEADY YOUR NERVES TO GET RID OF A HANGOVER: NO
HAVE PEOPLE ANNOYED YOU BY CRITICIZING YOUR DRINKING: NO
EVER FELT BAD OR GUILTY ABOUT YOUR DRINKING: NO

## 2024-05-03 ASSESSMENT — PAIN DESCRIPTION - PAIN TYPE: TYPE: ACUTE PAIN

## 2024-05-03 ASSESSMENT — PAIN SCALES - GENERAL: PAINLEVEL_OUTOF10: 8

## 2024-05-03 ASSESSMENT — PAIN DESCRIPTION - LOCATION: LOCATION: ABDOMEN

## 2024-05-04 PROBLEM — K52.9 ENTEROCOLITIS: Status: ACTIVE | Noted: 2024-05-04

## 2024-05-04 LAB
ALBUMIN SERPL BCP-MCNC: 3.5 G/DL (ref 3.4–5)
ANION GAP SERPL CALC-SCNC: 11 MMOL/L (ref 10–20)
BUN SERPL-MCNC: 13 MG/DL (ref 6–23)
CALCIUM SERPL-MCNC: 8.8 MG/DL (ref 8.6–10.3)
CHLORIDE SERPL-SCNC: 107 MMOL/L (ref 98–107)
CO2 SERPL-SCNC: 21 MMOL/L (ref 21–32)
CREAT SERPL-MCNC: 0.72 MG/DL (ref 0.5–1.3)
CREAT SERPL-MCNC: 0.75 MG/DL (ref 0.5–1.3)
CRP SERPL-MCNC: 1.66 MG/DL
EGFRCR SERPLBLD CKD-EPI 2021: >90 ML/MIN/1.73M*2
EGFRCR SERPLBLD CKD-EPI 2021: >90 ML/MIN/1.73M*2
ERYTHROCYTE [DISTWIDTH] IN BLOOD BY AUTOMATED COUNT: 15.4 % (ref 11.5–14.5)
ERYTHROCYTE [SEDIMENTATION RATE] IN BLOOD BY WESTERGREN METHOD: 1 MM/H (ref 0–20)
GLUCOSE SERPL-MCNC: 140 MG/DL (ref 74–99)
HCT VFR BLD AUTO: 28 % (ref 41–52)
HGB BLD-MCNC: 9 G/DL (ref 13.5–17.5)
HOLD SPECIMEN: NORMAL
LACTATE SERPL-SCNC: 1.2 MMOL/L (ref 0.4–2)
MAGNESIUM SERPL-MCNC: 1.48 MG/DL (ref 1.6–2.4)
MCH RBC QN AUTO: 28.6 PG (ref 26–34)
MCHC RBC AUTO-ENTMCNC: 32.1 G/DL (ref 32–36)
MCV RBC AUTO: 89 FL (ref 80–100)
NRBC BLD-RTO: 0 /100 WBCS (ref 0–0)
PHOSPHATE SERPL-MCNC: 2.5 MG/DL (ref 2.5–4.9)
PLATELET # BLD AUTO: 54 X10*3/UL (ref 150–450)
POTASSIUM SERPL-SCNC: 3.9 MMOL/L (ref 3.5–5.3)
RBC # BLD AUTO: 3.15 X10*6/UL (ref 4.5–5.9)
SODIUM SERPL-SCNC: 135 MMOL/L (ref 136–145)
WBC # BLD AUTO: 2.6 X10*3/UL (ref 4.4–11.3)

## 2024-05-04 PROCEDURE — 87506 IADNA-DNA/RNA PROBE TQ 6-11: CPT | Mod: GEALAB

## 2024-05-04 PROCEDURE — 83735 ASSAY OF MAGNESIUM: CPT | Performed by: STUDENT IN AN ORGANIZED HEALTH CARE EDUCATION/TRAINING PROGRAM

## 2024-05-04 PROCEDURE — 87328 CRYPTOSPORIDIUM AG IA: CPT | Mod: 91

## 2024-05-04 PROCEDURE — 87493 C DIFF AMPLIFIED PROBE: CPT | Mod: GEALAB,59

## 2024-05-04 PROCEDURE — 85027 COMPLETE CBC AUTOMATED: CPT

## 2024-05-04 PROCEDURE — 83605 ASSAY OF LACTIC ACID: CPT | Performed by: PHYSICIAN ASSISTANT

## 2024-05-04 PROCEDURE — 99223 1ST HOSP IP/OBS HIGH 75: CPT

## 2024-05-04 PROCEDURE — 96376 TX/PRO/DX INJ SAME DRUG ADON: CPT

## 2024-05-04 PROCEDURE — 86140 C-REACTIVE PROTEIN: CPT

## 2024-05-04 PROCEDURE — 87040 BLOOD CULTURE FOR BACTERIA: CPT | Mod: 91,GEALAB,59

## 2024-05-04 PROCEDURE — 2500000004 HC RX 250 GENERAL PHARMACY W/ HCPCS (ALT 636 FOR OP/ED): Performed by: STUDENT IN AN ORGANIZED HEALTH CARE EDUCATION/TRAINING PROGRAM

## 2024-05-04 PROCEDURE — 1200000002 HC GENERAL ROOM WITH TELEMETRY DAILY

## 2024-05-04 PROCEDURE — 36415 COLL VENOUS BLD VENIPUNCTURE: CPT

## 2024-05-04 PROCEDURE — 2500000006 HC RX 250 W HCPCS SELF ADMINISTERED DRUGS (ALT 637 FOR ALL PAYERS): Mod: MUE

## 2024-05-04 PROCEDURE — 2500000001 HC RX 250 WO HCPCS SELF ADMINISTERED DRUGS (ALT 637 FOR MEDICARE OP)

## 2024-05-04 PROCEDURE — 87081 CULTURE SCREEN ONLY: CPT | Mod: GEALAB

## 2024-05-04 PROCEDURE — 99223 1ST HOSP IP/OBS HIGH 75: CPT | Performed by: INTERNAL MEDICINE

## 2024-05-04 PROCEDURE — 2500000005 HC RX 250 GENERAL PHARMACY W/O HCPCS

## 2024-05-04 PROCEDURE — 2500000004 HC RX 250 GENERAL PHARMACY W/ HCPCS (ALT 636 FOR OP/ED): Performed by: PHYSICIAN ASSISTANT

## 2024-05-04 PROCEDURE — 82565 ASSAY OF CREATININE: CPT

## 2024-05-04 PROCEDURE — 2500000004 HC RX 250 GENERAL PHARMACY W/ HCPCS (ALT 636 FOR OP/ED)

## 2024-05-04 PROCEDURE — 87425 ROTAVIRUS AG IA: CPT

## 2024-05-04 PROCEDURE — 83993 ASSAY FOR CALPROTECTIN FECAL: CPT

## 2024-05-04 PROCEDURE — 82565 ASSAY OF CREATININE: CPT | Performed by: PHYSICIAN ASSISTANT

## 2024-05-04 PROCEDURE — 87329 GIARDIA AG IA: CPT | Mod: 91

## 2024-05-04 PROCEDURE — 87040 BLOOD CULTURE FOR BACTERIA: CPT | Mod: GEALAB,59

## 2024-05-04 RX ORDER — MULTIVIT-MIN/IRON FUM/FOLIC AC 7.5 MG-4
1 TABLET ORAL EVERY MORNING
Status: DISCONTINUED | OUTPATIENT
Start: 2024-05-04 | End: 2024-05-06 | Stop reason: HOSPADM

## 2024-05-04 RX ORDER — POTASSIUM CHLORIDE 750 MG/1
10 TABLET, FILM COATED, EXTENDED RELEASE ORAL DAILY
Status: DISCONTINUED | OUTPATIENT
Start: 2024-05-04 | End: 2024-05-06 | Stop reason: HOSPADM

## 2024-05-04 RX ORDER — ACETAMINOPHEN 500 MG
5 TABLET ORAL NIGHTLY PRN
Status: DISCONTINUED | OUTPATIENT
Start: 2024-05-04 | End: 2024-05-06 | Stop reason: HOSPADM

## 2024-05-04 RX ORDER — SODIUM CHLORIDE, SODIUM LACTATE, POTASSIUM CHLORIDE, CALCIUM CHLORIDE 600; 310; 30; 20 MG/100ML; MG/100ML; MG/100ML; MG/100ML
75 INJECTION, SOLUTION INTRAVENOUS CONTINUOUS
Status: ACTIVE | OUTPATIENT
Start: 2024-05-04 | End: 2024-05-04

## 2024-05-04 RX ORDER — FENTANYL 50 UG/1
1 PATCH TRANSDERMAL
Status: DISCONTINUED | OUTPATIENT
Start: 2024-05-06 | End: 2024-05-06 | Stop reason: HOSPADM

## 2024-05-04 RX ORDER — PANTOPRAZOLE SODIUM 40 MG/1
40 TABLET, DELAYED RELEASE ORAL
Status: DISCONTINUED | OUTPATIENT
Start: 2024-05-04 | End: 2024-05-06 | Stop reason: HOSPADM

## 2024-05-04 RX ORDER — LANOLIN ALCOHOL/MO/W.PET/CERES
400 CREAM (GRAM) TOPICAL DAILY
Status: DISCONTINUED | OUTPATIENT
Start: 2024-05-04 | End: 2024-05-06 | Stop reason: HOSPADM

## 2024-05-04 RX ORDER — HYDROMORPHONE HYDROCHLORIDE 1 MG/ML
0.6 INJECTION, SOLUTION INTRAMUSCULAR; INTRAVENOUS; SUBCUTANEOUS EVERY 4 HOURS PRN
Status: DISCONTINUED | OUTPATIENT
Start: 2024-05-04 | End: 2024-05-04

## 2024-05-04 RX ORDER — LIDOCAINE 560 MG/1
1 PATCH PERCUTANEOUS; TOPICAL; TRANSDERMAL EVERY 24 HOURS
Status: DISCONTINUED | OUTPATIENT
Start: 2024-05-04 | End: 2024-05-06 | Stop reason: HOSPADM

## 2024-05-04 RX ORDER — VANCOMYCIN HYDROCHLORIDE 125 MG/1
125 CAPSULE ORAL 4 TIMES DAILY
Status: DISCONTINUED | OUTPATIENT
Start: 2024-05-04 | End: 2024-05-04

## 2024-05-04 RX ORDER — SCOLOPAMINE TRANSDERMAL SYSTEM 1 MG/1
1 PATCH, EXTENDED RELEASE TRANSDERMAL
Status: DISCONTINUED | OUTPATIENT
Start: 2024-05-04 | End: 2024-05-04

## 2024-05-04 RX ORDER — HEPARIN SODIUM,PORCINE/PF 10 UNIT/ML
50 SYRINGE (ML) INTRAVENOUS EVERY 12 HOURS
Status: DISCONTINUED | OUTPATIENT
Start: 2024-05-04 | End: 2024-05-06 | Stop reason: HOSPADM

## 2024-05-04 RX ORDER — OXYCODONE HYDROCHLORIDE 5 MG/1
10 TABLET ORAL EVERY 4 HOURS PRN
COMMUNITY
End: 2024-05-24 | Stop reason: HOSPADM

## 2024-05-04 RX ORDER — HEPARIN SODIUM,PORCINE/PF 10 UNIT/ML
50 SYRINGE (ML) INTRAVENOUS AS NEEDED
Status: DISCONTINUED | OUTPATIENT
Start: 2024-05-04 | End: 2024-05-06 | Stop reason: HOSPADM

## 2024-05-04 RX ORDER — OXYCODONE HYDROCHLORIDE 10 MG/1
10 TABLET ORAL EVERY 4 HOURS PRN
Status: DISCONTINUED | OUTPATIENT
Start: 2024-05-04 | End: 2024-05-04

## 2024-05-04 RX ORDER — PREDNISONE 20 MG/1
70 TABLET ORAL DAILY
Status: DISCONTINUED | OUTPATIENT
Start: 2024-05-04 | End: 2024-05-04

## 2024-05-04 RX ORDER — SUCRALFATE 1 G/1
1 TABLET ORAL DAILY
Status: DISCONTINUED | OUTPATIENT
Start: 2024-05-04 | End: 2024-05-06 | Stop reason: HOSPADM

## 2024-05-04 RX ORDER — LORAZEPAM 1 MG/1
1 TABLET ORAL EVERY 8 HOURS PRN
Status: DISCONTINUED | OUTPATIENT
Start: 2024-05-04 | End: 2024-05-06 | Stop reason: HOSPADM

## 2024-05-04 RX ORDER — HYDROMORPHONE HYDROCHLORIDE 1 MG/ML
1 INJECTION, SOLUTION INTRAMUSCULAR; INTRAVENOUS; SUBCUTANEOUS ONCE
Status: COMPLETED | OUTPATIENT
Start: 2024-05-04 | End: 2024-05-04

## 2024-05-04 RX ADMIN — SODIUM CHLORIDE, POTASSIUM CHLORIDE, SODIUM LACTATE AND CALCIUM CHLORIDE 75 ML/HR: 600; 310; 30; 20 INJECTION, SOLUTION INTRAVENOUS at 02:31

## 2024-05-04 RX ADMIN — SUCRALFATE 1 G: 1 TABLET ORAL at 09:01

## 2024-05-04 RX ADMIN — POTASSIUM CHLORIDE 20 MEQ: 200 INJECTION, SOLUTION INTRAVENOUS at 01:48

## 2024-05-04 RX ADMIN — HYDROMORPHONE HYDROCHLORIDE 0.2 MG: 1 INJECTION, SOLUTION INTRAMUSCULAR; INTRAVENOUS; SUBCUTANEOUS at 14:59

## 2024-05-04 RX ADMIN — Medication 1 TABLET: at 09:01

## 2024-05-04 RX ADMIN — HYDROMORPHONE HYDROCHLORIDE 0.2 MG: 1 INJECTION, SOLUTION INTRAMUSCULAR; INTRAVENOUS; SUBCUTANEOUS at 10:39

## 2024-05-04 RX ADMIN — APIXABAN 5 MG: 5 TABLET, FILM COATED ORAL at 09:01

## 2024-05-04 RX ADMIN — HEPARIN, PORCINE (PF) 10 UNIT/ML INTRAVENOUS SYRINGE 50 UNITS: at 21:32

## 2024-05-04 RX ADMIN — APIXABAN 5 MG: 5 TABLET, FILM COATED ORAL at 21:19

## 2024-05-04 RX ADMIN — PREDNISONE 80 MG: 20 TABLET ORAL at 00:08

## 2024-05-04 RX ADMIN — OXYCODONE HYDROCHLORIDE 10 MG: 10 TABLET ORAL at 13:52

## 2024-05-04 RX ADMIN — HYDROMORPHONE HYDROCHLORIDE 1 MG: 1 INJECTION, SOLUTION INTRAMUSCULAR; INTRAVENOUS; SUBCUTANEOUS at 00:50

## 2024-05-04 RX ADMIN — HYDROMORPHONE HYDROCHLORIDE 0.2 MG: 1 INJECTION, SOLUTION INTRAMUSCULAR; INTRAVENOUS; SUBCUTANEOUS at 09:24

## 2024-05-04 RX ADMIN — HYDROMORPHONE HYDROCHLORIDE 0.2 MG: 1 INJECTION, SOLUTION INTRAMUSCULAR; INTRAVENOUS; SUBCUTANEOUS at 19:31

## 2024-05-04 RX ADMIN — Medication 400 MG: at 09:01

## 2024-05-04 RX ADMIN — HYDROMORPHONE HYDROCHLORIDE 0.4 MG: 1 INJECTION, SOLUTION INTRAMUSCULAR; INTRAVENOUS; SUBCUTANEOUS at 05:20

## 2024-05-04 RX ADMIN — LIDOCAINE 4% 1 PATCH: 40 PATCH TOPICAL at 05:17

## 2024-05-04 RX ADMIN — POTASSIUM CHLORIDE 10 MEQ: 750 TABLET, EXTENDED RELEASE ORAL at 09:01

## 2024-05-04 RX ADMIN — PANTOPRAZOLE SODIUM 40 MG: 40 TABLET, DELAYED RELEASE ORAL at 09:06

## 2024-05-04 RX ADMIN — METHYLPREDNISOLONE SODIUM SUCCINATE 68.75 MG: 125 INJECTION, POWDER, FOR SOLUTION INTRAMUSCULAR; INTRAVENOUS at 14:59

## 2024-05-04 SDOH — SOCIAL STABILITY: SOCIAL INSECURITY: HAS ANYONE EVER THREATENED TO HURT YOUR FAMILY OR YOUR PETS?: NO

## 2024-05-04 SDOH — SOCIAL STABILITY: SOCIAL INSECURITY: ARE THERE ANY APPARENT SIGNS OF INJURIES/BEHAVIORS THAT COULD BE RELATED TO ABUSE/NEGLECT?: NO

## 2024-05-04 SDOH — SOCIAL STABILITY: SOCIAL INSECURITY: DO YOU FEEL UNSAFE GOING BACK TO THE PLACE WHERE YOU ARE LIVING?: NO

## 2024-05-04 SDOH — SOCIAL STABILITY: SOCIAL INSECURITY: HAVE YOU HAD THOUGHTS OF HARMING ANYONE ELSE?: NO

## 2024-05-04 SDOH — SOCIAL STABILITY: SOCIAL INSECURITY: WERE YOU ABLE TO COMPLETE ALL THE BEHAVIORAL HEALTH SCREENINGS?: YES

## 2024-05-04 SDOH — SOCIAL STABILITY: SOCIAL INSECURITY: DO YOU FEEL ANYONE HAS EXPLOITED OR TAKEN ADVANTAGE OF YOU FINANCIALLY OR OF YOUR PERSONAL PROPERTY?: NO

## 2024-05-04 SDOH — SOCIAL STABILITY: SOCIAL INSECURITY: HAVE YOU HAD ANY THOUGHTS OF HARMING ANYONE ELSE?: NO

## 2024-05-04 SDOH — SOCIAL STABILITY: SOCIAL INSECURITY: ABUSE: ADULT

## 2024-05-04 SDOH — SOCIAL STABILITY: SOCIAL INSECURITY: DOES ANYONE TRY TO KEEP YOU FROM HAVING/CONTACTING OTHER FRIENDS OR DOING THINGS OUTSIDE YOUR HOME?: NO

## 2024-05-04 SDOH — SOCIAL STABILITY: SOCIAL INSECURITY: ARE YOU OR HAVE YOU BEEN THREATENED OR ABUSED PHYSICALLY, EMOTIONALLY, OR SEXUALLY BY ANYONE?: NO

## 2024-05-04 ASSESSMENT — COGNITIVE AND FUNCTIONAL STATUS - GENERAL
DAILY ACTIVITIY SCORE: 24
MOBILITY SCORE: 24
PATIENT BASELINE BEDBOUND: NO
DAILY ACTIVITIY SCORE: 24
MOBILITY SCORE: 24
DAILY ACTIVITIY SCORE: 24
MOBILITY SCORE: 24

## 2024-05-04 ASSESSMENT — ENCOUNTER SYMPTOMS
VOMITING: 0
HEMATURIA: 0
CHILLS: 0
BLOOD IN STOOL: 0
COUGH: 1
SHORTNESS OF BREATH: 0
FREQUENCY: 1
CONFUSION: 0
CHEST TIGHTNESS: 0
EYE ITCHING: 0
PALPITATIONS: 0
DIARRHEA: 1
FEVER: 0
CONSTIPATION: 0
EYE DISCHARGE: 0
HEADACHES: 0
SEIZURES: 0
ABDOMINAL PAIN: 1
NAUSEA: 1
FATIGUE: 1
TREMORS: 0
DYSURIA: 0

## 2024-05-04 ASSESSMENT — ACTIVITIES OF DAILY LIVING (ADL)
DRESSING YOURSELF: INDEPENDENT
HEARING - LEFT EAR: FUNCTIONAL
BATHING: INDEPENDENT
LACK_OF_TRANSPORTATION: NO
ADEQUATE_TO_COMPLETE_ADL: YES
FEEDING YOURSELF: INDEPENDENT
JUDGMENT_ADEQUATE_SAFELY_COMPLETE_DAILY_ACTIVITIES: YES
GROOMING: INDEPENDENT
TOILETING: INDEPENDENT
HEARING - RIGHT EAR: FUNCTIONAL
WALKS IN HOME: INDEPENDENT
ASSISTIVE_DEVICE: EYEGLASSES
PATIENT'S MEMORY ADEQUATE TO SAFELY COMPLETE DAILY ACTIVITIES?: YES

## 2024-05-04 ASSESSMENT — PAIN - FUNCTIONAL ASSESSMENT
PAIN_FUNCTIONAL_ASSESSMENT: 0-10

## 2024-05-04 ASSESSMENT — PAIN DESCRIPTION - LOCATION
LOCATION: ABDOMEN
LOCATION: BACK
LOCATION: ABDOMEN
LOCATION: BACK
LOCATION: ABDOMEN

## 2024-05-04 ASSESSMENT — PAIN SCALES - GENERAL
PAINLEVEL_OUTOF10: 5 - MODERATE PAIN
PAINLEVEL_OUTOF10: 8
PAINLEVEL_OUTOF10: 5 - MODERATE PAIN
PAINLEVEL_OUTOF10: 9
PAINLEVEL_OUTOF10: 2
PAINLEVEL_OUTOF10: 6
PAINLEVEL_OUTOF10: 2
PAINLEVEL_OUTOF10: 5 - MODERATE PAIN
PAINLEVEL_OUTOF10: 6
PAINLEVEL_OUTOF10: 5 - MODERATE PAIN
PAINLEVEL_OUTOF10: 4

## 2024-05-04 ASSESSMENT — LIFESTYLE VARIABLES
AUDIT-C TOTAL SCORE: 0
HOW MANY STANDARD DRINKS CONTAINING ALCOHOL DO YOU HAVE ON A TYPICAL DAY: PATIENT DOES NOT DRINK
AUDIT-C TOTAL SCORE: 0
HOW OFTEN DO YOU HAVE A DRINK CONTAINING ALCOHOL: NEVER
SKIP TO QUESTIONS 9-10: 1
HOW OFTEN DO YOU HAVE 6 OR MORE DRINKS ON ONE OCCASION: NEVER

## 2024-05-04 ASSESSMENT — PAIN DESCRIPTION - ORIENTATION
ORIENTATION: LOWER
ORIENTATION: LOWER

## 2024-05-04 NOTE — HOSPITAL COURSE
Brief HPI:  Massimo Garcia is a 63 y.o. year old male  patient with PMHx significant for third degree heart block s/p PPM (placed in November), esophageal adenocarcinoma with mets to liver and lungs, Drug-induced colitis, PE, peripheral neuropathy, and portal vein thrombosis (On Eliquis) came into [facility] ED on 5/3/2024 on account of diarrhea and worsening abdominal pain     Hospital Course:  Imaging was done that showed findings consistent with enterocolitis.  Given patient's immunocompromised status and recent chemotherapy, infectious workup was ordered. ED provider reached out to Dr. Tracy at [facility] who recommended patient be started on steroids.  Given that patient had recently finished antibiotic course, C. Diff colitis was a concern.  Heme-Onc on board. Patient was started on oral vancomycin and IV Zosyn. Which was discontinued 5/4 as believed to be immune mediated. C. Diff negative, stool pathogen panel all negative. Pt's frequency of bowel movements continue to decrease, and stool is more formed.

## 2024-05-04 NOTE — H&P
Internal Medicine - History and Physical Note      Patient: Massimo Garcia, Age: 63 y.o., SEX: male , MRN:[id number], ROOM:130/130-A,  Code: Full Code   Admitted On: 5/3/2024   Admitting Dx: Enterocolitis [K52.9]  PCP: Dayne Santamaria DO        Attending: Bam Sanchez DO        Chief Complaint   Patient: Massimo Garcia is a 63 y.o. male who presented to the hospital for   Chief Complaint   Patient presents with    Abdominal Pain     Pt has severe abdominal pain and diarrhea x2 days. Pt has hx colitis 1 week ago that was treated with antibiotics. Pt was doing well since then until yesterday. Pt has hx esophageal CA with mets to the liver. Currently on chemo. Last chemo treatment was this past Monday. Pt's wife gave him oxycodone and bentyl earlier today with no relief. Pt rates pain 8/10. Denies N/V.        HPI   Massimo Garcia is a 63 y.o. year old male  patient with PMHx significant for third degree heart block s/p PPM (placed in November), esophageal adenocarcinoma with mets to liver and lungs, Drug-induced colitis, PE, peripheral neuropathy, and portal vein thrombosis (On Eliquis) came into [facility] ED on account of diarrhea and worsening abdominal pain.  Patient states that he has been having dyspnea for the past 2 days that is associated with pain in his abdomen and around kidneys.  Patient states that he had 15 episodes of watery diarrhea today without any fevers, chills.  The pain he described as continuous with episodes of sharp stabbing 8-9/10 pain.  Patient took his oxycodone to help relieve the pain but did not help.  Patient states that his pain is only partially relieved with bowel movement.  Patient denies any vomiting but does endorse nausea.  Patient has not noticed any blood in his stool.     Patient has recurrent admissions reactions secondary to chemotherapy.  Most recent admission that patient had was in March, 2024.  Patient was admitted on 03/18/2024 for sudden onset chest pain.  He was also febrile.  He was  managed for sepsis criteria and required extensive IV fluid administration.  During the course patient developed worsening thrombocytopenia and his DIC labs were ordered which were consistent with DIC.  Patient ultimately achieved hemodynamic stability on 03/19/2024 with no obvious source of infection, his blood cultures were negative and his CTAP did not show any acute concerning infectious process.  It was later concluded that his symptoms are secondary to oxaliplatin chemotherapy.  Patient was discharged on 03/3/2024 once he finished his Vanco plus Zosyn plus micafungin course.  Since then, patient has been seen 4 times in the ED.  Most recently he was seen on 04/24/2024 for constipation.  CTAP done at that time showed left-sided colonic thickening consistent with colitis and patient was discharged home with Augmentin.  Patient stated that he finished his Augmentin course on 05/02/2024.  Patient saw Dr. Blake on 04/29/2024 where recent changes were made to chemo regimen.  His new regimen would be trastuzumab, pembrolizumab, leucovorin plus 5-FU. Oxali was discontinued.  It was his cycle 16 on 04/29/2024.  Patient was also started on cyclosporin for his colitis which he has still not taken.    ROS  Review of Systems   Constitutional:  Positive for fatigue. Negative for chills and fever.   Eyes:  Negative for discharge and itching.   Respiratory:  Positive for cough (Secondary to Keytruda). Negative for chest tightness and shortness of breath.    Cardiovascular:  Negative for chest pain, palpitations and leg swelling.        Does endorse dyspnea on exertion   Gastrointestinal:  Positive for abdominal pain, diarrhea and nausea. Negative for blood in stool, constipation and vomiting.   Genitourinary:  Positive for frequency. Negative for dysuria, hematuria and urgency.   Allergic/Immunologic: Positive for immunocompromised state.   Neurological:  Negative for tremors, seizures, syncope and headaches.    Psychiatric/Behavioral:  Negative for behavioral problems and confusion.      12 Point ROS negative unless otherwise specified above.     ED COURSE:   VS: Temperature 99 °F, /91 mmHg, heart rate 99 bpm, respiration 24/min, O2 sat 100% on room air    Labs  - CBC: WBC 4.4, H/H 8.3/25.1, platelet 74  - BMP: Glucose 98, sodium 137, potassium 3, chloride 104, bicarb 21, BUN 16, creatinine 0.82  - LFTs: Albumin 3.9, alk phos 142, ALT 23, AST 39, T. bili 1.2  - Magnesium 1.48  - Lactate: 2.6 --> 1.2  - Lipase: 27  - UA positive for: Being hazy, trace leukocyte Estrase  - Blood cultures: Collected 05/04/2024  - Urine cultures: Collected 05/03/2024    Imaging:  CT abdomen pelvis w IV contrast:   - The colon is filled with fluid compatible with reported history of diarrhea. There is also wall thickening of the distal colon along with nondilated loops of fluid-filled small bowel throughout the abdomen and pelvis. Correlate for symptoms of enterocolitis.     - Redemonstrated metastatic disease throughout the visualized lower chest and liver, similar compared to prior imaging.   - Concentric bladder wall thickening, with evaluation limited due to incomplete distention. Recommend correlation with urinalysis if there is clinical concern for cystitis     Interventions:   Patient was given Dilaudid 1 mg x 3.  Dr. Tracy of oncology at [facility] [place] was [place] who reviewed the case and felt like the patient might be suffering from aftereffects of the Keytruda and requested patient be started on steroids and hence prednisone 80 mg was given.  Patient was also given 1 L normal saline.    Past Medical History: Third degree heart block s/p PPM (placed in November), esophageal adenocarcinoma with mets to liver and lungs, Drug-induced colitis, PE, peripheral neuropathy, and portal vein thrombosis (On Eliquis)  Past Surgical History: Bilateral total knee arthroplasty, left-sided PPM placement, bilateral shoulder resurfaced  Allergies:  According to patient, no known drug allergies.  Oxaliplatin has been placed as an allergy in the chart secondary to causing hypotension  Family History: Breast cancer in mother, esophageal cancer in father  Home Meds: Augmentin 875-125 mg twice a day for 10 days (last dose 05/02/2024), Eliquis 5 mg twice a day, cyclosporine 50 mg every day (patient is yet to receive it via mail order), fentanyl patch every 72 hours on right shoulder, lorazepam 1 mg every 8 hours as needed, multivitamins every day, pantoprazole 40 mg every day, potassium 10 mEq every day, scopolamine patch once every 3 days, sucralfate 1 g by mouth every day    Social History:  - Coming from home in [place], lives with wife and daughter  - Tobacco: Denies  - Alcohol: Denies  - Illicit Drug: Medical marijuana.  Does not remember last dose    HealthCare Providers:  - PCP: Dayne Santamaria DO     Code Status: Full Code     Emergency contact: Extended Emergency Contact Information  Primary Emergency Contact: Jane Garcia  Address: [address] [place] [address]  Home Phone: [phone]  Mobile Phone: [phone]  Relation: Spouse  Secondary Emergency Contact: JIDA  Mobile Phone: [phone]  Relation: Daughter  Preferred language: English   needed? No     Past Medical History     Past Medical History:   Diagnosis Date    Essential (primary) hypertension 12/21/2013    Hypertension    Pacemaker     Peripheral neuropathy     Personal history of other diseases of the circulatory system     History of right bundle branch block (RBBB)    Pneumothorax 12/04/2023        Surgical History     Past Surgical History:   Procedure Laterality Date    CARDIAC ELECTROPHYSIOLOGY PROCEDURE N/A 11/7/2023    Procedure: Temporary Pacemaker Insertion;  Surgeon: Alexis Shook MD;  Location: [facility] Cardiac Cath Lab;  Service: Cardiovascular;  Laterality: N/A;    CARDIAC ELECTROPHYSIOLOGY PROCEDURE Left 11/9/2023     Procedure: PPM IMPLANT DUAL;  Surgeon: Elias Connolly MD;  Location: [facility] Cardiac Cath Lab;  Service: Electrophysiology;  Laterality: Left;    TOTAL KNEE ARTHROPLASTY  09/30/2016    Total Knee Replacement Left    TOTAL KNEE ARTHROPLASTY  09/30/2016    Total Knee Replacement Right       Family History     Family History   Problem Relation Name Age of Onset    Cancer Father         Social History     Social History     Socioeconomic History    Marital status:      Spouse name: Not on file    Number of children: Not on file    Years of education: Not on file    Highest education level: Not on file   Occupational History    Not on file   Tobacco Use    Smoking status: Former     Types: Cigarettes, Cigars     Passive exposure: Never    Smokeless tobacco: Never   Vaping Use    Vaping status: Unknown   Substance and Sexual Activity    Alcohol use: Not Currently    Drug use: Yes     Types: Marijuana     Comment: medical marjuana card    Sexual activity: Not on file   Other Topics Concern    Not on file   Social History Narrative    Not on file     Social Determinants of Health     Financial Resource Strain: Low Risk  (5/4/2024)    Overall Financial Resource Strain (CARDIA)     Difficulty of Paying Living Expenses: Not hard at all   Food Insecurity: Not on file   Transportation Needs: No Transportation Needs (5/4/2024)    PRAPARE - Transportation     Lack of Transportation (Medical): No     Lack of Transportation (Non-Medical): No   Physical Activity: Not on file   Stress: Not on file   Social Connections: Not on file   Intimate Partner Violence: Not on file   Housing Stability: Low Risk  (5/4/2024)    Housing Stability Vital Sign     Unable to Pay for Housing in the Last Year: No     Number of Places Lived in the Last Year: 1     Unstable Housing in the Last Year: No       Tobacco Use: Medium Risk (4/29/2024)    Patient History     Smoking Tobacco Use: Former     Smokeless Tobacco Use: Never     Passive  "Exposure: Never        Social History     Substance and Sexual Activity   Alcohol Use Not Currently        Allergies     Allergies   Allergen Reactions    Bee Venom Protein (Honey Bee) Anaphylaxis    Oxaliplatin Other     Rigors          Meds    Scheduled medications  apixaban, 5 mg, oral, BID  [START ON 5/6/2024] fentaNYL, 1 patch, transdermal, q72h  lidocaine, 1 patch, transdermal, q24h  multivitamin with minerals, 1 tablet, oral, q AM  pantoprazole, 40 mg, oral, Daily before breakfast  piperacillin-tazobactam, 4.5 g, intravenous, q6h  potassium chloride CR, 10 mEq, oral, Daily  predniSONE, 70 mg, oral, Daily  scopolamine, 1 patch, transdermal, q72h  sucralfate, 1 g, oral, Daily  vancomycin, 125 mg, oral, 4x daily      Continuous medications  lactated Ringer's, 75 mL/hr, Last Rate: 75 mL/hr (05/04/24 0231)      PRN medications  PRN medications: HYDROmorphone, HYDROmorphone, HYDROmorphone, LORazepam, melatonin     Objective    Physical Exam  Constitutional:       Appearance: Normal appearance.   HENT:      Head: Normocephalic and atraumatic.   Cardiovascular:      Heart sounds: No murmur heard.     No friction rub. No gallop.   Pulmonary:      Effort: No respiratory distress.      Breath sounds: No stridor. No wheezing.   Abdominal:      Tenderness: There is abdominal tenderness (Around umbilicus and left upper quadrant).   Musculoskeletal:         General: No swelling or tenderness. Normal range of motion.      Cervical back: Normal range of motion.   Neurological:      General: No focal deficit present.      Mental Status: He is alert and oriented to person, place, and time.   Psychiatric:         Mood and Affect: Mood normal.         Behavior: Behavior normal.          Visit Vitals  /74 (BP Location: Left arm, Patient Position: Lying)   Pulse 95   Temp 36.9 °C (98.4 °F) (Temporal)   Resp 18   Ht 1.727 m (5' 8\")   Wt 71.2 kg (156 lb 15.5 oz)   SpO2 99%   BMI 23.87 kg/m²   Smoking Status Former   BSA 1.85 m² "          Intake/Output Summary (Last 24 hours) at 5/4/2024 0359  Last data filed at 5/4/2024 0213  Gross per 24 hour   Intake 1200 ml   Output --   Net 1200 ml             Labs:   Results from last 72 hours   Lab Units 05/04/24  0056 05/03/24  2211   SODIUM mmol/L  --  137   POTASSIUM mmol/L  --  3.0*   CHLORIDE mmol/L  --  104   CO2 mmol/L  --  21   BUN mg/dL  --  16   CREATININE mg/dL 0.75 0.82   GLUCOSE mg/dL  --  98   CALCIUM mg/dL  --  9.3   ANION GAP mmol/L  --  15   EGFR mL/min/1.73m*2 >90 >90      Results from last 72 hours   Lab Units 05/03/24  2211   WBC AUTO x10*3/uL 4.4   HEMOGLOBIN g/dL 8.3*   HEMATOCRIT % 25.1*   PLATELETS AUTO x10*3/uL 74*   LYMPHO PCT MAN % 17.0   MONO PCT MAN % 1.0   EOSINO PCT MAN % 0.0      Lab Results   Component Value Date    CALCIUM 9.3 05/03/2024    PHOS 4.1 03/23/2024     Micro/ID:   No results found for the last 90 days.    Lab Results   Component Value Date    URINECULTURE No growth 03/18/2024    BLOODCULT No growth at 4 days -  FINAL REPORT 03/18/2024    BLOODCULT No growth at 4 days -  FINAL REPORT 03/18/2024     Images    CT abdomen pelvis w IV contrast  Narrative: Interpreted By:  Finkelstein, Evan,   STUDY:  CT ABDOMEN PELVIS W IV CONTRAST;  5/3/2024 10:32 pm      INDICATION:  Signs/Symptoms:diffuse abdominal pain with diarrhea that began  yesterday, hx of stage 4 esophageal cancer, on keytruda.      COMPARISON:  CT abdomen pelvis 04/24/2024      ACCESSION NUMBER(S):  [id number]      ORDERING CLINICIAN:  BENJAMIN COBURN      TECHNIQUE:  Axial CT images of the abdomen and pelvis with coronal and sagittal  reconstructed images obtained after intravenous administration of 75  mL Omnipaque 350      FINDINGS:  LOWER CHEST: Scattered pulmonary nodules in the visualized lung  bases, not significantly changed compared to prior recent imaging  04/24/2024.      ABDOMEN:      LIVER: Heterogeneous, predominantly hypodense masses are again seen  throughout the liver, also stable  compared to prior recent imaging  04/24/2024. BILE DUCTS: Normal caliber.  GALLBLADDER: No calcified gallstones. No wall thickening.  PORTAL VEIN: Patent  SPLEEN: Unremarkable.  PANCREAS: Unremarkable.  ADRENALS: Redemonstrated left adrenal gland thickening.  KIDNEYS, URETERS and URINARY BLADDER: Symmetric renal enhancement. No  hydronephrosis or perinephric fluid collection.  Concentric bladder  wall thickening, with evaluation limited due to incomplete  distention. REPRODUCTIVE ORGANS: No pelvic masses.      ABDOMINAL WALL: Fat containing inguinal hernias. A mesh is seen  within the lower anterior pelvic wall.. PERITONEUM: No ascites, free  air or fluid collection.      BOWEL: The stomach is distended with fluid. No gastric wall  thickening. Nondilated loops of fluid-filled small bowel throughout  the abdomen and pelvis. Wall thickening of the distal colon. The  colon is filled with fluid. Nondilated appendix.      VESSELS: Moderate aortoiliac calcifications. Aneurysmal dilatation of  the infrarenal aorta measuring up to 2.3 cm in maximum AP dimension.  RETROPERITONEUM: There are numerous enlarged para-aortic lymph nodes,  largest measuring up to approximately 1.5 cm. Likely necrotic lymph  node along the right external iliac chain measures up to  approximately 2 cm, similar compared to prior imaging.      BONES: No acute osseous abnormality.      Impression: The colon is filled with fluid compatible with reported history of  diarrhea. There is also wall thickening of the distal colon along  with nondilated loops of fluid-filled small bowel throughout the  abdomen and pelvis. Correlate for symptoms of enterocolitis.      redemonstrated metastatic disease throughout the visualized lower  chest and liver, similar compared to prior imaging.      Concentric bladder wall thickening, with evaluation limited due to  incomplete distention. Recommend correlation with urinalysis if there  is clinical concern for cystitis.       MACRO:  None.      Signed by: Evan Finkelstein 5/3/2024 11:07 PM  Dictation workstation:   [id number]       Encounter Date: 04/04/24   ECG 12 lead   Result Value    Ventricular Rate 101    Atrial Rate 89    QRS Duration 168    QT Interval 430    QTC Calculation(Bazett) 557    R Axis -64    T Axis 76    QRS Count 17    Q Onset 189    T Offset 404    QTC Fredericia 511    Narrative    Ventricular-paced rhythm  Abnormal ECG  When compared with ECG of 28-MAR-2024 11:24,  Vent. rate has decreased BY   5 BPM  See ED provider note for full interpretation and clinical correlation  Confirmed by Gladis Ferreira ([id number]) on 4/6/2024 10:38:05 AM      Encounter Date: 04/04/24   ECG 12 lead   Result Value    Ventricular Rate 101    Atrial Rate 89    QRS Duration 168    QT Interval 430    QTC Calculation(Bazett) 557    R Axis -64    T Axis 76    QRS Count 17    Q Onset 189    T Offset 404    QTC Fredericia 511    Narrative    Ventricular-paced rhythm  Abnormal ECG  When compared with ECG of 28-MAR-2024 11:24,  Vent. rate has decreased BY   5 BPM  See ED provider note for full interpretation and clinical correlation  Confirmed by Gladis Ferreira ([id number]) on 4/6/2024 10:38:05 AM     Assessment and Plan    Massimo Garcia is a 63 y.o. male admitted on 5/3/2024 for abdominal pain and associated diarrhea.  He has a past medical history significant for third degree heart block s/p PPM (placed in November), esophageal adenocarcinoma with mets to liver and lungs, Drug-induced colitis, PE, peripheral neuropathy, and portal vein thrombosis (On Eliquis).  Patient recently received his chemotherapy/immunotherapy on 04/29/2024, and decision was made to start him on Cipro for fomite for his drug-induced colitis.  Given immunocompromise status infectious cause cannot be ruled out.    ACUTE MEDICAL ISSUES:  # Acute diarrhea   # Acute infectious versus drug-induced entercolitis  # Esophageal adenocarcinoma with metastasis  - Sx: Endorses up to 15 episodes of  watery diarrhea with sharp abdominal pain. Patient denies any fevers or chills.  Has finished antibiotic course 2 days prior.  - Labs without leukocytosis (WBC 4.4), lactate 2.6 --> 1.2 .   - Imaging: CTAP with colon filled with fluid compatible with reported history of diarrhea. Wall thickening of the distal colon along with nondilated loops of fluid-filled small bowel throughout the abdomen and pelvis. Correlate for symptoms of enterocolitis.   - Patient status post 1 L normal saline in ED, prednisone 80 mg.  Scopolamine patch was also placed in the ED on 05/02/2024  PLAN:  - Labs Ordered: ESR, CRP. Given immunocompromise status, recurrent hospitalizations, recent antibiotic use and recent chemo/immunotherapy ordered: Stool C. difficile PCR, stool cryptosporidium, stool Giardia antigen, stool pathogen panel PCR, stool rotavirus antigen, stool calprotectin  - Abx: Zosyn 4.5 g every 6 hours (05/04/2024 - Onwards). Will start prophylactically on vancomycin 125 mg 4 times a day for 10 days (05/04/2024 - 05/14/2024)  - For chemo induced colitis: Ordered prednisone 70 mg every day (1 mg/kg/day)  - Pain regimen: Dilaudid 0.2/0.4/0.6 every 4 hours as needed for mild/moderate/severe pain  - Currently on LR running at 75 cc/h for 10 hours  - Continue scopolamine patch every 72 hours  - Palliative care consulted for recurrent hospitalizations  - Consider ID consult if patient acutely worsens  - Consider heme-onc consult if inpatient management required    # Hypokalemia - Replete  # Hypomagnesemia - Replete    # Chronic thrombocytopenia - Heme onc. Most likely result of treatment for his cancer    CHRONIC MEDICAL ISSUES:  # History of PE # portal vein thrombosis: Continue home Eliquis 5 mg twice daily  # Right shoulder pain: Fentanyl 50 mcg 1 patch every 72 hours.  Last placed 05/02/2020  # Chronic low back pain: Lidocaine 4% patch every 24 hours with 12 hours on  # GERD: Continue home pantoprazole 40 mg every day.  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  Hb at admission 8.3 continue to monitor with daily CBCs, transfuse at Hb < 7   # Chronic thrombocytopenia: Platelets 72 on 04/29/2024.  Platelets at admission 74.  Improving since patient off oxaliplatin    Fluids: Currently on LR running at 75 cc/h for 10 hours  Electrolytes: Replete as needed.  Hypokalemia treated in ED  Nutrition: Adult regular diet  Antimicrobials: Zosyn + Oral Van (05/04/2024 - Onwards)  DVT ppx: With home Eliquis 5 mg twice every day  GI ppx: Pantoprazole 40 mg twice sucralfate 1 g  Catheter: None  Lines: Implantable single-lumen port on right chest  Supplemental Oxygen: On room air  Emergency Contact: Extended Emergency Contact Information  Primary Emergency Contact: Jane Garcia  Address: [address] [place] [address]  Home Phone: [phone]  Mobile Phone: [phone]  Relation: Spouse  Secondary Emergency Contact: JIDA  Mobile Phone: [phone]  Relation: Daughter  Preferred language: English   needed? No   Code: Full Code     Disposition: Admitted for acute diarrhea.  Currently pending workup.  ELOS greater than 2 days    Melanie Crespo MD  Internal Medicine, PGY- 1  05/04/24 at 3:59 AM     Disclaimer: Documentation completed with the information available at the time of input. The times in the chart may not be reflective of actual patient care times, interventions, or procedures. Documentation occurs after the physical care of the patient. This note was dictated by speech recognition. Minor errors in transcription may be present

## 2024-05-04 NOTE — CONSULTS
Consults  Referred by KHOI Sanchez MD: Dayne Santamaria DO    Reason For Consult  Enterocolitis     History Of Present Illness  Massimo Garcia is a 63 y.o. male, hx of metastatic esophageal cancer on 5FU, trastuzumab and Keytruda, hx of HTN, hx of a pacemaker, hx of a port, he was admitted for abdominal pain and diarrhea, his ct showed enterocolitis, WBC were N, the Cr is N, no fever, no emesis, no travel, no ill contacts, he has received antibiotics over the last 2 months     Past Medical History  He has a past medical history of Essential (primary) hypertension (12/21/2013), Pacemaker, Peripheral neuropathy, Personal history of other diseases of the circulatory system, and Pneumothorax (12/04/2023).    Surgical History  He has a past surgical history that includes Total knee arthroplasty (09/30/2016); Total knee arthroplasty (09/30/2016); Cardiac electrophysiology procedure (N/A, 11/7/2023); and Cardiac electrophysiology procedure (Left, 11/9/2023).     Social History     Occupational History    Not on file   Tobacco Use    Smoking status: Former     Types: Cigarettes, Cigars     Passive exposure: Never    Smokeless tobacco: Never   Vaping Use    Vaping status: Unknown   Substance and Sexual Activity    Alcohol use: Not Currently    Drug use: Yes     Types: Marijuana     Comment: medical marjuana card    Sexual activity: Not on file     Travel History   Travel since 04/04/24    No documented travel since 04/04/24            Family History  Family History   Problem Relation Name Age of Onset    Cancer Father       Allergies  Bee venom protein (honey bee) and Oxaliplatin       There is no immunization history on file for this patient.  Pneumonia and influenza vaccines are planned  Tran fall risk 45, preventive protocol was implemented  Depression screen is negative    Medications  Home medications:  Medications Prior to Admission   Medication Sig Dispense Refill Last Dose    amoxicillin-pot clavulanate  (Augmentin) 875-125 mg tablet Take 1 tablet by mouth every 12 hours for 10 days. 14 tablet 0 5/3/2024    apixaban (Eliquis) 5 mg tablet TAKE 1 TABLET BY MOUTH TWO TIMES A DAY 60 tablet 6 5/3/2024    fentaNYL (Duragesic) 50 mcg/hr patch Place 1 patch over 72 hours on the skin every 3rd day. 10 patch 0 5/3/2024    LORazepam (Ativan) 1 mg tablet Take 1 tablet (1 mg) by mouth every 8 hours if needed for anxiety (nausea). 90 tablet 0 5/3/2024    multivitamin with minerals (multivit-min-iron fum-folic ac) tablet Take 2 tablets by mouth once daily in the morning.   5/3/2024    oxyCODONE (Roxicodone) 5 mg immediate release tablet Take 2 tablets (10 mg) by mouth every 4 hours if needed for severe pain (7 - 10).   5/3/2024    potassium chloride CR 10 mEq ER tablet TAKE 1 TABLET BY MOUTH EVERY DAY 90 tablet 1 5/3/2024    scopolamine (Transderm-Scop) 1 mg over 3 days patch 3 day Place 1 patch on the skin every 3rd day. 10 patch 1 5/3/2024    sucralfate (Carafate) 1 gram tablet Take 1 tablet (1 g) by mouth once daily as needed.   5/3/2024    chlorproMAZINE (Thorazine) 25 mg tablet Take 1 tablet (25 mg) by mouth 2 times a day. 180 tablet 0     cholecalciferol (Vitamin D-3) 50 MCG (2000 UT) tablet Take 2 tablets (100 mcg) by mouth once daily in the morning.       cycloSPORINE (SandIMMUNE) 25 mg capsule Take 2 capsules (50 mg) by mouth once daily. 60 capsule 3 Unknown    lidocaine (Lidoderm) 5 % patch Place 1 patch over 12 hours on the skin once daily. Remove & discard patch within 12 hours or as directed by MD. 30 patch 0 Unknown    medical cannabis Take 1 each by mouth. Last OARRS fills:  1/18/24 Tin Oral Admin-3.67-0-120 Cbn Tincture 440/4 days  1/18/24 Tin Oral Admin-5.5-5.5-120 660/6 days  12/9/23 Oint Top Admin-16.6-30 Mjm 590.00/ 2 days   More than a month    metoprolol succinate XL (Toprol-XL) 25 mg 24 hr tablet Take 1 tablet (25 mg) by mouth once daily. Do not crush or chew. (Patient taking differently: Take 1 tablet (25  "mg) by mouth once daily in the morning. Do not crush or chew.) 90 tablet 0     pantoprazole (ProtoNix) 40 mg EC tablet Take 1 tablet (40 mg) by mouth once daily in the morning. Take before meals. 30 tablet 0     sodium chloride (Ocean) 0.65 % nasal spray Administer 1 spray into each nostril if needed for congestion (or nasal dryness/ bleeding). 30 mL 12 Unknown     Current medications:  Scheduled medications  apixaban, 5 mg, oral, BID  [START ON 5/6/2024] fentaNYL, 1 patch, transdermal, q72h  lidocaine, 1 patch, transdermal, q24h  magnesium oxide, 400 mg, oral, Daily  multivitamin with minerals, 1 tablet, oral, q AM  pantoprazole, 40 mg, oral, Daily before breakfast  [Held by provider] piperacillin-tazobactam, 4.5 g, intravenous, q6h  potassium chloride CR, 10 mEq, oral, Daily  predniSONE, 70 mg, oral, Daily  scopolamine, 1 patch, transdermal, q72h  sucralfate, 1 g, oral, Daily  [Held by provider] vancomycin, 125 mg, oral, 4x daily      Continuous medications  lactated Ringer's, 75 mL/hr, Last Rate: 75 mL/hr (05/04/24 0231)      PRN medications  PRN medications: HYDROmorphone, LORazepam, melatonin, oxyCODONE    Review of Systems   All other systems reviewed and are negative.       Objective  Range of Vitals (last 24 hours)  Heart Rate:  [94-99]   Temp:  [36.5 °C (97.7 °F)-37.2 °C (99 °F)]   Resp:  [16-24]   BP: (105-131)/(69-91)   Height:  [172.7 cm (5' 8\")]   Weight:  [70.3 kg (155 lb)-71.2 kg (156 lb 15.5 oz)]   SpO2:  [98 %-100 %]   Daily Weight  05/04/24 : 71.2 kg (156 lb 15.5 oz)    Body mass index is 23.87 kg/m².     Physical Exam  Constitutional:       Appearance: Normal appearance.   HENT:      Head: Normocephalic and atraumatic.      Mouth/Throat:      Mouth: Mucous membranes are moist.      Pharynx: Oropharynx is clear.   Eyes:      Pupils: Pupils are equal, round, and reactive to light.   Cardiovascular:      Rate and Rhythm: Normal rate and regular rhythm.      Heart sounds: Normal heart sounds. " "  Pulmonary:      Effort: Pulmonary effort is normal.      Breath sounds: Normal breath sounds.      Comments: RT port  Abdominal:      General: Abdomen is flat. Bowel sounds are normal.      Palpations: Abdomen is soft.      Tenderness: There is abdominal tenderness.   Musculoskeletal:      Cervical back: Normal range of motion.   Neurological:      Mental Status: He is alert.          Relevant Results  Outside Hospital Results  reviewed  Labs  Results from last 72 hours   Lab Units 05/04/24  0630 05/03/24  2211   WBC AUTO x10*3/uL 2.6* 4.4   HEMOGLOBIN g/dL 9.0* 8.3*   HEMATOCRIT % 28.0* 25.1*   PLATELETS AUTO x10*3/uL 54* 74*   LYMPHO PCT MAN %  --  17.0   MONO PCT MAN %  --  1.0   EOSINO PCT MAN %  --  0.0     Results from last 72 hours   Lab Units 05/04/24  0630 05/04/24  0056 05/03/24  2211   SODIUM mmol/L 135*  --  137   POTASSIUM mmol/L 3.9  --  3.0*   CHLORIDE mmol/L 107  --  104   CO2 mmol/L 21  --  21   BUN mg/dL 13  --  16   CREATININE mg/dL 0.72 0.75 0.82   GLUCOSE mg/dL 140*  --  98   CALCIUM mg/dL 8.8  --  9.3   ANION GAP mmol/L 11  --  15   EGFR mL/min/1.73m*2 >90 >90 >90   PHOSPHORUS mg/dL 2.5  --   --      Results from last 72 hours   Lab Units 05/04/24  0630 05/03/24  2211   ALK PHOS U/L  --  142*   BILIRUBIN TOTAL mg/dL  --  1.2   PROTEIN TOTAL g/dL  --  7.0   ALT U/L  --  23   AST U/L  --  39   ALBUMIN g/dL 3.5 3.9     Estimated Creatinine Clearance: 99 mL/min (by C-G formula based on SCr of 0.72 mg/dL).  C-Reactive Protein   Date Value Ref Range Status   05/04/2024 1.66 (H) <1.00 mg/dL Final     Sedimentation Rate   Date Value Ref Range Status   05/03/2024 1 0 - 20 mm/h Final     No results found for: \"HIV1X2\", \"HIVCONF\", \"TOTMBY1ED\"  Hepatitis C AB   Date Value Ref Range Status   03/05/2024 Nonreactive Nonreactive Final     Comment:     Results from patients taking biotin supplements or receiving high-dose biotin therapy should be interpreted with caution due to possible interference with this " test. Providers may contact their local laboratory for further information.     Microbiology  No results found for the last 90 days.    Imaging  Reviewed       Assessment/Plan   Enterocolitis, likely immunotherapy related   Metastatic esophageal cancer on 5FU, trastuzumab and Keytruda    Recommendations :  No need for antibiotics  Stool studies  Hydration  Steroids    I spent minutes in the professional and overall care of this patient.      Emelyn Fowler MD

## 2024-05-04 NOTE — PROGRESS NOTES
Patient: Massimo Garcia  Room/bed: 130/130-A  Admitted on: 5/3/2024    Age: 63 y.o.   Gender: male  Code Status:  Full Code   Admitting Dx: Enterocolitis [K52.9]    MRN: [id number]  PCP: Dayne Santamaria DO  Preferred Pharm: [unfilled]    Attending: [unfilled]  Resident: Artie Marks DO  TCC: No care team member to display      Overnight Events     Patient admitted from the ED overnight    Subjective   Patient seen at bedside.  He states that his abdominal pain is feeling better. He has appetite. States he has had losoe stool for the last 3 days. Denies blood or mucous in stool. Denies straining.  States that his symptoms started after getting keytruda the second time.  States that laying in bed is aggravating his chronic back pain. No fever or chills. No new complaints at this time.    Objective    Physical Exam   Constitutional: Appears stated age. In NAD.   HEENT: NC/AT, EOMI, clear sclera, moist mucous membranes  CV: RRR, No M/R/G  PULM: CTAB, no coughing or wheezing  ABDOMEN: Soft, NT/ND. TTP near umbilicus and LUQ. + BSx4.  SKIN: Normal Color, Warm, Dry, No Rashes   EXTREMITIES: Non-Tender, Full ROM, No Pedal Edema  NEURO: A&O x 4, nonfocal neurological exam.  PSYCH: Normal Mood & Behavior      Temp:  [36.5 °C (97.7 °F)-37.2 °C (99 °F)] 36.5 °C (97.7 °F)  Heart Rate:  [94-99] 94  Resp:  [16-24] 16  BP: (105-131)/(69-91) 106/69      Intake/Output Summary (Last 24 hours) at 5/4/2024 1228  Last data filed at 5/4/2024 0929  Gross per 24 hour   Intake 1440 ml   Output 140 ml   Net 1300 ml       Vitals:    05/04/24 0152   Weight: 71.2 kg (156 lb 15.5 oz)             I/Os    Intake/Output Summary (Last 24 hours) at 5/4/2024 1228  Last data filed at 5/4/2024 0929  Gross per 24 hour   Intake 1440 ml   Output 140 ml   Net 1300 ml       Labs:   Results from last 72 hours   Lab Units 05/04/24  0630 05/04/24  0056 05/03/24  8032   SODIUM mmol/L 135*  --  137   POTASSIUM mmol/L 3.9  --  3.0*   CHLORIDE mmol/L 107  --  104  "  CO2 mmol/L 21  --  21   BUN mg/dL 13  --  16   CREATININE mg/dL 0.72 0.75 0.82   GLUCOSE mg/dL 140*  --  98   CALCIUM mg/dL 8.8  --  9.3   ANION GAP mmol/L 11  --  15   EGFR mL/min/1.73m*2 >90 >90 >90   PHOSPHORUS mg/dL 2.5  --   --       Results from last 72 hours   Lab Units 05/04/24  0630 05/03/24  2211   WBC AUTO x10*3/uL 2.6* 4.4   HEMOGLOBIN g/dL 9.0* 8.3*   HEMATOCRIT % 28.0* 25.1*   PLATELETS AUTO x10*3/uL 54* 74*   LYMPHO PCT MAN %  --  17.0   MONO PCT MAN %  --  1.0   EOSINO PCT MAN %  --  0.0      Lab Results   Component Value Date    CALCIUM 8.8 05/04/2024    PHOS 2.5 05/04/2024      Lab Results   Component Value Date    CRP 1.66 (H) 05/04/2024      [unfilled]     Micro/ID:   No results found for the last 90 days.                   No lab exists for component: \"AGALPCRNB\"   .ID  Lab Results   Component Value Date    URINECULTURE No growth 03/18/2024    BLOODCULT No growth at 4 days -  FINAL REPORT 03/18/2024    BLOODCULT No growth at 4 days -  FINAL REPORT 03/18/2024       Images:  CT abdomen pelvis w IV contrast  Narrative: Interpreted By:  Finkelstein, Evan,   STUDY:  CT ABDOMEN PELVIS W IV CONTRAST;  5/3/2024 10:32 pm      INDICATION:  Signs/Symptoms:diffuse abdominal pain with diarrhea that began  yesterday, hx of stage 4 esophageal cancer, on keytruda.      COMPARISON:  CT abdomen pelvis 04/24/2024      ACCESSION NUMBER(S):  [id number]      ORDERING CLINICIAN:  BENJAMIN COBURN      TECHNIQUE:  Axial CT images of the abdomen and pelvis with coronal and sagittal  reconstructed images obtained after intravenous administration of 75  mL Omnipaque 350      FINDINGS:  LOWER CHEST: Scattered pulmonary nodules in the visualized lung  bases, not significantly changed compared to prior recent imaging  04/24/2024.      ABDOMEN:      LIVER: Heterogeneous, predominantly hypodense masses are again seen  throughout the liver, also stable compared to prior recent imaging  04/24/2024. BILE DUCTS: Normal " caliber.  GALLBLADDER: No calcified gallstones. No wall thickening.  PORTAL VEIN: Patent  SPLEEN: Unremarkable.  PANCREAS: Unremarkable.  ADRENALS: Redemonstrated left adrenal gland thickening.  KIDNEYS, URETERS and URINARY BLADDER: Symmetric renal enhancement. No  hydronephrosis or perinephric fluid collection.  Concentric bladder  wall thickening, with evaluation limited due to incomplete  distention. REPRODUCTIVE ORGANS: No pelvic masses.      ABDOMINAL WALL: Fat containing inguinal hernias. A mesh is seen  within the lower anterior pelvic wall.. PERITONEUM: No ascites, free  air or fluid collection.      BOWEL: The stomach is distended with fluid. No gastric wall  thickening. Nondilated loops of fluid-filled small bowel throughout  the abdomen and pelvis. Wall thickening of the distal colon. The  colon is filled with fluid. Nondilated appendix.      VESSELS: Moderate aortoiliac calcifications. Aneurysmal dilatation of  the infrarenal aorta measuring up to 2.3 cm in maximum AP dimension.  RETROPERITONEUM: There are numerous enlarged para-aortic lymph nodes,  largest measuring up to approximately 1.5 cm. Likely necrotic lymph  node along the right external iliac chain measures up to  approximately 2 cm, similar compared to prior imaging.      BONES: No acute osseous abnormality.      Impression: The colon is filled with fluid compatible with reported history of  diarrhea. There is also wall thickening of the distal colon along  with nondilated loops of fluid-filled small bowel throughout the  abdomen and pelvis. Correlate for symptoms of enterocolitis.      redemonstrated metastatic disease throughout the visualized lower  chest and liver, similar compared to prior imaging.      Concentric bladder wall thickening, with evaluation limited due to  incomplete distention. Recommend correlation with urinalysis if there  is clinical concern for cystitis.      MACRO:  None.      Signed by: Evan Finkelstein 5/3/2024 11:07  PM  Dictation workstation:   [id number]       Meds    Scheduled medications  apixaban, 5 mg, oral, BID  [START ON 5/6/2024] fentaNYL, 1 patch, transdermal, q72h  heparin flush, 50 Units, intra-catheter, q12h  lidocaine, 1 patch, transdermal, q24h  magnesium oxide, 400 mg, oral, Daily  multivitamin with minerals, 1 tablet, oral, q AM  pantoprazole, 40 mg, oral, Daily before breakfast  [Held by provider] piperacillin-tazobactam, 4.5 g, intravenous, q6h  potassium chloride CR, 10 mEq, oral, Daily  predniSONE, 70 mg, oral, Daily  scopolamine, 1 patch, transdermal, q72h  sucralfate, 1 g, oral, Daily  [Held by provider] vancomycin, 125 mg, oral, 4x daily      Continuous medications  lactated Ringer's, 75 mL/hr, Last Rate: 75 mL/hr (05/04/24 0231)      PRN medications  PRN medications: alteplase, heparin flush, HYDROmorphone, LORazepam, melatonin, oxyCODONE     Assessment and Plan    Massimo Garcia is a 63 y.o. male with PMH of third degree heart block s/p PPM (placed in November), esophageal adenocarcinoma with mets to liver and lungs, Drug-induced colitis, PE, peripheral neuropathy, and portal vein thrombosis (On Eliquis), admitted for abdominal pain with associated diarrhea 2/2 immunotherapy mediated enterocolitis. Treating with steroids. Stool cultures pending.      ACUTE MEDICAL ISSUES:  # Acute diarrhea   # Acute infectious versus drug-induced entercolitis  # Esophageal adenocarcinoma with metastasis  -Prednisone 70 mg daily  - Imaging: CTAP with colon filled with fluid compatible with reported history of diarrhea. Wall thickening of the distal colon along with nondilated loops of fluid-filled small bowel throughout the abdomen and pelvis. Correlate for symptoms of enterocolitis.   - Pending; Stool cultures, C diff PCR, Giardia antigen, rotavirus antigen, stool calprotectin  -Infectious disease does not recommend any antibiotics at this time  - Patient status post 1 L normal saline in ED, prednisone 80 mg.  Scopolamine  patch was also placed in the ED on 05/02/2024  - CRP 1.66, ESR WNL  -Heme/onc consulted    #Chronic low back pain  -exacerbated by laying in bed, meets with pain management outpatient  -Lidocaine patch  -oxycodone 10 mg every 4 hours  -dilaudid 0.2 mg for breakthrough pain     # Hypokalemia   # Hypomagnesemia  -pain      # Chronic thrombocytopenia   - Most likely result of treatment for his cancer  -Heme/onc consulted     CHRONIC MEDICAL ISSUES:  # History of PE   # portal vein thrombosis  - Continue home Eliquis 5 mg twice daily    # Right shoulder pain:   -Fentanyl 50 mcg 1 patch every 72 hours.  Last placed 05/02/2024    # GERD  - Continue home pantoprazole 40 mg every day.    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    -Hb at admission 8.3   - continue to monitor        Fluids: PO  Electrolytes: Replete PRN  Nutrition: Adult regular diet  Antimicrobials: None  DVT ppx:  Eliquis 5 mg  GI ppx: Pantoprazole 40 mg   Lines: Implantable single-lumen port on right chest  Emergency Contact: Extended Emergency Contact Information  Primary Emergency Contact: Jane Garcia  Address: [address] [address] of [facility]  Home Phone: [phone]  Mobile Phone: [phone]  Relation: Spouse  Secondary Emergency Contact: DA WORKMAN  Mobile Phone: [phone]  Relation: Daughter  Preferred language: English   needed? No   Code: Full Code      Disposition: Massimo Garcia is a 63 y.o. male with esophageal adenocarcinoma with mets to liver and lungs admitted for abdominal pain with associated diarrhea 2/2 immunotherapy mediated enterocolitis. Treating  with steroids. Stool cultures pending.   ELOS >48hrs      Artie Marks, DO

## 2024-05-04 NOTE — ED PROVIDER NOTES
HPI   Chief Complaint   Patient presents with    Abdominal Pain     Pt has severe abdominal pain and diarrhea x2 days. Pt has hx colitis 1 week ago that was treated with antibiotics. Pt was doing well since then until yesterday. Pt has hx esophageal CA with mets to the liver. Currently on chemo. Last chemo treatment was this past Monday. Pt's wife gave him oxycodone and bentyl earlier today with no relief. Pt rates pain 8/10. Denies N/V.        History of present illness:  63-year-old male presents the emergency room for complaints of diffuse abdominal cramping and pain.  The patient states that beginning a couple weeks ago he began having diarrhea.  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  He states that he has been on Keytruda as well as chemotherapy.  He states he last received a dose of Keytruda about 2 weeks ago and he states that he received a dose of chemotherapy about 5 days ago.  He states that he initially began having symptoms over a week ago with bad diarrhea and states that has been frequent.  He states that he had a was constipated a few days ago though and then he had a large bowel movement followed by recurrent bowel movements.  He states he has been having diarrhea almost every hour on the hour for the past 10 to 12 hours.  He states that he has had some nausea without vomiting and denies any fevers or chills or chest pain or shortness of breath.  He states the pain throughout his abdomen is very intense and he states he did take some pain meds that he had at home with no relief.  He states that he just feels like an intense cramping and burning sensation in middle of his abdomen.  He denies any other symptoms at this time.    Social history: Negative for alcohol and drug use.    Review of systems:   Gen.: No weight loss, fatigue, anorexia, insomnia, fever.   Eyes: No vision loss, double vision  ENT: No  pharyngitis, neck pain  Cardiac: No chest pain, palpitations, syncope  Pulmonary: No shortness of breath, cough, hemoptysis.   Heme/lymph: No swollen glands, fever, bleeding.   GI: No  melena, hematemesis, hematochezia, nausea, vomiting  : No discharge, dysuria, frequency, urgency, hematuria.   Musculoskeletal: No limb pain, joint pain, joint swelling.   Skin: No rashes.   Review of systems is otherwise negative unless stated above or in history of present illness.        Physical exam:  General: Vitals noted, on exam the patient appears very uncomfortable at this time is holding his lower abdomen and crying out in pain  EENT: No lymphadenopathy  Cardiac: Regular, rate, rhythm, no murmur.   Pulmonary: Lungs clear bilaterally with good aeration. No adventitious breath sounds.   Abdomen: Soft, . No peritoneal signs. Normoactive bowel sounds.  Tenderness to  palpation throughout the abdomen  Extremities: No peripheral edema.   Skin: No rash.   Neuro: No focal neurologic deficits        Medical decision making:   Testing: CBC CMP lipase lactate urinalysis CT scan pelvis with contrast: CT scan abdomen pelvis shows concerns for enterocolitis at this time, metastases seen as well as prior documented, CBC shows worsening anemia compared to previous, lactate at 2.6, CMP shows potassium at 3.0.  Treatment: IV pain meds IV fluids given, scopolamine patch given  Reevaluation:   Plan: 63-year-old male presents the emergency room for complaints of diffuse abdominal cramping and pain.  The patient states that beginning a couple weeks ago he began having diarrhea.  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  He states that he has been on Keytruda as well as chemotherapy.  He states he last received a dose of Keytruda about 2 weeks ago and he states that he received a dose of chemotherapy about 5 days ago.  He states that he initially began  having symptoms over a week ago with bad diarrhea and states that has been frequent.  He states that he had a was constipated a few days ago though and then he had a large bowel movement followed by recurrent bowel movements.  He states he has been having diarrhea almost every hour on the hour for the past 10 to 12 hours.  He states that he has had some nausea without vomiting and denies any fevers or chills or chest pain or shortness of breath.  He states the pain throughout his abdomen is very intense and he states he did take some pain meds that he had at home with no relief.  He states that he just feels like an intense cramping and burning sensation in middle of his abdomen.  He denies any other symptoms at this time. Abdomen: Soft, . No peritoneal signs. Normoactive bowel sounds.  Tenderness to  palpation throughout the abdomen.  Lungs are clear to auscultation throughout normal S1-S2 heart sounds.  The patient appears under distress no apparent initial presentation.  I spoke with the patient's on-call oncologist at [facility] Dr. Tracy who recommended we begin oral prednisone 80 mg at this time as he believes this is related to the Keytruda treatment.  Patient was given IV narcotics at this time as well as oral prednisone and he was admitted to the care of the hospital at this time for further care and treatment.  Impression:   1.  Enterocolitis  2.  Abdominal pain          History provided by:  Patient   used: No                        Mendoza Coma Scale Score: 15                     Patient History   Past Medical History:   Diagnosis Date    Essential (primary) hypertension 12/21/2013    Hypertension    Pacemaker     Peripheral neuropathy     Personal history of other diseases of the circulatory system     History of right bundle branch block (RBBB)    Pneumothorax 12/04/2023     Past Surgical History:   Procedure Laterality Date    CARDIAC ELECTROPHYSIOLOGY PROCEDURE N/A 11/7/2023     Procedure: Temporary Pacemaker Insertion;  Surgeon: Alexis Shook MD;  Location: [facility];  Service: Cardiovascular;  Laterality: N/A;    CARDIAC ELECTROPHYSIOLOGY PROCEDURE Left 11/9/2023    Procedure: PPM IMPLANT DUAL;  Surgeon: Elias Connolly MD;  Location: [facility]iac [facility];  Service: Electrophysiology;  Laterality: Left;    TOTAL KNEE ARTHROPLASTY  09/30/2016    Total Knee Replacement Left    TOTAL KNEE ARTHROPLASTY  09/30/2016    Total Knee Replacement Right     Family History   Problem Relation Name Age of Onset    Cancer Father       Social History     Tobacco Use    Smoking status: Former     Types: Cigarettes, Cigars     Passive exposure: Never    Smokeless tobacco: Never   Vaping Use    Vaping status: Unknown   Substance Use Topics    Alcohol use: Not Currently    Drug use: Yes     Types: Marijuana     Comment: medical marjuana card       Physical Exam   ED Triage Vitals [05/03/24 2143]   Temperature Heart Rate Respirations BP   37.2 °C (99 °F) 99 (!) 24 (!) 131/91      Pulse Ox Temp Source Heart Rate Source Patient Position   100 % Temporal Monitor Lying      BP Location FiO2 (%)     Right arm --       Physical Exam    ED Course & MDM   ED Course as of 05/06/24 1106   Sat May 04, 2024   0620 63-year-old male presents emergency department abdominal cramping and diarrhea.  His last Keytruda was approximately 2 to 3 weeks ago for his adenocarcinoma of his esophagus.  Spoke to his oncologist who states this is a known complication of his Keytruda and recommended steroids.  Patient feeling better with IV hydration and electrolyte replacement.  Patient admitted to medicine for symptomatic control.  No recent antibiotic use.  Lower suspicion for C. difficile as the etiology of his symptoms and likely secondary to his chemotherapy agent. [HD]      ED Course User Index  [HD] Candie Arredondo DO         Diagnoses as of 05/06/24 1106   Enterocolitis       Medical Decision  Making      Procedure  Procedures     Artie Pérez PA-C  05/06/24 1109

## 2024-05-04 NOTE — CONSULTS
Reason For Consult  Colitis on Keytruda    History Of Present Illness  Massimo Garcia is a 63 y.o. male with history of metastatic esophageal cancer HER2 positive, patient of Dr. Blake seen by myself and his last hospital admission in March 2024, currently on therapy with 5-FU/leucovorin/trastuzumab/Keytruda, Keytruda at 400 mg every 6 weeks last administered on 4/15/2024, and received 5-FU/trastuzumab this week on 4/29/2024.   Patient had repetitive emergency room visits since his last hospital admission, with left lower quadrant abdominal pain and intermittent diarrhea, most recently CT scan on 4/24/2024 suggested colitis, patient was managed as an infectious colitis with administration of Augmentin course, primary oncologist Dr. Blake added cyclosporine which she did not receive yet.   He did okay, including the days he received his chemotherapy on 4/29/2024, with the pain subsiding however since last Thursday, 5/2/2024 he started having more severe pain, and more severe diarrhea reaching around 8 episodes per day, so he presented back to the emergency room  A CT scan of the abdomen and the pelvis showed the colon filled with fluids, with wall thickening of the distal colon, findings compatible with enterocolitis, his metastatic disease was a stable otherwise    He has been commenced on high-dose steroids with prednisone 1 mg/kg    Today he is still experiencing left lower quadrant pain requiring narcotics, he had so far 1 episode of diarrhea today, denies nausea or vomiting, diarrhea is watery, no blood or mucus, no nausea or vomiting     Past Medical History  He has a past medical history of Essential (primary) hypertension (12/21/2013), Pacemaker, Peripheral neuropathy, Personal history of other diseases of the circulatory system, and Pneumothorax (12/04/2023).    Surgical History  He has a past surgical history that includes Total knee arthroplasty (09/30/2016); Total knee arthroplasty (09/30/2016); Cardiac  "electrophysiology procedure (N/A, 11/7/2023); and Cardiac electrophysiology procedure (Left, 11/9/2023).     Social History  He reports that he has quit smoking. His smoking use included cigarettes and cigars. He has never been exposed to tobacco smoke. He has never used smokeless tobacco. He reports that he does not currently use alcohol. He reports current drug use. Drug: Marijuana.    Family History  Family History   Problem Relation Name Age of Onset    Cancer Father          Allergies  Bee venom protein (honey bee) and Oxaliplatin    Review of Systems  Review of system conducted and negative except for HPI     Physical Exam  General: Alert awake and oriented not in distress  HEENT: No jaundice, pale looking  Heart: Regular, no murmur  Lungs: Clear to auscultation bilaterally  Abdomen: Soft, left lower quadrant tenderness, no rebound  Extremities: No edema     Last Recorded Vitals  Blood pressure 106/69, pulse 94, temperature 36.5 °C (97.7 °F), temperature source Temporal, resp. rate 16, height 1.727 m (5' 8\"), weight 71.2 kg (156 lb 15.5 oz), SpO2 98%.    Relevant Results  Results for orders placed or performed during the hospital encounter of 05/03/24 (from the past 24 hour(s))   CBC and Auto Differential   Result Value Ref Range    WBC 4.4 4.4 - 11.3 x10*3/uL    nRBC 0.0 0.0 - 0.0 /100 WBCs    RBC 2.86 (L) 4.50 - 5.90 x10*6/uL    Hemoglobin 8.3 (L) 13.5 - 17.5 g/dL    Hematocrit 25.1 (L) 41.0 - 52.0 %    MCV 88 80 - 100 fL    MCH 29.0 26.0 - 34.0 pg    MCHC 33.1 32.0 - 36.0 g/dL    RDW 15.5 (H) 11.5 - 14.5 %    Platelets 74 (L) 150 - 450 x10*3/uL    Immature Granulocytes %, Automated 1.1 (H) 0.0 - 0.9 %    Immature Granulocytes Absolute, Automated 0.05 0.00 - 0.70 x10*3/uL   Comprehensive metabolic panel   Result Value Ref Range    Glucose 98 74 - 99 mg/dL    Sodium 137 136 - 145 mmol/L    Potassium 3.0 (L) 3.5 - 5.3 mmol/L    Chloride 104 98 - 107 mmol/L    Bicarbonate 21 21 - 32 mmol/L    Anion Gap 15 " mmol/L    Urea Nitrogen 16 6 - 23 mg/dL    Creatinine 0.82 0.50 - 1.30 mg/dL    eGFR >90 >60 mL/min/1.73m*2    Calcium 9.3 8.6 - 10.3 mg/dL    Albumin 3.9 3.4 - 5.0 g/dL    Alkaline Phosphatase 142 (H) 33 - 136 U/L    Total Protein 7.0 6.4 - 8.2 g/dL    AST 39 9 - 39 U/L    Bilirubin, Total 1.2 0.0 - 1.2 mg/dL    ALT 23 10 - 52 U/L   Lipase   Result Value Ref Range    Lipase 27 9 - 82 U/L   Lactate   Result Value Ref Range    Lactate 2.6 (H) 0.4 - 2.0 mmol/L   Manual Differential   Result Value Ref Range    Neutrophils %, Manual 81.0 40.0 - 80.0 %    Lymphocytes %, Manual 17.0 13.0 - 44.0 %    Monocytes %, Manual 1.0 2.0 - 10.0 %    Eosinophils %, Manual 0.0 0.0 - 6.0 %    Basophils %, Manual 0.0 0.0 - 2.0 %    Myelocytes %, Manual 1.0 0.0 - 0.0 %    Seg Neutrophils Absolute, Manual 3.56 1.20 - 7.00 x10*3/uL    Lymphocytes Absolute, Manual 0.75 (L) 1.20 - 4.80 x10*3/uL    Monocytes Absolute, Manual 0.04 (L) 0.10 - 1.00 x10*3/uL    Eosinophils Absolute, Manual 0.00 0.00 - 0.70 x10*3/uL    Basophils Absolute, Manual 0.00 0.00 - 0.10 x10*3/uL    Myelocytes Absolute, Manual 0.04 0.00 - 0.00 x10*3/uL    Total Cells Counted 100     RBC Morphology No significant RBC morphology present    Sedimentation Rate   Result Value Ref Range    Sedimentation Rate 1 0 - 20 mm/h   Urinalysis with Reflex Culture and Microscopic   Result Value Ref Range    Color, Urine Yellow Straw, Yellow    Appearance, Urine Hazy (N) Clear    Specific Gravity, Urine 1.013 1.005 - 1.035    pH, Urine 7.0 5.0, 5.5, 6.0, 6.5, 7.0, 7.5, 8.0    Protein, Urine NEGATIVE NEGATIVE mg/dL    Glucose, Urine NEGATIVE NEGATIVE mg/dL    Blood, Urine NEGATIVE NEGATIVE    Ketones, Urine NEGATIVE NEGATIVE mg/dL    Bilirubin, Urine NEGATIVE NEGATIVE    Urobilinogen, Urine <2.0 <2.0 mg/dL    Nitrite, Urine NEGATIVE NEGATIVE    Leukocyte Esterase, Urine TRACE (A) NEGATIVE   Extra Urine Gray Tube   Result Value Ref Range    Extra Tube Hold for add-ons.    Microscopic Only,  Urine   Result Value Ref Range    WBC, Urine 1-5 1-5, NONE /HPF    RBC, Urine NONE NONE, 1-2, 3-5 /HPF    Mucus, Urine FEW Reference range not established. /LPF   Creatinine, Serum   Result Value Ref Range    Creatinine 0.75 0.50 - 1.30 mg/dL    eGFR >90 >60 mL/min/1.73m*2   Lactate   Result Value Ref Range    Lactate 1.2 0.4 - 2.0 mmol/L   Magnesium   Result Value Ref Range    Magnesium 1.48 (L) 1.60 - 2.40 mg/dL   C-reactive protein   Result Value Ref Range    C-Reactive Protein 1.66 (H) <1.00 mg/dL   Blood Culture    Specimen: Peripheral Venipuncture; Blood culture   Result Value Ref Range    Blood Culture Loaded on Instrument - Culture in progress    Blood Culture    Specimen: Peripheral Venipuncture; Blood culture   Result Value Ref Range    Blood Culture Loaded on Instrument - Culture in progress    CBC   Result Value Ref Range    WBC 2.6 (L) 4.4 - 11.3 x10*3/uL    nRBC 0.0 0.0 - 0.0 /100 WBCs    RBC 3.15 (L) 4.50 - 5.90 x10*6/uL    Hemoglobin 9.0 (L) 13.5 - 17.5 g/dL    Hematocrit 28.0 (L) 41.0 - 52.0 %    MCV 89 80 - 100 fL    MCH 28.6 26.0 - 34.0 pg    MCHC 32.1 32.0 - 36.0 g/dL    RDW 15.4 (H) 11.5 - 14.5 %    Platelets 54 (L) 150 - 450 x10*3/uL   Renal Function Panel   Result Value Ref Range    Glucose 140 (H) 74 - 99 mg/dL    Sodium 135 (L) 136 - 145 mmol/L    Potassium 3.9 3.5 - 5.3 mmol/L    Chloride 107 98 - 107 mmol/L    Bicarbonate 21 21 - 32 mmol/L    Anion Gap 11 10 - 20 mmol/L    Urea Nitrogen 13 6 - 23 mg/dL    Creatinine 0.72 0.50 - 1.30 mg/dL    eGFR >90 >60 mL/min/1.73m*2    Calcium 8.8 8.6 - 10.3 mg/dL    Phosphorus 2.5 2.5 - 4.9 mg/dL    Albumin 3.5 3.4 - 5.0 g/dL          Assessment/Plan     Diarrhea/enterocolitis    The differential diagnosis including ICI induced colitis, infectious colitis, and chemotherapy (5-FU) induced    Agree with the infectious workup including sending for C. difficile and stool pathogen panel  Obtain fecal calprotectin and lactoferrin    We will manage for now as  ICI induced colitis pending the infectious workup, grade 3 presentation  Continue on high-dose steroids, switch to IV methylprednisolone at 1 mg/kg/day, if no improvement in the next 2 days we will consider increasing the dose and/or addition of other immunosuppressants (infliximab or vedolizumab)     GI consultation for colonoscopy or flex sig and biopsies, noting it may not be possible pending his counts trend    2.  Pancytopenia  Secondary to chemotherapy  Trend the CBC counts  Consider holding the anticoagulation temporarily if the platelets drop less than 50,000    3.  Metastatic esophageal cancer  Stable disease per scans  To follow Dr. Blake as outpatient for further management    I spent 80 minutes in the professional and overall care of this patient.      Bhavin Frankel MD

## 2024-05-04 NOTE — PROGRESS NOTES
05/04/24 1424   Discharge Planning   Living Arrangements Spouse/significant other;Children   Support Systems Spouse/significant other;Children   Assistance Needed patient is alert and orientedx3, independent in ADL's, no AD or DME   Type of Residence Private residence   Number of Stairs to Enter Residence 4   Number of Stairs Within Residence 14  (down to basement)   Do you have animals or pets at home? Yes   Type of Animals or Pets 2 dogs, 2 cats, 5 fish   Home or Post Acute Services None   Patient expects to be discharged to: Home [facility]

## 2024-05-05 LAB
ALBUMIN SERPL BCP-MCNC: 3.6 G/DL (ref 3.4–5)
ANION GAP SERPL CALC-SCNC: 12 MMOL/L (ref 10–20)
BACTERIA UR CULT: NO GROWTH
BASOPHILS # BLD AUTO: 0 X10*3/UL (ref 0–0.1)
BASOPHILS NFR BLD AUTO: 0 %
BUN SERPL-MCNC: 14 MG/DL (ref 6–23)
C COLI+JEJ+UPSA DNA STL QL NAA+PROBE: NOT DETECTED
C DIF TOX TCDA+TCDB STL QL NAA+PROBE: NOT DETECTED
CALCIUM SERPL-MCNC: 9.3 MG/DL (ref 8.6–10.3)
CHLORIDE SERPL-SCNC: 105 MMOL/L (ref 98–107)
CO2 SERPL-SCNC: 24 MMOL/L (ref 21–32)
CREAT SERPL-MCNC: 0.74 MG/DL (ref 0.5–1.3)
EC STX1 GENE STL QL NAA+PROBE: NOT DETECTED
EC STX2 GENE STL QL NAA+PROBE: NOT DETECTED
EGFRCR SERPLBLD CKD-EPI 2021: >90 ML/MIN/1.73M*2
EOSINOPHIL # BLD AUTO: 0 X10*3/UL (ref 0–0.7)
EOSINOPHIL NFR BLD AUTO: 0 %
ERYTHROCYTE [DISTWIDTH] IN BLOOD BY AUTOMATED COUNT: 15.1 % (ref 11.5–14.5)
GLUCOSE SERPL-MCNC: 150 MG/DL (ref 74–99)
HCT VFR BLD AUTO: 27.7 % (ref 41–52)
HGB BLD-MCNC: 9.2 G/DL (ref 13.5–17.5)
IMM GRANULOCYTES # BLD AUTO: 0.01 X10*3/UL (ref 0–0.7)
IMM GRANULOCYTES NFR BLD AUTO: 0.3 % (ref 0–0.9)
LYMPHOCYTES # BLD AUTO: 0.21 X10*3/UL (ref 1.2–4.8)
LYMPHOCYTES NFR BLD AUTO: 5.5 %
MAGNESIUM SERPL-MCNC: 1.49 MG/DL (ref 1.6–2.4)
MCH RBC QN AUTO: 29 PG (ref 26–34)
MCHC RBC AUTO-ENTMCNC: 33.2 G/DL (ref 32–36)
MCV RBC AUTO: 87 FL (ref 80–100)
MONOCYTES # BLD AUTO: 0.11 X10*3/UL (ref 0.1–1)
MONOCYTES NFR BLD AUTO: 2.9 %
NEUTROPHILS # BLD AUTO: 3.51 X10*3/UL (ref 1.2–7.7)
NEUTROPHILS NFR BLD AUTO: 91.3 %
NOROVIRUS GI + GII RNA STL NAA+PROBE: NOT DETECTED
NRBC BLD-RTO: 0 /100 WBCS (ref 0–0)
PHOSPHATE SERPL-MCNC: 2.6 MG/DL (ref 2.5–4.9)
PLATELET # BLD AUTO: 65 X10*3/UL (ref 150–450)
POTASSIUM SERPL-SCNC: 3.5 MMOL/L (ref 3.5–5.3)
RBC # BLD AUTO: 3.17 X10*6/UL (ref 4.5–5.9)
RBC MORPH BLD: NORMAL
RV RNA STL NAA+PROBE: NOT DETECTED
SALMONELLA DNA STL QL NAA+PROBE: NOT DETECTED
SHIGELLA DNA SPEC QL NAA+PROBE: NOT DETECTED
SODIUM SERPL-SCNC: 137 MMOL/L (ref 136–145)
V CHOLERAE DNA STL QL NAA+PROBE: NOT DETECTED
WBC # BLD AUTO: 3.8 X10*3/UL (ref 4.4–11.3)
Y ENTEROCOL DNA STL QL NAA+PROBE: NOT DETECTED

## 2024-05-05 PROCEDURE — 2500000004 HC RX 250 GENERAL PHARMACY W/ HCPCS (ALT 636 FOR OP/ED)

## 2024-05-05 PROCEDURE — 85025 COMPLETE CBC W/AUTO DIFF WBC: CPT | Performed by: INTERNAL MEDICINE

## 2024-05-05 PROCEDURE — 84100 ASSAY OF PHOSPHORUS: CPT

## 2024-05-05 PROCEDURE — 2500000001 HC RX 250 WO HCPCS SELF ADMINISTERED DRUGS (ALT 637 FOR MEDICARE OP): Performed by: INTERNAL MEDICINE

## 2024-05-05 PROCEDURE — 99232 SBSQ HOSP IP/OBS MODERATE 35: CPT | Performed by: INTERNAL MEDICINE

## 2024-05-05 PROCEDURE — 2500000004 HC RX 250 GENERAL PHARMACY W/ HCPCS (ALT 636 FOR OP/ED): Performed by: INTERNAL MEDICINE

## 2024-05-05 PROCEDURE — 1200000002 HC GENERAL ROOM WITH TELEMETRY DAILY

## 2024-05-05 PROCEDURE — 2500000006 HC RX 250 W HCPCS SELF ADMINISTERED DRUGS (ALT 637 FOR ALL PAYERS): Mod: MUE

## 2024-05-05 PROCEDURE — 2500000001 HC RX 250 WO HCPCS SELF ADMINISTERED DRUGS (ALT 637 FOR MEDICARE OP)

## 2024-05-05 PROCEDURE — 2500000005 HC RX 250 GENERAL PHARMACY W/O HCPCS

## 2024-05-05 PROCEDURE — 83735 ASSAY OF MAGNESIUM: CPT

## 2024-05-05 PROCEDURE — 99232 SBSQ HOSP IP/OBS MODERATE 35: CPT

## 2024-05-05 RX ORDER — CALCIUM CARBONATE 200(500)MG
500 TABLET,CHEWABLE ORAL DAILY
Status: DISCONTINUED | OUTPATIENT
Start: 2024-05-05 | End: 2024-05-06 | Stop reason: HOSPADM

## 2024-05-05 RX ORDER — MAGNESIUM SULFATE HEPTAHYDRATE 40 MG/ML
2 INJECTION, SOLUTION INTRAVENOUS ONCE
Status: COMPLETED | OUTPATIENT
Start: 2024-05-05 | End: 2024-05-05

## 2024-05-05 RX ORDER — FAMOTIDINE 10 MG/ML
20 INJECTION INTRAVENOUS
Qty: 4 ML | Refills: 0 | Status: DISPENSED | OUTPATIENT
Start: 2024-05-05 | End: 2024-05-05

## 2024-05-05 RX ADMIN — OXYCODONE HYDROCHLORIDE 15 MG: 10 TABLET ORAL at 04:42

## 2024-05-05 RX ADMIN — METHYLPREDNISOLONE SODIUM SUCCINATE 68.75 MG: 125 INJECTION, POWDER, FOR SOLUTION INTRAMUSCULAR; INTRAVENOUS at 14:48

## 2024-05-05 RX ADMIN — CALCIUM CARBONATE (ANTACID) CHEW TAB 500 MG 500 MG: 500 CHEW TAB at 09:13

## 2024-05-05 RX ADMIN — PANTOPRAZOLE SODIUM 40 MG: 40 TABLET, DELAYED RELEASE ORAL at 06:48

## 2024-05-05 RX ADMIN — HEPARIN, PORCINE (PF) 10 UNIT/ML INTRAVENOUS SYRINGE 50 UNITS: at 10:16

## 2024-05-05 RX ADMIN — LIDOCAINE 4% 1 PATCH: 40 PATCH TOPICAL at 06:48

## 2024-05-05 RX ADMIN — APIXABAN 5 MG: 5 TABLET, FILM COATED ORAL at 20:32

## 2024-05-05 RX ADMIN — MAGNESIUM SULFATE HEPTAHYDRATE 2 G: 2 INJECTION, SOLUTION INTRAVENOUS at 10:16

## 2024-05-05 RX ADMIN — SUCRALFATE 1 G: 1 TABLET ORAL at 09:13

## 2024-05-05 RX ADMIN — HEPARIN, PORCINE (PF) 10 UNIT/ML INTRAVENOUS SYRINGE 50 UNITS: at 21:34

## 2024-05-05 RX ADMIN — OXYCODONE HYDROCHLORIDE 15 MG: 10 TABLET ORAL at 14:48

## 2024-05-05 RX ADMIN — Medication 1 TABLET: at 09:13

## 2024-05-05 RX ADMIN — FAMOTIDINE 20 MG: 10 INJECTION, SOLUTION INTRAVENOUS at 05:30

## 2024-05-05 RX ADMIN — OXYCODONE HYDROCHLORIDE 15 MG: 10 TABLET ORAL at 21:33

## 2024-05-05 RX ADMIN — POTASSIUM CHLORIDE 10 MEQ: 750 TABLET, EXTENDED RELEASE ORAL at 09:13

## 2024-05-05 RX ADMIN — Medication 400 MG: at 09:13

## 2024-05-05 RX ADMIN — APIXABAN 5 MG: 5 TABLET, FILM COATED ORAL at 09:13

## 2024-05-05 ASSESSMENT — COGNITIVE AND FUNCTIONAL STATUS - GENERAL
DAILY ACTIVITIY SCORE: 24
MOBILITY SCORE: 24
DAILY ACTIVITIY SCORE: 24
MOBILITY SCORE: 24

## 2024-05-05 ASSESSMENT — PAIN SCALES - GENERAL
PAINLEVEL_OUTOF10: 2
PAINLEVEL_OUTOF10: 6
PAINLEVEL_OUTOF10: 6
PAINLEVEL_OUTOF10: 2
PAINLEVEL_OUTOF10: 0 - NO PAIN
PAINLEVEL_OUTOF10: 5 - MODERATE PAIN
PAINLEVEL_OUTOF10: 3

## 2024-05-05 ASSESSMENT — PAIN - FUNCTIONAL ASSESSMENT
PAIN_FUNCTIONAL_ASSESSMENT: 0-10

## 2024-05-05 ASSESSMENT — PAIN DESCRIPTION - LOCATION
LOCATION: ABDOMEN
LOCATION: ABDOMEN

## 2024-05-05 ASSESSMENT — PAIN DESCRIPTION - ORIENTATION: ORIENTATION: LOWER

## 2024-05-05 NOTE — CARE PLAN
The clinical goals for the shift include Patient's pain will be managed throughout shift.        
    The clinical goals for the shift include pt will rest comfortably throughout shift      
I have personally seen and examined the patient. I have collaborated with and supervised the

## 2024-05-05 NOTE — PROGRESS NOTES
Massimo Garcia is a 63 y.o. male on day 1 of admission presenting with Enterocolitis.    Subjective   Interval History: no fever, no diarrhea        Review of Systems    Objective   Range of Vitals (last 24 hours)  Heart Rate:  [91-96]   Temp:  [36.7 °C (98.1 °F)-37 °C (98.6 °F)]   Resp:  [18]   BP: (110-113)/(70-75)   SpO2:  [96 %-98 %]   Daily Weight  05/04/24 : 71.2 kg (156 lb 15.5 oz)    Body mass index is 23.87 kg/m².    Physical Exam  Constitutional:       Appearance: Normal appearance.   HENT:      Head: Normocephalic and atraumatic.      Mouth/Throat:      Mouth: Mucous membranes are moist.      Pharynx: Oropharynx is clear.   Eyes:      Pupils: Pupils are equal, round, and reactive to light.   Cardiovascular:      Rate and Rhythm: Normal rate and regular rhythm.      Heart sounds: Normal heart sounds.   Pulmonary:      Effort: Pulmonary effort is normal.      Breath sounds: Normal breath sounds.   Abdominal:      General: Abdomen is flat. Bowel sounds are normal.      Palpations: Abdomen is soft.   Musculoskeletal:      Cervical back: Normal range of motion.   Neurological:      Mental Status: He is alert.         Antibiotics  HYDROmorphone (Dilaudid) injection 1 mg  ondansetron (Zofran) injection 4 mg  sodium chloride 0.9 % bolus 1,000 mL  scopolamine (Transderm-Scop) patch 1 patch  iohexol (OMNIPaque) 350 mg iodine/mL solution 75 mL  HYDROmorphone (Dilaudid) injection 1 mg  potassium chloride 20 mEq in 100 mL IV premix  predniSONE (Deltasone) tablet 80 mg  HYDROmorphone (Dilaudid) injection 1 mg  oxyCODONE (Roxicodone) immediate release tablet  lactated Ringer's infusion  predniSONE (Deltasone) tablet 70 mg  piperacillin-tazobactam-dextrose (Zosyn) IV 4.5 g  vancomycin (Vancocin) capsule 125 mg  HYDROmorphone (Dilaudid) injection 0.2 mg  HYDROmorphone (Dilaudid) injection 0.4 mg  HYDROmorphone (Dilaudid) injection 0.6 mg  vancomycin (Vancocin) capsule 125 mg  apixaban (Eliquis) tablet 5 mg  fentaNYL  (Duragesic) 50 mcg/hr patch 1 patch  lidocaine 4 % patch 1 patch  LORazepam (Ativan) tablet 1 mg  multivitamin with minerals 1 tablet  pantoprazole (ProtoNix) EC tablet 40 mg  potassium chloride CR (Klor-Con) ER tablet 10 mEq  scopolamine (Transderm-Scop) patch 1 patch  sucralfate (Carafate) tablet 1 g  melatonin tablet 5 mg  magnesium oxide (Mag-Ox) tablet 400 mg  HYDROmorphone (Dilaudid) injection 0.2 mg  oxyCODONE (Roxicodone) immediate release tablet 10 mg  alteplase (Cathflo Activase) injection 2 mg  heparin flush 10 unit/mL syringe 50 Units  heparin flush 10 unit/mL syringe 50 Units  methylPREDNISolone sod succinate (SOLU-Medrol) injection 68.75 mg  oxyCODONE (Roxicodone) immediate release tablet 15 mg  famotidine PF (Pepcid) injection 20 mg  calcium carbonate (Tums) chewable tablet 500 mg  magnesium sulfate IV 2 g      Relevant Results  Labs  Results from last 72 hours   Lab Units 05/05/24  0543 05/04/24  0630 05/03/24  2211   WBC AUTO x10*3/uL 3.8* 2.6* 4.4   HEMOGLOBIN g/dL 9.2* 9.0* 8.3*   HEMATOCRIT % 27.7* 28.0* 25.1*   PLATELETS AUTO x10*3/uL 65* 54* 74*   NEUTROS PCT AUTO % 91.3  --   --    LYMPHO PCT MAN %  --   --  17.0   LYMPHS PCT AUTO % 5.5  --   --    MONO PCT MAN %  --   --  1.0   MONOS PCT AUTO % 2.9  --   --    EOSINO PCT MAN %  --   --  0.0   EOS PCT AUTO % 0.0  --   --      Results from last 72 hours   Lab Units 05/05/24  0542 05/04/24  0630 05/04/24  0056 05/03/24  2211   SODIUM mmol/L 137 135*  --  137   POTASSIUM mmol/L 3.5 3.9  --  3.0*   CHLORIDE mmol/L 105 107  --  104   CO2 mmol/L 24 21  --  21   BUN mg/dL 14 13  --  16   CREATININE mg/dL 0.74 0.72 0.75 0.82   GLUCOSE mg/dL 150* 140*  --  98   CALCIUM mg/dL 9.3 8.8  --  9.3   ANION GAP mmol/L 12 11  --  15   EGFR mL/min/1.73m*2 >90 >90 >90 >90   PHOSPHORUS mg/dL 2.6 2.5  --   --      Results from last 72 hours   Lab Units 05/05/24  0542 05/04/24  0630 05/03/24  8891   ALK PHOS U/L  --   --  142*   BILIRUBIN TOTAL mg/dL  --   --  1.2    PROTEIN TOTAL g/dL  --   --  7.0   ALT U/L  --   --  23   AST U/L  --   --  39   ALBUMIN g/dL 3.6 3.5 3.9     Estimated Creatinine Clearance: 96.3 mL/min (by C-G formula based on SCr of 0.74 mg/dL).  C-Reactive Protein   Date Value Ref Range Status   05/04/2024 1.66 (H) <1.00 mg/dL Final     Microbiology  reviewed  Imaging  reviewed        Assessment/Plan   Enterocolitis, likely immunotherapy related , C. Diff / stool pathogen PCR are negative  Metastatic esophageal cancer on 5FU, trastuzumab and Keytruda     Recommendations :  Supportive care  Steroids     I spent minutes in the professional and overall care of this patient.      Emelyn Fowler MD

## 2024-05-05 NOTE — PROGRESS NOTES
Patient: Massimo Garcia  Room/bed: 130/130-A  Admitted on: 5/3/2024    Age: 63 y.o.   Gender: male  Code Status:  Full Code   Admitting Dx: Enterocolitis [K52.9]    MRN: [id number]  PCP: Dayne Santamaria DO  Preferred Pharm: [unfilled]    Attending: [unfilled]  Resident: Latrice Mancuso DO  TCC: No care team member to display      Overnight Events     none    Subjective   Patient seen at bedside. His abdominal pain is feeling better. Has not had any bowel movements today, but is passing gas. No fever, chills, , lightheadedness, headache, bloody or dark stool. No new complaints at this time.    Objective    Physical Exam   Constitutional: Appears stated age. In NAD.   HEENT: NC/AT, EOMI, clear sclera, moist mucous membranes  CV: RRR, No M/R/G  PULM: CTAB, no coughing or wheezing  ABDOMEN: Soft, NT/ND. TTP near umbilicus and LUQ. + BSx4.  SKIN: Normal Color, Warm, Dry, No Rashes   EXTREMITIES: Non-Tender, Full ROM, No Pedal Edema  NEURO: A&O x 4, nonfocal neurological exam.  PSYCH: Normal Mood & Behavior      Temp:  [36.7 °C (98.1 °F)-37 °C (98.6 °F)] 36.8 °C (98.2 °F)  Heart Rate:  [91-96] 96  Resp:  [18] 18  BP: (110-113)/(70-75) 113/75    No intake or output data in the 24 hours ending 05/05/24 1007      Vitals:    05/04/24 0152   Weight: 71.2 kg (156 lb 15.5 oz)             I/Os  No intake or output data in the 24 hours ending 05/05/24 1007      Labs:   Results from last 72 hours   Lab Units 05/05/24  0542 05/04/24  0630 05/04/24  0056 05/03/24  2211   SODIUM mmol/L 137 135*  --  137   POTASSIUM mmol/L 3.5 3.9  --  3.0*   CHLORIDE mmol/L 105 107  --  104   CO2 mmol/L 24 21  --  21   BUN mg/dL 14 13  --  16   CREATININE mg/dL 0.74 0.72 0.75 0.82   GLUCOSE mg/dL 150* 140*  --  98   CALCIUM mg/dL 9.3 8.8  --  9.3   ANION GAP mmol/L 12 11  --  15   EGFR mL/min/1.73m*2 >90 >90 >90 >90   PHOSPHORUS mg/dL 2.6 2.5  --   --       Results from last 72 hours   Lab Units 05/05/24  0543 05/04/24  0630 05/03/24  3401   WBC AUTO  "x10*3/uL 3.8* 2.6* 4.4   HEMOGLOBIN g/dL 9.2* 9.0* 8.3*   HEMATOCRIT % 27.7* 28.0* 25.1*   PLATELETS AUTO x10*3/uL 65* 54* 74*   NEUTROS PCT AUTO % 91.3  --   --    LYMPHO PCT MAN %  --   --  17.0   LYMPHS PCT AUTO % 5.5  --   --    MONO PCT MAN %  --   --  1.0   MONOS PCT AUTO % 2.9  --   --    EOSINO PCT MAN %  --   --  0.0   EOS PCT AUTO % 0.0  --   --       Lab Results   Component Value Date    CALCIUM 9.3 05/05/2024    PHOS 2.6 05/05/2024      Lab Results   Component Value Date    CRP 1.66 (H) 05/04/2024      [unfilled]     Micro/ID:   No results found for the last 90 days.                   No lab exists for component: \"AGALPCRNB\"   .ID  Lab Results   Component Value Date    URINECULTURE No growth 05/03/2024    BLOODCULT Loaded on Instrument - Culture in progress 05/04/2024    BLOODCULT Loaded on Instrument - Culture in progress 05/04/2024       Images:  CT abdomen pelvis w IV contrast  Narrative: Interpreted By:  Finkelstein, Evan,   STUDY:  CT ABDOMEN PELVIS W IV CONTRAST;  5/3/2024 10:32 pm      INDICATION:  Signs/Symptoms:diffuse abdominal pain with diarrhea that began  yesterday, hx of stage 4 esophageal cancer, on keytruda.      COMPARISON:  CT abdomen pelvis 04/24/2024      ACCESSION NUMBER(S):  [id number]      ORDERING CLINICIAN:  BENJAMIN COBURN      TECHNIQUE:  Axial CT images of the abdomen and pelvis with coronal and sagittal  reconstructed images obtained after intravenous administration of 75  mL Omnipaque 350      FINDINGS:  LOWER CHEST: Scattered pulmonary nodules in the visualized lung  bases, not significantly changed compared to prior recent imaging  04/24/2024.      ABDOMEN:      LIVER: Heterogeneous, predominantly hypodense masses are again seen  throughout the liver, also stable compared to prior recent imaging  04/24/2024. BILE DUCTS: Normal caliber.  GALLBLADDER: No calcified gallstones. No wall thickening.  PORTAL VEIN: Patent  SPLEEN: Unremarkable.  PANCREAS: " Unremarkable.  ADRENALS: Redemonstrated left adrenal gland thickening.  KIDNEYS, URETERS and URINARY BLADDER: Symmetric renal enhancement. No  hydronephrosis or perinephric fluid collection.  Concentric bladder  wall thickening, with evaluation limited due to incomplete  distention. REPRODUCTIVE ORGANS: No pelvic masses.      ABDOMINAL WALL: Fat containing inguinal hernias. A mesh is seen  within the lower anterior pelvic wall.. PERITONEUM: No ascites, free  air or fluid collection.      BOWEL: The stomach is distended with fluid. No gastric wall  thickening. Nondilated loops of fluid-filled small bowel throughout  the abdomen and pelvis. Wall thickening of the distal colon. The  colon is filled with fluid. Nondilated appendix.      VESSELS: Moderate aortoiliac calcifications. Aneurysmal dilatation of  the infrarenal aorta measuring up to 2.3 cm in maximum AP dimension.  RETROPERITONEUM: There are numerous enlarged para-aortic lymph nodes,  largest measuring up to approximately 1.5 cm. Likely necrotic lymph  node along the right external iliac chain measures up to  approximately 2 cm, similar compared to prior imaging.      BONES: No acute osseous abnormality.      Impression: The colon is filled with fluid compatible with reported history of  diarrhea. There is also wall thickening of the distal colon along  with nondilated loops of fluid-filled small bowel throughout the  abdomen and pelvis. Correlate for symptoms of enterocolitis.      redemonstrated metastatic disease throughout the visualized lower  chest and liver, similar compared to prior imaging.      Concentric bladder wall thickening, with evaluation limited due to  incomplete distention. Recommend correlation with urinalysis if there  is clinical concern for cystitis.      MACRO:  None.      Signed by: Evan Finkelstein 5/3/2024 11:07 PM  Dictation workstation:   [id number]       Meds    Scheduled medications  apixaban, 5 mg, oral, BID  calcium  carbonate, 500 mg, oral, Daily  [START ON 5/6/2024] fentaNYL, 1 patch, transdermal, q72h  heparin flush, 50 Units, intra-catheter, q12h  lidocaine, 1 patch, transdermal, q24h  magnesium oxide, 400 mg, oral, Daily  magnesium sulfate, 2 g, intravenous, Once  methylPREDNISolone sodium succinate (PF), 68.75 mg, intravenous, q24h  multivitamin with minerals, 1 tablet, oral, q AM  pantoprazole, 40 mg, oral, Daily before breakfast  potassium chloride CR, 10 mEq, oral, Daily  scopolamine, 1 patch, transdermal, q72h  sucralfate, 1 g, oral, Daily      Continuous medications       PRN medications  PRN medications: alteplase, heparin flush, HYDROmorphone, LORazepam, melatonin, oxyCODONE     Assessment and Plan    Massimo Garcia is a 63 y.o. male with PMH of third degree heart block s/p PPM (placed in November), esophageal adenocarcinoma with mets to liver and lungs, Drug-induced colitis, PE, peripheral neuropathy, and portal vein thrombosis (On Eliquis), admitted for abdominal pain with associated diarrhea 2/2 immunotherapy mediated enterocolitis. Treating with steroids. Stool cultures pending.      ACUTE MEDICAL ISSUES:  # Acute diarrhea   # Acute infectious versus drug-induced entercolitis  # Esophageal adenocarcinoma with metastasis  - Scopolamine patch was placed in the ED on 05/02/2024  -Methylprednisolone 68.75 mg IV daily (1 mg/kg/day)  - Imaging: CTAP with colon filled with fluid compatible with reported history of diarrhea. Wall thickening of the distal colon along with nondilated loops of fluid-filled small bowel throughout the abdomen and pelvis. Correlate for symptoms of enterocolitis.   - Pending; Stool cultures, C diff PCR, Giardia antigen, Stool pathogen panel  -Infectious disease does not recommend any antibiotics at this time  -Heme/onc consulted;appreciate recs    #Chronic low back pain  -exacerbated by laying in bed, meets with pain management outpatient  -Lidocaine patch  -oxycodone 10 mg every 4 hours  -dilaudid  0.2 mg for breakthrough pain     # Hypokalemia   # Hypomagnesemia  -pain      # Chronic thrombocytopenia   - Most likely result of treatment for his cancer  -Heme/onc recs: trend CBC; consider temporary hold on anticoagulation if platelets drop to <50,000     CHRONIC MEDICAL ISSUES:  # History of PE   # portal vein thrombosis  - Continue home Eliquis 5 mg twice daily    # Right shoulder pain:   -Fentanyl 50 mcg 1 patch every 72 hours.  Last placed 05/02/2024    # GERD  - Continue home pantoprazole 40 mg every day.    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    -Hb at admission 8.3   - continue to monitor     Fluids: PO  Electrolytes: Replete PRN  Nutrition: Adult regular diet  Antimicrobials: None  DVT ppx:  Eliquis 5 mg  GI ppx: Pantoprazole 40 mg   Lines: Implantable single-lumen port on right chest  Emergency Contact: Extended Emergency Contact Information  Primary Emergency Contact: Jane Garcia  Address: [address] [place] [address] of [facility]  Home Phone: [phone]  Mobile Phone: [phone]  Relation: Spouse  Secondary Emergency Contact: DA WORKMAN  Mobile Phone: [phone]  Relation: Daughter  Preferred language: English   needed? No   Code: Full Code      Disposition: Massimo Garcia is a 63 y.o. male with esophageal adenocarcinoma with mets to liver and lungs admitted for abdominal pain with associated diarrhea 2/2 immunotherapy mediated enterocolitis. Treating with steroids. Stool cultures pending.   ELOS >48hrs      Latrice Mancuso DO

## 2024-05-05 NOTE — PROGRESS NOTES
"Massimo Garcia is a 63 y.o. male on day 1 of admission presenting with Enterocolitis.    Subjective   He reports improvement today   He reports no BM since yesterday afternoon, although has some cramps   Tolerated diet well   Denies nausea or vomiting       Objective     Physical Exam    Last Recorded Vitals  Blood pressure 113/75, pulse 96, temperature 36.8 °C (98.2 °F), temperature source Temporal, resp. rate 18, height 1.727 m (5' 8\"), weight 71.2 kg (156 lb 15.5 oz), SpO2 96%.  Intake/Output last 3 Shifts:  I/O last 3 completed shifts:  In: 1440 (20.2 mL/kg) [P.O.:240; IV Piggyback:1200]  Out: 140 (2 mL/kg) [Urine:40 (0 mL/kg/hr); Stool:100]  Weight: 71.2 kg     Relevant Results  Results for orders placed or performed during the hospital encounter of 05/03/24 (from the past 24 hour(s))   Renal Function Panel   Result Value Ref Range    Glucose 150 (H) 74 - 99 mg/dL    Sodium 137 136 - 145 mmol/L    Potassium 3.5 3.5 - 5.3 mmol/L    Chloride 105 98 - 107 mmol/L    Bicarbonate 24 21 - 32 mmol/L    Anion Gap 12 10 - 20 mmol/L    Urea Nitrogen 14 6 - 23 mg/dL    Creatinine 0.74 0.50 - 1.30 mg/dL    eGFR >90 >60 mL/min/1.73m*2    Calcium 9.3 8.6 - 10.3 mg/dL    Phosphorus 2.6 2.5 - 4.9 mg/dL    Albumin 3.6 3.4 - 5.0 g/dL   Magnesium   Result Value Ref Range    Magnesium 1.49 (L) 1.60 - 2.40 mg/dL   CBC and Auto Differential   Result Value Ref Range    WBC 3.8 (L) 4.4 - 11.3 x10*3/uL    nRBC 0.0 0.0 - 0.0 /100 WBCs    RBC 3.17 (L) 4.50 - 5.90 x10*6/uL    Hemoglobin 9.2 (L) 13.5 - 17.5 g/dL    Hematocrit 27.7 (L) 41.0 - 52.0 %    MCV 87 80 - 100 fL    MCH 29.0 26.0 - 34.0 pg    MCHC 33.2 32.0 - 36.0 g/dL    RDW 15.1 (H) 11.5 - 14.5 %    Platelets 65 (L) 150 - 450 x10*3/uL    Neutrophils % 91.3 40.0 - 80.0 %    Immature Granulocytes %, Automated 0.3 0.0 - 0.9 %    Lymphocytes % 5.5 13.0 - 44.0 %    Monocytes % 2.9 2.0 - 10.0 %    Eosinophils % 0.0 0.0 - 6.0 %    Basophils % 0.0 0.0 - 2.0 %    Neutrophils Absolute 3.51 1.20 - " 7.70 x10*3/uL    Immature Granulocytes Absolute, Automated 0.01 0.00 - 0.70 x10*3/uL    Lymphocytes Absolute 0.21 (L) 1.20 - 4.80 x10*3/uL    Monocytes Absolute 0.11 0.10 - 1.00 x10*3/uL    Eosinophils Absolute 0.00 0.00 - 0.70 x10*3/uL    Basophils Absolute 0.00 0.00 - 0.10 x10*3/uL   Morphology   Result Value Ref Range    RBC Morphology No significant RBC morphology present            Assessment/Plan   Principal Problem:    Enterocolitis    Diarrhea/enterocolitis     The differential diagnosis including ICI induced colitis, infectious colitis, and chemotherapy (5-FU) induced     Agree with the infectious workup including sending for C. difficile and stool pathogen panel  Obtain fecal calprotectin and lactoferrin     We will manage for now as ICI induced colitis pending the infectious workup, grade 3 presentation  Continue on high-dose steroids, switch to IV methylprednisolone at 1 mg/kg/day, if no improvement in the next 2 days we will consider increasing the dose and/or addition of other immunosuppressants (infliximab or vedolizumab)      GI consultation for colonoscopy or flex sig and biopsies, noting it may not be possible pending his counts trend    5/5- patient feeling better, continue high dose steroids until diarrhea down to grade 1 ( 3 or less bowel movements per day), can be discharged at that point on PO prednisone equivalent dose to be tapered in clinic over ~ 4-6 weeks   C diff negative, remaining stool studies in process   Discontinue cyclosporine at discharge      2.  Pancytopenia  Secondary to chemotherapy  Trend the CBC counts  Consider holding the anticoagulation temporarily if the platelets drop less than 50,000    5/5- counts stable and improving      3.  Metastatic esophageal cancer  Stable disease per scans  To follow Dr. Blake as outpatient for further management    Bhavin Frankel MD

## 2024-05-06 ENCOUNTER — PHARMACY VISIT (OUTPATIENT)
Dept: PHARMACY | Facility: CLINIC | Age: 64
End: 2024-05-06
Payer: MEDICARE

## 2024-05-06 VITALS
SYSTOLIC BLOOD PRESSURE: 112 MMHG | OXYGEN SATURATION: 98 % | DIASTOLIC BLOOD PRESSURE: 57 MMHG | BODY MASS INDEX: 23.79 KG/M2 | WEIGHT: 156.97 LBS | HEART RATE: 80 BPM | HEIGHT: 68 IN | TEMPERATURE: 97.3 F | RESPIRATION RATE: 18 BRPM

## 2024-05-06 PROBLEM — K52.9 ENTEROCOLITIS: Status: RESOLVED | Noted: 2024-05-04 | Resolved: 2024-05-06

## 2024-05-06 LAB
ALBUMIN SERPL BCP-MCNC: 3.3 G/DL (ref 3.4–5)
ANION GAP SERPL CALC-SCNC: 12 MMOL/L (ref 10–20)
BUN SERPL-MCNC: 20 MG/DL (ref 6–23)
CALCIUM SERPL-MCNC: 9.1 MG/DL (ref 8.6–10.3)
CHLORIDE SERPL-SCNC: 106 MMOL/L (ref 98–107)
CO2 SERPL-SCNC: 24 MMOL/L (ref 21–32)
CREAT SERPL-MCNC: 0.81 MG/DL (ref 0.5–1.3)
EGFRCR SERPLBLD CKD-EPI 2021: >90 ML/MIN/1.73M*2
ERYTHROCYTE [DISTWIDTH] IN BLOOD BY AUTOMATED COUNT: 15.2 % (ref 11.5–14.5)
GLUCOSE SERPL-MCNC: 130 MG/DL (ref 74–99)
HCT VFR BLD AUTO: 26.9 % (ref 41–52)
HGB BLD-MCNC: 8.8 G/DL (ref 13.5–17.5)
MAGNESIUM SERPL-MCNC: 1.91 MG/DL (ref 1.6–2.4)
MCH RBC QN AUTO: 29.1 PG (ref 26–34)
MCHC RBC AUTO-ENTMCNC: 32.7 G/DL (ref 32–36)
MCV RBC AUTO: 89 FL (ref 80–100)
NRBC BLD-RTO: 0 /100 WBCS (ref 0–0)
PHOSPHATE SERPL-MCNC: 3 MG/DL (ref 2.5–4.9)
PLATELET # BLD AUTO: 53 X10*3/UL (ref 150–450)
POTASSIUM SERPL-SCNC: 3.5 MMOL/L (ref 3.5–5.3)
RBC # BLD AUTO: 3.02 X10*6/UL (ref 4.5–5.9)
SODIUM SERPL-SCNC: 138 MMOL/L (ref 136–145)
STAPHYLOCOCCUS SPEC CULT: NORMAL
WBC # BLD AUTO: 3.1 X10*3/UL (ref 4.4–11.3)

## 2024-05-06 PROCEDURE — 2500000006 HC RX 250 W HCPCS SELF ADMINISTERED DRUGS (ALT 637 FOR ALL PAYERS)

## 2024-05-06 PROCEDURE — 2500000004 HC RX 250 GENERAL PHARMACY W/ HCPCS (ALT 636 FOR OP/ED)

## 2024-05-06 PROCEDURE — 80069 RENAL FUNCTION PANEL: CPT

## 2024-05-06 PROCEDURE — 2500000001 HC RX 250 WO HCPCS SELF ADMINISTERED DRUGS (ALT 637 FOR MEDICARE OP)

## 2024-05-06 PROCEDURE — RXMED WILLOW AMBULATORY MEDICATION CHARGE

## 2024-05-06 PROCEDURE — 83735 ASSAY OF MAGNESIUM: CPT

## 2024-05-06 PROCEDURE — 2500000001 HC RX 250 WO HCPCS SELF ADMINISTERED DRUGS (ALT 637 FOR MEDICARE OP): Performed by: INTERNAL MEDICINE

## 2024-05-06 PROCEDURE — 2500000005 HC RX 250 GENERAL PHARMACY W/O HCPCS

## 2024-05-06 PROCEDURE — 99239 HOSP IP/OBS DSCHRG MGMT >30: CPT

## 2024-05-06 PROCEDURE — 85027 COMPLETE CBC AUTOMATED: CPT

## 2024-05-06 RX ORDER — PREDNISONE 20 MG/1
80 TABLET ORAL EVERY 24 HOURS
Qty: 84 TABLET | Refills: 0 | Status: SHIPPED | OUTPATIENT
Start: 2024-05-06 | End: 2024-05-24 | Stop reason: HOSPADM

## 2024-05-06 RX ADMIN — PANTOPRAZOLE SODIUM 40 MG: 40 TABLET, DELAYED RELEASE ORAL at 06:26

## 2024-05-06 RX ADMIN — OXYCODONE HYDROCHLORIDE 15 MG: 10 TABLET ORAL at 09:50

## 2024-05-06 RX ADMIN — POTASSIUM CHLORIDE 10 MEQ: 750 TABLET, EXTENDED RELEASE ORAL at 09:45

## 2024-05-06 RX ADMIN — FENTANYL 1 PATCH: 50 PATCH TRANSDERMAL at 09:44

## 2024-05-06 RX ADMIN — HEPARIN, PORCINE (PF) 10 UNIT/ML INTRAVENOUS SYRINGE 50 UNITS: at 09:44

## 2024-05-06 RX ADMIN — LIDOCAINE 4% 1 PATCH: 40 PATCH TOPICAL at 06:25

## 2024-05-06 RX ADMIN — SUCRALFATE 1 G: 1 TABLET ORAL at 09:45

## 2024-05-06 RX ADMIN — CALCIUM CARBONATE (ANTACID) CHEW TAB 500 MG 500 MG: 500 CHEW TAB at 09:45

## 2024-05-06 RX ADMIN — APIXABAN 5 MG: 5 TABLET, FILM COATED ORAL at 09:45

## 2024-05-06 RX ADMIN — Medication 1 TABLET: at 09:45

## 2024-05-06 RX ADMIN — Medication 400 MG: at 09:45

## 2024-05-06 ASSESSMENT — PAIN SCALES - GENERAL
PAINLEVEL_OUTOF10: 2
PAINLEVEL_OUTOF10: 4

## 2024-05-06 ASSESSMENT — PAIN - FUNCTIONAL ASSESSMENT: PAIN_FUNCTIONAL_ASSESSMENT: 0-10

## 2024-05-06 ASSESSMENT — PAIN DESCRIPTION - LOCATION: LOCATION: BACK

## 2024-05-06 NOTE — DISCHARGE INSTRUCTIONS
Your [symptoms] were due to ____, which is ___. You are now stable enough to be discharged home.    The following recommendations are made for you at time of hospital discharge:  - Please take your home medications as instructed prior to hospitalization.   - The following changes to your home medications have been made during your hospital stay:   Take Prednisone 80 mg (4 tablets), every 24 hours.  Continue to take following up with your oncologist who will manage your steroid taper.  Do not take Cyclosporine.  - Please follow-up with oncology within 1 week from the time of discharge.   - Please follow-up with your primary care provider within 5-7 days from time of discharge. Please call your PCP's office to schedule an appointment. Likely will need to bring photo ID and insurance card to your appointment.   -   services has been requested to make an appointment for you however if you do not hear back from them within 2-3 days, please call [phone] to find out about appointments with  services.  - If you experience any worsening symptoms or any new acute concerns arise, please contact your primary care provider to discuss and possibly arrange an appointment. If you cannot get in touch with provider or severe symptoms are present, please return to nearest emergency room/urgent care for evaluation and treatment.

## 2024-05-06 NOTE — PROGRESS NOTES
05/06/24 0742   Discharge Planning   Living Arrangements Spouse/significant other;Children   Support Systems Spouse/significant other;Children   Type of Residence Private residence   Number of Stairs to Enter Residence 4   Number of Stairs Within Residence 14  (down to basement)   Do you have animals or pets at home? Yes   Type of Animals or Pets 2 dogs, 2 cats, 5 fish   Who is requesting discharge planning? Provider   Home or Post Acute Services None   Patient expects to be discharged to: Patient denies any home going needs. Patient with multiple follow up appointments and wishes to establish his own PCP appointment after hospital discharge   Does the patient need discharge transport arranged? No   Financial Resource Strain   How hard is it for you to pay for the very basics like food, housing, medical care, and heating? Not hard   Housing Stability   In the last 12 months, was there a time when you were not able to pay the mortgage or rent on time? N   In the last 12 months, how many places have you lived? 1   In the last 12 months, was there a time when you did not have a steady place to sleep or slept in a shelter (including now)? N

## 2024-05-06 NOTE — PROGRESS NOTES
05/06/24 0900   Discharge Planning   Patient expects to be discharged to: Discharge confirmed for today. Patient is aware of discharge and fine with dc today-IMM obtaitned. Patient's spouse on the way to hospital and is able to transport home once dc completed. Patient with multiple follow up and treatment appointments sccheduled and he prefers to schedule his own PCP hospital follow up visit. RN and RN Navigator on floor aware of dc. Patient cleared for dc from Transition's standpoint

## 2024-05-06 NOTE — DISCHARGE SUMMARY
Diagnosis  Immune checkpoint inhibitor induced colitis  esophageal adenocarcinoma with metastasis    Issues Requiring Follow-Up  Need to followup with oncology within next 1-2 weeks for further management including tapering of Prednisone    Discharge Meds     Your medication list        START taking these medications        Instructions Last Dose Given Next Dose Due   predniSONE 20 mg tablet  Commonly known as: Deltasone      Take 4 tablets (80 mg) by mouth once every 24 hours for 21 days.              CONTINUE taking these medications        Instructions Last Dose Given Next Dose Due   apixaban 5 mg tablet  Commonly known as: Eliquis      TAKE 1 TABLET BY MOUTH TWO TIMES A DAY       chlorproMAZINE 25 mg tablet  Commonly known as: Thorazine      Take 1 tablet (25 mg) by mouth 2 times a day.       cholecalciferol 50 MCG (2000 UT) tablet  Commonly known as: Vitamin D-3           Deep Sea Nasal 0.65 % nasal spray  Generic drug: sodium chloride      Administer 1 spray into each nostril if needed for congestion (or nasal dryness/ bleeding).       fentaNYL 50 mcg/hr patch  Commonly known as: Duragesic      Place 1 patch over 72 hours on the skin every 3rd day.       lidocaine 5 % patch  Commonly known as: Lidoderm      Place 1 patch over 12 hours on the skin once daily. Remove & discard patch within 12 hours or as directed by MD.       LORazepam 1 mg tablet  Commonly known as: Ativan      Take 1 tablet (1 mg) by mouth every 8 hours if needed for anxiety (nausea).       medical cannabis           multivitamin with minerals tablet           oxyCODONE 5 mg immediate release tablet  Commonly known as: Roxicodone           potassium chloride CR 10 mEq ER tablet  Commonly known as: Klor-Con      TAKE 1 TABLET BY MOUTH EVERY DAY       scopolamine 1 mg over 3 days patch 3 day  Commonly known as: Transderm-Scop      Place 1 patch on the skin every 3rd day.       sucralfate 1 gram tablet  Commonly known as: Carafate                   STOP taking these medications      amoxicillin-pot clavulanate 875-125 mg tablet  Commonly known as: Augmentin        cycloSPORINE 25 mg capsule  Commonly known as: SandIMMUNE        pantoprazole 40 mg EC tablet  Commonly known as: ProtoNix               ASK your doctor about these medications        Instructions Last Dose Given Next Dose Due   metoprolol succinate XL 25 mg 24 hr tablet  Commonly known as: Toprol-XL      Take 1 tablet (25 mg) by mouth once daily. Do not crush or chew.                 Where to Get Your Medications        These medications were sent to [facility]  [address], [place] [address]      Hours: 9 AM to 5 PM Mon-Fri Phone: [phone]   predniSONE 20 mg tablet         Test Results Pending At Discharge  Pending Labs       Order Current Status    Calprotectin Stool In process    Cryptosporidium Antigen, Stool In process    Giardia / Cryptosporidium antigens, DFA In process    Giardia Antigen In process    Rotavirus antigen, stool In process    Blood Culture Preliminary result    Blood Culture Preliminary result            Hospital Course  Brief HPI:  Massimo Garcia is a 63 y.o. year old male  patient with PMHx significant for third degree heart block s/p PPM (placed in November), esophageal adenocarcinoma with mets to liver and lungs, Drug-induced colitis, PE, peripheral neuropathy, and portal vein thrombosis (On Eliquis) came into [facility] ED on 5/3/2024 on account of diarrhea and worsening abdominal pain     Hospital Course:  Imaging was done that showed findings consistent with enterocolitis.  Given patient's immunocompromised status and recent chemotherapy, infectious workup was ordered. ED provider reached out [place] [facility] who recommended patient be started on steroids.  Given that patient had recently finished antibiotic course, C. Diff colitis was a concern.  Heme-Onc on board. Patient was started on oral vancomycin and IV Zosyn. Which was discontinued 5/4 as  believed to be immune mediated. C. Diff negative, stool pathogen panel all negative. Pt's frequency of bowel movements continue to decrease, and stool is more formed.  Pt had well-formed stool, and maintained less than 3 bowel movements over a 24 hour period. Pt is stable for discharge home on continued oral prednisone dose until outpatient follow up with Oncology who will then manage her steroid taper per the inpatient oncology consultant.  Pt discharged in stable condition.     Pertinent Physical Exam At Time of Discharge  Physical Exam  Constitutional: patient was seen and examined at the bedside and appeared calm  Eyes: PERRL bilaterally   Head: atraumatic, normocephalic  Heart: RRR, no M/R/G  Lungs: CTA, b/l, no W/R/R  Abdomen: soft, ND, NT, + BS in all 4 quadrants   Extremities: no edema, ecchymoses, erythema   Neuro: AAOx3 to person/place/time  Psych: appropriate mood and behavior   Outpatient Follow-Up  Future Appointments   Date Time Provider Department Center   5/9/2024  9:30 AM JASPER Ferguson [facility] None   5/13/2024 11:00 AM INF 19 GEAUGA [facility]   5/15/2024  9:30 AM INF 13 GEAUGA [facility]   5/21/2024  1:00 PM GEA ECHO LAB 1 GEANIC1 [facility]    5/29/2024  8:00 AM INF 03 GEAUGA [facility]   6/10/2024  9:00 AM INF 07 GEAUGA [facility]   6/12/2024  9:00 AM INF 00 GEAUGA [facility]   6/19/2024  8:00 AM INF 00 GEAUGA [facility]   6/19/2024  9:00 AM GEA CT 1 GEACT [facility]    6/24/2024  8:30 AM Tomasa Blake MD [facility]1 [facility]   6/24/2024  9:00 AM INF 07 GEAUGA [facility]   6/26/2024  9:00 AM INF 00 GEAUGA [facility]   7/1/2024 11:00 AM [facility]PER [facility]   7/2/2024 11:00 AM JASPER [facility]عر[facility]قي [facility]   7/3/2024 11:00 AM [facility]PER [facility]   7/8/2024  8:30 AM JASPER [facility]   7/9/2024  9:30 AM [facility]PER [facility]   7/10/2024   9:30 AM JASPER العر[facility]قي [facility]   7/11/2024  9:30 AM [facility]PER [facility]   7/15/2024  9:30 AM JASPER العراقي [facility]         Latrice Mancuso DO

## 2024-05-07 LAB
CRYPTOSP AG STL QL IA: NEGATIVE
G LAMBLIA AG STL QL IA: NEGATIVE
RV AG STL QL IA: NEGATIVE

## 2024-05-08 LAB
BACTERIA BLD CULT: NORMAL
BACTERIA BLD CULT: NORMAL
CALPROTECTIN STL-MCNT: 41 UG/G

## 2024-05-09 ENCOUNTER — APPOINTMENT (OUTPATIENT)
Dept: PALLIATIVE MEDICINE | Facility: CLINIC | Age: 64
End: 2024-05-09
Payer: MEDICARE

## 2024-05-09 ENCOUNTER — APPOINTMENT (OUTPATIENT)
Dept: RADIOLOGY | Facility: HOSPITAL | Age: 64
DRG: 543 | End: 2024-05-09
Payer: MEDICARE

## 2024-05-09 ENCOUNTER — HOSPITAL ENCOUNTER (INPATIENT)
Facility: HOSPITAL | Age: 64
LOS: 1 days | Discharge: ANOTHER HOSPITAL | DRG: 543 | End: 2024-05-10
Attending: STUDENT IN AN ORGANIZED HEALTH CARE EDUCATION/TRAINING PROGRAM | Admitting: STUDENT IN AN ORGANIZED HEALTH CARE EDUCATION/TRAINING PROGRAM
Payer: MEDICARE

## 2024-05-09 DIAGNOSIS — R93.89 ABNORMAL CT SCAN: ICD-10-CM

## 2024-05-09 DIAGNOSIS — Z85.89 HX OF METASTATIC NEOPLASTIC DISEASE: ICD-10-CM

## 2024-05-09 DIAGNOSIS — E87.6 HYPOKALEMIA: ICD-10-CM

## 2024-05-09 DIAGNOSIS — M48.05 SPINAL STENOSIS OF THORACOLUMBAR REGION: Primary | ICD-10-CM

## 2024-05-09 LAB
ALBUMIN SERPL BCP-MCNC: 3.6 G/DL (ref 3.4–5)
ALP SERPL-CCNC: 147 U/L (ref 33–136)
ALT SERPL W P-5'-P-CCNC: 19 U/L (ref 10–52)
ANION GAP SERPL CALC-SCNC: 12 MMOL/L (ref 10–20)
AST SERPL W P-5'-P-CCNC: 30 U/L (ref 9–39)
BASOPHILS # BLD AUTO: 0 X10*3/UL (ref 0–0.1)
BASOPHILS NFR BLD AUTO: 0 %
BILIRUB SERPL-MCNC: 1.3 MG/DL (ref 0–1.2)
BUN SERPL-MCNC: 22 MG/DL (ref 6–23)
CALCIUM SERPL-MCNC: 9 MG/DL (ref 8.6–10.3)
CHLORIDE SERPL-SCNC: 101 MMOL/L (ref 98–107)
CO2 SERPL-SCNC: 25 MMOL/L (ref 21–32)
CREAT SERPL-MCNC: 0.86 MG/DL (ref 0.5–1.3)
EGFRCR SERPLBLD CKD-EPI 2021: >90 ML/MIN/1.73M*2
EOSINOPHIL # BLD AUTO: 0.02 X10*3/UL (ref 0–0.7)
EOSINOPHIL NFR BLD AUTO: 0.2 %
ERYTHROCYTE [DISTWIDTH] IN BLOOD BY AUTOMATED COUNT: 16.8 % (ref 11.5–14.5)
GLUCOSE SERPL-MCNC: 109 MG/DL (ref 74–99)
HCT VFR BLD AUTO: 30.1 % (ref 41–52)
HGB BLD-MCNC: 10.1 G/DL (ref 13.5–17.5)
IMM GRANULOCYTES # BLD AUTO: 0.05 X10*3/UL (ref 0–0.7)
IMM GRANULOCYTES NFR BLD AUTO: 0.6 % (ref 0–0.9)
LIPASE SERPL-CCNC: 18 U/L (ref 9–82)
LYMPHOCYTES # BLD AUTO: 0.44 X10*3/UL (ref 1.2–4.8)
LYMPHOCYTES NFR BLD AUTO: 5 %
MAGNESIUM SERPL-MCNC: 1.75 MG/DL (ref 1.6–2.4)
MCH RBC QN AUTO: 29.4 PG (ref 26–34)
MCHC RBC AUTO-ENTMCNC: 33.6 G/DL (ref 32–36)
MCV RBC AUTO: 88 FL (ref 80–100)
MONOCYTES # BLD AUTO: 0.75 X10*3/UL (ref 0.1–1)
MONOCYTES NFR BLD AUTO: 8.5 %
NEUTROPHILS # BLD AUTO: 7.56 X10*3/UL (ref 1.2–7.7)
NEUTROPHILS NFR BLD AUTO: 85.7 %
NRBC BLD-RTO: 0.2 /100 WBCS (ref 0–0)
PLATELET # BLD AUTO: 113 X10*3/UL (ref 150–450)
POTASSIUM SERPL-SCNC: 3 MMOL/L (ref 3.5–5.3)
PROT SERPL-MCNC: 6.2 G/DL (ref 6.4–8.2)
RBC # BLD AUTO: 3.43 X10*6/UL (ref 4.5–5.9)
SODIUM SERPL-SCNC: 135 MMOL/L (ref 136–145)
WBC # BLD AUTO: 8.8 X10*3/UL (ref 4.4–11.3)

## 2024-05-09 PROCEDURE — 72131 CT LUMBAR SPINE W/O DYE: CPT | Mod: RCN

## 2024-05-09 PROCEDURE — 96374 THER/PROPH/DIAG INJ IV PUSH: CPT

## 2024-05-09 PROCEDURE — 85025 COMPLETE CBC W/AUTO DIFF WBC: CPT | Performed by: STUDENT IN AN ORGANIZED HEALTH CARE EDUCATION/TRAINING PROGRAM

## 2024-05-09 PROCEDURE — 2500000006 HC RX 250 W HCPCS SELF ADMINISTERED DRUGS (ALT 637 FOR ALL PAYERS): Performed by: STUDENT IN AN ORGANIZED HEALTH CARE EDUCATION/TRAINING PROGRAM

## 2024-05-09 PROCEDURE — 83690 ASSAY OF LIPASE: CPT | Performed by: STUDENT IN AN ORGANIZED HEALTH CARE EDUCATION/TRAINING PROGRAM

## 2024-05-09 PROCEDURE — 74177 CT ABD & PELVIS W/CONTRAST: CPT

## 2024-05-09 PROCEDURE — 2500000004 HC RX 250 GENERAL PHARMACY W/ HCPCS (ALT 636 FOR OP/ED): Performed by: STUDENT IN AN ORGANIZED HEALTH CARE EDUCATION/TRAINING PROGRAM

## 2024-05-09 PROCEDURE — 99285 EMERGENCY DEPT VISIT HI MDM: CPT | Mod: 25

## 2024-05-09 PROCEDURE — 72131 CT LUMBAR SPINE W/O DYE: CPT | Performed by: RADIOLOGY

## 2024-05-09 PROCEDURE — 80053 COMPREHEN METABOLIC PANEL: CPT | Performed by: STUDENT IN AN ORGANIZED HEALTH CARE EDUCATION/TRAINING PROGRAM

## 2024-05-09 PROCEDURE — 74177 CT ABD & PELVIS W/CONTRAST: CPT | Performed by: RADIOLOGY

## 2024-05-09 PROCEDURE — 83735 ASSAY OF MAGNESIUM: CPT | Performed by: STUDENT IN AN ORGANIZED HEALTH CARE EDUCATION/TRAINING PROGRAM

## 2024-05-09 PROCEDURE — 2550000001 HC RX 255 CONTRASTS: Performed by: STUDENT IN AN ORGANIZED HEALTH CARE EDUCATION/TRAINING PROGRAM

## 2024-05-09 RX ORDER — POTASSIUM CHLORIDE 20 MEQ/1
40 TABLET, EXTENDED RELEASE ORAL ONCE
Status: COMPLETED | OUTPATIENT
Start: 2024-05-09 | End: 2024-05-09

## 2024-05-09 RX ORDER — HYDROMORPHONE HYDROCHLORIDE 1 MG/ML
1 INJECTION, SOLUTION INTRAMUSCULAR; INTRAVENOUS; SUBCUTANEOUS ONCE
Status: COMPLETED | OUTPATIENT
Start: 2024-05-09 | End: 2024-05-09

## 2024-05-09 RX ADMIN — HYDROMORPHONE HYDROCHLORIDE 1 MG: 1 INJECTION, SOLUTION INTRAMUSCULAR; INTRAVENOUS; SUBCUTANEOUS at 23:48

## 2024-05-09 RX ADMIN — HYDROMORPHONE HYDROCHLORIDE 1 MG: 1 INJECTION, SOLUTION INTRAMUSCULAR; INTRAVENOUS; SUBCUTANEOUS at 22:04

## 2024-05-09 RX ADMIN — POTASSIUM CHLORIDE 40 MEQ: 1500 TABLET, EXTENDED RELEASE ORAL at 23:49

## 2024-05-09 RX ADMIN — IOHEXOL 75 ML: 350 INJECTION, SOLUTION INTRAVENOUS at 22:40

## 2024-05-09 ASSESSMENT — PAIN SCALES - GENERAL
PAINLEVEL_OUTOF10: 10 - WORST POSSIBLE PAIN
PAINLEVEL_OUTOF10: 9

## 2024-05-09 ASSESSMENT — LIFESTYLE VARIABLES
TOTAL SCORE: 0
HAVE YOU EVER FELT YOU SHOULD CUT DOWN ON YOUR DRINKING: NO
EVER HAD A DRINK FIRST THING IN THE MORNING TO STEADY YOUR NERVES TO GET RID OF A HANGOVER: NO
EVER FELT BAD OR GUILTY ABOUT YOUR DRINKING: NO
HAVE PEOPLE ANNOYED YOU BY CRITICIZING YOUR DRINKING: NO

## 2024-05-09 ASSESSMENT — PAIN DESCRIPTION - LOCATION: LOCATION: BACK

## 2024-05-09 ASSESSMENT — PAIN DESCRIPTION - ORIENTATION: ORIENTATION: LOWER;MID

## 2024-05-09 ASSESSMENT — PAIN - FUNCTIONAL ASSESSMENT: PAIN_FUNCTIONAL_ASSESSMENT: 0-10

## 2024-05-09 NOTE — SIGNIFICANT EVENT
Follow Up Phone Call    Outgoing phone call    Spoke to: Massimoartem Garcia Relationship:self   Phone number: [phone]      Outcome: contacted patient/ family   Chief Complaint   Patient presents with    Abdominal Pain     Pt has severe abdominal pain and diarrhea x2 days. Pt has hx colitis 1 week ago that was treated with antibiotics. Pt was doing well since then until yesterday. Pt has hx esophageal CA with mets to the liver. Currently on chemo. Last chemo treatment was this past Monday. Pt's wife gave him oxycodone and bentyl earlier today with no relief. Pt rates pain 8/10. Denies N/V.           Diagnosis:Not applicable    States he is feeling better. States eating and drinking is going well at this time.  No further questions or concerns.

## 2024-05-10 ENCOUNTER — HOSPITAL ENCOUNTER (INPATIENT)
Facility: HOSPITAL | Age: 64
LOS: 14 days | Discharge: HOME | DRG: 028 | End: 2024-05-24
Attending: INTERNAL MEDICINE
Payer: MEDICARE

## 2024-05-10 ENCOUNTER — APPOINTMENT (OUTPATIENT)
Dept: HEMATOLOGY/ONCOLOGY | Facility: CLINIC | Age: 64
End: 2024-05-10
Payer: MEDICARE

## 2024-05-10 ENCOUNTER — APPOINTMENT (OUTPATIENT)
Dept: RADIOLOGY | Facility: HOSPITAL | Age: 64
DRG: 543 | End: 2024-05-10
Payer: MEDICARE

## 2024-05-10 VITALS
SYSTOLIC BLOOD PRESSURE: 120 MMHG | TEMPERATURE: 98.1 F | OXYGEN SATURATION: 100 % | WEIGHT: 155 LBS | HEART RATE: 99 BPM | RESPIRATION RATE: 22 BRPM | DIASTOLIC BLOOD PRESSURE: 84 MMHG | HEIGHT: 68 IN | BODY MASS INDEX: 23.49 KG/M2

## 2024-05-10 DIAGNOSIS — C15.9 PRIMARY MALIGNANT NEOPLASM OF ESOPHAGUS (MULTI): ICD-10-CM

## 2024-05-10 DIAGNOSIS — Z95.0 PRESENCE OF CARDIAC PACEMAKER: ICD-10-CM

## 2024-05-10 DIAGNOSIS — I26.99 PE (PULMONARY THROMBOEMBOLISM) (MULTI): ICD-10-CM

## 2024-05-10 DIAGNOSIS — R22.1 MASS OF NECK WITH HISTORY OF MALIGNANT NEOPLASM: ICD-10-CM

## 2024-05-10 DIAGNOSIS — C15.9 ESOPHAGEAL CANCER, STAGE IV (MULTI): Primary | ICD-10-CM

## 2024-05-10 DIAGNOSIS — I44.2 CHB (COMPLETE HEART BLOCK) (MULTI): ICD-10-CM

## 2024-05-10 DIAGNOSIS — C78.7 MALIGNANT NEOPLASM METASTATIC TO LIVER (MULTI): ICD-10-CM

## 2024-05-10 DIAGNOSIS — I44.2 ATRIOVENTRICULAR BLOCK, COMPLETE (MULTI): ICD-10-CM

## 2024-05-10 DIAGNOSIS — C15.9 STAGE IV MALIGNANT NEOPLASM OF ESOPHAGUS (MULTI): ICD-10-CM

## 2024-05-10 DIAGNOSIS — Z95.0 PACEMAKER: ICD-10-CM

## 2024-05-10 DIAGNOSIS — Z95.0 CARDIAC PACEMAKER IN SITU: ICD-10-CM

## 2024-05-10 DIAGNOSIS — Z85.9 MASS OF NECK WITH HISTORY OF MALIGNANT NEOPLASM: ICD-10-CM

## 2024-05-10 DIAGNOSIS — M48.05 SPINAL STENOSIS OF THORACOLUMBAR REGION: ICD-10-CM

## 2024-05-10 DIAGNOSIS — R55 NEAR SYNCOPE: ICD-10-CM

## 2024-05-10 LAB
APPEARANCE UR: CLEAR
BILIRUB UR STRIP.AUTO-MCNC: NEGATIVE MG/DL
COLOR UR: YELLOW
GLUCOSE UR STRIP.AUTO-MCNC: NORMAL MG/DL
KETONES UR STRIP.AUTO-MCNC: NEGATIVE MG/DL
LEUKOCYTE ESTERASE UR QL STRIP.AUTO: NEGATIVE
NITRITE UR QL STRIP.AUTO: NEGATIVE
PH UR STRIP.AUTO: 7 [PH]
PROT UR STRIP.AUTO-MCNC: NEGATIVE MG/DL
RBC # UR STRIP.AUTO: NEGATIVE /UL
SARS-COV-2 RNA RESP QL NAA+PROBE: NOT DETECTED
SP GR UR STRIP.AUTO: 1.03
UROBILINOGEN UR STRIP.AUTO-MCNC: NORMAL MG/DL

## 2024-05-10 PROCEDURE — 86901 BLOOD TYPING SEROLOGIC RH(D): CPT

## 2024-05-10 PROCEDURE — 85610 PROTHROMBIN TIME: CPT

## 2024-05-10 PROCEDURE — 2500000004 HC RX 250 GENERAL PHARMACY W/ HCPCS (ALT 636 FOR OP/ED): Performed by: STUDENT IN AN ORGANIZED HEALTH CARE EDUCATION/TRAINING PROGRAM

## 2024-05-10 PROCEDURE — 83735 ASSAY OF MAGNESIUM: CPT

## 2024-05-10 PROCEDURE — 1170000001 HC PRIVATE ONCOLOGY ROOM DAILY

## 2024-05-10 PROCEDURE — 84100 ASSAY OF PHOSPHORUS: CPT

## 2024-05-10 PROCEDURE — 2500000004 HC RX 250 GENERAL PHARMACY W/ HCPCS (ALT 636 FOR OP/ED)

## 2024-05-10 PROCEDURE — 96376 TX/PRO/DX INJ SAME DRUG ADON: CPT

## 2024-05-10 PROCEDURE — 99223 1ST HOSP IP/OBS HIGH 75: CPT

## 2024-05-10 PROCEDURE — 87635 SARS-COV-2 COVID-19 AMP PRB: CPT | Performed by: STUDENT IN AN ORGANIZED HEALTH CARE EDUCATION/TRAINING PROGRAM

## 2024-05-10 PROCEDURE — 85025 COMPLETE CBC W/AUTO DIFF WBC: CPT

## 2024-05-10 PROCEDURE — 82248 BILIRUBIN DIRECT: CPT

## 2024-05-10 PROCEDURE — 2500000006 HC RX 250 W HCPCS SELF ADMINISTERED DRUGS (ALT 637 FOR ALL PAYERS)

## 2024-05-10 PROCEDURE — 2500000001 HC RX 250 WO HCPCS SELF ADMINISTERED DRUGS (ALT 637 FOR MEDICARE OP)

## 2024-05-10 PROCEDURE — 2500000005 HC RX 250 GENERAL PHARMACY W/O HCPCS

## 2024-05-10 PROCEDURE — 1100000001 HC PRIVATE ROOM DAILY

## 2024-05-10 PROCEDURE — 82374 ASSAY BLOOD CARBON DIOXIDE: CPT

## 2024-05-10 PROCEDURE — 81003 URINALYSIS AUTO W/O SCOPE: CPT | Performed by: STUDENT IN AN ORGANIZED HEALTH CARE EDUCATION/TRAINING PROGRAM

## 2024-05-10 RX ORDER — HYDROMORPHONE HYDROCHLORIDE 1 MG/ML
0.6 INJECTION, SOLUTION INTRAMUSCULAR; INTRAVENOUS; SUBCUTANEOUS
Status: DISCONTINUED | OUTPATIENT
Start: 2024-05-10 | End: 2024-05-12

## 2024-05-10 RX ORDER — HEPARIN 100 UNIT/ML
500 SYRINGE INTRAVENOUS ONCE AS NEEDED
Status: DISCONTINUED | OUTPATIENT
Start: 2024-05-10 | End: 2024-05-10 | Stop reason: HOSPADM

## 2024-05-10 RX ORDER — MULTIVIT-MIN/IRON FUM/FOLIC AC 7.5 MG-4
1 TABLET ORAL EVERY MORNING
Status: DISCONTINUED | OUTPATIENT
Start: 2024-05-11 | End: 2024-05-15

## 2024-05-10 RX ORDER — PREDNISONE 20 MG/1
80 TABLET ORAL EVERY 24 HOURS
Status: DISCONTINUED | OUTPATIENT
Start: 2024-05-10 | End: 2024-05-10 | Stop reason: HOSPADM

## 2024-05-10 RX ORDER — HYDROMORPHONE HYDROCHLORIDE 1 MG/ML
INJECTION, SOLUTION INTRAMUSCULAR; INTRAVENOUS; SUBCUTANEOUS
Status: COMPLETED
Start: 2024-05-10 | End: 2024-05-10

## 2024-05-10 RX ORDER — PANTOPRAZOLE SODIUM 40 MG/1
40 TABLET, DELAYED RELEASE ORAL
Status: DISCONTINUED | OUTPATIENT
Start: 2024-05-11 | End: 2024-05-15

## 2024-05-10 RX ORDER — PREDNISONE 20 MG/1
80 TABLET ORAL EVERY 24 HOURS
Status: DISCONTINUED | OUTPATIENT
Start: 2024-05-11 | End: 2024-05-11

## 2024-05-10 RX ORDER — CHOLECALCIFEROL (VITAMIN D3) 25 MCG
4000 TABLET ORAL EVERY MORNING
Status: DISCONTINUED | OUTPATIENT
Start: 2024-05-11 | End: 2024-05-15

## 2024-05-10 RX ORDER — SCOLOPAMINE TRANSDERMAL SYSTEM 1 MG/1
1 PATCH, EXTENDED RELEASE TRANSDERMAL
Status: DISCONTINUED | OUTPATIENT
Start: 2024-05-10 | End: 2024-05-10 | Stop reason: HOSPADM

## 2024-05-10 RX ORDER — OXYCODONE HYDROCHLORIDE 10 MG/1
10 TABLET ORAL EVERY 4 HOURS PRN
Status: DISCONTINUED | OUTPATIENT
Start: 2024-05-10 | End: 2024-05-10

## 2024-05-10 RX ORDER — HEPARIN SODIUM,PORCINE/PF 10 UNIT/ML
50 SYRINGE (ML) INTRAVENOUS AS NEEDED
Status: DISCONTINUED | OUTPATIENT
Start: 2024-05-10 | End: 2024-05-10 | Stop reason: HOSPADM

## 2024-05-10 RX ORDER — OXYCODONE HYDROCHLORIDE 5 MG/1
5 TABLET ORAL EVERY 4 HOURS PRN
Status: DISCONTINUED | OUTPATIENT
Start: 2024-05-10 | End: 2024-05-13

## 2024-05-10 RX ORDER — LIDOCAINE 560 MG/1
1 PATCH PERCUTANEOUS; TOPICAL; TRANSDERMAL DAILY
Status: DISCONTINUED | OUTPATIENT
Start: 2024-05-10 | End: 2024-05-10 | Stop reason: HOSPADM

## 2024-05-10 RX ORDER — HYDROMORPHONE HYDROCHLORIDE 1 MG/ML
1 INJECTION, SOLUTION INTRAMUSCULAR; INTRAVENOUS; SUBCUTANEOUS ONCE
Status: COMPLETED | OUTPATIENT
Start: 2024-05-10 | End: 2024-05-10

## 2024-05-10 RX ORDER — LORAZEPAM 1 MG/1
1 TABLET ORAL EVERY 8 HOURS PRN
Status: DISCONTINUED | OUTPATIENT
Start: 2024-05-10 | End: 2024-05-10 | Stop reason: HOSPADM

## 2024-05-10 RX ORDER — LIDOCAINE 560 MG/1
1 PATCH PERCUTANEOUS; TOPICAL; TRANSDERMAL DAILY
Status: DISCONTINUED | OUTPATIENT
Start: 2024-05-11 | End: 2024-05-15

## 2024-05-10 RX ORDER — HYDROMORPHONE HYDROCHLORIDE 1 MG/ML
0.6 INJECTION, SOLUTION INTRAMUSCULAR; INTRAVENOUS; SUBCUTANEOUS
Status: DISCONTINUED | OUTPATIENT
Start: 2024-05-10 | End: 2024-05-10 | Stop reason: HOSPADM

## 2024-05-10 RX ORDER — POLYETHYLENE GLYCOL 3350 17 G/17G
17 POWDER, FOR SOLUTION ORAL DAILY PRN
Status: DISCONTINUED | OUTPATIENT
Start: 2024-05-10 | End: 2024-05-12

## 2024-05-10 RX ORDER — ONDANSETRON 4 MG/1
4 TABLET, FILM COATED ORAL EVERY 8 HOURS PRN
Status: DISCONTINUED | OUTPATIENT
Start: 2024-05-10 | End: 2024-05-10 | Stop reason: HOSPADM

## 2024-05-10 RX ORDER — OXYCODONE HYDROCHLORIDE 10 MG/1
10 TABLET ORAL EVERY 4 HOURS PRN
Status: DISCONTINUED | OUTPATIENT
Start: 2024-05-10 | End: 2024-05-10 | Stop reason: HOSPADM

## 2024-05-10 RX ORDER — FENTANYL 50 UG/1
1 PATCH TRANSDERMAL
Status: DISCONTINUED | OUTPATIENT
Start: 2024-05-12 | End: 2024-05-10 | Stop reason: HOSPADM

## 2024-05-10 RX ORDER — CHOLECALCIFEROL (VITAMIN D3) 25 MCG
4000 TABLET ORAL EVERY MORNING
Status: DISCONTINUED | OUTPATIENT
Start: 2024-05-11 | End: 2024-05-10 | Stop reason: HOSPADM

## 2024-05-10 RX ORDER — MULTIVIT-MIN/IRON FUM/FOLIC AC 7.5 MG-4
1 TABLET ORAL DAILY
Status: DISCONTINUED | OUTPATIENT
Start: 2024-05-10 | End: 2024-05-10 | Stop reason: HOSPADM

## 2024-05-10 RX ORDER — OXYCODONE HYDROCHLORIDE 5 MG/1
10 TABLET ORAL EVERY 4 HOURS PRN
Status: DISCONTINUED | OUTPATIENT
Start: 2024-05-10 | End: 2024-05-13

## 2024-05-10 RX ORDER — HEPARIN SODIUM,PORCINE/PF 10 UNIT/ML
50 SYRINGE (ML) INTRAVENOUS EVERY 12 HOURS
Status: DISCONTINUED | OUTPATIENT
Start: 2024-05-10 | End: 2024-05-10 | Stop reason: HOSPADM

## 2024-05-10 RX ORDER — FENTANYL 50 UG/1
1 PATCH TRANSDERMAL
Status: DISCONTINUED | OUTPATIENT
Start: 2024-05-10 | End: 2024-05-12

## 2024-05-10 RX ORDER — POTASSIUM CHLORIDE 20 MEQ/1
20 TABLET, EXTENDED RELEASE ORAL ONCE
Status: COMPLETED | OUTPATIENT
Start: 2024-05-10 | End: 2024-05-10

## 2024-05-10 RX ORDER — PANTOPRAZOLE SODIUM 40 MG/1
40 TABLET, DELAYED RELEASE ORAL
Status: DISCONTINUED | OUTPATIENT
Start: 2024-05-11 | End: 2024-05-10 | Stop reason: HOSPADM

## 2024-05-10 RX ORDER — POTASSIUM CHLORIDE 750 MG/1
10 TABLET, FILM COATED, EXTENDED RELEASE ORAL DAILY
Status: DISCONTINUED | OUTPATIENT
Start: 2024-05-10 | End: 2024-05-10 | Stop reason: HOSPADM

## 2024-05-10 RX ORDER — ONDANSETRON HYDROCHLORIDE 2 MG/ML
4 INJECTION, SOLUTION INTRAVENOUS EVERY 8 HOURS PRN
Status: DISCONTINUED | OUTPATIENT
Start: 2024-05-10 | End: 2024-05-10 | Stop reason: HOSPADM

## 2024-05-10 RX ORDER — SUCRALFATE 1 G/1
1 TABLET ORAL DAILY
Status: DISCONTINUED | OUTPATIENT
Start: 2024-05-10 | End: 2024-05-10 | Stop reason: HOSPADM

## 2024-05-10 RX ADMIN — HYDROMORPHONE HYDROCHLORIDE 0.6 MG: 1 INJECTION, SOLUTION INTRAMUSCULAR; INTRAVENOUS; SUBCUTANEOUS at 20:00

## 2024-05-10 RX ADMIN — POTASSIUM CHLORIDE 20 MEQ: 1500 TABLET, EXTENDED RELEASE ORAL at 20:01

## 2024-05-10 RX ADMIN — HYDROMORPHONE HYDROCHLORIDE 1 MG: 1 INJECTION, SOLUTION INTRAMUSCULAR; INTRAVENOUS; SUBCUTANEOUS at 07:36

## 2024-05-10 RX ADMIN — PREDNISONE 80 MG: 20 TABLET ORAL at 17:03

## 2024-05-10 RX ADMIN — SUCRALFATE 1 G: 1 TABLET ORAL at 17:03

## 2024-05-10 RX ADMIN — LIDOCAINE 4% 1 PATCH: 40 PATCH TOPICAL at 16:59

## 2024-05-10 RX ADMIN — APIXABAN 5 MG: 5 TABLET, FILM COATED ORAL at 20:01

## 2024-05-10 RX ADMIN — HYDROMORPHONE HYDROCHLORIDE 0.6 MG: 1 INJECTION, SOLUTION INTRAMUSCULAR; INTRAVENOUS; SUBCUTANEOUS at 16:59

## 2024-05-10 RX ADMIN — HYDROMORPHONE HYDROCHLORIDE 1 MG: 1 INJECTION, SOLUTION INTRAMUSCULAR; INTRAVENOUS; SUBCUTANEOUS at 03:13

## 2024-05-10 RX ADMIN — HYDROMORPHONE HYDROCHLORIDE 0.6 MG: 1 INJECTION, SOLUTION INTRAMUSCULAR; INTRAVENOUS; SUBCUTANEOUS at 23:11

## 2024-05-10 RX ADMIN — POTASSIUM CHLORIDE 10 MEQ: 750 TABLET, EXTENDED RELEASE ORAL at 17:03

## 2024-05-10 RX ADMIN — HYDROMORPHONE HYDROCHLORIDE 1 MG: 1 INJECTION, SOLUTION INTRAMUSCULAR; INTRAVENOUS; SUBCUTANEOUS at 12:06

## 2024-05-10 RX ADMIN — HEPARIN, PORCINE (PF) 10 UNIT/ML INTRAVENOUS SYRINGE 50 UNITS: at 17:04

## 2024-05-10 SDOH — SOCIAL STABILITY: SOCIAL INSECURITY: HAS ANYONE EVER THREATENED TO HURT YOUR FAMILY OR YOUR PETS?: NO

## 2024-05-10 SDOH — SOCIAL STABILITY: SOCIAL INSECURITY: ARE YOU OR HAVE YOU BEEN THREATENED OR ABUSED PHYSICALLY, EMOTIONALLY, OR SEXUALLY BY ANYONE?: NO

## 2024-05-10 SDOH — SOCIAL STABILITY: SOCIAL INSECURITY: HAVE YOU HAD ANY THOUGHTS OF HARMING ANYONE ELSE?: NO

## 2024-05-10 SDOH — SOCIAL STABILITY: SOCIAL INSECURITY: WERE YOU ABLE TO COMPLETE ALL THE BEHAVIORAL HEALTH SCREENINGS?: YES

## 2024-05-10 SDOH — SOCIAL STABILITY: SOCIAL INSECURITY: DO YOU FEEL UNSAFE GOING BACK TO THE PLACE WHERE YOU ARE LIVING?: NO

## 2024-05-10 SDOH — SOCIAL STABILITY: SOCIAL INSECURITY: HAVE YOU HAD THOUGHTS OF HARMING ANYONE ELSE?: NO

## 2024-05-10 SDOH — SOCIAL STABILITY: SOCIAL INSECURITY: ABUSE: ADULT

## 2024-05-10 SDOH — SOCIAL STABILITY: SOCIAL INSECURITY: DOES ANYONE TRY TO KEEP YOU FROM HAVING/CONTACTING OTHER FRIENDS OR DOING THINGS OUTSIDE YOUR HOME?: NO

## 2024-05-10 SDOH — SOCIAL STABILITY: SOCIAL INSECURITY: ARE THERE ANY APPARENT SIGNS OF INJURIES/BEHAVIORS THAT COULD BE RELATED TO ABUSE/NEGLECT?: NO

## 2024-05-10 SDOH — SOCIAL STABILITY: SOCIAL INSECURITY: DO YOU FEEL ANYONE HAS EXPLOITED OR TAKEN ADVANTAGE OF YOU FINANCIALLY OR OF YOUR PERSONAL PROPERTY?: NO

## 2024-05-10 ASSESSMENT — COGNITIVE AND FUNCTIONAL STATUS - GENERAL
DAILY ACTIVITIY SCORE: 24
PATIENT BASELINE BEDBOUND: NO
DAILY ACTIVITIY SCORE: 24
PATIENT BASELINE BEDBOUND: NO
MOBILITY SCORE: 24
MOBILITY SCORE: 24

## 2024-05-10 ASSESSMENT — ACTIVITIES OF DAILY LIVING (ADL)
DRESSING YOURSELF: INDEPENDENT
HEARING - LEFT EAR: FUNCTIONAL
JUDGMENT_ADEQUATE_SAFELY_COMPLETE_DAILY_ACTIVITIES: YES
JUDGMENT_ADEQUATE_SAFELY_COMPLETE_DAILY_ACTIVITIES: YES
WALKS IN HOME: INDEPENDENT
PATIENT'S MEMORY ADEQUATE TO SAFELY COMPLETE DAILY ACTIVITIES?: YES
LACK_OF_TRANSPORTATION: NO
ASSISTIVE_DEVICE: EYEGLASSES
HEARING - LEFT EAR: FUNCTIONAL
TOILETING: INDEPENDENT
ADEQUATE_TO_COMPLETE_ADL: YES
WALKS IN HOME: INDEPENDENT
HEARING - RIGHT EAR: FUNCTIONAL
PATIENT'S MEMORY ADEQUATE TO SAFELY COMPLETE DAILY ACTIVITIES?: YES
LACK_OF_TRANSPORTATION: NO
TOILETING: INDEPENDENT
GROOMING: INDEPENDENT
DRESSING YOURSELF: INDEPENDENT
BATHING: INDEPENDENT
HEARING - RIGHT EAR: FUNCTIONAL
BATHING: INDEPENDENT
ADEQUATE_TO_COMPLETE_ADL: YES
GROOMING: INDEPENDENT
FEEDING YOURSELF: INDEPENDENT
FEEDING YOURSELF: INDEPENDENT

## 2024-05-10 ASSESSMENT — ENCOUNTER SYMPTOMS
SHORTNESS OF BREATH: 0
DIARRHEA: 0
FLANK PAIN: 0
FATIGUE: 0
COUGH: 0
TROUBLE SWALLOWING: 0
ARTHRALGIAS: 0
PALPITATIONS: 0
ABDOMINAL DISTENTION: 0
MYALGIAS: 1
CONFUSION: 0
DIZZINESS: 0
CHILLS: 0
ACTIVITY CHANGE: 0
CONSTIPATION: 0
ADENOPATHY: 0
AGITATION: 0
SORE THROAT: 0
VOICE CHANGE: 0
WHEEZING: 0
FEVER: 0
HEADACHES: 0
DIFFICULTY URINATING: 0
WEAKNESS: 0
HEMATURIA: 0
VOMITING: 0
NECK STIFFNESS: 0
NAUSEA: 0
APNEA: 0
BACK PAIN: 1
WOUND: 0
NUMBNESS: 0
NECK PAIN: 0

## 2024-05-10 ASSESSMENT — PATIENT HEALTH QUESTIONNAIRE - PHQ9
1. LITTLE INTEREST OR PLEASURE IN DOING THINGS: NOT AT ALL
1. LITTLE INTEREST OR PLEASURE IN DOING THINGS: NOT AT ALL
SUM OF ALL RESPONSES TO PHQ9 QUESTIONS 1 & 2: 0
2. FEELING DOWN, DEPRESSED OR HOPELESS: NOT AT ALL
SUM OF ALL RESPONSES TO PHQ9 QUESTIONS 1 & 2: 0
2. FEELING DOWN, DEPRESSED OR HOPELESS: NOT AT ALL

## 2024-05-10 ASSESSMENT — LIFESTYLE VARIABLES
HOW MANY STANDARD DRINKS CONTAINING ALCOHOL DO YOU HAVE ON A TYPICAL DAY: PATIENT DOES NOT DRINK
AUDIT-C TOTAL SCORE: 0
HOW OFTEN DO YOU HAVE A DRINK CONTAINING ALCOHOL: NEVER
AUDIT-C TOTAL SCORE: 0
HOW OFTEN DO YOU HAVE 6 OR MORE DRINKS ON ONE OCCASION: NEVER
AUDIT-C TOTAL SCORE: 0
HOW OFTEN DO YOU HAVE 6 OR MORE DRINKS ON ONE OCCASION: NEVER
SKIP TO QUESTIONS 9-10: 1
PRESCIPTION_ABUSE_PAST_12_MONTHS: NO
AUDIT-C TOTAL SCORE: 0
SKIP TO QUESTIONS 9-10: 1
SUBSTANCE_ABUSE_PAST_12_MONTHS: NO
HOW MANY STANDARD DRINKS CONTAINING ALCOHOL DO YOU HAVE ON A TYPICAL DAY: PATIENT DOES NOT DRINK
HOW OFTEN DO YOU HAVE A DRINK CONTAINING ALCOHOL: NEVER

## 2024-05-10 ASSESSMENT — PAIN SCALES - GENERAL
PAINLEVEL_OUTOF10: 5 - MODERATE PAIN
PAINLEVEL_OUTOF10: 0 - NO PAIN
PAINLEVEL_OUTOF10: 10 - WORST POSSIBLE PAIN
PAINLEVEL_OUTOF10: 8
PAINLEVEL_OUTOF10: 5 - MODERATE PAIN
PAINLEVEL_OUTOF10: 8

## 2024-05-10 ASSESSMENT — PAIN DESCRIPTION - DESCRIPTORS: DESCRIPTORS: ACHING;SHOOTING

## 2024-05-10 ASSESSMENT — PAIN - FUNCTIONAL ASSESSMENT
PAIN_FUNCTIONAL_ASSESSMENT: 0-10

## 2024-05-10 ASSESSMENT — PAIN DESCRIPTION - LOCATION: LOCATION: BACK

## 2024-05-10 NOTE — PROGRESS NOTES
05/10/24 0851   Discharge Planning   Living Arrangements Spouse/significant other   Support Systems Spouse/significant other   Assistance Needed A&OX3; very independent with no DME; drives; room air baseline and currently room air   Type of Residence Private residence   Number of Stairs to Enter Residence 4   Number of Stairs Within Residence 14   Do you have animals or pets at home? Yes   Type of Animals or Pets 2 dogs, 2 cats, 5 fish   Who is requesting discharge planning? Provider   Patient expects to be discharged to: Patient denies any home going needs at this time. DC dispo is transfer from [facility] to [facility]   Does the patient need discharge transport arranged? Yes   RoundTrip coordination needed? Yes   Has discharge transport been arranged? No   Financial Resource Strain   How hard is it for you to pay for the very basics like food, housing, medical care, and heating? Not hard   Housing Stability   In the last 12 months, was there a time when you were not able to pay the mortgage or rent on time? N   In the last 12 months, how many places have you lived? 1   In the last 12 months, was there a time when you did not have a steady place to sleep or slept in a shelter (including now)? N   Transportation Needs   In the past 12 months, has lack of transportation kept you from medical appointments or from getting medications? no   In the past 12 months, has lack of transportation kept you from meetings, work, or from getting things needed for daily living? No     05/10/2024 0853am  Spoke with patient bedside in ED. Patient very independent and denies any home going needs at this time. Currently pending transfer from [facility] ED to [facility]

## 2024-05-10 NOTE — PROGRESS NOTES
Emergency Medicine Transition of Care Note.    I received Massimo Garcia in signout from Dr. Mariscal.  Please see the previous ED provider note for all HPI, PE and MDM up to the time of signout at 0600. This is in addition to the primary record.    In brief Massimo Garcia is an 63 y.o. male presenting for   Chief Complaint   Patient presents with    Back Pain     Pt has hx of esophageal cancer currently on treatment. Pt began experiencing sharp lower back pain last night. Pt took 10mg of oxy and a 50mg fentanyl patch and states it is not working     At the time of signout we were awaitin-year-old awaiting [facility].  Hemodynamically stable and resting comfortably on reevaluation.  Patient requiring multiple doses of IV hydromorphone.  After a long discussion with patient and family decided that he would like to stay for transfer for MRI and radiation oncology after trying to pain control him here and potentially getting radiation oncology as an outpatient.  Patient admitted to medicine awaiting a [facility] bed as it may be days.    ED Course as of 05/10/24 1533   Thu May 09, 2024   2313 POTASSIUM(!): 3.0 [WL]   2314 POTASSIUM(!): 3.0  On reevaluation has some improvement with Dilaudid but pain returns was given another dose.  Potassium found to be low so was given p.o. replacement. [WL]   6401 Ct shows Multiple pulmonary nodules in the imaged lower lungs compatible with  metastatic disease. There is wall thickening and apparent  hyperenhancement of the anterior wall of the distal esophagus which  may relate to patient's known esophageal malignancy. There is also  redemonstration of multiple hepatic masses and abdominal and pelvic  lymphadenopathy compatible with metastatic disease.      Soft tissue thickening and nodularity in the paravertebral soft  tissues at the level of the imaged lower thoracic spine compatible  with metastatic disease. Evaluation of the soft tissues of the spinal  canal is limited, however there  is evidence of soft tissue extension  into the spinal canal at T9 and T10 resulting in spinal canal  stenosis. Neurosurgery consultation/evaluation and MRI is recommended  for better evaluation of the metastatic disease and spinal canal  stenosis.   [WL]   4710 Case was discussed with neurosurgery spoke with Dr. Hutchins who recommended patient be transferred downtown to oncology service.  No need for emergent MRI at this time but if there is long transfer times would get MRI here as he will get 1 downtown on transfer. [WL]   Fri May 10, 2024   0015 Spoke with oncology Dr. Serrano who agrees on transfer. [WL]   7799 Spoke to Dr. Blake who will attempt to try to arrange radiation oncology for the patient.  Unfortunately there are still no [facility] beds [HD]      ED Course User Index  [HD] Candie Arredondo DO  [WL] David Mariscal DO         Diagnoses as of 05/10/24 1533   Spinal stenosis of thoracolumbar region   Hx of metastatic neoplastic disease   Hypokalemia   Abnormal CT scan       Procedure  Procedures    Candie Arredondo DO

## 2024-05-10 NOTE — H&P
History Of Present Illness  Massimo Garcia is a 63 y.o. male presenting with ***.     Per patient,       ED Course:  -Vitals:   Temp   ,HR   , SPO2    ,RR    , BP   -Labs:  CBC:  CMP:  High sensitivity troponin, COVID-19  - UA:  -EKG:  -Imaging:  -Meds &Fluids       Past Medical History:   Allergies:  Surgical History:  Family History:  Residence:  Medications:     Social History: Tobacco, Alcohol, Illicit Drug Use    CODE STATUS:     PCP:   Cardiologist:   GI:   Nephrologist:   Neurologist:   Oncologist:   Urologist:    12 Point ROS negative unless otherwise specified above.      Past Medical History  He has a past medical history of Essential (primary) hypertension (12/21/2013), Pacemaker, Peripheral neuropathy, Personal history of other diseases of the circulatory system, and Pneumothorax (12/04/2023).    Surgical History  He has a past surgical history that includes Total knee arthroplasty (09/30/2016); Total knee arthroplasty (09/30/2016); Cardiac electrophysiology procedure (N/A, 11/7/2023); and Cardiac electrophysiology procedure (Left, 11/9/2023).     Social History  He reports that he has quit smoking. His smoking use included cigarettes and cigars. He has never been exposed to tobacco smoke. He has never used smokeless tobacco. He reports that he does not currently use alcohol. He reports current drug use. Drug: Marijuana.    Family History  Family History   Problem Relation Name Age of Onset   • Cancer Father          Allergies  Bee venom protein (honey bee) and Oxaliplatin    Review of Systems     Physical Exam     Last Recorded Vitals  /85   Pulse 77   Temp 36.7 °C (98.1 °F)   Resp 18   Wt 70.3 kg (155 lb)   SpO2 98%     Relevant Results  {If you would like to pull in Medications, type .meds     If you would like to pull in Lab results for the last 24 hours, type .[id number]    If you would like to pull in Imaging results, type .imgrslt :99}      ***     Assessment/Plan   Principal Problem:     Spinal stenosis of thoracolumbar region      ***    ACUTE ISSUES:  #***:  - ***  - ***  PLAN:  - ***    #***:  - ***  - ***  PLAN:  - ***    #***:  - ***  - ***  PLAN:  - ***    CHRONIC ISSUES:  #*** - continue home ***  #*** - continue home ***  #*** - continue home ***      Fluid: Replete PRN  Electrolytes: Replete PRN  Nutrition: ***  GI PPx: ***  DVT/PE PPx: ***  Abx: ***(***/***-)  IV Lines: PIV  O2: ***    Disposition: ***    Rusty Pat MD  Transitional Year Resident  PGY-1  Epic Chat       ***       Rusty Pat MD     Detail Level: Generalized

## 2024-05-10 NOTE — H&P
History Of Present Illness  Massimo Garcia is a 63 y.o. male with PMHx significant for third degree heart block s/p PPM (placed in November), esophageal adenocarcinoma with mets to liver and lungs, Drug-induced colitis, PE, peripheral neuropathy, and portal vein thrombosis (On Eliquis) presenting with two day history of worsening low back pain.  Patient has history of chronic low back pain, but states that the pain got acutely worse two days ago out of nowhere when he was getting off the couch.  He describes it as a sharp stabbing pain that he localizes to the midline area of his low back.  He denies any radiation of the pain into his buttocks or down his legs.  At its worst the pain is a 9/10 that inhibits him from being able to comfortably sit or lay down.  He uses a fentanyl patch and takes oxycodone regularly for his cancer related pain at baseline, but these did provide significant relief from his back pain.  He denies any saddle anesthesia, lower extremity numbness/tingling/weakness, or bowel/bladder incontinence.  He states that the pain usually feels better when he is standing and leaning forward.      ED Course:  -Vitals:   Temp 36.7C, , SPO2 99%, RR 18, /75  -Labs:  CBC: Hb 10.1, WBC 8.8, Hct 30.1, Plt 113  CMP: Na 135, K 3.0, Cl 101, HCO3 25, BUN 22, Cr 0.86, Ca 9.0, Alk Phos 147, T Bili 1.3  Lipase: 18  M.75  COVID: Negative  - UA: Unremarkable  -Imaging:   CT Abd/Pelvis: Multiple pulmonary nodules  in lower lungs consistent with metastatic disease.  Multiple hepatic masses and abdominal and pelvic lymphadenopathy consistent with metastatic disease   CT L-spine: Soft tissue thickening and nodularity in the paravertebral soft tissues at the level of the imaged lower T-spine compatible with metastatic disease.  Evidence of soft tissue extension into the spinal canal at T9-T10 with associated spinal canal stenosis.  Multilevel degenerative spinal canal narrowing in the lumbar spine  -Meds &Fluids     Dilaudid 1mg IV x5     Past Medical History  He has a past medical history of Essential (primary) hypertension (12/21/2013), Pacemaker, Peripheral neuropathy, Personal history of other diseases of the circulatory system, and Pneumothorax (12/04/2023).    Surgical History  He has a past surgical history that includes Total knee arthroplasty (09/30/2016); Total knee arthroplasty (09/30/2016); Cardiac electrophysiology procedure (N/A, 11/7/2023); and Cardiac electrophysiology procedure (Left, 11/9/2023).     Social History  He reports that he has quit smoking. His smoking use included cigarettes and cigars. He has never been exposed to tobacco smoke. He has never used smokeless tobacco. He reports that he does not currently use alcohol. He reports current drug use. Drug: Marijuana.    Family History  Family History   Problem Relation Name Age of Onset    Cancer Father          Allergies  Bee venom protein (honey bee) and Oxaliplatin    Review of Systems     Physical Exam  Constitutional:       General: He is not in acute distress.     Appearance: He is not toxic-appearing.   HENT:      Mouth/Throat:      Mouth: Mucous membranes are moist.      Pharynx: Oropharynx is clear.   Eyes:      Extraocular Movements: Extraocular movements intact.      Conjunctiva/sclera: Conjunctivae normal.   Cardiovascular:      Rate and Rhythm: Normal rate and regular rhythm.      Heart sounds: No murmur heard.     No gallop.   Pulmonary:      Effort: Pulmonary effort is normal. No respiratory distress.      Breath sounds: Normal breath sounds. No wheezing or rales.   Abdominal:      General: Bowel sounds are normal. There is no distension.      Palpations: Abdomen is soft.      Tenderness: There is no abdominal tenderness.   Musculoskeletal:      Comments: 5/5 strength in the bilateral lower extremities.  Negative straight leg raise bilaterally.  SILT in the bilateral LE.  Pain to palpation over the spinous processes of L2-L5, with some  mild pain to palpation over the right buttock.   Neurological:      General: No focal deficit present.      Mental Status: He is alert and oriented to person, place, and time. Mental status is at baseline.          Last Recorded Vitals  /70 (BP Location: Left arm, Patient Position: Sitting)   Pulse 101   Temp 36.7 °C (98.1 °F) (Temporal)   Resp 18   Wt 70.3 kg (155 lb)   SpO2 98%     Relevant Results  Scheduled medications  apixaban, 5 mg, oral, BID  [START ON 5/11/2024] cholecalciferol, 4,000 Units, oral, q AM  fentaNYL, 1 patch, transdermal, q72h  heparin flush, 50 Units, intra-catheter, q12h  heparin flush, 50 Units, intra-catheter, q12h  lidocaine, 1 patch, transdermal, Daily  potassium chloride CR, 10 mEq, oral, Daily  predniSONE, 80 mg, oral, q24h  scopolamine, 1 patch, transdermal, q72h  sucralfate, 1 g, oral, Daily      Continuous medications     PRN medications  PRN medications: alteplase, heparin flush, heparin flush, HYDROmorphone, LORazepam, ondansetron **OR** ondansetron, oxyCODONE  CT abdomen pelvis w IV contrast    Result Date: 5/9/2024  Interpreted By:  Manfred Azul, STUDY: CT ABDOMEN PELVIS W IV CONTRAST; CT LUMBAR SPINE WO IV CONTRAST; 5/9/2024 10:38 pm   INDICATION: Signs/Symptoms:Bilateral flank pain; Signs/Symptoms:pain to entire lumbar back. Stage IV esophageal cancer.   COMPARISON: 5/3/2024   ACCESSION NUMBER(S): [id number]; [id number]   ORDERING CLINICIAN: AFIA STALLINGS   TECHNIQUE: Contiguous axial images of the abdomen and pelvis were obtained after the intravenous administration of  contrast. Coronal and sagittal reformatted images were obtained from the axial images. Reformatted images of the lumbar spine were also performed.   FINDINGS: CT ABDOMEN AND PELVIS:   There is limited evaluation the lung bases. There is redemonstration of multiple nodular opacity at the imaged lung bases compatible with metastatic disease. There is evidence of thickening and question  hyperenhancement of the anterior esophageal wall distally.   There is redemonstration of multiple masses in the right and left hepatic lobes which measure up to 3.6 cm in size compatible metastatic disease. The gallbladder is present. Subtle calcification in the gallbladder may correspond to gallstones. No dilatation of the common bile duct.   The pancreas, spleen, and adrenal glands appear unremarkable.   Symmetric enhancement of the kidneys. No hydronephrosis.   Atherosclerotic calcification of the abdominal aorta and bilateral iliac arteries.   Enlarged tiffany hepatis and peripancreatic lymph nodes measure up to the 2.1 cm. 3.3 cm x 1.2 cm portal caval lymph node. Enlarged retroperitoneal lymph nodes measuring up to 1.7 cm. Stable 2.6 cm cystic lesion along course of right external iliac chain.   No evidence of bowel obstruction.   Urinary bladder is underdistended and not well evaluated.   Small amount of free fluid the pelvis which measures simple fluid attenuation.   Postsurgical change of anterior pelvic wall repair.     CT LUMBAR SPINE:   There is soft tissue thickening and nodularity in the paravertebral soft tissues at the level of the imaged lower thoracic spine compatible with metastatic disease. Evaluation of soft tissue spinal canal is limited, however there is evidence of soft tissue extension into the spinal canal at level of T9 and T10 with associated spinal canal stenosis.   No acute fracture of the lumbar spine. There is multilevel degenerative change of the lumbar spine. There is multilevel intervertebral disc space narrowing and degenerative disc disease. There is limited evaluation of the soft tissues of the spinal canal. There is multilevel degenerative spinal canal narrowing. Multilevel degenerative facet arthropathy.       Multiple pulmonary nodules in the imaged lower lungs compatible with metastatic disease. There is wall thickening and apparent hyperenhancement of the anterior wall of the  distal esophagus which may relate to patient's known esophageal malignancy. There is also redemonstration of multiple hepatic masses and abdominal and pelvic lymphadenopathy compatible with metastatic disease.   Soft tissue thickening and nodularity in the paravertebral soft tissues at the level of the imaged lower thoracic spine compatible with metastatic disease. Evaluation of the soft tissues of the spinal canal is limited, however there is evidence of soft tissue extension into the spinal canal at T9 and T10 resulting in spinal canal stenosis. Neurosurgery consultation/evaluation and MRI is recommended for better evaluation of the metastatic disease and spinal canal stenosis.     MACRO: None   Signed by: Manfred Azul 5/9/2024 11:26 PM Dictation workstation:   [id number]    CT lumbar spine wo IV contrast    Result Date: 5/9/2024  Interpreted By:  Manfred Azul, STUDY: CT ABDOMEN PELVIS W IV CONTRAST; CT LUMBAR SPINE WO IV CONTRAST; 5/9/2024 10:38 pm   INDICATION: Signs/Symptoms:Bilateral flank pain; Signs/Symptoms:pain to entire lumbar back. Stage IV esophageal cancer.   COMPARISON: 5/3/2024   ACCESSION NUMBER(S): [id number]; [id number]   ORDERING CLINICIAN: AFIA STALLINGS   TECHNIQUE: Contiguous axial images of the abdomen and pelvis were obtained after the intravenous administration of  contrast. Coronal and sagittal reformatted images were obtained from the axial images. Reformatted images of the lumbar spine were also performed.   FINDINGS: CT ABDOMEN AND PELVIS:   There is limited evaluation the lung bases. There is redemonstration of multiple nodular opacity at the imaged lung bases compatible with metastatic disease. There is evidence of thickening and question hyperenhancement of the anterior esophageal wall distally.   There is redemonstration of multiple masses in the right and left hepatic lobes which measure up to 3.6 cm in size compatible metastatic disease. The gallbladder is present. Subtle  calcification in the gallbladder may correspond to gallstones. No dilatation of the common bile duct.   The pancreas, spleen, and adrenal glands appear unremarkable.   Symmetric enhancement of the kidneys. No hydronephrosis.   Atherosclerotic calcification of the abdominal aorta and bilateral iliac arteries.   Enlarged tiffany hepatis and peripancreatic lymph nodes measure up to the 2.1 cm. 3.3 cm x 1.2 cm portal caval lymph node. Enlarged retroperitoneal lymph nodes measuring up to 1.7 cm. Stable 2.6 cm cystic lesion along course of right external iliac chain.   No evidence of bowel obstruction.   Urinary bladder is underdistended and not well evaluated.   Small amount of free fluid the pelvis which measures simple fluid attenuation.   Postsurgical change of anterior pelvic wall repair.     CT LUMBAR SPINE:   There is soft tissue thickening and nodularity in the paravertebral soft tissues at the level of the imaged lower thoracic spine compatible with metastatic disease. Evaluation of soft tissue spinal canal is limited, however there is evidence of soft tissue extension into the spinal canal at level of T9 and T10 with associated spinal canal stenosis.   No acute fracture of the lumbar spine. There is multilevel degenerative change of the lumbar spine. There is multilevel intervertebral disc space narrowing and degenerative disc disease. There is limited evaluation of the soft tissues of the spinal canal. There is multilevel degenerative spinal canal narrowing. Multilevel degenerative facet arthropathy.       Multiple pulmonary nodules in the imaged lower lungs compatible with metastatic disease. There is wall thickening and apparent hyperenhancement of the anterior wall of the distal esophagus which may relate to patient's known esophageal malignancy. There is also redemonstration of multiple hepatic masses and abdominal and pelvic lymphadenopathy compatible with metastatic disease.   Soft tissue thickening and  nodularity in the paravertebral soft tissues at the level of the imaged lower thoracic spine compatible with metastatic disease. Evaluation of the soft tissues of the spinal canal is limited, however there is evidence of soft tissue extension into the spinal canal at T9 and T10 resulting in spinal canal stenosis. Neurosurgery consultation/evaluation and MRI is recommended for better evaluation of the metastatic disease and spinal canal stenosis.     MACRO: None   Signed by: Manfred Azul 5/9/2024 11:26 PM Dictation workstation:   [id number]        Assessment/Plan   Principal Problem:    Spinal stenosis of thoracolumbar region    Massimo Garcia is a 63 y.o. male with PMHx significant for third degree heart block s/p PPM (placed in November), esophageal adenocarcinoma with mets to liver and lungs, Drug-induced colitis, PE, peripheral neuropathy, and portal vein thrombosis (On Eliquis) presenting with two day history of worsening low back pain.  CT findings concerning for metastasis to the paravertebral soft tissues with extension into the spinal canal at T9 and T10 resulting in spinal canal stenosis.    ACUTE MEDICAL ISSUES:  #Acute on chronic low back pain likely 2/2 esophageal adenocarcinoma metastasis  -Known history of esophageal adenocarcinoma with mets to the liver and lungs  -CT L-Spine: Soft tissue thickening and nodularity in the paravertebral soft tissues at the level of the imaged lower T-spine compatible with metastatic disease.  Evidence of soft tissue extension into the spinal canal at T9-T10 with associated spinal canal stenosis.  Multilevel degenerative spinal canal narrowing in the lumbar spine    Plan:  -Pain regimen: Fentanyl patch, lidocaine patch to low back, Oxycodone 10mg Q4 PRN for mild/moderate, Dilaudid 0.6 mg IV Q3 hours for severe pain   -Will continue to adjust as needed  -ED initiated process with transfer center to transfer patient to [facility]   -Patient requires  transfer to [facility] for further workup of these symptoms and imaging findings concern for metastatic spread to paravertebral soft tissues resulting in spinal canal stensos including neurosurgery consult, MRI T/L-spine, and radiation oncology consult.  Unable to obtain MRI at [facility] due to patient's pacemaker.  -Heme/Onc consulted, will appreciate further recs     #Hypokalemia   -On 10mEq Kcl daily at home  -ED repleted 40mEq Kcl    Plan:  -Ordered additional 20mEq Kcl  -Continue to follow on morning labs and replete as necessary    #Chronic thrombocytopenia   - Most likely result of treatment for his cancer  - Platelets stable on admission at 113     CHRONIC MEDICAL ISSUES:  # History of PE   # portal vein thrombosis  - Continue home Eliquis 5 mg twice daily     # GERD  -Continue home pantoprazole 40 mg every day.    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:   -Hb stable at 8.8  -Continue to monitor     Fluids: PO  Electrolytes: Replete PRN  Nutrition: Adult regular diet  Antimicrobials: None  DVT ppx:  Eliquis 5 mg  GI ppx: Pantoprazole 40 mg   Lines: Implantable single-lumen port on right chest    Emergency Contact: Extended Emergency Contact Information  Primary Emergency Contact: Jane Garcia  Address: [id number] [place] [address] of [facility]  Home Phone: [phone]  Mobile Phone: [phone]  Relation: Spouse  Secondary Emergency Contact: DA WORKMAN  Mobile Phone: [phone]  Relation: Daughter  Preferred language: English   needed? No   Code: Full Code      Disposition: Massimo Garcia is a 63 y.o. male with esophageal adenocarcinoma with mets to liver and lungs presenting for new acute low back pain with CT findings  concerning for metastasis and resulting spinal stenosis of the low thoracic spine.  Being treated for pain control pending transfer to [facility] for further workup with MRI and radiation oncology treatment.    Rusty Pat MD  Transitional Year Resident  PGY-1  Epic Chat

## 2024-05-10 NOTE — ED PROVIDER NOTES
History/Exam limitations: none  HPI was provided by patient    HPI:    Chief Complaint   Patient presents with    Back Pain     Pt has hx of esophageal cancer currently on treatment. Pt began experiencing sharp lower back pain last night. Pt took 10mg of oxy and a 50mg fentanyl patch and states it is not working        Massimo Garcia is a 63 y.o. male presents with chief complaint of back pain.  Past medical history reviewed below.  Patient states yesterday he he developed lumbar back pain that is nonradiating worse with extension.  No history of back pain.  Denies any heavy lifting or anything he may have been injured.  Does have history of constipation but states he did have a bowel movement today.  Denies any changes in bowel or bladder function.       ROS:   CONSTITUTIONAL fever, chills.  ENT sore throat, congestion, rhinorrhea.  CARDIOVASCULAR chest pain, palpitations.  RESPIRATORY cough, shortness of breath, wheeze.  GI abdominal pain, nausea, vomiting, diarrhea.  GENITOURINARY dysuria, hematuria, frequency.  Urinary incontinence  MUSCULOSKELETAL deformity, neck pain, gait abnormality. back pain  SKIN denies rash, color change.  NEUROLOGIC:  headache, numbness, focal weakness, perianal anesthesia      Physical Exam:  General/Constitutional: Alert, oriented , cooperative,  in no acute distress.  Head: normocephalic, atraumatic  Skin: Intact,  dry skin, no lesions.  Eyes: PERRL, EOMs intact,  Conjunctiva pink with no erythema or exudates. No scleral icterus.   ENT: No external deformities. Nares patent, mucus membranes moist.  Pharynx clear, uvula midline.   Neck: Supple, without meningismus. Trachea at midline. No lymphadenopathy.  Pulmonary: Clear bilaterally. No crackles, rhonchi or wheezing.   Cardiac: Regular rate and rhythm. good pulses.  Abdomen: Soft, nontender, active bowel sounds.  No palpable organomegaly.  No rebound or guarding.  No CVA tenderness.  Genitourinary: Exam deferred.  Musculoskeletal: Range  of motion limited in lumbar back as patient is more comfortable sitting up from lying flat.  No deformity.   Pulses full and equal.   Non-edematous.   no injury, no color change to skin, full range of motion with plantar and dorsiflexion of bilateral lower extremities  C T spine without tender to palpation paraspinal or step-offs or deformities to spinous processes.  L-spine tender to palpation bilateral paraspinal musculature and transverse processes of entire spine.  No reproducible pain with straight leg lift bilateral.  Neurological:  CN II through XII are grossly intact, no focal neuro deficits.  Strength 5 out of 5 throughout bilateral upper and lower extremities neurovascular intact in bilateral upper and lower extremities.   Psychiatric: Appropriate mood and affect. Calm.       MDM/ED COURSE:      Note: This note was dictated by speech recognition. Minor errors in transcription may be present.    Past Medical History:   Diagnosis Date    Essential (primary) hypertension 12/21/2013    Hypertension    Pacemaker     Peripheral neuropathy     Personal history of other diseases of the circulatory system     History of right bundle branch block (RBBB)    Pneumothorax 12/04/2023      Social History     Socioeconomic History    Marital status:      Spouse name: Not on file    Number of children: Not on file    Years of education: Not on file    Highest education level: Not on file   Occupational History    Not on file   Tobacco Use    Smoking status: Former     Types: Cigarettes, Cigars     Passive exposure: Never    Smokeless tobacco: Never   Vaping Use    Vaping status: Unknown   Substance and Sexual Activity    Alcohol use: Not Currently    Drug use: Yes     Types: Marijuana     Comment: medical marjuana card    Sexual activity: Not on file   Other Topics Concern    Not on file   Social History Narrative    Not on file     Social Determinants of Health     Financial Resource Strain: Low Risk  (5/10/2024)     Overall Financial Resource Strain (CARDIA)     Difficulty of Paying Living Expenses: Not hard at all   Food Insecurity: Not on file   Transportation Needs: No Transportation Needs (5/10/2024)    PRAPARE - Transportation     Lack of Transportation (Medical): No     Lack of Transportation (Non-Medical): No   Physical Activity: Not on file   Stress: Not on file   Social Connections: Not on file   Intimate Partner Violence: Not on file   Housing Stability: Low Risk  (5/10/2024)    Housing Stability Vital Sign     Unable to Pay for Housing in the Last Year: No     Number of Places Lived in the Last Year: 1     Unstable Housing in the Last Year: No     Current Outpatient Medications   Medication Instructions    apixaban (Eliquis) 5 mg tablet TAKE 1 TABLET BY MOUTH TWO TIMES A DAY    chlorproMAZINE (THORAZINE) 25 mg, oral, 2 times daily    cholecalciferol (VITAMIN D-3) 100 mcg, oral, Every morning    fentaNYL (Duragesic) 50 mcg/hr patch 1 patch, transdermal, Every 72 hours    lidocaine (Lidoderm) 5 % patch 1 patch, transdermal, Daily, Remove & discard patch within 12 hours or as directed by MD.    LORazepam (ATIVAN) 1 mg, oral, Every 8 hours PRN    medical cannabis 1 each, oral, Last OARRS fills:<BR>1/18/24 Tin Oral Admin-3.67-0-120 Cbn Tincture 440/4 days<BR>1/18/24 Tin Oral Admin-5.5-5.5-120 660/6 days<BR>12/9/23 Oint Top Admin-16.6-30 Mjm 590.00/ 2 days    metoprolol succinate XL (TOPROL-XL) 25 mg, oral, Daily, Do not crush or chew.    multivitamin with minerals (multivit-min-iron fum-folic ac) tablet 2 tablets, oral, Every morning    oxyCODONE (ROXICODONE) 10 mg, oral, Every 4 hours PRN, Last OARRS fill: 2/25/24 #180 for 30 days    potassium chloride CR 10 mEq ER tablet 10 mEq, oral, Daily    predniSONE (DELTASONE) 80 mg, oral, Every 24 hours    scopolamine (Transderm-Scop) 1 mg over 3 days patch 3 day 1 patch, transdermal, Every 72 hours    sodium chloride (Ocean) 0.65 % nasal spray 1 spray, Each Nostril, As needed     sucralfate (CARAFATE) 1 g, oral, Daily PRN     Allergies   Allergen Reactions    Bee Venom Protein (Honey Bee) Anaphylaxis    Oxaliplatin Other     Rigors       ED Triage Vitals [05/09/24 2113]   Temperature Heart Rate Respirations BP   36.7 °C (98.1 °F) (!) 107 18 117/75      Pulse Ox Temp src Heart Rate Source Patient Position   99 % -- -- --      BP Location FiO2 (%)     -- --               Labs and Imaging  CT abdomen pelvis w IV contrast   Final Result   Multiple pulmonary nodules in the imaged lower lungs compatible with   metastatic disease. There is wall thickening and apparent   hyperenhancement of the anterior wall of the distal esophagus which   may relate to patient's known esophageal malignancy. There is also   redemonstration of multiple hepatic masses and abdominal and pelvic   lymphadenopathy compatible with metastatic disease.        Soft tissue thickening and nodularity in the paravertebral soft   tissues at the level of the imaged lower thoracic spine compatible   with metastatic disease. Evaluation of the soft tissues of the spinal   canal is limited, however there is evidence of soft tissue extension   into the spinal canal at T9 and T10 resulting in spinal canal   stenosis. Neurosurgery consultation/evaluation and MRI is recommended   for better evaluation of the metastatic disease and spinal canal   stenosis.             MACRO:   None        Signed by: Manfred Azul 5/9/2024 11:26 PM   Dictation workstation:   [id number]      CT lumbar spine wo IV contrast   Final Result   Multiple pulmonary nodules in the imaged lower lungs compatible with   metastatic disease. There is wall thickening and apparent   hyperenhancement of the anterior wall of the distal esophagus which   may relate to patient's known esophageal malignancy. There is also   redemonstration of multiple hepatic masses and abdominal and pelvic   lymphadenopathy compatible with metastatic disease.        Soft tissue thickening  and nodularity in the paravertebral soft   tissues at the level of the imaged lower thoracic spine compatible   with metastatic disease. Evaluation of the soft tissues of the spinal   canal is limited, however there is evidence of soft tissue extension   into the spinal canal at T9 and T10 resulting in spinal canal   stenosis. Neurosurgery consultation/evaluation and MRI is recommended   for better evaluation of the metastatic disease and spinal canal   stenosis.             MACRO:   None        Signed by: Manfred Azul 5/9/2024 11:26 PM   Dictation workstation:   [id number]        Labs Reviewed   CBC WITH AUTO DIFFERENTIAL - Abnormal       Result Value    WBC 8.8      nRBC 0.2 (*)     RBC 3.43 (*)     Hemoglobin 10.1 (*)     Hematocrit 30.1 (*)     MCV 88      MCH 29.4      MCHC 33.6      RDW 16.8 (*)     Platelets 113 (*)     Neutrophils % 85.7      Immature Granulocytes %, Automated 0.6      Lymphocytes % 5.0      Monocytes % 8.5      Eosinophils % 0.2      Basophils % 0.0      Neutrophils Absolute 7.56      Immature Granulocytes Absolute, Automated 0.05      Lymphocytes Absolute 0.44 (*)     Monocytes Absolute 0.75      Eosinophils Absolute 0.02      Basophils Absolute 0.00     COMPREHENSIVE METABOLIC PANEL - Abnormal    Glucose 109 (*)     Sodium 135 (*)     Potassium 3.0 (*)     Chloride 101      Bicarbonate 25      Anion Gap 12      Urea Nitrogen 22      Creatinine 0.86      eGFR >90      Calcium 9.0      Albumin 3.6      Alkaline Phosphatase 147 (*)     Total Protein 6.2 (*)     AST 30      Bilirubin, Total 1.3 (*)     ALT 19     URINALYSIS WITH REFLEX MICROSCOPIC - Normal    Color, Urine Yellow      Appearance, Urine Clear      Specific Gravity, Urine 1.030      pH, Urine 7.0      Protein, Urine NEGATIVE      Glucose, Urine Normal      Blood, Urine NEGATIVE      Ketones, Urine NEGATIVE      Bilirubin, Urine NEGATIVE      Urobilinogen, Urine Normal      Nitrite, Urine NEGATIVE      Leukocyte Esterase, Urine  NEGATIVE     LIPASE - Normal    Lipase 18      Narrative:     Venipuncture immediately after or during the administration of Metamizole may lead to falsely low results. Testing should be performed immediately prior to Metamizole dosing.   MAGNESIUM - Normal    Magnesium 1.75     SARS-COV-2 PCR - Normal    Coronavirus 2019, PCR Not Detected      Narrative:     This assay has received FDA Emergency Use Authorization (EUA) and is only authorized for the duration of time that circumstances exist to justify the authorization of the emergency use of in vitro diagnostic tests for the detection of SARS-CoV-2 virus and/or diagnosis of COVID-19 infection under section 564(b)(1) of the Act, 21 U.S.C. 360bbb-3(b)(1). This assay is an in vitro diagnostic nucleic acid amplification test for the qualitative detection of SARS-CoV-2 from nasopharyngeal specimens and has been validated for use at [facility]. Negative results do not preclude COVID-19 infections and should not be used as the sole basis for diagnosis, treatment, or other management decisions.           Medical Decision Making:     The patient presented for evaluation for back pain.  Differential includes but not limited to UTI, pyelonephritis, musculoskeletal pain, exacerbation of chronic back pain, pathological lesions.  Besides the back pain patient not having any focal neurodeficits or symptoms consistent with cauda equina so MRI not needed emergently.  CT significant for concerning spinal lesions related to possible metastasis.  Pending bed availability for transfer patient was sent to oncoming physician at shift change.  Was given Dilaudid as needed for pain.    Imaging studies if performed were independently reviewed and interpreted by myself and confirmed by radiologist.        External Records Reviewed: I reviewed recent and relevant outside records    ED Course as of 05/13/24 2110   Thu May 09, 2024   1456 POTASSIUM(!): 3.0 [WL]   7325  POTASSIUM(!): 3.0  On reevaluation has some improvement with Dilaudid but pain returns was given another dose.  Potassium found to be low so was given p.o. replacement. [WL]   2336 Ct shows Multiple pulmonary nodules in the imaged lower lungs compatible with  metastatic disease. There is wall thickening and apparent  hyperenhancement of the anterior wall of the distal esophagus which  may relate to patient's known esophageal malignancy. There is also  redemonstration of multiple hepatic masses and abdominal and pelvic  lymphadenopathy compatible with metastatic disease.      Soft tissue thickening and nodularity in the paravertebral soft  tissues at the level of the imaged lower thoracic spine compatible  with metastatic disease. Evaluation of the soft tissues of the spinal  canal is limited, however there is evidence of soft tissue extension  into the spinal canal at T9 and T10 resulting in spinal canal  stenosis. Neurosurgery consultation/evaluation and MRI is recommended  for better evaluation of the metastatic disease and spinal canal  stenosis.   [WL]   9467 Case was discussed with neurosurgery spoke with Dr. Hutchins who recommended patient be transferred downtown to oncology service.  No need for emergent MRI at this time but if there is long transfer times would get MRI here as he will get 1 downtown on transfer. [WL]   Fri May 10, 2024   0015 Spoke with oncology Dr. Serrano who agrees on transfer. [WL]   0928 Spoke to Dr. Blake who will attempt to try to arrange radiation oncology for the patient.  Unfortunately there are still no [facility] beds [HD]      ED Course User Index  [HD] Candie Arredondo DO  [WL] David Mariscal DO         Diagnoses as of 05/13/24 2110   Spinal stenosis of thoracolumbar region   Hx of metastatic neoplastic disease   Hypokalemia   Abnormal CT scan         CT abdomen pelvis w IV contrast   Final Result   Multiple pulmonary nodules in the imaged lower lungs compatible with   metastatic  disease. There is wall thickening and apparent   hyperenhancement of the anterior wall of the distal esophagus which   may relate to patient's known esophageal malignancy. There is also   redemonstration of multiple hepatic masses and abdominal and pelvic   lymphadenopathy compatible with metastatic disease.        Soft tissue thickening and nodularity in the paravertebral soft   tissues at the level of the imaged lower thoracic spine compatible   with metastatic disease. Evaluation of the soft tissues of the spinal   canal is limited, however there is evidence of soft tissue extension   into the spinal canal at T9 and T10 resulting in spinal canal   stenosis. Neurosurgery consultation/evaluation and MRI is recommended   for better evaluation of the metastatic disease and spinal canal   stenosis.             MACRO:   None        Signed by: Manfred Azul 5/9/2024 11:26 PM   Dictation workstation:   [id number]      CT lumbar spine wo IV contrast   Final Result   Multiple pulmonary nodules in the imaged lower lungs compatible with   metastatic disease. There is wall thickening and apparent   hyperenhancement of the anterior wall of the distal esophagus which   may relate to patient's known esophageal malignancy. There is also   redemonstration of multiple hepatic masses and abdominal and pelvic   lymphadenopathy compatible with metastatic disease.        Soft tissue thickening and nodularity in the paravertebral soft   tissues at the level of the imaged lower thoracic spine compatible   with metastatic disease. Evaluation of the soft tissues of the spinal   canal is limited, however there is evidence of soft tissue extension   into the spinal canal at T9 and T10 resulting in spinal canal   stenosis. Neurosurgery consultation/evaluation and MRI is recommended   for better evaluation of the metastatic disease and spinal canal   stenosis.             MACRO:   None        Signed by: Manfred Azul 5/9/2024 11:26 PM    Dictation workstation:   [id number]        Labs Reviewed   CBC WITH AUTO DIFFERENTIAL - Abnormal       Result Value    WBC 8.8      nRBC 0.2 (*)     RBC 3.43 (*)     Hemoglobin 10.1 (*)     Hematocrit 30.1 (*)     MCV 88      MCH 29.4      MCHC 33.6      RDW 16.8 (*)     Platelets 113 (*)     Neutrophils % 85.7      Immature Granulocytes %, Automated 0.6      Lymphocytes % 5.0      Monocytes % 8.5      Eosinophils % 0.2      Basophils % 0.0      Neutrophils Absolute 7.56      Immature Granulocytes Absolute, Automated 0.05      Lymphocytes Absolute 0.44 (*)     Monocytes Absolute 0.75      Eosinophils Absolute 0.02      Basophils Absolute 0.00     COMPREHENSIVE METABOLIC PANEL - Abnormal    Glucose 109 (*)     Sodium 135 (*)     Potassium 3.0 (*)     Chloride 101      Bicarbonate 25      Anion Gap 12      Urea Nitrogen 22      Creatinine 0.86      eGFR >90      Calcium 9.0      Albumin 3.6      Alkaline Phosphatase 147 (*)     Total Protein 6.2 (*)     AST 30      Bilirubin, Total 1.3 (*)     ALT 19     URINALYSIS WITH REFLEX MICROSCOPIC - Normal    Color, Urine Yellow      Appearance, Urine Clear      Specific Gravity, Urine 1.030      pH, Urine 7.0      Protein, Urine NEGATIVE      Glucose, Urine Normal      Blood, Urine NEGATIVE      Ketones, Urine NEGATIVE      Bilirubin, Urine NEGATIVE      Urobilinogen, Urine Normal      Nitrite, Urine NEGATIVE      Leukocyte Esterase, Urine NEGATIVE     LIPASE - Normal    Lipase 18      Narrative:     Venipuncture immediately after or during the administration of Metamizole may lead to falsely low results. Testing should be performed immediately prior to Metamizole dosing.   MAGNESIUM - Normal    Magnesium 1.75     SARS-COV-2 PCR - Normal    Coronavirus 2019, PCR Not Detected      Narrative:     This assay has received FDA Emergency Use Authorization (EUA) and is only authorized for the duration of time that circumstances exist to justify the authorization of the emergency  use of in vitro diagnostic tests for the detection of SARS-CoV-2 virus and/or diagnosis of COVID-19 infection under section 564(b)(1) of the Act, 21 U.S.C. 360bbb-3(b)(1). This assay is an in vitro diagnostic nucleic acid amplification test for the qualitative detection of SARS-CoV-2 from nasopharyngeal specimens and has been validated for use at [facility]. Negative results do not preclude COVID-19 infections and should not be used as the sole basis for diagnosis, treatment, or other management decisions.             Procedure  Procedures          I discussed the differential, results and plan with the patient and/or family/friend/caregiver if present.          Note: This note was dictated by speech recognition. Minor errors in transcription may be present.           David Mariscal,   05/10/24 0527       David Mariscal DO  05/13/24 1421

## 2024-05-10 NOTE — PROGRESS NOTES
Pharmacy Medication History Review    Massimo Garcia is a 63 y.o. male admitted for No Principal Problem: There is no principal problem currently on the Problem List. Please update the Problem List and refresh.. Pharmacy reviewed the patient's [id number] medications and allergies for accuracy.    The list below reflectives the updated PTA list. Please review each medication in order reconciliation for additional clarification and justification.  Prior to Admission medications    Medication Sig Start Date End Date Taking? Authorizing Provider   apixaban (Eliquis) 5 mg tablet TAKE 1 TABLET BY MOUTH TWO TIMES A DAY 12/28/23 12/26/24 Yes Gloria Chand PA-C   cholecalciferol (Vitamin D-3) 50 MCG (2000 UT) tablet Take 2 tablets (100 mcg) by mouth once daily in the morning.   Yes Historical Provider, MD   fentaNYL (Duragesic) 50 mcg/hr patch Place 1 patch over 72 hours on the skin every 3rd day. 4/19/24 5/19/24 Yes JASPER Ferguson   LORazepam (Ativan) 1 mg tablet Take 1 tablet (1 mg) by mouth every 8 hours if needed for anxiety (nausea).  Patient taking differently: Take 1 tablet (1 mg) by mouth every 8 hours if needed for anxiety (nausea). Last OARRS fill: 4/23/24 #90 for 30 days 4/18/24 5/18/24 Yes JSAPER Ferguson   multivitamin with minerals (multivit-min-iron fum-folic ac) tablet Take 2 tablets by mouth once daily in the morning.   Yes Historical Provider, MD   oxyCODONE (Roxicodone) 5 mg immediate release tablet Take 2 tablets (10 mg) by mouth every 4 hours if needed for severe pain (7 - 10). Last OARRS fill: 2/25/24 #180 for 30 days   Yes Historical Provider, MD   potassium chloride CR 10 mEq ER tablet TAKE 1 TABLET BY MOUTH EVERY DAY 3/15/24  Yes Tomasa Blake MD   predniSONE (Deltasone) 20 mg tablet Take 4 tablets (80 mg) by mouth once every 24 hours for 21 days. 5/6/24 5/27/24 Yes Latrice Mancuso DO   scopolamine (Transderm-Scop) 1 mg over 3 days patch 3 day Place 1 patch on the skin  every 3rd day. 4/29/24  Yes Tomasa Blake MD   chlorproMAZINE (Thorazine) 25 mg tablet Take 1 tablet (25 mg) by mouth 2 times a day.  Patient not taking: Reported on 5/10/2024 3/19/24 6/17/24  Tomasa Blake MD   lidocaine (Lidoderm) 5 % patch Place 1 patch over 12 hours on the skin once daily. Remove & discard patch within 12 hours or as directed by MD.  Patient not taking: Reported on 5/10/2024 4/24/24 5/24/24  Yo Gonsalez PA-C   medical cannabis Take 1 each by mouth. Last OARRS fills:  1/18/24 Tin Oral Admin-3.67-0-120 Cbn Tincture 440/4 days  1/18/24 Tin Oral Admin-5.5-5.5-120 660/6 days  12/9/23 Oint Top Admin-16.6-30 Mjm 590.00/ 2 days    Historical Provider, MD   metoprolol succinate XL (Toprol-XL) 25 mg 24 hr tablet Take 1 tablet (25 mg) by mouth once daily. Do not crush or chew.  Patient not taking: Reported on 5/10/2024 2/19/24 5/22/24  JASPER Evans   sodium chloride (Ocean) 0.65 % nasal spray Administer 1 spray into each nostril if needed for congestion (or nasal dryness/ bleeding). 11/2/23 11/1/24  JASPER Ferguson   sucralfate (Carafate) 1 gram tablet Take 1 tablet (1 g) by mouth once daily as needed. 6/2/23   Historical Provider, MD   amoxicillin-pot clavulanate (Augmentin) 875-125 mg tablet Take 1 tablet by mouth every 12 hours for 10 days. 4/24/24 5/6/24  Yo Gonsalez PA-C   cycloSPORINE (SandIMMUNE) 25 mg capsule Take 2 capsules (50 mg) by mouth once daily. 4/29/24 5/6/24  Tomasa Blake MD   pantoprazole (ProtoNix) 40 mg EC tablet Take 1 tablet (40 mg) by mouth once daily in the morning. Take before meals. 3/23/24 5/6/24  Artie Weir MD        The list below reflectives the updated allergy list. Please review each documented allergy for additional clarification and justification.  Allergies  Reviewed by Radha Strange RN on 5/9/2024        Severity Reactions Comments    Bee Venom Protein (honey Bee) High Anaphylaxis     Oxaliplatin Medium Other  Rigors            Below are additional concerns with the patient's PTA list.      Norma Fontanez

## 2024-05-11 ENCOUNTER — APPOINTMENT (OUTPATIENT)
Dept: RADIOLOGY | Facility: HOSPITAL | Age: 64
DRG: 028 | End: 2024-05-11
Payer: MEDICARE

## 2024-05-11 LAB
ABO GROUP (TYPE) IN BLOOD: NORMAL
ALBUMIN SERPL BCP-MCNC: 3.4 G/DL (ref 3.4–5)
ALBUMIN SERPL BCP-MCNC: 3.8 G/DL (ref 3.4–5)
ALP SERPL-CCNC: 140 U/L (ref 33–136)
ALP SERPL-CCNC: 141 U/L (ref 33–136)
ALT SERPL W P-5'-P-CCNC: 17 U/L (ref 10–52)
ALT SERPL W P-5'-P-CCNC: 18 U/L (ref 10–52)
ANION GAP SERPL CALC-SCNC: 12 MMOL/L (ref 10–20)
ANION GAP SERPL CALC-SCNC: 14 MMOL/L (ref 10–20)
ANTIBODY SCREEN: NORMAL
APTT PPP: 28 SECONDS (ref 27–38)
AST SERPL W P-5'-P-CCNC: 27 U/L (ref 9–39)
AST SERPL W P-5'-P-CCNC: 30 U/L (ref 9–39)
BASOPHILS # BLD AUTO: 0 X10*3/UL (ref 0–0.1)
BASOPHILS # BLD AUTO: 0 X10*3/UL (ref 0–0.1)
BASOPHILS NFR BLD AUTO: 0 %
BASOPHILS NFR BLD AUTO: 0 %
BILIRUB DIRECT SERPL-MCNC: 0.3 MG/DL (ref 0–0.3)
BILIRUB DIRECT SERPL-MCNC: 0.3 MG/DL (ref 0–0.3)
BILIRUB SERPL-MCNC: 1.2 MG/DL (ref 0–1.2)
BILIRUB SERPL-MCNC: 1.5 MG/DL (ref 0–1.2)
BUN SERPL-MCNC: 21 MG/DL (ref 6–23)
BUN SERPL-MCNC: 21 MG/DL (ref 6–23)
CALCIUM SERPL-MCNC: 9.1 MG/DL (ref 8.6–10.6)
CALCIUM SERPL-MCNC: 9.4 MG/DL (ref 8.6–10.6)
CHLORIDE SERPL-SCNC: 101 MMOL/L (ref 98–107)
CHLORIDE SERPL-SCNC: 101 MMOL/L (ref 98–107)
CO2 SERPL-SCNC: 26 MMOL/L (ref 21–32)
CO2 SERPL-SCNC: 27 MMOL/L (ref 21–32)
CREAT SERPL-MCNC: 0.74 MG/DL (ref 0.5–1.3)
CREAT SERPL-MCNC: 0.75 MG/DL (ref 0.5–1.3)
EGFRCR SERPLBLD CKD-EPI 2021: >90 ML/MIN/1.73M*2
EGFRCR SERPLBLD CKD-EPI 2021: >90 ML/MIN/1.73M*2
EOSINOPHIL # BLD AUTO: 0 X10*3/UL (ref 0–0.7)
EOSINOPHIL # BLD AUTO: 0 X10*3/UL (ref 0–0.7)
EOSINOPHIL NFR BLD AUTO: 0 %
EOSINOPHIL NFR BLD AUTO: 0 %
ERYTHROCYTE [DISTWIDTH] IN BLOOD BY AUTOMATED COUNT: 17.1 % (ref 11.5–14.5)
ERYTHROCYTE [DISTWIDTH] IN BLOOD BY AUTOMATED COUNT: 17.2 % (ref 11.5–14.5)
GLUCOSE SERPL-MCNC: 150 MG/DL (ref 74–99)
GLUCOSE SERPL-MCNC: 177 MG/DL (ref 74–99)
HCT VFR BLD AUTO: 29.7 % (ref 41–52)
HCT VFR BLD AUTO: 32.8 % (ref 41–52)
HGB BLD-MCNC: 10 G/DL (ref 13.5–17.5)
HGB BLD-MCNC: 11 G/DL (ref 13.5–17.5)
IMM GRANULOCYTES # BLD AUTO: 0.05 X10*3/UL (ref 0–0.7)
IMM GRANULOCYTES # BLD AUTO: 0.08 X10*3/UL (ref 0–0.7)
IMM GRANULOCYTES NFR BLD AUTO: 0.7 % (ref 0–0.9)
IMM GRANULOCYTES NFR BLD AUTO: 0.7 % (ref 0–0.9)
INR PPP: 1.7 (ref 0.9–1.1)
LYMPHOCYTES # BLD AUTO: 0.23 X10*3/UL (ref 1.2–4.8)
LYMPHOCYTES # BLD AUTO: 0.27 X10*3/UL (ref 1.2–4.8)
LYMPHOCYTES NFR BLD AUTO: 2.1 %
LYMPHOCYTES NFR BLD AUTO: 3.8 %
MAGNESIUM SERPL-MCNC: 1.91 MG/DL (ref 1.6–2.4)
MAGNESIUM SERPL-MCNC: 1.96 MG/DL (ref 1.6–2.4)
MCH RBC QN AUTO: 29.4 PG (ref 26–34)
MCH RBC QN AUTO: 29.9 PG (ref 26–34)
MCHC RBC AUTO-ENTMCNC: 33.5 G/DL (ref 32–36)
MCHC RBC AUTO-ENTMCNC: 33.7 G/DL (ref 32–36)
MCV RBC AUTO: 87 FL (ref 80–100)
MCV RBC AUTO: 89 FL (ref 80–100)
MONOCYTES # BLD AUTO: 0.19 X10*3/UL (ref 0.1–1)
MONOCYTES # BLD AUTO: 0.3 X10*3/UL (ref 0.1–1)
MONOCYTES NFR BLD AUTO: 2.7 %
MONOCYTES NFR BLD AUTO: 2.7 %
NEUTROPHILS # BLD AUTO: 10.46 X10*3/UL (ref 1.2–7.7)
NEUTROPHILS # BLD AUTO: 6.52 X10*3/UL (ref 1.2–7.7)
NEUTROPHILS NFR BLD AUTO: 92.8 %
NEUTROPHILS NFR BLD AUTO: 94.5 %
NRBC BLD-RTO: 0 /100 WBCS (ref 0–0)
NRBC BLD-RTO: 0 /100 WBCS (ref 0–0)
PHOSPHATE SERPL-MCNC: 2.5 MG/DL (ref 2.5–4.9)
PHOSPHATE SERPL-MCNC: 2.5 MG/DL (ref 2.5–4.9)
PLATELET # BLD AUTO: 106 X10*3/UL (ref 150–450)
PLATELET # BLD AUTO: 86 X10*3/UL (ref 150–450)
POTASSIUM SERPL-SCNC: 3.2 MMOL/L (ref 3.5–5.3)
POTASSIUM SERPL-SCNC: 3.6 MMOL/L (ref 3.5–5.3)
PROT SERPL-MCNC: 6 G/DL (ref 6.4–8.2)
PROT SERPL-MCNC: 6.5 G/DL (ref 6.4–8.2)
PROTHROMBIN TIME: 19 SECONDS (ref 9.8–12.8)
RBC # BLD AUTO: 3.4 X10*6/UL (ref 4.5–5.9)
RBC # BLD AUTO: 3.68 X10*6/UL (ref 4.5–5.9)
RH FACTOR (ANTIGEN D): NORMAL
SODIUM SERPL-SCNC: 136 MMOL/L (ref 136–145)
SODIUM SERPL-SCNC: 138 MMOL/L (ref 136–145)
WBC # BLD AUTO: 11.1 X10*3/UL (ref 4.4–11.3)
WBC # BLD AUTO: 7 X10*3/UL (ref 4.4–11.3)

## 2024-05-11 PROCEDURE — 2500000004 HC RX 250 GENERAL PHARMACY W/ HCPCS (ALT 636 FOR OP/ED)

## 2024-05-11 PROCEDURE — 2500000001 HC RX 250 WO HCPCS SELF ADMINISTERED DRUGS (ALT 637 FOR MEDICARE OP)

## 2024-05-11 PROCEDURE — 2500000005 HC RX 250 GENERAL PHARMACY W/O HCPCS

## 2024-05-11 PROCEDURE — 1170000001 HC PRIVATE ONCOLOGY ROOM DAILY

## 2024-05-11 PROCEDURE — 84100 ASSAY OF PHOSPHORUS: CPT

## 2024-05-11 PROCEDURE — 85025 COMPLETE CBC W/AUTO DIFF WBC: CPT

## 2024-05-11 PROCEDURE — 71046 X-RAY EXAM CHEST 2 VIEWS: CPT

## 2024-05-11 PROCEDURE — 82435 ASSAY OF BLOOD CHLORIDE: CPT

## 2024-05-11 PROCEDURE — 83735 ASSAY OF MAGNESIUM: CPT

## 2024-05-11 PROCEDURE — 99232 SBSQ HOSP IP/OBS MODERATE 35: CPT

## 2024-05-11 PROCEDURE — 99222 1ST HOSP IP/OBS MODERATE 55: CPT | Performed by: STUDENT IN AN ORGANIZED HEALTH CARE EDUCATION/TRAINING PROGRAM

## 2024-05-11 PROCEDURE — 80076 HEPATIC FUNCTION PANEL: CPT

## 2024-05-11 RX ORDER — CALCIUM CARBONATE 200(500)MG
500 TABLET,CHEWABLE ORAL ONCE
Status: COMPLETED | OUTPATIENT
Start: 2024-05-11 | End: 2024-05-11

## 2024-05-11 RX ORDER — CALCIUM CARBONATE 200(500)MG
500 TABLET,CHEWABLE ORAL 4 TIMES DAILY PRN
Status: DISCONTINUED | OUTPATIENT
Start: 2024-05-11 | End: 2024-05-15

## 2024-05-11 RX ORDER — POTASSIUM CHLORIDE 29.8 MG/ML
40 INJECTION INTRAVENOUS ONCE
Status: COMPLETED | OUTPATIENT
Start: 2024-05-11 | End: 2024-05-11

## 2024-05-11 RX ORDER — CHLORPROMAZINE HYDROCHLORIDE 25 MG/1
25 TABLET, FILM COATED ORAL EVERY 6 HOURS PRN
Status: DISCONTINUED | OUTPATIENT
Start: 2024-05-11 | End: 2024-05-15

## 2024-05-11 RX ORDER — BACLOFEN 10 MG/1
5 TABLET ORAL ONCE
Status: COMPLETED | OUTPATIENT
Start: 2024-05-11 | End: 2024-05-11

## 2024-05-11 RX ORDER — ENOXAPARIN SODIUM 100 MG/ML
40 INJECTION SUBCUTANEOUS EVERY 24 HOURS
Status: DISCONTINUED | OUTPATIENT
Start: 2024-05-11 | End: 2024-05-15

## 2024-05-11 RX ADMIN — Medication 4000 UNITS: at 09:03

## 2024-05-11 RX ADMIN — Medication 1 TABLET: at 09:03

## 2024-05-11 RX ADMIN — PANTOPRAZOLE SODIUM 40 MG: 40 TABLET, DELAYED RELEASE ORAL at 06:16

## 2024-05-11 RX ADMIN — OXYCODONE HYDROCHLORIDE 10 MG: 5 TABLET ORAL at 05:28

## 2024-05-11 RX ADMIN — HYDROMORPHONE HYDROCHLORIDE 0.6 MG: 1 INJECTION, SOLUTION INTRAMUSCULAR; INTRAVENOUS; SUBCUTANEOUS at 09:31

## 2024-05-11 RX ADMIN — LIDOCAINE 1 PATCH: 4 PATCH TOPICAL at 09:02

## 2024-05-11 RX ADMIN — CALCIUM CARBONATE (ANTACID) CHEW TAB 500 MG 500 MG: 500 CHEW TAB at 09:34

## 2024-05-11 RX ADMIN — ENOXAPARIN SODIUM 40 MG: 40 INJECTION, SOLUTION SUBCUTANEOUS at 17:56

## 2024-05-11 RX ADMIN — POTASSIUM CHLORIDE 40 MEQ: 29.8 INJECTION, SOLUTION INTRAVENOUS at 02:17

## 2024-05-11 RX ADMIN — HYDROMORPHONE HYDROCHLORIDE 0.6 MG: 1 INJECTION, SOLUTION INTRAMUSCULAR; INTRAVENOUS; SUBCUTANEOUS at 17:45

## 2024-05-11 RX ADMIN — CALCIUM CARBONATE (ANTACID) CHEW TAB 500 MG 500 MG: 500 CHEW TAB at 02:12

## 2024-05-11 RX ADMIN — PREDNISONE 80 MG: 20 TABLET ORAL at 09:03

## 2024-05-11 RX ADMIN — BACLOFEN 5 MG: 10 TABLET ORAL at 06:16

## 2024-05-11 RX ADMIN — CHLORPROMAZINE HYDROCHLORIDE 25 MG: 25 TABLET, FILM COATED ORAL at 11:07

## 2024-05-11 RX ADMIN — HYDROMORPHONE HYDROCHLORIDE 0.6 MG: 1 INJECTION, SOLUTION INTRAMUSCULAR; INTRAVENOUS; SUBCUTANEOUS at 13:26

## 2024-05-11 RX ADMIN — HYDROMORPHONE HYDROCHLORIDE 0.6 MG: 1 INJECTION, SOLUTION INTRAMUSCULAR; INTRAVENOUS; SUBCUTANEOUS at 02:12

## 2024-05-11 RX ADMIN — HYDROMORPHONE HYDROCHLORIDE 0.6 MG: 1 INJECTION, SOLUTION INTRAMUSCULAR; INTRAVENOUS; SUBCUTANEOUS at 20:45

## 2024-05-11 ASSESSMENT — COGNITIVE AND FUNCTIONAL STATUS - GENERAL
MOBILITY SCORE: 24
DAILY ACTIVITIY SCORE: 24

## 2024-05-11 ASSESSMENT — PAIN SCALES - GENERAL
PAINLEVEL_OUTOF10: 7
PAINLEVEL_OUTOF10: 0 - NO PAIN
PAINLEVEL_OUTOF10: 7
PAINLEVEL_OUTOF10: 3
PAINLEVEL_OUTOF10: 4
PAINLEVEL_OUTOF10: 7
PAINLEVEL_OUTOF10: 6
PAINLEVEL_OUTOF10: 3
PAINLEVEL_OUTOF10: 7
PAINLEVEL_OUTOF10: 4
PAINLEVEL_OUTOF10: 7
PAINLEVEL_OUTOF10: 4
PAINLEVEL_OUTOF10: 7
PAINLEVEL_OUTOF10: 7

## 2024-05-11 ASSESSMENT — PAIN - FUNCTIONAL ASSESSMENT
PAIN_FUNCTIONAL_ASSESSMENT: 0-10

## 2024-05-11 NOTE — CARE PLAN
Problem: Fall/Injury  Goal: Not fall by end of shift  Outcome: Progressing  Goal: Be free from injury by end of the shift  Outcome: Progressing  Goal: Verbalize understanding of personal risk factors for fall in the hospital  Outcome: Progressing  Goal: Verbalize understanding of risk factor reduction measures to prevent injury from fall in the home  Outcome: Progressing  Goal: Use assistive devices by end of the shift  Outcome: Progressing  Goal: Pace activities to prevent fatigue by end of the shift  Outcome: Progressing     Problem: Pain  Goal: Takes deep breaths with improved pain control throughout the shift  Outcome: Progressing  Goal: Turns in bed with improved pain control throughout the shift  Outcome: Progressing  Goal: Walks with improved pain control throughout the shift  Outcome: Progressing  Goal: Performs ADL's with improved pain control throughout shift  Outcome: Progressing  Goal: Participates in PT with improved pain control throughout the shift  Outcome: Progressing  Goal: Free from opioid side effects throughout the shift  Outcome: Progressing  Goal: Free from acute confusion related to pain meds throughout the shift  Outcome: Progressing   The patient's goals for the shift include      The clinical goals for the shift include pt will remaiin safe and free of injury on shift on 5/10 @2100

## 2024-05-11 NOTE — DISCHARGE SUMMARY
Discharge Diagnosis    Acute on chronic low back pain likely 2/2 esophageal adenocarcinoma metastasis     Summary:   Massimo Garcia is a 63 y.o. male with PMHx significant for third degree heart block s/p PPM (placed in November), esophageal adenocarcinoma with mets to liver and lungs, Drug-induced colitis, PE, peripheral neuropathy, and portal vein thrombosis (On Eliquis) presenting with two day history of worsening low back pain.  CT findings concerning for metastasis to the paravertebral soft tissues with extension into the spinal canal at T9 and T10 resulting in spinal canal stenosis.     Pt transferred to [facility] for radiation oncology and neurosurgery support. He was admitted to [facility] for just a few hours prior to bed assignment at [facility].       Abby Malave DO

## 2024-05-11 NOTE — CONSULTS
Reason For Consult  mass    History Of Present Illness  Massimo Garcia is a 63 y.o. male with h/o HTN, third degree heart block s/p pacemaker (11/2023), portal john thrombosis/prior PE (on Eliquis), OA, esophageal adenocarcinoma (HER2 positive) w/ known mets to lung and liver, s/p chemo (last doses 4/29), recent admission for drug-induced colitis, 5/10 p/w 1d LBP, CT T/L spine diffuse soft tissue mets, T9-10 soft tissue mass with epidural extension (previously seen on multiple imaging modalities)    Patient states the back pain that he has is a different kind of pain than typical. Says its a sharp pain that radiates to the sides of his back but does not endorse any BLE radic, numbness, weakness, paresthesia, n'v, fevers, chills, HA, visual changes. States his pain is better with walking and worse when laying     Past Medical History  He has a past medical history of Essential (primary) hypertension (12/21/2013), Pacemaker, Peripheral neuropathy, Personal history of other diseases of the circulatory system, and Pneumothorax (12/04/2023).    Surgical History  He has a past surgical history that includes Total knee arthroplasty (09/30/2016); Total knee arthroplasty (09/30/2016); Cardiac electrophysiology procedure (N/A, 11/7/2023); and Cardiac electrophysiology procedure (Left, 11/9/2023).     Social History  He reports that he has quit smoking. His smoking use included cigarettes and cigars. He has never been exposed to tobacco smoke. He has never used smokeless tobacco. He reports that he does not currently use alcohol. He reports current drug use. Drug: Marijuana.    Family History  Family History   Problem Relation Name Age of Onset    Cancer Father          Allergies  Bee venom protein (honey bee) and Oxaliplatin    Review of Systems  As above     Physical Exam  NAD  Well perfused  Equal chest rise b/l  No skin lesions   NIKHIL  Appropriate affect    Aox3,   EOMI  PERRL, FS< TM  BUE DBTHGIO 5 no hoffmans   BLE HF KE DF PF  "5 no clonus  SILT     Last Recorded Vitals  Blood pressure 122/83, pulse 101, temperature 35.9 °C (96.6 °F), temperature source Temporal, resp. rate 16, height 1.727 m (5' 8\"), weight 70.3 kg (155 lb), SpO2 98%.    Relevant Results       Assessment/Plan   Massimo Garcia is a 63 y.o. male with h/o HTN, third degree heart block s/p pacemaker (11/2023), portal john thrombosis/prior PE (on Eliquis), OA, esophageal adenocarcinoma (HER2 positive) w/ known mets to lung and liver, s/p chemo (last doses 4/29), recent admission for drug-induced colitis, 5/10 p/w 1d LBP, CT T/L spine diffuse soft tissue mets, T9-10 soft tissue mass with epidural extension (previously seen on multiple imaging modalities)    Patient overall neurologocially intact in the setting of spinal mass    -recommend MRI Brain C/T/L spine w/wo contrast for oncologic workup  -recommend obtaining RCRI/prognosis in th event this is surgical  -recommend obtaing CBC, RFP, coag, T-S, CXR, UA,  -if patient to be considered for surgery we will need stop anticoagulation for appropriate washout  -neurosurgery will follow for imaging          Vargas Berkowitz MD    63-year-old male with a longstanding history of esophageal adenocarcinoma with known metastatic disease who was undergoing chemo for who last had chemotherapy and late April was noted to have a T9-T10 soft tissue mass with epidural extension.  At this time in the setting of metastatic esophageal cancer surgery I think we need new MRIs and staging       Blue Jhon MD, [facility]  Spine , [facility]  Tera Meza and Miguelina Meza Chair in Spinal Neurosurgery  Neurosurgery , [facility]  Complex Spine Surgery Fellowship Director   of Neurological Surgery  [facility] [employer]  Office: [phone]  Fax: [phone]            "

## 2024-05-11 NOTE — PROGRESS NOTES
Massimo Garcia is a 63 y.o. male on day 1 of admission presenting with Esophageal cancer, stage IV (Multi).    Subjective   No acute events overnight. Patient states that he has had a tough night because of hiccups that have been refractory to non-pharmacological interventions (later tried some TUMS). He reports his lower back pain pain remains improved, rates it 6/10 unchanged in quality. He states that the dilaudid helps him more so than anything else. Pt requesting medication to help with hiccups and heartburn.         Objective     Physical Exam  Constitutional:       General: He is not in acute distress.     Appearance: Normal appearance. He is not toxic-appearing.   HENT:      Head: Normocephalic and atraumatic.   Eyes:      Extraocular Movements: Extraocular movements intact.   Cardiovascular:      Rate and Rhythm: Normal rate and regular rhythm.      Heart sounds: Normal heart sounds. No murmur heard.     No friction rub. No gallop.   Pulmonary:      Effort: Pulmonary effort is normal. No respiratory distress.      Breath sounds: Normal breath sounds. No wheezing, rhonchi or rales.   Abdominal:      General: Bowel sounds are normal. There is no distension.      Palpations: Abdomen is soft.      Tenderness: There is no abdominal tenderness. There is no rebound.   Musculoskeletal:         General: Normal range of motion.   Skin:     General: Skin is warm and dry.   Neurological:      General: No focal deficit present.      Mental Status: He is oriented to person, place, and time.      Sensory: No sensory deficit.      Motor: No weakness.      Deep Tendon Reflexes: Reflexes normal.      Comments: BLE with preserved strength to knee flexion/extension, plantar flexion/extension, hip flexion/extension. Straight leg test negative. 2+ achilles and patellar reflexes.   Psychiatric:         Mood and Affect: Mood normal.         Behavior: Behavior normal.     Scheduled medications  [Held by provider] apixaban, 5 mg, oral,  "BID  cholecalciferol, 4,000 Units, oral, q AM  fentaNYL, 1 patch, transdermal, q72h  lidocaine, 1 patch, transdermal, Daily  multivitamin with minerals, 1 tablet, oral, q AM  pantoprazole, 40 mg, oral, Daily before breakfast  predniSONE, 80 mg, oral, q24h      Continuous medications     PRN medications  PRN medications: alteplase, calcium carbonate, chlorproMAZINE, HYDROmorphone, oxyCODONE, oxyCODONE, polyethylene glycol      Last Recorded Vitals  Blood pressure 114/75, pulse 100, temperature 36.5 °C (97.7 °F), temperature source Temporal, resp. rate 16, height 1.727 m (5' 8\"), weight 70.3 kg (155 lb), SpO2 100%.  Intake/Output last 3 Shifts:  No intake/output data recorded.    Relevant Results                Results for orders placed or performed during the hospital encounter of 05/10/24 (from the past 24 hour(s))   CBC and Auto Differential   Result Value Ref Range    WBC 11.1 4.4 - 11.3 x10*3/uL    nRBC 0.0 0.0 - 0.0 /100 WBCs    RBC 3.68 (L) 4.50 - 5.90 x10*6/uL    Hemoglobin 11.0 (L) 13.5 - 17.5 g/dL    Hematocrit 32.8 (L) 41.0 - 52.0 %    MCV 89 80 - 100 fL    MCH 29.9 26.0 - 34.0 pg    MCHC 33.5 32.0 - 36.0 g/dL    RDW 17.2 (H) 11.5 - 14.5 %    Platelets 106 (L) 150 - 450 x10*3/uL    Neutrophils % 94.5 40.0 - 80.0 %    Immature Granulocytes %, Automated 0.7 0.0 - 0.9 %    Lymphocytes % 2.1 13.0 - 44.0 %    Monocytes % 2.7 2.0 - 10.0 %    Eosinophils % 0.0 0.0 - 6.0 %    Basophils % 0.0 0.0 - 2.0 %    Neutrophils Absolute 10.46 (H) 1.20 - 7.70 x10*3/uL    Immature Granulocytes Absolute, Automated 0.08 0.00 - 0.70 x10*3/uL    Lymphocytes Absolute 0.23 (L) 1.20 - 4.80 x10*3/uL    Monocytes Absolute 0.30 0.10 - 1.00 x10*3/uL    Eosinophils Absolute 0.00 0.00 - 0.70 x10*3/uL    Basophils Absolute 0.00 0.00 - 0.10 x10*3/uL   Magnesium   Result Value Ref Range    Magnesium 1.91 1.60 - 2.40 mg/dL   Hepatic function panel   Result Value Ref Range    Albumin 3.8 3.4 - 5.0 g/dL    Bilirubin, Total 1.5 (H) 0.0 - 1.2 " mg/dL    Bilirubin, Direct 0.3 0.0 - 0.3 mg/dL    Alkaline Phosphatase 140 (H) 33 - 136 U/L    ALT 17 10 - 52 U/L    AST 30 9 - 39 U/L    Total Protein 6.5 6.4 - 8.2 g/dL   Coagulation Screen   Result Value Ref Range    Protime 19.0 (H) 9.8 - 12.8 seconds    INR 1.7 (H) 0.9 - 1.1    aPTT 28 27 - 38 seconds   Type And Screen   Result Value Ref Range    ABO TYPE O     Rh TYPE POS     ANTIBODY SCREEN NEG    Phosphorus   Result Value Ref Range    Phosphorus 2.5 2.5 - 4.9 mg/dL   Basic Metabolic Panel   Result Value Ref Range    Glucose 150 (H) 74 - 99 mg/dL    Sodium 136 136 - 145 mmol/L    Potassium 3.2 (L) 3.5 - 5.3 mmol/L    Chloride 101 98 - 107 mmol/L    Bicarbonate 26 21 - 32 mmol/L    Anion Gap 12 10 - 20 mmol/L    Urea Nitrogen 21 6 - 23 mg/dL    Creatinine 0.75 0.50 - 1.30 mg/dL    eGFR >90 >60 mL/min/1.73m*2    Calcium 9.4 8.6 - 10.6 mg/dL   CBC and Auto Differential   Result Value Ref Range    WBC 7.0 4.4 - 11.3 x10*3/uL    nRBC 0.0 0.0 - 0.0 /100 WBCs    RBC 3.40 (L) 4.50 - 5.90 x10*6/uL    Hemoglobin 10.0 (L) 13.5 - 17.5 g/dL    Hematocrit 29.7 (L) 41.0 - 52.0 %    MCV 87 80 - 100 fL    MCH 29.4 26.0 - 34.0 pg    MCHC 33.7 32.0 - 36.0 g/dL    RDW 17.1 (H) 11.5 - 14.5 %    Platelets 86 (L) 150 - 450 x10*3/uL    Neutrophils % 92.8 40.0 - 80.0 %    Immature Granulocytes %, Automated 0.7 0.0 - 0.9 %    Lymphocytes % 3.8 13.0 - 44.0 %    Monocytes % 2.7 2.0 - 10.0 %    Eosinophils % 0.0 0.0 - 6.0 %    Basophils % 0.0 0.0 - 2.0 %    Neutrophils Absolute 6.52 1.20 - 7.70 x10*3/uL    Immature Granulocytes Absolute, Automated 0.05 0.00 - 0.70 x10*3/uL    Lymphocytes Absolute 0.27 (L) 1.20 - 4.80 x10*3/uL    Monocytes Absolute 0.19 0.10 - 1.00 x10*3/uL    Eosinophils Absolute 0.00 0.00 - 0.70 x10*3/uL    Basophils Absolute 0.00 0.00 - 0.10 x10*3/uL   Magnesium   Result Value Ref Range    Magnesium 1.96 1.60 - 2.40 mg/dL   Hepatic Function Panel   Result Value Ref Range    Albumin 3.4 3.4 - 5.0 g/dL    Bilirubin,  Total 1.2 0.0 - 1.2 mg/dL    Bilirubin, Direct 0.3 0.0 - 0.3 mg/dL    Alkaline Phosphatase 141 (H) 33 - 136 U/L    ALT 18 10 - 52 U/L    AST 27 9 - 39 U/L    Total Protein 6.0 (L) 6.4 - 8.2 g/dL   Phosphorus   Result Value Ref Range    Phosphorus 2.5 2.5 - 4.9 mg/dL   Basic Metabolic Panel   Result Value Ref Range    Glucose 177 (H) 74 - 99 mg/dL    Sodium 138 136 - 145 mmol/L    Potassium 3.6 3.5 - 5.3 mmol/L    Chloride 101 98 - 107 mmol/L    Bicarbonate 27 21 - 32 mmol/L    Anion Gap 14 10 - 20 mmol/L    Urea Nitrogen 21 6 - 23 mg/dL    Creatinine 0.74 0.50 - 1.30 mg/dL    eGFR >90 >60 mL/min/1.73m*2    Calcium 9.1 8.6 - 10.6 mg/dL         CT abdomen pelvis w IV contrast    Result Date: 5/9/2024  Interpreted By:  Manfred Azul, STUDY: CT ABDOMEN PELVIS W IV CONTRAST; CT LUMBAR SPINE WO IV CONTRAST; 5/9/2024 10:38 pm   INDICATION: Signs/Symptoms:Bilateral flank pain; Signs/Symptoms:pain to entire lumbar back. Stage IV esophageal cancer.   COMPARISON: 5/3/2024   ACCESSION NUMBER(S): [id number]; [id number]   ORDERING CLINICIAN: AFIA STALLINGS   TECHNIQUE: Contiguous axial images of the abdomen and pelvis were obtained after the intravenous administration of  contrast. Coronal and sagittal reformatted images were obtained from the axial images. Reformatted images of the lumbar spine were also performed.   FINDINGS: CT ABDOMEN AND PELVIS:   There is limited evaluation the lung bases. There is redemonstration of multiple nodular opacity at the imaged lung bases compatible with metastatic disease. There is evidence of thickening and question hyperenhancement of the anterior esophageal wall distally.   There is redemonstration of multiple masses in the right and left hepatic lobes which measure up to 3.6 cm in size compatible metastatic disease. The gallbladder is present. Subtle calcification in the gallbladder may correspond to gallstones. No dilatation of the common bile duct.   The pancreas, spleen, and adrenal  glands appear unremarkable.   Symmetric enhancement of the kidneys. No hydronephrosis.   Atherosclerotic calcification of the abdominal aorta and bilateral iliac arteries.   Enlarged tiffany hepatis and peripancreatic lymph nodes measure up to the 2.1 cm. 3.3 cm x 1.2 cm portal caval lymph node. Enlarged retroperitoneal lymph nodes measuring up to 1.7 cm. Stable 2.6 cm cystic lesion along course of right external iliac chain.   No evidence of bowel obstruction.   Urinary bladder is underdistended and not well evaluated.   Small amount of free fluid the pelvis which measures simple fluid attenuation.   Postsurgical change of anterior pelvic wall repair.     CT LUMBAR SPINE:   There is soft tissue thickening and nodularity in the paravertebral soft tissues at the level of the imaged lower thoracic spine compatible with metastatic disease. Evaluation of soft tissue spinal canal is limited, however there is evidence of soft tissue extension into the spinal canal at level of T9 and T10 with associated spinal canal stenosis.   No acute fracture of the lumbar spine. There is multilevel degenerative change of the lumbar spine. There is multilevel intervertebral disc space narrowing and degenerative disc disease. There is limited evaluation of the soft tissues of the spinal canal. There is multilevel degenerative spinal canal narrowing. Multilevel degenerative facet arthropathy.       Multiple pulmonary nodules in the imaged lower lungs compatible with metastatic disease. There is wall thickening and apparent hyperenhancement of the anterior wall of the distal esophagus which may relate to patient's known esophageal malignancy. There is also redemonstration of multiple hepatic masses and abdominal and pelvic lymphadenopathy compatible with metastatic disease.   Soft tissue thickening and nodularity in the paravertebral soft tissues at the level of the imaged lower thoracic spine compatible with metastatic disease. Evaluation  of the soft tissues of the spinal canal is limited, however there is evidence of soft tissue extension into the spinal canal at T9 and T10 resulting in spinal canal stenosis. Neurosurgery consultation/evaluation and MRI is recommended for better evaluation of the metastatic disease and spinal canal stenosis.     MACRO: None   Signed by: Manfred Azul 5/9/2024 11:26 PM Dictation workstation:   [id number]    CT lumbar spine wo IV contrast    Result Date: 5/9/2024  Interpreted By:  Manfred Azul, STUDY: CT ABDOMEN PELVIS W IV CONTRAST; CT LUMBAR SPINE WO IV CONTRAST; 5/9/2024 10:38 pm   INDICATION: Signs/Symptoms:Bilateral flank pain; Signs/Symptoms:pain to entire lumbar back. Stage IV esophageal cancer.   COMPARISON: 5/3/2024   ACCESSION NUMBER(S): [id number]; [id number]   ORDERING CLINICIAN: AFIA STALLINGS   TECHNIQUE: Contiguous axial images of the abdomen and pelvis were obtained after the intravenous administration of  contrast. Coronal and sagittal reformatted images were obtained from the axial images. Reformatted images of the lumbar spine were also performed.   FINDINGS: CT ABDOMEN AND PELVIS:   There is limited evaluation the lung bases. There is redemonstration of multiple nodular opacity at the imaged lung bases compatible with metastatic disease. There is evidence of thickening and question hyperenhancement of the anterior esophageal wall distally.   There is redemonstration of multiple masses in the right and left hepatic lobes which measure up to 3.6 cm in size compatible metastatic disease. The gallbladder is present. Subtle calcification in the gallbladder may correspond to gallstones. No dilatation of the common bile duct.   The pancreas, spleen, and adrenal glands appear unremarkable.   Symmetric enhancement of the kidneys. No hydronephrosis.   Atherosclerotic calcification of the abdominal aorta and bilateral iliac arteries.   Enlarged tiffany hepatis and peripancreatic lymph nodes measure up to  the 2.1 cm. 3.3 cm x 1.2 cm portal caval lymph node. Enlarged retroperitoneal lymph nodes measuring up to 1.7 cm. Stable 2.6 cm cystic lesion along course of right external iliac chain.   No evidence of bowel obstruction.   Urinary bladder is underdistended and not well evaluated.   Small amount of free fluid the pelvis which measures simple fluid attenuation.   Postsurgical change of anterior pelvic wall repair.     CT LUMBAR SPINE:   There is soft tissue thickening and nodularity in the paravertebral soft tissues at the level of the imaged lower thoracic spine compatible with metastatic disease. Evaluation of soft tissue spinal canal is limited, however there is evidence of soft tissue extension into the spinal canal at level of T9 and T10 with associated spinal canal stenosis.   No acute fracture of the lumbar spine. There is multilevel degenerative change of the lumbar spine. There is multilevel intervertebral disc space narrowing and degenerative disc disease. There is limited evaluation of the soft tissues of the spinal canal. There is multilevel degenerative spinal canal narrowing. Multilevel degenerative facet arthropathy.       Multiple pulmonary nodules in the imaged lower lungs compatible with metastatic disease. There is wall thickening and apparent hyperenhancement of the anterior wall of the distal esophagus which may relate to patient's known esophageal malignancy. There is also redemonstration of multiple hepatic masses and abdominal and pelvic lymphadenopathy compatible with metastatic disease.   Soft tissue thickening and nodularity in the paravertebral soft tissues at the level of the imaged lower thoracic spine compatible with metastatic disease. Evaluation of the soft tissues of the spinal canal is limited, however there is evidence of soft tissue extension into the spinal canal at T9 and T10 resulting in spinal canal stenosis. Neurosurgery consultation/evaluation and MRI is recommended for  better evaluation of the metastatic disease and spinal canal stenosis.     MACRO: None   Signed by: Manfred Azul 5/9/2024 11:26 PM Dictation workstation:   [id number]          Assessment/Plan   Principal Problem:    Esophageal cancer, stage IV (Multi)    Massimo Garcia is a 63 y.o. male with PMH third degree heart block s/p PPM (placed in November), esophageal adenocarcinoma with mets to liver and lungs, Drug-induced colitis, PE, peripheral neuropathy, and portal vein thrombosis (On Eliquis). Patient presented to [facility] ED on 5/9 with acute on chronic low back pain. CT imaging was obtained and was concerning for evidence of soft tissue extension into the spinal canal at T9-T10 with associated spinal canal stenosis. Patient transferred to [facility] for further workup and management.       #Acute on chronic low Back Pain  :: Presented to [facility] ED on 5/9 with stabbing nonradiating low back pain, acutely worsened  :: CT L spine obtained: “evidence of soft tissue extension into the spinal canal at T9 and T10 resulting in spinal canal stenosis”  :: Neurology consult obtained on admission, rec obtaining MRI brain, C/T/L spine w/wo contrast for oncologic workup  :: UA (5/10) unremarkable  - CXR ordered per neurology, pending  - MRI brain and C, T, L spine w/wo con ordered, pending completion  - Currently on pred for recent colitis, will consider switching to dexamethasone tomorrow for improved CNS penetration as indicated     #Pain Management: continue home Fentanyl 50mcg 1 patch q72hrs  - Lidocaine patch  - Oxycodone 5mg q4hr PRN mild pain, Oxycodone 10mg q4hr PRN mod pain, Dilaudid 0.6mg IV q4hr PRN severe pain  - Consider supportive onc consult for pain management  - PT consulted     #Stage IV esophageal cancer HER2 positive   :: Follows with Dr. Blake, emailed 5/11 AM with updates  :: Current chemotherapy: 5FU, Tras/Pembro, last received on 4/29, next due 5/13  :: CT A/P (5/9) with “Multiple pulmonary nodules in the imaged lower  lungs compatible with metastatic disease. There is wall thickening and apparent hyperenhancement of the anterior wall of the distal esophagus which may relate to patient's known esophageal malignancy. There is also redemonstration of multiple hepatic masses and abdominal and pelvic lymphadenopathy compatible with metastatic disease     #Recent Immune-mediated Colitis  :: Recently admitted to [facility] 5/4-5/7 for enterocolitis  - Continue home steroid ?80mg PO q24hr, scheduled to finish on 5/27/24  - Will consider switching to dexamethasone tomorrow for improved CNS penetration as indicated     #hx PE  #Hx Portal vein thrombosis  - Held home Eliquis 5mg BID on admission, per neurology, pending further plan     #GERD  - Continue home Protonix     #Chronic anemia secondary to malignancy  :: At baseline hgb   - Monitor daily CBC  - Transfuse for hgb<7     #Hx Third degree heard block, s/p pacemaker placement (11/8/23)  :: Asymptomatic on admission  - CTM     F: PRN  E: Replete as needed  N: Regular diet  C: Full code  A: Denver Almaraz MD

## 2024-05-11 NOTE — CARE PLAN
Problem: Fall/Injury  Goal: Not fall by end of shift  Outcome: Progressing  Goal: Be free from injury by end of the shift  Outcome: Progressing  Goal: Verbalize understanding of personal risk factors for fall in the hospital  Outcome: Progressing  Goal: Verbalize understanding of risk factor reduction measures to prevent injury from fall in the home  Outcome: Progressing  Goal: Use assistive devices by end of the shift  Outcome: Progressing  Goal: Pace activities to prevent fatigue by end of the shift  Outcome: Progressing     Problem: Pain  Goal: Takes deep breaths with improved pain control throughout the shift  Outcome: Progressing  Goal: Turns in bed with improved pain control throughout the shift  Outcome: Progressing  Goal: Walks with improved pain control throughout the shift  Outcome: Progressing  Goal: Performs ADL's with improved pain control throughout shift  Outcome: Progressing  Goal: Participates in PT with improved pain control throughout the shift  Outcome: Progressing  Goal: Free from opioid side effects throughout the shift  Outcome: Progressing  Goal: Free from acute confusion related to pain meds throughout the shift  Outcome: Progressing     The clinical goals for the shift include Pt will remain safe and free from injury throughout shift.

## 2024-05-11 NOTE — H&P
History Of Present Illness  Massimo Garcia is a 63 y.o. male with PMH third degree heart block s/p PPM (placed in November), esophageal adenocarcinoma with mets to liver and lungs, Drug-induced colitis, PE, peripheral neuropathy, and portal vein thrombosis (On Eliquis). Patient presented to [facility] ED on 5/9 with acute on chronic low back pain. CT imaging was obtained and was concerning for evidence of soft tissue extension into the spinal canal at T9-T10 with associated spinal canal stenosis. Patient is now transferred to [facility] for further workup and management.      Of note, patient was recently admitted 5/4- 5/6 for enterocolitis. Infectious worklup was obtained given immunocompromised status. Patient was started on oral vancomycin and IV Zosyn which was discontinued on 5/4 as diarrhea was thought to be immune-mediated. C. Diff negative, stool pathogen panel all negative. Patient was discharged on steroid taper.    OSH Course:  VS: T 36.7C, , SPO2 99% RA, RR 18, /75  Labs: CBC: 8.8/10.1/30.1/113, RFP: 135/3/101/25/12/22/0.86, tbili 1.3, Lipase 18  Imaging: CT A/P, CT lumbar spine: Multiple pulmonary nodules in the imaged lower lungs compatible with metastatic disease. There is wall thickening and apparent  hyperenhancement of the anterior wall of the distal esophagus which  may relate to patient's known esophageal malignancy. There is also  redemonstration of multiple hepatic masses and abdominal and pelvic  lymphadenopathy compatible with metastatic disease.      Soft tissue thickening and nodularity in the paravertebral soft  tissues at the level of the imaged lower thoracic spine compatible  with metastatic disease. Evaluation of the soft tissues of the spinal  canal is limited, however there is evidence of soft tissue extension  into the spinal canal at T9 and T10 resulting in spinal canal  stenosis. Neurosurgery consultation/evaluation and MRI is recommended  for better evaluation of the metastatic  disease and spinal canal  stenosis.    ON admission,   Patient reports he has had a severe stabbing nonradiating low back pain since Wednesday of this week. Reports he has always had some chronic back pain but never this severe. Denies any triggers, any recent falls but reports that home pain medications were not helping relieve the pain which is why he presented to [facility] ED the following morning. Currently, reports that the pain is improved to 5/10 as a result of receiving pain medications. Otherwise reports feeling well. Denies fever, chills, headache, dizziness, vision changes, cough, CP, shortness of breath, N/V/D/C, abdominal pain, urinary retention or incontinence, lower extremity weakness or parasthesias. Appetite is good. All other ROS negative.     Past Medical History  Past Medical History:   Diagnosis Date    Essential (primary) hypertension 12/21/2013    Hypertension    Pacemaker     Peripheral neuropathy     Personal history of other diseases of the circulatory system     History of right bundle branch block (RBBB)    Pneumothorax 12/04/2023       Surgical History  Past Surgical History:   Procedure Laterality Date    CARDIAC ELECTROPHYSIOLOGY PROCEDURE N/A 11/7/2023    Procedure: Temporary Pacemaker Insertion;  Surgeon: Alexis Shook MD;  Location: [facility] Cardiac Cath Lab;  Service: Cardiovascular;  Laterality: N/A;    CARDIAC ELECTROPHYSIOLOGY PROCEDURE Left 11/9/2023    Procedure: PPM IMPLANT DUAL;  Surgeon: Elias Connolly MD;  Location: [facility] Cardiac Cath Lab;  Service: Electrophysiology;  Laterality: Left;    TOTAL KNEE ARTHROPLASTY  09/30/2016    Total Knee Replacement Left    TOTAL KNEE ARTHROPLASTY  09/30/2016    Total Knee Replacement Right        Social History  He reports that he has quit smoking. His smoking use included cigarettes and cigars. He has never been exposed to tobacco smoke. He has never used smokeless tobacco. He reports that he does not currently use alcohol. He reports  current drug use. Drug: Marijuana.    Family History  Family History   Problem Relation Name Age of Onset    Cancer Father          Allergies  Bee venom protein (honey bee) and Oxaliplatin    Review of Systems   Constitutional:  Negative for activity change, chills, fatigue and fever.   HENT:  Negative for congestion, sore throat, trouble swallowing and voice change.    Eyes:  Negative for visual disturbance.   Respiratory:  Negative for apnea, cough, shortness of breath and wheezing.    Cardiovascular:  Negative for chest pain, palpitations and leg swelling.   Gastrointestinal:  Negative for abdominal distention, constipation, diarrhea, nausea and vomiting.   Genitourinary:  Negative for difficulty urinating, flank pain and hematuria.   Musculoskeletal:  Positive for back pain and myalgias. Negative for arthralgias, neck pain and neck stiffness.   Skin:  Negative for rash and wound.   Neurological:  Negative for dizziness, syncope, weakness, numbness and headaches.   Hematological:  Negative for adenopathy.   Psychiatric/Behavioral:  Negative for agitation and confusion.         Physical Exam  Constitutional:       General: He is not in acute distress.     Appearance: Normal appearance. He is not toxic-appearing.   HENT:      Head: Normocephalic and atraumatic.      Nose: Nose normal. No congestion or rhinorrhea.      Mouth/Throat:      Mouth: Mucous membranes are moist.      Pharynx: No oropharyngeal exudate or posterior oropharyngeal erythema.   Eyes:      Extraocular Movements: Extraocular movements intact.      Conjunctiva/sclera: Conjunctivae normal.      Pupils: Pupils are equal, round, and reactive to light.   Cardiovascular:      Rate and Rhythm: Normal rate and regular rhythm.      Pulses: Normal pulses.      Heart sounds: Normal heart sounds. No murmur heard.     No friction rub. No gallop.   Pulmonary:      Effort: Pulmonary effort is normal. No respiratory distress.      Breath sounds: Normal breath  "sounds. No wheezing.   Chest:      Chest wall: No tenderness.   Abdominal:      General: Abdomen is flat. Bowel sounds are normal. There is no distension.      Palpations: Abdomen is soft. There is no mass.      Tenderness: There is no abdominal tenderness. There is no guarding.   Musculoskeletal:         General: Tenderness (Low back tenderness to palpation) present. No swelling or deformity. Normal range of motion.      Cervical back: Normal range of motion. No rigidity or tenderness.   Skin:     General: Skin is warm and dry.      Coloration: Skin is not jaundiced or pale.      Findings: No bruising or erythema.   Neurological:      General: No focal deficit present.      Mental Status: He is alert and oriented to person, place, and time.      Cranial Nerves: No cranial nerve deficit.      Motor: No weakness.   Psychiatric:         Mood and Affect: Mood normal.         Behavior: Behavior normal.        Last Recorded Vitals  Blood pressure 122/83, pulse 101, temperature 35.9 °C (96.6 °F), temperature source Temporal, resp. rate 16, height 1.727 m (5' 8\"), weight 70.3 kg (155 lb), SpO2 98%.    Relevant Results  Scheduled medications  [START ON 5/11/2024] apixaban, 5 mg, oral, BID  [START ON 5/11/2024] cholecalciferol, 4,000 Units, oral, q AM  fentaNYL, 1 patch, transdermal, q72h  [START ON 5/11/2024] lidocaine, 1 patch, transdermal, Daily  [START ON 5/11/2024] multivitamin with minerals, 1 tablet, oral, q AM  [START ON 5/11/2024] pantoprazole, 40 mg, oral, Daily before breakfast  [START ON 5/11/2024] predniSONE, 80 mg, oral, q24h      Continuous medications     PRN medications  PRN medications: alteplase, HYDROmorphone, oxyCODONE, oxyCODONE, polyethylene glycol    Results for orders placed or performed during the hospital encounter of 05/09/24 (from the past 24 hour(s))   Urinalysis with Reflex Microscopic   Result Value Ref Range    Color, Urine Yellow Light-Yellow, Yellow, Dark-Yellow    Appearance, Urine Clear " Clear    Specific Gravity, Urine 1.030 1.005 - 1.035    pH, Urine 7.0 5.0, 5.5, 6.0, 6.5, 7.0, 7.5, 8.0    Protein, Urine NEGATIVE NEGATIVE, 10 (TRACE), 20 (TRACE) mg/dL    Glucose, Urine Normal Normal mg/dL    Blood, Urine NEGATIVE NEGATIVE    Ketones, Urine NEGATIVE NEGATIVE mg/dL    Bilirubin, Urine NEGATIVE NEGATIVE    Urobilinogen, Urine Normal Normal mg/dL    Nitrite, Urine NEGATIVE NEGATIVE    Leukocyte Esterase, Urine NEGATIVE NEGATIVE   Sars-CoV-2 PCR   Result Value Ref Range    Coronavirus 2019, PCR Not Detected Not Detected     CT abdomen pelvis w IV contrast    Result Date: 5/9/2024  Interpreted By:  Manfred Azul, STUDY: CT ABDOMEN PELVIS W IV CONTRAST; CT LUMBAR SPINE WO IV CONTRAST; 5/9/2024 10:38 pm   INDICATION: Signs/Symptoms:Bilateral flank pain; Signs/Symptoms:pain to entire lumbar back. Stage IV esophageal cancer.   COMPARISON: 5/3/2024   ACCESSION NUMBER(S): [id number]; [id number]   ORDERING CLINICIAN: AFIA STALLINGS   TECHNIQUE: Contiguous axial images of the abdomen and pelvis were obtained after the intravenous administration of  contrast. Coronal and sagittal reformatted images were obtained from the axial images. Reformatted images of the lumbar spine were also performed.   FINDINGS: CT ABDOMEN AND PELVIS:   There is limited evaluation the lung bases. There is redemonstration of multiple nodular opacity at the imaged lung bases compatible with metastatic disease. There is evidence of thickening and question hyperenhancement of the anterior esophageal wall distally.   There is redemonstration of multiple masses in the right and left hepatic lobes which measure up to 3.6 cm in size compatible metastatic disease. The gallbladder is present. Subtle calcification in the gallbladder may correspond to gallstones. No dilatation of the common bile duct.   The pancreas, spleen, and adrenal glands appear unremarkable.   Symmetric enhancement of the kidneys. No hydronephrosis.   Atherosclerotic  calcification of the abdominal aorta and bilateral iliac arteries.   Enlarged tiffany hepatis and peripancreatic lymph nodes measure up to the 2.1 cm. 3.3 cm x 1.2 cm portal caval lymph node. Enlarged retroperitoneal lymph nodes measuring up to 1.7 cm. Stable 2.6 cm cystic lesion along course of right external iliac chain.   No evidence of bowel obstruction.   Urinary bladder is underdistended and not well evaluated.   Small amount of free fluid the pelvis which measures simple fluid attenuation.   Postsurgical change of anterior pelvic wall repair.     CT LUMBAR SPINE:   There is soft tissue thickening and nodularity in the paravertebral soft tissues at the level of the imaged lower thoracic spine compatible with metastatic disease. Evaluation of soft tissue spinal canal is limited, however there is evidence of soft tissue extension into the spinal canal at level of T9 and T10 with associated spinal canal stenosis.   No acute fracture of the lumbar spine. There is multilevel degenerative change of the lumbar spine. There is multilevel intervertebral disc space narrowing and degenerative disc disease. There is limited evaluation of the soft tissues of the spinal canal. There is multilevel degenerative spinal canal narrowing. Multilevel degenerative facet arthropathy.       Multiple pulmonary nodules in the imaged lower lungs compatible with metastatic disease. There is wall thickening and apparent hyperenhancement of the anterior wall of the distal esophagus which may relate to patient's known esophageal malignancy. There is also redemonstration of multiple hepatic masses and abdominal and pelvic lymphadenopathy compatible with metastatic disease.   Soft tissue thickening and nodularity in the paravertebral soft tissues at the level of the imaged lower thoracic spine compatible with metastatic disease. Evaluation of the soft tissues of the spinal canal is limited, however there is evidence of soft tissue extension  into the spinal canal at T9 and T10 resulting in spinal canal stenosis. Neurosurgery consultation/evaluation and MRI is recommended for better evaluation of the metastatic disease and spinal canal stenosis.     MACRO: None   Signed by: Manfred Azul 5/9/2024 11:26 PM Dictation workstation:   [id number]    CT lumbar spine wo IV contrast    Result Date: 5/9/2024  Interpreted By:  Manfred Azul, STUDY: CT ABDOMEN PELVIS W IV CONTRAST; CT LUMBAR SPINE WO IV CONTRAST; 5/9/2024 10:38 pm   INDICATION: Signs/Symptoms:Bilateral flank pain; Signs/Symptoms:pain to entire lumbar back. Stage IV esophageal cancer.   COMPARISON: 5/3/2024   ACCESSION NUMBER(S): [id number]; [id number]   ORDERING CLINICIAN: AFIA STALLINGS   TECHNIQUE: Contiguous axial images of the abdomen and pelvis were obtained after the intravenous administration of  contrast. Coronal and sagittal reformatted images were obtained from the axial images. Reformatted images of the lumbar spine were also performed.   FINDINGS: CT ABDOMEN AND PELVIS:   There is limited evaluation the lung bases. There is redemonstration of multiple nodular opacity at the imaged lung bases compatible with metastatic disease. There is evidence of thickening and question hyperenhancement of the anterior esophageal wall distally.   There is redemonstration of multiple masses in the right and left hepatic lobes which measure up to 3.6 cm in size compatible metastatic disease. The gallbladder is present. Subtle calcification in the gallbladder may correspond to gallstones. No dilatation of the common bile duct.   The pancreas, spleen, and adrenal glands appear unremarkable.   Symmetric enhancement of the kidneys. No hydronephrosis.   Atherosclerotic calcification of the abdominal aorta and bilateral iliac arteries.   Enlarged tiffany hepatis and peripancreatic lymph nodes measure up to the 2.1 cm. 3.3 cm x 1.2 cm portal caval lymph node. Enlarged retroperitoneal lymph nodes measuring up to  1.7 cm. Stable 2.6 cm cystic lesion along course of right external iliac chain.   No evidence of bowel obstruction.   Urinary bladder is underdistended and not well evaluated.   Small amount of free fluid the pelvis which measures simple fluid attenuation.   Postsurgical change of anterior pelvic wall repair.     CT LUMBAR SPINE:   There is soft tissue thickening and nodularity in the paravertebral soft tissues at the level of the imaged lower thoracic spine compatible with metastatic disease. Evaluation of soft tissue spinal canal is limited, however there is evidence of soft tissue extension into the spinal canal at level of T9 and T10 with associated spinal canal stenosis.   No acute fracture of the lumbar spine. There is multilevel degenerative change of the lumbar spine. There is multilevel intervertebral disc space narrowing and degenerative disc disease. There is limited evaluation of the soft tissues of the spinal canal. There is multilevel degenerative spinal canal narrowing. Multilevel degenerative facet arthropathy.       Multiple pulmonary nodules in the imaged lower lungs compatible with metastatic disease. There is wall thickening and apparent hyperenhancement of the anterior wall of the distal esophagus which may relate to patient's known esophageal malignancy. There is also redemonstration of multiple hepatic masses and abdominal and pelvic lymphadenopathy compatible with metastatic disease.   Soft tissue thickening and nodularity in the paravertebral soft tissues at the level of the imaged lower thoracic spine compatible with metastatic disease. Evaluation of the soft tissues of the spinal canal is limited, however there is evidence of soft tissue extension into the spinal canal at T9 and T10 resulting in spinal canal stenosis. Neurosurgery consultation/evaluation and MRI is recommended for better evaluation of the metastatic disease and spinal canal stenosis.     MACRO: None   Signed by: Manfred  Latha 5/9/2024 11:26 PM Dictation workstation:   [id number]        Assessment/Plan   Principal Problem:    Esophageal cancer, stage IV (Multi)    Massimo Garcia is a 63 y.o. male with PMH third degree heart block s/p PPM (placed in November), esophageal adenocarcinoma with mets to liver and lungs, Drug-induced colitis, PE, peripheral neuropathy, and portal vein thrombosis (On Eliquis). Patient presented to [facility] ED on 5/9 with acute on chronic low back pain. CT imaging was obtained and was concerning for evidence of soft tissue extension into the spinal canal at T9-T10 with associated spinal canal stenosis. Patient is now transferred to [facility] for further workup and management.       #Acute on chronic low Back Pain  - Presented to [facility] ED on 5/9 with stabbing nonradiating low back pain, acutely worsened  - CT L spine obtained: “evidence of soft tissue extension  into the spinal canal at T9 and T10 resulting in spinal canal stenosis”  - Neurology consult obtained on admission, rec obtaining MRI brain, C/T/L spine w/wo contrast for oncologic workup  - MRI brain and C, T, L spine w/wo con ordered, pending completion  - UA (5/10) unremarkable  - CXR ordered per neurology, pending    #Pain Management: continue home Fentanyl 50mcg 1 patch q72hrs  - Lidocaine patch  - Oxycodone 5mg q4hr PRN mild pain, Oxycodone 10mg q4hr PRN mod pain, Dilaudid 0.6mg IV q4hr PRN severe pain  - Consider supportive onc consult in am for pain management  - PT consulted on admission    #Stage IV esophageal cancer HER2 positive   - Follows with Dr. Blake, please email with updates in am  - Current chemotherapy: 5FU, Tras/Pembro, last received on 4/29, next due 5/13  - CT A/P (5/9) with “Multiple pulmonary nodules in the imaged lower lungs compatible with metastatic disease. There is wall thickening and apparent hyperenhancement of the anterior wall of the distal esophagus which may relate to patient's known esophageal malignancy. There is also  redemonstration of multiple hepatic masses and abdominal and pelvic lymphadenopathy compatible with metastatic disease    #Recent Immune-mediated Colitis  - Recently admitted to [facility] 5/4-5/7 for enterocolitis  - Continue home steroid ?80mg PO q24hr, scheduled to finish on 5/27/24    #hx PE  #Hx Portal vein thrombosis  - Held home Eliquis 5mg BID on admission, per neurology, pending further plan    #GERD  - Continue home Protonix    #Chronic anemia secondary to malignancy  - Baseline hgb   - Monitor daily CBC  - Transfuse for hgb<7    #Hx Third degree heard block, s/p pacemaker placement (11/8/23)  - Asymptomatic on admission  - CTM    F: PRN  E: Replete as needed  N: Regular diet  C: Full code  A: Port    I spent 60 minutes in the professional and overall care of this patient.      Lamar Wyatt PA-C

## 2024-05-12 LAB
ALBUMIN SERPL BCP-MCNC: 3.3 G/DL (ref 3.4–5)
ALP SERPL-CCNC: 136 U/L (ref 33–136)
ALT SERPL W P-5'-P-CCNC: 16 U/L (ref 10–52)
ANION GAP SERPL CALC-SCNC: 12 MMOL/L (ref 10–20)
APTT PPP: 26 SECONDS (ref 27–38)
AST SERPL W P-5'-P-CCNC: 28 U/L (ref 9–39)
BASOPHILS # BLD AUTO: 0 X10*3/UL (ref 0–0.1)
BASOPHILS NFR BLD AUTO: 0 %
BILIRUB SERPL-MCNC: 1 MG/DL (ref 0–1.2)
BUN SERPL-MCNC: 21 MG/DL (ref 6–23)
CALCIUM SERPL-MCNC: 8.9 MG/DL (ref 8.6–10.6)
CHLORIDE SERPL-SCNC: 105 MMOL/L (ref 98–107)
CO2 SERPL-SCNC: 26 MMOL/L (ref 21–32)
CREAT SERPL-MCNC: 0.59 MG/DL (ref 0.5–1.3)
EGFRCR SERPLBLD CKD-EPI 2021: >90 ML/MIN/1.73M*2
EOSINOPHIL # BLD AUTO: 0.01 X10*3/UL (ref 0–0.7)
EOSINOPHIL NFR BLD AUTO: 0.2 %
ERYTHROCYTE [DISTWIDTH] IN BLOOD BY AUTOMATED COUNT: 17.2 % (ref 11.5–14.5)
GLUCOSE SERPL-MCNC: 104 MG/DL (ref 74–99)
HCT VFR BLD AUTO: 29.4 % (ref 41–52)
HGB BLD-MCNC: 9.7 G/DL (ref 13.5–17.5)
IMM GRANULOCYTES # BLD AUTO: 0.03 X10*3/UL (ref 0–0.7)
IMM GRANULOCYTES NFR BLD AUTO: 0.6 % (ref 0–0.9)
INR PPP: 1.4 (ref 0.9–1.1)
LYMPHOCYTES # BLD AUTO: 0.36 X10*3/UL (ref 1.2–4.8)
LYMPHOCYTES NFR BLD AUTO: 7.4 %
MAGNESIUM SERPL-MCNC: 1.89 MG/DL (ref 1.6–2.4)
MCH RBC QN AUTO: 29.8 PG (ref 26–34)
MCHC RBC AUTO-ENTMCNC: 33 G/DL (ref 32–36)
MCV RBC AUTO: 90 FL (ref 80–100)
MONOCYTES # BLD AUTO: 0.47 X10*3/UL (ref 0.1–1)
MONOCYTES NFR BLD AUTO: 9.7 %
NEUTROPHILS # BLD AUTO: 4 X10*3/UL (ref 1.2–7.7)
NEUTROPHILS NFR BLD AUTO: 82.1 %
NRBC BLD-RTO: 0 /100 WBCS (ref 0–0)
PLATELET # BLD AUTO: 57 X10*3/UL (ref 150–450)
POTASSIUM SERPL-SCNC: 3.6 MMOL/L (ref 3.5–5.3)
PROT SERPL-MCNC: 5.6 G/DL (ref 6.4–8.2)
PROTHROMBIN TIME: 15.7 SECONDS (ref 9.8–12.8)
RBC # BLD AUTO: 3.26 X10*6/UL (ref 4.5–5.9)
SODIUM SERPL-SCNC: 139 MMOL/L (ref 136–145)
WBC # BLD AUTO: 4.9 X10*3/UL (ref 4.4–11.3)

## 2024-05-12 PROCEDURE — 2500000005 HC RX 250 GENERAL PHARMACY W/O HCPCS

## 2024-05-12 PROCEDURE — 2500000001 HC RX 250 WO HCPCS SELF ADMINISTERED DRUGS (ALT 637 FOR MEDICARE OP)

## 2024-05-12 PROCEDURE — 85025 COMPLETE CBC W/AUTO DIFF WBC: CPT

## 2024-05-12 PROCEDURE — 80053 COMPREHEN METABOLIC PANEL: CPT

## 2024-05-12 PROCEDURE — 2500000004 HC RX 250 GENERAL PHARMACY W/ HCPCS (ALT 636 FOR OP/ED)

## 2024-05-12 PROCEDURE — 99232 SBSQ HOSP IP/OBS MODERATE 35: CPT | Performed by: STUDENT IN AN ORGANIZED HEALTH CARE EDUCATION/TRAINING PROGRAM

## 2024-05-12 PROCEDURE — 2500000004 HC RX 250 GENERAL PHARMACY W/ HCPCS (ALT 636 FOR OP/ED): Performed by: STUDENT IN AN ORGANIZED HEALTH CARE EDUCATION/TRAINING PROGRAM

## 2024-05-12 PROCEDURE — 1170000001 HC PRIVATE ONCOLOGY ROOM DAILY

## 2024-05-12 PROCEDURE — 85610 PROTHROMBIN TIME: CPT

## 2024-05-12 PROCEDURE — 83735 ASSAY OF MAGNESIUM: CPT

## 2024-05-12 PROCEDURE — 2500000001 HC RX 250 WO HCPCS SELF ADMINISTERED DRUGS (ALT 637 FOR MEDICARE OP): Performed by: STUDENT IN AN ORGANIZED HEALTH CARE EDUCATION/TRAINING PROGRAM

## 2024-05-12 RX ORDER — POLYETHYLENE GLYCOL 3350 17 G/17G
17 POWDER, FOR SOLUTION ORAL DAILY
Status: DISCONTINUED | OUTPATIENT
Start: 2024-05-12 | End: 2024-05-15

## 2024-05-12 RX ORDER — HYDROMORPHONE HYDROCHLORIDE 1 MG/ML
0.6 INJECTION, SOLUTION INTRAMUSCULAR; INTRAVENOUS; SUBCUTANEOUS ONCE
Status: COMPLETED | OUTPATIENT
Start: 2024-05-12 | End: 2024-05-12

## 2024-05-12 RX ORDER — HYDROMORPHONE HYDROCHLORIDE 1 MG/ML
1 INJECTION, SOLUTION INTRAMUSCULAR; INTRAVENOUS; SUBCUTANEOUS
Status: DISCONTINUED | OUTPATIENT
Start: 2024-05-12 | End: 2024-05-13

## 2024-05-12 RX ORDER — FENTANYL 75 UG/H
1 PATCH TRANSDERMAL
Status: DISCONTINUED | OUTPATIENT
Start: 2024-05-12 | End: 2024-05-15

## 2024-05-12 RX ORDER — LORAZEPAM 1 MG/1
1 TABLET ORAL EVERY 8 HOURS PRN
Status: DISCONTINUED | OUTPATIENT
Start: 2024-05-12 | End: 2024-05-15

## 2024-05-12 RX ADMIN — HYDROMORPHONE HYDROCHLORIDE 1 MG: 1 INJECTION, SOLUTION INTRAMUSCULAR; INTRAVENOUS; SUBCUTANEOUS at 12:47

## 2024-05-12 RX ADMIN — DEXAMETHASONE SODIUM PHOSPHATE 6 MG: 4 INJECTION, SOLUTION INTRA-ARTICULAR; INTRALESIONAL; INTRAMUSCULAR; INTRAVENOUS; SOFT TISSUE at 21:05

## 2024-05-12 RX ADMIN — DEXAMETHASONE SODIUM PHOSPHATE 6 MG: 4 INJECTION, SOLUTION INTRA-ARTICULAR; INTRALESIONAL; INTRAMUSCULAR; INTRAVENOUS; SOFT TISSUE at 15:44

## 2024-05-12 RX ADMIN — HYDROMORPHONE HYDROCHLORIDE 0.6 MG: 1 INJECTION, SOLUTION INTRAMUSCULAR; INTRAVENOUS; SUBCUTANEOUS at 11:26

## 2024-05-12 RX ADMIN — FENTANYL 1 PATCH: 75 PATCH TRANSDERMAL at 15:44

## 2024-05-12 RX ADMIN — HYDROMORPHONE HYDROCHLORIDE 0.6 MG: 1 INJECTION, SOLUTION INTRAMUSCULAR; INTRAVENOUS; SUBCUTANEOUS at 09:47

## 2024-05-12 RX ADMIN — Medication 1 TABLET: at 10:07

## 2024-05-12 RX ADMIN — HYDROMORPHONE HYDROCHLORIDE 0.6 MG: 1 INJECTION, SOLUTION INTRAMUSCULAR; INTRAVENOUS; SUBCUTANEOUS at 05:00

## 2024-05-12 RX ADMIN — HYDROMORPHONE HYDROCHLORIDE 1 MG: 1 INJECTION, SOLUTION INTRAMUSCULAR; INTRAVENOUS; SUBCUTANEOUS at 15:44

## 2024-05-12 RX ADMIN — PANTOPRAZOLE SODIUM 40 MG: 40 TABLET, DELAYED RELEASE ORAL at 06:09

## 2024-05-12 RX ADMIN — POLYETHYLENE GLYCOL 3350 17 G: 17 POWDER, FOR SOLUTION ORAL at 15:45

## 2024-05-12 RX ADMIN — HYDROMORPHONE HYDROCHLORIDE 0.6 MG: 1 INJECTION, SOLUTION INTRAMUSCULAR; INTRAVENOUS; SUBCUTANEOUS at 01:59

## 2024-05-12 RX ADMIN — LIDOCAINE 1 PATCH: 4 PATCH TOPICAL at 09:48

## 2024-05-12 RX ADMIN — DEXAMETHASONE SODIUM PHOSPHATE 6 MG: 4 INJECTION, SOLUTION INTRA-ARTICULAR; INTRALESIONAL; INTRAMUSCULAR; INTRAVENOUS; SOFT TISSUE at 10:08

## 2024-05-12 RX ADMIN — HYDROMORPHONE HYDROCHLORIDE 1 MG: 1 INJECTION, SOLUTION INTRAMUSCULAR; INTRAVENOUS; SUBCUTANEOUS at 23:24

## 2024-05-12 RX ADMIN — HYDROMORPHONE HYDROCHLORIDE 1 MG: 1 INJECTION, SOLUTION INTRAMUSCULAR; INTRAVENOUS; SUBCUTANEOUS at 18:46

## 2024-05-12 RX ADMIN — OXYCODONE HYDROCHLORIDE 5 MG: 5 TABLET ORAL at 21:10

## 2024-05-12 RX ADMIN — Medication 4000 UNITS: at 10:07

## 2024-05-12 RX ADMIN — LORAZEPAM 1 MG: 1 TABLET ORAL at 22:24

## 2024-05-12 RX ADMIN — HYDROMORPHONE HYDROCHLORIDE 0.6 MG: 1 INJECTION, SOLUTION INTRAMUSCULAR; INTRAVENOUS; SUBCUTANEOUS at 06:18

## 2024-05-12 RX ADMIN — ENOXAPARIN SODIUM 40 MG: 40 INJECTION, SOLUTION SUBCUTANEOUS at 15:45

## 2024-05-12 ASSESSMENT — COGNITIVE AND FUNCTIONAL STATUS - GENERAL
DAILY ACTIVITIY SCORE: 24
DAILY ACTIVITIY SCORE: 24
MOBILITY SCORE: 24
MOBILITY SCORE: 24

## 2024-05-12 ASSESSMENT — PAIN SCALES - GENERAL
PAINLEVEL_OUTOF10: 7
PAINLEVEL_OUTOF10: 9
PAINLEVEL_OUTOF10: 8
PAINLEVEL_OUTOF10: 7
PAINLEVEL_OUTOF10: 9
PAINLEVEL_OUTOF10: 5 - MODERATE PAIN
PAINLEVEL_OUTOF10: 0 - NO PAIN
PAINLEVEL_OUTOF10: 3
PAINLEVEL_OUTOF10: 7
PAINLEVEL_OUTOF10: 0 - NO PAIN
PAINLEVEL_OUTOF10: 10 - WORST POSSIBLE PAIN
PAINLEVEL_OUTOF10: 8
PAINLEVEL_OUTOF10: 9
PAINLEVEL_OUTOF10: 4
PAINLEVEL_OUTOF10: 7
PAINLEVEL_OUTOF10: 3

## 2024-05-12 ASSESSMENT — PAIN - FUNCTIONAL ASSESSMENT
PAIN_FUNCTIONAL_ASSESSMENT: 0-10

## 2024-05-12 NOTE — PROGRESS NOTES
Massimo Garcia is a 63 y.o. male on day 2 of admission presenting with Esophageal cancer, stage IV (Multi).      Subjective   Pt reporting significant back pain upon waking up this morning. He feels like his 0.6 mg dose of IV dilaudid dose help  but starts to wear off after 2 hours.       Dietary Orders (From admission, onward)               Adult diet Regular  Diet effective now        Question:  Diet type  Answer:  Regular                      Objective     Vitals  Temp:  [35.7 °C (96.3 °F)-36.7 °C (98.1 °F)] 36.6 °C (97.9 °F)  Heart Rate:  [] 101  Resp:  [14-18] 18  BP: (100-120)/(69-75) 113/75       Pain Score: 4         Implantable Port 05/09/24 Right Chest (Active)   Number of days: 3       Vent Settings       Intake/Output Summary (Last 24 hours) at 5/12/2024 1524  Last data filed at 5/12/2024 1030  Gross per 24 hour   Intake 200 ml   Output --   Net 200 ml       Physical Exam  Physical Exam  Constitutional:       General: He is not in acute distress.     Appearance: Normal appearance. He is not toxic-appearing.   HENT:      Head: Normocephalic and atraumatic.   Eyes:      Extraocular Movements: Extraocular movements intact.   Cardiovascular:      Rate and Rhythm: Normal rate and regular rhythm.      Heart sounds: Normal heart sounds. No murmur heard.     No friction rub. No gallop.   Pulmonary:      Effort: Pulmonary effort is normal. No respiratory distress.      Breath sounds: Normal breath sounds. No wheezing, rhonchi or rales.   Abdominal:      General: Bowel sounds are normal. There is no distension.      Palpations: Abdomen is soft.      Tenderness: There is no abdominal tenderness. There is no rebound.   Musculoskeletal:         General: Normal range of motion.   Skin:     General: Skin is warm and dry.   Neurological:      General: No focal deficit present.      Mental Status: He is oriented to person, place, and time.      Sensory: No sensory deficit.      Motor: No weakness.      Deep Tendon  Reflexes: Reflexes normal.      Comments: BLE with preserved strength to knee flexion/extension, plantar flexion/extension, hip flexion/extension. Straight leg test negative.  Psychiatric:         Mood and Affect: Mood normal.         Behavior: Behavior normal.       Relevant Results  Scheduled medications  [Held by provider] apixaban, 5 mg, oral, BID  cholecalciferol, 4,000 Units, oral, q AM  dexAMETHasone, 6 mg, intravenous, TID  enoxaparin, 40 mg, subcutaneous, q24h  fentaNYL, 1 patch, transdermal, q72h  lidocaine, 1 patch, transdermal, Daily  multivitamin with minerals, 1 tablet, oral, q AM  pantoprazole, 40 mg, oral, Daily before breakfast  polyethylene glycol, 17 g, oral, Daily      Continuous medications     PRN medications  PRN medications: alteplase, calcium carbonate, chlorproMAZINE, HYDROmorphone, oxyCODONE, oxyCODONE    Results for orders placed or performed during the hospital encounter of 05/10/24 (from the past 24 hour(s))   CBC and Auto Differential   Result Value Ref Range    WBC 4.9 4.4 - 11.3 x10*3/uL    nRBC 0.0 0.0 - 0.0 /100 WBCs    RBC 3.26 (L) 4.50 - 5.90 x10*6/uL    Hemoglobin 9.7 (L) 13.5 - 17.5 g/dL    Hematocrit 29.4 (L) 41.0 - 52.0 %    MCV 90 80 - 100 fL    MCH 29.8 26.0 - 34.0 pg    MCHC 33.0 32.0 - 36.0 g/dL    RDW 17.2 (H) 11.5 - 14.5 %    Platelets 57 (L) 150 - 450 x10*3/uL    Neutrophils % 82.1 40.0 - 80.0 %    Immature Granulocytes %, Automated 0.6 0.0 - 0.9 %    Lymphocytes % 7.4 13.0 - 44.0 %    Monocytes % 9.7 2.0 - 10.0 %    Eosinophils % 0.2 0.0 - 6.0 %    Basophils % 0.0 0.0 - 2.0 %    Neutrophils Absolute 4.00 1.20 - 7.70 x10*3/uL    Immature Granulocytes Absolute, Automated 0.03 0.00 - 0.70 x10*3/uL    Lymphocytes Absolute 0.36 (L) 1.20 - 4.80 x10*3/uL    Monocytes Absolute 0.47 0.10 - 1.00 x10*3/uL    Eosinophils Absolute 0.01 0.00 - 0.70 x10*3/uL    Basophils Absolute 0.00 0.00 - 0.10 x10*3/uL   Comprehensive metabolic panel   Result Value Ref Range    Glucose 104 (H) 74  - 99 mg/dL    Sodium 139 136 - 145 mmol/L    Potassium 3.6 3.5 - 5.3 mmol/L    Chloride 105 98 - 107 mmol/L    Bicarbonate 26 21 - 32 mmol/L    Anion Gap 12 10 - 20 mmol/L    Urea Nitrogen 21 6 - 23 mg/dL    Creatinine 0.59 0.50 - 1.30 mg/dL    eGFR >90 >60 mL/min/1.73m*2    Calcium 8.9 8.6 - 10.6 mg/dL    Albumin 3.3 (L) 3.4 - 5.0 g/dL    Alkaline Phosphatase 136 33 - 136 U/L    Total Protein 5.6 (L) 6.4 - 8.2 g/dL    AST 28 9 - 39 U/L    Bilirubin, Total 1.0 0.0 - 1.2 mg/dL    ALT 16 10 - 52 U/L   Magnesium   Result Value Ref Range    Magnesium 1.89 1.60 - 2.40 mg/dL   Coagulation Screen   Result Value Ref Range    Protime 15.7 (H) 9.8 - 12.8 seconds    INR 1.4 (H) 0.9 - 1.1    aPTT 26 (L) 27 - 38 seconds             Assessment/Plan     Principal Problem:    Esophageal cancer, stage IV (Multi)    Massimo Garcia is a 63 y.o. male with PMH third degree heart block s/p PPM (placed in November), esophageal adenocarcinoma with mets to liver and lungs, Drug-induced colitis, PE, peripheral neuropathy, and portal vein thrombosis (On Eliquis). Patient presented to [facility] ED on 5/9 with acute on chronic low back pain. CT imaging was obtained and was concerning for evidence of soft tissue extension into the spinal canal at T9-T10 with associated spinal canal stenosis. Patient [facility] for further workup and management.      5/12 updates:  -Increase fentanyl patch to 75 mcg per Supportive Onc  -Increase dilaudid PRN to 1 mg q3h  -MRI likely to happen Monday due to having pacemaker     #Acute on chronic low Back Pain  :: Presented to [facility] ED on 5/9 with stabbing nonradiating low back pain, acutely worsened  :: CT L spine obtained: “evidence of soft tissue extension into the spinal canal at T9 and T10 resulting in spinal canal stenosis”  :: Neurology consult obtained on admission, rec obtaining MRI brain, C/T/L spine w/wo contrast for oncologic workup  :: UA (5/10) unremarkable  - CXR ordered per neurology, pending  - MRI  brain and C, T, L spine w/wo con ordered, pending completion  - Currently on pred for recent colitis, will consider switching to dexamethasone tomorrow for improved CNS penetration as indicated     #Pain Management:   -Increase fentanyl patch to 75 mcg patch q72hrs  - Lidocaine patch  - Oxycodone 5mg q4hr PRN mild pain, Oxycodone 10mg q4hr PRN mod pain, Dilaudid 1 mg q3hr PRN severe pain  - Consider supportive onc consult for pain management  - PT consulted     #Stage IV esophageal cancer HER2 positive   :: Follows with Dr. Blake, emailed 5/11 AM with updates  :: Current chemotherapy: 5FU, Tras/Pembro, last received on 4/29, next due 5/13  :: CT A/P (5/9) with “Multiple pulmonary nodules in the imaged lower lungs compatible with metastatic disease. There is wall thickening and apparent hyperenhancement of the anterior wall of the distal esophagus which may relate to patient's known esophageal malignancy. There is also redemonstration of multiple hepatic masses and abdominal and pelvic lymphadenopathy compatible with metastatic disease     #Recent Immune-mediated Colitis  :: Recently admitted to [facility] 5/4-5/7 for enterocolitis  - Continue home steroid ?80mg PO q24hr, scheduled to finish on 5/27/24  - Will consider switching to dexamethasone tomorrow for improved CNS penetration as indicated     #hx PE  #Hx Portal vein thrombosis  - Held home Eliquis 5mg BID on admission, per neurology, pending further plan     #GERD  - Continue home Protonix     #Chronic anemia secondary to malignancy  :: At baseline hgb   - Monitor daily CBC  - Transfuse for hgb<7     #Hx Third degree heard block, s/p pacemaker placement (11/8/23)  :: Asymptomatic on admission  - CTM     F: PRN  E: Replete as needed  N: Regular diet  C: Full code  A: Port     Pt was seen and staffed with Dr. Zach Wood MD  Internal Medicine-Pediatrics, PGY4

## 2024-05-12 NOTE — CARE PLAN
Problem: Fall/Injury  Goal: Not fall by end of shift  Outcome: Progressing  Goal: Be free from injury by end of the shift  Outcome: Progressing  Goal: Verbalize understanding of personal risk factors for fall in the hospital  Outcome: Progressing  Goal: Verbalize understanding of risk factor reduction measures to prevent injury from fall in the home  Outcome: Progressing  Goal: Use assistive devices by end of the shift  Outcome: Progressing  Goal: Pace activities to prevent fatigue by end of the shift  Outcome: Progressing     Problem: Pain  Goal: Takes deep breaths with improved pain control throughout the shift  Outcome: Progressing  Goal: Turns in bed with improved pain control throughout the shift  Outcome: Progressing  Goal: Walks with improved pain control throughout the shift  Outcome: Progressing  Goal: Performs ADL's with improved pain control throughout shift  Outcome: Progressing  Goal: Participates in PT with improved pain control throughout the shift  Outcome: Progressing  Goal: Free from opioid side effects throughout the shift  Outcome: Progressing  Goal: Free from acute confusion related to pain meds throughout the shift  Outcome: Progressing       The clinical goals for the shift include Pt will report a pain level of less than 7/10 in response to pain medication throughout shift.

## 2024-05-12 NOTE — CONSULTS
SUPPORTIVE AND PALLIATIVE ONCOLOGY CONSULT    Inpatient consult to [facility] Adult Supportive Oncology  Consult performed by: JASPER Damon  Consult ordered by: Bam Serrano MD  Reason for consult: pain        SERVICE DATE: 5/12/2024      PALLIATIVE MEDICINE OUTPATIENT PROVIDER:  JASPER Doss  CURRENT ATTENDING PROVIDER: Bam Serrano MD     Medical Oncologist: MD Bhavin Guevara MD James M Martin, MD   Radiation Oncologist: No care team member to display  Primary Physician: Dayne Santamaria  [phone]    REASON FOR CONSULT/CHIEF CONSULT COMPLAINT: pain management    Subjective   HISTORY OF PRESENT ILLNESS: Massimo Garcia is a 63 y.o. male diagnosed with metastatic esophageal cancer. PMH significant for third degree heart block s/p PPM (placed in November), esophageal adenocarcinoma with mets to liver and lungs, Drug-induced colitis, PE, peripheral neuropathy, and portal vein thrombosis (On Eliquis).  Admitted 5/10/2024 for further evaluation and management of acute on chronic low back pain. Course complicated by soft tissue extension into the spinal canal at T9-T10 with associated spinal canal stenosis. Supportive and Palliative Oncology is consulted for pain management.     Patient was seen comfortable in bed. He reports that his pain is well controlled after multiple PRN IV dilaudid. Discussed increasing his fentanyl patch and he was receptive.     Pain Assessment:  Onset:  since cancer diagnosis   Location:  lower back pain  Duration: Constant  Characteristics:   Rating: 3   Descriptors: aching and throbbing   Aggravating: movement and bending    Relieving: Analgesics   Intolerances:Massimo Garcia is allergic to bee venom protein (honey bee) and oxaliplatin.   Personal Pain Goal: 2    Interference with Function: Very Much        Opioid Use  Past 24 h opioid use:   Hydromorphone 0.6 mg IV x 7 doses = 4.2 mg = 52.5 OME  Fentanyl patch 50mcgs = 100mg OME  Total 24h OME use:   152.5mg  OME    Note: OME calculations based on equianalgesic table below. Please note this table is based on best available evidence but conversions are still approximate. These are NOT opioid DOSES for individual patient use; this is equivalency information.  Drug Parenteral Enteral   Morphine 10 25   Oxycodone N/A 20   Hydromorphone 2 5   Fentanyl 0.15 N/A   Tramadol N/A 120   Citation: Aniya ROSS. Demystifying opioid conversion calculations: A guide for effective dosing, Second edition. [place]obo: American Society of Health-System Pharmacists, 2018.    OARRS/PDMP reviewed 5/12/2024 no aberrant behavior noted.    Symptom Assessment:  Pain:very much   Headache: none  Dizziness:none  Lack of energy: none  Difficulty sleeping: very much, with better pain control he is doing well.   Worrying: very much  Anxiety: none  Depression: none  Pain in mouth/swallowing: none  Dry mouth: none  Taste changes: none  Shortness of breath: none  Lack of appetite: none   Nausea: none  Vomiting: none  Constipation: none  Diarrhea: none  Sore muscles: none  Numbness or tingling in hands/feet/other: none  Weight loss: none  Other: none        Information obtained from: chart review, interview of patient, and discussion with primary team  ______________________________________________________________________     Oncology History   Stage IV malignant neoplasm of esophagus (Multi)   9/13/2023 Initial Diagnosis    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)     9/13/2023 -  Chemotherapy    Trastuzumab + mFOLFOX6 (Fluorouracil Continuous Infusion / Leucovorin / Oxaliplatin), 14 Day Cycles     Metastases to the liver (Multi)   9/13/2023 Initial Diagnosis    Metastases to the liver (CMS/HCC)     9/13/2023 -  Chemotherapy    Trastuzumab + mFOLFOX6 (Fluorouracil Continuous Infusion / Leucovorin / Oxaliplatin), 14 Day Cycles         Past Medical History:   Diagnosis Date    Essential (primary) hypertension 12/21/2013    Hypertension     Pacemaker     Peripheral neuropathy     Personal history of other diseases of the circulatory system     History of right bundle branch block (RBBB)    Pneumothorax 12/04/2023     Past Surgical History:   Procedure Laterality Date    CARDIAC ELECTROPHYSIOLOGY PROCEDURE N/A 11/7/2023    Procedure: Temporary Pacemaker Insertion;  Surgeon: Alexis Shook MD;  Location: [facility];  Service: Cardiovascular;  Laterality: N/A;    CARDIAC ELECTROPHYSIOLOGY PROCEDURE Left 11/9/2023    Procedure: PPM IMPLANT DUAL;  Surgeon: Elias Connolly MD;  Location: [facility];  Service: Electrophysiology;  Laterality: Left;    TOTAL KNEE ARTHROPLASTY  09/30/2016    Total Knee Replacement Left    TOTAL KNEE ARTHROPLASTY  09/30/2016    Total Knee Replacement Right     Family History   Problem Relation Name Age of Onset    Cancer Father          SOCIAL HISTORY:  Marital Status     Social History:  reports that he has quit smoking. His smoking use included cigarettes and cigars. He has never been exposed to tobacco smoke. He has never used smokeless tobacco. He reports that he does not currently use alcohol. He reports current drug use. Drug: Marijuana.    Yarsanism and Importance of Yarsanism:  Buddhist   Role of darlene in daily life/Role of spirituality in health care decision-making: has his  that is his next door neighbor providing spiritual support.      REVIEW OF SYSTEMS:  Review of systems negative unless noted in HPI.       Objective       Lab Results   Component Value Date    WBC 4.9 05/12/2024    HGB 9.7 (L) 05/12/2024    HCT 29.4 (L) 05/12/2024    MCV 90 05/12/2024    PLT 57 (L) 05/12/2024      Lab Results   Component Value Date    GLUCOSE 104 (H) 05/12/2024    CALCIUM 8.9 05/12/2024     05/12/2024    K 3.6 05/12/2024    CO2 26 05/12/2024     05/12/2024    BUN 21 05/12/2024    CREATININE 0.59 05/12/2024     Lab Results   Component Value Date    ALT 16 05/12/2024    AST 28  05/12/2024    ALKPHOS 136 05/12/2024    BILITOT 1.0 05/12/2024     Estimated Creatinine Clearance: 120.8 mL/min (by C-G formula based on SCr of 0.59 mg/dL).     Encounter Date: 04/04/24   ECG 12 lead   Result Value    Ventricular Rate 101    Atrial Rate 89    QRS Duration 168    QT Interval 430    QTC Calculation(Bazett) 557    R Axis -64    T Axis 76    QRS Count 17    Q Onset 189    T Offset 404    QTC Fredericia 511    Narrative    Ventricular-paced rhythm  Abnormal ECG  When compared with ECG of 28-MAR-2024 11:24,  Vent. rate has decreased BY   5 BPM  See ED provider note for full interpretation and clinical correlation  Confirmed by Gladis Ferreira ([id number]) on 4/6/2024 10:38:05 AM     Wt Readings from Last 5 Encounters:   05/10/24 70.3 kg (155 lb)   05/09/24 70.3 kg (155 lb)   05/04/24 71.2 kg (156 lb 15.5 oz)   05/01/24 69.6 kg (153 lb 7 oz)   04/29/24 70.1 kg (154 lb 10.4 oz)       Current Outpatient Medications   Medication Instructions    apixaban (Eliquis) 5 mg tablet TAKE 1 TABLET BY MOUTH TWO TIMES A DAY    chlorproMAZINE (THORAZINE) 25 mg, oral, 2 times daily    cholecalciferol (VITAMIN D-3) 100 mcg, oral, Every morning    fentaNYL (Duragesic) 50 mcg/hr patch 1 patch, transdermal, Every 72 hours    lidocaine (Lidoderm) 5 % patch 1 patch, transdermal, Daily, Remove & discard patch within 12 hours or as directed by MD.    LORazepam (ATIVAN) 1 mg, oral, Every 8 hours PRN    medical cannabis 1 each, oral, Last OARRS fills:<BR>1/18/24 Tin Oral Admin-3.67-0-120 Cbn Tincture 440/4 days<BR>1/18/24 Tin Oral Admin-5.5-5.5-120 660/6 days<BR>12/9/23 Oint Top Admin-16.6-30 Mjm 590.00/ 2 days    metoprolol succinate XL (TOPROL-XL) 25 mg, oral, Daily, Do not crush or chew.    multivitamin with minerals (multivit-min-iron fum-folic ac) tablet 2 tablets, oral, Every morning    oxyCODONE (ROXICODONE) 10 mg, oral, Every 4 hours PRN, Last OARRS fill: 2/25/24 #180 for 30 days    potassium chloride CR 10 mEq ER tablet 10 mEq,  oral, Daily    predniSONE (DELTASONE) 80 mg, oral, Every 24 hours    scopolamine (Transderm-Scop) 1 mg over 3 days patch 3 day 1 patch, transdermal, Every 72 hours    sodium chloride (Ocean) 0.65 % nasal spray 1 spray, Each Nostril, As needed    sucralfate (CARAFATE) 1 g, oral, Daily PRN     Scheduled medications   [Held by provider] apixaban, 5 mg, oral, BID  cholecalciferol, 4,000 Units, oral, q AM  dexAMETHasone, 6 mg, intravenous, TID  enoxaparin, 40 mg, subcutaneous, q24h  fentaNYL, 1 patch, transdermal, q72h  lidocaine, 1 patch, transdermal, Daily  multivitamin with minerals, 1 tablet, oral, q AM  pantoprazole, 40 mg, oral, Daily before breakfast  polyethylene glycol, 17 g, oral, Daily      Continuous medications     PRN medications  alteplase, 2 mg, PRN  calcium carbonate, 500 mg, 4x daily PRN  chlorproMAZINE, 25 mg, q6h PRN  HYDROmorphone, 1 mg, q3h PRN  oxyCODONE, 10 mg, q4h PRN  oxyCODONE, 5 mg, q4h PRN         Allergies:   Allergies   Allergen Reactions    Bee Venom Protein (Honey Bee) Anaphylaxis    Oxaliplatin Other     Rigors                PHYSICAL EXAMINATION:  Vital Signs:   Vital signs reviewed  Vitals:    05/12/24 1037   BP: 113/75   Pulse: 101   Resp: 18   Temp: 36.6 °C (97.9 °F)   SpO2: 98%     Pain Score: 9     Physical Exam  Eyes:      Pupils: Pupils are equal, round, and reactive to light.   Cardiovascular:      Rate and Rhythm: Regular rhythm.      Heart sounds: Normal heart sounds.   Pulmonary:      Effort: Pulmonary effort is normal.      Breath sounds: Normal breath sounds.   Abdominal:      General: Abdomen is flat.   Musculoskeletal:         General: Normal range of motion.   Skin:     General: Skin is warm.   Neurological:      Mental Status: He is alert and oriented to person, place, and time.   Psychiatric:         Mood and Affect: Mood normal.         ASSESSMENT/PLAN:  Massimo Garcia is a 63 y.o. male diagnosed with metastatic esophageal cancer. PMH significant for third degree heart  block s/p PPM (placed in November), esophageal adenocarcinoma with mets to liver and lungs, Drug-induced colitis, PE, peripheral neuropathy, and portal vein thrombosis (On Eliquis).  Admitted 5/10/2024 for further evaluation and management of acute on chronic low back pain. Course complicated by soft tissue extension into the spinal canal at T9-T10 with associated spinal canal stenosis. Supportive and Palliative Oncology is consulted for pain management.     Pain:  Lower back pain related to metastatic esophageal cancer  Pain is: cancer related pain  Type: visceral, somatic, and neuropathic  Pain control: sub-optimally controlled  Home regimen:  Acetaminophen PRN, Oxycodone 10mg q4hrs prn, Fentanyl TD , and bentyl prn   Intolerances/previously tried: Morphine with poor efficacy   Personalized pain goal: 2  Total OME usage for the past 24 hours: 152.5mg  Continue dilaudid 1mg q 3hrs PRN   Continue oxycodone 10mg q4hrs PRN   Continue Oxycodone 5mg q 4hrs PRN  Consider increasing Fentanyl to 75mcgs q 72 hours [current patch patient had on during encounter was dated 5/9/2024 so it was due to be changed today].  Continue to monitor pain scores and administer PRN medications as appropriate  Continue/initiate nonpharmacologic pain management strategies including ice/heat therapy, distraction techniques, deep breathing/relaxation techniques, calming music, and repositioning  Continue to monitor for signs of opioid efficacy (pain scores, improved functionality) and toxicity (pinpoint pupils, excess sedation/drowsiness/confusion, respiratory depression, etc.)    Nausea:  Intermittent nausea without vomiting related to constipation   Home regimen:  protonix 40mg daily,  Scopolamine patch PRN and Carafate PRN  denies  Continue dex 6mg iv TID managed by primary team    Constipation  At risk for constipation related to opioids, currently not constipated  Usual bowel pattern: every day  Home regimen: Senna 2 tablets BID, Miralax  17 gm daily, Lactulose 20 gm 4x a day PRN, Docusate 100mg BID PRN, and MOM 15ml PRN   LBM 5/12/2024    Continue Miralax 17 grams daily   Monitor BM frequency, adjust regimen as needed  Goal to have BM without straining q48-72h     Altered Mood:  Acute on chronic anxiety related to health concerns   controlled with home regimen   Home regimen: lorazepam 1mg TID PRN  Monitor and control     Sleeping Difficulty:  Impaired sleep related to pain  Home regimen:  none  Improved with pain control     Decreased appetite:  Appetite loss related to malignancy, chemotherapy, taste changes, and disease process  Nutrition following  Weight loss none  Home regimen:  none  Dex as above  Reports that he ate tapioca today made by his wife. With better pain control he is more enthusiastic about eating     Medical Decision Making/Goals of Care/Advance Care Planning:  Patient's current clinical condition, including diagnosis, prognosis, and management plan, and goals of care were discussed.   Life limiting disease: metastatic malignancy  Family: Supportive family   Joys/meaning/strength: Family and Lizette  Understanding of health: Demonstrates good prognostic understanding of disease process, understands plan for ongoing symptom control   Information:Wants full disclosure     Goals: symptom control and cancer directed therapy  Worries and fears now and future: ongoing symptoms   Minimum acceptable outcome/QOL:  wants all heroic measures in the event that his heart stops.   Code status discussion:  full code    Advance Directives  Existence of Advance Directives:No - has interest  Decision maker: LUKEOA is wife  Code Status: Full code    Introduction to Supportive and Palliative Oncology:  Spoke with patient at bedside  Introduced the role and philosophy of Supportive and Palliative oncology in the evaluation and management of symptoms during cancer treatment  Palliative care was introduced as a service for patients with serious illness to  help with symptoms, assist with goals of care conversations, navigate complex decision making, improve quality of life for patients, and provide support both patients and families.  Patient seemed to appreciate the extra layer of support.     Supportive Interventions: Interventions: Music Therapy: offered referral placed, Art Therapy: offered declined, SPO Spiritual Care: offered declined, Social work referral for: Advance Directives    Disposition:  Please  start the process of having prior authorization with meds to beds deliver medications to patient prior to discharge via [facility] pharmacy. Prescriptions will need to be sent 48-72 hours prior to discharge so that a prior authorization can be completed.     Discharge date: unknown pending acute issues  Will request an appointment with Outpatient Supportive Oncology JASPER Doss      Signature and billing:  Thank you for allowing us to participate in the care of this patient. Recommendations will be communicated back to the consulting service by way of shared electronic medical record or face-to-face.    Medical complexity was high level due to due to complexity of problems, extensive data review, and high risk of management/treatment.    I spent 75 minutes in the care of this patient which included chart review, interviewing patient/family, discussion with primary team, coordination of care, and documentation.      DATA   Diagnostic tests and information reviewed for today's visit:  Conversation with primary team       Some elements copied from Sugar Hernandez note  note on 4/11/2024, the elements have been updated and all reflect current decision making from today, 5/12/2024.    Plan of Care discussed with: Provider, RN, Patient    Thank you for asking Supportive and Palliative Oncology to assist with care of this patient.  We will continue to follow.  Please contact us for additional questions or concerns.      SIGNATURE: Bebeto Rae  APRN-CNP  PAGER/CONTACT:  Contact information:  Supportive and Palliative Oncology  Monday-Friday 8 AM-5 PM  Epic Secure chat or pager [phone].  After hours and weekends:  pager [phone]

## 2024-05-12 NOTE — CARE PLAN
Problem: Fall/Injury  Goal: Not fall by end of shift  Outcome: Progressing  Goal: Be free from injury by end of the shift  Outcome: Progressing  Goal: Verbalize understanding of personal risk factors for fall in the hospital  Outcome: Progressing  Goal: Verbalize understanding of risk factor reduction measures to prevent injury from fall in the home  Outcome: Progressing  Goal: Use assistive devices by end of the shift  Outcome: Progressing  Goal: Pace activities to prevent fatigue by end of the shift  Outcome: Progressing     Problem: Pain  Goal: Takes deep breaths with improved pain control throughout the shift  Outcome: Progressing  Goal: Turns in bed with improved pain control throughout the shift  Outcome: Progressing  Goal: Walks with improved pain control throughout the shift  Outcome: Progressing  Goal: Performs ADL's with improved pain control throughout shift  Outcome: Progressing  Goal: Participates in PT with improved pain control throughout the shift  Outcome: Progressing  Goal: Free from opioid side effects throughout the shift  Outcome: Progressing  Goal: Free from acute confusion related to pain meds throughout the shift  Outcome: Progressing   The patient's goals for the shift include      The clinical goals for the shift include pt will remain safe and free from injury during shift on 05/11 @1900

## 2024-05-13 ENCOUNTER — APPOINTMENT (OUTPATIENT)
Dept: HEMATOLOGY/ONCOLOGY | Facility: CLINIC | Age: 64
End: 2024-05-13
Payer: MEDICARE

## 2024-05-13 LAB
ALBUMIN SERPL BCP-MCNC: 3.6 G/DL (ref 3.4–5)
ANION GAP SERPL CALC-SCNC: 13 MMOL/L (ref 10–20)
BASOPHILS # BLD AUTO: 0 X10*3/UL (ref 0–0.1)
BASOPHILS NFR BLD AUTO: 0 %
BUN SERPL-MCNC: 21 MG/DL (ref 6–23)
CALCIUM SERPL-MCNC: 9.4 MG/DL (ref 8.6–10.6)
CHLORIDE SERPL-SCNC: 102 MMOL/L (ref 98–107)
CO2 SERPL-SCNC: 26 MMOL/L (ref 21–32)
CREAT SERPL-MCNC: 0.66 MG/DL (ref 0.5–1.3)
EGFRCR SERPLBLD CKD-EPI 2021: >90 ML/MIN/1.73M*2
EOSINOPHIL # BLD AUTO: 0 X10*3/UL (ref 0–0.7)
EOSINOPHIL NFR BLD AUTO: 0 %
ERYTHROCYTE [DISTWIDTH] IN BLOOD BY AUTOMATED COUNT: 17.7 % (ref 11.5–14.5)
GLUCOSE SERPL-MCNC: 126 MG/DL (ref 74–99)
HCT VFR BLD AUTO: 32.3 % (ref 41–52)
HGB BLD-MCNC: 10.5 G/DL (ref 13.5–17.5)
IMM GRANULOCYTES # BLD AUTO: 0.05 X10*3/UL (ref 0–0.7)
IMM GRANULOCYTES NFR BLD AUTO: 0.8 % (ref 0–0.9)
LYMPHOCYTES # BLD AUTO: 0.31 X10*3/UL (ref 1.2–4.8)
LYMPHOCYTES NFR BLD AUTO: 5.1 %
MAGNESIUM SERPL-MCNC: 1.87 MG/DL (ref 1.6–2.4)
MCH RBC QN AUTO: 29.2 PG (ref 26–34)
MCHC RBC AUTO-ENTMCNC: 32.5 G/DL (ref 32–36)
MCV RBC AUTO: 90 FL (ref 80–100)
MONOCYTES # BLD AUTO: 0.24 X10*3/UL (ref 0.1–1)
MONOCYTES NFR BLD AUTO: 3.9 %
NEUTROPHILS # BLD AUTO: 5.53 X10*3/UL (ref 1.2–7.7)
NEUTROPHILS NFR BLD AUTO: 90.2 %
NRBC BLD-RTO: 0 /100 WBCS (ref 0–0)
PHOSPHATE SERPL-MCNC: 2.9 MG/DL (ref 2.5–4.9)
PLATELET # BLD AUTO: 67 X10*3/UL (ref 150–450)
POTASSIUM SERPL-SCNC: 3.9 MMOL/L (ref 3.5–5.3)
RBC # BLD AUTO: 3.59 X10*6/UL (ref 4.5–5.9)
SODIUM SERPL-SCNC: 137 MMOL/L (ref 136–145)
WBC # BLD AUTO: 6.1 X10*3/UL (ref 4.4–11.3)

## 2024-05-13 PROCEDURE — 97161 PT EVAL LOW COMPLEX 20 MIN: CPT | Mod: GP

## 2024-05-13 PROCEDURE — 2500000001 HC RX 250 WO HCPCS SELF ADMINISTERED DRUGS (ALT 637 FOR MEDICARE OP)

## 2024-05-13 PROCEDURE — 2500000005 HC RX 250 GENERAL PHARMACY W/O HCPCS

## 2024-05-13 PROCEDURE — 1170000001 HC PRIVATE ONCOLOGY ROOM DAILY

## 2024-05-13 PROCEDURE — 2500000004 HC RX 250 GENERAL PHARMACY W/ HCPCS (ALT 636 FOR OP/ED)

## 2024-05-13 PROCEDURE — 85025 COMPLETE CBC W/AUTO DIFF WBC: CPT | Performed by: STUDENT IN AN ORGANIZED HEALTH CARE EDUCATION/TRAINING PROGRAM

## 2024-05-13 PROCEDURE — 84100 ASSAY OF PHOSPHORUS: CPT | Performed by: STUDENT IN AN ORGANIZED HEALTH CARE EDUCATION/TRAINING PROGRAM

## 2024-05-13 PROCEDURE — 2500000004 HC RX 250 GENERAL PHARMACY W/ HCPCS (ALT 636 FOR OP/ED): Performed by: STUDENT IN AN ORGANIZED HEALTH CARE EDUCATION/TRAINING PROGRAM

## 2024-05-13 PROCEDURE — 99233 SBSQ HOSP IP/OBS HIGH 50: CPT

## 2024-05-13 PROCEDURE — 83735 ASSAY OF MAGNESIUM: CPT | Performed by: STUDENT IN AN ORGANIZED HEALTH CARE EDUCATION/TRAINING PROGRAM

## 2024-05-13 RX ORDER — HYDROMORPHONE HYDROCHLORIDE 1 MG/ML
1 INJECTION, SOLUTION INTRAMUSCULAR; INTRAVENOUS; SUBCUTANEOUS ONCE
Status: COMPLETED | OUTPATIENT
Start: 2024-05-13 | End: 2024-05-13

## 2024-05-13 RX ORDER — HYDROMORPHONE HYDROCHLORIDE 1 MG/ML
1 INJECTION, SOLUTION INTRAMUSCULAR; INTRAVENOUS; SUBCUTANEOUS
Status: DISCONTINUED | OUTPATIENT
Start: 2024-05-13 | End: 2024-05-15

## 2024-05-13 RX ORDER — HYDROMORPHONE HYDROCHLORIDE 1 MG/ML
1 INJECTION, SOLUTION INTRAMUSCULAR; INTRAVENOUS; SUBCUTANEOUS
Status: DISCONTINUED | OUTPATIENT
Start: 2024-05-13 | End: 2024-05-13

## 2024-05-13 RX ORDER — HYDROMORPHONE HYDROCHLORIDE 2 MG/1
2 TABLET ORAL EVERY 4 HOURS PRN
Status: DISCONTINUED | OUTPATIENT
Start: 2024-05-13 | End: 2024-05-14

## 2024-05-13 RX ORDER — HYDROMORPHONE HYDROCHLORIDE 4 MG/1
4 TABLET ORAL EVERY 4 HOURS PRN
Status: DISCONTINUED | OUTPATIENT
Start: 2024-05-13 | End: 2024-05-14

## 2024-05-13 RX ADMIN — HYDROMORPHONE HYDROCHLORIDE 1 MG: 1 INJECTION, SOLUTION INTRAMUSCULAR; INTRAVENOUS; SUBCUTANEOUS at 13:37

## 2024-05-13 RX ADMIN — HYDROMORPHONE HYDROCHLORIDE 1 MG: 1 INJECTION, SOLUTION INTRAMUSCULAR; INTRAVENOUS; SUBCUTANEOUS at 17:28

## 2024-05-13 RX ADMIN — HYDROMORPHONE HYDROCHLORIDE 1 MG: 1 INJECTION, SOLUTION INTRAMUSCULAR; INTRAVENOUS; SUBCUTANEOUS at 21:50

## 2024-05-13 RX ADMIN — HYDROMORPHONE HYDROCHLORIDE 1 MG: 1 INJECTION, SOLUTION INTRAMUSCULAR; INTRAVENOUS; SUBCUTANEOUS at 09:19

## 2024-05-13 RX ADMIN — DEXAMETHASONE SODIUM PHOSPHATE 6 MG: 4 INJECTION, SOLUTION INTRA-ARTICULAR; INTRALESIONAL; INTRAMUSCULAR; INTRAVENOUS; SOFT TISSUE at 20:33

## 2024-05-13 RX ADMIN — ENOXAPARIN SODIUM 40 MG: 40 INJECTION, SOLUTION SUBCUTANEOUS at 16:09

## 2024-05-13 RX ADMIN — PANTOPRAZOLE SODIUM 40 MG: 40 TABLET, DELAYED RELEASE ORAL at 06:11

## 2024-05-13 RX ADMIN — DEXAMETHASONE SODIUM PHOSPHATE 6 MG: 4 INJECTION, SOLUTION INTRA-ARTICULAR; INTRALESIONAL; INTRAMUSCULAR; INTRAVENOUS; SOFT TISSUE at 16:08

## 2024-05-13 RX ADMIN — HYDROMORPHONE HYDROCHLORIDE 1 MG: 1 INJECTION, SOLUTION INTRAMUSCULAR; INTRAVENOUS; SUBCUTANEOUS at 12:21

## 2024-05-13 RX ADMIN — CHLORPROMAZINE HYDROCHLORIDE 25 MG: 25 TABLET, FILM COATED ORAL at 16:09

## 2024-05-13 RX ADMIN — HYDROMORPHONE HYDROCHLORIDE 4 MG: 4 TABLET ORAL at 20:34

## 2024-05-13 RX ADMIN — DEXAMETHASONE SODIUM PHOSPHATE 6 MG: 4 INJECTION, SOLUTION INTRA-ARTICULAR; INTRALESIONAL; INTRAMUSCULAR; INTRAVENOUS; SOFT TISSUE at 08:40

## 2024-05-13 RX ADMIN — Medication 4000 UNITS: at 08:40

## 2024-05-13 RX ADMIN — HYDROMORPHONE HYDROCHLORIDE 1 MG: 1 INJECTION, SOLUTION INTRAMUSCULAR; INTRAVENOUS; SUBCUTANEOUS at 06:11

## 2024-05-13 RX ADMIN — HYDROMORPHONE HYDROCHLORIDE 4 MG: 4 TABLET ORAL at 16:08

## 2024-05-13 RX ADMIN — HYDROMORPHONE HYDROCHLORIDE 1 MG: 1 INJECTION, SOLUTION INTRAMUSCULAR; INTRAVENOUS; SUBCUTANEOUS at 02:27

## 2024-05-13 RX ADMIN — Medication 1 TABLET: at 08:40

## 2024-05-13 RX ADMIN — LIDOCAINE 1 PATCH: 4 PATCH TOPICAL at 08:40

## 2024-05-13 ASSESSMENT — COGNITIVE AND FUNCTIONAL STATUS - GENERAL
DAILY ACTIVITIY SCORE: 24
MOBILITY SCORE: 24
MOBILITY SCORE: 24

## 2024-05-13 ASSESSMENT — PAIN - FUNCTIONAL ASSESSMENT
PAIN_FUNCTIONAL_ASSESSMENT: 0-10

## 2024-05-13 ASSESSMENT — PAIN SCALES - GENERAL
PAINLEVEL_OUTOF10: 8
PAINLEVEL_OUTOF10: 7
PAINLEVEL_OUTOF10: 3
PAINLEVEL_OUTOF10: 7
PAINLEVEL_OUTOF10: 0 - NO PAIN
PAINLEVEL_OUTOF10: 7
PAINLEVEL_OUTOF10: 0 - NO PAIN
PAINLEVEL_OUTOF10: 0 - NO PAIN

## 2024-05-13 ASSESSMENT — ACTIVITIES OF DAILY LIVING (ADL): ADL_ASSISTANCE: INDEPENDENT

## 2024-05-13 NOTE — CARE PLAN
The patient's goals for the shift include      The clinical goals for the shift include pt will report pain <7/10 through 5/13/24 @ 0700      Problem: Fall/Injury  Goal: Pace activities to prevent fatigue by end of the shift  Outcome: Progressing     Problem: Pain  Goal: Takes deep breaths with improved pain control throughout the shift  Outcome: Progressing  Goal: Turns in bed with improved pain control throughout the shift  Outcome: Progressing  Goal: Walks with improved pain control throughout the shift  Outcome: Progressing  Goal: Performs ADL's with improved pain control throughout shift  Outcome: Progressing

## 2024-05-13 NOTE — PROGRESS NOTES
Subjective   Overnight, he was restarted on home Ativan 1 mg TID for anxiety. He used one dose oxycodone at 9 pm, and got three doses of IV Dilaudid.    He slept well overnight, getting about 6 hrs of sleep. He has pain in low back that is 7/10 at worst but improves to 3/10 with IV Dilaudid. Feels PRN oxycodone has never worked for pain control for him. He is eating and drinking well, and had a bowel movement yesterday that was a bit softer than his baseline but was not watery.     Denies any shortness of breath, chest pain, nausea/vomiting, numbness, weakness or paresthesias. Has episodic hiccups and eye fluid leaking.    Meds  Scheduled medications  [Held by provider] apixaban, 5 mg, oral, BID  cholecalciferol, 4,000 Units, oral, q AM  dexAMETHasone, 6 mg, intravenous, TID  enoxaparin, 40 mg, subcutaneous, q24h  fentaNYL, 1 patch, transdermal, q72h  lidocaine, 1 patch, transdermal, Daily  multivitamin with minerals, 1 tablet, oral, q AM  pantoprazole, 40 mg, oral, Daily before breakfast  polyethylene glycol, 17 g, oral, Daily      Continuous medications     PRN medications  PRN medications: alteplase, calcium carbonate, chlorproMAZINE, HYDROmorphone, LORazepam, oxyCODONE, oxyCODONE      Objective   Vital signs in last 24 hours:  Temp:  [36.2 °C (97.2 °F)-36.9 °C (98.4 °F)] 36.9 °C (98.4 °F)  Heart Rate:  [101-107] 101  Resp:  [18-20] 18  BP: (103-130)/(70-84) 120/77    Intake/Output this shift:    Intake/Output Summary (Last 24 hours) at 5/13/2024 6127  Last data filed at 5/12/2024 2527  Gross per 24 hour   Intake 400 ml   Output --   Net 400 ml       Physical  General: Patient is awake, non-toxic appearing, thin body habitus  HEENT: No erythema or exudates on oropharynx   Pulm: Normal WOB at rest and when sitting up, no crackles or rhonchi in lungs  Cardiac: Tachycardic, regular rhythm, normal S1/S2  Abdomen: Non-tender to palpation, non-distended  Extremities: No peripheral edema, skin warm and dry  Neuro:  Patient alert and oriented, cranial nerves grossly intact, 5/5 strength bilaterally. Left eye periodically leaking water.     Results  Results for orders placed or performed during the hospital encounter of 05/10/24 (from the past 24 hour(s))   CBC and Auto Differential   Result Value Ref Range    WBC 4.9 4.4 - 11.3 x10*3/uL    nRBC 0.0 0.0 - 0.0 /100 WBCs    RBC 3.26 (L) 4.50 - 5.90 x10*6/uL    Hemoglobin 9.7 (L) 13.5 - 17.5 g/dL    Hematocrit 29.4 (L) 41.0 - 52.0 %    MCV 90 80 - 100 fL    MCH 29.8 26.0 - 34.0 pg    MCHC 33.0 32.0 - 36.0 g/dL    RDW 17.2 (H) 11.5 - 14.5 %    Platelets 57 (L) 150 - 450 x10*3/uL    Neutrophils % 82.1 40.0 - 80.0 %    Immature Granulocytes %, Automated 0.6 0.0 - 0.9 %    Lymphocytes % 7.4 13.0 - 44.0 %    Monocytes % 9.7 2.0 - 10.0 %    Eosinophils % 0.2 0.0 - 6.0 %    Basophils % 0.0 0.0 - 2.0 %    Neutrophils Absolute 4.00 1.20 - 7.70 x10*3/uL    Immature Granulocytes Absolute, Automated 0.03 0.00 - 0.70 x10*3/uL    Lymphocytes Absolute 0.36 (L) 1.20 - 4.80 x10*3/uL    Monocytes Absolute 0.47 0.10 - 1.00 x10*3/uL    Eosinophils Absolute 0.01 0.00 - 0.70 x10*3/uL    Basophils Absolute 0.00 0.00 - 0.10 x10*3/uL   Comprehensive metabolic panel   Result Value Ref Range    Glucose 104 (H) 74 - 99 mg/dL    Sodium 139 136 - 145 mmol/L    Potassium 3.6 3.5 - 5.3 mmol/L    Chloride 105 98 - 107 mmol/L    Bicarbonate 26 21 - 32 mmol/L    Anion Gap 12 10 - 20 mmol/L    Urea Nitrogen 21 6 - 23 mg/dL    Creatinine 0.59 0.50 - 1.30 mg/dL    eGFR >90 >60 mL/min/1.73m*2    Calcium 8.9 8.6 - 10.6 mg/dL    Albumin 3.3 (L) 3.4 - 5.0 g/dL    Alkaline Phosphatase 136 33 - 136 U/L    Total Protein 5.6 (L) 6.4 - 8.2 g/dL    AST 28 9 - 39 U/L    Bilirubin, Total 1.0 0.0 - 1.2 mg/dL    ALT 16 10 - 52 U/L   Magnesium   Result Value Ref Range    Magnesium 1.89 1.60 - 2.40 mg/dL   Coagulation Screen   Result Value Ref Range    Protime 15.7 (H) 9.8 - 12.8 seconds    INR 1.4 (H) 0.9 - 1.1    aPTT 26 (L) 27 - 38  seconds       Imaging  XR chest 2 views    Result Date: 5/11/2024  Interpreted By:  Jason Paredes, STUDY: XR CHEST 2 VIEWS;  5/11/2024 9:45 am   INDICATION: Signs/Symptoms:Per neurosurgery recs.   COMPARISON: Exam dated 03/18/2024   ACCESSION NUMBER(S): [id number]   ORDERING CLINICIAN: KOBI SIBLEY   FINDINGS: PA and lateral radiographs of the chest were provided.   Status post bilateral humeral head resurfacing which is partially evaluated. Right chest wall medication port with catheter tip projecting over the mid to lower SVC. Left chest wall pacer with lead tips projecting over the right atrial appendage and right ventricle in similar position as prior exam.   CARDIOMEDIASTINAL SILHOUETTE: Cardiomediastinal silhouette is normal in size and configuration.   LUNGS: Scattered bilateral pulmonary nodules are noted and were better evaluated on recent CT. No new areas of focal consolidation, pneumothorax, or pleural effusion.   ABDOMEN: No remarkable upper abdominal findings.   BONES: No acute osseous changes.       1.  Multiple bilateral pulmonary nodules which were better evaluated on prior CT and consistent with metastatic disease. 2. No radiographic evidence of acute cardiopulmonary process.       MACRO: None   Signed by: Jason Paredes 5/11/2024 5:02 PM Dictation workstation:   [id number]        Assessment/Plan   Principal Problem:    Esophageal cancer, stage IV (Multi)    Massimo Garcia is a 63 y.o. male with PMH third degree heart block s/p PPM (placed in November), esophageal adenocarcinoma with mets to liver and lungs, Drug-induced colitis, PE, peripheral neuropathy, and portal vein thrombosis (On Eliquis). Patient presented to [facility] ED on 5/9 with acute on chronic low back pain. CT imaging was obtained and was concerning for evidence of soft tissue extension into the spinal canal at T9-T10 with associated spinal canal stenosis. Patient transferred to [facility] for further workup and management.       5/13  updates  -Pain well controlled, no new neurologic deficits  -MRI pending pacemaker card  -Further management with surgery or radiation     #Acute on Chronic low Back Pain  :: Presented to [facility] ED on 5/9 with stabbing nonradiating low back pain, acutely worsened  :: CT L spine obtained: “evidence of soft tissue extension into the spinal canal at T9 and T10 resulting in spinal canal stenosis”  :: Neurology consult obtained on admission, rec obtaining MRI brain, C/T/L spine w/wo contrast for oncologic workup  :: UA (5/10) unremarkable  - MRI brain and C, T, L spine w/wo con ordered, pending completion  - On dexamethasone 6 mg TID     #Hiccups  -Chloropromazine 25 mg Q6H PRN    #Pain Management:   -Fentanyl patch to 75 mcg patch q72hrs  - Lidocaine patch  - Oxycodone 5mg q4hr PRN mild pain, Oxycodone 10mg q4hr PRN mod pain, Dilaudid 1 mg IV q3hr PRN severe pain  - Supportive oncology consulted, appreciate recs  - PT consulted, awaiting recs     #Vitamin D deficiency  -Continue vitamin D 4000 units every morning    #Stage IV esophageal cancer HER2 positive   :: Follows with Dr. Blake, emailed 5/11 AM with updates  :: Current chemotherapy: 5FU, Tras/Pembro, last received on 4/29, next due 5/13. Hold for potential surgery.  :: CT A/P (5/9) with “Multiple pulmonary nodules in the imaged lower lungs compatible with metastatic disease. There is wall thickening and apparent hyperenhancement of the anterior wall of the distal esophagus which may relate to patient's known esophageal malignancy. There is also redemonstration of multiple hepatic masses and abdominal and pelvic lymphadenopathy compatible with metastatic disease     #Recent Immune-mediated Colitis  :: Recently admitted to [facility] 5/4-5/7 for enterocolitis  - Dexamethasone 6 mg TID     #hx PE  #Hx Portal vein thrombosis  - Pulmonary embolism in RLL subsegmental artery in March 2024  -Jan 2023:  Extensive large pulmonary emboli involving lobar, segmental, and  subsegmental arteries bilaterally. Prior to any cancer treatment  - Held home Eliquis 5mg BID on admission, per neurology, pending further plan  - On enoxaparin 40 mg daily    #GERD  - Continue home Protonix   -Calcium carbonate 500 mg PRN    #Anxiety disorder  - Lorazepam 1 mg Q8H PRN    #Chronic normocytic anemia secondary to malignancy and treatment  #Chronic thrombocytopenia  :: At baseline hgb   -Hgb of 10.5. Baseline 10-12  - Monitor daily CBC  - Transfuse for hgb<7, platelet<10     #Hx Third degree heard block, s/p pacemaker placement (11/8/23)  :: Asymptomatic on admission  - CTM     F: PRN  E: Replete as needed  N: Regular diet  C: Full code  A: Port     LOS: 3 days     Adi Mays MD/PhD   PGY-1

## 2024-05-13 NOTE — CONSULTS
"Nutrition Initial Assessment:   Nutrition Assessment    The patient is a 63 y.o. male who is hospital day #3.  Pt admitted for acute on chronic back pain. CT w/ c/f of soft tissue extension into the spinal canal at T9-T10 with associated spinal canal stenosis. Rad onc and NSGY consulted. Plan for MRI today.    PMHx: third degree heart block s/p PPM (placed in November), esophageal adenocarcinoma with mets to liver and lungs, Drug-induced colitis, PE, peripheral neuropathy, and portal vein thrombosis (On Eliquis)     Reason for Assessment: Admission nursing screening  Malnutrition Screening Tool (MST)  Have you recently lost weight without trying?: Yes  If yes, how much weight have you lost?: Unsure  Weight Loss Score: 2  Have you been eating poorly because of a decreased appetite?: No  Malnutrition Score: 2      Nutrition History:  Energy Intake: Good > 75 %  Food and Nutrient History: Pt and wife present at time of visit. Both provided information. Pt states he has been doing well in terms of nutrition. Doing 2-3 meals per day. Snacking all throughout the day. Appetite is good - no changes in PO intake recently. On dexamethasone which has increased his appetite and he is hungry. Has Boost [employer] at home and in the hospital for when days when he is not eating enough though states he has not used any in a while. Various snacks in the room. No nutrition concerns or questions at this moment. Normall follows with outpatient [facility] RDN. Denied any N/V/D/C and food allergies. Chart review, indicates pt eating about % of the meals he orders.  Vitamin/Herbal Supplement Use: MVI, vitamin D3, and balance of nature fruits and vegetable supplement    Percent Meals Eaten (%): 100 (05/13/24 0918)  Percent Meals Eaten (%): 66 (05/12/24 1030)  Percent Meals Eaten (%): 100 (05/11/24 1300)    Anthropometrics:  Start of admission anthropometrics:  Height: 172.7 cm (5' 8\")  Weight: 70.3 kg (155 lb)  BMI (Calculated): 23.57    IBW/kg " (Dietitian Calculated): 70 kg  Percent of IBW: 100 %       Wt Hx   05/10/24 70.3 kg (155 lb)   05/04/24 71.2 kg (156 lb 15.5 oz)   04/11/24 69.8 kg (153 lb 15.9 oz)   03/18/24 72.4 kg (159 lb 11.6 oz)   02/12/24 76.1 kg (167 lb 10.6 oz)   01/10/24 74.5 kg (164 lb 3.9 oz)   12/18/23 75.3 kg (165 lb 14.3 oz)   11/13/23 68.4 kg (150 lb 12.7 oz)   10/16/23 72.6 kg (160 lb 0.9 oz)   09/13/23 73.5 kg (162 lb 0.6 oz)   06/27/23 72.8 kg (160 lb 7.9 oz)       Weight Change %:  Weight History / % Weight Change: Overall, 7.5% wt loss in the past 3 months (though wt from 02/12 is the highest this past year - ? accuracy) but wt appears to have stabilized this past month. Pt indicates that throughout this whole process he has lost wt but believes his wt has been stable recently at around 155-158lb. Denied any recent wt changes or noticing any losses/clothes fitting differently,  Significant Weight Loss: Yes  Interpretation of Weight Loss: 7.5% in 3 months    Nutrition Focused Physical Exam Findings:  defer: not indicated     Edema:  Edema: none  Physical Findings:  Skin: Negative    Objective Data:    Last BM Date: 05/12/24    Nutrition Significant Labs:    Results from last 7 days   Lab Units 05/13/24  0616 05/12/24  0557 05/11/24  0216   HEMOGLOBIN g/dL 10.5* 9.7* 10.0*   MCV fL 90 90 87   GLUCOSE mg/dL 126* 104* 177*   POTASSIUM mmol/L 3.9 3.6 3.6   SODIUM mmol/L 137 139 138   PHOSPHORUS mg/dL 2.9  --  2.5   MAGNESIUM mg/dL 1.87 1.89 1.96   CREATININE mg/dL 0.66 0.59 0.74   BUN mg/dL 21 21 21   ALT U/L  --  16 18   AST U/L  --  28 27   ALK PHOS U/L  --  136 141*       Nutrition Specific Medications:  cholecalciferol, 4,000 Units, oral, q AM  dexAMETHasone, 6 mg, intravenous, TID  fentaNYL, 1 patch, transdermal, q72h  multivitamin with minerals, 1 tablet, oral, q AM  pantoprazole, 40 mg, oral, Daily before breakfast  polyethylene glycol, 17 g, oral, Daily    I/O:   I/O last 2 completed shifts:  In: 400 (5.7 mL/kg)  [P.O.:400]  Out: - (0 mL/kg)   Weight: 70.3 kg     Dietary Orders (From admission, onward)       Start     Ordered    05/11/24 1053  Adult diet Regular  Diet effective now        Question:  Diet type  Answer:  Regular    05/11/24 1052                     Estimated Needs:   Total Energy Estimated Needs (kCal): 2250 kCal  Method for Estimating Needs: 32 kcal/kg ABW    Total Protein Estimated Needs (g): 90 g  Method for Estimating Needs: 1.3 g/kg ABW    Method for Estimating Needs: 1 ml/kcal or per team          Nutrition Diagnosis   Malnutrition Diagnosis  Patient has Malnutrition Diagnosis: No    Nutrition Diagnosis  Patient has Nutrition Diagnosis: Yes  Diagnosis Status (1): New  Nutrition Diagnosis 1: Increased nutrient needs  Related to (1): increased metabolic demand  As Evidenced by (1): esophageal ca w/ mets       Nutrition Interventions/Recommendations   Individualized Nutrition Prescription Provided for : 2250 kcal and 90g protein via oral diet    Pt reports good PO intake and appetite, per EMR and pt no recent wt changes, and no nutrition concerns/questions at this time. This service to sign-off, though does remain available during pt's stay. Reconsult as needed.        Nutrition Monitoring and Evaluation         N/A                  Time Spent/Follow-up Reminder:   Time Spent (min): 45 minutes

## 2024-05-13 NOTE — CARE PLAN
Problem: Fall/Injury  Goal: Not fall by end of shift  Outcome: Progressing  Goal: Be free from injury by end of the shift  Outcome: Progressing  Goal: Verbalize understanding of personal risk factors for fall in the hospital  Outcome: Progressing  Goal: Verbalize understanding of risk factor reduction measures to prevent injury from fall in the home  Outcome: Progressing  Goal: Use assistive devices by end of the shift  Outcome: Progressing  Goal: Pace activities to prevent fatigue by end of the shift  Outcome: Progressing     Problem: Pain  Goal: Takes deep breaths with improved pain control throughout the shift  Outcome: Progressing  Goal: Turns in bed with improved pain control throughout the shift  Outcome: Progressing  Goal: Walks with improved pain control throughout the shift  Outcome: Progressing  Goal: Performs ADL's with improved pain control throughout shift  Outcome: Progressing  Goal: Participates in PT with improved pain control throughout the shift  Outcome: Progressing  Goal: Free from opioid side effects throughout the shift  Outcome: Progressing  Goal: Free from acute confusion related to pain meds throughout the shift  Outcome: Progressing   The patient's goals for the shift include      The clinical goals for the shift include pt will rate pain <7 on shift 05/13 @0700

## 2024-05-13 NOTE — PROGRESS NOTES
Physical Therapy    Physical Therapy Evaluation    Patient Name: Massimo Garcia  MRN: [id number]  Today's Date: 5/13/2024   Room: [address] [place], [place] [address]  Time Calculation  Start Time: 1104  Stop Time: 1123  Time Calculation (min): 19 min    Assessment/Plan   PT Assessment  Evaluation/Treatment Tolerance: Patient tolerated treatment well  Medical Staff Made Aware: Yes  Strengths: Attitude of self  Barriers to Participation: Comorbidities  End of Session Communication: Bedside nurse  Assessment Comment: 63 year old male admitted with acute on chronic low back pain. CT imaging was obtained and was concerning for evidence of soft tissue extension into the spinal canal at T9-T10 with associated spinal canal stenosis. Pt completing all functional mobility grossly independently this session with no AD and no LOB. At this time, no acute PT needs identified. Pt stating that he has been ambulating in hallway while he has been here. Will DC order. Recommend home with NN.  End of Session Patient Position: Bed, 3 rail up, Alarm off, not on at start of session  IP OR SWING BED PT PLAN  Inpatient or Swing Bed: Inpatient  PT Plan  PT Plan: PT Eval only  PT Eval Only Reason: No acute PT needs identified  PT Frequency: PT eval only  PT Discharge Recommendations: No further acute PT, No PT needed after discharge  PT Recommended Transfer Status: Independent  PT - OK to Discharge: Yes (PT eval complete and DC rec made)    Subjective   General Visit Information:  Reason for Referral: 63 year old male admitted with acute on chronic low back pain. CT imaging was obtained and was concerning for evidence of soft tissue extension into the spinal canal at T9-T10 with associated spinal canal stenosis  Past Medical History Relevant to Rehab: third degree heart block s/p PPM (placed in November), esophageal adenocarcinoma with mets to liver and lungs, Drug-induced colitis, PE, peripheral neuropathy, and portal vein thrombosis (On Eliquis)  Prior to Session  Communication: Bedside nurse, Physician (MD in room during session and okaying OOB mobility)  Patient Position Received: Bed, 3 rail up, Alarm off, not on at start of session  Family/Caregiver Present: Yes  Caregiver Feedback: pt's wife present in room during session  General Comment: Pt supine in bed upon entry to room. Pt pleasant, cooperative and willing to work with PT.   Home Living:  Home Living  Type of Home: House  Lives With: Spouse  Home Layout: One level  Home Access:  (4 JOE)  Prior Level of Function:  Prior Function Per Pt/Caregiver Report  Level of [place]: Independent with ADLs and functional transfers, Independent with homemaking with ambulation  ADL Assistance: Independent  Homemaking Assistance: Independent  Ambulatory Assistance: Independent  Precautions:  Precautions  Medical Precautions: Spinal precautions    Objective   Lines/Tubes/Drains:  Implantable Port 05/09/24 Right Chest (Active)   Number of days: 3     Pain:  Pain Assessment  Pain Assessment: 0-10  Pain Score: 3  Pain Location: Back  Cognition:  Cognition  Overall Cognitive Status: Within Functional Limits  Orientation Level: Oriented X4    Extremity/Trunk Assessments:  Strength:    RLE   RLE : Within Functional Limits  LLE   LLE : Within Functional Limits    General Assessments:    Activity Tolerance  Endurance: Endurance does not limit participation in activity  Sensation  Light Touch: No apparent deficits     Static Sitting Balance  Static Sitting-Comment/Number of Minutes: sba  Dynamic Sitting Balance  Dynamic Sitting-Comments: sba  Static Standing Balance  Static Standing-Comment/Number of Minutes: sba  Dynamic Standing Balance  Dynamic Standing-Comments: sba    Functional Assessments:  Bed Mobility  Bed Mobility: Yes  Bed Mobility 1  Bed Mobility 1: Supine to sitting, Sitting to supine  Level of Assistance 1: Modified independent  Bed Mobility Comments 1: HOB partially elevated  Transfers  Transfer: Yes  Transfer 1  Technique  1: Sit to stand, Stand to sit  Transfer Level of Assistance 1: Independent  Ambulation/Gait Training  Ambulation/Gait Training Performed: Yes  Ambulation/Gait Training 1  Surface 1: Level tile  Device 1: No device  Assistance 1: Distant supervision  Quality of Gait 1:  (slightly flexed trunk; decreased hip rotation)  Comments/Distance (ft) 1: 500ft    Outcome Measures:  [facility] Basic Mobility  Turning from your back to your side while in a flat bed without using bedrails: None  Moving from lying on your back to sitting on the side of a flat bed without using bedrails: None  Moving to and from bed to chair (including a wheelchair): None  Standing up from a chair using your arms (e.g. wheelchair or bedside chair): None  To walk in hospital room: None  Climbing 3-5 steps with railing: None  Basic Mobility - Total Score: 24    Education Documentation  Precautions, taught by Shasha Garcia PT at 5/13/2024  3:44 PM.  Learner: Patient  Readiness: Acceptance  Method: Explanation  Response: Verbalizes Understanding    Mobility Training, taught by Shasha Garcia PT at 5/13/2024  3:44 PM.  Learner: Patient  Readiness: Acceptance  Method: Explanation  Response: Verbalizes Understanding    Education Comments  No comments found.      05/13/24 at 3:44 PM   Shasha Garcia PT   Rehab Office: [phone]

## 2024-05-13 NOTE — PROGRESS NOTES
SUPPORTIVE AND PALLIATIVE ONCOLOGY INPATIENT FOLLOW-UP      SERVICE DATE: 24     SUBJECTIVE:  Interval Events:  Patient was seen at bedside along his wife. He reports that his current regimen is effective at reducing his pain. Discussed with patient about keeping the same regimen and he was receptive. Reports that he is doing well on fentanyl patch.     Discussed with primary team about above and patient reports that patient does not appear to be using oxycodone. Discussed switching patient to dilaudid Po instead to help with pain management.     Pain Assessment:  Location:  lower back pain  Duration: Constant  Characteristics:   Ratin and Mild   Descriptors: aching and throbbing   Aggravating: movement and bending    Relieving: Analgesics dilaudid   Interference with Function: Very Much    Opioid Use  Past 24 h prn opioid use: Hydromorphone 0.6 mg IV x 5 doses = 3 mg = 37.5mg OME  Oxycodone IR 5 mg PO x 1 doses = 5 mg = 6.25mg OME  Fentanyl TD 75 mcg/h = 150mg OME  Dilaudid 1mg iv x 4 doses = 50mg ome   Total 24h OME use:  243.75mg     Note: OME calculations based on equianalgesic table below. Please note this table is based on best available evidence but conversions are still approximate. These are NOT opioid DOSES for individual patient use; this is equivalency information.  Drug Parenteral Enteral   Morphine 10 25   Oxycodone N/A 20   Hydromorphone 2 5   Fentanyl 0.15 N/A   Tramadol N/A 120   Citation: Aniya ROSS. Demystifying opioid conversion calculations: A guide for effective dosing, Second edition. [place]: [employer]System Pharmacists, 2018.    Symptom Assessment:  Nausea none  Vomiting none  Anxiety none  Difficulty Sleeping none  Lack of appetite none    Information obtained from: chart review, interview of patient, interview of family, and discussion with primary team  ______________________________________________________________________        OBJECTIVE:    Lab  Results   Component Value Date    WBC 6.1 05/13/2024    HGB 10.5 (L) 05/13/2024    HCT 32.3 (L) 05/13/2024    MCV 90 05/13/2024    PLT 67 (L) 05/13/2024      Lab Results   Component Value Date    GLUCOSE 126 (H) 05/13/2024    CALCIUM 9.4 05/13/2024     05/13/2024    K 3.9 05/13/2024    CO2 26 05/13/2024     05/13/2024    BUN 21 05/13/2024    CREATININE 0.66 05/13/2024     Lab Results   Component Value Date    ALT 16 05/12/2024    AST 28 05/12/2024    ALKPHOS 136 05/12/2024    BILITOT 1.0 05/12/2024     Estimated Creatinine Clearance: 108 mL/min (by C-G formula based on SCr of 0.66 mg/dL).     Scheduled medications  [Held by provider] apixaban, 5 mg, oral, BID  cholecalciferol, 4,000 Units, oral, q AM  dexAMETHasone, 6 mg, intravenous, TID  enoxaparin, 40 mg, subcutaneous, q24h  fentaNYL, 1 patch, transdermal, q72h  lidocaine, 1 patch, transdermal, Daily  multivitamin with minerals, 1 tablet, oral, q AM  pantoprazole, 40 mg, oral, Daily before breakfast  polyethylene glycol, 17 g, oral, Daily      Continuous medications     PRN medications  alteplase, 2 mg, PRN  calcium carbonate, 500 mg, 4x daily PRN  chlorproMAZINE, 25 mg, q6h PRN  HYDROmorphone, 1 mg, q3h PRN  LORazepam, 1 mg, q8h PRN  oxyCODONE, 10 mg, q4h PRN  oxyCODONE, 5 mg, q4h PRN      }     PHYSICAL EXAMINATION:    Vital Signs:   Vital signs reviewed  Visit Vitals  /75 (BP Location: Left arm, Patient Position: Lying)   Pulse 98   Temp 36.5 °C (97.7 °F) (Temporal)   Resp 16        Pain Score: 0 - No pain       Physical Exam  Constitutional:       Appearance: Normal appearance.   Cardiovascular:      Rate and Rhythm: Regular rhythm.      Heart sounds: Normal heart sounds.   Pulmonary:      Breath sounds: Normal breath sounds.   Abdominal:      General: Abdomen is flat.      Palpations: Abdomen is soft.   Skin:     General: Skin is warm.   Neurological:      Mental Status: He is alert and oriented to person, place, and time.   Psychiatric:          Mood and Affect: Mood normal.        ASSESSMENT/PLAN:    Massimo Garcia is a 63 y.o. male diagnosed with metastatic esophageal cancer. PMH significant for third degree heart block s/p PPM (placed in November), esophageal adenocarcinoma with mets to liver and lungs, Drug-induced colitis, PE, peripheral neuropathy, and portal vein thrombosis (On Eliquis).  Admitted 5/10/2024 for further evaluation and management of acute on chronic low back pain. Course complicated by soft tissue extension into the spinal canal at T9-T10 with associated spinal canal stenosis. Supportive and Palliative Oncology is consulted for pain management.      Pain:  Lower back pain related to metastatic esophageal cancer  Pain is: cancer related pain  Type: visceral, somatic, and neuropathic  Pain control: sub-optimally controlled  Home regimen:  Acetaminophen PRN, Oxycodone 10mg q4hrs prn, Fentanyl TD , and bentyl prn   Intolerances/previously tried: Morphine with poor efficacy   Personalized pain goal: 2  Total OME usage for the past 24 hours: 243.75mg   Continue dilaudid 1mg IV q 3hrs PRN   Start Dilaudid po 2mg q3hrs PRN   discontinue oxycodone 10mg q4hrs PRN   discontinue Oxycodone 5mg q 4hrs PRN  Continue Fentanyl patch 75mcgs change q 72 hours   Continue to monitor pain scores and administer PRN medications as appropriate  Continue/initiate nonpharmacologic pain management strategies including ice/heat therapy, distraction techniques, deep breathing/relaxation techniques, calming music, and repositioning  Continue to monitor for signs of opioid efficacy (pain scores, improved functionality) and toxicity (pinpoint pupils, excess sedation/drowsiness/confusion, respiratory depression, etc.)     Nausea:  Intermittent nausea without vomiting related to constipation   Home regimen:  protonix 40mg daily,  Scopolamine patch PRN and Carafate PRN  denies  Continue Dex 6mg iv TID managed by primary team     Constipation  At risk for constipation  related to opioids, currently not constipated  Usual bowel pattern: every day  Home regimen: Senna 2 tablets BID, Miralax 17 gm daily, Lactulose 20 gm 4x a day PRN, Docusate 100mg BID PRN, and MOM 15ml PRN   LBM 5/13/2024  Continue Miralax 17 grams daily   Monitor BM frequency, adjust regimen as needed  Goal to have BM without straining q48-72h      Altered Mood:  Acute on chronic anxiety related to health concerns   controlled with home regimen   Home regimen: lorazepam 1mg TID PRN  Monitor and control      Sleeping Difficulty:  Impaired sleep related to pain  Home regimen:  none  Improved with pain control   Reports that he slept well last night.      Decreased appetite:  Appetite loss related to malignancy, chemotherapy, taste changes, and disease process  Nutrition following  Weight loss none  Home regimen:  none  Dex as above  Reports that he ate tapioca today made by his wife. With better pain control he is more enthusiastic about eating      Medical Decision Making/Goals of Care/Advance Care Planning:  Patient's current clinical condition, including diagnosis, prognosis, and management plan, and goals of care were discussed.   Life limiting disease: metastatic malignancy  Family: Supportive family   Joys/meaning/strength: Family and Lizette  Understanding of health: Demonstrates good prognostic understanding of disease process, understands plan for ongoing symptom control   Information:Wants full disclosure     Goals: symptom control and cancer directed therapy  Worries and fears now and future: ongoing symptoms   Minimum acceptable outcome/QOL:  wants all heroic measures in the event that his heart stops.   Code status discussion:  full code     Advance Directives  Existence of Advance Directives:No - has interest  Decision maker: DOUGLAS is wife  Code Status: Full code     Introduction to Supportive and Palliative Oncology:  Spoke with patient at bedside  Introduced the role and philosophy of Supportive and  Palliative oncology in the evaluation and management of symptoms during cancer treatment  Palliative care was introduced as a service for patients with serious illness to help with symptoms, assist with goals of care conversations, navigate complex decision making, improve quality of life for patients, and provide support both patients and families.  Patient seemed to appreciate the extra layer of support.      Supportive Interventions: Interventions: Music Therapy: offered referral placed, Art Therapy: offered declined, SPO Spiritual Care: offered declined, Social work referral for: Advance Directives    Supportive and Palliative Oncology encounter:  Spoke with patient at bedside  Emotional support provided  Coordination of care     Signature and billing:  Thank you for allowing us to participate in the care of this patient. Recommendations will be communicated back to the consulting service by way of shared electronic medical record or face-to-face.    Medical complexity was high level due to due to complexity of problems, extensive data review, and high risk of management/treatment.    I spent 50 minutes in the care of this patient which included chart review, interviewing patient/family, discussion with primary team, coordination of care, and documentation.    Data:   Diagnostic tests and information reviewed for today's visit:  Conversation with primary team, Most recent labs, Most recent imaging       Some elements copied from my note on 5/12/2024, the elements have been updated and all reflect current decision making from today, 05/13/24       Plan of Care discussed with: Provider, RN, Patient    Thank you for asking Supportive and Palliative Oncology to assist with care of this patient.  We will continue to follow  Please contact us for additional questions or concerns.      SIGNATURE: JASPER Damon   PAGER/CONTACT:  Contact information:  Supportive and Palliative Oncology  Monday-Friday 8 AM-5 PM   Epic Secure chat or pager [phone].  After hours and weekends:  pager [phone]

## 2024-05-14 PROCEDURE — 2500000001 HC RX 250 WO HCPCS SELF ADMINISTERED DRUGS (ALT 637 FOR MEDICARE OP)

## 2024-05-14 PROCEDURE — 1170000001 HC PRIVATE ONCOLOGY ROOM DAILY

## 2024-05-14 PROCEDURE — 2500000004 HC RX 250 GENERAL PHARMACY W/ HCPCS (ALT 636 FOR OP/ED): Performed by: STUDENT IN AN ORGANIZED HEALTH CARE EDUCATION/TRAINING PROGRAM

## 2024-05-14 PROCEDURE — 2500000005 HC RX 250 GENERAL PHARMACY W/O HCPCS

## 2024-05-14 PROCEDURE — 99233 SBSQ HOSP IP/OBS HIGH 50: CPT

## 2024-05-14 PROCEDURE — 2500000004 HC RX 250 GENERAL PHARMACY W/ HCPCS (ALT 636 FOR OP/ED)

## 2024-05-14 PROCEDURE — 2500000004 HC RX 250 GENERAL PHARMACY W/ HCPCS (ALT 636 FOR OP/ED): Mod: MUE

## 2024-05-14 RX ORDER — HYDROMORPHONE HYDROCHLORIDE 4 MG/1
4 TABLET ORAL EVERY 4 HOURS PRN
Status: DISCONTINUED | OUTPATIENT
Start: 2024-05-14 | End: 2024-05-15

## 2024-05-14 RX ORDER — ONDANSETRON HYDROCHLORIDE 2 MG/ML
4 INJECTION, SOLUTION INTRAVENOUS EVERY 8 HOURS PRN
Status: DISCONTINUED | OUTPATIENT
Start: 2024-05-14 | End: 2024-05-15

## 2024-05-14 RX ORDER — ONDANSETRON 4 MG/1
4 TABLET, ORALLY DISINTEGRATING ORAL EVERY 8 HOURS PRN
Status: DISCONTINUED | OUTPATIENT
Start: 2024-05-14 | End: 2024-05-15

## 2024-05-14 RX ORDER — PREGABALIN 25 MG/1
50 CAPSULE ORAL NIGHTLY
Status: DISCONTINUED | OUTPATIENT
Start: 2024-05-14 | End: 2024-05-15

## 2024-05-14 RX ADMIN — HYDROMORPHONE HYDROCHLORIDE 1 MG: 1 INJECTION, SOLUTION INTRAMUSCULAR; INTRAVENOUS; SUBCUTANEOUS at 06:26

## 2024-05-14 RX ADMIN — ONDANSETRON 4 MG: 4 TABLET, ORALLY DISINTEGRATING ORAL at 11:21

## 2024-05-14 RX ADMIN — Medication 4000 UNITS: at 08:21

## 2024-05-14 RX ADMIN — Medication 1 TABLET: at 08:21

## 2024-05-14 RX ADMIN — POLYETHYLENE GLYCOL 3350 17 G: 17 POWDER, FOR SOLUTION ORAL at 08:20

## 2024-05-14 RX ADMIN — DEXAMETHASONE 6 MG: 2 TABLET ORAL at 08:22

## 2024-05-14 RX ADMIN — BENZOCAINE AND MENTHOL 1 LOZENGE: 15; 3.6 LOZENGE ORAL at 20:05

## 2024-05-14 RX ADMIN — ENOXAPARIN SODIUM 40 MG: 40 INJECTION, SOLUTION SUBCUTANEOUS at 15:30

## 2024-05-14 RX ADMIN — HYDROMORPHONE HYDROCHLORIDE 1 MG: 1 INJECTION, SOLUTION INTRAMUSCULAR; INTRAVENOUS; SUBCUTANEOUS at 01:24

## 2024-05-14 RX ADMIN — PANTOPRAZOLE SODIUM 40 MG: 40 TABLET, DELAYED RELEASE ORAL at 06:26

## 2024-05-14 RX ADMIN — HYDROMORPHONE HYDROCHLORIDE 6 MG: 2 TABLET ORAL at 12:54

## 2024-05-14 RX ADMIN — PREGABALIN 50 MG: 25 CAPSULE ORAL at 20:05

## 2024-05-14 RX ADMIN — LORAZEPAM 1 MG: 1 TABLET ORAL at 08:25

## 2024-05-14 RX ADMIN — CHLORPROMAZINE HYDROCHLORIDE 25 MG: 25 TABLET, FILM COATED ORAL at 10:56

## 2024-05-14 RX ADMIN — DEXAMETHASONE 6 MG: 2 TABLET ORAL at 20:05

## 2024-05-14 RX ADMIN — HYDROMORPHONE HYDROCHLORIDE 1 MG: 1 INJECTION, SOLUTION INTRAMUSCULAR; INTRAVENOUS; SUBCUTANEOUS at 10:23

## 2024-05-14 RX ADMIN — CHLORPROMAZINE HYDROCHLORIDE 25 MG: 25 TABLET, FILM COATED ORAL at 05:09

## 2024-05-14 RX ADMIN — DEXAMETHASONE 6 MG: 2 TABLET ORAL at 15:29

## 2024-05-14 ASSESSMENT — COGNITIVE AND FUNCTIONAL STATUS - GENERAL
MOBILITY SCORE: 24
MOBILITY SCORE: 24
DAILY ACTIVITIY SCORE: 24
DAILY ACTIVITIY SCORE: 24

## 2024-05-14 ASSESSMENT — PAIN SCALES - GENERAL
PAINLEVEL_OUTOF10: 4
PAINLEVEL_OUTOF10: 7
PAINLEVEL_OUTOF10: 0 - NO PAIN
PAINLEVEL_OUTOF10: 0 - NO PAIN
PAINLEVEL_OUTOF10: 5 - MODERATE PAIN
PAINLEVEL_OUTOF10: 7
PAINLEVEL_OUTOF10: 5 - MODERATE PAIN
PAINLEVEL_OUTOF10: 7

## 2024-05-14 ASSESSMENT — PAIN - FUNCTIONAL ASSESSMENT
PAIN_FUNCTIONAL_ASSESSMENT: 0-10

## 2024-05-14 NOTE — PROGRESS NOTES
Subjective   No acute events overnight. He feels the 4 mg oral dilaudid he took yesterday wasn't helping with pain. However, new 6 mg Dilaudid is helping with pain. When I saw him, he had just gotten a dose of IV Dilaudid so pain was at an acceptable level.    No fevers, chills, shortness of breath, weakness, or numbness.     Meds  Scheduled medications  [Held by provider] apixaban, 5 mg, oral, BID  cholecalciferol, 4,000 Units, oral, q AM  dexAMETHasone, 6 mg, oral, TID  enoxaparin, 40 mg, subcutaneous, q24h  fentaNYL, 1 patch, transdermal, q72h  lidocaine, 1 patch, transdermal, Daily  multivitamin with minerals, 1 tablet, oral, q AM  pantoprazole, 40 mg, oral, Daily before breakfast  polyethylene glycol, 17 g, oral, Daily  pregabalin, 50 mg, oral, Nightly      Continuous medications     PRN medications  PRN medications: alteplase, benzocaine-menthol, calcium carbonate, chlorproMAZINE, HYDROmorphone, HYDROmorphone, HYDROmorphone, LORazepam, ondansetron ODT **OR** ondansetron      Objective   Vital signs in last 24 hours:  Temp:  [35.6 °C (96.1 °F)-36.6 °C (97.9 °F)] 36.6 °C (97.9 °F)  Heart Rate:  [] 104  Resp:  [16] 16  BP: (100-126)/(59-79) 117/69    Intake/Output this shift:  No intake or output data in the 24 hours ending 05/14/24 1512    Physical  General: Patient is awake, non-toxic appearing, normal body habitus  Pulm: Normal WOB at rest and when sitting up, no crackles or rhonchi  Cardiac: Regular rate and rhythm, normal S1/S2  Abdomen: Non-tender to palpation, non-distended  Extremities: No peripheral edema  Neuro: Patient alert and oriented, cranial nerves grossly intact, 5/5 strength bilaterally    Results  No results found for this or any previous visit (from the past 24 hour(s)).    Imaging  No results found.       Assessment/Plan   Principal Problem:    Esophageal cancer, stage IV (Multi)    Massimo Garcia is a 63 y.o. male with PMH third degree heart block s/p PPM (placed in November), esophageal  adenocarcinoma with mets to liver and lungs, Drug-induced colitis, PE, peripheral neuropathy, and portal vein thrombosis (On Eliquis). Patient presented to [facility] ED on 5/9 with acute on chronic low back pain. CT imaging was obtained and was concerning for evidence of soft tissue extension into the spinal canal at T9-T10 with associated spinal canal stenosis. Patient transferred to [facility] for further workup and management.       5/13 updates  -Increase to 6 mg Dilaudid helping with pain, no new neurologic deficits  -MRI pending clearance by device  -Further management with surgery or radiation  -Start Lyrica 50 mg nightly     #Acute on Chronic low Back Pain  :: Presented [facility] ED on 5/9 with stabbing nonradiating low back pain, acutely worsened  :: CT L spine obtained: “evidence of soft tissue extension into the spinal canal at T9 and T10 resulting in spinal canal stenosis”  :: Neurology consult obtained on admission, rec obtaining MRI brain, C/T/L spine w/wo contrast for oncologic workup  :: UA (5/10) unremarkable  - MRI brain and C, T, L spine w/wo con ordered, pending completion  - On dexamethasone 6 mg TID     #Hiccups  -Chloropromazine 25 mg Q6H PRN     #Pain Management:   -Fentanyl patch to 75 mcg patch q72hrs. Supportive onc to consider increase tomorrow  - Start Lyrica 50 mg nightly  - Lidocaine patch  - Oxycodone 5mg q4hr PRN mild pain, Oxycodone 10mg q4hr PRN mod pain, Dilaudid 1 mg IV q3hr PRN severe pain  - Supportive oncology consulted, appreciate recs  - PT consulted, awaiting recs     #Vitamin D deficiency  -Continue vitamin D 4000 units every morning     #Stage IV esophageal cancer HER2 positive   :: Follows with Dr. Blake, emailed 5/11 AM with updates  :: Current chemotherapy: 5FU, Tras/Pembro, last received on 4/29, next due 5/13. Hold for potential surgery.  :: CT A/P (5/9) with “Multiple pulmonary nodules in the imaged lower lungs compatible with metastatic disease. There is wall thickening and  apparent hyperenhancement of the anterior wall of the distal esophagus which may relate to patient's known esophageal malignancy. There is also redemonstration of multiple hepatic masses and abdominal and pelvic lymphadenopathy compatible with metastatic disease     #Recent Immune-mediated Colitis  :: Recently admitted to [facility] 5/4-5/7 for enterocolitis  - Dexamethasone 6 mg TID     #hx PE  #Hx Portal vein thrombosis  - Pulmonary embolism in RLL subsegmental artery in March 2024  -Jan 2023:  Extensive large pulmonary emboli involving lobar, segmental, and subsegmental arteries bilaterally. Prior to any cancer treatment  - Held home Eliquis 5mg BID on admission, per neurology, pending further plan  - On enoxaparin 40 mg daily     #GERD  - Continue home Protonix   -Calcium carbonate 500 mg PRN     #Anxiety disorder  - Lorazepam 1 mg Q8H PRN     #Chronic normocytic anemia secondary to malignancy and treatment  #Chronic thrombocytopenia  :: At baseline hgb   -Hgb of 10.5. Baseline 10-12  - Monitor daily CBC  - Transfuse for hgb<7, platelet<10     #Hx Third degree heard block, s/p pacemaker placement (11/8/23)  :: Asymptomatic on admission  - CTM     F: PRN  E: Replete as needed  N: Regular diet  C: Full code  A: Port     LOS: 4 days     Adi Mays MD/PhD   PGY-1

## 2024-05-14 NOTE — CARE PLAN
The patient's goals for the shift include  pain control    The clinical goals for the shift include Pt will have decreased pain through end of shift 5/14/2024 0700    Over the shift, the patient did not make progress toward the following goals. Barriers to progression include inadequate pain regimen. Recommendations to address these barriers include frequent pain assessments.

## 2024-05-14 NOTE — CARE PLAN
The patient's goals for the shift include      The clinical goals for the shift include pt will report pain <7/10 by end of shift 5/15/24 @ 0700      Problem: Fall/Injury  Goal: Not fall by end of shift  Outcome: Progressing  Goal: Be free from injury by end of the shift  Outcome: Progressing     Problem: Pain  Goal: Takes deep breaths with improved pain control throughout the shift  Outcome: Progressing  Goal: Turns in bed with improved pain control throughout the shift  Outcome: Progressing  Goal: Walks with improved pain control throughout the shift  Outcome: Progressing  Goal: Performs ADL's with improved pain control throughout shift  Outcome: Progressing

## 2024-05-14 NOTE — PROGRESS NOTES
Spiritual Care Visit    Clinical Encounter Type  Visited With: Patient, Family  Routine Visit: Introduction  Continue Visiting: Yes  Referral From: Nurse      introduced self and spiritual care services to patient and patient's spouse, explaining services available and checking in to see how patient is doing today. Together we processed this hospital admission, and waiting for an MRI and hoping there is a good plan ahead. Lizette and prayer are both important to their family and have been sources of strength for them overall.     Patient shared that he has been a little more tired today overall.     They said they have been appreciative of the care and support at [facility] from all the different teams.      will continue to follow and provide support as needed.     Rev. Serge Mccabe, Supportive Oncology

## 2024-05-14 NOTE — PROGRESS NOTES
"SUPPORTIVE AND PALLIATIVE ONCOLOGY INPATIENT FOLLOW-UP      SERVICE DATE: 24     SUBJECTIVE:  Interval Events:  Patient was seen at bedside, reports that his pain is somewhat controlled with current regimen. He had  rough night of sleep due to lack of pain control. Discussed adding Lyrica and he was not receptive to plan, stating that \"I want to hold off for a couple days until I get the MRI before I start anything so I see how well it is working\".     He reports that he spoke to primary team about dilaudid 6mg increase, because the 4mg did not do much for him, granted patient only had 2 doses of dilaudid 4mg.     Patient prefers the IV dilaudid 1mg because it is more effective. Dilaudid 1mg IV is equivalent to ~Dilaudid 2.5mg oral.    Discussed with patient about possibility of increasing his fentanyl patch to higher dose tomorrow if his OME continues to increase. Also encouraged him to consider Lyrica tonight, small dose of 50mg can be started.     Above discussed with primary team    Pain Assessment:  Location:  lower back pain  Duration: Constant  Characteristics:   Ratin and Mild   Descriptors: aching and throbbing   Aggravating: movement and bending    Relieving: Analgesics dilaudid   Interference with Function: Very Much    Opioid Use  Past 24 h prn opioid use:   Fentanyl patch 75mcgs x 1 = 150mg   Dilaudid 4mg x 2 doses = 40mg OME  Dilaudid 1mg iv x 7 doses = 87.5mg ome   Total 24h OME use: 277.5 mg OME    Note: OME calculations based on equianalgesic table below. Please note this table is based on best available evidence but conversions are still approximate. These are NOT opioid DOSES for individual patient use; this is equivalency information.  Drug Parenteral Enteral   Morphine 10 25   Oxycodone N/A 20   Hydromorphone 2 5   Fentanyl 0.15 N/A   Tramadol N/A 120   Citation: Aniya ROSS. Demystifying opioid conversion calculations: A guide for effective dosing, Second edition. MD Lobo: " American Society of Health-System Pharmacists, 2018.    Symptom Assessment:  Nausea none  Vomiting none  Anxiety none  Difficulty Sleeping  rough night due to no IV dilaudid  Lack of appetite none    Information obtained from: chart review, interview of patient, interview of family, and discussion with primary team  ______________________________________________________________________        OBJECTIVE:    Lab Results   Component Value Date    WBC 6.1 05/13/2024    HGB 10.5 (L) 05/13/2024    HCT 32.3 (L) 05/13/2024    MCV 90 05/13/2024    PLT 67 (L) 05/13/2024      Lab Results   Component Value Date    GLUCOSE 126 (H) 05/13/2024    CALCIUM 9.4 05/13/2024     05/13/2024    K 3.9 05/13/2024    CO2 26 05/13/2024     05/13/2024    BUN 21 05/13/2024    CREATININE 0.66 05/13/2024     Lab Results   Component Value Date    ALT 16 05/12/2024    AST 28 05/12/2024    ALKPHOS 136 05/12/2024    BILITOT 1.0 05/12/2024     Estimated Creatinine Clearance: 108 mL/min (by C-G formula based on SCr of 0.66 mg/dL).     Scheduled medications  [Held by provider] apixaban, 5 mg, oral, BID  cholecalciferol, 4,000 Units, oral, q AM  dexAMETHasone, 6 mg, oral, TID  enoxaparin, 40 mg, subcutaneous, q24h  fentaNYL, 1 patch, transdermal, q72h  lidocaine, 1 patch, transdermal, Daily  multivitamin with minerals, 1 tablet, oral, q AM  pantoprazole, 40 mg, oral, Daily before breakfast  polyethylene glycol, 17 g, oral, Daily      Continuous medications     PRN medications  alteplase, 2 mg, PRN  benzocaine-menthol, 1 lozenge, q2h PRN  calcium carbonate, 500 mg, 4x daily PRN  chlorproMAZINE, 25 mg, q6h PRN  HYDROmorphone, 1 mg, q3h PRN  HYDROmorphone, 2 mg, q4h PRN  HYDROmorphone, 4 mg, q4h PRN  LORazepam, 1 mg, q8h PRN      }     PHYSICAL EXAMINATION:    Vital Signs:   Vital signs reviewed  Visit Vitals  /68 (BP Location: Right arm, Patient Position: Sitting)   Pulse 102   Temp 36.3 °C (97.3 °F) (Temporal)   Resp 16        Pain  Score: 5 - Moderate pain       Physical Exam  Constitutional:       Appearance: Normal appearance.   Cardiovascular:      Rate and Rhythm: Regular rhythm.      Heart sounds: Normal heart sounds.   Pulmonary:      Breath sounds: Normal breath sounds.   Abdominal:      General: Abdomen is flat.      Palpations: Abdomen is soft.   Skin:     General: Skin is warm.   Neurological:      Mental Status: He is alert and oriented to person, place, and time.   Psychiatric:         Mood and Affect: Mood normal.        ASSESSMENT/PLAN:    Massimo Garcia is a 63 y.o. male diagnosed with metastatic esophageal cancer. PMH significant for third degree heart block s/p PPM (placed in November), esophageal adenocarcinoma with mets to liver and lungs, Drug-induced colitis, PE, peripheral neuropathy, and portal vein thrombosis (On Eliquis).  Admitted 5/10/2024 for further evaluation and management of acute on chronic low back pain. Course complicated by soft tissue extension into the spinal canal at T9-T10 with associated spinal canal stenosis. Supportive and Palliative Oncology is consulted for pain management.      Pain:  Lower back pain related to metastatic esophageal cancer  Pain is: cancer related pain  Type: visceral, somatic, and neuropathic  Pain control: sub-optimally controlled  Home regimen:  Acetaminophen PRN, Oxycodone 10mg q4hrs prn, Fentanyl TD , and bentyl prn   Intolerances/previously tried: Morphine with poor efficacy. Gabapentin was ineffective   Personalized pain goal: 2  Total OME usage for the past 24 hours: 277.5mg   Continue dilaudid 1mg IV q 3hrs PRN   Discontinue Dilaudid po 2mg q4hrs PRN   Add dilaudid 6mg q3hrs PRN   Continue dilaudid 4mg q3hrs PRN   Continue Fentanyl patch 75mcgs change q 72 hours [can increase tomorrow]   Consider adding Lyrica 50mg tonight if patient becomes receptive.   Continue to monitor pain scores and administer PRN medications as appropriate  Continue/initiate nonpharmacologic pain  management strategies including ice/heat therapy, distraction techniques, deep breathing/relaxation techniques, calming music, and repositioning  Continue to monitor for signs of opioid efficacy (pain scores, improved functionality) and toxicity (pinpoint pupils, excess sedation/drowsiness/confusion, respiratory depression, etc.)     Nausea:  Intermittent nausea without vomiting related to constipation   Home regimen:  protonix 40mg daily,  Scopolamine patch PRN and Carafate PRN  denies  Continue Dex 6mg iv TID managed by primary team     Constipation  At risk for constipation related to opioids, currently not constipated  Usual bowel pattern: every day  Home regimen: Senna 2 tablets BID, Miralax 17 gm daily, Lactulose 20 gm 4x a day PRN, Docusate 100mg BID PRN, and MOM 15ml PRN   LBM 5/14/2024  Continue Miralax 17 grams daily   Monitor BM frequency, adjust regimen as needed  Goal to have BM without straining q48-72h      Altered Mood:  Acute on chronic anxiety related to health concerns   controlled with home regimen   Home regimen: lorazepam 1mg TID PRN  Monitor and control      Sleeping Difficulty:  Impaired sleep related to pain  Home regimen:  none  Improved with pain control   Reports that he slept well last night.      Decreased appetite:  Appetite loss related to malignancy, chemotherapy, taste changes, and disease process  Nutrition following  Weight loss none  Home regimen:  none  Dex as above  Reports that he ate tapioca today made by his wife. With better pain control he is more enthusiastic about eating      Medical Decision Making/Goals of Care/Advance Care Planning:  Patient's current clinical condition, including diagnosis, prognosis, and management plan, and goals of care were discussed.   Life limiting disease: metastatic malignancy  Family: Supportive family   Joys/meaning/strength: Family and Lizette  Understanding of health: Demonstrates good prognostic understanding of disease process,  understands plan for ongoing symptom control   Information:Wants full disclosure     Goals: symptom control and cancer directed therapy  Worries and fears now and future: ongoing symptoms   Minimum acceptable outcome/QOL:  wants all heroic measures in the event that his heart stops.   Code status discussion:  full code     Advance Directives  Existence of Advance Directives:No - has interest  Decision maker: DOUGLAS is wife  Code Status: Full code     Introduction to Supportive and Palliative Oncology:  Spoke with patient at bedside  Introduced the role and philosophy of Supportive and Palliative oncology in the evaluation and management of symptoms during cancer treatment  Palliative care was introduced as a service for patients with serious illness to help with symptoms, assist with goals of care conversations, navigate complex decision making, improve quality of life for patients, and provide support both patients and families.  Patient seemed to appreciate the extra layer of support.      Supportive Interventions: Interventions: Music Therapy: offered referral placed, Art Therapy: offered declined, SPO Spiritual Care: offered declined, Social work referral for: Advance Directives    Supportive and Palliative Oncology encounter:  Spoke with patient at bedside  Emotional support provided  Coordination of care     Signature and billing:  Thank you for allowing us to participate in the care of this patient. Recommendations will be communicated back to the consulting service by way of shared electronic medical record or face-to-face.    Medical complexity was high level due to due to complexity of problems, extensive data review, and high risk of management/treatment.    I spent 50 minutes in the care of this patient which included chart review, interviewing patient/family, discussion with primary team, coordination of care, and documentation.    Data:   Diagnostic tests and information reviewed for today's visit:   Conversation with primary team, Most recent labs, Most recent imaging       Some elements copied from my note on 5/12/2024, the elements have been updated and all reflect current decision making from today, 05/14/24       Plan of Care discussed with: Provider, RN, Patient    Thank you for asking Supportive and Palliative Oncology to assist with care of this patient.  We will continue to follow  Please contact us for additional questions or concerns.      SIGNATURE: ALFREDITO Damon-CNP   PAGER/CONTACT:  Contact information:  Supportive and Palliative Oncology  Monday-Friday 8 AM-5 PM  Epic Secure chat or pager [phone].  After hours and weekends:  pager [phone]

## 2024-05-14 NOTE — CARE PLAN
The patient's goals for the shift include      The clinical goals for the shift include Pt will have decreased pain through end of shift 5/14/2024 0700      Problem: Fall/Injury  Goal: Not fall by end of shift  Outcome: Progressing  Goal: Be free from injury by end of the shift  Outcome: Progressing  Goal: Verbalize understanding of personal risk factors for fall in the hospital  Outcome: Progressing  Goal: Verbalize understanding of risk factor reduction measures to prevent injury from fall in the home  Outcome: Progressing  Goal: Use assistive devices by end of the shift  Outcome: Progressing  Goal: Pace activities to prevent fatigue by end of the shift  Outcome: Progressing     Problem: Pain  Goal: Takes deep breaths with improved pain control throughout the shift  Outcome: Progressing  Goal: Turns in bed with improved pain control throughout the shift  Outcome: Progressing  Goal: Walks with improved pain control throughout the shift  Outcome: Progressing  Goal: Performs ADL's with improved pain control throughout shift  Outcome: Progressing  Goal: Participates in PT with improved pain control throughout the shift  Outcome: Progressing  Goal: Free from opioid side effects throughout the shift  Outcome: Progressing  Goal: Free from acute confusion related to pain meds throughout the shift  Outcome: Progressing

## 2024-05-15 ENCOUNTER — APPOINTMENT (OUTPATIENT)
Dept: HEMATOLOGY/ONCOLOGY | Facility: CLINIC | Age: 64
End: 2024-05-15
Payer: MEDICARE

## 2024-05-15 LAB
ALBUMIN SERPL BCP-MCNC: 3.2 G/DL (ref 3.4–5)
ANION GAP SERPL CALC-SCNC: 13 MMOL/L (ref 10–20)
BASOPHILS # BLD AUTO: 0 X10*3/UL (ref 0–0.1)
BASOPHILS NFR BLD AUTO: 0 %
BUN SERPL-MCNC: 22 MG/DL (ref 6–23)
CALCIUM SERPL-MCNC: 9.1 MG/DL (ref 8.6–10.6)
CHLORIDE SERPL-SCNC: 105 MMOL/L (ref 98–107)
CO2 SERPL-SCNC: 24 MMOL/L (ref 21–32)
CREAT SERPL-MCNC: 0.74 MG/DL (ref 0.5–1.3)
EGFRCR SERPLBLD CKD-EPI 2021: >90 ML/MIN/1.73M*2
EOSINOPHIL # BLD AUTO: 0 X10*3/UL (ref 0–0.7)
EOSINOPHIL NFR BLD AUTO: 0 %
ERYTHROCYTE [DISTWIDTH] IN BLOOD BY AUTOMATED COUNT: 18 % (ref 11.5–14.5)
GLUCOSE SERPL-MCNC: 142 MG/DL (ref 74–99)
HCT VFR BLD AUTO: 31.3 % (ref 41–52)
HGB BLD-MCNC: 10.3 G/DL (ref 13.5–17.5)
IMM GRANULOCYTES # BLD AUTO: 0.08 X10*3/UL (ref 0–0.7)
IMM GRANULOCYTES NFR BLD AUTO: 1 % (ref 0–0.9)
LYMPHOCYTES # BLD AUTO: 0.17 X10*3/UL (ref 1.2–4.8)
LYMPHOCYTES NFR BLD AUTO: 2.2 %
MAGNESIUM SERPL-MCNC: 1.98 MG/DL (ref 1.6–2.4)
MCH RBC QN AUTO: 30.3 PG (ref 26–34)
MCHC RBC AUTO-ENTMCNC: 32.9 G/DL (ref 32–36)
MCV RBC AUTO: 92 FL (ref 80–100)
MONOCYTES # BLD AUTO: 0.24 X10*3/UL (ref 0.1–1)
MONOCYTES NFR BLD AUTO: 3.1 %
NEUTROPHILS # BLD AUTO: 7.29 X10*3/UL (ref 1.2–7.7)
NEUTROPHILS NFR BLD AUTO: 93.7 %
NRBC BLD-RTO: 0 /100 WBCS (ref 0–0)
PHOSPHATE SERPL-MCNC: 3.5 MG/DL (ref 2.5–4.9)
PLATELET # BLD AUTO: 52 X10*3/UL (ref 150–450)
POTASSIUM SERPL-SCNC: 3.9 MMOL/L (ref 3.5–5.3)
RBC # BLD AUTO: 3.4 X10*6/UL (ref 4.5–5.9)
SODIUM SERPL-SCNC: 138 MMOL/L (ref 136–145)
WBC # BLD AUTO: 7.8 X10*3/UL (ref 4.4–11.3)

## 2024-05-15 PROCEDURE — 99233 SBSQ HOSP IP/OBS HIGH 50: CPT

## 2024-05-15 PROCEDURE — 2500000001 HC RX 250 WO HCPCS SELF ADMINISTERED DRUGS (ALT 637 FOR MEDICARE OP)

## 2024-05-15 PROCEDURE — 2500000004 HC RX 250 GENERAL PHARMACY W/ HCPCS (ALT 636 FOR OP/ED)

## 2024-05-15 PROCEDURE — 2500000005 HC RX 250 GENERAL PHARMACY W/O HCPCS

## 2024-05-15 PROCEDURE — 2500000004 HC RX 250 GENERAL PHARMACY W/ HCPCS (ALT 636 FOR OP/ED): Performed by: STUDENT IN AN ORGANIZED HEALTH CARE EDUCATION/TRAINING PROGRAM

## 2024-05-15 PROCEDURE — 85025 COMPLETE CBC W/AUTO DIFF WBC: CPT

## 2024-05-15 PROCEDURE — 83735 ASSAY OF MAGNESIUM: CPT

## 2024-05-15 PROCEDURE — 1170000001 HC PRIVATE ONCOLOGY ROOM DAILY

## 2024-05-15 PROCEDURE — 2500000001 HC RX 250 WO HCPCS SELF ADMINISTERED DRUGS (ALT 637 FOR MEDICARE OP): Performed by: STUDENT IN AN ORGANIZED HEALTH CARE EDUCATION/TRAINING PROGRAM

## 2024-05-15 PROCEDURE — 80069 RENAL FUNCTION PANEL: CPT

## 2024-05-15 RX ORDER — HYDROMORPHONE HYDROCHLORIDE 1 MG/ML
1 INJECTION, SOLUTION INTRAMUSCULAR; INTRAVENOUS; SUBCUTANEOUS
Status: DISCONTINUED | OUTPATIENT
Start: 2024-05-15 | End: 2024-05-24 | Stop reason: HOSPADM

## 2024-05-15 RX ORDER — ONDANSETRON HYDROCHLORIDE 2 MG/ML
4 INJECTION, SOLUTION INTRAVENOUS EVERY 8 HOURS PRN
Status: DISCONTINUED | OUTPATIENT
Start: 2024-05-15 | End: 2024-05-24 | Stop reason: HOSPADM

## 2024-05-15 RX ORDER — PANTOPRAZOLE SODIUM 40 MG/1
40 TABLET, DELAYED RELEASE ORAL
Status: DISCONTINUED | OUTPATIENT
Start: 2024-05-16 | End: 2024-05-24 | Stop reason: HOSPADM

## 2024-05-15 RX ORDER — LORAZEPAM 1 MG/1
1 TABLET ORAL EVERY 8 HOURS PRN
Status: DISCONTINUED | OUTPATIENT
Start: 2024-05-15 | End: 2024-05-24 | Stop reason: HOSPADM

## 2024-05-15 RX ORDER — HYDROMORPHONE HYDROCHLORIDE 4 MG/1
4 TABLET ORAL EVERY 4 HOURS PRN
Status: DISCONTINUED | OUTPATIENT
Start: 2024-05-15 | End: 2024-05-24 | Stop reason: HOSPADM

## 2024-05-15 RX ORDER — MULTIVIT-MIN/IRON FUM/FOLIC AC 7.5 MG-4
1 TABLET ORAL DAILY
Status: DISCONTINUED | OUTPATIENT
Start: 2024-05-15 | End: 2024-05-24 | Stop reason: HOSPADM

## 2024-05-15 RX ORDER — LIDOCAINE 560 MG/1
1 PATCH PERCUTANEOUS; TOPICAL; TRANSDERMAL DAILY
Status: DISCONTINUED | OUTPATIENT
Start: 2024-05-15 | End: 2024-05-24 | Stop reason: HOSPADM

## 2024-05-15 RX ORDER — ONDANSETRON 4 MG/1
4 TABLET, FILM COATED ORAL EVERY 8 HOURS PRN
Status: DISCONTINUED | OUTPATIENT
Start: 2024-05-15 | End: 2024-05-24 | Stop reason: HOSPADM

## 2024-05-15 RX ORDER — NYSTATIN 100000 [USP'U]/ML
5 SUSPENSION ORAL 4 TIMES DAILY
Status: DISCONTINUED | OUTPATIENT
Start: 2024-05-15 | End: 2024-05-15

## 2024-05-15 RX ORDER — METOCLOPRAMIDE 10 MG/1
10 TABLET ORAL EVERY 8 HOURS PRN
Status: DISCONTINUED | OUTPATIENT
Start: 2024-05-15 | End: 2024-05-24 | Stop reason: HOSPADM

## 2024-05-15 RX ORDER — PREGABALIN 25 MG/1
50 CAPSULE ORAL NIGHTLY
Status: DISCONTINUED | OUTPATIENT
Start: 2024-05-15 | End: 2024-05-20

## 2024-05-15 RX ORDER — CHOLECALCIFEROL (VITAMIN D3) 25 MCG
4000 TABLET ORAL DAILY
Status: DISCONTINUED | OUTPATIENT
Start: 2024-05-15 | End: 2024-05-24 | Stop reason: HOSPADM

## 2024-05-15 RX ORDER — BACLOFEN 10 MG/1
10 TABLET ORAL ONCE AS NEEDED
Status: COMPLETED | OUTPATIENT
Start: 2024-05-15 | End: 2024-05-16

## 2024-05-15 RX ORDER — NYSTATIN 100000 [USP'U]/ML
5 SUSPENSION ORAL 4 TIMES DAILY
Status: DISPENSED | OUTPATIENT
Start: 2024-05-15 | End: 2024-05-22

## 2024-05-15 RX ORDER — POLYETHYLENE GLYCOL 3350 17 G/17G
17 POWDER, FOR SOLUTION ORAL DAILY
Status: DISCONTINUED | OUTPATIENT
Start: 2024-05-15 | End: 2024-05-19

## 2024-05-15 RX ORDER — CHLORPROMAZINE HYDROCHLORIDE 25 MG/1
25 TABLET, FILM COATED ORAL EVERY 6 HOURS PRN
Status: DISCONTINUED | OUTPATIENT
Start: 2024-05-15 | End: 2024-05-15

## 2024-05-15 RX ORDER — FENTANYL 75 UG/H
1 PATCH TRANSDERMAL
Status: DISCONTINUED | OUTPATIENT
Start: 2024-05-18 | End: 2024-05-19

## 2024-05-15 RX ORDER — CHLORPROMAZINE HYDROCHLORIDE 25 MG/1
25 TABLET, FILM COATED ORAL EVERY 6 HOURS PRN
Status: DISCONTINUED | OUTPATIENT
Start: 2024-05-15 | End: 2024-05-21 | Stop reason: ALTCHOICE

## 2024-05-15 RX ORDER — LORAZEPAM 1 MG/1
1 TABLET ORAL EVERY 8 HOURS PRN
Status: DISCONTINUED | OUTPATIENT
Start: 2024-05-15 | End: 2024-05-15

## 2024-05-15 RX ORDER — CALCIUM CARBONATE 200(500)MG
500 TABLET,CHEWABLE ORAL 4 TIMES DAILY PRN
Status: DISCONTINUED | OUTPATIENT
Start: 2024-05-15 | End: 2024-05-24 | Stop reason: HOSPADM

## 2024-05-15 RX ORDER — ENOXAPARIN SODIUM 100 MG/ML
40 INJECTION SUBCUTANEOUS DAILY
Status: DISCONTINUED | OUTPATIENT
Start: 2024-05-16 | End: 2024-05-17

## 2024-05-15 RX ORDER — BACLOFEN 10 MG/1
5 TABLET ORAL ONCE AS NEEDED
Status: DISCONTINUED | OUTPATIENT
Start: 2024-05-15 | End: 2024-05-15

## 2024-05-15 RX ADMIN — HYDROMORPHONE HYDROCHLORIDE 1 MG: 1 INJECTION, SOLUTION INTRAMUSCULAR; INTRAVENOUS; SUBCUTANEOUS at 10:22

## 2024-05-15 RX ADMIN — NYSTATIN 500000 UNITS: 100000 SUSPENSION ORAL at 17:08

## 2024-05-15 RX ADMIN — LORAZEPAM 1 MG: 1 TABLET ORAL at 10:31

## 2024-05-15 RX ADMIN — LIDOCAINE 1 PATCH: 4 PATCH TOPICAL at 08:27

## 2024-05-15 RX ADMIN — BENZOCAINE AND MENTHOL 1 LOZENGE: 15; 3.6 LOZENGE ORAL at 08:27

## 2024-05-15 RX ADMIN — HYDROMORPHONE HYDROCHLORIDE 4 MG: 4 TABLET ORAL at 00:05

## 2024-05-15 RX ADMIN — DEXAMETHASONE 6 MG: 2 TABLET ORAL at 08:27

## 2024-05-15 RX ADMIN — FENTANYL 1 PATCH: 75 PATCH TRANSDERMAL at 15:13

## 2024-05-15 RX ADMIN — Medication 1 TABLET: at 08:27

## 2024-05-15 RX ADMIN — DEXAMETHASONE 6 MG: 2 TABLET ORAL at 15:12

## 2024-05-15 RX ADMIN — HYDROMORPHONE HYDROCHLORIDE 1 MG: 1 INJECTION, SOLUTION INTRAMUSCULAR; INTRAVENOUS; SUBCUTANEOUS at 21:24

## 2024-05-15 RX ADMIN — POLYETHYLENE GLYCOL 3350 17 G: 17 POWDER, FOR SOLUTION ORAL at 08:27

## 2024-05-15 RX ADMIN — PANTOPRAZOLE SODIUM 40 MG: 40 TABLET, DELAYED RELEASE ORAL at 06:04

## 2024-05-15 RX ADMIN — NYSTATIN 500000 UNITS: 100000 SUSPENSION ORAL at 12:56

## 2024-05-15 RX ADMIN — HYDROMORPHONE HYDROCHLORIDE 6 MG: 2 TABLET ORAL at 12:58

## 2024-05-15 RX ADMIN — CHLORPROMAZINE HYDROCHLORIDE 25 MG: 25 TABLET, FILM COATED ORAL at 17:08

## 2024-05-15 RX ADMIN — BENZOCAINE AND MENTHOL 1 LOZENGE: 15; 3.6 LOZENGE ORAL at 06:04

## 2024-05-15 RX ADMIN — HYDROMORPHONE HYDROCHLORIDE 6 MG: 4 TABLET ORAL at 18:40

## 2024-05-15 RX ADMIN — BENZOCAINE AND MENTHOL 1 LOZENGE: 15; 3.6 LOZENGE ORAL at 00:00

## 2024-05-15 RX ADMIN — DEXAMETHASONE 6 MG: 2 TABLET ORAL at 21:24

## 2024-05-15 RX ADMIN — HYDROMORPHONE HYDROCHLORIDE 1 MG: 1 INJECTION, SOLUTION INTRAMUSCULAR; INTRAVENOUS; SUBCUTANEOUS at 17:08

## 2024-05-15 RX ADMIN — HYDROMORPHONE HYDROCHLORIDE 4 MG: 4 TABLET ORAL at 06:04

## 2024-05-15 RX ADMIN — PREGABALIN 50 MG: 25 CAPSULE ORAL at 21:24

## 2024-05-15 RX ADMIN — ENOXAPARIN SODIUM 40 MG: 40 INJECTION, SOLUTION SUBCUTANEOUS at 15:12

## 2024-05-15 RX ADMIN — METOCLOPRAMIDE 10 MG: 10 TABLET ORAL at 18:38

## 2024-05-15 RX ADMIN — NYSTATIN 500000 UNITS: 100000 SUSPENSION ORAL at 21:24

## 2024-05-15 RX ADMIN — Medication 4000 UNITS: at 08:27

## 2024-05-15 ASSESSMENT — PAIN SCALES - GENERAL
PAINLEVEL_OUTOF10: 7
PAINLEVEL_OUTOF10: 5 - MODERATE PAIN
PAINLEVEL_OUTOF10: 4
PAINLEVEL_OUTOF10: 7
PAINLEVEL_OUTOF10: 8
PAINLEVEL_OUTOF10: 7
PAINLEVEL_OUTOF10: 4
PAINLEVEL_OUTOF10: 6
PAINLEVEL_OUTOF10: 7

## 2024-05-15 ASSESSMENT — COGNITIVE AND FUNCTIONAL STATUS - GENERAL
MOBILITY SCORE: 24
DAILY ACTIVITIY SCORE: 24

## 2024-05-15 ASSESSMENT — PAIN - FUNCTIONAL ASSESSMENT
PAIN_FUNCTIONAL_ASSESSMENT: 0-10

## 2024-05-15 NOTE — PROGRESS NOTES
Subjective   Pain well controlled with Dilaudid PRN regimen. He is getting two doses of Dilaudid 4 mg and one dose of dialudid 6 mg.  Still has a sore throat and hiccups. No new weakness,numbness or paresthesias. No shortness of breath, fever, nausea/vomiting .    Meds  Scheduled medications  [Held by provider] apixaban, 5 mg, oral, BID  cholecalciferol, 4,000 Units, oral, q AM  dexAMETHasone, 6 mg, oral, TID  enoxaparin, 40 mg, subcutaneous, q24h  fentaNYL, 1 patch, transdermal, q72h  lidocaine, 1 patch, transdermal, Daily  multivitamin with minerals, 1 tablet, oral, q AM  nystatin, 5 mL, Swish & Swallow, 4x daily  pantoprazole, 40 mg, oral, Daily before breakfast  polyethylene glycol, 17 g, oral, Daily  pregabalin, 50 mg, oral, Nightly      Continuous medications     PRN medications  PRN medications: alteplase, benzocaine-menthol, calcium carbonate, chlorproMAZINE, HYDROmorphone, HYDROmorphone, HYDROmorphone, LORazepam, ondansetron ODT **OR** ondansetron      Objective   Vital signs in last 24 hours:  Temp:  [35.8 °C (96.4 °F)-36.7 °C (98.1 °F)] 36.6 °C (97.9 °F)  Heart Rate:  [] 103  Resp:  [16-18] 18  BP: ()/(63-81) 130/81    Intake/Output this shift:  No intake or output data in the 24 hours ending 05/15/24 1144    Physical  General: Patient is awake, non-toxic appearing, normal body habitus  HEENT: Some white patches noted on roof of mouth  Pulm: Normal WOB at rest and when sitting up, no crackles or rhonchi  Cardiac: Regular rate and rhythm, normal S1/S2  Abdomen: Non-tender to palpation, non-distended  Extremities: No peripheral edema, or cyanosis  Neuro: Patient alert and oriented, cranial nerves grossly intact, normal strength and sensation.    Results  Results for orders placed or performed during the hospital encounter of 05/10/24 (from the past 24 hour(s))   CBC and Auto Differential   Result Value Ref Range    WBC 7.8 4.4 - 11.3 x10*3/uL    nRBC 0.0 0.0 - 0.0 /100 WBCs    RBC 3.40 (L) 4.50  - 5.90 x10*6/uL    Hemoglobin 10.3 (L) 13.5 - 17.5 g/dL    Hematocrit 31.3 (L) 41.0 - 52.0 %    MCV 92 80 - 100 fL    MCH 30.3 26.0 - 34.0 pg    MCHC 32.9 32.0 - 36.0 g/dL    RDW 18.0 (H) 11.5 - 14.5 %    Platelets 52 (L) 150 - 450 x10*3/uL    Neutrophils % 93.7 40.0 - 80.0 %    Immature Granulocytes %, Automated 1.0 (H) 0.0 - 0.9 %    Lymphocytes % 2.2 13.0 - 44.0 %    Monocytes % 3.1 2.0 - 10.0 %    Eosinophils % 0.0 0.0 - 6.0 %    Basophils % 0.0 0.0 - 2.0 %    Neutrophils Absolute 7.29 1.20 - 7.70 x10*3/uL    Immature Granulocytes Absolute, Automated 0.08 0.00 - 0.70 x10*3/uL    Lymphocytes Absolute 0.17 (L) 1.20 - 4.80 x10*3/uL    Monocytes Absolute 0.24 0.10 - 1.00 x10*3/uL    Eosinophils Absolute 0.00 0.00 - 0.70 x10*3/uL    Basophils Absolute 0.00 0.00 - 0.10 x10*3/uL   Renal Function Panel   Result Value Ref Range    Glucose 142 (H) 74 - 99 mg/dL    Sodium 138 136 - 145 mmol/L    Potassium 3.9 3.5 - 5.3 mmol/L    Chloride 105 98 - 107 mmol/L    Bicarbonate 24 21 - 32 mmol/L    Anion Gap 13 10 - 20 mmol/L    Urea Nitrogen 22 6 - 23 mg/dL    Creatinine 0.74 0.50 - 1.30 mg/dL    eGFR >90 >60 mL/min/1.73m*2    Calcium 9.1 8.6 - 10.6 mg/dL    Phosphorus 3.5 2.5 - 4.9 mg/dL    Albumin 3.2 (L) 3.4 - 5.0 g/dL   Magnesium   Result Value Ref Range    Magnesium 1.98 1.60 - 2.40 mg/dL       Imaging  No results found.       Assessment/Plan   Principal Problem:    Esophageal cancer, stage IV (Multi)    Massimo Garcia is a 63 y.o. male with PMH third degree heart block s/p PPM (placed in November), esophageal adenocarcinoma with mets to liver and lungs, Drug-induced colitis, PE, peripheral neuropathy, and portal vein thrombosis (On Eliquis). Patient presented to [facility] ED on 5/9 with acute on chronic low back pain. CT imaging was obtained and was concerning for evidence of soft tissue extension into the spinal canal at T9-T10 with associated spinal canal stenosis. Patient transferred to [facility] for further workup and management.        5/13 updates  -MRI pending clearance, likely tomorrow  -Pain 3/10 this morning with pain regimen     #Acute on Chronic low Back Pain  :: Presented to [facility] ED on 5/9 with stabbing nonradiating low back pain, acutely worsened  :: CT L spine obtained: “evidence of soft tissue extension into the spinal canal at T9 and T10 resulting in spinal canal stenosis”  :: Neurology consult obtained on admission, rec obtaining MRI brain, C/T/L spine w/wo contrast for oncologic workup  :: UA (5/10) unremarkable  - MRI brain and C, T, L spine w/wo con ordered, pending completion  - On dexamethasone 6 mg TID     #Hiccups  -Chloropromazine 25 mg Q6H PRN     #Pain Management:   -Fentanyl patch to 75 mcg patch q72hrs. Supportive onc to consider increase tomorrow  - Continue Lyrica 50 mg nightly  - Lidocaine patch  - Hydromorphone 4 mg q4hr PRN mild pain, Hydromorophone 6 mg q4hr PRN severe pain, Dilaudid 1 mg IV q3hr PRN severe pain  - Supportive oncology consulted, appreciate recs  - PT consulted, awaiting recs     #Vitamin D deficiency  -Continue vitamin D 4000 units every morning     #Stage IV esophageal cancer HER2 positive   :: Follows with Dr. Blake, emailed 5/11 AM with updates  :: Current chemotherapy: 5FU, Tras/Pembro, last received on 4/29, next due 5/13. Hold for potential surgery.  :: CT A/P (5/9) with “Multiple pulmonary nodules in the imaged lower lungs compatible with metastatic disease. There is wall thickening and apparent hyperenhancement of the anterior wall of the distal esophagus which may relate to patient's known esophageal malignancy. There is also redemonstration of multiple hepatic masses and abdominal and pelvic lymphadenopathy compatible with metastatic disease     #Recent Immune-mediated Colitis  :: Recently admitted to [facility] 5/4-5/7 for enterocolitis  - Dexamethasone 6 mg TID     #hx PE  #Hx Portal vein thrombosis  - Pulmonary embolism in RLL subsegmental artery in March 2024  -Jan 2023:  Extensive  large pulmonary emboli involving lobar, segmental, and subsegmental arteries bilaterally. Prior to any cancer treatment  - Held home Eliquis 5mg BID on admission, per neurology, pending further plan  - On enoxaparin 40 mg daily     #GERD  - Continue home Protonix   -Calcium carbonate 500 mg PRN     #Anxiety disorder  - Lorazepam 1 mg Q8H PRN     #Chronic normocytic anemia secondary to malignancy and treatment  #Chronic thrombocytopenia  :: At baseline hgb   -Hgb of 10.3. Baseline 10-12  - Monitor daily CBC  - Transfuse for hgb<7, platelet<10     #Hx Third degree heard block, s/p pacemaker placement (11/8/23)  :: Asymptomatic on admission  - CTM     F: PRN  E: Replete as needed  N: Regular diet  C: Full code  A: Port     LOS: 5 days     Adi Mays MD/PhD   PGY-1

## 2024-05-15 NOTE — PROGRESS NOTES
Music Therapy Note      Therapy Session  Referral Type: New referral this admission  Visit Type: New visit  Intervention Delivery: In-person  Conflict of Service: Declined treatment  Family Present for Session: None               Treatment/Interventions  Music Therapy Interventions: Assessment         Narrative  Assessment Detail: Found pt in bed, appeared uncomfortable or tired. Agreed to mt in the future, likes country music. Asked MT to return at another time.  Plan: Introduce services and assess goals/ preferences in music therapy.  Follow-up: Will continue to follow.    Education Documentation  No documentation found.

## 2024-05-15 NOTE — CARE PLAN
The patient's goals for the shift include      The clinical goals for the shift include Pt will have decreased pain through end of shift 5/15/2024 1900      Problem: Fall/Injury  Goal: Not fall by end of shift  Outcome: Progressing  Goal: Be free from injury by end of the shift  Outcome: Progressing  Goal: Verbalize understanding of personal risk factors for fall in the hospital  Outcome: Progressing  Goal: Verbalize understanding of risk factor reduction measures to prevent injury from fall in the home  Outcome: Progressing  Goal: Use assistive devices by end of the shift  Outcome: Progressing  Goal: Pace activities to prevent fatigue by end of the shift  Outcome: Progressing     Problem: Pain  Goal: Takes deep breaths with improved pain control throughout the shift  Outcome: Progressing  Goal: Turns in bed with improved pain control throughout the shift  Outcome: Progressing  Goal: Walks with improved pain control throughout the shift  Outcome: Progressing  Goal: Performs ADL's with improved pain control throughout shift  Outcome: Progressing  Goal: Participates in PT with improved pain control throughout the shift  Outcome: Progressing  Goal: Free from opioid side effects throughout the shift  Outcome: Progressing  Goal: Free from acute confusion related to pain meds throughout the shift  Outcome: Progressing

## 2024-05-16 ENCOUNTER — APPOINTMENT (OUTPATIENT)
Dept: CARDIOLOGY | Facility: HOSPITAL | Age: 64
DRG: 028 | End: 2024-05-16
Payer: MEDICARE

## 2024-05-16 ENCOUNTER — APPOINTMENT (OUTPATIENT)
Dept: RADIOLOGY | Facility: HOSPITAL | Age: 64
DRG: 028 | End: 2024-05-16
Payer: MEDICARE

## 2024-05-16 LAB
ABO GROUP (TYPE) IN BLOOD: NORMAL
ANTIBODY SCREEN: NORMAL
BLOOD EXPIRATION DATE: NORMAL
BLOOD EXPIRATION DATE: NORMAL
DISPENSE STATUS: NORMAL
DISPENSE STATUS: NORMAL
INR PPP: 1.4 (ref 0.9–1.1)
PRODUCT BLOOD TYPE: 5100
PRODUCT BLOOD TYPE: 9500
PRODUCT CODE: NORMAL
PRODUCT CODE: NORMAL
PROTHROMBIN TIME: 15.6 SECONDS (ref 9.8–12.8)
RH FACTOR (ANTIGEN D): NORMAL
UNIT ABO: NORMAL
UNIT ABO: NORMAL
UNIT NUMBER: NORMAL
UNIT NUMBER: NORMAL
UNIT RH: NORMAL
UNIT RH: NORMAL
UNIT VOLUME: 263
UNIT VOLUME: 295

## 2024-05-16 PROCEDURE — 93286 PERI-PX EVAL PM/LDLS PM IP: CPT

## 2024-05-16 PROCEDURE — 2500000001 HC RX 250 WO HCPCS SELF ADMINISTERED DRUGS (ALT 637 FOR MEDICARE OP)

## 2024-05-16 PROCEDURE — 72158 MRI LUMBAR SPINE W/O & W/DYE: CPT

## 2024-05-16 PROCEDURE — 72158 MRI LUMBAR SPINE W/O & W/DYE: CPT | Performed by: RADIOLOGY

## 2024-05-16 PROCEDURE — 2500000004 HC RX 250 GENERAL PHARMACY W/ HCPCS (ALT 636 FOR OP/ED): Performed by: STUDENT IN AN ORGANIZED HEALTH CARE EDUCATION/TRAINING PROGRAM

## 2024-05-16 PROCEDURE — 1170000001 HC PRIVATE ONCOLOGY ROOM DAILY

## 2024-05-16 PROCEDURE — 93286 PERI-PX EVAL PM/LDLS PM IP: CPT | Performed by: INTERNAL MEDICINE

## 2024-05-16 PROCEDURE — 93005 ELECTROCARDIOGRAM TRACING: CPT

## 2024-05-16 PROCEDURE — 72157 MRI CHEST SPINE W/O & W/DYE: CPT

## 2024-05-16 PROCEDURE — A9575 INJ GADOTERATE MEGLUMI 0.1ML: HCPCS | Performed by: HOSPITALIST

## 2024-05-16 PROCEDURE — 2500000004 HC RX 250 GENERAL PHARMACY W/ HCPCS (ALT 636 FOR OP/ED)

## 2024-05-16 PROCEDURE — 86923 COMPATIBILITY TEST ELECTRIC: CPT | Mod: 91

## 2024-05-16 PROCEDURE — 72157 MRI CHEST SPINE W/O & W/DYE: CPT | Performed by: RADIOLOGY

## 2024-05-16 PROCEDURE — 70553 MRI BRAIN STEM W/O & W/DYE: CPT

## 2024-05-16 PROCEDURE — 93010 ELECTROCARDIOGRAM REPORT: CPT | Performed by: STUDENT IN AN ORGANIZED HEALTH CARE EDUCATION/TRAINING PROGRAM

## 2024-05-16 PROCEDURE — 2500000005 HC RX 250 GENERAL PHARMACY W/O HCPCS

## 2024-05-16 PROCEDURE — 86901 BLOOD TYPING SEROLOGIC RH(D): CPT

## 2024-05-16 PROCEDURE — 2550000001 HC RX 255 CONTRASTS: Performed by: HOSPITALIST

## 2024-05-16 PROCEDURE — 70553 MRI BRAIN STEM W/O & W/DYE: CPT | Performed by: RADIOLOGY

## 2024-05-16 PROCEDURE — 72156 MRI NECK SPINE W/O & W/DYE: CPT

## 2024-05-16 PROCEDURE — 85610 PROTHROMBIN TIME: CPT

## 2024-05-16 PROCEDURE — 72128 CT CHEST SPINE W/O DYE: CPT

## 2024-05-16 PROCEDURE — P9073 PLATELETS PHERESIS PATH REDU: HCPCS

## 2024-05-16 PROCEDURE — 36430 TRANSFUSION BLD/BLD COMPNT: CPT

## 2024-05-16 PROCEDURE — 99233 SBSQ HOSP IP/OBS HIGH 50: CPT

## 2024-05-16 PROCEDURE — 72156 MRI NECK SPINE W/O & W/DYE: CPT | Performed by: RADIOLOGY

## 2024-05-16 RX ORDER — GADOTERATE MEGLUMINE 376.9 MG/ML
15 INJECTION INTRAVENOUS
Status: COMPLETED | OUTPATIENT
Start: 2024-05-16 | End: 2024-05-16

## 2024-05-16 RX ORDER — HYDROMORPHONE HYDROCHLORIDE 1 MG/ML
1 INJECTION, SOLUTION INTRAMUSCULAR; INTRAVENOUS; SUBCUTANEOUS ONCE
Status: COMPLETED | OUTPATIENT
Start: 2024-05-16 | End: 2024-05-16

## 2024-05-16 RX ORDER — SODIUM CHLORIDE, SODIUM LACTATE, POTASSIUM CHLORIDE, CALCIUM CHLORIDE 600; 310; 30; 20 MG/100ML; MG/100ML; MG/100ML; MG/100ML
75 INJECTION, SOLUTION INTRAVENOUS CONTINUOUS
Status: DISCONTINUED | OUTPATIENT
Start: 2024-05-17 | End: 2024-05-20

## 2024-05-16 RX ORDER — BACLOFEN 10 MG/1
5 TABLET ORAL ONCE
Status: COMPLETED | OUTPATIENT
Start: 2024-05-16 | End: 2024-05-16

## 2024-05-16 RX ADMIN — PANTOPRAZOLE SODIUM 40 MG: 40 TABLET, DELAYED RELEASE ORAL at 06:45

## 2024-05-16 RX ADMIN — HYDROMORPHONE HYDROCHLORIDE 4 MG: 4 TABLET ORAL at 22:27

## 2024-05-16 RX ADMIN — HYDROMORPHONE HYDROCHLORIDE 1 MG: 1 INJECTION, SOLUTION INTRAMUSCULAR; INTRAVENOUS; SUBCUTANEOUS at 16:44

## 2024-05-16 RX ADMIN — BACLOFEN 5 MG: 10 TABLET ORAL at 22:27

## 2024-05-16 RX ADMIN — PREGABALIN 50 MG: 25 CAPSULE ORAL at 21:55

## 2024-05-16 RX ADMIN — NYSTATIN 500000 UNITS: 100000 SUSPENSION ORAL at 21:55

## 2024-05-16 RX ADMIN — Medication 4000 UNITS: at 08:20

## 2024-05-16 RX ADMIN — HYDROMORPHONE HYDROCHLORIDE 6 MG: 4 TABLET ORAL at 19:30

## 2024-05-16 RX ADMIN — HYDROMORPHONE HYDROCHLORIDE 1 MG: 1 INJECTION, SOLUTION INTRAMUSCULAR; INTRAVENOUS; SUBCUTANEOUS at 14:15

## 2024-05-16 RX ADMIN — HYDROMORPHONE HYDROCHLORIDE 1 MG: 1 INJECTION, SOLUTION INTRAMUSCULAR; INTRAVENOUS; SUBCUTANEOUS at 13:20

## 2024-05-16 RX ADMIN — GADOTERATE MEGLUMINE 14 ML: 376.9 INJECTION INTRAVENOUS at 15:17

## 2024-05-16 RX ADMIN — DEXAMETHASONE 6 MG: 2 TABLET ORAL at 13:19

## 2024-05-16 RX ADMIN — LIDOCAINE 1 PATCH: 4 PATCH TOPICAL at 08:21

## 2024-05-16 RX ADMIN — HYDROMORPHONE HYDROCHLORIDE 1 MG: 1 INJECTION, SOLUTION INTRAMUSCULAR; INTRAVENOUS; SUBCUTANEOUS at 09:21

## 2024-05-16 RX ADMIN — HYDROMORPHONE HYDROCHLORIDE 4 MG: 4 TABLET ORAL at 02:00

## 2024-05-16 RX ADMIN — DEXAMETHASONE 6 MG: 2 TABLET ORAL at 21:55

## 2024-05-16 RX ADMIN — NYSTATIN 500000 UNITS: 100000 SUSPENSION ORAL at 06:46

## 2024-05-16 RX ADMIN — DEXAMETHASONE 6 MG: 2 TABLET ORAL at 06:45

## 2024-05-16 RX ADMIN — POLYETHYLENE GLYCOL 3350 17 G: 17 POWDER, FOR SOLUTION ORAL at 08:19

## 2024-05-16 RX ADMIN — HYDROMORPHONE HYDROCHLORIDE 6 MG: 4 TABLET ORAL at 11:37

## 2024-05-16 RX ADMIN — LORAZEPAM 1 MG: 1 TABLET ORAL at 13:19

## 2024-05-16 RX ADMIN — Medication 1 TABLET: at 08:20

## 2024-05-16 RX ADMIN — HYDROMORPHONE HYDROCHLORIDE 6 MG: 4 TABLET ORAL at 06:45

## 2024-05-16 RX ADMIN — SODIUM CHLORIDE, POTASSIUM CHLORIDE, SODIUM LACTATE AND CALCIUM CHLORIDE 75 ML/HR: 600; 310; 30; 20 INJECTION, SOLUTION INTRAVENOUS at 18:31

## 2024-05-16 RX ADMIN — BACLOFEN 10 MG: 10 TABLET ORAL at 02:00

## 2024-05-16 ASSESSMENT — COGNITIVE AND FUNCTIONAL STATUS - GENERAL
MOBILITY SCORE: 24
DAILY ACTIVITIY SCORE: 24
MOBILITY SCORE: 24
DAILY ACTIVITIY SCORE: 24
DAILY ACTIVITIY SCORE: 24
MOBILITY SCORE: 24

## 2024-05-16 ASSESSMENT — PAIN SCALES - GENERAL
PAINLEVEL_OUTOF10: 5 - MODERATE PAIN
PAINLEVEL_OUTOF10: 8
PAINLEVEL_OUTOF10: 6
PAINLEVEL_OUTOF10: 4
PAINLEVEL_OUTOF10: 6
PAINLEVEL_OUTOF10: 4
PAINLEVEL_OUTOF10: 6
PAINLEVEL_OUTOF10: 5 - MODERATE PAIN
PAINLEVEL_OUTOF10: 7
PAINLEVEL_OUTOF10: 7

## 2024-05-16 ASSESSMENT — PAIN DESCRIPTION - LOCATION: LOCATION: BACK

## 2024-05-16 ASSESSMENT — PAIN - FUNCTIONAL ASSESSMENT
PAIN_FUNCTIONAL_ASSESSMENT: 0-10

## 2024-05-16 ASSESSMENT — PAIN DESCRIPTION - ORIENTATION: ORIENTATION: MID

## 2024-05-16 ASSESSMENT — PAIN SCALES - PAIN ASSESSMENT IN ADVANCED DEMENTIA (PAINAD): TOTALSCORE: MEDICATION (SEE MAR)

## 2024-05-16 ASSESSMENT — PAIN SCALES - WONG BAKER: WONGBAKER_NUMERICALRESPONSE: HURTS WHOLE LOT

## 2024-05-16 NOTE — SIGNIFICANT EVENT
Surgery Risk Assessment  I was asked to perform a presurgical risk assessment    Proposed Procedure:  T7-T11 Instrumented Fusion with T9 bitranspedicular decompression.    RCRI Score  Elevated Risk Surgery: No  History of ischemic heart disease: No   History of heart failure: No  History of cerebrovascular disease: No  Preoperative treatment with insulin: No  Preoperative creatinine: 0.74 (Not greater than 2): No    With all of these risk factors, Mr. Garcia has an RCRI score of zero which corresponds to a 3.9% 30-day risk of death, MI or cardiac arrest following surgery.     Based on activity questionnaire, Mr. Garcia has a DASI score corresponding to 42.7, which means 7.99 METs of activity. This corresponds to excellent functional capacity (>4 METs), suggesting he will tolerate anesthesia well and need no additional preoperative testing.    The only concerning cardiac history Mr. Garcia has is a history of third degree heart block for which he received an implanted pacemaker. However, during this hospitalization he has no episodes of bradycardia, so this is likely a low perioperative risk.    Overall, Mr. Garcia should be low risk for the procedure he is getting tomorrow

## 2024-05-16 NOTE — NURSING NOTE
1600 return of device clinic nurse to reset to his previous setting; patient reports no adverse effects. BL

## 2024-05-16 NOTE — CARE PLAN
The patient's goals for the shift include      The clinical goals for the shift include pt will report pain <7/10 through end of shift 5/16/24 @ 0700      Problem: Fall/Injury  Goal: Not fall by end of shift  Outcome: Progressing  Goal: Be free from injury by end of the shift  Outcome: Progressing     Problem: Pain  Goal: Takes deep breaths with improved pain control throughout the shift  Outcome: Progressing  Goal: Turns in bed with improved pain control throughout the shift  Outcome: Progressing  Goal: Walks with improved pain control throughout the shift  Outcome: Progressing  Goal: Performs ADL's with improved pain control throughout shift  Outcome: Progressing

## 2024-05-16 NOTE — NURSING NOTE
1400 Arrival of device clinic nurse to interrogate patient's pacemaker into MRI safe mode; DOO@90/min. BL

## 2024-05-16 NOTE — PROGRESS NOTES
Subjective   Yesterday evening had severe hiccuping that was not relieved by Thorazine. Got a dose of metaclopramide and got better afterward. Otherwise, this morning has no focal weakness, numbness or tingling. He has minimal pain in low back this morning.  He had one dose of oral Dilaudid 6 mg this morning.  No fever, chills, or SOB. Feels sore throat is getting better with nystatin oral rinses.    Meds  Scheduled medications  cholecalciferol, 4,000 Units, oral, Daily  dexAMETHasone, 6 mg, oral, q8h LUCITA  [Held by provider] enoxaparin, 40 mg, subcutaneous, Daily  [START ON 5/18/2024] fentaNYL, 1 patch, transdermal, q72h  lidocaine, 1 patch, transdermal, Daily  multivitamin with minerals, 1 tablet, oral, Daily  nystatin, 5 mL, Swish & Swallow, 4x daily  pantoprazole, 40 mg, oral, Daily before breakfast  polyethylene glycol, 17 g, oral, Daily  pregabalin, 50 mg, oral, Nightly      Continuous medications     PRN medications  PRN medications: alteplase, benzocaine-menthol, calcium carbonate, chlorproMAZINE, HYDROmorphone, HYDROmorphone, HYDROmorphone, LORazepam, metoclopramide, ondansetron **OR** ondansetron      Objective   Vital signs in last 24 hours:  Temp:  [36.4 °C (97.5 °F)-36.9 °C (98.4 °F)] 36.4 °C (97.5 °F)  Heart Rate:  [] 89  Resp:  [18-22] 22  BP: (100-139)/(54-81) 138/70    Intake/Output this shift:  No intake or output data in the 24 hours ending 05/16/24 3586    Physical  General: Patient is awake, non-toxic appearing, thin body habitus  Pulm: Normal WOB at rest and when sitting up, no crackles or rhonchi  Cardiac: Regular rate and rhythm, normal S1/S2  Abdomen: Non-tender to palpation, non-distended  Extremities: No peripheral edema, or cyanosis  Neuro: Patient alert and oriented, cranial nerves grossly intact, normal strength and sensation.    Results  Results for orders placed or performed during the hospital encounter of 05/10/24 (from the past 24 hour(s))   Protime-INR   Result Value Ref  Range    Protime 15.6 (H) 9.8 - 12.8 seconds    INR 1.4 (H) 0.9 - 1.1   Cardiac Device Check - MRI   Result Value Ref Range    BSA 1.84 m2       Imaging  No results found.       Assessment/Plan   Principal Problem:    Esophageal cancer, stage IV (Multi)  Active Problems:    Primary malignant neoplasm of esophagus (Multi)    Massimo Garcia is a 63 y.o. male with PMH third degree heart block s/p PPM (placed in November), esophageal adenocarcinoma with mets to liver and lungs, Drug-induced colitis, PE, peripheral neuropathy, and portal vein thrombosis (On Eliquis). Patient presented to [facility] ED on 5/9 with acute on chronic low back pain. CT imaging was obtained and was concerning for evidence of soft tissue extension into the spinal canal at T9-T10 with associated spinal canal stenosis. Patient transferred to [facility] for further workup and management.       5/14 updates  -MRI performed today. Per neurosurgery, shows Bilskey grade III stenosis, requires emergent operation. Tentatively planned for tomorrow  - Started metaclopramide 10 mg Q8H for hiccups     #Acute on Chronic low Back Pain  :: Presented to [facility] ED on 5/9 with stabbing nonradiating low back pain, acutely worsened  :: CT L spine obtained: “evidence of soft tissue extension into the spinal canal at T9 and T10 resulting in spinal canal stenosis”  :: Neurology consult obtained on admission, rec obtaining MRI brain, C/T/L spine w/wo contrast for oncologic workup  :: UA (5/10) unremarkable  - MRI brain and C, T, L spine w/wo con ordered, pending completion  - On dexamethasone 6 mg TID     #Hiccups  -Held Chloropromazine 25 mg Q6H PRN as not helping  - Started metaclopramide 10 mg Q8H PRN     #Pain Management:   -Fentanyl patch to 75 mcg patch q72hrs.   - Continue Lyrica 50 mg nightly  - Lidocaine patch  - Hydromorphone oral 4 mg q4hr PRN mild pain, Hydromorophone oral 6 mg q4hr PRN severe pain, Dilaudid 1 mg IV q3hr PRN severe pain  - Supportive oncology consulted,  appreciate recs  - PT consulted, awaiting recs     #Vitamin D deficiency  -Continue vitamin D 4000 units every morning     #Stage IV esophageal cancer HER2 positive   :: Follows with Dr. Blake, emailed 5/11 AM with updates  :: Current chemotherapy: 5FU, Tras/Pembro, last received on 4/29, next due 5/13. Hold for potential surgery.  :: CT A/P (5/9) with “Multiple pulmonary nodules in the imaged lower lungs compatible with metastatic disease. There is wall thickening and apparent hyperenhancement of the anterior wall of the distal esophagus which may relate to patient's known esophageal malignancy. There is also redemonstration of multiple hepatic masses and abdominal and pelvic lymphadenopathy compatible with metastatic disease     #Recent Immune-mediated Colitis  :: Recently admitted to [facility] 5/4-5/7 for enterocolitis  - Dexamethasone 6 mg TID     #hx PE  #Hx Portal vein thrombosis  - Pulmonary embolism in RLL subsegmental artery in March 2024  -Jan 2023:  Extensive large pulmonary emboli involving lobar, segmental, and subsegmental arteries bilaterally. Prior to any cancer treatment  - Held home Eliquis 5mg BID on admission, per neurology, pending further plan  - On enoxaparin 40 mg daily     #GERD  - Continue home Protonix   -Calcium carbonate 500 mg PRN     #Anxiety disorder  - Lorazepam 1 mg Q8H PRN     #Chronic normocytic anemia secondary to malignancy and treatment  #Chronic thrombocytopenia  :: At baseline hgb   -Hgb of 10.3. Baseline 10-12  - Monitor daily CBC  - Transfuse for hgb<7, platelet<10     #Hx Third degree heard block, s/p pacemaker placement (11/8/23)  :: Asymptomatic on admission  - CTM     F: PRN  E: Replete as needed  N: Regular diet  C: Full code  A: Port        LOS: 6 days     Adi Mays MD/PhD   PGY-1.833

## 2024-05-16 NOTE — INTERVAL H&P NOTE
H&P reviewed.  Patient has metastatic epidural spinal cord compression and needs an urgent decompression and stabiliazation.    I had a lengthy discussion regarding the clinical and surgical plan for the patient.  I discussed with the patient surgical option of T7-11 instrumented fusion with T9 bitranspedicular decompression. I discussed risks including infection, bleeding, weakness, sensory loss, seizures, postoperative pain, visual deficit, CSF leak, need for further surgery, prolonged hospitalization, as well of risks of anesthesia, blood clot, pneumonia, death, etc. Benefits include decompression of neural elements and stabilization of spine. No guarantees were given. The patient verbalized understanding, and would like to proceed with surgery.

## 2024-05-16 NOTE — CARE PLAN
The patient's goals for the shift include  pain control    The clinical goals for the shift include pt will report pain <7/10 through end of shift 5/16/24 @ 0700    Over the shift, the patient did not make progress toward the following goals. Barriers to progression include rating 7/10 pain. Recommendations to address these barriers include frequent pain assessments.

## 2024-05-17 ENCOUNTER — APPOINTMENT (OUTPATIENT)
Dept: NEUROLOGY | Facility: HOSPITAL | Age: 64
DRG: 028 | End: 2024-05-17
Payer: MEDICARE

## 2024-05-17 ENCOUNTER — ANESTHESIA EVENT (OUTPATIENT)
Dept: OPERATING ROOM | Facility: HOSPITAL | Age: 64
DRG: 028 | End: 2024-05-17
Payer: MEDICARE

## 2024-05-17 ENCOUNTER — APPOINTMENT (OUTPATIENT)
Dept: RADIOLOGY | Facility: HOSPITAL | Age: 64
DRG: 028 | End: 2024-05-17
Payer: MEDICARE

## 2024-05-17 ENCOUNTER — APPOINTMENT (OUTPATIENT)
Dept: CARDIOLOGY | Facility: HOSPITAL | Age: 64
DRG: 028 | End: 2024-05-17
Payer: MEDICARE

## 2024-05-17 ENCOUNTER — ANESTHESIA (OUTPATIENT)
Dept: OPERATING ROOM | Facility: HOSPITAL | Age: 64
DRG: 028 | End: 2024-05-17
Payer: MEDICARE

## 2024-05-17 LAB
ALBUMIN SERPL BCP-MCNC: 3.3 G/DL (ref 3.4–5)
ALBUMIN SERPL BCP-MCNC: 3.5 G/DL (ref 3.4–5)
ANION GAP BLDA CALCULATED.4IONS-SCNC: 12 MMO/L (ref 10–25)
ANION GAP BLDA CALCULATED.4IONS-SCNC: ABNORMAL MMOL/L
ANION GAP SERPL CALC-SCNC: 11 MMOL/L (ref 10–20)
ANION GAP SERPL CALC-SCNC: 16 MMOL/L (ref 10–20)
APTT PPP: 25 SECONDS (ref 27–38)
BASE EXCESS BLDA CALC-SCNC: -1.8 MMOL/L (ref -2–3)
BASE EXCESS BLDA CALC-SCNC: 0.2 MMOL/L (ref -2–3)
BASOPHILS # BLD AUTO: 0.02 X10*3/UL (ref 0–0.1)
BASOPHILS # BLD AUTO: 0.02 X10*3/UL (ref 0–0.1)
BASOPHILS NFR BLD AUTO: 0.2 %
BASOPHILS NFR BLD AUTO: 0.2 %
BLOOD EXPIRATION DATE: NORMAL
BODY TEMPERATURE: 37 DEGREES CELSIUS
BODY TEMPERATURE: 37 DEGREES CELSIUS
BUN SERPL-MCNC: 24 MG/DL (ref 6–23)
BUN SERPL-MCNC: 26 MG/DL (ref 6–23)
CA-I BLDA-SCNC: 1.29 MMOL/L (ref 1.1–1.33)
CA-I BLDA-SCNC: 1.31 MMOL/L (ref 1.1–1.33)
CALCIUM SERPL-MCNC: 8.8 MG/DL (ref 8.6–10.6)
CALCIUM SERPL-MCNC: 9.3 MG/DL (ref 8.6–10.6)
CFT FORM KAOLIN IND BLD RES TEG: 1.8 MIN (ref 0.8–2.1)
CHLORIDE BLDA-SCNC: 103 MMOL/L (ref 98–107)
CHLORIDE BLDA-SCNC: ABNORMAL MMOL/L
CHLORIDE SERPL-SCNC: 101 MMOL/L (ref 98–107)
CHLORIDE SERPL-SCNC: 102 MMOL/L (ref 98–107)
CLOT ANGLE.KAOLIN INDUCED BLD RES TEG: 70 DEG (ref 63–78)
CLOT INIT KAO IND P HEP NEUT BLD RES TEG: 5.4 MIN (ref 4.3–8.3)
CLOT INIT KAO IND P HEP NEUT BLD RES TEG: 5.6 MIN (ref 4.6–9.1)
CO2 SERPL-SCNC: 23 MMOL/L (ref 21–32)
CO2 SERPL-SCNC: 28 MMOL/L (ref 21–32)
CREAT SERPL-MCNC: 0.57 MG/DL (ref 0.5–1.3)
CREAT SERPL-MCNC: 0.6 MG/DL (ref 0.5–1.3)
DISPENSE STATUS: NORMAL
EGFRCR SERPLBLD CKD-EPI 2021: >90 ML/MIN/1.73M*2
EGFRCR SERPLBLD CKD-EPI 2021: >90 ML/MIN/1.73M*2
EOSINOPHIL # BLD AUTO: 0 X10*3/UL (ref 0–0.7)
EOSINOPHIL # BLD AUTO: 0 X10*3/UL (ref 0–0.7)
EOSINOPHIL NFR BLD AUTO: 0 %
EOSINOPHIL NFR BLD AUTO: 0 %
ERYTHROCYTE [DISTWIDTH] IN BLOOD BY AUTOMATED COUNT: 18 % (ref 11.5–14.5)
ERYTHROCYTE [DISTWIDTH] IN BLOOD BY AUTOMATED COUNT: 18.1 % (ref 11.5–14.5)
ERYTHROCYTE [DISTWIDTH] IN BLOOD BY AUTOMATED COUNT: 18.1 % (ref 11.5–14.5)
ERYTHROCYTE [DISTWIDTH] IN BLOOD BY AUTOMATED COUNT: 18.2 % (ref 11.5–14.5)
FIBRINOGEN BLD CALC-MCNC: 246 MG/DL (ref 278–581)
FIBRINOGEN PPP-MCNC: 160 MG/DL (ref 200–400)
GLUCOSE BLDA-MCNC: 155 MG/DL (ref 74–99)
GLUCOSE BLDA-MCNC: 185 MG/DL (ref 74–99)
GLUCOSE SERPL-MCNC: 148 MG/DL (ref 74–99)
GLUCOSE SERPL-MCNC: 159 MG/DL (ref 74–99)
HCO3 BLDA-SCNC: 23.1 MMOL/L (ref 22–26)
HCO3 BLDA-SCNC: 24.6 MMOL/L (ref 22–26)
HCT VFR BLD AUTO: 24.1 % (ref 41–52)
HCT VFR BLD AUTO: 28.9 % (ref 41–52)
HCT VFR BLD AUTO: 30.9 % (ref 41–52)
HCT VFR BLD AUTO: 32.4 % (ref 41–52)
HCT VFR BLD EST: 25 % (ref 41–52)
HCT VFR BLD EST: 29 % (ref 41–52)
HGB BLD-MCNC: 10.6 G/DL (ref 13.5–17.5)
HGB BLD-MCNC: 8 G/DL (ref 13.5–17.5)
HGB BLD-MCNC: 9.3 G/DL (ref 13.5–17.5)
HGB BLD-MCNC: 9.9 G/DL (ref 13.5–17.5)
HGB BLDA-MCNC: 8.3 G/DL (ref 13.5–17.5)
HGB BLDA-MCNC: 9.7 G/DL (ref 13.5–17.5)
IMM GRANULOCYTES # BLD AUTO: 0.28 X10*3/UL (ref 0–0.7)
IMM GRANULOCYTES # BLD AUTO: 0.47 X10*3/UL (ref 0–0.7)
IMM GRANULOCYTES NFR BLD AUTO: 3.4 % (ref 0–0.9)
IMM GRANULOCYTES NFR BLD AUTO: 4 % (ref 0–0.9)
INHALED O2 CONCENTRATION: 100 %
INHALED O2 CONCENTRATION: 50 %
INR PPP: 1.4 (ref 0.9–1.1)
LACTATE BLDA-SCNC: 3.6 MMOL/L (ref 0.4–2)
LACTATE BLDA-SCNC: 4.4 MMOL/L (ref 0.4–2)
LYMPHOCYTES # BLD AUTO: 0.16 X10*3/UL (ref 1.2–4.8)
LYMPHOCYTES # BLD AUTO: 0.24 X10*3/UL (ref 1.2–4.8)
LYMPHOCYTES NFR BLD AUTO: 1.9 %
LYMPHOCYTES NFR BLD AUTO: 2 %
MA KAOLIN BLD RES TEG: 49 MM (ref 52–69)
MA KAOLIN+TF BLD RES TEG: 48 MM (ref 52–70)
MA TF IND+IIB-IIIA INH BLD RES TEG: 14 MM (ref 15–32)
MAGNESIUM SERPL-MCNC: 2.02 MG/DL (ref 1.6–2.4)
MCH RBC QN AUTO: 28.9 PG (ref 26–34)
MCH RBC QN AUTO: 29.1 PG (ref 26–34)
MCH RBC QN AUTO: 29.8 PG (ref 26–34)
MCH RBC QN AUTO: 30.5 PG (ref 26–34)
MCHC RBC AUTO-ENTMCNC: 32 G/DL (ref 32–36)
MCHC RBC AUTO-ENTMCNC: 32.2 G/DL (ref 32–36)
MCHC RBC AUTO-ENTMCNC: 32.7 G/DL (ref 32–36)
MCHC RBC AUTO-ENTMCNC: 33.2 G/DL (ref 32–36)
MCV RBC AUTO: 90 FL (ref 80–100)
MCV RBC AUTO: 90 FL (ref 80–100)
MCV RBC AUTO: 91 FL (ref 80–100)
MCV RBC AUTO: 92 FL (ref 80–100)
MONOCYTES # BLD AUTO: 0.53 X10*3/UL (ref 0.1–1)
MONOCYTES # BLD AUTO: 0.72 X10*3/UL (ref 0.1–1)
MONOCYTES NFR BLD AUTO: 6.1 %
MONOCYTES NFR BLD AUTO: 6.5 %
NEUTROPHILS # BLD AUTO: 10.36 X10*3/UL (ref 1.2–7.7)
NEUTROPHILS # BLD AUTO: 7.22 X10*3/UL (ref 1.2–7.7)
NEUTROPHILS NFR BLD AUTO: 87.7 %
NEUTROPHILS NFR BLD AUTO: 88 %
NRBC BLD-RTO: 0 /100 WBCS (ref 0–0)
OXYHGB MFR BLDA: 97 % (ref 94–98)
OXYHGB MFR BLDA: 97.4 % (ref 94–98)
PCO2 BLDA: 38 MM HG (ref 38–42)
PCO2 BLDA: 39 MM HG (ref 38–42)
PH BLDA: 7.38 PH (ref 7.38–7.42)
PH BLDA: 7.42 PH (ref 7.38–7.42)
PHOSPHATE SERPL-MCNC: 2.9 MG/DL (ref 2.5–4.9)
PHOSPHATE SERPL-MCNC: 4.2 MG/DL (ref 2.5–4.9)
PLATELET # BLD AUTO: 113 X10*3/UL (ref 150–450)
PLATELET # BLD AUTO: 75 X10*3/UL (ref 150–450)
PLATELET # BLD AUTO: 79 X10*3/UL (ref 150–450)
PLATELET # BLD AUTO: 96 X10*3/UL (ref 150–450)
PO2 BLDA: 171 MM HG (ref 85–95)
PO2 BLDA: 340 MM HG (ref 85–95)
POTASSIUM BLDA-SCNC: 3.5 MMOL/L (ref 3.5–5.3)
POTASSIUM BLDA-SCNC: 3.7 MMOL/L (ref 3.5–5.3)
POTASSIUM SERPL-SCNC: 3.7 MMOL/L (ref 3.5–5.3)
POTASSIUM SERPL-SCNC: 4 MMOL/L (ref 3.5–5.3)
PRODUCT BLOOD TYPE: 5100
PRODUCT BLOOD TYPE: 6200
PRODUCT BLOOD TYPE: NORMAL
PRODUCT CODE: NORMAL
PROTHROMBIN TIME: 15.5 SECONDS (ref 9.8–12.8)
RBC # BLD AUTO: 2.62 X10*6/UL (ref 4.5–5.9)
RBC # BLD AUTO: 3.2 X10*6/UL (ref 4.5–5.9)
RBC # BLD AUTO: 3.42 X10*6/UL (ref 4.5–5.9)
RBC # BLD AUTO: 3.56 X10*6/UL (ref 4.5–5.9)
SAO2 % BLDA: 99 % (ref 94–100)
SAO2 % BLDA: 99 % (ref 94–100)
SODIUM BLDA-SCNC: 136 MMOL/L (ref 136–145)
SODIUM BLDA-SCNC: 137 MMOL/L (ref 136–145)
SODIUM SERPL-SCNC: 136 MMOL/L (ref 136–145)
SODIUM SERPL-SCNC: 137 MMOL/L (ref 136–145)
UNIT ABO: NORMAL
UNIT NUMBER: NORMAL
UNIT RH: NORMAL
UNIT VOLUME: 169
UNIT VOLUME: 224
UNIT VOLUME: 277
UNIT VOLUME: 279
UNIT VOLUME: 293
WBC # BLD AUTO: 11.8 X10*3/UL (ref 4.4–11.3)
WBC # BLD AUTO: 7.8 X10*3/UL (ref 4.4–11.3)
WBC # BLD AUTO: 8.1 X10*3/UL (ref 4.4–11.3)
WBC # BLD AUTO: 8.2 X10*3/UL (ref 4.4–11.3)

## 2024-05-17 PROCEDURE — 84132 ASSAY OF SERUM POTASSIUM: CPT | Mod: 91 | Performed by: STUDENT IN AN ORGANIZED HEALTH CARE EDUCATION/TRAINING PROGRAM

## 2024-05-17 PROCEDURE — 2500000004 HC RX 250 GENERAL PHARMACY W/ HCPCS (ALT 636 FOR OP/ED): Performed by: STUDENT IN AN ORGANIZED HEALTH CARE EDUCATION/TRAINING PROGRAM

## 2024-05-17 PROCEDURE — 85027 COMPLETE CBC AUTOMATED: CPT | Performed by: ANESTHESIOLOGIST ASSISTANT

## 2024-05-17 PROCEDURE — 7100000002 HC RECOVERY ROOM TIME - EACH INCREMENTAL 1 MINUTE: Performed by: STUDENT IN AN ORGANIZED HEALTH CARE EDUCATION/TRAINING PROGRAM

## 2024-05-17 PROCEDURE — 00NX0ZZ RELEASE THORACIC SPINAL CORD, OPEN APPROACH: ICD-10-PCS | Performed by: STUDENT IN AN ORGANIZED HEALTH CARE EDUCATION/TRAINING PROGRAM

## 2024-05-17 PROCEDURE — 83735 ASSAY OF MAGNESIUM: CPT

## 2024-05-17 PROCEDURE — 95870 NDL EMG LMTD STD MUSC 1 XTR: CPT

## 2024-05-17 PROCEDURE — C1713 ANCHOR/SCREW BN/BN,TIS/BN: HCPCS | Performed by: STUDENT IN AN ORGANIZED HEALTH CARE EDUCATION/TRAINING PROGRAM

## 2024-05-17 PROCEDURE — 3600000017 HC OR TIME - EACH INCREMENTAL 1 MINUTE - PROCEDURE LEVEL SIX: Performed by: STUDENT IN AN ORGANIZED HEALTH CARE EDUCATION/TRAINING PROGRAM

## 2024-05-17 PROCEDURE — 85025 COMPLETE CBC W/AUTO DIFF WBC: CPT | Mod: 91

## 2024-05-17 PROCEDURE — 4A11X4G MONITORING OF PERIPHERAL NERVOUS ELECTRICAL ACTIVITY, INTRAOPERATIVE, EXTERNAL APPROACH: ICD-10-PCS | Performed by: STUDENT IN AN ORGANIZED HEALTH CARE EDUCATION/TRAINING PROGRAM

## 2024-05-17 PROCEDURE — 93286 PERI-PX EVAL PM/LDLS PM IP: CPT

## 2024-05-17 PROCEDURE — 36620 INSERTION CATHETER ARTERY: CPT | Performed by: ANESTHESIOLOGY

## 2024-05-17 PROCEDURE — 3600000018 HC OR TIME - INITIAL BASE CHARGE - PROCEDURE LEVEL SIX: Performed by: STUDENT IN AN ORGANIZED HEALTH CARE EDUCATION/TRAINING PROGRAM

## 2024-05-17 PROCEDURE — 2500000004 HC RX 250 GENERAL PHARMACY W/ HCPCS (ALT 636 FOR OP/ED): Performed by: ANESTHESIOLOGIST ASSISTANT

## 2024-05-17 PROCEDURE — 36430 TRANSFUSION BLD/BLD COMPNT: CPT

## 2024-05-17 PROCEDURE — A4217 STERILE WATER/SALINE, 500 ML: HCPCS | Performed by: STUDENT IN AN ORGANIZED HEALTH CARE EDUCATION/TRAINING PROGRAM

## 2024-05-17 PROCEDURE — 3700000001 HC GENERAL ANESTHESIA TIME - INITIAL BASE CHARGE: Performed by: STUDENT IN AN ORGANIZED HEALTH CARE EDUCATION/TRAINING PROGRAM

## 2024-05-17 PROCEDURE — 2500000005 HC RX 250 GENERAL PHARMACY W/O HCPCS: Performed by: STUDENT IN AN ORGANIZED HEALTH CARE EDUCATION/TRAINING PROGRAM

## 2024-05-17 PROCEDURE — 22610 ARTHRD PST TQ 1NTRSPC THRC: CPT | Performed by: STUDENT IN AN ORGANIZED HEALTH CARE EDUCATION/TRAINING PROGRAM

## 2024-05-17 PROCEDURE — 0761T DGTZ GLS MCRSCP SL IMM EA 1: CPT | Mod: TC,SUR | Performed by: STUDENT IN AN ORGANIZED HEALTH CARE EDUCATION/TRAINING PROGRAM

## 2024-05-17 PROCEDURE — P9073 PLATELETS PHERESIS PATH REDU: HCPCS

## 2024-05-17 PROCEDURE — 88342 IMHCHEM/IMCYTCHM 1ST ANTB: CPT | Performed by: PATHOLOGY

## 2024-05-17 PROCEDURE — A22610 PR ARTHRODESIS POSTERIOR/POSTEROLATERAL THORACIC: Performed by: ANESTHESIOLOGY

## 2024-05-17 PROCEDURE — 3700000002 HC GENERAL ANESTHESIA TIME - EACH INCREMENTAL 1 MINUTE: Performed by: STUDENT IN AN ORGANIZED HEALTH CARE EDUCATION/TRAINING PROGRAM

## 2024-05-17 PROCEDURE — 0RG7071 FUSION OF 2 TO 7 THORACIC VERTEBRAL JOINTS WITH AUTOLOGOUS TISSUE SUBSTITUTE, POSTERIOR APPROACH, POSTERIOR COLUMN, OPEN APPROACH: ICD-10-PCS | Performed by: STUDENT IN AN ORGANIZED HEALTH CARE EDUCATION/TRAINING PROGRAM

## 2024-05-17 PROCEDURE — P9037 PLATE PHERES LEUKOREDU IRRAD: HCPCS

## 2024-05-17 PROCEDURE — 84520 ASSAY OF UREA NITROGEN: CPT | Mod: 91

## 2024-05-17 PROCEDURE — 2500000005 HC RX 250 GENERAL PHARMACY W/O HCPCS: Performed by: ANESTHESIOLOGIST ASSISTANT

## 2024-05-17 PROCEDURE — 85384 FIBRINOGEN ACTIVITY: CPT | Performed by: STUDENT IN AN ORGANIZED HEALTH CARE EDUCATION/TRAINING PROGRAM

## 2024-05-17 PROCEDURE — 99232 SBSQ HOSP IP/OBS MODERATE 35: CPT

## 2024-05-17 PROCEDURE — A22610 PR ARTHRODESIS POSTERIOR/POSTEROLATERAL THORACIC: Performed by: ANESTHESIOLOGIST ASSISTANT

## 2024-05-17 PROCEDURE — 2500000004 HC RX 250 GENERAL PHARMACY W/ HCPCS (ALT 636 FOR OP/ED)

## 2024-05-17 PROCEDURE — 1170000001 HC PRIVATE ONCOLOGY ROOM DAILY

## 2024-05-17 PROCEDURE — 2720000007 HC OR 272 NO HCPCS: Performed by: STUDENT IN AN ORGANIZED HEALTH CARE EDUCATION/TRAINING PROGRAM

## 2024-05-17 PROCEDURE — 88360 TUMOR IMMUNOHISTOCHEM/MANUAL: CPT | Performed by: PATHOLOGY

## 2024-05-17 PROCEDURE — 7100000001 HC RECOVERY ROOM TIME - INITIAL BASE CHARGE: Performed by: STUDENT IN AN ORGANIZED HEALTH CARE EDUCATION/TRAINING PROGRAM

## 2024-05-17 PROCEDURE — 93286 PERI-PX EVAL PM/LDLS PM IP: CPT | Performed by: INTERNAL MEDICINE

## 2024-05-17 PROCEDURE — 85025 COMPLETE CBC W/AUTO DIFF WBC: CPT

## 2024-05-17 PROCEDURE — 00BX0ZZ EXCISION OF THORACIC SPINAL CORD, OPEN APPROACH: ICD-10-PCS | Performed by: STUDENT IN AN ORGANIZED HEALTH CARE EDUCATION/TRAINING PROGRAM

## 2024-05-17 PROCEDURE — 63276 BX/EXC XDRL SPINE LESN THRC: CPT | Performed by: STUDENT IN AN ORGANIZED HEALTH CARE EDUCATION/TRAINING PROGRAM

## 2024-05-17 PROCEDURE — 61783 SCAN PROC SPINAL: CPT | Performed by: STUDENT IN AN ORGANIZED HEALTH CARE EDUCATION/TRAINING PROGRAM

## 2024-05-17 PROCEDURE — 63055 DECOMPRESS SPINAL CORD THRC: CPT | Performed by: STUDENT IN AN ORGANIZED HEALTH CARE EDUCATION/TRAINING PROGRAM

## 2024-05-17 PROCEDURE — 37799 UNLISTED PX VASCULAR SURGERY: CPT | Performed by: ANESTHESIOLOGIST ASSISTANT

## 2024-05-17 PROCEDURE — 2500000001 HC RX 250 WO HCPCS SELF ADMINISTERED DRUGS (ALT 637 FOR MEDICARE OP)

## 2024-05-17 PROCEDURE — 85610 PROTHROMBIN TIME: CPT | Performed by: STUDENT IN AN ORGANIZED HEALTH CARE EDUCATION/TRAINING PROGRAM

## 2024-05-17 PROCEDURE — 88341 IMHCHEM/IMCYTCHM EA ADD ANTB: CPT | Performed by: PATHOLOGY

## 2024-05-17 PROCEDURE — 88311 DECALCIFY TISSUE: CPT | Performed by: PATHOLOGY

## 2024-05-17 PROCEDURE — 88307 TISSUE EXAM BY PATHOLOGIST: CPT | Performed by: PATHOLOGY

## 2024-05-17 PROCEDURE — 85384 FIBRINOGEN ACTIVITY: CPT | Performed by: ANESTHESIOLOGIST ASSISTANT

## 2024-05-17 PROCEDURE — 37799 UNLISTED PX VASCULAR SURGERY: CPT | Performed by: STUDENT IN AN ORGANIZED HEALTH CARE EDUCATION/TRAINING PROGRAM

## 2024-05-17 PROCEDURE — 22842 INSERT SPINE FIXATION DEVICE: CPT | Performed by: STUDENT IN AN ORGANIZED HEALTH CARE EDUCATION/TRAINING PROGRAM

## 2024-05-17 PROCEDURE — 2780000003 HC OR 278 NO HCPCS: Performed by: STUDENT IN AN ORGANIZED HEALTH CARE EDUCATION/TRAINING PROGRAM

## 2024-05-17 PROCEDURE — 2500000004 HC RX 250 GENERAL PHARMACY W/ HCPCS (ALT 636 FOR OP/ED): Performed by: ANESTHESIOLOGY

## 2024-05-17 PROCEDURE — 22614 ARTHRD PST TQ 1NTRSPC EA ADD: CPT | Performed by: STUDENT IN AN ORGANIZED HEALTH CARE EDUCATION/TRAINING PROGRAM

## 2024-05-17 PROCEDURE — P9045 ALBUMIN (HUMAN), 5%, 250 ML: HCPCS | Mod: JZ | Performed by: ANESTHESIOLOGIST ASSISTANT

## 2024-05-17 PROCEDURE — 84132 ASSAY OF SERUM POTASSIUM: CPT | Mod: 91 | Performed by: ANESTHESIOLOGIST ASSISTANT

## 2024-05-17 DEVICE — ROD, RELINE-0, 5.5 X 300MM, STRAIGHT: Type: IMPLANTABLE DEVICE | Site: THORACIC | Status: FUNCTIONAL

## 2024-05-17 DEVICE — SCREW, RELINE LOCK, 5.5MM OPEN TULIP: Type: IMPLANTABLE DEVICE | Site: THORACIC | Status: FUNCTIONAL

## 2024-05-17 DEVICE — ATTRAX® SCAFFOLD STRIPS, LARGE
Type: IMPLANTABLE DEVICE | Site: THORACIC | Status: FUNCTIONAL
Brand: ATTRAX

## 2024-05-17 DEVICE — SCREW, RELINE-O, 6.5X45MM 2S POLYAXIAL: Type: IMPLANTABLE DEVICE | Site: THORACIC | Status: FUNCTIONAL

## 2024-05-17 DEVICE — SCREW, RELINE-O, 5.5X40MM 2S POLYAXIAL: Type: IMPLANTABLE DEVICE | Site: THORACIC | Status: FUNCTIONAL

## 2024-05-17 RX ORDER — ALBUMIN HUMAN 50 G/1000ML
SOLUTION INTRAVENOUS AS NEEDED
Status: DISCONTINUED | OUTPATIENT
Start: 2024-05-17 | End: 2024-05-17

## 2024-05-17 RX ORDER — TRANEXAMIC ACID 100 MG/ML
INJECTION, SOLUTION INTRAVENOUS AS NEEDED
Status: DISCONTINUED | OUTPATIENT
Start: 2024-05-17 | End: 2024-05-17

## 2024-05-17 RX ORDER — OXYCODONE HYDROCHLORIDE 5 MG/1
5 TABLET ORAL EVERY 4 HOURS PRN
Status: DISCONTINUED | OUTPATIENT
Start: 2024-05-17 | End: 2024-05-17 | Stop reason: HOSPADM

## 2024-05-17 RX ORDER — HYDROMORPHONE HYDROCHLORIDE 1 MG/ML
0.2 INJECTION, SOLUTION INTRAMUSCULAR; INTRAVENOUS; SUBCUTANEOUS EVERY 5 MIN PRN
Status: DISCONTINUED | OUTPATIENT
Start: 2024-05-17 | End: 2024-05-17 | Stop reason: HOSPADM

## 2024-05-17 RX ORDER — ONDANSETRON 4 MG/1
4 TABLET, FILM COATED ORAL EVERY 8 HOURS PRN
Status: DISCONTINUED | OUTPATIENT
Start: 2024-05-17 | End: 2024-05-21 | Stop reason: SDUPTHER

## 2024-05-17 RX ORDER — PREDNISONE 20 MG/1
80 TABLET ORAL DAILY
Status: COMPLETED | OUTPATIENT
Start: 2024-05-18 | End: 2024-05-24

## 2024-05-17 RX ORDER — SUCCINYLCHOLINE CHLORIDE 20 MG/ML
INJECTION INTRAMUSCULAR; INTRAVENOUS AS NEEDED
Status: DISCONTINUED | OUTPATIENT
Start: 2024-05-17 | End: 2024-05-17

## 2024-05-17 RX ORDER — PREDNISONE 20 MG/1
80 TABLET ORAL ONCE
Status: COMPLETED | OUTPATIENT
Start: 2024-05-17 | End: 2024-05-17

## 2024-05-17 RX ORDER — ONDANSETRON HYDROCHLORIDE 2 MG/ML
4 INJECTION, SOLUTION INTRAVENOUS EVERY 8 HOURS PRN
Status: DISCONTINUED | OUTPATIENT
Start: 2024-05-17 | End: 2024-05-21 | Stop reason: SDUPTHER

## 2024-05-17 RX ORDER — MIDAZOLAM HYDROCHLORIDE 1 MG/ML
INJECTION INTRAMUSCULAR; INTRAVENOUS AS NEEDED
Status: DISCONTINUED | OUTPATIENT
Start: 2024-05-17 | End: 2024-05-17

## 2024-05-17 RX ORDER — NALOXONE HYDROCHLORIDE 0.4 MG/ML
0.2 INJECTION, SOLUTION INTRAMUSCULAR; INTRAVENOUS; SUBCUTANEOUS AS NEEDED
Status: CANCELLED | OUTPATIENT
Start: 2024-05-17

## 2024-05-17 RX ORDER — HYDROMORPHONE HCL/0.9% NACL/PF 15 MG/30ML
PATIENT CONTROLLED ANALGESIA SYRINGE INTRAVENOUS CONTINUOUS
Status: DISCONTINUED | OUTPATIENT
Start: 2024-05-17 | End: 2024-05-20

## 2024-05-17 RX ORDER — PREDNISONE 20 MG/1
20 TABLET ORAL DAILY
Status: DISCONTINUED | OUTPATIENT
Start: 2024-06-01 | End: 2024-05-24 | Stop reason: HOSPADM

## 2024-05-17 RX ORDER — ONDANSETRON HYDROCHLORIDE 2 MG/ML
4 INJECTION, SOLUTION INTRAVENOUS ONCE AS NEEDED
Status: DISCONTINUED | OUTPATIENT
Start: 2024-05-17 | End: 2024-05-17 | Stop reason: HOSPADM

## 2024-05-17 RX ORDER — FENTANYL CITRATE 50 UG/ML
INJECTION, SOLUTION INTRAMUSCULAR; INTRAVENOUS AS NEEDED
Status: DISCONTINUED | OUTPATIENT
Start: 2024-05-17 | End: 2024-05-17

## 2024-05-17 RX ORDER — PROPOFOL 10 MG/ML
INJECTION, EMULSION INTRAVENOUS CONTINUOUS PRN
Status: DISCONTINUED | OUTPATIENT
Start: 2024-05-17 | End: 2024-05-17

## 2024-05-17 RX ORDER — OXYCODONE HYDROCHLORIDE 5 MG/1
10 TABLET ORAL EVERY 4 HOURS PRN
Status: DISCONTINUED | OUTPATIENT
Start: 2024-05-17 | End: 2024-05-17 | Stop reason: HOSPADM

## 2024-05-17 RX ORDER — REMIFENTANIL HYDROCHLORIDE 1 MG/ML
INJECTION, POWDER, LYOPHILIZED, FOR SOLUTION INTRAVENOUS CONTINUOUS PRN
Status: DISCONTINUED | OUTPATIENT
Start: 2024-05-17 | End: 2024-05-17

## 2024-05-17 RX ORDER — SODIUM CHLORIDE, SODIUM LACTATE, POTASSIUM CHLORIDE, CALCIUM CHLORIDE 600; 310; 30; 20 MG/100ML; MG/100ML; MG/100ML; MG/100ML
100 INJECTION, SOLUTION INTRAVENOUS CONTINUOUS
Status: CANCELLED | OUTPATIENT
Start: 2024-05-17

## 2024-05-17 RX ORDER — HYDROMORPHONE HCL/0.9% NACL/PF 15 MG/30ML
PATIENT CONTROLLED ANALGESIA SYRINGE INTRAVENOUS CONTINUOUS
Status: CANCELLED | OUTPATIENT
Start: 2024-05-17

## 2024-05-17 RX ORDER — HYDROMORPHONE HYDROCHLORIDE 1 MG/ML
INJECTION, SOLUTION INTRAMUSCULAR; INTRAVENOUS; SUBCUTANEOUS AS NEEDED
Status: DISCONTINUED | OUTPATIENT
Start: 2024-05-17 | End: 2024-05-17

## 2024-05-17 RX ORDER — CEFAZOLIN 1 G/1
INJECTION, POWDER, FOR SOLUTION INTRAVENOUS AS NEEDED
Status: DISCONTINUED | OUTPATIENT
Start: 2024-05-17 | End: 2024-05-17

## 2024-05-17 RX ORDER — PREDNISONE 20 MG/1
40 TABLET ORAL DAILY
Status: DISCONTINUED | OUTPATIENT
Start: 2024-05-25 | End: 2024-05-24 | Stop reason: HOSPADM

## 2024-05-17 RX ORDER — ACETAMINOPHEN 325 MG/1
650 TABLET ORAL EVERY 6 HOURS
Status: DISCONTINUED | OUTPATIENT
Start: 2024-05-17 | End: 2024-05-24 | Stop reason: HOSPADM

## 2024-05-17 RX ORDER — LIDOCAINE HYDROCHLORIDE 20 MG/ML
INJECTION, SOLUTION INFILTRATION; PERINEURAL AS NEEDED
Status: DISCONTINUED | OUTPATIENT
Start: 2024-05-17 | End: 2024-05-17

## 2024-05-17 RX ORDER — HYDROMORPHONE HYDROCHLORIDE 1 MG/ML
0.5 INJECTION, SOLUTION INTRAMUSCULAR; INTRAVENOUS; SUBCUTANEOUS EVERY 5 MIN PRN
Status: DISCONTINUED | OUTPATIENT
Start: 2024-05-17 | End: 2024-05-17 | Stop reason: HOSPADM

## 2024-05-17 RX ORDER — LIDOCAINE HYDROCHLORIDE 10 MG/ML
0.1 INJECTION INFILTRATION; PERINEURAL ONCE
Status: DISCONTINUED | OUTPATIENT
Start: 2024-05-17 | End: 2024-05-17 | Stop reason: HOSPADM

## 2024-05-17 RX ORDER — NALOXONE HYDROCHLORIDE 0.4 MG/ML
0.2 INJECTION, SOLUTION INTRAMUSCULAR; INTRAVENOUS; SUBCUTANEOUS AS NEEDED
Status: DISCONTINUED | OUTPATIENT
Start: 2024-05-17 | End: 2024-05-20

## 2024-05-17 RX ORDER — PROPOFOL 10 MG/ML
INJECTION, EMULSION INTRAVENOUS AS NEEDED
Status: DISCONTINUED | OUTPATIENT
Start: 2024-05-17 | End: 2024-05-17

## 2024-05-17 RX ORDER — ETOMIDATE 2 MG/ML
INJECTION INTRAVENOUS AS NEEDED
Status: DISCONTINUED | OUTPATIENT
Start: 2024-05-17 | End: 2024-05-17

## 2024-05-17 RX ORDER — LIDOCAINE HYDROCHLORIDE AND EPINEPHRINE 10; 10 MG/ML; UG/ML
INJECTION, SOLUTION INFILTRATION; PERINEURAL AS NEEDED
Status: DISCONTINUED | OUTPATIENT
Start: 2024-05-17 | End: 2024-05-17 | Stop reason: HOSPADM

## 2024-05-17 RX ORDER — VANCOMYCIN HYDROCHLORIDE 1 G/20ML
INJECTION, POWDER, LYOPHILIZED, FOR SOLUTION INTRAVENOUS AS NEEDED
Status: DISCONTINUED | OUTPATIENT
Start: 2024-05-17 | End: 2024-05-17

## 2024-05-17 RX ORDER — SODIUM CHLORIDE, SODIUM LACTATE, POTASSIUM CHLORIDE, CALCIUM CHLORIDE 600; 310; 30; 20 MG/100ML; MG/100ML; MG/100ML; MG/100ML
100 INJECTION, SOLUTION INTRAVENOUS CONTINUOUS
Status: DISCONTINUED | OUTPATIENT
Start: 2024-05-17 | End: 2024-05-17 | Stop reason: HOSPADM

## 2024-05-17 RX ORDER — PREDNISONE 20 MG/1
80 TABLET ORAL DAILY
Status: CANCELLED | OUTPATIENT
Start: 2024-05-18

## 2024-05-17 RX ORDER — SODIUM CHLORIDE 0.9 G/100ML
IRRIGANT IRRIGATION AS NEEDED
Status: DISCONTINUED | OUTPATIENT
Start: 2024-05-17 | End: 2024-05-17 | Stop reason: HOSPADM

## 2024-05-17 RX ORDER — DEXMEDETOMIDINE HYDROCHLORIDE 4 UG/ML
INJECTION, SOLUTION INTRAVENOUS CONTINUOUS PRN
Status: DISCONTINUED | OUTPATIENT
Start: 2024-05-17 | End: 2024-05-17

## 2024-05-17 RX ORDER — CALCIUM CHLORIDE INJECTION 100 MG/ML
INJECTION, SOLUTION INTRAVENOUS AS NEEDED
Status: DISCONTINUED | OUTPATIENT
Start: 2024-05-17 | End: 2024-05-17

## 2024-05-17 RX ORDER — PREDNISONE 10 MG/1
10 TABLET ORAL DAILY
Status: DISCONTINUED | OUTPATIENT
Start: 2024-06-08 | End: 2024-05-24 | Stop reason: HOSPADM

## 2024-05-17 RX ORDER — PHENYLEPHRINE 10 MG/250 ML(40 MCG/ML)IN 0.9 % SOD.CHLORIDE INTRAVENOUS
CONTINUOUS PRN
Status: DISCONTINUED | OUTPATIENT
Start: 2024-05-17 | End: 2024-05-17

## 2024-05-17 RX ORDER — ENOXAPARIN SODIUM 100 MG/ML
40 INJECTION SUBCUTANEOUS DAILY
Status: DISCONTINUED | OUTPATIENT
Start: 2024-05-18 | End: 2024-05-24

## 2024-05-17 RX ORDER — CYCLOBENZAPRINE HCL 10 MG
10 TABLET ORAL 3 TIMES DAILY
Status: DISCONTINUED | OUTPATIENT
Start: 2024-05-17 | End: 2024-05-24 | Stop reason: HOSPADM

## 2024-05-17 RX ADMIN — SODIUM CHLORIDE, POTASSIUM CHLORIDE, SODIUM LACTATE AND CALCIUM CHLORIDE 100 ML/HR: 600; 310; 30; 20 INJECTION, SOLUTION INTRAVENOUS at 15:01

## 2024-05-17 RX ADMIN — SODIUM CHLORIDE, POTASSIUM CHLORIDE, SODIUM LACTATE AND CALCIUM CHLORIDE 75 ML/HR: 600; 310; 30; 20 INJECTION, SOLUTION INTRAVENOUS at 00:08

## 2024-05-17 RX ADMIN — TRANEXAMIC ACID 1000 MG: 100 INJECTION INTRAVENOUS at 09:32

## 2024-05-17 RX ADMIN — CALCIUM CHLORIDE 0.3 G: 100 INJECTION, SOLUTION INTRAVENOUS at 10:42

## 2024-05-17 RX ADMIN — Medication 0.2 MCG/KG/MIN: at 08:42

## 2024-05-17 RX ADMIN — ALBUMIN (HUMAN) 250 ML: 12.5 SOLUTION INTRAVENOUS at 10:35

## 2024-05-17 RX ADMIN — VANCOMYCIN HYDROCHLORIDE 1 G: 1 INJECTION, POWDER, LYOPHILIZED, FOR SOLUTION INTRAVENOUS at 09:00

## 2024-05-17 RX ADMIN — CALCIUM CHLORIDE 0.4 G: 100 INJECTION, SOLUTION INTRAVENOUS at 11:15

## 2024-05-17 RX ADMIN — NYSTATIN 500000 UNITS: 100000 SUSPENSION ORAL at 22:04

## 2024-05-17 RX ADMIN — Medication: at 15:04

## 2024-05-17 RX ADMIN — SUCCINYLCHOLINE CHLORIDE 120 MG: 20 INJECTION, SOLUTION INTRAMUSCULAR; INTRAVENOUS at 08:30

## 2024-05-17 RX ADMIN — DEXAMETHASONE SODIUM PHOSPHATE 10 MG: 4 INJECTION INTRA-ARTICULAR; INTRALESIONAL; INTRAMUSCULAR; INTRAVENOUS; SOFT TISSUE at 09:00

## 2024-05-17 RX ADMIN — PROPOFOL 100 MCG/KG/MIN: 10 INJECTION, EMULSION INTRAVENOUS at 08:35

## 2024-05-17 RX ADMIN — CALCIUM CHLORIDE 0.3 G: 100 INJECTION, SOLUTION INTRAVENOUS at 11:06

## 2024-05-17 RX ADMIN — SODIUM CHLORIDE, POTASSIUM CHLORIDE, SODIUM LACTATE AND CALCIUM CHLORIDE: 600; 310; 30; 20 INJECTION, SOLUTION INTRAVENOUS at 08:13

## 2024-05-17 RX ADMIN — METOCLOPRAMIDE 10 MG: 10 TABLET ORAL at 04:22

## 2024-05-17 RX ADMIN — ACETAMINOPHEN 650 MG: 325 TABLET ORAL at 17:28

## 2024-05-17 RX ADMIN — HYDROMORPHONE HYDROCHLORIDE 6 MG: 4 TABLET ORAL at 04:09

## 2024-05-17 RX ADMIN — PANTOPRAZOLE SODIUM 40 MG: 40 TABLET, DELAYED RELEASE ORAL at 05:25

## 2024-05-17 RX ADMIN — FENTANYL CITRATE 50 MCG: 50 INJECTION, SOLUTION INTRAMUSCULAR; INTRAVENOUS at 08:30

## 2024-05-17 RX ADMIN — DEXAMETHASONE 6 MG: 2 TABLET ORAL at 05:25

## 2024-05-17 RX ADMIN — FENTANYL CITRATE 25 MCG: 50 INJECTION, SOLUTION INTRAMUSCULAR; INTRAVENOUS at 08:39

## 2024-05-17 RX ADMIN — LIDOCAINE HYDROCHLORIDE 70 MG: 20 INJECTION, SOLUTION INFILTRATION; PERINEURAL at 08:30

## 2024-05-17 RX ADMIN — PREDNISONE 80 MG: 20 TABLET ORAL at 17:26

## 2024-05-17 RX ADMIN — HYDROMORPHONE HYDROCHLORIDE 0.5 MG: 1 INJECTION, SOLUTION INTRAMUSCULAR; INTRAVENOUS; SUBCUTANEOUS at 13:09

## 2024-05-17 RX ADMIN — HYDROMORPHONE HYDROCHLORIDE 0.5 MG: 1 INJECTION, SOLUTION INTRAMUSCULAR; INTRAVENOUS; SUBCUTANEOUS at 14:36

## 2024-05-17 RX ADMIN — MIDAZOLAM HYDROCHLORIDE 2 MG: 1 INJECTION, SOLUTION INTRAMUSCULAR; INTRAVENOUS at 08:14

## 2024-05-17 RX ADMIN — HYDROMORPHONE HYDROCHLORIDE 0.5 MG: 1 INJECTION, SOLUTION INTRAMUSCULAR; INTRAVENOUS; SUBCUTANEOUS at 11:28

## 2024-05-17 RX ADMIN — HYDROMORPHONE HYDROCHLORIDE 0.2 MG: 1 INJECTION, SOLUTION INTRAMUSCULAR; INTRAVENOUS; SUBCUTANEOUS at 11:40

## 2024-05-17 RX ADMIN — CEFAZOLIN 2 G: 1 INJECTION, POWDER, FOR SOLUTION INTRAMUSCULAR; INTRAVENOUS at 09:00

## 2024-05-17 RX ADMIN — HYDROMORPHONE HYDROCHLORIDE 1 MG: 1 INJECTION, SOLUTION INTRAMUSCULAR; INTRAVENOUS; SUBCUTANEOUS at 00:06

## 2024-05-17 RX ADMIN — ACETAMINOPHEN 650 MG: 325 TABLET ORAL at 22:04

## 2024-05-17 RX ADMIN — REMIFENTANIL HYDROCHLORIDE 0.12 MCG/KG/MIN: 1 INJECTION, POWDER, LYOPHILIZED, FOR SOLUTION INTRAVENOUS at 08:35

## 2024-05-17 RX ADMIN — CYCLOBENZAPRINE 10 MG: 10 TABLET, FILM COATED ORAL at 22:04

## 2024-05-17 RX ADMIN — SODIUM CHLORIDE 3 MG/KG/HR: 9 INJECTION, SOLUTION INTRAVENOUS at 09:32

## 2024-05-17 RX ADMIN — PROPOFOL 30 MG: 10 INJECTION, EMULSION INTRAVENOUS at 11:30

## 2024-05-17 RX ADMIN — ETOMIDATE 14 MG: 2 INJECTION, SOLUTION INTRAVENOUS at 08:30

## 2024-05-17 RX ADMIN — DEXMEDETOMIDINE HYDROCHLORIDE 0.3 MCG/KG/HR: 4 INJECTION, SOLUTION INTRAVENOUS at 09:45

## 2024-05-17 RX ADMIN — FENTANYL CITRATE 25 MCG: 50 INJECTION, SOLUTION INTRAMUSCULAR; INTRAVENOUS at 12:26

## 2024-05-17 RX ADMIN — HYDROMORPHONE HYDROCHLORIDE 0.5 MG: 1 INJECTION, SOLUTION INTRAMUSCULAR; INTRAVENOUS; SUBCUTANEOUS at 13:23

## 2024-05-17 RX ADMIN — HYDROMORPHONE HYDROCHLORIDE 0.5 MG: 1 INJECTION, SOLUTION INTRAMUSCULAR; INTRAVENOUS; SUBCUTANEOUS at 13:57

## 2024-05-17 RX ADMIN — HYDROMORPHONE HYDROCHLORIDE 0.3 MG: 1 INJECTION, SOLUTION INTRAMUSCULAR; INTRAVENOUS; SUBCUTANEOUS at 12:26

## 2024-05-17 SDOH — HEALTH STABILITY: MENTAL HEALTH: CURRENT SMOKER: 0

## 2024-05-17 ASSESSMENT — PAIN SCALES - GENERAL
PAINLEVEL_OUTOF10: 10 - WORST POSSIBLE PAIN
PAINLEVEL_OUTOF10: 6
PAINLEVEL_OUTOF10: 7
PAINLEVEL_OUTOF10: 9
PAINLEVEL_OUTOF10: 5 - MODERATE PAIN
PAINLEVEL_OUTOF10: 8
PAINLEVEL_OUTOF10: 0 - NO PAIN
PAIN_LEVEL: 2
PAINLEVEL_OUTOF10: 1
PAINLEVEL_OUTOF10: 6
PAINLEVEL_OUTOF10: 5 - MODERATE PAIN
PAINLEVEL_OUTOF10: 8
PAINLEVEL_OUTOF10: 4
PAINLEVEL_OUTOF10: 8

## 2024-05-17 ASSESSMENT — COGNITIVE AND FUNCTIONAL STATUS - GENERAL
CLIMB 3 TO 5 STEPS WITH RAILING: A LOT
MOBILITY SCORE: 24
STANDING UP FROM CHAIR USING ARMS: A LOT
DRESSING REGULAR LOWER BODY CLOTHING: A LOT
DRESSING REGULAR UPPER BODY CLOTHING: A LOT
PERSONAL GROOMING: A LOT
TURNING FROM BACK TO SIDE WHILE IN FLAT BAD: A LITTLE
WALKING IN HOSPITAL ROOM: A LOT
MOVING TO AND FROM BED TO CHAIR: A LOT
DAILY ACTIVITIY SCORE: 14
MOVING FROM LYING ON BACK TO SITTING ON SIDE OF FLAT BED WITH BEDRAILS: A LITTLE
MOBILITY SCORE: 14
DAILY ACTIVITIY SCORE: 24
TOILETING: A LOT
HELP NEEDED FOR BATHING: A LOT

## 2024-05-17 ASSESSMENT — PAIN - FUNCTIONAL ASSESSMENT
PAIN_FUNCTIONAL_ASSESSMENT: VAS (VISUAL ANALOG SCALE)
PAIN_FUNCTIONAL_ASSESSMENT: 0-10
PAIN_FUNCTIONAL_ASSESSMENT: VAS (VISUAL ANALOG SCALE)
PAIN_FUNCTIONAL_ASSESSMENT: 0-10

## 2024-05-17 ASSESSMENT — PAIN DESCRIPTION - LOCATION: LOCATION: BACK

## 2024-05-17 ASSESSMENT — PAIN DESCRIPTION - ORIENTATION: ORIENTATION: MID

## 2024-05-17 NOTE — ANESTHESIA PROCEDURE NOTES
Peripheral IV  Date/Time: 5/17/2024 8:45 AM  Inserted by: DALIA Kramer    Placement  Needle size: 16 G  Laterality: left  Location: hand  Site prep: alcohol  Technique: anatomical landmarks  Attempts: 1

## 2024-05-17 NOTE — CARE PLAN
The patient's goals for the shift include pain control     The clinical goals for the shift include pt will report pain <7/10 by end of shift 5/17/24 @ 0700    Over the shift, the patient did not make progress toward the following goals. Barriers to progression include 6-7/10. Recommendations to address these barriers include neurosurgery today 5/17.

## 2024-05-17 NOTE — OP NOTE
T10 transpedicular approach, mass resection, T8-12 fusion Operative Note     Date: 5/10/2024 - 2024  OR Location: [facility] OR    Name: Massimo Garcia YOB: 1960, Age: 63 y.o., MRN: [id number], Sex: male    Diagnosis  Pre-op Diagnosis     * Primary malignant neoplasm of esophagus (Multi) [C15.9] Post-op Diagnosis     * Primary malignant neoplasm of esophagus (Multi) [C15.9]     Procedures  T10 transpedicular approach, mass resection, T8-12 fusion  [id number] - [place] [facility]      Surgeons      * Blue John - Primary    Resident/Fellow/Other Assistant:  Surgeons and Role:     * Reece Fisher MD - Resident - Assisting    Procedure Summary  Anesthesia: General  ASA: ASA status not filed in the log.  Anesthesia Staff: Anesthesiologist: Kassy Russell MD  C-AA: DALIA Kramer  Anesthesia Break User: DALIA Key  Estimated Blood Loss: 400mL  Intra-op Medications:   Administrations occurring from 0715 to 1215 on 24:   Medication Name Total Dose   thrombin solution 5,000 Units   gelatin absorbable (Gelfoam) 100 sponge 1 each   lidocaine-epinephrine (Xylocaine W/EPI) 1 %-1:100,000 injection 10 mL   sodium chloride 0.9 % irrigation solution 1,000 mL   cholecalciferol (Vitamin D-3) tablet 4,000 Units Cannot be calculated   lactated Ringer's infusion Cannot be calculated   lidocaine 4 % patch 1 patch Cannot be calculated   multivitamin with minerals 1 tablet Cannot be calculated   polyethylene glycol (Glycolax, Miralax) packet 17 g Cannot be calculated              Anesthesia Record               Intraprocedure I/O Totals          Intake    Transfuse Platelets :1 Hour 250.00 mL    Transfuse Platelets :30 Minutes 750.00 mL    Dexmedetomidine 0.00 mL    The total shown is the total volume documented since Anesthesia Start was filed.    Remifentanil Drip 0.00 mL    The total shown is the total volume documented since Anesthesia Start was filed.    Tranexamic Acid  0.00 mL    The total shown is the total volume documented since Anesthesia Start was filed.    Propofol Drip 0.00 mL    The total shown is the total volume documented since Anesthesia Start was filed.    Phenylephrine Drip 0.00 mL    The total shown is the total volume documented since Anesthesia Start was filed.    Total Intake 1000 mL       Output    Urine 315 mL    Est. Blood Loss 500 mL    Total Output 815 mL       Net    Net Volume 185 mL          Specimen:   ID Type Source Tests Collected by Time   1 : THORACIC TUMOR Tissue SPINE SURGICAL PATHOLOGY EXAM Blue John MD 5/17/2024 1111        Staff:   Circulator: Derek Ryder RN  Relief Circulator: Isidra Quintanilla RN  Scrub Person: Brandt Foss RN; Zandra Ayon         Drains and/or Catheters:   Urethral Catheter Non-latex 16 Fr. (Active)       Implants:  Implants       Type Name Action Serial No.      Implant SCAFFOLD, STRIP, ATTRAX, 100 X 25 X 6MM, 30CC - [id number] Implanted      Screw SCREW, RELINE-O, 5.5X40MM 2S POLYAXIAL - [id number] Implanted      Screw SCREW, RELINE-O, 6.5X45MM 2S POLYAXIAL - [id number] Implanted      Neuro Interventional Implant SCREW, RELINE LOCK, 5.5MM OPEN TULIP - [id number] Implanted      Screw DANYELLE, RELINE-0, 5.5 X 300MM, STRAIGHT - [id number] Implanted                   Informed Consent:  The risks, benefits, complications, and alternatives were discussed with the patient. I have explained the surgical procedure in detail with expected duration and extent of recovery along risks of surgery that include, but is not limited to bleeding, infection, blood vessel injury or damage, loss of sensation, loss of bladder, bowel or sexual function, nerve injury/damage resulting in weakness/paralysis, malunion, nonunion, CSF leak, brachial plexus injury, peripheral vision blindness, failure of implants/fusion, failure to relieve symptoms, recurrent disease, adjacent segment disease, need to reoperate for any reason and general  anesthesia reaction such as stroke, coma, heart attack, delirium, confusion, death as well as worsening of preexisted medical conditions.     I clearly emphasized that while the goal of surgery is to decompress the spinal cord so as to ARREST the progression of neurological deficits - preexisting deficits may or may not improve after surgery. We discussed that many patients do clinically improve in functional and neurological outcomes following decompression of the spinal elements in patients but the extent of which is variable and depends on the severity of pain, numbness, tingling, or weakness. With improvement seen of those symptoms in that order. We did discuss the goal of surgery to alleviate pain first and foremost and hope for recovery of all neurologic function with time.     All questions were answered and the patient was amenable to proceed.     INDICATIONS FOR THE PROCEDURE: Massimo Garcia is an 63 y.o. male who is having surgery for Primary malignant neoplasm of esophagus (Multi) [C15.9].  He had spinal cord compression with severe stenosis and a pathologic fracture. He was taken urgent for surgical decompression and stabilization.     DESCRIPTION OF THE OPERATION:   The patient was brought back from PACU to OR by anesthesia. After patient was appropriately checked in by nursing staff, anesthesia was induced and general endotracheal intubation was performed. After obtaining appropriate access by anesthesia, SSEPs and MEPs were hooked up to patient, and patient was flipped into prone position on Chandler table with hip pads. Patient was prepped and draped in usual sterile fashion. Incision was localized using C-arm from T8-T12. The appropriate levels were marked and 1% lidocaine with epinephrine was used to infiltrate skin under marked incision.    A combination of sharp and blunt electrocautery was used to expose spinous process, and lamina out to facet joint capsule from T7-L1 with care taken to not violate  the joints with the bovey. Once adequate exposure was obtained, a spinous process clamp with stealth star was placed and O-arm was brought in for an intra-operative CT. After O-arm spin was completed and good registration was confirmed, screws were placed in usual fashion, first using 5.5mm tap under navigated guidance to create a screw trajectory in the pedicle, followed by confirmation that there was no breach in the pedicle using feeler ball, and finally, placing pedicle screws from T8-T12 under navigated guidance. We did skip T10. AP and lateral xrays were utilized to confirm screw placement and trajectory.     Then we turned our attention to the decompression portion of the case. A laminectomy was carried out with rongeurs from T9-T11. A combination of curettes and kerrasin rongeurs were used to complete the laminectomy. The joints across T9-T10 and T10-T11 were removed and the T10 pedicles were removed using a combination of a high speed drill, curettes, and rongeurs. The curettes were utilized to feel underneath the thecal sac and tumor was removed circumferentially decompressing the thecal and spinal cord with the pituitary rongeur and sent as final pathology.    Rods were then cut to appropriate lengths, bent, and placed into the tulipheads at all levels. Set caps were used at all levels and final tightened to 's specifications. Final x-rays were obtained showing good hardware placement from T8-T12. Native bone was decorticated from T8-T12, and ceramic allograft was packed along the extent of the construct for fusion material.     Incision was copiously irrigated with Irricept, followed by antibiotic-impregnated saline. 2 10-round drains were placed in the subfascial space and tunneled out inferiorly to the incision. 0-vicryl suture was used to close the muscle over the laminectomy defect, followed by another layer of 0-vicryl suture was used to close the entirety of the linear fascial defect.  30mL of Marcaine was used for local anesthesia. 2-0 vicryls were used to approximate the skin edges staples were used to close skin. The drain was secured with a 2-0 silk suture. A prevena was affixed on top of the incision. SSEPs and MEPS were at baseline throughout the case without any significant changes. All counts were correct at end of case without any obvious complication. Patient was carefully flipped into supine position on patient cart, and turned over to anesthesia to extubate.    Disposition: PACU - hemodynamically stable.    Condition: stable    Attending Attestation: I was present and scrubbed for the key portions of the procedure.      Blue John MD, [facility]  Spine , [facility]  Tera Meza and Miguelina Meza Chair in Spinal Neurosurgery  Neurosurgery , [facility] and [facility]  Complex Spine Surgery Fellowship Director   of Neurological Surgery  [facility] [employer]  Office: [phone]  Fax: [phone]

## 2024-05-17 NOTE — ANESTHESIA PROCEDURE NOTES
Airway  Date/Time: 5/17/2024 8:32 AM  Urgency: elective    Airway not difficult    Staffing  Performed: DALIA   Authorized by: Kassy Russell MD    Performed by: DALIA Kramer  Patient location during procedure: OR    Indications and Patient Condition  Indications for airway management: anesthesia and airway protection  Spontaneous Ventilation: absent  Sedation level: deep  Preoxygenated: yes  Patient position: sniffing  Mask difficulty assessment: 0 - not attempted  Planned trial extubation    Final Airway Details  Final airway type: endotracheal airway      Successful airway: ETT  Cuffed: yes   Successful intubation technique: video laryngoscopy  Facilitating devices/methods: intubating stylet  Endotracheal tube insertion site: oral  Blade: Andrea  Blade size: #4  ETT size (mm): 7.5  Cormack-Lehane Classification: grade I - full view of glottis  Placement verified by: chest auscultation and capnometry   Measured from: lips  ETT to lips (cm): 23  Number of attempts at approach: 1  Ventilation between attempts: none  Number of other approaches attempted: 0

## 2024-05-17 NOTE — PROGRESS NOTES
Subjective   He went to his spine surgery today. Per neurosurgery, it went well with minimal complications. He had no issues overnight, and pain was well controlled.     Meds  Scheduled medications  [Transfer Hold] cholecalciferol, 4,000 Units, oral, Daily  [START ON 5/18/2024] enoxaparin, 40 mg, subcutaneous, Daily  [Transfer Hold] fentaNYL, 1 patch, transdermal, q72h  fibrinogen, 5,115 mg, intravenous, Once  lidocaine, 0.1 mL, subcutaneous, Once  [Transfer Hold] lidocaine, 1 patch, transdermal, Daily  [Transfer Hold] multivitamin with minerals, 1 tablet, oral, Daily  [Transfer Hold] nystatin, 5 mL, Swish & Swallow, 4x daily  [Transfer Hold] pantoprazole, 40 mg, oral, Daily before breakfast  [Transfer Hold] polyethylene glycol, 17 g, oral, Daily  [Transfer Hold] pregabalin, 50 mg, oral, Nightly      Continuous medications  HYDROmorphone,   lactated Ringer's, 75 mL/hr, Last Rate: 75 mL/hr (05/17/24 0600)  lactated Ringer's, 100 mL/hr, Last Rate: 100 mL/hr (05/17/24 1501)      PRN medications  PRN medications: [Transfer Hold] alteplase, [Transfer Hold] benzocaine-menthol, [Transfer Hold] calcium carbonate, [Held by provider] chlorproMAZINE, HYDROmorphone, HYDROmorphone, [Transfer Hold] HYDROmorphone, [Transfer Hold] HYDROmorphone, [Transfer Hold] HYDROmorphone, [Transfer Hold] LORazepam, [Transfer Hold] metoclopramide, naloxone, [Transfer Hold] ondansetron **OR** [Transfer Hold] ondansetron, ondansetron, ondansetron **OR** ondansetron, oxyCODONE, oxyCODONE, oxygen      Objective   Vital signs in last 24 hours:  Temp:  [36 °C (96.8 °F)-37.3 °C (99.1 °F)] 36.6 °C (97.9 °F)  Heart Rate:  [] 98  Resp:  [12-25] 15  BP: (118-155)/(58-92) 129/77  Arterial Line BP 1: (154-163)/(63-72) 154/63    Intake/Output this shift:    Intake/Output Summary (Last 24 hours) at 5/17/2024 1503  Last data filed at 5/17/2024 1420  Gross per 24 hour   Intake 2824.25 ml   Output 1360 ml   Net 1464.25 ml       Physical  General: Patient is  awake, non-toxic appearing, thin body habitus  HEENT: No erythema and exudates on oropharynx  Pulm: Normal WOB at rest and when sitting up, no crackles or rhonchi  Cardiac: Regular rate and rhythm, normal S1/S2  Abdomen: Non-tender to palpation, non-distended  Extremities: No peripheral edema, or cyanosis  Neuro: Patient alert and oriented, cranial nerves grossly intact, normal strength and sensation.    Results  Results for orders placed or performed during the hospital encounter of 05/10/24 (from the past 24 hour(s))   Type and screen   Result Value Ref Range    ABO TYPE O     Rh TYPE POS     ANTIBODY SCREEN NEG    ECG 12 lead   Result Value Ref Range    Ventricular Rate 103 BPM    Atrial Rate 103 BPM    DE Interval 108 ms    QRS Duration 166 ms    QT Interval 424 ms    QTC Calculation(Bazett) 555 ms    P Axis 34 degrees    R Axis -73 degrees    T Axis 66 degrees    QRS Count 17 beats    Q Onset 189 ms    P Onset 135 ms    P Offset 160 ms    T Offset 401 ms    QTC Fredericia 508 ms   Prepare Platelets: 2 Units   Result Value Ref Range    PRODUCT CODE M6863H55     Unit Number [id number]     Unit ABO O     Unit RH POS     Dispense Status TR     Blood Expiration Date May 16, 2024 23:59 EDT     PRODUCT BLOOD TYPE 5100     UNIT VOLUME 295     PRODUCT CODE Q2392W51     Unit Number [id number]     Unit ABO O     Unit RH NEG     Dispense Status TR     Blood Expiration Date May 16, 2024 23:59 EDT     PRODUCT BLOOD TYPE 9500     UNIT VOLUME 263    CBC and Auto Differential   Result Value Ref Range    WBC 11.8 (H) 4.4 - 11.3 x10*3/uL    nRBC 0.0 0.0 - 0.0 /100 WBCs    RBC 3.56 (L) 4.50 - 5.90 x10*6/uL    Hemoglobin 10.6 (L) 13.5 - 17.5 g/dL    Hematocrit 32.4 (L) 41.0 - 52.0 %    MCV 91 80 - 100 fL    MCH 29.8 26.0 - 34.0 pg    MCHC 32.7 32.0 - 36.0 g/dL    RDW 18.2 (H) 11.5 - 14.5 %    Platelets 79 (L) 150 - 450 x10*3/uL    Neutrophils % 87.7 40.0 - 80.0 %    Immature Granulocytes %, Automated 4.0 (H) 0.0 - 0.9 %     Lymphocytes % 2.0 13.0 - 44.0 %    Monocytes % 6.1 2.0 - 10.0 %    Eosinophils % 0.0 0.0 - 6.0 %    Basophils % 0.2 0.0 - 2.0 %    Neutrophils Absolute 10.36 (H) 1.20 - 7.70 x10*3/uL    Immature Granulocytes Absolute, Automated 0.47 0.00 - 0.70 x10*3/uL    Lymphocytes Absolute 0.24 (L) 1.20 - 4.80 x10*3/uL    Monocytes Absolute 0.72 0.10 - 1.00 x10*3/uL    Eosinophils Absolute 0.00 0.00 - 0.70 x10*3/uL    Basophils Absolute 0.02 0.00 - 0.10 x10*3/uL   Prepare Platelets: 1 Units   Result Value Ref Range    PRODUCT CODE A4202S45     Unit Number [id number]     Unit ABO A     Unit RH POS     Dispense Status TR     Blood Expiration Date May 17, 2024 23:59 EDT     PRODUCT BLOOD TYPE 6200     UNIT VOLUME 169    Prepare Platelets: 1 Units   Result Value Ref Range    PRODUCT CODE T4223Y59     Unit Number [id number]     Unit ABO O     Unit RH POS     Dispense Status TR     Blood Expiration Date May 18, 2024 23:59 EDT     PRODUCT BLOOD TYPE 5100     UNIT VOLUME 277    CBC and Auto Differential   Result Value Ref Range    WBC 8.2 4.4 - 11.3 x10*3/uL    nRBC 0.0 0.0 - 0.0 /100 WBCs    RBC 3.42 (L) 4.50 - 5.90 x10*6/uL    Hemoglobin 9.9 (L) 13.5 - 17.5 g/dL    Hematocrit 30.9 (L) 41.0 - 52.0 %    MCV 90 80 - 100 fL    MCH 28.9 26.0 - 34.0 pg    MCHC 32.0 32.0 - 36.0 g/dL    RDW 18.1 (H) 11.5 - 14.5 %    Platelets 75 (L) 150 - 450 x10*3/uL    Neutrophils % 88.0 40.0 - 80.0 %    Immature Granulocytes %, Automated 3.4 (H) 0.0 - 0.9 %    Lymphocytes % 1.9 13.0 - 44.0 %    Monocytes % 6.5 2.0 - 10.0 %    Eosinophils % 0.0 0.0 - 6.0 %    Basophils % 0.2 0.0 - 2.0 %    Neutrophils Absolute 7.22 1.20 - 7.70 x10*3/uL    Immature Granulocytes Absolute, Automated 0.28 0.00 - 0.70 x10*3/uL    Lymphocytes Absolute 0.16 (L) 1.20 - 4.80 x10*3/uL    Monocytes Absolute 0.53 0.10 - 1.00 x10*3/uL    Eosinophils Absolute 0.00 0.00 - 0.70 x10*3/uL    Basophils Absolute 0.02 0.00 - 0.10 x10*3/uL   Renal Function Panel   Result Value Ref  Range    Glucose 159 (H) 74 - 99 mg/dL    Sodium 137 136 - 145 mmol/L    Potassium 3.7 3.5 - 5.3 mmol/L    Chloride 102 98 - 107 mmol/L    Bicarbonate 28 21 - 32 mmol/L    Anion Gap 11 10 - 20 mmol/L    Urea Nitrogen 26 (H) 6 - 23 mg/dL    Creatinine 0.60 0.50 - 1.30 mg/dL    eGFR >90 >60 mL/min/1.73m*2    Calcium 8.8 8.6 - 10.6 mg/dL    Phosphorus 2.9 2.5 - 4.9 mg/dL    Albumin 3.3 (L) 3.4 - 5.0 g/dL   Magnesium   Result Value Ref Range    Magnesium 2.02 1.60 - 2.40 mg/dL   Prepare Platelets: 1 Units   Result Value Ref Range    PRODUCT CODE M6393W02     Unit Number [id number]     Unit ABO A     Unit RH POS     Dispense Status TR     Blood Expiration Date May 17, 2024 23:59 EDT     PRODUCT BLOOD TYPE 6200     UNIT VOLUME 224    Prepare RBC: 4 Units   Result Value Ref Range    PRODUCT CODE A2665X97     Unit Number [id number]     Unit ABO O     Unit RH POS     XM INTEP COMP     Dispense Status XM     Blood Expiration Date May 25, 2024 23:59 EDT     PRODUCT BLOOD TYPE 5100     UNIT VOLUME 350     PRODUCT CODE P2896A99     Unit Number [id number]     Unit ABO O     Unit RH POS     XM INTEP COMP     Dispense Status XM     Blood Expiration Date June 03, 2024 23:59 EDT     PRODUCT BLOOD TYPE 5100     UNIT VOLUME 271     PRODUCT CODE F9945U06     Unit Number [id number]     Unit ABO O     Unit RH POS     XM INTEP COMP     Dispense Status RE     Blood Expiration Date June 03, 2024 23:59 EDT     PRODUCT BLOOD TYPE 5100     UNIT VOLUME 282     PRODUCT CODE L0025N76     Unit Number [id number]     Unit ABO O     Unit RH POS     XM INTEP COMP     Dispense Status RE     Blood Expiration Date May 31, 2024 23:59 EDT     PRODUCT BLOOD TYPE 5100     UNIT VOLUME 323    Prepare Platelets: 2 Units   Result Value Ref Range    PRODUCT CODE Q9691T77     Unit Number [id number]     Unit ABO O     Unit RH POS     Dispense Status TR     Blood Expiration Date May 17, 2024 23:59 EDT     PRODUCT BLOOD TYPE      UNIT  VOLUME 293     PRODUCT CODE Z7975P03     Unit Number [id number]     Unit ABO A     Unit RH POS     Dispense Status TR     Blood Expiration Date May 17, 2024 23:59 EDT     PRODUCT BLOOD TYPE 6200     UNIT VOLUME 279    Prepare Plasma: 4 Units   Result Value Ref Range    PRODUCT CODE K5698O73     Unit Number [id number]     Unit ABO O     Unit RH POS     Dispense Status XM     Blood Expiration Date May 19, 2024 14:04 EDT     PRODUCT BLOOD TYPE 5100     UNIT VOLUME 235     PRODUCT CODE B7163Y70     Unit Number [id number]     Unit ABO O     Unit RH POS     Dispense Status RE     Blood Expiration Date May 19, 2024 23:37 EDT     PRODUCT BLOOD TYPE 5100     UNIT VOLUME 277     PRODUCT CODE D2507C31     Unit Number [id number]     Unit ABO O     Unit RH POS     Dispense Status XM     Blood Expiration Date May 22, 2024 04:00 EDT     PRODUCT BLOOD TYPE 5100     UNIT VOLUME 287     PRODUCT CODE V3859N07     Unit Number [id number]     Unit ABO O     Unit RH POS     Dispense Status RE     Blood Expiration Date May 22, 2024 04:00 EDT     PRODUCT BLOOD TYPE 5100     UNIT VOLUME 318    Blood Gas Arterial Full Panel   Result Value Ref Range    POCT pH, Arterial 7.42 7.38 - 7.42 pH    POCT pCO2, Arterial 38 38 - 42 mm Hg    POCT pO2, Arterial 340 (H) 85 - 95 mm Hg    POCT SO2, Arterial 99 94 - 100 %    POCT Oxy Hemoglobin, Arterial 97.0 94.0 - 98.0 %    POCT Hematocrit Calculated, Arterial 29.0 (L) 41.0 - 52.0 %    POCT Sodium, Arterial 136 136 - 145 mmol/L    POCT Potassium, Arterial 3.7 3.5 - 5.3 mmol/L    POCT Chloride, Arterial 103 98 - 107 mmol/L    POCT Ionized Calcium, Arterial 1.29 1.10 - 1.33 mmol/L    POCT Glucose, Arterial 185 (H) 74 - 99 mg/dL    POCT Lactate, Arterial 3.6 (H) 0.4 - 2.0 mmol/L    POCT Base Excess, Arterial 0.2 -2.0 - 3.0 mmol/L    POCT HCO3 Calculated, Arterial 24.6 22.0 - 26.0 mmol/L    POCT Hemoglobin, Arterial 9.7 (L) 13.5 - 17.5 g/dL    POCT Anion Gap, Arterial 12 10 - 25 mmo/L     Patient Temperature 37.0 degrees Celsius    FiO2 100 %   CBC   Result Value Ref Range    WBC 7.8 4.4 - 11.3 x10*3/uL    nRBC 0.0 0.0 - 0.0 /100 WBCs    RBC 3.20 (L) 4.50 - 5.90 x10*6/uL    Hemoglobin 9.3 (L) 13.5 - 17.5 g/dL    Hematocrit 28.9 (L) 41.0 - 52.0 %    MCV 90 80 - 100 fL    MCH 29.1 26.0 - 34.0 pg    MCHC 32.2 32.0 - 36.0 g/dL    RDW 18.0 (H) 11.5 - 14.5 %    Platelets 96 (L) 150 - 450 x10*3/uL   TEG Clot Global Profile   Result Value Ref Range    R (Reaction Time) K 5.6 4.6 - 9.1 min    K (Clot Kinetics) 1.8 0.8 - 2.1 min    ANGLE 70.0 63.0 - 78.0 deg    MA (Max Amplitude) K 49.0 (L) 52.0 - 69.0 mm    R (Reaction Time) KH 5.4 4.3 - 8.3 min    MA (Max Amplitude) RT 48.0 (L) 52.0 - 70.0 mm    MA ( Clive Amplitude) FF 14.0 (L) 15.0 - 32.0 mm    FLEV 246 (L) 278 - 581 mg/dL   CBC   Result Value Ref Range    WBC 8.1 4.4 - 11.3 x10*3/uL    nRBC 0.0 0.0 - 0.0 /100 WBCs    RBC 2.62 (L) 4.50 - 5.90 x10*6/uL    Hemoglobin 8.0 (L) 13.5 - 17.5 g/dL    Hematocrit 24.1 (L) 41.0 - 52.0 %    MCV 92 80 - 100 fL    MCH 30.5 26.0 - 34.0 pg    MCHC 33.2 32.0 - 36.0 g/dL    RDW 18.1 (H) 11.5 - 14.5 %    Platelets 113 (L) 150 - 450 x10*3/uL   Blood Gas Arterial Full Panel   Result Value Ref Range    POCT pH, Arterial 7.38 7.38 - 7.42 pH    POCT pCO2, Arterial 39 38 - 42 mm Hg    POCT pO2, Arterial 171 (H) 85 - 95 mm Hg    POCT SO2, Arterial 99 94 - 100 %    POCT Oxy Hemoglobin, Arterial 97.4 94.0 - 98.0 %    POCT Hematocrit Calculated, Arterial 25.0 (L) 41.0 - 52.0 %    POCT Sodium, Arterial 137 136 - 145 mmol/L    POCT Potassium, Arterial 3.5 3.5 - 5.3 mmol/L    POCT Chloride, Arterial      POCT Ionized Calcium, Arterial 1.31 1.10 - 1.33 mmol/L    POCT Glucose, Arterial 155 (H) 74 - 99 mg/dL    POCT Lactate, Arterial 4.4 (HH) 0.4 - 2.0 mmol/L    POCT Base Excess, Arterial -1.8 -2.0 - 3.0 mmol/L    POCT HCO3 Calculated, Arterial 23.1 22.0 - 26.0 mmol/L    POCT Hemoglobin, Arterial 8.3 (L) 13.5 - 17.5 g/dL    POCT Anion Gap,  Arterial      Patient Temperature 37.0 degrees Celsius    FiO2 50 %   Fibrinogen   Result Value Ref Range    Fibrinogen 160 (L) 200 - 400 mg/dL   Coagulation Screen   Result Value Ref Range    Protime 15.5 (H) 9.8 - 12.8 seconds    INR 1.4 (H) 0.9 - 1.1    aPTT 25 (L) 27 - 38 seconds   Renal function panel   Result Value Ref Range    Glucose 148 (H) 74 - 99 mg/dL    Sodium 136 136 - 145 mmol/L    Potassium 4.0 3.5 - 5.3 mmol/L    Chloride 101 98 - 107 mmol/L    Bicarbonate 23 21 - 32 mmol/L    Anion Gap 16 10 - 20 mmol/L    Urea Nitrogen 24 (H) 6 - 23 mg/dL    Creatinine 0.57 0.50 - 1.30 mg/dL    eGFR >90 >60 mL/min/1.73m*2    Calcium 9.3 8.6 - 10.6 mg/dL    Phosphorus 4.2 2.5 - 4.9 mg/dL    Albumin 3.5 3.4 - 5.0 g/dL       Imaging  ECG 12 lead    Result Date: 5/17/2024  Electronic ventricular pacemaker When compared with ECG of 04-APR-2024 18:37, Vent. rate has increased BY   2 BPM    FL fluoro images no charge    Result Date: 5/17/2024  These images are not reportable by radiology and will not be interpreted by  Radiologists.    FL fluoro images no charge    Result Date: 5/17/2024  These images are not reportable by radiology and will not be interpreted by  Radiologists.    CT thoracic spine wo IV contrast    Result Date: 5/17/2024  Interpreted By:  Sreekanth Erickson and Baker Zachary STUDY: CT THORACIC SPINE WO IV CONTRAST;  5/17/2024 12:00 am   INDICATION: Signs/Symptoms:Surgical planning -OR Friday 5/17.   COMPARISON: Same day MRI thoracic spine. CT chest on 03/18/2024 and CT abdomen and pelvis on 05/19/2024.   ACCESSION NUMBER(S): [id number]   ORDERING CLINICIAN: ORLIN GREEN   TECHNIQUE: Axial noncontrast images of the thoracic spine with coronal and sagittal reconstructed images.   FINDINGS: Surgical planning scan.   There is straightening of the thoracic spine. There is slight retrolisthesis at T12-L1.   There is stable slight anterior wedging of the T12 vertebral body. Subtle sclerosis is seen  within the T10 vertebral body which probably corresponds to metastasis better seen on the prior MRI. T9 metastasis, seen on the prior MRI, is at best partially visualized on CT.   There is mild intervertebral disc height narrowing at L1-L2 and L2-L3. There are chronic degenerative endplate changes at multiple levels. There is moderate to marked left facet osteoarthropathy at T10-T11.   Findings within the spinal canal, including cord compression the level of T10 was better seen on the prior MRI. There is extension of tumor into the left T9-T10 and T10-T11 neural foramina as well as the proximal aspect of the right T10-T11 neural foramen, again better seen on the prior MRI. There is extension of mass into the left paraspinal soft tissues located just posterior to the aorta at the level of T9-T10 and unchanged since the recent MRI.   There is no striking erosion or scalloping of the visualized osseous structures related to metastasis.   Partially visualized lungs demonstrate multiple nodules which are most consistent with metastases. Since the prior chest CT, there are new areas of patchy and ground-glass opacity within the left-greater-than-right lungs and could be infectious or inflammatory in etiology or could reflect pulmonary edema. There is a small left pleural effusion passive atelectasis.       Surgery planning scan with findings detailed above.   Since the prior chest CT, is interval development of ground-glass and patchy opacities within the bilateral lungs, left-greater-than-right, which could be infectious or inflammatory in etiology or could reflect pulmonary edema, correlate clinically and consider further evaluation with chest imaging as clinically indicated.     I personally reviewed the images/study and I agree with the findings as stated by Dr. Nadeem Naranjo M.D. This study was interpreted at [facility], [place], [place].   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 CT lumbar spine 05/09/2024. CT head 02/28/2024.   ACCESSION NUMBER(S): [id number]; [id number]; [id number]; [id number]   ORDERING CLINICIAN: ORLIN GREEN   TECHNIQUE: Axial T2, FLAIR, DWI, gradient echo T2 and sagittal and coronal T1 weighted images of brain were acquired.   Multiplanar multisequence MR imaging of the cervical, thoracic, and lumbar spine was performed. Sagittal T1, T2, STIR and axial T2 and T1 weighted MR images of the total spine were obtained.   A total of 14 mL of intravenous gadolinium based contrast (Dotarem) was administered prior to multiplanar multisequence and volumetric image acquisition of the brain and full spine.   FINDINGS: BRAIN:   No abnormal diffusion restriction to suggest acute ischemia.   There is a 1.3 x 1.3 x 1.3 cm (CC X TRV X AP) peripherally enhancing and intrinsically T2/FLAIR hypointense lesion within the right posterior parasagittal parietal lobe (series 10, image 85). Surrounding this lesion there is T2/FLAIR hyperintensity suggestive of associated edema. Minimal local mass effect without significant midline shift.   There is an additional similar appearing peripherally enhancing and intrinsically T2/FLAIR hypointense lesion within the left anterior temporal lobe measuring 0.8 x 0.9 x 0.7 cm (CC X TRV X AP) without significant associated edema or mass effect.   These lesions are new when compared to prior MRI dated  11/06/2023 and are not appreciated on recent CT dated 02/28/2024.   No extra-axial collection. No intraparenchymal hemorrhage. No diffusion restriction or susceptibility artifact associated with the above-described lesions.   The major intracranial flow voids are maintained.   The ventricles, cortical sulci, and basal cisterns are nonenlarged.   Paranasal sinuses and mastoid air cells are well aerated. No significant calvarial or soft tissue abnormality.     CERVICAL SPINE:   Segmentation: There are 7 non-rib-bearing cervical vertebrae.   Alignment: The vertebral alignment is within normal limits.   Vertebrae/Intervertebral Discs: The vertebral body heights are maintained. Faint enhancement within the anterior aspect of the C2 spinous process greater than other levels could reflect a component of neoplastic change in this region. The marrow signal is otherwise within normal limits. The disc spaces are preserved. No abnormal enhancement.   Spinal cord: Normal in caliber and signal. No abnormal enhancement.   C1-C2: The cervicomedullary junction appears unremarkable. No significant neural foraminal or spinal canal stenosis. C2-C3: There is no posterior disc contour abnormality. No significant neural foraminal or spinal canal stenosis. C3-C4: Small disc osteophyte complex. No significant neural foraminal or spinal canal stenosis. C4-C5: Small disc osteophyte complex. No significant neural foraminal or spinal canal stenosis. C5-C6: Small disc osteophyte complex. No significant neural foraminal or spinal canal stenosis. C6-C7: Small disc osteophyte complex. No significant neural foraminal or spinal canal stenosis. C7-T1: There is no posterior disc contour abnormality. No significant neural foraminal or spinal canal stenosis.   Paraspinous Soft Tissues: The prevertebral and posterior paraspinous soft tissues are unremarkable.     THORACIC SPINE:   Segmentation: There are 12 rib-bearing thoracic vertebrae.   Alignment:  Within normal limits.   Vertebrae/Intervertebral Discs/cord:   There is an ill-defined infiltrating T1/T2 hypointense and STIR hyperintense lesion filling the marrow space of the entire T10 vertebral body and extending along the pedicles and posterior elements. There is a similar process involving the inferior and posterior aspects of the T9 vertebral body. There is mild irregular associated incomplete peripheral enhancement adjacent to the T9 lesion.   There is masslike extension posteriorly and right laterally disrupting the posterior walls of the T9 and T10 vertebral bodies with invasion into the ventral epidural space and associated deformity  of the spinal cord at the T10 level with subtle spinal cord signal abnormality not excluded. There is mild cranial and caudal extension of this mass along the ventral epidural space and right paraspinal region 2 T8-9 rostrally and approximately T11 caudally.   There is extension of the mass along the right anterior paraspinal region from the levels of T8 to T11 with the mass closely abutting the descending aorta (thoracic spine series 18, image 18). There is questionable invasion of the aortic wall at the level of the T9 vertebral body (series 19, image 19).   Minimal STIR hyperintensity of the cord just proximal and distal to the region of compression. No significant abnormal enhancement of the cord.   Mild heterogeneity of the remaining visualized marrow signal.   T1-T2:  No significant neural foraminal or spinal canal stenosis. T2-T3:  No significant neural foraminal or spinal canal stenosis. T3-T4:  No significant neural foraminal or spinal canal stenosis. T4-T5:  No significant neural foraminal or spinal canal stenosis. T5-T6:  No significant neural foraminal or spinal canal stenosis. T6-T7:  No significant neural foraminal or spinal canal stenosis. T7-T8:  No significant neural foraminal or spinal canal stenosis. T8-T9:  There is mass infiltration into the  right-greater-than-left neural foramina with associated mild-to-moderate stenosis. T9-T10: There is mass filling the right neural foramen with complete effacement of the right neural foramen. Mild left neural foraminal stenosis. T10-T11:  There is mass filling the right neural foramen with near complete effacement of the right neural foramen. Mild left neural foraminal stenosis. T11-T12: There is a mass infiltration into the right neural foramen with severe stenosis. No left neural foraminal stenosis. T12-L1:  No significant neural foraminal or spinal canal stenosis.     LUMBAR SPINE:   Segmentation: There are 5 non-rib-bearing lumbar vertebrae.   Alignment: Minimal retrolisthesis of L4 on L5. The remaining alignment is maintained.   Vertebrae/Intervertebral Discs: The vertebral bodies demonstrate expected height. The marrow signal is within normal limits. Mild multilevel desiccated disc signal.   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 The conus terminates at the L1 inferior endplate. Likely subtle additional nodular enhancement of the cauda equina probably due to additional metastatic deposits.   T12-L1:  No significant neural foraminal or spinal canal stenosis. L1-2:  No significant neural foraminal or spinal canal stenosis. L2-3: Mild posterior disc bulge. No significant neural foraminal or spinal canal stenosis. L3-4: Mild posterior disc bulge with disc fissuring. No significant neural foraminal or spinal canal stenosis. L4-5: Mild posterior disc bulge. No significant neural foraminal or spinal canal stenosis. L5-S1: Posterior disc bulge. No significant neural foraminal or spinal canal stenosis.   Paraspinous Soft Tissues: The lumbar paraspinal soft tissues are moderately diffusely atrophied. The visualized  intra-abdominal structures are unremarkable.       MRI brain: 1. Peripherally enhancing intrinsically T2/FLAIR hypointense lesions within the right posterior parasagittal parietal lobe and the left anterior temporal lobe, which are new when compared to prior MRI dated 11/06/2023. The parietal lesion demonstrates significant adjacent enhancement with mild local mass effect. These findings are likely due to metastatic disease given the patient's history.   MRI spine: 1. Ill-defined infiltrating and minimally peripherally enhancing lesion involving the T9 and T10 vertebral bodies and with masslike extension posteriorly along the ventral thecal sac and rightward along the anterior paraspinal soft tissues. This mass exerts severe mass effect on the thecal sac with deformity with potential subtle T2 signal abnormality/likely compression of the spinal cord at least at the T10 level. The above-described mass also extends along the left anterior paraspinal soft tissues and closely abuts the descending thoracic aorta with questionable invasion of the posteromedial aortic wall at the T9 level.There is multilevel effacement of the right neural foramina with complete loss of epidural fat signal within the right neural foramina from T9-T11. 2. There is an avidly enhancing T2 hypointense lesion within the ventral thecal sac exerting mass effect on the cauda equina at the level of L2 likely due to intrathecal metastatic disease given the other findings. Likely subtle additional nodular enhancement of the cauda equina probably due to additional metastatic deposits.       I personally reviewed the image(s)/study and resident interpretation as stated by Dr. Sakina Wilder MD. I agree with the findings as stated. This study was interpreted at [facility], [place], [place].   MACRO: Sakina Wilder discussed the significance and urgency of this critical finding by secure chat with  ORLIN GREEN  on 5/16/2024 at 5:23 pm.  (**-RCF-**) Findings:  See findings.   Signed by: Papo Cuarda 5/16/2024 5:59 PM Dictation workstation:   [id number]    MR thoracic spine w and wo IV contrast    Result Date: 5/16/2024  Interpreted By:  Papo Cuadra,  and Meño Presley STUDY: MR BRAIN W AND WO IV CONTRAST; MR CERVICAL SPINE W AND WO IV CONTRAST; MR LUMBAR SPINE W AND WO IV CONTRAST; MR THORACIC SPINE W AND WO IV CONTRAST;  5/16/2024 4:06 pm; 5/16/2024 4:07 pm   INDICATION: Signs/Symptoms:Paraspinal mass; per EMR: 63-year-old male with history of esophageal adenocarcinoma metastases to the liver and lungs   COMPARISON: MRI brain 11/06/2023. CT lumbar spine 05/09/2024. CT head 02/28/2024.   ACCESSION NUMBER(S): [id number]; [id number]; [id number]; [id number]   ORDERING CLINICIAN: ORLIN GREEN   TECHNIQUE: Axial T2, FLAIR, DWI, gradient echo T2 and sagittal and coronal T1 weighted images of brain were acquired.   Multiplanar multisequence MR imaging of the cervical, thoracic, and lumbar spine was performed. Sagittal T1, T2, STIR and axial T2 and T1 weighted MR images of the total spine were obtained.   A total of 14 mL of intravenous gadolinium based contrast (Dotarem) was administered prior to multiplanar multisequence and volumetric image acquisition of the brain and full spine.   FINDINGS: BRAIN:   No abnormal diffusion restriction to suggest acute ischemia.   There is a 1.3 x 1.3 x 1.3 cm (CC X TRV X AP) peripherally enhancing and intrinsically T2/FLAIR hypointense lesion within the right posterior parasagittal parietal lobe (series 10, image 85). Surrounding this lesion there is T2/FLAIR hyperintensity suggestive of associated edema. Minimal local mass effect without significant midline shift.   There is an additional similar appearing peripherally enhancing and intrinsically T2/FLAIR hypointense lesion within the left anterior temporal lobe measuring 0.8 x 0.9 x 0.7 cm (CC X TRV X AP) without significant  associated edema or mass effect.   These lesions are new when compared to prior MRI dated 11/06/2023 and are not appreciated on recent CT dated 02/28/2024.   No extra-axial collection. No intraparenchymal hemorrhage. No diffusion restriction or susceptibility artifact associated with the above-described lesions.   The major intracranial flow voids are maintained.   The ventricles, cortical sulci, and basal cisterns are nonenlarged.   Paranasal sinuses and mastoid air cells are well aerated. No significant calvarial or soft tissue abnormality.     CERVICAL SPINE:   Segmentation: There are 7 non-rib-bearing cervical vertebrae.   Alignment: The vertebral alignment is within normal limits.   Vertebrae/Intervertebral Discs: The vertebral body heights are maintained. Faint enhancement within the anterior aspect of the C2 spinous process greater than other levels could reflect a component of neoplastic change in this region. The marrow signal is otherwise within normal limits. The disc spaces are preserved. No abnormal enhancement.   Spinal cord: Normal in caliber and signal. No abnormal enhancement.   C1-C2: The cervicomedullary junction appears unremarkable. No significant neural foraminal or spinal canal stenosis. C2-C3: There is no posterior disc contour abnormality. No significant neural foraminal or spinal canal stenosis. C3-C4: Small disc osteophyte complex. No significant neural foraminal or spinal canal stenosis. C4-C5: Small disc osteophyte complex. No significant neural foraminal or spinal canal stenosis. C5-C6: Small disc osteophyte complex. No significant neural foraminal or spinal canal stenosis. C6-C7: Small disc osteophyte complex. No significant neural foraminal or spinal canal stenosis. C7-T1: There is no posterior disc contour abnormality. No significant neural foraminal or spinal canal stenosis.   Paraspinous Soft Tissues: The prevertebral and posterior paraspinous soft tissues are unremarkable.      THORACIC SPINE:   Segmentation: There are 12 rib-bearing thoracic vertebrae.   Alignment: Within normal limits.   Vertebrae/Intervertebral Discs/cord:   There is an ill-defined infiltrating T1/T2 hypointense and STIR hyperintense lesion filling the marrow space of the entire T10 vertebral body and extending along the pedicles and posterior elements. There is a similar process involving the inferior and posterior aspects of the T9 vertebral body. There is mild irregular associated incomplete peripheral enhancement adjacent to the T9 lesion.   There is masslike extension posteriorly and right laterally disrupting the posterior walls of the T9 and T10 vertebral bodies with invasion into the ventral epidural space and associated deformity  of the spinal cord at the T10 level with subtle spinal cord signal abnormality not excluded. There is mild cranial and caudal extension of this mass along the ventral epidural space and right paraspinal region 2 T8-9 rostrally and approximately T11 caudally.   There is extension of the mass along the right anterior paraspinal region from the levels of T8 to T11 with the mass closely abutting the descending aorta (thoracic spine series 18, image 18). There is questionable invasion of the aortic wall at the level of the T9 vertebral body (series 19, image 19).   Minimal STIR hyperintensity of the cord just proximal and distal to the region of compression. No significant abnormal enhancement of the cord.   Mild heterogeneity of the remaining visualized marrow signal.   T1-T2:  No significant neural foraminal or spinal canal stenosis. T2-T3:  No significant neural foraminal or spinal canal stenosis. T3-T4:  No significant neural foraminal or spinal canal stenosis. T4-T5:  No significant neural foraminal or spinal canal stenosis. T5-T6:  No significant neural foraminal or spinal canal stenosis. T6-T7:  No significant neural foraminal or spinal canal stenosis. T7-T8:  No significant neural  foraminal or spinal canal stenosis. T8-T9:  There is mass infiltration into the right-greater-than-left neural foramina with associated mild-to-moderate stenosis. T9-T10: There is mass filling the right neural foramen with complete effacement of the right neural foramen. Mild left neural foraminal stenosis. T10-T11:  There is mass filling the right neural foramen with near complete effacement of the right neural foramen. Mild left neural foraminal stenosis. T11-T12: There is a mass infiltration into the right neural foramen with severe stenosis. No left neural foraminal stenosis. T12-L1:  No significant neural foraminal or spinal canal stenosis.     LUMBAR SPINE:   Segmentation: There are 5 non-rib-bearing lumbar vertebrae.   Alignment: Minimal retrolisthesis of L4 on L5. The remaining alignment is maintained.   Vertebrae/Intervertebral Discs: The vertebral bodies demonstrate expected height. The marrow signal is within normal limits. Mild multilevel desiccated disc signal.   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 The conus terminates at the L1 inferior endplate. Likely subtle additional nodular enhancement of the cauda equina probably due to additional metastatic deposits.   T12-L1:  No significant neural foraminal or spinal canal stenosis. L1-2:  No significant neural foraminal or spinal canal stenosis. L2-3: Mild posterior disc bulge. No significant neural foraminal or spinal canal stenosis. L3-4: Mild posterior disc bulge with disc fissuring. No significant neural foraminal or spinal canal stenosis. L4-5: Mild posterior disc bulge. No significant neural foraminal or spinal canal stenosis. L5-S1: Posterior disc bulge. No significant neural foraminal or spinal canal stenosis.   Paraspinous Soft Tissues: The lumbar  paraspinal soft tissues are moderately diffusely atrophied. The visualized intra-abdominal structures are unremarkable.       MRI brain: 1. Peripherally enhancing intrinsically T2/FLAIR hypointense lesions within the right posterior parasagittal parietal lobe and the left anterior temporal lobe, which are new when compared to prior MRI dated 11/06/2023. The parietal lesion demonstrates significant adjacent enhancement with mild local mass effect. These findings are likely due to metastatic disease given the patient's history.   MRI spine: 1. Ill-defined infiltrating and minimally peripherally enhancing lesion involving the T9 and T10 vertebral bodies and with masslike extension posteriorly along the ventral thecal sac and rightward along the anterior paraspinal soft tissues. This mass exerts severe mass effect on the thecal sac with deformity with potential subtle T2 signal abnormality/likely compression of the spinal cord at least at the T10 level. The above-described mass also extends along the left anterior paraspinal soft tissues and closely abuts the descending thoracic aorta with questionable invasion of the posteromedial aortic wall at the T9 level.There is multilevel effacement of the right neural foramina with complete loss of epidural fat signal within the right neural foramina from T9-T11. 2. There is an avidly enhancing T2 hypointense lesion within the ventral thecal sac exerting mass effect on the cauda equina at the level of L2 likely due to intrathecal metastatic disease given the other findings. Likely subtle additional nodular enhancement of the cauda equina probably due to additional metastatic deposits.       I personally reviewed the image(s)/study and resident interpretation as stated by Dr. Sakina Wilder MD. I agree with the findings as stated. This study was interpreted at [facility], [place], [place].   MACRO: Sakina Wilder discussed the significance  and urgency of this critical finding by secure chat with  ORLIN GREEN on 5/16/2024 at 5:23 pm.  (**-RCF-**) Findings:  See findings.   Signed by: Papo Cuadra 5/16/2024 5:59 PM Dictation workstation:   [id number]    MR cervical spine w and wo IV contrast    Result Date: 5/16/2024  Interpreted By:  Papo Cuadra,  and Meño Presley STUDY: MR BRAIN W AND WO IV CONTRAST; MR CERVICAL SPINE W AND WO IV CONTRAST; MR LUMBAR SPINE W AND WO IV CONTRAST; MR THORACIC SPINE W AND WO IV CONTRAST;  5/16/2024 4:06 pm; 5/16/2024 4:07 pm   INDICATION: Signs/Symptoms:Paraspinal mass; per EMR: 63-year-old male with history of esophageal adenocarcinoma metastases to the liver and lungs   COMPARISON: MRI brain 11/06/2023. CT lumbar spine 05/09/2024. CT head 02/28/2024.   ACCESSION NUMBER(S): [id number]; [id number]; [id number]; [id number]   ORDERING CLINICIAN: ORLIN GREEN   TECHNIQUE: Axial T2, FLAIR, DWI, gradient echo T2 and sagittal and coronal T1 weighted images of brain were acquired.   Multiplanar multisequence MR imaging of the cervical, thoracic, and lumbar spine was performed. Sagittal T1, T2, STIR and axial T2 and T1 weighted MR images of the total spine were obtained.   A total of 14 mL of intravenous gadolinium based contrast (Dotarem) was administered prior to multiplanar multisequence and volumetric image acquisition of the brain and full spine.   FINDINGS: BRAIN:   No abnormal diffusion restriction to suggest acute ischemia.   There is a 1.3 x 1.3 x 1.3 cm (CC X TRV X AP) peripherally enhancing and intrinsically T2/FLAIR hypointense lesion within the right posterior parasagittal parietal lobe (series 10, image 85). Surrounding this lesion there is T2/FLAIR hyperintensity suggestive of associated edema. Minimal local mass effect without significant midline shift.   There is an additional similar appearing peripherally enhancing and intrinsically T2/FLAIR hypointense lesion within the left anterior  temporal lobe measuring 0.8 x 0.9 x 0.7 cm (CC X TRV X AP) without significant associated edema or mass effect.   These lesions are new when compared to prior MRI dated 11/06/2023 and are not appreciated on recent CT dated 02/28/2024.   No extra-axial collection. No intraparenchymal hemorrhage. No diffusion restriction or susceptibility artifact associated with the above-described lesions.   The major intracranial flow voids are maintained.   The ventricles, cortical sulci, and basal cisterns are nonenlarged.   Paranasal sinuses and mastoid air cells are well aerated. No significant calvarial or soft tissue abnormality.     CERVICAL SPINE:   Segmentation: There are 7 non-rib-bearing cervical vertebrae.   Alignment: The vertebral alignment is within normal limits.   Vertebrae/Intervertebral Discs: The vertebral body heights are maintained. Faint enhancement within the anterior aspect of the C2 spinous process greater than other levels could reflect a component of neoplastic change in this region. The marrow signal is otherwise within normal limits. The disc spaces are preserved. No abnormal enhancement.   Spinal cord: Normal in caliber and signal. No abnormal enhancement.   C1-C2: The cervicomedullary junction appears unremarkable. No significant neural foraminal or spinal canal stenosis. C2-C3: There is no posterior disc contour abnormality. No significant neural foraminal or spinal canal stenosis. C3-C4: Small disc osteophyte complex. No significant neural foraminal or spinal canal stenosis. C4-C5: Small disc osteophyte complex. No significant neural foraminal or spinal canal stenosis. C5-C6: Small disc osteophyte complex. No significant neural foraminal or spinal canal stenosis. C6-C7: Small disc osteophyte complex. No significant neural foraminal or spinal canal stenosis. C7-T1: There is no posterior disc contour abnormality. No significant neural foraminal or spinal canal stenosis.   Paraspinous Soft Tissues:  The prevertebral and posterior paraspinous soft tissues are unremarkable.     THORACIC SPINE:   Segmentation: There are 12 rib-bearing thoracic vertebrae.   Alignment: Within normal limits.   Vertebrae/Intervertebral Discs/cord:   There is an ill-defined infiltrating T1/T2 hypointense and STIR hyperintense lesion filling the marrow space of the entire T10 vertebral body and extending along the pedicles and posterior elements. There is a similar process involving the inferior and posterior aspects of the T9 vertebral body. There is mild irregular associated incomplete peripheral enhancement adjacent to the T9 lesion.   There is masslike extension posteriorly and right laterally disrupting the posterior walls of the T9 and T10 vertebral bodies with invasion into the ventral epidural space and associated deformity  of the spinal cord at the T10 level with subtle spinal cord signal abnormality not excluded. There is mild cranial and caudal extension of this mass along the ventral epidural space and right paraspinal region 2 T8-9 rostrally and approximately T11 caudally.   There is extension of the mass along the right anterior paraspinal region from the levels of T8 to T11 with the mass closely abutting the descending aorta (thoracic spine series 18, image 18). There is questionable invasion of the aortic wall at the level of the T9 vertebral body (series 19, image 19).   Minimal STIR hyperintensity of the cord just proximal and distal to the region of compression. No significant abnormal enhancement of the cord.   Mild heterogeneity of the remaining visualized marrow signal.   T1-T2:  No significant neural foraminal or spinal canal stenosis. T2-T3:  No significant neural foraminal or spinal canal stenosis. T3-T4:  No significant neural foraminal or spinal canal stenosis. T4-T5:  No significant neural foraminal or spinal canal stenosis. T5-T6:  No significant neural foraminal or spinal canal stenosis. T6-T7:  No  significant neural foraminal or spinal canal stenosis. T7-T8:  No significant neural foraminal or spinal canal stenosis. T8-T9:  There is mass infiltration into the right-greater-than-left neural foramina with associated mild-to-moderate stenosis. T9-T10: There is mass filling the right neural foramen with complete effacement of the right neural foramen. Mild left neural foraminal stenosis. T10-T11:  There is mass filling the right neural foramen with near complete effacement of the right neural foramen. Mild left neural foraminal stenosis. T11-T12: There is a mass infiltration into the right neural foramen with severe stenosis. No left neural foraminal stenosis. T12-L1:  No significant neural foraminal or spinal canal stenosis.     LUMBAR SPINE:   Segmentation: There are 5 non-rib-bearing lumbar vertebrae.   Alignment: Minimal retrolisthesis of L4 on L5. The remaining alignment is maintained.   Vertebrae/Intervertebral Discs: The vertebral bodies demonstrate expected height. The marrow signal is within normal limits. Mild multilevel desiccated disc signal.   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 The conus terminates at the L1 inferior endplate. Likely subtle additional nodular enhancement of the cauda equina probably due to additional metastatic deposits.   T12-L1:  No significant neural foraminal or spinal canal stenosis. L1-2:  No significant neural foraminal or spinal canal stenosis. L2-3: Mild posterior disc bulge. No significant neural foraminal or spinal canal stenosis. L3-4: Mild posterior disc bulge with disc fissuring. No significant neural foraminal or spinal canal stenosis. L4-5: Mild posterior disc bulge. No significant neural foraminal or spinal canal stenosis. L5-S1: Posterior disc bulge. No significant  neural foraminal or spinal canal stenosis.   Paraspinous Soft Tissues: The lumbar paraspinal soft tissues are moderately diffusely atrophied. The visualized intra-abdominal structures are unremarkable.       MRI brain: 1. Peripherally enhancing intrinsically T2/FLAIR hypointense lesions within the right posterior parasagittal parietal lobe and the left anterior temporal lobe, which are new when compared to prior MRI dated 11/06/2023. The parietal lesion demonstrates significant adjacent enhancement with mild local mass effect. These findings are likely due to metastatic disease given the patient's history.   MRI spine: 1. Ill-defined infiltrating and minimally peripherally enhancing lesion involving the T9 and T10 vertebral bodies and with masslike extension posteriorly along the ventral thecal sac and rightward along the anterior paraspinal soft tissues. This mass exerts severe mass effect on the thecal sac with deformity with potential subtle T2 signal abnormality/likely compression of the spinal cord at least at the T10 level. The above-described mass also extends along the left anterior paraspinal soft tissues and closely abuts the descending thoracic aorta with questionable invasion of the posteromedial aortic wall at the T9 level.There is multilevel effacement of the right neural foramina with complete loss of epidural fat signal within the right neural foramina from T9-T11. 2. There is an avidly enhancing T2 hypointense lesion within the ventral thecal sac exerting mass effect on the cauda equina at the level of L2 likely due to intrathecal metastatic disease given the other findings. Likely subtle additional nodular enhancement of the cauda equina probably due to additional metastatic deposits.       I personally reviewed the image(s)/study and resident interpretation as stated by Dr. Sakina Wilder MD. I agree with the findings as stated. This study was interpreted at [facility]  [place], [place].   MACRO: Sakina Wilder discussed the significance and urgency of this critical finding by secure chat with  ORLIN GREEN on 5/16/2024 at 5:23 pm.  (**-RCF-**) Findings:  See findings.   Signed by: Papo Cuadra 5/16/2024 5:59 PM Dictation workstation:   [id number]    MR lumbar spine w and wo IV contrast    Result Date: 5/16/2024  Interpreted By:  Papo Cuadra,  and Meño Presley STUDY: MR BRAIN W AND WO IV CONTRAST; MR CERVICAL SPINE W AND WO IV CONTRAST; MR LUMBAR SPINE W AND WO IV CONTRAST; MR THORACIC SPINE W AND WO IV CONTRAST;  5/16/2024 4:06 pm; 5/16/2024 4:07 pm   INDICATION: Signs/Symptoms:Paraspinal mass; per EMR: 63-year-old male with history of esophageal adenocarcinoma metastases to the liver and lungs   COMPARISON: MRI brain 11/06/2023. CT lumbar spine 05/09/2024. CT head 02/28/2024.   ACCESSION NUMBER(S): [id number]; [id number]; [id number]; [id number]   ORDERING CLINICIAN: ORLIN GREEN   TECHNIQUE: Axial T2, FLAIR, DWI, gradient echo T2 and sagittal and coronal T1 weighted images of brain were acquired.   Multiplanar multisequence MR imaging of the cervical, thoracic, and lumbar spine was performed. Sagittal T1, T2, STIR and axial T2 and T1 weighted MR images of the total spine were obtained.   A total of 14 mL of intravenous gadolinium based contrast (Dotarem) was administered prior to multiplanar multisequence and volumetric image acquisition of the brain and full spine.   FINDINGS: BRAIN:   No abnormal diffusion restriction to suggest acute ischemia.   There is a 1.3 x 1.3 x 1.3 cm (CC X TRV X AP) peripherally enhancing and intrinsically T2/FLAIR hypointense lesion within the right posterior parasagittal parietal lobe (series 10, image 85). Surrounding this lesion there is T2/FLAIR hyperintensity suggestive of associated edema. Minimal local mass effect without significant midline shift.   There is an additional similar appearing peripherally  enhancing and intrinsically T2/FLAIR hypointense lesion within the left anterior temporal lobe measuring 0.8 x 0.9 x 0.7 cm (CC X TRV X AP) without significant associated edema or mass effect.   These lesions are new when compared to prior MRI dated 11/06/2023 and are not appreciated on recent CT dated 02/28/2024.   No extra-axial collection. No intraparenchymal hemorrhage. No diffusion restriction or susceptibility artifact associated with the above-described lesions.   The major intracranial flow voids are maintained.   The ventricles, cortical sulci, and basal cisterns are nonenlarged.   Paranasal sinuses and mastoid air cells are well aerated. No significant calvarial or soft tissue abnormality.     CERVICAL SPINE:   Segmentation: There are 7 non-rib-bearing cervical vertebrae.   Alignment: The vertebral alignment is within normal limits.   Vertebrae/Intervertebral Discs: The vertebral body heights are maintained. Faint enhancement within the anterior aspect of the C2 spinous process greater than other levels could reflect a component of neoplastic change in this region. The marrow signal is otherwise within normal limits. The disc spaces are preserved. No abnormal enhancement.   Spinal cord: Normal in caliber and signal. No abnormal enhancement.   C1-C2: The cervicomedullary junction appears unremarkable. No significant neural foraminal or spinal canal stenosis. C2-C3: There is no posterior disc contour abnormality. No significant neural foraminal or spinal canal stenosis. C3-C4: Small disc osteophyte complex. No significant neural foraminal or spinal canal stenosis. C4-C5: Small disc osteophyte complex. No significant neural foraminal or spinal canal stenosis. C5-C6: Small disc osteophyte complex. No significant neural foraminal or spinal canal stenosis. C6-C7: Small disc osteophyte complex. No significant neural foraminal or spinal canal stenosis. C7-T1: There is no posterior disc contour abnormality. No  significant neural foraminal or spinal canal stenosis.   Paraspinous Soft Tissues: The prevertebral and posterior paraspinous soft tissues are unremarkable.     THORACIC SPINE:   Segmentation: There are 12 rib-bearing thoracic vertebrae.   Alignment: Within normal limits.   Vertebrae/Intervertebral Discs/cord:   There is an ill-defined infiltrating T1/T2 hypointense and STIR hyperintense lesion filling the marrow space of the entire T10 vertebral body and extending along the pedicles and posterior elements. There is a similar process involving the inferior and posterior aspects of the T9 vertebral body. There is mild irregular associated incomplete peripheral enhancement adjacent to the T9 lesion.   There is masslike extension posteriorly and right laterally disrupting the posterior walls of the T9 and T10 vertebral bodies with invasion into the ventral epidural space and associated deformity  of the spinal cord at the T10 level with subtle spinal cord signal abnormality not excluded. There is mild cranial and caudal extension of this mass along the ventral epidural space and right paraspinal region 2 T8-9 rostrally and approximately T11 caudally.   There is extension of the mass along the right anterior paraspinal region from the levels of T8 to T11 with the mass closely abutting the descending aorta (thoracic spine series 18, image 18). There is questionable invasion of the aortic wall at the level of the T9 vertebral body (series 19, image 19).   Minimal STIR hyperintensity of the cord just proximal and distal to the region of compression. No significant abnormal enhancement of the cord.   Mild heterogeneity of the remaining visualized marrow signal.   T1-T2:  No significant neural foraminal or spinal canal stenosis. T2-T3:  No significant neural foraminal or spinal canal stenosis. T3-T4:  No significant neural foraminal or spinal canal stenosis. T4-T5:  No significant neural foraminal or spinal canal stenosis.  T5-T6:  No significant neural foraminal or spinal canal stenosis. T6-T7:  No significant neural foraminal or spinal canal stenosis. T7-T8:  No significant neural foraminal or spinal canal stenosis. T8-T9:  There is mass infiltration into the right-greater-than-left neural foramina with associated mild-to-moderate stenosis. T9-T10: There is mass filling the right neural foramen with complete effacement of the right neural foramen. Mild left neural foraminal stenosis. T10-T11:  There is mass filling the right neural foramen with near complete effacement of the right neural foramen. Mild left neural foraminal stenosis. T11-T12: There is a mass infiltration into the right neural foramen with severe stenosis. No left neural foraminal stenosis. T12-L1:  No significant neural foraminal or spinal canal stenosis.     LUMBAR SPINE:   Segmentation: There are 5 non-rib-bearing lumbar vertebrae.   Alignment: Minimal retrolisthesis of L4 on L5. The remaining alignment is maintained.   Vertebrae/Intervertebral Discs: The vertebral bodies demonstrate expected height. The marrow signal is within normal limits. Mild multilevel desiccated disc signal.   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 The conus terminates at the L1 inferior endplate. Likely subtle additional nodular enhancement of the cauda equina probably due to additional metastatic deposits.   T12-L1:  No significant neural foraminal or spinal canal stenosis. L1-2:  No significant neural foraminal or spinal canal stenosis. L2-3: Mild posterior disc bulge. No significant neural foraminal or spinal canal stenosis. L3-4: Mild posterior disc bulge with disc fissuring. No significant neural foraminal or spinal canal stenosis. L4-5: Mild posterior disc bulge. No significant neural  foraminal or spinal canal stenosis. L5-S1: Posterior disc bulge. No significant neural foraminal or spinal canal stenosis.   Paraspinous Soft Tissues: The lumbar paraspinal soft tissues are moderately diffusely atrophied. The visualized intra-abdominal structures are unremarkable.       MRI brain: 1. Peripherally enhancing intrinsically T2/FLAIR hypointense lesions within the right posterior parasagittal parietal lobe and the left anterior temporal lobe, which are new when compared to prior MRI dated 11/06/2023. The parietal lesion demonstrates significant adjacent enhancement with mild local mass effect. These findings are likely due to metastatic disease given the patient's history.   MRI spine: 1. Ill-defined infiltrating and minimally peripherally enhancing lesion involving the T9 and T10 vertebral bodies and with masslike extension posteriorly along the ventral thecal sac and rightward along the anterior paraspinal soft tissues. This mass exerts severe mass effect on the thecal sac with deformity with potential subtle T2 signal abnormality/likely compression of the spinal cord at least at the T10 level. The above-described mass also extends along the left anterior paraspinal soft tissues and closely abuts the descending thoracic aorta with questionable invasion of the posteromedial aortic wall at the T9 level.There is multilevel effacement of the right neural foramina with complete loss of epidural fat signal within the right neural foramina from T9-T11. 2. There is an avidly enhancing T2 hypointense lesion within the ventral thecal sac exerting mass effect on the cauda equina at the level of L2 likely due to intrathecal metastatic disease given the other findings. Likely subtle additional nodular enhancement of the cauda equina probably due to additional metastatic deposits.       I personally reviewed the image(s)/study and resident interpretation as stated by Dr. Sakina Wilder MD. I agree with the  findings as stated. This study was interpreted at [facility], [place], [place].   MACRO: Sakina Wilder discussed the significance and urgency of this critical finding by secure chat with  ORLIN GREEN on 5/16/2024 at 5:23 pm.  (**-RCF-**) Findings:  See findings.   Signed by: Papo Cuadra 5/16/2024 5:59 PM Dictation workstation:   [id number]        Assessment/Plan   Principal Problem:    Esophageal cancer, stage IV (Multi)  Active Problems:    Primary malignant neoplasm of esophagus (Multi)    Massimo Garcia is a 63 y.o. male with PMH third degree heart block s/p PPM (placed in November), esophageal adenocarcinoma with mets to liver and lungs, irAE colitis, PE, peripheral neuropathy, and portal vein thrombosis (On Eliquis). Patient presented to [facility] ED on 5/9 with acute on chronic low back pain. CT imaging was obtained and was concerning for evidence of soft tissue extension into the spinal canal at T9-T10 with associated spinal canal stenosis. Patient transferred to [facility] for further workup and management.      Today, patient underwent surgery today with no complications in the operative site. Radiation oncology was consulted and arranged follow up in two weeks, and will see him in person on Monday. In the first 24 hrs, we will be following INR, fibrinogen, platelets to maintain above thresholds. He will also get aggressive physical therapy starting tomorrow.     An interesting point of his history was a history of grade III iRAE colitis for which he started steroid treatment on 5/4. He got two doses of methylpred 1 mg/kg on 5/4 and 5/5, followed by a planned course of prednisone 80 mg from 5/6-5/11, switched to dexamethasone 6 mg TID from 5/12-/5/17 (am). So he has gotten 13 days of steroids. However, from neurosurgery's perspective fusion of spinal cadaveric grafts starts at day 7 post op so maintaining on original plan taper plan may jeopardize that. So will do taper plan of  80 mg for 7 days, 40 mg for 7 days, 20 mg for 70 days and 10 mg for 7 days.      5/14 updates  - T10 transpedicular mass resection with T8-T12 fusion performed today  - Post-op pain control as below  - Radiation oncology consulted for spinal radiation in around 2 weeks  - Start steroid taper tomorrow with 7 days each going from prednisone 80 ->40->20 ->10     #Acute on Chronic low Back Pain  #New paraspinal masses impinging on spinal cord, s/p resection on 5/17  :: Presented to [facility] ED on 5/9 with stabbing nonradiating low back pain, acutely worsened  :: CT L spine obtained: “evidence of soft tissue extension into the spinal canal at T9 and T10 resulting in spinal canal stenosis”  :: Neurology consult obtained on admission, rec obtaining MRI brain, C/T/L spine w/wo contrast for oncologic workup  :: UA (5/10) unremarkable  -PACU labs: INR of 1.4, Fibrinogen of 160    Plan  - Stop dexamethasone 6 mg TID. Switch to a prednisone taper of 80 ->40 ->20 ->10 weekly  - Start Dilaudid PCA   - Per NSGY, INR<1.6, Fibrinogen> 150, Platelets>100 for 24 hrs post-op  -PT/OT starting POD1  - Acetaminophen 650 mg Q4H, cyclobenzaprine 10 mg Q8H, lidocaine patches  -Toradol 30 mg Q6H for up to 8 doses  -Fentanyl patch to 75 mcg patch q72hrs.   -Physical therapy starting tomorrow  - Hold oral and IV Dilaudid PRNs  -Hold Lyrica 50 mg nightly to avoid sedation    #Hiccups  -Held Chloropromazine 25 mg Q6H PRN as not helping  - Started metaclopramide 10 mg Q8H PRN      #Vitamin D deficiency  -Continue vitamin D 4000 units every morning     #Stage IV esophageal cancer HER2 positive   :: Follows with Dr. Blake, emailed 5/11 AM with updates  :: Current chemotherapy: 5FU, Tras/Pembro, last received on 4/29, next due 5/13. Hold for potential surgery.  :: CT A/P (5/9) with “Multiple pulmonary nodules in the imaged lower lungs compatible with metastatic disease. There is wall thickening and apparent hyperenhancement of the anterior wall of the  distal esophagus which may relate to patient's known esophageal malignancy. There is also redemonstration of multiple hepatic masses and abdominal and pelvic lymphadenopathy compatible with metastatic disease     #iRAE colitis, grade III  :: Recently admitted to [facility] 5/4-5/7 for enterocolitis  - Was discharged on prednisone 80 mg through 5/27. Now since off dexamethasone steroids, will start on prednisone 80 mg -> 40 mg  ->20 mg  -> 10 mg taper weekly.    #hx PE  #Hx Portal vein thrombosis  - Pulmonary embolism in RLL subsegmental artery in March 2024  -Jan 2023:  Extensive large pulmonary emboli involving lobar, segmental, and subsegmental arteries bilaterally. Prior to any cancer treatment  - Held home Eliquis 5mg BID on admission, per neurology, pending further plan  - On enoxaparin 40 mg daily     #GERD  - Continue home Protonix   -Calcium carbonate 500 mg PRN     #Anxiety disorder  - Lorazepam 1 mg Q8H PRN     #Chronic normocytic anemia secondary to malignancy and treatment  #Chronic thrombocytopenia  :: At baseline hgb   -Hgb of 10.3. Baseline 10-12  - Monitor daily CBC  - Transfuse for hgb<7, platelet<10     #Hx Third degree heard block, s/p pacemaker placement (11/8/23)  :: Asymptomatic on admission  - CTM     F: PRN  E: Replete as needed  N: Regular diet  C: Full code  A: Port     LOS: 7 days     Adi Mays MD/PhD   PGY-1.833

## 2024-05-17 NOTE — PROGRESS NOTES
SUPPORTIVE AND PALLIATIVE ONCOLOGY INPATIENT FOLLOW-UP      SERVICE DATE: 05/17/24     SUBJECTIVE:  Interval Events:    Pain Assessment:  Location: { :43578}  Duration: { :48662}  Characteristics:   Rating: { :59106}   Descriptors: { :74451}   Aggravating: { :25250}    Relieving: { :60680}   Interference with Function: { :70599}    Opioid Use  Past 24 h prn opioid use: { :07533}  Total 24h OME use:  ***    Note: OME calculations based on equianalgesic table below. Please note this table is based on best available evidence but conversions are still approximate. These are NOT opioid DOSES for individual patient use; this is equivalency information.  Drug Parenteral Enteral   Morphine 10 25   Oxycodone N/A 20   Hydromorphone 2 5   Fentanyl 0.15 N/A   Tramadol N/A 120   Citation: Aniya ROSS. Demystifying opioid conversion calculations: A guide for effective dosing, Second edition. [place] Lobo: American [employer] of [employer]-System Pharmacists, 2018.    Symptom Assessment:  {ROS - Unable to Obtain:52151}  { :24595} { :81670}  { :65266} { :11600}  { :73980} { :74391}  { :88201} { :35394}  { :94121} { :30800}    Information obtained from: { :58418}  ______________________________________________________________________        OBJECTIVE:    Lab Results   Component Value Date    WBC 8.2 05/17/2024    HGB 9.9 (L) 05/17/2024    HCT 30.9 (L) 05/17/2024    MCV 90 05/17/2024    PLT 75 (L) 05/17/2024      Lab Results   Component Value Date    GLUCOSE 159 (H) 05/17/2024    CALCIUM 8.8 05/17/2024     05/17/2024    K 3.7 05/17/2024    CO2 28 05/17/2024     05/17/2024    BUN 26 (H) 05/17/2024    CREATININE 0.60 05/17/2024     Lab Results   Component Value Date    ALT 16 05/12/2024    AST 28 05/12/2024    ALKPHOS 136 05/12/2024    BILITOT 1.0 05/12/2024     Estimated Creatinine Clearance: 118.8 mL/min (by C-G formula based on SCr of 0.6 mg/dL).     Scheduled medications  [Transfer Hold] cholecalciferol, 4,000 Units, oral,  Daily  [Transfer Hold] dexAMETHasone, 6 mg, oral, q8h LUCITA  [Held by provider] enoxaparin, 40 mg, subcutaneous, Daily  [Transfer Hold] fentaNYL, 1 patch, transdermal, q72h  [Transfer Hold] lidocaine, 1 patch, transdermal, Daily  [Transfer Hold] multivitamin with minerals, 1 tablet, oral, Daily  [Transfer Hold] nystatin, 5 mL, Swish & Swallow, 4x daily  [Transfer Hold] pantoprazole, 40 mg, oral, Daily before breakfast  [Transfer Hold] polyethylene glycol, 17 g, oral, Daily  [Transfer Hold] pregabalin, 50 mg, oral, Nightly      Continuous medications  lactated Ringer's, 75 mL/hr, Last Rate: 75 mL/hr (05/17/24 0600)      PRN medications  [Transfer Hold] alteplase, 2 mg, PRN  [Transfer Hold] benzocaine-menthol, 1 lozenge, q2h PRN  [Transfer Hold] calcium carbonate, 500 mg, 4x daily PRN  [Held by provider] chlorproMAZINE, 25 mg, q6h PRN  gelatin absorbable, , PRN  [Transfer Hold] HYDROmorphone, 1 mg, q3h PRN  [Transfer Hold] HYDROmorphone, 4 mg, q4h PRN  [Transfer Hold] HYDROmorphone, 6 mg, q4h PRN  lidocaine-epinephrine, , PRN  [Transfer Hold] LORazepam, 1 mg, q8h PRN  [Transfer Hold] metoclopramide, 10 mg, q8h PRN  [Transfer Hold] ondansetron, 4 mg, q8h PRN   Or  [Transfer Hold] ondansetron, 4 mg, q8h PRN  thrombin, , PRN      }     PHYSICAL EXAMINATION:    Vital Signs:   Vital signs reviewed  Visit Vitals  /85   Pulse 103   Temp 36.6 °C (97.9 °F) (Temporal)   Resp 18        Pain Score: 5 - Moderate pain  Mancuso-Naranjo FACES Pain Rating: Hurts whole lot       ASSESSMENT/PLAN:    Massimo Garcia is a 63 y.o. male diagnosed with metastatic esophageal cancer. PMH significant for third degree heart block s/p PPM (placed in November), esophageal adenocarcinoma with mets to liver and lungs, Drug-induced colitis, PE, peripheral neuropathy, and portal vein thrombosis (On Eliquis).  Admitted 5/10/2024 for further evaluation and management of acute on chronic low back pain. Course complicated by soft tissue extension into the spinal  canal at T9-T10 with associated spinal canal stenosis. Supportive and Palliative Oncology is consulted for pain management.     Pain:  Lower back pain related to metastatic esophageal cancer  Pain is: cancer related pain  Type: visceral, somatic, and neuropathic  Pain control: { :53199}  Home regimen:  Acetaminophen PRN, Oxycodone 10mg q4hrs prn, Fentanyl TD , and bentyl prn   Intolerances/previously tried: Morphine with poor efficacy. Gabapentin was ineffective   Personalized pain goal: 2  Total OME usage for the past 24 hours:  ~173 OME  ***  ***  Continue to monitor pain scores and administer PRN medications as appropriate  Continue/initiate nonpharmacologic pain management strategies including ice/heat therapy, distraction techniques, deep breathing/relaxation techniques, calming music, and repositioning  Continue to monitor for signs of opioid efficacy (pain scores, improved functionality) and toxicity (pinpoint pupils, excess sedation/drowsiness/confusion, respiratory depression, etc.)    Nausea:  { :16789} nausea {with or without:79067} vomiting related to { :06184}   Home regimen:  { :22474}  { :77347}  ***    Constipation  At risk for constipation related to opioids, { :87858}  Usual bowel pattern: { :52623}  Home regimen: { :72894}  LBM ***  Monitor BM frequency, adjust regimen as needed  Goal to have BM without straining q48-72h  ***     Altered Mood:  {Acute/Chronic:88205} {anxiety/depression:12154} related to {related to:96722}   {controlled/uncontrolled:82811} with home regimen   Home regimen: { :88031}  ***    Sleeping Difficulty:  Impaired sleep related to { :25039}  Home regimen:  { :66013}  ***    Decreased appetite:  Appetite loss related to { :01372}  Nutrition { :02580}  Weight loss ***  Home regimen:  { :63382}  ***     Supportive and Palliative Oncology encounter:  Spoke with patient at bedside  Emotional support provided  Coordination of care     Signature and billing:  Thank you for allowing  us to participate in the care of this patient. Recommendations will be communicated back to the consulting service by way of shared electronic medical record or face-to-face.    Medical complexity was { :02309} level due to due to complexity of problems, extensive data review, and high risk of management/treatment.    I spent *** minutes in the care of this patient which included chart review, interviewing patient/family, discussion with primary team, coordination of care, and documentation.    Data:   Diagnostic tests and information reviewed for today's visit:  { :27425}       Some elements copied from *** note on ***, the elements have been updated and all reflect current decision making from today, 05/17/24       Plan of Care discussed with: { :82905}    Thank you for asking Supportive and Palliative Oncology to assist with care of this patient.  We will continue to { :24666}  Please contact us for additional questions or concerns.      SIGNATURE: Rossana Pierre PharmD   PAGER/CONTACT:  Contact information:  Supportive and Palliative Oncology  Monday-Friday 8 AM-5 PM  Epic Secure chat or pager [phone].  After hours and weekends:  pager [phone]

## 2024-05-17 NOTE — ANESTHESIA PREPROCEDURE EVALUATION
Patient: Massimo Garcia    Procedure Information       Anesthesia Start Date/Time: 05/17/24 0813    Procedure: T10 transpedicular approach, mass resection, T8-12 fusion    Location: [facility] OR 26 / [facility] OR    Surgeons: Blue John MD          Vitals:    05/17/24 0714   BP:    Pulse: 103   Resp: 18   Temp: 36.6 °C (97.9 °F)   SpO2: 98%       Past Surgical History:   Procedure Laterality Date   • CARDIAC ELECTROPHYSIOLOGY PROCEDURE N/A 11/7/2023    Procedure: Temporary Pacemaker Insertion;  Surgeon: Alexis Shook MD;  Location: [facility] Cardiac Cath Lab;  Service: Cardiovascular;  Laterality: N/A;   • CARDIAC ELECTROPHYSIOLOGY PROCEDURE Left 11/9/2023    Procedure: PPM IMPLANT DUAL;  Surgeon: Elias Connolly MD;  Location: [facility] Cardiac Cath Lab;  Service: Electrophysiology;  Laterality: Left;   • TOTAL KNEE ARTHROPLASTY  09/30/2016    Total Knee Replacement Left   • TOTAL KNEE ARTHROPLASTY  09/30/2016    Total Knee Replacement Right     Past Medical History:   Diagnosis Date   • Essential (primary) hypertension 12/21/2013    Hypertension   • Pacemaker    • Peripheral neuropathy    • Personal history of other diseases of the circulatory system     History of right bundle branch block (RBBB)   • Pneumothorax 12/04/2023       Current Facility-Administered Medications:   •  [Transfer Hold] alteplase (Cathflo Activase) injection 2 mg, 2 mg, intra-catheter, PRN, Adi Mays MD  •  [Transfer Hold] benzocaine-menthol (Cepastat Sore Throat) lozenge 1 lozenge, 1 lozenge, Mouth/Throat, q2h PRN, Adi Mays MD  •  [Transfer Hold] calcium carbonate (Tums) chewable tablet 500 mg, 500 mg, oral, 4x daily PRN, Adi Mays MD  •  [Held by provider] chlorproMAZINE (Thorazine) tablet 25 mg, 25 mg, oral, q6h PRN, Adi Mays MD  •  [Transfer Hold] cholecalciferol (Vitamin D-3) tablet 4,000 Units, 4,000 Units, oral, Daily, Adi Mays MD, 4,000 Units at 05/16/24 0820  •  [Transfer Hold] dexAMETHasone (Decadron)  tablet 6 mg, 6 mg, oral, q8h LUCITA, Adi Mays MD, 6 mg at 05/17/24 0525  •  [Held by provider] enoxaparin (Lovenox) syringe 40 mg, 40 mg, subcutaneous, Daily, Adi Mays MD  •  [Transfer Hold] fentaNYL (Duragesic) 75 mcg/hr patch 1 patch, 1 patch, transdermal, q72h, Adi Mays MD  •  gelatin absorbable (Gelfoam) 100 sponge, , , PRN, Blue John MD, 1 each at 05/17/24 0927  •  [Transfer Hold] HYDROmorphone (Dilaudid) injection 1 mg, 1 mg, intravenous, q3h PRN, Adi Mays MD, 1 mg at 05/17/24 0006  •  [Transfer Hold] HYDROmorphone (Dilaudid) tablet 4 mg, 4 mg, oral, q4h PRN, Adi Mays MD, 4 mg at 05/16/24 2227  •  [Transfer Hold] HYDROmorphone (Dilaudid) tablet 6 mg, 6 mg, oral, q4h PRN, Adi Mays MD, 6 mg at 05/17/24 0409  •  lactated Ringer's infusion, 75 mL/hr, intravenous, Continuous, Adi Mays MD, Last Rate: 75 mL/hr at 05/17/24 0600, 75 mL/hr at 05/17/24 0600  •  [Transfer Hold] lidocaine 4 % patch 1 patch, 1 patch, transdermal, Daily, Adi Mays MD, 1 patch at 05/16/24 0821  •  lidocaine-epinephrine (Xylocaine W/EPI) 1 %-1:100,000 injection, , , PRN, Blue John MD, 10 mL at 05/17/24 0920  •  [Transfer Hold] LORazepam (Ativan) tablet 1 mg, 1 mg, oral, q8h PRN, Nicholas Oconnell MD PhD, 1 mg at 05/16/24 1319  •  [Transfer Hold] metoclopramide (Reglan) tablet 10 mg, 10 mg, oral, q8h PRN, Adi Mays MD, 10 mg at 05/17/24 0422  •  [Transfer Hold] multivitamin with minerals 1 tablet, 1 tablet, oral, Daily, Adi Mays MD, 1 tablet at 05/16/24 0820  •  [Transfer Hold] nystatin (Mycostatin) 100,000 unit/mL suspension 500,000 Units, 5 mL, Swish & Swallow, 4x daily, Adi Mays MD, 500,000 Units at 05/16/24 2155  •  [Transfer Hold] ondansetron (Zofran) tablet 4 mg, 4 mg, oral, q8h PRN **OR** [Transfer Hold] ondansetron (Zofran) injection 4 mg, 4 mg, intravenous, q8h PRN, Adi Mays MD  •  [Transfer Hold] pantoprazole (ProtoNix) EC tablet 40 mg, 40 mg, oral, Daily before breakfast, Adi KIRKPATRICK  MD Davon, 40 mg at 05/17/24 0525  •  [Transfer Hold] polyethylene glycol (Glycolax, Miralax) packet 17 g, 17 g, oral, Daily, Adi Mays MD, 17 g at 05/16/24 0819  •  [Transfer Hold] pregabalin (Lyrica) capsule 50 mg, 50 mg, oral, Nightly, Adi Mays MD, 50 mg at 05/16/24 2155  •  thrombin solution, , , PRN, Blue John MD, 5,000 Units at 05/17/24 0927    Facility-Administered Medications Ordered in Other Encounters:   •  dexmedeTOMIDine (Precedex) 400 mcg in 100 mL (4 mcg/mL) sodium chloride 0.9% infusion, , intravenous, Continuous PRN, Ashwinraj Amalraj, CAA, Last Rate: 5.273 mL/hr at 05/17/24 0945, 0.3 mcg/kg/hr at 05/17/24 0945  •  propofol (Diprivan) infusion, , intravenous, Continuous PRN, Ashwinraj Amalraj, CAA, Last Rate: 42.18 mL/hr at 05/17/24 0944, 100 mcg/kg/min at 05/17/24 0944  •  remifentanil (Ultiva) 1,000 mcg in sodium chloride 0.9% 50 mL (20 mcg/mL) infusion, , intravenous, Continuous PRN, Ashwinraj Amalraj, CAA, Last Rate: 25.308 mL/hr at 05/17/24 0949, 0.12 mcg/kg/min at 05/17/24 0949  •  tranexamic acid (Cyklokapron) 6,250 mg in sodium chloride 0.9% 312.5 mL (20 mg/mL) infusion, , intravenous, Continuous PRN, Ashwinraj Amalraj, CAA, Last Rate: 21 mL/hr at 05/17/24 0932, 5.974 mg/kg/hr at 05/17/24 0932  •  tranexamic acid (Cyklokapron) injection, , intravenous, PRN, Ashwinraj Amalraj, CAA, 1,000 mg at 05/17/24 0932  Prior to Admission medications    Medication Sig Start Date End Date Taking? Authorizing Provider   apixaban (Eliquis) 5 mg tablet TAKE 1 TABLET BY MOUTH TWO TIMES A DAY 12/28/23 12/26/24  Gloria Chand PA-C   chlorproMAZINE (Thorazine) 25 mg tablet Take 1 tablet (25 mg) by mouth 2 times a day.  Patient not taking: Reported on 5/10/2024 3/19/24 6/17/24  Tomasa Blake MD   cholecalciferol (Vitamin D-3) 50 MCG (2000 UT) tablet Take 2 tablets (100 mcg) by mouth once daily in the morning.    Historical Provider, MD   fentaNYL (Duragesic) 50 mcg/hr patch Place 1 patch over 72  hours on the skin every 3rd day. 4/19/24 5/19/24  ALFREDITO Ferguson-CNP   lidocaine (Lidoderm) 5 % patch Place 1 patch over 12 hours on the skin once daily. Remove & discard patch within 12 hours or as directed by MD.  Patient not taking: Reported on 5/10/2024 4/24/24 5/24/24  Yo Gonsalez PA-C   LORazepam (Ativan) 1 mg tablet Take 1 tablet (1 mg) by mouth every 8 hours if needed for anxiety (nausea).  Patient taking differently: Take 1 tablet (1 mg) by mouth every 8 hours if needed for anxiety (nausea). Last OAR[place] fill: 4/23/24 #90 for 30 days 4/18/24 5/18/24  ALFREDITO Ferguson-CNP   medical cannabis Take 1 each by mouth. Last OARRS fills:  1/18/24 Tin Oral Admin-3.67-0-120 Cbn Tincture 440/4 days  1/18/24 Tin Oral Admin-5.5-5.5-120 660/6 days  12/9/23 Oint Top Admin-16.6-30 Mjm 590.00/ 2 days    Historical Provider, MD   metoprolol succinate XL (Toprol-XL) 25 mg 24 hr tablet Take 1 tablet (25 mg) by mouth once daily. Do not crush or chew.  Patient not taking: Reported on 5/10/2024 2/19/24 5/22/24  ALFREDITO Evans-CNP   multivitamin with minerals (multivit-min-iron fum-folic ac) tablet Take 2 tablets by mouth once daily in the morning.    Historical Provider, MD   oxyCODONE (Roxicodone) 5 mg immediate release tablet Take 2 tablets (10 mg) by mouth every 4 hours if needed for severe pain (7 - 10). Last OARRS fill: 2/25/24 #180 for 30 days    Historical Provider, MD   potassium chloride CR 10 mEq ER tablet TAKE 1 TABLET BY MOUTH EVERY DAY 3/15/24   Tomasa Blake MD   predniSONE (Deltasone) 20 mg tablet Take 4 tablets (80 mg) by mouth once every 24 hours for 21 days. 5/6/24 5/27/24  Latrice Mancuso, DO   scopolamine (Transderm-Scop) 1 mg over 3 days patch 3 day Place 1 patch on the skin every 3rd day. 4/29/24   Tomasa Blake MD   sodium chloride (Ocean) 0.65 % nasal spray Administer 1 spray into each nostril if needed for congestion (or nasal dryness/ bleeding). 11/2/23 11/1/24   Sugar Hernandez, APRN-CNP   sucralfate (Carafate) 1 gram tablet Take 1 tablet (1 g) by mouth once daily as needed. 6/2/23   Historical Provider, MD     Allergies   Allergen Reactions   • Bee Venom Protein (Honey Bee) Anaphylaxis   • Oxaliplatin Other     Rigors     Social History     Tobacco Use   • Smoking status: Former     Types: Cigarettes, Cigars     Passive exposure: Never   • Smokeless tobacco: Never   Substance Use Topics   • Alcohol use: Not Currently         Chemistry    Lab Results   Component Value Date/Time     05/17/2024 0527    K 3.7 05/17/2024 0527     05/17/2024 0527    CO2 28 05/17/2024 0527    BUN 26 (H) 05/17/2024 0527    CREATININE 0.60 05/17/2024 0527    Lab Results   Component Value Date/Time    CALCIUM 8.8 05/17/2024 0527    ALKPHOS 136 05/12/2024 0557    AST 28 05/12/2024 0557    ALT 16 05/12/2024 0557    BILITOT 1.0 05/12/2024 0557          Lab Results   Component Value Date/Time    WBC 8.2 05/17/2024 0527    HGB 9.9 (L) 05/17/2024 0527    HCT 30.9 (L) 05/17/2024 0527    PLT 75 (L) 05/17/2024 0527     Lab Results   Component Value Date/Time    PROTIME 15.6 (H) 05/16/2024 1148    INR 1.4 (H) 05/16/2024 1148     Encounter Date: 04/04/24   ECG 12 lead   Result Value    Ventricular Rate 101    Atrial Rate 89    QRS Duration 168    QT Interval 430    QTC Calculation(Bazett) 557    R Axis -64    T Axis 76    QRS Count 17    Q Onset 189    T Offset 404    QTC Fredericia 511    Narrative    Ventricular-paced rhythm  Abnormal ECG  When compared with ECG of 28-MAR-2024 11:24,  Vent. rate has decreased BY   5 BPM  See ED provider note for full interpretation and clinical correlation  Confirmed by Gladis Ferreira ([id number]) on 4/6/2024 10:38:05 AM     No results found for this or any previous visit from the past 1095 days.        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)      GI   (+) Esophageal cancer, stage IV (Multi)   (+) GERD (gastroesophageal reflux disease)   (+) Gastroesophageal reflux disease   (+) Primary malignant neoplasm of esophagus (Multi)   (+) Stage IV malignant neoplasm of esophagus (Multi)      Liver   (+) Esophageal cancer, stage IV (Multi)   (+) Malignant neoplasm metastatic to liver (Multi)   (+) Metastases to the liver (Multi)   (+) Primary malignant neoplasm of esophagus (Multi)   (+) Stage IV malignant neoplasm of esophagus (Multi)      Hematology   (+) PE (pulmonary thromboembolism) (Multi)   (+) Thrombocytopenia (CMS-HCC)      Musculoskeletal   (+) Osteoarthritis of shoulder   (+) Spinal stenosis of thoracolumbar region      ID   (+) Candidiasis of mouth       Clinical information reviewed:   Tobacco  Allergies  Meds  Problems  Med Hx  Surg Hx   Fam Hx  Soc   Hx        NPO Detail:  NPO/Void Status  Carbohydrate Drink Given Prior to Surgery? : N  Date of Last Liquid: 05/17/24  Time of Last Liquid: 0000  Date of Last Solid: 05/17/24  Time of Last Solid: 0000  Last Intake Type: Clear fluids  Time of Last Void: 0000         Physical Exam    Airway  Mallampati: IV  TM distance: >3 FB  Neck ROM: full     Cardiovascular   Rhythm: regular  Rate: abnormal     Dental    Pulmonary   Breath sounds clear to auscultation     Abdominal      Other findings: Poor dentition.  Tachycardic in preop.       Anesthesia Plan    History of general anesthesia?: yes  History of complications of general anesthesia?: no    ASA 4     general   (Stage 4 metastatic esophageal CA with liver mets. Plan for RSI. Metastatic epidural spinal cord compression- urgent per neurosurgery.   CHB- PM, Echo EF 50-55%.  Hx of PE (RLL segmental artery 3/24 and large PE 1/23).  Home Eliquis held, currently on Enoxaparin 40 mg.  Anxiety on Lorazepam. Chronic anemia, thrombocytopenia.  PLTS 79 on DOS after 2 U PLTs.  Neurosurgery would like to proceed even with a platelet count less than 100 k.  Plan to transfuse 2 U PLTs in OR asap. )  The patient is not a current smoker.    intravenous induction   Postoperative administration of opioids is intended.  Trial extubation is planned.  Anesthetic plan and risks discussed with patient and spouse.  Use of blood products discussed with patient and spouse who.    Plan discussed with CAA.

## 2024-05-17 NOTE — ANESTHESIA PROCEDURE NOTES
Peripheral IV  Date/Time: 5/17/2024 8:46 AM  Inserted by: DALIA Kramer    Placement  Needle size: 16 G  Laterality: right  Location: hand  Site prep: alcohol  Technique: anatomical landmarks  Attempts: 1

## 2024-05-17 NOTE — CARE PLAN
The patient's goals for the shift include      The clinical goals for the shift include pt will report pain <7/10 by end of shift 5/17/24 @ 0700      Problem: Fall/Injury  Goal: Not fall by end of shift  Outcome: Progressing  Goal: Be free from injury by end of the shift  Outcome: Progressing     Problem: Pain  Goal: Takes deep breaths with improved pain control throughout the shift  Outcome: Progressing  Goal: Turns in bed with improved pain control throughout the shift  Outcome: Progressing  Goal: Walks with improved pain control throughout the shift  Outcome: Progressing

## 2024-05-17 NOTE — HOSPITAL COURSE
63 y.o. 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  Admitted 5/10/2024 with diffuse T/L spine mets, R occipital met, and L temporal met after presenting with two days of worsening low back pain. On admission was tachycardic to 107, but otherwise afebrile and hemodynamically stable. Admission labs were significant for mild hyponatremia to 135 and hypokalmia to 3.0. CT abdomen/pelvis showed mulitple pulmonary nodules in lower lungs consistent with metastatic disease. Multiple hepatic masses, and A/P lymphadenopathy consistent with metastatic disease. CT of the L-spine on admission showed soft tissue thickening and nodularity in the paravertebral soft tissues at lower T spine with spinal cord stenosis at T9-T10 consistent with metastatic disease.  He was transferred from [facility] to [facility] on 5/10 for further management. Neurosurgery was consulted on 5/11 and recommended MRI pan-spine for further evaluation. Supportive oncology consulted and helped titrate pain management with fentanyl patch and oral Dilaudid. His steroids were titrated from his home prednisone 80 to dexamethasone 6 mg TID. On 5/16, patient had MRI pan-spine done as well as MRI brain. Based on findings, neurosurgery took him emergently on 5/17 for T10 transpedicular decompression and a T8-T12 fusion. The operation went well with no major side effects. Post-op, he was switched from his dexamethasone 6 mg TID to  a prednisone taper given previous history of irAE colitis and two week prior history of being on high dose steroids. Given neurosurgery's concern for poor bone healing with high steroid doses, we tried to taper as fast as possible. On the night of 5/18, he had a recorded heart rate of 240 which resolved on its own, and was placed  on telemetry. He had no further episodes of tachycardia. On 5/19, due to failure of platelets to increment appropriately following transfusion, hematology was consulted. 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 Transfusion goals were updated accordingly to platelets>50 and fibrinogen>150. He was started on a PCA post-op which was discontinued 5/21, when he was switched back to oral regimen. Radiation oncology was also consulted on 5/21 for post-op radiation to mets in brain and spine, and scheduled a follow-up in 2 weeks with Dr. Hodgson. He was getting up to walk around by 5/20. Lumbar drains were removed on 5/21. On 5/24, Prevena wound vac was removed and he was restarted on home apixaban. On discharge, pain was a 1/10 at rest in back and 2/10 with walking and was well controlled with oral pain regimen. He was follow up with supportive oncology and his regular oncologist on 5/31, and radiation oncology and neurosurgery in 2 weeks.

## 2024-05-17 NOTE — ANESTHESIA PROCEDURE NOTES
Arterial Line:    Date/Time: 5/17/2024 8:40 AM    Staffing  Performed: attending   Authorized by: Kassy Russell MD    Performed by: DALIA Kramer    An arterial line was placed. in the OR for the following indication(s): continuous blood pressure monitoring and blood sampling needed.    A 20 gauge (size), 1 and 3/4 inch (length), Angiocath (type) catheter was placed into the Right radial artery, secured by Tegaderm,   Seldinger technique not used.  Events:  patient tolerated procedure well with no complications.

## 2024-05-17 NOTE — SIGNIFICANT EVENT
Neurosurgery spine postoperative recommendations     Resuscitation/activity  Okay for DVT chemoprophylaxis postoperative day 1  Please follow up postoperative labs from PACU (CBC, RFP, coag, fibrinogen, goals are Hb>7; INR<1.6, fibrinogen>150, plt>100. This should be maintained strictly for 24 hours postoperatively)  Please additionally obtain AM labs (Hb>7; INR<1.6, fibrinogen>150, plt>100 if able--we can liberalize these goals if needed, please discuss)  Please ensure the patient works with PTOT daily starting POD1. Okay for WBAT, activity as tolerated with assist as needed, should be up in chair TID.    Pain control  Scheduled acetaminophen 650q4, cyclobenzaprine 10q8, lidocaine patches x2 to be applied to either side of back daily  PCA/maher through POD1; then can transition to oxycodone 5q4 for mod pain (4-6), 10q4 for severe pain (7-10); dilaudid 0.4 q3 for breakthrough pain  Okay for toradol 30q6 pending renal function for up to 8 doses; should not continue NSAIDs after POD2-3 as this inhibits bony fusion process    Imaging/wound care  We will order an MRI thoracic spine w/wo contrast when able (given pacemaker, this may be delayed, but needs to be coordinated while inpatient) as well as standing upright X-rays of the thoracic spine POD1 and will assess these images.  Neurosurgery spine will manage the patient's drain as well as the Prevena negative pressure dressing over the incision. The prevena dressing can be pulled off of the spine incision on postoperative day 7 and discarded if it is still in place. We will also manage the drains which will stay in place until appropriately low output.  Hold chemotherapy until [id number]--ns want to ensure the wound is appropriately healing.  Add on for tumor board; will need adjuvant radiation therapy, which at earliest can happen POD14 pending wound healing. Please ensure the patient has radiation oncology follow-up.  If the patient is in-house on postoperative day 14,  please page [phone] for staple removal  We will arrange for follow-up with Dr. John office for POD14 wound check and 6-week follow-up visit.

## 2024-05-17 NOTE — ANESTHESIA POSTPROCEDURE EVALUATION
Patient: Massimo Garcia    Procedure Summary       Date: 05/17/24 Room / Location: [facility] OR 26 / [facility] OR    Anesthesia Start: 0813 Anesthesia Stop: 1236    Procedure: T10 transpedicular approach, mass resection, T8-12 fusion (Spine Thoracic) Diagnosis:       Primary malignant neoplasm of esophagus (Multi)      (Primary malignant neoplasm of esophagus (Multi) [C15.9])    Surgeons: Blue John MD Responsible Provider: Kassy Russell MD    Anesthesia Type: general ASA Status: 4            Anesthesia Type: general    Vitals Value Taken Time   /85 05/17/24 1230   Temp 36.2 05/17/24 1237   Pulse 79 05/17/24 1233   Resp 20 05/17/24 1233   SpO2 100 % 05/17/24 1233   Vitals shown include unfiled device data.    Anesthesia Post Evaluation    Patient location during evaluation: PACU  Patient participation: complete - patient participated  Level of consciousness: awake and alert  Pain score: 2  Pain management: adequate  Airway patency: patent  Cardiovascular status: acceptable  Respiratory status: acceptable and face mask  Hydration status: acceptable  Postoperative Nausea and Vomiting: none  Comments: Eye chemosis noted in b/l eyes. L eye with slight exophthalmos. Eyes were free of pressure intraoperatively.  Patient denies blurry vision.  Neurosurgery team aware, recommend ophthalmology consult. Patient updated, all questions answered.       No notable events documented.

## 2024-05-17 NOTE — PROGRESS NOTES
"Massimo Garcia is a 63 y.o. male on day 7 of admission presenting with Esophageal cancer, stage IV (Multi).    Subjective   Accompanied by family at bedside, no complaints    Objective     Physical Exam  A&Ox3  Face symmetric  RUE 5/5  LUE 5/5  RLE 5/5  LLE 5/5  Sensation intact to light touch throughout all extremities    Last Recorded Vitals  Blood pressure 135/79, pulse 99, temperature 36.7 °C (98.1 °F), temperature source Temporal, resp. rate 18, height 1.727 m (5' 8\"), weight 70.3 kg (155 lb), SpO2 99%.  Intake/Output last 3 Shifts:  No intake/output data recorded.    Relevant Results    Assessment/Plan   Principal Problem:    Esophageal cancer, stage IV (Multi)  Active Problems:    Primary malignant neoplasm of esophagus (Multi)    63yM h/o HTN, third degree heart block s/p pacemaker (11/2023), portal john thrombosis/prior PE (on Eliquis), OA, stage IV esophageal adenocarcinoma (HER2 positive) w/ known mets to lung and liver, s/p chemo (last doses 4/29), recent admission for drug-induced colitis, 5/10 p/w 1d LBP, CT T/L spine diffuse soft tissue mets, T9-10 soft tissue mass with epidural extension, MRI neuroaxis R occipital 1.1cm met, L temporal 8mm met, T9-11 peripherally enhancing vertebral body circumferential lesion, worst at T10, L2 ventral lesion c/f drop met, 5/16 s/p 2U plts    PLAN    Rodriguez primary  OR today for T10 transpedic, T8-12 fusion  Recommend platelets >100  Hold kory Denny MD      "

## 2024-05-18 LAB
ALBUMIN SERPL BCP-MCNC: 3.1 G/DL (ref 3.4–5)
ANION GAP SERPL CALC-SCNC: 11 MMOL/L (ref 10–20)
BASOPHILS # BLD AUTO: 0.01 X10*3/UL (ref 0–0.1)
BASOPHILS # BLD AUTO: 0.01 X10*3/UL (ref 0–0.1)
BASOPHILS NFR BLD AUTO: 0.1 %
BASOPHILS NFR BLD AUTO: 0.1 %
BLOOD EXPIRATION DATE: NORMAL
BUN SERPL-MCNC: 23 MG/DL (ref 6–23)
CALCIUM SERPL-MCNC: 8.8 MG/DL (ref 8.6–10.6)
CHLORIDE SERPL-SCNC: 99 MMOL/L (ref 98–107)
CO2 SERPL-SCNC: 28 MMOL/L (ref 21–32)
CREAT SERPL-MCNC: 0.67 MG/DL (ref 0.5–1.3)
DISPENSE STATUS: NORMAL
EGFRCR SERPLBLD CKD-EPI 2021: >90 ML/MIN/1.73M*2
EOSINOPHIL # BLD AUTO: 0 X10*3/UL (ref 0–0.7)
EOSINOPHIL # BLD AUTO: 0 X10*3/UL (ref 0–0.7)
EOSINOPHIL NFR BLD AUTO: 0 %
EOSINOPHIL NFR BLD AUTO: 0 %
ERYTHROCYTE [DISTWIDTH] IN BLOOD BY AUTOMATED COUNT: 17.8 % (ref 11.5–14.5)
ERYTHROCYTE [DISTWIDTH] IN BLOOD BY AUTOMATED COUNT: 17.8 % (ref 11.5–14.5)
FIBRINOGEN PPP-MCNC: 146 MG/DL (ref 200–400)
GLUCOSE SERPL-MCNC: 238 MG/DL (ref 74–99)
HCT VFR BLD AUTO: 25.6 % (ref 41–52)
HCT VFR BLD AUTO: 27.9 % (ref 41–52)
HGB BLD-MCNC: 8.5 G/DL (ref 13.5–17.5)
HGB BLD-MCNC: 9.3 G/DL (ref 13.5–17.5)
IMM GRANULOCYTES # BLD AUTO: 0.17 X10*3/UL (ref 0–0.7)
IMM GRANULOCYTES # BLD AUTO: 0.27 X10*3/UL (ref 0–0.7)
IMM GRANULOCYTES NFR BLD AUTO: 2.4 % (ref 0–0.9)
IMM GRANULOCYTES NFR BLD AUTO: 2.5 % (ref 0–0.9)
LYMPHOCYTES # BLD AUTO: 0.13 X10*3/UL (ref 1.2–4.8)
LYMPHOCYTES # BLD AUTO: 0.29 X10*3/UL (ref 1.2–4.8)
LYMPHOCYTES NFR BLD AUTO: 1.9 %
LYMPHOCYTES NFR BLD AUTO: 2.6 %
MAGNESIUM SERPL-MCNC: 1.96 MG/DL (ref 1.6–2.4)
MCH RBC QN AUTO: 29.8 PG (ref 26–34)
MCH RBC QN AUTO: 30.1 PG (ref 26–34)
MCHC RBC AUTO-ENTMCNC: 33.2 G/DL (ref 32–36)
MCHC RBC AUTO-ENTMCNC: 33.3 G/DL (ref 32–36)
MCV RBC AUTO: 89 FL (ref 80–100)
MCV RBC AUTO: 91 FL (ref 80–100)
MONOCYTES # BLD AUTO: 0.57 X10*3/UL (ref 0.1–1)
MONOCYTES # BLD AUTO: 0.88 X10*3/UL (ref 0.1–1)
MONOCYTES NFR BLD AUTO: 7.9 %
MONOCYTES NFR BLD AUTO: 8.3 %
NEUTROPHILS # BLD AUTO: 6.02 X10*3/UL (ref 1.2–7.7)
NEUTROPHILS # BLD AUTO: 9.64 X10*3/UL (ref 1.2–7.7)
NEUTROPHILS NFR BLD AUTO: 87 %
NEUTROPHILS NFR BLD AUTO: 87.2 %
NRBC BLD-RTO: 0 /100 WBCS (ref 0–0)
NRBC BLD-RTO: 0 /100 WBCS (ref 0–0)
PHOSPHATE SERPL-MCNC: 3 MG/DL (ref 2.5–4.9)
PLATELET # BLD AUTO: 90 X10*3/UL (ref 150–450)
PLATELET # BLD AUTO: 96 X10*3/UL (ref 150–450)
POTASSIUM SERPL-SCNC: 3.7 MMOL/L (ref 3.5–5.3)
PRODUCT BLOOD TYPE: 5100
PRODUCT CODE: NORMAL
RBC # BLD AUTO: 2.82 X10*6/UL (ref 4.5–5.9)
RBC # BLD AUTO: 3.12 X10*6/UL (ref 4.5–5.9)
SODIUM SERPL-SCNC: 134 MMOL/L (ref 136–145)
UNIT ABO: NORMAL
UNIT NUMBER: NORMAL
UNIT RH: NORMAL
UNIT VOLUME: 235
UNIT VOLUME: 277
UNIT VOLUME: 287
UNIT VOLUME: 318
UNIT VOLUME: 82
WBC # BLD AUTO: 11.1 X10*3/UL (ref 4.4–11.3)
WBC # BLD AUTO: 6.9 X10*3/UL (ref 4.4–11.3)

## 2024-05-18 PROCEDURE — P9073 PLATELETS PHERESIS PATH REDU: HCPCS

## 2024-05-18 PROCEDURE — 85384 FIBRINOGEN ACTIVITY: CPT

## 2024-05-18 PROCEDURE — 1200000002 HC GENERAL ROOM WITH TELEMETRY DAILY

## 2024-05-18 PROCEDURE — 2500000004 HC RX 250 GENERAL PHARMACY W/ HCPCS (ALT 636 FOR OP/ED)

## 2024-05-18 PROCEDURE — 85025 COMPLETE CBC W/AUTO DIFF WBC: CPT

## 2024-05-18 PROCEDURE — 85025 COMPLETE CBC W/AUTO DIFF WBC: CPT | Mod: 91

## 2024-05-18 PROCEDURE — 83735 ASSAY OF MAGNESIUM: CPT

## 2024-05-18 PROCEDURE — 80069 RENAL FUNCTION PANEL: CPT

## 2024-05-18 PROCEDURE — 85060 BLOOD SMEAR INTERPRETATION: CPT

## 2024-05-18 PROCEDURE — 99232 SBSQ HOSP IP/OBS MODERATE 35: CPT

## 2024-05-18 PROCEDURE — 93010 ELECTROCARDIOGRAM REPORT: CPT | Performed by: INTERNAL MEDICINE

## 2024-05-18 PROCEDURE — 2500000001 HC RX 250 WO HCPCS SELF ADMINISTERED DRUGS (ALT 637 FOR MEDICARE OP)

## 2024-05-18 PROCEDURE — 36430 TRANSFUSION BLD/BLD COMPNT: CPT

## 2024-05-18 PROCEDURE — P9012 CRYOPRECIPITATE EACH UNIT: HCPCS

## 2024-05-18 PROCEDURE — P9037 PLATE PHERES LEUKOREDU IRRAD: HCPCS

## 2024-05-18 RX ADMIN — ACETAMINOPHEN 650 MG: 325 TABLET ORAL at 21:53

## 2024-05-18 RX ADMIN — SODIUM CHLORIDE, POTASSIUM CHLORIDE, SODIUM LACTATE AND CALCIUM CHLORIDE 75 ML/HR: 600; 310; 30; 20 INJECTION, SOLUTION INTRAVENOUS at 21:11

## 2024-05-18 RX ADMIN — SODIUM CHLORIDE, POTASSIUM CHLORIDE, SODIUM LACTATE AND CALCIUM CHLORIDE 75 ML/HR: 600; 310; 30; 20 INJECTION, SOLUTION INTRAVENOUS at 10:00

## 2024-05-18 RX ADMIN — PREDNISONE 80 MG: 20 TABLET ORAL at 09:11

## 2024-05-18 RX ADMIN — ACETAMINOPHEN 650 MG: 325 TABLET ORAL at 09:12

## 2024-05-18 RX ADMIN — NYSTATIN 500000 UNITS: 100000 SUSPENSION ORAL at 21:09

## 2024-05-18 RX ADMIN — FENTANYL 1 PATCH: 75 PATCH TRANSDERMAL at 21:10

## 2024-05-18 RX ADMIN — NYSTATIN 500000 UNITS: 100000 SUSPENSION ORAL at 06:27

## 2024-05-18 RX ADMIN — PANTOPRAZOLE SODIUM 40 MG: 40 TABLET, DELAYED RELEASE ORAL at 06:27

## 2024-05-18 RX ADMIN — PREGABALIN 50 MG: 25 CAPSULE ORAL at 21:09

## 2024-05-18 RX ADMIN — CYCLOBENZAPRINE 10 MG: 10 TABLET, FILM COATED ORAL at 09:12

## 2024-05-18 RX ADMIN — CYCLOBENZAPRINE 10 MG: 10 TABLET, FILM COATED ORAL at 21:10

## 2024-05-18 RX ADMIN — ACETAMINOPHEN 650 MG: 325 TABLET ORAL at 03:53

## 2024-05-18 RX ADMIN — Medication 4000 UNITS: at 09:11

## 2024-05-18 RX ADMIN — ENOXAPARIN SODIUM 40 MG: 100 INJECTION SUBCUTANEOUS at 22:51

## 2024-05-18 RX ADMIN — Medication 1 TABLET: at 09:12

## 2024-05-18 RX ADMIN — Medication: at 09:12

## 2024-05-18 ASSESSMENT — COGNITIVE AND FUNCTIONAL STATUS - GENERAL
MOVING FROM LYING ON BACK TO SITTING ON SIDE OF FLAT BED WITH BEDRAILS: A LITTLE
MOVING TO AND FROM BED TO CHAIR: A LOT
MOBILITY SCORE: 14
WALKING IN HOSPITAL ROOM: A LOT
DAILY ACTIVITIY SCORE: 14
TURNING FROM BACK TO SIDE WHILE IN FLAT BAD: A LITTLE
STANDING UP FROM CHAIR USING ARMS: A LOT
PERSONAL GROOMING: A LOT
TOILETING: A LOT
DRESSING REGULAR UPPER BODY CLOTHING: A LOT
DRESSING REGULAR LOWER BODY CLOTHING: A LOT
HELP NEEDED FOR BATHING: A LOT
CLIMB 3 TO 5 STEPS WITH RAILING: A LOT

## 2024-05-18 ASSESSMENT — PAIN - FUNCTIONAL ASSESSMENT
PAIN_FUNCTIONAL_ASSESSMENT: 0-10

## 2024-05-18 ASSESSMENT — PAIN SCALES - GENERAL
PAINLEVEL_OUTOF10: 3
PAINLEVEL_OUTOF10: 4
PAINLEVEL_OUTOF10: 3
PAINLEVEL_OUTOF10: 4
PAINLEVEL_OUTOF10: 7

## 2024-05-18 NOTE — PROGRESS NOTES
"SUPPORTIVE AND PALLIATIVE ONCOLOGY INPATIENT FOLLOW-UP      SERVICE DATE: 05/18/24     SUBJECTIVE:  Interval Events:    Patient reports that his pain is well controlled with dilaudid PCA pump. He narrates how he had a rough night due to poor pain control but states that he plans to sleep during the day to catch up on sleep.     Discussed Lyrica and he reports that he does not remember why he has not received, his wife was at bedside and echoed same sentiment, stating that he was using it a couple days ago and did not notice any significant difference in patients mentation and felt like it was not kept long enough for them to \"notice any improvement in his pain\".     Discussed with wife and patient that we would start Lyrica again tonight to determine if it is effective.       Pain Assessment:  Location:  lower back pain  Duration: Constant  Characteristics:   Rating: 3 and Mild   Descriptors: aching and throbbing   Aggravating: movement and bending    Relieving: Analgesics dilaudid   Interference with Function: Very Much    Opioid Use  Past 24 h prn opioid use:   Fentanyl patch 75mcgs x 1 =150 mg   Dilaudid 6mg x 1 doses = 30 mg OME  Dilaudid 1mg iv x 1 doses = 12.5 mg ome   Dilaudid 0.5mg iv x 4  doses =25 mg ome   Patient dilaudid 0.2mg q10 minutes lockout =   Used 14.6 mg iv total. = 182.5 mg   93 demand   73 delivered.   Total 24h OME use: 400mg OME    Note: OME calculations based on equianalgesic table below. Please note this table is based on best available evidence but conversions are still approximate. These are NOT opioid DOSES for individual patient use; this is equivalency information.  Drug Parenteral Enteral   Morphine 10 25   Oxycodone N/A 20   Hydromorphone 2 5   Fentanyl 0.15 N/A   Tramadol N/A 120   Citation: Aniya ROSS. Demystifying opioid conversion calculations: A guide for effective dosing, Second edition. [place]: [employer]System Pharmacists, 2018.    Symptom " Assessment:  Nausea none  Vomiting none  Anxiety none  Difficulty Sleeping  rough night due to no IV dilaudid  Lack of appetite none    Information obtained from: chart review, interview of patient, interview of family, and discussion with primary team  ______________________________________________________________________        OBJECTIVE:    Lab Results   Component Value Date    WBC 6.9 05/18/2024    HGB 8.5 (L) 05/18/2024    HCT 25.6 (L) 05/18/2024    MCV 91 05/18/2024    PLT 90 (L) 05/18/2024      Lab Results   Component Value Date    GLUCOSE 238 (H) 05/18/2024    CALCIUM 8.8 05/18/2024     (L) 05/18/2024    K 3.7 05/18/2024    CO2 28 05/18/2024    CL 99 05/18/2024    BUN 23 05/18/2024    CREATININE 0.67 05/18/2024     Lab Results   Component Value Date    ALT 16 05/12/2024    AST 28 05/12/2024    ALKPHOS 136 05/12/2024    BILITOT 1.0 05/12/2024     Estimated Creatinine Clearance: 106.3 mL/min (by C-G formula based on SCr of 0.67 mg/dL).     Scheduled medications  acetaminophen, 650 mg, oral, q6h  cholecalciferol, 4,000 Units, oral, Daily  cyclobenzaprine, 10 mg, oral, TID  enoxaparin, 40 mg, subcutaneous, Daily  fentaNYL, 1 patch, transdermal, q72h  fibrinogen, 5,115 mg, intravenous, Once  lidocaine, 1 patch, transdermal, Daily  multivitamin with minerals, 1 tablet, oral, Daily  nystatin, 5 mL, Swish & Swallow, 4x daily  pantoprazole, 40 mg, oral, Daily before breakfast  polyethylene glycol, 17 g, oral, Daily  predniSONE, 80 mg, oral, Daily   Followed by  [START ON 5/25/2024] predniSONE, 40 mg, oral, Daily   Followed by  [START ON 6/1/2024] predniSONE, 20 mg, oral, Daily   Followed by  [START ON 6/8/2024] predniSONE, 10 mg, oral, Daily  [Held by provider] pregabalin, 50 mg, oral, Nightly      Continuous medications  HYDROmorphone,   lactated Ringer's, 75 mL/hr, Last Rate: 75 mL/hr (05/17/24 0600)      PRN medications  alteplase, 2 mg, PRN  benzocaine-menthol, 1 lozenge, q2h PRN  calcium carbonate, 500 mg,  4x daily PRN  [Held by provider] chlorproMAZINE, 25 mg, q6h PRN  HYDROmorphone, 1 mg, q3h PRN  HYDROmorphone, 4 mg, q4h PRN  HYDROmorphone, 6 mg, q4h PRN  LORazepam, 1 mg, q8h PRN  metoclopramide, 10 mg, q8h PRN  naloxone, 0.2 mg, PRN  ondansetron, 4 mg, q8h PRN   Or  ondansetron, 4 mg, q8h PRN  ondansetron, 4 mg, q8h PRN   Or  ondansetron, 4 mg, q8h PRN      }     PHYSICAL EXAMINATION:    Vital Signs:   Vital signs reviewed  Visit Vitals  /74 (BP Location: Right arm, Patient Position: Lying)   Pulse 99   Temp 36.9 °C (98.4 °F) (Temporal)   Resp 18        Pain Score: 7       Physical Exam  Constitutional:       Appearance: Normal appearance.   Cardiovascular:      Rate and Rhythm: Regular rhythm.      Heart sounds: Normal heart sounds.   Pulmonary:      Breath sounds: Normal breath sounds.   Abdominal:      General: Abdomen is flat.      Palpations: Abdomen is soft.   Skin:     General: Skin is warm.   Neurological:      Mental Status: He is alert and oriented to person, place, and time.   Psychiatric:         Mood and Affect: Mood normal.        ASSESSMENT/PLAN:    Massimo Garcia is a 63 y.o. male diagnosed with metastatic esophageal cancer. PMH significant for third degree heart block s/p PPM (placed in November), esophageal adenocarcinoma with mets to liver and lungs, Drug-induced colitis, PE, peripheral neuropathy, and portal vein thrombosis (On Eliquis).  Admitted 5/10/2024 for further evaluation and management of acute on chronic low back pain. Course complicated by soft tissue extension into the spinal canal at T9-T10 with associated spinal canal stenosis. Supportive and Palliative Oncology is consulted for pain management.      Pain:  Lower back pain related to metastatic esophageal cancer  Pain is: cancer related pain  Type: visceral, somatic, and neuropathic  Pain control: sub-optimally controlled  Home regimen:  Acetaminophen PRN, Oxycodone 10mg q4hrs prn, Fentanyl TD , and bentyl prn    Intolerances/previously tried: Morphine with poor efficacy. Gabapentin was ineffective, Lyrica makes patient very lethargic, was tried on 50mg and did not tolerate well.  Personalized pain goal: 2  Total OME usage for the past 24 hours: 400mg OME  Continue dilaudid 1mg IV q 3hrs PRN for breakthrough pain  Restart Lyrica 50mg at night [hold for sedation]  Discontinue dilaudid 6mg q3hrs PRN   Discontinue dilaudid 4mg q3hrs PRN   Continue dilaudid PCA   Bolus dose: 0.2mg   Lockout: 10 minutes   Basal: none  Max dose: 1.2mg/hr  Continue Fentanyl patch 75mcgs change q 72 hours [would increase at next opportunity]  Continue to monitor pain scores and administer PRN medications as appropriate  Continue/initiate nonpharmacologic pain management strategies including ice/heat therapy, distraction techniques, deep breathing/relaxation techniques, calming music, and repositioning  Continue to monitor for signs of opioid efficacy (pain scores, improved functionality) and toxicity (pinpoint pupils, excess sedation/drowsiness/confusion, respiratory depression, etc.)     Nausea:  Intermittent nausea without vomiting related to constipation   Home regimen:  protonix 40mg daily,  Scopolamine patch PRN and Carafate PRN  denies  Continue Dex 6mg iv TID managed by primary team  Continue Reglan 10mg q8hrs PRN   Continue Zofran 4mg IV q8hr PRN      Constipation  At risk for constipation related to opioids, currently not constipated  Usual bowel pattern: every day  Home regimen: Senna 2 tablets BID, Miralax 17 gm daily, Lactulose 20 gm 4x a day PRN, Docusate 100mg BID PRN, and MOM 15ml PRN   LBM 5/17/2024  Continue Miralax 17 grams daily   Monitor BM frequency, adjust regimen as needed  Goal to have BM without straining q48-72h      Altered Mood:  Acute on chronic anxiety related to health concerns   controlled with home regimen   Home regimen: lorazepam 1mg TID PRN  Monitor and control      Sleeping Difficulty:  Impaired sleep related  to pain  Home regimen:  none  Improved with pain control   Reports that he slept well last night.      Decreased appetite:  Appetite loss related to malignancy, chemotherapy, taste changes, and disease process  Nutrition following  Weight loss none  Home regimen:  none  Dex as above  Reports that he ate tapioca today made by his wife. With better pain control he is more enthusiastic about eating      Medical Decision Making/Goals of Care/Advance Care Planning:  Patient's current clinical condition, including diagnosis, prognosis, and management plan, and goals of care were discussed.   Life limiting disease: metastatic malignancy  Family: Supportive family   Joys/meaning/strength: Family and Lizette  Understanding of health: Demonstrates good prognostic understanding of disease process, understands plan for ongoing symptom control   Information:Wants full disclosure     Goals: symptom control and cancer directed therapy  Worries and fears now and future: ongoing symptoms   Minimum acceptable outcome/QOL:  wants all heroic measures in the event that his heart stops.   Code status discussion:  full code     Advance Directives  Existence of Advance Directives:No - has interest  Decision maker: LUKEOA is wife  Code Status: Full code     Introduction to Supportive and Palliative Oncology:  Spoke with patient at bedside  Introduced the role and philosophy of Supportive and Palliative oncology in the evaluation and management of symptoms during cancer treatment  Palliative care was introduced as a service for patients with serious illness to help with symptoms, assist with goals of care conversations, navigate complex decision making, improve quality of life for patients, and provide support both patients and families.  Patient seemed to appreciate the extra layer of support.      Supportive Interventions: Interventions: Music Therapy: offered referral placed, Art Therapy: offered declined, SPO Spiritual Care: offered  declined, Social work referral for: Advance Directives    Supportive and Palliative Oncology encounter:  Spoke with patient at bedside  Emotional support provided  Coordination of care     Signature and billing:  Thank you for allowing us to participate in the care of this patient. Recommendations will be communicated back to the consulting service by way of shared electronic medical record or face-to-face.    Medical complexity was high level due to due to complexity of problems, extensive data review, and high risk of management/treatment.    I spent 50 minutes in the care of this patient which included chart review, interviewing patient/family, discussion with primary team, coordination of care, and documentation.    Data:   Diagnostic tests and information reviewed for today's visit:  Conversation with primary team, Most recent labs, Most recent imaging       Some elements copied from my note on 5/14/2024, the elements have been updated and all reflect current decision making from today, 05/18/24       Plan of Care discussed with: Provider, RN, Patient    Thank you for asking Supportive and Palliative Oncology to assist with care of this patient.  We will continue to follow  Please contact us for additional questions or concerns.      SIGNATURE: JASPER Damon   PAGER/CONTACT:  Contact information:  Supportive and Palliative Oncology  Monday-Friday 8 AM-5 PM  Epic Secure chat or pager [phone].  After hours and weekends:  pager [phone]

## 2024-05-18 NOTE — CARE PLAN
Problem: Skin  Goal: Prevent/minimize sheer/friction injuries  Outcome: Progressing  Flowsheets (Taken 5/18/2024 0956)  Prevent/minimize sheer/friction injuries:   Use pull sheet   Turn/reposition every 2 hours/use positioning/transfer devices   HOB 30 degrees or less   The patient's goals for the shift include      The clinical goals for the shift include patient will remain safe and free from injury this shift 5/18/24 1383

## 2024-05-18 NOTE — CARE PLAN
The patient's goals for the shift include      The clinical goals for the shift include patient will remain safe and free from injury this shift 5/18/24 1900      Problem: Fall/Injury  Goal: Be free from injury by end of the shift  Outcome: Progressing     Problem: Fall/Injury  Goal: Not fall by end of shift  Outcome: Progressing     Problem: Pain  Goal: Free from opioid side effects throughout the shift  Outcome: Progressing

## 2024-05-18 NOTE — PROGRESS NOTES
"Massimo Garcia is a 63 y.o. male on day 8 of admission presenting with Esophageal cancer, stage IV (Multi).    Subjective   Has moderate pain otherwise doing well postoperatively    Objective     Physical Exam  A&Ox3  Face symmetric  RUE 5/5  LUE 5/5  RLE 5/5  LLE 5/5  Sensation intact to light touch throughout all extremities  Incision c/d/I  L ear with dried blood    Last Recorded Vitals  Blood pressure 126/78, pulse 103, temperature 36.7 °C (98.1 °F), temperature source Temporal, resp. rate 18, height 1.727 m (5' 8\"), weight 70.3 kg (155 lb), SpO2 95%.  Intake/Output last 3 Shifts:  I/O last 3 completed shifts:  In: 2824.3 (40.2 mL/kg) [I.V.:861.3 (12.2 mL/kg); Blood:1963]  Out: 2220 (31.6 mL/kg) [Urine:1560 (0.6 mL/kg/hr); Drains:160; Blood:500]  Weight: 70.3 kg     Relevant Results    Assessment/Plan   Principal Problem:    Esophageal cancer, stage IV (Multi)  Active Problems:    Primary malignant neoplasm of esophagus (Multi)    63yM h/o HTN, third degree heart block s/p pacemaker (11/2023), portal john thrombosis/prior PE (on Eliquis), OA, stage IV esophageal adenocarcinoma (HER2 positive) w/ known mets to lung and liver, s/p chemo (last doses 4/29), recent admission for drug-induced colitis, 5/10 p/w 1d LBP, CT T/L spine diffuse soft tissue mets, T9-10 soft tissue mass with epidural extension, MRI neuroaxis R occipital 1.1cm met, L temporal 8mm met, T9-11 peripherally enhancing vertebral body circumferential lesion, worst at T10, L2 ventral lesion c/f drop met, 5/16 s/p 2U plts    5/17 s/p T10 transpedic decompression, T8-12 fusion (s/p 4u PLT, riastap)    PLAN  [facility] primary  PCA/maher- can switch to multimodal oral medications today  drains x2 (under prevena)  Daily labs  pacer MRI when able  Upright xrays of TL spine when ambulatory  final path  hold chemo/RT until POD 14  [facility] recs  eliquis restart POD 7  Recommend holding off on steroids  PTOT- ok to work with physical therapy without restrictions, " recommend getting out of bed at least three tiems daily  SCDs, [facility]      Kavon Denny MD

## 2024-05-18 NOTE — PROGRESS NOTES
Subjective   Reports pain is well controlled with PCA pump. It is a 3 at minimum after he has gotten a dose and a 7 at maximum.  Denies any fever, chills, nausea or vomiting. Has not had a bowel movement yet since the surgery but is passing gas. Throat soreness has mostly improved. No other acute complaints today. He has had no numbness, weakness or tingling.    Meds  Scheduled medications  acetaminophen, 650 mg, oral, q6h  cholecalciferol, 4,000 Units, oral, Daily  cyclobenzaprine, 10 mg, oral, TID  enoxaparin, 40 mg, subcutaneous, Daily  fentaNYL, 1 patch, transdermal, q72h  fibrinogen, 5,115 mg, intravenous, Once  lidocaine, 1 patch, transdermal, Daily  multivitamin with minerals, 1 tablet, oral, Daily  nystatin, 5 mL, Swish & Swallow, 4x daily  pantoprazole, 40 mg, oral, Daily before breakfast  polyethylene glycol, 17 g, oral, Daily  predniSONE, 80 mg, oral, Daily   Followed by  [START ON 5/25/2024] predniSONE, 40 mg, oral, Daily   Followed by  [START ON 6/1/2024] predniSONE, 20 mg, oral, Daily   Followed by  [START ON 6/8/2024] predniSONE, 10 mg, oral, Daily  pregabalin, 50 mg, oral, Nightly      Continuous medications  HYDROmorphone,   lactated Ringer's, 75 mL/hr, Last Rate: 75 mL/hr (05/18/24 1706)      PRN medications  PRN medications: alteplase, benzocaine-menthol, calcium carbonate, [Held by provider] chlorproMAZINE, HYDROmorphone, HYDROmorphone, HYDROmorphone, LORazepam, metoclopramide, naloxone, ondansetron **OR** ondansetron, ondansetron **OR** ondansetron      Objective   Vital signs in last 24 hours:  Temp:  [36.4 °C (97.5 °F)-37.8 °C (100 °F)] 36.5 °C (97.7 °F)  Heart Rate:  [] 92  Resp:  [18] 18  BP: (115-130)/(73-80) 128/78    Intake/Output this shift:    Intake/Output Summary (Last 24 hours) at 5/18/2024 1725  Last data filed at 5/18/2024 1706  Gross per 24 hour   Intake 1628.48 ml   Output 2660 ml   Net -1031.52 ml       Physical  General: Patient is awake, non-toxic appearing, normal body  habitus  Pulm: Normal WOB at rest and when sitting up, no crackles or rhonchi  Cardiac: Regular rate and rhythm, normal S1/S2  Abdomen: Non-tender to palpation, non-distended  Extremities: No peripheral edema  Neuro: Patient alert and oriented, cranial nerves grossly intact, 5/5 strength and sensation bilaterally.    Results  Results for orders placed or performed during the hospital encounter of 05/10/24 (from the past 24 hour(s))   CBC and Auto Differential   Result Value Ref Range    WBC 6.9 4.4 - 11.3 x10*3/uL    nRBC 0.0 0.0 - 0.0 /100 WBCs    RBC 2.82 (L) 4.50 - 5.90 x10*6/uL    Hemoglobin 8.5 (L) 13.5 - 17.5 g/dL    Hematocrit 25.6 (L) 41.0 - 52.0 %    MCV 91 80 - 100 fL    MCH 30.1 26.0 - 34.0 pg    MCHC 33.2 32.0 - 36.0 g/dL    RDW 17.8 (H) 11.5 - 14.5 %    Platelets 90 (L) 150 - 450 x10*3/uL    Neutrophils % 87.2 40.0 - 80.0 %    Immature Granulocytes %, Automated 2.5 (H) 0.0 - 0.9 %    Lymphocytes % 1.9 13.0 - 44.0 %    Monocytes % 8.3 2.0 - 10.0 %    Eosinophils % 0.0 0.0 - 6.0 %    Basophils % 0.1 0.0 - 2.0 %    Neutrophils Absolute 6.02 1.20 - 7.70 x10*3/uL    Immature Granulocytes Absolute, Automated 0.17 0.00 - 0.70 x10*3/uL    Lymphocytes Absolute 0.13 (L) 1.20 - 4.80 x10*3/uL    Monocytes Absolute 0.57 0.10 - 1.00 x10*3/uL    Eosinophils Absolute 0.00 0.00 - 0.70 x10*3/uL    Basophils Absolute 0.01 0.00 - 0.10 x10*3/uL   Renal Function Panel   Result Value Ref Range    Glucose 238 (H) 74 - 99 mg/dL    Sodium 134 (L) 136 - 145 mmol/L    Potassium 3.7 3.5 - 5.3 mmol/L    Chloride 99 98 - 107 mmol/L    Bicarbonate 28 21 - 32 mmol/L    Anion Gap 11 10 - 20 mmol/L    Urea Nitrogen 23 6 - 23 mg/dL    Creatinine 0.67 0.50 - 1.30 mg/dL    eGFR >90 >60 mL/min/1.73m*2    Calcium 8.8 8.6 - 10.6 mg/dL    Phosphorus 3.0 2.5 - 4.9 mg/dL    Albumin 3.1 (L) 3.4 - 5.0 g/dL   Magnesium   Result Value Ref Range    Magnesium 1.96 1.60 - 2.40 mg/dL   Fibrinogen   Result Value Ref Range    Fibrinogen 146 (L) 200 - 400  mg/dL   Prepare Cryoprecipitated AHF (Pooled Units): 1 Pools   Result Value Ref Range    PRODUCT CODE S1382I28     Unit Number [id number]     Unit ABO O     Unit RH POS     Dispense Status TR     Blood Expiration Date May 18, 2024 18:55 EDT     PRODUCT BLOOD TYPE 5100     UNIT VOLUME 82        Imaging  ECG 12 lead    Result Date: 5/17/2024  Electronic ventricular pacemaker When compared with ECG of 04-APR-2024 18:37, Vent. rate has increased BY   2 BPM    FL fluoro images no charge    Result Date: 5/17/2024  These images are not reportable by radiology and will not be interpreted by  Radiologists.    FL fluoro images no charge    Result Date: 5/17/2024  These images are not reportable by radiology and will not be interpreted by  Radiologists.    CT thoracic spine wo IV contrast    Result Date: 5/17/2024  Interpreted By:  Sreekanth Erickson and Baker Zachary STUDY: CT THORACIC SPINE WO IV CONTRAST;  5/17/2024 12:00 am   INDICATION: Signs/Symptoms:Surgical planning -OR Friday 5/17.   COMPARISON: Same day MRI thoracic spine. CT chest on 03/18/2024 and CT abdomen and pelvis on 05/19/2024.   ACCESSION NUMBER(S): [id number]   ORDERING CLINICIAN: ORLIN GREEN   TECHNIQUE: Axial noncontrast images of the thoracic spine with coronal and sagittal reconstructed images.   FINDINGS: Surgical planning scan.   There is straightening of the thoracic spine. There is slight retrolisthesis at T12-L1.   There is stable slight anterior wedging of the T12 vertebral body. Subtle sclerosis is seen within the T10 vertebral body which probably corresponds to metastasis better seen on the prior MRI. T9 metastasis, seen on the prior MRI, is at best partially visualized on CT.   There is mild intervertebral disc height narrowing at L1-L2 and L2-L3. There are chronic degenerative endplate changes at multiple levels. There is moderate to marked left facet osteoarthropathy at T10-T11.   Findings within the spinal canal, including cord  compression the level of T10 was better seen on the prior MRI. There is extension of tumor into the left T9-T10 and T10-T11 neural foramina as well as the proximal aspect of the right T10-T11 neural foramen, again better seen on the prior MRI. There is extension of mass into the left paraspinal soft tissues located just posterior to the aorta at the level of T9-T10 and unchanged since the recent MRI.   There is no striking erosion or scalloping of the visualized osseous structures related to metastasis.   Partially visualized lungs demonstrate multiple nodules which are most consistent with metastases. Since the prior chest CT, there are new areas of patchy and ground-glass opacity within the left-greater-than-right lungs and could be infectious or inflammatory in etiology or could reflect pulmonary edema. There is a small left pleural effusion passive atelectasis.       Surgery planning scan with findings detailed above.   Since the prior chest CT, is interval development of ground-glass and patchy opacities within the bilateral lungs, left-greater-than-right, which could be infectious or inflammatory in etiology or could reflect pulmonary edema, correlate clinically and consider further evaluation with chest imaging as clinically indicated.     I personally reviewed the images/study and I agree with the findings as stated by Dr. Nadeem Naranjo M.D. This study was interpreted at [facility], [place], [place].   MACRO: None   Signed by: Sreekanth Erickson 5/17/2024 8:38 AM Dictation workstation:   [id number]        Assessment/Plan   Principal Problem:    Esophageal cancer, stage IV (Multi)  Active Problems:    Primary malignant neoplasm of esophagus (Multi)    Massimo Garcia is a 63 y.o. male with PMH third degree heart block s/p PPM (placed in November), esophageal adenocarcinoma with mets to liver and lungs, irAE colitis, PE, peripheral neuropathy, and portal vein thrombosis (On Eliquis).  Patient presented to [facility] ED on 5/9 with acute on chronic low back pain. CT imaging was obtained and was concerning for evidence of soft tissue extension into the spinal canal at T9-T10 with associated spinal canal stenosis. Patient transferred to [facility] for further workup and management.  He is s/p surgery on 5/17.     5/18 updates  -Continue pain control with PCA. Will try to transition to orals tomorrow or Monday.  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, s/p resection on 5/17  #Acute on Chronic low Back Pain  :: Presented to [facility] ED on 5/9 with stabbing nonradiating low back pain, acutely worsened  :: CT L spine obtained: “evidence of soft tissue extension into the spinal canal at T9 and T10 resulting in spinal canal stenosis”  :: Neurology consult obtained on admission, rec obtaining MRI brain, C/T/L spine w/wo contrast for oncologic workup  :: UA (5/10) unremarkable  -PACU labs: INR of 1.4, Fibrinogen of 160  -Fibrinogen of 146 today, Platelets of 90     Plan  - Continue prednisone taper of 80 mg ->40 ->20 ->10 weekly  - Continue Dilaudid PCA   - Per NSGY, INR<1.6, Fibrinogen> 150, Platelets>100 for 24 hrs post-op  -PT/OT starting POD1  - Acetaminophen 650 mg Q4H, cyclobenzaprine 10 mg Q8H, lidocaine patches  -Toradol 30 mg Q6H as needed for up to 8 doses  -Fentanyl patch to 75 mcg patch q72hrs.   -Physical therapy as tolerated  - Hold oral and IV Dilaudid PRNs  -Continue Lyrica 50 mg nightly to avoid sedation     #Hiccups  -Held Chloropromazine 25 mg Q6H PRN as not helping  - Started metaclopramide 10 mg Q8H PRN      #Vitamin D deficiency  -Continue vitamin D 4000 units every morning     #Stage IV esophageal cancer HER2 positive   - Jan 2023: Diagnosed with moderately differentiated esophageal adenocarcinoma    -Jan 27,2023 - Staging scan shows intense hypermetabolic distal esophageal lesion  consistent with patient's reported history of esophageal adenocarcinoma. Numerous centrally photopenic and peripherally intense liver lesion corresponding with hepatic metastasis. Numerous moderate to intensly hypermetabolic periaortic and aortocaval lymph nodes consistent with metastatic ralph involvement. Single left pulmonary ligament LN with mild hypermetabolic activity  - Cycle 1 FOLFOX Herceptin - 1/30/23  -April 2024: Switched to Trastuzumab + 5-FU/Pembrolizumab alone  :: Follows with Dr. Blake, emailed 5/11 AM with updates  :: Current chemotherapy: 5FU, Tras/Pembro, last received on 4/29, next due 5/13. Hold for potential surgery.  :: CT A/P (5/9) with “Multiple pulmonary nodules in the imaged lower lungs compatible with metastatic disease. There is wall thickening and apparent hyperenhancement of the anterior wall of the distal esophagus which may relate to patient's known esophageal malignancy. There is also redemonstration of multiple hepatic masses and abdominal and pelvic lymphadenopathy compatible with metastatic disease     #iRAE colitis, grade III  :: Recently admitted to [facility] 5/4-5/7 for enterocolitis  - Was discharged on prednisone 80 mg through 5/27. Now since off dexamethasone steroids, will start on prednisone 80 mg -> 40 mg  ->20 mg  -> 10 mg taper weekly.     #hx PE  #Hx Portal vein thrombosis  - Pulmonary embolism in RLL subsegmental artery in March 2024  -Jan 2023:  Extensive large pulmonary emboli involving lobar, segmental, and subsegmental arteries bilaterally. Prior to any cancer treatment  - Held home Eliquis 5mg BID on admission, per neurology, pending further plan  - Restart enoxaparin 40 mg daily today  - Can restart apixaban  POD 7     #GERD  - Continue home Protonix   -Calcium carbonate 500 mg PRN     #Anxiety disorder  - Lorazepam 1 mg Q8H PRN     #Chronic normocytic anemia secondary to malignancy and treatment  :: At baseline hgb   -Hgb of 8.5 this morning  - Monitor daily  CBC  - Transfuse for hgb<8, platelet<10    #Chronic thrombocytopenia  #Corresponds with initiation of treatment  -Cancer diagnosed in Jan 2023. Treatment started 1/30/2023  -Last normal platelets in Feb 2023 at 194, has been chronically low since then  - INR of 1.3-1.7 starting in October 2023. Was normal in Jan 2023. PTT has been different, with elevations in Dec 2023 and has normalized since March 2024  -Liver function through AST, ALT, Alkphos and albumin shows no injury corresponding to this       #Hx Third degree heard block, s/p pacemaker placement (11/8/23)  :: Asymptomatic on admission  - CTM     F: PRN  E: Replete as needed  N: Regular diet  C: Full code  A: Port     LOS: 8 days     Adi Mays MD/PhD   PGY-[phone]

## 2024-05-19 ENCOUNTER — APPOINTMENT (OUTPATIENT)
Dept: RADIOLOGY | Facility: HOSPITAL | Age: 64
DRG: 028 | End: 2024-05-19
Payer: MEDICARE

## 2024-05-19 ENCOUNTER — APPOINTMENT (OUTPATIENT)
Dept: CARDIOLOGY | Facility: HOSPITAL | Age: 64
DRG: 028 | End: 2024-05-19
Payer: MEDICARE

## 2024-05-19 LAB
ALBUMIN SERPL BCP-MCNC: 2.9 G/DL (ref 3.4–5)
ANION GAP BLDV CALCULATED.4IONS-SCNC: 11 MMOL/L (ref 10–25)
ANION GAP BLDV CALCULATED.4IONS-SCNC: 8 MMOL/L (ref 10–25)
ANION GAP SERPL CALC-SCNC: 12 MMOL/L (ref 10–20)
BASE EXCESS BLDV CALC-SCNC: 2 MMOL/L (ref -2–3)
BASE EXCESS BLDV CALC-SCNC: 3.5 MMOL/L (ref -2–3)
BASOPHILS # BLD AUTO: 0 X10*3/UL (ref 0–0.1)
BASOPHILS NFR BLD AUTO: 0 %
BLOOD EXPIRATION DATE: NORMAL
BODY TEMPERATURE: 37 DEGREES CELSIUS
BODY TEMPERATURE: 37 DEGREES CELSIUS
BUN SERPL-MCNC: 22 MG/DL (ref 6–23)
CA-I BLDV-SCNC: 1.11 MMOL/L (ref 1.1–1.33)
CA-I BLDV-SCNC: 1.18 MMOL/L (ref 1.1–1.33)
CALCIUM SERPL-MCNC: 8.6 MG/DL (ref 8.6–10.6)
CHLORIDE BLDV-SCNC: 102 MMOL/L (ref 98–107)
CHLORIDE BLDV-SCNC: 103 MMOL/L (ref 98–107)
CHLORIDE SERPL-SCNC: 102 MMOL/L (ref 98–107)
CO2 SERPL-SCNC: 29 MMOL/L (ref 21–32)
CREAT SERPL-MCNC: 0.54 MG/DL (ref 0.5–1.3)
D DIMER PPP FEU-MCNC: 6847 NG/ML FEU
DISPENSE STATUS: NORMAL
EGFRCR SERPLBLD CKD-EPI 2021: >90 ML/MIN/1.73M*2
EOSINOPHIL # BLD AUTO: 0 X10*3/UL (ref 0–0.7)
EOSINOPHIL NFR BLD AUTO: 0 %
ERYTHROCYTE [DISTWIDTH] IN BLOOD BY AUTOMATED COUNT: 17.6 % (ref 11.5–14.5)
FIBRINOGEN PPP-MCNC: 203 MG/DL (ref 200–400)
FOLATE SERPL-MCNC: 23 NG/ML
GLUCOSE BLDV-MCNC: 152 MG/DL (ref 74–99)
GLUCOSE BLDV-MCNC: 251 MG/DL (ref 74–99)
GLUCOSE SERPL-MCNC: 201 MG/DL (ref 74–99)
HCO3 BLDV-SCNC: 25.5 MMOL/L (ref 22–26)
HCO3 BLDV-SCNC: 27.8 MMOL/L (ref 22–26)
HCT VFR BLD AUTO: 26.5 % (ref 41–52)
HCT VFR BLD EST: 32 % (ref 41–52)
HCT VFR BLD EST: 32 % (ref 41–52)
HGB BLD-MCNC: 8.6 G/DL (ref 13.5–17.5)
HGB BLDV-MCNC: 10.5 G/DL (ref 13.5–17.5)
HGB BLDV-MCNC: 10.7 G/DL (ref 13.5–17.5)
IMM GRANULOCYTES # BLD AUTO: 0.29 X10*3/UL (ref 0–0.7)
IMM GRANULOCYTES NFR BLD AUTO: 3.1 % (ref 0–0.9)
INHALED O2 CONCENTRATION: 21 %
INHALED O2 CONCENTRATION: 21 %
LACTATE BLDV-SCNC: 4.1 MMOL/L (ref 0.4–2)
LACTATE BLDV-SCNC: 4.6 MMOL/L (ref 0.4–2)
LDH SERPL L TO P-CCNC: 379 U/L (ref 84–246)
LYMPHOCYTES # BLD AUTO: 0.17 X10*3/UL (ref 1.2–4.8)
LYMPHOCYTES NFR BLD AUTO: 1.8 %
MAGNESIUM SERPL-MCNC: 2.01 MG/DL (ref 1.6–2.4)
MCH RBC QN AUTO: 29.6 PG (ref 26–34)
MCHC RBC AUTO-ENTMCNC: 32.5 G/DL (ref 32–36)
MCV RBC AUTO: 91 FL (ref 80–100)
MONOCYTES # BLD AUTO: 0.68 X10*3/UL (ref 0.1–1)
MONOCYTES NFR BLD AUTO: 7.4 %
NEUTROPHILS # BLD AUTO: 8.08 X10*3/UL (ref 1.2–7.7)
NEUTROPHILS NFR BLD AUTO: 87.7 %
NRBC BLD-RTO: 0 /100 WBCS (ref 0–0)
OXYHGB MFR BLDV: 68.2 % (ref 45–75)
OXYHGB MFR BLDV: 86.4 % (ref 45–75)
PCO2 BLDV: 35 MM HG (ref 41–51)
PCO2 BLDV: 40 MM HG (ref 41–51)
PH BLDV: 7.45 PH (ref 7.33–7.43)
PH BLDV: 7.47 PH (ref 7.33–7.43)
PHOSPHATE SERPL-MCNC: 2.9 MG/DL (ref 2.5–4.9)
PLATELET # BLD AUTO: 88 X10*3/UL (ref 150–450)
PO2 BLDV: 42 MM HG (ref 35–45)
PO2 BLDV: 55 MM HG (ref 35–45)
POTASSIUM BLDV-SCNC: 3.6 MMOL/L (ref 3.5–5.3)
POTASSIUM BLDV-SCNC: 3.8 MMOL/L (ref 3.5–5.3)
POTASSIUM SERPL-SCNC: 4.1 MMOL/L (ref 3.5–5.3)
PRODUCT BLOOD TYPE: 5100
PRODUCT CODE: NORMAL
RBC # BLD AUTO: 2.91 X10*6/UL (ref 4.5–5.9)
SAO2 % BLDV: 70 % (ref 45–75)
SAO2 % BLDV: 89 % (ref 45–75)
SODIUM BLDV-SCNC: 135 MMOL/L (ref 136–145)
SODIUM BLDV-SCNC: 135 MMOL/L (ref 136–145)
SODIUM SERPL-SCNC: 139 MMOL/L (ref 136–145)
UNIT ABO: NORMAL
UNIT NUMBER: NORMAL
UNIT RH: NORMAL
UNIT VOLUME: 275
UNIT VOLUME: 340
UNIT VOLUME: 354
VIT B12 SERPL-MCNC: >2000 PG/ML (ref 211–911)
WBC # BLD AUTO: 9.2 X10*3/UL (ref 4.4–11.3)

## 2024-05-19 PROCEDURE — 99223 1ST HOSP IP/OBS HIGH 75: CPT | Performed by: STUDENT IN AN ORGANIZED HEALTH CARE EDUCATION/TRAINING PROGRAM

## 2024-05-19 PROCEDURE — 80069 RENAL FUNCTION PANEL: CPT

## 2024-05-19 PROCEDURE — 83735 ASSAY OF MAGNESIUM: CPT

## 2024-05-19 PROCEDURE — 71045 X-RAY EXAM CHEST 1 VIEW: CPT

## 2024-05-19 PROCEDURE — 82746 ASSAY OF FOLIC ACID SERUM: CPT

## 2024-05-19 PROCEDURE — 85379 FIBRIN DEGRADATION QUANT: CPT | Performed by: STUDENT IN AN ORGANIZED HEALTH CARE EDUCATION/TRAINING PROGRAM

## 2024-05-19 PROCEDURE — 82607 VITAMIN B-12: CPT

## 2024-05-19 PROCEDURE — 1200000002 HC GENERAL ROOM WITH TELEMETRY DAILY

## 2024-05-19 PROCEDURE — 85025 COMPLETE CBC W/AUTO DIFF WBC: CPT

## 2024-05-19 PROCEDURE — 93005 ELECTROCARDIOGRAM TRACING: CPT

## 2024-05-19 PROCEDURE — 99233 SBSQ HOSP IP/OBS HIGH 50: CPT

## 2024-05-19 PROCEDURE — 2500000004 HC RX 250 GENERAL PHARMACY W/ HCPCS (ALT 636 FOR OP/ED)

## 2024-05-19 PROCEDURE — 71045 X-RAY EXAM CHEST 1 VIEW: CPT | Performed by: STUDENT IN AN ORGANIZED HEALTH CARE EDUCATION/TRAINING PROGRAM

## 2024-05-19 PROCEDURE — 2500000001 HC RX 250 WO HCPCS SELF ADMINISTERED DRUGS (ALT 637 FOR MEDICARE OP)

## 2024-05-19 PROCEDURE — 84132 ASSAY OF SERUM POTASSIUM: CPT | Mod: 91

## 2024-05-19 PROCEDURE — 85384 FIBRINOGEN ACTIVITY: CPT

## 2024-05-19 PROCEDURE — 93010 ELECTROCARDIOGRAM REPORT: CPT | Performed by: INTERNAL MEDICINE

## 2024-05-19 PROCEDURE — 83615 LACTATE (LD) (LDH) ENZYME: CPT

## 2024-05-19 RX ORDER — LACTULOSE 10 G/15ML
20 SOLUTION ORAL DAILY
Status: DISCONTINUED | OUTPATIENT
Start: 2024-05-19 | End: 2024-05-19

## 2024-05-19 RX ORDER — POLYETHYLENE GLYCOL 3350 17 G/17G
17 POWDER, FOR SOLUTION ORAL 2 TIMES DAILY
Status: DISCONTINUED | OUTPATIENT
Start: 2024-05-19 | End: 2024-05-24 | Stop reason: HOSPADM

## 2024-05-19 RX ORDER — FENTANYL 100 UG/H
1 PATCH TRANSDERMAL
Status: DISCONTINUED | OUTPATIENT
Start: 2024-05-19 | End: 2024-05-22

## 2024-05-19 RX ADMIN — CYCLOBENZAPRINE 10 MG: 10 TABLET, FILM COATED ORAL at 15:00

## 2024-05-19 RX ADMIN — PREDNISONE 80 MG: 20 TABLET ORAL at 08:25

## 2024-05-19 RX ADMIN — SODIUM CHLORIDE, POTASSIUM CHLORIDE, SODIUM LACTATE AND CALCIUM CHLORIDE 1000 ML: 600; 310; 30; 20 INJECTION, SOLUTION INTRAVENOUS at 21:39

## 2024-05-19 RX ADMIN — ACETAMINOPHEN 650 MG: 325 TABLET ORAL at 21:39

## 2024-05-19 RX ADMIN — ENOXAPARIN SODIUM 40 MG: 100 INJECTION SUBCUTANEOUS at 20:22

## 2024-05-19 RX ADMIN — Medication 4000 UNITS: at 08:25

## 2024-05-19 RX ADMIN — SODIUM CHLORIDE, POTASSIUM CHLORIDE, SODIUM LACTATE AND CALCIUM CHLORIDE 75 ML/HR: 600; 310; 30; 20 INJECTION, SOLUTION INTRAVENOUS at 07:56

## 2024-05-19 RX ADMIN — Medication: at 14:21

## 2024-05-19 RX ADMIN — CYCLOBENZAPRINE 10 MG: 10 TABLET, FILM COATED ORAL at 20:22

## 2024-05-19 RX ADMIN — PREGABALIN 50 MG: 25 CAPSULE ORAL at 20:22

## 2024-05-19 RX ADMIN — CYCLOBENZAPRINE 10 MG: 10 TABLET, FILM COATED ORAL at 08:25

## 2024-05-19 RX ADMIN — PANTOPRAZOLE SODIUM 40 MG: 40 TABLET, DELAYED RELEASE ORAL at 05:01

## 2024-05-19 RX ADMIN — SODIUM CHLORIDE, SODIUM LACTATE, POTASSIUM CHLORIDE, AND CALCIUM CHLORIDE 1000 ML: 600; 310; 30; 20 INJECTION, SOLUTION INTRAVENOUS at 14:45

## 2024-05-19 RX ADMIN — POLYETHYLENE GLYCOL 3350 17 G: 17 POWDER, FOR SOLUTION ORAL at 08:25

## 2024-05-19 RX ADMIN — Medication 1 TABLET: at 08:25

## 2024-05-19 RX ADMIN — ACETAMINOPHEN 650 MG: 325 TABLET ORAL at 05:00

## 2024-05-19 RX ADMIN — NYSTATIN 500000 UNITS: 100000 SUSPENSION ORAL at 20:22

## 2024-05-19 RX ADMIN — FENTANYL TRANSDERMAL 1 PATCH: 100 PATCH, EXTENDED RELEASE TRANSDERMAL at 21:26

## 2024-05-19 ASSESSMENT — COGNITIVE AND FUNCTIONAL STATUS - GENERAL
MOVING FROM LYING ON BACK TO SITTING ON SIDE OF FLAT BED WITH BEDRAILS: A LOT
MOBILITY SCORE: 12
CLIMB 3 TO 5 STEPS WITH RAILING: A LOT
DRESSING REGULAR UPPER BODY CLOTHING: A LOT
TOILETING: A LOT
WALKING IN HOSPITAL ROOM: A LOT
EATING MEALS: A LOT
DRESSING REGULAR LOWER BODY CLOTHING: A LOT
MOVING TO AND FROM BED TO CHAIR: A LOT
STANDING UP FROM CHAIR USING ARMS: A LOT
TURNING FROM BACK TO SIDE WHILE IN FLAT BAD: A LOT
DAILY ACTIVITIY SCORE: 12
HELP NEEDED FOR BATHING: A LOT
PERSONAL GROOMING: A LOT

## 2024-05-19 ASSESSMENT — PAIN SCALES - GENERAL
PAINLEVEL_OUTOF10: 8
PAINLEVEL_OUTOF10: 5 - MODERATE PAIN
PAINLEVEL_OUTOF10: 8

## 2024-05-19 ASSESSMENT — PAIN - FUNCTIONAL ASSESSMENT
PAIN_FUNCTIONAL_ASSESSMENT: 0-10
PAIN_FUNCTIONAL_ASSESSMENT: 0-10

## 2024-05-19 NOTE — SIGNIFICANT EVENT
RAPID RESPONSE TEAM    Rapid response paged for heart rate of 220.  Per RN, pt was about to receive platelets transfusion.  RN obtained pre transfusion vital signs and the heart rate was 220.  She contacted the primary team and then paged rapid response.  Upon arrival to floor, pt's heart rate and rhythm resolved. RN stated that it lasted 10 minutes.  During event, pt was asymptomatic.  12 lead EKG obtained.  Heart rate 100 - 110, sinus tachycardia.  Remains asymptomatic.  Dr Oconnell ([facility]) came to bedside to assess.  Team did not want any further interventions.  Dr Oconnell wanted to look in to fluid status and possibly bolus pt with fluids.  Platelet transfusion was started at that time.  Staff advised to call for any concerns or vital signs.     Bbaak Londono RN

## 2024-05-19 NOTE — CARE PLAN
The patient's goals for the shift include      The clinical goals for the shift include Patient will remain safe and free from injury throughout the shift 5/18/24 @ 0700      Problem: Fall/Injury  Goal: Not fall by end of shift  Outcome: Progressing  Goal: Be free from injury by end of the shift  Outcome: Progressing  Goal: Verbalize understanding of personal risk factors for fall in the hospital  Outcome: Progressing  Goal: Verbalize understanding of risk factor reduction measures to prevent injury from fall in the home  Outcome: Progressing  Goal: Use assistive devices by end of the shift  Outcome: Progressing  Goal: Pace activities to prevent fatigue by end of the shift  Outcome: Progressing     Problem: Pain  Goal: Takes deep breaths with improved pain control throughout the shift  Outcome: Progressing  Goal: Turns in bed with improved pain control throughout the shift  Outcome: Progressing  Goal: Walks with improved pain control throughout the shift  Outcome: Progressing  Goal: Performs ADL's with improved pain control throughout shift  Outcome: Progressing  Goal: Participates in PT with improved pain control throughout the shift  Outcome: Progressing  Goal: Free from opioid side effects throughout the shift  Outcome: Progressing  Goal: Free from acute confusion related to pain meds throughout the shift  Outcome: Progressing     Problem: Skin  Goal: Decreased wound size/increased tissue granulation at next dressing change  Outcome: Progressing  Flowsheets (Taken 5/19/2024 0226)  Decreased wound size/increased tissue granulation at next dressing change: Promote sleep for wound healing  Goal: Participates in plan/prevention/treatment measures  Outcome: Progressing  Flowsheets (Taken 5/19/2024 0226)  Participates in plan/prevention/treatment measures: Elevate heels  Goal: Prevent/manage excess moisture  Outcome: Progressing  Flowsheets (Taken 5/19/2024 0226)  Prevent/manage excess moisture: Moisturize dry  skin  Goal: Prevent/minimize sheer/friction injuries  Outcome: Progressing  Goal: Promote/optimize nutrition  Outcome: Progressing  Goal: Promote skin healing  Outcome: Progressing

## 2024-05-19 NOTE — PROGRESS NOTES
SUPPORTIVE AND PALLIATIVE ONCOLOGY INPATIENT FOLLOW-UP      SERVICE DATE: 05/19/24     SUBJECTIVE:  Interval Events:    Assisted patient back in bed with wife and nursing assistance. Patient reports that his pain is well controlled when he uses his dilaudid PCA pump pushes. Rated his pain 3-4/10  Discussed going up on his fentanyl patch to 100mcgs/hr and he was receptive. Started conversation about methadone given that patients long acting opioid need keeps increasing, at some point it may be best to rotate to methadone in the future.  Discussed making Miralax 17 grams BID and adding Lactulose 20grams 2 doses daily/prn.       Pain Assessment:  Location:  lower back pain  Duration: Constant  Characteristics:   Rating: 3, 4, and Moderate   Descriptors: aching and throbbing   Aggravating: movement and bending    Relieving: Analgesics dilaudid   Interference with Function: Very Much    Opioid Use  Past 24 h prn opioid use:   Fentanyl patch 75mcgs x 1 =150 mg   Patient dilaudid 0.2mg q10 minutes lockout =    Demand Doses:  61   Delivered Doses: . 53  Total used:  10.6mg = 132.5mg OME   Total 24h OME use: 282.5mg OME    Note: OME calculations based on equianalgesic table below. Please note this table is based on best available evidence but conversions are still approximate. These are NOT opioid DOSES for individual patient use; this is equivalency information.  Drug Parenteral Enteral   Morphine 10 25   Oxycodone N/A 20   Hydromorphone 2 5   Fentanyl 0.15 N/A   Tramadol N/A 120   Citation: Aniya ROSS. Demystifying opioid conversion calculations: A guide for effective dosing, Second edition. [place]: [employer] of [employer]-System Pharmacists, 2018.    Symptom Assessment:  Nausea none  Vomiting none  Anxiety none  Difficulty Sleeping  rough night due to no IV dilaudid  Lack of appetite none    Information obtained from: chart review, interview of patient, interview of family, and discussion with primary  team  ______________________________________________________________________        OBJECTIVE:    Lab Results   Component Value Date    WBC 9.2 05/19/2024    HGB 8.6 (L) 05/19/2024    HCT 26.5 (L) 05/19/2024    MCV 91 05/19/2024    PLT 88 (L) 05/19/2024      Lab Results   Component Value Date    GLUCOSE 201 (H) 05/19/2024    CALCIUM 8.6 05/19/2024     05/19/2024    K 4.1 05/19/2024    CO2 29 05/19/2024     05/19/2024    BUN 22 05/19/2024    CREATININE 0.54 05/19/2024     Lab Results   Component Value Date    ALT 16 05/12/2024    AST 28 05/12/2024    ALKPHOS 136 05/12/2024    BILITOT 1.0 05/12/2024     Estimated Creatinine Clearance: 125 mL/min (by C-G formula based on SCr of 0.54 mg/dL).     Scheduled medications  acetaminophen, 650 mg, oral, q6h  cholecalciferol, 4,000 Units, oral, Daily  cyclobenzaprine, 10 mg, oral, TID  enoxaparin, 40 mg, subcutaneous, Daily  fentaNYL, 1 patch, transdermal, q72h  fibrinogen, 5,115 mg, intravenous, Once  lidocaine, 1 patch, transdermal, Daily  multivitamin with minerals, 1 tablet, oral, Daily  nystatin, 5 mL, Swish & Swallow, 4x daily  pantoprazole, 40 mg, oral, Daily before breakfast  polyethylene glycol, 17 g, oral, Daily  predniSONE, 80 mg, oral, Daily   Followed by  [START ON 5/25/2024] predniSONE, 40 mg, oral, Daily   Followed by  [START ON 6/1/2024] predniSONE, 20 mg, oral, Daily   Followed by  [START ON 6/8/2024] predniSONE, 10 mg, oral, Daily  pregabalin, 50 mg, oral, Nightly      Continuous medications  HYDROmorphone,   lactated Ringer's, 75 mL/hr, Last Rate: 75 mL/hr (05/19/24 6186)      PRN medications  alteplase, 2 mg, PRN  benzocaine-menthol, 1 lozenge, q2h PRN  calcium carbonate, 500 mg, 4x daily PRN  [Held by provider] chlorproMAZINE, 25 mg, q6h PRN  HYDROmorphone, 1 mg, q3h PRN  HYDROmorphone, 4 mg, q4h PRN  HYDROmorphone, 6 mg, q4h PRN  LORazepam, 1 mg, q8h PRN  metoclopramide, 10 mg, q8h PRN  naloxone, 0.2 mg, PRN  ondansetron, 4 mg, q8h PRN    Or  ondansetron, 4 mg, q8h PRN  ondansetron, 4 mg, q8h PRN   Or  ondansetron, 4 mg, q8h PRN      }     PHYSICAL EXAMINATION:    Vital Signs:   Vital signs reviewed  Visit Vitals  /84 (Patient Position: Lying)   Pulse 105   Temp 36.7 °C (98.1 °F) (Temporal)   Resp 18        Pain Score: 5 - Moderate pain       Physical Exam  Constitutional:       Appearance: Normal appearance.   Cardiovascular:      Rate and Rhythm: Regular rhythm.      Heart sounds: Normal heart sounds.   Pulmonary:      Breath sounds: Normal breath sounds.   Abdominal:      General: Abdomen is flat.      Palpations: Abdomen is soft.   Skin:     General: Skin is warm.   Neurological:      Mental Status: He is alert and oriented to person, place, and time.   Psychiatric:         Mood and Affect: Mood normal.        ASSESSMENT/PLAN:    Massimo Garcia is a 63 y.o. male diagnosed with metastatic esophageal cancer. PMH significant for third degree heart block s/p PPM (placed in November), esophageal adenocarcinoma with mets to liver and lungs, Drug-induced colitis, PE, peripheral neuropathy, and portal vein thrombosis (On Eliquis).  Admitted 5/10/2024 for further evaluation and management of acute on chronic low back pain. Course complicated by soft tissue extension into the spinal canal at T9-T10 with associated spinal canal stenosis. Supportive and Palliative Oncology is consulted for pain management.      Pain:  Lower back pain related to metastatic esophageal cancer  Pain is: cancer related pain  Type: visceral, somatic, and neuropathic  Pain control: sub-optimally controlled  Home regimen:  Acetaminophen PRN, Oxycodone 10mg q4hrs prn, Fentanyl TD , and bentyl prn   Intolerances/previously tried: Morphine with poor efficacy. Gabapentin was ineffective, Lyrica makes patient very lethargic, was tried on 50mg and did not tolerate well.  Personalized pain goal: 2  Total OME usage for the past 24 hours: 282.5mg OME  Continue dilaudid 1mg IV q 3hrs PRN for  breakthrough pain  Continue Lyrica 50mg at night [hold for sedation]  Discontinue dilaudid 6mg q3hrs PRN   Discontinue dilaudid 4mg q3hrs PRN   Continue dilaudid PCA   Bolus dose: 0.2mg   Lockout: 10 minutes   Basal: none  Max dose: 1.2mg/hr  Change Fentanyl patch to 100mcgs q 72 hours   Continue to monitor pain scores and administer PRN medications as appropriate  Continue/initiate nonpharmacologic pain management strategies including ice/heat therapy, distraction techniques, deep breathing/relaxation techniques, calming music, and repositioning  Continue to monitor for signs of opioid efficacy (pain scores, improved functionality) and toxicity (pinpoint pupils, excess sedation/drowsiness/confusion, respiratory depression, etc.)     Nausea:  Intermittent nausea without vomiting related to constipation   Home regimen:  protonix 40mg daily,  Scopolamine patch PRN and Carafate PRN  denies  Continue Dex 6mg iv TID managed by primary team  Continue Reglan 10mg q8hrs PRN   Continue Zofran 4mg IV q8hr PRN      Constipation  At risk for constipation related to opioids, currently not constipated  Usual bowel pattern: every day  Home regimen: Senna 2 tablets BID, Miralax 17 gm daily, Lactulose 20 gm 4x a day PRN, Docusate 100mg BID PRN, and MOM 15ml PRN   LBM 5/16/2024  Change Miralax 17 grams to BID   Add Lactulose 20 grams twice daily PRN   Monitor BM frequency, adjust regimen as needed  Goal to have BM without straining q48-72h      Altered Mood:  Acute on chronic anxiety related to health concerns   controlled with home regimen   Home regimen: lorazepam 1mg TID PRN  Monitor and control      Sleeping Difficulty:  Impaired sleep related to pain  Home regimen:  none  Improved with pain control   Reports that he slept well last night.      Decreased appetite:  Appetite loss related to malignancy, chemotherapy, taste changes, and disease process  Nutrition following  Weight loss none  Home regimen:  none  Dex as  above  Reports that he ate tapioca today made by his wife. With better pain control he is more enthusiastic about eating      Medical Decision Making/Goals of Care/Advance Care Planning:  Patient's current clinical condition, including diagnosis, prognosis, and management plan, and goals of care were discussed.   Life limiting disease: metastatic malignancy  Family: Supportive family   Joys/meaning/strength: Family and Lizette  Understanding of health: Demonstrates good prognostic understanding of disease process, understands plan for ongoing symptom control   Information:Wants full disclosure     Goals: symptom control and cancer directed therapy  Worries and fears now and future: ongoing symptoms   Minimum acceptable outcome/QOL:  wants all heroic measures in the event that his heart stops.   Code status discussion:  full code     Advance Directives  Existence of Advance Directives:No - has interest  Decision maker: DOUGLAS is wife  Code Status: Full code     Introduction to Supportive and Palliative Oncology:  Spoke with patient at bedside  Introduced the role and philosophy of Supportive and Palliative oncology in the evaluation and management of symptoms during cancer treatment  Palliative care was introduced as a service for patients with serious illness to help with symptoms, assist with goals of care conversations, navigate complex decision making, improve quality of life for patients, and provide support both patients and families.  Patient seemed to appreciate the extra layer of support.      Supportive Interventions: Interventions: Music Therapy: offered referral placed, Art Therapy: offered declined, SPO Spiritual Care: offered declined, Social work referral for: Advance Directives    Supportive and Palliative Oncology encounter:  Spoke with patient at bedside  Emotional support provided  Coordination of care     Signature and billing:  Thank you for allowing us to participate in the care of this patient.  Recommendations will be communicated back to the consulting service by way of shared electronic medical record or face-to-face.    Medical complexity was high level due to due to complexity of problems, extensive data review, and high risk of management/treatment.    I spent 50 minutes in the care of this patient which included chart review, interviewing patient/family, discussion with primary team, coordination of care, and documentation.    Data:   Diagnostic tests and information reviewed for today's visit:  Conversation with primary team, Most recent labs, Most recent imaging       Some elements copied from my note on 5/18/2024, the elements have been updated and all reflect current decision making from today, 05/19/24       Plan of Care discussed with: Provider, RN, Patient    Thank you for asking Supportive and Palliative Oncology to assist with care of this patient.  We will continue to follow  Please contact us for additional questions or concerns.      SIGNATURE: JASPER Damon   PAGER/CONTACT:  Contact information:  Supportive and Palliative Oncology  Monday-Friday 8 AM-5 PM  Epic Secure chat or pager [phone].  After hours and weekends:  pager [phone]

## 2024-05-19 NOTE — PROGRESS NOTES
Subjective   Overnight, he had a small episode of hemoptysis around 8 pm that followed a particularly intense bout of coughing. He also had his heart rate spike up to 214. During that time, he had no symptoms like palpitations, shortness of breath and chest pain.     This morning, he states he has no pain while laying still. However, when he gets up and tries to move around pain spikes up to a 6 or 7 out of 10. For this reason, he has been hesitant to move around much. He still hasn't had a bowel movement.     Meds  Scheduled medications  acetaminophen, 650 mg, oral, q6h  cholecalciferol, 4,000 Units, oral, Daily  cyclobenzaprine, 10 mg, oral, TID  enoxaparin, 40 mg, subcutaneous, Daily  fentaNYL, 1 patch, transdermal, q72h  fibrinogen, 5,115 mg, intravenous, Once  lidocaine, 1 patch, transdermal, Daily  multivitamin with minerals, 1 tablet, oral, Daily  nystatin, 5 mL, Swish & Swallow, 4x daily  pantoprazole, 40 mg, oral, Daily before breakfast  polyethylene glycol, 17 g, oral, Daily  predniSONE, 80 mg, oral, Daily   Followed by  [START ON 5/25/2024] predniSONE, 40 mg, oral, Daily   Followed by  [START ON 6/1/2024] predniSONE, 20 mg, oral, Daily   Followed by  [START ON 6/8/2024] predniSONE, 10 mg, oral, Daily  pregabalin, 50 mg, oral, Nightly      Continuous medications  HYDROmorphone,   lactated Ringer's, 75 mL/hr, Last Rate: 75 mL/hr (05/18/24 2111)      PRN medications  PRN medications: alteplase, benzocaine-menthol, calcium carbonate, [Held by provider] chlorproMAZINE, HYDROmorphone, HYDROmorphone, HYDROmorphone, LORazepam, metoclopramide, naloxone, ondansetron **OR** ondansetron, ondansetron **OR** ondansetron      Objective   Vital signs in last 24 hours:  Temp:  [36.4 °C (97.5 °F)-37.8 °C (100 °F)] 36.4 °C (97.5 °F)  Heart Rate:  [] 103  Resp:  [16-18] 16  BP: (114-138)/(73-88) 125/78    Intake/Output this shift:    Intake/Output Summary (Last 24 hours) at 5/19/2024 0604  Last data filed at 5/19/2024  0500  Gross per 24 hour   Intake 2944.53 ml   Output 3470 ml   Net -525.47 ml       Physical  General: Patient is awake, non-toxic appearing, normal body habitus  Pulm: Normal WOB at rest and when sitting up, no crackles or rhonchi  Cardiac: Regular rate and rhythm, normal S1/S2  Abdomen: Non-tender to palpation, non-distended  Extremities: No peripheral edema, some mottling of the inferior toes, left foot is cold  Neuro: Patient alert and oriented, cranial nerves grossly intact, 5/5 strength bilaterally    Results  Results for orders placed or performed during the hospital encounter of 05/10/24 (from the past 24 hour(s))   Prepare Cryoprecipitated AHF (Pooled Units): 1 Pools   Result Value Ref Range    PRODUCT CODE L4212P86     Unit Number [id number]     Unit ABO O     Unit RH POS     Dispense Status TR     Blood Expiration Date May 18, 2024 18:55 EDT     PRODUCT BLOOD TYPE 5100     UNIT VOLUME 82    CBC and Auto Differential   Result Value Ref Range    WBC 11.1 4.4 - 11.3 x10*3/uL    nRBC 0.0 0.0 - 0.0 /100 WBCs    RBC 3.12 (L) 4.50 - 5.90 x10*6/uL    Hemoglobin 9.3 (L) 13.5 - 17.5 g/dL    Hematocrit 27.9 (L) 41.0 - 52.0 %    MCV 89 80 - 100 fL    MCH 29.8 26.0 - 34.0 pg    MCHC 33.3 32.0 - 36.0 g/dL    RDW 17.8 (H) 11.5 - 14.5 %    Platelets 96 (L) 150 - 450 x10*3/uL    Neutrophils % 87.0 40.0 - 80.0 %    Immature Granulocytes %, Automated 2.4 (H) 0.0 - 0.9 %    Lymphocytes % 2.6 13.0 - 44.0 %    Monocytes % 7.9 2.0 - 10.0 %    Eosinophils % 0.0 0.0 - 6.0 %    Basophils % 0.1 0.0 - 2.0 %    Neutrophils Absolute 9.64 (H) 1.20 - 7.70 x10*3/uL    Immature Granulocytes Absolute, Automated 0.27 0.00 - 0.70 x10*3/uL    Lymphocytes Absolute 0.29 (L) 1.20 - 4.80 x10*3/uL    Monocytes Absolute 0.88 0.10 - 1.00 x10*3/uL    Eosinophils Absolute 0.00 0.00 - 0.70 x10*3/uL    Basophils Absolute 0.01 0.00 - 0.10 x10*3/uL   Prepare Platelets: 1 Units   Result Value Ref Range    PRODUCT CODE [id number]     Unit Number  [id number]     Unit ABO O     Unit RH POS     Dispense Status TR     Blood Expiration Date May 18, 2024 23:59 EDT     PRODUCT BLOOD TYPE 5100     UNIT VOLUME 354    CBC and Auto Differential   Result Value Ref Range    WBC 9.2 4.4 - 11.3 x10*3/uL    nRBC 0.0 0.0 - 0.0 /100 WBCs    RBC 2.91 (L) 4.50 - 5.90 x10*6/uL    Hemoglobin 8.6 (L) 13.5 - 17.5 g/dL    Hematocrit 26.5 (L) 41.0 - 52.0 %    MCV 91 80 - 100 fL    MCH 29.6 26.0 - 34.0 pg    MCHC 32.5 32.0 - 36.0 g/dL    RDW 17.6 (H) 11.5 - 14.5 %    Platelets 88 (L) 150 - 450 x10*3/uL    Neutrophils % 87.7 40.0 - 80.0 %    Immature Granulocytes %, Automated 3.1 (H) 0.0 - 0.9 %    Lymphocytes % 1.8 13.0 - 44.0 %    Monocytes % 7.4 2.0 - 10.0 %    Eosinophils % 0.0 0.0 - 6.0 %    Basophils % 0.0 0.0 - 2.0 %    Neutrophils Absolute 8.08 (H) 1.20 - 7.70 x10*3/uL    Immature Granulocytes Absolute, Automated 0.29 0.00 - 0.70 x10*3/uL    Lymphocytes Absolute 0.17 (L) 1.20 - 4.80 x10*3/uL    Monocytes Absolute 0.68 0.10 - 1.00 x10*3/uL    Eosinophils Absolute 0.00 0.00 - 0.70 x10*3/uL    Basophils Absolute 0.00 0.00 - 0.10 x10*3/uL   Fibrinogen   Result Value Ref Range    Fibrinogen 203 200 - 400 mg/dL       Imaging  FL fluoro images no charge    Result Date: 5/17/2024  These images are not reportable by radiology and will not be interpreted by  Radiologists.    FL fluoro images no charge    Result Date: 5/17/2024  These images are not reportable by radiology and will not be interpreted by  Radiologists.        Assessment/Plan   Principal Problem:    Esophageal cancer, stage IV (Multi)  Active Problems:    Primary malignant neoplasm of esophagus (Multi)    Massimo Garcia is a 63 y.o. male with PMH third degree heart block s/p PPM (placed in November), esophageal adenocarcinoma with mets to liver and lungs, irAE colitis, PE, peripheral neuropathy, and portal vein thrombosis (On Eliquis). Patient presented to [facility] ED on 5/9 with acute on chronic low back pain. CT imaging  was obtained and was concerning for evidence of soft tissue extension into the spinal canal at T9-T10 with associated spinal canal stenosis. Patient transferred to [facility] for further workup and management.  He is s/p surgery on 5/17.     5/18 updates  -Continue pain control with PCA. Will try to transition to orals on Monday.  - Physical activity as tolerated  --Lactulose and Miralax to stimulate a bowel movement  - Evaluated by hematology, likely has low-grade DIC secodary to malignancy. Due to ongoing consumptive coagulopathy, will be difficult to maintain platelets above transfusion threshold>100, as last night 3 units of platelets moved from 90 -> 96 ->88  -Started on cardiac telemetry after heart rate >214, no further tachycardic events. Talked to EP but stated pacemaker does not record, only ICDs do.  Review of telemetry shows only occasional PVCs  -Lactate of 4.1, gave 1 L of fluid today.  -New mottling of toes, new since surgery    #New Tachycardia  -Ddx of hypovolemia vs new clots from known DIC (off enoxaparin for a day)  -Review of telemetry shows sinus tachycardia with occasional PVCs  -s/p 1L of LR today.   - Can consider metoprolol tartrate 12.5 mg oral if tachycardia persists.    #Disseminated Intravascular coagulation  #Chronic thrombocytopenia  #Hemoptysis on night of 5/18  -Cancer diagnosed in Jan 2023. Treatment started 1/30/2023  -Last normal platelets in Feb 2023 at 194, has been chronically low since then  - INR of 1.3-1.7 starting in October 2023. Was normal in Jan 2023. PTT has been different, with elevations in Dec 2023 and has normalized since March 2024  -Liver function through AST, ALT, Alkphos and albumin shows no injury corresponding to this  -Etiology of hemoptysis likely Trauma from coughing vs pulmonary embolism vs bleeding from malignant mass in lung  - Chest x-ray showed interval worsening of diffuse interstitial prominence and superimposed hazy opacities but no bleeds. 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 NSGY interested in a shorter taper in 2 weeks as there is concerned about bone healing.   - Continue Dilaudid PCA   - Per NSGY, INR<1.6, Fibrinogen> 150, Platelets>100 for 24 hrs post-op. Given consumptive coagulopathy (DIC), will focus on maintaining fibrinogen>150, and platelets>50  -PT/OT starting POD1  - Acetaminophen 650 mg Q4H, cyclobenzaprine 10 mg Q8H, lidocaine patches  -Toradol 30 mg Q6H as needed for up to 8 doses  -Increase fentanyl patch to 100 mcg/hr  -Physical therapy as tolerated  -Continue Lyrica 50 mg nightly    #Hiccups  -Held Chloropromazine 25 mg Q6H PRN as not helping  - Started metaclopramide 10 mg Q8H PRN      #Vitamin D deficiency  -Continue vitamin D 4000 units every morning     #Stage IV esophageal cancer HER2 positive   - Jan 2023: Diagnosed with moderately differentiated esophageal adenocarcinoma   -Jan 27,2023 - Staging scan  shows intense hypermetabolic distal esophageal lesion consistent with patient's reported history of esophageal adenocarcinoma. Numerous centrally photopenic and peripherally intense liver lesion corresponding with hepatic metastasis. Numerous moderate to intensly hypermetabolic periaortic and aortocaval lymph nodes consistent with metastatic ralph involvement. Single left pulmonary ligament LN with mild hypermetabolic activity  - Cycle 1 FOLFOX Herceptin - 1/30/23  -April 2024: Switched to Trastuzumab + 5-FU/Pembrolizumab alone  :: Follows with Dr. Blake, emailed 5/11 AM with updates  :: Current chemotherapy: 5FU, Tras/Pembro, last received on 4/29, next due 5/13. Hold for potential surgery.  :: CT A/P (5/9) with “Multiple pulmonary nodules in the imaged lower lungs compatible with metastatic disease. There is wall thickening and apparent hyperenhancement of the anterior wall of the distal esophagus which may relate to patient's known esophageal malignancy. There is also redemonstration of multiple hepatic masses and abdominal and pelvic lymphadenopathy compatible with metastatic disease     #iRAE colitis, grade III  :: Recently admitted to [facility]/4-5/7 for enterocolitis  - Was discharged on prednisone 80 mg through 5/27. Now since off dexamethasone steroids, will start on prednisone 80 mg -> 40 mg  ->20 mg  -> 10 mg taper weekly.     #hx PE  #Hx Portal vein thrombosis  - Pulmonary embolism in RLL subsegmental artery in March 2024  -Jan 2023:  Extensive large pulmonary emboli involving lobar, segmental, and subsegmental arteries bilaterally. Prior to any cancer treatment  - Held home Eliquis 5mg BID on admission, per neurology, pending further plan  - Restart enoxaparin 40 mg daily today  - Can restart apixaban  POD 7     #GERD  - Continue home Protonix   -Calcium carbonate 500 mg PRN     #Anxiety disorder  - Lorazepam 1 mg Q8H PRN     #Chronic normocytic anemia secondary to malignancy and treatment  :: At  baseline hgb   -Hgb of 8.5 this morning  - Monitor daily CBC  - Transfuse for hgb<8, platelet<10     #Hx Third degree heard block, s/p pacemaker placement (11/8/23)  :: Asymptomatic on admission  - CTM     F: PRN  E: Replete as needed  N: Regular diet  C: Full code  A: Port     LOS: 9 days     Adi Mays MD/PhD   PGY-1.833

## 2024-05-19 NOTE — PROGRESS NOTES
"Massimo Garcia is a 63 y.o. male on day 9 of admission presenting with Esophageal cancer, stage IV (Multi).    Subjective   Has pain with movement    Objective     Physical Exam  A&Ox3  Face symmetric  RUE 5/5  LUE 5/5  RLE 5/5  LLE 5/5  Sensation intact to light touch throughout all extremities  Incision c/d/I  L ear with dried blood    Last Recorded Vitals  Blood pressure 125/78, pulse 103, temperature 36.4 °C (97.5 °F), temperature source Temporal, resp. rate 16, height 1.727 m (5' 8\"), weight 70.3 kg (155 lb), SpO2 98%.  Intake/Output last 3 Shifts:  I/O last 3 completed shifts:  In: 2944.5 (41.9 mL/kg) [I.V.:698.7 (9.9 mL/kg); Blood:2245.8]  Out: 4400 (62.6 mL/kg) [Urine:4175 (1.6 mL/kg/hr); Drains:225]  Weight: 70.3 kg     Relevant Results    Assessment/Plan   Principal Problem:    Esophageal cancer, stage IV (Multi)  Active Problems:    Primary malignant neoplasm of esophagus (Multi)    63yM h/o HTN, third degree heart block s/p pacemaker (11/2023), portal john thrombosis/prior PE (on Eliquis), OA, stage IV esophageal adenocarcinoma (HER2 positive) w/ known mets to lung and liver, s/p chemo (last doses 4/29), recent admission for drug-induced colitis, 5/10 p/w 1d LBP, CT T/L spine diffuse soft tissue mets, T9-10 soft tissue mass with epidural extension, MRI neuroaxis R occipital 1.1cm met, L temporal 8mm met, T9-11 peripherally enhancing vertebral body circumferential lesion, worst at T10, L2 ventral lesion c/f drop met, 5/16 s/p 2U plts    5/17 s/p T10 transpedic decompression, T8-12 fusion (s/p 4u PLT, riastap)    PLAN  Sully primary  Pain per supportive onc  drains x2 (under prevena)  Daily labs  pacer MRI when able  Upright xrays of TL spine when ambulatory  final path  hold chemo/RT until POD 14  radonc recs  eliquis restart POD 7  Recommend 2 week taper of steroids  PTOT- ok to work with physical therapy without restrictions, recommend getting out of bed at least three tiems daily  SCDs, SQH      Jiankush " MD Eduin

## 2024-05-19 NOTE — CONSULTS
Name: Massimo Garcia  MRN: [id number]  Encounter Date: 5/19/2024  PCP: Dayne Santamaria DO  Heme-Onc: Dr. Blake    Reason for consult: DIC, metastatic esophageal CA  Attending Provider: Dr. Menchaca    Hematology/ Oncology Consult Note      History of Present Illness   Massimo Garcia is a 63 y.o. male with PMH third degree heart block s/p PPM (placed in November), esophageal adenocarcinoma with mets to liver and lungs, irAE colitis, PE, peripheral neuropathy, and portal vein thrombosis (On Eliquis). Patient presented to [facility] ED on 5/9 with acute on chronic low back pain. CT imaging was obtained and was concerning for evidence of soft tissue extension into the spinal canal at T9-T10 with associated spinal canal stenosis. Patient transferred to [facility] for further workup and management.  He is s/p surgery on 5/17.     Now admitted for paraspinal mass s/p surgery 5/17. Labs showing thrombocytopenia minimally responsive to plt transfusions 90->96, Fibrinogen <150 improved with cryo, PT mild prolongation, PTT normal now shortened after product, D-dimer 6,847. Labs previously in March showed similar findings and in Dec showed prolonged PT and PTT. Hematology consulted for further guidance regarding possible DIC. Pt seen at bedside, reports weakness and fatigue since surgery but no fevers, chills, significant wt loss. Has more edema in legs.    Current Tx Trastuzumab + mFOLFOX + pembro  last chemo 4/29/24, oxali last dose 3/18/24 held for neuropathy, last pembro 4/15/24 stopped d/t ICI colitis     Past Medical history     Past Medical History:   Diagnosis Date    Essential (primary) hypertension 12/21/2013    Hypertension    Pacemaker     Peripheral neuropathy     Personal history of other diseases of the circulatory system     History of right bundle branch block (RBBB)    Pneumothorax 12/04/2023         Past Surgical History     Past Surgical History:   Procedure Laterality Date    CARDIAC ELECTROPHYSIOLOGY PROCEDURE N/A 11/7/2023     Procedure: Temporary Pacemaker Insertion;  Surgeon: Alexis Shook MD;  Location: [facility] Cardiac Cath Lab;  Service: Cardiovascular;  Laterality: N/A;    CARDIAC ELECTROPHYSIOLOGY PROCEDURE Left 11/9/2023    Procedure: PPM IMPLANT DUAL;  Surgeon: Elias Connolly MD;  Location: [facility] Cardiac Cath Lab;  Service: Electrophysiology;  Laterality: Left;    TOTAL KNEE ARTHROPLASTY  09/30/2016    Total Knee Replacement Left    TOTAL KNEE ARTHROPLASTY  09/30/2016    Total Knee Replacement Right         Family History      Family History   Problem Relation Name Age of Onset    Cancer Father           Social History     Social History     Socioeconomic History    Marital status:      Spouse name: None    Number of children: None    Years of education: None    Highest education level: None   Occupational History    None   Tobacco Use    Smoking status: Former     Types: Cigarettes, Cigars     Passive exposure: Never    Smokeless tobacco: Never   Vaping Use    Vaping status: Unknown   Substance and Sexual Activity    Alcohol use: Not Currently    Drug use: Yes     Types: Marijuana     Comment: medical marjuana card    Sexual activity: None   Other Topics Concern    None   Social History Narrative    None     Social Determinants of Health     Financial Resource Strain: Low Risk  (5/10/2024)    Overall Financial Resource Strain (CARDIA)     Difficulty of Paying Living Expenses: Not hard at all   Food Insecurity: Not on file   Transportation Needs: No Transportation Needs (5/10/2024)    PRAPARE - Transportation     Lack of Transportation (Medical): No     Lack of Transportation (Non-Medical): No   Physical Activity: Not on file   Stress: Not on file   Social Connections: Not on file   Intimate Partner Violence: Not on file   Housing Stability: Low Risk  (5/10/2024)    Housing Stability Vital Sign     Unable to Pay for Housing in the Last Year: No     Number of Places Lived in the Last Year: 1     Unstable Housing in the  "Last Year: No         Allergies     Allergies   Allergen Reactions    Bee Venom Protein (Honey Bee) Anaphylaxis    Oxaliplatin Other     Rigors       Medications   acetaminophen, 650 mg, q6h  cholecalciferol, 4,000 Units, Daily  cyclobenzaprine, 10 mg, TID  enoxaparin, 40 mg, Daily  fentaNYL, 1 patch, q72h  fibrinogen, 5,115 mg, Once  lidocaine, 1 patch, Daily  multivitamin with minerals, 1 tablet, Daily  nystatin, 5 mL, 4x daily  pantoprazole, 40 mg, Daily before breakfast  polyethylene glycol, 17 g, Daily  predniSONE, 80 mg, Daily   Followed by  [START ON 5/25/2024] predniSONE, 40 mg, Daily   Followed by  [START ON 6/1/2024] predniSONE, 20 mg, Daily   Followed by  [START ON 6/8/2024] predniSONE, 10 mg, Daily  pregabalin, 50 mg, Nightly      HYDROmorphone  lactated Ringer's, Last Rate: 75 mL/hr (05/19/24 0756)      alteplase, 2 mg, PRN  benzocaine-menthol, 1 lozenge, q2h PRN  calcium carbonate, 500 mg, 4x daily PRN  [Held by provider] chlorproMAZINE, 25 mg, q6h PRN  HYDROmorphone, 1 mg, q3h PRN  HYDROmorphone, 4 mg, q4h PRN  HYDROmorphone, 6 mg, q4h PRN  LORazepam, 1 mg, q8h PRN  metoclopramide, 10 mg, q8h PRN  naloxone, 0.2 mg, PRN  ondansetron, 4 mg, q8h PRN   Or  ondansetron, 4 mg, q8h PRN  ondansetron, 4 mg, q8h PRN   Or  ondansetron, 4 mg, q8h PRN        Review of Systems   Review of Systems   10 pt ROS reviewed and negative aside from above    Physical Exam   Blood pressure 136/84, pulse 105, temperature 36.7 °C (98.1 °F), temperature source Temporal, resp. rate 18, height 1.727 m (5' 8\"), weight 70.3 kg (155 lb), SpO2 94%.    GEN: NAD, weak  HEENT: NC/AT, MMM  CV: RRR no m/r/g  Chest: CTAB, diminished in b/l bases  GI: soft, NTND  MSK: +1 LE edema  Skin: no obvious rashes  Neuro: AOx3, moves all limbs, face symmetric      Labs     Lab Results   Component Value Date    GLUCOSE 201 (H) 05/19/2024    CALCIUM 8.6 05/19/2024     05/19/2024    K 4.1 05/19/2024    CO2 29 05/19/2024     05/19/2024    BUN " 22 05/19/2024    CREATININE 0.54 05/19/2024       Lab Results   Component Value Date    WBC 9.2 05/19/2024    HGB 8.6 (L) 05/19/2024    HCT 26.5 (L) 05/19/2024    MCV 91 05/19/2024    PLT 88 (L) 05/19/2024       Lab Results   Component Value Date    ALT 16 05/12/2024    AST 28 05/12/2024    ALKPHOS 136 05/12/2024    BILITOT 1.0 05/12/2024       Imaging   CT A/P 5/9/24:  Multiple pulmonary nodules in the imaged lower lungs compatible with  metastatic disease. There is wall thickening and apparent  hyperenhancement of the anterior wall of the distal esophagus which  may relate to patient's known esophageal malignancy. There is also  redemonstration of multiple hepatic masses and abdominal and pelvic  lymphadenopathy compatible with metastatic disease.      Soft tissue thickening and nodularity in the paravertebral soft  tissues at the level of the imaged lower thoracic spine compatible  with metastatic disease. Evaluation of the soft tissues of the spinal  canal is limited, however there is evidence of soft tissue extension  into the spinal canal at T9 and T10 resulting in spinal canal  stenosis. Neurosurgery consultation/evaluation and MRI is recommended  for better evaluation of the metastatic disease and spinal canal  stenosis.    Oncologic History    Primary Onc: Dr. Blake  Oncology History   Stage IV malignant neoplasm of esophagus (Multi)   9/13/2023 Initial Diagnosis     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)      9/13/2023 -  Chemotherapy     Trastuzumab + mFOLFOX6 (Fluorouracil Continuous Infusion / Leucovorin / Oxaliplatin), 14 Day Cycles      Metastases to the liver (Multi)   9/13/2023 Initial Diagnosis     Metastases to the liver (CMS/HCC)      9/13/2023 -  Chemotherapy     Trastuzumab + mFOLFOX6 (Fluorouracil Continuous Infusion / Leucovorin / Oxaliplatin), 14 Day Cycles        Current Tx Trastuzumab + mFOLFOX + pembro  last chemo 4/29/24, oxali last dose 3/18/24 held for neuropathy, last  pembro 4/15/24 stopped d/t ICI colitis     Assessment/Plan     Massimo Garcia is a 63 y.o. male with PMH third degree heart block s/p PPM (placed in November), esophageal adenocarcinoma with mets to liver and lungs, irAE colitis, PE, peripheral neuropathy, and portal vein thrombosis (On Eliquis). Patient presented to [facility] ED on 5/9 with acute on chronic low back pain. CT imaging was obtained and was concerning for evidence of soft tissue extension into the spinal canal at T9-T10 with associated spinal canal stenosis. Patient transferred to [facility] for further workup and management.  He is s/p surgery on 5/17.     Now admitted for paraspinal mass s/p surgery 5/17. Labs showing thrombocytopenia minimally responsive to plt transfusions 90->96, Fibrinogen <150 improved with cryo, PT mild prolongation, PTT normal now shortened after product, D-dimer 6,847. Labs previously in March showed similar findings and in Dec showed prolonged PT and PTT. Hematology consulted for further guidance regarding possible DIC. Labs appear to show chronic DIC picture dating back to at least March - was on DOAC so PT/PTT may be prolonged in setting of AC therapy - but fibrinogen low and D-Dimer elevated at that time. Plts have been intermittently low in setting of chemo but can r/o nutritional and viral etiologies. Consider staging imaging to assess if DIC related to progression of known metastatic disease.     Smear reviewed 5/19 showing occasional schistocyte 1-2/hpf, rare teardrop cell, hypochromia, anisocytosis, WBC with increased bands visualized possibly reactive, plts reduced in number but normal in morphology.       #metastatic esophageal CA  #malignancy induced DIC  -monitor LDH, PT and PTT, fibrinogen  -check hep C, HIV, B12, Folate  -cryo for fibrinogen <150, RBC for hgb <7, plts per NSGY  -consider CT chest to complete repeat staging    Thank you for this consult, we will continue to follow. Pt seen and discussed with   Neptali.    Rosalio Dodson DO  Hematology- Oncology Fellow, PGY-6  Hematology Consult Pager: [phone]

## 2024-05-19 NOTE — CARE PLAN
The patient's goals for the shift include      The clinical goals for the shift include patient will remain safe and free from injury this shift 5/19/24 1900      Problem: Fall/Injury  Goal: Not fall by end of shift  Outcome: Progressing     Problem: Fall/Injury  Goal: Be free from injury by end of the shift  Outcome: Progressing     Problem: Pain  Goal: Takes deep breaths with improved pain control throughout the shift  Outcome: Progressing

## 2024-05-20 ENCOUNTER — APPOINTMENT (OUTPATIENT)
Dept: CARDIOLOGY | Facility: HOSPITAL | Age: 64
End: 2024-05-20
Payer: MEDICARE

## 2024-05-20 ENCOUNTER — APPOINTMENT (OUTPATIENT)
Dept: HEMATOLOGY/ONCOLOGY | Facility: HOSPITAL | Age: 64
End: 2024-05-20
Payer: MEDICARE

## 2024-05-20 ENCOUNTER — APPOINTMENT (OUTPATIENT)
Dept: RADIOLOGY | Facility: HOSPITAL | Age: 64
DRG: 028 | End: 2024-05-20
Payer: MEDICARE

## 2024-05-20 LAB
ALBUMIN SERPL BCP-MCNC: 2.9 G/DL (ref 3.4–5)
ANION GAP SERPL CALC-SCNC: 10 MMOL/L (ref 10–20)
BASOPHILS # BLD AUTO: 0.02 X10*3/UL (ref 0–0.1)
BASOPHILS NFR BLD AUTO: 0.2 %
BLOOD EXPIRATION DATE: NORMAL
BUN SERPL-MCNC: 20 MG/DL (ref 6–23)
CALCIUM SERPL-MCNC: 8.4 MG/DL (ref 8.6–10.6)
CHLORIDE SERPL-SCNC: 102 MMOL/L (ref 98–107)
CO2 SERPL-SCNC: 29 MMOL/L (ref 21–32)
CREAT SERPL-MCNC: 0.56 MG/DL (ref 0.5–1.3)
DISPENSE STATUS: NORMAL
EGFRCR SERPLBLD CKD-EPI 2021: >90 ML/MIN/1.73M*2
EOSINOPHIL # BLD AUTO: 0.01 X10*3/UL (ref 0–0.7)
EOSINOPHIL NFR BLD AUTO: 0.1 %
ERYTHROCYTE [DISTWIDTH] IN BLOOD BY AUTOMATED COUNT: 18 % (ref 11.5–14.5)
FIBRINOGEN PPP-MCNC: 211 MG/DL (ref 200–400)
GLUCOSE SERPL-MCNC: 164 MG/DL (ref 74–99)
HCT VFR BLD AUTO: 25.7 % (ref 41–52)
HCV AB SER QL: NONREACTIVE
HGB BLD-MCNC: 8.7 G/DL (ref 13.5–17.5)
HIV 1+2 AB+HIV1 P24 AG SERPL QL IA: NONREACTIVE
IMM GRANULOCYTES # BLD AUTO: 0.4 X10*3/UL (ref 0–0.7)
IMM GRANULOCYTES NFR BLD AUTO: 3.6 % (ref 0–0.9)
LACTATE BLDV-SCNC: 3.1 MMOL/L (ref 0.4–2)
LYMPHOCYTES # BLD AUTO: 0.34 X10*3/UL (ref 1.2–4.8)
LYMPHOCYTES NFR BLD AUTO: 3.1 %
MAGNESIUM SERPL-MCNC: 2.04 MG/DL (ref 1.6–2.4)
MCH RBC QN AUTO: 30.4 PG (ref 26–34)
MCHC RBC AUTO-ENTMCNC: 33.9 G/DL (ref 32–36)
MCV RBC AUTO: 90 FL (ref 80–100)
MONOCYTES # BLD AUTO: 0.71 X10*3/UL (ref 0.1–1)
MONOCYTES NFR BLD AUTO: 6.4 %
NEUTROPHILS # BLD AUTO: 9.66 X10*3/UL (ref 1.2–7.7)
NEUTROPHILS NFR BLD AUTO: 86.6 %
NRBC BLD-RTO: 0.2 /100 WBCS (ref 0–0)
PHOSPHATE SERPL-MCNC: 2.3 MG/DL (ref 2.5–4.9)
PLATELET # BLD AUTO: 82 X10*3/UL (ref 150–450)
POTASSIUM SERPL-SCNC: 3.9 MMOL/L (ref 3.5–5.3)
PRODUCT BLOOD TYPE: 5100
PRODUCT CODE: NORMAL
RBC # BLD AUTO: 2.86 X10*6/UL (ref 4.5–5.9)
SODIUM SERPL-SCNC: 137 MMOL/L (ref 136–145)
UNIT ABO: NORMAL
UNIT NUMBER: NORMAL
UNIT RH: NORMAL
UNIT VOLUME: 271
UNIT VOLUME: 282
UNIT VOLUME: 323
UNIT VOLUME: 350
WBC # BLD AUTO: 11.1 X10*3/UL (ref 4.4–11.3)
XM INTEP: NORMAL

## 2024-05-20 PROCEDURE — 93970 EXTREMITY STUDY: CPT | Mod: DISTINCT PROCEDURAL SERVICE | Performed by: RADIOLOGY

## 2024-05-20 PROCEDURE — 72080 X-RAY EXAM THORACOLMB 2/> VW: CPT

## 2024-05-20 PROCEDURE — 93005 ELECTROCARDIOGRAM TRACING: CPT

## 2024-05-20 PROCEDURE — 85384 FIBRINOGEN ACTIVITY: CPT

## 2024-05-20 PROCEDURE — 86803 HEPATITIS C AB TEST: CPT

## 2024-05-20 PROCEDURE — 99231 SBSQ HOSP IP/OBS SF/LOW 25: CPT | Performed by: INTERNAL MEDICINE

## 2024-05-20 PROCEDURE — 83735 ASSAY OF MAGNESIUM: CPT

## 2024-05-20 PROCEDURE — 1200000002 HC GENERAL ROOM WITH TELEMETRY DAILY

## 2024-05-20 PROCEDURE — 93970 EXTREMITY STUDY: CPT | Mod: 59,MUE

## 2024-05-20 PROCEDURE — 87389 HIV-1 AG W/HIV-1&-2 AB AG IA: CPT

## 2024-05-20 PROCEDURE — 72080 X-RAY EXAM THORACOLMB 2/> VW: CPT | Performed by: RADIOLOGY

## 2024-05-20 PROCEDURE — 97165 OT EVAL LOW COMPLEX 30 MIN: CPT | Mod: GO

## 2024-05-20 PROCEDURE — 83605 ASSAY OF LACTIC ACID: CPT

## 2024-05-20 PROCEDURE — 99233 SBSQ HOSP IP/OBS HIGH 50: CPT

## 2024-05-20 PROCEDURE — 85025 COMPLETE CBC W/AUTO DIFF WBC: CPT

## 2024-05-20 PROCEDURE — 97164 PT RE-EVAL EST PLAN CARE: CPT | Mod: GP

## 2024-05-20 PROCEDURE — 93971 EXTREMITY STUDY: CPT | Performed by: RADIOLOGY

## 2024-05-20 PROCEDURE — 93970 EXTREMITY STUDY: CPT

## 2024-05-20 PROCEDURE — 80069 RENAL FUNCTION PANEL: CPT

## 2024-05-20 PROCEDURE — 2500000001 HC RX 250 WO HCPCS SELF ADMINISTERED DRUGS (ALT 637 FOR MEDICARE OP)

## 2024-05-20 PROCEDURE — 2500000004 HC RX 250 GENERAL PHARMACY W/ HCPCS (ALT 636 FOR OP/ED)

## 2024-05-20 RX ORDER — NALOXONE HYDROCHLORIDE 0.4 MG/ML
0.2 INJECTION, SOLUTION INTRAMUSCULAR; INTRAVENOUS; SUBCUTANEOUS AS NEEDED
Status: DISCONTINUED | OUTPATIENT
Start: 2024-05-20 | End: 2024-05-21

## 2024-05-20 RX ORDER — PREGABALIN 25 MG/1
50 CAPSULE ORAL 2 TIMES DAILY
Status: DISCONTINUED | OUTPATIENT
Start: 2024-05-20 | End: 2024-05-24 | Stop reason: HOSPADM

## 2024-05-20 RX ORDER — HYDROMORPHONE HCL/0.9% NACL/PF 15 MG/30ML
PATIENT CONTROLLED ANALGESIA SYRINGE INTRAVENOUS CONTINUOUS
Status: DISCONTINUED | OUTPATIENT
Start: 2024-05-20 | End: 2024-05-21

## 2024-05-20 RX ADMIN — PREDNISONE 80 MG: 20 TABLET ORAL at 08:27

## 2024-05-20 RX ADMIN — NYSTATIN 500000 UNITS: 100000 SUSPENSION ORAL at 14:11

## 2024-05-20 RX ADMIN — CYCLOBENZAPRINE 10 MG: 10 TABLET, FILM COATED ORAL at 20:34

## 2024-05-20 RX ADMIN — Medication 4000 UNITS: at 08:27

## 2024-05-20 RX ADMIN — ENOXAPARIN SODIUM 40 MG: 100 INJECTION SUBCUTANEOUS at 20:34

## 2024-05-20 RX ADMIN — NYSTATIN 500000 UNITS: 100000 SUSPENSION ORAL at 06:20

## 2024-05-20 RX ADMIN — PANTOPRAZOLE SODIUM 40 MG: 40 TABLET, DELAYED RELEASE ORAL at 06:20

## 2024-05-20 RX ADMIN — ACETAMINOPHEN 650 MG: 325 TABLET ORAL at 10:30

## 2024-05-20 RX ADMIN — CYCLOBENZAPRINE 10 MG: 10 TABLET, FILM COATED ORAL at 08:27

## 2024-05-20 RX ADMIN — SODIUM CHLORIDE, POTASSIUM CHLORIDE, SODIUM LACTATE AND CALCIUM CHLORIDE 75 ML/HR: 600; 310; 30; 20 INJECTION, SOLUTION INTRAVENOUS at 01:11

## 2024-05-20 RX ADMIN — Medication 1 TABLET: at 08:27

## 2024-05-20 RX ADMIN — ACETAMINOPHEN 650 MG: 325 TABLET ORAL at 03:32

## 2024-05-20 RX ADMIN — CYCLOBENZAPRINE 10 MG: 10 TABLET, FILM COATED ORAL at 14:11

## 2024-05-20 RX ADMIN — POLYETHYLENE GLYCOL 3350 17 G: 17 POWDER, FOR SOLUTION ORAL at 08:28

## 2024-05-20 RX ADMIN — ACETAMINOPHEN 650 MG: 325 TABLET ORAL at 16:49

## 2024-05-20 RX ADMIN — PREGABALIN 50 MG: 25 CAPSULE ORAL at 20:34

## 2024-05-20 ASSESSMENT — COGNITIVE AND FUNCTIONAL STATUS - GENERAL
EATING MEALS: A LITTLE
DRESSING REGULAR UPPER BODY CLOTHING: A LITTLE
TURNING FROM BACK TO SIDE WHILE IN FLAT BAD: A LITTLE
MOVING TO AND FROM BED TO CHAIR: A LITTLE
STANDING UP FROM CHAIR USING ARMS: A LITTLE
TOILETING: A LITTLE
CLIMB 3 TO 5 STEPS WITH RAILING: A LITTLE
EATING MEALS: A LITTLE
DRESSING REGULAR UPPER BODY CLOTHING: A LITTLE
WALKING IN HOSPITAL ROOM: A LITTLE
PERSONAL GROOMING: A LITTLE
HELP NEEDED FOR BATHING: A LITTLE
MOBILITY SCORE: 18
DRESSING REGULAR LOWER BODY CLOTHING: A LITTLE
HELP NEEDED FOR BATHING: A LITTLE
DAILY ACTIVITIY SCORE: 18
MOBILITY SCORE: 24
DAILY ACTIVITIY SCORE: 18
PERSONAL GROOMING: A LITTLE
MOVING FROM LYING ON BACK TO SITTING ON SIDE OF FLAT BED WITH BEDRAILS: A LITTLE
DRESSING REGULAR LOWER BODY CLOTHING: A LITTLE
TOILETING: A LITTLE

## 2024-05-20 ASSESSMENT — PAIN - FUNCTIONAL ASSESSMENT
PAIN_FUNCTIONAL_ASSESSMENT: 0-10

## 2024-05-20 ASSESSMENT — ACTIVITIES OF DAILY LIVING (ADL)
BATHING_ASSISTANCE: STAND BY
ADL_ASSISTANCE: INDEPENDENT
ADL_ASSISTANCE: INDEPENDENT

## 2024-05-20 ASSESSMENT — PAIN SCALES - GENERAL
PAINLEVEL_OUTOF10: 2
PAINLEVEL_OUTOF10: 4
PAINLEVEL_OUTOF10: 1
PAINLEVEL_OUTOF10: 0 - NO PAIN

## 2024-05-20 NOTE — PROGRESS NOTES
Subjective   New left arm swelling overnight. Left foot is painful. Repleted lactate overnight of 4.6, got another 1 L of fluid. This morning reports he has no pain while lying down. Had one big bowel movement yesterday, and is urinating normally. No shortness of breath, chest pain, abdominal pain, or nausea/vomiting. No numbness or weakness.     Meds  Scheduled medications  acetaminophen, 650 mg, oral, q6h  cholecalciferol, 4,000 Units, oral, Daily  cyclobenzaprine, 10 mg, oral, TID  enoxaparin, 40 mg, subcutaneous, Daily  fentaNYL, 1 patch, transdermal, q72h  fibrinogen, 5,115 mg, intravenous, Once  lidocaine, 1 patch, transdermal, Daily  multivitamin with minerals, 1 tablet, oral, Daily  nystatin, 5 mL, Swish & Swallow, 4x daily  pantoprazole, 40 mg, oral, Daily before breakfast  polyethylene glycol, 17 g, oral, BID  predniSONE, 80 mg, oral, Daily   Followed by  [START ON 5/25/2024] predniSONE, 40 mg, oral, Daily   Followed by  [START ON 6/1/2024] predniSONE, 20 mg, oral, Daily   Followed by  [START ON 6/8/2024] predniSONE, 10 mg, oral, Daily  pregabalin, 50 mg, oral, BID      Continuous medications  HYDROmorphone,       PRN medications  PRN medications: alteplase, benzocaine-menthol, calcium carbonate, [Held by provider] chlorproMAZINE, HYDROmorphone, HYDROmorphone, HYDROmorphone, LORazepam, metoclopramide, naloxone, ondansetron **OR** ondansetron, ondansetron **OR** ondansetron      Objective   Vital signs in last 24 hours:  Temp:  [36.4 °C (97.5 °F)-36.7 °C (98.1 °F)] 36.7 °C (98.1 °F)  Heart Rate:  [] 101  Resp:  [18] 18  BP: (114-127)/(69-83) 123/76    Intake/Output this shift:    Intake/Output Summary (Last 24 hours) at 5/20/2024 1317  Last data filed at 5/20/2024 0630  Gross per 24 hour   Intake 4408.42 ml   Output 2095 ml   Net 2313.42 ml       Physical  General: Patient is awake, non-toxic appearing, normal body habitus  Pulm: Normal WOB at rest and when sitting up, no crackles or rhonchi  Cardiac:  Regular rate and rhythm, normal S1/S2  Abdomen: Non-tender to palpation, non-distended  Extremities: No peripheral edema, or cyanosis  Neuro: Patient alert and oriented, cranial nerves grossly intact, normal strength and sensation.    Results  Results for orders placed or performed during the hospital encounter of 05/10/24 (from the past 24 hour(s))   Vitamin B12   Result Value Ref Range    Vitamin B12 >2,000 (H) 211 - 911 pg/mL   Folate   Result Value Ref Range    Folate, Serum 23.0 >5.0 ng/mL   BLOOD GAS VENOUS FULL PANEL   Result Value Ref Range    POCT pH, Venous 7.45 (H) 7.33 - 7.43 pH    POCT pCO2, Venous 40 (L) 41 - 51 mm Hg    POCT pO2, Venous 42 35 - 45 mm Hg    POCT SO2, Venous 70 45 - 75 %    POCT Oxy Hemoglobin, Venous 68.2 45.0 - 75.0 %    POCT Hematocrit Calculated, Venous 32.0 (L) 41.0 - 52.0 %    POCT Sodium, Venous 135 (L) 136 - 145 mmol/L    POCT Potassium, Venous 3.6 3.5 - 5.3 mmol/L    POCT Chloride, Venous 103 98 - 107 mmol/L    POCT Ionized Calicum, Venous 1.11 1.10 - 1.33 mmol/L    POCT Glucose, Venous 251 (H) 74 - 99 mg/dL    POCT Lactate, Venous 4.6 (HH) 0.4 - 2.0 mmol/L    POCT Base Excess, Venous 3.5 (H) -2.0 - 3.0 mmol/L    POCT HCO3 Calculated, Venous 27.8 (H) 22.0 - 26.0 mmol/L    POCT Hemoglobin, Venous 10.7 (L) 13.5 - 17.5 g/dL    POCT Anion Gap, Venous 8.0 (L) 10.0 - 25.0 mmol/L    Patient Temperature 37.0 degrees Celsius    FiO2 21 %   Blood Gas Lactic Acid, Venous   Result Value Ref Range    POCT Lactate, Venous 3.1 (H) 0.4 - 2.0 mmol/L   CBC and Auto Differential   Result Value Ref Range    WBC 11.1 4.4 - 11.3 x10*3/uL    nRBC 0.2 (H) 0.0 - 0.0 /100 WBCs    RBC 2.86 (L) 4.50 - 5.90 x10*6/uL    Hemoglobin 8.7 (L) 13.5 - 17.5 g/dL    Hematocrit 25.7 (L) 41.0 - 52.0 %    MCV 90 80 - 100 fL    MCH 30.4 26.0 - 34.0 pg    MCHC 33.9 32.0 - 36.0 g/dL    RDW 18.0 (H) 11.5 - 14.5 %    Platelets 82 (L) 150 - 450 x10*3/uL    Neutrophils % 86.6 40.0 - 80.0 %    Immature Granulocytes %,  Automated 3.6 (H) 0.0 - 0.9 %    Lymphocytes % 3.1 13.0 - 44.0 %    Monocytes % 6.4 2.0 - 10.0 %    Eosinophils % 0.1 0.0 - 6.0 %    Basophils % 0.2 0.0 - 2.0 %    Neutrophils Absolute 9.66 (H) 1.20 - 7.70 x10*3/uL    Immature Granulocytes Absolute, Automated 0.40 0.00 - 0.70 x10*3/uL    Lymphocytes Absolute 0.34 (L) 1.20 - 4.80 x10*3/uL    Monocytes Absolute 0.71 0.10 - 1.00 x10*3/uL    Eosinophils Absolute 0.01 0.00 - 0.70 x10*3/uL    Basophils Absolute 0.02 0.00 - 0.10 x10*3/uL   Renal function panel   Result Value Ref Range    Glucose 164 (H) 74 - 99 mg/dL    Sodium 137 136 - 145 mmol/L    Potassium 3.9 3.5 - 5.3 mmol/L    Chloride 102 98 - 107 mmol/L    Bicarbonate 29 21 - 32 mmol/L    Anion Gap 10 10 - 20 mmol/L    Urea Nitrogen 20 6 - 23 mg/dL    Creatinine 0.56 0.50 - 1.30 mg/dL    eGFR >90 >60 mL/min/1.73m*2    Calcium 8.4 (L) 8.6 - 10.6 mg/dL    Phosphorus 2.3 (L) 2.5 - 4.9 mg/dL    Albumin 2.9 (L) 3.4 - 5.0 g/dL   Magnesium   Result Value Ref Range    Magnesium 2.04 1.60 - 2.40 mg/dL       Imaging  Upper extremity venous duplex bilateral    Result Date: 5/20/2024  STUDY: [facility] US UPPER EXTREMITY VENOUS DUPLEX BILATERAL;  5/20/2024 11:01 am   INDICATION: Signs/Symptoms:New LUE swelling, concern for clot given holding AC in s/o recent procedure, hypercoagulable state d/t malignancy.   COMPARISON: None.   ACCESSION NUMBER(S): [id number]   ORDERING CLINICIAN: ORLIN GREEN   TECHNIQUE: Vascular ultrasound of the bilateral upper extremities was performed. Evaluation was performed with grayscale, color, and spectral Doppler. When possible, compression views of the evaluated veins was also performed.   FINDINGS: Limited evaluation due to edema, tortuous and small-vessel.   RIGHT UPPER EXTREMITY: Evaluation of the visualized portions of the right internal jugular, innominate, subclavian, axillary, brachial, cephalic, and basilic veins was performed.   There is normal respiratory variation, normal  compressibility, as well as normal color doppler signal in the visualized vessels. There is no evidence of thrombus.   LEFT UPPER EXTREMITY: Evaluation of the visualized portions of the left internal jugular, innominate, subclavian, axillary, brachial, cephalic, and basilic veins was performed.   Left cephalic vein is compressible but demonstrates no significant flow. Otherwise, there is normal respiratory variation, normal compressibility, as well as normal color doppler signal in the visualized vessels. There is no evidence of thrombus.   There is a normal-appearing lymph node measuring 2.1 x 0.9 x 0.9 cm in seen in the neck. An additional round hypoechoic structure adjacent and lateral to the pacemaker in the left chest wall measures 1.6 x 1.5 x 1.5 cm is noted.       Left cephalic venous compressible but demonstrates no significant flow, concerning for deep vein thrombosis. No evidence of additional findings suggestive for DVT bilateral upper extremities. There is a round hypoechoic structure adjacent and lateral to the pacemaker in the left chest wall without significant internal vascularity which may represent an adjacent lymph node versus fibrotic tissue or fat necrosis.   I personally reviewed the images/study and I agree with the findings as stated by resident physician Dr. Ken Vinson . This study was interpreted at [facility], [place], [place].   MACRO: Ken Vinson discussed the significance and urgency of this critical finding by telephone with  Adi Mays on 5/20/2024 at 11:54 am.  (**-RCF-**) Findings:  See findings.     Dictation workstation:   [id number]    Lower extremity venous duplex bilateral    Result Date: 5/20/2024  STUDY: [facility] LOWER EXTREMITY VENOUS DUPLEX BILATERAL;  5/20/2024 10:09 am   INDICATION: Signs/Symptoms:New lower extremity swelling, holding AC in s/o recent procedure.   COMPARISON: None.   ACCESSION NUMBER(S): [id number]    ORDERING CLINICIAN: ORLIN GREEN   TECHNIQUE: Vascular ultrasound of the bilateral lower extremities was performed. Real-time compression views as well as Gray scale, color Doppler and spectral Doppler waveform analysis was performed.   FINDINGS: Evaluation of the visualized portions of the bilateral common femoral vein, proximal, mid, and distal femoral vein, and popliteal vein were performed.  Evaluation of the visualized portions of the  posterior tibial and peroneal veins were also performed.   Limitations:  Limited evaluation due to edema and morphology of the veins.   There is a small caliber of the distal femoral vein on the left and minimal flow was detected on Doppler interrogation. Otherwise, no evidence of deep vein thrombus within the visualized bilateral lower extremities.         No significant flow was detected on Doppler images in the left distal femoral vein which can be artifactual due to small caliber of the vein. However, deep vein thrombus can not be excluded. No evidence of additional DVT in bilateral lower extremities.   I personally reviewed the images/study and I agree with the findings as stated by resident physician Dr. Ken Vinson . This study was interpreted at [place], [place].   MACRO: None     Dictation workstation:   [id number]    ECG 12 Lead    Result Date: 5/20/2024  Electronic ventricular pacemaker When compared with ECG of 19-MAY-2024 06:29, (unconfirmed) No significant change was found    XR chest 1 view    Result Date: 5/19/2024  Interpreted By:  Spike Alvarado and Weaver Jakob STUDY: XR CHEST 1 VIEW; 5/19/2024 10:34 am   INDICATION: Signs/Symptoms:h/o tachyarrhythmia overnight, position of pacemaker.   COMPARISON: Chest radiograph 05/11/2024, abdominopelvic CT 05/09/2024   ACCESSION NUMBER(S): [id number]   ORDERING CLINICIAN: ORLIN GREEN   FINDINGS: Single AP radiograph of the chest was obtained. Stable  appearance of aleftchest wall dual chamber cardiac pacemakerwith leads projecting over the cardiac chambers.   CARDIOMEDIASTINAL SILHOUETTE: Cardiomediastinal silhouette is stable in size and configuration.   LUNGS: Scattered bilateral pulmonary nodules similar to prior radiograph and better evaluated on 05/09/2024 CT. Interval worsening of diffuse interstitial prominence and superimposed subtle hazy opacities, which may represent a component of pulmonary edema. There is no sizable pleural effusion or pneumothorax.   ABDOMEN: No remarkable upper abdominal findings.   BONES: No acute osseous abnormality.  The patient is status post interval thoracolumbar spine fusion hardware with overlying multiple midline skin staples. Changes of bilateral shoulder arthroplasties again noted.       1. Interval worsening of diffuse interstitial prominence and superimposed subtle hazy opacities, which may represent a component of pulmonary edema. 2. Scattered bilateral pulmonary nodules similar to prior radiograph and better evaluated on 05/09/2024 CT. 3. Medical devices as above.   I personally reviewed the images/study and I agree with the findings as stated by Roney Joyner MD. This study was interpreted at [facility], [place], [place].   Signed by: Spike Alvarado 5/19/2024 2:44 PM Dictation workstation:   [id number]        Assessment/Plan   Principal Problem:    Esophageal cancer, stage IV (Multi)  Active Problems:    Primary malignant neoplasm of esophagus (Multi)    Massimo Garcia is a 63 y.o. male with PMH third degree heart block s/p PPM (placed in November), esophageal adenocarcinoma with mets to liver and lungs, irAE colitis, PE, peripheral neuropathy, and portal vein thrombosis (On Eliquis). Patient presented to [facility] ED on 5/9 with acute on chronic low back pain. CT imaging was obtained and was concerning for evidence of soft tissue extension into the spinal canal at T9-T10 with associated  spinal canal stenosis. Patient transferred to [facility] for further workup and management.  He is s/p surgery on 5/17.     Updates  -New cephalic vein flow void, likely cephalic vein thrombosis. Will not need anticoagulation, but will touch base with NSGY about resumption of full dose anticoagulation if DVT.   -Remove Estrella catheter  -Physical therapy today  -Will keep on PCA pump today, transition to orals tomorrow     #Disseminated Intravascular coagulation  #Chronic thrombocytopenia  #Hemoptysis on night of 5/18  -Cancer diagnosed in Jan 2023. Treatment started 1/30/2023  -Last normal platelets in Feb 2023 at 194, has been chronically low since then  - INR of 1.3-1.7 starting in October 2023. Was normal in Jan 2023. PTT has been different, with elevations in Dec 2023 and has normalized since March 2024  -Liver function through AST, ALT, Alkphos and albumin shows no injury corresponding to this  -Etiology of hemoptysis likely Trauma from coughing vs pulmonary embolism vs bleeding from malignant mass in lung  - Chest x-ray showed interval worsening of diffuse interstitial prominence and superimposed hazy opacities but no bleeds. 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 NSGY interested in a shorter taper in 2 weeks as there is concerned about bone healing.   - Continue Dilaudid PCA   - Per NSGY, INR<1.6, Fibrinogen> 150, Platelets>100 for 24 hrs post-op. Given consumptive coagulopathy (DIC), will focus on maintaining fibrinogen>150, and platelets>50  -PT/OT starting POD1  - Acetaminophen 650 mg Q4H, cyclobenzaprine 10 mg Q8H, lidocaine patches  -Toradol 30 mg Q6H as needed for up to 8 doses  -Continue fentanyl patch to 100 mcg/hr  -Physical therapy as tolerated  -Continue Lyrica 50 mg nightly    #New Tachycardia, resolved  -Ddx of hypovolemia vs new clots from known DIC (off enoxaparin for a day)  -Review of telemetry shows sinus tachycardia with occasional PVCs  -s/p 1L of LR today.   - Can consider metoprolol tartrate 12.5 mg oral if tachycardia persists.     #Hiccups  -Held Chloropromazine 25 mg Q6H PRN as not helping  - Started metaclopramide 10 mg Q8H PRN      #Vitamin D deficiency  -Continue vitamin D 4000 units every morning     #Stage IV esophageal cancer HER2 positive   - Jan 2023: Diagnosed with moderately differentiated esophageal adenocarcinoma   -Jan 27,2023 - Staging scan shows intense hypermetabolic distal esophageal lesion consistent with patient's reported history of esophageal adenocarcinoma. Numerous centrally photopenic and peripherally intense liver lesion corresponding with hepatic metastasis. Numerous moderate to intensly hypermetabolic periaortic and aortocaval lymph nodes consistent with metastatic ralph involvement. Single left pulmonary ligament LN with mild hypermetabolic activity  - Cycle 1 FOLFOX Herceptin - 1/30/23  -April 2024: Switched to  Trastuzumab + 5-FU/Pembrolizumab alone  :: Follows with Dr. Blake, emailed 5/11 AM with updates  :: Current chemotherapy: 5FU, Tras/Pembro, last received on 4/29, next due 5/13. Hold for potential surgery.  :: CT A/P (5/9) with “Multiple pulmonary nodules in the imaged lower lungs compatible with metastatic disease. There is wall thickening and apparent hyperenhancement of the anterior wall of the distal esophagus which may relate to patient's known esophageal malignancy. There is also redemonstration of multiple hepatic masses and abdominal and pelvic lymphadenopathy compatible with metastatic disease     #iRAE colitis, grade III  :: Recently admitted to [facility] 5/4-5/7 for enterocolitis  - Was discharged on prednisone 80 mg through 5/27. Now since off dexamethasone steroids, will start on prednisone 80 mg -> 40 mg  ->20 mg  -> 10 mg taper weekly.     #hx PE  #Hx Portal vein thrombosis  - Pulmonary embolism in RLL subsegmental artery in March 2024  -Jan 2023:  Extensive large pulmonary emboli involving lobar, segmental, and subsegmental arteries bilaterally. Prior to any cancer treatment  - Held home Eliquis 5mg BID on admission, per neurology, pending further plan  - Restart enoxaparin 40 mg daily today  - Can restart apixaban  POD 7     #GERD  - Continue home Protonix   -Calcium carbonate 500 mg PRN     #Anxiety disorder  - Lorazepam 1 mg Q8H PRN     #Chronic normocytic anemia secondary to malignancy and treatment  :: At baseline hgb   -Hgb of 8.5 this morning  - Monitor daily CBC  - Transfuse for hgb<8, platelet<10     #Hx Third degree heard block, s/p pacemaker placement (11/8/23)  :: Asymptomatic on admission  - CTM     F: PRN  E: Replete as needed  N: Regular diet  C: Full code  A: Port     LOS: 10 days     Adi Mays MD/PhD   PGY-1.833

## 2024-05-20 NOTE — PROGRESS NOTES
"Massimo Garcia is a 63 y.o. male on day 10 of admission presenting with Esophageal cancer, stage IV (Multi).    63 y.o. male with PMH of third degree heart block s/p PPM (placed in November), stage 4 esophageal adenocarcinoma with metastasis to liver and lungs, irAE colitis, PE, peripheral neuropathy, and portal vein thrombosis (On Eliquis-held for surgery) who initially presented to [facility] ED on 5/9 for acute on chronic low back pain and found to have soft tissue extension into the spinal canal at T9-T10 with spinal canal stenosis and was transferred to  for further management. He underwent surgery on 5/17 for paraspinal mass resection. Hematology was consulted for possible DIC.    Patient continue to receive supportive platelet and cryoprecipitate for his labs with thrombocytopenia, low fibrinogen, PT prolongation, D-dimer 6,847, leading improvement. Upper and low extremity ultrasound is ordered for new swelling in LUE and leg edema. Chemo on hold for now since 4/29/24.  Patient also had an episode of hemoptysis on 5/18.    Subjective    Patient c/o back pain, denies any fever or chills, blood in stool or urine. No more episode of hemoptysis,    Objective     Physical Exam  HENT:      Head: Normocephalic and atraumatic.   Cardiovascular:      Rate and Rhythm: Normal rate and regular rhythm.      Heart sounds: Normal heart sounds.   Pulmonary:      Breath sounds: Normal breath sounds.   Abdominal:      General: Abdomen is flat.      Palpations: Abdomen is soft.   Musculoskeletal:         General: Swelling present.      Comments: Left upper extremity swelling.   Neurological:      General: No focal deficit present.      Mental Status: He is alert.       Last Recorded Vitals  Blood pressure 112/75, pulse 108, temperature 36.9 °C (98.5 °F), temperature source Temporal, resp. rate 18, height 1.727 m (5' 8\"), weight 70.3 kg (155 lb), SpO2 95%.  Intake/Output last 3 Shifts:  I/O last 3 completed shifts:  In: 5724.5 (81.4 " mL/kg) [P.O.:150; I.V.:2511.1 (35.7 mL/kg); Blood:1316.1; IV Piggyback:1747.4]  Out: 5300 (75.4 mL/kg) [Urine:4875 (1.9 mL/kg/hr); Drains:425]  Weight: 70.3 kg     Relevant Results  Results for orders placed or performed during the hospital encounter of 05/10/24 (from the past 24 hour(s))   BLOOD GAS VENOUS FULL PANEL   Result Value Ref Range    POCT pH, Venous 7.45 (H) 7.33 - 7.43 pH    POCT pCO2, Venous 40 (L) 41 - 51 mm Hg    POCT pO2, Venous 42 35 - 45 mm Hg    POCT SO2, Venous 70 45 - 75 %    POCT Oxy Hemoglobin, Venous 68.2 45.0 - 75.0 %    POCT Hematocrit Calculated, Venous 32.0 (L) 41.0 - 52.0 %    POCT Sodium, Venous 135 (L) 136 - 145 mmol/L    POCT Potassium, Venous 3.6 3.5 - 5.3 mmol/L    POCT Chloride, Venous 103 98 - 107 mmol/L    POCT Ionized Calicum, Venous 1.11 1.10 - 1.33 mmol/L    POCT Glucose, Venous 251 (H) 74 - 99 mg/dL    POCT Lactate, Venous 4.6 (HH) 0.4 - 2.0 mmol/L    POCT Base Excess, Venous 3.5 (H) -2.0 - 3.0 mmol/L    POCT HCO3 Calculated, Venous 27.8 (H) 22.0 - 26.0 mmol/L    POCT Hemoglobin, Venous 10.7 (L) 13.5 - 17.5 g/dL    POCT Anion Gap, Venous 8.0 (L) 10.0 - 25.0 mmol/L    Patient Temperature 37.0 degrees Celsius    FiO2 21 %   Blood Gas Lactic Acid, Venous   Result Value Ref Range    POCT Lactate, Venous 3.1 (H) 0.4 - 2.0 mmol/L   CBC and Auto Differential   Result Value Ref Range    WBC 11.1 4.4 - 11.3 x10*3/uL    nRBC 0.2 (H) 0.0 - 0.0 /100 WBCs    RBC 2.86 (L) 4.50 - 5.90 x10*6/uL    Hemoglobin 8.7 (L) 13.5 - 17.5 g/dL    Hematocrit 25.7 (L) 41.0 - 52.0 %    MCV 90 80 - 100 fL    MCH 30.4 26.0 - 34.0 pg    MCHC 33.9 32.0 - 36.0 g/dL    RDW 18.0 (H) 11.5 - 14.5 %    Platelets 82 (L) 150 - 450 x10*3/uL    Neutrophils % 86.6 40.0 - 80.0 %    Immature Granulocytes %, Automated 3.6 (H) 0.0 - 0.9 %    Lymphocytes % 3.1 13.0 - 44.0 %    Monocytes % 6.4 2.0 - 10.0 %    Eosinophils % 0.1 0.0 - 6.0 %    Basophils % 0.2 0.0 - 2.0 %    Neutrophils Absolute 9.66 (H) 1.20 - 7.70 x10*3/uL     Immature Granulocytes Absolute, Automated 0.40 0.00 - 0.70 x10*3/uL    Lymphocytes Absolute 0.34 (L) 1.20 - 4.80 x10*3/uL    Monocytes Absolute 0.71 0.10 - 1.00 x10*3/uL    Eosinophils Absolute 0.01 0.00 - 0.70 x10*3/uL    Basophils Absolute 0.02 0.00 - 0.10 x10*3/uL   Renal function panel   Result Value Ref Range    Glucose 164 (H) 74 - 99 mg/dL    Sodium 137 136 - 145 mmol/L    Potassium 3.9 3.5 - 5.3 mmol/L    Chloride 102 98 - 107 mmol/L    Bicarbonate 29 21 - 32 mmol/L    Anion Gap 10 10 - 20 mmol/L    Urea Nitrogen 20 6 - 23 mg/dL    Creatinine 0.56 0.50 - 1.30 mg/dL    eGFR >90 >60 mL/min/1.73m*2    Calcium 8.4 (L) 8.6 - 10.6 mg/dL    Phosphorus 2.3 (L) 2.5 - 4.9 mg/dL    Albumin 2.9 (L) 3.4 - 5.0 g/dL   Magnesium   Result Value Ref Range    Magnesium 2.04 1.60 - 2.40 mg/dL   Hepatitis C antibody   Result Value Ref Range    Hepatitis C AB Nonreactive Nonreactive   HIV 1/2 Antigen/Antibody Screen with Reflex to Confirmation   Result Value Ref Range    HIV 1/2 Antigen/Antibody Screen with Reflex to Confirmation Nonreactive Nonreactive   ECG 12 Lead   Result Value Ref Range    Ventricular Rate 108 BPM    Atrial Rate 108 BPM    MD Interval 146 ms    QRS Duration 154 ms    QT Interval 392 ms    QTC Calculation(Bazett) 525 ms    P Axis 12 degrees    R Axis -31 degrees    T Axis 84 degrees    QRS Count 17 beats    Q Onset 196 ms    P Onset 123 ms    P Offset 159 ms    T Offset 392 ms    QTC Fredericia 476 ms      MRI brain 5/16/24:  1. Peripherally enhancing intrinsically T2/FLAIR hypointense lesions within the right posterior parasagittal parietal lobe and the left anterior temporal lobe, which are new when compared to prior MRI  dated 11/06/2023. The parietal lesion demonstrates significant adjacent enhancement with mild local mass effect. These findings are likely due to metastatic disease given the patient's history.      MRI spine 5/16/24:  1. Ill-defined infiltrating and minimally peripherally enhancing lesion  involving the T9 and T10 vertebral bodies and with masslike extension posteriorly along the ventral thecal sac and rightward along the anterior paraspinal soft tissues. This mass exerts severe mass effect on the thecal sac with deformity with potential subtle T2 signal abnormality/likely compression of the spinal cord at least at the T10 level. The above-described mass also extends along the left anterior paraspinal soft tissues and closely abuts the descending thoracic aorta with questionable invasion of the posteromedial aortic wall at the T9 level.There is multilevel effacement of the right neural foramina with complete loss of epidural fat signal within the right neural foramina from T9-T11.  2. There is an avidly enhancing T2 hypointense lesion within the ventral thecal sac exerting mass effect on the cauda equina at the level of L2 likely due to intrathecal metastatic disease given the other findings. Likely subtle additional nodular enhancement of the cauda equina probably due to additional metastatic deposits.    Upper extremity US 5/20/24 Preliminary: Left cephalic venous compressible but demonstrates no significant flow, concerning for deep vein thrombosis. No evidence of additional findings suggestive for DVT bilateral upper extremities. There is around hypoechoic structure adjacent and lateral to the pacemaker in the left chest wall without significant internal vascularity which may represent an adjacent lymph node versus fibrotic tissue or fat necrosis.    Lower extremity US: No significant flow was detected on Doppler images in the left distal femoral vein which can be artifactual due to small caliber of the vein. However, deep vein thrombus cannot be excluded. No evidence of additional DVT in bilateral lower extremities.    Assessment/Plan   Principal Problem:    Esophageal cancer, stage IV (Multi)  Active Problems:    Primary malignant neoplasm of esophagus (Multi)  Massimo Garcia is a 63 y.o. male with  PMH of third degree heart block s/p PPM (placed in November), stage 4 esophageal adenocarcinoma with metastasis to liver and lungs, irAE colitis, PE, peripheral neuropathy, and portal vein thrombosis (On Eliquis-held for surgery) who initially presented to [facility] ED on 5/9 for acute on chronic low back pain and found to have soft tissue extension into the spinal canal at T9-T10 with spinal canal stenosis and was transferred to  for further management. He underwent surgery on 5/17 for paraspinal mass resection. Hematology was consulted for possible DIC.    Patient continue to receive supportive platelet and cryoprecipitate for his labs with thrombocytopenia, low fibrinogen, PT prolongation, D-dimer 6,847, leading improvement. Upper and low extremity ultrasound is ordered for new swelling in LUE and leg edema. Chemo on hold for now since 4/29/24. Patient also had an episode of hemoptysis on 5/18. Patient c/o fatigue generalized weakness, denies any fever or chills.    Prior oncology treatment as per chart review:  Primary Onc: Dr. Blake      Oncology History   Stage IV malignant neoplasm of esophagus (Multi)   9/13/2023 Initial Diagnosis     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)      9/13/2023 -  Chemotherapy     Trastuzumab + mFOLFOX6 (Fluorouracil Continuous Infusion / Leucovorin / Oxaliplatin), 14 Day Cycles      Metastases to the liver (Multi)   9/13/2023 Initial Diagnosis     Metastases to the liver (CMS/HCC)      9/13/2023 -  Chemotherapy     Trastuzumab + mFOLFOX6 (Fluorouracil Continuous Infusion / Leucovorin / Oxaliplatin), 14 Day Cycles         Current Tx Trastuzumab + mFOLFOX + pembro  last chemo 4/29/24, oxali last dose 3/18/24 held for neuropathy, last pembro 4/15/24 stopped d/t ICI colitis     Labs today:  LDH: 379  Hgb 8.7, Platelet 82, WBC 11.1,   B12 and folate normal, hepatitis b negative  PT 15.5 mild increase, aPTT 25 low, Fibrinogen normal now.    Per chart review, Smear reviewed  5/19 showing occasional schistocyte 1-2/hpf, rare teardrop cell, hypochromia, anisocytosis, WBC with increased bands visualized possibly reactive, plts reduced in number but normal in morphology.     #metastatic esophageal CA  #malignancy induced DIC  Patient appear to have chronic DIC secondary to his malignancy. His PT/PTT prologation can be from eliquis. Post operatively  his platelets were dropping. D/D can be DIC secondary to advanced malignancy, viral infection vs chemo vs nutritional deficiency. Prelim U/S of upper extremity showed Left cephalic venous compressible but demonstrates no significant flow, concerning for deep vein thrombosis. Given the ultrasound findings  concerning new clots since eliquis was on hold, recommend resuming eliquis when safe to do so.    Recommendations:  Recommend resuming eliquis when safe to start as per neurosurgery team  Await final US results  Continue to monitor PT, PTT, fibrinogen  Check HIV  Continue to monitor for sign and symptoms of bleeding  Give cryoppreciptate for fibrinogen <150, blood transfusion for hgb <7  Goal platelets per primary team    Thank you for this consult, Hematology will continue to follow. Patient was seen and discussed with Dr. Cheng. For any questions, [place] hemat[place] [phone].    Martha Gongora MD  PGY-2 Resident

## 2024-05-20 NOTE — PROGRESS NOTES
SUPPORTIVE AND PALLIATIVE ONCOLOGY INPATIENT FOLLOW-UP      SERVICE DATE: 05/20/24     SUBJECTIVE:  Interval Events:     Patient was seen at bedside with wife and another family member. PT was in assisting him to get up and move. Patient reports that his pain is 4-5/10, it is well controlled on current regimen. Discussed using oral dilaudid 4mg and 6mg Po tomorrow.   Patients LBM: 5/19/2024   N/V: denies   Patient reports that he is doing well on new Fentanyl patch dose.     Pain Assessment:  Location:  lower back pain  Duration: Constant  Characteristics:   Rating: 3, 4, and Moderate   Descriptors: aching and throbbing   Aggravating: movement and bending    Relieving: Analgesics dilaudid   Interference with Function: Very Much    Opioid Use  Past 24 h prn opioid use:   Fentanyl patch 100mcgs x 1 = 200 mg OME   Patient dilaudid 0.2mg q10 minutes lockout =    Demand Doses:  29   Delivered Doses: 28  Total used: 5.6mg = 70 mg OME   Total 24h OME use: 270mg OME    Note: OME calculations based on equianalgesic table below. Please note this table is based on best available evidence but conversions are still approximate. These are NOT opioid DOSES for individual patient use; this is equivalency information.  Drug Parenteral Enteral   Morphine 10 25   Oxycodone N/A 20   Hydromorphone 2 5   Fentanyl 0.15 N/A   Tramadol N/A 120   Citation: Aniya ROSS. Demystifying opioid conversion calculations: A guide for effective dosing, Second edition. [place]: [employer] of Health-System Pharmacists, 2018.    Symptom Assessment:  Nausea none  Vomiting none  Anxiety none  Difficulty Sleeping  rough night due to no IV dilaudid  Lack of appetite none    Information obtained from: chart review, interview of patient, interview of family, and discussion with primary team  ______________________________________________________________________        OBJECTIVE:    Lab Results   Component Value Date    WBC 11.1 05/20/2024    HGB  8.7 (L) 05/20/2024    HCT 25.7 (L) 05/20/2024    MCV 90 05/20/2024    PLT 82 (L) 05/20/2024      Lab Results   Component Value Date    GLUCOSE 164 (H) 05/20/2024    CALCIUM 8.4 (L) 05/20/2024     05/20/2024    K 3.9 05/20/2024    CO2 29 05/20/2024     05/20/2024    BUN 20 05/20/2024    CREATININE 0.56 05/20/2024     Lab Results   Component Value Date    ALT 16 05/12/2024    AST 28 05/12/2024    ALKPHOS 136 05/12/2024    BILITOT 1.0 05/12/2024     Estimated Creatinine Clearance: 125 mL/min (by C-G formula based on SCr of 0.56 mg/dL).     Scheduled medications  acetaminophen, 650 mg, oral, q6h  cholecalciferol, 4,000 Units, oral, Daily  cyclobenzaprine, 10 mg, oral, TID  enoxaparin, 40 mg, subcutaneous, Daily  fentaNYL, 1 patch, transdermal, q72h  fibrinogen, 5,115 mg, intravenous, Once  lidocaine, 1 patch, transdermal, Daily  multivitamin with minerals, 1 tablet, oral, Daily  nystatin, 5 mL, Swish & Swallow, 4x daily  pantoprazole, 40 mg, oral, Daily before breakfast  polyethylene glycol, 17 g, oral, BID  predniSONE, 80 mg, oral, Daily   Followed by  [START ON 5/25/2024] predniSONE, 40 mg, oral, Daily   Followed by  [START ON 6/1/2024] predniSONE, 20 mg, oral, Daily   Followed by  [START ON 6/8/2024] predniSONE, 10 mg, oral, Daily  pregabalin, 50 mg, oral, Nightly      Continuous medications  HYDROmorphone,       PRN medications  alteplase, 2 mg, PRN  benzocaine-menthol, 1 lozenge, q2h PRN  calcium carbonate, 500 mg, 4x daily PRN  [Held by provider] chlorproMAZINE, 25 mg, q6h PRN  HYDROmorphone, 1 mg, q3h PRN  HYDROmorphone, 4 mg, q4h PRN  HYDROmorphone, 6 mg, q4h PRN  LORazepam, 1 mg, q8h PRN  metoclopramide, 10 mg, q8h PRN  naloxone, 0.2 mg, PRN  ondansetron, 4 mg, q8h PRN   Or  ondansetron, 4 mg, q8h PRN  ondansetron, 4 mg, q8h PRN   Or  ondansetron, 4 mg, q8h PRN      }     PHYSICAL EXAMINATION:    Vital Signs:   Vital signs reviewed  Visit Vitals  /76 (BP Location: Left arm, Patient Position:  Lying)   Pulse 101   Temp 36.7 °C (98.1 °F) (Temporal)   Resp 18        Pain Score: 1       Physical Exam  Constitutional:       Appearance: Normal appearance.   Cardiovascular:      Rate and Rhythm: Regular rhythm.      Heart sounds: Normal heart sounds.   Pulmonary:      Breath sounds: Normal breath sounds.   Abdominal:      General: Abdomen is flat.      Palpations: Abdomen is soft.   Skin:     General: Skin is warm.   Neurological:      Mental Status: He is alert and oriented to person, place, and time.   Psychiatric:         Mood and Affect: Mood normal.        ASSESSMENT/PLAN:    Massimo Garcia is a 63 y.o. male diagnosed with metastatic esophageal cancer. PMH significant for third degree heart block s/p PPM (placed in November), esophageal adenocarcinoma with mets to liver and lungs, Drug-induced colitis, PE, peripheral neuropathy, and portal vein thrombosis (On Eliquis).  Admitted 5/10/2024 for further evaluation and management of acute on chronic low back pain. Course complicated by soft tissue extension into the spinal canal at T9-T10 with associated spinal canal stenosis. Supportive and Palliative Oncology is consulted for pain management.      Pain:  Lower back pain related to metastatic esophageal cancer  Pain is: cancer related pain  Type: visceral, somatic, and neuropathic  Pain control: sub-optimally controlled  Home regimen:  Acetaminophen PRN, Oxycodone 10mg q4hrs prn, Fentanyl TD , and bentyl prn   Intolerances/previously tried: Morphine with poor efficacy. Gabapentin was ineffective, Lyrica makes patient very lethargic, was tried on 50mg and did not tolerate well.  Personalized pain goal: 2  Total OME usage for the past 24 hours: 270mg OME  Continue dilaudid 1mg IV q 3hrs PRN for breakthrough pain  Consider making Lyrica 50mg BID [hold for sedation]  May continue dilaudid 6mg q3hrs PRN [tomorrow 5/21/2024]  May continue dilaudid 4mg q3hrs PRN  [tomorrow 5/21/2024]  Continue dilaudid PCA   Bolus dose:  0.2mg   Lockout: 10 minutes   Basal: none  Max dose: 1.2mg/hr  Continue Fentanyl patch 100mcgs q 72 hours   Continue to monitor pain scores and administer PRN medications as appropriate  Continue/initiate nonpharmacologic pain management strategies including ice/heat therapy, distraction techniques, deep breathing/relaxation techniques, calming music, and repositioning  Continue to monitor for signs of opioid efficacy (pain scores, improved functionality) and toxicity (pinpoint pupils, excess sedation/drowsiness/confusion, respiratory depression, etc.)     Nausea:  Intermittent nausea without vomiting related to constipation   Home regimen:  protonix 40mg daily,  Scopolamine patch PRN and Carafate PRN  denies  Continue Dex 6mg iv TID managed by primary team  Continue Reglan 10mg q8hrs PRN   Continue Zofran 4mg IV q8hr PRN      Constipation  At risk for constipation related to opioids, currently not constipated  Usual bowel pattern: every day  Home regimen: Senna 2 tablets BID, Miralax 17 gm daily, Lactulose 20 gm 4x a day PRN, Docusate 100mg BID PRN, and MOM 15ml PRN   LBM 5/19/2024  Continue Miralax 17 grams BID   Add Lactulose 20 grams twice daily PRN   Monitor BM frequency, adjust regimen as needed  Goal to have BM without straining q48-72h      Altered Mood:  Acute on chronic anxiety related to health concerns   controlled with home regimen   Home regimen: lorazepam 1mg TID PRN  Monitor and control      Sleeping Difficulty:  Impaired sleep related to pain  Home regimen:  none  Improved with pain control   Reports that he slept well last night.      Decreased appetite:  Appetite loss related to malignancy, chemotherapy, taste changes, and disease process  Nutrition following  Weight loss none  Home regimen:  none  Dex as above  Reports that he ate tapioca today made by his wife. With better pain control he is more enthusiastic about eating      Medical Decision Making/Goals of Care/Advance Care  Planning:  Patient's current clinical condition, including diagnosis, prognosis, and management plan, and goals of care were discussed.   Life limiting disease: metastatic malignancy  Family: Supportive family   Joys/meaning/strength: Family and Lizette  Understanding of health: Demonstrates good prognostic understanding of disease process, understands plan for ongoing symptom control   Information:Wants full disclosure     Goals: symptom control and cancer directed therapy  Worries and fears now and future: ongoing symptoms   Minimum acceptable outcome/QOL:  wants all heroic measures in the event that his heart stops.   Code status discussion:  full code     Advance Directives  Existence of Advance Directives:No - has interest  Decision maker: DOUGLAS is wife  Code Status: Full code     Introduction to Supportive and Palliative Oncology:  Spoke with patient at bedside  Introduced the role and philosophy of Supportive and Palliative oncology in the evaluation and management of symptoms during cancer treatment  Palliative care was introduced as a service for patients with serious illness to help with symptoms, assist with goals of care conversations, navigate complex decision making, improve quality of life for patients, and provide support both patients and families.  Patient seemed to appreciate the extra layer of support.      Supportive Interventions: Interventions: Music Therapy: offered referral placed, Art Therapy: offered declined, SPO Spiritual Care: offered declined, Social work referral for: Advance Directives    Supportive and Palliative Oncology encounter:  Spoke with patient at bedside  Emotional support provided  Coordination of care     Signature and billing:  Thank you for allowing us to participate in the care of this patient. Recommendations will be communicated back to the consulting service by way of shared electronic medical record or face-to-face.    Medical complexity was high level due to due to  complexity of problems, extensive data review, and high risk of management/treatment.    I spent 50 minutes in the care of this patient which included chart review, interviewing patient/family, discussion with primary team, coordination of care, and documentation.    Data:   Diagnostic tests and information reviewed for today's visit:  Conversation with primary team, Most recent labs, Most recent imaging       Some elements copied from my note on 5/19/2024, the elements have been updated and all reflect current decision making from today, 05/20/24       Plan of Care discussed with: Provider, RN, Patient    Thank you for asking Supportive and Palliative Oncology to assist with care of this patient.  We will continue to follow  Please contact us for additional questions or concerns.      SIGNATURE: JASPER Damon   PAGER/CONTACT:  Contact information:  Supportive and Palliative Oncology  Monday-Friday 8 AM-5 PM  Epic Secure chat or pager [phone].  After hours and weekends:  pager [phone]

## 2024-05-20 NOTE — PROGRESS NOTES
"Massimo Garcia is a 63 y.o. male on day 10 of admission presenting with Esophageal cancer, stage IV (Multi).    Subjective   Pain improving    Objective     Physical Exam  A&Ox3  Face symmetric  RUE 5/5  LUE 5/5  RLE 5/5  LLE 5/5  Sensation intact to light touch throughout all extremities  Incision c/d/I  L ear with dried blood    Last Recorded Vitals  Blood pressure 123/76, pulse 101, temperature 36.7 °C (98.1 °F), temperature source Temporal, resp. rate 18, height 1.727 m (5' 8\"), weight 70.3 kg (155 lb), SpO2 95%.  Intake/Output last 3 Shifts:  I/O last 3 completed shifts:  In: 5724.5 (81.4 mL/kg) [P.O.:150; I.V.:2511.1 (35.7 mL/kg); Blood:1316.1; IV Piggyback:1747.4]  Out: 5300 (75.4 mL/kg) [Urine:4875 (1.9 mL/kg/hr); Drains:425]  Weight: 70.3 kg     Relevant Results    Assessment/Plan   Principal Problem:    Esophageal cancer, stage IV (Multi)  Active Problems:    Primary malignant neoplasm of esophagus (Multi)    63yM h/o HTN, third degree heart block s/p pacemaker (11/2023), portal john thrombosis/prior PE (on Eliquis), OA, stage IV esophageal adenocarcinoma (HER2 positive) w/ known mets to lung and liver, s/p chemo (last doses 4/29), recent admission for drug-induced colitis, 5/10 p/w 1d LBP, CT T/L spine diffuse soft tissue mets, T9-10 soft tissue mass with epidural extension, MRI neuroaxis R occipital 1.1cm met, L temporal 8mm met, T9-11 peripherally enhancing vertebral body circumferential lesion, worst at T10, L2 ventral lesion c/f drop met, 5/16 s/p 2U plts    5/17 s/p T10 transpedic decompression, T8-12 fusion (s/p 4u PLT, riastap)    PLAN  [facility] primary  Dc PCA/maher  drains x2 (under prevena) - will continue monitoring output  Daily labs  pacer MRI when able  Upright xrays of TL spine when ambulatory  hold chemo/RT until POD 14  [facility] recs  eliquis restart POD 7  Follow up final path  Recommend 2 week taper of steroids  PTOT- ok to work with physical therapy without restrictions, recommend getting out " of bed at least three tiems daily  SCDs, SQH      Aidee Roche MD

## 2024-05-20 NOTE — PROGRESS NOTES
Physical Therapy    Physical Therapy Re-Evaluation    Patient Name: Massimo Garcia  MRN: [id number]  Today's Date: 5/20/2024   Room: [facility]-A  Time Calculation  Start Time: 1507  Stop Time: 1523  Time Calculation (min): 16 min    Assessment/Plan   PT Assessment  PT Assessment Results: Decreased endurance, Impaired balance, Decreased mobility, Pain, Orthopedic restrictions  Rehab Prognosis: Excellent  Barriers to Discharge: medical dx  Evaluation/Treatment Tolerance: Patient tolerated treatment well  Medical Staff Made Aware: Yes  Strengths: Attitude of self  Barriers to Participation: Comorbidities  End of Session Communication: Bedside nurse  Assessment Comment: 63 year old male admitted with cute on chronic low back pain. CT imaging was obtained and was concerning for evidence of soft tissue extension into the spinal canal at T9-T10 with associated spinal canal stenosis and now s/p T10 transpedic decompression, T8-12 fusioncircumferential lesion, worst at T10, L2 ventral lesion on 5/17 Pt presents with decreased strength, endurance and balance impacting functional mobility. Pt would benefit from continued PT while in hospital to improve functional mobility and return to PLOF.  End of Session Patient Position: Up in room (with fww)  IP OR SWING BED PT PLAN  Inpatient or Swing Bed: Inpatient  PT Plan  Treatment/Interventions: Bed mobility, Transfer training, Gait training, Balance training, Stair training, Neuromuscular re-education, Strengthening, Endurance training, Therapeutic exercise, Therapeutic activity, Home exercise program, Positioning, Postural re-education  PT Plan: Skilled PT  PT Eval Only Reason: No acute PT needs identified  PT Frequency: 2 times per week  PT Discharge Recommendations: No PT needed after discharge  Equipment Recommended upon Discharge: Wheeled walker  PT Recommended Transfer Status: Assistive device, Stand by assist  PT - OK to Discharge: Yes (PT eval complete and DC rec  made)    Subjective   General Visit Information:  Reason for Referral: 63 year old male admitted with cute on chronic low back pain. 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 Pt  cooperative and willing to work with PT. noted x2 hemovac drains, wound vac, pca, PIV.   Home Living:  Home Living  Type of Home: House  Lives With: Spouse  Home Layout: One level  Home Access:  (4 JOE)  Bathroom Shower/Tub: Walk-in shower  Prior Level of Function:  Prior Function Per Pt/Caregiver Report  Level of [place]: Independent with ADLs and functional transfers, Independent with homemaking with ambulation  ADL Assistance: Independent  Homemaking Assistance: Independent  Ambulatory Assistance: Independent  Vocational: Retired  Precautions:  Precautions  Medical Precautions: Spinal precautions    Objective   Lines/Tubes/Drains:  Implantable Port 05/09/24 Right Chest (Active)   Number of days: 10       Closed/Suction Drain Medial Back Other (Comment) (Active)   Number of days: 3       Closed/Suction Drain Medial Back Other (Comment) (Active)   Number of days: 3     Continuous Medications/Drips:  HYDROmorphone,        Pain:  Pain Assessment  Pain Assessment: 0-10  Pain Score: 2  Pain Location: Back  Cognition:  Cognition  Overall Cognitive Status: Within Functional Limits  Orientation Level: Oriented X4    Extremity/Trunk Assessments:  Strength:    RLE   RLE : Within Functional Limits  LLE   LLE : Within Functional Limits    General Assessments:    Activity Tolerance  Endurance: Endurance does not limit participation in activity  Sensation  Light Touch: No apparent deficits     Static Sitting Balance  Static Sitting-Comment/Number of Minutes: sba  Dynamic Sitting Balance  Dynamic Sitting-Comments: sba  Static Standing Balance  Static Standing-Comment/Number of Minutes: sba  Dynamic Standing Balance  Dynamic Standing-Comments: cga    Functional Assessments:  Bed Mobility  Bed Mobility: No (pt requesting PT to step out of room so he could put his underwear on with the help of his wife. Upon re-entry to room, pt standing with fww.)  Bed Mobility 1  Bed Mobility 1: Supine to sitting, Sitting to supine  Level of Assistance 1: Modified independent  Bed Mobility Comments 1: HOB partially elevated  Transfers  Transfer: Yes  Transfer 1  Technique 1: Sit to stand, Stand to sit  Transfer Level of Assistance 1: Independent  Ambulation/Gait Training  Ambulation/Gait Training Performed: Yes  Ambulation/Gait Training 1  Surface 1: Level tile  Device 1: Rolling walker  Assistance 1: Close supervision  Quality of Gait 1:  (decreased donovan)  Comments/Distance (ft) 1: 20ft    Outcome Measures:  [facility] Basic Mobility  Turning from your back to your side while in a flat bed without using bedrails: None  Moving from lying on your back to sitting on the side of a flat bed without using bedrails: None  Moving to and from bed to chair (including a wheelchair): None  Standing up from a chair using your arms (e.g. wheelchair or bedside chair): None  To walk in hospital room: None  Climbing 3-5 steps with railing: None  Basic Mobility - Total Score:  24    Encounter Problems       Encounter Problems (Active)       Mobility       Pt will ambulate >1000ft with  LRAD and Jami Assist with no LOB and VSS.        Start:  05/20/24    Expected End:  06/03/24            Pt will ascend and descend >4 Stairs with sba.       Start:  05/20/24    Expected End:  06/03/24               PT Transfers       Pt will perform all functional transfers with LRAD and Jami assist.         Start:  05/20/24    Expected End:  06/03/24                   Education Documentation  Precautions, taught by Shasha Garcia PT at 5/20/2024  4:40 PM.  Learner: Patient  Readiness: Acceptance  Method: Explanation  Response: Verbalizes Understanding, Needs Reinforcement    Mobility Training, taught by Shasha Garcia PT at 5/20/2024  4:40 PM.  Learner: Patient  Readiness: Acceptance  Method: Explanation  Response: Verbalizes Understanding, Needs Reinforcement    Education Comments  No comments found.      05/20/24 at 4:40 PM   Shasha Garcia PT   Rehab Office: [phone]

## 2024-05-20 NOTE — CARE PLAN
The patient's goals for the shift include      The clinical goals for the shift include Patient will remain safe and free of injury throughout the shift 5/20/24 @ 0700      Problem: Fall/Injury  Goal: Not fall by end of shift  Outcome: Progressing  Goal: Be free from injury by end of the shift  Outcome: Progressing  Goal: Verbalize understanding of personal risk factors for fall in the hospital  Outcome: Progressing  Goal: Verbalize understanding of risk factor reduction measures to prevent injury from fall in the home  Outcome: Progressing  Goal: Use assistive devices by end of the shift  Outcome: Progressing  Goal: Pace activities to prevent fatigue by end of the shift  Outcome: Progressing     Problem: Pain  Goal: Takes deep breaths with improved pain control throughout the shift  Outcome: Progressing  Goal: Turns in bed with improved pain control throughout the shift  Outcome: Progressing  Goal: Walks with improved pain control throughout the shift  Outcome: Progressing  Goal: Performs ADL's with improved pain control throughout shift  Outcome: Progressing  Goal: Participates in PT with improved pain control throughout the shift  Outcome: Progressing  Goal: Free from opioid side effects throughout the shift  Outcome: Progressing  Goal: Free from acute confusion related to pain meds throughout the shift  Outcome: Progressing     Problem: Skin  Goal: Decreased wound size/increased tissue granulation at next dressing change  Outcome: Progressing  Flowsheets (Taken 5/20/2024 0210)  Decreased wound size/increased tissue granulation at next dressing change: Promote sleep for wound healing  Goal: Participates in plan/prevention/treatment measures  Outcome: Progressing  Flowsheets (Taken 5/20/2024 0210)  Participates in plan/prevention/treatment measures: Elevate heels  Goal: Prevent/manage excess moisture  Outcome: Progressing  Flowsheets (Taken 5/20/2024 0210)  Prevent/manage excess moisture: Moisturize dry  skin  Goal: Prevent/minimize sheer/friction injuries  Outcome: Progressing  Goal: Promote/optimize nutrition  Outcome: Progressing  Goal: Promote skin healing  Outcome: Progressing

## 2024-05-20 NOTE — CARE PLAN
The patient's goals for the shift include      The clinical goals for the shift include pt will remain safe and free from injury 5/20 @1900      Problem: Fall/Injury  Goal: Be free from injury by end of the shift  Outcome: Progressing     Problem: Fall/Injury  Goal: Not fall by end of shift  Outcome: Progressing     Problem: Pain  Goal: Takes deep breaths with improved pain control throughout the shift  Outcome: Progressing

## 2024-05-20 NOTE — PROGRESS NOTES
Occupational Therapy    Therapy Communication Note    Patient Name: Massimo Garcia  MRN: [id number]  Today's Date: 5/20/2024     Missed Visit: Yes  Missed Visit Reason: Patient in a medical procedure      05/20/24 at 9:37 AM   Radha GOVEA/L, OTD  Rehab Office: [phone]

## 2024-05-20 NOTE — PROGRESS NOTES
Occupational Therapy    Evaluation    Patient Name: Massimo Garcia  MRN: [id number]  Today's Date: 5/20/2024  Time Calculation  Start Time: 1333  Stop Time: 1344  Time Calculation (min): 11 min    Assessment  IP OT Assessment  OT Assessment: Impaired ability to complete lower body ADLs, bed mobility and transfers.  Prognosis: Good  Barriers to Discharge: None  Evaluation/Treatment Tolerance: Patient tolerated treatment well  Medical Staff Made Aware: Yes  End of Session Communication: Bedside nurse  End of Session Patient Position: Bed, 3 rail up, Alarm off, caregiver present  Plan:  Treatment Interventions: ADL retraining, Functional transfer training  OT Frequency: 3 times per week  OT Discharge Recommendations:  (will continue to assess pt's progress towards goals.)  OT - OK to Discharge: Yes    Subjective   Current Problem:  1. Esophageal cancer, stage IV (Multi)        2. Primary malignant neoplasm of esophagus (Multi)  Referral to Hematology and Oncology    Case Request Operating Room: T9 transpedicular approach, mass resection, T7-11 fusion    Case Request Operating Room: T9 transpedicular approach, mass resection, T7-11 fusion    Surgical Pathology Exam    Surgical Pathology Exam      3. Malignant neoplasm metastatic to liver (Multi)  Referral to Hematology and Oncology      4. Atrioventricular block, complete (Multi)  Cardiac Device Check - MRI    Cardiac Device Check - MRI    Cardiac Device Check - MRI    Cardiac Device Check - MRI    Cardiac Device Check - Surgery    Cardiac Device Check - Surgery      5. Cardiac pacemaker in situ  Cardiac Device Check - MRI    Cardiac Device Check - MRI    Cardiac Device Check - MRI    Cardiac Device Check - MRI      6. Spinal stenosis of thoracolumbar region  Cardiac device check - Surgery    Cardiac device check - Surgery      7. Near syncope  Cardiac Device Check - Surgery    Cardiac Device Check - Surgery      8. PE (pulmonary thromboembolism) (Multi)  Lower extremity  venous duplex bilateral    Upper extremity venous duplex bilateral    Lower extremity venous duplex bilateral    Upper extremity venous duplex bilateral      9. Mass of neck with history of malignant neoplasm  Lower extremity venous duplex bilateral    Upper extremity venous duplex bilateral    Lower extremity venous duplex bilateral    Upper extremity venous duplex bilateral        General:  Reason for Referral: Esophageal cancer, stage IV; 5/10 p/w 1d LBP, CT T/L spine diffuse soft tissue mets, T9-10 soft tissue mass with epidural extension, MRI neuroaxis R occipital 1.1cm met, L temporal 8mm met, T9-11 peripherally enhancing vertebral body;  s/p T10 transpedic decompression, T8-12 fusioncircumferential lesion, worst at T10, L2 ventral lesion  Past Medical History Relevant to Rehab: HTN, third degree heart block s/p pacemaker (11/2023), 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   Precautions:  Medical Precautions: Spinal precautions    Pain:  Pain Assessment  Pain Assessment: 0-10  Pain Score: 0 - No pain    Objective   Cognition:  Overall Cognitive Status: Within Functional Limits    Home Living:  Type of Home: House  Lives With: Spouse  Home Layout: One level  Bathroom Shower/Tub: Walk-in shower  Bathroom Equipment: None   Prior Function:  Level of [place]: Independent with ADLs and functional transfers, Independent with homemaking with ambulation  ADL Assistance: Independent  Homemaking Assistance: Independent  Ambulatory Assistance: Independent  Vocational: Retired  Hand Dominance: Right  IADL History:  IADL  Comments: + drive, has dogs, does not walk them.  ADL:  Eating Assistance: Stand by  Eating Deficit: Setup (anticipate)  Grooming Assistance: Stand by  Grooming Deficit: Setup (anticipate)  Bathing Assistance: Stand by  Bathing Deficit: Setup (anticipate)  UE Dressing Assistance: Stand by  UE Dressing Deficit: Setup (anticipate)  LE Dressing Assistance: Minimal  LE Dressing Deficit:  (anticipate due to spine precautions.)  Activity Tolerance:  Endurance: Endurance does not limit participation in activity  Balance:     Bed Mobility/Transfers: Bed Mobility  Bed Mobility: No (Pt declined at this time.)    IADL's:   IADL Comments: + drive, has dogs, does not walk them.  Vision: Vision - Basic Assessment  Current Vision: No visual deficits    Sensation:  Sensation Comment: reported B distal UE peripheral neuropathy, denied difficulties with fine motor coordination tasks due to impaired sensation.    Coordination:  Movements are Fluid and Coordinated: Yes   Hand Function:  Hand Function  Gross Grasp: Functional  Coordination: Functional  Extremities: RUE   RUE : Within Functional Limits, LUE   LUE: Within Functional Limits,  , and      Outcome Measures: [facility] Daily Activity  Putting on and taking off regular lower body clothing: A little  Bathing (including washing, rinsing, drying): A little  Putting on and taking off regular upper body clothing: A little  Toileting, which includes using toilet, bedpan or urinal: A little  Taking care of personal grooming such as brushing teeth: A little  Eating Meals: A little  Daily Activity - Total Score: 18  OT Adult Other Outcome Measures  4AT: negative    Education Documentation  Handouts, taught by Radha Smith OT at 5/20/2024  3:00 PM.  Learner: Patient  Readiness: Acceptance  Method: Explanation  Response: Verbalizes Understanding    Precautions, taught by Radha Smith OT at 5/20/2024  3:00 PM.  Learner: Patient  Readiness: Acceptance  Method: Explanation  Response:  Verbalizes Understanding    Body Mechanics, taught by Radha Smith OT at 5/20/2024  3:00 PM.  Learner: Patient  Readiness: Acceptance  Method: Explanation  Response: Verbalizes Understanding    ADL Training, taught by Radha Smith OT at 5/20/2024  3:00 PM.  Learner: Patient  Readiness: Acceptance  Method: Explanation  Response: Verbalizes Understanding    Education Comments  No comments found.      Goals:   Encounter Problems       Encounter Problems (Active)       ADLs       Patient will perform UB and LB bathing  with modified independent level of assistance and grab bars. (Progressing)       Start:  05/20/24    Expected End:  06/10/24            Patient with complete lower body dressing with modified independent level of assistance donning and doffing all LE clothes  with PRN adaptive equipment while edge of bed  (Progressing)       Start:  05/20/24    Expected End:  06/10/24            Patient will complete toileting including hygiene clothing management/hygiene with modified independent level of assistance and grab bars. (Progressing)       Start:  05/20/24    Expected End:  06/10/24               BALANCE       Pt will maintain dynamic standing balance during ADL task with modified independent level of assistance in order to demonstrate decreased risk of falling and improved postural control. (Progressing)       Start:  05/20/24    Expected End:  06/10/24               MOBILITY       Patient will perform Functional mobility min Household distances/Community Distances with modified independent level of assistance and least restrictive device in order to improve safety and functional mobility. (Progressing)       Start:  05/20/24    Expected End:  06/10/24               TRANSFERS       Patient will perform bed mobility modified independent level of assistance and bed rails in order to improve safety and independence with mobility (Progressing)       Start:  05/20/24    Expected End:  06/10/24             Patient will complete sit to stand transfer with modified independent level of assistance and least restrictive device in order to improve safety and prepare for out of bed mobility. (Progressing)       Start:  05/20/24    Expected End:  06/10/24                     05/20/24 at 3:02 PM   Radha Smith OTR/OTD  Rehab Office: [phone]

## 2024-05-20 NOTE — CARE PLAN
The patient's goals for the shift include      The clinical goals for the shift include pt will remain safe and free from injury 5/20 @1900    Problem: Fall/Injury  Goal: Not fall by end of shift  Outcome: Progressing  Goal: Be free from injury by end of the shift  Outcome: Progressing  Goal: Verbalize understanding of personal risk factors for fall in the hospital  Outcome: Progressing  Goal: Verbalize understanding of risk factor reduction measures to prevent injury from fall in the home  Outcome: Progressing  Goal: Use assistive devices by end of the shift  Outcome: Progressing  Goal: Pace activities to prevent fatigue by end of the shift  Outcome: Progressing     Problem: Pain  Goal: Takes deep breaths with improved pain control throughout the shift  Outcome: Progressing  Goal: Turns in bed with improved pain control throughout the shift  Outcome: Progressing  Goal: Walks with improved pain control throughout the shift  Outcome: Progressing  Goal: Performs ADL's with improved pain control throughout shift  Outcome: Progressing  Goal: Participates in PT with improved pain control throughout the shift  Outcome: Progressing  Goal: Free from opioid side effects throughout the shift  Outcome: Progressing  Goal: Free from acute confusion related to pain meds throughout the shift  Outcome: Progressing     Problem: Skin  Goal: Decreased wound size/increased tissue granulation at next dressing change  Outcome: Progressing  Flowsheets (Taken 5/20/2024 1005)  Decreased wound size/increased tissue granulation at next dressing change: Protective dressings over bony prominences  Goal: Participates in plan/prevention/treatment measures  Outcome: Progressing  Flowsheets (Taken 5/20/2024 1005)  Participates in plan/prevention/treatment measures: Elevate heels  Goal: Prevent/manage excess moisture  Outcome: Progressing  Flowsheets (Taken 5/20/2024 1005)  Prevent/manage excess moisture:   Moisturize dry skin   Cleanse  incontinence/protect with barrier cream  Goal: Prevent/minimize sheer/friction injuries  Outcome: Progressing  Flowsheets (Taken 5/20/2024 1005)  Prevent/minimize sheer/friction injuries:   Turn/reposition every 2 hours/use positioning/transfer devices   Use pull sheet  Goal: Promote/optimize nutrition  Outcome: Progressing  Flowsheets (Taken 5/20/2024 1005)  Promote/optimize nutrition:   Consume > 50% meals/supplements   Offer water/supplements/favorite foods  Goal: Promote skin healing  Outcome: Progressing  Flowsheets (Taken 5/20/2024 1005)  Promote skin healing:   Turn/reposition every 2 hours/use positioning/transfer devices   Rotate device position/do not position patient on device

## 2024-05-21 ENCOUNTER — APPOINTMENT (OUTPATIENT)
Dept: CARDIOLOGY | Facility: HOSPITAL | Age: 64
DRG: 028 | End: 2024-05-21
Payer: MEDICARE

## 2024-05-21 ENCOUNTER — APPOINTMENT (OUTPATIENT)
Dept: CARDIOLOGY | Facility: HOSPITAL | Age: 64
End: 2024-05-21
Payer: MEDICARE

## 2024-05-21 ENCOUNTER — APPOINTMENT (OUTPATIENT)
Dept: RADIOLOGY | Facility: HOSPITAL | Age: 64
DRG: 028 | End: 2024-05-21
Payer: MEDICARE

## 2024-05-21 LAB
ALBUMIN SERPL BCP-MCNC: 2.9 G/DL (ref 3.4–5)
ANION GAP SERPL CALC-SCNC: 11 MMOL/L (ref 10–20)
ATRIAL RATE: 101 BPM
ATRIAL RATE: 108 BPM
BASOPHILS # BLD AUTO: 0.01 X10*3/UL (ref 0–0.1)
BASOPHILS NFR BLD AUTO: 0.1 %
BODY SURFACE AREA: 1.84 M2
BUN SERPL-MCNC: 22 MG/DL (ref 6–23)
CALCIUM SERPL-MCNC: 8.3 MG/DL (ref 8.6–10.6)
CHLORIDE SERPL-SCNC: 102 MMOL/L (ref 98–107)
CO2 SERPL-SCNC: 29 MMOL/L (ref 21–32)
CREAT SERPL-MCNC: 0.49 MG/DL (ref 0.5–1.3)
EGFRCR SERPLBLD CKD-EPI 2021: >90 ML/MIN/1.73M*2
EOSINOPHIL # BLD AUTO: 0.01 X10*3/UL (ref 0–0.7)
EOSINOPHIL NFR BLD AUTO: 0.1 %
ERYTHROCYTE [DISTWIDTH] IN BLOOD BY AUTOMATED COUNT: 18.4 % (ref 11.5–14.5)
FIBRINOGEN PPP-MCNC: 185 MG/DL (ref 200–400)
GLUCOSE SERPL-MCNC: 182 MG/DL (ref 74–99)
HCT VFR BLD AUTO: 27.2 % (ref 41–52)
HGB BLD-MCNC: 8.8 G/DL (ref 13.5–17.5)
IMM GRANULOCYTES # BLD AUTO: 0.32 X10*3/UL (ref 0–0.7)
IMM GRANULOCYTES NFR BLD AUTO: 2.7 % (ref 0–0.9)
LYMPHOCYTES # BLD AUTO: 0.29 X10*3/UL (ref 1.2–4.8)
LYMPHOCYTES NFR BLD AUTO: 2.5 %
MAGNESIUM SERPL-MCNC: 2.04 MG/DL (ref 1.6–2.4)
MCH RBC QN AUTO: 30.1 PG (ref 26–34)
MCHC RBC AUTO-ENTMCNC: 32.4 G/DL (ref 32–36)
MCV RBC AUTO: 93 FL (ref 80–100)
MONOCYTES # BLD AUTO: 0.52 X10*3/UL (ref 0.1–1)
MONOCYTES NFR BLD AUTO: 4.4 %
NEUTROPHILS # BLD AUTO: 10.62 X10*3/UL (ref 1.2–7.7)
NEUTROPHILS NFR BLD AUTO: 90.2 %
NRBC BLD-RTO: 0.2 /100 WBCS (ref 0–0)
P AXIS: 12 DEGREES
P OFFSET: 159 MS
P ONSET: 123 MS
PHOSPHATE SERPL-MCNC: 2.6 MG/DL (ref 2.5–4.9)
PLATELET # BLD AUTO: 70 X10*3/UL (ref 150–450)
POTASSIUM SERPL-SCNC: 3.9 MMOL/L (ref 3.5–5.3)
PR INTERVAL: 146 MS
Q ONSET: 189 MS
Q ONSET: 196 MS
QRS COUNT: 17 BEATS
QRS COUNT: 17 BEATS
QRS DURATION: 154 MS
QRS DURATION: 172 MS
QT INTERVAL: 392 MS
QT INTERVAL: 408 MS
QTC CALCULATION(BAZETT): 525 MS
QTC CALCULATION(BAZETT): 529 MS
QTC FREDERICIA: 476 MS
QTC FREDERICIA: 485 MS
R AXIS: -31 DEGREES
R AXIS: -53 DEGREES
RBC # BLD AUTO: 2.92 X10*6/UL (ref 4.5–5.9)
SODIUM SERPL-SCNC: 138 MMOL/L (ref 136–145)
T AXIS: 104 DEGREES
T AXIS: 84 DEGREES
T OFFSET: 392 MS
T OFFSET: 393 MS
VENTRICULAR RATE: 101 BPM
VENTRICULAR RATE: 108 BPM
WBC # BLD AUTO: 11.8 X10*3/UL (ref 4.4–11.3)

## 2024-05-21 PROCEDURE — 93286 PERI-PX EVAL PM/LDLS PM IP: CPT | Performed by: INTERNAL MEDICINE

## 2024-05-21 PROCEDURE — 85025 COMPLETE CBC W/AUTO DIFF WBC: CPT

## 2024-05-21 PROCEDURE — 2500000001 HC RX 250 WO HCPCS SELF ADMINISTERED DRUGS (ALT 637 FOR MEDICARE OP)

## 2024-05-21 PROCEDURE — 2500000004 HC RX 250 GENERAL PHARMACY W/ HCPCS (ALT 636 FOR OP/ED)

## 2024-05-21 PROCEDURE — 99233 SBSQ HOSP IP/OBS HIGH 50: CPT

## 2024-05-21 PROCEDURE — 80069 RENAL FUNCTION PANEL: CPT

## 2024-05-21 PROCEDURE — 83735 ASSAY OF MAGNESIUM: CPT

## 2024-05-21 PROCEDURE — 1200000002 HC GENERAL ROOM WITH TELEMETRY DAILY

## 2024-05-21 PROCEDURE — 72157 MRI CHEST SPINE W/O & W/DYE: CPT | Performed by: RADIOLOGY

## 2024-05-21 PROCEDURE — A9575 INJ GADOTERATE MEGLUMI 0.1ML: HCPCS | Performed by: HOSPITALIST

## 2024-05-21 PROCEDURE — 85384 FIBRINOGEN ACTIVITY: CPT

## 2024-05-21 PROCEDURE — 72157 MRI CHEST SPINE W/O & W/DYE: CPT

## 2024-05-21 PROCEDURE — 93286 PERI-PX EVAL PM/LDLS PM IP: CPT

## 2024-05-21 PROCEDURE — 2550000001 HC RX 255 CONTRASTS: Performed by: HOSPITALIST

## 2024-05-21 RX ORDER — AMOXICILLIN 250 MG
2 CAPSULE ORAL 2 TIMES DAILY
Status: DISCONTINUED | OUTPATIENT
Start: 2024-05-21 | End: 2024-05-24 | Stop reason: HOSPADM

## 2024-05-21 RX ORDER — GADOTERATE MEGLUMINE 376.9 MG/ML
15 INJECTION INTRAVENOUS
Status: COMPLETED | OUTPATIENT
Start: 2024-05-21 | End: 2024-05-21

## 2024-05-21 RX ADMIN — PANTOPRAZOLE SODIUM 40 MG: 40 TABLET, DELAYED RELEASE ORAL at 09:14

## 2024-05-21 RX ADMIN — PREGABALIN 50 MG: 25 CAPSULE ORAL at 09:12

## 2024-05-21 RX ADMIN — HYDROMORPHONE HYDROCHLORIDE 1 MG: 1 INJECTION, SOLUTION INTRAMUSCULAR; INTRAVENOUS; SUBCUTANEOUS at 12:40

## 2024-05-21 RX ADMIN — ACETAMINOPHEN 650 MG: 325 TABLET ORAL at 16:10

## 2024-05-21 RX ADMIN — CYCLOBENZAPRINE 10 MG: 10 TABLET, FILM COATED ORAL at 14:48

## 2024-05-21 RX ADMIN — ENOXAPARIN SODIUM 40 MG: 100 INJECTION SUBCUTANEOUS at 22:25

## 2024-05-21 RX ADMIN — LORAZEPAM 1 MG: 1 TABLET ORAL at 11:25

## 2024-05-21 RX ADMIN — CYCLOBENZAPRINE 10 MG: 10 TABLET, FILM COATED ORAL at 09:12

## 2024-05-21 RX ADMIN — PREDNISONE 80 MG: 20 TABLET ORAL at 09:12

## 2024-05-21 RX ADMIN — HYDROMORPHONE HYDROCHLORIDE 6 MG: 4 TABLET ORAL at 14:48

## 2024-05-21 RX ADMIN — HYDROMORPHONE HYDROCHLORIDE 1 MG: 1 INJECTION, SOLUTION INTRAMUSCULAR; INTRAVENOUS; SUBCUTANEOUS at 16:10

## 2024-05-21 RX ADMIN — GADOTERATE MEGLUMINE 14 ML: 376.9 INJECTION INTRAVENOUS at 13:03

## 2024-05-21 RX ADMIN — SENNOSIDES AND DOCUSATE SODIUM 2 TABLET: 50; 8.6 TABLET ORAL at 22:25

## 2024-05-21 RX ADMIN — ACETAMINOPHEN 650 MG: 325 TABLET ORAL at 10:30

## 2024-05-21 RX ADMIN — NYSTATIN 500000 UNITS: 100000 SUSPENSION ORAL at 14:48

## 2024-05-21 RX ADMIN — POLYETHYLENE GLYCOL 3350 17 G: 17 POWDER, FOR SOLUTION ORAL at 09:12

## 2024-05-21 RX ADMIN — NYSTATIN 500000 UNITS: 100000 SUSPENSION ORAL at 17:00

## 2024-05-21 RX ADMIN — Medication 1 TABLET: at 09:12

## 2024-05-21 RX ADMIN — Medication 4000 UNITS: at 09:11

## 2024-05-21 RX ADMIN — NYSTATIN 500000 UNITS: 100000 SUSPENSION ORAL at 09:14

## 2024-05-21 ASSESSMENT — COGNITIVE AND FUNCTIONAL STATUS - GENERAL
MOBILITY SCORE: 18
STANDING UP FROM CHAIR USING ARMS: A LITTLE
WALKING IN HOSPITAL ROOM: A LITTLE
PERSONAL GROOMING: A LITTLE
MOVING FROM LYING ON BACK TO SITTING ON SIDE OF FLAT BED WITH BEDRAILS: A LITTLE
TURNING FROM BACK TO SIDE WHILE IN FLAT BAD: A LITTLE
HELP NEEDED FOR BATHING: A LITTLE
MOVING TO AND FROM BED TO CHAIR: A LITTLE
DRESSING REGULAR UPPER BODY CLOTHING: A LITTLE
DAILY ACTIVITIY SCORE: 19
CLIMB 3 TO 5 STEPS WITH RAILING: A LITTLE
TOILETING: A LITTLE
DRESSING REGULAR LOWER BODY CLOTHING: A LITTLE

## 2024-05-21 ASSESSMENT — PAIN - FUNCTIONAL ASSESSMENT
PAIN_FUNCTIONAL_ASSESSMENT: 0-10

## 2024-05-21 ASSESSMENT — PAIN SCALES - GENERAL
PAINLEVEL_OUTOF10: 0 - NO PAIN
PAINLEVEL_OUTOF10: 2
PAINLEVEL_OUTOF10: 3
PAINLEVEL_OUTOF10: 8
PAINLEVEL_OUTOF10: 8

## 2024-05-21 ASSESSMENT — PAIN DESCRIPTION - ORIENTATION: ORIENTATION: RIGHT

## 2024-05-21 ASSESSMENT — PAIN SCALES - WONG BAKER: WONGBAKER_NUMERICALRESPONSE: HURTS WHOLE LOT

## 2024-05-21 ASSESSMENT — PAIN SCALES - PAIN ASSESSMENT IN ADVANCED DEMENTIA (PAINAD)
BREATHING: NORMAL
FACIALEXPRESSION: FACIAL GRIMACING

## 2024-05-21 NOTE — CARE PLAN
The patient's goals for the shift include      The clinical goals for the shift include pt will remain safe and free from injury 5/21 @1900      Problem: Fall/Injury  Goal: Not fall by end of shift  Outcome: Progressing     Problem: Fall/Injury  Goal: Be free from injury by end of the shift  Outcome: Progressing     Problem: Pain  Goal: Turns in bed with improved pain control throughout the shift  Outcome: Progressing

## 2024-05-21 NOTE — NURSING NOTE
1200 Arrival of device clinic nurse to interrogate patient's pacemaker into safe MRI mode. Fentanyl  patch removed, discarded, witnessed by JOSE Renae

## 2024-05-21 NOTE — PROGRESS NOTES
Subjective   No PRNs or acute events overnight. No abnormalities on telemetry. He continues to be constipated and have acholic stools. Pain is well controlled on PCA pump, and he says he is ready to try pain control with oral meds. Denies any fever, chills, shortness of breath, numbness, weakness or tingling.     Meds  Scheduled medications  acetaminophen, 650 mg, oral, q6h  cholecalciferol, 4,000 Units, oral, Daily  cyclobenzaprine, 10 mg, oral, TID  enoxaparin, 40 mg, subcutaneous, Daily  fentaNYL, 1 patch, transdermal, q72h  lidocaine, 1 patch, transdermal, Daily  multivitamin with minerals, 1 tablet, oral, Daily  nystatin, 5 mL, Swish & Swallow, 4x daily  pantoprazole, 40 mg, oral, Daily before breakfast  polyethylene glycol, 17 g, oral, BID  predniSONE, 80 mg, oral, Daily   Followed by  [START ON 5/25/2024] predniSONE, 40 mg, oral, Daily   Followed by  [START ON 6/1/2024] predniSONE, 20 mg, oral, Daily   Followed by  [START ON 6/8/2024] predniSONE, 10 mg, oral, Daily  pregabalin, 50 mg, oral, BID  sennosides-docusate sodium, 2 tablet, oral, BID      Continuous medications     PRN medications  PRN medications: alteplase, benzocaine-menthol, calcium carbonate, HYDROmorphone, HYDROmorphone, HYDROmorphone, LORazepam, metoclopramide, ondansetron **OR** ondansetron      Objective   Vital signs in last 24 hours:  Temp:  [36.3 °C (97.3 °F)-37.3 °C (99.1 °F)] 36.7 °C (98.1 °F)  Heart Rate:  [] 106  Resp:  [18] 18  BP: (104-135)/(64-82) 106/71    Intake/Output this shift:    Intake/Output Summary (Last 24 hours) at 5/21/2024 8503  Last data filed at 5/21/2024 0603  Gross per 24 hour   Intake --   Output 430 ml   Net -430 ml       Physical  General: Patient is awake, non-toxic appearing, normal body habitus  Pulm: Normal WOB at rest and when sitting up, no crackles or rhonchi  Cardiac: Regular rate and rhythm, normal S1/S2  Abdomen: Non-tender to palpation, distended  Extremities: Left arm still mildly  swollen.  Neuro: Patient alert and oriented, cranial nerves grossly intact, normal strength and sensation.    Results  Results for orders placed or performed during the hospital encounter of 05/10/24 (from the past 24 hour(s))   Fibrinogen   Result Value Ref Range    Fibrinogen 211 200 - 400 mg/dL   CBC and Auto Differential   Result Value Ref Range    WBC 11.8 (H) 4.4 - 11.3 x10*3/uL    nRBC 0.2 (H) 0.0 - 0.0 /100 WBCs    RBC 2.92 (L) 4.50 - 5.90 x10*6/uL    Hemoglobin 8.8 (L) 13.5 - 17.5 g/dL    Hematocrit 27.2 (L) 41.0 - 52.0 %    MCV 93 80 - 100 fL    MCH 30.1 26.0 - 34.0 pg    MCHC 32.4 32.0 - 36.0 g/dL    RDW 18.4 (H) 11.5 - 14.5 %    Platelets 70 (L) 150 - 450 x10*3/uL    Neutrophils % 90.2 40.0 - 80.0 %    Immature Granulocytes %, Automated 2.7 (H) 0.0 - 0.9 %    Lymphocytes % 2.5 13.0 - 44.0 %    Monocytes % 4.4 2.0 - 10.0 %    Eosinophils % 0.1 0.0 - 6.0 %    Basophils % 0.1 0.0 - 2.0 %    Neutrophils Absolute 10.62 (H) 1.20 - 7.70 x10*3/uL    Immature Granulocytes Absolute, Automated 0.32 0.00 - 0.70 x10*3/uL    Lymphocytes Absolute 0.29 (L) 1.20 - 4.80 x10*3/uL    Monocytes Absolute 0.52 0.10 - 1.00 x10*3/uL    Eosinophils Absolute 0.01 0.00 - 0.70 x10*3/uL    Basophils Absolute 0.01 0.00 - 0.10 x10*3/uL   Renal Function Panel   Result Value Ref Range    Glucose 182 (H) 74 - 99 mg/dL    Sodium 138 136 - 145 mmol/L    Potassium 3.9 3.5 - 5.3 mmol/L    Chloride 102 98 - 107 mmol/L    Bicarbonate 29 21 - 32 mmol/L    Anion Gap 11 10 - 20 mmol/L    Urea Nitrogen 22 6 - 23 mg/dL    Creatinine 0.49 (L) 0.50 - 1.30 mg/dL    eGFR >90 >60 mL/min/1.73m*2    Calcium 8.3 (L) 8.6 - 10.6 mg/dL    Phosphorus 2.6 2.5 - 4.9 mg/dL    Albumin 2.9 (L) 3.4 - 5.0 g/dL   Magnesium   Result Value Ref Range    Magnesium 2.04 1.60 - 2.40 mg/dL   Fibrinogen   Result Value Ref Range    Fibrinogen 185 (L) 200 - 400 mg/dL       Imaging  Upper extremity venous duplex bilateral    Result Date: 5/21/2024  Interpreted By:  Zakia  Wes Gomez,  and Faye Vinson Ken STUDY: [facility] US UPPER EXTREMITY VENOUS DUPLEX BILATERAL;  5/20/2024 11:01 am   INDICATION: Signs/Symptoms:New LUE swelling, concern for clot given holding AC in s/o recent procedure, hypercoagulable state d/t malignancy.   COMPARISON: None.   ACCESSION NUMBER(S): [id number]   ORDERING CLINICIAN: ORLIN GREEN   TECHNIQUE: Vascular ultrasound of the bilateral upper extremities was performed. Evaluation was performed with grayscale, color, and spectral Doppler. When possible, compression views of the evaluated veins was also performed.   FINDINGS: Limited evaluation due to edema, tortuous and small-vessel.   RIGHT UPPER EXTREMITY: Evaluation of the visualized portions of the right internal jugular, innominate, subclavian, axillary, brachial, cephalic, and basilic veins was performed.   There is normal respiratory variation, normal compressibility, as well as normal color doppler signal in the visualized vessels. There is no evidence of thrombus.   LEFT UPPER EXTREMITY: Evaluation of the visualized portions of the left internal jugular, innominate, subclavian, axillary, brachial, cephalic, and basilic veins was performed.   Left cephalic vein is compressible but demonstrates no significant flow. Otherwise, there is normal respiratory variation, normal compressibility, as well as normal color doppler signal in the visualized vessels. There is no evidence of thrombus.   There is a normal-appearing lymph node measuring 2.1 x 0.9 x 0.9 cm in seen in the neck. An additional round hypoechoic structure adjacent and lateral to the pacemaker in the left chest wall measures 1.6 x 1.5 x 1.5 cm is noted.       Left cephalic venous compressible but demonstrates no significant flow, concerning for thrombophlebitis. No evidence of additional findings to suggest for DVT bilateral upper extremities. There is a round hypoechoic structure adjacent and lateral to the pacemaker in the  left chest wall without significant internal vascularity which may represent an adjacent lymph node versus fibrotic tissue or fat necrosis.   I personally reviewed the images/study and I agree with the findings as stated by resident physician Dr. Ken Vinson . This study was interpreted at [facility], [place], [place].   MACRO: Ken Vinson discussed the significance and urgency of this critical finding by telephone with  dAi Mays on 5/20/2024 at 11:54 am.  (**-RCF-**) Findings:  See findings.   Signed by: Wes Gomez 5/21/2024 12:16 AM Dictation workstation:   [id number]    ECG 12 Lead    Result Date: 5/21/2024  Electronic Ventricular Pacemaker Abnormal ECG When compared with ECG of 18-MAY-2024 22:04, (unconfirmed) No significant change was found Confirmed by Kirill Gunter ([id number]) on 5/21/2024 12:40:52 AM    XR thoracolumbar spine 2 views    Result Date: 5/20/2024  Interpreted By:  Julieta Mayorga, STUDY: Thoracolumbar spine, 2 views.   INDICATION: Signs/Symptoms:standing uprights after thoracic fusion.   COMPARISON: Thoracic spine MRI 05/16/2024.   ACCESSION NUMBER(S): [id number]   ORDERING CLINICIAN: ORLIN GREEN   FINDINGS: Alignment is within normal limits. Status post posterior spinal instrumented fusion from T8-T12 with posterior decompression at T10. Hardware is intact without perihardware fractures or lucencies. Vertebral body heights are maintained. Degenerative changes of the upper lumbar levels with disc height loss and osteophytes.       Postsurgical changes as described above without hardware complication.   Signed by: Julieta Mayorga 5/20/2024 3:42 PM Dictation workstation:   [id number]    Lower extremity venous duplex bilateral    Result Date: 5/20/2024  Interpreted By:  Eder Mckenna,  and Faye Rogers STUDY: VASC  LOWER EXTREMITY VENOUS DUPLEX BILATERAL;  5/20/2024 10:09 am   INDICATION: Signs/Symptoms:New lower  extremity swelling, holding AC in s/o recent procedure.   COMPARISON: None.   ACCESSION NUMBER(S): [id number]   ORDERING CLINICIAN: ORLIN GREEN   TECHNIQUE: Vascular ultrasound of the bilateral lower extremities was performed. Real-time compression views as well as Gray scale, color Doppler and spectral Doppler waveform analysis was performed.   FINDINGS: Evaluation of the visualized portions of the bilateral common femoral vein, proximal, mid, and distal femoral vein, and popliteal vein were performed.  Evaluation of the visualized portions of the  posterior tibial and peroneal veins were also performed.   Limitations:  Limited evaluation due to edema and morphology of the veins.   There is a small caliber of the distal femoral vein on the left and minimal flow was detected on Doppler interrogation. Otherwise, no evidence of deep vein thrombus within the visualized bilateral lower extremities.         No significant flow was detected on Doppler images in the left distal femoral vein which can be artifactual due to small caliber of the vein. However, deep vein thrombus can not be excluded. No evidence of additional DVT in bilateral lower extremities.   I personally reviewed the images/study and I agree with the findings as stated by resident physician Dr. Ken Vinson . This study was interpreted at [place], [place].   MACRO: None   Signed by: Eder Mckenna 5/20/2024 2:12 PM Dictation workstation:   [id number]        Assessment/Plan   Principal Problem:    Esophageal cancer, stage IV (Multi)  Active Problems:    Primary malignant neoplasm of esophagus (Multi)    Massimo Garcia is a 63 y.o. male with PMH third degree heart block s/p PPM (placed in November), esophageal adenocarcinoma with mets to liver and lungs, irAE colitis, PE, peripheral neuropathy, and portal vein thrombosis (On Eliquis). Patient presented to [facility] ED on 5/9 with acute on chronic low back  pain. CT imaging was obtained and was concerning for evidence of soft tissue extension into the spinal canal at T9-T10 with associated spinal canal stenosis. Patient transferred to [facility] for further workup and management.  He is s/p surgery on 5/17.     Updates  -Stop PCA pump and start orals  - Intensify bowel regimen  -Lumbar drains out today, plan for wound vac removal on Friday  -Follow up visit with Dr. Hodgson at [facility] in ~ 2 weeks  -Tentative plans for SBRT to T10 and L2 in ~4 weeks  -Tentative plans for GKRS to RP and LT brain lesions in ~3-4 weeks    #Disseminated Intravascular coagulation  #Chronic thrombocytopenia  #Hemoptysis on night of 5/18  -Cancer diagnosed in Jan 2023. Treatment started 1/30/2023  -Last normal platelets in Feb 2023 at 194, has been chronically low since then  - INR of 1.3-1.7 starting in October 2023. Was normal in Jan 2023. PTT has been different, with elevations in Dec 2023 and has normalized since March 2024  -Liver function through AST, ALT, Alkphos and albumin shows no injury corresponding to this  -Etiology of hemoptysis likely Trauma from coughing vs pulmonary embolism vs bleeding from malignant mass in lung  - Chest x-ray showed interval worsening of diffuse interstitial prominence and superimposed hazy opacities but no bleeds. 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 NSGY interested in a shorter taper in 2 weeks as there is concerned about bone healing.   -Stop Dilaudid PCA, transition to oral pain control  - Per NSGY, INR<1.6, Fibrinogen> 150, Platelets>100 for 24 hrs post-op. Given consumptive coagulopathy (DIC), will focus on maintaining fibrinogen>150, and platelets>50  -Continue PT/OT  - Acetaminophen 650 mg Q4H, cyclobenzaprine 10 mg Q8H, lidocaine patches  -Toradol 30 mg Q6H as needed for up to 8 doses  -Continue fentanyl patch to 100 mcg/hr  -Physical therapy as tolerated  -Continue Lyrica 50 mg nightly     #New Tachycardia, resolved  -Ddx of hypovolemia vs new clots from known DIC (off enoxaparin for a day)  -Review of telemetry shows sinus tachycardia with occasional PVCs  -s/p 1L of LR today.   - Can consider metoprolol tartrate 12.5 mg oral if tachycardia persists.     #Hiccups  -Held Chloropromazine 25 mg Q6H PRN as not helping  - Started metaclopramide 10 mg Q8H PRN      #Vitamin D deficiency  -Continue vitamin D 4000 units every morning     #Stage IV esophageal cancer HER2 positive   - Jan 2023: Diagnosed with moderately differentiated esophageal adenocarcinoma   -Jan 27,2023 - Staging scan shows intense hypermetabolic distal esophageal lesion consistent with patient's reported history of esophageal adenocarcinoma. Numerous centrally photopenic and peripherally intense liver lesion corresponding with hepatic metastasis. Numerous moderate to intensly hypermetabolic periaortic and aortocaval lymph nodes  consistent with metastatic ralph involvement. Single left pulmonary ligament LN with mild hypermetabolic activity  - Cycle 1 FOLFOX Herceptin - 1/30/23  -April 2024: Switched to Trastuzumab + 5-FU/Pembrolizumab alone  :: Follows with Dr. Blake, emailed 5/11 AM with updates  :: Current chemotherapy: 5FU, Tras/Pembro, last received on 4/29, next due 5/13. Hold for potential surgery.  :: CT A/P (5/9) with “Multiple pulmonary nodules in the imaged lower lungs compatible with metastatic disease. There is wall thickening and apparent hyperenhancement of the anterior wall of the distal esophagus which may relate to patient's known esophageal malignancy. There is also redemonstration of multiple hepatic masses and abdominal and pelvic lymphadenopathy compatible with metastatic disease     #iRAE colitis, grade III  :: Recently admitted to [facility] 5/4-5/7 for enterocolitis  - Was discharged on prednisone 80 mg through 5/27. Now since off dexamethasone steroids, will start on prednisone 80 mg -> 40 mg  ->20 mg  -> 10 mg taper weekly.     #hx PE  #Hx Portal vein thrombosis  - Pulmonary embolism in RLL subsegmental artery in March 2024  -Jan 2023:  Extensive large pulmonary emboli involving lobar, segmental, and subsegmental arteries bilaterally. Prior to any cancer treatment  - Held home Eliquis 5mg BID on admission, per neurology, pending further plan  - Restart enoxaparin 40 mg daily today  - Can restart apixaban  POD 7     #GERD  - Continue home Protonix   -Calcium carbonate 500 mg PRN     #Anxiety disorder  - Lorazepam 1 mg Q8H PRN     #Chronic normocytic anemia secondary to malignancy and treatment  :: At baseline hgb   -Hgb of 8.5 this morning  - Monitor daily CBC  - Transfuse for hgb<8, platelet<10     #Hx Third degree heard block, s/p pacemaker placement (11/8/23)  :: Asymptomatic on admission  - CTM     F: PRN  E: Replete as needed  N: Regular diet  C: Full code  A: Port     LOS: 11 days     Adi Mays MD/PhD    PGY-1.833

## 2024-05-21 NOTE — CONSULTS
Radiation Oncology Inpatient Consult    Patient Name:  Massimo Garcia  MRN:  [id number]  :  1960    Referring Provider: David Mariscal DO  Primary Care Provider: Dayne Santamaria DO  Care Team: Patient Care Team:  Dayne Santamaria DO as PCP - General  Tomasa Blake MD as Referring Physician (Hematology and Oncology)  ALFREDITO Ferguson-CNP as Nurse Practitioner (Palliative Medicine)  Chidi Mays MD as Consulting Physician (Hematology and Oncology)  Bhavin Frnakel MD as Medical Oncologist (Hematology and Oncology)  Bam Serrano MD as Consulting Physician (Hematology and Oncology)  Blue oJhn MD as Surgeon (Neurosurgery)  Vania Menchaca MD as Consulting Physician (Hospitalist)    Date of Service: 24    SUBJECTIVE  History of Present Illness:  This is a 63-year-old male with a history of metastatic esophageal adenocarcinoma HER2 positive that presents to the emergency department on 5/10 with acute on chronic lower back pain.  Workup reveals T9/10 epidural extension with cord compression.  MRI L-spine also showed a nodular lesion at L2 abutting the equina, concerning for drop lesion.  MRI brain showed small enhancing lesions in the right posterior parasagittal parietal lobe and left temporal lobe, consistent with metastatic disease.  On  the patient underwent T10 decompression with T8-12 fusion.  Radiation oncology has been asked to assist with care coordination.    Today, the patient is found to be resting comfortably with his wife at bedside.  He again endorses acute on chronic back pain.  He denies any peripheral deficits at this time.  No changes in lower extremity sensation, no saddle paresthesia, no urinary or bowel incontinence.  No headaches or vision changes.  He does have chronic left facial weakness secondary to previous Bell's palsy.  No additional complaints at this time.    Prior Radiotherapy:  no    Current Systemic Treatment:    currently on therapy trastuzumab  pembrolizumab leucovorin plus 5-FU      Presence of Pacemaker or ICD:  no    Past Medical History:    Past Medical History:   Diagnosis Date    Essential (primary) hypertension 12/21/2013    Hypertension    Pacemaker     Peripheral neuropathy     Personal history of other diseases of the circulatory system     History of right bundle branch block (RBBB)    Pneumothorax 12/04/2023        Past Surgical History:    Past Surgical History:   Procedure Laterality Date    CARDIAC ELECTROPHYSIOLOGY PROCEDURE N/A 11/7/2023    Procedure: Temporary Pacemaker Insertion;  Surgeon: Alexis Shook MD;  Location: [facility];  Service: Cardiovascular;  Laterality: N/A;    CARDIAC ELECTROPHYSIOLOGY PROCEDURE Left 11/9/2023    Procedure: PPM IMPLANT DUAL;  Surgeon: Elias Connolly MD;  Location: [facility];  Service: Electrophysiology;  Laterality: Left;    TOTAL KNEE ARTHROPLASTY  09/30/2016    Total Knee Replacement Left    TOTAL KNEE ARTHROPLASTY  09/30/2016    Total Knee Replacement Right        Family History:  Cancer-related family history includes Cancer in his father.    Social History:    Social History     Tobacco Use    Smoking status: Former     Types: Cigarettes, Cigars     Passive exposure: Never    Smokeless tobacco: Never   Vaping Use    Vaping status: Unknown   Substance Use Topics    Alcohol use: Not Currently    Drug use: Yes     Types: Marijuana     Comment: medical [facility] card       Allergies:    Allergies   Allergen Reactions    Bee Venom Protein (Honey Bee) Anaphylaxis    Oxaliplatin Other     Rigors        Medications:    Current Facility-Administered Medications:     acetaminophen (Tylenol) tablet 650 mg, 650 mg, oral, q6h, Adi Mays MD, 650 mg at 05/20/24 5188    alteplase (Cathflo Activase) injection 2 mg, 2 mg, intra-catheter, PRN, Adi Mays MD    benzocaine-menthol (Cepastat Sore Throat) lozenge 1 lozenge, 1 lozenge, Mouth/Throat, q2h PRN, Adi Mays MD    calcium  carbonate (Tums) chewable tablet 500 mg, 500 mg, oral, 4x daily PRN, Adi Mays MD    cholecalciferol (Vitamin D-3) tablet 4,000 Units, 4,000 Units, oral, Daily, Adi Mays MD, 4,000 Units at 05/21/24 0911    cyclobenzaprine (Flexeril) tablet 10 mg, 10 mg, oral, TID, Adi Mays MD, 10 mg at 05/21/24 0912    enoxaparin (Lovenox) syringe 40 mg, 40 mg, subcutaneous, Daily, Adi Mays MD, 40 mg at 05/20/24 2034    fentaNYL (Duragesic) 100 mcg/hr patch 1 patch, 1 patch, transdermal, q72h, Adi Mays MD, 1 patch at 05/19/24 2126    HYDROmorphone (Dilaudid) injection 1 mg, 1 mg, intravenous, q3h PRN, Adi Mays MD, 1 mg at 05/17/24 0006    HYDROmorphone (Dilaudid) tablet 4 mg, 4 mg, oral, q4h PRN, Adi Mays MD, 4 mg at 05/16/24 2227    HYDROmorphone (Dilaudid) tablet 6 mg, 6 mg, oral, q4h PRN, Adi Mays MD, 6 mg at 05/17/24 0409    lidocaine 4 % patch 1 patch, 1 patch, transdermal, Daily, Adi Mays MD, 1 patch at 05/16/24 0821    LORazepam (Ativan) tablet 1 mg, 1 mg, oral, q8h PRN, Adi Mays MD, 1 mg at 05/16/24 1319    metoclopramide (Reglan) tablet 10 mg, 10 mg, oral, q8h PRN, Adi Mays MD, 10 mg at 05/17/24 0422    multivitamin with minerals 1 tablet, 1 tablet, oral, Daily, Adi Mays MD, 1 tablet at 05/21/24 0912    nystatin (Mycostatin) 100,000 unit/mL suspension 500,000 Units, 5 mL, Swish & Swallow, 4x daily, Adi Mays MD, 500,000 Units at 05/21/24 0914    ondansetron (Zofran) tablet 4 mg, 4 mg, oral, q8h PRN **OR** ondansetron (Zofran) injection 4 mg, 4 mg, intravenous, q8h PRN, Adi Mays MD    pantoprazole (ProtoNix) EC tablet 40 mg, 40 mg, oral, Daily before breakfast, Adi Mays MD, 40 mg at 05/21/24 0914    polyethylene glycol (Glycolax, Miralax) packet 17 g, 17 g, oral, BID, Adi Mays MD, 17 g at 05/21/24 0912    predniSONE (Deltasone) tablet 80 mg, 80 mg, oral, Daily, 80 mg at 05/21/24 0912 **FOLLOWED BY** [START ON 5/25/2024] predniSONE (Deltasone) tablet 40 mg, 40 mg,  "oral, Daily **FOLLOWED BY** [START ON 6/1/2024] predniSONE (Deltasone) tablet 20 mg, 20 mg, oral, Daily **FOLLOWED BY** [START ON 6/8/2024] predniSONE (Deltasone) tablet 10 mg, 10 mg, oral, Daily, Adi Mays MD    pregabalin (Lyrica) capsule 50 mg, 50 mg, oral, BID, Adi Mays MD, 50 mg at 05/21/24 0912      Review of Systems:    recent admissions for enterocolitis diarrhea, has history of Bell's palsy with chronic facial droop, no chest pain or difficulty with breathing, no abdominal pain, no palpitations or syncope    Performance Status:  The Karnofsky performance scale today is 70, Cares for self; unable to carry on normal activity or to do active work (ECOG equivalent 1).        OBJECTIVE  Physical Exam:  /76 (BP Location: Right arm, Patient Position: Lying)   Pulse 108   Temp 36.5 °C (97.7 °F) (Temporal)   Resp 18   Ht 1.727 m (5' 8\")   Wt 70.3 kg (155 lb)   SpO2 95%   BMI 23.57 kg/m²    General: awake, alert, resting comfortably, well developed and well nourished in appearance  HEENT: pupils equal and round, no scleral icterus  Pulmonary: Breathing comfortably at rest   Cardiac: regular rate  Abdomen: soft, nondistended, non tender to palpation   EXT: no peripheral edema, no asymmetry noted  MSK: no focal joint swelling noted  Neuro: A&O x 3, cranial nerves II through XII grossly intact with show asymmetry secondary to previous Bell's palsy, sensation and strength intact  Psych: Normal affect      Laboratory Review:    05/21/24 0503  CBC and Auto Differential  Collected: 05/21/24 0438  Final result  Specimen: Blood, Venous    WBC 11.8 High  x10*3/uL Immature Granulocytes %, Automated 2.7 High  %    nRBC 0.2 High  /100 WBCs Lymphocytes % 2.5 %   RBC 2.92 Low  x10*6/uL Monocytes % 4.4 %   Hemoglobin 8.8 Low  g/dL Eosinophils % 0.1 %   Hematocrit 27.2 Low  % Basophils % 0.1 %   MCV 93 fL Neutrophils Absolute 10.62 High  x10*3/uL    MCH 30.1 pg Immature Granulocytes Absolute, Automated 0.32 " x10*3/uL   MCHC 32.4 g/dL Lymphocytes Absolute 0.29 Low  x10*3/uL   RDW 18.4 High  % Monocytes Absolute 0.52 x10*3/uL   Platelets 70 Low  x10*3/uL Eosinophils Absolute 0.01 x10*3/uL   Neutrophils % 90.2 % Basophils Absolute 0.01 x10*3/uL        05/21/24 0607  Renal Function Panel  Collected: 05/21/24 0438  Final result  Specimen: Blood, Venous    Glucose 182 High  mg/dL Urea Nitrogen 22 mg/dL   Sodium 138 mmol/L Creatinine 0.49 Low  mg/dL   Potassium 3.9 mmol/L eGFR >90 mL/min/1.73m*2    Chloride 102 mmol/L Calcium 8.3 Low  mg/dL   Bicarbonate 29 mmol/L Phosphorus 2.6 mg/dL    Anion Gap 11 mmol/L Albumin 2.9 Low  g/dL          Pathology Review:    Jan / 20203  ESOPHAGEAL MASS: MODERATELY DIFFERENTIATED ADENOCARCINOMA.     Imaging:    MRI brain and spine 5/15  IMPRESSION:  MRI brain:  1. Peripherally enhancing intrinsically T2/FLAIR hypointense lesions  within the right posterior parasagittal parietal lobe and the left  anterior temporal lobe, which are new when compared to prior MRI  dated 11/06/2023. The parietal lesion demonstrates significant  adjacent enhancement with mild local mass effect. These findings are  likely due to metastatic disease given the patient's history.      MRI spine:  1. Ill-defined infiltrating and minimally peripherally enhancing  lesion involving the T9 and T10 vertebral bodies and with masslike  extension posteriorly along the ventral thecal sac and rightward  along the anterior paraspinal soft tissues. This mass exerts severe  mass effect on the thecal sac with deformity with potential subtle T2  signal abnormality/likely compression of the spinal cord at least at  the T10 level. The above-described mass also extends along the left  anterior paraspinal soft tissues and closely abuts the descending  thoracic aorta with questionable invasion of the posteromedial aortic  wall at the T9 level.There is multilevel effacement of the right  neural foramina with complete loss of epidural fat  signal within the  right neural foramina from T9-T11.  2. There is an avidly enhancing T2 hypointense lesion within the  ventral thecal sac exerting mass effect on the cauda equina at the  level of L2 likely due to intrathecal metastatic disease given the  other findings. Likely subtle additional nodular enhancement of the  cauda equina probably due to additional metastatic deposits.           ASSESSMENT:  This is a 63-year-old male with a history of metastatic esophageal adenocarcinoma HER2 positive that presents to the emergency department on 5/10 with acute on chronic lower back pain.  Workup reveals T9/10 epidural extension with cord compression.  MRI L-spine also showed a nodular lesion at L2 abutting the equina, concerning for drop lesion.  MRI brain showed small enhancing lesions in the right posterior parasagittal parietal lobe and left temporal lobe, consistent with metastatic disease.  On 5/17 the patient underwent T10 decompression with T8-12 fusion.  Radiation oncology has been asked to assist with care coordination.    PLAN:    The patient was discussed with my attending physician, Dr. Hodgson.    Diagnosis and indications for radiation were discussed with the patient and his wife.  We have tentative plans to pursue postoperative SBRT to T10, in conjunction with treatment to nodular L2 lesion following a period of surgical recovery.  We additionally plan to follow for gamma knife radiosurgery scheduling for secondary brain metastases.  Arrangements will be made for clinical follow-up in approximately 2 to 3 weeks with Dr. Hodgson to discuss plans.    -Follow up visit with Dr. Hodgson at [facility] in ~ 2 weeks  -Tentative plans for SBRT to T10 and L2 in ~4 weeks  -Tentative plans for GKRS to RP and LT brain lesions in ~3-4 weeks      I spent 60 minutes in the professional and overall care of this patient.

## 2024-05-21 NOTE — PROGRESS NOTES
"Massimo Garcia is a 63 y.o. male on day 11 of admission presenting with Esophageal cancer, stage IV (Multi).    Subjective   Pain controlled. Patient got up and walked around, sat in chair    Objective     Physical Exam  A&Ox3  RUE 5/5  LUE 5/5  RLE 5/5  LLE 5/5  Sensation intact to light touch throughout all extremities  Incision c/d/I w prevena, drains x 2    Last Recorded Vitals  Blood pressure 118/82, pulse 109, temperature 36.6 °C (97.9 °F), temperature source Temporal, resp. rate 18, height 1.727 m (5' 8\"), weight 70.3 kg (155 lb), SpO2 96%.  Intake/Output last 3 Shifts:  I/O last 3 completed shifts:  In: 4408.4 (62.7 mL/kg) [P.O.:150; I.V.:2511.1 (35.7 mL/kg); IV Piggyback:1747.4]  Out: 2810 (40 mL/kg) [Urine:2275 (0.9 mL/kg/hr); Drains:535]  Weight: 70.3 kg     Relevant Results    Assessment/Plan   Principal Problem:    Esophageal cancer, stage IV (Multi)  Active Problems:    Primary malignant neoplasm of esophagus (Multi)    63yM h/o HTN, third degree heart block s/p pacemaker (11/2023), portal john thrombosis/prior PE (on Eliquis), OA, stage IV esophageal adenocarcinoma (HER2 positive) w/ known mets to lung and liver, s/p chemo (last doses 4/29), recent admission for drug-induced colitis, 5/10 p/w 1d LBP, CT T/L spine diffuse soft tissue mets, T9-10 soft tissue mass with epidural extension, MRI neuroaxis R occipital 1.1cm met, L temporal 8mm met, T9-11 peripherally enhancing vertebral body circumferential lesion, worst at T10, L2 ventral lesion c/f drop met, 5/16 s/p 2U plts    5/17 s/p T10 transpedic decompression, T8-12 fusion (s/p 4u PLT, riastap)    PLAN  [facility] primary  Recommend to discontinue PCA and quick prednisone taper  Dc drains today  Daily labs  MRI when able (pacer)  Upright xrays of thoracic and lumbar spine give pt is ambulatory w improving pain mgmt  Rec holding chemo/RT until POD 14  Con't to hold eliquis, ok for eliquis restart POD 7  Follow up final path  [facility] recs  PTOT- ok to work with " physical therapy without restrictions, recommend getting out of bed at least three times daily  CHAUs, LVX      Severiano Anand MD

## 2024-05-21 NOTE — PROGRESS NOTES
SUPPORTIVE AND PALLIATIVE ONCOLOGY INPATIENT FOLLOW-UP      SERVICE DATE: 24     SUBJECTIVE:  Interval Events:    Patient was seen at bedside and reports that his pain is well controlled. His wife was present providing support.  Patients last bowel movement was today and it was large.  He denies nausea and he has not vomited.   Reports good appetite     Pain Assessment:  Location:  lower back pain  Duration: Constant  Characteristics:   Ratin and Mild   Descriptors: aching and throbbing   Aggravating: movement and bending    Relieving: Analgesics dilaudid   Interference with Function: Very Much    Opioid Use  Past 24 h prn opioid use:   Fentanyl patch 100mcgs x 1 = 200 mg OME   Total 24h OME use: 200mg OME from fentanyl patch and dilaudid PCA dose that was discontinued prior to encounter.     Note: OME calculations based on equianalgesic table below. Please note this table is based on best available evidence but conversions are still approximate. These are NOT opioid DOSES for individual patient use; this is equivalency information.  Drug Parenteral Enteral   Morphine 10 25   Oxycodone N/A 20   Hydromorphone 2 5   Fentanyl 0.15 N/A   Tramadol N/A 120   Citation: Aniya ROSS. Demystifying opioid conversion calculations: A guide for effective dosing, Second edition. [place]: American Society of Health-System Pharmacists, 2018.    Symptom Assessment:  Nausea none  Vomiting none  Anxiety none  Difficulty Sleeping none  Lack of appetite none    Information obtained from: chart review, interview of patient, interview of family, discussion with RN, and discussion with primary team  ______________________________________________________________________        OBJECTIVE:    Lab Results   Component Value Date    WBC 11.8 (H) 2024    HGB 8.8 (L) 2024    HCT 27.2 (L) 2024    MCV 93 2024    PLT 70 (L) 2024      Lab Results   Component Value Date    GLUCOSE 182 (H) 2024     CALCIUM 8.3 (L) 05/21/2024     05/21/2024    K 3.9 05/21/2024    CO2 29 05/21/2024     05/21/2024    BUN 22 05/21/2024    CREATININE 0.49 (L) 05/21/2024     Lab Results   Component Value Date    ALT 16 05/12/2024    AST 28 05/12/2024    ALKPHOS 136 05/12/2024    BILITOT 1.0 05/12/2024     Estimated Creatinine Clearance: 125 mL/min (A) (by C-G formula based on SCr of 0.49 mg/dL (L)).     Scheduled medications  acetaminophen, 650 mg, oral, q6h  cholecalciferol, 4,000 Units, oral, Daily  cyclobenzaprine, 10 mg, oral, TID  enoxaparin, 40 mg, subcutaneous, Daily  fentaNYL, 1 patch, transdermal, q72h  lidocaine, 1 patch, transdermal, Daily  multivitamin with minerals, 1 tablet, oral, Daily  nystatin, 5 mL, Swish & Swallow, 4x daily  pantoprazole, 40 mg, oral, Daily before breakfast  polyethylene glycol, 17 g, oral, BID  predniSONE, 80 mg, oral, Daily   Followed by  [START ON 5/25/2024] predniSONE, 40 mg, oral, Daily   Followed by  [START ON 6/1/2024] predniSONE, 20 mg, oral, Daily   Followed by  [START ON 6/8/2024] predniSONE, 10 mg, oral, Daily  pregabalin, 50 mg, oral, BID      Continuous medications       PRN medications  alteplase, 2 mg, PRN  benzocaine-menthol, 1 lozenge, q2h PRN  calcium carbonate, 500 mg, 4x daily PRN  HYDROmorphone, 1 mg, q3h PRN  HYDROmorphone, 4 mg, q4h PRN  HYDROmorphone, 6 mg, q4h PRN  LORazepam, 1 mg, q8h PRN  metoclopramide, 10 mg, q8h PRN  ondansetron, 4 mg, q8h PRN   Or  ondansetron, 4 mg, q8h PRN      }     PHYSICAL EXAMINATION:    Vital Signs:   Vital signs reviewed  Visit Vitals  /76 (BP Location: Right arm, Patient Position: Lying)   Pulse 108   Temp 36.5 °C (97.7 °F) (Temporal)   Resp 18        Pain Score: 2       Physical Exam  Constitutional:       Appearance: Normal appearance.   Cardiovascular:      Rate and Rhythm: Regular rhythm.      Heart sounds: Normal heart sounds.   Pulmonary:      Breath sounds: Normal breath sounds.   Abdominal:      General: Abdomen is  flat.      Palpations: Abdomen is soft.   Skin:     General: Skin is warm.   Neurological:      Mental Status: He is alert and oriented to person, place, and time.   Psychiatric:         Mood and Affect: Mood normal.        ASSESSMENT/PLAN:    Massimo Garcia is a 63 y.o. male diagnosed with metastatic esophageal cancer. PMH significant for third degree heart block s/p PPM (placed in November), esophageal adenocarcinoma with mets to liver and lungs, Drug-induced colitis, PE, peripheral neuropathy, and portal vein thrombosis (On Eliquis).  Admitted 5/10/2024 for further evaluation and management of acute on chronic low back pain. Course complicated by soft tissue extension into the spinal canal at T9-T10 with associated spinal canal stenosis. Supportive and Palliative Oncology is consulted for pain management.      Pain:  Lower back pain related to metastatic esophageal cancer  Pain is: cancer related pain  Type: visceral, somatic, and neuropathic  Pain control: sub-optimally controlled  Home regimen:  Acetaminophen PRN, Oxycodone 10mg q4hrs prn, Fentanyl TD , and bentyl prn   Intolerances/previously tried: Morphine with poor efficacy. Gabapentin was ineffective, Lyrica makes patient very lethargic, was tried on 50mg and did not tolerate well.  Personalized pain goal: 2  Total OME usage for the past 24 hours:  Continue dilaudid 1mg IV q 3hrs PRN for breakthrough pain  Continue Lyrica 50mg BID [hold for sedation]  May continue dilaudid 6mg q3hrs PRN   May continue dilaudid 4mg q3hrs PRN    Continue dilaudid 1mg iv q3hrs PRN breakthrough pain   Discontinue dilaudid PCA   Bolus dose: 0.2mg   Lockout: 10 minutes   Basal: none  Max dose: 1.2mg/hr  Continue Fentanyl patch 100mcgs q 72 hours   Continue to monitor pain scores and administer PRN medications as appropriate  Continue/initiate nonpharmacologic pain management strategies including ice/heat therapy, distraction techniques, deep breathing/relaxation techniques, calming  music, and repositioning  Continue to monitor for signs of opioid efficacy (pain scores, improved functionality) and toxicity (pinpoint pupils, excess sedation/drowsiness/confusion, respiratory depression, etc.)     Nausea:  Intermittent nausea without vomiting related to constipation   Home regimen:  protonix 40mg daily,  Scopolamine patch PRN and Carafate PRN  denies  Continue Dex 6mg iv TID managed by primary team  Continue Reglan 10mg q8hrs PRN   Continue Zofran 4mg IV q8hr PRN      Constipation  At risk for constipation related to opioids, currently not constipated  Usual bowel pattern: every day  Home regimen: Senna 2 tablets BID, Miralax 17 gm daily, Lactulose 20 gm 4x a day PRN, Docusate 100mg BID PRN, and MOM 15ml PRN   LBM 5/21/2024  Continue Miralax 17 grams BID   Monitor BM frequency, adjust regimen as needed  Goal to have BM without straining q48-72h      Altered Mood:  Acute on chronic anxiety related to health concerns   controlled with home regimen   Home regimen: lorazepam 1mg TID PRN  Monitor and control      Sleeping Difficulty:  Impaired sleep related to pain  Home regimen:  none  Improved with pain control   Reports that he slept well last night.      Decreased appetite:  Appetite loss related to malignancy, chemotherapy, taste changes, and disease process  Nutrition following  Weight loss none  Home regimen:  none  Dex as above  Reports that he ate tapioca today made by his wife. With better pain control he is more enthusiastic about eating      Medical Decision Making/Goals of Care/Advance Care Planning:  Patient's current clinical condition, including diagnosis, prognosis, and management plan, and goals of care were discussed.   Life limiting disease: metastatic malignancy  Family: Supportive family   Joys/meaning/strength: Family and Lizette  Understanding of health: Demonstrates good prognostic understanding of disease process, understands plan for ongoing symptom control   Information:Wants  full disclosure     Goals: symptom control and cancer directed therapy  Worries and fears now and future: ongoing symptoms   Minimum acceptable outcome/QOL:  wants all heroic measures in the event that his heart stops.   Code status discussion:  full code     Advance Directives  Existence of Advance Directives:No - has interest  Decision maker: DOUGLAS is wife  Code Status: Full code     Introduction to Supportive and Palliative Oncology:  Spoke with patient at bedside  Introduced the role and philosophy of Supportive and Palliative oncology in the evaluation and management of symptoms during cancer treatment  Palliative care was introduced as a service for patients with serious illness to help with symptoms, assist with goals of care conversations, navigate complex decision making, improve quality of life for patients, and provide support both patients and families.  Patient seemed to appreciate the extra layer of support.      Supportive Interventions: Interventions: Music Therapy: offered referral placed, Art Therapy: offered declined, SPO Spiritual Care: offered declined, Social work referral for: Advance Directives    Supportive and Palliative Oncology encounter:  Spoke with patient at bedside  Emotional support provided  Coordination of care     Signature and billing:  Thank you for allowing us to participate in the care of this patient. Recommendations will be communicated back to the consulting service by way of shared electronic medical record or face-to-face.    Medical complexity was high level due to due to complexity of problems, extensive data review, and high risk of management/treatment.    I spent 50 minutes in the care of this patient which included chart review, interviewing patient/family, discussion with primary team, coordination of care, and documentation.    Data:   Diagnostic tests and information reviewed for today's visit:  Conversation with primary team, Most recent labs, Most recent  imaging       Some elements copied from my note on 5/20/2024, the elements have been updated and all reflect current decision making from today, 05/21/24       Plan of Care discussed with: Provider, RN, Patient    Thank you for asking Supportive and Palliative Oncology to assist with care of this patient.  We will continue to follow  Please contact us for additional questions or concerns.      SIGNATURE: JASPER Damon   PAGER/CONTACT:  Contact information:  Supportive and Palliative Oncology  Monday-Friday 8 AM-5 PM  Epic Secure chat or pager [phone].  After hours and weekends:  pager [phone]

## 2024-05-21 NOTE — CARE PLAN
The patient's goals for the shift include      The clinical goals for the shift include pt will remain safe and free from injury 5/21 @1900    Problem: Fall/Injury  Goal: Not fall by end of shift  Outcome: Progressing  Goal: Be free from injury by end of the shift  Outcome: Progressing  Goal: Verbalize understanding of personal risk factors for fall in the hospital  Outcome: Progressing  Goal: Verbalize understanding of risk factor reduction measures to prevent injury from fall in the home  Outcome: Progressing  Goal: Use assistive devices by end of the shift  Outcome: Progressing  Goal: Pace activities to prevent fatigue by end of the shift  Outcome: Progressing     Problem: Pain  Goal: Takes deep breaths with improved pain control throughout the shift  Outcome: Progressing  Goal: Turns in bed with improved pain control throughout the shift  Outcome: Progressing  Goal: Walks with improved pain control throughout the shift  Outcome: Progressing  Goal: Performs ADL's with improved pain control throughout shift  Outcome: Progressing  Goal: Participates in PT with improved pain control throughout the shift  Outcome: Progressing  Goal: Free from opioid side effects throughout the shift  Outcome: Progressing  Goal: Free from acute confusion related to pain meds throughout the shift  Outcome: Progressing     Problem: Skin  Goal: Decreased wound size/increased tissue granulation at next dressing change  Outcome: Progressing  Flowsheets (Taken 5/21/2024 1254)  Decreased wound size/increased tissue granulation at next dressing change: Protective dressings over bony prominences  Goal: Participates in plan/prevention/treatment measures  Outcome: Progressing  Flowsheets (Taken 5/21/2024 1254)  Participates in plan/prevention/treatment measures: Elevate heels  Goal: Prevent/manage excess moisture  Outcome: Progressing  Flowsheets (Taken 5/21/2024 1254)  Prevent/manage excess moisture:   Moisturize dry skin   Cleanse  incontinence/protect with barrier cream  Goal: Prevent/minimize sheer/friction injuries  Outcome: Progressing  Flowsheets (Taken 5/21/2024 1254)  Prevent/minimize sheer/friction injuries:   Use pull sheet   Turn/reposition every 2 hours/use positioning/transfer devices   Increase activity/out of bed for meals  Goal: Promote/optimize nutrition  Outcome: Progressing  Flowsheets (Taken 5/21/2024 1254)  Promote/optimize nutrition:   Consume > 50% meals/supplements   Offer water/supplements/favorite foods  Goal: Promote skin healing  Outcome: Progressing  Flowsheets (Taken 5/21/2024 1254)  Promote skin healing:   Turn/reposition every 2 hours/use positioning/transfer devices   Rotate device position/do not position patient on device   Protective dressings over bony prominences

## 2024-05-22 LAB
ALBUMIN SERPL BCP-MCNC: 3 G/DL (ref 3.4–5)
ALBUMIN SERPL BCP-MCNC: 3.1 G/DL (ref 3.4–5)
ALP SERPL-CCNC: 176 U/L (ref 33–136)
ALT SERPL W P-5'-P-CCNC: 42 U/L (ref 10–52)
ANION GAP SERPL CALC-SCNC: 15 MMOL/L (ref 10–20)
AST SERPL W P-5'-P-CCNC: 42 U/L (ref 9–39)
ATRIAL RATE: 103 BPM
BASOPHILS # BLD AUTO: 0.02 X10*3/UL (ref 0–0.1)
BASOPHILS NFR BLD AUTO: 0.1 %
BILIRUB DIRECT SERPL-MCNC: 0.2 MG/DL (ref 0–0.3)
BILIRUB SERPL-MCNC: 0.8 MG/DL (ref 0–1.2)
BUN SERPL-MCNC: 23 MG/DL (ref 6–23)
CALCIUM SERPL-MCNC: 8.6 MG/DL (ref 8.6–10.6)
CHLORIDE SERPL-SCNC: 100 MMOL/L (ref 98–107)
CO2 SERPL-SCNC: 27 MMOL/L (ref 21–32)
CREAT SERPL-MCNC: 0.63 MG/DL (ref 0.5–1.3)
EGFRCR SERPLBLD CKD-EPI 2021: >90 ML/MIN/1.73M*2
EOSINOPHIL # BLD AUTO: 0 X10*3/UL (ref 0–0.7)
EOSINOPHIL NFR BLD AUTO: 0 %
ERYTHROCYTE [DISTWIDTH] IN BLOOD BY AUTOMATED COUNT: 18.6 % (ref 11.5–14.5)
FIBRINOGEN PPP-MCNC: 224 MG/DL (ref 200–400)
GLUCOSE SERPL-MCNC: 200 MG/DL (ref 74–99)
HCT VFR BLD AUTO: 29.4 % (ref 41–52)
HGB BLD-MCNC: 9.5 G/DL (ref 13.5–17.5)
IMM GRANULOCYTES # BLD AUTO: 0.57 X10*3/UL (ref 0–0.7)
IMM GRANULOCYTES NFR BLD AUTO: 3.2 % (ref 0–0.9)
INR PPP: 1.1 (ref 0.9–1.1)
LYMPHOCYTES # BLD AUTO: 0.35 X10*3/UL (ref 1.2–4.8)
LYMPHOCYTES NFR BLD AUTO: 2 %
MAGNESIUM SERPL-MCNC: 2.04 MG/DL (ref 1.6–2.4)
MCH RBC QN AUTO: 29.5 PG (ref 26–34)
MCHC RBC AUTO-ENTMCNC: 32.3 G/DL (ref 32–36)
MCV RBC AUTO: 91 FL (ref 80–100)
MONOCYTES # BLD AUTO: 0.55 X10*3/UL (ref 0.1–1)
MONOCYTES NFR BLD AUTO: 3.1 %
NEUTROPHILS # BLD AUTO: 16.11 X10*3/UL (ref 1.2–7.7)
NEUTROPHILS NFR BLD AUTO: 91.6 %
NRBC BLD-RTO: 0 /100 WBCS (ref 0–0)
P AXIS: 34 DEGREES
P OFFSET: 160 MS
P ONSET: 135 MS
PATH REVIEW-CBC DIFFERENTIAL: NORMAL
PHOSPHATE SERPL-MCNC: 2.5 MG/DL (ref 2.5–4.9)
PLATELET # BLD AUTO: 75 X10*3/UL (ref 150–450)
POTASSIUM SERPL-SCNC: 3.5 MMOL/L (ref 3.5–5.3)
PR INTERVAL: 108 MS
PROT SERPL-MCNC: 5.4 G/DL (ref 6.4–8.2)
PROTHROMBIN TIME: 12.8 SECONDS (ref 9.8–12.8)
Q ONSET: 189 MS
QRS COUNT: 17 BEATS
QRS DURATION: 166 MS
QT INTERVAL: 424 MS
QTC CALCULATION(BAZETT): 555 MS
QTC FREDERICIA: 508 MS
R AXIS: -73 DEGREES
RBC # BLD AUTO: 3.22 X10*6/UL (ref 4.5–5.9)
SODIUM SERPL-SCNC: 138 MMOL/L (ref 136–145)
T AXIS: 66 DEGREES
T OFFSET: 401 MS
VENTRICULAR RATE: 103 BPM
WBC # BLD AUTO: 17.6 X10*3/UL (ref 4.4–11.3)

## 2024-05-22 PROCEDURE — 1170000001 HC PRIVATE ONCOLOGY ROOM DAILY

## 2024-05-22 PROCEDURE — 2500000001 HC RX 250 WO HCPCS SELF ADMINISTERED DRUGS (ALT 637 FOR MEDICARE OP)

## 2024-05-22 PROCEDURE — 83735 ASSAY OF MAGNESIUM: CPT

## 2024-05-22 PROCEDURE — 85025 COMPLETE CBC W/AUTO DIFF WBC: CPT

## 2024-05-22 PROCEDURE — 82040 ASSAY OF SERUM ALBUMIN: CPT

## 2024-05-22 PROCEDURE — 99233 SBSQ HOSP IP/OBS HIGH 50: CPT

## 2024-05-22 PROCEDURE — 85384 FIBRINOGEN ACTIVITY: CPT

## 2024-05-22 PROCEDURE — 80069 RENAL FUNCTION PANEL: CPT

## 2024-05-22 PROCEDURE — 85610 PROTHROMBIN TIME: CPT

## 2024-05-22 PROCEDURE — 2500000004 HC RX 250 GENERAL PHARMACY W/ HCPCS (ALT 636 FOR OP/ED)

## 2024-05-22 RX ORDER — BISACODYL 10 MG/1
10 SUPPOSITORY RECTAL DAILY PRN
Status: DISCONTINUED | OUTPATIENT
Start: 2024-05-22 | End: 2024-05-24 | Stop reason: HOSPADM

## 2024-05-22 RX ORDER — FENTANYL 100 UG/H
1 PATCH TRANSDERMAL
Status: DISCONTINUED | OUTPATIENT
Start: 2024-05-22 | End: 2024-05-24 | Stop reason: HOSPADM

## 2024-05-22 RX ORDER — FENTANYL 100 UG/H
1 PATCH TRANSDERMAL
Status: DISCONTINUED | OUTPATIENT
Start: 2024-05-22 | End: 2024-05-22

## 2024-05-22 RX ADMIN — ENOXAPARIN SODIUM 40 MG: 100 INJECTION SUBCUTANEOUS at 20:07

## 2024-05-22 RX ADMIN — Medication 4000 UNITS: at 10:11

## 2024-05-22 RX ADMIN — PREGABALIN 50 MG: 25 CAPSULE ORAL at 20:07

## 2024-05-22 RX ADMIN — ACETAMINOPHEN 650 MG: 325 TABLET ORAL at 16:17

## 2024-05-22 RX ADMIN — POLYETHYLENE GLYCOL 3350 17 G: 17 POWDER, FOR SOLUTION ORAL at 10:11

## 2024-05-22 RX ADMIN — HYDROMORPHONE HYDROCHLORIDE 1 MG: 1 INJECTION, SOLUTION INTRAMUSCULAR; INTRAVENOUS; SUBCUTANEOUS at 13:30

## 2024-05-22 RX ADMIN — PREGABALIN 50 MG: 25 CAPSULE ORAL at 10:11

## 2024-05-22 RX ADMIN — NYSTATIN 500000 UNITS: 100000 SUSPENSION ORAL at 10:17

## 2024-05-22 RX ADMIN — POLYETHYLENE GLYCOL 3350 17 G: 17 POWDER, FOR SOLUTION ORAL at 20:07

## 2024-05-22 RX ADMIN — PANTOPRAZOLE SODIUM 40 MG: 40 TABLET, DELAYED RELEASE ORAL at 10:11

## 2024-05-22 RX ADMIN — CYCLOBENZAPRINE 10 MG: 10 TABLET, FILM COATED ORAL at 20:07

## 2024-05-22 RX ADMIN — NYSTATIN 500000 UNITS: 100000 SUSPENSION ORAL at 13:33

## 2024-05-22 RX ADMIN — Medication 1 TABLET: at 10:17

## 2024-05-22 RX ADMIN — HYDROMORPHONE HYDROCHLORIDE 1 MG: 1 INJECTION, SOLUTION INTRAMUSCULAR; INTRAVENOUS; SUBCUTANEOUS at 03:06

## 2024-05-22 RX ADMIN — HYDROMORPHONE HYDROCHLORIDE 6 MG: 4 TABLET ORAL at 02:00

## 2024-05-22 RX ADMIN — SENNOSIDES AND DOCUSATE SODIUM 2 TABLET: 50; 8.6 TABLET ORAL at 20:07

## 2024-05-22 RX ADMIN — HYDROMORPHONE HYDROCHLORIDE 6 MG: 4 TABLET ORAL at 10:11

## 2024-05-22 RX ADMIN — CYCLOBENZAPRINE 10 MG: 10 TABLET, FILM COATED ORAL at 10:11

## 2024-05-22 RX ADMIN — HYDROMORPHONE HYDROCHLORIDE 4 MG: 4 TABLET ORAL at 20:14

## 2024-05-22 RX ADMIN — FENTANYL 1 PATCH: 100 PATCH TRANSDERMAL at 03:03

## 2024-05-22 RX ADMIN — PREDNISONE 80 MG: 20 TABLET ORAL at 10:11

## 2024-05-22 RX ADMIN — ACETAMINOPHEN 650 MG: 325 TABLET ORAL at 10:11

## 2024-05-22 RX ADMIN — SENNOSIDES AND DOCUSATE SODIUM 2 TABLET: 50; 8.6 TABLET ORAL at 10:11

## 2024-05-22 RX ADMIN — METOCLOPRAMIDE 10 MG: 10 TABLET ORAL at 16:18

## 2024-05-22 RX ADMIN — CYCLOBENZAPRINE 10 MG: 10 TABLET, FILM COATED ORAL at 15:37

## 2024-05-22 ASSESSMENT — PAIN SCALES - GENERAL
PAINLEVEL_OUTOF10: 3
PAINLEVEL_OUTOF10: 7
PAINLEVEL_OUTOF10: 5 - MODERATE PAIN
PAINLEVEL_OUTOF10: 7
PAINLEVEL_OUTOF10: 7
PAINLEVEL_OUTOF10: 2
PAINLEVEL_OUTOF10: 6

## 2024-05-22 ASSESSMENT — PAIN - FUNCTIONAL ASSESSMENT
PAIN_FUNCTIONAL_ASSESSMENT: 0-10

## 2024-05-22 NOTE — PROGRESS NOTES
Music Therapy Note      Therapy Session  Referral Type: New referral this admission  Visit Type: Follow-up visit  Session Start Time: 1505  Session End Time: 1508  Intervention Delivery: In-person  Conflict of Service: Declined treatment  Number of family members present: 2  Family Present for Session: Spouse/Significant Other, Child  Family Participation: Supportive               Treatment/Interventions       Post-assessment  Total Session Time (min): 3 minutes    Narrative  Assessment Detail: Found pt sitting up, visiting with family. They all appeared well, pt reported feeling pretty good today other than some hiccups. MT offered to return another time when family wasn't here. Pt appreciative of visit.  Follow-up: Will continue to follow.    Education Documentation  No documentation found.

## 2024-05-22 NOTE — SIGNIFICANT EVENT
Patient with stable post operative neurologic exam and post operative drains were removed.  No acute neurosurgical intervention or additional neuroimaging needed at this time. Prophylactic anticoagulation allowed on post-operative day 2.  Prevena to be removed (by patient, family or nursing) on post operative day 7.  Ok to shower and leave incision open to air after prevena removed. Will arrange for a two week follow up .    Thank you for allowing us to participate in the care of this patient. Will sign off at this time. Please page with any questions or concerns.

## 2024-05-22 NOTE — PROGRESS NOTES
Subjective   No acute events overnight. Family reported he had some slurred speech when he came back from imaging yesterday but got Ativan and Dilaudid going down. No fever, chills, shortness of breath or chest pain.  Got one 6 mg Dilaudid overnight with 1 mg Q3H IV breakthrough. Pain well controlled this morning.     Meds  Scheduled medications  acetaminophen, 650 mg, oral, q6h  cholecalciferol, 4,000 Units, oral, Daily  cyclobenzaprine, 10 mg, oral, TID  enoxaparin, 40 mg, subcutaneous, Daily  fentaNYL, 1 patch, transdermal, q72h  lidocaine, 1 patch, transdermal, Daily  multivitamin with minerals, 1 tablet, oral, Daily  pantoprazole, 40 mg, oral, Daily before breakfast  polyethylene glycol, 17 g, oral, BID  predniSONE, 80 mg, oral, Daily   Followed by  [START ON 5/25/2024] predniSONE, 40 mg, oral, Daily   Followed by  [START ON 6/1/2024] predniSONE, 20 mg, oral, Daily   Followed by  [START ON 6/8/2024] predniSONE, 10 mg, oral, Daily  pregabalin, 50 mg, oral, BID  sennosides-docusate sodium, 2 tablet, oral, BID      Continuous medications     PRN medications  PRN medications: alteplase, benzocaine-menthol, calcium carbonate, HYDROmorphone, HYDROmorphone, HYDROmorphone, LORazepam, metoclopramide, ondansetron **OR** ondansetron      Objective   Vital signs in last 24 hours:  Temp:  [36 °C (96.8 °F)-36.3 °C (97.3 °F)] 36 °C (96.8 °F)  Heart Rate:  [] 106  Resp:  [16-18] 18  BP: (100-119)/(66-76) 119/76    Intake/Output this shift:    Intake/Output Summary (Last 24 hours) at 5/22/2024 2022  Last data filed at 5/22/2024 2005  Gross per 24 hour   Intake 240 ml   Output --   Net 240 ml       Physical  General: Patient is awake, non-toxic appearing, normal body habitus  Pulm: Normal WOB at rest and when sitting up, no crackles or rhonchi  Cardiac: Regular rate and rhythm, normal S1/S2  Abdomen: Non-tender to palpation, belly still distended  Extremities: No peripheral edema, or cyanosis  Neuro: Patient alert and  oriented, cranial nerves grossly intact, normal strength and sensation.    Results  Results for orders placed or performed during the hospital encounter of 05/10/24 (from the past 24 hour(s))   CBC and Auto Differential   Result Value Ref Range    WBC 17.6 (H) 4.4 - 11.3 x10*3/uL    nRBC 0.0 0.0 - 0.0 /100 WBCs    RBC 3.22 (L) 4.50 - 5.90 x10*6/uL    Hemoglobin 9.5 (L) 13.5 - 17.5 g/dL    Hematocrit 29.4 (L) 41.0 - 52.0 %    MCV 91 80 - 100 fL    MCH 29.5 26.0 - 34.0 pg    MCHC 32.3 32.0 - 36.0 g/dL    RDW 18.6 (H) 11.5 - 14.5 %    Platelets 75 (L) 150 - 450 x10*3/uL    Neutrophils % 91.6 40.0 - 80.0 %    Immature Granulocytes %, Automated 3.2 (H) 0.0 - 0.9 %    Lymphocytes % 2.0 13.0 - 44.0 %    Monocytes % 3.1 2.0 - 10.0 %    Eosinophils % 0.0 0.0 - 6.0 %    Basophils % 0.1 0.0 - 2.0 %    Neutrophils Absolute 16.11 (H) 1.20 - 7.70 x10*3/uL    Immature Granulocytes Absolute, Automated 0.57 0.00 - 0.70 x10*3/uL    Lymphocytes Absolute 0.35 (L) 1.20 - 4.80 x10*3/uL    Monocytes Absolute 0.55 0.10 - 1.00 x10*3/uL    Eosinophils Absolute 0.00 0.00 - 0.70 x10*3/uL    Basophils Absolute 0.02 0.00 - 0.10 x10*3/uL   Renal Function Panel   Result Value Ref Range    Glucose 200 (H) 74 - 99 mg/dL    Sodium 138 136 - 145 mmol/L    Potassium 3.5 3.5 - 5.3 mmol/L    Chloride 100 98 - 107 mmol/L    Bicarbonate 27 21 - 32 mmol/L    Anion Gap 15 10 - 20 mmol/L    Urea Nitrogen 23 6 - 23 mg/dL    Creatinine 0.63 0.50 - 1.30 mg/dL    eGFR >90 >60 mL/min/1.73m*2    Calcium 8.6 8.6 - 10.6 mg/dL    Phosphorus 2.5 2.5 - 4.9 mg/dL    Albumin 3.1 (L) 3.4 - 5.0 g/dL   Magnesium   Result Value Ref Range    Magnesium 2.04 1.60 - 2.40 mg/dL   Fibrinogen   Result Value Ref Range    Fibrinogen 224 200 - 400 mg/dL   Protime-INR   Result Value Ref Range    Protime 12.8 9.8 - 12.8 seconds    INR 1.1 0.9 - 1.1   Hepatic function panel   Result Value Ref Range    Albumin 3.0 (L) 3.4 - 5.0 g/dL    Bilirubin, Total 0.8 0.0 - 1.2 mg/dL    Bilirubin,  Direct 0.2 0.0 - 0.3 mg/dL    Alkaline Phosphatase 176 (H) 33 - 136 U/L    ALT 42 10 - 52 U/L    AST 42 (H) 9 - 39 U/L    Total Protein 5.4 (L) 6.4 - 8.2 g/dL       Imaging  MR thoracic spine w and wo IV contrast    Result Date: 5/22/2024  Interpreted By:  Rosalio Aaron  and Joshua West STUDY: MR THORACIC SPINE W AND WO IV CONTRAST;  5/21/2024 1:25 pm   INDICATION: Signs/Symptoms:postop tumor debulking--use 1.5T magnet if possible (has titanium).   Per EMR: 63-year-old male history of metastatic esophageal adenocarcinoma. MRI on 05/16 showed epidural extension with cord compression at level T9 and T10. There is additional nodular lesion at L2 abutting the cauda equina concerning for drop metastasis. On 05/17 patient underwent T10 decompression and T8-T12 fusion. MRI for further evaluation.   COMPARISON: MRI thoracic spine dated 05/16/2024   ACCESSION NUMBER(S): [id number]   ORDERING CLINICIAN: ORLIN GREEN   TECHNIQUE: Sagittal STIR, sagittal T2, sagittal T1, axial T2, axial T1, as well as post gadolinium sagittal axial T1 weighted MRI images through the thoracic spine were obtained. The patient received 14 mL of Dotarem gadolinium intravenously.   FINDINGS: There are new postoperative changes compatible with interval posterior laminectomies at the T9 and T10 levels as well as resection of the bilateral facets at the T9 and T10 levels in keeping with the patient's clinical history of a spinal decompression surgery and debulking of osseous and epidural metastatic disease. There is new metallic artifact from posterior-lateral orthopedic fixation hardware and pedicle screws extending from the T8 through T12 levels with bilateral pedicle screws noted at the T8, T9, T11, and T12 levels.   There has been interval debulking of the previously noted enhancing epidural soft tissue at the T9 and T10 levels when compared with the prior study dated 05/16/2024. The current study demonstrates residual but improved abnormal  epidural thickening and enhancement extending from the T8 through T11 levels. There is residual  narrowing of the thecal sac within the spinal canal extending from the T8/9 level through T11 level with residual effacement of the subarachnoid space surrounding the thoracic spinal cord as well as a component of flattening of the margins of the thoracic spinal cord extending from the T9 through T11 levels.   There is residual abnormal bone marrow signal within the T10 and T9 vertebrae compatible with the residual osseous metastatic disease. There is residual abnormal enhancing soft tissue a left paraspinal location extending from the T8/9 level caudally to the T11/12 level with extension into the left neural foramen at the T9/10, T10/11, and T11/12 levels suggesting a component of residual neoplasm/metastatic disease.   The thoracic vertebral bodies remain in anatomic alignment.   There is again evidence of mild multilevel spondylosis.   There are scattered lesions noted within the partially imaged liver most suspicious for liver metastasis.   There are nonspecific patchy opacities noted within the partially imaged lungs. Correlation with dedicated chest imaging is recommended.         There are new postoperative changes compatible with interval posterior laminectomies at the T9 and T10 levels as well as resection of the bilateral facets at the T9 and T10 levels in keeping with the patient's clinical history of a spinal decompression surgery and debulking of osseous and epidural metastatic disease. There is new metallic artifact from posterior-lateral orthopedic fixation hardware and pedicle screws extending from the T8 through T12 levels with bilateral pedicle screws noted at the T8, T9, T11, and T12 levels.   There has been interval debulking of the previously noted enhancing epidural soft tissue at the T9 and T10 levels when compared with the prior study dated 05/16/2024. The current study demonstrates residual but  improved abnormal epidural thickening and enhancement extending from the T8 through T11 levels. There is residual narrowing of the thecal sac within the spinal canal extending from the T8/9 level through T11 level with residual effacement of the subarachnoid space surrounding the thoracic spinal cord as well as a component of flattening of the margins of the thoracic spinal cord extending from the T9 through T11 levels.   There is residual abnormal bone marrow signal within the T10 and T9 vertebrae compatible with the residual osseous metastatic disease. There is residual abnormal enhancing soft tissue a left paraspinal location extending from the T8/9 level caudally to the T11/12 level with extension into the left neural foramen at the T9/10, T10/11, and T11/12 levels suggesting a component of residual neoplasm/metastatic disease.     I personally reviewed the images/study and I agree with the findings as stated by Dr. Brett Lewis. This study was interpreted at [place], [place].   MACRO: None   Signed by: Rosalio Aaron 5/22/2024 7:34 AM Dictation workstation:   [id number]        Assessment/Plan   Principal Problem:    Esophageal cancer, stage IV (Multi)  Active Problems:    Primary malignant neoplasm of esophagus (Multi)    Massimo Garcia is a 63 y.o. male with PMH third degree heart block s/p PPM (placed in November), esophageal adenocarcinoma with mets to liver and lungs, irAE colitis, PE, peripheral neuropathy, and portal vein thrombosis (On Eliquis). Patient presented to [facility] ED on 5/9 with acute on chronic low back pain. CT imaging was obtained and was concerning for evidence of soft tissue extension into the spinal canal at T9-T10 with associated spinal canal stenosis. Patient transferred to [facility] for further workup and management.  He is s/p surgery on 5/17.     Updates  -Stop PCA pump and start orals  - Intensify bowel regimen by adding PRN bisacodyl  - Despite  acholic schools, no sign of bilirubin increase, perhaps increased extrahepatic circulation secondary to poor ileus     #Disseminated Intravascular coagulation  #Chronic thrombocytopenia  #Hemoptysis on night of 5/18  -Cancer diagnosed in Jan 2023. Treatment started 1/30/2023  -Last normal platelets in Feb 2023 at 194, has been chronically low since then  - INR of 1.3-1.7 starting in October 2023. Was normal in Jan 2023. PTT has been different, with elevations in Dec 2023 and has normalized since March 2024  -Liver function through AST, ALT, Alkphos and albumin shows no injury corresponding to this  -Etiology of hemoptysis likely Trauma from coughing vs pulmonary embolism vs bleeding from malignant mass in lung  - Chest x-ray showed interval worsening of diffuse interstitial prominence and superimposed hazy opacities but no bleeds. 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 NSGY interested in a shorter taper in 2 weeks as there is concern about bone healing. On our end, we are concerned that too rapid a taper could drive adrenal insufficiency or perhaps a flare of iRAE colitis originally suppressed by prednisone 80 mg since 5/4.  - Per NSGY, INR<1.6, Fibrinogen> 150, Platelets>100 for 24 hrs post-op. Given consumptive coagulopathy (DIC), will focus on maintaining fibrinogen>150, and platelets>50  -Continue PT/OT  - Acetaminophen 650 mg Q4H, cyclobenzaprine 10 mg Q8H, lidocaine patches  -Toradol 30 mg Q6H as needed for up to 8 doses  -Continue fentanyl patch to 100 mcg/hr  -Continue Lyrica 50 mg BID     #New Tachycardia, resolved  -Ddx of hypovolemia vs new clots from known DIC (off enoxaparin for a day)  -Review of telemetry shows sinus tachycardia with occasional PVCs  -s/p 1L of LR today.   - Can consider metoprolol tartrate 12.5 mg oral if tachycardia persists.  -Stop telemetry     #Hiccups  -Held Chloropromazine 25 mg Q6H PRN as not helping  - Started metaclopramide 10 mg Q8H PRN      #Vitamin D deficiency  -Continue vitamin D 4000 units every morning     #Stage IV esophageal cancer HER2 positive   - Jan 2023: Diagnosed with moderately differentiated esophageal adenocarcinoma   -Jan 27,2023 - Staging scan shows intense hypermetabolic distal esophageal lesion consistent with patient's reported history of esophageal adenocarcinoma. Numerous centrally photopenic and peripherally intense liver lesion corresponding with hepatic metastasis. Numerous moderate to intensly hypermetabolic periaortic and aortocaval lymph nodes consistent with metastatic ralph involvement. Single left pulmonary ligament LN with mild hypermetabolic activity  - Cycle 1 FOLFOX Herceptin - 1/30/23  -April 2024: Switched to Trastuzumab + 5-FU/Pembrolizumab alone  :: Follows with Dr. Blake, emailed 5/11 AM with updates  :: Current chemotherapy: 5FU, Tras/Pembro, last received on 4/29, next due  5/13. Hold for potential surgery.  :: CT A/P (5/9) with “Multiple pulmonary nodules in the imaged lower lungs compatible with metastatic disease. There is wall thickening and apparent hyperenhancement of the anterior wall of the distal esophagus which may relate to patient's known esophageal malignancy. There is also redemonstration of multiple hepatic masses and abdominal and pelvic lymphadenopathy compatible with metastatic disease     #iRAE colitis, grade III  :: Recently admitted to [facility] 5/4-5/7 for enterocolitis  - Was discharged on prednisone 80 mg through 5/27. Now since off dexamethasone steroids, will start on prednisone 80 mg -> 40 mg  ->20 mg  -> 10 mg taper weekly.     #hx PE  #Hx Portal vein thrombosis  - Pulmonary embolism in RLL subsegmental artery in March 2024  -Jan 2023:  Extensive large pulmonary emboli involving lobar, segmental, and subsegmental arteries bilaterally. Prior to any cancer treatment  - Held home Eliquis 5mg BID on admission, per neurology, pending further plan  - Restart enoxaparin 40 mg daily today  - Can restart apixaban  POD 7     #GERD  - Continue home Protonix   -Calcium carbonate 500 mg PRN     #Anxiety disorder  - Lorazepam 1 mg Q8H PRN     #Chronic normocytic anemia secondary to malignancy and treatment  :: At baseline hgb   -Hgb of 8.5 this morning  - Monitor daily CBC  - Transfuse for hgb<8, platelet<10     #Hx Third degree heard block, s/p pacemaker placement (11/8/23)  :: Asymptomatic on admission  - CTM     F: PRN  E: Replete as needed  N: Regular diet  C: Full code  A: Port     LOS: 12 days     Adi Mays MD/PhD   PGY-1.833       none

## 2024-05-22 NOTE — PROGRESS NOTES
"Massimo Garcia is a 63 y.o. male on day 12 of admission presenting with Esophageal cancer, stage IV (Multi).    Subjective   Drain's removed yesterday. Pain stable. Working with PT and would like to continue ambulating regularly    Objective     Physical Exam  A&Ox3  RUE 5/5  LUE 5/5  RLE 5/5  LLE 5/5  Sensation intact to light touch throughout all extremities  Prevena covering incision and holding suction    Last Recorded Vitals  Blood pressure 111/68, pulse 107, temperature 36.1 °C (97 °F), temperature source Temporal, resp. rate 18, height 1.727 m (5' 8\"), weight 70.3 kg (155 lb), SpO2 95%.  Intake/Output last 3 Shifts:  I/O last 3 completed shifts:  In: - (0 mL/kg)   Out: 430 (6.1 mL/kg) [Urine:300 (0.1 mL/kg/hr); Drains:130]  Weight: 70.3 kg     Relevant Results    Assessment/Plan   Principal Problem:    Esophageal cancer, stage IV (Multi)  Active Problems:    Primary malignant neoplasm of esophagus (Multi)    63yM h/o HTN, third degree heart block s/p pacemaker (11/2023), portal john thrombosis/prior PE (on Eliquis), OA, stage IV esophageal adenocarcinoma (HER2 positive) w/ known mets to lung and liver, s/p chemo (last doses 4/29), recent admission for drug-induced colitis, 5/10 p/w 1d LBP, CT T/L spine diffuse soft tissue mets, T9-10 soft tissue mass with epidural extension, MRI neuroaxis R occipital 1.1cm met, L temporal 8mm met, T9-11 peripherally enhancing vertebral body circumferential lesion, worst at T10, L2 ventral lesion c/f drop met, 5/16 s/p 2U plts    5/17 s/p T10 transpedic decompression, T8-12 fusion (s/p 4u PLT, riastap)  5/21 drain's dc'd, MRI good decompression    PLAN  Sully primary  Recommend quick prednisone taper  Daily labs  Upright xrays of thoracic and lumbar spine give pt is ambulatory w improving pain mgmt  Rec holding chemo/RT until POD 14  Con't to hold eliquis, ok for eliquis restart POD 7  Follow up final path  Rad onc recs - SBRT to T10 and gamma knife for brain mets, OP fu 2-3 " wks  PTOT- ok to work with physical therapy without restrictions, recommend getting out of bed at least three times daily  CHAUs, LVX    Severiano Anand MD

## 2024-05-23 LAB
ALBUMIN SERPL BCP-MCNC: 3 G/DL (ref 3.4–5)
ANION GAP SERPL CALC-SCNC: 14 MMOL/L (ref 10–20)
BASOPHILS # BLD AUTO: 0.04 X10*3/UL (ref 0–0.1)
BASOPHILS NFR BLD AUTO: 0.2 %
BUN SERPL-MCNC: 22 MG/DL (ref 6–23)
CALCIUM SERPL-MCNC: 8.9 MG/DL (ref 8.6–10.6)
CHLORIDE SERPL-SCNC: 102 MMOL/L (ref 98–107)
CO2 SERPL-SCNC: 24 MMOL/L (ref 21–32)
CREAT SERPL-MCNC: 0.61 MG/DL (ref 0.5–1.3)
EGFRCR SERPLBLD CKD-EPI 2021: >90 ML/MIN/1.73M*2
EOSINOPHIL # BLD AUTO: 0.02 X10*3/UL (ref 0–0.7)
EOSINOPHIL NFR BLD AUTO: 0.1 %
ERYTHROCYTE [DISTWIDTH] IN BLOOD BY AUTOMATED COUNT: 18.9 % (ref 11.5–14.5)
FIBRINOGEN PPP-MCNC: 176 MG/DL (ref 200–400)
GLUCOSE SERPL-MCNC: 155 MG/DL (ref 74–99)
HCT VFR BLD AUTO: 29.9 % (ref 41–52)
HGB BLD-MCNC: 9.4 G/DL (ref 13.5–17.5)
IMM GRANULOCYTES # BLD AUTO: 1 X10*3/UL (ref 0–0.7)
IMM GRANULOCYTES NFR BLD AUTO: 3.8 % (ref 0–0.9)
LYMPHOCYTES # BLD AUTO: 0.47 X10*3/UL (ref 1.2–4.8)
LYMPHOCYTES NFR BLD AUTO: 1.8 %
MAGNESIUM SERPL-MCNC: 1.98 MG/DL (ref 1.6–2.4)
MCH RBC QN AUTO: 29.4 PG (ref 26–34)
MCHC RBC AUTO-ENTMCNC: 31.4 G/DL (ref 32–36)
MCV RBC AUTO: 93 FL (ref 80–100)
MONOCYTES # BLD AUTO: 0.92 X10*3/UL (ref 0.1–1)
MONOCYTES NFR BLD AUTO: 3.5 %
NEUTROPHILS # BLD AUTO: 23.63 X10*3/UL (ref 1.2–7.7)
NEUTROPHILS NFR BLD AUTO: 90.6 %
NRBC BLD-RTO: 0 /100 WBCS (ref 0–0)
PHOSPHATE SERPL-MCNC: 2.4 MG/DL (ref 2.5–4.9)
PLATELET # BLD AUTO: 72 X10*3/UL (ref 150–450)
POTASSIUM SERPL-SCNC: 3.5 MMOL/L (ref 3.5–5.3)
RBC # BLD AUTO: 3.2 X10*6/UL (ref 4.5–5.9)
SODIUM SERPL-SCNC: 136 MMOL/L (ref 136–145)
WBC # BLD AUTO: 26.1 X10*3/UL (ref 4.4–11.3)

## 2024-05-23 PROCEDURE — 2500000001 HC RX 250 WO HCPCS SELF ADMINISTERED DRUGS (ALT 637 FOR MEDICARE OP)

## 2024-05-23 PROCEDURE — 85025 COMPLETE CBC W/AUTO DIFF WBC: CPT

## 2024-05-23 PROCEDURE — 80069 RENAL FUNCTION PANEL: CPT

## 2024-05-23 PROCEDURE — 85384 FIBRINOGEN ACTIVITY: CPT

## 2024-05-23 PROCEDURE — 83735 ASSAY OF MAGNESIUM: CPT

## 2024-05-23 PROCEDURE — 99233 SBSQ HOSP IP/OBS HIGH 50: CPT

## 2024-05-23 PROCEDURE — 2500000004 HC RX 250 GENERAL PHARMACY W/ HCPCS (ALT 636 FOR OP/ED)

## 2024-05-23 PROCEDURE — 2500000005 HC RX 250 GENERAL PHARMACY W/O HCPCS

## 2024-05-23 PROCEDURE — 1170000001 HC PRIVATE ONCOLOGY ROOM DAILY

## 2024-05-23 RX ORDER — NYSTATIN 100000 [USP'U]/ML
5 SUSPENSION ORAL 4 TIMES DAILY
Status: DISCONTINUED | OUTPATIENT
Start: 2024-05-23 | End: 2024-05-24 | Stop reason: HOSPADM

## 2024-05-23 RX ADMIN — SENNOSIDES AND DOCUSATE SODIUM 2 TABLET: 50; 8.6 TABLET ORAL at 09:11

## 2024-05-23 RX ADMIN — Medication 4000 UNITS: at 09:11

## 2024-05-23 RX ADMIN — NYSTATIN 500000 UNITS: 100000 SUSPENSION ORAL at 18:10

## 2024-05-23 RX ADMIN — PREGABALIN 50 MG: 25 CAPSULE ORAL at 20:17

## 2024-05-23 RX ADMIN — Medication 1 TABLET: at 09:11

## 2024-05-23 RX ADMIN — SENNOSIDES AND DOCUSATE SODIUM 2 TABLET: 50; 8.6 TABLET ORAL at 20:17

## 2024-05-23 RX ADMIN — PREDNISONE 80 MG: 20 TABLET ORAL at 09:11

## 2024-05-23 RX ADMIN — ACETAMINOPHEN 650 MG: 325 TABLET ORAL at 12:05

## 2024-05-23 RX ADMIN — HYDROMORPHONE HYDROCHLORIDE 6 MG: 4 TABLET ORAL at 13:27

## 2024-05-23 RX ADMIN — ACETAMINOPHEN 650 MG: 325 TABLET ORAL at 00:33

## 2024-05-23 RX ADMIN — CYCLOBENZAPRINE 10 MG: 10 TABLET, FILM COATED ORAL at 09:11

## 2024-05-23 RX ADMIN — ENOXAPARIN SODIUM 40 MG: 100 INJECTION SUBCUTANEOUS at 20:17

## 2024-05-23 RX ADMIN — HYDROMORPHONE HYDROCHLORIDE 1 MG: 1 INJECTION, SOLUTION INTRAMUSCULAR; INTRAVENOUS; SUBCUTANEOUS at 15:05

## 2024-05-23 RX ADMIN — ACETAMINOPHEN 650 MG: 325 TABLET ORAL at 05:41

## 2024-05-23 RX ADMIN — PREGABALIN 50 MG: 25 CAPSULE ORAL at 09:11

## 2024-05-23 RX ADMIN — CYCLOBENZAPRINE 10 MG: 10 TABLET, FILM COATED ORAL at 20:18

## 2024-05-23 RX ADMIN — CYCLOBENZAPRINE 10 MG: 10 TABLET, FILM COATED ORAL at 15:03

## 2024-05-23 RX ADMIN — POLYETHYLENE GLYCOL 3350 17 G: 17 POWDER, FOR SOLUTION ORAL at 20:25

## 2024-05-23 RX ADMIN — METOCLOPRAMIDE 10 MG: 10 TABLET ORAL at 01:37

## 2024-05-23 RX ADMIN — PANTOPRAZOLE SODIUM 40 MG: 40 TABLET, DELAYED RELEASE ORAL at 05:41

## 2024-05-23 RX ADMIN — NYSTATIN 500000 UNITS: 100000 SUSPENSION ORAL at 20:17

## 2024-05-23 RX ADMIN — POLYETHYLENE GLYCOL 3350 17 G: 17 POWDER, FOR SOLUTION ORAL at 09:11

## 2024-05-23 RX ADMIN — HYDROMORPHONE HYDROCHLORIDE 4 MG: 4 TABLET ORAL at 20:18

## 2024-05-23 RX ADMIN — POTASSIUM PHOSPHATE, MONOBASIC POTASSIUM PHOSPHATE, DIBASIC 15 MMOL: 224; 236 INJECTION, SOLUTION, CONCENTRATE INTRAVENOUS at 20:20

## 2024-05-23 RX ADMIN — NYSTATIN 500000 UNITS: 100000 SUSPENSION ORAL at 13:12

## 2024-05-23 RX ADMIN — ACETAMINOPHEN 650 MG: 325 TABLET ORAL at 18:10

## 2024-05-23 ASSESSMENT — COGNITIVE AND FUNCTIONAL STATUS - GENERAL
MOBILITY SCORE: 19
WALKING IN HOSPITAL ROOM: A LITTLE
HELP NEEDED FOR BATHING: A LITTLE
TOILETING: A LITTLE
MOVING TO AND FROM BED TO CHAIR: A LITTLE
STANDING UP FROM CHAIR USING ARMS: A LITTLE
TURNING FROM BACK TO SIDE WHILE IN FLAT BAD: A LITTLE
PERSONAL GROOMING: A LITTLE
DRESSING REGULAR UPPER BODY CLOTHING: A LITTLE
DAILY ACTIVITIY SCORE: 19
DRESSING REGULAR LOWER BODY CLOTHING: A LITTLE
CLIMB 3 TO 5 STEPS WITH RAILING: A LITTLE

## 2024-05-23 ASSESSMENT — PAIN SCALES - GENERAL
PAINLEVEL_OUTOF10: 1
PAINLEVEL_OUTOF10: 5 - MODERATE PAIN
PAINLEVEL_OUTOF10: 4
PAINLEVEL_OUTOF10: 7
PAINLEVEL_OUTOF10: 8

## 2024-05-23 ASSESSMENT — PAIN - FUNCTIONAL ASSESSMENT
PAIN_FUNCTIONAL_ASSESSMENT: 0-10

## 2024-05-23 ASSESSMENT — PAIN DESCRIPTION - LOCATION: LOCATION: BACK

## 2024-05-23 NOTE — PROGRESS NOTES
SUPPORTIVE AND PALLIATIVE ONCOLOGY INPATIENT FOLLOW-UP      SERVICE DATE: 24     SUBJECTIVE:  Interval Events:    Patient was seen at bedside with a family member. He reports that his pain is well controlled with current regimen. Discussed his preference in pain relieve with Dilaudid 4mg vs Dilaudid 6mg and patient reports that he is fine with either. His ultimate goal is not to be dependent on any of these opioids and is looking forward to weaning off. However, he recognizes the importance of it at this time.   Tentative plan is to discharge patient in the next couple of days on this regimen.   Patient has a appointment with outpatient supportive oncology. Sugar Hernandez May 29th at 8am.  Patient should discharge on enough medication to last him until this appointment.     Pain Assessment:  Location:  lower back pain  Duration: Constant  Characteristics:   Ratin and Mild   Descriptors: aching and throbbing   Aggravating: movement and bending    Relieving: Analgesics dilaudid   Interference with Function: Very Much    Opioid Use  Past 24 h prn opioid use:   Fentanyl patch 100mcgs x 1 = 200 mg OME   Dilaudid 4mg x 1 dose = 20mg   Dilaudid 1mg IV x 2 doses = 25mg OME   Dilaudid 6mg x 2 doses = 60mg OME  Total 24h OME use: 105mg + 200mg OME from fentanyl patch     Note: OME calculations based on equianalgesic table below. Please note this table is based on best available evidence but conversions are still approximate. These are NOT opioid DOSES for individual patient use; this is equivalency information.  Drug Parenteral Enteral   Morphine 10 25   Oxycodone N/A 20   Hydromorphone 2 5   Fentanyl 0.15 N/A   Tramadol N/A 120   Citation: Aniya ROSS. Demystifying opioid conversion calculations: A guide for effective dosing, Second edition. [place]: [employer]-System Pharmacists, 2018.    Symptom Assessment:  Nausea none  Vomiting none  Anxiety none  Difficulty Sleeping none  Lack of  appetite none    Information obtained from: chart review, interview of patient, interview of family, discussion with RN, and discussion with primary team  ______________________________________________________________________        OBJECTIVE:    Lab Results   Component Value Date    WBC 17.6 (H) 05/22/2024    HGB 9.5 (L) 05/22/2024    HCT 29.4 (L) 05/22/2024    MCV 91 05/22/2024    PLT 75 (L) 05/22/2024      Lab Results   Component Value Date    GLUCOSE 200 (H) 05/22/2024    CALCIUM 8.6 05/22/2024     05/22/2024    K 3.5 05/22/2024    CO2 27 05/22/2024     05/22/2024    BUN 23 05/22/2024    CREATININE 0.63 05/22/2024     Lab Results   Component Value Date    ALT 42 05/22/2024    AST 42 (H) 05/22/2024    ALKPHOS 176 (H) 05/22/2024    BILITOT 0.8 05/22/2024     Estimated Creatinine Clearance: 113.1 mL/min (by C-G formula based on SCr of 0.63 mg/dL).     Scheduled medications  acetaminophen, 650 mg, oral, q6h  cholecalciferol, 4,000 Units, oral, Daily  cyclobenzaprine, 10 mg, oral, TID  enoxaparin, 40 mg, subcutaneous, Daily  fentaNYL, 1 patch, transdermal, q72h  lidocaine, 1 patch, transdermal, Daily  multivitamin with minerals, 1 tablet, oral, Daily  pantoprazole, 40 mg, oral, Daily before breakfast  polyethylene glycol, 17 g, oral, BID  predniSONE, 80 mg, oral, Daily   Followed by  [START ON 5/25/2024] predniSONE, 40 mg, oral, Daily   Followed by  [START ON 6/1/2024] predniSONE, 20 mg, oral, Daily   Followed by  [START ON 6/8/2024] predniSONE, 10 mg, oral, Daily  pregabalin, 50 mg, oral, BID  sennosides-docusate sodium, 2 tablet, oral, BID      Continuous medications       PRN medications  alteplase, 2 mg, PRN  benzocaine-menthol, 1 lozenge, q2h PRN  bisacodyl, 10 mg, Daily PRN  calcium carbonate, 500 mg, 4x daily PRN  HYDROmorphone, 1 mg, q3h PRN  HYDROmorphone, 4 mg, q4h PRN  HYDROmorphone, 6 mg, q4h PRN  LORazepam, 1 mg, q8h PRN  metoclopramide, 10 mg, q8h PRN  ondansetron, 4 mg, q8h PRN    Or  ondansetron, 4 mg, q8h PRN      }     PHYSICAL EXAMINATION:    Vital Signs:   Vital signs reviewed  Visit Vitals  /66 (BP Location: Left arm, Patient Position: Lying)   Pulse 106   Temp 36.1 °C (97 °F) (Temporal)   Resp 18        Pain Score: 1       Physical Exam  Constitutional:       Appearance: Normal appearance.   Cardiovascular:      Rate and Rhythm: Regular rhythm.      Heart sounds: Normal heart sounds.   Pulmonary:      Breath sounds: Normal breath sounds.   Abdominal:      General: Abdomen is flat.      Palpations: Abdomen is soft.   Skin:     General: Skin is warm.   Neurological:      Mental Status: He is alert and oriented to person, place, and time.   Psychiatric:         Mood and Affect: Mood normal.        ASSESSMENT/PLAN:    Massimo Garcia is a 63 y.o. male diagnosed with metastatic esophageal cancer. PMH significant for third degree heart block s/p PPM (placed in November), esophageal adenocarcinoma with mets to liver and lungs, Drug-induced colitis, PE, peripheral neuropathy, and portal vein thrombosis (On Eliquis).  Admitted 5/10/2024 for further evaluation and management of acute on chronic low back pain. Course complicated by soft tissue extension into the spinal canal at T9-T10 with associated spinal canal stenosis. Supportive and Palliative Oncology is consulted for pain management.      Pain:  Lower back pain related to metastatic esophageal cancer  Pain is: cancer related pain  Type: visceral, somatic, and neuropathic  Pain control: sub-optimally controlled  Home regimen:  Acetaminophen PRN, Oxycodone 10mg q4hrs prn, Fentanyl TD , and bentyl prn   Intolerances/previously tried: Morphine with poor efficacy. Gabapentin was ineffective, Lyrica makes patient very lethargic, was tried on 50mg and did not tolerate well.  Personalized pain goal: 2  Total OME usage for the past 24 hours: 305mg OME.  Continue dilaudid 1mg IV q 3hrs PRN for breakthrough pain  Continue Lyrica 50mg BID [hold for  sedation]  Continue dilaudid 6mg q4hrs PRN   Continue dilaudid 4mg q4hrs PRN    Patient may discharge on Dilaudid 4-6mg q4hrs PRN   Continue dilaudid 1mg IV q3hrs PRN breakthrough pain   Continue Fentanyl patch 100mcgs q 72 hours   Continue to monitor pain scores and administer PRN medications as appropriate  Continue/initiate nonpharmacologic pain management strategies including ice/heat therapy, distraction techniques, deep breathing/relaxation techniques, calming music, and repositioning  Continue to monitor for signs of opioid efficacy (pain scores, improved functionality) and toxicity (pinpoint pupils, excess sedation/drowsiness/confusion, respiratory depression, etc.)     Nausea:  Intermittent nausea without vomiting related to constipation   Home regimen:  protonix 40mg daily,  Scopolamine patch PRN and Carafate PRN  denies  Continue Dex 6mg iv TID managed by primary team  Continue Reglan 10mg q8hrs PRN   Continue Zofran 4mg IV q8hr PRN      Constipation  At risk for constipation related to opioids, currently not constipated  Usual bowel pattern: every day  Home regimen: Senna 2 tablets BID, Miralax 17 gm daily, Lactulose 20 gm 4x a day PRN, Docusate 100mg BID PRN, and MOM 15ml PRN   LBM 5/23/2024. 2x today.  Continue Miralax 17 grams BID   Monitor BM frequency, adjust regimen as needed  Goal to have BM without straining q48-72h      Altered Mood:  Acute on chronic anxiety related to health concerns   controlled with home regimen   Home regimen: lorazepam 1mg TID PRN  Monitor and control      Sleeping Difficulty:  Impaired sleep related to pain  Home regimen:  none  Improved with pain control   Reports that he slept well last night.      Decreased appetite:  Appetite loss related to malignancy, chemotherapy, taste changes, and disease process  Nutrition following  Weight loss none  Home regimen:  none  Dex as above  Reports that he ate tapioca today made by his wife. With better pain control he is more  enthusiastic about eating      Medical Decision Making/Goals of Care/Advance Care Planning:  Patient's current clinical condition, including diagnosis, prognosis, and management plan, and goals of care were discussed.   Life limiting disease: metastatic malignancy  Family: Supportive family   Joys/meaning/strength: Family and Lizette  Understanding of health: Demonstrates good prognostic understanding of disease process, understands plan for ongoing symptom control   Information:Wants full disclosure     Goals: symptom control and cancer directed therapy  Worries and fears now and future: ongoing symptoms   Minimum acceptable outcome/QOL:  wants all heroic measures in the event that his heart stops.   Code status discussion:  full code     Advance Directives  Existence of Advance Directives:No - has interest  Decision maker: HCPOA is wife  Code Status: Full code     Introduction to Supportive and Palliative Oncology:  Spoke with patient at bedside  Introduced the role and philosophy of Supportive and Palliative oncology in the evaluation and management of symptoms during cancer treatment  Palliative care was introduced as a service for patients with serious illness to help with symptoms, assist with goals of care conversations, navigate complex decision making, improve quality of life for patients, and provide support both patients and families.  Patient seemed to appreciate the extra layer of support.      Supportive Interventions: Interventions: Music Therapy: offered referral placed, Art Therapy: offered declined, SPO Spiritual Care: offered declined, Social work referral for: Advance Directives  Patient has a appointment with outpatient supportive oncology. Sugar Hernandez May 29th at 8am.  Patient should discharge on enough medication to last him until this appointment.     Supportive and Palliative Oncology encounter:  Spoke with patient at bedside  Emotional support provided  Coordination of care     Signature  and billing:  Thank you for allowing us to participate in the care of this patient. Recommendations will be communicated back to the consulting service by way of shared electronic medical record or face-to-face.    Medical complexity was high level due to due to complexity of problems, extensive data review, and high risk of management/treatment.    I spent 50 minutes in the care of this patient which included chart review, interviewing patient/family, discussion with primary team, coordination of care, and documentation.    Data:   Diagnostic tests and information reviewed for today's visit:  Conversation with primary team, Most recent labs, Most recent imaging       Some elements copied from my note on 5/22/2024, the elements have been updated and all reflect current decision making from today, 05/23/24       Plan of Care discussed with: Provider, RN, Patient    Thank you for asking Supportive and Palliative Oncology to assist with care of this patient.  We will continue to follow peripherally  Please contact us for additional questions or concerns.      SIGNATURE: JASPER Damon   PAGER/CONTACT:  Contact information:  Supportive and Palliative Oncology  Monday-Friday 8 AM-5 PM  Epic Secure chat or pager [phone].  After hours and weekends:  pager [phone]

## 2024-05-23 NOTE — PROGRESS NOTES
Music Therapy Note      Therapy Session  Referral Type: New referral this admission  Visit Type: Follow-up visit  Session Start Time: 1348  Intervention Delivery: In-person  Conflict of Service: Declined treatment  Number of family members present: 1  Family Present for Session: Spouse/Significant Other  Family Participation: Supportive               Treatment/Interventions       Post-assessment  Total Session Time (min): 3 minutes    Narrative  Assessment Detail: Found pt in bed with wife at bedside. Both kindly asked MT to return tomorrow as they were expecting more company this afternoon.  Follow-up: will continue to follow.    Education Documentation  No documentation found.

## 2024-05-23 NOTE — PROGRESS NOTES
Subjective   No events overnight. One PRN Dilaudid 4 mg overnight. Had two bowel movements yesterday but still feels some stool in it. No fevers, chills, shortness of breath, or chest pain. Back pain is 1/10 at rest and 2/10 with movement.     Meds  Scheduled medications  acetaminophen, 650 mg, oral, q6h  cholecalciferol, 4,000 Units, oral, Daily  cyclobenzaprine, 10 mg, oral, TID  enoxaparin, 40 mg, subcutaneous, Daily  fentaNYL, 1 patch, transdermal, q72h  lidocaine, 1 patch, transdermal, Daily  multivitamin with minerals, 1 tablet, oral, Daily  nystatin, 5 mL, Swish & Swallow, 4x daily  pantoprazole, 40 mg, oral, Daily before breakfast  polyethylene glycol, 17 g, oral, BID  predniSONE, 80 mg, oral, Daily   Followed by  [START ON 5/25/2024] predniSONE, 40 mg, oral, Daily   Followed by  [START ON 6/1/2024] predniSONE, 20 mg, oral, Daily   Followed by  [START ON 6/8/2024] predniSONE, 10 mg, oral, Daily  pregabalin, 50 mg, oral, BID  sennosides-docusate sodium, 2 tablet, oral, BID      Continuous medications     PRN medications  PRN medications: alteplase, benzocaine-menthol, bisacodyl, calcium carbonate, HYDROmorphone, HYDROmorphone, HYDROmorphone, LORazepam, metoclopramide, ondansetron **OR** ondansetron      Objective   Vital signs in last 24 hours:  Temp:  [36 °C (96.8 °F)-36.8 °C (98.2 °F)] 36.8 °C (98.2 °F)  Heart Rate:  [105-109] 108  Resp:  [18] 18  BP: (100-121)/(66-76) 115/70    Intake/Output this shift:    Intake/Output Summary (Last 24 hours) at 5/23/2024 1631  Last data filed at 5/23/2024 0034  Gross per 24 hour   Intake 420 ml   Output --   Net 420 ml       Physical  General: Patient is awake, non-toxic appearing, normal body habitus  Pulm: Normal WOB at rest and when sitting up, no crackles or rhonchi  Cardiac: Regular rate and rhythm, normal S1/S2  Abdomen: Non-tender to palpation, distended  Extremities: No peripheral edema, or cyanosis  Neuro: Patient alert and oriented, cranial nerves grossly  intact, normal strength and sensation.    Results  Results for orders placed or performed during the hospital encounter of 05/10/24 (from the past 24 hour(s))   CBC and Auto Differential   Result Value Ref Range    WBC 26.1 (H) 4.4 - 11.3 x10*3/uL    nRBC 0.0 0.0 - 0.0 /100 WBCs    RBC 3.20 (L) 4.50 - 5.90 x10*6/uL    Hemoglobin 9.4 (L) 13.5 - 17.5 g/dL    Hematocrit 29.9 (L) 41.0 - 52.0 %    MCV 93 80 - 100 fL    MCH 29.4 26.0 - 34.0 pg    MCHC 31.4 (L) 32.0 - 36.0 g/dL    RDW 18.9 (H) 11.5 - 14.5 %    Platelets 72 (L) 150 - 450 x10*3/uL    Neutrophils % 90.6 40.0 - 80.0 %    Immature Granulocytes %, Automated 3.8 (H) 0.0 - 0.9 %    Lymphocytes % 1.8 13.0 - 44.0 %    Monocytes % 3.5 2.0 - 10.0 %    Eosinophils % 0.1 0.0 - 6.0 %    Basophils % 0.2 0.0 - 2.0 %    Neutrophils Absolute 23.63 (H) 1.20 - 7.70 x10*3/uL    Immature Granulocytes Absolute, Automated 1.00 (H) 0.00 - 0.70 x10*3/uL    Lymphocytes Absolute 0.47 (L) 1.20 - 4.80 x10*3/uL    Monocytes Absolute 0.92 0.10 - 1.00 x10*3/uL    Eosinophils Absolute 0.02 0.00 - 0.70 x10*3/uL    Basophils Absolute 0.04 0.00 - 0.10 x10*3/uL   Renal Function Panel   Result Value Ref Range    Glucose 155 (H) 74 - 99 mg/dL    Sodium 136 136 - 145 mmol/L    Potassium 3.5 3.5 - 5.3 mmol/L    Chloride 102 98 - 107 mmol/L    Bicarbonate 24 21 - 32 mmol/L    Anion Gap 14 10 - 20 mmol/L    Urea Nitrogen 22 6 - 23 mg/dL    Creatinine 0.61 0.50 - 1.30 mg/dL    eGFR >90 >60 mL/min/1.73m*2    Calcium 8.9 8.6 - 10.6 mg/dL    Phosphorus 2.4 (L) 2.5 - 4.9 mg/dL    Albumin 3.0 (L) 3.4 - 5.0 g/dL   Magnesium   Result Value Ref Range    Magnesium 1.98 1.60 - 2.40 mg/dL   Fibrinogen   Result Value Ref Range    Fibrinogen 176 (L) 200 - 400 mg/dL       Imaging  No results found.       Assessment/Plan   Principal Problem:    Esophageal cancer, stage IV (Multi)  Active Problems:    Primary malignant neoplasm of esophagus (Multi)    Massimo Garcia is a 63 y.o. male with PMH third degree heart block s/p  PPM (placed in November), esophageal adenocarcinoma with mets to liver and lungs, irAE colitis, PE, peripheral neuropathy, and portal vein thrombosis (On Eliquis). Patient presented to [facility] ED on 5/9 with acute on chronic low back pain. CT imaging was obtained and was concerning for evidence of soft tissue extension into the spinal canal at T9-T10 with associated spinal canal stenosis. Patient transferred to [facility] for further workup and management.  He is s/p surgery on 5/17.     Updates  - Pain well controlled on oral regimen  -No acute complaints today  - Plan to restart apixaban tomorrow, remove prevena and discharge  -F/U in 2 weeks with Neurosurgery for wound check     #Disseminated Intravascular coagulation  #Chronic thrombocytopenia  #Hemoptysis on night of 5/18  -Cancer diagnosed in Jan 2023. Treatment started 1/30/2023  -Last normal platelets in Feb 2023 at 194, has been chronically low since then  - INR of 1.3-1.7 starting in October 2023. Was normal in Jan 2023. PTT has been different, with elevations in Dec 2023 and has normalized since March 2024  -Liver function through AST, ALT, Alkphos and albumin shows no injury corresponding to this  -Etiology of hemoptysis likely Trauma from coughing vs pulmonary embolism vs bleeding from malignant mass in lung  - Chest x-ray showed interval worsening of diffuse interstitial prominence and superimposed hazy opacities but no bleeds. 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 NSGY interested in a shorter taper in 2 weeks as there is concern about bone healing. On our end, we are concerned that too rapid a taper could drive adrenal insufficiency or perhaps a flare of iRAE colitis originally suppressed by prednisone 80 mg since 5/4.  - Per NSGY, INR<1.6, Fibrinogen> 150, Platelets>100 for 24 hrs post-op. Given consumptive coagulopathy (DIC), will focus on maintaining fibrinogen>150, and platelets>50  -Continue PT/OT  - Acetaminophen 650 mg Q4H, cyclobenzaprine 10 mg Q8H, lidocaine patches  -Toradol 30 mg Q6H as needed for up to 8 doses  -Continue fentanyl patch to 100 mcg/hr  -Continue Lyrica 50 mg BID     #New Tachycardia, resolved  -Ddx of hypovolemia vs new clots from known DIC (off enoxaparin for a day)  -Review of telemetry shows sinus tachycardia with occasional PVCs  -s/p 1L of LR today.   - Can consider metoprolol tartrate 12.5 mg oral if tachycardia persists.  -Stop telemetry     #Hiccups  -Held Chloropromazine 25 mg Q6H PRN as not helping  - Started metaclopramide 10 mg Q8H PRN      #Vitamin D deficiency  -Continue vitamin D 4000 units every morning     #Stage IV esophageal cancer HER2 positive   - Jan 2023: Diagnosed with moderately differentiated esophageal adenocarcinoma   -Jan 27,2023 - Staging scan shows intense hypermetabolic distal esophageal lesion consistent with patient's reported  history of esophageal adenocarcinoma. Numerous centrally photopenic and peripherally intense liver lesion corresponding with hepatic metastasis. Numerous moderate to intensly hypermetabolic periaortic and aortocaval lymph nodes consistent with metastatic ralph involvement. Single left pulmonary ligament LN with mild hypermetabolic activity  - Cycle 1 FOLFOX Herceptin - 1/30/23  -April 2024: Switched to Trastuzumab + 5-FU/Pembrolizumab alone  :: Follows with Dr. Blake, emailed 5/11 AM with updates  :: Current chemotherapy: 5FU, Tras/Pembro, last received on 4/29, next due 5/13. Hold for potential surgery.  :: CT A/P (5/9) with “Multiple pulmonary nodules in the imaged lower lungs compatible with metastatic disease. There is wall thickening and apparent hyperenhancement of the anterior wall of the distal esophagus which may relate to patient's known esophageal malignancy. There is also redemonstration of multiple hepatic masses and abdominal and pelvic lymphadenopathy compatible with metastatic disease     #iRAE colitis, grade III  :: Recently admitted to [facility] 5/4-5/7 for enterocolitis  - Was discharged on prednisone 80 mg through 5/27. Now since off dexamethasone steroids, will start on prednisone 80 mg -> 40 mg  ->20 mg  -> 10 mg taper weekly.     #hx PE  #Hx Portal vein thrombosis  - Pulmonary embolism in RLL subsegmental artery in March 2024  -Jan 2023:  Extensive large pulmonary emboli involving lobar, segmental, and subsegmental arteries bilaterally. Prior to any cancer treatment  - Held home Eliquis 5mg BID on admission, per neurology, pending further plan  - Restart enoxaparin 40 mg daily today  - Can restart apixaban  POD 7     #GERD  - Continue home Protonix   -Calcium carbonate 500 mg PRN     #Anxiety disorder  - Lorazepam 1 mg Q8H PRN     #Chronic normocytic anemia secondary to malignancy and treatment  :: At baseline hgb   -Hgb of 8.5 this morning  - Monitor daily CBC  - Transfuse for hgb<8,  platelet<10     #Hx Third degree heard block, s/p pacemaker placement (11/8/23)  :: Asymptomatic on admission  - CTM     F: PRN  E: Replete as needed  N: Regular diet  C: Full code  A: Port     LOS: 13 days     Adi Mays MD/PhD   PGY-1.833

## 2024-05-23 NOTE — CARE PLAN
Problem: Fall/Injury  Goal: Not fall by end of shift  Outcome: Progressing  Goal: Be free from injury by end of the shift  Outcome: Progressing  Goal: Verbalize understanding of personal risk factors for fall in the hospital  Outcome: Progressing  Goal: Verbalize understanding of risk factor reduction measures to prevent injury from fall in the home  Outcome: Progressing  Goal: Use assistive devices by end of the shift  Outcome: Progressing  Goal: Pace activities to prevent fatigue by end of the shift  Outcome: Progressing     Problem: Pain  Goal: Takes deep breaths with improved pain control throughout the shift  Outcome: Progressing  Goal: Turns in bed with improved pain control throughout the shift  Outcome: Progressing  Goal: Walks with improved pain control throughout the shift  Outcome: Progressing  Goal: Performs ADL's with improved pain control throughout shift  Outcome: Progressing  Goal: Participates in PT with improved pain control throughout the shift  Outcome: Progressing  Goal: Free from opioid side effects throughout the shift  Outcome: Progressing  Goal: Free from acute confusion related to pain meds throughout the shift  Outcome: Progressing   The patient's goals for the shift include      The clinical goals for the shift include Pt pain to remain less than 7/10 thru shift.

## 2024-05-24 ENCOUNTER — APPOINTMENT (OUTPATIENT)
Dept: CARDIOLOGY | Facility: CLINIC | Age: 64
DRG: 028 | End: 2024-05-24
Payer: MEDICARE

## 2024-05-24 ENCOUNTER — APPOINTMENT (OUTPATIENT)
Dept: PALLIATIVE MEDICINE | Facility: CLINIC | Age: 64
End: 2024-05-24
Payer: MEDICARE

## 2024-05-24 ENCOUNTER — PHARMACY VISIT (OUTPATIENT)
Dept: PHARMACY | Facility: CLINIC | Age: 64
End: 2024-05-24
Payer: MEDICARE

## 2024-05-24 VITALS
HEIGHT: 68 IN | SYSTOLIC BLOOD PRESSURE: 107 MMHG | WEIGHT: 155 LBS | DIASTOLIC BLOOD PRESSURE: 70 MMHG | BODY MASS INDEX: 23.49 KG/M2 | OXYGEN SATURATION: 97 % | RESPIRATION RATE: 18 BRPM | TEMPERATURE: 98.2 F | HEART RATE: 100 BPM

## 2024-05-24 LAB
ALBUMIN SERPL BCP-MCNC: 2.7 G/DL (ref 3.4–5)
ANION GAP SERPL CALC-SCNC: 13 MMOL/L (ref 10–20)
BASOPHILS # BLD AUTO: 0.01 X10*3/UL (ref 0–0.1)
BASOPHILS # BLD AUTO: 0.03 X10*3/UL (ref 0–0.1)
BASOPHILS NFR BLD AUTO: 0.1 %
BASOPHILS NFR BLD AUTO: 0.1 %
BODY SURFACE AREA: 1.84 M2
BUN SERPL-MCNC: 18 MG/DL (ref 6–23)
BURR CELLS BLD QL SMEAR: NORMAL
CALCIUM SERPL-MCNC: 8.4 MG/DL (ref 8.6–10.6)
CHLORIDE SERPL-SCNC: 103 MMOL/L (ref 98–107)
CO2 SERPL-SCNC: 26 MMOL/L (ref 21–32)
CREAT SERPL-MCNC: 0.55 MG/DL (ref 0.5–1.3)
EGFRCR SERPLBLD CKD-EPI 2021: >90 ML/MIN/1.73M*2
EOSINOPHIL # BLD AUTO: 0.02 X10*3/UL (ref 0–0.7)
EOSINOPHIL # BLD AUTO: 0.02 X10*3/UL (ref 0–0.7)
EOSINOPHIL NFR BLD AUTO: 0.1 %
EOSINOPHIL NFR BLD AUTO: 0.2 %
ERYTHROCYTE [DISTWIDTH] IN BLOOD BY AUTOMATED COUNT: 19.2 % (ref 11.5–14.5)
ERYTHROCYTE [DISTWIDTH] IN BLOOD BY AUTOMATED COUNT: 19.5 % (ref 11.5–14.5)
FIBRINOGEN PPP-MCNC: 164 MG/DL (ref 200–400)
GLUCOSE SERPL-MCNC: 119 MG/DL (ref 74–99)
HCT VFR BLD AUTO: 27.3 % (ref 41–52)
HCT VFR BLD AUTO: 29.3 % (ref 41–52)
HGB BLD-MCNC: 8.7 G/DL (ref 13.5–17.5)
HGB BLD-MCNC: 9.5 G/DL (ref 13.5–17.5)
IMM GRANULOCYTES # BLD AUTO: 0.35 X10*3/UL (ref 0–0.7)
IMM GRANULOCYTES # BLD AUTO: 0.94 X10*3/UL (ref 0–0.7)
IMM GRANULOCYTES NFR BLD AUTO: 2.9 % (ref 0–0.9)
IMM GRANULOCYTES NFR BLD AUTO: 3.2 % (ref 0–0.9)
LYMPHOCYTES # BLD AUTO: 0.37 X10*3/UL (ref 1.2–4.8)
LYMPHOCYTES # BLD AUTO: 0.41 X10*3/UL (ref 1.2–4.8)
LYMPHOCYTES NFR BLD AUTO: 1.4 %
LYMPHOCYTES NFR BLD AUTO: 3 %
MAGNESIUM SERPL-MCNC: 1.94 MG/DL (ref 1.6–2.4)
MCH RBC QN AUTO: 30 PG (ref 26–34)
MCH RBC QN AUTO: 30.4 PG (ref 26–34)
MCHC RBC AUTO-ENTMCNC: 31.9 G/DL (ref 32–36)
MCHC RBC AUTO-ENTMCNC: 32.4 G/DL (ref 32–36)
MCV RBC AUTO: 92 FL (ref 80–100)
MCV RBC AUTO: 96 FL (ref 80–100)
MONOCYTES # BLD AUTO: 0.41 X10*3/UL (ref 0.1–1)
MONOCYTES # BLD AUTO: 0.7 X10*3/UL (ref 0.1–1)
MONOCYTES NFR BLD AUTO: 2.4 %
MONOCYTES NFR BLD AUTO: 3.4 %
NEUTROPHILS # BLD AUTO: 11.03 X10*3/UL (ref 1.2–7.7)
NEUTROPHILS # BLD AUTO: 26.97 X10*3/UL (ref 1.2–7.7)
NEUTROPHILS NFR BLD AUTO: 90.4 %
NEUTROPHILS NFR BLD AUTO: 92.8 %
NRBC BLD-RTO: 0 /100 WBCS (ref 0–0)
NRBC BLD-RTO: 0 /100 WBCS (ref 0–0)
OVALOCYTES BLD QL SMEAR: NORMAL
PHOSPHATE SERPL-MCNC: 3.1 MG/DL (ref 2.5–4.9)
PLATELET # BLD AUTO: 40 X10*3/UL (ref 150–450)
PLATELET # BLD AUTO: 58 X10*3/UL (ref 150–450)
POLYCHROMASIA BLD QL SMEAR: NORMAL
POTASSIUM SERPL-SCNC: 3.7 MMOL/L (ref 3.5–5.3)
RBC # BLD AUTO: 2.86 X10*6/UL (ref 4.5–5.9)
RBC # BLD AUTO: 3.17 X10*6/UL (ref 4.5–5.9)
RBC MORPH BLD: NORMAL
SCHISTOCYTES BLD QL SMEAR: NORMAL
SODIUM SERPL-SCNC: 138 MMOL/L (ref 136–145)
WBC # BLD AUTO: 12.2 X10*3/UL (ref 4.4–11.3)
WBC # BLD AUTO: 29.1 X10*3/UL (ref 4.4–11.3)

## 2024-05-24 PROCEDURE — 80069 RENAL FUNCTION PANEL: CPT

## 2024-05-24 PROCEDURE — 99239 HOSP IP/OBS DSCHRG MGMT >30: CPT

## 2024-05-24 PROCEDURE — 85384 FIBRINOGEN ACTIVITY: CPT

## 2024-05-24 PROCEDURE — RXMED WILLOW AMBULATORY MEDICATION CHARGE

## 2024-05-24 PROCEDURE — 97530 THERAPEUTIC ACTIVITIES: CPT | Mod: GP,CQ

## 2024-05-24 PROCEDURE — 83735 ASSAY OF MAGNESIUM: CPT

## 2024-05-24 PROCEDURE — 2500000004 HC RX 250 GENERAL PHARMACY W/ HCPCS (ALT 636 FOR OP/ED)

## 2024-05-24 PROCEDURE — 2500000001 HC RX 250 WO HCPCS SELF ADMINISTERED DRUGS (ALT 637 FOR MEDICARE OP)

## 2024-05-24 PROCEDURE — 85025 COMPLETE CBC W/AUTO DIFF WBC: CPT | Mod: 91

## 2024-05-24 PROCEDURE — 85025 COMPLETE CBC W/AUTO DIFF WBC: CPT

## 2024-05-24 RX ORDER — PREGABALIN 50 MG/1
50 CAPSULE ORAL 2 TIMES DAILY
Qty: 60 CAPSULE | Refills: 0 | Status: SHIPPED | OUTPATIENT
Start: 2024-05-24 | End: 2024-05-31 | Stop reason: SDUPTHER

## 2024-05-24 RX ORDER — PREGABALIN 50 MG/1
50 CAPSULE ORAL 2 TIMES DAILY
Qty: 60 CAPSULE | Refills: 0 | Status: SHIPPED | OUTPATIENT
Start: 2024-05-24 | End: 2024-05-24

## 2024-05-24 RX ORDER — HYDROMORPHONE HYDROCHLORIDE 4 MG/1
4 TABLET ORAL EVERY 4 HOURS PRN
Qty: 10 TABLET | Refills: 0 | Status: SHIPPED | OUTPATIENT
Start: 2024-05-24 | End: 2024-05-24

## 2024-05-24 RX ORDER — CYCLOBENZAPRINE HCL 10 MG
10 TABLET ORAL 3 TIMES DAILY
Qty: 90 TABLET | Refills: 0 | Status: SHIPPED | OUTPATIENT
Start: 2024-05-24 | End: 2024-05-24

## 2024-05-24 RX ORDER — ACETAMINOPHEN 325 MG/1
650 TABLET ORAL EVERY 6 HOURS
Qty: 240 TABLET | Refills: 0 | Status: SHIPPED | OUTPATIENT
Start: 2024-05-24 | End: 2024-05-24

## 2024-05-24 RX ORDER — FENTANYL 100 UG/H
1 PATCH TRANSDERMAL
Qty: 3 PATCH | Refills: 0 | Status: SHIPPED | OUTPATIENT
Start: 2024-05-25 | End: 2024-05-24

## 2024-05-24 RX ORDER — NYSTATIN 100000 [USP'U]/ML
5 SUSPENSION ORAL 4 TIMES DAILY
Qty: 280 ML | Refills: 0 | Status: SHIPPED | OUTPATIENT
Start: 2024-05-24 | End: 2024-05-24

## 2024-05-24 RX ORDER — PREDNISONE 10 MG/1
TABLET ORAL DAILY
Qty: 49 TABLET | Refills: 0 | Status: SHIPPED | OUTPATIENT
Start: 2024-05-25 | End: 2024-05-24

## 2024-05-24 RX ORDER — POLYETHYLENE GLYCOL 3350 17 G/17G
17 POWDER, FOR SOLUTION ORAL 2 TIMES DAILY
Qty: 1020 G | Refills: 0 | Status: ON HOLD | OUTPATIENT
Start: 2024-05-24

## 2024-05-24 RX ORDER — PREDNISONE 10 MG/1
TABLET ORAL
Qty: 49 TABLET | Refills: 0 | Status: ON HOLD | OUTPATIENT
Start: 2024-05-25 | End: 2024-06-15

## 2024-05-24 RX ORDER — PANTOPRAZOLE SODIUM 40 MG/1
40 TABLET, DELAYED RELEASE ORAL
Qty: 30 TABLET | Refills: 1 | Status: SHIPPED | OUTPATIENT
Start: 2024-05-25 | End: 2024-05-24

## 2024-05-24 RX ORDER — HYDROMORPHONE HYDROCHLORIDE 2 MG/1
6 TABLET ORAL EVERY 4 HOURS PRN
Qty: 10 TABLET | Refills: 0 | Status: SHIPPED | OUTPATIENT
Start: 2024-05-24 | End: 2024-05-24

## 2024-05-24 RX ORDER — FENTANYL 100 UG/H
1 PATCH TRANSDERMAL
Qty: 3 PATCH | Refills: 0 | Status: SHIPPED | OUTPATIENT
Start: 2024-05-24 | End: 2024-05-31 | Stop reason: SDUPTHER

## 2024-05-24 RX ORDER — HYDROMORPHONE HYDROCHLORIDE 2 MG/1
6 TABLET ORAL EVERY 4 HOURS PRN
Qty: 10 TABLET | Refills: 0 | Status: SHIPPED | OUTPATIENT
Start: 2024-05-24 | End: 2024-05-27

## 2024-05-24 RX ORDER — METOCLOPRAMIDE 10 MG/1
10 TABLET ORAL EVERY 8 HOURS PRN
Qty: 30 TABLET | Refills: 0 | Status: ON HOLD | OUTPATIENT
Start: 2024-05-24 | End: 2024-06-23

## 2024-05-24 RX ORDER — POLYETHYLENE GLYCOL 3350 17 G/17G
17 POWDER, FOR SOLUTION ORAL 2 TIMES DAILY
Qty: 60 PACKET | Refills: 0 | Status: SHIPPED | OUTPATIENT
Start: 2024-05-24 | End: 2024-05-24

## 2024-05-24 RX ORDER — HYDROMORPHONE HYDROCHLORIDE 4 MG/1
4 TABLET ORAL EVERY 4 HOURS PRN
Qty: 10 TABLET | Refills: 0 | Status: SHIPPED | OUTPATIENT
Start: 2024-05-24 | End: 2024-05-27

## 2024-05-24 RX ORDER — METOCLOPRAMIDE 10 MG/1
10 TABLET ORAL EVERY 8 HOURS PRN
Qty: 30 TABLET | Refills: 0 | Status: SHIPPED | OUTPATIENT
Start: 2024-05-24 | End: 2024-05-24

## 2024-05-24 RX ORDER — CYCLOBENZAPRINE HCL 10 MG
10 TABLET ORAL 3 TIMES DAILY
Qty: 42 TABLET | Refills: 0 | Status: ON HOLD | OUTPATIENT
Start: 2024-05-24 | End: 2024-06-09

## 2024-05-24 RX ORDER — ACETAMINOPHEN 325 MG/1
650 TABLET ORAL EVERY 6 HOURS
Qty: 240 TABLET | Refills: 0 | Status: ON HOLD | OUTPATIENT
Start: 2024-05-24 | End: 2024-06-23

## 2024-05-24 RX ORDER — PANTOPRAZOLE SODIUM 40 MG/1
40 TABLET, DELAYED RELEASE ORAL
Qty: 30 TABLET | Refills: 0 | Status: SHIPPED | OUTPATIENT
Start: 2024-05-25 | End: 2024-05-31 | Stop reason: SDUPTHER

## 2024-05-24 RX ORDER — AMOXICILLIN 250 MG
2 CAPSULE ORAL 2 TIMES DAILY
Qty: 120 TABLET | Refills: 0 | Status: ON HOLD | OUTPATIENT
Start: 2024-05-24 | End: 2024-06-23

## 2024-05-24 RX ORDER — AMOXICILLIN 250 MG
2 CAPSULE ORAL 2 TIMES DAILY
Qty: 120 TABLET | Refills: 0 | Status: SHIPPED | OUTPATIENT
Start: 2024-05-24 | End: 2024-05-24

## 2024-05-24 RX ORDER — PREDNISONE 10 MG/1
TABLET ORAL DAILY
Qty: 57 TABLET | Refills: 0 | Status: CANCELLED | OUTPATIENT
Start: 2024-05-24 | End: 2024-06-14

## 2024-05-24 RX ORDER — NYSTATIN 100000 [USP'U]/ML
5 SUSPENSION ORAL 4 TIMES DAILY
Qty: 280 ML | Refills: 0 | Status: ON HOLD | OUTPATIENT
Start: 2024-05-24 | End: 2024-06-09

## 2024-05-24 RX ADMIN — Medication 1 TABLET: at 08:18

## 2024-05-24 RX ADMIN — NYSTATIN 500000 UNITS: 100000 SUSPENSION ORAL at 08:18

## 2024-05-24 RX ADMIN — PANTOPRAZOLE SODIUM 40 MG: 40 TABLET, DELAYED RELEASE ORAL at 05:49

## 2024-05-24 RX ADMIN — PREDNISONE 80 MG: 20 TABLET ORAL at 08:18

## 2024-05-24 RX ADMIN — Medication 4000 UNITS: at 08:18

## 2024-05-24 RX ADMIN — HYDROMORPHONE HYDROCHLORIDE 4 MG: 4 TABLET ORAL at 00:24

## 2024-05-24 RX ADMIN — METOCLOPRAMIDE 10 MG: 10 TABLET ORAL at 00:24

## 2024-05-24 RX ADMIN — NYSTATIN 500000 UNITS: 100000 SUSPENSION ORAL at 12:28

## 2024-05-24 RX ADMIN — POLYETHYLENE GLYCOL 3350 17 G: 17 POWDER, FOR SOLUTION ORAL at 08:18

## 2024-05-24 RX ADMIN — ACETAMINOPHEN 650 MG: 325 TABLET ORAL at 12:28

## 2024-05-24 RX ADMIN — PREGABALIN 50 MG: 25 CAPSULE ORAL at 08:18

## 2024-05-24 RX ADMIN — HYDROMORPHONE HYDROCHLORIDE 4 MG: 4 TABLET ORAL at 14:19

## 2024-05-24 RX ADMIN — CYCLOBENZAPRINE 10 MG: 10 TABLET, FILM COATED ORAL at 08:18

## 2024-05-24 RX ADMIN — CYCLOBENZAPRINE 10 MG: 10 TABLET, FILM COATED ORAL at 14:18

## 2024-05-24 RX ADMIN — ACETAMINOPHEN 650 MG: 325 TABLET ORAL at 05:48

## 2024-05-24 RX ADMIN — ACETAMINOPHEN 650 MG: 325 TABLET ORAL at 00:25

## 2024-05-24 RX ADMIN — HYDROMORPHONE HYDROCHLORIDE 1 MG: 1 INJECTION, SOLUTION INTRAMUSCULAR; INTRAVENOUS; SUBCUTANEOUS at 13:19

## 2024-05-24 RX ADMIN — SENNOSIDES AND DOCUSATE SODIUM 2 TABLET: 50; 8.6 TABLET ORAL at 08:18

## 2024-05-24 ASSESSMENT — PAIN - FUNCTIONAL ASSESSMENT
PAIN_FUNCTIONAL_ASSESSMENT: 0-10

## 2024-05-24 ASSESSMENT — COGNITIVE AND FUNCTIONAL STATUS - GENERAL
TURNING FROM BACK TO SIDE WHILE IN FLAT BAD: A LITTLE
MOBILITY SCORE: 19
CLIMB 3 TO 5 STEPS WITH RAILING: A LITTLE
WALKING IN HOSPITAL ROOM: A LITTLE
MOVING FROM LYING ON BACK TO SITTING ON SIDE OF FLAT BED WITH BEDRAILS: A LITTLE
MOVING TO AND FROM BED TO CHAIR: A LITTLE

## 2024-05-24 ASSESSMENT — PAIN SCALES - GENERAL
PAINLEVEL_OUTOF10: 2
PAINLEVEL_OUTOF10: 3
PAINLEVEL_OUTOF10: 4
PAINLEVEL_OUTOF10: 6
PAINLEVEL_OUTOF10: 2

## 2024-05-24 NOTE — CARE PLAN
Pt vitals stable, incision clean and dry with no redness or drainage. Provena removed. Reviewed homegoing instructions with patient, including when to seek medical care. Medport deaccessed, PIV removed. Pt discharged with wife.

## 2024-05-24 NOTE — PROGRESS NOTES
Physical Therapy    Physical Therapy Treatment    Patient Name: Massimo Garcia  MRN: [id number]  Today's Date: 5/24/2024  Time Calculation  Start Time: 1122  Stop Time: 1133  Time Calculation (min): 11 min    Assessment/Plan   PT Assessment  End of Session Communication: Bedside nurse  Assessment Comment: Pt. will discharge home today with a new walker and family for support.  End of Session Patient Position: Bed, 3 rail up  PT Plan  Inpatient/Swing Bed or Outpatient: Inpatient  PT Plan  Treatment/Interventions: Bed mobility, Transfer training, Gait training, Balance training, Stair training, Neuromuscular re-education, Strengthening, Endurance training, Therapeutic exercise, Therapeutic activity, Home exercise program, Positioning, Postural re-education  PT Plan: Skilled PT  PT Eval Only Reason: No acute PT needs identified  PT Frequency: 2 times per week  PT Discharge Recommendations: No PT needed after discharge  Equipment Recommended upon Discharge: Wheeled walker  PT Recommended Transfer Status: Assistive device, Stand by assist  PT - OK to Discharge: Yes (PT eval complete and DC rec made)      General Visit Information:   PT  Visit  PT Received On: 05/24/24  General  Reason for Referral: 63 year old male admitted with cute on chronic low back pain. CT imaging was obtained and was concerning for evidence of soft tissue extension into the spinal canal at T9-T10 with associated spinal canal stenosis and now s/p T10 transpedic decompression, T8-12 fusioncircumferential lesion, worst at T10, L2 ventral lesion on 5/17  Past Medical History Relevant to Rehab: HTN, third degree heart block s/p pacemaker (11/2023), portal john thrombosis/prior PE (on Eliquis), OA, stage IV esophageal adenocarcinoma (HER2 positive) w/ known mets to lung and liver, s/p chemo (last doses 4/29), recent admission for drug-induced colitis  Missed Visit: No  Family/Caregiver Present: Yes  Caregiver Feedback: Wife  Prior to Session Communication:  Bedside nurse  Patient Position Received: Bed, 3 rail up, Alarm off, not on at start of session  General Comment: Pt. supine in bed upon arrival. Pt. stated that he will be discharged home today. Pt.'s new walker in room was too tall therefore adjusted for proper height. Pt. willing to participate.    Subjective   Precautions:     Vital Signs:       Objective   Pain:  Pain Assessment  Pain Assessment: 0-10  Pain Score: 2  Cognition:     Coordination:     Postural Control:  Static Sitting Balance  Static Sitting-Comment/Number of Minutes: Supervision  Static Standing Balance  Static Standing-Comment/Number of Minutes: Supervision with walker.  Extremity/Trunk Assessments:                      Activity Tolerance:  Activity Tolerance  Endurance: Endurance does not limit participation in activity  Treatments:  Bed Mobility  Bed Mobility: Yes  Bed Mobility 1  Bed Mobility 1:  (Attempted to instruct pt. on the log roll however pt. resistant - modified log roll while pt. using bed rails and HOB raised as pt. did not want HOB to be lowered.)  Level of Assistance 1:  (Supervision.)    Ambulation/Gait Training  Ambulation/Gait Training Performed: Yes  Ambulation/Gait Training 1  Surface 1:  (Pt. amb. 70'x2 wh. walker and supervision. Pt. floats hands on walker at times admittedly therefore walker tipped slightly but pt. corrected.)  Transfers  Transfer: Yes  Transfer 1  Transfer From 1: Sit to  Transfer to 1: Stand  Transfer Level of Assistance 1: Modified independent    Stairs  Stairs: Yes  Stairs  Rails 1:  (Pt.ascended and descended 3 steps with rail and hand held assist. Pt. denied one le being stronger than the other therefore instructed on non reciprocal stepping. cga.)    Outcome Measures:  [facility] Basic Mobility  Turning from your back to your side while in a flat bed without using bedrails: A little  Moving from lying on your back to sitting on the side of a flat bed without using bedrails: A little  Moving to and from  bed to chair (including a wheelchair): A little  Standing up from a chair using your arms (e.g. wheelchair or bedside chair): None  To walk in hospital room: A little  Climbing 3-5 steps with railing: A little  Basic Mobility - Total Score: 19    Education Documentation  Precautions, taught by Jeni Gaines PTA at 5/24/2024 11:44 AM.  Learner: Patient  Readiness: Acceptance  Method: Explanation, Demonstration  Response: Needs Reinforcement    Mobility Training, taught by Jeni Gaines PTA at 5/24/2024 11:44 AM.  Learner: Patient  Readiness: Acceptance  Method: Explanation, Demonstration  Response: Needs Reinforcement    Education Comments  No comments found.        OP EDUCATION:       Encounter Problems       Encounter Problems (Active)       Mobility       Pt will ambulate >1000ft with  LRAD and Jami Assist with no LOB and VSS.  (Progressing)       Start:  05/20/24    Expected End:  06/03/24            Pt will ascend and descend >4 Stairs with sba. (Progressing)       Start:  05/20/24    Expected End:  06/03/24               PT Transfers       Pt will perform all functional transfers with LRAD and Jami assist.   (Progressing)       Start:  05/20/24    Expected End:  06/03/24

## 2024-05-24 NOTE — DISCHARGE INSTRUCTIONS
Mr. Garcia,    You were admitted to the hospital with low back pain. Via imaging, we found that cancer had spread to the area around your spinal cord and was pushing up against it. The neurosurgeons took you to the operating room on 5/17, cut parts of your spinal column to reduce the pressure on the cord, peeled some of the tumor off the cord, and fused the other bones of the spine together to stabilize the spine. On the medical side, we tried to control your pain and get you back to walking. Here are the changes we made to your home meds:     Apixaban 5 mg twice a day - for preventing clots. Should start first dose tonight  Acetaminophen 650 mg - Take 4 times a day  Cyclobenzaprine 10 mg - Take 1 table three times a day for 14 days  Fentanyl 100 mcg/hr - place every 3 days  Hydromorphone 4 mg tablet - take 1 tablet up to every 4 hrs for moderate pain  Hydromorphone 2 mg tablet - take 1 4 mg and 1 2 mg table every 5 hrs for severe pain  Metaclopramide 10 mg tablet - take 1 every 8 hrs for hiccups  Nystatin - Swish and swallow 5 mL 4 times a day for 14 days  Pantoprazole 40 mg - Take 1 tablet daily when on steroid  Polyethylene glycol 17 g - Take 17 g two times a day  Pregabalin 50 mg - Take 1 capsule two times a day  Prednisone - Take 4 tablets for 7 days, 2 tablets for 7 days, and then 1 tablet for 7 days    Follow-ups:  Palliative Care Vitrual Visit - May 31 at 9 am  Oncology visit with Dr. Blake - May 31 at 3:30 pm  Radiation Oncology Visit with Dr. Hodgson - June 4 at 2 pm  June 14 at 9 am - Neurosurgery post-op visit    Thanks for letting us care for you.     Sincerely,     [facility] Team

## 2024-05-24 NOTE — PROGRESS NOTES
05/23/24 1232   Discharge Planning   Who is requesting discharge planning? Provider   Home or Post Acute Services None   Patient expects to be discharged to: 5/24/24   Does the patient need discharge transport arranged? No     5/23/24 @ 1230  Patient to get his Prevena removed POD 7, tomorrow 5/24. Per neurosurgery note, he is able to shower and leave incision open to air. No home care needed at this time. PT/OT rec no needs. Anticipate no needs at discharge. Will continue to follow patient for any needs that may arise.  Frida Soria RN [facility]    5/24/24 @ 1115  Patient requesting a wheeled walker for home. Order placed by team and referral sent to [place] via [facility].  Wheeled walker delivered to patient's room. No other needs at this time. ADOD is today.  Frida Soria RN [facility]

## 2024-05-25 NOTE — DISCHARGE SUMMARY
Discharge Diagnosis  Esophageal cancer, stage IV (Multi)    Issues Requiring Follow-Up  Metastatic Esophageal Cancer  Wound check for post-op wound  Radiation of remaining spine and brain disease    Test Results Pending At Discharge  Pending Labs       Order Current Status    Pathologist Review-CBC Differential In process    Surgical Pathology Exam In process            Hospital Course  63 y.o. 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  Admitted 5/10/2024 with diffuse T/L spine mets, R occipital met, and L temporal met after presenting with two days of worsening low back pain. On admission was tachycardic to 107, but otherwise afebrile and hemodynamically stable. Admission labs were significant for mild hyponatremia to 135 and hypokalmia to 3.0. CT abdomen/pelvis showed mulitple pulmonary nodules in lower lungs consistent with metastatic disease. Multiple hepatic masses, and A/P lymphadenopathy consistent with metastatic disease. CT of the L-spine on admission showed soft tissue thickening and nodularity in the paravertebral soft tissues at lower T spine with spinal cord stenosis at T9-T10 consistent with metastatic disease.  He was transferred from [facility] to [facility] on 5/10 for further management. Neurosurgery was consulted on 5/11 and recommended MRI pan-spine for further evaluation. Supportive oncology consulted and helped titrate pain management with fentanyl patch and oral Dilaudid. His steroids were titrated from his home prednisone 80 to dexamethasone 6 mg TID. On 5/16, patient had MRI pan-spine done as well as MRI brain. Based on findings, neurosurgery took him emergently on 5/17 for T10 transpedicular decompression and a T8-T12 fusion. The operation went well with no major side effects.  Post-op, he was switched from his dexamethasone 6 mg TID to  a prednisone taper given previous history of irAE colitis and two week prior history of being on high dose steroids. Given neurosurgery's concern for poor bone healing with high steroid doses, we tried to taper as fast as possible. On the night of 5/18, he had a recorded heart rate of 240 which resolved on its own, and was placed on telemetry. He had no further episodes of tachycardia. On 5/19, due to failure of platelets to increment appropriately following transfusion, hematology was consulted. 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 Transfusion goals were updated accordingly to platelets>50 and fibrinogen>150. He was started on a PCA post-op which was discontinued 5/21, when he was switched back to oral regimen. Radiation oncology was also consulted on 5/21 for post-op radiation to mets in brain and spine, and scheduled a follow-up in 2 weeks with Dr. Hodgson. He was getting up to walk around by 5/20. Lumbar drains were removed on 5/21. On 5/24, Prevena wound vac was removed and he was restarted on home apixaban. On discharge, pain was a 1/10 at rest in back and 2/10 with walking and was well controlled with oral pain regimen. He was follow up with supportive oncology and his regular oncologist on 5/31, and radiation oncology and neurosurgery in 2 weeks.      Pertinent Physical Exam At Time of Discharge  General: Patient is awake, non-toxic appearing, normal body habitus  Pulm: Normal WOB at rest and when sitting up, no crackles or rhonchi  Cardiac: Regular rate and rhythm, normal S1/S2  Back: Wound shows no peripheral induration or erythema  Abdomen: Non-tender to palpation, distended  Extremities: No peripheral edema, or cyanosis  Neuro: Patient alert and oriented, cranial nerves grossly intact, normal strength and sensation.      Home Medications      Medication List      START taking these medications     acetaminophen 325 mg tablet; Commonly known as: Tylenol; Take 2 tablets   (650 mg) by mouth every 6 hours.   cyclobenzaprine 10 mg tablet; Commonly known as: Flexeril; Take 1 tablet   (10 mg) by mouth 3 times a day for 14 days.   fentaNYL 100 mcg/hr patch; Commonly known as: Duragesic; Place 1 patch   over 72 hours on the skin every 3rd day for 9 days.; Replaces: fentaNYL 50   mcg/hr patch   * HYDROmorphone 4 mg tablet; Commonly known as: Dilaudid; Take 1 tablet   (4 mg) by mouth every 4 hours if needed for moderate pain (4 - 6) for up   to 10 days.   * HYDROmorphone 2 mg tablet; Commonly known as: Dilaudid; Take 3 tablets   (6 mg) by mouth every 4 hours if needed for severe pain (7 - 10) for up to   10 days.   metoclopramide 10 mg tablet; Commonly known as: Reglan; Take 1 tablet   (10 mg) by mouth every 8 hours if needed (Hiccups alternative if Thorazine   not working. Don't give with thorazine for therapy as too much D2 agonists   can have bad side effects).   nystatin 100,000 unit/mL suspension; Commonly known as: Mycostatin;   Swish and swallow 5 mL (500,000 Units) 4 times a day for 14 days.   pantoprazole 40 mg EC tablet; Commonly known as: ProtoNix; Take 1 tablet   (40 mg) by mouth once daily in the morning. Take before meals. Do not   crush, chew, or split. Do not fill before May 25, 2024.   polyethylene glycol 17 gram/dose powder; Commonly known as: Glycolax,   Miralax; Take 17 g by mouth 2 times a day.   pregabalin 50 mg capsule; Commonly known as: Lyrica; Take 1 capsule (50   mg) by mouth 2 times a day.   Senexon-S 8.6-50 mg tablet; Generic drug: sennosides-docusate sodium;   Take 2 tablets by mouth 2 times a day.  * This list has 2 medication(s) that are the same as other medications   prescribed for you. Read the directions carefully, and ask your doctor or   other care provider to review them with you.     CHANGE how you take these medications      LORazepam 1 mg tablet; Commonly known as: Ativan; Take 1 tablet (1 mg)   by mouth every 8 hours if needed for anxiety (nausea).; What changed:   additional instructions   predniSONE 10 mg tablet; Commonly known as: Deltasone; Take 4 tablets   (40 mg) by mouth once daily for 7 days, THEN 2 tablets (20 mg) once daily   for 7 days, THEN 1 tablet (10 mg) once daily for 7 days. Do not fill   before May 25, 2024.; Start taking on: May 25, 2024; What changed:   medication strength, See the new instructions.     CONTINUE taking these medications     apixaban 5 mg tablet; Commonly known as: Eliquis; TAKE 1 TABLET BY MOUTH   TWO TIMES A DAY   cholecalciferol 50 MCG (2000 UT) tablet; Commonly known as: Vitamin D-3   medical cannabis   multivitamin with minerals tablet     STOP taking these medications     chlorproMAZINE 25 mg tablet; Commonly known as: Thorazine   Deep Sea Nasal 0.65 % nasal spray; Generic drug: sodium chloride   fentaNYL 50 mcg/hr patch; Commonly known as: Duragesic; Replaced by:   fentaNYL 100 mcg/hr patch   lidocaine 5 % patch; Commonly known as: Lidoderm   metoprolol succinate XL 25 mg 24 hr tablet; Commonly known as: Toprol-XL   oxyCODONE 5 mg immediate release tablet; Commonly known as: Roxicodone   potassium chloride CR 10 mEq ER tablet; Commonly known as: Klor-Con   scopolamine 1 mg over 3 days patch 3 day; Commonly known as:   Transderm-Scop   sucralfate 1 gram tablet; Commonly known as: Carafate       Outpatient Follow-Up  Future Appointments   Date Time Provider Department [place]   5/29/2024  8:00 [facility]   5/31/2024  9:00 AM ALRFEDITO Ferguson-CNP SCCGEAPAL1 [facility]   5/31/2024  3:30 PM Tomasa Blake MD SCCGEAMOC1 [facility]   6/4/2024  2:00 PM Babatunde Hodgson MD, MS [id number] [facility]   6/10/2024  9:00 AM [facility] 07 [facility]   6/12/2024  9:00 [facility]   6/14/2024 12:30 PM Mukund Davis PA-C [id number] [place]   6/19/2024  8:00 AM [facility] 00  GEAUGA [facility]   6/19/2024  9:00 AM GEA CT 1 GEACT GEA [place]    6/24/2024  8:30 AM Tomasa Blake MD SCCGEAMOC1 [facility]   6/24/2024  9:00 AM INF 07 GEAUGA [facility]   6/26/2024  9:00 AM INF 00 GEAUGA [facility]   7/1/2024 11:00 AM Kristie Green, APRN-CNP [facility]   7/2/2024 11:00 AM Krisite EMILY Green, APRN-CNP [facility]   7/3/2024 11:00 AM Kristie EMILY Green, APRN-[facility]   7/8/2024  8:30 AM Kristie EMILY Green, APRN-CNP [facility]   7/9/2024  9:30 AM Kristiejudy Green, APRN-[facility]   7/10/2024  9:30 AM Kristie EMILY Green, APRN-CNP [facility]   7/11/2024  9:30 AM Kristie EMILY Green, APRN-CNP [facility]   7/15/2024  9:30 AM Joseluis Steel MD [facility]   8/19/2024 10:20 AM Blue John MD [id number] [place]       Adi Mays MD

## 2024-05-27 RX ORDER — HYDROMORPHONE HYDROCHLORIDE 4 MG/1
4 TABLET ORAL EVERY 4 HOURS PRN
Qty: 10 TABLET | Refills: 0 | Status: ON HOLD | OUTPATIENT
Start: 2024-05-27

## 2024-05-27 RX ORDER — HYDROMORPHONE HYDROCHLORIDE 2 MG/1
6 TABLET ORAL EVERY 4 HOURS PRN
Qty: 10 TABLET | Refills: 0 | Status: SHIPPED | OUTPATIENT
Start: 2024-05-27 | End: 2024-05-28 | Stop reason: SDUPTHER

## 2024-05-28 ENCOUNTER — TELEPHONE (OUTPATIENT)
Dept: HEMATOLOGY/ONCOLOGY | Facility: CLINIC | Age: 64
End: 2024-05-28
Payer: MEDICARE

## 2024-05-28 ENCOUNTER — SPECIALTY PHARMACY (OUTPATIENT)
Dept: PHARMACY | Facility: CLINIC | Age: 64
End: 2024-05-28

## 2024-05-28 DIAGNOSIS — C15.9 ESOPHAGEAL CANCER, STAGE IV (MULTI): ICD-10-CM

## 2024-05-28 LAB — PATH REVIEW-CBC DIFFERENTIAL: NORMAL

## 2024-05-28 RX ORDER — HYDROMORPHONE HYDROCHLORIDE 2 MG/1
2-4 TABLET ORAL EVERY 6 HOURS PRN
Qty: 120 TABLET | Refills: 0 | Status: SHIPPED | OUTPATIENT
Start: 2024-05-28 | End: 2024-05-28 | Stop reason: SDUPTHER

## 2024-05-28 RX ORDER — HYDROMORPHONE HYDROCHLORIDE 2 MG/1
2-4 TABLET ORAL EVERY 6 HOURS PRN
Qty: 120 TABLET | Refills: 0 | Status: ON HOLD | OUTPATIENT
Start: 2024-05-28 | End: 2024-06-12

## 2024-05-28 NOTE — TELEPHONE ENCOUNTER
Call returned to spouse. She is concerned because Massimo was only provided 10 tablets of hydromorphone 2mg tabs at discharge.  She reports it is working well for post op back pain and he would like to stay on it for the next 2 weeks.  He states 1-2 tabs every 6 hours would work best.  Refill pended to [facility] to approve and send.  They have a phone appt this Friday 5/31.

## 2024-05-29 ENCOUNTER — APPOINTMENT (OUTPATIENT)
Dept: HEMATOLOGY/ONCOLOGY | Facility: CLINIC | Age: 64
End: 2024-05-29
Payer: MEDICARE

## 2024-05-30 LAB
LAB AP ASR DISCLAIMER: NORMAL
LABORATORY COMMENT REPORT: NORMAL
PATH REPORT.FINAL DX SPEC: NORMAL
PATH REPORT.GROSS SPEC: NORMAL
PATH REPORT.RELEVANT HX SPEC: NORMAL
PATH REPORT.TOTAL CANCER: NORMAL

## 2024-05-31 ENCOUNTER — TELEPHONE (OUTPATIENT)
Dept: PALLIATIVE MEDICINE | Facility: CLINIC | Age: 64
End: 2024-05-31
Payer: MEDICARE

## 2024-05-31 ENCOUNTER — OFFICE VISIT (OUTPATIENT)
Dept: HEMATOLOGY/ONCOLOGY | Facility: CLINIC | Age: 64
End: 2024-05-31
Payer: MEDICARE

## 2024-05-31 ENCOUNTER — TELEMEDICINE (OUTPATIENT)
Dept: PALLIATIVE MEDICINE | Facility: CLINIC | Age: 64
End: 2024-05-31
Payer: MEDICARE

## 2024-05-31 VITALS
DIASTOLIC BLOOD PRESSURE: 69 MMHG | RESPIRATION RATE: 16 BRPM | BODY MASS INDEX: 26.41 KG/M2 | WEIGHT: 173.72 LBS | TEMPERATURE: 97.3 F | SYSTOLIC BLOOD PRESSURE: 143 MMHG | OXYGEN SATURATION: 99 % | HEART RATE: 64 BPM

## 2024-05-31 DIAGNOSIS — D69.6 THROMBOCYTOPENIA (CMS-HCC): ICD-10-CM

## 2024-05-31 DIAGNOSIS — C15.9 PRIMARY MALIGNANT NEOPLASM OF ESOPHAGUS (MULTI): ICD-10-CM

## 2024-05-31 DIAGNOSIS — G89.3 CANCER RELATED PAIN: ICD-10-CM

## 2024-05-31 DIAGNOSIS — F41.9 ANXIETY AND DEPRESSION: ICD-10-CM

## 2024-05-31 DIAGNOSIS — F32.A ANXIETY AND DEPRESSION: ICD-10-CM

## 2024-05-31 DIAGNOSIS — T50.905A ADVERSE EFFECT OF UNSPECIFIED DRUGS, MEDICAMENTS AND BIOLOGICAL SUBSTANCES, INITIAL ENCOUNTER: ICD-10-CM

## 2024-05-31 DIAGNOSIS — M79.2 NEUROPATHIC PAIN: ICD-10-CM

## 2024-05-31 DIAGNOSIS — Z51.5 PALLIATIVE CARE ENCOUNTER: Primary | ICD-10-CM

## 2024-05-31 DIAGNOSIS — K52.9 COLITIS: ICD-10-CM

## 2024-05-31 DIAGNOSIS — C78.7 MALIGNANT NEOPLASM METASTATIC TO LIVER (MULTI): ICD-10-CM

## 2024-05-31 DIAGNOSIS — C15.9 STAGE IV MALIGNANT NEOPLASM OF ESOPHAGUS (MULTI): ICD-10-CM

## 2024-05-31 DIAGNOSIS — C78.7 METASTASES TO THE LIVER (MULTI): Primary | ICD-10-CM

## 2024-05-31 DIAGNOSIS — C15.9 ESOPHAGEAL CANCER, STAGE IV (MULTI): ICD-10-CM

## 2024-05-31 PROCEDURE — 3078F DIAST BP <80 MM HG: CPT | Performed by: INTERNAL MEDICINE

## 2024-05-31 PROCEDURE — 99215 OFFICE O/P EST HI 40 MIN: CPT | Performed by: INTERNAL MEDICINE

## 2024-05-31 PROCEDURE — 1159F MED LIST DOCD IN RCRD: CPT | Performed by: INTERNAL MEDICINE

## 2024-05-31 PROCEDURE — 1125F AMNT PAIN NOTED PAIN PRSNT: CPT | Performed by: INTERNAL MEDICINE

## 2024-05-31 PROCEDURE — 1111F DSCHRG MED/CURRENT MED MERGE: CPT | Performed by: INTERNAL MEDICINE

## 2024-05-31 PROCEDURE — 1111F DSCHRG MED/CURRENT MED MERGE: CPT | Performed by: NURSE PRACTITIONER

## 2024-05-31 PROCEDURE — 3077F SYST BP >= 140 MM HG: CPT | Performed by: INTERNAL MEDICINE

## 2024-05-31 PROCEDURE — 99214 OFFICE O/P EST MOD 30 MIN: CPT | Performed by: NURSE PRACTITIONER

## 2024-05-31 RX ORDER — PROCHLORPERAZINE MALEATE 5 MG
10 TABLET ORAL EVERY 6 HOURS PRN
OUTPATIENT
Start: 2024-06-29

## 2024-05-31 RX ORDER — DIPHENHYDRAMINE HYDROCHLORIDE 50 MG/ML
50 INJECTION INTRAMUSCULAR; INTRAVENOUS AS NEEDED
OUTPATIENT
Start: 2024-06-29

## 2024-05-31 RX ORDER — PALONOSETRON 0.05 MG/ML
0.25 INJECTION, SOLUTION INTRAVENOUS ONCE
OUTPATIENT
Start: 2024-07-20

## 2024-05-31 RX ORDER — EPINEPHRINE 0.3 MG/.3ML
0.3 INJECTION SUBCUTANEOUS EVERY 5 MIN PRN
OUTPATIENT
Start: 2024-06-29

## 2024-05-31 RX ORDER — PANTOPRAZOLE SODIUM 40 MG/1
40 TABLET, DELAYED RELEASE ORAL
Qty: 30 TABLET | Refills: 3 | Status: ON HOLD | OUTPATIENT
Start: 2024-05-31 | End: 2024-06-30

## 2024-05-31 RX ORDER — PREGABALIN 50 MG/1
50 CAPSULE ORAL 2 TIMES DAILY
Qty: 60 CAPSULE | Refills: 3 | Status: ON HOLD | OUTPATIENT
Start: 2024-05-31 | End: 2024-06-30

## 2024-05-31 RX ORDER — LORAZEPAM 1 MG/1
1 TABLET ORAL EVERY 8 HOURS PRN
Qty: 90 TABLET | Refills: 3 | Status: ON HOLD | OUTPATIENT
Start: 2024-05-31 | End: 2024-06-30

## 2024-05-31 RX ORDER — PROCHLORPERAZINE EDISYLATE 5 MG/ML
10 INJECTION INTRAMUSCULAR; INTRAVENOUS EVERY 6 HOURS PRN
OUTPATIENT
Start: 2024-06-29

## 2024-05-31 RX ORDER — PROCHLORPERAZINE EDISYLATE 5 MG/ML
10 INJECTION INTRAMUSCULAR; INTRAVENOUS EVERY 6 HOURS PRN
OUTPATIENT
Start: 2024-07-20

## 2024-05-31 RX ORDER — PROCHLORPERAZINE MALEATE 5 MG
10 TABLET ORAL EVERY 6 HOURS PRN
OUTPATIENT
Start: 2024-07-20

## 2024-05-31 RX ORDER — PALONOSETRON 0.05 MG/ML
0.25 INJECTION, SOLUTION INTRAVENOUS ONCE
OUTPATIENT
Start: 2024-06-29

## 2024-05-31 RX ORDER — FENTANYL 100 UG/H
1 PATCH TRANSDERMAL
Qty: 3 PATCH | Refills: 0 | Status: ON HOLD | OUTPATIENT
Start: 2024-05-31 | End: 2024-06-09

## 2024-05-31 RX ORDER — ALBUTEROL SULFATE 0.83 MG/ML
3 SOLUTION RESPIRATORY (INHALATION) AS NEEDED
OUTPATIENT
Start: 2024-06-29

## 2024-05-31 RX ORDER — DIPHENHYDRAMINE HYDROCHLORIDE 50 MG/ML
50 INJECTION INTRAMUSCULAR; INTRAVENOUS AS NEEDED
OUTPATIENT
Start: 2024-07-20

## 2024-05-31 RX ORDER — FAMOTIDINE 10 MG/ML
20 INJECTION INTRAVENOUS ONCE AS NEEDED
OUTPATIENT
Start: 2024-07-20

## 2024-05-31 RX ORDER — ALBUTEROL SULFATE 0.83 MG/ML
3 SOLUTION RESPIRATORY (INHALATION) AS NEEDED
OUTPATIENT
Start: 2024-07-20

## 2024-05-31 RX ORDER — FAMOTIDINE 10 MG/ML
20 INJECTION INTRAVENOUS ONCE AS NEEDED
OUTPATIENT
Start: 2024-06-29

## 2024-05-31 RX ORDER — EPINEPHRINE 0.3 MG/.3ML
0.3 INJECTION SUBCUTANEOUS EVERY 5 MIN PRN
OUTPATIENT
Start: 2024-07-20

## 2024-05-31 ASSESSMENT — PAIN SCALES - GENERAL: PAINLEVEL: 3

## 2024-05-31 NOTE — PROGRESS NOTES
"  SUPPORTIVE AND PALLIATIVE ONCOLOGY OUTPATIENT FOLLOW-UP      SERVICE DATE: 5/31/2024    .Virtual or Telephone Consent    A telephone visit (audio only) between the patient (at the originating site) and the provider (at the distant site) was utilized to provide this telehealth service.   Verbal consent was requested and obtained from Massimo Garcia on this date, 05/31/24 for a telehealth visit.        Cancer History  Stage IV metastatic esophageal cancer dx Sept 2023  -mets to liver, lung, abd lymphadenopathy  -treated with FOLFOX and trastuzumab  -PET/CT 12/26/23 shows evidence of disease progression  -s/p 15 cycles of oxaliplatin, 5-FU, trastuzumab, pembro  -multiple ED visits for abd pain, colitis   -s/p 1 cycle 5FU, trastuzumab, pembro (oxaliplatin discontinued d/t poor tolerance)  -recent hospitalization 5/10/24-5/24/24, found to have new brain mets, spine mets s/p emergent surgery for T10 transpedicular decompression and a T8-T12 fusion   -plan for XRT to brain/spine    Med Onc: [facility]         Subjective   HISTORY OF PRESENT ILLNESS: Massimo Garcia is a 63 y.o. male with PmHx of HTN, and recently diagnosed metastatic esophageal cancer.      He presents to supportive oncology for follow up for pain and symptom management.    Spoke with pt on the phone for follow up, wife Jane also present on the call. Recently hospitalized for 2 weeks, found to have new spine and brain mets. Required emergency surgery on 5/17 for T10 transpedicular decompression and a T8-T12 fusion. While inpatient, fentanyl patch increased to 100mcg, started on PRN dilaudid and pregabalin 50mg bid. States pain is \"pretty good,\" taking dilaudid 3-5x/day PRN. Also taking flexeril PRN. Neuropathy is stable. Moving bowels at least 2x/day, taking senna and miralax PRN. No N/V/reflux. Appetite is good, may be related to daily prednisone. Mood has been a bit low, feeling very overwhelmed lately, taking lorazepam PRN. Again discussed long term " medication management, pt declines at this time. Sleeping okay, sometimes wakes up in the middle of the night from pain, getting new bed delivered tomorrow so he can sleep with head of bed up. Energy levels are stable, getting up and moving around well, supposed to start PT this week.        Pain Assessment:  Pain Score:  2  Location:  back  Education:      Symptom Assessment:  Pain:a little  Headache: none  Dizziness:none  Lack of energy: none  Difficulty sleeping: somewhat  Worrying: none  Anxiety: somewhat  Depression: none  Pain in mouth/swallowing: none  Dry mouth: none  Taste changes: none  Shortness of breath: none  Lack of appetite: none   Nausea: none  Vomiting: none  Constipation: none  Diarrhea: none  Sore muscles: none  Numbness or tingling in hands/feet/other: none  Weight loss: none      Information obtained from: interview of patient, interview of family  ______________________________________________________________________        Objective                PHYSICAL EXAMINATION not performed d/t phone visit  Vital Signs:   Vital signs reviewed  There were no vitals filed for this visit.    Pain Score:  2      ASSESSMENT/PLAN    Pain  Pain is: cancer related pain  Type: visceral  Pain control: sub-optimally controlled  Home regimen:   -continue fentanyl patch 100mcg q72hrs. Rx sent for fill on 6/1.   -continue oxycodone 10mg q4hrs PRN. No Rx needed today.   -continue bentyl PRN. No Rx needed today.   -continue tylenol PRN   Intolerances/previously tried:   -previously received morphine with poor efficacy (while inpatient)    Opioid Use  Medication Management:   - OARRS report reviewed with no aberrant behavior; consistent with  prescriptions/records and patient history  - .  Overdose Risk Score 460.   This has been discussed with patient.   - We will continue to closely monitor the patient for signs of prescription misuse including UDS, OARRS review and subjective reports at each visit.  -  concurrent benzodiazepine use with ativan, educated pt on medication safety when used in combination with opiates    - I am a provider who either is or has consulted and collaborated with a provider certified in Hospice and Palliative Medicine and have conducted a face-face visit and examination for this patient.  - Routine Urine Drug Screen completed 11/2/23 appropriately positive for opioids and negative for illicit substances  - Controlled Substance Agreement completed 11/2/23  - Specifically discussed that controlled substance prescriptions will only be provided by our group as outlined in the completed agreement  - Prescribed naloxone 11/2/23  - Red Flags: hx of ETOH abuse     Neuropathy b/l hands and feet  -pt self discontinued gabapentin 300mg bid.  -pt self decreased pregabalin to 50mg at bedtime. Was having a hard time forming words on bid dosing. Rx sent for fill on 6/22.   -scheduled for scrambler therapy in July 2024, will reach out to provider to cancel as pt wants to prioritize recovering from surgery first      Nausea/ Reflux   Intermittent nausea without vomiting related to chemotherapy and opioids   -continue protonix 40mg daily. Rx sent today.   -scopolamine patch discontinued while inpatient   -continue carafate PRN    Constipation   At risk for constipation related to opioids,  currently not constipated  -educated pt on importance of maintaining regular bowel schedule  Current regimen:   -continue colace 100mg bid PRN.   -continue senna 8.6mg, 1-2tabs, 1-2x/day  -continueMOM 15mL 4x/day PRN  -continue lactulose 20grams q4hrs x 4 doses PRN     Altered Mood  Acute on chronic anxiety related to health concerns   controlled with home regimen  Home regimen:   -pt never started paxil 10mg daily, would like to hold off for now  -continue ativan 1mg tid PRN. Rx sent for fill today    Decreased appetite  Related to malignancy, chemotherapy, and disease process  -encouraged smaller, more frequent meals  through out the day  -encouraged use of supplements such as Boost, Ensure, Breeze  -encouraged use of anti-emetics around meal times   -pt interested in integrative medicine-- dietary changes, etc.   -referral to Dr. Mays's office     Advance Directives  Existence of Advance Directives:No - has interest  -SW referral for assitance with ADs  Decision maker: HCPOA is wife  Code Status: Full code        Next Follow-Up Visit:  Return to clinic in 1 month    Signature and billing  Medical complexity was moderate level due to due to complexity of problems, extensive data review, and high risk of management/treatment.  Time was spent on the following: Prep Time, Time Directly with Patient/Family/Caregiver, Documentation Time. Total time spent: 35 mins              Some elements copied from my note on 4/11/24, the elements have been updated and all reflect current decision making from today, 5/31/2024.      Plan of Care discussed with: Patient, wife    SIGNATURE: ALFREDITO Ferguson-CNP    Contact information:  [facility]ive [facility] Palliative Oncology  Monday-Friday 8 AM-5 PM  Phone:  [phone], press option #5, then option #1.   Or Epic Secure Chat

## 2024-05-31 NOTE — TELEPHONE ENCOUNTER
Phone call to pharmacy to see if PA needed for Fentanyl patches. No PA needed, pharmacy will fill.

## 2024-05-31 NOTE — PATIENT INSTRUCTIONS
Today you met with Dr. Blake.  Your recent surgery and weeks were reviewed.  You were consented for ENHERTU.  Lexicomp sheets were provided.  We will see you back after radiation and healing to begin treatment.  Please call the office for any questions or concerns.  Review your schedule prior to leaving [facility].  We ask that you notify us 5-7 days before your medications need refilled.   [facility] is strongly encouraging all patients to access their MyChart for all results and to communicate with their provider for non-urgent messages.  If an urgent/same day/sick concern response is needed, please call the office at [phone]. Thank You!

## 2024-05-31 NOTE — PROGRESS NOTES
Patient ID: Massimo Garcia is a 63 y.o. male.  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  Has done well on trastuzumab FOLFOX regimen.  Today complains of dizziness and presyncopal symptoms. Metastatic burden is liver some lung lesions as well as abdominal lymphadenopathy.        LIVER:  Liver measures 14.7 cm in length and demonstrates mildly diffuse coarsened hepatic echotexture with mild nodular contour which may indicate cirrhotic morphology. There is an indeterminate hypoechoic  lesion within the right hepatic lobe measuring up. 3.6 x 3.2 x 3.0 cm and a 2nd 2.6 x 1.8 x 2.7 cm indeterminate heterogeneous hypoechoic lesion within the left hepatic lobe. Additional scatter presumed hepatic cysts, largest measuring up to 2.4 x 2.1 x 1.8 cm      GALLBLADDER:  Contracted. No obvious cholelithiasis.      BILIARY SYSTEM:  No evidence of intra hepatic biliary dilatation is identified; the common bile duct measures 13.2 mm.  PANCREAS:  The pancreas is poorly visualized due to overlying bowel gas.    RIGHT KIDNEY:  The right kidney measures 11.5 cm in length. No hydronephrosis or renal calculi are seen.       IMPRESSION:  Cirrhotic hepatic morphology with indeterminate hypoechoic lesions of the liver as above, largest measuring up to 3.6 x 3.2 x 3.0 cm. Hepatic mass protocol MRI advised for further assessment.   Patient was treated with FOLFOX plus trastuzumab with very good effect.  After patient developed intolerance to chemotherapy patient continued on trastuzumab without oxaliplatin in the maintenance setting.  Developed evidence of recurrent disease and was treated with FOLFOX plus pembrolizumab plus trastuzumab and upon recurrence which happened after 4 5 the fifth cycle patient  developed spinal metastases.  Currently status post surgical decompression and excision of visible disease.  Presents today for continued evaluation.  Plan is to start fam trastuzumab Deruxtecan.    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    HENT:  Negative.     Eyes: Negative.    Respiratory: Negative.     Cardiovascular: Negative.    Gastrointestinal: Negative.         Objective   BSA: 1.94 meters squared  /69 (BP Location: Left arm)   Pulse 64   Temp 36.3 °C (97.3 °F)   Resp 16   Wt 78.8 kg (173 lb 11.6 oz)   SpO2 99%   BMI 26.41 kg/m²      has a past medical history of Essential (primary) hypertension (12/21/2013), Pacemaker, Peripheral neuropathy, Personal history of other diseases of the circulatory system, and Pneumothorax (12/04/2023).   has a past surgical history that includes Total knee arthroplasty (09/30/2016); Total knee arthroplasty (09/30/2016); Cardiac electrophysiology procedure (N/A, 11/7/2023); and Cardiac electrophysiology procedure (Left, 11/9/2023).  Family History   Problem Relation Name Age of Onset    Cancer Father       Oncology History   Stage IV malignant neoplasm of esophagus (Multi)   9/13/2023 Initial Diagnosis    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)     9/13/2023  -  Chemotherapy    Trastuzumab + mFOLFOX6 (Fluorouracil Continuous Infusion / Leucovorin / Oxaliplatin), 14 Day Cycles     Metastases to the liver (Multi)   9/13/2023 Initial Diagnosis    Metastases to the liver (CMS/HCC)     9/13/2023 -  Chemotherapy    Trastuzumab + mFOLFOX6 (Fluorouracil Continuous Infusion / Leucovorin / Oxaliplatin), 14 Day Cycles         Massimo aGrcia  reports that he has quit smoking. His smoking use included cigarettes and cigars. He has never been exposed to tobacco smoke. He has never used smokeless tobacco.  He  reports that he does not currently use alcohol.  He  reports current drug use. Drug: Marijuana.    Physical Exam  Constitutional:       Appearance: Normal appearance.   HENT:      Head: Normocephalic and atraumatic.   Eyes:      Extraocular Movements: Extraocular movements intact.      Pupils: Pupils are equal, round, and reactive to light.   Cardiovascular:      Rate and Rhythm: Normal rate and regular rhythm.   Neurological:      Mental Status: He is alert.         WBC   Date/Time Value Ref Range Status   05/24/2024 11:38 AM 29.1 (H) 4.4 - 11.3 x10*3/uL Final   05/24/2024 05:52 AM 12.2 (H) 4.4 - 11.3 x10*3/uL Final   05/23/2024 09:46 AM 26.1 (H) 4.4 - 11.3 x10*3/uL Final     nRBC   Date Value Ref Range Status   05/24/2024 0.0 0.0 - 0.0 /100 WBCs Final   05/24/2024 0.0 0.0 - 0.0 /100 WBCs Final   05/23/2024 0.0 0.0 - 0.0 /100 WBCs Final     RBC   Date Value Ref Range Status   05/24/2024 3.17 (L) 4.50 - 5.90 x10*6/uL Final   05/24/2024 2.86 (L) 4.50 - 5.90 x10*6/uL Final   05/23/2024 3.20 (L) 4.50 - 5.90 x10*6/uL Final     Hemoglobin   Date Value Ref Range Status   05/24/2024 9.5 (L) 13.5 - 17.5 g/dL Final   05/24/2024 8.7 (L) 13.5 - 17.5 g/dL Final   05/23/2024 9.4 (L) 13.5 - 17.5 g/dL Final     Hematocrit   Date Value Ref Range Status   05/24/2024 29.3 (L) 41.0 - 52.0 % Final   05/24/2024 27.3 (L) 41.0 - 52.0 % Final   05/23/2024 29.9 (L) 41.0 - 52.0 % Final     MCV   Date/Time  Value Ref Range Status   05/24/2024 11:38 AM 92 80 - 100 fL Final   05/24/2024 05:52 AM 96 80 - 100 fL Final   05/23/2024 09:46 AM 93 80 - 100 fL Final     MCH   Date/Time Value Ref Range Status   05/24/2024 11:38 AM 30.0 26.0 - 34.0 pg Final   05/24/2024 05:52 AM 30.4 26.0 - 34.0 pg Final   05/23/2024 09:46 AM 29.4 26.0 - 34.0 pg Final     MCHC   Date/Time Value Ref Range Status   05/24/2024 11:38 AM 32.4 32.0 - 36.0 g/dL Final   05/24/2024 05:52 AM 31.9 (L) 32.0 - 36.0 g/dL Final   05/23/2024 09:46 AM 31.4 (L) 32.0 - 36.0 g/dL Final     RDW   Date/Time Value Ref Range Status   05/24/2024 11:38 AM 19.2 (H) 11.5 - 14.5 % Final   05/24/2024 05:52 AM 19.5 (H) 11.5 - 14.5 % Final   05/23/2024 09:46 AM 18.9 (H) 11.5 - 14.5 % Final     Platelets   Date/Time Value Ref Range Status   05/24/2024 11:38 AM 58 (L) 150 - 450 x10*3/uL Final   05/24/2024 05:52 AM 40 (LL) 150 - 450 x10*3/uL Final     Comment:     Platelet count verified by smear review.   05/23/2024 09:46 AM 72 (L) 150 - 450 x10*3/uL Final     MPV   Date/Time Value Ref Range Status   10/30/2023 09:13 AM 10.3 7.5 - 11.5 fL Final   10/16/2023 08:34 AM 10.7 7.5 - 11.5 fL Final   10/02/2023 09:08 AM 10.0 7.5 - 11.5 fL Final     Neutrophils %   Date/Time Value Ref Range Status   05/24/2024 11:38 AM 92.8 40.0 - 80.0 % Final   05/24/2024 05:52 AM 90.4 40.0 - 80.0 % Final   05/23/2024 09:46 AM 90.6 40.0 - 80.0 % Final     Immature Granulocytes %, Automated   Date/Time Value Ref Range Status   05/24/2024 11:38 AM 3.2 (H) 0.0 - 0.9 % Final     Comment:     Immature Granulocyte Count (IG) includes promyelocytes, myelocytes and metamyelocytes but does not include bands. Percent differential counts (%) should be interpreted in the context of the absolute cell counts (cells/UL).   05/24/2024 05:52 AM 2.9 (H) 0.0 - 0.9 % Final     Comment:     Immature Granulocyte Count (IG) includes promyelocytes, myelocytes and metamyelocytes but does not include bands. Percent differential  counts (%) should be interpreted in the context of the absolute cell counts (cells/UL).   05/23/2024 09:46 AM 3.8 (H) 0.0 - 0.9 % Final     Comment:     Immature Granulocyte Count (IG) includes promyelocytes, myelocytes and metamyelocytes but does not include bands. Percent differential counts (%) should be interpreted in the context of the absolute cell counts (cells/UL).     Lymphocytes %, Manual   Date/Time Value Ref Range Status   05/03/2024 10:11 PM 17.0 13.0 - 44.0 % Final     Lymphocytes %   Date/Time Value Ref Range Status   05/24/2024 11:38 AM 1.4 13.0 - 44.0 % Final   05/24/2024 05:52 AM 3.0 13.0 - 44.0 % Final   05/23/2024 09:46 AM 1.8 13.0 - 44.0 % Final     Monocytes %, Manual   Date/Time Value Ref Range Status   05/03/2024 10:11 PM 1.0 2.0 - 10.0 % Final     Monocytes %   Date/Time Value Ref Range Status   05/24/2024 11:38 AM 2.4 2.0 - 10.0 % Final   05/24/2024 05:52 AM 3.4 2.0 - 10.0 % Final   05/23/2024 09:46 AM 3.5 2.0 - 10.0 % Final     Eosinophils %, Manual   Date/Time Value Ref Range Status   05/03/2024 10:11 PM 0.0 0.0 - 6.0 % Final     Eosinophils %   Date/Time Value Ref Range Status   05/24/2024 11:38 AM 0.1 0.0 - 6.0 % Final   05/24/2024 05:52 AM 0.2 0.0 - 6.0 % Final   05/23/2024 09:46 AM 0.1 0.0 - 6.0 % Final     Basophils %, Manual   Date/Time Value Ref Range Status   05/03/2024 10:11 PM 0.0 0.0 - 2.0 % Final     Basophils %   Date/Time Value Ref Range Status   05/24/2024 11:38 AM 0.1 0.0 - 2.0 % Final   05/24/2024 05:52 AM 0.1 0.0 - 2.0 % Final   05/23/2024 09:46 AM 0.2 0.0 - 2.0 % Final     Neutrophils Absolute   Date/Time Value Ref Range Status   05/24/2024 11:38 AM 26.97 (H) 1.20 - 7.70 x10*3/uL Final     Comment:     Percent differential counts (%) should be interpreted in the context of the absolute cell counts (cells/uL).   05/24/2024 05:52 AM 11.03 (H) 1.20 - 7.70 x10*3/uL Final     Comment:     Percent differential counts (%) should be interpreted in the context of the absolute  "cell counts (cells/uL).   05/23/2024 09:46 AM 23.63 (H) 1.20 - 7.70 x10*3/uL Final     Comment:     Percent differential counts (%) should be interpreted in the context of the absolute cell counts (cells/uL).     Immature Granulocytes Absolute, Automated   Date/Time Value Ref Range Status   05/24/2024 11:38 AM 0.94 (H) 0.00 - 0.70 x10*3/uL Final   05/24/2024 05:52 AM 0.35 0.00 - 0.70 x10*3/uL Final   05/23/2024 09:46 AM 1.00 (H) 0.00 - 0.70 x10*3/uL Final     Lymphocytes Absolute   Date/Time Value Ref Range Status   05/24/2024 11:38 AM 0.41 (L) 1.20 - 4.80 x10*3/uL Final   05/24/2024 05:52 AM 0.37 (L) 1.20 - 4.80 x10*3/uL Final   05/23/2024 09:46 AM 0.47 (L) 1.20 - 4.80 x10*3/uL Final     Monocytes Absolute   Date/Time Value Ref Range Status   05/24/2024 11:38 AM 0.70 0.10 - 1.00 x10*3/uL Final   05/24/2024 05:52 AM 0.41 0.10 - 1.00 x10*3/uL Final   05/23/2024 09:46 AM 0.92 0.10 - 1.00 x10*3/uL Final     Eosinophils Absolute   Date/Time Value Ref Range Status   05/24/2024 11:38 AM 0.02 0.00 - 0.70 x10*3/uL Final   05/24/2024 05:52 AM 0.02 0.00 - 0.70 x10*3/uL Final   05/23/2024 09:46 AM 0.02 0.00 - 0.70 x10*3/uL Final     Eosinophils Absolute, Manual   Date/Time Value Ref Range Status   05/03/2024 10:11 PM 0.00 0.00 - 0.70 x10*3/uL Final     Basophils Absolute   Date/Time Value Ref Range Status   05/24/2024 11:38 AM 0.03 0.00 - 0.10 x10*3/uL Final   05/24/2024 05:52 AM 0.01 0.00 - 0.10 x10*3/uL Final     Comment:     Automated WBC differential has been confirmed by manual smear.   05/23/2024 09:46 AM 0.04 0.00 - 0.10 x10*3/uL Final     Basophils Absolute, Manual   Date/Time Value Ref Range Status   05/03/2024 10:11 PM 0.00 0.00 - 0.10 x10*3/uL Final       No components found for: \"PT\"  aPTT   Date/Time Value Ref Range Status   05/17/2024 12:45 PM 25 (L) 27 - 38 seconds Final   05/12/2024 05:57 AM 26 (L) 27 - 38 seconds Final   05/10/2024 10:23 PM 28 27 - 38 seconds Final       Assessment/Plan                "     Tomasa Blake MD

## 2024-06-03 ASSESSMENT — ENCOUNTER SYMPTOMS
CONSTITUTIONAL NEGATIVE: 1
RESPIRATORY NEGATIVE: 1
CARDIOVASCULAR NEGATIVE: 1
GASTROINTESTINAL NEGATIVE: 1
EYES NEGATIVE: 1

## 2024-06-03 NOTE — PROGRESS NOTES
Staff Physician: Babatunde Hodgson MD, MS  Resident Physician: Gurmeet Gongora MD PGY-3   Referring Physician: Tomasa Blake MD  Date of Service: 6/4/2024    RADIATION ONCOLOGY CONSULT NOTE    IDENTIFYING DATA:   Cancer Staging   No matching staging information was found for the patient.    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  Workup reveals T9/10 epidural extension with cord compression.  MRI L-spine also showed a nodular lesion at L2 abutting the equina, concerning for drop lesion.  MRI brain showed small enhancing lesions in the right posterior parasagittal parietal lobe and left temporal lobe, consistent with metastatic disease.  On 5/17 the patient underwent T10 decompression with T8-12 fusion.  The patient is here to discuss post-operative RT to the spine and susu knife radiosurgery to the brain.     The patient reports to have improved pain at his back but has some ongoing discomfort near the surgical site.  He denies any recent weeping, pus, discharge, bleeding at the site of his surgery.  He notes to have no ongoing difficulties with headaches, vision or hearing changes, gait instability, difficulties with completing his own ADLs.  He notes that he had an occasional pain in his right middle quadrant abdomen which has been ongoing over the past couple of months.  He denies any difficulties with constipation, diarrhea, melena/hematochezia. A 13-point review of systems was completed, all are negative except as stated above.     PAST MEDICAL  HISTORY:  Past Medical History:   Diagnosis Date    Essential (primary) hypertension 12/21/2013    Hypertension    Pacemaker     Peripheral neuropathy     Personal history of other diseases of the circulatory system     History of right bundle branch block (RBBB)    Pneumothorax 12/04/2023     PAST SURGICAL HISTORY:  Past Surgical History:   Procedure Laterality Date    CARDIAC ELECTROPHYSIOLOGY PROCEDURE N/A 11/7/2023    Procedure: Temporary Pacemaker Insertion;  Surgeon: Alexis Shook MD;  Location: [facility];  Service: Cardiovascular;  Laterality: N/A;    CARDIAC ELECTROPHYSIOLOGY PROCEDURE Left 11/9/2023    Procedure: PPM IMPLANT DUAL;  Surgeon: Elias Connolly MD;  Location: [facility];  Service: Electrophysiology;  Laterality: Left;    TOTAL KNEE ARTHROPLASTY  09/30/2016    Total Knee Replacement Left    TOTAL KNEE ARTHROPLASTY  09/30/2016    Total Knee Replacement Right     ALLERGIES:  Allergies   Allergen Reactions    Bee Venom Protein (Honey Bee) Anaphylaxis    Oxaliplatin Other     Rigors     MEDICATIONS:    Current Outpatient Medications:     acetaminophen (Tylenol) 325 mg tablet, Take 2 tablets (650 mg) by mouth every 6 hours., Disp: 240 tablet, Rfl: 0    apixaban (Eliquis) 5 mg tablet, TAKE 1 TABLET BY MOUTH TWO TIMES A DAY, Disp: 60 tablet, Rfl: 6    cholecalciferol (Vitamin D-3) 50 MCG (2000 UT) tablet, Take 2 tablets (100 mcg) by mouth once daily in the morning., Disp: , Rfl:     cyclobenzaprine (Flexeril) 10 mg tablet, Take 1 tablet (10 mg) by mouth 3 times a day for 14 days., Disp: 42 tablet, Rfl: 0    fentaNYL (Duragesic) 100 mcg/hr patch, Place 1 patch over 72 hours on the skin every 3rd day for 9 days., Disp: 3 patch, Rfl: 0    HYDROmorphone (Dilaudid) 2 mg tablet, Take 1-2 tablets (2-4 mg) by mouth every 6 hours if needed for severe pain (7 - 10) for up to 15 days., Disp: 120 tablet, Rfl: 0    HYDROmorphone (Dilaudid) 4 mg tablet, Take 1 tablet (4 mg) by mouth every  4 hours if needed for moderate pain (4 - 6)., Disp: 10 tablet, Rfl: 0    LORazepam (Ativan) 1 mg tablet, Take 1 tablet (1 mg) by mouth every 8 hours if needed for anxiety (nausea)., Disp: 90 tablet, Rfl: 3    medical cannabis, Take 1 each by mouth. Last OARRS fills: 1/18/24 Tin Oral Admin-3.67-0-120 Cbn Tincture 440/4 days 1/18/24 Tin Oral Admin-5.5-5.5-120 660/6 days 12/9/23 Oint Top Admin-16.6-30 Mjm 590.00/ 2 days, Disp: , Rfl:     metoclopramide (Reglan) 10 mg tablet, Take 1 tablet (10 mg) by mouth every 8 hours if needed (Hiccups alternative if Thorazine not working. Don't give with thorazine for therapy as too much D2 agonists can have bad side effects)., Disp: 30 tablet, Rfl: 0    multivitamin with minerals (multivit-min-iron fum-folic ac) tablet, Take 2 tablets by mouth once daily in the morning., Disp: , Rfl:     nystatin (Mycostatin) 100,000 unit/mL suspension, Swish and swallow 5 mL (500,000 Units) 4 times a day for 14 days., Disp: 280 mL, Rfl: 0    pantoprazole (ProtoNix) 40 mg EC tablet, Take 1 tablet (40 mg) by mouth once daily in the morning. Take before meals. Do not crush, chew, or split., Disp: 30 tablet, Rfl: 3    polyethylene glycol (Glycolax, Miralax) 17 gram/dose powder, Take 17 g by mouth 2 times a day., Disp: 1020 g, Rfl: 0    predniSONE (Deltasone) 10 mg tablet, Take 4 tablets (40 mg) by mouth once daily for 7 days, THEN 2 tablets (20 mg) once daily for 7 days, THEN 1 tablet (10 mg) once daily for 7 days. Do not fill before May 25, 2024., Disp: 49 tablet, Rfl: 0    pregabalin (Lyrica) 50 mg capsule, Take 1 capsule (50 mg) by mouth 2 times a day., Disp: 60 capsule, Rfl: 3    sennosides-docusate sodium (Mireille-Colace) 8.6-50 mg tablet, Take 2 tablets by mouth 2 times a day., Disp: 120 tablet, Rfl: 0   SOCIAL HISTORY:  Social History     Tobacco Use    Smoking status: Former     Types: Cigarettes, Cigars     Passive exposure: Never    Smokeless tobacco: Never   Substance Use Topics    Alcohol  use: Not Currently     FAMILY HISTORY:  Family History   Problem Relation Name Age of Onset    Cancer Father         REVIEW OF SYSTEMS:  Except for the symptoms described above, the review of systems is negative. Specifically, except as noted in the HPI, when asked the patient expressed no complaints relative to constitutional (fever, weight loss), eyes, ears, nose, mouth, throat, neurologic, cardiovascular, pulmonary, breast, GI, , skin, musculoskeletal, endocrine, hematologic/lymphatic, or immunologic systems.    RADIATION SCREENING QUESTIONS:  Prior radiation therapy: No  Pacemaker: No  Other implantable devices: No  Connective tissue disease: No    PERFORMANCE STATUS:  Karnofsky Performance Score/ECO, Cares for self; unable to carry on normal activity or to do active work (ECOG equivalent 1)    PHYSICAL EXAMINATION:  /55   Pulse 89   Temp 36.6 °C (97.9 °F) (Skin)   Resp 17   Wt 73.7 kg (162 lb 8 oz)   SpO2 97%   BMI 24.71 kg/m²   Pain score: 4/10  General: no acute distress, calm, cooperative, engaged in conversation.   HEENT: Normocephalic, atraumatic. Extraocular movements are intact.   Neck: supple with trachea at midline, no palpable adenopathy.   Pulmonary: Breathing comfortably on room air, no respiratory distress  Cardiovascular: Regular rate, no cyanosis, well-perfused  Abdomen: Soft, nontender, nondistended.   Extremities: No lower extremity edema or cyanosis. Normal range of motion.  Skin: Without rash.  Surgical site well-healing with midline scar with staples still in place.  Has some minimal erythema at the surgical site without any pus or weeping noted.  Dry scabs are healing at this time.  Musculoskeletal: Normal range of motion. Able to raise both arms above head without issues.  Minimal pain to midline spine on palpation.  Neurologic: Alert and oriented x3. Cranial nerves intact to examination. Motor strength intact bilaterally. Sensation intact to pain/fine touch throughout.  No dysdiadochokinesia. Normal gait.    LABORATORY AND IMAGING DATA:  Imaging: All imaging was personally reviewed and interpreted in clinic. Findings as per HPI and EMR.    Laboratory/Pathology:  All pertinent labs and pathology were personally reviewed and interpreted in clinic. Findings as per HPI and EMR.    IMPRESSION:  63-year-old male with a history of metastatic esophageal adenocarcinoma HER2 positive that presents to the emergency department on 5/10 with acute on chronic lower back pain.  Workup reveals T9/10 epidural extension with cord compression.  MRI L-spine also showed a nodular lesion at L2 abutting the equina, concerning for drop lesion.  MRI brain showed small enhancing lesions in the right posterior parasagittal parietal lobe and left temporal lobe, consistent with metastatic disease.  On 5/17 the patient underwent T10 decompression with T8-12 fusion.  The patient is here to discuss post-operative RT to the spine and susu knife radiosurgery to the brain.    We had an extensive discussion with the patient regarding his clinical, pathological, imaging related results.  We discussed in detail regarding his overall prognosis, including treatment options for his metastatic disease to his spine and his brain.  We discussed that given his metastatic lesions in his T10 and L2 spine, we would proceed with palliative radiation to provide durable local control as well as pain relief with EBRT in 5 fractions.  We discussed short-term and long-term side effects to radiation therapy including the risk of radiation dermatitis, fatigue, vertebral compression fractures, incomplete pain control despite radiation therapy.    Given that he has 2 punctate lesions in the supratentorium noted on his recent MRI brain, we discussed the role of SRS without WBRT to provide durable local control while preserving his neurocognitive function, in which the patient is interested in proceeding. As for the delivery of SRS, GKRS was  recommended given the favorable size and location of the lesion. The potential side effects of Gamma Knife radiosurgery were discussed including short-term adverse effects associated with stereotactic frame placement such as headache following frame removal, bleeding or infection at the sites of pin insertion and a few days of periorbital swelling. Adverse effects specific to radiosurgery include short term sequelae such as fatigue and a small risk of alopecia approximately 3 weeks after treatment which would likely regrow within 3 months. Long-term risks associated with radiosurgery include fatigue, and treatment-associated brain edema and/or radionecrosis potentially causing headaches or other neurological symptoms requiring corticosteroids, anti-angiogenic agents, or even surgical intervention to control. We also discussed that there is a risk of additional brain metastases being found on the day of Gamma Knife treatment; in this scenario, additional brain lesions may be treated, or if the additional disease is extensive, the procedure may need to be aborted and  alternative treatments may be recommended.    PLAN:  -We will coordinate CT simulation for these T and L-spine along with coordinating gamma knife radiosurgery in the next 2 to 3 weeks.    PAIN PLAN: The patient reports their pain is not well-controlled on their current regimen.  Their pain regimen is currently managed by [facility] Oncology and [facility].      Thank you for the opportunity to participate in the care of this pleasant man.    The patient was evaluated by and discussed with attending physician, Dr. Hodgson, who repeated all key aspects of the HPI and physical exam.    Gurmeet Gongora MD  PGY-3 Radiation Oncology

## 2024-06-04 ENCOUNTER — HOSPITAL ENCOUNTER (OUTPATIENT)
Dept: RADIATION ONCOLOGY | Facility: HOSPITAL | Age: 64
Setting detail: RADIATION/ONCOLOGY SERIES
Discharge: HOME | End: 2024-06-04
Payer: MEDICARE

## 2024-06-04 VITALS
TEMPERATURE: 97.9 F | OXYGEN SATURATION: 97 % | BODY MASS INDEX: 24.71 KG/M2 | DIASTOLIC BLOOD PRESSURE: 55 MMHG | SYSTOLIC BLOOD PRESSURE: 130 MMHG | RESPIRATION RATE: 17 BRPM | WEIGHT: 162.5 LBS | HEART RATE: 89 BPM

## 2024-06-04 DIAGNOSIS — C15.9 STAGE IV MALIGNANT NEOPLASM OF ESOPHAGUS (MULTI): ICD-10-CM

## 2024-06-04 DIAGNOSIS — C79.31 SECONDARY MALIGNANT NEOPLASM OF BRAIN AND SPINAL CORD (MULTI): Primary | ICD-10-CM

## 2024-06-04 DIAGNOSIS — C79.49 SECONDARY MALIGNANT NEOPLASM OF BRAIN AND SPINAL CORD (MULTI): Primary | ICD-10-CM

## 2024-06-04 PROCEDURE — 99215 OFFICE O/P EST HI 40 MIN: CPT | Mod: GC | Performed by: STUDENT IN AN ORGANIZED HEALTH CARE EDUCATION/TRAINING PROGRAM

## 2024-06-04 PROCEDURE — 99205 OFFICE O/P NEW HI 60 MIN: CPT | Performed by: STUDENT IN AN ORGANIZED HEALTH CARE EDUCATION/TRAINING PROGRAM

## 2024-06-04 ASSESSMENT — ENCOUNTER SYMPTOMS
VOICE CHANGE: 1
LEG SWELLING: 1
ABDOMINAL DISTENTION: 1
EYE PROBLEMS: 1
SHORTNESS OF BREATH: 1
DEPRESSION: 0
FEVER: 1
CHILLS: 1
LOSS OF SENSATION IN FEET: 0
BACK PAIN: 1
BRUISES/BLEEDS EASILY: 1
OCCASIONAL FEELINGS OF UNSTEADINESS: 0
LIGHT-HEADEDNESS: 1
NERVOUS/ANXIOUS: 1
SLEEP DISTURBANCE: 1
FATIGUE: 1

## 2024-06-04 ASSESSMENT — PATIENT HEALTH QUESTIONNAIRE - PHQ9
2. FEELING DOWN, DEPRESSED OR HOPELESS: NOT AT ALL
SUM OF ALL RESPONSES TO PHQ9 QUESTIONS 1 AND 2: 0
1. LITTLE INTEREST OR PLEASURE IN DOING THINGS: NOT AT ALL

## 2024-06-04 ASSESSMENT — PAIN SCALES - GENERAL: PAINLEVEL: 0-NO PAIN

## 2024-06-04 NOTE — PROGRESS NOTES
Radiation Oncology Nursing Note    Prior Radiotherapy:  No  No radiation treatments to show. (Treatments may have been administered in another system.)     Current Systemic Treatment:  No     Presence of Pacemaker or ICD:  Yes    History of Autoimmune or Connective Tissue Disorders:  No    Pain: The patient's current pain level was assessed.  They report currently having a pain of 4 out of 10.  They feel their pain is under control with the use of pain medications.    Review of Systems:  Review of Systems   Constitutional:  Positive for chills, fatigue and fever.   HENT:   Positive for nosebleeds and voice change.    Eyes:  Positive for eye problems.   Respiratory:  Positive for shortness of breath.    Cardiovascular:  Positive for leg swelling.        Ankles swollen   Gastrointestinal:  Positive for abdominal distention.        Left side abdomin distended - sharp pain on/off   Genitourinary: Negative.     Musculoskeletal:  Positive for back pain and gait problem.   Skin: Negative.    Neurological:  Positive for gait problem and light-headedness.        Right arm tingling   Hematological:  Bruises/bleeds easily.   Psychiatric/Behavioral:  Positive for sleep disturbance. The patient is nervous/anxious.

## 2024-06-05 ENCOUNTER — APPOINTMENT (OUTPATIENT)
Dept: RADIOLOGY | Facility: HOSPITAL | Age: 64
End: 2024-06-05
Payer: MEDICARE

## 2024-06-05 ENCOUNTER — HOSPITAL ENCOUNTER (INPATIENT)
Facility: HOSPITAL | Age: 64
LOS: 2 days | Discharge: HOME | End: 2024-06-07
Attending: EMERGENCY MEDICINE | Admitting: INTERNAL MEDICINE
Payer: MEDICARE

## 2024-06-05 DIAGNOSIS — Z51.5 PALLIATIVE CARE STATUS: ICD-10-CM

## 2024-06-05 DIAGNOSIS — C15.9 PRIMARY MALIGNANT NEOPLASM OF ESOPHAGUS (MULTI): ICD-10-CM

## 2024-06-05 DIAGNOSIS — R55 NEAR SYNCOPE: ICD-10-CM

## 2024-06-05 DIAGNOSIS — C79.49 SECONDARY MALIGNANT NEOPLASM OF BRAIN AND SPINAL CORD (MULTI): ICD-10-CM

## 2024-06-05 DIAGNOSIS — K56.609 SBO (SMALL BOWEL OBSTRUCTION) (MULTI): Primary | ICD-10-CM

## 2024-06-05 DIAGNOSIS — Z95.0 PACEMAKER: ICD-10-CM

## 2024-06-05 DIAGNOSIS — C79.31 SECONDARY MALIGNANT NEOPLASM OF BRAIN AND SPINAL CORD (MULTI): ICD-10-CM

## 2024-06-05 DIAGNOSIS — C15.9 STAGE IV MALIGNANT NEOPLASM OF ESOPHAGUS (MULTI): ICD-10-CM

## 2024-06-05 DIAGNOSIS — C15.9 ESOPHAGEAL CANCER, STAGE IV (MULTI): ICD-10-CM

## 2024-06-05 DIAGNOSIS — G89.3 CANCER RELATED PAIN: ICD-10-CM

## 2024-06-05 LAB
ALBUMIN SERPL BCP-MCNC: 3.3 G/DL (ref 3.4–5)
ALP SERPL-CCNC: 217 U/L (ref 33–136)
ALT SERPL W P-5'-P-CCNC: 39 U/L (ref 10–52)
ANION GAP SERPL CALC-SCNC: 14 MMOL/L (ref 10–20)
APPEARANCE UR: CLEAR
AST SERPL W P-5'-P-CCNC: 62 U/L (ref 9–39)
BASOPHILS # BLD AUTO: 0.02 X10*3/UL (ref 0–0.1)
BASOPHILS NFR BLD AUTO: 0.2 %
BILIRUB SERPL-MCNC: 1.4 MG/DL (ref 0–1.2)
BILIRUB UR STRIP.AUTO-MCNC: NEGATIVE MG/DL
BUN SERPL-MCNC: 12 MG/DL (ref 6–23)
CALCIUM SERPL-MCNC: 9 MG/DL (ref 8.6–10.3)
CHLORIDE SERPL-SCNC: 104 MMOL/L (ref 98–107)
CO2 SERPL-SCNC: 24 MMOL/L (ref 21–32)
COLOR UR: ABNORMAL
CREAT SERPL-MCNC: 0.66 MG/DL (ref 0.5–1.3)
EGFRCR SERPLBLD CKD-EPI 2021: >90 ML/MIN/1.73M*2
EOSINOPHIL # BLD AUTO: 0.1 X10*3/UL (ref 0–0.7)
EOSINOPHIL NFR BLD AUTO: 1 %
ERYTHROCYTE [DISTWIDTH] IN BLOOD BY AUTOMATED COUNT: 17.7 % (ref 11.5–14.5)
GLUCOSE SERPL-MCNC: 90 MG/DL (ref 74–99)
GLUCOSE UR STRIP.AUTO-MCNC: NORMAL MG/DL
HCT VFR BLD AUTO: 27.3 % (ref 41–52)
HGB BLD-MCNC: 8.6 G/DL (ref 13.5–17.5)
HOLD SPECIMEN: NORMAL
IMM GRANULOCYTES # BLD AUTO: 0.24 X10*3/UL (ref 0–0.7)
IMM GRANULOCYTES NFR BLD AUTO: 2.4 % (ref 0–0.9)
KETONES UR STRIP.AUTO-MCNC: NEGATIVE MG/DL
LACTATE SERPL-SCNC: 1.7 MMOL/L (ref 0.4–2)
LACTATE SERPL-SCNC: 2.4 MMOL/L (ref 0.4–2)
LACTATE SERPL-SCNC: 3.5 MMOL/L (ref 0.4–2)
LEUKOCYTE ESTERASE UR QL STRIP.AUTO: NEGATIVE
LYMPHOCYTES # BLD AUTO: 0.82 X10*3/UL (ref 1.2–4.8)
LYMPHOCYTES NFR BLD AUTO: 8 %
MAGNESIUM SERPL-MCNC: 1.52 MG/DL (ref 1.6–2.4)
MCH RBC QN AUTO: 28.5 PG (ref 26–34)
MCHC RBC AUTO-ENTMCNC: 31.5 G/DL (ref 32–36)
MCV RBC AUTO: 90 FL (ref 80–100)
MONOCYTES # BLD AUTO: 0.87 X10*3/UL (ref 0.1–1)
MONOCYTES NFR BLD AUTO: 8.5 %
NEUTROPHILS # BLD AUTO: 8.14 X10*3/UL (ref 1.2–7.7)
NEUTROPHILS NFR BLD AUTO: 79.9 %
NITRITE UR QL STRIP.AUTO: NEGATIVE
NRBC BLD-RTO: 0 /100 WBCS (ref 0–0)
PH UR STRIP.AUTO: 7.5 [PH]
PLATELET # BLD AUTO: 167 X10*3/UL (ref 150–450)
POTASSIUM SERPL-SCNC: 3.2 MMOL/L (ref 3.5–5.3)
PROT SERPL-MCNC: 6 G/DL (ref 6.4–8.2)
PROT UR STRIP.AUTO-MCNC: NEGATIVE MG/DL
RBC # BLD AUTO: 3.02 X10*6/UL (ref 4.5–5.9)
RBC # UR STRIP.AUTO: NEGATIVE /UL
SODIUM SERPL-SCNC: 139 MMOL/L (ref 136–145)
SP GR UR STRIP.AUTO: 1.04
UROBILINOGEN UR STRIP.AUTO-MCNC: NORMAL MG/DL
WBC # BLD AUTO: 10.2 X10*3/UL (ref 4.4–11.3)

## 2024-06-05 PROCEDURE — 2500000002 HC RX 250 W HCPCS SELF ADMINISTERED DRUGS (ALT 637 FOR MEDICARE OP, ALT 636 FOR OP/ED)

## 2024-06-05 PROCEDURE — 2500000001 HC RX 250 WO HCPCS SELF ADMINISTERED DRUGS (ALT 637 FOR MEDICARE OP): Performed by: INTERNAL MEDICINE

## 2024-06-05 PROCEDURE — 84075 ASSAY ALKALINE PHOSPHATASE: CPT | Performed by: EMERGENCY MEDICINE

## 2024-06-05 PROCEDURE — 96374 THER/PROPH/DIAG INJ IV PUSH: CPT

## 2024-06-05 PROCEDURE — 99285 EMERGENCY DEPT VISIT HI MDM: CPT | Mod: 25

## 2024-06-05 PROCEDURE — 96361 HYDRATE IV INFUSION ADD-ON: CPT

## 2024-06-05 PROCEDURE — 83605 ASSAY OF LACTIC ACID: CPT | Mod: 91,MUE

## 2024-06-05 PROCEDURE — 2550000001 HC RX 255 CONTRASTS: Performed by: EMERGENCY MEDICINE

## 2024-06-05 PROCEDURE — 81003 URINALYSIS AUTO W/O SCOPE: CPT | Performed by: EMERGENCY MEDICINE

## 2024-06-05 PROCEDURE — 85025 COMPLETE CBC W/AUTO DIFF WBC: CPT | Performed by: EMERGENCY MEDICINE

## 2024-06-05 PROCEDURE — 83605 ASSAY OF LACTIC ACID: CPT | Performed by: EMERGENCY MEDICINE

## 2024-06-05 PROCEDURE — 2500000004 HC RX 250 GENERAL PHARMACY W/ HCPCS (ALT 636 FOR OP/ED): Performed by: EMERGENCY MEDICINE

## 2024-06-05 PROCEDURE — 74177 CT ABD & PELVIS W/CONTRAST: CPT | Performed by: RADIOLOGY

## 2024-06-05 PROCEDURE — 96375 TX/PRO/DX INJ NEW DRUG ADDON: CPT

## 2024-06-05 PROCEDURE — 96376 TX/PRO/DX INJ SAME DRUG ADON: CPT

## 2024-06-05 PROCEDURE — 1100000001 HC PRIVATE ROOM DAILY

## 2024-06-05 PROCEDURE — 2500000004 HC RX 250 GENERAL PHARMACY W/ HCPCS (ALT 636 FOR OP/ED)

## 2024-06-05 PROCEDURE — 36415 COLL VENOUS BLD VENIPUNCTURE: CPT | Performed by: EMERGENCY MEDICINE

## 2024-06-05 PROCEDURE — C9113 INJ PANTOPRAZOLE SODIUM, VIA: HCPCS | Performed by: INTERNAL MEDICINE

## 2024-06-05 PROCEDURE — 2500000004 HC RX 250 GENERAL PHARMACY W/ HCPCS (ALT 636 FOR OP/ED): Performed by: INTERNAL MEDICINE

## 2024-06-05 PROCEDURE — 99223 1ST HOSP IP/OBS HIGH 75: CPT | Performed by: INTERNAL MEDICINE

## 2024-06-05 PROCEDURE — 74177 CT ABD & PELVIS W/CONTRAST: CPT

## 2024-06-05 PROCEDURE — 83735 ASSAY OF MAGNESIUM: CPT | Performed by: EMERGENCY MEDICINE

## 2024-06-05 RX ORDER — PREDNISONE 20 MG/1
20 TABLET ORAL DAILY
Status: CANCELLED | OUTPATIENT
Start: 2024-06-05 | End: 2024-06-08

## 2024-06-05 RX ORDER — PREDNISONE 5 MG/1
10 TABLET ORAL DAILY
Status: CANCELLED | OUTPATIENT
Start: 2024-06-08 | End: 2024-06-15

## 2024-06-05 RX ORDER — LORAZEPAM 1 MG/1
1 TABLET ORAL EVERY 8 HOURS PRN
Status: CANCELLED | OUTPATIENT
Start: 2024-06-05

## 2024-06-05 RX ORDER — PANTOPRAZOLE SODIUM 40 MG/10ML
40 INJECTION, POWDER, LYOPHILIZED, FOR SOLUTION INTRAVENOUS DAILY
Status: DISCONTINUED | OUTPATIENT
Start: 2024-06-05 | End: 2024-06-07 | Stop reason: HOSPADM

## 2024-06-05 RX ORDER — CHOLECALCIFEROL (VITAMIN D3) 25 MCG
4000 TABLET ORAL EVERY MORNING
Status: CANCELLED | OUTPATIENT
Start: 2024-06-06

## 2024-06-05 RX ORDER — PROMETHAZINE HYDROCHLORIDE 25 MG/ML
12.5 INJECTION, SOLUTION INTRAMUSCULAR; INTRAVENOUS EVERY 6 HOURS PRN
Status: DISCONTINUED | OUTPATIENT
Start: 2024-06-05 | End: 2024-06-07 | Stop reason: HOSPADM

## 2024-06-05 RX ORDER — POLYETHYLENE GLYCOL 3350 17 G/17G
17 POWDER, FOR SOLUTION ORAL DAILY
Status: DISCONTINUED | OUTPATIENT
Start: 2024-06-05 | End: 2024-06-07

## 2024-06-05 RX ORDER — HYDROMORPHONE HYDROCHLORIDE 2 MG/ML
2 INJECTION, SOLUTION INTRAMUSCULAR; INTRAVENOUS; SUBCUTANEOUS
Status: DISCONTINUED | OUTPATIENT
Start: 2024-06-05 | End: 2024-06-06

## 2024-06-05 RX ORDER — PANTOPRAZOLE SODIUM 40 MG/1
40 TABLET, DELAYED RELEASE ORAL 2 TIMES DAILY
Status: CANCELLED | OUTPATIENT
Start: 2024-06-05

## 2024-06-05 RX ORDER — POLYETHYLENE GLYCOL 3350 17 G/17G
17 POWDER, FOR SOLUTION ORAL 2 TIMES DAILY
Status: CANCELLED | OUTPATIENT
Start: 2024-06-05

## 2024-06-05 RX ORDER — LORAZEPAM 2 MG/ML
0.5 INJECTION INTRAMUSCULAR EVERY 8 HOURS PRN
Status: DISCONTINUED | OUTPATIENT
Start: 2024-06-05 | End: 2024-06-07 | Stop reason: HOSPADM

## 2024-06-05 RX ORDER — ONDANSETRON HYDROCHLORIDE 2 MG/ML
4 INJECTION, SOLUTION INTRAVENOUS EVERY 6 HOURS PRN
Status: DISCONTINUED | OUTPATIENT
Start: 2024-06-05 | End: 2024-06-07 | Stop reason: HOSPADM

## 2024-06-05 RX ORDER — ACETAMINOPHEN 325 MG/1
975 TABLET ORAL EVERY 8 HOURS PRN
Status: DISCONTINUED | OUTPATIENT
Start: 2024-06-05 | End: 2024-06-06

## 2024-06-05 RX ORDER — FENTANYL 100 UG/H
1 PATCH TRANSDERMAL
Status: DISCONTINUED | OUTPATIENT
Start: 2024-06-05 | End: 2024-06-07 | Stop reason: HOSPADM

## 2024-06-05 RX ORDER — PROCHLORPERAZINE EDISYLATE 5 MG/ML
5 INJECTION INTRAMUSCULAR; INTRAVENOUS EVERY 6 HOURS PRN
Status: DISCONTINUED | OUTPATIENT
Start: 2024-06-05 | End: 2024-06-07 | Stop reason: HOSPADM

## 2024-06-05 RX ORDER — PREGABALIN 50 MG/1
50 CAPSULE ORAL 2 TIMES DAILY
Status: CANCELLED | OUTPATIENT
Start: 2024-06-05

## 2024-06-05 RX ORDER — HYDROMORPHONE HYDROCHLORIDE 1 MG/ML
1 INJECTION, SOLUTION INTRAMUSCULAR; INTRAVENOUS; SUBCUTANEOUS
Status: DISCONTINUED | OUTPATIENT
Start: 2024-06-05 | End: 2024-06-06

## 2024-06-05 RX ORDER — ONDANSETRON HYDROCHLORIDE 2 MG/ML
4 INJECTION, SOLUTION INTRAVENOUS EVERY 8 HOURS PRN
Status: DISCONTINUED | OUTPATIENT
Start: 2024-06-05 | End: 2024-06-06

## 2024-06-05 RX ORDER — MAGNESIUM SULFATE HEPTAHYDRATE 40 MG/ML
4 INJECTION, SOLUTION INTRAVENOUS ONCE
Status: COMPLETED | OUTPATIENT
Start: 2024-06-05 | End: 2024-06-05

## 2024-06-05 RX ORDER — HYDROMORPHONE HYDROCHLORIDE 1 MG/ML
0.6 INJECTION, SOLUTION INTRAMUSCULAR; INTRAVENOUS; SUBCUTANEOUS EVERY 2 HOUR PRN
Status: DISCONTINUED | OUTPATIENT
Start: 2024-06-05 | End: 2024-06-06

## 2024-06-05 RX ORDER — HYDROMORPHONE HYDROCHLORIDE 1 MG/ML
1 INJECTION, SOLUTION INTRAMUSCULAR; INTRAVENOUS; SUBCUTANEOUS ONCE
Status: COMPLETED | OUTPATIENT
Start: 2024-06-05 | End: 2024-06-05

## 2024-06-05 RX ORDER — NALOXONE HYDROCHLORIDE 0.4 MG/ML
0.2 INJECTION, SOLUTION INTRAMUSCULAR; INTRAVENOUS; SUBCUTANEOUS EVERY 5 MIN PRN
Status: DISCONTINUED | OUTPATIENT
Start: 2024-06-05 | End: 2024-06-07 | Stop reason: HOSPADM

## 2024-06-05 RX ORDER — ONDANSETRON HYDROCHLORIDE 2 MG/ML
4 INJECTION, SOLUTION INTRAVENOUS ONCE
Status: COMPLETED | OUTPATIENT
Start: 2024-06-05 | End: 2024-06-05

## 2024-06-05 RX ORDER — AMOXICILLIN 250 MG
2 CAPSULE ORAL 2 TIMES DAILY
Status: CANCELLED | OUTPATIENT
Start: 2024-06-05

## 2024-06-05 RX ORDER — MULTIVIT-MIN/IRON FUM/FOLIC AC 7.5 MG-4
1 TABLET ORAL EVERY MORNING
Status: CANCELLED | OUTPATIENT
Start: 2024-06-06

## 2024-06-05 RX ORDER — SODIUM CHLORIDE, SODIUM LACTATE, POTASSIUM CHLORIDE, CALCIUM CHLORIDE 600; 310; 30; 20 MG/100ML; MG/100ML; MG/100ML; MG/100ML
125 INJECTION, SOLUTION INTRAVENOUS CONTINUOUS
Status: DISCONTINUED | OUTPATIENT
Start: 2024-06-05 | End: 2024-06-06

## 2024-06-05 RX ORDER — ENOXAPARIN SODIUM 100 MG/ML
1 INJECTION SUBCUTANEOUS EVERY 12 HOURS
Status: DISCONTINUED | OUTPATIENT
Start: 2024-06-05 | End: 2024-06-07

## 2024-06-05 RX ORDER — POTASSIUM CHLORIDE 20 MEQ/1
40 TABLET, EXTENDED RELEASE ORAL ONCE
Status: COMPLETED | OUTPATIENT
Start: 2024-06-05 | End: 2024-06-05

## 2024-06-05 RX ORDER — ONDANSETRON 4 MG/1
4 TABLET, ORALLY DISINTEGRATING ORAL EVERY 8 HOURS PRN
Status: DISCONTINUED | OUTPATIENT
Start: 2024-06-05 | End: 2024-06-06

## 2024-06-05 RX ADMIN — FENTANYL 1 PATCH: 100 PATCH TRANSDERMAL at 22:07

## 2024-06-05 RX ADMIN — HYDROMORPHONE HYDROCHLORIDE 2 MG: 2 INJECTION INTRAMUSCULAR; INTRAVENOUS; SUBCUTANEOUS at 21:00

## 2024-06-05 RX ADMIN — POTASSIUM CHLORIDE 40 MEQ: 1500 TABLET, EXTENDED RELEASE ORAL at 17:34

## 2024-06-05 RX ADMIN — HYDROMORPHONE HYDROCHLORIDE 2 MG: 2 INJECTION INTRAMUSCULAR; INTRAVENOUS; SUBCUTANEOUS at 16:13

## 2024-06-05 RX ADMIN — MAGNESIUM SULFATE HEPTAHYDRATE 4 G: 4 INJECTION, SOLUTION INTRAVENOUS at 17:12

## 2024-06-05 RX ADMIN — HYDROMORPHONE HYDROCHLORIDE 1 MG: 1 INJECTION, SOLUTION INTRAMUSCULAR; INTRAVENOUS; SUBCUTANEOUS at 08:10

## 2024-06-05 RX ADMIN — SODIUM CHLORIDE 1000 ML: 9 INJECTION, SOLUTION INTRAVENOUS at 08:10

## 2024-06-05 RX ADMIN — ENOXAPARIN SODIUM 70 MG: 80 INJECTION SUBCUTANEOUS at 18:50

## 2024-06-05 RX ADMIN — HYDROMORPHONE HYDROCHLORIDE 1 MG: 1 INJECTION, SOLUTION INTRAMUSCULAR; INTRAVENOUS; SUBCUTANEOUS at 13:42

## 2024-06-05 RX ADMIN — PANTOPRAZOLE SODIUM 40 MG: 40 INJECTION, POWDER, FOR SOLUTION INTRAVENOUS at 17:12

## 2024-06-05 RX ADMIN — SODIUM CHLORIDE, POTASSIUM CHLORIDE, SODIUM LACTATE AND CALCIUM CHLORIDE 125 ML/HR: 600; 310; 30; 20 INJECTION, SOLUTION INTRAVENOUS at 21:11

## 2024-06-05 RX ADMIN — HYDROMORPHONE HYDROCHLORIDE 1 MG: 1 INJECTION, SOLUTION INTRAMUSCULAR; INTRAVENOUS; SUBCUTANEOUS at 10:10

## 2024-06-05 RX ADMIN — IOHEXOL 75 ML: 350 INJECTION, SOLUTION INTRAVENOUS at 10:39

## 2024-06-05 RX ADMIN — ONDANSETRON 4 MG: 2 INJECTION, SOLUTION INTRAMUSCULAR; INTRAVENOUS at 08:10

## 2024-06-05 SDOH — SOCIAL STABILITY: SOCIAL INSECURITY: DO YOU FEEL UNSAFE GOING BACK TO THE PLACE WHERE YOU ARE LIVING?: NO

## 2024-06-05 SDOH — SOCIAL STABILITY: SOCIAL INSECURITY: DOES ANYONE TRY TO KEEP YOU FROM HAVING/CONTACTING OTHER FRIENDS OR DOING THINGS OUTSIDE YOUR HOME?: NO

## 2024-06-05 SDOH — SOCIAL STABILITY: SOCIAL INSECURITY: ARE THERE ANY APPARENT SIGNS OF INJURIES/BEHAVIORS THAT COULD BE RELATED TO ABUSE/NEGLECT?: NO

## 2024-06-05 SDOH — SOCIAL STABILITY: SOCIAL INSECURITY: HAS ANYONE EVER THREATENED TO HURT YOUR FAMILY OR YOUR PETS?: NO

## 2024-06-05 SDOH — SOCIAL STABILITY: SOCIAL INSECURITY: WERE YOU ABLE TO COMPLETE ALL THE BEHAVIORAL HEALTH SCREENINGS?: YES

## 2024-06-05 SDOH — SOCIAL STABILITY: SOCIAL INSECURITY: ARE YOU OR HAVE YOU BEEN THREATENED OR ABUSED PHYSICALLY, EMOTIONALLY, OR SEXUALLY BY ANYONE?: NO

## 2024-06-05 SDOH — SOCIAL STABILITY: SOCIAL INSECURITY: HAVE YOU HAD ANY THOUGHTS OF HARMING ANYONE ELSE?: NO

## 2024-06-05 SDOH — SOCIAL STABILITY: SOCIAL INSECURITY: HAVE YOU HAD THOUGHTS OF HARMING ANYONE ELSE?: NO

## 2024-06-05 SDOH — SOCIAL STABILITY: SOCIAL INSECURITY: ABUSE: ADULT

## 2024-06-05 SDOH — SOCIAL STABILITY: SOCIAL INSECURITY: DO YOU FEEL ANYONE HAS EXPLOITED OR TAKEN ADVANTAGE OF YOU FINANCIALLY OR OF YOUR PERSONAL PROPERTY?: NO

## 2024-06-05 ASSESSMENT — ACTIVITIES OF DAILY LIVING (ADL)
BATHING: INDEPENDENT
LACK_OF_TRANSPORTATION: NO
DRESSING YOURSELF: INDEPENDENT
LACK_OF_TRANSPORTATION: NO
HEARING - LEFT EAR: FUNCTIONAL
FEEDING YOURSELF: INDEPENDENT
PATIENT'S MEMORY ADEQUATE TO SAFELY COMPLETE DAILY ACTIVITIES?: YES
TOILETING: INDEPENDENT
ADEQUATE_TO_COMPLETE_ADL: YES
HEARING - RIGHT EAR: FUNCTIONAL
GROOMING: INDEPENDENT
WALKS IN HOME: INDEPENDENT
JUDGMENT_ADEQUATE_SAFELY_COMPLETE_DAILY_ACTIVITIES: YES

## 2024-06-05 ASSESSMENT — COGNITIVE AND FUNCTIONAL STATUS - GENERAL
MOBILITY SCORE: 24
DAILY ACTIVITIY SCORE: 24
PATIENT BASELINE BEDBOUND: NO
MOBILITY SCORE: 24
DAILY ACTIVITIY SCORE: 24

## 2024-06-05 ASSESSMENT — PAIN DESCRIPTION - PAIN TYPE: TYPE: ACUTE PAIN

## 2024-06-05 ASSESSMENT — PAIN - FUNCTIONAL ASSESSMENT
PAIN_FUNCTIONAL_ASSESSMENT: 0-10

## 2024-06-05 ASSESSMENT — LIFESTYLE VARIABLES
EVER FELT BAD OR GUILTY ABOUT YOUR DRINKING: NO
TOTAL SCORE: 0
HOW OFTEN DO YOU HAVE A DRINK CONTAINING ALCOHOL: NEVER
AUDIT-C TOTAL SCORE: 0
EVER HAD A DRINK FIRST THING IN THE MORNING TO STEADY YOUR NERVES TO GET RID OF A HANGOVER: NO
SKIP TO QUESTIONS 9-10: 1
HAVE PEOPLE ANNOYED YOU BY CRITICIZING YOUR DRINKING: NO
HOW MANY STANDARD DRINKS CONTAINING ALCOHOL DO YOU HAVE ON A TYPICAL DAY: PATIENT DOES NOT DRINK
HAVE YOU EVER FELT YOU SHOULD CUT DOWN ON YOUR DRINKING: NO
HOW OFTEN DO YOU HAVE 6 OR MORE DRINKS ON ONE OCCASION: NEVER
AUDIT-C TOTAL SCORE: 0

## 2024-06-05 ASSESSMENT — PAIN SCALES - GENERAL
PAINLEVEL_OUTOF10: 8
PAINLEVEL_OUTOF10: 9
PAINLEVEL_OUTOF10: 9

## 2024-06-05 ASSESSMENT — PAIN DESCRIPTION - ORIENTATION: ORIENTATION: LEFT;LOWER

## 2024-06-05 ASSESSMENT — PAIN DESCRIPTION - DESCRIPTORS
DESCRIPTORS: ACHING;SHARP
DESCRIPTORS: ACHING;STABBING

## 2024-06-05 ASSESSMENT — PAIN DESCRIPTION - LOCATION: LOCATION: ABDOMEN

## 2024-06-05 NOTE — ED TRIAGE NOTES
"Patient c/o LLQ abdominal pain that started at about 0200 today. Patient took an ativan and 0.2mg of dilaudid at home but \"it did not touch the pain.\" Patient has a hx of esophogeal and liver CA, recently had back surgery at [facility].   "

## 2024-06-05 NOTE — PROGRESS NOTES
Pharmacy Medication History Review    Massimo Garcia is a 63 y.o. male admitted for No Principal Problem: There is no principal problem currently on the Problem List. Please update the Problem List and refresh.. Pharmacy reviewed the patient's [id number] medications and allergies for accuracy.    The list below reflectives the updated PTA list. Please review each medication in order reconciliation for additional clarification and justification.  Prior to Admission Medications   Prescriptions Last Dose Informant Patient Reported? Taking?   HYDROmorphone (Dilaudid) 2 mg tablet 6/5/2024 at 04:00 Spouse/Significant Other No Yes   Sig: Take 1-2 tablets (2-4 mg) by mouth every 6 hours if needed for severe pain (7 - 10) for up to 15 days.   HYDROmorphone (Dilaudid) 4 mg tablet  Spouse/Significant Other No No   Sig: Take 1 tablet (4 mg) by mouth every 4 hours if needed for moderate pain (4 - 6).   LORazepam (Ativan) 1 mg tablet 6/5/2024 at 07:30 Spouse/Significant Other No Yes   Sig: Take 1 tablet (1 mg) by mouth every 8 hours if needed for anxiety (nausea).   acetaminophen (Tylenol) 325 mg tablet 6/4/2024 at pm Spouse/Significant Other No Yes   Sig: Take 2 tablets (650 mg) by mouth every 6 hours.   apixaban (Eliquis) 5 mg tablet 6/4/2024 at pm Spouse/Significant Other No Yes   Sig: TAKE 1 TABLET BY MOUTH TWO TIMES A DAY   cholecalciferol (Vitamin D-3) 50 MCG (2000 UT) tablet 6/4/2024 at am Spouse/Significant Other Yes Yes   Sig: Take 2 tablets (100 mcg) by mouth once daily in the morning.   cyclobenzaprine (Flexeril) 10 mg tablet 6/4/2024 at pm Spouse/Significant Other No Yes   Sig: Take 1 tablet (10 mg) by mouth 3 times a day for 14 days.   fentaNYL (Duragesic) 100 mcg/hr patch 6/2/2024 Spouse/Significant Other No Yes   Sig: Place 1 patch over 72 hours on the skin every 3rd day for 9 days.   metoclopramide (Reglan) 10 mg tablet Unknown Spouse/Significant Other No No   Sig: Take 1 tablet (10 mg) by mouth every 8 hours  if needed (Hiccups alternative if Thorazine not working. Don't give with thorazine for therapy as too much D2 agonists can have bad side effects).   multivitamin with minerals (multivit-min-iron fum-folic ac) tablet 6/4/2024 at am Spouse/Significant Other Yes Yes   Sig: Take 2 tablets by mouth once daily in the morning.   nystatin (Mycostatin) 100,000 unit/mL suspension 6/3/2024 Spouse/Significant Other No No   Sig: Swish and swallow 5 mL (500,000 Units) 4 times a day for 14 days.   pantoprazole (ProtoNix) 40 mg EC tablet 6/4/2024 at am Spouse/Significant Other No Yes   Sig: Take 1 tablet (40 mg) by mouth once daily in the morning. Take before meals. Do not crush, chew, or split.   Patient taking differently: Take 1 tablet (40 mg) by mouth 2 times a day. Do not crush, chew, or split.   polyethylene glycol (Glycolax, Miralax) 17 gram/dose powder 6/4/2024 at pm Spouse/Significant Other No Yes   Sig: Take 17 g by mouth 2 times a day.   predniSONE (Deltasone) 10 mg tablet 6/4/2024 at am Spouse/Significant Other No Yes   Sig: Take 4 tablets (40 mg) by mouth once daily for 7 days, THEN 2 tablets (20 mg) once daily for 7 days, THEN 1 tablet (10 mg) once daily for 7 days. Do not fill before May 25, 2024.   pregabalin (Lyrica) 50 mg capsule 6/4/2024 at pm Spouse/Significant Other No Yes   Sig: Take 1 capsule (50 mg) by mouth 2 times a day.   sennosides-docusate sodium (Mireille-Colace) 8.6-50 mg tablet 6/4/2024 at pm Spouse/Significant Other No Yes   Sig: Take 2 tablets by mouth 2 times a day.      Facility-Administered Medications: None         The list below reflectives the updated allergy list. Please review each documented allergy for additional clarification and justification.  Allergies  Reviewed by Westley Ronquillo RN on 6/5/2024        Severity Reactions Comments    Bee Venom Protein (honey Bee) High Anaphylaxis     Oxaliplatin Medium Other Rigors            Below are additional concerns with the patient's PTA  billy.      Norma Fonatnez

## 2024-06-05 NOTE — PROGRESS NOTES
06/05/24 1404   Discharge Planning   Living Arrangements Spouse/significant other   Support Systems Spouse/significant other   Assistance Needed A&OX3; independent with ADLs with no DME; drives (not currently due to medications); room air baseline currently room air   Type of Residence Private residence   Number of Stairs to Enter Residence 3   Number of Stairs Within Residence 0   Do you have animals or pets at home? Yes   Type of Animals or Pets 2 dogs, 2 cats, 5 fish   Who is requesting discharge planning? Provider   Patient expects to be discharged to: TBD pending GI/Surgery workup   Does the patient need discharge transport arranged? No   Financial Resource Strain   How hard is it for you to pay for the very basics like food, housing, medical care, and heating? Not hard   Housing Stability   In the last 12 months, was there a time when you were not able to pay the mortgage or rent on time? N   In the last 12 months, how many places have you lived? 1   In the last 12 months, was there a time when you did not have a steady place to sleep or slept in a shelter (including now)? N   Transportation Needs   In the past 12 months, has lack of transportation kept you from medical appointments or from getting medications? no   In the past 12 months, has lack of transportation kept you from meetings, work, or from getting things needed for daily living? No     06/05/2024 1406pm  Spoke with patient and patient's spouse bedside in the ED

## 2024-06-05 NOTE — H&P
Patient: Massimo Garcia   Age: 63 y.o.   Gender: male   Room/Bed: 127/127-A     Attending: Dara Martinez DO  Code Status:  Full Code    Chief Complaint:  Patient: Massimo Garcia is a 63 y.o. male who presented to the hospital for acute abdominal pain evaluation.     History of Presenting Illness  63 y.o. 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 Recent admission on 5/10-5/24 with diffuse T/L spine mets, R occipital met, and L temporal mets, s/p T10 transpedicular decompression and a T8-T12 fusion on 05/17 requiring wound vac and lumbar drain, during the admission he was also treated with high dose steroids followed by steroid taper and managed for anemia and low grade malignancy-associated DIC. The patient was at an office visit at the radiation oncology department to discuss his further radiation plans, no that time the patient was feeling well but did feel 2-3 left lower quadrant pain on examination, at nighttime around 2 AM the patient did experience severe abdominal pain (8-9/10) he used his home Dilaudid 0.4 mg with no much relief in his pain, as his pain continued and he presented to the hospital for further evaluation, in the ED he was afebrile,tachycardic 106, with hypomagnesemia 1.52, hypokalemia 3.2, elevated ALP level 217, and elevated lactate level 3.2-2.4, and hemoglobin of 8.6, patient underwent CT abdomen and pelvis which showed increased burden of lung nodules concerning for mets, small left-sided pleural effusion, hepatic masses with abdominal and pelvic lymphadenopathy, and mildly dilated jejunum concerning for SBO, patient initially received 1 L NS, for 2 doses of 4 mg of Dilaudid and Zofran, Dr. Rosado/ED provider contacted Dr. Lara/general surgery, Dr. Lara advised to transfer the  patient downtown to [facility] as he may require emergent radiation, patient was accepted at [facility] by Dr Mell BERNARD but as no beds were available he was admitted to [facility] for observation and pain control, upon evaluation he reported improvement of his pain to 2-3 and stated he did have a BM in the ED around 2 Pm and he is able to pass gas freely since then. He endorsed no further nausea and expressed his desire to resume PO intake.       Past Medical History   Past Medical History:   Diagnosis Date    Essential (primary) hypertension 12/21/2013    Hypertension    Pacemaker     Peripheral neuropathy     Personal history of other diseases of the circulatory system     History of right bundle branch block (RBBB)    Pneumothorax 12/04/2023      Past Surgical History:   Past Surgical History:   Procedure Laterality Date    CARDIAC ELECTROPHYSIOLOGY PROCEDURE N/A 11/7/2023    Procedure: Temporary Pacemaker Insertion;  Surgeon: Alexis Shook MD;  Location: [facility];  Service: Cardiovascular;  Laterality: N/A;    CARDIAC ELECTROPHYSIOLOGY PROCEDURE Left 11/9/2023    Procedure: PPM IMPLANT DUAL;  Surgeon: Elias Connolly MD;  Location: [facility];  Service: Electrophysiology;  Laterality: Left;    TOTAL KNEE ARTHROPLASTY  09/30/2016    Total Knee Replacement Left    TOTAL KNEE ARTHROPLASTY  09/30/2016    Total Knee Replacement Right     Family History:   Family History   Problem Relation Name Age of Onset    Cancer Father       Social History:   Tobacco Use: Medium Risk (6/5/2024)    Patient History     Smoking Tobacco Use: Former     Smokeless Tobacco Use: Never     Passive Exposure: Never      Social History     Substance and Sexual Activity   Alcohol Use Not Currently       Outpatient Medications:  Current Outpatient Medications   Medication Instructions    acetaminophen (TYLENOL) 650 mg, oral, Every 6 hours    apixaban (Eliquis) 5 mg tablet TAKE 1 TABLET BY MOUTH TWO TIMES A DAY     cholecalciferol (VITAMIN D-3) 100 mcg, oral, Every morning    cyclobenzaprine (FLEXERIL) 10 mg, oral, 3 times daily    fentaNYL (Duragesic) 100 mcg/hr patch 1 patch, transdermal, Every 72 hours    HYDROmorphone (DILAUDID) 4 mg, oral, Every 4 hours PRN    HYDROmorphone (DILAUDID) 2-4 mg, oral, Every 6 hours PRN    LORazepam (ATIVAN) 1 mg, oral, Every 8 hours PRN    metoclopramide (REGLAN) 10 mg, oral, Every 8 hours PRN    multivitamin with minerals (multivit-min-iron fum-folic ac) tablet 2 tablets, oral, Every morning    nystatin (MYCOSTATIN) 500,000 Units, Swish & Swallow, 4 times daily    pantoprazole (PROTONIX) 40 mg, oral, Daily before breakfast, Do not crush, chew, or split.    polyethylene glycol (GLYCOLAX, MIRALAX) 17 g, oral, 2 times daily    predniSONE (Deltasone) 10 mg tablet Take 4 tablets (40 mg) by mouth once daily for 7 days, THEN 2 tablets (20 mg) once daily for 7 days, THEN 1 tablet (10 mg) once daily for 7 days. Do not fill before May 25, 2024.    pregabalin (LYRICA) 50 mg, oral, 2 times daily    sennosides-docusate sodium (Mireille-Colace) 8.6-50 mg tablet 2 tablets, oral, 2 times daily        ED Course   Vitals - /62 (Patient Position: Standing)   Pulse 60   Temp 37.1 °C (98.8 °F) (Temporal)   Resp 16   Wt 73.5 kg (162 lb)   SpO2 97%     Labs:   CBC:  Recent Labs     06/05/24  0825 05/24/24  1138 05/24/24  0552   WBC 10.2 29.1* 12.2*   HGB 8.6* 9.5* 8.7*   HCT 27.3* 29.3* 27.3*    58* 40*   MCV 90 92 96     CMP:  Recent Labs     06/05/24  0825 05/24/24  0552 05/23/24  0946    138 136   K 3.2* 3.7 3.5    103 102   CO2 24 26 24   ANIONGAP 14 13 14   BUN 12 18 22   CREATININE 0.66 0.55 0.61   EGFR >90 >90 >90   GLUCOSE 90 119* 155*     Recent Labs     06/05/24  0825 05/24/24  0552 05/23/24  0946 05/22/24  0202 05/13/24  0616 05/12/24  0557 05/10/24  2223 05/09/24  2201 05/04/24  0630 05/03/24  2211 04/15/24  0830 04/14/24  1630   ALBUMIN 3.3* 2.7* 3.0* 3.0*  3.1*   < > 3.3*   " < > 3.6   < > 3.9   < > 3.9   ALKPHOS 217*  --   --  176*  --  136   < > 147*  --  142*   < > 182*   ALT 39  --   --  42  --  16   < > 19  --  23   < > 24   AST 62*  --   --  42*  --  28   < > 30  --  39   < > 63*   BILITOT 1.4*  --   --  0.8  --  1.0   < > 1.3*  --  1.2   < > 1.4*   LIPASE  --   --   --   --   --   --   --  18  --  27  --  17    < > = values in this interval not displayed.     Calcium/Phos:   Lab Results   Component Value Date    CALCIUM 9.0 06/05/2024    PHOS 3.1 05/24/2024      COAG:   Recent Labs     05/22/24  0202 05/17/24  1245 05/16/24  1148 03/20/24  1803 03/19/24  1005   INR 1.1 1.4* 1.4*   < > 1.8*   DDIMERVTE  --   --   --   --  81,270*    < > = values in this interval not displayed.     CRP:   Lab Results   Component Value Date    CRP 1.66 (H) 05/04/2024      [unfilled]   ENDO:  Recent Labs     04/15/24  0830 03/04/24  0946 02/26/24  0948 11/05/23  1608 11/05/23  1456   TSH 1.37 0.45 2.27 1.27  --    HGBA1C  --   --   --   --  4.9      CARDIAC:   Recent Labs     05/19/24  0506 04/04/24  1835 03/20/24  0551 03/19/24  1005 03/18/24  1822 03/18/24  1650 02/09/24  1648 12/27/23  0721   *  --  598* 975*  --   --   --   --    TROPHS  --  14  --   --  50* 34*   < >  --    BNP  --  184*  --   --   --  355*  --  27    < > = values in this interval not displayed.     Recent Labs     12/27/23  0721   CHOL 145   LDLCALC 81   HDL 49.9   TRIG 70     No data recorded    Micro/ID:   No results found for the last 90 days.                   No lab exists for component: \"AGALPCRNB\"   .ID  Lab Results   Component Value Date    URINECULTURE No growth 05/03/2024    BLOODCULT No growth at 4 days -  FINAL REPORT 05/04/2024    BLOODCULT No growth at 4 days -  FINAL REPORT 05/04/2024       Imaging:   No orders to display     Encounter Date: 05/10/24   ECG 12 Lead   Result Value    Ventricular Rate 108    Atrial Rate 108    WV Interval 146    QRS Duration 154    QT Interval 392    QTC " Calculation(Bazett) 525    P Axis 12    R Axis -31    T Axis 84    QRS Count 17    Q Onset 196    P Onset 123    P Offset 159    T Offset 392    QTC Fredericia 476    Narrative    Electronic Ventricular Pacemaker  Abnormal ECG  When compared with ECG of 18-MAY-2024 22:04, (unconfirmed)  No significant change was found  Confirmed by Kirill Gunter ([id number]) on 5/21/2024 12:40:52 AM     Transthoracic Echo (TTE) Limited    Result Date: 11/10/2023   [facility], [address] [place] [address]               Tel [phone] and Fax [phone] TRANSTHORACIC ECHOCARDIOGRAM REPORT  Patient Name:      GETACHEW Lira Physician:    Mahamed Key MD Study Date:        11/10/2023           Ordering Provider:    Mahamed KEY MRN/PID:           [id number]             Fellow: Accession#:        [id number]         Nurse: Date of Birth/Age: 1960 / 62      Sonographer:          Merced collins                                      UTE Gender:            M                    Additional Staff: Height:            170.18 cm            Admit Date:           11/5/2023 Weight:            80.74 kg             Admission Status:     Inpatient -                                                               Routine BSA:               1.92 m2              Encounter#:           [id number]                                         Department Location:  [facility] ICU Blood Pressure: 125 /68 mmHg Study Type:    TRANSTHORACIC ECHO (TTE) LIMITED Diagnosis/ICD: Other pericardial effusion (noninflammatory)-I31.39 Indication:    Pericardial effusion CPT Code:      Echo Limited-48043  Study Detail: The following Echo studies were performed: 2D and M-Mode.  PHYSICIAN INTERPRETATION: Left Ventricle: The left ventricular systolic function is normal. The left  ventricular cavity size was not assessed. Left ventricular diastolic filling was not assessed. Left Atrium: The left atrium was not assessed. Right Ventricle: The right ventricle was not assessed. Right ventricular systolic function not assessed. A device is visualized in the right ventricle. Right Atrium: The right atrium was not assessed. There is a device visualized in the right atrium. Aortic Valve: The aortic valve was not assessed. Aortic valve regurgitation was not assessed. Mitral Valve: The mitral valve was not assessed. Mitral valve regurgitation was not assessed. Tricuspid Valve: The tricuspid valve was not assessed. Tricuspid regurgitation was not assessed. Pulmonic Valve: The pulmonic valve was not assessed. Pulmonic valve regurgitation was not assessed. Pericardium: There is no pericardial effusion noted. Aorta: The aortic root was not assessed. Systemic Veins: The inferior vena cava appears to be of normal size. There is IVC inspiratory collapse greater than 50%.  CONCLUSIONS:  1. Left ventricular systolic function is normal.  2. There is no pericardial effusion noted. QUANTITATIVE DATA SUMMARY: TRICUSPID VALVE/RVSP:                   Normal Ranges: IVC Diam: 1.60 cm  Mahamed Hunter MD Electronically signed on 11/10/2023 at 2:31:02 PM  ** Final (Updated) **     Transthoracic Echo (TTE) Complete    Result Date: 11/7/2023   [facility], [address] [place] [address]               Tel [phone] and Fax [phone] TRANSTHORACIC ECHOCARDIOGRAM REPORT  Patient Name:      GETACHEW MG            Soraya Physician:    Mahamed Hunter MD Study Date:        11/7/2023            Ordering Provider:    [id number] DEVON CHEUNG MRN/PID:           [id number]             Fellow: Accession#:        [id number]         Nurse: Date of Birth/Age: 1960 / 62       Sonographer:          Ceci Hui RDCS                    years Gender:            M                    Additional Staff: Height:            170.18 cm            Admit Date:           11/5/2023 Weight:            69.85 kg             Admission Status:     Inpatient -                                                               Routine BSA:               1.81 m2              Encounter#:           [id number]                                         Department Location:  [facility]                                                               Invasive Blood Pressure: 154 /77 mmHg Study Type:    TRANSTHORACIC ECHO (TTE) COMPLETE Diagnosis/ICD: Dizziness and giddiness-R42 Indication:    Dizziness CPT Code:      Echo Complete w Full Doppler-08079 Patient History: Pertinent History: Cancer and PE. Chemotherapy. Study Detail: The following Echo studies were performed: 2D, M-Mode, Doppler and               color flow. Technically challenging study due to prominent lung               artifact. The patient was awake.  PHYSICIAN INTERPRETATION: Left Ventricle: The left ventricular systolic function is normal, with an estimated ejection fraction of 55-60%. The left ventricular cavity size is normal. Spectral Doppler shows an impaired relaxation pattern of left ventricular diastolic filling. Left Atrium: The left atrium is normal in size. Right Ventricle: The right ventricle is normal in size. There is normal right ventricular global systolic function. Right Atrium: The right atrium is normal in size. Aortic Valve: The aortic valve was not well visualized. There is no evidence of aortic valve stenosis. There is trivial aortic valve regurgitation. The peak instantaneous gradient of the aortic valve is 6.1 mmHg. The mean gradient of the aortic valve is 4.0 mmHg. Mitral Valve: The mitral valve is mildly thickened. There is mild mitral valve regurgitation. Tricuspid Valve: The tricuspid valve is structurally normal. There is  trace tricuspid regurgitation. The right ventricular systolic pressure is unable to be estimated. Pulmonic Valve: The pulmonic valve is not well visualized. There is physiologic pulmonic valve regurgitation. Pericardium: There is no pericardial effusion noted. Aorta: The aortic root is normal. Systemic Veins: The inferior vena cava appears to be of normal size. There is IVC inspiratory collapse greater than 50%. In comparison to the previous echocardiogram(s): Compared with study from 8/31/2023, no significant change.  CONCLUSIONS:  1. Left ventricular systolic function is normal with a 55-60% estimated ejection fraction.  2. Spectral Doppler shows an impaired relaxation pattern of left ventricular diastolic filling. QUANTITATIVE DATA SUMMARY: 2D MEASUREMENTS:                          Normal Ranges: Ao Root d:     3.70 cm   (2.0-3.7cm) IVSd:          1.03 cm   (0.6-1.1cm) LVPWd:         0.98 cm   (0.6-1.1cm) LVIDd:         4.52 cm   (3.9-5.9cm) LVIDs:         3.16 cm LV Mass Index: 86.0 g/m2 LV % FS        30.1 % LA VOLUME:                               Normal Ranges: LA Vol A4C:        54.1 ml    (22+/-6mL/m2) LA Vol A2C:        53.9 ml LA Vol BP:         54.4 ml LA Vol Index A4C:  29.9ml/m2 LA Vol Index A2C:  29.8 ml/m2 LA Vol Index BP:   30.1 ml/m2 LA Area A4C:       19.3 cm2 LA Area A2C:       19.4 cm2 LA Major Axis A4C: 5.8 cm LA Major Axis A2C: 5.9 cm LA Volume Index:   27.6 ml/m2 LA Vol A4C:        48.0 ml LA Vol A2C:        51.0 ml RA VOLUME BY A/L METHOD:                       Normal Ranges: RA Area A4C: 16.0 cm2 M-MODE MEASUREMENTS:                  Normal Ranges: Ao Root: 4.00 cm (2.0-3.7cm) LAs:     3.00 cm (2.7-4.0cm) AORTA MEASUREMENTS:                      Normal Ranges: Ao Sinus, d: 3.70 cm (2.1-3.5cm) LV SYSTOLIC FUNCTION BY 2D PLANIMETRY (MOD):                     Normal Ranges: EF-A4C View: 56.1 % (>=55%) EF-A2C View: 56.1 % EF-Biplane:  56.1 % LV DIASTOLIC FUNCTION:                                Normal Ranges: MV Peak E:        0.54 m/s    (0.7-1.2 m/s) MV Peak A:        0.78 m/s    (0.42-0.7 m/s) E/A Ratio:        0.70        (1.0-2.2) MV e'             0.10 m/s    (>8.0) MV lateral e'     0.10 m/s MV medial e'      0.08 m/s MV A Dur:         121.00 msec E/e' Ratio:       5.44        (<8.0) PulmV Sys Angelito:    57.80 cm/s PulmV Gillette Angelito:   40.10 cm/s PulmV S/D Angelito:    1.40 PulmV A Revs Angelito: 32.00 cm/s PulmV A Revs Dur: 135.00 msec MITRAL VALVE:                Normal Ranges: MV DT: 92 msec (150-240msec) AORTIC VALVE:                                   Normal Ranges: AoV Vmax:                1.23 m/s (<=1.7m/s) AoV Peak P.1 mmHg (<20mmHg) AoV Mean P.0 mmHg (1.7-11.5mmHg) LVOT Max Angelito:            1.00 m/s (<=1.1m/s) AoV VTI:                 22.50 cm (18-25cm) LVOT VTI:                17.80 cm LVOT Diameter:           2.10 cm  (1.8-2.4cm) AoV Area, VTI:           2.74 cm2 (2.5-5.5cm2) AoV Area,Vmax:           2.82 cm2 (2.5-4.5cm2) AoV Dimensionless Index: 0.79  RIGHT VENTRICLE: RV Basal 3.71 cm RV Mid   2.74 cm RV Major 8.1 cm TAPSE:   22.0 mm RV s'    0.11 m/s TRICUSPID VALVE/RVSP:                             Normal Ranges: Peak TR Velocity: 1.97 m/s RV Syst Pressure: 18.5 mmHg (< 30mmHg) IVC Diam:         1.56 cm PULMONIC VALVE:                      Normal Ranges: PV Max Angelito: 0.8 m/s  (0.6-0.9m/s) PV Max P.7 mmHg Pulmonary Veins: PulmV A Revs Dur: 135.00 msec PulmV A Revs Angelito: 32.00 cm/s PulmV Gillette Angelito:   40.10 cm/s PulmV S/D Angelito:    1.40 PulmV Sys Angelito:    57.80 cm/s  [id number] Lior Hunter MD Electronically signed on 2023 at 12:11:27 PM  ** Final **    CT abdomen pelvis w IV contrast    Result Date: 2024  Interpreted By:  Cedric Castillo, STUDY: CT ABDOMEN PELVIS W IV CONTRAST; 2024 10:38 am   INDICATION: Signs/Symptoms:severe llq pain;   COMPARISON: 2024   ACCESSION NUMBER(S): [id number]   ORDERING CLINICIAN: SOTO GARNETT   TECHNIQUE:  Contiguous axial images of the abdomen/pelvis were performed with IV contrast. 75 ml of Omnipaque 350 was utilized. Coronal and sagittal reformatted images were also obtained. All CT examinations are performed with 1 or more of the following dose reduction techniques: Automated exposure control, adjustment of mA and/or kv according to patient's size, or use of iterative reconstruction techniques.     FINDINGS: Interval development of a small left pleural effusion. Redemonstration of multiple nodular opacity at the imaged lung bases compatible with metastatic disease.   There is redemonstration of multiple masses in the right and left hepatic lobes consistent with a metastatic disease. Interval enlargement of hepatic lesions, the largest in the right hepatic dome measuring 6.2 x 5.7 cm. The gallbladder is present. Subtle calcification in the gallbladder may correspond to gallstones. No dilatation of the common bile duct.   The pancreas, spleen, and adrenal glands are stable in appearance. Mild splenomegaly is again noted, the spleen measures a proximally 14.6 cm in length.   Symmetric enhancement of the kidneys. No hydronephrosis.   Atherosclerotic calcification of the abdominal aorta and bilateral iliac arteries.   Enlarged tiffany hepatis and peripancreatic lymph nodes, slightly increased in size from the prior study with a large confluent lymph node measuring 5.8 x 2.8 cm. Mild interval increase in size of enlarged retroperitoneal lymph nodes. Enlarged right iliac chain lymph node, increased in size measuring 4.3 x 3.5 cm.   There are mildly loops dilated loops of jejunum measuring up to 3.4 cm, early developing partial small bowel obstruction can not be excluded. Colon is unremarkable with no acute inflammatory process. Normal stool burden. Appendix is within normal limits.   Urinary bladder is underdistended and not well evaluated.   Small amount of free fluid the pelvis which measures simple fluid attenuation.    Postsurgical change of anterior pelvic wall repair.       Interval enlargement of visualized pulmonary nodules in the imaged lower lungs compatible with metastatic disease.   Interval development of a small left pleural effusion.   Redemonstration of multiple hepatic masses and abdominal and pelvic lymphadenopathy compatible with metastatic disease. There has been interval enlargement of hepatic lesions and lymph nodes since the prior study.   Mildly loops dilated loops of jejunum measuring up to 3.4 cm, early developing partial small bowel obstruction can not be excluded.   Signed by: Cedric Castillo 6/5/2024 11:51 AM Dictation workstation:   [id number]     Interventions:  Medications   polyethylene glycol (Glycolax, Miralax) packet 17 g (17 g oral Not Given 6/5/24 1555)   magnesium sulfate IV 4 g (4 g intravenous New Bag 6/5/24 1712)   ondansetron ODT (Zofran-ODT) disintegrating tablet 4 mg (has no administration in time range)     Or   ondansetron (Zofran) injection 4 mg (has no administration in time range)   HYDROmorphone (Dilaudid) injection 2 mg (2 mg intravenous Given 6/5/24 1613)   HYDROmorphone (Dilaudid) injection 0.6 mg (has no administration in time range)   acetaminophen (Tylenol) tablet 975 mg (has no administration in time range)   apixaban (Eliquis) tablet 5 mg ( oral Dose Auto Held 6/9/24 2100)   fentaNYL (Duragesic) 100 mcg/hr patch 1 patch (has no administration in time range)   pantoprazole (ProtoNix) injection 40 mg (40 mg intravenous Given 6/5/24 1712)   LORazepam (Ativan) injection 0.5 mg (has no administration in time range)   HYDROmorphone (Dilaudid) injection 0.4 mg (has no administration in time range)     Or   HYDROmorphone (Dilaudid) injection 1 mg (has no administration in time range)   naloxone (Narcan) injection 0.2 mg (has no administration in time range)   ondansetron (Zofran) injection 4 mg (has no administration in time range)   promethazine (Phenergan) injection 12.5 mg (has  no administration in time range)   prochlorperazine (Compazine) injection 5 mg (has no administration in time range)   lactated Ringer's infusion (has no administration in time range)   enoxaparin (Lovenox) syringe 70 mg (has no administration in time range)   sodium chloride 0.9 % bolus 1,000 mL (0 mL intravenous Stopped 6/5/24 0910)   ondansetron (Zofran) injection 4 mg (4 mg intravenous Given 6/5/24 0810)   HYDROmorphone (Dilaudid) injection 1 mg (1 mg intravenous Given 6/5/24 0810)   HYDROmorphone (Dilaudid) injection 1 mg (1 mg intravenous Given 6/5/24 1010)   iohexol (OMNIPaque) 350 mg iodine/mL solution 75 mL (75 mL intravenous Given 6/5/24 1039)   HYDROmorphone (Dilaudid) injection 1 mg (1 mg intravenous Given 6/5/24 1342)   potassium chloride CR (Klor-Con M20) ER tablet 40 mEq (40 mEq oral Given 6/5/24 1734)       Objective Data  Physical Exam  Vitals and nursing note reviewed.   Constitutional:       Appearance: Normal appearance. He is normal weight.   HENT:      Head: Normocephalic and atraumatic.      Mouth/Throat:      Mouth: Mucous membranes are moist.      Pharynx: Oropharynx is clear.   Eyes:      Extraocular Movements: Extraocular movements intact.      Conjunctiva/sclera: Conjunctivae normal.      Pupils: Pupils are equal, round, and reactive to light.   Cardiovascular:      Rate and Rhythm: Normal rate and regular rhythm.   Pulmonary:      Effort: Pulmonary effort is normal.      Breath sounds: Normal breath sounds.   Abdominal:      General: Abdomen is flat. Bowel sounds are normal.      Palpations: Abdomen is soft.      Tenderness: There is abdominal tenderness.   Musculoskeletal:         General: Normal range of motion.      Cervical back: Normal range of motion and neck supple.   Skin:     General: Skin is warm and dry.      Capillary Refill: Capillary refill takes less than 2 seconds.   Neurological:      General: No focal deficit present.      Mental Status: He is alert and oriented to  person, place, and time. Mental status is at baseline.   Psychiatric:         Mood and Affect: Mood normal.         Behavior: Behavior normal.         Thought Content: Thought content normal.         Judgment: Judgment normal.                Assessment and Plan   63 y.o. 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 Recent admission on 5/10-5/24 with diffuse T/L spine mets, R occipital met, and L temporal mets, s/p T10 transpedicular decompression and a T8-T12 fusion on 05/17 requiring wound vac and lumbar drain, during the admission he was also treated with high dose steroids followed by steroid taper and managed for anemia and low grade malignancy-associated DIC, he is presenting for SBO evaluation. Clinically [facility] [place] [facility].     Acute Medical Issues:  #SBO   #Hx of recent irAE   #stage IV esophageal adenocarcinoma (HER2 pos) with known mets to lung and liver, s/p chemo (last 4/29)  - tachycardic with Elevated lactate levels.   - patient had BM but will keep NPO for now to ensure bowel rest, if improving tomorrow can start CLD  - s/p 1L IVF, now on IVF maintenance 125ml/hr   - will defer Abx use for now   - pain and nausea control   - trend lactate   - surgery onboard patient was evaluated by surgery, Accepted [facility] by Dr Mell Ayala, pending available bed  - hold eliquis and switch to lovenox in anticipation for any required procedures.      Chronic Medical Issues:   #Constipation: on bowel regimen.  #hx of PE/ portal thrombosis: hold Eliquis, subcutaneous Lovenox   #GERD: Cont home Protonix 40mg BID   #spine and talha Mets: cont home prednisone per taper protocol      Fluids: IVF 125ml   Electrolytes: replete as needed  Nutrition: NPO   GI Prophylaxis:Protonix   DVT Prophylaxis:  Lovenox     Access: Port   Antibiotics:none  Oxygenation: RA     Dispo:patient metastatic esophageal cancer, brain and spine mets admitted for SBO evaluation, Pending transfer to [facility].

## 2024-06-06 ENCOUNTER — TELEPHONE (OUTPATIENT)
Dept: HEMATOLOGY/ONCOLOGY | Facility: CLINIC | Age: 64
End: 2024-06-06
Payer: MEDICARE

## 2024-06-06 LAB
ALBUMIN SERPL BCP-MCNC: 3 G/DL (ref 3.4–5)
ANION GAP SERPL CALC-SCNC: 11 MMOL/L (ref 10–20)
BUN SERPL-MCNC: 11 MG/DL (ref 6–23)
CALCIUM SERPL-MCNC: 8.2 MG/DL (ref 8.6–10.3)
CHLORIDE SERPL-SCNC: 101 MMOL/L (ref 98–107)
CO2 SERPL-SCNC: 28 MMOL/L (ref 21–32)
CREAT SERPL-MCNC: 0.68 MG/DL (ref 0.5–1.3)
EGFRCR SERPLBLD CKD-EPI 2021: >90 ML/MIN/1.73M*2
ERYTHROCYTE [DISTWIDTH] IN BLOOD BY AUTOMATED COUNT: 17.9 % (ref 11.5–14.5)
GLUCOSE SERPL-MCNC: 79 MG/DL (ref 74–99)
HCT VFR BLD AUTO: 26.9 % (ref 41–52)
HGB BLD-MCNC: 8.5 G/DL (ref 13.5–17.5)
MAGNESIUM SERPL-MCNC: 2.08 MG/DL (ref 1.6–2.4)
MCH RBC QN AUTO: 28.8 PG (ref 26–34)
MCHC RBC AUTO-ENTMCNC: 31.6 G/DL (ref 32–36)
MCV RBC AUTO: 91 FL (ref 80–100)
NRBC BLD-RTO: 0 /100 WBCS (ref 0–0)
PHOSPHATE SERPL-MCNC: 4.1 MG/DL (ref 2.5–4.9)
PLATELET # BLD AUTO: 124 X10*3/UL (ref 150–450)
POTASSIUM SERPL-SCNC: 3.9 MMOL/L (ref 3.5–5.3)
RBC # BLD AUTO: 2.95 X10*6/UL (ref 4.5–5.9)
SODIUM SERPL-SCNC: 136 MMOL/L (ref 136–145)
WBC # BLD AUTO: 7.8 X10*3/UL (ref 4.4–11.3)

## 2024-06-06 PROCEDURE — 83735 ASSAY OF MAGNESIUM: CPT

## 2024-06-06 PROCEDURE — 87081 CULTURE SCREEN ONLY: CPT | Mod: GEALAB

## 2024-06-06 PROCEDURE — 2500000004 HC RX 250 GENERAL PHARMACY W/ HCPCS (ALT 636 FOR OP/ED): Performed by: NURSE PRACTITIONER

## 2024-06-06 PROCEDURE — 80069 RENAL FUNCTION PANEL: CPT

## 2024-06-06 PROCEDURE — C9113 INJ PANTOPRAZOLE SODIUM, VIA: HCPCS | Performed by: INTERNAL MEDICINE

## 2024-06-06 PROCEDURE — 2500000001 HC RX 250 WO HCPCS SELF ADMINISTERED DRUGS (ALT 637 FOR MEDICARE OP)

## 2024-06-06 PROCEDURE — 85027 COMPLETE CBC AUTOMATED: CPT

## 2024-06-06 PROCEDURE — 2500000004 HC RX 250 GENERAL PHARMACY W/ HCPCS (ALT 636 FOR OP/ED): Performed by: INTERNAL MEDICINE

## 2024-06-06 PROCEDURE — 99232 SBSQ HOSP IP/OBS MODERATE 35: CPT | Performed by: INTERNAL MEDICINE

## 2024-06-06 PROCEDURE — 2500000001 HC RX 250 WO HCPCS SELF ADMINISTERED DRUGS (ALT 637 FOR MEDICARE OP): Performed by: NURSE PRACTITIONER

## 2024-06-06 PROCEDURE — 1100000001 HC PRIVATE ROOM DAILY

## 2024-06-06 PROCEDURE — 2500000004 HC RX 250 GENERAL PHARMACY W/ HCPCS (ALT 636 FOR OP/ED)

## 2024-06-06 PROCEDURE — 2500000002 HC RX 250 W HCPCS SELF ADMINISTERED DRUGS (ALT 637 FOR MEDICARE OP, ALT 636 FOR OP/ED): Performed by: NURSE PRACTITIONER

## 2024-06-06 RX ORDER — DULOXETIN HYDROCHLORIDE 60 MG/1
60 CAPSULE, DELAYED RELEASE ORAL DAILY
Status: DISCONTINUED | OUTPATIENT
Start: 2024-06-06 | End: 2024-06-07 | Stop reason: HOSPADM

## 2024-06-06 RX ORDER — NALOXONE HYDROCHLORIDE 0.4 MG/ML
0.2 INJECTION, SOLUTION INTRAMUSCULAR; INTRAVENOUS; SUBCUTANEOUS AS NEEDED
Status: DISCONTINUED | OUTPATIENT
Start: 2024-06-06 | End: 2024-06-06

## 2024-06-06 RX ORDER — CAPSAICIN 0.75 MG/G
CREAM TOPICAL 4 TIMES DAILY
Status: DISCONTINUED | OUTPATIENT
Start: 2024-06-06 | End: 2024-06-07 | Stop reason: HOSPADM

## 2024-06-06 RX ORDER — ACETAMINOPHEN 325 MG/1
650 TABLET ORAL EVERY 6 HOURS
Status: DISCONTINUED | OUTPATIENT
Start: 2024-06-06 | End: 2024-06-07 | Stop reason: HOSPADM

## 2024-06-06 RX ORDER — HYDROMORPHONE HYDROCHLORIDE 2 MG/ML
3 INJECTION, SOLUTION INTRAMUSCULAR; INTRAVENOUS; SUBCUTANEOUS ONCE
Status: COMPLETED | OUTPATIENT
Start: 2024-06-06 | End: 2024-06-06

## 2024-06-06 RX ORDER — HYDROMORPHONE HYDROCHLORIDE 1 MG/ML
1 INJECTION, SOLUTION INTRAMUSCULAR; INTRAVENOUS; SUBCUTANEOUS ONCE
Status: COMPLETED | OUTPATIENT
Start: 2024-06-06 | End: 2024-06-06

## 2024-06-06 RX ORDER — PREDNISONE 20 MG/1
20 TABLET ORAL DAILY
Status: DISCONTINUED | OUTPATIENT
Start: 2024-06-06 | End: 2024-06-06

## 2024-06-06 RX ORDER — HEPARIN SODIUM,PORCINE/PF 10 UNIT/ML
50 SYRINGE (ML) INTRAVENOUS AS NEEDED
Status: DISCONTINUED | OUTPATIENT
Start: 2024-06-06 | End: 2024-06-07 | Stop reason: HOSPADM

## 2024-06-06 RX ORDER — HYDROMORPHONE HYDROCHLORIDE 2 MG/ML
3 INJECTION, SOLUTION INTRAMUSCULAR; INTRAVENOUS; SUBCUTANEOUS EVERY 6 HOURS PRN
Status: DISCONTINUED | OUTPATIENT
Start: 2024-06-06 | End: 2024-06-06

## 2024-06-06 RX ORDER — AMOXICILLIN 250 MG
2 CAPSULE ORAL 2 TIMES DAILY
Status: DISCONTINUED | OUTPATIENT
Start: 2024-06-06 | End: 2024-06-07 | Stop reason: HOSPADM

## 2024-06-06 RX ORDER — HYDROMORPHONE HCL/0.9% NACL/PF 15 MG/30ML
PATIENT CONTROLLED ANALGESIA SYRINGE INTRAVENOUS CONTINUOUS
Status: DISCONTINUED | OUTPATIENT
Start: 2024-06-06 | End: 2024-06-07

## 2024-06-06 RX ORDER — HEPARIN SODIUM,PORCINE/PF 10 UNIT/ML
50 SYRINGE (ML) INTRAVENOUS EVERY 12 HOURS
Status: DISCONTINUED | OUTPATIENT
Start: 2024-06-06 | End: 2024-06-07 | Stop reason: HOSPADM

## 2024-06-06 RX ORDER — PREGABALIN 50 MG/1
50 CAPSULE ORAL 2 TIMES DAILY
Status: DISCONTINUED | OUTPATIENT
Start: 2024-06-06 | End: 2024-06-07 | Stop reason: HOSPADM

## 2024-06-06 RX ORDER — DEXAMETHASONE 4 MG/1
8 TABLET ORAL DAILY
Status: CANCELLED | OUTPATIENT
Start: 2024-06-07

## 2024-06-06 RX ORDER — HEPARIN 100 UNIT/ML
500 SYRINGE INTRAVENOUS ONCE AS NEEDED
Status: COMPLETED | OUTPATIENT
Start: 2024-06-06 | End: 2024-06-07

## 2024-06-06 RX ORDER — SODIUM CHLORIDE 0.9 % (FLUSH) 0.9 %
10 SYRINGE (ML) INJECTION AS NEEDED
Status: DISCONTINUED | OUTPATIENT
Start: 2024-06-06 | End: 2024-06-07 | Stop reason: HOSPADM

## 2024-06-06 RX ORDER — HYDROMORPHONE HYDROCHLORIDE 2 MG/ML
1.5 INJECTION, SOLUTION INTRAMUSCULAR; INTRAVENOUS; SUBCUTANEOUS EVERY 6 HOURS PRN
Status: DISCONTINUED | OUTPATIENT
Start: 2024-06-06 | End: 2024-06-06

## 2024-06-06 RX ORDER — DEXAMETHASONE 4 MG/1
8 TABLET ORAL EVERY 12 HOURS
Status: DISCONTINUED | OUTPATIENT
Start: 2024-06-07 | End: 2024-06-06

## 2024-06-06 RX ORDER — PREDNISONE 20 MG/1
20 TABLET ORAL DAILY
Status: DISCONTINUED | OUTPATIENT
Start: 2024-06-07 | End: 2024-06-07 | Stop reason: HOSPADM

## 2024-06-06 RX ADMIN — HYDROMORPHONE HYDROCHLORIDE 1 MG: 1 INJECTION, SOLUTION INTRAMUSCULAR; INTRAVENOUS; SUBCUTANEOUS at 10:29

## 2024-06-06 RX ADMIN — HYDROMORPHONE HYDROCHLORIDE: 10 INJECTION, SOLUTION INTRAMUSCULAR; INTRAVENOUS; SUBCUTANEOUS at 16:53

## 2024-06-06 RX ADMIN — HYDROMORPHONE HYDROCHLORIDE 1 MG: 1 INJECTION, SOLUTION INTRAMUSCULAR; INTRAVENOUS; SUBCUTANEOUS at 09:36

## 2024-06-06 RX ADMIN — HYDROMORPHONE HYDROCHLORIDE 3 MG: 2 INJECTION INTRAMUSCULAR; INTRAVENOUS; SUBCUTANEOUS at 16:29

## 2024-06-06 RX ADMIN — HYDROMORPHONE HYDROCHLORIDE 2 MG: 2 INJECTION INTRAMUSCULAR; INTRAVENOUS; SUBCUTANEOUS at 00:17

## 2024-06-06 RX ADMIN — ENOXAPARIN SODIUM 70 MG: 80 INJECTION SUBCUTANEOUS at 17:12

## 2024-06-06 RX ADMIN — HYDROMORPHONE HYDROCHLORIDE 2 MG: 2 INJECTION INTRAMUSCULAR; INTRAVENOUS; SUBCUTANEOUS at 03:24

## 2024-06-06 RX ADMIN — HYDROMORPHONE HYDROCHLORIDE 3 MG: 2 INJECTION INTRAMUSCULAR; INTRAVENOUS; SUBCUTANEOUS at 12:24

## 2024-06-06 RX ADMIN — HYDROMORPHONE HYDROCHLORIDE 2 MG: 2 INJECTION INTRAMUSCULAR; INTRAVENOUS; SUBCUTANEOUS at 06:15

## 2024-06-06 RX ADMIN — CAPSAICIN: 0.75 CREAM TOPICAL at 17:12

## 2024-06-06 RX ADMIN — DULOXETINE HYDROCHLORIDE 60 MG: 60 CAPSULE, DELAYED RELEASE ORAL at 17:12

## 2024-06-06 RX ADMIN — DOCUSATE SODIUM AND SENNOSIDES 2 TABLET: 8.6; 5 TABLET, FILM COATED ORAL at 13:13

## 2024-06-06 RX ADMIN — SODIUM CHLORIDE, POTASSIUM CHLORIDE, SODIUM LACTATE AND CALCIUM CHLORIDE 125 ML/HR: 600; 310; 30; 20 INJECTION, SOLUTION INTRAVENOUS at 06:15

## 2024-06-06 RX ADMIN — ACETAMINOPHEN 650 MG: 325 TABLET ORAL at 21:30

## 2024-06-06 RX ADMIN — HEPARIN, PORCINE (PF) 10 UNIT/ML INTRAVENOUS SYRINGE 50 UNITS: at 09:39

## 2024-06-06 RX ADMIN — POLYETHYLENE GLYCOL 3350 17 G: 17 POWDER, FOR SOLUTION ORAL at 09:39

## 2024-06-06 RX ADMIN — DEXAMETHASONE SODIUM PHOSPHATE 16 MG: 10 INJECTION, SOLUTION INTRAMUSCULAR; INTRAVENOUS at 17:55

## 2024-06-06 RX ADMIN — ACETAMINOPHEN 650 MG: 325 TABLET ORAL at 16:35

## 2024-06-06 RX ADMIN — PREGABALIN 50 MG: 50 CAPSULE ORAL at 21:30

## 2024-06-06 RX ADMIN — PANTOPRAZOLE SODIUM 40 MG: 40 INJECTION, POWDER, FOR SOLUTION INTRAVENOUS at 09:39

## 2024-06-06 RX ADMIN — ENOXAPARIN SODIUM 70 MG: 80 INJECTION SUBCUTANEOUS at 06:26

## 2024-06-06 ASSESSMENT — COGNITIVE AND FUNCTIONAL STATUS - GENERAL
MOBILITY SCORE: 24
DAILY ACTIVITIY SCORE: 24

## 2024-06-06 ASSESSMENT — ENCOUNTER SYMPTOMS
NAUSEA: 0
COUGH: 0
FATIGUE: 1
BACK PAIN: 0
CONSTIPATION: 0
BLOOD IN STOOL: 0
WOUND: 1
VOMITING: 0
CHEST TIGHTNESS: 0
ABDOMINAL PAIN: 1
LIGHT-HEADEDNESS: 0
NUMBNESS: 1
HEADACHES: 0
FEVER: 0
DIFFICULTY URINATING: 0
DIZZINESS: 0
ABDOMINAL DISTENTION: 1
ANAL BLEEDING: 0
ACTIVITY CHANGE: 1
WHEEZING: 0
SHORTNESS OF BREATH: 1
DIAPHORESIS: 0
PALPITATIONS: 0
DIARRHEA: 0
APPETITE CHANGE: 1
CHILLS: 0

## 2024-06-06 ASSESSMENT — PAIN DESCRIPTION - LOCATION
LOCATION: ABDOMEN

## 2024-06-06 ASSESSMENT — PAIN SCALES - GENERAL
PAINLEVEL_OUTOF10: 2
PAINLEVEL_OUTOF10: 10 - WORST POSSIBLE PAIN
PAINLEVEL_OUTOF10: 9
PAINLEVEL_OUTOF10: 7
PAINLEVEL_OUTOF10: 7

## 2024-06-06 ASSESSMENT — PAIN DESCRIPTION - ORIENTATION: ORIENTATION: LEFT

## 2024-06-06 NOTE — CONSULTS
PALLIATIVE MEDICINE CONSULT   Attending Physician: Dara Martinez DO  Reason For Consult: Assistance with determining goals of care, complex medical decision making, code status discussion, and symptom management    INTRODUCTIONS   Patient's capacity: Good and is ableto participate in the visit  Family present:  none  Service: Palliative care was introduced as a resource for patients with serious illness to help with symptoms, assist with goals of care conversations, navigate complex decision making, improve quality of life for patients, and provide support to patients and families.     SUBJECTIVE   History of the Present Illness  Massimo Garcia is a 63 y.o. male with pertinent PMH HTN, third degree heart block s/p pacemaker (2023), portal vein thrombosis,  PE (on chronic  eliquis), OA, stage IV esophageal adenocarcinoma with known mets to lung, liver, diffuse, T/L spine, R occipital, L temporal + brain + edema right posterior parasagittal parietal lobe,  last chemo on 4/29, and recent admit for drug-induced colitis. Recent admission for s/p T10 transpedicular decompression and a T8-T12 fusion on 05/17 requiring wound vac and lumbar drain, during the admission he had low grade malignancy-associated DIC.    This time, pt presented to the ED on from home with a chief complaint of LLQ abdominal pain  that started about a month ago that was a bit after the back surgery, but has been managed with home po dilaudid 4mg and fent patch until around 2am on  6/5 and progressively got worse despite using home dilaudid of 4mg, lyrica, and fentanyl patch up to 100mcg. Also has low and mid back pain. The patient then presented to the ED and the pain persisted.  The pain is described as stabbing and sharp.  He has been given multiple doses of dilaudid IV from 1mg up to 3mg at a time and is on the fentanyl patch 100mcg. Pain is constant and improves with iv dilaudid use. Nothing makes it better or worse that he can think of. He denies N,  V,D, C. Imaging findings pertinent for many areas of mets and possible SBO.  Tolerating a CLD wihtout issues and is ambulating in his room to the bathroom. Intial labs pertinent for  hypomagnesemia, elevated lactate, mild elevation in the AST, and anemia . The pain has improved a little with pain medications at best down to 2/10, but the pain medications only last about 2-3 hrs before pain has gotten severe from 7-9/10.  Palliative Medicine was consulted to determine goals of care and symptom management.     The  additional history was obtained from record review due to pt being a poor historian about some important details.     Review of Systems/Symptoms:   Other 10pt review of systems negative or unobtainable unless otherwise mentioned in the HPI    Past Medical History   has a past medical history of Essential (primary) hypertension (12/21/2013), Pacemaker, Peripheral neuropathy, Personal history of other diseases of the circulatory system, and Pneumothorax (12/04/2023).    Past Surgical History   has a past surgical history that includes Total knee arthroplasty (09/30/2016); Total knee arthroplasty (09/30/2016); Cardiac electrophysiology procedure (N/A, 11/7/2023); and Cardiac electrophysiology procedure (Left, 11/9/2023).    Family Medical History  Family History   Problem Relation Name Age of Onset    Cancer Father         Home Medications  Reviewed    Allergies  Allergies   Allergen Reactions    Bee Venom Protein (Honey Bee) Anaphylaxis    Oxaliplatin Other     Rigors       Social History  The patient is . Has 3 children who are involved in his life. They are all supportive of him. He reports that he and his famiyl are managing emotionally the best that they can. He rates the way they are dealing with his health and anticipated situation as fair. 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 He reports that he has quit smoking. His smoking  "use included cigarettes and cigars. He has never been exposed to tobacco smoke. He has never used smokeless tobacco. He reports that he does not currently use alcohol. He reports current drug use. Drug: Marijuana..      Anabaptism and Spirituality:  None  He reports that his  came to visit him today and that they addressed his spiritual concerns. He did not want to disclose them. He plans to have regular visits wit him.   He  denies other spiritual needs at this time.     ADVANCE CARE PLANNING AND COUNSELING   Serious Illness Perception   Perspective: Patient  Impression of disease and prognosis: Serious and that the cancer will cause his death.   General Feelings about this all: Pt voluntarily expressed feeling somewhat like there is some depression and anxiety at times about his health situation. He expressed that he knew that one day he had to die, but didn't think it would be this way.   Concerns about the future:  He wonders what the future actually look like for him.   Hopes: Better control over his symptoms and prolong life.   Minimal acceptable quality of Life: Able to be independent with most ADL's, able to walk / transfer with some help, the ability to recognize and clearly verbally communicate with others in a meaningful way  Maximum health burden for the possibility of more time: Willing to undergo the perceived negative outcomes of treatments that negatively effect overall wellbeing and quality of life  Health preferences and priorities at EOL: comfort and return to home  Rating of family knowledge about pt's disease, priorities and wishes: Good    Advanced Directive Documents  Has not completed LW or HCPOA , but states that his wife is \" working on it.   He did express wanting his wife as his HCPOA and that he would not want to exist on machines if he were terminally ill or in a permanent coma.     Emergency Contact Information  Extended Emergency Contact Information  Primary Emergency Contact: " Jane Garcia  Address: [address] [place] [address] [place]  Home Phone: [phone]  Mobile Phone: [phone]  Relation: Spouse  Secondary Emergency Contact: DA WORKMAN  Mobile Phone: [phone]  Relation: Daughter  Preferred language: English   needed? No    Resuscitation Assessment   Beneficial 2/2 it would help meet goals for efforts at survival    Counseling and Support Provided  Counseling provided on clinical condition, including diagnoses  advanced cancer  , poor prognosis overall, and known treatments that are currently recorded as being options for cancer. Counseling provided on symptom management including pharmacologic and non pharmacologic mgmt.    Reviewed advanced directive concepts and did offer for the SW here to assist, but pt declines at this time.   Deferred hospice education/conversation today due to pt feeling really overwhelmed with trying to keep track of pain control and feeling like he needs time to process all that is going on with him.   Resuscitation options with respect to QOL, disease stage, and expected outcomes.  Patient verbalized understanding of this information.    All questions were answered to their satisfaction.  Support and empathy was provided throughout the conversation.    OBJECTIVE   Inpatient Medications  [Held by provider] apixaban, 5 mg, oral, BID  enoxaparin, 1 mg/kg, subcutaneous, q12h  fentaNYL, 1 patch, transdermal, q72h  heparin flush, 50 Units, intra-catheter, q12h  pantoprazole, 40 mg, intravenous, Daily  polyethylene glycol, 17 g, oral, Daily  sennosides-docusate sodium, 2 tablet, oral, BID      Continuous medications  lactated Ringer's, 125 mL/hr, Last Rate: 125 mL/hr (06/06/24 0615)      PRN medications  PRN medications: acetaminophen, alteplase, alteplase, heparin flush, heparin flush, HYDROmorphone **OR** HYDROmorphone, LORazepam, naloxone, ondansetron ODT **OR** ondansetron, ondansetron, prochlorperazine,  "promethazine, sodium chloride 0.9%, sodium chloride 0.9%     Opioid Use  I have personally verified OARRS report via EMR link to OARRS website.   No suspicious activity noted.  I have considered the risks of abuse, dependence, addiction and diversion.   Noted to have required 1000mg OME over the past 24 hours including fentanyl patch and IV dilaudid and pain is still not controlled    Last Recorded Vitals  Blood pressure 107/69, pulse (!) 111, temperature 37.4 °C (99.3 °F), temperature source Temporal, resp. rate 18, height 1.727 m (5' 8\"), weight 73.5 kg (162 lb), SpO2 92%.  7 Day Weight Change: Unable to Calculate   Body mass index is 24.63 kg/m².   Weight change:      Relevant Results  Lab Results   Component Value Date    WBC 7.8 06/06/2024    HGB 8.5 (L) 06/06/2024    HCT 26.9 (L) 06/06/2024    MCV 91 06/06/2024     (L) 06/06/2024      Lab Results   Component Value Date    GLUCOSE 79 06/06/2024    CALCIUM 8.2 (L) 06/06/2024     06/06/2024    K 3.9 06/06/2024    CO2 28 06/06/2024     06/06/2024    BUN 11 06/06/2024    CREATININE 0.68 06/06/2024      Lab Results   Component Value Date    ALT 39 06/05/2024    AST 62 (H) 06/05/2024    ALKPHOS 217 (H) 06/05/2024    BILITOT 1.4 (H) 06/05/2024      Lab Results   Component Value Date    ALBUMIN 3.0 (L) 06/06/2024      Serum creatinine: 0.68 mg/dL 06/06/24 0615  Estimated creatinine clearance: 104.8 mL/min     Relevant Imaging  CT abdomen pelvis w IV contrast    Result Date: 6/5/2024  Interpreted By:  Cedric Castillo, STUDY: CT ABDOMEN PELVIS W IV CONTRAST; 6/5/2024 10:38 am   INDICATION: Signs/Symptoms:severe llq pain;   COMPARISON: 05/09/2024   ACCESSION NUMBER(S): [id number]   ORDERING CLINICIAN: SOTO GARNETT   TECHNIQUE: Contiguous axial images of the abdomen/pelvis were performed with IV contrast. 75 ml of Omnipaque 350 was utilized. Coronal and sagittal reformatted images were also obtained. All CT examinations are performed with " 1 or more of the following dose reduction techniques: Automated exposure control, adjustment of mA and/or kv according to patient's size, or use of iterative reconstruction techniques.     FINDINGS: Interval development of a small left pleural effusion. Redemonstration of multiple nodular opacity at the imaged lung bases compatible with metastatic disease.   There is redemonstration of multiple masses in the right and left hepatic lobes consistent with a metastatic disease. Interval enlargement of hepatic lesions, the largest in the right hepatic dome measuring 6.2 x 5.7 cm. The gallbladder is present. Subtle calcification in the gallbladder may correspond to gallstones. No dilatation of the common bile duct.   The pancreas, spleen, and adrenal glands are stable in appearance. Mild splenomegaly is again noted, the spleen measures a proximally 14.6 cm in length.   Symmetric enhancement of the kidneys. No hydronephrosis.   Atherosclerotic calcification of the abdominal aorta and bilateral iliac arteries.   Enlarged tiffany hepatis and peripancreatic lymph nodes, slightly increased in size from the prior study with a large confluent lymph node measuring 5.8 x 2.8 cm. Mild interval increase in size of enlarged retroperitoneal lymph nodes. Enlarged right iliac chain lymph node, increased in size measuring 4.3 x 3.5 cm.   There are mildly loops dilated loops of jejunum measuring up to 3.4 cm, early developing partial small bowel obstruction can not be excluded. Colon is unremarkable with no acute inflammatory process. Normal stool burden. Appendix is within normal limits.   Urinary bladder is underdistended and not well evaluated.   Small amount of free fluid the pelvis which measures simple fluid attenuation.   Postsurgical change of anterior pelvic wall repair.       Interval enlargement of visualized pulmonary nodules in the imaged lower lungs compatible with metastatic disease.   Interval development of a small left  pleural effusion.   Redemonstration of multiple hepatic masses and abdominal and pelvic lymphadenopathy compatible with metastatic disease. There has been interval enlargement of hepatic lesions and lymph nodes since the prior study.   Mildly loops dilated loops of jejunum measuring up to 3.4 cm, early developing partial small bowel obstruction can not be excluded.   Signed by: Cedric Castillo 6/5/2024 11:51 AM Dictation workstation:   [id number]    ECG 12 lead    Result Date: 5/22/2024  Electronic ventricular pacemaker When compared with ECG of 04-APR-2024 18:37, Vent. rate has increased BY   2 BPM Confirmed by Marcio Murillo ([id number]) on 5/22/2024 8:02:48 AM    MR thoracic spine w and wo IV contrast    Result Date: 5/22/2024  Interpreted By:  Rosalio Aaron and Sheng Max STUDY: MR THORACIC SPINE W AND WO IV CONTRAST;  5/21/2024 1:25 pm   INDICATION: Signs/Symptoms:postop tumor debulking--use 1.5T magnet if possible (has titanium).   Per EMR: 63-year-old male history of metastatic esophageal adenocarcinoma. MRI on 05/16 showed epidural extension with cord compression at level T9 and T10. There is additional nodular lesion at L2 abutting the cauda equina concerning for drop metastasis. On 05/17 patient underwent T10 decompression and T8-T12 fusion. MRI for further evaluation.   COMPARISON: MRI thoracic spine dated 05/16/2024   ACCESSION NUMBER(S): [id number]   ORDERING CLINICIAN: ORLIN GREEN   TECHNIQUE: Sagittal STIR, sagittal T2, sagittal T1, axial T2, axial T1, as well as post gadolinium sagittal axial T1 weighted MRI images through the thoracic spine were obtained. The patient received 14 mL of Dotarem gadolinium intravenously.   FINDINGS: There are new postoperative changes compatible with interval posterior laminectomies at the T9 and T10 levels as well as resection of the bilateral facets at the T9 and T10 levels in keeping with the patient's clinical history of a spinal decompression surgery and  debulking of osseous and epidural metastatic disease. There is new metallic artifact from posterior-lateral orthopedic fixation hardware and pedicle screws extending from the T8 through T12 levels with bilateral pedicle screws noted at the T8, T9, T11, and T12 levels.   There has been interval debulking of the previously noted enhancing epidural soft tissue at the T9 and T10 levels when compared with the prior study dated 05/16/2024. The current study demonstrates residual but improved abnormal epidural thickening and enhancement extending from the T8 through T11 levels. There is residual  narrowing of the thecal sac within the spinal canal extending from the T8/9 level through T11 level with residual effacement of the subarachnoid space surrounding the thoracic spinal cord as well as a component of flattening of the margins of the thoracic spinal cord extending from the T9 through T11 levels.   There is residual abnormal bone marrow signal within the T10 and T9 vertebrae compatible with the residual osseous metastatic disease. There is residual abnormal enhancing soft tissue a left paraspinal location extending from the T8/9 level caudally to the T11/12 level with extension into the left neural foramen at the T9/10, T10/11, and T11/12 levels suggesting a component of residual neoplasm/metastatic disease.   The thoracic vertebral bodies remain in anatomic alignment.   There is again evidence of mild multilevel spondylosis.   There are scattered lesions noted within the partially imaged liver most suspicious for liver metastasis.   There are nonspecific patchy opacities noted within the partially imaged lungs. Correlation with dedicated chest imaging is recommended.         There are new postoperative changes compatible with interval posterior laminectomies at the T9 and T10 levels as well as resection of the bilateral facets at the T9 and T10 levels in keeping with the patient's clinical history of a spinal  decompression surgery and debulking of osseous and epidural metastatic disease. There is new metallic artifact from posterior-lateral orthopedic fixation hardware and pedicle screws extending from the T8 through T12 levels with bilateral pedicle screws noted at the T8, T9, T11, and T12 levels.   There has been interval debulking of the previously noted enhancing epidural soft tissue at the T9 and T10 levels when compared with the prior study dated 05/16/2024. The current study demonstrates residual but improved abnormal epidural thickening and enhancement extending from the T8 through T11 levels. There is residual narrowing of the thecal sac within the spinal canal extending from the T8/9 level through T11 level with residual effacement of the subarachnoid space surrounding the thoracic spinal cord as well as a component of flattening of the margins of the thoracic spinal cord extending from the T9 through T11 levels.   There is residual abnormal bone marrow signal within the T10 and T9 vertebrae compatible with the residual osseous metastatic disease. There is residual abnormal enhancing soft tissue a left paraspinal location extending from the T8/9 level caudally to the T11/12 level with extension into the left neural foramen at the T9/10, T10/11, and T11/12 levels suggesting a component of residual neoplasm/metastatic disease.     I personally reviewed the images/study and I agree with the findings as stated by Dr. Brett Lewis. This study was interpreted at [place], [place].   MACRO: None   Signed by: Rosalio Aaron 5/22/2024 7:34 AM Dictation workstation:   [id number]    Upper extremity venous duplex bilateral    Result Date: 5/21/2024  Interpreted By:  Wes Bailey and Afshari Mirak Sohrab STUDY: [facility] UPPER EXTREMITY VENOUS DUPLEX BILATERAL;  5/20/2024 11:01 am   INDICATION: Signs/Symptoms:New LUE swelling, concern for clot given holding AC in s/o  recent procedure, hypercoagulable state d/t malignancy.   COMPARISON: None.   ACCESSION NUMBER(S): [id number]   ORDERING CLINICIAN: ORLIN GREEN   TECHNIQUE: Vascular ultrasound of the bilateral upper extremities was performed. Evaluation was performed with grayscale, color, and spectral Doppler. When possible, compression views of the evaluated veins was also performed.   FINDINGS: Limited evaluation due to edema, tortuous and small-vessel.   RIGHT UPPER EXTREMITY: Evaluation of the visualized portions of the right internal jugular, innominate, subclavian, axillary, brachial, cephalic, and basilic veins was performed.   There is normal respiratory variation, normal compressibility, as well as normal color doppler signal in the visualized vessels. There is no evidence of thrombus.   LEFT UPPER EXTREMITY: Evaluation of the visualized portions of the left internal jugular, innominate, subclavian, axillary, brachial, cephalic, and basilic veins was performed.   Left cephalic vein is compressible but demonstrates no significant flow. Otherwise, there is normal respiratory variation, normal compressibility, as well as normal color doppler signal in the visualized vessels. There is no evidence of thrombus.   There is a normal-appearing lymph node measuring 2.1 x 0.9 x 0.9 cm in seen in the neck. An additional round hypoechoic structure adjacent and lateral to the pacemaker in the left chest wall measures 1.6 x 1.5 x 1.5 cm is noted.       Left cephalic venous compressible but demonstrates no significant flow, concerning for thrombophlebitis. No evidence of additional findings to suggest for DVT bilateral upper extremities. There is a round hypoechoic structure adjacent and lateral to the pacemaker in the left chest wall without significant internal vascularity which may represent an adjacent lymph node versus fibrotic tissue or fat necrosis.   I personally reviewed the images/study and I agree with the findings as  stated by resident physician Dr. Ken Vinson . This study was interpreted at [facility], [place], [place].   MACRO: Ken Vinson discussed the significance and urgency of this critical finding by telephone with  Adi Mays on 5/20/2024 at 11:54 am.  (**-RCF-**) Findings:  See findings.   Signed by: Wes Gomez 5/21/2024 12:16 AM Dictation workstation:   [id number]    ECG 12 Lead    Result Date: 5/21/2024  Electronic ventricular pacemaker When compared with ECG of 19-MAY-2024 06:29, (unconfirmed) No significant change was found Confirmed by Kirill Gunter ([id number]) on 5/21/2024 12:41:58 AM    ECG 12 Lead    Result Date: 5/21/2024  Electronic Ventricular Pacemaker Abnormal ECG When compared with ECG of 18-MAY-2024 22:04, (unconfirmed) No significant change was found Confirmed by Kirill Gunter ([id number]) on 5/21/2024 12:40:52 AM    XR thoracolumbar spine 2 views    Result Date: 5/20/2024  Interpreted By:  Julieta Mayorga, STUDY: Thoracolumbar spine, 2 views.   INDICATION: Signs/Symptoms:standing uprights after thoracic fusion.   COMPARISON: Thoracic spine MRI 05/16/2024.   ACCESSION NUMBER(S): [id number]   ORDERING CLINICIAN: ORLIN GREEN   FINDINGS: Alignment is within normal limits. Status post posterior spinal instrumented fusion from T8-T12 with posterior decompression at T10. Hardware is intact without perihardware fractures or lucencies. Vertebral body heights are maintained. Degenerative changes of the upper lumbar levels with disc height loss and osteophytes.       Postsurgical changes as described above without hardware complication.   Signed by: Julieta Mayorga 5/20/2024 3:42 PM Dictation workstation:   [id number]    Lower extremity venous duplex bilateral    Result Date: 5/20/2024  Interpreted By:  Eder Mckenna,  and Faye Rogers STUDY: VASC US LOWER EXTREMITY VENOUS DUPLEX BILATERAL;  5/20/2024 10:09 am   INDICATION:  Signs/Symptoms:New lower extremity swelling, holding AC in s/o recent procedure.   COMPARISON: None.   ACCESSION NUMBER(S): [id number]   ORDERING CLINICIAN: ORLIN GREEN   TECHNIQUE: Vascular ultrasound of the bilateral lower extremities was performed. Real-time compression views as well as Gray scale, color Doppler and spectral Doppler waveform analysis was performed.   FINDINGS: Evaluation of the visualized portions of the bilateral common femoral vein, proximal, mid, and distal femoral vein, and popliteal vein were performed.  Evaluation of the visualized portions of the  posterior tibial and peroneal veins were also performed.   Limitations:  Limited evaluation due to edema and morphology of the veins.   There is a small caliber of the distal femoral vein on the left and minimal flow was detected on Doppler interrogation. Otherwise, no evidence of deep vein thrombus within the visualized bilateral lower extremities.         No significant flow was detected on Doppler images in the left distal femoral vein which can be artifactual due to small caliber of the vein. However, deep vein thrombus can not be excluded. No evidence of additional DVT in bilateral lower extremities.   I personally reviewed the images/study and I agree with the findings as stated by resident physician Dr. Ken Vinson . This study was interpreted at [place], [place].   MACRO: None   Signed by: Eder Mckenna 5/20/2024 2:12 PM Dictation workstation:   [id number]    XR chest 1 view    Result Date: 5/19/2024  Interpreted By:  Spike Alvarado and Weaver Jakob STUDY: XR CHEST 1 VIEW; 5/19/2024 10:34 am   INDICATION: Signs/Symptoms:h/o tachyarrhythmia overnight, position of pacemaker.   COMPARISON: Chest radiograph 05/11/2024, abdominopelvic CT 05/09/2024   ACCESSION NUMBER(S): [id number]   ORDERING CLINICIAN: ORLIN GREEN   FINDINGS: Single AP radiograph of the chest was obtained.  Stable appearance of aleftchest wall dual chamber cardiac pacemakerwith leads projecting over the cardiac chambers.   CARDIOMEDIASTINAL SILHOUETTE: Cardiomediastinal silhouette is stable in size and configuration.   LUNGS: Scattered bilateral pulmonary nodules similar to prior radiograph and better evaluated on 05/09/2024 CT. Interval worsening of diffuse interstitial prominence and superimposed subtle hazy opacities, which may represent a component of pulmonary edema. There is no sizable pleural effusion or pneumothorax.   ABDOMEN: No remarkable upper abdominal findings.   BONES: No acute osseous abnormality.  The patient is status post interval thoracolumbar spine fusion hardware with overlying multiple midline skin staples. Changes of bilateral shoulder arthroplasties again noted.       1. Interval worsening of diffuse interstitial prominence and superimposed subtle hazy opacities, which may represent a component of pulmonary edema. 2. Scattered bilateral pulmonary nodules similar to prior radiograph and better evaluated on 05/09/2024 CT. 3. Medical devices as above.   I personally reviewed the images/study and I agree with the findings as stated by Roney Joyner MD. This study was interpreted at [place], [place].   Signed by: Spike Alvarado 5/19/2024 2:44 PM Dictation workstation:   [id number]    [place] fluoro images no charge    Result Date: 5/17/2024  These images are not reportable by radiology and will not be interpreted by  Radiologists.    FL fluoro images no charge    Result Date: 5/17/2024  These images are not reportable by radiology and will not be interpreted by  Radiologists.    CT thoracic spine wo IV contrast    Result Date: 5/17/2024  Interpreted By:  Sreekanth Erickson and Baker Zachary STUDY: CT THORACIC SPINE WO IV CONTRAST;  5/17/2024 12:00 am   INDICATION: Signs/Symptoms:Surgical planning -OR Friday 5/17.   COMPARISON: Same day MRI thoracic spine. CT  chest on 03/18/2024 and CT abdomen and pelvis on 05/19/2024.   ACCESSION NUMBER(S): [id number]   ORDERING CLINICIAN: ORLIN GREEN   TECHNIQUE: Axial noncontrast images of the thoracic spine with coronal and sagittal reconstructed images.   FINDINGS: Surgical planning scan.   There is straightening of the thoracic spine. There is slight retrolisthesis at T12-L1.   There is stable slight anterior wedging of the T12 vertebral body. Subtle sclerosis is seen within the T10 vertebral body which probably corresponds to metastasis better seen on the prior MRI. T9 metastasis, seen on the prior MRI, is at best partially visualized on CT.   There is mild intervertebral disc height narrowing at L1-L2 and L2-L3. There are chronic degenerative endplate changes at multiple levels. There is moderate to marked left facet osteoarthropathy at T10-T11.   Findings within the spinal canal, including cord compression the level of T10 was better seen on the prior MRI. There is extension of tumor into the left T9-T10 and T10-T11 neural foramina as well as the proximal aspect of the right T10-T11 neural foramen, again better seen on the prior MRI. There is extension of mass into the left paraspinal soft tissues located just posterior to the aorta at the level of T9-T10 and unchanged since the recent MRI.   There is no striking erosion or scalloping of the visualized osseous structures related to metastasis.   Partially visualized lungs demonstrate multiple nodules which are most consistent with metastases. Since the prior chest CT, there are new areas of patchy and ground-glass opacity within the left-greater-than-right lungs and could be infectious or inflammatory in etiology or could reflect pulmonary edema. There is a small left pleural effusion passive atelectasis.       Surgery planning scan with findings detailed above.   Since the prior chest CT, is interval development of ground-glass and patchy opacities within the bilateral  lungs, left-greater-than-right, which could be infectious or inflammatory in etiology or could reflect pulmonary edema, correlate clinically and consider further evaluation with chest imaging as clinically indicated.     I personally reviewed the images/study and I agree with the findings as stated by Dr. Nadeem Naranjo M.D. This study was interpreted at [place], [place].   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 CT lumbar spine 05/09/2024. CT head 02/28/2024.   ACCESSION NUMBER(S): [id number]; [id number]; [id number]; [id number]   ORDERING CLINICIAN: ORLIN GREEN   TECHNIQUE: Axial T2, FLAIR, DWI, gradient echo T2 and sagittal and coronal T1 weighted images of brain were acquired.   Multiplanar multisequence MR imaging of the cervical, thoracic, and lumbar spine was performed. Sagittal T1, T2, STIR and axial T2 and T1 weighted MR images of the total spine were obtained.   A total of 14 mL of intravenous gadolinium based contrast (Dotarem) was administered prior to multiplanar multisequence and volumetric image acquisition of the brain and full spine.   FINDINGS: BRAIN:   No abnormal diffusion restriction to suggest acute ischemia.   There is a 1.3 x 1.3 x 1.3 cm (CC X TRV X AP) peripherally enhancing and intrinsically T2/FLAIR hypointense lesion within the right posterior parasagittal parietal lobe (series 10, image 85).  Surrounding this lesion there is T2/FLAIR hyperintensity suggestive of associated edema. Minimal local mass effect without significant midline shift.   There is an additional similar appearing peripherally enhancing and intrinsically T2/FLAIR hypointense lesion within the left anterior temporal lobe measuring 0.8 x 0.9 x 0.7 cm (CC X TRV X AP) without significant associated edema or mass effect.   These lesions are new when compared to prior MRI dated 11/06/2023 and are not appreciated on recent CT dated 02/28/2024.   No extra-axial collection. No intraparenchymal hemorrhage. No diffusion restriction or susceptibility artifact associated with the above-described lesions.   The major intracranial flow voids are maintained.   The ventricles, cortical sulci, and basal cisterns are nonenlarged.   Paranasal sinuses and mastoid air cells are well aerated. No significant calvarial or soft tissue abnormality.     CERVICAL SPINE:   Segmentation: There are 7 non-rib-bearing cervical vertebrae.   Alignment: The vertebral alignment is within normal limits.   Vertebrae/Intervertebral Discs: The vertebral body heights are maintained. Faint enhancement within the anterior aspect of the C2 spinous process greater than other levels could reflect a component of neoplastic change in this region. The marrow signal is otherwise within normal limits. The disc spaces are preserved. No abnormal enhancement.   Spinal cord: Normal in caliber and signal. No abnormal enhancement.   C1-C2: The cervicomedullary junction appears unremarkable. No significant neural foraminal or spinal canal stenosis. C2-C3: There is no posterior disc contour abnormality. No significant neural foraminal or spinal canal stenosis. C3-C4: Small disc osteophyte complex. No significant neural foraminal or spinal canal stenosis. C4-C5: Small disc osteophyte complex. No significant neural foraminal or spinal canal stenosis. C5-C6: Small disc osteophyte complex. No  significant neural foraminal or spinal canal stenosis. C6-C7: Small disc osteophyte complex. No significant neural foraminal or spinal canal stenosis. C7-T1: There is no posterior disc contour abnormality. No significant neural foraminal or spinal canal stenosis.   Paraspinous Soft Tissues: The prevertebral and posterior paraspinous soft tissues are unremarkable.     THORACIC SPINE:   Segmentation: There are 12 rib-bearing thoracic vertebrae.   Alignment: Within normal limits.   Vertebrae/Intervertebral Discs/cord:   There is an ill-defined infiltrating T1/T2 hypointense and STIR hyperintense lesion filling the marrow space of the entire T10 vertebral body and extending along the pedicles and posterior elements. There is a similar process involving the inferior and posterior aspects of the T9 vertebral body. There is mild irregular associated incomplete peripheral enhancement adjacent to the T9 lesion.   There is masslike extension posteriorly and right laterally disrupting the posterior walls of the T9 and T10 vertebral bodies with invasion into the ventral epidural space and associated deformity  of the spinal cord at the T10 level with subtle spinal cord signal abnormality not excluded. There is mild cranial and caudal extension of this mass along the ventral epidural space and right paraspinal region 2 T8-9 rostrally and approximately T11 caudally.   There is extension of the mass along the right anterior paraspinal region from the levels of T8 to T11 with the mass closely abutting the descending aorta (thoracic spine series 18, image 18). There is questionable invasion of the aortic wall at the level of the T9 vertebral body (series 19, image 19).   Minimal STIR hyperintensity of the cord just proximal and distal to the region of compression. No significant abnormal enhancement of the cord.   Mild heterogeneity of the remaining visualized marrow signal.   T1-T2:  No significant neural foraminal or spinal canal  stenosis. T2-T3:  No significant neural foraminal or spinal canal stenosis. T3-T4:  No significant neural foraminal or spinal canal stenosis. T4-T5:  No significant neural foraminal or spinal canal stenosis. T5-T6:  No significant neural foraminal or spinal canal stenosis. T6-T7:  No significant neural foraminal or spinal canal stenosis. T7-T8:  No significant neural foraminal or spinal canal stenosis. T8-T9:  There is mass infiltration into the right-greater-than-left neural foramina with associated mild-to-moderate stenosis. T9-T10: There is mass filling the right neural foramen with complete effacement of the right neural foramen. Mild left neural foraminal stenosis. T10-T11:  There is mass filling the right neural foramen with near complete effacement of the right neural foramen. Mild left neural foraminal stenosis. T11-T12: There is a mass infiltration into the right neural foramen with severe stenosis. No left neural foraminal stenosis. T12-L1:  No significant neural foraminal or spinal canal stenosis.     LUMBAR SPINE:   Segmentation: There are 5 non-rib-bearing lumbar vertebrae.   Alignment: Minimal retrolisthesis of L4 on L5. The remaining alignment is maintained.   Vertebrae/Intervertebral Discs: The vertebral bodies demonstrate expected height. The marrow signal is within normal limits. Mild multilevel desiccated disc signal.   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 The conus terminates at the L1 inferior endplate. Likely subtle additional nodular enhancement of the cauda equina probably due to additional metastatic deposits.   T12-L1:  No significant neural foraminal or spinal canal stenosis. L1-2:  No significant neural foraminal or spinal canal stenosis. L2-3: Mild posterior disc bulge. No significant  neural foraminal or spinal canal stenosis. L3-4: Mild posterior disc bulge with disc fissuring. No significant neural foraminal or spinal canal stenosis. L4-5: Mild posterior disc bulge. No significant neural foraminal or spinal canal stenosis. L5-S1: Posterior disc bulge. No significant neural foraminal or spinal canal stenosis.   Paraspinous Soft Tissues: The lumbar paraspinal soft tissues are moderately diffusely atrophied. The visualized intra-abdominal structures are unremarkable.       MRI brain: 1. Peripherally enhancing intrinsically T2/FLAIR hypointense lesions within the right posterior parasagittal parietal lobe and the left anterior temporal lobe, which are new when compared to prior MRI dated 11/06/2023. The parietal lesion demonstrates significant adjacent enhancement with mild local mass effect. These findings are likely due to metastatic disease given the patient's history.   MRI spine: 1. Ill-defined infiltrating and minimally peripherally enhancing lesion involving the T9 and T10 vertebral bodies and with masslike extension posteriorly along the ventral thecal sac and rightward along the anterior paraspinal soft tissues. This mass exerts severe mass effect on the thecal sac with deformity with potential subtle T2 signal abnormality/likely compression of the spinal cord at least at the T10 level. The above-described mass also extends along the left anterior paraspinal soft tissues and closely abuts the descending thoracic aorta with questionable invasion of the posteromedial aortic wall at the T9 level.There is multilevel effacement of the right neural foramina with complete loss of epidural fat signal within the right neural foramina from T9-T11. 2. There is an avidly enhancing T2 hypointense lesion within the ventral thecal sac exerting mass effect on the cauda equina at the level of L2 likely due to intrathecal metastatic disease given the other findings. Likely subtle additional nodular  enhancement of the cauda equina probably due to additional metastatic deposits.       I personally reviewed the image(s)/study and resident interpretation as stated by Dr. Sakina Wilder MD. I agree with the findings as stated. This study was interpreted at [facility], [place], [place].   MACRO: Sakina Wilder discussed the significance and urgency of this critical finding by secure chat with  ORLIN GREEN on 5/16/2024 at 5:23 pm.  (**-RCF-**) Findings:  See findings.   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 CT lumbar spine 05/09/2024. CT head 02/28/2024.   ACCESSION NUMBER(S): [id number]; [id number]; [id number]; [id number]   ORDERING CLINICIAN: ORLIN GREEN   TECHNIQUE: Axial T2, FLAIR, DWI, gradient echo T2 and sagittal and coronal T1 weighted images of brain were acquired.   Multiplanar multisequence MR imaging of the cervical, thoracic, and lumbar spine was performed. Sagittal T1, T2, STIR and axial T2 and T1 weighted MR images of the total spine were obtained.   A total of 14 mL of intravenous gadolinium based contrast (Dotarem) was administered prior to multiplanar multisequence and volumetric image acquisition of the brain and full spine.   FINDINGS: BRAIN:   No abnormal diffusion restriction to suggest acute ischemia.   There is a 1.3 x 1.3 x 1.3 cm (CC X TRV X AP) peripherally enhancing and intrinsically T2/FLAIR  hypointense lesion within the right posterior parasagittal parietal lobe (series 10, image 85). Surrounding this lesion there is T2/FLAIR hyperintensity suggestive of associated edema. Minimal local mass effect without significant midline shift.   There is an additional similar appearing peripherally enhancing and intrinsically T2/FLAIR hypointense lesion within the left anterior temporal lobe measuring 0.8 x 0.9 x 0.7 cm (CC X TRV X AP) without significant associated edema or mass effect.   These lesions are new when compared to prior MRI dated 11/06/2023 and are not appreciated on recent CT dated 02/28/2024.   No extra-axial collection. No intraparenchymal hemorrhage. No diffusion restriction or susceptibility artifact associated with the above-described lesions.   The major intracranial flow voids are maintained.   The ventricles, cortical sulci, and basal cisterns are nonenlarged.   Paranasal sinuses and mastoid air cells are well aerated. No significant calvarial or soft tissue abnormality.     CERVICAL SPINE:   Segmentation: There are 7 non-rib-bearing cervical vertebrae.   Alignment: The vertebral alignment is within normal limits.   Vertebrae/Intervertebral Discs: The vertebral body heights are maintained. Faint enhancement within the anterior aspect of the C2 spinous process greater than other levels could reflect a component of neoplastic change in this region. The marrow signal is otherwise within normal limits. The disc spaces are preserved. No abnormal enhancement.   Spinal cord: Normal in caliber and signal. No abnormal enhancement.   C1-C2: The cervicomedullary junction appears unremarkable. No significant neural foraminal or spinal canal stenosis. C2-C3: There is no posterior disc contour abnormality. No significant neural foraminal or spinal canal stenosis. C3-C4: Small disc osteophyte complex. No significant neural foraminal or spinal canal stenosis. C4-C5: Small disc osteophyte complex. No  significant neural foraminal or spinal canal stenosis. C5-C6: Small disc osteophyte complex. No significant neural foraminal or spinal canal stenosis. C6-C7: Small disc osteophyte complex. No significant neural foraminal or spinal canal stenosis. C7-T1: There is no posterior disc contour abnormality. No significant neural foraminal or spinal canal stenosis.   Paraspinous Soft Tissues: The prevertebral and posterior paraspinous soft tissues are unremarkable.     THORACIC SPINE:   Segmentation: There are 12 rib-bearing thoracic vertebrae.   Alignment: Within normal limits.   Vertebrae/Intervertebral Discs/cord:   There is an ill-defined infiltrating T1/T2 hypointense and STIR hyperintense lesion filling the marrow space of the entire T10 vertebral body and extending along the pedicles and posterior elements. There is a similar process involving the inferior and posterior aspects of the T9 vertebral body. There is mild irregular associated incomplete peripheral enhancement adjacent to the T9 lesion.   There is masslike extension posteriorly and right laterally disrupting the posterior walls of the T9 and T10 vertebral bodies with invasion into the ventral epidural space and associated deformity  of the spinal cord at the T10 level with subtle spinal cord signal abnormality not excluded. There is mild cranial and caudal extension of this mass along the ventral epidural space and right paraspinal region 2 T8-9 rostrally and approximately T11 caudally.   There is extension of the mass along the right anterior paraspinal region from the levels of T8 to T11 with the mass closely abutting the descending aorta (thoracic spine series 18, image 18). There is questionable invasion of the aortic wall at the level of the T9 vertebral body (series 19, image 19).   Minimal STIR hyperintensity of the cord just proximal and distal to the region of compression. No significant abnormal enhancement of the cord.   Mild heterogeneity of  the remaining visualized marrow signal.   T1-T2:  No significant neural foraminal or spinal canal stenosis. T2-T3:  No significant neural foraminal or spinal canal stenosis. T3-T4:  No significant neural foraminal or spinal canal stenosis. T4-T5:  No significant neural foraminal or spinal canal stenosis. T5-T6:  No significant neural foraminal or spinal canal stenosis. T6-T7:  No significant neural foraminal or spinal canal stenosis. T7-T8:  No significant neural foraminal or spinal canal stenosis. T8-T9:  There is mass infiltration into the right-greater-than-left neural foramina with associated mild-to-moderate stenosis. T9-T10: There is mass filling the right neural foramen with complete effacement of the right neural foramen. Mild left neural foraminal stenosis. T10-T11:  There is mass filling the right neural foramen with near complete effacement of the right neural foramen. Mild left neural foraminal stenosis. T11-T12: There is a mass infiltration into the right neural foramen with severe stenosis. No left neural foraminal stenosis. T12-L1:  No significant neural foraminal or spinal canal stenosis.     LUMBAR SPINE:   Segmentation: There are 5 non-rib-bearing lumbar vertebrae.   Alignment: Minimal retrolisthesis of L4 on L5. The remaining alignment is maintained.   Vertebrae/Intervertebral Discs: The vertebral bodies demonstrate expected height. The marrow signal is within normal limits. Mild multilevel desiccated disc signal.   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 The conus terminates at the L1 inferior endplate. Likely subtle additional nodular enhancement of the cauda equina probably due to additional metastatic deposits.   T12-L1:  No significant neural foraminal or spinal canal stenosis. L1-2:  No  significant neural foraminal or spinal canal stenosis. L2-3: Mild posterior disc bulge. No significant neural foraminal or spinal canal stenosis. L3-4: Mild posterior disc bulge with disc fissuring. No significant neural foraminal or spinal canal stenosis. L4-5: Mild posterior disc bulge. No significant neural foraminal or spinal canal stenosis. L5-S1: Posterior disc bulge. No significant neural foraminal or spinal canal stenosis.   Paraspinous Soft Tissues: The lumbar paraspinal soft tissues are moderately diffusely atrophied. The visualized intra-abdominal structures are unremarkable.       MRI brain: 1. Peripherally enhancing intrinsically T2/FLAIR hypointense lesions within the right posterior parasagittal parietal lobe and the left anterior temporal lobe, which are new when compared to prior MRI dated 11/06/2023. The parietal lesion demonstrates significant adjacent enhancement with mild local mass effect. These findings are likely due to metastatic disease given the patient's history.   MRI spine: 1. Ill-defined infiltrating and minimally peripherally enhancing lesion involving the T9 and T10 vertebral bodies and with masslike extension posteriorly along the ventral thecal sac and rightward along the anterior paraspinal soft tissues. This mass exerts severe mass effect on the thecal sac with deformity with potential subtle T2 signal abnormality/likely compression of the spinal cord at least at the T10 level. The above-described mass also extends along the left anterior paraspinal soft tissues and closely abuts the descending thoracic aorta with questionable invasion of the posteromedial aortic wall at the T9 level.There is multilevel effacement of the right neural foramina with complete loss of epidural fat signal within the right neural foramina from T9-T11. 2. There is an avidly enhancing T2 hypointense lesion within the ventral thecal sac exerting mass effect on the cauda equina at the level of L2 likely  due to intrathecal metastatic disease given the other findings. Likely subtle additional nodular enhancement of the cauda equina probably due to additional metastatic deposits.       I personally reviewed the image(s)/study and resident interpretation as stated by Dr. Sakina Wilder MD. I agree with the findings as stated. This study was interpreted at [facility], [place], [place].   MACRO: Sakina Wilder discussed the significance and urgency of this critical finding by secure chat with  ORLIN GREEN on 5/16/2024 at 5:23 pm.  (**-RCF-**) Findings:  See findings.   Signed by: Papo Cuadra 5/16/2024 5:59 PM Dictation workstation:   [id number]    MR cervical spine w and wo IV contrast    Result Date: 5/16/2024  Interpreted By:  Papo Cuadra,  and Meño Presley STUDY: MR BRAIN W AND WO IV CONTRAST; MR CERVICAL SPINE W AND WO IV CONTRAST; MR LUMBAR SPINE W AND WO IV CONTRAST; MR THORACIC SPINE W AND WO IV CONTRAST;  5/16/2024 4:06 pm; 5/16/2024 4:07 pm   INDICATION: Signs/Symptoms:Paraspinal mass; per EMR: 63-year-old male with history of esophageal adenocarcinoma metastases to the liver and lungs   COMPARISON: MRI brain 11/06/2023. CT lumbar spine 05/09/2024. CT head 02/28/2024.   ACCESSION NUMBER(S): [id number]; [id number]; [id number]; [id number]   ORDERING CLINICIAN: ORLIN GREEN   TECHNIQUE: Axial T2, FLAIR, DWI, gradient echo T2 and sagittal and coronal T1 weighted images of brain were acquired.   Multiplanar multisequence MR imaging of the cervical, thoracic, and lumbar spine was performed. Sagittal T1, T2, STIR and axial T2 and T1 weighted MR images of the total spine were obtained.   A total of 14 mL of intravenous gadolinium based contrast (Dotarem) was administered prior to multiplanar multisequence and volumetric image acquisition of the brain and full spine.   FINDINGS: BRAIN:   No abnormal diffusion restriction to suggest acute ischemia.   There is  a 1.3 x 1.3 x 1.3 cm (CC X TRV X AP) peripherally enhancing and intrinsically T2/FLAIR hypointense lesion within the right posterior parasagittal parietal lobe (series 10, image 85). Surrounding this lesion there is T2/FLAIR hyperintensity suggestive of associated edema. Minimal local mass effect without significant midline shift.   There is an additional similar appearing peripherally enhancing and intrinsically T2/FLAIR hypointense lesion within the left anterior temporal lobe measuring 0.8 x 0.9 x 0.7 cm (CC X TRV X AP) without significant associated edema or mass effect.   These lesions are new when compared to prior MRI dated 11/06/2023 and are not appreciated on recent CT dated 02/28/2024.   No extra-axial collection. No intraparenchymal hemorrhage. No diffusion restriction or susceptibility artifact associated with the above-described lesions.   The major intracranial flow voids are maintained.   The ventricles, cortical sulci, and basal cisterns are nonenlarged.   Paranasal sinuses and mastoid air cells are well aerated. No significant calvarial or soft tissue abnormality.     CERVICAL SPINE:   Segmentation: There are 7 non-rib-bearing cervical vertebrae.   Alignment: The vertebral alignment is within normal limits.   Vertebrae/Intervertebral Discs: The vertebral body heights are maintained. Faint enhancement within the anterior aspect of the C2 spinous process greater than other levels could reflect a component of neoplastic change in this region. The marrow signal is otherwise within normal limits. The disc spaces are preserved. No abnormal enhancement.   Spinal cord: Normal in caliber and signal. No abnormal enhancement.   C1-C2: The cervicomedullary junction appears unremarkable. No significant neural foraminal or spinal canal stenosis. C2-C3: There is no posterior disc contour abnormality. No significant neural foraminal or spinal canal stenosis. C3-C4: Small disc osteophyte complex. No significant  neural foraminal or spinal canal stenosis. C4-C5: Small disc osteophyte complex. No significant neural foraminal or spinal canal stenosis. C5-C6: Small disc osteophyte complex. No significant neural foraminal or spinal canal stenosis. C6-C7: Small disc osteophyte complex. No significant neural foraminal or spinal canal stenosis. C7-T1: There is no posterior disc contour abnormality. No significant neural foraminal or spinal canal stenosis.   Paraspinous Soft Tissues: The prevertebral and posterior paraspinous soft tissues are unremarkable.     THORACIC SPINE:   Segmentation: There are 12 rib-bearing thoracic vertebrae.   Alignment: Within normal limits.   Vertebrae/Intervertebral Discs/cord:   There is an ill-defined infiltrating T1/T2 hypointense and STIR hyperintense lesion filling the marrow space of the entire T10 vertebral body and extending along the pedicles and posterior elements. There is a similar process involving the inferior and posterior aspects of the T9 vertebral body. There is mild irregular associated incomplete peripheral enhancement adjacent to the T9 lesion.   There is masslike extension posteriorly and right laterally disrupting the posterior walls of the T9 and T10 vertebral bodies with invasion into the ventral epidural space and associated deformity  of the spinal cord at the T10 level with subtle spinal cord signal abnormality not excluded. There is mild cranial and caudal extension of this mass along the ventral epidural space and right paraspinal region 2 T8-9 rostrally and approximately T11 caudally.   There is extension of the mass along the right anterior paraspinal region from the levels of T8 to T11 with the mass closely abutting the descending aorta (thoracic spine series 18, image 18). There is questionable invasion of the aortic wall at the level of the T9 vertebral body (series 19, image 19).   Minimal STIR hyperintensity of the cord just proximal and distal to the region of  compression. No significant abnormal enhancement of the cord.   Mild heterogeneity of the remaining visualized marrow signal.   T1-T2:  No significant neural foraminal or spinal canal stenosis. T2-T3:  No significant neural foraminal or spinal canal stenosis. T3-T4:  No significant neural foraminal or spinal canal stenosis. T4-T5:  No significant neural foraminal or spinal canal stenosis. T5-T6:  No significant neural foraminal or spinal canal stenosis. T6-T7:  No significant neural foraminal or spinal canal stenosis. T7-T8:  No significant neural foraminal or spinal canal stenosis. T8-T9:  There is mass infiltration into the right-greater-than-left neural foramina with associated mild-to-moderate stenosis. T9-T10: There is mass filling the right neural foramen with complete effacement of the right neural foramen. Mild left neural foraminal stenosis. T10-T11:  There is mass filling the right neural foramen with near complete effacement of the right neural foramen. Mild left neural foraminal stenosis. T11-T12: There is a mass infiltration into the right neural foramen with severe stenosis. No left neural foraminal stenosis. T12-L1:  No significant neural foraminal or spinal canal stenosis.     LUMBAR SPINE:   Segmentation: There are 5 non-rib-bearing lumbar vertebrae.   Alignment: Minimal retrolisthesis of L4 on L5. The remaining alignment is maintained.   Vertebrae/Intervertebral Discs: The vertebral bodies demonstrate expected height. The marrow signal is within normal limits. Mild multilevel desiccated disc signal.   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 The conus terminates at the L1 inferior endplate. Likely subtle additional nodular enhancement of the cauda equina probably due to additional metastatic deposits.    T12-L1:  No significant neural foraminal or spinal canal stenosis. L1-2:  No significant neural foraminal or spinal canal stenosis. L2-3: Mild posterior disc bulge. No significant neural foraminal or spinal canal stenosis. L3-4: Mild posterior disc bulge with disc fissuring. No significant neural foraminal or spinal canal stenosis. L4-5: Mild posterior disc bulge. No significant neural foraminal or spinal canal stenosis. L5-S1: Posterior disc bulge. No significant neural foraminal or spinal canal stenosis.   Paraspinous Soft Tissues: The lumbar paraspinal soft tissues are moderately diffusely atrophied. The visualized intra-abdominal structures are unremarkable.       MRI brain: 1. Peripherally enhancing intrinsically T2/FLAIR hypointense lesions within the right posterior parasagittal parietal lobe and the left anterior temporal lobe, which are new when compared to prior MRI dated 11/06/2023. The parietal lesion demonstrates significant adjacent enhancement with mild local mass effect. These findings are likely due to metastatic disease given the patient's history.   MRI spine: 1. Ill-defined infiltrating and minimally peripherally enhancing lesion involving the T9 and T10 vertebral bodies and with masslike extension posteriorly along the ventral thecal sac and rightward along the anterior paraspinal soft tissues. This mass exerts severe mass effect on the thecal sac with deformity with potential subtle T2 signal abnormality/likely compression of the spinal cord at least at the T10 level. The above-described mass also extends along the left anterior paraspinal soft tissues and closely abuts the descending thoracic aorta with questionable invasion of the posteromedial aortic wall at the T9 level.There is multilevel effacement of the right neural foramina with complete loss of epidural fat signal within the right neural foramina from T9-T11. 2. There is an avidly enhancing T2 hypointense lesion within the ventral  thecal sac exerting mass effect on the cauda equina at the level of L2 likely due to intrathecal metastatic disease given the other findings. Likely subtle additional nodular enhancement of the cauda equina probably due to additional metastatic deposits.       I personally reviewed the image(s)/study and resident interpretation as stated by Dr. Sakina Wilder MD. I agree with the findings as stated. This study was interpreted at [facility], [place], [place].   MACRO: Sakina Wilder discussed the significance and urgency of this critical finding by secure chat with  ORLIN GREEN on 5/16/2024 at 5:23 pm.  (**-RCF-**) Findings:  See findings.   Signed by: Papo Cuadra 5/16/2024 5:59 PM Dictation workstation:   [id number]    MR lumbar spine w and wo IV contrast    Result Date: 5/16/2024  Interpreted By:  Papo Cuadra,  and Meño Presley STUDY: MR BRAIN W AND WO IV CONTRAST; MR CERVICAL SPINE W AND WO IV CONTRAST; MR LUMBAR SPINE W AND WO IV CONTRAST; MR THORACIC SPINE W AND WO IV CONTRAST;  5/16/2024 4:06 pm; 5/16/2024 4:07 pm   INDICATION: Signs/Symptoms:Paraspinal mass; per EMR: 63-year-old male with history of esophageal adenocarcinoma metastases to the liver and lungs   COMPARISON: MRI brain 11/06/2023. CT lumbar spine 05/09/2024. CT head 02/28/2024.   ACCESSION NUMBER(S): [id number]; [id number]; [id number]; [id number]   ORDERING CLINICIAN: ORLIN GREEN   TECHNIQUE: Axial T2, FLAIR, DWI, gradient echo T2 and sagittal and coronal T1 weighted images of brain were acquired.   Multiplanar multisequence MR imaging of the cervical, thoracic, and lumbar spine was performed. Sagittal T1, T2, STIR and axial T2 and T1 weighted MR images of the total spine were obtained.   A total of 14 mL of intravenous gadolinium based contrast (Dotarem) was administered prior to multiplanar multisequence and volumetric image acquisition of the brain and full spine.   FINDINGS:  BRAIN:   No abnormal diffusion restriction to suggest acute ischemia.   There is a 1.3 x 1.3 x 1.3 cm (CC X TRV X AP) peripherally enhancing and intrinsically T2/FLAIR hypointense lesion within the right posterior parasagittal parietal lobe (series 10, image 85). Surrounding this lesion there is T2/FLAIR hyperintensity suggestive of associated edema. Minimal local mass effect without significant midline shift.   There is an additional similar appearing peripherally enhancing and intrinsically T2/FLAIR hypointense lesion within the left anterior temporal lobe measuring 0.8 x 0.9 x 0.7 cm (CC X TRV X AP) without significant associated edema or mass effect.   These lesions are new when compared to prior MRI dated 11/06/2023 and are not appreciated on recent CT dated 02/28/2024.   No extra-axial collection. No intraparenchymal hemorrhage. No diffusion restriction or susceptibility artifact associated with the above-described lesions.   The major intracranial flow voids are maintained.   The ventricles, cortical sulci, and basal cisterns are nonenlarged.   Paranasal sinuses and mastoid air cells are well aerated. No significant calvarial or soft tissue abnormality.     CERVICAL SPINE:   Segmentation: There are 7 non-rib-bearing cervical vertebrae.   Alignment: The vertebral alignment is within normal limits.   Vertebrae/Intervertebral Discs: The vertebral body heights are maintained. Faint enhancement within the anterior aspect of the C2 spinous process greater than other levels could reflect a component of neoplastic change in this region. The marrow signal is otherwise within normal limits. The disc spaces are preserved. No abnormal enhancement.   Spinal cord: Normal in caliber and signal. No abnormal enhancement.   C1-C2: The cervicomedullary junction appears unremarkable. No significant neural foraminal or spinal canal stenosis. C2-C3: There is no posterior disc contour abnormality. No significant neural foraminal or  spinal canal stenosis. C3-C4: Small disc osteophyte complex. No significant neural foraminal or spinal canal stenosis. C4-C5: Small disc osteophyte complex. No significant neural foraminal or spinal canal stenosis. C5-C6: Small disc osteophyte complex. No significant neural foraminal or spinal canal stenosis. C6-C7: Small disc osteophyte complex. No significant neural foraminal or spinal canal stenosis. C7-T1: There is no posterior disc contour abnormality. No significant neural foraminal or spinal canal stenosis.   Paraspinous Soft Tissues: The prevertebral and posterior paraspinous soft tissues are unremarkable.     THORACIC SPINE:   Segmentation: There are 12 rib-bearing thoracic vertebrae.   Alignment: Within normal limits.   Vertebrae/Intervertebral Discs/cord:   There is an ill-defined infiltrating T1/T2 hypointense and STIR hyperintense lesion filling the marrow space of the entire T10 vertebral body and extending along the pedicles and posterior elements. There is a similar process involving the inferior and posterior aspects of the T9 vertebral body. There is mild irregular associated incomplete peripheral enhancement adjacent to the T9 lesion.   There is masslike extension posteriorly and right laterally disrupting the posterior walls of the T9 and T10 vertebral bodies with invasion into the ventral epidural space and associated deformity  of the spinal cord at the T10 level with subtle spinal cord signal abnormality not excluded. There is mild cranial and caudal extension of this mass along the ventral epidural space and right paraspinal region 2 T8-9 rostrally and approximately T11 caudally.   There is extension of the mass along the right anterior paraspinal region from the levels of T8 to T11 with the mass closely abutting the descending aorta (thoracic spine series 18, image 18). There is questionable invasion of the aortic wall at the level of the T9 vertebral body (series 19, image 19).   Minimal  STIR hyperintensity of the cord just proximal and distal to the region of compression. No significant abnormal enhancement of the cord.   Mild heterogeneity of the remaining visualized marrow signal.   T1-T2:  No significant neural foraminal or spinal canal stenosis. T2-T3:  No significant neural foraminal or spinal canal stenosis. T3-T4:  No significant neural foraminal or spinal canal stenosis. T4-T5:  No significant neural foraminal or spinal canal stenosis. T5-T6:  No significant neural foraminal or spinal canal stenosis. T6-T7:  No significant neural foraminal or spinal canal stenosis. T7-T8:  No significant neural foraminal or spinal canal stenosis. T8-T9:  There is mass infiltration into the right-greater-than-left neural foramina with associated mild-to-moderate stenosis. T9-T10: There is mass filling the right neural foramen with complete effacement of the right neural foramen. Mild left neural foraminal stenosis. T10-T11:  There is mass filling the right neural foramen with near complete effacement of the right neural foramen. Mild left neural foraminal stenosis. T11-T12: There is a mass infiltration into the right neural foramen with severe stenosis. No left neural foraminal stenosis. T12-L1:  No significant neural foraminal or spinal canal stenosis.     LUMBAR SPINE:   Segmentation: There are 5 non-rib-bearing lumbar vertebrae.   Alignment: Minimal retrolisthesis of L4 on L5. The remaining alignment is maintained.   Vertebrae/Intervertebral Discs: The vertebral bodies demonstrate expected height. The marrow signal is within normal limits. Mild multilevel desiccated disc signal.   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 The conus terminates at the L1 inferior endplate. Likely subtle additional nodular  enhancement of the cauda equina probably due to additional metastatic deposits.   T12-L1:  No significant neural foraminal or spinal canal stenosis. L1-2:  No significant neural foraminal or spinal canal stenosis. L2-3: Mild posterior disc bulge. No significant neural foraminal or spinal canal stenosis. L3-4: Mild posterior disc bulge with disc fissuring. No significant neural foraminal or spinal canal stenosis. L4-5: Mild posterior disc bulge. No significant neural foraminal or spinal canal stenosis. L5-S1: Posterior disc bulge. No significant neural foraminal or spinal canal stenosis.   Paraspinous Soft Tissues: The lumbar paraspinal soft tissues are moderately diffusely atrophied. The visualized intra-abdominal structures are unremarkable.       MRI brain: 1. Peripherally enhancing intrinsically T2/FLAIR hypointense lesions within the right posterior parasagittal parietal lobe and the left anterior temporal lobe, which are new when compared to prior MRI dated 11/06/2023. The parietal lesion demonstrates significant adjacent enhancement with mild local mass effect. These findings are likely due to metastatic disease given the patient's history.   MRI spine: 1. Ill-defined infiltrating and minimally peripherally enhancing lesion involving the T9 and T10 vertebral bodies and with masslike extension posteriorly along the ventral thecal sac and rightward along the anterior paraspinal soft tissues. This mass exerts severe mass effect on the thecal sac with deformity with potential subtle T2 signal abnormality/likely compression of the spinal cord at least at the T10 level. The above-described mass also extends along the left anterior paraspinal soft tissues and closely abuts the descending thoracic aorta with questionable invasion of the posteromedial aortic wall at the T9 level.There is multilevel effacement of the right neural foramina with complete loss of epidural fat signal within the right neural foramina from  T9-T11. 2. There is an avidly enhancing T2 hypointense lesion within the ventral thecal sac exerting mass effect on the cauda equina at the level of L2 likely due to intrathecal metastatic disease given the other findings. Likely subtle additional nodular enhancement of the cauda equina probably due to additional metastatic deposits.       I personally reviewed the image(s)/study and resident interpretation as stated by Dr. Sakina Wilder MD. I agree with the findings as stated. This study was interpreted at [facility], [place], [place].   MACRO: Sakina Wilder discussed the significance and urgency of this critical finding by secure chat with  ALEXXAS PETER on 5/16/2024 at 5:23 pm.  (**-RCF-**) Findings:  See findings.   Signed by: Papo Cuadra 5/16/2024 5:59 PM Dictation workstation:   [id number]    XR chest 2 views    Result Date: 5/11/2024  Interpreted By:  Jason Paredes, STUDY: XR CHEST 2 VIEWS;  5/11/2024 9:45 am   INDICATION: Signs/Symptoms:Per neurosurgery recs.   COMPARISON: Exam dated 03/18/2024   ACCESSION NUMBER(S): [id number]   ORDERING CLINICIAN: KOBI SIBLEY   FINDINGS: PA and lateral radiographs of the chest were provided.   Status post bilateral humeral head resurfacing which is partially evaluated. Right chest wall medication port with catheter tip projecting over the mid to lower SVC. Left chest wall pacer with lead tips projecting over the right atrial appendage and right ventricle in similar position as prior exam.   CARDIOMEDIASTINAL SILHOUETTE: Cardiomediastinal silhouette is normal in size and configuration.   LUNGS: Scattered bilateral pulmonary nodules are noted and were better evaluated on recent CT. No new areas of focal consolidation, pneumothorax, or pleural effusion.   ABDOMEN: No remarkable upper abdominal findings.   BONES: No acute osseous changes.       1.  Multiple bilateral pulmonary nodules which were better evaluated on prior CT  and consistent with metastatic disease. 2. No radiographic evidence of acute cardiopulmonary process.       MACRO: None   Signed by: Jason Paredes 5/11/2024 5:02 PM Dictation workstation:   [id number]    CT abdomen pelvis w IV contrast    Result Date: 5/9/2024  Interpreted By:  Manfred Azul, STUDY: CT ABDOMEN PELVIS W IV CONTRAST; CT LUMBAR SPINE WO IV CONTRAST; 5/9/2024 10:38 pm   INDICATION: Signs/Symptoms:Bilateral flank pain; Signs/Symptoms:pain to entire lumbar back. Stage IV esophageal cancer.   COMPARISON: 5/3/2024   ACCESSION NUMBER(S): [id number]; [id number]   ORDERING CLINICIAN: AFIA STALLINGS   TECHNIQUE: Contiguous axial images of the abdomen and pelvis were obtained after the intravenous administration of  contrast. Coronal and sagittal reformatted images were obtained from the axial images. Reformatted images of the lumbar spine were also performed.   FINDINGS: CT ABDOMEN AND PELVIS:   There is limited evaluation the lung bases. There is redemonstration of multiple nodular opacity at the imaged lung bases compatible with metastatic disease. There is evidence of thickening and question hyperenhancement of the anterior esophageal wall distally.   There is redemonstration of multiple masses in the right and left hepatic lobes which measure up to 3.6 cm in size compatible metastatic disease. The gallbladder is present. Subtle calcification in the gallbladder may correspond to gallstones. No dilatation of the common bile duct.   The pancreas, spleen, and adrenal glands appear unremarkable.   Symmetric enhancement of the kidneys. No hydronephrosis.   Atherosclerotic calcification of the abdominal aorta and bilateral iliac arteries.   Enlarged tiffany hepatis and peripancreatic lymph nodes measure up to the 2.1 cm. 3.3 cm x 1.2 cm portal caval lymph node. Enlarged retroperitoneal lymph nodes measuring up to 1.7 cm. Stable 2.6 cm cystic lesion along course of right external iliac chain.   No evidence of  bowel obstruction.   Urinary bladder is underdistended and not well evaluated.   Small amount of free fluid the pelvis which measures simple fluid attenuation.   Postsurgical change of anterior pelvic wall repair.     CT LUMBAR SPINE:   There is soft tissue thickening and nodularity in the paravertebral soft tissues at the level of the imaged lower thoracic spine compatible with metastatic disease. Evaluation of soft tissue spinal canal is limited, however there is evidence of soft tissue extension into the spinal canal at level of T9 and T10 with associated spinal canal stenosis.   No acute fracture of the lumbar spine. There is multilevel degenerative change of the lumbar spine. There is multilevel intervertebral disc space narrowing and degenerative disc disease. There is limited evaluation of the soft tissues of the spinal canal. There is multilevel degenerative spinal canal narrowing. Multilevel degenerative facet arthropathy.       Multiple pulmonary nodules in the imaged lower lungs compatible with metastatic disease. There is wall thickening and apparent hyperenhancement of the anterior wall of the distal esophagus which may relate to patient's known esophageal malignancy. There is also redemonstration of multiple hepatic masses and abdominal and pelvic lymphadenopathy compatible with metastatic disease.   Soft tissue thickening and nodularity in the paravertebral soft tissues at the level of the imaged lower thoracic spine compatible with metastatic disease. Evaluation of the soft tissues of the spinal canal is limited, however there is evidence of soft tissue extension into the spinal canal at T9 and T10 resulting in spinal canal stenosis. Neurosurgery consultation/evaluation and MRI is recommended for better evaluation of the metastatic disease and spinal canal stenosis.     MACRO: None   Signed by: Manfred Azul 5/9/2024 11:26 PM Dictation workstation:   [id number]    CT lumbar spine wo IV  contrast    Result Date: 5/9/2024  Interpreted By:  Manfred Azul, STUDY: CT ABDOMEN PELVIS W IV CONTRAST; CT LUMBAR SPINE WO IV CONTRAST; 5/9/2024 10:38 pm   INDICATION: Signs/Symptoms:Bilateral flank pain; Signs/Symptoms:pain to entire lumbar back. Stage IV esophageal cancer.   COMPARISON: 5/3/2024   ACCESSION NUMBER(S): [id number]; [id number]   ORDERING CLINICIAN: AFIA STALLINGS   TECHNIQUE: Contiguous axial images of the abdomen and pelvis were obtained after the intravenous administration of  contrast. Coronal and sagittal reformatted images were obtained from the axial images. Reformatted images of the lumbar spine were also performed.   FINDINGS: CT ABDOMEN AND PELVIS:   There is limited evaluation the lung bases. There is redemonstration of multiple nodular opacity at the imaged lung bases compatible with metastatic disease. There is evidence of thickening and question hyperenhancement of the anterior esophageal wall distally.   There is redemonstration of multiple masses in the right and left hepatic lobes which measure up to 3.6 cm in size compatible metastatic disease. The gallbladder is present. Subtle calcification in the gallbladder may correspond to gallstones. No dilatation of the common bile duct.   The pancreas, spleen, and adrenal glands appear unremarkable.   Symmetric enhancement of the kidneys. No hydronephrosis.   Atherosclerotic calcification of the abdominal aorta and bilateral iliac arteries.   Enlarged tiffany hepatis and peripancreatic lymph nodes measure up to the 2.1 cm. 3.3 cm x 1.2 cm portal caval lymph node. Enlarged retroperitoneal lymph nodes measuring up to 1.7 cm. Stable 2.6 cm cystic lesion along course of right external iliac chain.   No evidence of bowel obstruction.   Urinary bladder is underdistended and not well evaluated.   Small amount of free fluid the pelvis which measures simple fluid attenuation.   Postsurgical change of anterior pelvic wall repair.     CT LUMBAR  SPINE:   There is soft tissue thickening and nodularity in the paravertebral soft tissues at the level of the imaged lower thoracic spine compatible with metastatic disease. Evaluation of soft tissue spinal canal is limited, however there is evidence of soft tissue extension into the spinal canal at level of T9 and T10 with associated spinal canal stenosis.   No acute fracture of the lumbar spine. There is multilevel degenerative change of the lumbar spine. There is multilevel intervertebral disc space narrowing and degenerative disc disease. There is limited evaluation of the soft tissues of the spinal canal. There is multilevel degenerative spinal canal narrowing. Multilevel degenerative facet arthropathy.       Multiple pulmonary nodules in the imaged lower lungs compatible with metastatic disease. There is wall thickening and apparent hyperenhancement of the anterior wall of the distal esophagus which may relate to patient's known esophageal malignancy. There is also redemonstration of multiple hepatic masses and abdominal and pelvic lymphadenopathy compatible with metastatic disease.   Soft tissue thickening and nodularity in the paravertebral soft tissues at the level of the imaged lower thoracic spine compatible with metastatic disease. Evaluation of the soft tissues of the spinal canal is limited, however there is evidence of soft tissue extension into the spinal canal at T9 and T10 resulting in spinal canal stenosis. Neurosurgery consultation/evaluation and MRI is recommended for better evaluation of the metastatic disease and spinal canal stenosis.     MACRO: None   Signed by: Manfred Azul 5/9/2024 11:26 PM Dictation workstation:   [id number]       Physical Exam  GENERAL: Alert, awake, cooperative, no distress  SKIN: Warm and dry.    HEENT:  EOMI, nonicteric sclera  LUNGS: Unlabored resps  CARDIO: RRR, no JVD  GI: Soft, NTND, BS+, no rebound, no guarding   : good urine op  MS: no joint swelling  NEURO:  grossly nonfocal exam  PSYCH: mood and behavior appropriate    ASSESSMENT AND PLAN   Impression  This is a seriously ill 63 y.o. year old with advancing widespread metastatic esophageal cancer who is hospitalized for SBO (small bowel obstruction) (Multi).This has been complicated by poorly controlled pain. Patients overall condition has improved some, but suboptimal pain control at this time.     Goals of Care: survival is prioritized, if goals for quality or survival can reasonably be achieved  Hopes: Better pain and symptom control and to live longer  Code Status: sustained at  Full code  Prognosis: poor 2/2 advancing cancer with wide spread metastases  Hospice Eligibility:  Advanced Cancer    Plan:  Continue supportive care  Symptom mgmt  Cancer related pain 2/2  to to mets to T/L spine s/p complicated by recent spinal fusion, L flank 2/2 referred/ complicated by possible SBO  Suboptimal control and required 1000mg OME over the last 24 hrs  Started Dilaudid PCA with 1mg Q30 min PRN demand-- plan to utilize this for dose finding re PO doses vs needing more OME long acting   Started Acetaminophen 650mg Q6h- Will observe LFT's on this  Started capsaicin cream to the area QID  Started Duloxetine 60mg daily  Decadron 16mg IV x1, then resume home prednisone 20mg daily  Resumed home Lyrica 50mg BID  Continue fentanyl patch at 100 mcg  Overall, want to see pt response with adjuvants before potentially considering alternate to current long acting opiod  Education provided on multimodal pain control.  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  Continue steroid regimen   [place] [facility] [place]    Depression and Anxiety  Pt reports denying up until today, but was willing to disclose 2/2 education provided on the effect this has on his pain  Duloxetine 60mg daily started  PRN home lorazepam PRN for anxiety   Support from palliative medicine and family is essential    Risk for Constipation   Last BM yesterday- formed, soft med-large  Continue bowel regimen  Education and encouragement provided re ambulation and the implications    Thank you for asking Palliative Care to assist with care of this patient.  Please contact us for additional questions or concerns.    Update provided to: The Primary team and the bedside nurse    MEDICAL COMPLEXITY    Medical complexity was high level due to due to 1 or more chronic illness with progression or poses threat to life or bodily function,  record review of the prior external notes, current encounter notes, independent interpretation of laboratory tests as per the HPI, utilization of IV controlled substances dilaudid, pt being [place] [facility] [place] higher level of care that is required, and discussion of management with the primary team.     CONTACT INFORMATION   Madelaine Bravo CNP  Palliative Medicine  [employer] Secure chat

## 2024-06-06 NOTE — CONSULTS
Reason For Consult  Partial SBO    History Of Present Illness  Massimo Garcia is a 63 y.o. male presenting with abd pain that started 6/2/24. His pain is on the left side and has been for 2 months but worsened significantly over the past few days. He reports that since his surgery 5/17/24 at [facility] [place] for T8-12 fusion and mass resection his left side has had soft swelling but he thinks that the swelling is worse in the last few days with his increase in pain. He reports a tactile fever and night sweats 3 nights ago but no fever/chills/sweats since. Denies n/v. States he has been having Bms as normal, had one yesterday.     Pt has esophageal cancer with metastasis to liver, lung, and spine.   He had surgery 5/17/24 for mass resection, T8-12 fusion and his staples are still in place. Denies swelling or drainage at incision site.      Past Medical History  He has a past medical history of Essential (primary) hypertension (12/21/2013), Pacemaker, Peripheral neuropathy, Personal history of other diseases of the circulatory system, and Pneumothorax (12/04/2023).    Surgical History  He has a past surgical history that includes Total knee arthroplasty (09/30/2016); Total knee arthroplasty (09/30/2016); Cardiac electrophysiology procedure (N/A, 11/7/2023); and Cardiac electrophysiology procedure (Left, 11/9/2023).     7/17/24 - T10 transpedicular approach mass resection, T8-12 fusion.    Social History  He reports that he has quit smoking. His smoking use included cigarettes and cigars. He has never been exposed to tobacco smoke. He has never used smokeless tobacco. He reports that he does not currently use alcohol. He reports current drug use. Drug: Marijuana.    Family History  Family History   Problem Relation Name Age of Onset    Cancer Father          Allergies  Bee venom protein (honey bee) and Oxaliplatin    Review of Systems  Review of Systems   Constitutional:  Positive for activity change, appetite change and fatigue.  Negative for chills, diaphoresis (not since 3 nights ago) and fever (not since 3 nights ago).   Respiratory:  Positive for shortness of breath (with exertion). Negative for cough, chest tightness and wheezing.    Cardiovascular:  Negative for chest pain, palpitations and leg swelling.   Gastrointestinal:  Positive for abdominal distention and abdominal pain. Negative for anal bleeding, blood in stool, constipation, diarrhea, nausea and vomiting.   Genitourinary:  Negative for difficulty urinating.   Musculoskeletal:  Negative for back pain (tender to around incision).   Skin:  Positive for wound (spinal incision).   Neurological:  Positive for numbness (in gatito toes, not new). Negative for dizziness, light-headedness and headaches.         Physical Exam  Physical Exam  Vitals reviewed.   Constitutional:       General: He is not in acute distress.     Appearance: Normal appearance. He is normal weight. He is not ill-appearing, toxic-appearing or diaphoretic.   HENT:      Head: Normocephalic and atraumatic.   Eyes:      General: No scleral icterus.        Right eye: No discharge.         Left eye: No discharge.      Conjunctiva/sclera: Conjunctivae normal.   Cardiovascular:      Rate and Rhythm: Normal rate and regular rhythm.      Pulses: Normal pulses.      Heart sounds: Normal heart sounds. No murmur heard.     No friction rub. No gallop.   Pulmonary:      Effort: Pulmonary effort is normal. No respiratory distress.      Breath sounds: Normal breath sounds. No stridor. No wheezing, rhonchi or rales.   Chest:      Chest wall: No tenderness.   Abdominal:      General: Bowel sounds are normal. There is distension (soft swelling to left side around ribs to hip).      Palpations: Abdomen is soft.      Tenderness: There is abdominal tenderness (to area of left side swelling).   Musculoskeletal:      Right lower leg: No edema.      Left lower leg: No edema.   Skin:     General: Skin is warm and dry.      Coloration: Skin  "is pale.      Comments: Vertical incision along spine is well approximated, scabbed. Staples in place. Slight erythema at edges of incision, soft. No drainage.   Neurological:      Mental Status: He is alert and oriented to person, place, and time.   Psychiatric:         Mood and Affect: Mood normal.         Behavior: Behavior normal.         Thought Content: Thought content normal.         Judgment: Judgment normal.           Last Recorded Vitals  Blood pressure 107/69, pulse (!) 111, temperature 37.4 °C (99.3 °F), temperature source Temporal, resp. rate 18, height 1.727 m (5' 8\"), weight 73.5 kg (162 lb), SpO2 92%.    At time of exam , pulse ox 94 % on RA, up to 97 % with deep breathing.     Relevant Results    CT abdomen pelvis w IV contrast    Result Date: 6/5/2024  Interpreted By:  Cedric Castillo, STUDY: CT ABDOMEN PELVIS W IV CONTRAST; 6/5/2024 10:38 am   INDICATION: Signs/Symptoms:severe llq pain;   COMPARISON: 05/09/2024   ACCESSION NUMBER(S): [id number]   ORDERING CLINICIAN: SOTO GARNETT   TECHNIQUE: Contiguous axial images of the abdomen/pelvis were performed with IV contrast. 75 ml of Omnipaque 350 was utilized. Coronal and sagittal reformatted images were also obtained. All CT examinations are performed with 1 or more of the following dose reduction techniques: Automated exposure control, adjustment of mA and/or kv according to patient's size, or use of iterative reconstruction techniques.     FINDINGS: Interval development of a small left pleural effusion. Redemonstration of multiple nodular opacity at the imaged lung bases compatible with metastatic disease.   There is redemonstration of multiple masses in the right and left hepatic lobes consistent with a metastatic disease. Interval enlargement of hepatic lesions, the largest in the right hepatic dome measuring 6.2 x 5.7 cm. The gallbladder is present. Subtle calcification in the gallbladder may correspond to gallstones. No " dilatation of the common bile duct.   The pancreas, spleen, and adrenal glands are stable in appearance. Mild splenomegaly is again noted, the spleen measures a proximally 14.6 cm in length.   Symmetric enhancement of the kidneys. No hydronephrosis.   Atherosclerotic calcification of the abdominal aorta and bilateral iliac arteries.   Enlarged tiffany hepatis and peripancreatic lymph nodes, slightly increased in size from the prior study with a large confluent lymph node measuring 5.8 x 2.8 cm. Mild interval increase in size of enlarged retroperitoneal lymph nodes. Enlarged right iliac chain lymph node, increased in size measuring 4.3 x 3.5 cm.   There are mildly loops dilated loops of jejunum measuring up to 3.4 cm, early developing partial small bowel obstruction can not be excluded. Colon is unremarkable with no acute inflammatory process. Normal stool burden. Appendix is within normal limits.   Urinary bladder is underdistended and not well evaluated.   Small amount of free fluid the pelvis which measures simple fluid attenuation.   Postsurgical change of anterior pelvic wall repair.       Interval enlargement of visualized pulmonary nodules in the imaged lower lungs compatible with metastatic disease.   Interval development of a small left pleural effusion.   Redemonstration of multiple hepatic masses and abdominal and pelvic lymphadenopathy compatible with metastatic disease. There has been interval enlargement of hepatic lesions and lymph nodes since the prior study.   Mildly loops dilated loops of jejunum measuring up to 3.4 cm, early developing partial small bowel obstruction can not be excluded.   Signed by: Cedric Castillo 6/5/2024 11:51 AM Dictation workstation:   [id number]    ECG 12 lead    Result Date: 5/22/2024  Electronic ventricular pacemaker When compared with ECG of 04-APR-2024 18:37, Vent. rate has increased BY   2 BPM Confirmed by Marcio Murillo ([id number]) on 5/22/2024 8:02:48 AM    MR thoracic  spine w and wo IV contrast    Result Date: 5/22/2024  Interpreted By:  Rosalio Aaron and Sheng Max STUDY: MR THORACIC SPINE W AND WO IV CONTRAST;  5/21/2024 1:25 pm   INDICATION: Signs/Symptoms:postop tumor debulking--use 1.5T magnet if possible (has titanium).   Per EMR: 63-year-old male history of metastatic esophageal adenocarcinoma. MRI on 05/16 showed epidural extension with cord compression at level T9 and T10. There is additional nodular lesion at L2 abutting the cauda equina concerning for drop metastasis. On 05/17 patient underwent T10 decompression and T8-T12 fusion. MRI for further evaluation.   COMPARISON: MRI thoracic spine dated 05/16/2024   ACCESSION NUMBER(S): [id number]   ORDERING CLINICIAN: ORLIN GREEN   TECHNIQUE: Sagittal STIR, sagittal T2, sagittal T1, axial T2, axial T1, as well as post gadolinium sagittal axial T1 weighted MRI images through the thoracic spine were obtained. The patient received 14 mL of Dotarem gadolinium intravenously.   FINDINGS: There are new postoperative changes compatible with interval posterior laminectomies at the T9 and T10 levels as well as resection of the bilateral facets at the T9 and T10 levels in keeping with the patient's clinical history of a spinal decompression surgery and debulking of osseous and epidural metastatic disease. There is new metallic artifact from posterior-lateral orthopedic fixation hardware and pedicle screws extending from the T8 through T12 levels with bilateral pedicle screws noted at the T8, T9, T11, and T12 levels.   There has been interval debulking of the previously noted enhancing epidural soft tissue at the T9 and T10 levels when compared with the prior study dated 05/16/2024. The current study demonstrates residual but improved abnormal epidural thickening and enhancement extending from the T8 through T11 levels. There is residual  narrowing of the thecal sac within the spinal canal extending from the T8/9 level through  T11 level with residual effacement of the subarachnoid space surrounding the thoracic spinal cord as well as a component of flattening of the margins of the thoracic spinal cord extending from the T9 through T11 levels.   There is residual abnormal bone marrow signal within the T10 and T9 vertebrae compatible with the residual osseous metastatic disease. There is residual abnormal enhancing soft tissue a left paraspinal location extending from the T8/9 level caudally to the T11/12 level with extension into the left neural foramen at the T9/10, T10/11, and T11/12 levels suggesting a component of residual neoplasm/metastatic disease.   The thoracic vertebral bodies remain in anatomic alignment.   There is again evidence of mild multilevel spondylosis.   There are scattered lesions noted within the partially imaged liver most suspicious for liver metastasis.   There are nonspecific patchy opacities noted within the partially imaged lungs. Correlation with dedicated chest imaging is recommended.         There are new postoperative changes compatible with interval posterior laminectomies at the T9 and T10 levels as well as resection of the bilateral facets at the T9 and T10 levels in keeping with the patient's clinical history of a spinal decompression surgery and debulking of osseous and epidural metastatic disease. There is new metallic artifact from posterior-lateral orthopedic fixation hardware and pedicle screws extending from the T8 through T12 levels with bilateral pedicle screws noted at the T8, T9, T11, and T12 levels.   There has been interval debulking of the previously noted enhancing epidural soft tissue at the T9 and T10 levels when compared with the prior study dated 05/16/2024. The current study demonstrates residual but improved abnormal epidural thickening and enhancement extending from the T8 through T11 levels. There is residual narrowing of the thecal sac within the spinal canal extending from the  T8/9 level through T11 level with residual effacement of the subarachnoid space surrounding the thoracic spinal cord as well as a component of flattening of the margins of the thoracic spinal cord extending from the T9 through T11 levels.   There is residual abnormal bone marrow signal within the T10 and T9 vertebrae compatible with the residual osseous metastatic disease. There is residual abnormal enhancing soft tissue a left paraspinal location extending from the T8/9 level caudally to the T11/12 level with extension into the left neural foramen at the T9/10, T10/11, and T11/12 levels suggesting a component of residual neoplasm/metastatic disease.     I personally reviewed the images/study and I agree with the findings as stated by Dr. Brett Lewis. This study was interpreted at [place], [place].   MACRO: None   Signed by: Rosalio Aaron 5/22/2024 7:34 AM Dictation workstation:   [id number]    Upper extremity venous duplex bilateral    Result Date: 5/21/2024  Interpreted By:  Wes Bailey  and Faye Rogers STUDY: [facility] UPPER EXTREMITY VENOUS DUPLEX BILATERAL;  5/20/2024 11:01 am   INDICATION: Signs/Symptoms:New LUE swelling, concern for clot given holding AC in s/o recent procedure, hypercoagulable state d/t malignancy.   COMPARISON: None.   ACCESSION NUMBER(S): [id number]   ORDERING CLINICIAN: ORLIN GREEN   TECHNIQUE: Vascular ultrasound of the bilateral upper extremities was performed. Evaluation was performed with grayscale, color, and spectral Doppler. When possible, compression views of the evaluated veins was also performed.   FINDINGS: Limited evaluation due to edema, tortuous and small-vessel.   RIGHT UPPER EXTREMITY: Evaluation of the visualized portions of the right internal jugular, innominate, subclavian, axillary, brachial, cephalic, and basilic veins was performed.   There is normal respiratory variation, normal  compressibility, as well as normal color doppler signal in the visualized vessels. There is no evidence of thrombus.   LEFT UPPER EXTREMITY: Evaluation of the visualized portions of the left internal jugular, innominate, subclavian, axillary, brachial, cephalic, and basilic veins was performed.   Left cephalic vein is compressible but demonstrates no significant flow. Otherwise, there is normal respiratory variation, normal compressibility, as well as normal color doppler signal in the visualized vessels. There is no evidence of thrombus.   There is a normal-appearing lymph node measuring 2.1 x 0.9 x 0.9 cm in seen in the neck. An additional round hypoechoic structure adjacent and lateral to the pacemaker in the left chest wall measures 1.6 x 1.5 x 1.5 cm is noted.       Left cephalic venous compressible but demonstrates no significant flow, concerning for thrombophlebitis. No evidence of additional findings to suggest for DVT bilateral upper extremities. There is a round hypoechoic structure adjacent and lateral to the pacemaker in the left chest wall without significant internal vascularity which may represent an adjacent lymph node versus fibrotic tissue or fat necrosis.   I personally reviewed the images/study and I agree with the findings as stated by resident physician Dr. Ken Vinson . This study was interpreted at [facility], [place], [place].   MACRO: Ken Vinson discussed the significance and urgency of this critical finding by telephone with  Adi Mays on 5/20/2024 at 11:54 am.  (**-RCF-**) Findings:  See findings.   Signed by: Wes Gomez 5/21/2024 12:16 AM Dictation workstation:   [id number]    ECG 12 Lead    Result Date: 5/21/2024  Electronic ventricular pacemaker When compared with ECG of 19-MAY-2024 06:29, (unconfirmed) No significant change was found Confirmed by Kirill Gutner ([id number]) on 5/21/2024 12:41:58 AM    ECG 12  Lead    Result Date: 5/21/2024  Electronic Ventricular Pacemaker Abnormal ECG When compared with ECG of 18-MAY-2024 22:04, (unconfirmed) No significant change was found Confirmed by Kirill Gunter ([id number]) on 5/21/2024 12:40:52 AM    XR thoracolumbar spine 2 views    Result Date: 5/20/2024  Interpreted By:  Julieta Mayorga, STUDY: Thoracolumbar spine, 2 views.   INDICATION: Signs/Symptoms:standing uprights after thoracic fusion.   COMPARISON: Thoracic spine MRI 05/16/2024.   ACCESSION NUMBER(S): [id number]   ORDERING CLINICIAN: ORLIN GREEN   FINDINGS: Alignment is within normal limits. Status post posterior spinal instrumented fusion from T8-T12 with posterior decompression at T10. Hardware is intact without perihardware fractures or lucencies. Vertebral body heights are maintained. Degenerative changes of the upper lumbar levels with disc height loss and osteophytes.       Postsurgical changes as described above without hardware complication.   Signed by: Julieta Mayorga 5/20/2024 3:42 PM Dictation workstation:   [id number]    Lower extremity venous duplex bilateral    Result Date: 5/20/2024  Interpreted By:  Eder Mckenna,  and Faye Rogers STUDY: [facility] LOWER EXTREMITY VENOUS DUPLEX BILATERAL;  5/20/2024 10:09 am   INDICATION: Signs/Symptoms:New lower extremity swelling, holding AC in s/o recent procedure.   COMPARISON: None.   ACCESSION NUMBER(S): [id number]   ORDERING CLINICIAN: ORLIN GREEN   TECHNIQUE: Vascular ultrasound of the bilateral lower extremities was performed. Real-time compression views as well as Gray scale, color Doppler and spectral Doppler waveform analysis was performed.   FINDINGS: Evaluation of the visualized portions of the bilateral common femoral vein, proximal, mid, and distal femoral vein, and popliteal vein were performed.  Evaluation of the visualized portions of the  posterior tibial and peroneal veins were also performed.   Limitations:  Limited evaluation due  to edema and morphology of the veins.   There is a small caliber of the distal femoral vein on the left and minimal flow was detected on Doppler interrogation. Otherwise, no evidence of deep vein thrombus within the visualized bilateral lower extremities.         No significant flow was detected on Doppler images in the left distal femoral vein which can be artifactual due to small caliber of the vein. However, deep vein thrombus can not be excluded. No evidence of additional DVT in bilateral lower extremities.   I personally reviewed the images/study and I agree with the findings as stated by resident physician Dr. Ken Vinson . This study was interpreted at [place], [place].   MACRO: None   Signed by: Eder Mckenna 5/20/2024 2:12 PM Dictation workstation:   [id number]    XR chest 1 view    Result Date: 5/19/2024  Interpreted By:  Spike Alvarado and Weaver Jakob STUDY: XR CHEST 1 VIEW; 5/19/2024 10:34 am   INDICATION: Signs/Symptoms:h/o tachyarrhythmia overnight, position of pacemaker.   COMPARISON: Chest radiograph 05/11/2024, abdominopelvic CT 05/09/2024   ACCESSION NUMBER(S): [id number]   ORDERING CLINICIAN: ORLIN GREEN   FINDINGS: Single AP radiograph of the chest was obtained. Stable appearance of aleftchest wall dual chamber cardiac pacemakerwith leads projecting over the cardiac chambers.   CARDIOMEDIASTINAL SILHOUETTE: Cardiomediastinal silhouette is stable in size and configuration.   LUNGS: Scattered bilateral pulmonary nodules similar to prior radiograph and better evaluated on 05/09/2024 CT. Interval worsening of diffuse interstitial prominence and superimposed subtle hazy opacities, which may represent a component of pulmonary edema. There is no sizable pleural effusion or pneumothorax.   ABDOMEN: No remarkable upper abdominal findings.   BONES: No acute osseous abnormality.  The patient is status post interval thoracolumbar spine fusion  hardware with overlying multiple midline skin staples. Changes of bilateral shoulder arthroplasties again noted.       1. Interval worsening of diffuse interstitial prominence and superimposed subtle hazy opacities, which may represent a component of pulmonary edema. 2. Scattered bilateral pulmonary nodules similar to prior radiograph and better evaluated on 05/09/2024 CT. 3. Medical devices as above.   I personally reviewed the images/study and I agree with the findings as stated by Roney Joyner MD. This study was interpreted at [facility], [place], [place].   Signed by: Spike Alvarado 5/19/2024 2:44 PM Dictation workstation:   [id number]    FL fluoro images no charge    Result Date: 5/17/2024  These images are not reportable by radiology and will not be interpreted by  Radiologists.    FL fluoro images no charge    Result Date: 5/17/2024  These images are not reportable by radiology and will not be interpreted by  Radiologists.    CT thoracic spine wo IV contrast    Result Date: 5/17/2024  Interpreted By:  Sreekanth Erickson and Baker Zachary STUDY: CT THORACIC SPINE WO IV CONTRAST;  5/17/2024 12:00 am   INDICATION: Signs/Symptoms:Surgical planning -OR Friday 5/17.   COMPARISON: Same day MRI thoracic spine. CT chest on 03/18/2024 and CT abdomen and pelvis on 05/19/2024.   ACCESSION NUMBER(S): [id number]   ORDERING CLINICIAN: ORLNI GREEN   TECHNIQUE: Axial noncontrast images of the thoracic spine with coronal and sagittal reconstructed images.   FINDINGS: Surgical planning scan.   There is straightening of the thoracic spine. There is slight retrolisthesis at T12-L1.   There is stable slight anterior wedging of the T12 vertebral body. Subtle sclerosis is seen within the T10 vertebral body which probably corresponds to metastasis better seen on the prior MRI. T9 metastasis, seen on the prior MRI, is at best partially visualized on CT.   There is mild intervertebral disc  height narrowing at L1-L2 and L2-L3. There are chronic degenerative endplate changes at multiple levels. There is moderate to marked left facet osteoarthropathy at T10-T11.   Findings within the spinal canal, including cord compression the level of T10 was better seen on the prior MRI. There is extension of tumor into the left T9-T10 and T10-T11 neural foramina as well as the proximal aspect of the right T10-T11 neural foramen, again better seen on the prior MRI. There is extension of mass into the left paraspinal soft tissues located just posterior to the aorta at the level of T9-T10 and unchanged since the recent MRI.   There is no striking erosion or scalloping of the visualized osseous structures related to metastasis.   Partially visualized lungs demonstrate multiple nodules which are most consistent with metastases. Since the prior chest CT, there are new areas of patchy and ground-glass opacity within the left-greater-than-right lungs and could be infectious or inflammatory in etiology or could reflect pulmonary edema. There is a small left pleural effusion passive atelectasis.       Surgery planning scan with findings detailed above.   Since the prior chest CT, is interval development of ground-glass and patchy opacities within the bilateral lungs, left-greater-than-right, which could be infectious or inflammatory in etiology or could reflect pulmonary edema, correlate clinically and consider further evaluation with chest imaging as clinically indicated.     I personally reviewed the images/study and I agree with the findings as stated by Dr. Nadeem Naranjo M.D. This study was interpreted at [place], [place].   MACRO: None   Signed by: Sreekanth Erickson 5/17/2024 8:38 AM Dictation workstation:   [id number]    MR brain w and wo IV contrast    Result Date: 5/16/2024  Interpreted By:  Papo Cuadra,  and Meño Presley STUDY: MR BRAIN W AND WO IV CONTRAST; MR CERVICAL  SPINE W AND WO IV CONTRAST; MR LUMBAR SPINE W AND WO IV CONTRAST; MR THORACIC SPINE W AND WO IV CONTRAST;  5/16/2024 4:06 pm; 5/16/2024 4:07 pm   INDICATION: Signs/Symptoms:Paraspinal mass; per EMR: 63-year-old male with history of esophageal adenocarcinoma metastases to the liver and lungs   COMPARISON: MRI brain 11/06/2023. CT lumbar spine 05/09/2024. CT head 02/28/2024.   ACCESSION NUMBER(S): [id number]; [id number]; [id number]; [id number]   ORDERING CLINICIAN: ORLIN GREEN   TECHNIQUE: Axial T2, FLAIR, DWI, gradient echo T2 and sagittal and coronal T1 weighted images of brain were acquired.   Multiplanar multisequence MR imaging of the cervical, thoracic, and lumbar spine was performed. Sagittal T1, T2, STIR and axial T2 and T1 weighted MR images of the total spine were obtained.   A total of 14 mL of intravenous gadolinium based contrast (Dotarem) was administered prior to multiplanar multisequence and volumetric image acquisition of the brain and full spine.   FINDINGS: BRAIN:   No abnormal diffusion restriction to suggest acute ischemia.   There is a 1.3 x 1.3 x 1.3 cm (CC X TRV X AP) peripherally enhancing and intrinsically T2/FLAIR hypointense lesion within the right posterior parasagittal parietal lobe (series 10, image 85). Surrounding this lesion there is T2/FLAIR hyperintensity suggestive of associated edema. Minimal local mass effect without significant midline shift.   There is an additional similar appearing peripherally enhancing and intrinsically T2/FLAIR hypointense lesion within the left anterior temporal lobe measuring 0.8 x 0.9 x 0.7 cm (CC X TRV X AP) without significant associated edema or mass effect.   These lesions are new when compared to prior MRI dated 11/06/2023 and are not appreciated on recent CT dated 02/28/2024.   No extra-axial collection. No intraparenchymal hemorrhage. No diffusion restriction or susceptibility artifact associated with the above-described lesions.    The major intracranial flow voids are maintained.   The ventricles, cortical sulci, and basal cisterns are nonenlarged.   Paranasal sinuses and mastoid air cells are well aerated. No significant calvarial or soft tissue abnormality.     CERVICAL SPINE:   Segmentation: There are 7 non-rib-bearing cervical vertebrae.   Alignment: The vertebral alignment is within normal limits.   Vertebrae/Intervertebral Discs: The vertebral body heights are maintained. Faint enhancement within the anterior aspect of the C2 spinous process greater than other levels could reflect a component of neoplastic change in this region. The marrow signal is otherwise within normal limits. The disc spaces are preserved. No abnormal enhancement.   Spinal cord: Normal in caliber and signal. No abnormal enhancement.   C1-C2: The cervicomedullary junction appears unremarkable. No significant neural foraminal or spinal canal stenosis. C2-C3: There is no posterior disc contour abnormality. No significant neural foraminal or spinal canal stenosis. C3-C4: Small disc osteophyte complex. No significant neural foraminal or spinal canal stenosis. C4-C5: Small disc osteophyte complex. No significant neural foraminal or spinal canal stenosis. C5-C6: Small disc osteophyte complex. No significant neural foraminal or spinal canal stenosis. C6-C7: Small disc osteophyte complex. No significant neural foraminal or spinal canal stenosis. C7-T1: There is no posterior disc contour abnormality. No significant neural foraminal or spinal canal stenosis.   Paraspinous Soft Tissues: The prevertebral and posterior paraspinous soft tissues are unremarkable.     THORACIC SPINE:   Segmentation: There are 12 rib-bearing thoracic vertebrae.   Alignment: Within normal limits.   Vertebrae/Intervertebral Discs/cord:   There is an ill-defined infiltrating T1/T2 hypointense and STIR hyperintense lesion filling the marrow space of the entire T10 vertebral body and extending along the  pedicles and posterior elements. There is a similar process involving the inferior and posterior aspects of the T9 vertebral body. There is mild irregular associated incomplete peripheral enhancement adjacent to the T9 lesion.   There is masslike extension posteriorly and right laterally disrupting the posterior walls of the T9 and T10 vertebral bodies with invasion into the ventral epidural space and associated deformity  of the spinal cord at the T10 level with subtle spinal cord signal abnormality not excluded. There is mild cranial and caudal extension of this mass along the ventral epidural space and right paraspinal region 2 T8-9 rostrally and approximately T11 caudally.   There is extension of the mass along the right anterior paraspinal region from the levels of T8 to T11 with the mass closely abutting the descending aorta (thoracic spine series 18, image 18). There is questionable invasion of the aortic wall at the level of the T9 vertebral body (series 19, image 19).   Minimal STIR hyperintensity of the cord just proximal and distal to the region of compression. No significant abnormal enhancement of the cord.   Mild heterogeneity of the remaining visualized marrow signal.   T1-T2:  No significant neural foraminal or spinal canal stenosis. T2-T3:  No significant neural foraminal or spinal canal stenosis. T3-T4:  No significant neural foraminal or spinal canal stenosis. T4-T5:  No significant neural foraminal or spinal canal stenosis. T5-T6:  No significant neural foraminal or spinal canal stenosis. T6-T7:  No significant neural foraminal or spinal canal stenosis. T7-T8:  No significant neural foraminal or spinal canal stenosis. T8-T9:  There is mass infiltration into the right-greater-than-left neural foramina with associated mild-to-moderate stenosis. T9-T10: There is mass filling the right neural foramen with complete effacement of the right neural foramen. Mild left neural foraminal stenosis. T10-T11:   There is mass filling the right neural foramen with near complete effacement of the right neural foramen. Mild left neural foraminal stenosis. T11-T12: There is a mass infiltration into the right neural foramen with severe stenosis. No left neural foraminal stenosis. T12-L1:  No significant neural foraminal or spinal canal stenosis.     LUMBAR SPINE:   Segmentation: There are 5 non-rib-bearing lumbar vertebrae.   Alignment: Minimal retrolisthesis of L4 on L5. The remaining alignment is maintained.   Vertebrae/Intervertebral Discs: The vertebral bodies demonstrate expected height. The marrow signal is within normal limits. Mild multilevel desiccated disc signal.   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 The conus terminates at the L1 inferior endplate. Likely subtle additional nodular enhancement of the cauda equina probably due to additional metastatic deposits.   T12-L1:  No significant neural foraminal or spinal canal stenosis. L1-2:  No significant neural foraminal or spinal canal stenosis. L2-3: Mild posterior disc bulge. No significant neural foraminal or spinal canal stenosis. L3-4: Mild posterior disc bulge with disc fissuring. No significant neural foraminal or spinal canal stenosis. L4-5: Mild posterior disc bulge. No significant neural foraminal or spinal canal stenosis. L5-S1: Posterior disc bulge. No significant neural foraminal or spinal canal stenosis.   Paraspinous Soft Tissues: The lumbar paraspinal soft tissues are moderately diffusely atrophied. The visualized intra-abdominal structures are unremarkable.       MRI brain: 1. Peripherally enhancing intrinsically T2/FLAIR hypointense lesions within the right posterior parasagittal parietal lobe and the left anterior temporal lobe, which are new when  compared to prior MRI dated 11/06/2023. The parietal lesion demonstrates significant adjacent enhancement with mild local mass effect. These findings are likely due to metastatic disease given the patient's history.   MRI spine: 1. Ill-defined infiltrating and minimally peripherally enhancing lesion involving the T9 and T10 vertebral bodies and with masslike extension posteriorly along the ventral thecal sac and rightward along the anterior paraspinal soft tissues. This mass exerts severe mass effect on the thecal sac with deformity with potential subtle T2 signal abnormality/likely compression of the spinal cord at least at the T10 level. The above-described mass also extends along the left anterior paraspinal soft tissues and closely abuts the descending thoracic aorta with questionable invasion of the posteromedial aortic wall at the T9 level.There is multilevel effacement of the right neural foramina with complete loss of epidural fat signal within the right neural foramina from T9-T11. 2. There is an avidly enhancing T2 hypointense lesion within the ventral thecal sac exerting mass effect on the cauda equina at the level of L2 likely due to intrathecal metastatic disease given the other findings. Likely subtle additional nodular enhancement of the cauda equina probably due to additional metastatic deposits.       I personally reviewed the image(s)/study and resident interpretation as stated by Dr. Sakina iWlder MD. I agree with the findings as stated. This study was interpreted at [facility], [place], [place].   MACRO: Sakina Wilder discussed the significance and urgency of this critical finding by secure chat with  ORLIN GREEN on 5/16/2024 at 5:23 pm.  (**-RCF-**) Findings:  See findings.   Signed by: Papo Cuadra 5/16/2024 5:59 PM Dictation workstation:   [id number]    MR thoracic spine w and wo IV contrast    Result Date: 5/16/2024  Interpreted By:  Papo Cuadra,   and [place] Sakina STUDY: MR BRAIN W AND WO IV CONTRAST; MR CERVICAL SPINE W AND WO IV CONTRAST; MR LUMBAR SPINE W AND WO IV CONTRAST; MR THORACIC SPINE W AND WO IV CONTRAST;  5/16/2024 4:06 pm; 5/16/2024 4:07 pm   INDICATION: Signs/Symptoms:Paraspinal mass; per EMR: 63-year-old male with history of esophageal adenocarcinoma metastases to the liver and lungs   COMPARISON: MRI brain 11/06/2023. CT lumbar spine 05/09/2024. CT head 02/28/2024.   ACCESSION NUMBER(S): [id number]; [id number]; [id number]; [id number]   ORDERING CLINICIAN: ORLIN GREEN   TECHNIQUE: Axial T2, FLAIR, DWI, gradient echo T2 and sagittal and coronal T1 weighted images of brain were acquired.   Multiplanar multisequence MR imaging of the cervical, thoracic, and lumbar spine was performed. Sagittal T1, T2, STIR and axial T2 and T1 weighted MR images of the total spine were obtained.   A total of 14 mL of intravenous gadolinium based contrast (Dotarem) was administered prior to multiplanar multisequence and volumetric image acquisition of the brain and full spine.   FINDINGS: BRAIN:   No abnormal diffusion restriction to suggest acute ischemia.   There is a 1.3 x 1.3 x 1.3 cm (CC X TRV X AP) peripherally enhancing and intrinsically T2/FLAIR hypointense lesion within the right posterior parasagittal parietal lobe (series 10, image 85). Surrounding this lesion there is T2/FLAIR hyperintensity suggestive of associated edema. Minimal local mass effect without significant midline shift.   There is an additional similar appearing peripherally enhancing and intrinsically T2/FLAIR hypointense lesion within the left anterior temporal lobe measuring 0.8 x 0.9 x 0.7 cm (CC X TRV X AP) without significant associated edema or mass effect.   These lesions are new when compared to prior MRI dated 11/06/2023 and are not appreciated on recent CT dated 02/28/2024.   No extra-axial collection. No intraparenchymal hemorrhage. No diffusion restriction or  susceptibility artifact associated with the above-described lesions.   The major intracranial flow voids are maintained.   The ventricles, cortical sulci, and basal cisterns are nonenlarged.   Paranasal sinuses and mastoid air cells are well aerated. No significant calvarial or soft tissue abnormality.     CERVICAL SPINE:   Segmentation: There are 7 non-rib-bearing cervical vertebrae.   Alignment: The vertebral alignment is within normal limits.   Vertebrae/Intervertebral Discs: The vertebral body heights are maintained. Faint enhancement within the anterior aspect of the C2 spinous process greater than other levels could reflect a component of neoplastic change in this region. The marrow signal is otherwise within normal limits. The disc spaces are preserved. No abnormal enhancement.   Spinal cord: Normal in caliber and signal. No abnormal enhancement.   C1-C2: The cervicomedullary junction appears unremarkable. No significant neural foraminal or spinal canal stenosis. C2-C3: There is no posterior disc contour abnormality. No significant neural foraminal or spinal canal stenosis. C3-C4: Small disc osteophyte complex. No significant neural foraminal or spinal canal stenosis. C4-C5: Small disc osteophyte complex. No significant neural foraminal or spinal canal stenosis. C5-C6: Small disc osteophyte complex. No significant neural foraminal or spinal canal stenosis. C6-C7: Small disc osteophyte complex. No significant neural foraminal or spinal canal stenosis. C7-T1: There is no posterior disc contour abnormality. No significant neural foraminal or spinal canal stenosis.   Paraspinous Soft Tissues: The prevertebral and posterior paraspinous soft tissues are unremarkable.     THORACIC SPINE:   Segmentation: There are 12 rib-bearing thoracic vertebrae.   Alignment: Within normal limits.   Vertebrae/Intervertebral Discs/cord:   There is an ill-defined infiltrating T1/T2 hypointense and STIR hyperintense lesion filling the  marrow space of the entire T10 vertebral body and extending along the pedicles and posterior elements. There is a similar process involving the inferior and posterior aspects of the T9 vertebral body. There is mild irregular associated incomplete peripheral enhancement adjacent to the T9 lesion.   There is masslike extension posteriorly and right laterally disrupting the posterior walls of the T9 and T10 vertebral bodies with invasion into the ventral epidural space and associated deformity  of the spinal cord at the T10 level with subtle spinal cord signal abnormality not excluded. There is mild cranial and caudal extension of this mass along the ventral epidural space and right paraspinal region 2 T8-9 rostrally and approximately T11 caudally.   There is extension of the mass along the right anterior paraspinal region from the levels of T8 to T11 with the mass closely abutting the descending aorta (thoracic spine series 18, image 18). There is questionable invasion of the aortic wall at the level of the T9 vertebral body (series 19, image 19).   Minimal STIR hyperintensity of the cord just proximal and distal to the region of compression. No significant abnormal enhancement of the cord.   Mild heterogeneity of the remaining visualized marrow signal.   T1-T2:  No significant neural foraminal or spinal canal stenosis. T2-T3:  No significant neural foraminal or spinal canal stenosis. T3-T4:  No significant neural foraminal or spinal canal stenosis. T4-T5:  No significant neural foraminal or spinal canal stenosis. T5-T6:  No significant neural foraminal or spinal canal stenosis. T6-T7:  No significant neural foraminal or spinal canal stenosis. T7-T8:  No significant neural foraminal or spinal canal stenosis. T8-T9:  There is mass infiltration into the right-greater-than-left neural foramina with associated mild-to-moderate stenosis. T9-T10: There is mass filling the right neural foramen with complete effacement of the  right neural foramen. Mild left neural foraminal stenosis. T10-T11:  There is mass filling the right neural foramen with near complete effacement of the right neural foramen. Mild left neural foraminal stenosis. T11-T12: There is a mass infiltration into the right neural foramen with severe stenosis. No left neural foraminal stenosis. T12-L1:  No significant neural foraminal or spinal canal stenosis.     LUMBAR SPINE:   Segmentation: There are 5 non-rib-bearing lumbar vertebrae.   Alignment: Minimal retrolisthesis of L4 on L5. The remaining alignment is maintained.   Vertebrae/Intervertebral Discs: The vertebral bodies demonstrate expected height. The marrow signal is within normal limits. Mild multilevel desiccated disc signal.   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 The conus terminates at the L1 inferior endplate. Likely subtle additional nodular enhancement of the cauda equina probably due to additional metastatic deposits.   T12-L1:  No significant neural foraminal or spinal canal stenosis. L1-2:  No significant neural foraminal or spinal canal stenosis. L2-3: Mild posterior disc bulge. No significant neural foraminal or spinal canal stenosis. L3-4: Mild posterior disc bulge with disc fissuring. No significant neural foraminal or spinal canal stenosis. L4-5: Mild posterior disc bulge. No significant neural foraminal or spinal canal stenosis. L5-S1: Posterior disc bulge. No significant neural foraminal or spinal canal stenosis.   Paraspinous Soft Tissues: The lumbar paraspinal soft tissues are moderately diffusely atrophied. The visualized intra-abdominal structures are unremarkable.       MRI brain: 1. Peripherally enhancing intrinsically T2/FLAIR hypointense lesions within the right posterior parasagittal  parietal lobe and the left anterior temporal lobe, which are new when compared to prior MRI dated 11/06/2023. The parietal lesion demonstrates significant adjacent enhancement with mild local mass effect. These findings are likely due to metastatic disease given the patient's history.   MRI spine: 1. Ill-defined infiltrating and minimally peripherally enhancing lesion involving the T9 and T10 vertebral bodies and with masslike extension posteriorly along the ventral thecal sac and rightward along the anterior paraspinal soft tissues. This mass exerts severe mass effect on the thecal sac with deformity with potential subtle T2 signal abnormality/likely compression of the spinal cord at least at the T10 level. The above-described mass also extends along the left anterior paraspinal soft tissues and closely abuts the descending thoracic aorta with questionable invasion of the posteromedial aortic wall at the T9 level.There is multilevel effacement of the right neural foramina with complete loss of epidural fat signal within the right neural foramina from T9-T11. 2. There is an avidly enhancing T2 hypointense lesion within the ventral thecal sac exerting mass effect on the cauda equina at the level of L2 likely due to intrathecal metastatic disease given the other findings. Likely subtle additional nodular enhancement of the cauda equina probably due to additional metastatic deposits.       I personally reviewed the image(s)/study and resident interpretation as stated by Dr. Sakina Wilder MD. I agree with the findings as stated. This study was interpreted at [facility], [place], [place].   MACRO: Sakina Wilder discussed the significance and urgency of this critical finding by secure chat with  ORLIN GREEN on 5/16/2024 at 5:23 pm.  (**-RCF-**) Findings:  See findings.   Signed by: Papo Cuadra 5/16/2024 5:59 PM Dictation workstation:   [id number]    MR cervical spine w and  wo IV contrast    Result Date: 5/16/2024  Interpreted By:  Papo Cuadra  and Me[place] Presley STUDY: MR BRAIN W AND WO IV CONTRAST; MR CERVICAL SPINE W AND WO IV CONTRAST; MR LUMBAR SPINE W AND WO IV CONTRAST; MR THORACIC SPINE W AND WO IV CONTRAST;  5/16/2024 4:06 pm; 5/16/2024 4:07 pm   INDICATION: Signs/Symptoms:Paraspinal mass; per EMR: 63-year-old male with history of esophageal adenocarcinoma metastases to the liver and lungs   COMPARISON: MRI brain 11/06/2023. CT lumbar spine 05/09/2024. CT head 02/28/2024.   ACCESSION NUMBER(S): [id number]; [id number]; [id number]; [id number]   ORDERING CLINICIAN: ORLIN GREEN   TECHNIQUE: Axial T2, FLAIR, DWI, gradient echo T2 and sagittal and coronal T1 weighted images of brain were acquired.   Multiplanar multisequence MR imaging of the cervical, thoracic, and lumbar spine was performed. Sagittal T1, T2, STIR and axial T2 and T1 weighted MR images of the total spine were obtained.   A total of 14 mL of intravenous gadolinium based contrast (Dotarem) was administered prior to multiplanar multisequence and volumetric image acquisition of the brain and full spine.   FINDINGS: BRAIN:   No abnormal diffusion restriction to suggest acute ischemia.   There is a 1.3 x 1.3 x 1.3 cm (CC X TRV X AP) peripherally enhancing and intrinsically T2/FLAIR hypointense lesion within the right posterior parasagittal parietal lobe (series 10, image 85). Surrounding this lesion there is T2/FLAIR hyperintensity suggestive of associated edema. Minimal local mass effect without significant midline shift.   There is an additional similar appearing peripherally enhancing and intrinsically T2/FLAIR hypointense lesion within the left anterior temporal lobe measuring 0.8 x 0.9 x 0.7 cm (CC X TRV X AP) without significant associated edema or mass effect.   These lesions are new when compared to prior MRI dated 11/06/2023 and are not appreciated on recent CT dated 02/28/2024.   No extra-axial  collection. No intraparenchymal hemorrhage. No diffusion restriction or susceptibility artifact associated with the above-described lesions.   The major intracranial flow voids are maintained.   The ventricles, cortical sulci, and basal cisterns are nonenlarged.   Paranasal sinuses and mastoid air cells are well aerated. No significant calvarial or soft tissue abnormality.     CERVICAL SPINE:   Segmentation: There are 7 non-rib-bearing cervical vertebrae.   Alignment: The vertebral alignment is within normal limits.   Vertebrae/Intervertebral Discs: The vertebral body heights are maintained. Faint enhancement within the anterior aspect of the C2 spinous process greater than other levels could reflect a component of neoplastic change in this region. The marrow signal is otherwise within normal limits. The disc spaces are preserved. No abnormal enhancement.   Spinal cord: Normal in caliber and signal. No abnormal enhancement.   C1-C2: The cervicomedullary junction appears unremarkable. No significant neural foraminal or spinal canal stenosis. C2-C3: There is no posterior disc contour abnormality. No significant neural foraminal or spinal canal stenosis. C3-C4: Small disc osteophyte complex. No significant neural foraminal or spinal canal stenosis. C4-C5: Small disc osteophyte complex. No significant neural foraminal or spinal canal stenosis. C5-C6: Small disc osteophyte complex. No significant neural foraminal or spinal canal stenosis. C6-C7: Small disc osteophyte complex. No significant neural foraminal or spinal canal stenosis. C7-T1: There is no posterior disc contour abnormality. No significant neural foraminal or spinal canal stenosis.   Paraspinous Soft Tissues: The prevertebral and posterior paraspinous soft tissues are unremarkable.     THORACIC SPINE:   Segmentation: There are 12 rib-bearing thoracic vertebrae.   Alignment: Within normal limits.   Vertebrae/Intervertebral Discs/cord:   There is an ill-defined  infiltrating T1/T2 hypointense and STIR hyperintense lesion filling the marrow space of the entire T10 vertebral body and extending along the pedicles and posterior elements. There is a similar process involving the inferior and posterior aspects of the T9 vertebral body. There is mild irregular associated incomplete peripheral enhancement adjacent to the T9 lesion.   There is masslike extension posteriorly and right laterally disrupting the posterior walls of the T9 and T10 vertebral bodies with invasion into the ventral epidural space and associated deformity  of the spinal cord at the T10 level with subtle spinal cord signal abnormality not excluded. There is mild cranial and caudal extension of this mass along the ventral epidural space and right paraspinal region 2 T8-9 rostrally and approximately T11 caudally.   There is extension of the mass along the right anterior paraspinal region from the levels of T8 to T11 with the mass closely abutting the descending aorta (thoracic spine series 18, image 18). There is questionable invasion of the aortic wall at the level of the T9 vertebral body (series 19, image 19).   Minimal STIR hyperintensity of the cord just proximal and distal to the region of compression. No significant abnormal enhancement of the cord.   Mild heterogeneity of the remaining visualized marrow signal.   T1-T2:  No significant neural foraminal or spinal canal stenosis. T2-T3:  No significant neural foraminal or spinal canal stenosis. T3-T4:  No significant neural foraminal or spinal canal stenosis. T4-T5:  No significant neural foraminal or spinal canal stenosis. T5-T6:  No significant neural foraminal or spinal canal stenosis. T6-T7:  No significant neural foraminal or spinal canal stenosis. T7-T8:  No significant neural foraminal or spinal canal stenosis. T8-T9:  There is mass infiltration into the right-greater-than-left neural foramina with associated mild-to-moderate stenosis. T9-T10: There  is mass filling the right neural foramen with complete effacement of the right neural foramen. Mild left neural foraminal stenosis. T10-T11:  There is mass filling the right neural foramen with near complete effacement of the right neural foramen. Mild left neural foraminal stenosis. T11-T12: There is a mass infiltration into the right neural foramen with severe stenosis. No left neural foraminal stenosis. T12-L1:  No significant neural foraminal or spinal canal stenosis.     LUMBAR SPINE:   Segmentation: There are 5 non-rib-bearing lumbar vertebrae.   Alignment: Minimal retrolisthesis of L4 on L5. The remaining alignment is maintained.   Vertebrae/Intervertebral Discs: The vertebral bodies demonstrate expected height. The marrow signal is within normal limits. Mild multilevel desiccated disc signal.   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 The conus terminates at the L1 inferior endplate. Likely subtle additional nodular enhancement of the cauda equina probably due to additional metastatic deposits.   T12-L1:  No significant neural foraminal or spinal canal stenosis. L1-2:  No significant neural foraminal or spinal canal stenosis. L2-3: Mild posterior disc bulge. No significant neural foraminal or spinal canal stenosis. L3-4: Mild posterior disc bulge with disc fissuring. No significant neural foraminal or spinal canal stenosis. L4-5: Mild posterior disc bulge. No significant neural foraminal or spinal canal stenosis. L5-S1: Posterior disc bulge. No significant neural foraminal or spinal canal stenosis.   Paraspinous Soft Tissues: The lumbar paraspinal soft tissues are moderately diffusely atrophied. The visualized intra-abdominal structures are unremarkable.       MRI brain: 1. Peripherally enhancing intrinsically  T2/FLAIR hypointense lesions within the right posterior parasagittal parietal lobe and the left anterior temporal lobe, which are new when compared to prior MRI dated 11/06/2023. The parietal lesion demonstrates significant adjacent enhancement with mild local mass effect. These findings are likely due to metastatic disease given the patient's history.   MRI spine: 1. Ill-defined infiltrating and minimally peripherally enhancing lesion involving the T9 and T10 vertebral bodies and with masslike extension posteriorly along the ventral thecal sac and rightward along the anterior paraspinal soft tissues. This mass exerts severe mass effect on the thecal sac with deformity with potential subtle T2 signal abnormality/likely compression of the spinal cord at least at the T10 level. The above-described mass also extends along the left anterior paraspinal soft tissues and closely abuts the descending thoracic aorta with questionable invasion of the posteromedial aortic wall at the T9 level.There is multilevel effacement of the right neural foramina with complete loss of epidural fat signal within the right neural foramina from T9-T11. 2. There is an avidly enhancing T2 hypointense lesion within the ventral thecal sac exerting mass effect on the cauda equina at the level of L2 likely due to intrathecal metastatic disease given the other findings. Likely subtle additional nodular enhancement of the cauda equina probably due to additional metastatic deposits.       I personally reviewed the image(s)/study and resident interpretation as stated by Dr. Sakina Wilder MD. I agree with the findings as stated. This study was interpreted at [facility], [place], [place].   MACRO: Sakina Wilder discussed the significance and urgency of this critical finding by secure chat with  ORLIN GREEN on 5/16/2024 at 5:23 pm.  (**-RCF-**) Findings:  See findings.   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 CT lumbar spine 05/09/2024. CT head 02/28/2024.   ACCESSION NUMBER(S): [id number]; [id number]; [id number]; [id number]   ORDERING CLINICIAN: ORLIN GREEN   TECHNIQUE: Axial T2, FLAIR, DWI, gradient echo T2 and sagittal and coronal T1 weighted images of brain were acquired.   Multiplanar multisequence MR imaging of the cervical, thoracic, and lumbar spine was performed. Sagittal T1, T2, STIR and axial T2 and T1 weighted MR images of the total spine were obtained.   A total of 14 mL of intravenous gadolinium based contrast (Dotarem) was administered prior to multiplanar multisequence and volumetric image acquisition of the brain and full spine.   FINDINGS: BRAIN:   No abnormal diffusion restriction to suggest acute ischemia.   There is a 1.3 x 1.3 x 1.3 cm (CC X TRV X AP) peripherally enhancing and intrinsically T2/FLAIR hypointense lesion within the right posterior parasagittal parietal lobe (series 10, image 85). Surrounding this lesion there is T2/FLAIR hyperintensity suggestive of associated edema. Minimal local mass effect without significant midline shift.   There is an additional similar appearing peripherally enhancing and intrinsically T2/FLAIR hypointense lesion within the left anterior temporal lobe measuring 0.8 x 0.9 x 0.7 cm (CC X TRV X AP) without significant associated edema or mass effect.   These lesions are new when compared to prior MRI dated 11/06/2023 and  are not appreciated on recent CT dated 02/28/2024.   No extra-axial collection. No intraparenchymal hemorrhage. No diffusion restriction or susceptibility artifact associated with the above-described lesions.   The major intracranial flow voids are maintained.   The ventricles, cortical sulci, and basal cisterns are nonenlarged.   Paranasal sinuses and mastoid air cells are well aerated. No significant calvarial or soft tissue abnormality.     CERVICAL SPINE:   Segmentation: There are 7 non-rib-bearing cervical vertebrae.   Alignment: The vertebral alignment is within normal limits.   Vertebrae/Intervertebral Discs: The vertebral body heights are maintained. Faint enhancement within the anterior aspect of the C2 spinous process greater than other levels could reflect a component of neoplastic change in this region. The marrow signal is otherwise within normal limits. The disc spaces are preserved. No abnormal enhancement.   Spinal cord: Normal in caliber and signal. No abnormal enhancement.   C1-C2: The cervicomedullary junction appears unremarkable. No significant neural foraminal or spinal canal stenosis. C2-C3: There is no posterior disc contour abnormality. No significant neural foraminal or spinal canal stenosis. C3-C4: Small disc osteophyte complex. No significant neural foraminal or spinal canal stenosis. C4-C5: Small disc osteophyte complex. No significant neural foraminal or spinal canal stenosis. C5-C6: Small disc osteophyte complex. No significant neural foraminal or spinal canal stenosis. C6-C7: Small disc osteophyte complex. No significant neural foraminal or spinal canal stenosis. C7-T1: There is no posterior disc contour abnormality. No significant neural foraminal or spinal canal stenosis.   Paraspinous Soft Tissues: The prevertebral and posterior paraspinous soft tissues are unremarkable.     THORACIC SPINE:   Segmentation: There are 12 rib-bearing thoracic vertebrae.   Alignment: Within normal  limits.   Vertebrae/Intervertebral Discs/cord:   There is an ill-defined infiltrating T1/T2 hypointense and STIR hyperintense lesion filling the marrow space of the entire T10 vertebral body and extending along the pedicles and posterior elements. There is a similar process involving the inferior and posterior aspects of the T9 vertebral body. There is mild irregular associated incomplete peripheral enhancement adjacent to the T9 lesion.   There is masslike extension posteriorly and right laterally disrupting the posterior walls of the T9 and T10 vertebral bodies with invasion into the ventral epidural space and associated deformity  of the spinal cord at the T10 level with subtle spinal cord signal abnormality not excluded. There is mild cranial and caudal extension of this mass along the ventral epidural space and right paraspinal region 2 T8-9 rostrally and approximately T11 caudally.   There is extension of the mass along the right anterior paraspinal region from the levels of T8 to T11 with the mass closely abutting the descending aorta (thoracic spine series 18, image 18). There is questionable invasion of the aortic wall at the level of the T9 vertebral body (series 19, image 19).   Minimal STIR hyperintensity of the cord just proximal and distal to the region of compression. No significant abnormal enhancement of the cord.   Mild heterogeneity of the remaining visualized marrow signal.   T1-T2:  No significant neural foraminal or spinal canal stenosis. T2-T3:  No significant neural foraminal or spinal canal stenosis. T3-T4:  No significant neural foraminal or spinal canal stenosis. T4-T5:  No significant neural foraminal or spinal canal stenosis. T5-T6:  No significant neural foraminal or spinal canal stenosis. T6-T7:  No significant neural foraminal or spinal canal stenosis. T7-T8:  No significant neural foraminal or spinal canal stenosis. T8-T9:  There is mass infiltration into the right-greater-than-left  neural foramina with associated mild-to-moderate stenosis. T9-T10: There is mass filling the right neural foramen with complete effacement of the right neural foramen. Mild left neural foraminal stenosis. T10-T11:  There is mass filling the right neural foramen with near complete effacement of the right neural foramen. Mild left neural foraminal stenosis. T11-T12: There is a mass infiltration into the right neural foramen with severe stenosis. No left neural foraminal stenosis. T12-L1:  No significant neural foraminal or spinal canal stenosis.     LUMBAR SPINE:   Segmentation: There are 5 non-rib-bearing lumbar vertebrae.   Alignment: Minimal retrolisthesis of L4 on L5. The remaining alignment is maintained.   Vertebrae/Intervertebral Discs: The vertebral bodies demonstrate expected height. The marrow signal is within normal limits. Mild multilevel desiccated disc signal.   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 The conus terminates at the L1 inferior endplate. Likely subtle additional nodular enhancement of the cauda equina probably due to additional metastatic deposits.   T12-L1:  No significant neural foraminal or spinal canal stenosis. L1-2:  No significant neural foraminal or spinal canal stenosis. L2-3: Mild posterior disc bulge. No significant neural foraminal or spinal canal stenosis. L3-4: Mild posterior disc bulge with disc fissuring. No significant neural foraminal or spinal canal stenosis. L4-5: Mild posterior disc bulge. No significant neural foraminal or spinal canal stenosis. L5-S1: Posterior disc bulge. No significant neural foraminal or spinal canal stenosis.   Paraspinous Soft Tissues: The lumbar paraspinal soft tissues are moderately diffusely atrophied. The visualized intra-abdominal structures are  unremarkable.       MRI brain: 1. Peripherally enhancing intrinsically T2/FLAIR hypointense lesions within the right posterior parasagittal parietal lobe and the left anterior temporal lobe, which are new when compared to prior MRI dated 11/06/2023. The parietal lesion demonstrates significant adjacent enhancement with mild local mass effect. These findings are likely due to metastatic disease given the patient's history.   MRI spine: 1. Ill-defined infiltrating and minimally peripherally enhancing lesion involving the T9 and T10 vertebral bodies and with masslike extension posteriorly along the ventral thecal sac and rightward along the anterior paraspinal soft tissues. This mass exerts severe mass effect on the thecal sac with deformity with potential subtle T2 signal abnormality/likely compression of the spinal cord at least at the T10 level. The above-described mass also extends along the left anterior paraspinal soft tissues and closely abuts the descending thoracic aorta with questionable invasion of the posteromedial aortic wall at the T9 level.There is multilevel effacement of the right neural foramina with complete loss of epidural fat signal within the right neural foramina from T9-T11. 2. There is an avidly enhancing T2 hypointense lesion within the ventral thecal sac exerting mass effect on the cauda equina at the level of L2 likely due to intrathecal metastatic disease given the other findings. Likely subtle additional nodular enhancement of the cauda equina probably due to additional metastatic deposits.       I personally reviewed the image(s)/study and resident interpretation as stated by Dr. Sakina Wilder MD. I agree with the findings as stated. This study was interpreted at [facility], [place], [place].   MACRO: Sakina Wilder discussed the significance and urgency of this critical finding by secure chat with  ORLIN GREEN on 5/16/2024 at 5:23 pm.   (**-RCF-**) Findings:  See findings.   Signed by: Papo Cuadra 5/16/2024 5:59 PM Dictation workstation:   [id number]    XR chest 2 views    Result Date: 5/11/2024  Interpreted By:  Jason Paredes, STUDY: XR CHEST 2 VIEWS;  5/11/2024 9:45 am   INDICATION: Signs/Symptoms:Per neurosurgery recs.   COMPARISON: Exam dated 03/18/2024   ACCESSION NUMBER(S): [id number]   ORDERING CLINICIAN: KOBI SIBLEY   FINDINGS: PA and lateral radiographs of the chest were provided.   Status post bilateral humeral head resurfacing which is partially evaluated. Right chest wall medication port with catheter tip projecting over the mid to lower SVC. Left chest wall pacer with lead tips projecting over the right atrial appendage and right ventricle in similar position as prior exam.   CARDIOMEDIASTINAL SILHOUETTE: Cardiomediastinal silhouette is normal in size and configuration.   LUNGS: Scattered bilateral pulmonary nodules are noted and were better evaluated on recent CT. No new areas of focal consolidation, pneumothorax, or pleural effusion.   ABDOMEN: No remarkable upper abdominal findings.   BONES: No acute osseous changes.       1.  Multiple bilateral pulmonary nodules which were better evaluated on prior CT and consistent with metastatic disease. 2. No radiographic evidence of acute cardiopulmonary process.       MACRO: None   Signed by: Jason Paredes 5/11/2024 5:02 PM Dictation workstation:   [id number]    CT abdomen pelvis w IV contrast    Result Date: 5/9/2024  Interpreted By:  Manfred Azul, STUDY: CT ABDOMEN PELVIS W IV CONTRAST; CT LUMBAR SPINE WO IV CONTRAST; 5/9/2024 10:38 pm   INDICATION: Signs/Symptoms:Bilateral flank pain; Signs/Symptoms:pain to entire lumbar back. Stage IV esophageal cancer.   COMPARISON: 5/3/2024   ACCESSION NUMBER(S): [id number]; [id number]   ORDERING CLINICIAN: AFIA STALLINGS   TECHNIQUE: Contiguous axial images of the abdomen and pelvis were obtained after the intravenous administration of   contrast. Coronal and sagittal reformatted images were obtained from the axial images. Reformatted images of the lumbar spine were also performed.   FINDINGS: CT ABDOMEN AND PELVIS:   There is limited evaluation the lung bases. There is redemonstration of multiple nodular opacity at the imaged lung bases compatible with metastatic disease. There is evidence of thickening and question hyperenhancement of the anterior esophageal wall distally.   There is redemonstration of multiple masses in the right and left hepatic lobes which measure up to 3.6 cm in size compatible metastatic disease. The gallbladder is present. Subtle calcification in the gallbladder may correspond to gallstones. No dilatation of the common bile duct.   The pancreas, spleen, and adrenal glands appear unremarkable.   Symmetric enhancement of the kidneys. No hydronephrosis.   Atherosclerotic calcification of the abdominal aorta and bilateral iliac arteries.   Enlarged tiffany hepatis and peripancreatic lymph nodes measure up to the 2.1 cm. 3.3 cm x 1.2 cm portal caval lymph node. Enlarged retroperitoneal lymph nodes measuring up to 1.7 cm. Stable 2.6 cm cystic lesion along course of right external iliac chain.   No evidence of bowel obstruction.   Urinary bladder is underdistended and not well evaluated.   Small amount of free fluid the pelvis which measures simple fluid attenuation.   Postsurgical change of anterior pelvic wall repair.     CT LUMBAR SPINE:   There is soft tissue thickening and nodularity in the paravertebral soft tissues at the level of the imaged lower thoracic spine compatible with metastatic disease. Evaluation of soft tissue spinal canal is limited, however there is evidence of soft tissue extension into the spinal canal at level of T9 and T10 with associated spinal canal stenosis.   No acute fracture of the lumbar spine. There is multilevel degenerative change of the lumbar spine. There is multilevel intervertebral disc space  narrowing and degenerative disc disease. There is limited evaluation of the soft tissues of the spinal canal. There is multilevel degenerative spinal canal narrowing. Multilevel degenerative facet arthropathy.       Multiple pulmonary nodules in the imaged lower lungs compatible with metastatic disease. There is wall thickening and apparent hyperenhancement of the anterior wall of the distal esophagus which may relate to patient's known esophageal malignancy. There is also redemonstration of multiple hepatic masses and abdominal and pelvic lymphadenopathy compatible with metastatic disease.   Soft tissue thickening and nodularity in the paravertebral soft tissues at the level of the imaged lower thoracic spine compatible with metastatic disease. Evaluation of the soft tissues of the spinal canal is limited, however there is evidence of soft tissue extension into the spinal canal at T9 and T10 resulting in spinal canal stenosis. Neurosurgery consultation/evaluation and MRI is recommended for better evaluation of the metastatic disease and spinal canal stenosis.     MACRO: None   Signed by: Manfred Azul 5/9/2024 11:26 PM Dictation workstation:   [id number]    CT lumbar spine wo IV contrast    Result Date: 5/9/2024  Interpreted By:  Manfred Azul, STUDY: CT ABDOMEN PELVIS W IV CONTRAST; CT LUMBAR SPINE WO IV CONTRAST; 5/9/2024 10:38 pm   INDICATION: Signs/Symptoms:Bilateral flank pain; Signs/Symptoms:pain to entire lumbar back. Stage IV esophageal cancer.   COMPARISON: 5/3/2024   ACCESSION NUMBER(S): [id number]; [id number]   ORDERING CLINICIAN: AFIA STALLINGS   TECHNIQUE: Contiguous axial images of the abdomen and pelvis were obtained after the intravenous administration of  contrast. Coronal and sagittal reformatted images were obtained from the axial images. Reformatted images of the lumbar spine were also performed.   FINDINGS: CT ABDOMEN AND PELVIS:   There is limited evaluation the lung bases. There is  redemonstration of multiple nodular opacity at the imaged lung bases compatible with metastatic disease. There is evidence of thickening and question hyperenhancement of the anterior esophageal wall distally.   There is redemonstration of multiple masses in the right and left hepatic lobes which measure up to 3.6 cm in size compatible metastatic disease. The gallbladder is present. Subtle calcification in the gallbladder may correspond to gallstones. No dilatation of the common bile duct.   The pancreas, spleen, and adrenal glands appear unremarkable.   Symmetric enhancement of the kidneys. No hydronephrosis.   Atherosclerotic calcification of the abdominal aorta and bilateral iliac arteries.   Enlarged tiffany hepatis and peripancreatic lymph nodes measure up to the 2.1 cm. 3.3 cm x 1.2 cm portal caval lymph node. Enlarged retroperitoneal lymph nodes measuring up to 1.7 cm. Stable 2.6 cm cystic lesion along course of right external iliac chain.   No evidence of bowel obstruction.   Urinary bladder is underdistended and not well evaluated.   Small amount of free fluid the pelvis which measures simple fluid attenuation.   Postsurgical change of anterior pelvic wall repair.     CT LUMBAR SPINE:   There is soft tissue thickening and nodularity in the paravertebral soft tissues at the level of the imaged lower thoracic spine compatible with metastatic disease. Evaluation of soft tissue spinal canal is limited, however there is evidence of soft tissue extension into the spinal canal at level of T9 and T10 with associated spinal canal stenosis.   No acute fracture of the lumbar spine. There is multilevel degenerative change of the lumbar spine. There is multilevel intervertebral disc space narrowing and degenerative disc disease. There is limited evaluation of the soft tissues of the spinal canal. There is multilevel degenerative spinal canal narrowing. Multilevel degenerative facet arthropathy.       Multiple pulmonary  nodules in the imaged lower lungs compatible with metastatic disease. There is wall thickening and apparent hyperenhancement of the anterior wall of the distal esophagus which may relate to patient's known esophageal malignancy. There is also redemonstration of multiple hepatic masses and abdominal and pelvic lymphadenopathy compatible with metastatic disease.   Soft tissue thickening and nodularity in the paravertebral soft tissues at the level of the imaged lower thoracic spine compatible with metastatic disease. Evaluation of the soft tissues of the spinal canal is limited, however there is evidence of soft tissue extension into the spinal canal at T9 and T10 resulting in spinal canal stenosis. Neurosurgery consultation/evaluation and MRI is recommended for better evaluation of the metastatic disease and spinal canal stenosis.     MACRO: None   Signed by: Manfred Azul 5/9/2024 11:26 PM Dictation workstation:   [id number]    Scheduled medications  [Held by provider] apixaban, 5 mg, oral, BID  enoxaparin, 1 mg/kg, subcutaneous, q12h  fentaNYL, 1 patch, transdermal, q72h  heparin flush, 50 Units, intra-catheter, q12h  pantoprazole, 40 mg, intravenous, Daily  polyethylene glycol, 17 g, oral, Daily  sennosides-docusate sodium, 2 tablet, oral, BID      Continuous medications  lactated Ringer's, 125 mL/hr, Last Rate: 125 mL/hr (06/06/24 0615)      PRN medications  PRN medications: acetaminophen, alteplase, alteplase, heparin flush, heparin flush, HYDROmorphone **OR** HYDROmorphone, LORazepam, naloxone, ondansetron ODT **OR** ondansetron, ondansetron, prochlorperazine, promethazine, sodium chloride 0.9%, sodium chloride 0.9%  Results for orders placed or performed during the hospital encounter of 06/05/24 (from the past 24 hour(s))   Urinalysis with Reflex Culture and Microscopic   Result Value Ref Range    Color, Urine Light-Yellow Light-Yellow, Yellow, Dark-Yellow    Appearance, Urine Clear Clear    Specific Gravity,  Urine 1.038 (N) 1.005 - 1.035    pH, Urine 7.5 5.0, 5.5, 6.0, 6.5, 7.0, 7.5, 8.0    Protein, Urine NEGATIVE NEGATIVE, 10 (TRACE), 20 (TRACE) mg/dL    Glucose, Urine Normal Normal mg/dL    Blood, Urine NEGATIVE NEGATIVE    Ketones, Urine NEGATIVE NEGATIVE mg/dL    Bilirubin, Urine NEGATIVE NEGATIVE    Urobilinogen, Urine Normal Normal mg/dL    Nitrite, Urine NEGATIVE NEGATIVE    Leukocyte Esterase, Urine NEGATIVE NEGATIVE   Extra Urine Gray Tube   Result Value Ref Range    Extra Tube Hold for add-ons.    Lactate   Result Value Ref Range    Lactate 2.4 (H) 0.4 - 2.0 mmol/L   Lactate   Result Value Ref Range    Lactate 1.7 0.4 - 2.0 mmol/L   CBC   Result Value Ref Range    WBC 7.8 4.4 - 11.3 x10*3/uL    nRBC 0.0 0.0 - 0.0 /100 WBCs    RBC 2.95 (L) 4.50 - 5.90 x10*6/uL    Hemoglobin 8.5 (L) 13.5 - 17.5 g/dL    Hematocrit 26.9 (L) 41.0 - 52.0 %    MCV 91 80 - 100 fL    MCH 28.8 26.0 - 34.0 pg    MCHC 31.6 (L) 32.0 - 36.0 g/dL    RDW 17.9 (H) 11.5 - 14.5 %    Platelets 124 (L) 150 - 450 x10*3/uL   Renal Function Panel   Result Value Ref Range    Glucose 79 74 - 99 mg/dL    Sodium 136 136 - 145 mmol/L    Potassium 3.9 3.5 - 5.3 mmol/L    Chloride 101 98 - 107 mmol/L    Bicarbonate 28 21 - 32 mmol/L    Anion Gap 11 10 - 20 mmol/L    Urea Nitrogen 11 6 - 23 mg/dL    Creatinine 0.68 0.50 - 1.30 mg/dL    eGFR >90 >60 mL/min/1.73m*2    Calcium 8.2 (L) 8.6 - 10.3 mg/dL    Phosphorus 4.1 2.5 - 4.9 mg/dL    Albumin 3.0 (L) 3.4 - 5.0 g/dL   Magnesium   Result Value Ref Range    Magnesium 2.08 1.60 - 2.40 mg/dL        Assessment/Plan     Partial bowel obstructions  - no surgical intervention at this time  - diet clears and advance as tolerated  - remainder of care per primary    S/p spinal fusion  - direct message sent to surgeon Dr. Blue John about if pt could have staples removed now. His MA Brianda Sawyer sent back message that staples could be removed.   - 30 staples removed. Distal aspect of incision with larger scab and the  skin margins are not tight. Applied 4 steri strips. Pt tolerated well.       Plan of care discussed with Dr. Marco Reno, ALFREDITO-CNP

## 2024-06-06 NOTE — CARE PLAN
The clinical goals for the shift include Pt pain will be better controlled through shift 6/10 progressing      Problem: Pain  Goal: Takes deep breaths with improved pain control throughout the shift  Outcome: Progressing  Goal: Turns in bed with improved pain control throughout the shift  Outcome: Progressing  Goal: Walks with improved pain control throughout the shift  Outcome: Progressing  Goal: Performs ADL's with improved pain control throughout shift  Outcome: Progressing  Goal: Free from opioid side effects throughout the shift  Outcome: Progressing  Goal: Free from acute confusion related to pain meds throughout the shift  Outcome: Progressing

## 2024-06-06 NOTE — TELEPHONE ENCOUNTER
Spoke with Mrs. Garcia as Mr. Garcia is currently hospitalized with a partial SBO. Explained  that Mr. Garcia is being seen by a Palliative Care NP at [facility]. Further explained that we would not send in pain medication refills as discharge plan/medications are not known at this time.  She verbalized understanding.

## 2024-06-06 NOTE — PROGRESS NOTES
[facility] Internal Medicine Daily Progress Note    Massimo Garcia is a 63 y.o. male on Hospital Day: 2 of admission presenting with SBO (small bowel obstruction) (Multi).    Subjective     Overnight Events: No acute overnight events. Pain is well controlled. Will consult palliative for pain regimen and advanced diet as tolerating. SBO / abdominal pain has improved.       Objective   Overnight Vitals  Heart Rate:  [108-114]   Temp:  [37.2 °C (99 °F)-37.4 °C (99.3 °F)]   Resp:  [17-18]   BP: (107-116)/(49-69)   SpO2:  [92 %-98 %]     Physical Exam   Physical Exam  Vitals reviewed.   HENT:      Head: Normocephalic and atraumatic.   Cardiovascular:      Rate and Rhythm: Normal rate and regular rhythm.      Heart sounds: Normal heart sounds. No murmur heard.     No gallop.   Pulmonary:      Breath sounds: Normal breath sounds.   Abdominal:      General: Abdomen is flat. There is no distension.      Palpations: Abdomen is soft.      Tenderness: There is no abdominal tenderness.   Musculoskeletal:      Right lower leg: No edema.      Left lower leg: No edema.   Skin:     General: Skin is warm and dry.   Neurological:      Mental Status: He is alert. Mental status is at baseline.   Psychiatric:         Mood and Affect: Mood normal.         Behavior: Behavior normal.           IOs  No intake/output data recorded.  No intake/output data recorded.    Last stool output       Vent settings:         Labs:   Most recent labs:   Results from last 7 days   Lab Units 06/06/24  0615 06/05/24  0825   WBC AUTO x10*3/uL 7.8 10.2   HEMOGLOBIN g/dL 8.5* 8.6*   HEMATOCRIT % 26.9* 27.3*   PLATELETS AUTO x10*3/uL 124* 167     Results from last 7 days   Lab Units 06/06/24  0615 06/05/24  0825   CO2 mmol/L 28 24   ANION GAP mmol/L 11 14   BUN mg/dL 11 12   CREATININE mg/dL 0.68 0.66   GLUCOSE mg/dL 79 90   CALCIUM mg/dL 8.2* 9.0   MAGNESIUM mg/dL 2.08 1.52*   PHOSPHORUS mg/dL 4.1  --       Results from last 7 days   Lab Units 06/05/24  0825   ALT  "U/L 39   AST U/L 62*   ALK PHOS U/L 217*            No lab exists for component: \"PT\"          Results from last 7 days   Lab Units 06/05/24  1718 06/05/24  1137 06/05/24  0825   LACTATE mmol/L 1.7 2.4* 3.5*           Micro/ID:   No results found for the last 90 days.                   No lab exists for component: \"AGALPCRNB\"   Lab Results   Component Value Date    URINECULTURE No growth 05/03/2024    BLOODCULT No growth at 4 days -  FINAL REPORT 05/04/2024    BLOODCULT No growth at 4 days -  FINAL REPORT 05/04/2024       Images  All images:   CT abdomen pelvis w IV contrast    Result Date: 6/5/2024  Interpreted By:  Cedric Castillo, STUDY: CT ABDOMEN PELVIS W IV CONTRAST; 6/5/2024 10:38 am   INDICATION: Signs/Symptoms:severe llq pain;   COMPARISON: 05/09/2024   ACCESSION NUMBER(S): [id number]   ORDERING CLINICIAN: SOTO GARNETT   TECHNIQUE: Contiguous axial images of the abdomen/pelvis were performed with IV contrast. 75 ml of Omnipaque 350 was utilized. Coronal and sagittal reformatted images were also obtained. All CT examinations are performed with 1 or more of the following dose reduction techniques: Automated exposure control, adjustment of mA and/or kv according to patient's size, or use of iterative reconstruction techniques.     FINDINGS: Interval development of a small left pleural effusion. Redemonstration of multiple nodular opacity at the imaged lung bases compatible with metastatic disease.   There is redemonstration of multiple masses in the right and left hepatic lobes consistent with a metastatic disease. Interval enlargement of hepatic lesions, the largest in the right hepatic dome measuring 6.2 x 5.7 cm. The gallbladder is present. Subtle calcification in the gallbladder may correspond to gallstones. No dilatation of the common bile duct.   The pancreas, spleen, and adrenal glands are stable in appearance. Mild splenomegaly is again noted, the spleen measures a proximally 14.6 cm in " length.   Symmetric enhancement of the kidneys. No hydronephrosis.   Atherosclerotic calcification of the abdominal aorta and bilateral iliac arteries.   Enlarged tiffayn hepatis and peripancreatic lymph nodes, slightly increased in size from the prior study with a large confluent lymph node measuring 5.8 x 2.8 cm. Mild interval increase in size of enlarged retroperitoneal lymph nodes. Enlarged right iliac chain lymph node, increased in size measuring 4.3 x 3.5 cm.   There are mildly loops dilated loops of jejunum measuring up to 3.4 cm, early developing partial small bowel obstruction can not be excluded. Colon is unremarkable with no acute inflammatory process. Normal stool burden. Appendix is within normal limits.   Urinary bladder is underdistended and not well evaluated.   Small amount of free fluid the pelvis which measures simple fluid attenuation.   Postsurgical change of anterior pelvic wall repair.       Interval enlargement of visualized pulmonary nodules in the imaged lower lungs compatible with metastatic disease.   Interval development of a small left pleural effusion.   Redemonstration of multiple hepatic masses and abdominal and pelvic lymphadenopathy compatible with metastatic disease. There has been interval enlargement of hepatic lesions and lymph nodes since the prior study.   Mildly loops dilated loops of jejunum measuring up to 3.4 cm, early developing partial small bowel obstruction can not be excluded.   Signed by: Cedric Castillo 6/5/2024 11:51 AM Dictation workstation:   [id number]           Meds  Scheduled medications  [Held by provider] apixaban, 5 mg, oral, BID  enoxaparin, 1 mg/kg, subcutaneous, q12h  fentaNYL, 1 patch, transdermal, q72h  heparin flush, 50 Units, intra-catheter, q12h  pantoprazole, 40 mg, intravenous, Daily  polyethylene glycol, 17 g, oral, Daily  sennosides-docusate sodium, 2 tablet, oral, BID      Continuous medications  lactated Ringer's, 125 mL/hr, Last Rate: 125  mL/hr (06/06/24 0615)      PRN medications  PRN medications: acetaminophen, alteplase, alteplase, heparin flush, heparin flush, HYDROmorphone **OR** HYDROmorphone, LORazepam, naloxone, ondansetron ODT **OR** ondansetron, ondansetron, prochlorperazine, promethazine, sodium chloride 0.9%, sodium chloride 0.9%          ASSESSMENT/PLAN  63 y.o. 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 Recent admission on 5/10-5/24 with diffuse T/L spine mets, R occipital met, and L temporal mets, s/p T10 transpedicular decompression and a T8-T12 fusion on 05/17 requiring wound vac and lumbar drain, during the admission he was also treated with high dose steroids followed by steroid taper and managed for anemia and low grade malignancy-associated DIC, he is presenting for SBO evaluation. [facility]      Acute Medical Issues:  #SBO   #Hx of recent irAE   #stage IV esophageal adenocarcinoma (HER2 pos) with known mets to lung and liver, s/p chemo (last 4/29)  - tachycardic with Elevated lactate levels.   - patient had BM but will keep NPO for now to ensure bowel rest, if improving tomorrow can start CLD  - s/p 1L IVF, now on IVF maintenance 125ml/hr   - will defer Abx use for now   - pain and nausea control - 1.5mg dilaudid q6hr prn for mild/mod pain, 3mg dilaudid q6hr prn for severe pain, 100mcg fentanyl patch  - trend lactate   - surgery onboard patient was evaluated by surgery, Accepted downtown by Dr Mell Ayala, pending available bed  - hold eliquis and switch to lovenox in anticipation for any required procedures.    - palliative care consulted for pain regimen   - advancing diet to clear liquids, will continue to advance as tolerating     Chronic Medical Issues:   #Constipation: on  bowel regimen.  #hx of PE/ portal thrombosis: hold Eliquis, subcutaneous Lovenox   #GERD: Cont home Protonix 40mg BID   #spine and talha Mets: cont home prednisone per taper protocol       Fluids: PO intake  Electrolytes: replete as needed  Nutrition: Clear liquid diet  GI Prophylaxis:Protonix   DVT Prophylaxis: Lovenox      Access: Port   Antibiotics:none  Oxygenation: RA      Dispo:patient metastatic esophageal cancer, brain and spine mets admitted for SBO evaluation, Pending transfer to [facility].     Mike Goff, DO  Internal Medicine, PGY-I

## 2024-06-07 VITALS
DIASTOLIC BLOOD PRESSURE: 73 MMHG | OXYGEN SATURATION: 97 % | TEMPERATURE: 97.3 F | SYSTOLIC BLOOD PRESSURE: 123 MMHG | HEIGHT: 68 IN | HEART RATE: 105 BPM | BODY MASS INDEX: 24.55 KG/M2 | RESPIRATION RATE: 16 BRPM | WEIGHT: 162 LBS

## 2024-06-07 LAB
ALBUMIN SERPL BCP-MCNC: 3.2 G/DL (ref 3.4–5)
ALP SERPL-CCNC: 178 U/L (ref 33–136)
ALT SERPL W P-5'-P-CCNC: 33 U/L (ref 10–52)
ANION GAP SERPL CALC-SCNC: 14 MMOL/L (ref 10–20)
AST SERPL W P-5'-P-CCNC: 61 U/L (ref 9–39)
BILIRUB SERPL-MCNC: 1 MG/DL (ref 0–1.2)
BUN SERPL-MCNC: 11 MG/DL (ref 6–23)
CALCIUM SERPL-MCNC: 8.6 MG/DL (ref 8.6–10.3)
CHLORIDE SERPL-SCNC: 100 MMOL/L (ref 98–107)
CO2 SERPL-SCNC: 26 MMOL/L (ref 21–32)
CREAT SERPL-MCNC: 0.55 MG/DL (ref 0.5–1.3)
EGFRCR SERPLBLD CKD-EPI 2021: >90 ML/MIN/1.73M*2
ERYTHROCYTE [DISTWIDTH] IN BLOOD BY AUTOMATED COUNT: 16.6 % (ref 11.5–14.5)
GLUCOSE SERPL-MCNC: 147 MG/DL (ref 74–99)
HCT VFR BLD AUTO: 29.1 % (ref 41–52)
HGB BLD-MCNC: 9.2 G/DL (ref 13.5–17.5)
MAGNESIUM SERPL-MCNC: 1.86 MG/DL (ref 1.6–2.4)
MCH RBC QN AUTO: 28.5 PG (ref 26–34)
MCHC RBC AUTO-ENTMCNC: 31.6 G/DL (ref 32–36)
MCV RBC AUTO: 90 FL (ref 80–100)
NRBC BLD-RTO: 0 /100 WBCS (ref 0–0)
PLATELET # BLD AUTO: 105 X10*3/UL (ref 150–450)
POTASSIUM SERPL-SCNC: 3.7 MMOL/L (ref 3.5–5.3)
PROT SERPL-MCNC: 5.9 G/DL (ref 6.4–8.2)
RBC # BLD AUTO: 3.23 X10*6/UL (ref 4.5–5.9)
SODIUM SERPL-SCNC: 136 MMOL/L (ref 136–145)
WBC # BLD AUTO: 5.4 X10*3/UL (ref 4.4–11.3)

## 2024-06-07 PROCEDURE — 80053 COMPREHEN METABOLIC PANEL: CPT | Performed by: NURSE PRACTITIONER

## 2024-06-07 PROCEDURE — 2500000002 HC RX 250 W HCPCS SELF ADMINISTERED DRUGS (ALT 637 FOR MEDICARE OP, ALT 636 FOR OP/ED): Mod: MUE | Performed by: NURSE PRACTITIONER

## 2024-06-07 PROCEDURE — 99239 HOSP IP/OBS DSCHRG MGMT >30: CPT

## 2024-06-07 PROCEDURE — C9113 INJ PANTOPRAZOLE SODIUM, VIA: HCPCS | Performed by: INTERNAL MEDICINE

## 2024-06-07 PROCEDURE — 2500000004 HC RX 250 GENERAL PHARMACY W/ HCPCS (ALT 636 FOR OP/ED): Performed by: NURSE PRACTITIONER

## 2024-06-07 PROCEDURE — 2500000004 HC RX 250 GENERAL PHARMACY W/ HCPCS (ALT 636 FOR OP/ED): Performed by: INTERNAL MEDICINE

## 2024-06-07 PROCEDURE — 2500000001 HC RX 250 WO HCPCS SELF ADMINISTERED DRUGS (ALT 637 FOR MEDICARE OP): Performed by: NURSE PRACTITIONER

## 2024-06-07 PROCEDURE — 85027 COMPLETE CBC AUTOMATED: CPT

## 2024-06-07 PROCEDURE — 2500000001 HC RX 250 WO HCPCS SELF ADMINISTERED DRUGS (ALT 637 FOR MEDICARE OP)

## 2024-06-07 PROCEDURE — 2500000004 HC RX 250 GENERAL PHARMACY W/ HCPCS (ALT 636 FOR OP/ED)

## 2024-06-07 PROCEDURE — 83735 ASSAY OF MAGNESIUM: CPT

## 2024-06-07 RX ORDER — HYDROMORPHONE HYDROCHLORIDE 4 MG/1
10 TABLET ORAL EVERY 4 HOURS PRN
Qty: 225 TABLET | Refills: 0 | Status: ON HOLD | OUTPATIENT
Start: 2024-06-07 | End: 2024-06-22

## 2024-06-07 RX ORDER — NALOXONE HYDROCHLORIDE 0.4 MG/ML
0.2 INJECTION, SOLUTION INTRAMUSCULAR; INTRAVENOUS; SUBCUTANEOUS EVERY 5 MIN PRN
Status: ON HOLD
Start: 2024-06-07

## 2024-06-07 RX ORDER — DOCUSATE SODIUM 100 MG/1
100 CAPSULE, LIQUID FILLED ORAL 2 TIMES DAILY
Qty: 60 CAPSULE | Refills: 1 | Status: CANCELLED | OUTPATIENT
Start: 2024-06-07 | End: 2024-08-06

## 2024-06-07 RX ORDER — PROMETHAZINE HYDROCHLORIDE 25 MG/ML
12.5 INJECTION, SOLUTION INTRAMUSCULAR; INTRAVENOUS EVERY 6 HOURS PRN
Status: ON HOLD
Start: 2024-06-07

## 2024-06-07 RX ORDER — HYDROMORPHONE HYDROCHLORIDE 2 MG/1
10 TABLET ORAL EVERY 4 HOURS PRN
Status: DISCONTINUED | OUTPATIENT
Start: 2024-06-07 | End: 2024-06-07

## 2024-06-07 RX ORDER — AMOXICILLIN 250 MG
2 CAPSULE ORAL 2 TIMES DAILY
Qty: 120 TABLET | Refills: 1 | Status: CANCELLED | OUTPATIENT
Start: 2024-06-07 | End: 2024-08-06

## 2024-06-07 RX ORDER — CAPSAICIN 0.75 MG/G
CREAM TOPICAL 4 TIMES DAILY
Start: 2024-06-07 | End: 2024-06-07 | Stop reason: HOSPADM

## 2024-06-07 RX ORDER — DULOXETIN HYDROCHLORIDE 60 MG/1
60 CAPSULE, DELAYED RELEASE ORAL DAILY
Status: ON HOLD
Start: 2024-06-07 | End: 2024-07-07

## 2024-06-07 RX ORDER — NALOXONE HYDROCHLORIDE 0.4 MG/ML
0.2 INJECTION, SOLUTION INTRAMUSCULAR; INTRAVENOUS; SUBCUTANEOUS EVERY 5 MIN PRN
Qty: 1 ML | Refills: 3 | Status: CANCELLED | OUTPATIENT
Start: 2024-06-07

## 2024-06-07 RX ORDER — HYDROMORPHONE HYDROCHLORIDE 2 MG/1
10 TABLET ORAL
Status: DISCONTINUED | OUTPATIENT
Start: 2024-06-07 | End: 2024-06-07 | Stop reason: HOSPADM

## 2024-06-07 RX ORDER — DOCUSATE SODIUM 100 MG/1
100 CAPSULE, LIQUID FILLED ORAL 2 TIMES DAILY
Status: DISCONTINUED | OUTPATIENT
Start: 2024-06-07 | End: 2024-06-07 | Stop reason: HOSPADM

## 2024-06-07 RX ORDER — PROCHLORPERAZINE EDISYLATE 5 MG/ML
5 INJECTION INTRAMUSCULAR; INTRAVENOUS EVERY 6 HOURS PRN
Status: ON HOLD
Start: 2024-06-07

## 2024-06-07 RX ORDER — POLYETHYLENE GLYCOL 3350 17 G/17G
17 POWDER, FOR SOLUTION ORAL 2 TIMES DAILY
Status: DISCONTINUED | OUTPATIENT
Start: 2024-06-07 | End: 2024-06-07 | Stop reason: HOSPADM

## 2024-06-07 RX ORDER — DOCUSATE SODIUM 100 MG/1
100 CAPSULE, LIQUID FILLED ORAL 2 TIMES DAILY
Qty: 60 CAPSULE | Refills: 1 | Status: ON HOLD | OUTPATIENT
Start: 2024-06-07 | End: 2024-08-06

## 2024-06-07 RX ADMIN — DULOXETINE HYDROCHLORIDE 60 MG: 60 CAPSULE, DELAYED RELEASE ORAL at 09:31

## 2024-06-07 RX ADMIN — PREDNISONE 20 MG: 20 TABLET ORAL at 09:32

## 2024-06-07 RX ADMIN — DOCUSATE SODIUM 100 MG: 100 CAPSULE, LIQUID FILLED ORAL at 10:55

## 2024-06-07 RX ADMIN — PANTOPRAZOLE SODIUM 40 MG: 40 INJECTION, POWDER, FOR SOLUTION INTRAVENOUS at 09:33

## 2024-06-07 RX ADMIN — POLYETHYLENE GLYCOL 3350 17 G: 17 POWDER, FOR SOLUTION ORAL at 09:30

## 2024-06-07 RX ADMIN — APIXABAN 5 MG: 5 TABLET, FILM COATED ORAL at 12:40

## 2024-06-07 RX ADMIN — DOCUSATE SODIUM AND SENNOSIDES 2 TABLET: 8.6; 5 TABLET, FILM COATED ORAL at 09:31

## 2024-06-07 RX ADMIN — ACETAMINOPHEN 650 MG: 325 TABLET ORAL at 09:30

## 2024-06-07 RX ADMIN — HEPARIN, PORCINE (PF) 10 UNIT/ML INTRAVENOUS SYRINGE 50 UNITS: at 09:30

## 2024-06-07 RX ADMIN — ENOXAPARIN SODIUM 70 MG: 80 INJECTION SUBCUTANEOUS at 05:44

## 2024-06-07 RX ADMIN — HEPARIN 500 UNITS: 100 SYRINGE at 16:44

## 2024-06-07 RX ADMIN — HYDROMORPHONE HYDROCHLORIDE 10 MG: 2 TABLET ORAL at 16:31

## 2024-06-07 RX ADMIN — ACETAMINOPHEN 650 MG: 325 TABLET ORAL at 05:44

## 2024-06-07 RX ADMIN — PREGABALIN 50 MG: 50 CAPSULE ORAL at 09:32

## 2024-06-07 ASSESSMENT — PAIN SCALES - GENERAL: PAINLEVEL_OUTOF10: 2

## 2024-06-07 ASSESSMENT — PAIN - FUNCTIONAL ASSESSMENT: PAIN_FUNCTIONAL_ASSESSMENT: 0-10

## 2024-06-07 NOTE — PROGRESS NOTES
06/07/24 1105   Discharge Planning   Living Arrangements Spouse/significant other   Support Systems Spouse/significant other   Assistance Needed A&OX3; independent with ADLs with no DME; drives (not currently due to medications); room air baseline currently room air   Type of Residence Private residence   Number of Stairs to Enter Residence 3   Number of Stairs Within Residence 0   Do you have animals or pets at home? Yes   Type of Animals or Pets 2 dogs, 2 cats, 5 fish   Who is requesting discharge planning? Provider   Patient expects to be discharged to: Home no needs

## 2024-06-07 NOTE — DISCHARGE SUMMARY
Discharge Diagnosis  SBO (small bowel obstruction) (Multi)  stage IV esophageal adenocarcinoma (HER2 pos) with known mets to lung and liver   Hx of recent irAE     Issues Requiring Follow-Up  Follow up with hematology/oncology for ongoing radiation  Follow up with PCP    Discharge Meds     Your medication list        START taking these medications        Instructions Last Dose Given Next Dose Due   capsicum 0.075 % topical cream  Commonly known as: Zostrix      Apply topically 4 times a day.       DULoxetine 60 mg DR capsule  Commonly known as: Cymbalta      Take 1 capsule (60 mg) by mouth once daily. Do not crush or chew.       naloxone 0.4 mg/mL injection  Commonly known as: Narcan      Infuse 0.5 mL (0.2 mg) into a venous catheter every 5 minutes if needed for respiratory depression.       prochlorperazine 10 mg/2 mL (5 mg/mL) solution  Commonly known as: Compazine      Infuse 1 mL (5 mg) into a venous catheter every 6 hours if needed for nausea or vomiting.       promethazine 25 mg/mL injection  Commonly known as: Phenergan      Inject 0.5 mL (12.5 mg) over 15 minutes into the muscle every 6 hours if needed for nausea or vomiting.              CHANGE how you take these medications        Instructions Last Dose Given Next Dose Due   pantoprazole 40 mg EC tablet  Commonly known as: ProtoNix  What changed: when to take this      Take 1 tablet (40 mg) by mouth once daily in the morning. Take before meals. Do not crush, chew, or split.              CONTINUE taking these medications        Instructions Last Dose Given Next Dose Due   acetaminophen 325 mg tablet  Commonly known as: Tylenol      Take 2 tablets (650 mg) by mouth every 6 hours.       apixaban 5 mg tablet  Commonly known as: Eliquis      TAKE 1 TABLET BY MOUTH TWO TIMES A DAY       cholecalciferol 50 MCG (2000 UT) tablet  Commonly known as: Vitamin D-3           cyclobenzaprine 10 mg tablet  Commonly known as: Flexeril      Take 1 tablet (10 mg) by mouth 3  times a day for 14 days.       fentaNYL 100 mcg/hr patch  Commonly known as: Duragesic      Place 1 patch over 72 hours on the skin every 3rd day for 9 days.       HYDROmorphone 4 mg tablet  Commonly known as: Dilaudid      Take 1 tablet (4 mg) by mouth every 4 hours if needed for moderate pain (4 - 6).       HYDROmorphone 2 mg tablet  Commonly known as: Dilaudid      Take 1-2 tablets (2-4 mg) by mouth every 6 hours if needed for severe pain (7 - 10) for up to 15 days.       LORazepam 1 mg tablet  Commonly known as: Ativan      Take 1 tablet (1 mg) by mouth every 8 hours if needed for anxiety (nausea).       metoclopramide 10 mg tablet  Commonly known as: Reglan      Take 1 tablet (10 mg) by mouth every 8 hours if needed (Hiccups alternative if Thorazine not working. Don't give with thorazine for therapy as too much D2 agonists can have bad side effects).       multivitamin with minerals tablet           nystatin 100,000 unit/mL suspension  Commonly known as: Mycostatin      Swish and swallow 5 mL (500,000 Units) 4 times a day for 14 days.       polyethylene glycol 17 gram/dose powder  Commonly known as: Glycolax, Miralax      Take 17 g by mouth 2 times a day.       predniSONE 10 mg tablet  Commonly known as: Deltasone  Start taking on: May 25, 2024      Take 4 tablets (40 mg) by mouth once daily for 7 days, THEN 2 tablets (20 mg) once daily for 7 days, THEN 1 tablet (10 mg) once daily for 7 days. Do not fill before May 25, 2024.       pregabalin 50 mg capsule  Commonly known as: Lyrica      Take 1 capsule (50 mg) by mouth 2 times a day.       Senexon-S 8.6-50 mg tablet  Generic drug: sennosides-docusate sodium      Take 2 tablets by mouth 2 times a day.                 Where to Get Your Medications        Information about where to get these medications is not yet available    Ask your nurse or doctor about these medications  capsicum 0.075 % topical cream  DULoxetine 60 mg DR capsule  naloxone 0.4 mg/mL  injection  prochlorperazine 10 mg/2 mL (5 mg/mL) solution  promethazine 25 mg/mL injection         Test Results Pending At Discharge  Pending Labs       Order Current Status    Staphylococcus Aureus/MRSA Colonization, Culture In process            Hospital Course  Massimo Garcia is a 63 y.o. male who was at an office visit at the radiation oncology department to discuss his further radiation plans. He felt 2-3 left lower quadrant pain on examination, at nighttime around 2 AM the patient did experience severe abdominal pain (8-9/10) he used his home Dilaudid 0.4 mg with no much relief in his pain, as his pain continued and he presented to the hospital for further evaluation    ED COURSE:  In the ED, he was afebrile,tachycardic 106, with hypomagnesemia 1.52, hypokalemia 3.2, elevated ALP level 217, and elevated lactate level 3.2-2.4, and hemoglobin of 8.6, patient underwent CT abdomen and pelvis which showed increased burden of lung nodules concerning for mets, small left-sided pleural effusion, hepatic masses with abdominal and pelvic lymphadenopathy, and mildly dilated jejunum concerning for SBO, patient initially received 1 L NS, for 2 doses of 4 mg of Dilaudid and Zofran. Dr. Rosado/ED provider contacted Dr. Lara/general surgery, Dr. Lara advised to transfer the patient downtown to [facility] [place] as he may require emergent radiation, patient was accepted at [facility] by Dr Lila Cali but as no beds were available he was [place] [facility] [place] and pain control    FLOOR COURSE:  Upon evaluation he reported improvement of his pain to 2-3 and stated he did have a BM in the ED around 2 Pm and he is able to pass gas freely since then. He endorsed no further nausea and expressed his desire to resume PO intake. Diet was advanced as tolerated. He had 30 staples removed on his back by general surgery.     Palliative care was consulted regarding pain regimen. Started on Dilaudid PCA 1mg q30min PRN demand,  650mg Tylenol q6hr, capsaicin cream QID, Duloxetine 60mg daily, fentanyl patch 100mcg, and Lyrica 50mg BID.     [place] [facility] [place]     Pertinent Physical Exam At Time of Discharge  Physical Exam  Vitals reviewed.   HENT:      Head: Normocephalic and atraumatic.   Cardiovascular:      Rate and Rhythm: Normal rate and regular rhythm.      Heart sounds: Normal heart sounds. No murmur heard.     No gallop.   Pulmonary:      Breath sounds: Normal breath sounds.   Abdominal:      General: Abdomen is flat. There is no distension.      Palpations: Abdomen is soft.      Tenderness: There is no abdominal tenderness.   Musculoskeletal:      Right lower leg: No edema.      Left lower leg: No edema.   Skin:     General: Skin is warm and dry.   Neurological:      Mental Status: He is alert. Mental status is at baseline.   Psychiatric:         Mood and Affect: Mood normal.         Behavior: Behavior normal.         Outpatient Follow-Up  Future Appointments   Date Time Provider Department Center   6/14/2024 12:30 PM Mukund Davis PA-C [id number] [place]   6/19/2024  8:00 AM INF 00 [facility]   6/19/2024  9:00 AM GEA CT 1 [facility] [place] [facility]   6/27/2024  1:00 PM GEA ECHO LAB 1 GEANIC1 [facility]   6/28/2024  9:00 AM Tomasa Blake MD [facility]OC[facility]   6/28/2024  9:30 AM JASPER Ferguson [facility]   6/28/2024 10:00 AM INF 11 [facility]   7/1/2024 11:00 AM JASPER ال[facility]قي [facility]   7/2/2024 11:00 AM JASPER [facility]   7/3/2024 11:00 AM JASPER [facility]   7/8/2024  8:30 AM JASPER [facility]   7/9/2024  9:30 AM JASPER [facility]   7/10/2024  9:30 [facility]   7/11/2024  9:30 AM [facility]   7/15/2024  9:30 AM Joseluis Steel MD [facility]   7/19/2024  9:30 AM Tomasa  MD [place] [facility]   7/19/2024 10:00 AM INF 11 [facility]AUGA [facility]   8/19/2024 10:20 AM Blue John MD [id number] [place]         Mike Goff, DO

## 2024-06-07 NOTE — PROGRESS NOTES
"PALLIATIVE DAILY PROGRESS NOTE                                                                                                                                                                                            Wife present     SIGNIFICANT EVENTS SINCE LAST ENCOUNTER    None    SUBJECTIVE                                                                                                                                                                                                                                                    Pt feeling:  Better and like he could go home today  Shortness of breath: No  Pain: Better controlled; typically around a 2-3/10 even with movement  GI Issues: Constipation since this AM, hard and some blood on the tissue after straining to poop   Issues: No  Appetite: Fair and is tolerating the CLD  Anxiety: Mild at best  Depression: Improved with wife visiting him  Trouble sleeping: No    OBJECTIVE                                                                                                                                                                                                                                                      Physical Exam  GENERAL: Alert, awake, cooperative, no distress  SKIN: Warm and dry.    HEENT:  EOMI, nonicteric sclera  LUNGS: Unlabored resps  CARDIO: RRR, no JVD  GI: Soft, NTND, BS+, no rebound, no guarding   : good urine op  MS: no joint swelling  NEURO: grossly nonfocal exam  PSYCH: mood and behavior appropriate    Opioid Use  Total 24h OME use:  660mg which is significantly less than yesterday    Last Recorded Vitals  Visit Vitals  /65 (BP Location: Right arm, Patient Position: Lying)   Pulse 110   Temp 36.2 °C (97.2 °F) (Temporal)   Resp 16   Ht 1.727 m (5' 8\")   Wt 73.5 kg (162 lb)   SpO2 98%   BMI 24.63 kg/m²   Smoking Status Former   BSA 1.88 m²        Relevant Results  Scheduled medications  acetaminophen, 650 mg, oral, " q6h  [Held by provider] apixaban, 5 mg, oral, BID  capsicum, , Topical, 4x daily  DULoxetine, 60 mg, oral, Daily  enoxaparin, 1 mg/kg, subcutaneous, q12h  fentaNYL, 1 patch, transdermal, q72h  heparin flush, 50 Units, intra-catheter, q12h  pantoprazole, 40 mg, intravenous, Daily  polyethylene glycol, 17 g, oral, BID  predniSONE, 20 mg, oral, Daily  pregabalin, 50 mg, oral, BID  sennosides-docusate sodium, 2 tablet, oral, BID      Continuous medications     PRN medications  PRN medications: alteplase, alteplase, heparin flush, heparin flush, HYDROmorphone, LORazepam, naloxone, ondansetron, prochlorperazine, promethazine, sodium chloride 0.9%, sodium chloride 0.9%    Results for orders placed or performed during the hospital encounter of 06/05/24 (from the past 24 hour(s))   CBC   Result Value Ref Range    WBC 5.4 4.4 - 11.3 x10*3/uL    nRBC 0.0 0.0 - 0.0 /100 WBCs    RBC 3.23 (L) 4.50 - 5.90 x10*6/uL    Hemoglobin 9.2 (L) 13.5 - 17.5 g/dL    Hematocrit 29.1 (L) 41.0 - 52.0 %    MCV 90 80 - 100 fL    MCH 28.5 26.0 - 34.0 pg    MCHC 31.6 (L) 32.0 - 36.0 g/dL    RDW 16.6 (H) 11.5 - 14.5 %    Platelets 105 (L) 150 - 450 x10*3/uL   Magnesium   Result Value Ref Range    Magnesium 1.86 1.60 - 2.40 mg/dL   Comprehensive metabolic panel   Result Value Ref Range    Glucose 147 (H) 74 - 99 mg/dL    Sodium 136 136 - 145 mmol/L    Potassium 3.7 3.5 - 5.3 mmol/L    Chloride 100 98 - 107 mmol/L    Bicarbonate 26 21 - 32 mmol/L    Anion Gap 14 10 - 20 mmol/L    Urea Nitrogen 11 6 - 23 mg/dL    Creatinine 0.55 0.50 - 1.30 mg/dL    eGFR >90 >60 mL/min/1.73m*2    Calcium 8.6 8.6 - 10.3 mg/dL    Albumin 3.2 (L) 3.4 - 5.0 g/dL    Alkaline Phosphatase 178 (H) 33 - 136 U/L    Total Protein 5.9 (L) 6.4 - 8.2 g/dL    AST 61 (H) 9 - 39 U/L    Bilirubin, Total 1.0 0.0 - 1.2 mg/dL    ALT 33 10 - 52 U/L        CT abdomen pelvis w IV contrast    Result Date: 6/5/2024  Interpreted By:  Cedric Castillo, STUDY: CT ABDOMEN PELVIS W IV CONTRAST;  6/5/2024 10:38 am   INDICATION: Signs/Symptoms:severe llq pain;   COMPARISON: 05/09/2024   ACCESSION NUMBER(S): [id number]   ORDERING CLINICIAN: SOTO GARNETT   TECHNIQUE: Contiguous axial images of the abdomen/pelvis were performed with IV contrast. 75 ml of Omnipaque 350 was utilized. Coronal and sagittal reformatted images were also obtained. All CT examinations are performed with 1 or more of the following dose reduction techniques: Automated exposure control, adjustment of mA and/or kv according to patient's size, or use of iterative reconstruction techniques.     FINDINGS: Interval development of a small left pleural effusion. Redemonstration of multiple nodular opacity at the imaged lung bases compatible with metastatic disease.   There is redemonstration of multiple masses in the right and left hepatic lobes consistent with a metastatic disease. Interval enlargement of hepatic lesions, the largest in the right hepatic dome measuring 6.2 x 5.7 cm. The gallbladder is present. Subtle calcification in the gallbladder may correspond to gallstones. No dilatation of the common bile duct.   The pancreas, spleen, and adrenal glands are stable in appearance. Mild splenomegaly is again noted, the spleen measures a proximally 14.6 cm in length.   Symmetric enhancement of the kidneys. No hydronephrosis.   Atherosclerotic calcification of the abdominal aorta and bilateral iliac arteries.   Enlarged tiffany hepatis and peripancreatic lymph nodes, slightly increased in size from the prior study with a large confluent lymph node measuring 5.8 x 2.8 cm. Mild interval increase in size of enlarged retroperitoneal lymph nodes. Enlarged right iliac chain lymph node, increased in size measuring 4.3 x 3.5 cm.   There are mildly loops dilated loops of jejunum measuring up to 3.4 cm, early developing partial small bowel obstruction can not be excluded. Colon is unremarkable with no acute inflammatory process. Normal stool  burden. Appendix is within normal limits.   Urinary bladder is underdistended and not well evaluated.   Small amount of free fluid the pelvis which measures simple fluid attenuation.   Postsurgical change of anterior pelvic wall repair.       Interval enlargement of visualized pulmonary nodules in the imaged lower lungs compatible with metastatic disease.   Interval development of a small left pleural effusion.   Redemonstration of multiple hepatic masses and abdominal and pelvic lymphadenopathy compatible with metastatic disease. There has been interval enlargement of hepatic lesions and lymph nodes since the prior study.   Mildly loops dilated loops of jejunum measuring up to 3.4 cm, early developing partial small bowel obstruction can not be excluded.   Signed by: Cedric Castillo 6/5/2024 11:51 AM Dictation workstation:   [id number]  }    ASSESSMENT AND PLAN     Cancer related pain 2/2  to to mets to T/L spine s/p complicated by recent spinal fusion, L flank 2/2 referred/ complicated by possible SBO  Well controlled on fent patch and PCA w new adjuvents  Pt only used 2-3x  1mg IV dilaudid PCA doses since its been started yesterday at 4pm  Will trial transition to PO for breakthrough in place of PCA  DC PCA Pump, trial 10 mg PO dilaudid Q 3 hrs PRN ( Dont anticipate needing this med this often, but would give a buffer if dose isnt working well at this time given this is a trial  -Rx sent for 10mg dilaudid PO Q 4 hrs PRN in anticipation of home going  Continue Acetaminophen 650mg Q6h- LFT's stable for now on this  Capsaicin cream to the affected areas QID  Continue Duloxetine 60mg daily  Decadron 16mg IV x1 on 6/8, Continue home prednisone 20mg daily  Resumed home Lyrica 50mg BID  Continue fentanyl patch at 100 mcg  Education provided on multimodal pain control.  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 C/o blood on tissue - Dr. Martinez updated on this  Continue Miralax 17g BID  Continue newly started senna 8.6mg/[id number] 2 tabs BID  Added additional 100mg colace BID to the bowel regimen  Education and encouragement provided re ambulation and the implications     Thank you for asking Palliative Care to assist with care of this patient.  Please contact us for additional questions or concerns.     Update provided to: The Primary team and the bedside nurse  MEDICAL COMPLEXITY    Medical complexity was high level due to due to 1 or more chronic illness with progression or poses threat to life or bodily function,  record review of the prior external notes, current encounter notes, independent interpretation of laboratory tests being stable, utilization and management of IV controlled substances dilaudid, and discussion of management with the primary team.     CONTACT INFORMATION   Madelaine Bravo CNP  Palliative Medicine  [facility] Secure chat

## 2024-06-07 NOTE — HOSPITAL COURSE
Massimo Garcia is a 63 y.o. male who was at an office visit at the radiation oncology department to discuss his further radiation plans. He felt 2-3 left lower quadrant pain on examination, at nighttime around 2 AM the patient did experience severe abdominal pain (8-9/10) he used his home Dilaudid 0.4 mg with no much relief in his pain, as his pain continued and he presented to the hospital for further evaluation    ED COURSE:  In the ED, he was afebrile,tachycardic 106, with hypomagnesemia 1.52, hypokalemia 3.2, elevated ALP level 217, and elevated lactate level 3.2-2.4, and hemoglobin of 8.6, patient underwent CT abdomen and pelvis which showed increased burden of lung nodules concerning for mets, small left-sided pleural effusion, hepatic masses with abdominal and pelvic lymphadenopathy, and mildly dilated jejunum concerning for SBO, patient initially received 1 L NS, for 2 doses of 4 mg of Dilaudid and Zofran. Dr. Rosado/ED provider contacted Dr. Lara/general surgery, Dr. Lara advised to transfer the patient [place] to [facility] as he may require emergent radiation, patient was accepted at [facility] by Dr Lila Cali but as no beds were available [place] [facility] [place]    FLOOR COURSE:  Upon evaluation he reported improvement of his pain to 2-3 and stated he did have a BM in the ED around 2 Pm and he is able to pass gas freely since then. He endorsed no further nausea and expressed his desire to resume PO intake. Diet was advanced as tolerated. He had 30 staples removed on his back by general surgery.     Palliative care was consulted regarding pain regimen. Started on Dilaudid PCA 1mg q30min PRN demand, 650mg Tylenol q6hr, capsaicin cream QID, Duloxetine 60mg daily, fentanyl patch 100mcg, and Lyrica 50mg BID.     Pt was cleared for discharge. Pain regimen adjusted per palliative care. Will follow up with rad onc for further radiation.

## 2024-06-07 NOTE — DISCHARGE SUMMARY
Discharge Diagnosis  SBO (small bowel obstruction) (Multi)  stage IV esophageal adenocarcinoma (HER2 pos) with known mets to lung and liver   Hx of recent irAE     Issues Requiring Follow-Up  Follow up with radiation oncology for ongoing radiation treatment  Follow up with PCP    Discharge Meds     Your medication list        START taking these medications        Instructions Last Dose Given Next Dose Due   docusate sodium 100 mg capsule  Commonly known as: Colace      Take 1 capsule (100 mg) by mouth 2 times a day.       DULoxetine 60 mg DR capsule  Commonly known as: Cymbalta      Take 1 capsule (60 mg) by mouth once daily. Do not crush or chew.       naloxone 0.4 mg/mL injection  Commonly known as: Narcan      Infuse 0.5 mL (0.2 mg) into a venous catheter every 5 minutes if needed for respiratory depression.       prochlorperazine 10 mg/2 mL (5 mg/mL) solution  Commonly known as: Compazine      Infuse 1 mL (5 mg) into a venous catheter every 6 hours if needed for nausea or vomiting.       promethazine 25 mg/mL injection  Commonly known as: Phenergan      Inject 0.5 mL (12.5 mg) over 15 minutes into the muscle every 6 hours if needed for nausea or vomiting.              CHANGE how you take these medications        Instructions Last Dose Given Next Dose Due   HYDROmorphone 4 mg tablet  Commonly known as: Dilaudid  What changed: Another medication with the same name was added. Make sure you understand how and when to take each.      Take 1 tablet (4 mg) by mouth every 4 hours if needed for moderate pain (4 - 6).       HYDROmorphone 2 mg tablet  Commonly known as: Dilaudid  What changed: Another medication with the same name was added. Make sure you understand how and when to take each.      Take 1-2 tablets (2-4 mg) by mouth every 6 hours if needed for severe pain (7 - 10) for up to 15 days.       HYDROmorphone 4 mg tablet  Commonly known as: Dilaudid  What changed: You were already taking a medication with the same  name, and this prescription was added. Make sure you understand how and when to take each.      Take 2.5 tablets (10 mg) by mouth every 4 hours if needed for severe pain (7 - 10) or moderate pain (4 - 6) for up to 15 days.       HYDROmorphone 4 mg tablet  Commonly known as: Dilaudid  What changed: You were already taking a medication with the same name, and this prescription was added. Make sure you understand how and when to take each.      Take 2.5 tablets (10 mg) by mouth every 4 hours if needed for severe pain (7 - 10) or moderate pain (4 - 6) for up to 15 days.       pantoprazole 40 mg EC tablet  Commonly known as: ProtoNix  What changed: when to take this      Take 1 tablet (40 mg) by mouth once daily in the morning. Take before meals. Do not crush, chew, or split.              CONTINUE taking these medications        Instructions Last Dose Given Next Dose Due   acetaminophen 325 mg tablet  Commonly known as: Tylenol      Take 2 tablets (650 mg) by mouth every 6 hours.       apixaban 5 mg tablet  Commonly known as: Eliquis      TAKE 1 TABLET BY MOUTH TWO TIMES A DAY       cholecalciferol 50 MCG (2000 UT) tablet  Commonly known as: Vitamin D-3           cyclobenzaprine 10 mg tablet  Commonly known as: Flexeril      Take 1 tablet (10 mg) by mouth 3 times a day for 14 days.       fentaNYL 100 mcg/hr patch  Commonly known as: Duragesic      Place 1 patch over 72 hours on the skin every 3rd day for 9 days.       LORazepam 1 mg tablet  Commonly known as: Ativan      Take 1 tablet (1 mg) by mouth every 8 hours if needed for anxiety (nausea).       metoclopramide 10 mg tablet  Commonly known as: Reglan      Take 1 tablet (10 mg) by mouth every 8 hours if needed (Hiccups alternative if Thorazine not working. Don't give with thorazine for therapy as too much D2 agonists can have bad side effects).       multivitamin with minerals tablet           nystatin 100,000 unit/mL suspension  Commonly known as: Mycostatin       Swish and swallow 5 mL (500,000 Units) 4 times a day for 14 days.       polyethylene glycol 17 gram/dose powder  Commonly known as: Glycolax, Miralax      Take 17 g by mouth 2 times a day.       predniSONE 10 mg tablet  Commonly known as: Deltasone  Start taking on: May 25, 2024      Take 4 tablets (40 mg) by mouth once daily for 7 days, THEN 2 tablets (20 mg) once daily for 7 days, THEN 1 tablet (10 mg) once daily for 7 days. Do not fill before May 25, 2024.       pregabalin 50 mg capsule  Commonly known as: Lyrica      Take 1 capsule (50 mg) by mouth 2 times a day.       Senexon-S 8.6-50 mg tablet  Generic drug: sennosides-docusate sodium      Take 2 tablets by mouth 2 times a day.                 Where to Get Your Medications        These medications were sent to [facility]/pharmacy #[id number] [address] [address]      Phone: [phone]   docusate sodium 100 mg capsule  HYDROmorphone 4 mg tablet       These medications were sent to [facility]  [address] [facility] [address]      Hours: 9 AM to 5 PM Mon-Fri Phone: [phone]   HYDROmorphone 4 mg tablet       Information about where to get these medications is not yet available    Ask your nurse or doctor about these medications  DULoxetine 60 mg DR capsule  naloxone 0.4 mg/mL injection  prochlorperazine 10 mg/2 mL (5 mg/mL) solution  promethazine 25 mg/mL injection         Test Results Pending At Discharge  Pending Labs       Order Current Status    Staphylococcus Aureus/MRSA Colonization, Culture In process            Hospital Course  Massimo Garcia is a 63 y.o. male who was at an office visit at the radiation oncology department to discuss his further radiation plans. He felt 2-3 left lower quadrant pain on examination, at nighttime around 2 AM the patient did experience severe abdominal pain (8-9/10) he used his home Dilaudid 0.4 mg with no much relief in his pain, as his pain continued and he presented to the  hospital for further evaluation    ED COURSE:  In the ED, he was afebrile,tachycardic 106, with hypomagnesemia 1.52, hypokalemia 3.2, elevated ALP level 217, and elevated lactate level 3.2-2.4, and hemoglobin of 8.6, patient underwent CT abdomen and pelvis which showed increased burden of lung nodules concerning for mets, small left-sided pleural effusion, hepatic masses with abdominal and pelvic lymphadenopathy, and mildly dilated jejunum concerning for SBO, patient initially received 1 L NS, for 2 doses of 4 mg of Dilaudid and Zofran. Dr. Rosado/ED provider contacted Dr. Lara/general surgery, Dr. Lara advised to transfer the patient [place] to [facility] [place] as he may require emergent radiation, patient was accepted at [facility] by Dr Lila Cail but as no beds were available [place] [facility] [place]    FLOOR COURSE:  Upon evaluation he reported improvement of his pain to 2-3 and stated he did have a BM in the ED around 2 Pm and he is able to pass gas freely since then. He endorsed no further nausea and expressed his desire to resume PO intake. Diet was advanced as tolerated. He had 30 staples removed on his back by general surgery.     Palliative care was consulted regarding pain regimen. Started on Dilaudid PCA 1mg q30min PRN demand, 650mg Tylenol q6hr, capsaicin cream QID, Duloxetine 60mg daily, fentanyl patch 100mcg, and Lyrica 50mg BID.     Pt was cleared for discharge. Pain regimen adjusted per palliative care. Will follow up with rad onc for further radiation.     Pertinent Physical Exam At Time of Discharge  Physical Exam  Vitals reviewed.   HENT:      Head: Normocephalic and atraumatic.   Cardiovascular:      Rate and Rhythm: Normal rate and regular rhythm.      Heart sounds: Normal heart sounds. No murmur heard.     No gallop.   Pulmonary:      Breath sounds: Normal breath sounds.   Abdominal:      General: Abdomen is flat. There is no distension.       Palpations: Abdomen is soft.      Tenderness: There is no abdominal tenderness.   Musculoskeletal:      Right lower leg: No edema.      Left lower leg: No edema.   Skin:     General: Skin is warm and dry.   Neurological:      Mental Status: He is alert. Mental status is at baseline.   Psychiatric:         Mood and Affect: Mood normal.         Behavior: Behavior normal.         Outpatient Follow-Up  Future Appointments   Date Time Provider Department Center   6/14/2024 12:30 PM Mukund Davis PA-C [id number] [facility]   6/19/2024  8:00 AM INF 00 GEAUGA [facility]   6/19/2024  9:00 AM GEA CT 1 GEACT [place] [facility]   6/27/2024  1:00 PM GEA ECHO LAB 1 GEANIC1 [place] [facility]   6/28/2024  9:00 AM Tomasa Blake MD [id number] [facility]   6/28/2024  9:30 AM ALFREDITO FergusonISRA SCCGEAPAL1 [facility]   6/28/2024 10:00 AM INF 11 GEAUGA [facility]AIN[facility]   7/1/2024 11:00 AM Kristie Green, APRN-CNP [facility]   7/2/2024 11:00 AM Kristiejudy Green[facility]   7/3/2024 11:00 AM Kristiejudy Green, [facility]-CNP [facility]   7/8/2024  8:30 AM Kristiejudy Green[facility]   7/9/2024  9:30 AM Kristiejudy Green, APRN-CNP [facility]   7/10/2024  9:30 AM Kristiejudy Green, [facility]-CNP [facility]   7/11/2024  9:30 AM Kristie Green, [facility]-CNP [facility]   7/15/2024  9:30 AM Joseluis Steel MD [facility]   7/19/2024  9:30 AM Tomasa Blake MD SCCGEAMOC1 [facility]   7/19/2024 10:00 AM INF 11 GEAUGA SCCGEAINF [facility]   8/19/2024 10:20 AM Blue John MD [id number] [facility]         Mike Goff, DO

## 2024-06-07 NOTE — DISCHARGE INSTRUCTIONS
Thank you for choosing [facility] - it has been a pleasure taking part in your medical care.   Supportive oncology will continue to follow your care outpatient regarding your pain regimen.      Additional Instructions  *You have been started on a new medication, please carefully review dosing regimen.     BELOW ARE IMPORTANT PATIENT INSTRUCTIONS:     You were admitted to the hospital for:  - Partial small bowel obstruction    After leaving the hospital it is important that you:   - Continue to take your pain medications as prescribed. Narcan has been prescribed in case you have respiratory depression.   - Take your bowel regimen for constipation.  - Follow up with radiation oncology at [facility].     -Fill your mediations and take them        Medications:              -As prescribed take all your medications, additionally                       - For your pain, you can take 10mg Dilaudid by mouth every 3 hours as needed for pain.                       - Continue Tylenol 650mg every 6 hours                      - Continue duloxetine 60mg daily                      - Continue prednisone 20mg daily - follow your prednisone taper                      - Continue Continue fentanyl patch at 100mcg                      - For constipation, you can continue miralax 17mg twice a day, senna/colace 2 tabs twice a day, and colace 100mg twice a day    Please return to the ED if symptoms worsen. Please take all of your discharge paperwork with you to your next appointments.

## 2024-06-08 ENCOUNTER — HOSPITAL ENCOUNTER (INPATIENT)
Facility: HOSPITAL | Age: 64
DRG: 393 | End: 2024-06-08
Attending: EMERGENCY MEDICINE | Admitting: INTERNAL MEDICINE
Payer: MEDICARE

## 2024-06-08 ENCOUNTER — CLINICAL SUPPORT (OUTPATIENT)
Dept: EMERGENCY MEDICINE | Facility: HOSPITAL | Age: 64
DRG: 393 | End: 2024-06-08
Payer: MEDICARE

## 2024-06-08 ENCOUNTER — CLINICAL SUPPORT (OUTPATIENT)
Dept: EMERGENCY MEDICINE | Facility: HOSPITAL | Age: 64
End: 2024-06-08
Payer: MEDICARE

## 2024-06-08 ENCOUNTER — APPOINTMENT (OUTPATIENT)
Dept: RADIOLOGY | Facility: HOSPITAL | Age: 64
DRG: 393 | End: 2024-06-08
Payer: MEDICARE

## 2024-06-08 DIAGNOSIS — I44.2 CHB (COMPLETE HEART BLOCK) (MULTI): ICD-10-CM

## 2024-06-08 DIAGNOSIS — K52.9 COLITIS: Primary | ICD-10-CM

## 2024-06-08 DIAGNOSIS — C15.9 PRIMARY MALIGNANT NEOPLASM OF ESOPHAGUS (MULTI): ICD-10-CM

## 2024-06-08 DIAGNOSIS — C15.9 ESOPHAGEAL CANCER, STAGE IV (MULTI): ICD-10-CM

## 2024-06-08 DIAGNOSIS — F41.9 ANXIETY AND DEPRESSION: ICD-10-CM

## 2024-06-08 DIAGNOSIS — B37.0 THRUSH: ICD-10-CM

## 2024-06-08 DIAGNOSIS — R94.31 ABNORMAL ELECTROCARDIOGRAM (ECG) (EKG): ICD-10-CM

## 2024-06-08 DIAGNOSIS — Z95.0 PRESENCE OF CARDIAC PACEMAKER: ICD-10-CM

## 2024-06-08 DIAGNOSIS — F32.A ANXIETY AND DEPRESSION: ICD-10-CM

## 2024-06-08 DIAGNOSIS — C79.49 SECONDARY MALIGNANT NEOPLASM OF BRAIN AND SPINAL CORD (MULTI): ICD-10-CM

## 2024-06-08 DIAGNOSIS — I44.2 COMPLETE HEART BLOCK (MULTI): ICD-10-CM

## 2024-06-08 DIAGNOSIS — C79.31 SECONDARY MALIGNANT NEOPLASM OF BRAIN AND SPINAL CORD (MULTI): ICD-10-CM

## 2024-06-08 DIAGNOSIS — F41.9 ANXIETY: ICD-10-CM

## 2024-06-08 DIAGNOSIS — G95.20 CORD COMPRESSION (MULTI): ICD-10-CM

## 2024-06-08 DIAGNOSIS — R53.1 UNILATERAL WEAKNESS: ICD-10-CM

## 2024-06-08 DIAGNOSIS — Z79.891 LONG TERM CURRENT USE OF OPIATE ANALGESIC: ICD-10-CM

## 2024-06-08 DIAGNOSIS — J18.9 PNEUMONIA DUE TO INFECTIOUS ORGANISM, UNSPECIFIED LATERALITY, UNSPECIFIED PART OF LUNG: ICD-10-CM

## 2024-06-08 DIAGNOSIS — Z95.0 PACEMAKER: ICD-10-CM

## 2024-06-08 LAB
ALBUMIN SERPL BCP-MCNC: 3.4 G/DL (ref 3.4–5)
ALP SERPL-CCNC: 202 U/L (ref 33–136)
ALT SERPL W P-5'-P-CCNC: 28 U/L (ref 10–52)
ANION GAP SERPL CALC-SCNC: 16 MMOL/L (ref 10–20)
APPEARANCE UR: CLEAR
AST SERPL W P-5'-P-CCNC: 59 U/L (ref 9–39)
ATRIAL RATE: 47 BPM
BASOPHILS # BLD AUTO: 0.01 X10*3/UL (ref 0–0.1)
BASOPHILS NFR BLD AUTO: 0.1 %
BILIRUB SERPL-MCNC: 1.4 MG/DL (ref 0–1.2)
BILIRUB UR STRIP.AUTO-MCNC: NEGATIVE MG/DL
BUN SERPL-MCNC: 12 MG/DL (ref 6–23)
CALCIUM SERPL-MCNC: 8.9 MG/DL (ref 8.6–10.6)
CHLORIDE SERPL-SCNC: 97 MMOL/L (ref 98–107)
CO2 SERPL-SCNC: 25 MMOL/L (ref 21–32)
COLOR UR: NORMAL
CREAT SERPL-MCNC: 0.57 MG/DL (ref 0.5–1.3)
EGFRCR SERPLBLD CKD-EPI 2021: >90 ML/MIN/1.73M*2
EOSINOPHIL # BLD AUTO: 0.01 X10*3/UL (ref 0–0.7)
EOSINOPHIL NFR BLD AUTO: 0.1 %
ERYTHROCYTE [DISTWIDTH] IN BLOOD BY AUTOMATED COUNT: 16.6 % (ref 11.5–14.5)
GLUCOSE SERPL-MCNC: 129 MG/DL (ref 74–99)
GLUCOSE UR STRIP.AUTO-MCNC: NORMAL MG/DL
HCT VFR BLD AUTO: 27.3 % (ref 41–52)
HGB BLD-MCNC: 9.3 G/DL (ref 13.5–17.5)
IMM GRANULOCYTES # BLD AUTO: 0.1 X10*3/UL (ref 0–0.7)
IMM GRANULOCYTES NFR BLD AUTO: 0.9 % (ref 0–0.9)
KETONES UR STRIP.AUTO-MCNC: NEGATIVE MG/DL
LEUKOCYTE ESTERASE UR QL STRIP.AUTO: NEGATIVE
LIPASE SERPL-CCNC: 8 U/L (ref 9–82)
LYMPHOCYTES # BLD AUTO: 0.26 X10*3/UL (ref 1.2–4.8)
LYMPHOCYTES NFR BLD AUTO: 2.4 %
MCH RBC QN AUTO: 28.3 PG (ref 26–34)
MCHC RBC AUTO-ENTMCNC: 34.1 G/DL (ref 32–36)
MCV RBC AUTO: 83 FL (ref 80–100)
MONOCYTES # BLD AUTO: 0.83 X10*3/UL (ref 0.1–1)
MONOCYTES NFR BLD AUTO: 7.8 %
MUCOUS THREADS #/AREA URNS AUTO: NORMAL /LPF
NEUTROPHILS # BLD AUTO: 9.44 X10*3/UL (ref 1.2–7.7)
NEUTROPHILS NFR BLD AUTO: 88.7 %
NITRITE UR QL STRIP.AUTO: NEGATIVE
NRBC BLD-RTO: 0 /100 WBCS (ref 0–0)
P AXIS: 69 DEGREES
P OFFSET: 189 MS
P ONSET: 107 MS
PH UR STRIP.AUTO: 8 [PH]
PLATELET # BLD AUTO: 169 X10*3/UL (ref 150–450)
POTASSIUM SERPL-SCNC: 3.5 MMOL/L (ref 3.5–5.3)
PR INTERVAL: 232 MS
PROT SERPL-MCNC: 5.9 G/DL (ref 6.4–8.2)
PROT UR STRIP.AUTO-MCNC: NORMAL MG/DL
Q ONSET: 223 MS
QRS COUNT: 8 BEATS
QRS DURATION: 104 MS
QT INTERVAL: 400 MS
QTC CALCULATION(BAZETT): 354 MS
QTC FREDERICIA: 368 MS
R AXIS: 89 DEGREES
RBC # BLD AUTO: 3.29 X10*6/UL (ref 4.5–5.9)
RBC # UR STRIP.AUTO: NEGATIVE /UL
RBC #/AREA URNS AUTO: NORMAL /HPF
SODIUM SERPL-SCNC: 134 MMOL/L (ref 136–145)
SP GR UR STRIP.AUTO: 1.01
STAPHYLOCOCCUS SPEC CULT: NORMAL
T AXIS: -87 DEGREES
T OFFSET: 423 MS
UROBILINOGEN UR STRIP.AUTO-MCNC: NORMAL MG/DL
VENTRICULAR RATE: 47 BPM
WBC # BLD AUTO: 10.7 X10*3/UL (ref 4.4–11.3)
WBC #/AREA URNS AUTO: NORMAL /HPF

## 2024-06-08 PROCEDURE — 93005 ELECTROCARDIOGRAM TRACING: CPT

## 2024-06-08 PROCEDURE — 83690 ASSAY OF LIPASE: CPT

## 2024-06-08 PROCEDURE — 85025 COMPLETE CBC W/AUTO DIFF WBC: CPT | Performed by: EMERGENCY MEDICINE

## 2024-06-08 PROCEDURE — 96375 TX/PRO/DX INJ NEW DRUG ADDON: CPT

## 2024-06-08 PROCEDURE — 74177 CT ABD & PELVIS W/CONTRAST: CPT | Performed by: STUDENT IN AN ORGANIZED HEALTH CARE EDUCATION/TRAINING PROGRAM

## 2024-06-08 PROCEDURE — 2500000004 HC RX 250 GENERAL PHARMACY W/ HCPCS (ALT 636 FOR OP/ED)

## 2024-06-08 PROCEDURE — 99285 EMERGENCY DEPT VISIT HI MDM: CPT

## 2024-06-08 PROCEDURE — 74177 CT ABD & PELVIS W/CONTRAST: CPT

## 2024-06-08 PROCEDURE — 99285 EMERGENCY DEPT VISIT HI MDM: CPT | Performed by: EMERGENCY MEDICINE

## 2024-06-08 PROCEDURE — 80053 COMPREHEN METABOLIC PANEL: CPT | Performed by: EMERGENCY MEDICINE

## 2024-06-08 PROCEDURE — 2550000001 HC RX 255 CONTRASTS: Performed by: EMERGENCY MEDICINE

## 2024-06-08 PROCEDURE — 93010 ELECTROCARDIOGRAM REPORT: CPT | Performed by: EMERGENCY MEDICINE

## 2024-06-08 PROCEDURE — 96361 HYDRATE IV INFUSION ADD-ON: CPT

## 2024-06-08 PROCEDURE — 81001 URINALYSIS AUTO W/SCOPE: CPT

## 2024-06-08 PROCEDURE — 96376 TX/PRO/DX INJ SAME DRUG ADON: CPT

## 2024-06-08 PROCEDURE — 96374 THER/PROPH/DIAG INJ IV PUSH: CPT

## 2024-06-08 RX ORDER — HYDROMORPHONE HYDROCHLORIDE 1 MG/ML
1 INJECTION, SOLUTION INTRAMUSCULAR; INTRAVENOUS; SUBCUTANEOUS ONCE
Status: COMPLETED | OUTPATIENT
Start: 2024-06-08 | End: 2024-06-08

## 2024-06-08 RX ORDER — HYDROMORPHONE HYDROCHLORIDE 1 MG/ML
0.5 INJECTION, SOLUTION INTRAMUSCULAR; INTRAVENOUS; SUBCUTANEOUS ONCE
Status: COMPLETED | OUTPATIENT
Start: 2024-06-08 | End: 2024-06-08

## 2024-06-08 RX ORDER — ONDANSETRON HYDROCHLORIDE 2 MG/ML
4 INJECTION, SOLUTION INTRAVENOUS ONCE
Status: COMPLETED | OUTPATIENT
Start: 2024-06-08 | End: 2024-06-08

## 2024-06-08 RX ADMIN — ONDANSETRON 4 MG: 2 INJECTION INTRAMUSCULAR; INTRAVENOUS at 20:38

## 2024-06-08 RX ADMIN — HYDROMORPHONE HYDROCHLORIDE 1 MG: 1 INJECTION, SOLUTION INTRAMUSCULAR; INTRAVENOUS; SUBCUTANEOUS at 23:25

## 2024-06-08 RX ADMIN — IOHEXOL 80 ML: 350 INJECTION, SOLUTION INTRAVENOUS at 22:37

## 2024-06-08 RX ADMIN — HYDROMORPHONE HYDROCHLORIDE 0.5 MG: 1 INJECTION, SOLUTION INTRAMUSCULAR; INTRAVENOUS; SUBCUTANEOUS at 21:55

## 2024-06-08 RX ADMIN — HYDROMORPHONE HYDROCHLORIDE 0.5 MG: 1 INJECTION, SOLUTION INTRAMUSCULAR; INTRAVENOUS; SUBCUTANEOUS at 20:38

## 2024-06-08 RX ADMIN — SODIUM CHLORIDE, POTASSIUM CHLORIDE, SODIUM LACTATE AND CALCIUM CHLORIDE 1000 ML: 600; 310; 30; 20 INJECTION, SOLUTION INTRAVENOUS at 20:38

## 2024-06-08 ASSESSMENT — PAIN DESCRIPTION - PROGRESSION: CLINICAL_PROGRESSION: NOT CHANGED

## 2024-06-08 ASSESSMENT — PAIN - FUNCTIONAL ASSESSMENT: PAIN_FUNCTIONAL_ASSESSMENT: 0-10

## 2024-06-08 ASSESSMENT — PAIN SCALES - GENERAL
PAINLEVEL_OUTOF10: 10 - WORST POSSIBLE PAIN
PAINLEVEL_OUTOF10: 9
PAINLEVEL_OUTOF10: 9
PAINLEVEL_OUTOF10: 10 - WORST POSSIBLE PAIN

## 2024-06-08 ASSESSMENT — PAIN DESCRIPTION - LOCATION
LOCATION: CHEST
LOCATION: ABDOMEN
LOCATION: ABDOMEN

## 2024-06-08 ASSESSMENT — LIFESTYLE VARIABLES
TOTAL SCORE: 0
HAVE PEOPLE ANNOYED YOU BY CRITICIZING YOUR DRINKING: NO
EVER HAD A DRINK FIRST THING IN THE MORNING TO STEADY YOUR NERVES TO GET RID OF A HANGOVER: NO
EVER FELT BAD OR GUILTY ABOUT YOUR DRINKING: NO
HAVE YOU EVER FELT YOU SHOULD CUT DOWN ON YOUR DRINKING: NO

## 2024-06-08 NOTE — ED TRIAGE NOTES
PT REPORTS WITH ABD PAIN. PT RECENTLY ADMITTED AND DISCHARGED FROM [facility] WITH SBO. PT HAS HX OF STAGE IV ESOPHAGEAL ADENOCARCINOMA (HER2 pos) WITH METS TO LIVER AND LUNG. PT HAD A BED HERE YESTERDAY FOR TRANSFER FROM [facility] AND ENDED UP BEING DISCHARGED HOME.

## 2024-06-09 PROBLEM — K52.9 COLITIS: Status: ACTIVE | Noted: 2024-06-09

## 2024-06-09 LAB
ALBUMIN SERPL BCP-MCNC: 3.1 G/DL (ref 3.4–5)
ALBUMIN SERPL BCP-MCNC: 3.1 G/DL (ref 3.4–5)
ALP SERPL-CCNC: 194 U/L (ref 33–136)
ALT SERPL W P-5'-P-CCNC: 25 U/L (ref 10–52)
ANION GAP SERPL CALC-SCNC: 13 MMOL/L (ref 10–20)
ANION GAP SERPL CALC-SCNC: 14 MMOL/L (ref 10–20)
AST SERPL W P-5'-P-CCNC: 58 U/L (ref 9–39)
BASOPHILS # BLD AUTO: 0.01 X10*3/UL (ref 0–0.1)
BASOPHILS NFR BLD AUTO: 0.1 %
BILIRUB SERPL-MCNC: 1 MG/DL (ref 0–1.2)
BUN SERPL-MCNC: 10 MG/DL (ref 6–23)
BUN SERPL-MCNC: 10 MG/DL (ref 6–23)
CALCIUM SERPL-MCNC: 8.5 MG/DL (ref 8.6–10.6)
CALCIUM SERPL-MCNC: 8.5 MG/DL (ref 8.6–10.6)
CHLORIDE SERPL-SCNC: 99 MMOL/L (ref 98–107)
CHLORIDE SERPL-SCNC: 99 MMOL/L (ref 98–107)
CO2 SERPL-SCNC: 27 MMOL/L (ref 21–32)
CO2 SERPL-SCNC: 27 MMOL/L (ref 21–32)
CREAT SERPL-MCNC: 0.56 MG/DL (ref 0.5–1.3)
CREAT SERPL-MCNC: 0.56 MG/DL (ref 0.5–1.3)
EGFRCR SERPLBLD CKD-EPI 2021: >90 ML/MIN/1.73M*2
EGFRCR SERPLBLD CKD-EPI 2021: >90 ML/MIN/1.73M*2
EOSINOPHIL # BLD AUTO: 0.04 X10*3/UL (ref 0–0.7)
EOSINOPHIL NFR BLD AUTO: 0.4 %
ERYTHROCYTE [DISTWIDTH] IN BLOOD BY AUTOMATED COUNT: 16.8 % (ref 11.5–14.5)
GLUCOSE SERPL-MCNC: 77 MG/DL (ref 74–99)
GLUCOSE SERPL-MCNC: 78 MG/DL (ref 74–99)
HCT VFR BLD AUTO: 27 % (ref 41–52)
HGB BLD-MCNC: 8.7 G/DL (ref 13.5–17.5)
IMM GRANULOCYTES # BLD AUTO: 0.24 X10*3/UL (ref 0–0.7)
IMM GRANULOCYTES NFR BLD AUTO: 2.6 % (ref 0–0.9)
LYMPHOCYTES # BLD AUTO: 0.36 X10*3/UL (ref 1.2–4.8)
LYMPHOCYTES NFR BLD AUTO: 3.8 %
MAGNESIUM SERPL-MCNC: 1.71 MG/DL (ref 1.6–2.4)
MCH RBC QN AUTO: 28.2 PG (ref 26–34)
MCHC RBC AUTO-ENTMCNC: 32.2 G/DL (ref 32–36)
MCV RBC AUTO: 88 FL (ref 80–100)
MONOCYTES # BLD AUTO: 0.98 X10*3/UL (ref 0.1–1)
MONOCYTES NFR BLD AUTO: 10.5 %
NEUTROPHILS # BLD AUTO: 7.73 X10*3/UL (ref 1.2–7.7)
NEUTROPHILS NFR BLD AUTO: 82.6 %
NRBC BLD-RTO: 0 /100 WBCS (ref 0–0)
PHOSPHATE SERPL-MCNC: 3.1 MG/DL (ref 2.5–4.9)
PLATELET # BLD AUTO: 133 X10*3/UL (ref 150–450)
POTASSIUM SERPL-SCNC: 3.4 MMOL/L (ref 3.5–5.3)
POTASSIUM SERPL-SCNC: 3.5 MMOL/L (ref 3.5–5.3)
PROT SERPL-MCNC: 5.2 G/DL (ref 6.4–8.2)
RBC # BLD AUTO: 3.08 X10*6/UL (ref 4.5–5.9)
SODIUM SERPL-SCNC: 136 MMOL/L (ref 136–145)
SODIUM SERPL-SCNC: 136 MMOL/L (ref 136–145)
WBC # BLD AUTO: 9.4 X10*3/UL (ref 4.4–11.3)

## 2024-06-09 PROCEDURE — 2500000004 HC RX 250 GENERAL PHARMACY W/ HCPCS (ALT 636 FOR OP/ED)

## 2024-06-09 PROCEDURE — 2500000001 HC RX 250 WO HCPCS SELF ADMINISTERED DRUGS (ALT 637 FOR MEDICARE OP): Performed by: STUDENT IN AN ORGANIZED HEALTH CARE EDUCATION/TRAINING PROGRAM

## 2024-06-09 PROCEDURE — 80053 COMPREHEN METABOLIC PANEL: CPT | Performed by: STUDENT IN AN ORGANIZED HEALTH CARE EDUCATION/TRAINING PROGRAM

## 2024-06-09 PROCEDURE — 84075 ASSAY ALKALINE PHOSPHATASE: CPT

## 2024-06-09 PROCEDURE — 99223 1ST HOSP IP/OBS HIGH 75: CPT | Performed by: STUDENT IN AN ORGANIZED HEALTH CARE EDUCATION/TRAINING PROGRAM

## 2024-06-09 PROCEDURE — 87493 C DIFF AMPLIFIED PROBE: CPT | Performed by: STUDENT IN AN ORGANIZED HEALTH CARE EDUCATION/TRAINING PROGRAM

## 2024-06-09 PROCEDURE — 85025 COMPLETE CBC W/AUTO DIFF WBC: CPT

## 2024-06-09 PROCEDURE — 2500000004 HC RX 250 GENERAL PHARMACY W/ HCPCS (ALT 636 FOR OP/ED): Performed by: STUDENT IN AN ORGANIZED HEALTH CARE EDUCATION/TRAINING PROGRAM

## 2024-06-09 PROCEDURE — 87506 IADNA-DNA/RNA PROBE TQ 6-11: CPT | Performed by: STUDENT IN AN ORGANIZED HEALTH CARE EDUCATION/TRAINING PROGRAM

## 2024-06-09 PROCEDURE — 83735 ASSAY OF MAGNESIUM: CPT

## 2024-06-09 PROCEDURE — 80069 RENAL FUNCTION PANEL: CPT | Performed by: STUDENT IN AN ORGANIZED HEALTH CARE EDUCATION/TRAINING PROGRAM

## 2024-06-09 PROCEDURE — 2500000001 HC RX 250 WO HCPCS SELF ADMINISTERED DRUGS (ALT 637 FOR MEDICARE OP)

## 2024-06-09 PROCEDURE — 2500000002 HC RX 250 W HCPCS SELF ADMINISTERED DRUGS (ALT 637 FOR MEDICARE OP, ALT 636 FOR OP/ED)

## 2024-06-09 PROCEDURE — C9113 INJ PANTOPRAZOLE SODIUM, VIA: HCPCS | Performed by: STUDENT IN AN ORGANIZED HEALTH CARE EDUCATION/TRAINING PROGRAM

## 2024-06-09 PROCEDURE — 1170000001 HC PRIVATE ONCOLOGY ROOM DAILY

## 2024-06-09 RX ORDER — LORAZEPAM 2 MG/ML
0.5 INJECTION INTRAMUSCULAR EVERY 8 HOURS PRN
Status: DISCONTINUED | OUTPATIENT
Start: 2024-06-09 | End: 2024-06-16

## 2024-06-09 RX ORDER — CAPSAICIN 0.75 MG/G
CREAM TOPICAL 4 TIMES DAILY
Status: DISCONTINUED | OUTPATIENT
Start: 2024-06-09 | End: 2024-06-10

## 2024-06-09 RX ORDER — DULOXETIN HYDROCHLORIDE 60 MG/1
60 CAPSULE, DELAYED RELEASE ORAL DAILY
Status: DISCONTINUED | OUTPATIENT
Start: 2024-06-09 | End: 2024-06-27 | Stop reason: HOSPADM

## 2024-06-09 RX ORDER — ACETAMINOPHEN 325 MG/1
650 TABLET ORAL EVERY 6 HOURS
Status: DISCONTINUED | OUTPATIENT
Start: 2024-06-09 | End: 2024-06-27 | Stop reason: HOSPADM

## 2024-06-09 RX ORDER — SODIUM CHLORIDE 9 MG/ML
75 INJECTION, SOLUTION INTRAVENOUS CONTINUOUS
Status: ACTIVE | OUTPATIENT
Start: 2024-06-09 | End: 2024-06-10

## 2024-06-09 RX ORDER — FENTANYL 100 UG/H
1 PATCH TRANSDERMAL
Status: DISCONTINUED | OUTPATIENT
Start: 2024-06-11 | End: 2024-06-11

## 2024-06-09 RX ORDER — PREGABALIN 25 MG/1
50 CAPSULE ORAL 2 TIMES DAILY
Status: DISCONTINUED | OUTPATIENT
Start: 2024-06-09 | End: 2024-06-27 | Stop reason: HOSPADM

## 2024-06-09 RX ORDER — HYDROMORPHONE HYDROCHLORIDE 4 MG/1
4 TABLET ORAL
Status: DISCONTINUED | OUTPATIENT
Start: 2024-06-09 | End: 2024-06-11

## 2024-06-09 RX ORDER — POTASSIUM CHLORIDE 20 MEQ/1
40 TABLET, EXTENDED RELEASE ORAL ONCE
Status: COMPLETED | OUTPATIENT
Start: 2024-06-09 | End: 2024-06-09

## 2024-06-09 RX ORDER — PROMETHAZINE HYDROCHLORIDE 25 MG/ML
12.5 INJECTION, SOLUTION INTRAMUSCULAR; INTRAVENOUS EVERY 6 HOURS PRN
Status: DISCONTINUED | OUTPATIENT
Start: 2024-06-09 | End: 2024-06-09

## 2024-06-09 RX ORDER — ONDANSETRON HYDROCHLORIDE 2 MG/ML
4 INJECTION, SOLUTION INTRAVENOUS EVERY 6 HOURS PRN
Status: DISCONTINUED | OUTPATIENT
Start: 2024-06-09 | End: 2024-06-27 | Stop reason: HOSPADM

## 2024-06-09 RX ORDER — DICYCLOMINE HYDROCHLORIDE 10 MG/1
10 CAPSULE ORAL 4 TIMES DAILY PRN
Status: DISCONTINUED | OUTPATIENT
Start: 2024-06-09 | End: 2024-06-27 | Stop reason: HOSPADM

## 2024-06-09 RX ORDER — PANTOPRAZOLE SODIUM 40 MG/10ML
40 INJECTION, POWDER, LYOPHILIZED, FOR SOLUTION INTRAVENOUS DAILY
Status: DISCONTINUED | OUTPATIENT
Start: 2024-06-09 | End: 2024-06-27 | Stop reason: HOSPADM

## 2024-06-09 RX ORDER — DOCUSATE SODIUM 100 MG/1
100 CAPSULE, LIQUID FILLED ORAL 2 TIMES DAILY
Status: DISCONTINUED | OUTPATIENT
Start: 2024-06-09 | End: 2024-06-27 | Stop reason: HOSPADM

## 2024-06-09 RX ORDER — FENTANYL 100 UG/H
1 PATCH TRANSDERMAL
Status: DISCONTINUED | OUTPATIENT
Start: 2024-06-09 | End: 2024-06-09

## 2024-06-09 RX ORDER — AMOXICILLIN 250 MG
2 CAPSULE ORAL 2 TIMES DAILY
Status: DISCONTINUED | OUTPATIENT
Start: 2024-06-09 | End: 2024-06-27 | Stop reason: HOSPADM

## 2024-06-09 RX ORDER — PROCHLORPERAZINE EDISYLATE 5 MG/ML
5 INJECTION INTRAMUSCULAR; INTRAVENOUS EVERY 6 HOURS PRN
Status: DISCONTINUED | OUTPATIENT
Start: 2024-06-09 | End: 2024-06-27 | Stop reason: HOSPADM

## 2024-06-09 RX ORDER — SODIUM CHLORIDE 0.9 % (FLUSH) 0.9 %
10 SYRINGE (ML) INJECTION AS NEEDED
Status: DISCONTINUED | OUTPATIENT
Start: 2024-06-09 | End: 2024-06-27 | Stop reason: HOSPADM

## 2024-06-09 RX ORDER — HYDROMORPHONE HYDROCHLORIDE 1 MG/ML
1 INJECTION, SOLUTION INTRAMUSCULAR; INTRAVENOUS; SUBCUTANEOUS ONCE
Status: COMPLETED | OUTPATIENT
Start: 2024-06-09 | End: 2024-06-09

## 2024-06-09 RX ORDER — POTASSIUM CHLORIDE 750 MG/1
10 TABLET, FILM COATED, EXTENDED RELEASE ORAL DAILY
COMMUNITY
End: 2024-07-07 | Stop reason: HOSPADM

## 2024-06-09 RX ORDER — HYDROMORPHONE HYDROCHLORIDE 2 MG/1
10 TABLET ORAL
Status: DISCONTINUED | OUTPATIENT
Start: 2024-06-09 | End: 2024-06-09

## 2024-06-09 RX ORDER — POLYETHYLENE GLYCOL 3350 17 G/17G
17 POWDER, FOR SOLUTION ORAL 2 TIMES DAILY
Status: DISCONTINUED | OUTPATIENT
Start: 2024-06-09 | End: 2024-06-27 | Stop reason: HOSPADM

## 2024-06-09 RX ORDER — NALOXONE HYDROCHLORIDE 0.4 MG/ML
0.2 INJECTION, SOLUTION INTRAMUSCULAR; INTRAVENOUS; SUBCUTANEOUS EVERY 5 MIN PRN
Status: DISCONTINUED | OUTPATIENT
Start: 2024-06-09 | End: 2024-06-27 | Stop reason: HOSPADM

## 2024-06-09 RX ORDER — PREDNISONE 20 MG/1
20 TABLET ORAL DAILY
Status: DISCONTINUED | OUTPATIENT
Start: 2024-06-09 | End: 2024-06-12

## 2024-06-09 RX ADMIN — CAPSAICIN: 0.75 CREAM TOPICAL at 21:00

## 2024-06-09 RX ADMIN — ACETAMINOPHEN 650 MG: 325 TABLET ORAL at 08:25

## 2024-06-09 RX ADMIN — SODIUM CHLORIDE 75 ML/HR: 9 INJECTION, SOLUTION INTRAVENOUS at 22:08

## 2024-06-09 RX ADMIN — PANTOPRAZOLE SODIUM 40 MG: 40 INJECTION, POWDER, FOR SOLUTION INTRAVENOUS at 08:25

## 2024-06-09 RX ADMIN — FENTANYL 1 PATCH: 100 PATCH TRANSDERMAL at 08:25

## 2024-06-09 RX ADMIN — HYDROMORPHONE HYDROCHLORIDE 2 MG: 2 INJECTION, SOLUTION INTRAMUSCULAR; INTRAVENOUS; SUBCUTANEOUS at 09:50

## 2024-06-09 RX ADMIN — PREGABALIN 50 MG: 25 CAPSULE ORAL at 08:25

## 2024-06-09 RX ADMIN — PREGABALIN 50 MG: 25 CAPSULE ORAL at 22:14

## 2024-06-09 RX ADMIN — HYDROMORPHONE HYDROCHLORIDE 1 MG: 1 INJECTION, SOLUTION INTRAMUSCULAR; INTRAVENOUS; SUBCUTANEOUS at 01:02

## 2024-06-09 RX ADMIN — HYDROMORPHONE HYDROCHLORIDE 4 MG: 4 TABLET ORAL at 19:32

## 2024-06-09 RX ADMIN — ACETAMINOPHEN 650 MG: 325 TABLET ORAL at 13:41

## 2024-06-09 RX ADMIN — SODIUM CHLORIDE 75 ML/HR: 9 INJECTION, SOLUTION INTRAVENOUS at 07:57

## 2024-06-09 RX ADMIN — HYDROMORPHONE HYDROCHLORIDE 2 MG: 2 INJECTION, SOLUTION INTRAMUSCULAR; INTRAVENOUS; SUBCUTANEOUS at 15:49

## 2024-06-09 RX ADMIN — HYDROMORPHONE HYDROCHLORIDE 2 MG: 2 INJECTION, SOLUTION INTRAMUSCULAR; INTRAVENOUS; SUBCUTANEOUS at 02:59

## 2024-06-09 RX ADMIN — ACETAMINOPHEN 650 MG: 325 TABLET ORAL at 22:18

## 2024-06-09 RX ADMIN — HYDROMORPHONE HYDROCHLORIDE 2 MG: 2 INJECTION, SOLUTION INTRAMUSCULAR; INTRAVENOUS; SUBCUTANEOUS at 22:19

## 2024-06-09 RX ADMIN — APIXABAN 5 MG: 5 TABLET, FILM COATED ORAL at 22:14

## 2024-06-09 RX ADMIN — HYDROMORPHONE HYDROCHLORIDE 2 MG: 2 INJECTION, SOLUTION INTRAMUSCULAR; INTRAVENOUS; SUBCUTANEOUS at 06:40

## 2024-06-09 RX ADMIN — APIXABAN 5 MG: 5 TABLET, FILM COATED ORAL at 08:25

## 2024-06-09 RX ADMIN — SENNOSIDES AND DOCUSATE SODIUM 2 TABLET: 8.6; 5 TABLET ORAL at 08:34

## 2024-06-09 RX ADMIN — PREDNISONE 20 MG: 20 TABLET ORAL at 08:25

## 2024-06-09 RX ADMIN — POTASSIUM CHLORIDE 40 MEQ: 1500 TABLET, EXTENDED RELEASE ORAL at 09:50

## 2024-06-09 SDOH — SOCIAL STABILITY: SOCIAL INSECURITY: ARE YOU OR HAVE YOU BEEN THREATENED OR ABUSED PHYSICALLY, EMOTIONALLY, OR SEXUALLY BY ANYONE?: NO

## 2024-06-09 SDOH — SOCIAL STABILITY: SOCIAL INSECURITY: WERE YOU ABLE TO COMPLETE ALL THE BEHAVIORAL HEALTH SCREENINGS?: YES

## 2024-06-09 SDOH — SOCIAL STABILITY: SOCIAL INSECURITY: DO YOU FEEL UNSAFE GOING BACK TO THE PLACE WHERE YOU ARE LIVING?: NO

## 2024-06-09 SDOH — SOCIAL STABILITY: SOCIAL INSECURITY: HAS ANYONE EVER THREATENED TO HURT YOUR FAMILY OR YOUR PETS?: NO

## 2024-06-09 SDOH — SOCIAL STABILITY: SOCIAL INSECURITY: HAVE YOU HAD ANY THOUGHTS OF HARMING ANYONE ELSE?: NO

## 2024-06-09 SDOH — SOCIAL STABILITY: SOCIAL INSECURITY: HAVE YOU HAD THOUGHTS OF HARMING ANYONE ELSE?: NO

## 2024-06-09 SDOH — SOCIAL STABILITY: SOCIAL INSECURITY: ABUSE: ADULT

## 2024-06-09 SDOH — SOCIAL STABILITY: SOCIAL INSECURITY: ARE THERE ANY APPARENT SIGNS OF INJURIES/BEHAVIORS THAT COULD BE RELATED TO ABUSE/NEGLECT?: NO

## 2024-06-09 SDOH — SOCIAL STABILITY: SOCIAL INSECURITY: DO YOU FEEL ANYONE HAS EXPLOITED OR TAKEN ADVANTAGE OF YOU FINANCIALLY OR OF YOUR PERSONAL PROPERTY?: NO

## 2024-06-09 SDOH — SOCIAL STABILITY: SOCIAL INSECURITY: DOES ANYONE TRY TO KEEP YOU FROM HAVING/CONTACTING OTHER FRIENDS OR DOING THINGS OUTSIDE YOUR HOME?: NO

## 2024-06-09 ASSESSMENT — COGNITIVE AND FUNCTIONAL STATUS - GENERAL
MOBILITY SCORE: 24
PATIENT BASELINE BEDBOUND: NO
DAILY ACTIVITIY SCORE: 24
DAILY ACTIVITIY SCORE: 24
MOBILITY SCORE: 24

## 2024-06-09 ASSESSMENT — PAIN - FUNCTIONAL ASSESSMENT
PAIN_FUNCTIONAL_ASSESSMENT: 0-10

## 2024-06-09 ASSESSMENT — LIFESTYLE VARIABLES
AUDIT-C TOTAL SCORE: 0
HOW MANY STANDARD DRINKS CONTAINING ALCOHOL DO YOU HAVE ON A TYPICAL DAY: PATIENT DOES NOT DRINK
HOW OFTEN DO YOU HAVE A DRINK CONTAINING ALCOHOL: NEVER
AUDIT-C TOTAL SCORE: 0
HOW OFTEN DO YOU HAVE 6 OR MORE DRINKS ON ONE OCCASION: NEVER
SKIP TO QUESTIONS 9-10: 1

## 2024-06-09 ASSESSMENT — PAIN SCALES - GENERAL
PAINLEVEL_OUTOF10: 8
PAINLEVEL_OUTOF10: 3
PAINLEVEL_OUTOF10: 4
PAINLEVEL_OUTOF10: 9
PAINLEVEL_OUTOF10: 7
PAINLEVEL_OUTOF10: 6
PAINLEVEL_OUTOF10: 7
PAINLEVEL_OUTOF10: 10 - WORST POSSIBLE PAIN

## 2024-06-09 ASSESSMENT — ACTIVITIES OF DAILY LIVING (ADL)
LACK_OF_TRANSPORTATION: NO
PATIENT'S MEMORY ADEQUATE TO SAFELY COMPLETE DAILY ACTIVITIES?: YES
HEARING - RIGHT EAR: FUNCTIONAL
HEARING - LEFT EAR: FUNCTIONAL
FEEDING YOURSELF: INDEPENDENT
JUDGMENT_ADEQUATE_SAFELY_COMPLETE_DAILY_ACTIVITIES: YES
DRESSING YOURSELF: INDEPENDENT
TOILETING: INDEPENDENT
BATHING: INDEPENDENT
GROOMING: INDEPENDENT
ADEQUATE_TO_COMPLETE_ADL: YES
WALKS IN HOME: INDEPENDENT

## 2024-06-09 ASSESSMENT — PAIN DESCRIPTION - LOCATION
LOCATION: ABDOMEN
LOCATION: ABDOMEN
LOCATION: BACK

## 2024-06-09 NOTE — CARE PLAN
Problem: Pain  Goal: My pain/discomfort is manageable  Outcome: Progressing     Problem: Safety  Goal: Patient will be injury free during hospitalization  Outcome: Progressing  Goal: I will remain free of falls  Outcome: Progressing     Problem: Daily Care  Goal: Daily care needs are met  Outcome: Progressing     Problem: Psychosocial Needs  Goal: Demonstrates ability to cope with hospitalization/illness  Outcome: Progressing  Goal: Collaborate with me, my family, and caregiver to identify my specific goals  Outcome: Progressing  Flowsheets (Taken 6/9/2024 7024)  Cultural Requests During Hospitalization: none  Spiritual Requests During Hospitalization: none     Problem: Pain  Goal: Takes deep breaths with improved pain control throughout the shift  Outcome: Progressing  Goal: Turns in bed with improved pain control throughout the shift  Outcome: Progressing  Goal: Walks with improved pain control throughout the shift  Outcome: Progressing  Goal: Performs ADL's with improved pain control throughout shift  Outcome: Progressing  Goal: Participates in PT with improved pain control throughout the shift  Outcome: Progressing  Goal: Free from opioid side effects throughout the shift  Outcome: Progressing  Goal: Free from acute confusion related to pain meds throughout the shift  Outcome: Progressing     Problem: Discharge Barriers  Goal: My discharge needs are met  Outcome: Progressing     Problem: Skin  Goal: Decreased wound size/increased tissue granulation at next dressing change  Outcome: Progressing  Flowsheets (Taken 6/9/2024 0515)  Decreased wound size/increased tissue granulation at next dressing change: Promote sleep for wound healing  Goal: Participates in plan/prevention/treatment measures  Outcome: Progressing  Flowsheets (Taken 6/9/2024 0515)  Participates in plan/prevention/treatment measures: Elevate heels  Goal: Prevent/manage excess moisture  Outcome: Progressing  Flowsheets (Taken 6/9/2024  0515)  Prevent/manage excess moisture: Moisturize dry skin  Goal: Prevent/minimize sheer/friction injuries  Outcome: Progressing  Flowsheets (Taken 6/9/2024 0515)  Prevent/minimize sheer/friction injuries: HOB 30 degrees or less  Goal: Promote/optimize nutrition  Outcome: Progressing  Flowsheets (Taken 6/9/2024 0515)  Promote/optimize nutrition: Monitor/record intake including meals  Goal: Promote skin healing  Outcome: Progressing  Flowsheets (Taken 6/9/2024 0515)  Promote skin healing:   Assess skin/pad under line(s)/device(s)   Protective dressings over bony prominences

## 2024-06-09 NOTE — H&P
History Of Present Illness  Massimo Garcia is a 63 y.o. 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    6/5-6/7 Hospitalization at [facility] for Abd Pain  Per H&P on 6/5:  Recent admission on 5/10-5/24 with diffuse T/L spine mets, R occipital met, and L temporal mets, s/p T10 transpedicular decompression and a T8-T12 fusion on 05/17 requiring wound vac and lumbar drain, during the admission he was also treated with high dose steroids followed by steroid taper and managed for anemia and low grade malignancy-associated DIC.     Per Discharge Summary on 6/7:  At [facility] onc office visit earlier in the day, patient had 2-3/10 LLQ pain on exam. Ovn, escalated to 8-9/10 refractory to home opioids for which he presented to the ED. Imaging c/f partial SBO. Advised transfer to [facility] after imaging also showed c/f increased burden of mets and potential need for emergent radiation tx, but no beds, so admitted to [facility] for observation and pain control w/ c/f SBO.    In ED/on floor, pain improved and patient had bowel movement. Resumed PO diet, and palliative care c/s adjusted pain meds/regimen. Cleared for discharge and sent home on 6/7.    Per [facility] ED Provider:  Patient states that at 3 in the morning he started having worsening abdominal pain with nausea, no vomiting and 1 bout of diarrhea. Patient states his last bowel movement within 1 hour and passing gas. Patient states that he is not currently on chemotherapy in the last dose he Was 4 to 6 weeks ago. Patient is scheduled to get his next round of chemotherapy in July. Of note, patient had a spinal surgery about a month ago for removal of spinal cord mets in the setting of possible cord compression. Patient is denying fever, cough,  congestion, shortness of breath, chest pain.     ED course notable for:  VS HR 87, RR 16, /85, O2 96% on RA  Labs notable for CMP at baseline, w/ mild elevation in Tbili to 1.4, lipase 8, CBC at baseline (plts actually higher than usual at 169), UA bland,     Imaging notable  for CT A/P w/ IV Contrast:  1. No evidence of bowel obstruction. There is relatively short segment of circumferential thickening of ascending colon superior to the cecum new from prior, which may represent colitis.  2. Redemonstration of metastatic disease in the visualized lung bases and liver unchanged in the short-term interval since 06/05/2024.  3. Redemonstration of abdominopelvic lymphadenopathy as described above.  4. Redemonstration of left adrenal nodule, likely also metastatic.  5. Small left pleural effusion unchanged in the short-term interval. Interval development of trace ascites in the dependent portion of the pelvis.    Interventions notable for Dilaudid 0.5 mg IVP x2, 1mg IVP x2, 1L LR, Zofran 4mg IVP x1, Admit to Medicine    On my interview, patient endorses the above history. Denies fevers, chills, nausea, vomiting, chest pain, shortness of breath, weakness, numbness, tingling, urinary symptoms. Endorses diarrhea x2 episodes, loose, non bloody while in ED. States PO dilaudid has not been helping, po intake limited. Very pleasant, says he's just following God's path. Daughter accompanied at bedside. Confirmed he still wishes to be full code for now.    Oncologic History  Stage IV malignant neoplasm of esophagus (Multi)   9/13/2023 Initial Diagnosis     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)      9/13/2023 -  Chemotherapy     Trastuzumab + mFOLFOX6 (Fluorouracil Continuous Infusion / Leucovorin / Oxaliplatin), 14 Day Cycles      Metastases to the liver (Multi)   9/13/2023 Initial Diagnosis     Metastases to the liver (CMS/HCC)      9/13/2023 -  Chemotherapy     Trastuzumab + mFOLFOX6 (Fluorouracil Continuous  Infusion / Leucovorin / Oxaliplatin), 14 Day Cycles       Past Medical History  Past Medical History:   Diagnosis Date    Essential (primary) hypertension 12/21/2013    Hypertension    Pacemaker     Peripheral neuropathy     Personal history of other diseases of the circulatory system     History of right bundle branch block (RBBB)    Pneumothorax 12/04/2023       Surgical History  Past Surgical History:   Procedure Laterality Date    CARDIAC ELECTROPHYSIOLOGY PROCEDURE N/A 11/7/2023    Procedure: Temporary Pacemaker Insertion;  Surgeon: Alexis Shook MD;  Location: [facility];  Service: Cardiovascular;  Laterality: N/A;    CARDIAC ELECTROPHYSIOLOGY PROCEDURE Left 11/9/2023    Procedure: PPM IMPLANT DUAL;  Surgeon: Elias Connolly MD;  Location: [facility];  Service: Electrophysiology;  Laterality: Left;    TOTAL KNEE ARTHROPLASTY  09/30/2016    Total Knee Replacement Left    TOTAL KNEE ARTHROPLASTY  09/30/2016    Total Knee Replacement Right        Social History  He reports that he has quit smoking. His smoking use included cigarettes and cigars. He has never been exposed to tobacco smoke. He has never used smokeless tobacco. He reports that he does not currently use alcohol. He reports current drug use. Drug: Marijuana.    Family History  Family History   Problem Relation Name Age of Onset    Cancer Father          Allergies  Bee venom protein (honey bee) and Oxaliplatin    Review of Systems  12 Point ROS reviewed and otherwise negative except as stated above.    Physical Exam  General: awake, alert, conversant, NAD  Skin: no suspect lesions or rashes noted on visible skin, warm and well perfused  HENT: NCAT  Eyes: EOMI, no scleral icterus or conjunctival pallor  Cardiac: RRR, mediport over R chest  Pulm: normal respiratory effort, no inc WOB  Abdomen: soft, ND, NT, no involuntary guarding or rebound tenderness, patient states he can localize pain to LLQ  EXT: no peripheral edema, no  "asymmetry noted  MSK: no focal joint swelling noted, sensation and strength intact 5/5 in all extremities b/l  Neuro: AOx3, able to follow commands, no gross FND  Psych: coherent thought process, appropriate mood and affect     Last Recorded Vitals  Blood pressure 137/85, pulse 87, temperature 36.5 °C (97.7 °F), temperature source Temporal, resp. rate 16, height 1.727 m (5' 8\"), weight 73.5 kg (162 lb), SpO2 96%.    Relevant Results  Results for orders placed or performed during the hospital encounter of 06/08/24 (from the past 24 hour(s))   CBC and Auto Differential   Result Value Ref Range    WBC 10.7 4.4 - 11.3 x10*3/uL    nRBC 0.0 0.0 - 0.0 /100 WBCs    RBC 3.29 (L) 4.50 - 5.90 x10*6/uL    Hemoglobin 9.3 (L) 13.5 - 17.5 g/dL    Hematocrit 27.3 (L) 41.0 - 52.0 %    MCV 83 80 - 100 fL    MCH 28.3 26.0 - 34.0 pg    MCHC 34.1 32.0 - 36.0 g/dL    RDW 16.6 (H) 11.5 - 14.5 %    Platelets 169 150 - 450 x10*3/uL    Neutrophils % 88.7 40.0 - 80.0 %    Immature Granulocytes %, Automated 0.9 0.0 - 0.9 %    Lymphocytes % 2.4 13.0 - 44.0 %    Monocytes % 7.8 2.0 - 10.0 %    Eosinophils % 0.1 0.0 - 6.0 %    Basophils % 0.1 0.0 - 2.0 %    Neutrophils Absolute 9.44 (H) 1.20 - 7.70 x10*3/uL    Immature Granulocytes Absolute, Automated 0.10 0.00 - 0.70 x10*3/uL    Lymphocytes Absolute 0.26 (L) 1.20 - 4.80 x10*3/uL    Monocytes Absolute 0.83 0.10 - 1.00 x10*3/uL    Eosinophils Absolute 0.01 0.00 - 0.70 x10*3/uL    Basophils Absolute 0.01 0.00 - 0.10 x10*3/uL   Comprehensive metabolic panel   Result Value Ref Range    Glucose 129 (H) 74 - 99 mg/dL    Sodium 134 (L) 136 - 145 mmol/L    Potassium 3.5 3.5 - 5.3 mmol/L    Chloride 97 (L) 98 - 107 mmol/L    Bicarbonate 25 21 - 32 mmol/L    Anion Gap 16 10 - 20 mmol/L    Urea Nitrogen 12 6 - 23 mg/dL    Creatinine 0.57 0.50 - 1.30 mg/dL    eGFR >90 >60 mL/min/1.73m*2    Calcium 8.9 8.6 - 10.6 mg/dL    Albumin 3.4 3.4 - 5.0 g/dL    Alkaline Phosphatase 202 (H) 33 - 136 U/L    Total Protein " 5.9 (L) 6.4 - 8.2 g/dL    AST 59 (H) 9 - 39 U/L    Bilirubin, Total 1.4 (H) 0.0 - 1.2 mg/dL    ALT 28 10 - 52 U/L   Lipase   Result Value Ref Range    Lipase 8 (L) 9 - 82 U/L   Urinalysis with Reflex Microscopic   Result Value Ref Range    Color, Urine Light-Yellow Light-Yellow, Yellow, Dark-Yellow    Appearance, Urine Clear Clear    Specific Gravity, Urine 1.010 1.005 - 1.035    pH, Urine 8.0 5.0, 5.5, 6.0, 6.5, 7.0, 7.5, 8.0    Protein, Urine 10 (TRACE) NEGATIVE, 10 (TRACE), 20 (TRACE) mg/dL    Glucose, Urine Normal Normal mg/dL    Blood, Urine NEGATIVE NEGATIVE    Ketones, Urine NEGATIVE NEGATIVE mg/dL    Bilirubin, Urine NEGATIVE NEGATIVE    Urobilinogen, Urine Normal Normal mg/dL    Nitrite, Urine NEGATIVE NEGATIVE    Leukocyte Esterase, Urine NEGATIVE NEGATIVE   Microscopic Only, Urine   Result Value Ref Range    WBC, Urine 1-5 1-5, NONE /HPF    RBC, Urine NONE NONE, 1-2, 3-5 /HPF    Mucus, Urine FEW Reference range not established. /LPF   ECG 12 Lead   Result Value Ref Range    Ventricular Rate 47 BPM    Atrial Rate 47 BPM    HI Interval 232 ms    QRS Duration 104 ms    QT Interval 400 ms    QTC Calculation(Bazett) 354 ms    P Axis 69 degrees    R Axis 89 degrees    T Axis -87 degrees    QRS Count 8 beats    Q Onset 223 ms    P Onset 107 ms    P Offset 189 ms    T Offset 423 ms    QTC Fredericia 368 ms       Assessment/Plan   Active Problems:  There are no active Hospital Problems.  Massimo Garcia is a 63 y.o. 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    Partial SBO resolved prior to discharge from [facility], but patient now presents w/ concerns of colitis on imaging, recurrence of LLQ abd pain and single bout of diarrhea, wo leukocytosis or fever. Given CT redemonstration of  metastatic disease burden and lymphadenopathy, symptoms may be related, but these symptoms did resolve in the interim, just could also be related purely to these imaging concerns for colitis. Given reported only two episode loose stools/diarrhea, low c/f C. Dif colitis, but will send work up for watery stools and stool panel. No leukocytosis or infectious symptoms at this time (no fevers, chills, etc), but patient may still be immunocompromised iso past chemotherapy and steroid taper. No indication for abx at this time given hx of drug induced colitis and symptoms inconsistent with infection. Will also treat pain w/ regimen per [facility] palliative and consider palliative c/s in AM.    #Stage IV Esophageal AdenoCarcinoma (Her2 pos) w/ known mets to Lung/Liver, s/p chemo (last 4/29) and recent admission for drug induced colitis  #Recent Spine Emergent Decompression/Laminectomy (T8-T12 5/17/24 2/2 to Metastatic Lesion)(  #Immunocompromised State  #Colitis on Imaging iso Hx of Drug Induced Colitis  #Cancer Pain  :: Follows w/ Dr. Randolph for Oncology  :: 6/8 CT A/P wo evidence of bowel obstruction, but w/ short segment of circumferential thickening of ascending colon superior to the cecum new from prior, which may represent colitis. Also, redemonstration of metastatic disease in the visualized lung bases and liver unchanged in the short-term interval since 06/05/2024; redemonstration of abdominopelvic lymphadenopathy; redemonstration of left adrenal nodule, likely also metastatic; also Small left pleural effusion unchanged in the short-term interval. Interval development of trace ascites in the dependent portion of the pelvis  Per [facility] Palliative DPN on 6/7:  - Trial 10mg PO Dilaudid Q3H PRN at [facility], but did require PCA pump in [facility] while inpatient  - Will start Dilaudid 2mg Q3H PRN ovn and fu Palliative care in AM  - C/w Tylenol 650mg Q6H, Capsaicin cream QID, Duloxetine 60mg daily, Lyrica 50mg BID, Fentanyl  patch 100mcg, Lorazepam PRN anxiety, C/w Miralax 17g BID, Senna/Colace 2 tabs BID  - C/w Zofran, Compazine, Phenergan PRN N/V  - Decadron 16mg Ivx1 on 6/8, c/w home prednisone 20mg daily (part of taper, through 6/14, then decreased to 10mg daily for 7 days)  - Holding home Flexeril  [ ] fu stool studies and C. Dif if appropriate to send  [ ] C/s Palliative Care in AM  [ ] C/s Rad Onc in AM given increased metastatic burden    #Hx of PVT/PE (on Eliquis)   - C/w  Apixaban 5mg BID    #HTN - No Active Meds  #Third Degree AV Block s/p PM (11/9/23)  #OA    F: s/p 1L LR in ED  E: PRN  N: Regular Diet  A: pIV, Mediport    DVT ppx: Home AC  GI ppx: Pantoprazole 40mg IV    Full Code  SDM: Jane (wife)    Grover Rincon MD

## 2024-06-09 NOTE — CONSULTS
SUPPORTIVE AND PALLIATIVE ONCOLOGY CONSULT    Inpatient consult to [facility] Adult Supportive Oncology  Consult performed by: JASPER Damon  Consult ordered by: JASPER Carrillo  Reason for consult: pain        SERVICE DATE: 2024      PALLIATIVE MEDICINE OUTPATIENT PROVIDER:  JASPER Doss  CURRENT ATTENDING PROVIDER: Ronnie Guardado MD     Medical Oncologist: MD Bhavin Guevara MD James M Martin, MD   Radiation Oncologist: No care team member to display  Primary Physician: Dayne Santamaria  [phone]    REASON FOR CONSULT/CHIEF CONSULT COMPLAINT: pain management    Subjective   HISTORY OF PRESENT ILLNESS: Massimo Garcia is a 63 y.o. 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 PMH significant for HTN, Third Degree AV Block s/p PM (23), OA. Admitted 2024 for further evaluation and management of abdominal pain. Course complicated by partial SBO with [facility] ED imagining. Supportive and Palliative Oncology is consulted for pain management.       Patient was seen at bedside with his family, his wife and 2 daughters. He reports that his pain is well controlled with current regimen.   Discussed changing his fentanyl patch to q 48hs   Started home regimen Dilaudid 4mg po q 3hrs . Wife reports that with new 4mg prescription, their co-pay was about $160, and requested to have Dilaudid 2mg po prescription sent with discharge, with more quantity to decrease their co-pay amount.   Discussed adding bentyl 10mg 4x a day PRN and patient was receptive   Discussed QTC prolongation medications and he reports that he would like to avoid those drugs as much as possible.     Pain Assessment:  Location:  abdominal pain to all quadrants  Duration: Intermittent  Characteristics:   Ratin   Descriptors: aching, throbbing, and cramping   Aggravating: movement and  bending    Relieving: Analgesics   Intolerances:Massimo Garcia is allergic to bee venom protein (honey bee) and oxaliplatin.   Personal Pain Goal: 1    Interference with Function: None   Coping Strategies: none   Emotional Response: None   Barriers to Pain Management: None    Opioid Use  Past 24 h opioid use:   Fentanyl  mcg/h = 200mg OME  Dilaudid 2mg iv PRN = 25mg OME    Dilaudid 1mg x 1 dose  =12.5mg OME   Dilaudid 0.5mg x 2 doses = 12.5mg OME   Total 24h OME use:  250mg OME    Note: OME calculations based on equianalgesic table below. Please note this table is based on best available evidence but conversions are still approximate. These are NOT opioid DOSES for individual patient use; this is equivalency information.  Drug Parenteral Enteral   Morphine 10 25   Oxycodone N/A 20   Hydromorphone 2 5   Fentanyl 0.15 N/A   Tramadol N/A 120   Citation: Aniya ROSS. Demystifying opioid conversion calculations: A guide for effective dosing, Second edition. MD Lobo: American Society of Health-System Pharmacists, 2018.    OARRS/PDMP reviewed 6/9/2024 no aberrant behavior noted.    Symptom Assessment:  Pain:very much   Headache: none  Dizziness:none  Lack of energy: none  Difficulty sleeping: none  Worrying: a little  Anxiety: none  Depression: a little  Pain in mouth/swallowing: none  Dry mouth: none  Taste changes: none  Shortness of breath: none  Lack of appetite: none   Nausea: a little  Vomiting: none  Constipation: none  Diarrhea: none  Sore muscles: none  Numbness or tingling in hands/feet/other: none  Weight loss: none  Other: none        Information obtained from: chart review, interview of patient, interview of family, discussion with RN, and discussion with primary team  ______________________________________________________________________     Oncology History   Stage IV malignant neoplasm of esophagus (Multi)   9/13/2023 Initial Diagnosis    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)      9/13/2023 - 5/1/2024 Chemotherapy    Trastuzumab + mFOLFOX6 (Fluorouracil Continuous Infusion / Leucovorin / Oxaliplatin), 14 Day Cycles     6/29/2024 -  Chemotherapy    Fam-trastuzumab deruxtecan (6.4 mg/kg), 21 Day Cycles      Metastases to the liver (Multi)   9/13/2023 Initial Diagnosis    Metastases to the liver (CMS/HCC)     9/13/2023 - 5/1/2024 Chemotherapy    Trastuzumab + mFOLFOX6 (Fluorouracil Continuous Infusion / Leucovorin / Oxaliplatin), 14 Day Cycles     6/29/2024 -  Chemotherapy    Fam-trastuzumab deruxtecan (6.4 mg/kg), 21 Day Cycles          Past Medical History:   Diagnosis Date    Essential (primary) hypertension 12/21/2013    Hypertension    Pacemaker     Peripheral neuropathy     Personal history of other diseases of the circulatory system     History of right bundle branch block (RBBB)    Pneumothorax 12/04/2023     Past Surgical History:   Procedure Laterality Date    CARDIAC ELECTROPHYSIOLOGY PROCEDURE N/A 11/7/2023    Procedure: Temporary Pacemaker Insertion;  Surgeon: Alexis Shook MD;  Location: [facility];  Service: Cardiovascular;  Laterality: N/A;    CARDIAC ELECTROPHYSIOLOGY PROCEDURE Left 11/9/2023    Procedure: PPM IMPLANT DUAL;  Surgeon: Elias Connolly MD;  Location: [facility];  Service: Electrophysiology;  Laterality: Left;    TOTAL KNEE ARTHROPLASTY  09/30/2016    Total Knee Replacement Left    TOTAL KNEE ARTHROPLASTY  09/30/2016    Total Knee Replacement Right     Family History   Problem Relation Name Age of Onset    Cancer Father          SOCIAL HISTORY:  Marital Status   and Children 2 daughters    Social History:  reports that he has quit smoking. His smoking use included cigarettes and cigars. He has never been exposed to tobacco smoke. He has never used smokeless tobacco. He reports that he does not currently use alcohol. He reports current drug use. Drug: Marijuana.       REVIEW OF SYSTEMS:  Review of systems negative unless noted in  HPI.       Objective       Lab Results   Component Value Date    WBC 10.7 06/08/2024    HGB 9.3 (L) 06/08/2024    HCT 27.3 (L) 06/08/2024    MCV 83 06/08/2024     06/08/2024      Lab Results   Component Value Date    GLUCOSE 77 06/09/2024    CALCIUM 8.5 (L) 06/09/2024     06/09/2024    K 3.4 (L) 06/09/2024    CO2 27 06/09/2024    CL 99 06/09/2024    BUN 10 06/09/2024    CREATININE 0.56 06/09/2024     Lab Results   Component Value Date    ALT 28 06/08/2024    AST 59 (H) 06/08/2024    ALKPHOS 202 (H) 06/08/2024    BILITOT 1.4 (H) 06/08/2024     Estimated Creatinine Clearance: 125 mL/min (by C-G formula based on SCr of 0.56 mg/dL).     Encounter Date: 06/08/24   ECG 12 Lead   Result Value    Ventricular Rate 47    Atrial Rate 47    MS Interval 232    QRS Duration 104    QT Interval 400    QTC Calculation(Bazett) 354    P Axis 69    R Axis 89    T Axis -87    QRS Count 8    Q Onset 223    P Onset 107    P Offset 189    T Offset 423    QTC Fredericia 368    Narrative    Sinus bradycardia with 1st degree AV block  Biatrial enlargement  Anteroseptal infarct , age undetermined  ST & T wave abnormality, consider inferolateral ischemia  Abnormal ECG  When compared with ECG of 19-MAY-2024 06:30,  Sinus rhythm has replaced Electronic ventricular pacemaker  Vent. rate has decreased BY  54 BPM    See ED provider note for full interpretation and clinical correlation  Confirmed by Carina Ross ([id number]) on 6/8/2024 10:32:24 PM     Wt Readings from Last 5 Encounters:   06/08/24 73.5 kg (162 lb)   06/05/24 73.5 kg (162 lb)   06/04/24 73.7 kg (162 lb 8 oz)   05/31/24 78.8 kg (173 lb 11.6 oz)   05/10/24 70.3 kg (155 lb)       Current Outpatient Medications   Medication Instructions    acetaminophen (TYLENOL) 650 mg, oral, Every 6 hours    apixaban (Eliquis) 5 mg tablet TAKE 1 TABLET BY MOUTH TWO TIMES A DAY    cholecalciferol (VITAMIN D-3) 100 mcg, oral, Every morning    cyclobenzaprine (FLEXERIL) 10 mg, oral, 3 times daily     docusate sodium (COLACE) 100 mg, oral, 2 times daily    DULoxetine (CYMBALTA) 60 mg, oral, Daily, Do not crush or chew.    fentaNYL (Duragesic) 100 mcg/hr patch 1 patch, transdermal, Every 72 hours    HYDROmorphone (DILAUDID) 4 mg, oral, Every 4 hours PRN    HYDROmorphone (DILAUDID) 2-4 mg, oral, Every 6 hours PRN    HYDROmorphone (DILAUDID) 10 mg, oral, Every 4 hours PRN    HYDROmorphone (DILAUDID) 10 mg, oral, Every 4 hours PRN    LORazepam (ATIVAN) 1 mg, oral, Every 8 hours PRN    metoclopramide (REGLAN) 10 mg, oral, Every 8 hours PRN    multivitamin with minerals (multivit-min-iron fum-folic ac) tablet 2 tablets, oral, Every morning    naloxone (NARCAN) 0.2 mg, intravenous, Every 5 min PRN    nystatin (MYCOSTATIN) 500,000 Units, Swish & Swallow, 4 times daily    pantoprazole (PROTONIX) 40 mg, oral, Daily before breakfast, Do not crush, chew, or split.    polyethylene glycol (GLYCOLAX, MIRALAX) 17 g, oral, 2 times daily    potassium chloride CR 10 mEq ER tablet 10 mEq, oral, Daily, Do not crush, chew, or split.    predniSONE (Deltasone) 10 mg tablet Take 4 tablets (40 mg) by mouth once daily for 7 days, THEN 2 tablets (20 mg) once daily for 7 days, THEN 1 tablet (10 mg) once daily for 7 days. Do not fill before May 25, 2024.    pregabalin (LYRICA) 50 mg, oral, 2 times daily    prochlorperazine (COMPAZINE) 5 mg, intravenous, Every 6 hours PRN    promethazine (PHENERGAN) 12.5 mg, intramuscular, Every 6 hours PRN    sennosides-docusate sodium (Mireille-Colace) 8.6-50 mg tablet 2 tablets, oral, 2 times daily     Scheduled medications   acetaminophen, 650 mg, oral, q6h  apixaban, 5 mg, oral, BID  capsicum, , Topical, 4x daily  docusate sodium, 100 mg, oral, BID  DULoxetine, 60 mg, oral, Daily  fentaNYL, 1 patch, transdermal, q72h  pantoprazole, 40 mg, intravenous, Daily  polyethylene glycol, 17 g, oral, BID  potassium chloride CR, 40 mEq, oral, Once  predniSONE, 20 mg, oral, Daily  pregabalin, 50 mg, oral,  BID  sennosides-docusate sodium, 2 tablet, oral, BID      Continuous medications  sodium chloride 0.9%, 75 mL/hr, Last Rate: 75 mL/hr (06/09/24 0757)      PRN medications  HYDROmorphone, 2 mg, q3h PRN  LORazepam, 0.5 mg, q8h PRN  naloxone, 0.2 mg, q5 min PRN  ondansetron, 4 mg, q6h PRN  prochlorperazine, 5 mg, q6h PRN  promethazine, 12.5 mg, q6h PRN  sodium chloride 0.9%, 10 mL, PRN  sodium chloride 0.9%, 10 mL, PRN         Allergies:   Allergies   Allergen Reactions    Bee Venom Protein (Honey Bee) Anaphylaxis    Oxaliplatin Other     Rigors                PHYSICAL EXAMINATION:  Vital Signs:   Vital signs reviewed  Vitals:    06/09/24 0638   BP: 115/74   Pulse: (!) 112   Resp: 16   Temp: 36.7 °C (98.1 °F)   SpO2: 96%     Pain Score: 7     Physical Exam  Eyes:      Pupils: Pupils are equal, round, and reactive to light.   Cardiovascular:      Rate and Rhythm: Regular rhythm.      Heart sounds: Normal heart sounds.   Pulmonary:      Breath sounds: Normal breath sounds.   Abdominal:      General: Bowel sounds are normal.      Palpations: Abdomen is soft.   Musculoskeletal:         General: Normal range of motion.   Skin:     General: Skin is warm.   Neurological:      Mental Status: He is alert and oriented to person, place, and time.   Psychiatric:         Mood and Affect: Mood normal.        ASSESSMENT/PLAN:  Massimo Garcia is a 63 y.o. 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 PMH significant for HTN, Third Degree AV Block s/p PM (11/9/23), OA. Admitted 6/8/2024 for further evaluation and management of abdominal pain. Course complicated by partial SBO with [facility] ED imagining. Supportive and Palliative Oncology is consulted for pain management.       CT Abdomen pelvis w IV contrast 6/8/2024:   IMPRESSION:  1. No evidence of bowel obstruction. There is relatively short  segment of circumferential  thickening of ascending colon superior to  the cecum new from prior, which may represent colitis.  2. Redemonstration of metastatic disease in the visualized lung bases  and liver unchanged in the short-term interval since 06/05/2024.  3. Redemonstration of abdominopelvic lymphadenopathy as described  above.  4. Redemonstration of left adrenal nodule, likely also metastatic.  5. Small left pleural effusion unchanged in the short-term interval.  Interval development of trace ascites in the dependent portion of the  pelvis.      Pain:  Abdominal pain related to Metastatic esophageal cancer   Pain is: cancer related pain  Type: visceral, somatic, and neuropathic  Pain control: sub-optimally controlled  Home regimen:  Fentanyl TD  and Hydromorphone 4mg q3hrs PRN   fentanyl 100mcgs /72hrs   Personalized pain goal: 2  Total OME usage for the past 24 hours:  250mg OME   Change Fentanyl patch to 100 mcgs q 48 hrs scheduled [patient reports that after 2 days it stops becoming more effective]  Continue Dilaudid 2mg IV q3hrs PRN for breakthrough pain  Add dilaudid 4mg PO q3hrs PRN for moderate and severe pain  With discharge send patient home on Dilaudid 2mg tablets with more quantity due to lower co-pay   Patient's wife has 15 day supply of Dilaudid 4mg PO which was picked up 6/07/2024, patients next supportive oncology appointment is 6/28/2024 so he will need additional medication to last him until that appointment with discharge     Add Bentyl 10mg 4x a day PRN for abdominal cramps.  Tentative plan is for CT/CIM tomorrow with Radiation oncology   Continue Lyrica 50mg BID   Continue Acetaminophen 650mg q 6hrs   Continue Duloxetine 60mg daily   Continue prednisone 20mg daily   Continue to monitor pain scores and administer PRN medications as appropriate  Continue/initiate nonpharmacologic pain management strategies including ice/heat therapy, distraction techniques, deep breathing/relaxation techniques, calming music, and  repositioning  Continue to monitor for signs of opioid efficacy (pain scores, improved functionality) and toxicity (pinpoint pupils, excess sedation/drowsiness/confusion, respiratory depression, etc.)    Nausea/Reflux:  Intermittent nausea without vomiting related to opioids and constipation   Home regimen: scopolamine patch, Carafate and Protonix PRN   Improving    Continue Compazine 5mg IV q6hr PRN  Continue promethazine 12.5mg IM q6hr PRN   Continue Zofran 4mg q6hrs PRN   Consider sending patient home on PRN ODT Zofran     Constipation  At risk for constipation related to opioids, currently not constipated  Usual bowel pattern: every day  Home regimen: Senna 1-2 tablets 1-2x/day, Lactulose 20 gm 4x a day PRN, Docusate 100mg BID PRN, and MOM 4x  DAY prn  LBM 6/8/2024  Continue Senna 2 tabsd bid   Continue Miralax 17grams  bid   Continue docusate sodium 100mg BID    Monitor BM frequency, adjust regimen as needed  Goal to have BM without straining q48-72h        Altered Mood:  Acute on chronic anxiety and depression related to health concerns   controlled with home regimen   Home regimen:  Ativan 1mg TID PRN   Continue Ativan 0.5mg q8hrs PRN     Sleeping Difficulty:  Impaired sleep related to hospital environment  Home regimen:  none  With better pain management and less hospital interruptions he plans to catch up on his sleep tonight.     Decreased appetite:  Patient is on a liquid diet   Outpatient supportive oncology referred patient to Dr. Mays's office for integrative medicine    Medical Decision Making/Goals of Care/Advance Care Planning:  Patient's current clinical condition, including diagnosis, prognosis, and management plan, and goals of care were discussed.   Life limiting disease: metastatic malignancy  Family: Supportive family   Performance status: Major  limitations due to pain  Joys/meaning/strength: Family  Understanding of health: Demonstrates good understanding of disease process, understands plan  for symptom management   Information:Wants full disclosure  Goals: symptom control and cancer directed therapy  Worries and fears now and future: ongoing symptoms   Code status discussion:  full code     Advance Directives  Existence of Advance Directives:No - has interest  Decision maker: DOUGLAS is wife   Code Status: Full code    Introduction to Supportive and Palliative Oncology:  Spoke with patient, his 2 daughters and wife at bedside  Introduced the role and philosophy of Supportive and Palliative oncology in the evaluation and management of symptoms during cancer treatment  Palliative care was introduced as a service for patients with serious illness to help with symptoms, assist with goals of care conversations, navigate complex decision making, improve quality of life for patients, and provide support both patients and families.  Patient seemed to appreciate the extra layer of support.     Supportive Interventions: Interventions: Music Therapy: offered declined, Art Therapy: offered declined, SPO Spiritual Care: offered declined    Disposition:  Please  start the process of having prior authorization with meds to beds deliver [facility]. Prescriptions will need to be sent 48-72 hours prior to discharge so that a prior authorization can be completed.     Discharge date: unknown pending acute issues, pain control, and symptom control  Patient has an appointment with Outpatient Supportive Oncology JASPER Doss on June 28, 2024.      Signature and billing:  Thank you for allowing us to participate in the care of this patient. Recommendations will be communicated back to the consulting service by way of shared electronic medical record or face-to-face.    Medical complexity was high level due to due to complexity of problems, extensive data review, and high risk of management/treatment.    I spent 75 minutes in the care of this patient which included  chart review, interviewing patient/family, discussion with primary team, coordination of care, and documentation.      DATA   Diagnostic tests and information reviewed for today's visit:  Conversation with primary team, Most recent labs       Some elements copied from Sugar GLASER note on 5/31/2024, the elements have been updated and all reflect current decision making from today, 6/9/2024.    Plan of Care discussed with: Provider, RN, Patient and Family/Significant Other: wife and 2 daughters     Thank you for asking Supportive and Palliative Oncology to assist with care of this patient.  We will continue to follow.  Please contact us for additional questions or concerns.      SIGNATURE: ALFREDITO Damon-CNP  PAGER/CONTACT:  Contact information:  Supportive and Palliative Oncology  Monday-Friday 8 AM-5 PM  Epic Secure chat or pager [phone].  After hours and weekends:  pager [phone]

## 2024-06-09 NOTE — PROGRESS NOTES
Pharmacy Medication History Review    Massimo Garcia is a 63 y.o. male admitted for Colitis. Pharmacy reviewed the patient's [id number] medications and allergies for accuracy.    The list below reflects the updated PTA list. Comments regarding how patient may be taking medications differently can be found in the Admit Orders Activity  Prior to Admission Medications   Prescriptions Last Dose Informant   DULoxetine (Cymbalta) 60 mg DR capsule Not Taking Self, Child   Sig: Take 1 capsule (60 mg) by mouth once daily. Do not crush or chew.   Patient not taking: Reported on 6/9/2024   HYDROmorphone (Dilaudid) 2 mg tablet 6/7/2024 Self, Child   Sig: Take 1-2 tablets (2-4 mg) by mouth every 6 hours if needed for severe pain (7 - 10) for up to 15 days.   HYDROmorphone (Dilaudid) 4 mg tablet  Self, Child   Sig: Take 1 tablet (4 mg) by mouth every 4 hours if needed for moderate pain (4 - 6).   HYDROmorphone (Dilaudid) 4 mg tablet  Self, Child   Sig: Take 2.5 tablets (10 mg) by mouth every 4 hours if needed for severe pain (7 - 10) or moderate pain (4 - 6) for up to 15 days.   HYDROmorphone (Dilaudid) 4 mg tablet  Self, Child   Sig: Take 2.5 tablets (10 mg) by mouth every 4 hours if needed for severe pain (7 - 10) or moderate pain (4 - 6) for up to 15 days.   LORazepam (Ativan) 1 mg tablet 6/7/2024 at night Self, Child   Sig: Take 1 tablet (1 mg) by mouth every 8 hours if needed for anxiety (nausea).   acetaminophen (Tylenol) 325 mg tablet 6/8/2024 at morning Self, Child   Sig: Take 2 tablets (650 mg) by mouth every 6 hours.   apixaban (Eliquis) 5 mg tablet 6/8/2024 at morning Self, Child   Sig: TAKE 1 TABLET BY MOUTH TWO TIMES A DAY   cholecalciferol (Vitamin D-3) 50 MCG (2000 UT) tablet 6/8/2024 at morning Self, Child   Sig: Take 2 tablets (100 mcg) by mouth once daily in the morning.   cyclobenzaprine (Flexeril) 10 mg tablet Past Week Self, Child   Sig: Take 1 tablet (10 mg) by mouth 3 times a day for 14 days.   docusate  sodium (Colace) 100 mg capsule 6/8/2024 at morning Self, Child   Sig: Take 1 capsule (100 mg) by mouth 2 times a day.   fentaNYL (Duragesic) 100 mcg/hr patch 6/7/2024 Self, Child   Sig: Place 1 patch over 72 hours on the skin every 3rd day for 9 days.   metoclopramide (Reglan) 10 mg tablet Not Taking Self, Child   Sig: Take 1 tablet (10 mg) by mouth every 8 hours if needed (Hiccups alternative if Thorazine not working. Don't give with thorazine for therapy as too much D2 agonists can have bad side effects).   Patient not taking: Reported on 6/9/2024   multivitamin with minerals (multivit-min-iron fum-folic ac) tablet 6/8/2024 at morning Self, Child   Sig: Take 2 tablets by mouth once daily in the morning.   naloxone (Narcan) 0.4 mg/mL injection Unknown Self, Child   Sig: Infuse 0.5 mL (0.2 mg) into a venous catheter every 5 minutes if needed for respiratory depression.   nystatin (Mycostatin) 100,000 unit/mL suspension 6/7/2024 Self, Child   Sig: Swish and swallow 5 mL (500,000 Units) 4 times a day for 14 days.   pantoprazole (ProtoNix) 40 mg EC tablet 6/8/2024 Self, Child   Sig: Take 1 tablet (40 mg) by mouth once daily in the morning. Take before meals. Do not crush, chew, or split.   polyethylene glycol (Glycolax, Miralax) 17 gram/dose powder 6/7/2024 Self, Child   Sig: Take 17 g by mouth 2 times a day.   potassium chloride CR 10 mEq ER tablet 6/8/2024 Child, Self   Sig: Take 1 tablet (10 mEq) by mouth once daily. Do not crush, chew, or split.   predniSONE (Deltasone) 10 mg tablet 6/8/2024 Self, Child   Sig: Take 4 tablets (40 mg) by mouth once daily for 7 days, THEN 2 tablets (20 mg) once daily for 7 days, THEN 1 tablet (10 mg) once daily for 7 days. Do not fill before May 25, 2024.   pregabalin (Lyrica) 50 mg capsule Past Month Self, Child   Sig: Take 1 capsule (50 mg) by mouth 2 times a day.   prochlorperazine (Compazine) 10 mg/2 mL (5 mg/mL) solution  Self, Child   Sig: Infuse 1 mL (5 mg) into a venous  catheter every 6 hours if needed for nausea or vomiting.   promethazine (Phenergan) 25 mg/mL injection Not Taking Self, Child   Sig: Inject 0.5 mL (12.5 mg) over 15 minutes into the muscle every 6 hours if needed for nausea or vomiting.   Patient not taking: Reported on 6/9/2024   sennosides-docusate sodium (Mireille-Colace) 8.6-50 mg tablet Not Taking Self, Child   Sig: Take 2 tablets by mouth 2 times a day.   Patient not taking: Reported on 6/9/2024      Facility-Administered Medications: None        The list below reflects the updated allergy list. Please review each documented allergy for additional clarification and justification.  Allergies  Reviewed by Susan Kiran on 6/9/2024        Severity Reactions Comments    Bee Venom Protein (honey Bee) High Anaphylaxis     Oxaliplatin Medium Other Rigors            Medications ADDED:  Potassium Chloride 10 meq ER tablet  Medications CHANGED:  None  Medications REMOVED:   None    Patient accepts M2B at discharge. Pharmacy has been updated to [facility].    Sources used to complete the med history include :  OARRS ;  - 06/07/2024 Hydromorphone 4 mg tab , 215 # / 15 days   -05/31/2024 Lorazepam 1 mg tablet 90 # / 30 days   -05/31/2024 Fentanyl 100 mg/hr patch 3#/9 days  - 05/28/2024 Hydromorphone 2 mg tablet 88#/11 days   -05/24/2024 Pregabalin 50 mg capsule 60#/ 30 days   Patient dispense history   Patient interview       Below are additional concerns with the patient's PTA list.  Per patient he is not taking Duloxetine 60 mg DR capsule but still has some available if needed. Patient states he has not yet started Hydromorphone 4 mg tablets , he is still currently taking Hydromorphone 2 mg tablets but has 4 mg tablets available. Patient states he has not needed Metoclopramide 10 mg tablet and has not been taking. Patient also states he is not taking Sennosides docusate sodium ( Mireille-colace) 8.6 - 50 mg tablet. Per patient he may be on Prednisone day 6 of the 20  mg tablets. According to medication records patient received a dose inpatient on 6/7/2024 , therefore patient should be on day 1 of the 10 mg once daily. Patient has not yet started Prochlorperazine 10 mg / 2mL solution and will start on 6/29/2024. Patient also has not yet started Promethazine ( Phenergan) 25 mg / ml injection.     Susan Kiran  Transitions of Care Pharmacy Tech   [facility]  Please reach out via Secure Chat for questions, or if no response call Pansieve or [facility]era [facility]

## 2024-06-09 NOTE — SIGNIFICANT EVENT
Updated [place] Plan of Care (6/9)     Massimo Garcia is a 63 y.o. 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 Patient admitted to [facility] (6/5-6/7) c/f partial SBO, resolved prior to discharge from [facility], surgery consulted at OSH and did not recommend surgical interventions at this time. Presented to [facility] 6/8 with continued abdominal pain and diarrhea. CT A/P (6/8) wo bowel obstruction, c/f colitis on imaging. Cdiff and stool pathogen pending. No leukocytosis or infectious symptoms at this time (no fevers/chills), no indication for abx at this time. Supportive oncology consulted for further pain management, recommendations pending. Radiation oncology consulted as he was recently seen outpatient with plan for CT simulation for gamma knife and radiation to spine. Discharge pending pain control and radiation oncology consult.        # Stage IV Esophageal AdenoCarcinoma (Her2 pos) w/ known mets to Lung/Liver,   # Recent Spine Emergent Decompression/Laminectomy (T8-T12 5/17/24 2/2 to Metastatic Lesion)  #Cancer Related Pain  - Follows w/ Dr. Randolph for Oncology; emailed on 6/9   - 6/8 CT A/P wo evidence of bowel obstruction, but w/ short segment of circumferential thickening of ascending colon superior to the cecum new from prior, which may represent colitis. Also, redemonstration of metastatic disease in the visualized lung bases and liver unchanged in the short-term interval since 06/05/2024; redemonstration of abdominopelvic lymphadenopathy; redemonstration of left adrenal nodule, likely  also metastatic; also Small left pleural effusion unchanged in the short-term interval. Interval development of trace ascites in the dependent portion of the pelvis  - Palliative care consulted while at [facility]; recommended 2mg IV dilaudid Q3 PRN for severe pain, continued on admission (6/9- )  - C/w Tylenol 650mg Q6H, Capsaicin cream QID, Duloxetine 60mg daily, Lyrica 50mg BID, Fentanyl patch 100mcg, 0.5mg Lorazepam IV Q8hrs PRN anxiety  - Miralax 17g BID, Senna/Colace 2 tabs BID for opioid induced constipation   - C/w Zofran, Compazine, Phenergan PRN N/V  - Decadron 16mg Ivx1 on 6/8, c/w home prednisone 20mg daily (part of taper, through 6/14, then decreased to 10mg daily for 7 days) with PPI   - Holding home Flexeril  - supportive oncology consulted 6/9; further recommendations pending   - Radiation oncology consulted 6/9; further recs pending (seen outpt on 6/4 with plan for gamma knife and radiation to spine pending CT simulation)     # Colitis on Imaging iso Hx of Drug Induced Colitis   - Admitted in May for immunotherapy related colitis (infectious workup remained negative); on steroid taper as above   - Presented to [facility] (6/5-6/7) with abdominal pain/ diarrhea c/f CBO  - CT A/P (6/5): Mildly loops dilated loops of jejunum measuring up to 3.4 cm, early developing partial small bowel obstruction can not be excluded  - While at [facility] pt was passing gas and was having active Bms, general surgery consulted and did not recommend any surgical interventions   - CT A/P (6/8) c/f colitis   - pt wo leukocytosis or fevers   - cdiff pending   - Stool pathogen pending   - pt placed NPO on admission, now increased to clear liquid diet, consider changing diet to full liquid on 6/10 if he can tolerate   - NS @ 75 x24 hours (6/9)   - supportive oncology; further recommendations pending     #Hx of PVT/PE (on Eliquis)   - C/w Apixaban 5mg BID     #HTN   #Third Degree AV Block s/p PM (11/9/23)  - No active BP medications     #  Felicitas   - eliquis, OOB as able, SCD while in bed     Dispo:   - Full code (confirmed on admission)   - Discharge home pending pain management and radiation oncology plan   - NOK: Jane (wife) [phone]         I spent >60 minutes in the overall care and plan for this patient     Assessment and plan discussed with attending physician Dr. Geo Martínez, APRN-CNP

## 2024-06-10 ENCOUNTER — APPOINTMENT (OUTPATIENT)
Dept: HEMATOLOGY/ONCOLOGY | Facility: CLINIC | Age: 64
End: 2024-06-10
Payer: MEDICARE

## 2024-06-10 LAB
ALBUMIN SERPL BCP-MCNC: 2.9 G/DL (ref 3.4–5)
ALP SERPL-CCNC: 193 U/L (ref 33–136)
ALT SERPL W P-5'-P-CCNC: 23 U/L (ref 10–52)
ANION GAP SERPL CALC-SCNC: 12 MMOL/L (ref 10–20)
AST SERPL W P-5'-P-CCNC: 56 U/L (ref 9–39)
BASOPHILS # BLD AUTO: 0.01 X10*3/UL (ref 0–0.1)
BASOPHILS NFR BLD AUTO: 0.1 %
BILIRUB SERPL-MCNC: 0.7 MG/DL (ref 0–1.2)
BUN SERPL-MCNC: 7 MG/DL (ref 6–23)
C COLI+JEJ+UPSA DNA STL QL NAA+PROBE: NOT DETECTED
C DIF TOX TCDA+TCDB STL QL NAA+PROBE: NOT DETECTED
CALCIUM SERPL-MCNC: 8.2 MG/DL (ref 8.6–10.6)
CHLORIDE SERPL-SCNC: 103 MMOL/L (ref 98–107)
CO2 SERPL-SCNC: 27 MMOL/L (ref 21–32)
CREAT SERPL-MCNC: 0.54 MG/DL (ref 0.5–1.3)
EC STX1 GENE STL QL NAA+PROBE: NOT DETECTED
EC STX2 GENE STL QL NAA+PROBE: NOT DETECTED
EGFRCR SERPLBLD CKD-EPI 2021: >90 ML/MIN/1.73M*2
EOSINOPHIL # BLD AUTO: 0.05 X10*3/UL (ref 0–0.7)
EOSINOPHIL NFR BLD AUTO: 0.5 %
ERYTHROCYTE [DISTWIDTH] IN BLOOD BY AUTOMATED COUNT: 16.7 % (ref 11.5–14.5)
GLUCOSE SERPL-MCNC: 75 MG/DL (ref 74–99)
HCT VFR BLD AUTO: 27.5 % (ref 41–52)
HGB BLD-MCNC: 8.5 G/DL (ref 13.5–17.5)
IMM GRANULOCYTES # BLD AUTO: 0.12 X10*3/UL (ref 0–0.7)
IMM GRANULOCYTES NFR BLD AUTO: 1.2 % (ref 0–0.9)
LYMPHOCYTES # BLD AUTO: 0.4 X10*3/UL (ref 1.2–4.8)
LYMPHOCYTES NFR BLD AUTO: 3.9 %
MAGNESIUM SERPL-MCNC: 1.75 MG/DL (ref 1.6–2.4)
MCH RBC QN AUTO: 28.4 PG (ref 26–34)
MCHC RBC AUTO-ENTMCNC: 30.9 G/DL (ref 32–36)
MCV RBC AUTO: 92 FL (ref 80–100)
MONOCYTES # BLD AUTO: 0.73 X10*3/UL (ref 0.1–1)
MONOCYTES NFR BLD AUTO: 7.1 %
NEUTROPHILS # BLD AUTO: 8.97 X10*3/UL (ref 1.2–7.7)
NEUTROPHILS NFR BLD AUTO: 87.2 %
NOROVIRUS GI + GII RNA STL NAA+PROBE: NOT DETECTED
NRBC BLD-RTO: 0 /100 WBCS (ref 0–0)
PLATELET # BLD AUTO: 129 X10*3/UL (ref 150–450)
POTASSIUM SERPL-SCNC: 3.6 MMOL/L (ref 3.5–5.3)
PROT SERPL-MCNC: 5 G/DL (ref 6.4–8.2)
RBC # BLD AUTO: 2.99 X10*6/UL (ref 4.5–5.9)
RV RNA STL NAA+PROBE: NOT DETECTED
SALMONELLA DNA STL QL NAA+PROBE: NOT DETECTED
SHIGELLA DNA SPEC QL NAA+PROBE: NOT DETECTED
SODIUM SERPL-SCNC: 138 MMOL/L (ref 136–145)
V CHOLERAE DNA STL QL NAA+PROBE: NOT DETECTED
WBC # BLD AUTO: 10.3 X10*3/UL (ref 4.4–11.3)
Y ENTEROCOL DNA STL QL NAA+PROBE: NOT DETECTED

## 2024-06-10 PROCEDURE — 2500000002 HC RX 250 W HCPCS SELF ADMINISTERED DRUGS (ALT 637 FOR MEDICARE OP, ALT 636 FOR OP/ED): Performed by: STUDENT IN AN ORGANIZED HEALTH CARE EDUCATION/TRAINING PROGRAM

## 2024-06-10 PROCEDURE — 2500000001 HC RX 250 WO HCPCS SELF ADMINISTERED DRUGS (ALT 637 FOR MEDICARE OP)

## 2024-06-10 PROCEDURE — 99233 SBSQ HOSP IP/OBS HIGH 50: CPT

## 2024-06-10 PROCEDURE — 85025 COMPLETE CBC W/AUTO DIFF WBC: CPT

## 2024-06-10 PROCEDURE — 1170000001 HC PRIVATE ONCOLOGY ROOM DAILY

## 2024-06-10 PROCEDURE — 80053 COMPREHEN METABOLIC PANEL: CPT

## 2024-06-10 PROCEDURE — 83735 ASSAY OF MAGNESIUM: CPT

## 2024-06-10 PROCEDURE — 2500000004 HC RX 250 GENERAL PHARMACY W/ HCPCS (ALT 636 FOR OP/ED): Performed by: STUDENT IN AN ORGANIZED HEALTH CARE EDUCATION/TRAINING PROGRAM

## 2024-06-10 PROCEDURE — C9113 INJ PANTOPRAZOLE SODIUM, VIA: HCPCS | Performed by: STUDENT IN AN ORGANIZED HEALTH CARE EDUCATION/TRAINING PROGRAM

## 2024-06-10 PROCEDURE — 2500000001 HC RX 250 WO HCPCS SELF ADMINISTERED DRUGS (ALT 637 FOR MEDICARE OP): Performed by: STUDENT IN AN ORGANIZED HEALTH CARE EDUCATION/TRAINING PROGRAM

## 2024-06-10 PROCEDURE — 2500000004 HC RX 250 GENERAL PHARMACY W/ HCPCS (ALT 636 FOR OP/ED)

## 2024-06-10 RX ORDER — DICLOFENAC SODIUM 10 MG/G
4 GEL TOPICAL 4 TIMES DAILY PRN
Status: DISCONTINUED | OUTPATIENT
Start: 2024-06-10 | End: 2024-06-27 | Stop reason: HOSPADM

## 2024-06-10 RX ORDER — POTASSIUM CHLORIDE 1.5 G/1.58G
40 POWDER, FOR SOLUTION ORAL 2 TIMES DAILY
Status: DISCONTINUED | OUTPATIENT
Start: 2024-06-10 | End: 2024-06-25

## 2024-06-10 RX ADMIN — DOCUSATE SODIUM 100 MG: 100 CAPSULE, LIQUID FILLED ORAL at 20:41

## 2024-06-10 RX ADMIN — ACETAMINOPHEN 650 MG: 325 TABLET ORAL at 16:13

## 2024-06-10 RX ADMIN — CAPSAICIN: 0.75 CREAM TOPICAL at 12:28

## 2024-06-10 RX ADMIN — APIXABAN 5 MG: 5 TABLET, FILM COATED ORAL at 20:41

## 2024-06-10 RX ADMIN — DULOXETINE HYDROCHLORIDE 60 MG: 60 CAPSULE, DELAYED RELEASE ORAL at 09:43

## 2024-06-10 RX ADMIN — PREGABALIN 50 MG: 25 CAPSULE ORAL at 20:41

## 2024-06-10 RX ADMIN — PANTOPRAZOLE SODIUM 40 MG: 40 INJECTION, POWDER, FOR SOLUTION INTRAVENOUS at 09:43

## 2024-06-10 RX ADMIN — POLYETHYLENE GLYCOL 3350 17 G: 17 POWDER, FOR SOLUTION ORAL at 09:43

## 2024-06-10 RX ADMIN — SENNOSIDES AND DOCUSATE SODIUM 2 TABLET: 8.6; 5 TABLET ORAL at 09:44

## 2024-06-10 RX ADMIN — ACETAMINOPHEN 650 MG: 325 TABLET ORAL at 04:42

## 2024-06-10 RX ADMIN — HYDROMORPHONE HYDROCHLORIDE 2 MG: 2 INJECTION, SOLUTION INTRAMUSCULAR; INTRAVENOUS; SUBCUTANEOUS at 22:31

## 2024-06-10 RX ADMIN — HYDROMORPHONE HYDROCHLORIDE 4 MG: 4 TABLET ORAL at 09:50

## 2024-06-10 RX ADMIN — SENNOSIDES AND DOCUSATE SODIUM 2 TABLET: 8.6; 5 TABLET ORAL at 20:41

## 2024-06-10 RX ADMIN — POTASSIUM CHLORIDE 40 MEQ: 1.5 POWDER, FOR SOLUTION ORAL at 20:41

## 2024-06-10 RX ADMIN — HYDROMORPHONE HYDROCHLORIDE 4 MG: 4 TABLET ORAL at 01:54

## 2024-06-10 RX ADMIN — HYDROMORPHONE HYDROCHLORIDE 2 MG: 2 INJECTION, SOLUTION INTRAMUSCULAR; INTRAVENOUS; SUBCUTANEOUS at 17:32

## 2024-06-10 RX ADMIN — HYDROMORPHONE HYDROCHLORIDE 4 MG: 4 TABLET ORAL at 14:17

## 2024-06-10 RX ADMIN — PREGABALIN 50 MG: 25 CAPSULE ORAL at 09:44

## 2024-06-10 RX ADMIN — POTASSIUM CHLORIDE 40 MEQ: 1.5 POWDER, FOR SOLUTION ORAL at 09:51

## 2024-06-10 RX ADMIN — PREDNISONE 20 MG: 20 TABLET ORAL at 09:44

## 2024-06-10 RX ADMIN — DICLOFENAC 4 G: 10 GEL TOPICAL at 19:39

## 2024-06-10 RX ADMIN — HYDROMORPHONE HYDROCHLORIDE 4 MG: 4 TABLET ORAL at 20:41

## 2024-06-10 RX ADMIN — POLYETHYLENE GLYCOL 3350 17 G: 17 POWDER, FOR SOLUTION ORAL at 20:41

## 2024-06-10 RX ADMIN — DOCUSATE SODIUM 100 MG: 100 CAPSULE, LIQUID FILLED ORAL at 09:44

## 2024-06-10 RX ADMIN — APIXABAN 5 MG: 5 TABLET, FILM COATED ORAL at 09:44

## 2024-06-10 RX ADMIN — HYDROMORPHONE HYDROCHLORIDE 2 MG: 2 INJECTION, SOLUTION INTRAMUSCULAR; INTRAVENOUS; SUBCUTANEOUS at 12:23

## 2024-06-10 RX ADMIN — CAPSAICIN: 0.75 CREAM TOPICAL at 05:46

## 2024-06-10 RX ADMIN — ACETAMINOPHEN 650 MG: 325 TABLET ORAL at 20:41

## 2024-06-10 RX ADMIN — ACETAMINOPHEN 650 MG: 325 TABLET ORAL at 09:50

## 2024-06-10 ASSESSMENT — PAIN SCALES - GENERAL
PAINLEVEL_OUTOF10: 7
PAINLEVEL_OUTOF10: 3
PAINLEVEL_OUTOF10: 7
PAINLEVEL_OUTOF10: 3
PAINLEVEL_OUTOF10: 3
PAINLEVEL_OUTOF10: 7
PAINLEVEL_OUTOF10: 7
PAINLEVEL_OUTOF10: 6
PAINLEVEL_OUTOF10: 7
PAINLEVEL_OUTOF10: 3
PAINLEVEL_OUTOF10: 6
PAINLEVEL_OUTOF10: 3
PAINLEVEL_OUTOF10: 3

## 2024-06-10 ASSESSMENT — COGNITIVE AND FUNCTIONAL STATUS - GENERAL
MOBILITY SCORE: 24
DAILY ACTIVITIY SCORE: 24

## 2024-06-10 ASSESSMENT — PAIN DESCRIPTION - LOCATION
LOCATION: SHOULDER
LOCATION: BACK
LOCATION: BACK

## 2024-06-10 ASSESSMENT — PAIN DESCRIPTION - ORIENTATION
ORIENTATION: MID
ORIENTATION: RIGHT

## 2024-06-10 ASSESSMENT — PAIN DESCRIPTION - DESCRIPTORS: DESCRIPTORS: ACHING;SHARP

## 2024-06-10 ASSESSMENT — PAIN SCALES - PAIN ASSESSMENT IN ADVANCED DEMENTIA (PAINAD): TOTALSCORE: MEDICATION (SEE MAR)

## 2024-06-10 NOTE — PROGRESS NOTES
SUPPORTIVE AND PALLIATIVE ONCOLOGY INPATIENT FOLLOW-UP      SERVICE DATE: 06/10/24     SUBJECTIVE:  Interval Events:  Patient was seen at bedside with his wife present.     Discussed QTC prolonging drugs and we arrived to conclusion that he is comfortable discharging on PRN Zofran 4mg q6hrs and some Bentyl for abdominal cramps.     Discussed his pain regimen and he reports that dilaudid 4mg is effective, he used it at 2am this morning and his pain has been well controlled.     He reported 6-7/10 pain to his right shoulder stating that his pain medication is effective at reducing     He reported 3-4/10 pain to his abdomen     N/V: denies     LBM: yesterday but small and diarrhea      Pain Assessment:  Location:  Right shoulder and abdomen   Duration: Intermittent  Characteristics:   Ratin-7 & 3-4   Descriptors: localized, aching, and cramping   Aggravating: movement and bending    Relieving: Analgesics dilaudid    Interference with Function: None    Opioid Use  Past 24 h prn opioid use:  Dilaudid 1mg iv x 1 dose = 12.5mg OME   Dilaudid 4mg po x 1 = 20mg OME  Dilaudid 2mg iv x 5 doses = 125mg OME   Total 24h OME use:  157.5mg OME     Note: OME calculations based on equianalgesic table below. Please note this table is based on best available evidence but conversions are still approximate. These are NOT opioid DOSES for individual patient use; this is equivalency information.  Drug Parenteral Enteral   Morphine 10 25   Oxycodone N/A 20   Hydromorphone 2 5   Fentanyl 0.15 N/A   Tramadol N/A 120   Citation: Aniya ROSS. Demystifying opioid conversion calculations: A guide for effective dosing, Second edition. [place]: American Society of Health-System Pharmacists, 2018.    Symptom Assessment:    Nausea none  Vomiting none  Lack of appetite none  Difficulty Sleeping none  Diarrhea a little    Information obtained from: chart review, interview of patient, interview of family, discussion with RN, and discussion  with primary team  ______________________________________________________________________        OBJECTIVE:    Lab Results   Component Value Date    WBC 10.3 06/10/2024    HGB 8.5 (L) 06/10/2024    HCT 27.5 (L) 06/10/2024    MCV 92 06/10/2024     (L) 06/10/2024      Lab Results   Component Value Date    GLUCOSE 75 06/10/2024    CALCIUM 8.2 (L) 06/10/2024     06/10/2024    K 3.6 06/10/2024    CO2 27 06/10/2024     06/10/2024    BUN 7 06/10/2024    CREATININE 0.54 06/10/2024     Lab Results   Component Value Date    ALT 23 06/10/2024    AST 56 (H) 06/10/2024    ALKPHOS 193 (H) 06/10/2024    BILITOT 0.7 06/10/2024     Estimated Creatinine Clearance: 125 mL/min (by C-G formula based on SCr of 0.54 mg/dL).     Scheduled medications  acetaminophen, 650 mg, oral, q6h  apixaban, 5 mg, oral, BID  capsicum, , Topical, 4x daily  docusate sodium, 100 mg, oral, BID  DULoxetine, 60 mg, oral, Daily  [START ON 6/11/2024] fentaNYL, 1 patch, transdermal, q48h  pantoprazole, 40 mg, intravenous, Daily  polyethylene glycol, 17 g, oral, BID  potassium chloride, 40 mEq, oral, BID  predniSONE, 20 mg, oral, Daily  pregabalin, 50 mg, oral, BID  sennosides-docusate sodium, 2 tablet, oral, BID      Continuous medications     PRN medications  dicyclomine, 10 mg, 4x daily PRN  HYDROmorphone, 4 mg, q3h PRN  HYDROmorphone, 2 mg, q3h PRN  LORazepam, 0.5 mg, q8h PRN  naloxone, 0.2 mg, q5 min PRN  ondansetron, 4 mg, q6h PRN  prochlorperazine, 5 mg, q6h PRN  promethazine, 12.5 mg, q6h PRN  sodium chloride 0.9%, 10 mL, PRN  sodium chloride 0.9%, 10 mL, PRN      }     PHYSICAL EXAMINATION:    Vital Signs:   Vital signs reviewed  Visit Vitals  /66 (BP Location: Left arm, Patient Position: Lying)   Pulse (!) 114   Temp 36.1 °C (97 °F)   Resp 18        Pain Score: 6       Physical Exam  Constitutional:       Appearance: Normal appearance.   Eyes:      Pupils: Pupils are equal, round, and reactive to light.   Cardiovascular:       Heart sounds: Normal heart sounds.   Pulmonary:      Breath sounds: Normal breath sounds.   Abdominal:      Palpations: Abdomen is soft.   Musculoskeletal:         General: Normal range of motion.   Skin:     General: Skin is warm.   Neurological:      Mental Status: He is alert and oriented to person, place, and time.   Psychiatric:         Mood and Affect: Mood normal.        ASSESSMENT/PLAN:    Massimo Garcia is a 63 y.o. 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 PMH significant for HTN, Third Degree AV Block s/p PM (11/9/23), OA. Admitted 6/8/2024 for further evaluation and management of abdominal pain. Course complicated by partial SBO with [facility] ED imagining. Supportive and Palliative Oncology is consulted for pain management.         CT Abdomen pelvis w IV contrast 6/8/2024:   IMPRESSION:  1. No evidence of bowel obstruction. There is relatively short  segment of circumferential thickening of ascending colon superior to  the cecum new from prior, which may represent colitis.  2. Redemonstration of metastatic disease in the visualized lung bases  and liver unchanged in the short-term interval since 06/05/2024.  3. Redemonstration of abdominopelvic lymphadenopathy as described  above.  4. Redemonstration of left adrenal nodule, likely also metastatic.  5. Small left pleural effusion unchanged in the short-term interval.  Interval development of trace ascites in the dependent portion of the  pelvis.        Pain:  Abdominal pain related to Metastatic esophageal cancer   Pain is: cancer related pain  Type: visceral, somatic, and neuropathic  Pain control: sub-optimally controlled  Home regimen:  Fentanyl TD  and Hydromorphone 4mg q3hrs PRN   fentanyl 100mcgs /72hrs   Personalized pain goal: 2  Total OME usage for the past 24 hours: 157.5mg OME   Continue Fentanyl patch 100 mcgs q 48 hrs scheduled    Continue Dilaudid 2mg IV q3hrs PRN for breakthrough pain  Discussed adding Voltaren Gel topical to his right shoulder.  Continue dilaudid 4mg PO q3hrs PRN for moderate and severe pain  With discharge send patient home on Dilaudid 2mg tablets with more quantity due to lower co-pay   Patient's wife has 15 day supply of Dilaudid 4mg PO which was picked up 6/07/2024, patients next supportive oncology appointment is 6/28/2024 so he will need additional medication to last him until that appointment with discharge          EKG 6/8/2024, QTC 354ms    Continue Bentyl 10mg 4x a day PRN for abdominal cramps.  Continue Lyrica 50mg BID   Continue Acetaminophen 650mg q 6hrs   Continue Duloxetine 60mg daily   Continue prednisone 20mg daily   Continue to monitor pain scores and administer PRN medications as appropriate  Continue/initiate nonpharmacologic pain management strategies including ice/heat therapy, distraction techniques, deep breathing/relaxation techniques, calming music, and repositioning  Continue to monitor for signs of opioid efficacy (pain scores, improved functionality) and toxicity (pinpoint pupils, excess sedation/drowsiness/confusion, respiratory depression, etc.)     Nausea/Reflux:  Intermittent nausea without vomiting related to opioids and constipation   Home regimen: scopolamine patch, Carafate and Protonix PRN   Improving     Continue Compazine 5mg IV q6hr PRN  Continue promethazine 12.5mg IM q6hr PRN   Continue Zofran 4mg q6hrs PRN   Since patient is concerned about QTC prolonging drugs with his history of 3rd degree AV heart block, consider discharging him home on Bentyl 10mg 4x a day  Agreeable to discharging on small dose of Zofran 4mg po q6hrs PRN      Constipation  At risk for constipation related to opioids, currently not constipated  Usual bowel pattern: every day  Home regimen: Senna 1-2 tablets 1-2x/day, Lactulose 20 gm 4x a day PRN, Docusate 100mg BID PRN, and MOM 4x  DAY prn  LBM 6/9/2024  small diarrhea   Continue Senna 2 tabsd bid   Continue Miralax 17grams  bid   Continue docusate sodium 100mg BID    Monitor BM frequency, adjust regimen as needed  Goal to have BM without straining q48-72h         Altered Mood:  Acute on chronic anxiety and depression related to health concerns   controlled with home regimen   Home regimen:  Ativan 1mg TID PRN   Continue Ativan 0.5mg q8hrs PRN      Sleeping Difficulty:  Impaired sleep related to hospital environment  Home regimen:  none  With better pain management and less hospital interruptions he plans to catch up on his sleep tonight.      Decreased appetite:  Patient is on a liquid diet   Outpatient supportive oncology referred patient to Dr. Mays's office for integrative medicine     Medical Decision Making/Goals of Care/Advance Care Planning:  Patient's current clinical condition, including diagnosis, prognosis, and management plan, and goals of care were discussed.   Life limiting disease: metastatic malignancy  Family: Supportive family   Performance status: Major  limitations due to pain  Joys/meaning/strength: Family  Understanding of health: Demonstrates good understanding of disease process, understands plan for symptom management   Information:Wants full disclosure  Goals: symptom control and cancer directed therapy  Worries and fears now and future: ongoing symptoms   Code status discussion:  full code      Advance Directives  Existence of Advance Directives:No - has interest  Decision maker: HCPOA is wife   Code Status: Full code     Introduction to Supportive and Palliative Oncology:  Spoke with patient, his 2 daughters and wife at bedside  Introduced the role and philosophy of Supportive and Palliative oncology in the evaluation and management of symptoms during cancer treatment  Palliative care was introduced as a service for patients with serious illness to help with symptoms, assist with goals of care conversations, navigate complex decision  making, improve quality of life for patients, and provide support both patients and families.  Patient seemed to appreciate the extra layer of support.     Supportive and Palliative Oncology encounter:  Spoke with patient at bedside  Emotional support provided  Coordination of care     Signature and billing:  Thank you for allowing us to participate in the care of this patient. Recommendations will be communicated back to the consulting service by way of shared electronic medical record or face-to-face.    Medical complexity was high level due to due to complexity of problems, extensive data review, and high risk of management/treatment.    I spent 50 minutes in the care of this patient which included chart review, interviewing patient/family, discussion with primary team, coordination of care, and documentation.    Data:   Diagnostic tests and information reviewed for today's visit:  Conversation with primary team, Most recent labs, Most recent imaging, Medications       Some elements copied from my note on 6/9/2024, the elements have been updated and all reflect current decision making from today, 06/10/24       Plan of Care discussed with: Provider, RN, Patient and Family/Significant Other: wife was at bedside     Thank you for asking Supportive and Palliative Oncology to assist with care of this patient.  We will continue to follow  Please contact us for additional questions or concerns.      SIGNATURE: JASPER Damon   PAGER/CONTACT:  Contact information:  Supportive and Palliative Oncology  Monday-Friday 8 AM-5 PM  Epic Secure chat or pager [phone].  After hours and weekends:  pager [phone]

## 2024-06-10 NOTE — CARE PLAN
Problem: Pain  Goal: My pain/discomfort is manageable  Outcome: Progressing       The patient's goals for the shift include  manage pain     The clinical goals for the shift include Patient will remain safe and HDS until EOS

## 2024-06-10 NOTE — CARE PLAN
The patient's goals for the shift include      The clinical goals for the shift include Pt will be safe, comfortable, and HD stable overnight.    Problem: Pain  Goal: My pain/discomfort is manageable  Outcome: Progressing     Problem: Psychosocial Needs  Goal: Demonstrates ability to cope with hospitalization/illness  Outcome: Progressing  Goal: Collaborate with me, my family, and caregiver to identify my specific goals  Outcome: Progressing     Problem: Safety  Goal: Patient will be injury free during hospitalization  Outcome: Progressing  Goal: I will remain free of falls  Outcome: Progressing     Problem: Pain  Goal: Takes deep breaths with improved pain control throughout the shift  Outcome: Progressing  Goal: Turns in bed with improved pain control throughout the shift  Outcome: Progressing  Goal: Walks with improved pain control throughout the shift  Outcome: Progressing  Goal: Performs ADL's with improved pain control throughout shift  Outcome: Progressing  Goal: Participates in PT with improved pain control throughout the shift  Outcome: Progressing  Goal: Free from opioid side effects throughout the shift  Outcome: Progressing  Goal: Free from acute confusion related to pain meds throughout the shift  Outcome: Progressing

## 2024-06-10 NOTE — PROGRESS NOTES
"Massimo Garcia is a 63 y.o. male on day 2 of admission presenting with Colitis.    Subjective   Seen this AM. Patient sitting up OOB with wife present in room. Pt c/o R shoulder pain and numbness (chronic but aggravated pain), along with mild/moderate abdominal pain. He states the pain has improved since when he was admitted. Discussed negative C. Diff. and stool studies results, and discussed plan for meeting with Radiology today, and continuing to monitor for worsening of symptoms. Last BM yesterday, 6/9, and pt still endorsing diarrhea. Denies chills/fever, N/V/C, dizziness, CP, H/A, bleeding.     Objective     Physical Exam  Vitals reviewed.   Constitutional:       Appearance: Normal appearance.   HENT:      Head: Normocephalic and atraumatic.      Nose: Nose normal.      Mouth/Throat:      Mouth: Mucous membranes are moist.      Pharynx: Oropharynx is clear.   Eyes:      Extraocular Movements: Extraocular movements intact.      Pupils: Pupils are equal, round, and reactive to light.   Cardiovascular:      Rate and Rhythm: Normal rate and regular rhythm.      Pulses: Normal pulses.      Heart sounds: Normal heart sounds.   Pulmonary:      Effort: Pulmonary effort is normal.      Breath sounds: Normal breath sounds.   Abdominal:      General: Bowel sounds are normal. There is distension.      Palpations: Abdomen is soft.      Tenderness: There is abdominal tenderness (Left).   Musculoskeletal:         General: Normal range of motion.   Skin:     General: Skin is warm.   Neurological:      General: No focal deficit present.      Mental Status: He is alert and oriented to person, place, and time. Mental status is at baseline.   Psychiatric:         Mood and Affect: Mood normal.         Behavior: Behavior normal.       Last Recorded Vitals  Blood pressure 133/80, pulse 92, temperature 36.2 °C (97.2 °F), temperature source Temporal, resp. rate 20, height 1.727 m (5' 8\"), weight 73.5 kg (162 lb), SpO2 92%.  Intake/Output " last 3 Shifts:  I/O last 3 completed shifts:  In: 3122.8 (42.5 mL/kg) [P.O.:480; I.V.:1643.8 (22.4 mL/kg); IV Piggyback:999]  Out: 0 (0 mL/kg)   Weight: 73.5 kg     Relevant Results  Scheduled medications  acetaminophen, 650 mg, oral, q6h  apixaban, 5 mg, oral, BID  capsicum, , Topical, 4x daily  docusate sodium, 100 mg, oral, BID  DULoxetine, 60 mg, oral, Daily  [START ON 6/11/2024] fentaNYL, 1 patch, transdermal, q48h  pantoprazole, 40 mg, intravenous, Daily  polyethylene glycol, 17 g, oral, BID  potassium chloride, 40 mEq, oral, BID  predniSONE, 20 mg, oral, Daily  pregabalin, 50 mg, oral, BID  sennosides-docusate sodium, 2 tablet, oral, BID      Continuous medications     PRN medications  PRN medications: dicyclomine, HYDROmorphone, HYDROmorphone, LORazepam, naloxone, ondansetron, prochlorperazine, promethazine, sodium chloride 0.9%, sodium chloride 0.9%   This patient has a central line   Reason for the central line remaining today? Hemodynamic monitoring    Assessment/Plan   Principal Problem:    Jennifer Garcia is a 63 y.o. 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 Patient admitted to [facility] (6/5-6/7) c/f partial SBO, resolved prior to discharge from [facility], surgery consulted at OSH and did not recommend surgical interventions at this time. Presented to [facility] [place] 6/8 with continued abdominal pain and diarrhea. CT A/P (6/8) wo bowel obstruction, c/f colitis on imaging. Cdiff and stool pathogen pending. No leukocytosis or infectious symptoms at this time (no fevers/chills), no  indication for abx at this time. Supportive oncology consulted for further pain management (6/9), rec cont PO Dilaudid 4mg Q2 PRN for moderate to severe pain, cont IV Dilaudid 2mg Q3 for breakthrough pain, increasing Fentanyl patch change frequency to 100mcgs Q48hr, and starting Bentyl 10mg x4/day PRN for abdominal cramps. Radiation oncology consulted as he was recently seen outpatient with plan for CT simulation for gamma knife and radiation to spine, recs pending. Discharge pending pain and symptom control and radiation oncology consult.      # Stage IV Esophageal AdenoCarcinoma (Her2 pos) w/ known mets to Lung/Liver,   # Recent Spine Emergent Decompression/Laminectomy (T8-T12 5/17/24 2/2 to Metastatic Lesion)  #Cancer Related Pain  - Follows w/ Dr. Randolph for Oncology; emailed on 6/9   - 6/8 CT A/P wo evidence of bowel obstruction, but w/ short segment of circumferential thickening of ascending colon superior to the cecum new from prior, which may represent colitis. Also, redemonstration of metastatic disease in the visualized lung bases and liver unchanged in the short-term interval since 06/05/2024; redemonstration of abdominopelvic lymphadenopathy; redemonstration of left adrenal nodule, likely also metastatic; also Small left pleural effusion unchanged in the short-term interval. Interval development of trace ascites in the dependent portion of the pelvis  - Palliative care consulted while at [facility]; recommended 2mg IV dilaudid Q3 PRN for severe pain, continued on admission (6/9- )  - EKG (6/8)    - C/w Tylenol 650mg Q6H, Capsaicin cream QID, Duloxetine 60mg daily, Lyrica 50mg BID, Fentanyl patch 100mcg, 0.5mg Lorazepam IV Q8hrs PRN anxiety  - Miralax 17g BID, Senna/Colace 2 tabs BID for opioid induced constipation   - C/w Zofran, Compazine, Phenergan PRN N/V  - Decadron 16mg Ivx1 on 6/8, c/w home prednisone 20mg daily (part of taper, through 6/14, then decreased to 10mg daily for 7 days) with PPI    - Holding home Flexeril  - supportive oncology consulted (6/9), rec cont PO Dilaudid 4mg Q2 PRN for moderate to severe pain, cont IV Dilaudid 2mg Q3 for breakthrough pain, increasing Fentanyl patch change frequency to 100mcgs Q48hr, and starting Bentyl 10mg x4/day PRN for abdominal cramps  - Radiation oncology consulted 6/9; further recs pending (seen outpt on 6/4 with plan for gamma knife and radiation to spine pending CT simulation)      # Colitis on Imaging iso Hx of Drug Induced Colitis   - Admitted in May for immunotherapy related colitis (infectious workup remained negative); on steroid taper as above   - Presented to [facility] (6/5-6/7) with abdominal pain/ diarrhea c/f CBO  - CT A/P (6/5): Mildly loops dilated loops of jejunum measuring up to 3.4 cm, early developing partial small bowel obstruction can not be excluded  - While at [facility] pt was passing gas and was having active Bms, general surgery consulted and did not recommend any surgical interventions   - CT A/P (6/8) c/f colitis   - pt wo leukocytosis or fevers   - cdiff negative (6/9)  - Stool pathogen panel negative (6/9)  - pt placed NPO on admission, increased to clear liquid diet (6/9 AM), advanced to full liquid diet (6/9 PM), pt tolerating, plan to advance to soft diet 6/10 afternoon   - NS @ 75 x24 hours (6/9)   - supportive oncology consulted 6/9, rec cont PO Dilaudid 4mg Q2 PRN for moderate to severe pain, cont IV Dilaudid 2mg Q3 for breakthrough pain, increasing Fentanyl patch change frequency to 100mcgs Q48hr, and starting Bentyl 10mg x4/day PRN for abdominal cramps     #Hx of PVT/PE (on Eliquis)   - C/w Apixaban 5mg BID     #HTN   #Third Degree AV Block s/p PM (11/9/23)    - No active BP medications      # Prophy   - eliquis, OOB as able, SCD while in bed      Dispo:   - Full code (confirmed on admission)   - Discharge home pending pain management and radiation oncology plan   - NOK: Jane (wife) [phone]     I spent >60 minutes in the  professional and overall care of this patient.    Assessment and plan discussed with attending physician Dr. Guardado.    Anastasia Escalante, APRN-CNP

## 2024-06-11 ENCOUNTER — APPOINTMENT (OUTPATIENT)
Dept: RADIOLOGY | Facility: HOSPITAL | Age: 64
End: 2024-06-11
Payer: MEDICARE

## 2024-06-11 LAB
ALBUMIN SERPL BCP-MCNC: 3 G/DL (ref 3.4–5)
ALP SERPL-CCNC: 222 U/L (ref 33–136)
ALT SERPL W P-5'-P-CCNC: 23 U/L (ref 10–52)
ANION GAP SERPL CALC-SCNC: 16 MMOL/L (ref 10–20)
AST SERPL W P-5'-P-CCNC: 61 U/L (ref 9–39)
BASOPHILS # BLD AUTO: 0.01 X10*3/UL (ref 0–0.1)
BASOPHILS NFR BLD AUTO: 0.1 %
BILIRUB SERPL-MCNC: 1.2 MG/DL (ref 0–1.2)
BUN SERPL-MCNC: 8 MG/DL (ref 6–23)
CALCIUM SERPL-MCNC: 8.5 MG/DL (ref 8.6–10.6)
CHLORIDE SERPL-SCNC: 100 MMOL/L (ref 98–107)
CO2 SERPL-SCNC: 24 MMOL/L (ref 21–32)
CREAT SERPL-MCNC: 0.5 MG/DL (ref 0.5–1.3)
EGFRCR SERPLBLD CKD-EPI 2021: >90 ML/MIN/1.73M*2
EOSINOPHIL # BLD AUTO: 0.03 X10*3/UL (ref 0–0.7)
EOSINOPHIL NFR BLD AUTO: 0.3 %
ERYTHROCYTE [DISTWIDTH] IN BLOOD BY AUTOMATED COUNT: 16.6 % (ref 11.5–14.5)
GLUCOSE SERPL-MCNC: 83 MG/DL (ref 74–99)
HCT VFR BLD AUTO: 26.1 % (ref 41–52)
HGB BLD-MCNC: 8.3 G/DL (ref 13.5–17.5)
IMM GRANULOCYTES # BLD AUTO: 0.18 X10*3/UL (ref 0–0.7)
IMM GRANULOCYTES NFR BLD AUTO: 1.7 % (ref 0–0.9)
LYMPHOCYTES # BLD AUTO: 0.46 X10*3/UL (ref 1.2–4.8)
LYMPHOCYTES NFR BLD AUTO: 4.3 %
MAGNESIUM SERPL-MCNC: 1.71 MG/DL (ref 1.6–2.4)
MCH RBC QN AUTO: 28.4 PG (ref 26–34)
MCHC RBC AUTO-ENTMCNC: 31.8 G/DL (ref 32–36)
MCV RBC AUTO: 89 FL (ref 80–100)
MONOCYTES # BLD AUTO: 0.65 X10*3/UL (ref 0.1–1)
MONOCYTES NFR BLD AUTO: 6 %
NEUTROPHILS # BLD AUTO: 9.47 X10*3/UL (ref 1.2–7.7)
NEUTROPHILS NFR BLD AUTO: 87.6 %
NRBC BLD-RTO: 0.2 /100 WBCS (ref 0–0)
PLATELET # BLD AUTO: 118 X10*3/UL (ref 150–450)
POTASSIUM SERPL-SCNC: 3.8 MMOL/L (ref 3.5–5.3)
PROT SERPL-MCNC: 5 G/DL (ref 6.4–8.2)
RBC # BLD AUTO: 2.92 X10*6/UL (ref 4.5–5.9)
SODIUM SERPL-SCNC: 136 MMOL/L (ref 136–145)
WBC # BLD AUTO: 10.8 X10*3/UL (ref 4.4–11.3)

## 2024-06-11 PROCEDURE — 2500000004 HC RX 250 GENERAL PHARMACY W/ HCPCS (ALT 636 FOR OP/ED): Performed by: STUDENT IN AN ORGANIZED HEALTH CARE EDUCATION/TRAINING PROGRAM

## 2024-06-11 PROCEDURE — 2500000002 HC RX 250 W HCPCS SELF ADMINISTERED DRUGS (ALT 637 FOR MEDICARE OP, ALT 636 FOR OP/ED): Performed by: STUDENT IN AN ORGANIZED HEALTH CARE EDUCATION/TRAINING PROGRAM

## 2024-06-11 PROCEDURE — 73030 X-RAY EXAM OF SHOULDER: CPT | Mod: RIGHT SIDE | Performed by: STUDENT IN AN ORGANIZED HEALTH CARE EDUCATION/TRAINING PROGRAM

## 2024-06-11 PROCEDURE — 2500000001 HC RX 250 WO HCPCS SELF ADMINISTERED DRUGS (ALT 637 FOR MEDICARE OP)

## 2024-06-11 PROCEDURE — 1170000001 HC PRIVATE ONCOLOGY ROOM DAILY

## 2024-06-11 PROCEDURE — 99233 SBSQ HOSP IP/OBS HIGH 50: CPT

## 2024-06-11 PROCEDURE — 83735 ASSAY OF MAGNESIUM: CPT

## 2024-06-11 PROCEDURE — 2500000002 HC RX 250 W HCPCS SELF ADMINISTERED DRUGS (ALT 637 FOR MEDICARE OP, ALT 636 FOR OP/ED)

## 2024-06-11 PROCEDURE — 73030 X-RAY EXAM OF SHOULDER: CPT | Mod: RT

## 2024-06-11 PROCEDURE — 2500000001 HC RX 250 WO HCPCS SELF ADMINISTERED DRUGS (ALT 637 FOR MEDICARE OP): Performed by: STUDENT IN AN ORGANIZED HEALTH CARE EDUCATION/TRAINING PROGRAM

## 2024-06-11 PROCEDURE — 2500000004 HC RX 250 GENERAL PHARMACY W/ HCPCS (ALT 636 FOR OP/ED)

## 2024-06-11 PROCEDURE — 85025 COMPLETE CBC W/AUTO DIFF WBC: CPT

## 2024-06-11 PROCEDURE — C9113 INJ PANTOPRAZOLE SODIUM, VIA: HCPCS | Performed by: STUDENT IN AN ORGANIZED HEALTH CARE EDUCATION/TRAINING PROGRAM

## 2024-06-11 PROCEDURE — 80053 COMPREHEN METABOLIC PANEL: CPT

## 2024-06-11 RX ORDER — ONDANSETRON 4 MG/1
4 TABLET, FILM COATED ORAL EVERY 6 HOURS PRN
Qty: 56 TABLET | Refills: 0 | Status: SHIPPED | OUTPATIENT
Start: 2024-06-11 | End: 2024-07-07 | Stop reason: HOSPADM

## 2024-06-11 RX ORDER — FENTANYL 25 UG/1
1 PATCH TRANSDERMAL
Status: DISCONTINUED | OUTPATIENT
Start: 2024-06-11 | End: 2024-06-16

## 2024-06-11 RX ORDER — FENTANYL 100 UG/H
1 PATCH TRANSDERMAL
Status: DISCONTINUED | OUTPATIENT
Start: 2024-06-11 | End: 2024-06-27 | Stop reason: HOSPADM

## 2024-06-11 RX ORDER — DICYCLOMINE HYDROCHLORIDE 10 MG/1
10 CAPSULE ORAL
Qty: 28 CAPSULE | Refills: 0 | Status: SHIPPED | OUTPATIENT
Start: 2024-06-11 | End: 2024-07-07 | Stop reason: HOSPADM

## 2024-06-11 RX ADMIN — HYDROMORPHONE HYDROCHLORIDE 6 MG: 4 TABLET ORAL at 18:19

## 2024-06-11 RX ADMIN — POLYETHYLENE GLYCOL 3350 17 G: 17 POWDER, FOR SOLUTION ORAL at 08:47

## 2024-06-11 RX ADMIN — PREGABALIN 50 MG: 25 CAPSULE ORAL at 08:46

## 2024-06-11 RX ADMIN — ACETAMINOPHEN 650 MG: 325 TABLET ORAL at 04:51

## 2024-06-11 RX ADMIN — SENNOSIDES AND DOCUSATE SODIUM 2 TABLET: 8.6; 5 TABLET ORAL at 08:46

## 2024-06-11 RX ADMIN — ACETAMINOPHEN 650 MG: 325 TABLET ORAL at 10:43

## 2024-06-11 RX ADMIN — HYDROMORPHONE HYDROCHLORIDE 2 MG: 2 INJECTION, SOLUTION INTRAMUSCULAR; INTRAVENOUS; SUBCUTANEOUS at 05:50

## 2024-06-11 RX ADMIN — HYDROMORPHONE HYDROCHLORIDE 6 MG: 4 TABLET ORAL at 21:04

## 2024-06-11 RX ADMIN — POTASSIUM CHLORIDE 40 MEQ: 1.5 POWDER, FOR SOLUTION ORAL at 21:03

## 2024-06-11 RX ADMIN — FENTANYL 1 PATCH: 25 PATCH TRANSDERMAL at 12:07

## 2024-06-11 RX ADMIN — FENTANYL 1 PATCH: 100 PATCH TRANSDERMAL at 05:54

## 2024-06-11 RX ADMIN — DOCUSATE SODIUM 100 MG: 100 CAPSULE, LIQUID FILLED ORAL at 08:48

## 2024-06-11 RX ADMIN — ACETAMINOPHEN 650 MG: 325 TABLET ORAL at 21:50

## 2024-06-11 RX ADMIN — PANTOPRAZOLE SODIUM 40 MG: 40 INJECTION, POWDER, FOR SOLUTION INTRAVENOUS at 08:47

## 2024-06-11 RX ADMIN — HYDROMORPHONE HYDROCHLORIDE 4 MG: 4 TABLET ORAL at 04:51

## 2024-06-11 RX ADMIN — DULOXETINE HYDROCHLORIDE 60 MG: 60 CAPSULE, DELAYED RELEASE ORAL at 08:46

## 2024-06-11 RX ADMIN — HYDROMORPHONE HYDROCHLORIDE 6 MG: 4 TABLET ORAL at 11:56

## 2024-06-11 RX ADMIN — APIXABAN 5 MG: 5 TABLET, FILM COATED ORAL at 08:46

## 2024-06-11 RX ADMIN — HYDROMORPHONE HYDROCHLORIDE 4 MG: 4 TABLET ORAL at 08:46

## 2024-06-11 RX ADMIN — FENTANYL 1 PATCH: 100 PATCH TRANSDERMAL at 12:07

## 2024-06-11 RX ADMIN — POTASSIUM CHLORIDE 40 MEQ: 1.5 POWDER, FOR SOLUTION ORAL at 08:46

## 2024-06-11 RX ADMIN — PREGABALIN 50 MG: 25 CAPSULE ORAL at 21:05

## 2024-06-11 RX ADMIN — HYDROMORPHONE HYDROCHLORIDE 6 MG: 4 TABLET ORAL at 15:13

## 2024-06-11 RX ADMIN — PREDNISONE 20 MG: 20 TABLET ORAL at 08:46

## 2024-06-11 RX ADMIN — DOCUSATE SODIUM 100 MG: 100 CAPSULE, LIQUID FILLED ORAL at 21:05

## 2024-06-11 RX ADMIN — ACETAMINOPHEN 650 MG: 325 TABLET ORAL at 15:15

## 2024-06-11 RX ADMIN — SENNOSIDES AND DOCUSATE SODIUM 2 TABLET: 8.6; 5 TABLET ORAL at 21:05

## 2024-06-11 RX ADMIN — POLYETHYLENE GLYCOL 3350 17 G: 17 POWDER, FOR SOLUTION ORAL at 21:02

## 2024-06-11 RX ADMIN — APIXABAN 5 MG: 5 TABLET, FILM COATED ORAL at 21:05

## 2024-06-11 ASSESSMENT — PAIN - FUNCTIONAL ASSESSMENT
PAIN_FUNCTIONAL_ASSESSMENT: 0-10

## 2024-06-11 ASSESSMENT — COGNITIVE AND FUNCTIONAL STATUS - GENERAL
MOBILITY SCORE: 24
DAILY ACTIVITIY SCORE: 24

## 2024-06-11 ASSESSMENT — PAIN SCALES - GENERAL
PAINLEVEL_OUTOF10: 3
PAINLEVEL_OUTOF10: 9
PAINLEVEL_OUTOF10: 7
PAINLEVEL_OUTOF10: 3
PAINLEVEL_OUTOF10: 6
PAINLEVEL_OUTOF10: 7
PAINLEVEL_OUTOF10: 4
PAINLEVEL_OUTOF10: 10 - WORST POSSIBLE PAIN
PAINLEVEL_OUTOF10: 10 - WORST POSSIBLE PAIN

## 2024-06-11 ASSESSMENT — PAIN DESCRIPTION - LOCATION
LOCATION: BACK
LOCATION: ABDOMEN
LOCATION: BACK

## 2024-06-11 NOTE — PROGRESS NOTES
06/11/24 1442   Discharge Planning   Living Arrangements Spouse/significant other   Support Systems Spouse/significant other;Family members;Friends/neighbors   Type of Residence Private residence   Who is requesting discharge planning? Provider   Home or Post Acute Services None   Patient expects to be discharged to: home   Does the patient need discharge transport arranged? No     6/11/24 @ 1445  [facility] Note    Plan per Medical/Surgical Team: pain control, radiation oncology consulted, CT sim planned  Status: Inpatient  Payor Source: Medicare  Discharge disposition: home  Expected date of discharge: 6/12/24  Barriers: none  Primary Oncologist: Dr. Randolph    Anticipate no needs at discharge. Patient to get CT sim to plan for outpatient radiation. Will continue to follow patient for any additional needs.   Frida Soria RN TCC

## 2024-06-11 NOTE — PROGRESS NOTES
"Massimo Garcai is a 63 y.o. male on day 2 of admission presenting with Colitis.    Subjective   Seen this AM. Pt stated he had increased pain overnight, requiring IV Dilaudid for breakthrough. Discussed with pt importance of keeping up with pain medication. No BM yesterday, last BM 6/9. Discussed awaiting radiation team recommendations and possible SIM today. Denies chills/fever, N/V/C, dizziness, CP, H/A, bleeding.     Objective     Physical Exam  Vitals reviewed.   Constitutional:       Appearance: Normal appearance.   HENT:      Head: Normocephalic and atraumatic.      Nose: Nose normal.      Mouth/Throat:      Mouth: Mucous membranes are moist.      Pharynx: Oropharynx is clear.   Eyes:      Extraocular Movements: Extraocular movements intact.      Pupils: Pupils are equal, round, and reactive to light.   Cardiovascular:      Rate and Rhythm: Normal rate and regular rhythm.      Pulses: Normal pulses.      Heart sounds: Normal heart sounds.   Pulmonary:      Effort: Pulmonary effort is normal.      Breath sounds: Normal breath sounds.   Abdominal:      General: Bowel sounds are normal. There is distension.      Palpations: Abdomen is soft.      Tenderness: There is abdominal tenderness (Left).   Musculoskeletal:         General: Normal range of motion.   Skin:     General: Skin is warm.   Neurological:      General: No focal deficit present.      Mental Status: He is alert and oriented to person, place, and time. Mental status is at baseline.   Psychiatric:         Mood and Affect: Mood normal.         Behavior: Behavior normal.       Last Recorded Vitals  Blood pressure 108/72, pulse (!) 112, temperature 37.4 °C (99.3 °F), temperature source Temporal, resp. rate 20, height 1.727 m (5' 8\"), weight 73.5 kg (162 lb), SpO2 96%.  Intake/Output last 3 Shifts:  I/O last 3 completed shifts:  In: 2723.8 (37.1 mL/kg) [P.O.:1080; I.V.:1643.8 (22.4 mL/kg)]  Out: 0 (0 mL/kg)   Weight: 73.5 kg     Relevant Results  Scheduled " medications  acetaminophen, 650 mg, oral, q6h  apixaban, 5 mg, oral, BID  docusate sodium, 100 mg, oral, BID  DULoxetine, 60 mg, oral, Daily  fentaNYL, 1 patch, transdermal, q48h  fentaNYL, 1 patch, transdermal, q48h  pantoprazole, 40 mg, intravenous, Daily  polyethylene glycol, 17 g, oral, BID  potassium chloride, 40 mEq, oral, BID  predniSONE, 20 mg, oral, Daily  pregabalin, 50 mg, oral, BID  sennosides-docusate sodium, 2 tablet, oral, BID      Continuous medications     PRN medications  PRN medications: diclofenac sodium, dicyclomine, HYDROmorphone, HYDROmorphone, LORazepam, naloxone, ondansetron, prochlorperazine, promethazine, sodium chloride 0.9%, sodium chloride 0.9%   This patient has a central line   Reason for the central line remaining today? Hemodynamic monitoring    Assessment/Plan   Principal Problem:    Jennifer Garcia is a 63 y.o. 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 Patient admitted to [facility] (6/5-6/7) c/f partial SBO, resolved prior to discharge from [facility], surgery consulted at OSH and did not recommend surgical interventions at this time. [place] [facility] [place] with continued abdominal pain and diarrhea. CT A/P (6/8) wo bowel obstruction, c/f colitis on imaging. Cdiff and stool pathogen pending. No leukocytosis or infectious symptoms at this time (no fevers/chills), no indication for abx at this time. Supportive oncology consulted for further pain management (6/9), rec cont PO Dilaudid 4mg Q2 PRN for moderate to severe pain, cont IV Dilaudid 2mg Q3 for  breakthrough pain, increasing Fentanyl patch change frequency to 100mcgs Q48hr, and starting Bentyl 10mg x4/day PRN for abdominal cramps. Increased Dilaudid PO dose and fentanyl patch dose (6/11) per updated Supportive Oncology updated recs (6/11). Pt tolerating full liquid diet, no nausea or vomiting, advanced to regular diet (6/11). Radiation oncology consulted as he was recently seen outpatient with plan for CT simulation for gamma knife and radiation to spine, recs pending. Discharge pending pain and symptom control and radiation oncology consult.      # Stage IV Esophageal AdenoCarcinoma (Her2 pos) w/ known mets to Lung/Liver,   # Recent Spine Emergent Decompression/Laminectomy (T8-T12 5/17/24 2/2 to Metastatic Lesion)  #Cancer Related Pain  - Follows w/ Dr. Randolph for Oncology; emailed on 6/9   - 6/8 CT A/P wo evidence of bowel obstruction, but w/ short segment of circumferential thickening of ascending colon superior to the cecum new from prior, which may represent colitis. Also, redemonstration of metastatic disease in the visualized lung bases and liver unchanged in the short-term interval since 06/05/2024; redemonstration of abdominopelvic lymphadenopathy; redemonstration of left adrenal nodule, likely also metastatic; also Small left pleural effusion unchanged in the short-term interval. Interval development of trace ascites in the dependent portion of the pelvis  - Palliative care consulted while at [facility]; recommended 2mg IV dilaudid Q3 PRN for severe pain, continued on admission (6/9- )  - EKG (6/8)    - C/w Tylenol 650mg Q6H, Capsaicin cream QID, Duloxetine 60mg daily, Lyrica 50mg BID, Fentanyl patch 100mcg, 0.5mg Lorazepam IV Q8hrs PRN anxiety  - Miralax 17g BID, Senna/Colace 2 tabs BID for opioid induced constipation   - C/w Zofran, Compazine, Phenergan PRN N/V  - Decadron 16mg Ivx1 on 6/8, c/w home prednisone 20mg daily (part of taper, through 6/14, then decreased to 10mg daily for 7  days) with PPI   - Holding home Flexeril  - supportive oncology consulted (6/9), rec cont PO Dilaudid 4mg Q2 PRN for moderate to severe pain, cont IV Dilaudid 2mg Q3 for breakthrough pain, increasing Fentanyl patch change frequency to 100mcgs Q48hr, and starting Bentyl 10mg x4/day PRN for abdominal cramps  - supportive onc updated recs (6/11): increase PO Dilaudid from 4mg to 6mg Q3, and increase Fentanyl from 100mcg to 125mcg Q48hr  - Radiation oncology consulted 6/9; further recs pending (seen outpt on 6/4 with plan for gamma knife and radiation to spine pending CT simulation)      # Colitis on Imaging iso Hx of Drug Induced Colitis   - Admitted in May for immunotherapy related colitis (infectious workup remained negative); on steroid taper as above   - Presented to [facility] (6/5-6/7) with abdominal pain/ diarrhea c/f CBO  - CT A/P (6/5): Mildly loops dilated loops of jejunum measuring up to 3.4 cm, early developing partial small bowel obstruction can not be excluded  - While at [facility] pt was passing gas and was having active Bms, general surgery consulted and did not recommend any surgical interventions   - CT A/P (6/8) c/f colitis   - pt wo leukocytosis or fevers   - cdiff negative (6/9)  - Stool pathogen panel negative (6/9)  - pt placed NPO on admission, increased to clear liquid diet (6/9 AM), advanced to full liquid diet (6/9 PM), advanced to regular diet (6/11)  - NS @ 75 x24 hours (6/9)   - supportive oncology consulted 6/9, rec cont PO Dilaudid 4mg Q2 PRN for moderate to severe pain, cont IV Dilaudid 2mg Q3 for breakthrough pain, increasing Fentanyl patch change frequency to 100mcgs Q48hr, and starting Bentyl 10mg x4/day PRN for abdominal cramps     #Hx of PVT/PE (on Eliquis)   - C/w Apixaban 5mg BID     #HTN   #Third Degree AV Block s/p PM (11/9/23)  - No active BP medications      # Prophy   - eliquis, OOB as able, SCD while in bed      Dispo:   - Full code (confirmed on admission)   - Discharge home  pending pain management and radiation oncology plan   - NOK: Jane (wife) [phone]     I spent >60 minutes in the professional and overall care of this patient.    Assessment and plan discussed with attending physician Dr. Guardado.    Anastasia Escalante, ALFREDITO-CNP

## 2024-06-11 NOTE — PROGRESS NOTES
SUPPORTIVE AND PALLIATIVE ONCOLOGY INPATIENT FOLLOW-UP      SERVICE DATE: 24     SUBJECTIVE:  Interval Events:  Patient was seen at bedside and he reports that his pain is not well controlled he had a rough night. We discussed increasing his PRN dilaudid to 6mg q 3hrs PRN and going up on the Fentanyl patch to 125mcgs q 48hrs.     LBM: days ago, discussed adding lactulose 20 grams TID scheduled     Pain Assessment:  Location:  Right shoulder and abdomen   Duration: Intermittent  Characteristics:   Ratin, 6, and Moderate   Descriptors: localized, aching, and cramping   Aggravating: movement and bending    Relieving: Analgesics dilaudid    Interference with Function: None    Opioid Use  Past 24 h prn opioid use:  Fentanyl patch 100mcgs = 200mg OME   Dilaudid 4mg po x 4 = 80mg OME  Dilaudid 2mg iv x 3 doses = 75mg OME   Total 24h OME use:  355mg OME     Note: OME calculations based on equianalgesic table below. Please note this table is based on best available evidence but conversions are still approximate. These are NOT opioid DOSES for individual patient use; this is equivalency information.  Drug Parenteral Enteral   Morphine 10 25   Oxycodone N/A 20   Hydromorphone 2 5   Fentanyl 0.15 N/A   Tramadol N/A 120   Citation: Aniya ROSS. Demystifying opioid conversion calculations: A guide for effective dosing, Second edition. MD Lobo: [employer] of [employer]-System Pharmacists, 2018.    Symptom Assessment:    Nausea none  Vomiting none  Lack of appetite none  Difficulty Sleeping none  Diarrhea none    Information obtained from: chart review, interview of patient, interview of family, discussion with RN, and discussion with primary team  ______________________________________________________________________        OBJECTIVE:    Lab Results   Component Value Date    WBC 10.8 2024    HGB 8.3 (L) 2024    HCT 26.1 (L) 2024    MCV 89 2024     (L) 2024      Lab Results    Component Value Date    GLUCOSE 83 06/11/2024    CALCIUM 8.5 (L) 06/11/2024     06/11/2024    K 3.8 06/11/2024    CO2 24 06/11/2024     06/11/2024    BUN 8 06/11/2024    CREATININE 0.50 06/11/2024     Lab Results   Component Value Date    ALT 23 06/11/2024    AST 61 (H) 06/11/2024    ALKPHOS 222 (H) 06/11/2024    BILITOT 1.2 06/11/2024     Estimated Creatinine Clearance: 125 mL/min (by C-G formula based on SCr of 0.5 mg/dL).     Scheduled medications  acetaminophen, 650 mg, oral, q6h  apixaban, 5 mg, oral, BID  docusate sodium, 100 mg, oral, BID  DULoxetine, 60 mg, oral, Daily  fentaNYL, 1 patch, transdermal, q48h  pantoprazole, 40 mg, intravenous, Daily  polyethylene glycol, 17 g, oral, BID  potassium chloride, 40 mEq, oral, BID  predniSONE, 20 mg, oral, Daily  pregabalin, 50 mg, oral, BID  sennosides-docusate sodium, 2 tablet, oral, BID      Continuous medications     PRN medications  diclofenac sodium, 4 g, 4x daily PRN  dicyclomine, 10 mg, 4x daily PRN  HYDROmorphone, 4 mg, q3h PRN  HYDROmorphone, 2 mg, q3h PRN  LORazepam, 0.5 mg, q8h PRN  naloxone, 0.2 mg, q5 min PRN  ondansetron, 4 mg, q6h PRN  prochlorperazine, 5 mg, q6h PRN  promethazine, 12.5 mg, q6h PRN  sodium chloride 0.9%, 10 mL, PRN  sodium chloride 0.9%, 10 mL, PRN      }     PHYSICAL EXAMINATION:    Vital Signs:   Vital signs reviewed  Visit Vitals  /66 (BP Location: Left arm, Patient Position: Lying)   Pulse 98   Temp 37 °C (98.6 °F) (Temporal)   Resp 20        Pain Score: 7       Physical Exam  Constitutional:       Appearance: Normal appearance.   Eyes:      Pupils: Pupils are equal, round, and reactive to light.   Cardiovascular:      Heart sounds: Normal heart sounds.   Pulmonary:      Breath sounds: Normal breath sounds.   Abdominal:      Palpations: Abdomen is soft.   Musculoskeletal:         General: Normal range of motion.   Skin:     General: Skin is warm.   Neurological:      Mental Status: He is alert and oriented to  person, place, and time.   Psychiatric:         Mood and Affect: Mood normal.        ASSESSMENT/PLAN:    Massimo Garcia is a 63 y.o. 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 PMH significant for HTN, Third Degree AV Block s/p PM (11/9/23), OA. Admitted 6/8/2024 for further evaluation and management of abdominal pain. Course complicated by partial SBO with [facility] ED imagining. Supportive and Palliative Oncology is consulted for pain management.         CT Abdomen pelvis w IV contrast 6/8/2024:   IMPRESSION:  1. No evidence of bowel obstruction. There is relatively short  segment of circumferential thickening of ascending colon superior to  the cecum new from prior, which may represent colitis.  2. Redemonstration of metastatic disease in the visualized lung bases  and liver unchanged in the short-term interval since 06/05/2024.  3. Redemonstration of abdominopelvic lymphadenopathy as described  above.  4. Redemonstration of left adrenal nodule, likely also metastatic.  5. Small left pleural effusion unchanged in the short-term interval.  Interval development of trace ascites in the dependent portion of the  pelvis.        Pain:  Abdominal pain related to Metastatic esophageal cancer   Pain is: cancer related pain  Type: visceral, somatic, and neuropathic  Pain control: sub-optimally controlled  Home regimen:  Fentanyl TD  and Hydromorphone 4mg q3hrs PRN   fentanyl 100mcgs /72hrs   Personalized pain goal: 2  Total OME usage for the past 24 hours: 355mg OME   Increase Fentanyl patch to 125mcgs mcgs q 48 hrs scheduled   Patient will need Fentanyl patch 25mcgs and 100mcgs before discharge   Will consider opioid rotating him to methadone in the future, however due to his concern for QTC prolongation will maximize fentanyl patch use.   Continue Dilaudid 2mg IV q3hrs PRN for breakthrough pain  Discussed  adding Voltaren Gel topical to his right shoulder.  Increase dilaudid to 6mg PO q3hrs PRN for moderate and severe pain  With discharge send patient home on Dilaudid 2mg tablets with more quantity due to lower co-pay   Patient's wife has 15 day supply of Dilaudid 4mg PO which was picked up 6/07/2024, patients next supportive oncology appointment is 6/28/2024 so he will need additional medication to last him until that appointment with discharge          EKG 6/8/2024, QTC 354ms    Continue Bentyl 10mg 4x a day PRN for abdominal cramps.  Continue Lyrica 50mg BID   Continue Acetaminophen 650mg q 6hrs   Continue Duloxetine 60mg daily   Continue prednisone 20mg daily   Continue to monitor pain scores and administer PRN medications as appropriate  Continue/initiate nonpharmacologic pain management strategies including ice/heat therapy, distraction techniques, deep breathing/relaxation techniques, calming music, and repositioning  Continue to monitor for signs of opioid efficacy (pain scores, improved functionality) and toxicity (pinpoint pupils, excess sedation/drowsiness/confusion, respiratory depression, etc.)     Nausea/Reflux:  Intermittent nausea without vomiting related to opioids and constipation   Home regimen: scopolamine patch, Carafate and Protonix PRN   Improving     Continue Compazine 5mg IV q6hr PRN  Continue promethazine 12.5mg IM q6hr PRN   Continue Zofran 4mg q6hrs PRN   Since patient is concerned about QTC prolonging drugs with his history of 3rd degree AV heart block, consider discharging him home on Bentyl 10mg 4x a day  Agreeable to discharging on small dose of Zofran 4mg po q6hrs PRN      Constipation  At risk for constipation related to opioids, currently not constipated  Usual bowel pattern: every day  Home regimen: Senna 1-2 tablets 1-2x/day, Lactulose 20 gm 4x a day PRN, Docusate 100mg BID PRN, and MOM 4x  DAY prn  LBM 6/9/2024 small diarrhea   Continue Senna 2 tabsd bid   Add lactulose 20 grams  TID scheduled   Continue Miralax 17grams  bid   Continue docusate sodium 100mg BID    Monitor BM frequency, adjust regimen as needed  Goal to have BM without straining q48-72h         Altered Mood:  Acute on chronic anxiety and depression related to health concerns   controlled with home regimen   Home regimen:  Ativan 1mg TID PRN   Continue Ativan 0.5mg q8hrs PRN      Sleeping Difficulty:  Impaired sleep related to hospital environment  Home regimen:  none  With better pain management and less hospital interruptions he plans to catch up on his sleep tonight.      Decreased appetite:  Patient is on a liquid diet   Outpatient supportive oncology referred patient to Dr. Mays's office for integrative medicine     Medical Decision Making/Goals of Care/Advance Care Planning:  Patient's current clinical condition, including diagnosis, prognosis, and management plan, and goals of care were discussed.   Life limiting disease: metastatic malignancy  Family: Supportive family   Performance status: Major  limitations due to pain  Joys/meaning/strength: Family  Understanding of health: Demonstrates good understanding of disease process, understands plan for symptom management   Information:Wants full disclosure  Goals: symptom control and cancer directed therapy  Worries and fears now and future: ongoing symptoms   Code status discussion:  full code      Advance Directives  Existence of Advance Directives:No - has interest  Decision maker: HCPOA is wife   Code Status: Full code     Introduction to Supportive and Palliative Oncology:  Spoke with patient, his 2 daughters and wife at bedside  Introduced the role and philosophy of Supportive and Palliative oncology in the evaluation and management of symptoms during cancer treatment  Palliative care was introduced as a service for patients with serious illness to help with symptoms, assist with goals of care conversations, navigate complex decision making, improve quality of life  for patients, and provide support both patients and families.  Patient seemed to appreciate the extra layer of support.     Supportive and Palliative Oncology encounter:  Spoke with patient at bedside  Emotional support provided  Coordination of care     Signature and billing:  Thank you for allowing us to participate in the care of this patient. Recommendations will be communicated back to the consulting service by way of shared electronic medical record or face-to-face.    Medical complexity was high level due to due to complexity of problems, extensive data review, and high risk of management/treatment.    I spent 50 minutes in the care of this patient which included chart review, interviewing patient/family, discussion with primary team, coordination of care, and documentation.    Data:   Diagnostic tests and information reviewed for today's visit:  Conversation with primary team, Most recent labs, Most recent imaging, Medications       Some elements copied from my note on 6/10/2024, the elements have been updated and all reflect current decision making from today, 06/11/24       Plan of Care discussed with: Provider, RN, Patient and Family/Significant Other: wife was at bedside     Thank you for asking Supportive and Palliative Oncology to assist with care of this patient.  We will continue to follow  Please contact us for additional questions or concerns.      SIGNATURE: JASPER Damon   PAGER/CONTACT:  Contact information:  Supportive and Palliative Oncology  Monday-Friday 8 AM-5 PM  Epic Secure chat or pager [phone].  After hours and weekends:  pager [phone]

## 2024-06-12 ENCOUNTER — APPOINTMENT (OUTPATIENT)
Dept: HEMATOLOGY/ONCOLOGY | Facility: CLINIC | Age: 64
End: 2024-06-12
Payer: MEDICARE

## 2024-06-12 ENCOUNTER — APPOINTMENT (OUTPATIENT)
Dept: RADIOLOGY | Facility: HOSPITAL | Age: 64
DRG: 393 | End: 2024-06-12
Payer: MEDICARE

## 2024-06-12 ENCOUNTER — APPOINTMENT (OUTPATIENT)
Dept: RADIOLOGY | Facility: HOSPITAL | Age: 64
End: 2024-06-12
Payer: MEDICARE

## 2024-06-12 LAB
ABO GROUP (TYPE) IN BLOOD: NORMAL
ALBUMIN SERPL BCP-MCNC: 3 G/DL (ref 3.4–5)
ALP SERPL-CCNC: 209 U/L (ref 33–136)
ALT SERPL W P-5'-P-CCNC: 23 U/L (ref 10–52)
ANION GAP SERPL CALC-SCNC: 11 MMOL/L (ref 10–20)
ANTIBODY SCREEN: NORMAL
APTT PPP: 29 SECONDS (ref 27–38)
AST SERPL W P-5'-P-CCNC: 60 U/L (ref 9–39)
BASOPHILS # BLD AUTO: 0.03 X10*3/UL (ref 0–0.1)
BASOPHILS NFR BLD AUTO: 0.3 %
BILIRUB SERPL-MCNC: 1 MG/DL (ref 0–1.2)
BUN SERPL-MCNC: 7 MG/DL (ref 6–23)
CALCIUM SERPL-MCNC: 8.7 MG/DL (ref 8.6–10.6)
CHLORIDE SERPL-SCNC: 99 MMOL/L (ref 98–107)
CO2 SERPL-SCNC: 31 MMOL/L (ref 21–32)
CREAT SERPL-MCNC: 0.47 MG/DL (ref 0.5–1.3)
EGFRCR SERPLBLD CKD-EPI 2021: >90 ML/MIN/1.73M*2
EOSINOPHIL # BLD AUTO: 0.06 X10*3/UL (ref 0–0.7)
EOSINOPHIL NFR BLD AUTO: 0.5 %
ERYTHROCYTE [DISTWIDTH] IN BLOOD BY AUTOMATED COUNT: 16.6 % (ref 11.5–14.5)
ERYTHROCYTE [DISTWIDTH] IN BLOOD BY AUTOMATED COUNT: 16.7 % (ref 11.5–14.5)
GLUCOSE BLD MANUAL STRIP-MCNC: 92 MG/DL (ref 74–99)
GLUCOSE SERPL-MCNC: 77 MG/DL (ref 74–99)
HCT VFR BLD AUTO: 27 % (ref 41–52)
HCT VFR BLD AUTO: 27.8 % (ref 41–52)
HGB BLD-MCNC: 8.4 G/DL (ref 13.5–17.5)
HGB BLD-MCNC: 8.7 G/DL (ref 13.5–17.5)
IMM GRANULOCYTES # BLD AUTO: 0.29 X10*3/UL (ref 0–0.7)
IMM GRANULOCYTES NFR BLD AUTO: 2.5 % (ref 0–0.9)
INR PPP: 1.5 (ref 0.9–1.1)
LYMPHOCYTES # BLD AUTO: 0.4 X10*3/UL (ref 1.2–4.8)
LYMPHOCYTES NFR BLD AUTO: 3.4 %
MAGNESIUM SERPL-MCNC: 1.79 MG/DL (ref 1.6–2.4)
MCH RBC QN AUTO: 28.2 PG (ref 26–34)
MCH RBC QN AUTO: 28.2 PG (ref 26–34)
MCHC RBC AUTO-ENTMCNC: 31.1 G/DL (ref 32–36)
MCHC RBC AUTO-ENTMCNC: 31.3 G/DL (ref 32–36)
MCV RBC AUTO: 90 FL (ref 80–100)
MCV RBC AUTO: 91 FL (ref 80–100)
MONOCYTES # BLD AUTO: 0.82 X10*3/UL (ref 0.1–1)
MONOCYTES NFR BLD AUTO: 7 %
NEUTROPHILS # BLD AUTO: 10.12 X10*3/UL (ref 1.2–7.7)
NEUTROPHILS NFR BLD AUTO: 86.3 %
NRBC BLD-RTO: 0 /100 WBCS (ref 0–0)
NRBC BLD-RTO: 0 /100 WBCS (ref 0–0)
PLATELET # BLD AUTO: 111 X10*3/UL (ref 150–450)
PLATELET # BLD AUTO: 130 X10*3/UL (ref 150–450)
POTASSIUM SERPL-SCNC: 4.5 MMOL/L (ref 3.5–5.3)
PROT SERPL-MCNC: 5.2 G/DL (ref 6.4–8.2)
PROTHROMBIN TIME: 17.4 SECONDS (ref 9.8–12.8)
RBC # BLD AUTO: 2.98 X10*6/UL (ref 4.5–5.9)
RBC # BLD AUTO: 3.08 X10*6/UL (ref 4.5–5.9)
RH FACTOR (ANTIGEN D): NORMAL
SODIUM SERPL-SCNC: 136 MMOL/L (ref 136–145)
WBC # BLD AUTO: 11.7 X10*3/UL (ref 4.4–11.3)
WBC # BLD AUTO: 12.2 X10*3/UL (ref 4.4–11.3)

## 2024-06-12 PROCEDURE — 70470 CT HEAD/BRAIN W/O & W/DYE: CPT

## 2024-06-12 PROCEDURE — 70450 CT HEAD/BRAIN W/O DYE: CPT

## 2024-06-12 PROCEDURE — C9113 INJ PANTOPRAZOLE SODIUM, VIA: HCPCS | Performed by: STUDENT IN AN ORGANIZED HEALTH CARE EDUCATION/TRAINING PROGRAM

## 2024-06-12 PROCEDURE — 2500000001 HC RX 250 WO HCPCS SELF ADMINISTERED DRUGS (ALT 637 FOR MEDICARE OP): Performed by: STUDENT IN AN ORGANIZED HEALTH CARE EDUCATION/TRAINING PROGRAM

## 2024-06-12 PROCEDURE — 2500000004 HC RX 250 GENERAL PHARMACY W/ HCPCS (ALT 636 FOR OP/ED)

## 2024-06-12 PROCEDURE — 70491 CT SOFT TISSUE NECK W/DYE: CPT | Performed by: RADIOLOGY

## 2024-06-12 PROCEDURE — 2550000001 HC RX 255 CONTRASTS: Performed by: INTERNAL MEDICINE

## 2024-06-12 PROCEDURE — 86901 BLOOD TYPING SEROLOGIC RH(D): CPT | Performed by: PHYSICIAN ASSISTANT

## 2024-06-12 PROCEDURE — 2500000002 HC RX 250 W HCPCS SELF ADMINISTERED DRUGS (ALT 637 FOR MEDICARE OP, ALT 636 FOR OP/ED): Performed by: STUDENT IN AN ORGANIZED HEALTH CARE EDUCATION/TRAINING PROGRAM

## 2024-06-12 PROCEDURE — 99233 SBSQ HOSP IP/OBS HIGH 50: CPT

## 2024-06-12 PROCEDURE — 85025 COMPLETE CBC W/AUTO DIFF WBC: CPT

## 2024-06-12 PROCEDURE — 70491 CT SOFT TISSUE NECK W/DYE: CPT

## 2024-06-12 PROCEDURE — 83735 ASSAY OF MAGNESIUM: CPT

## 2024-06-12 PROCEDURE — 84075 ASSAY ALKALINE PHOSPHATASE: CPT

## 2024-06-12 PROCEDURE — 2500000004 HC RX 250 GENERAL PHARMACY W/ HCPCS (ALT 636 FOR OP/ED): Performed by: STUDENT IN AN ORGANIZED HEALTH CARE EDUCATION/TRAINING PROGRAM

## 2024-06-12 PROCEDURE — 85027 COMPLETE CBC AUTOMATED: CPT | Performed by: PHYSICIAN ASSISTANT

## 2024-06-12 PROCEDURE — 82947 ASSAY GLUCOSE BLOOD QUANT: CPT

## 2024-06-12 PROCEDURE — 85610 PROTHROMBIN TIME: CPT | Performed by: PHYSICIAN ASSISTANT

## 2024-06-12 PROCEDURE — 2500000001 HC RX 250 WO HCPCS SELF ADMINISTERED DRUGS (ALT 637 FOR MEDICARE OP)

## 2024-06-12 PROCEDURE — 1170000001 HC PRIVATE ONCOLOGY ROOM DAILY

## 2024-06-12 PROCEDURE — 70450 CT HEAD/BRAIN W/O DYE: CPT | Performed by: RADIOLOGY

## 2024-06-12 PROCEDURE — 99222 1ST HOSP IP/OBS MODERATE 55: CPT | Performed by: STUDENT IN AN ORGANIZED HEALTH CARE EDUCATION/TRAINING PROGRAM

## 2024-06-12 RX ADMIN — LORAZEPAM 0.5 MG: 2 INJECTION INTRAMUSCULAR; INTRAVENOUS at 21:09

## 2024-06-12 RX ADMIN — SENNOSIDES AND DOCUSATE SODIUM 2 TABLET: 8.6; 5 TABLET ORAL at 08:44

## 2024-06-12 RX ADMIN — DEXAMETHASONE SODIUM PHOSPHATE 10 MG: 4 INJECTION, SOLUTION INTRA-ARTICULAR; INTRALESIONAL; INTRAMUSCULAR; INTRAVENOUS; SOFT TISSUE at 17:42

## 2024-06-12 RX ADMIN — DULOXETINE HYDROCHLORIDE 60 MG: 60 CAPSULE, DELAYED RELEASE ORAL at 08:44

## 2024-06-12 RX ADMIN — HYDROMORPHONE HYDROCHLORIDE 2 MG: 2 INJECTION, SOLUTION INTRAMUSCULAR; INTRAVENOUS; SUBCUTANEOUS at 15:49

## 2024-06-12 RX ADMIN — DEXAMETHASONE SODIUM PHOSPHATE 4 MG: 4 INJECTION, SOLUTION INTRA-ARTICULAR; INTRALESIONAL; INTRAMUSCULAR; INTRAVENOUS; SOFT TISSUE at 23:01

## 2024-06-12 RX ADMIN — HYDROMORPHONE HYDROCHLORIDE 6 MG: 4 TABLET ORAL at 00:05

## 2024-06-12 RX ADMIN — PREDNISONE 20 MG: 20 TABLET ORAL at 08:44

## 2024-06-12 RX ADMIN — ACETAMINOPHEN 650 MG: 325 TABLET ORAL at 23:01

## 2024-06-12 RX ADMIN — DOCUSATE SODIUM 100 MG: 100 CAPSULE, LIQUID FILLED ORAL at 08:44

## 2024-06-12 RX ADMIN — POTASSIUM CHLORIDE 40 MEQ: 1.5 POWDER, FOR SOLUTION ORAL at 21:09

## 2024-06-12 RX ADMIN — IOHEXOL 90 ML: 350 INJECTION, SOLUTION INTRAVENOUS at 09:59

## 2024-06-12 RX ADMIN — PREGABALIN 50 MG: 25 CAPSULE ORAL at 21:09

## 2024-06-12 RX ADMIN — HYDROMORPHONE HYDROCHLORIDE 6 MG: 4 TABLET ORAL at 04:00

## 2024-06-12 RX ADMIN — HYDROMORPHONE HYDROCHLORIDE 6 MG: 4 TABLET ORAL at 23:01

## 2024-06-12 RX ADMIN — PREGABALIN 50 MG: 25 CAPSULE ORAL at 08:44

## 2024-06-12 RX ADMIN — APIXABAN 5 MG: 5 TABLET, FILM COATED ORAL at 08:44

## 2024-06-12 RX ADMIN — ACETAMINOPHEN 650 MG: 325 TABLET ORAL at 17:42

## 2024-06-12 ASSESSMENT — PAIN SCALES - GENERAL
PAINLEVEL_OUTOF10: 3
PAINLEVEL_OUTOF10: 5 - MODERATE PAIN
PAINLEVEL_OUTOF10: 8
PAINLEVEL_OUTOF10: 9
PAINLEVEL_OUTOF10: 3
PAINLEVEL_OUTOF10: 6
PAINLEVEL_OUTOF10: 7

## 2024-06-12 ASSESSMENT — COGNITIVE AND FUNCTIONAL STATUS - GENERAL
DAILY ACTIVITIY SCORE: 24
MOBILITY SCORE: 24

## 2024-06-12 ASSESSMENT — PAIN DESCRIPTION - LOCATION: LOCATION: ABDOMEN

## 2024-06-12 ASSESSMENT — PAIN - FUNCTIONAL ASSESSMENT
PAIN_FUNCTIONAL_ASSESSMENT: 0-10

## 2024-06-12 NOTE — PROGRESS NOTES
"SUPPORTIVE AND PALLIATIVE ONCOLOGY INPATIENT FOLLOW-UP      SERVICE DATE: 06/12/24     SUBJECTIVE:  Interval Events:      Patient was seen at bedside with his wife. He reports that he had a good night sleep, he was vigilant with his pain regimen and states that he feels \"like my words are slurred and I'm a little loopy\". Discussed if this happens when he takes his dilaudid 6mg, he states that \"I was just taking it every 3 hours because I didn't want to be in pain and when I use it I feel like it may be too strong\". Informed him that I am happy that he asked for his pain medication, however, if he does not need it, he doesn't have to take it. So discussed giving himself time and adding dilaudid 4mg po q3hrs PRN for moderate pain and making Dilaudid 6mg po for severe pain. He was receptive to plan.     He reports that he has 3/10 pain to his abdomen, no pain to his shoulder unless he moves it.     LBM: today 6/12     Pain Assessment:  Location:  Right shoulder and abdomen   Duration: Intermittent  Characteristics:   Rating: 3 and Moderate   Descriptors: localized, aching, and cramping   Aggravating: movement and bending    Relieving: Analgesics dilaudid    Interference with Function: None    Opioid Use  Past 24 h prn opioid use:  Fentanyl patch 125mcgs = 250mg OME   Dilaudid 4mg po x 2 = 40mg OME  Dilaudid 2mg iv x 1 doses = 25mg OME   Dilaudid 6mg po x 4 doses = 120mg OME   Total 24h OME use:  435mg OME     Note: OME calculations based on equianalgesic table below. Please note this table is based on best available evidence but conversions are still approximate. These are NOT opioid DOSES for individual patient use; this is equivalency information.  Drug Parenteral Enteral   Morphine 10 25   Oxycodone N/A 20   Hydromorphone 2 5   Fentanyl 0.15 N/A   Tramadol N/A 120   Citation: Aniya ROSS. Demystifying opioid conversion calculations: A guide for effective dosing, Second edition. MD Lobo: American Society of " Health-System Pharmacists, 2018.    Symptom Assessment:    Nausea none  Vomiting none  Lack of appetite none  Difficulty Sleeping none  Diarrhea none    Information obtained from: chart review, interview of patient, interview of family, discussion with RN, and discussion with primary team  ______________________________________________________________________        OBJECTIVE:    Lab Results   Component Value Date    WBC 11.7 (H) 06/12/2024    HGB 8.4 (L) 06/12/2024    HCT 27.0 (L) 06/12/2024    MCV 91 06/12/2024     (L) 06/12/2024      Lab Results   Component Value Date    GLUCOSE 77 06/12/2024    CALCIUM 8.7 06/12/2024     06/12/2024    K 4.5 06/12/2024    CO2 31 06/12/2024    CL 99 06/12/2024    BUN 7 06/12/2024    CREATININE 0.47 (L) 06/12/2024     Lab Results   Component Value Date    ALT 23 06/12/2024    AST 60 (H) 06/12/2024    ALKPHOS 209 (H) 06/12/2024    BILITOT 1.0 06/12/2024     Estimated Creatinine Clearance: 125 mL/min (A) (by C-G formula based on SCr of 0.47 mg/dL (L)).     Scheduled medications  acetaminophen, 650 mg, oral, q6h  [Held by provider] apixaban, 5 mg, oral, BID  docusate sodium, 100 mg, oral, BID  DULoxetine, 60 mg, oral, Daily  fentaNYL, 1 patch, transdermal, q48h  fentaNYL, 1 patch, transdermal, q48h  iohexol, 90 mL, intravenous, Once in imaging  pantoprazole, 40 mg, intravenous, Daily  polyethylene glycol, 17 g, oral, BID  potassium chloride, 40 mEq, oral, BID  predniSONE, 20 mg, oral, Daily  pregabalin, 50 mg, oral, BID  sennosides-docusate sodium, 2 tablet, oral, BID      Continuous medications     PRN medications  diclofenac sodium, 4 g, 4x daily PRN  dicyclomine, 10 mg, 4x daily PRN  HYDROmorphone, 6 mg, q3h PRN  HYDROmorphone, 2 mg, q3h PRN  LORazepam, 0.5 mg, q8h PRN  naloxone, 0.2 mg, q5 min PRN  ondansetron, 4 mg, q6h PRN  prochlorperazine, 5 mg, q6h PRN  promethazine, 12.5 mg, q6h PRN  sodium chloride 0.9%, 10 mL, PRN  sodium chloride 0.9%, 10 mL, PRN      }      PHYSICAL EXAMINATION:    Vital Signs:   Vital signs reviewed  Visit Vitals  /68   Pulse (!) 119   Temp 36.3 °C (97.3 °F) (Temporal)   Resp 18        Pain Score: 9       Physical Exam  Constitutional:       Appearance: Normal appearance.   Eyes:      Pupils: Pupils are equal, round, and reactive to light.   Cardiovascular:      Heart sounds: Normal heart sounds.   Pulmonary:      Breath sounds: Normal breath sounds.   Abdominal:      Palpations: Abdomen is soft.   Musculoskeletal:         General: Normal range of motion.   Skin:     General: Skin is warm.   Neurological:      Mental Status: He is alert and oriented to person, place, and time.   Psychiatric:         Mood and Affect: Mood normal.        ASSESSMENT/PLAN:    Massimo Garcia is a 63 y.o. 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 PMH significant for HTN, Third Degree AV Block s/p PM (11/9/23), OA. Admitted 6/8/2024 for further evaluation and management of abdominal pain. Course complicated by partial SBO with [facility] ED imagining. Supportive and Palliative Oncology is consulted for pain management.         CT Abdomen pelvis w IV contrast 6/8/2024:   IMPRESSION:  1. No evidence of bowel obstruction. There is relatively short  segment of circumferential thickening of ascending colon superior to  the cecum new from prior, which may represent colitis.  2. Redemonstration of metastatic disease in the visualized lung bases  and liver unchanged in the short-term interval since 06/05/2024.  3. Redemonstration of abdominopelvic lymphadenopathy as described  above.  4. Redemonstration of left adrenal nodule, likely also metastatic.  5. Small left pleural effusion unchanged in the short-term interval.  Interval development of trace ascites in the dependent portion of the  pelvis.        Pain:  Abdominal pain related to Metastatic esophageal  cancer   Pain is: cancer related pain  Type: visceral, somatic, and neuropathic  Pain control: sub-optimally controlled  Home regimen:  Fentanyl TD  and Hydromorphone 4mg q3hrs PRN   fentanyl 100mcgs /72hrs   Personalized pain goal: 2  Total OME usage for the past 24 hours: 435mg OME   Continue Fentanyl patch 125mcgs mcgs q 48 hrs scheduled   Patient will need Fentanyl patch 25mcgs and 100mcgs before discharge   Will consider opioid rotating him to methadone in the future, however due to his concern for QTC prolongation will maximize fentanyl patch use.   Change Dilaudid to 1mg IV q3hrs PRN for breakthrough pain  Continue Voltaren Gel topical to his right shoulder.  Add dilaudid 4mg po q 3hrs PRN for moderate pain  Continue dilaudid 6mg PO q3hrs PRN for severe pain  With discharge send patient home on Dilaudid 2mg tablets with more quantity due to lower co-pay   Patient's wife has 15 day supply of Dilaudid 4mg PO which was picked up 6/07/2024, patients next supportive oncology appointment is 6/28/2024 so he will need additional medication to last him until that appointment with discharge          EKG 6/8/2024, QTC 354ms    Continue Bentyl 10mg 4x a day PRN for abdominal cramps.  Continue Lyrica 50mg BID   Continue Acetaminophen 650mg q 6hrs   Continue Duloxetine 60mg daily   Continue prednisone 20mg daily   Continue to monitor pain scores and administer PRN medications as appropriate  Continue/initiate nonpharmacologic pain management strategies including ice/heat therapy, distraction techniques, deep breathing/relaxation techniques, calming music, and repositioning  Continue to monitor for signs of opioid efficacy (pain scores, improved functionality) and toxicity (pinpoint pupils, excess sedation/drowsiness/confusion, respiratory depression, etc.)     Nausea/Reflux:  Intermittent nausea without vomiting related to opioids and constipation   Home regimen: scopolamine patch, Carafate and Protonix PRN   Improving      Continue Compazine 5mg IV q6hr PRN  Continue promethazine 12.5mg IM q6hr PRN   Continue Zofran 4mg q6hrs PRN   Since patient is concerned about QTC prolonging drugs with his history of 3rd degree AV heart block, consider discharging him home on Bentyl 10mg 4x a day  Agreeable to discharging on small dose of Zofran 4mg po q6hrs PRN      Constipation  At risk for constipation related to opioids, currently not constipated  Usual bowel pattern: every day  Home regimen: Senna 1-2 tablets 1-2x/day, Lactulose 20 gm 4x a day PRN, Docusate 100mg BID PRN, and MOM 4x  DAY prn  LBM 6/12/2024     Continue Senna 2 tabsd bid   Continue Miralax 17grams  bid   Continue docusate sodium 100mg BID    Monitor BM frequency, adjust regimen as needed  Goal to have BM without straining q48-72h     Altered Mood:  Acute on chronic anxiety and depression related to health concerns   controlled with home regimen   Home regimen:  Ativan 1mg TID PRN   Continue Ativan 0.5mg q8hrs PRN      Sleeping Difficulty:  Impaired sleep related to hospital environment  Home regimen:  none  With better pain management and less hospital interruptions he plans to catch up on his sleep tonight.      Decreased appetite:  Patient is on a liquid diet   Outpatient supportive oncology referred patient to Dr. Mays's office for integrative medicine     Medical Decision Making/Goals of Care/Advance Care Planning:  Patient's current clinical condition, including diagnosis, prognosis, and management plan, and goals of care were discussed.   Life limiting disease: metastatic malignancy  Family: Supportive family   Performance status: Major  limitations due to pain  Joys/meaning/strength: Family  Understanding of health: Demonstrates good understanding of disease process, understands plan for symptom management   Information:Wants full disclosure  Goals: symptom control and cancer directed therapy  Worries and fears now and future: ongoing symptoms   Code status discussion:   full code      Advance Directives  Existence of Advance Directives:No - has interest  Decision maker: DOUGLAS is wife   Code Status: Full code     Introduction to Supportive and Palliative Oncology:  Spoke with patient, his 2 daughters and wife at bedside  Introduced the role and philosophy of Supportive and Palliative oncology in the evaluation and management of symptoms during cancer treatment  Palliative care was introduced as a service for patients with serious illness to help with symptoms, assist with goals of care conversations, navigate complex decision making, improve quality of life for patients, and provide support both patients and families.  Patient seemed to appreciate the extra layer of support.     Supportive and Palliative Oncology encounter:  Spoke with patient at bedside  Emotional support provided  Coordination of care     Signature and billing:  Thank you for allowing us to participate in the care of this patient. Recommendations will be communicated back to the consulting service by way of shared electronic medical record or face-to-face.    Medical complexity was high level due to due to complexity of problems, extensive data review, and high risk of management/treatment.    I spent 50 minutes in the care of this patient which included chart review, interviewing patient/family, discussion with primary team, coordination of care, and documentation.    Data:   Diagnostic tests and information reviewed for today's visit:  Conversation with primary team, Most recent labs, Most recent imaging, Medications       Some elements copied from my note on 6/11/2024, the elements have been updated and all reflect current decision making from today, 06/12/24       Plan of Care discussed with: Provider, RN, Patient and Family/Significant Other: wife was at bedside     Thank you for asking Supportive and Palliative Oncology to assist with care of this patient.  We will continue to follow  Please contact us for  additional questions or concerns.      SIGNATURE: JASPER Damon   PAGER/CONTACT:  Contact information:  Supportive and Palliative Oncology  Monday-Friday 8 AM-5 PM  Epic Secure chat or pager [phone].  After hours and weekends:  pager [phone]

## 2024-06-12 NOTE — PROGRESS NOTES
This is a 63-year-old male with a history of metastatic esophageal adenocarcinoma HER2 positive that presented to the emergency department on 5/10 with acute on chronic lower back pain.  Workup revealed metastatic disease with T9/10 epidural extension with cord compression.  MRI L-spine also showed a nodular intradural lesion at L2 abutting the equina, concerning for drop lesion.  MRI brain showed small enhancing lesions in the right posterior parasagittal parietal lobe and left temporal lobe, consistent with metastatic disease.  On 5/17 the patient underwent T10 decompression with T8-12 fusion.  The patient was seen in follow-up consultation with Dr. Hodgson on 6/4, and recommend for interval postoperative RT to T-spine, palliative RT to L spine, and gamma knife radiosurgery.    The patient was subsequently admitted to [facility] 6/5 - 6/7 for left lower quadrant abdominal pain, with imaging reportedly showing possible small bowel obstruction.    CT imaging shows interval increase in size of pulmonary nodules, with development of left pleural effusion, multiple hepatic masses with abdominal and pelvic lymphadenopathy compatible with progression of metastatic disease.  He is additionally found to have body wall edema, prominently in the left lateral abdominal wall.    Patient has since been admitted to [facility] for further management.  He continues to describe some mild to moderate discomfort in his left abdomen.  He denies having right-sided abdominal pain.  In recent days he has developed right humeral pain.  Over the last 24 hours he has developed increased right upper extremity weakness.    CT head 6/12 shows an interval increase in size of right posterior frontal lobe brain metastasis, with associated increase in vasogenic edema.    CT Neck 6/12 shows bilateral heterogeneously concerning lymph nodes consistent with ralph metastases        Subjective   Today, the patient is found to be resting comfortably with his  wife at bedside.  He continues to describe right arm weakness.  Left abdominal pain has improved with medications.  Denies having any neck pain or new back pain.        Objective     Last Recorded Vitals  /51 (BP Location: Left arm, Patient Position: Lying)   Pulse 96   Temp 37.1 °C (98.8 °F) (Temporal)   Resp 18   Wt 73.5 kg (162 lb)   SpO2 97%     Image Results  CT soft tissue neck w IV contrast  Narrative: Interpreted By:  Tim Angeles,   STUDY:  CT SOFT TISSUE NECK W IV CONTRAST;  6/12/2024 9:57 am      INDICATION:  Signs/Symptoms:r/o R brachial hematoma.      COMPARISON:  PET-CT January 9th.      ACCESSION NUMBER(S):  [id number]      ORDERING CLINICIAN:  SHIRLEY CALIX      TECHNIQUE:  Following intravenous injection  axial CT was performed from the  skullbase to the thoracic inlet and multiplanar reconstructions were  made.      FINDINGS:  *  The visualized paranasal sinuses nasopharynx and oropharynx are  unremarkable. *The mandible, maxilla and temporomandibular joints are  normal. *The major salivary glands are normal.  *There are abundant enlarged and heterogeneous enhancing lymph nodes  within the neck. The largest of these measures a proximally 1.5 cm in  size level 3 on the right. There is an associated 1.5 cm lymph node  at level 3B on the right. On the left side, there is a 1.5 cm lymph  node at level 3/level 4 in the posterior triangle with adjacent 1 cm  lymph nodes. Findings best appreciated on axial 57/123. There is also  a 1.5 cm homogeneous enhancing lymph node at level 4 within the  supraclavicular fat on the left side. *The larynx and related  cartilages are normal. *The thyroid gland is normal.  *There are abundant pulmonary nodules throughout the visualized upper  pulmonary lobes. Left pleural fluid collection.      Impression: * Abundant bilateral heterogeneously enhancing lymph nodes consistent  with ralph metastases *Abundant pulmonary nodules consistent with  pulmonary  metastases.      MACRO:  An Orange alert message was sent to the referring physician Dr. SHIRLEY CALIX through the [employer] system at 10:47 am on 6/12/2024 by  Dr.Charles Angeles      Signed by: Tim Angeles 6/12/2024 10:47 AM  Dictation workstation:   [id number]  CT head w and wo IV contrast, CT brain attack head wo IV contrast  Narrative: Interpreted By:  Tim Angeles,   STUDY:  CT HEAD W AND WO IV CONTRAST; CT BRAIN ATTACK HEAD WO IV CONTRAST;  6/12/2024 9:57 am      INDICATION:  Signs/Symptoms:L sided weakness, poss disease progression;  Signs/Symptoms:BAT, new right arm weakness.      COMPARISON:  MRI May 16.      ACCESSION NUMBER(S):  [id number]; [id number]      ORDERING CLINICIAN:  SHIRLEY CALIX; YESICA SEGURA      TECHNIQUE:  Following injection of intravenous iodinated contrast, CT of the head  was performed      FINDINGS:  * Unchanged subcentimeter enhancing nodule in the left temporal lobe  with minimal surrounding edema consistent with metastasis *The  previously identified enhancing nodule in the posterior left frontal  lobe has increased in size and currently measures a proximally 2 cm  in greatest diameter. There has also been increased vasogenic edema  in the interval since the previous exam *The ventricular system is  nonenlarged *No new foci of enhancement  *The calvarium, skull base, paranasal sinuses and orbital structures  are normal      Impression: * Increased size of the previously demonstrated enhancing nodule in  the right posterior frontal lobe consistent with metastases.  Associated increased vasogenic edema *No change in the size of the  previously demonstrated left temporal nodule. There has been slight  increased vasogenic edema at this location compared to the previous  exam.      MACRO:  none      Signed by: Tim Angeles 6/12/2024 10:42 AM  Dictation workstation:   [id number]  XR shoulder right 2+ views  Narrative: Interpreted By:  Chandler Myles,    STUDY:  XR SHOULDER RIGHT 2+ VIEWS; ;  6/11/2024 4:31 pm      INDICATION:  Signs/Symptoms:Hx. esophogeal CA, pain and limited movement in R  shoulder.      COMPARISON:  None.      ACCESSION NUMBER(S):  [id number]      ORDERING CLINICIAN:  LISA GRIMES      FINDINGS:  Three views of the right shoulder were provided.      No acute fracture or malalignment.      Postsurgical changes of short stem right shoulder hemiarthroplasty.  Hardware is intact. No perihardware lucency or fracture. There is  some irregularity of the glenoid articular surface inferiorly.      Multifocal nodular opacities throughout the visualized right lung.  Incompletely visualized chest wall MediPort and pacemaker wires.      Impression: 1.  No acute fracture or malalignment of the right shoulder.  2. Postsurgical changes of short stem right shoulder hemiarthroplasty.  3. Multifocal nodular opacities throughout the visualized right lung  may be attributable to pulmonary metastatic disease or infectious  infiltrate. Further evaluation with chest radiograph could be  considered if there is concern for infection.      MACRO:  None      Signed by: Chandler Myles 6/12/2024 9:48 AM  Dictation workstation:   [id number]      Physical Exam  General: awake, alert, resting comfortably  HEENT: pupils equal and round, no scleral icterus  Pulmonary: Breathing comfortably at rest   Cardiac: regular rate  Abdomen: Some firmness in the left abdominal wall, without associated skin changes, mildly tender to palpation, no rebound, the remainder of his abdomen is benign  Neuro: A&O x 3, cranial nerves II through XII grossly intact -left chronic Bell's palsy facial weakness, right arm is found to be weak, 2/5,  is 4/5, sensation intact  Psych: Normal affect     Relevant Results  Lab Results   Component Value Date    WBC 12.2 (H) 06/12/2024    HGB 8.7 (L) 06/12/2024    HCT 27.8 (L) 06/12/2024    MCV 90 06/12/2024     (L) 06/12/2024     Lab Results    Component Value Date    GLUCOSE 77 06/12/2024    CALCIUM 8.7 06/12/2024     06/12/2024    K 4.5 06/12/2024    CO2 31 06/12/2024    CL 99 06/12/2024    BUN 7 06/12/2024    CREATININE 0.47 (L) 06/12/2024                Assessment/Plan   This is a 63-year-old male with a history of metastatic esophageal adenocarcinoma HER2 positive that presented to the emergency department on 5/10 with acute on chronic lower back pain.  Workup revealed metastatic disease with T9/10 epidural extension with cord compression.  MRI L-spine also showed a nodular intradural lesion at L2 abutting the equina, concerning for drop lesion.  MRI brain showed small enhancing lesions in the right posterior parasagittal parietal lobe and left temporal lobe, consistent with metastatic disease.  On 5/17 the patient underwent T10 decompression with T8-12 fusion.  The patient was seen in follow-up consultation with Dr. Hodgson on 6/4, and recommend for interval postoperative RT to T-spine, palliative RT to L spine, and gamma knife radiosurgery.    Patient presented 6/5 with increased abdominal pain.  CT imaging of the chest abdomen pelvis note interval increase in pulmonary, hepatic and adrenal, A/P lymphadenopathy, consistent with disease progression.      CT head 6/12 shows an interval increase in size of right posterior frontal lobe brain metastasis, with associated increase in vasogenic calros    Plan:  The patient was discussed with my attending physician, Dr. Hodgson.    Imaging results were discussed with the patient and his wife.  Given the increase in vasogenic edema and right upper extremity weakness, we would recommend transitioning to dexamethasone 4 mg QID with GI Ppx and obtaining repeat volumetric brain MRI.  Given the progression of metastatic disease seen within the chest, abdomen, pelvis, we recommend expediting plans to resume systemic therapy.  Interval palliative spine RT and GKRS can be sequenced in between cycles.       -Dexamethasone 4mg QID with GI Ppx  -Volumetric brain MRI  -Resume systemic therapy  -Interval spine RT and GKRS, pending review of MRI      I spent __45___ minutes with this patient. Greater than 50% of this time was spent in the counseling and/or coordination of care of this patient.       MARIANO GomezC

## 2024-06-12 NOTE — CARE PLAN
Upon giving patient morning medications, patient made RN aware that he had been having shoulder pain but from the night before to this morning he could no longer lift his right hand up. This was something new that he noticed overnight, when asking patient to smile, symmetrical, left eye a little droopy. Patient has hx of bells palsy, oncologist team came to bedside to assess and was also made aware of the new findings. Code BAT called for patient, BS 97, VS /68, . Patient able to follow commands, stroke nurse at bedside assessing patient. Orders received for patient to have CT neck and brain. BAT cancelled. Patient not complaining of any pain, report given to oncoming nurse. Will continue to monitor patient and team will follow up.     The patient's goals for the shift include      The clinical goals for the shift include movement of right shoulder    Problem: Pain  Goal: My pain/discomfort is manageable  Outcome: Progressing     Problem: Safety  Goal: Patient will be injury free during hospitalization  Outcome: Progressing  Goal: I will remain free of falls  Outcome: Progressing     Problem: Daily Care  Goal: Daily care needs are met  Outcome: Progressing     Problem: Psychosocial Needs  Goal: Demonstrates ability to cope with hospitalization/illness  Outcome: Progressing  Goal: Collaborate with me, my family, and caregiver to identify my specific goals  Outcome: Progressing     Problem: Discharge Barriers  Goal: My discharge needs are met  Outcome: Progressing     Problem: Pain  Goal: Takes deep breaths with improved pain control throughout the shift  Outcome: Progressing  Goal: Turns in bed with improved pain control throughout the shift  Outcome: Progressing  Goal: Walks with improved pain control throughout the shift  Outcome: Progressing  Goal: Performs ADL's with improved pain control throughout shift  Outcome: Progressing  Goal: Participates in PT with improved pain control throughout the  shift  Outcome: Progressing  Goal: Free from opioid side effects throughout the shift  Outcome: Progressing  Goal: Free from acute confusion related to pain meds throughout the shift  Outcome: Progressing     Problem: Skin  Goal: Decreased wound size/increased tissue granulation at next dressing change  Outcome: Progressing  Goal: Participates in plan/prevention/treatment measures  Outcome: Progressing  Goal: Prevent/manage excess moisture  Outcome: Progressing  Goal: Prevent/minimize sheer/friction injuries  Outcome: Progressing  Goal: Promote/optimize nutrition  Outcome: Progressing  Goal: Promote skin healing  Outcome: Progressing

## 2024-06-12 NOTE — CONSULTS
"Reason For Consult  CTH with concern for new left temporal hemorrhage vs lesion    History Of Present Illness  Massimo Garcia is a 63 y.o. male presenting with abdominal pain and right arm weakness.     Spoke with patient and wife at bedside who re-counted events as detailed in neurology note:   \"now admitted for intractable abdominal pain, concerning for partial SBO. BAT was activated on 6/12/24 at 8:59 AM for right arm weakness.      History was obtained from patient and wife who was at bedside.      Ms. Garcia started to notice worsening right shoulder pain on 6/10. On 6/11 morning, he was having trouble moving his right arm, but was still able to grab things with his hands. This morning, his arm weakness was worse.      Otherwise, patient denied any numbness or sensory change in his right arm. No weakness elsewhere. Denied any neck pain, and feels that his right shoulder pain is better today. \"    Past Medical History  He has a past medical history of Essential (primary) hypertension (12/21/2013), Pacemaker, Peripheral neuropathy, Personal history of other diseases of the circulatory system, and Pneumothorax (12/04/2023).    Surgical History  He has a past surgical history that includes Total knee arthroplasty (09/30/2016); Total knee arthroplasty (09/30/2016); Cardiac electrophysiology procedure (N/A, 11/7/2023); and Cardiac electrophysiology procedure (Left, 11/9/2023).     Social History  He reports that he has quit smoking. His smoking use included cigarettes and cigars. He has never been exposed to tobacco smoke. He has never used smokeless tobacco. He reports that he does not currently use alcohol. He reports current drug use. Drug: Marijuana.    Family History  Family History   Problem Relation Name Age of Onset    Cancer Father          Allergies  Bee venom protein (honey bee) and Oxaliplatin    Review of Systems  10 point ROS is obtained and negative except the ones mentioned in the HPI      Physical " "Exam  General: awake, alert, interactive   HEENT: atraumatic, mucus membranes moist   Neuro:  NAD, A&Ox3  Cranial Nerves II-XII: PERRL, EOMI, Face symmetric, Facial SILT, Palate/Tongue midline and symmetric  Motor: RUE D/B/T2/5 HF/IO 4/5  LUE 5/5  BLE 5/5  Sensation: SILT throughout all extremities  DTRS: 2+ Throughout, No Hoffmans or Clonus   Cardiac: carotid pulses 2+ bilaterally  Respiratory: breathing comfortably on RA  Abdomen: non tender, non distended   Skin: warm, dry     Last Recorded Vitals  Blood pressure 116/51, pulse 96, temperature 37.1 °C (98.8 °F), temperature source Temporal, resp. rate 18, height 1.727 m (5' 8\"), weight 73.5 kg (162 lb), SpO2 97%.    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), OA, stage IV esophageal adenocarcinoma (HER2 positive) w/ known mets to lung and liver, s/p chemo (last doses 4/29), recent admission for drug-induced colitis, 5/10 p/w 1d LBP, CT T/L spine diffuse soft tissue mets, T9-10 soft tissue mass with epidural extension, MRI neuroaxis R occipital 1.1cm met, L temporal 8mm met, T9-11 peripherally enhancing vertebral body circumferential lesion, worst at T10, L2 ventral lesion c/f drop met, 5/17 s/p T10 transpedic decompression, T8-12 fusion    6/11 p/w abdominal pain, RUE weakness x2 days, CTH known right occipital lesion, new small left temporal hyperdenisty    Recommendations:  Area of hyperdensity on CT not likely causing RUE weakness  Agree with neurology differential   Agree with MRI brain with and without contrast and MRI C spine with and without contrast    Will follow for imaging    Alyssa Roberto MD    "

## 2024-06-12 NOTE — PROGRESS NOTES
"Massimo Garcia is a 63 y.o. male on day 3 of admission presenting with Colitis.    Subjective   Patient seen at bedside. Patient states that he can no longer lift right arm. States that yesterday afternoon he began having shoulder pain and has had progressive weakness since then. BAT called then canceled. Patient denies any additional neurological changes including issues ambulating, headache, or confusion. Denies SOB< CP, heart palpitations, N/V/D?C, fever/chills.        Objective     Physical Exam  HENT:      Head: Normocephalic.      Nose: Nose normal.      Mouth/Throat:      Mouth: Mucous membranes are moist.   Eyes:      Pupils: Pupils are equal, round, and reactive to light.   Cardiovascular:      Rate and Rhythm: Normal rate and regular rhythm.   Pulmonary:      Effort: Pulmonary effort is normal.   Abdominal:      Palpations: Abdomen is soft.   Musculoskeletal:      Right shoulder: Decreased strength.      Right elbow: Decreased range of motion.      Cervical back: Full passive range of motion without pain.   Neurological:      Mental Status: He is alert and oriented to person, place, and time.      Motor: Weakness present.      Gait: Gait is intact.   Psychiatric:         Attention and Perception: Attention and perception normal.         Mood and Affect: Mood normal.         Last Recorded Vitals  Blood pressure 116/51, pulse 96, temperature 37.1 °C (98.8 °F), temperature source Temporal, resp. rate 18, height 1.727 m (5' 8\"), weight 73.5 kg (162 lb), SpO2 97%.  Intake/Output last 3 Shifts:  I/O last 3 completed shifts:  In: 360 (4.9 mL/kg) [P.O.:360]  Out: - (0 mL/kg)   Weight: 73.5 kg     Relevant Results                             Assessment/Plan   Principal Problem:    Colitis    Massimo Garcia is a 63 y.o. 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 Patient admitted to [place] (6/5-6/7) c/f partial SBO, resolved prior to discharge from [facility], surgery consulted at OSH and did not recommend surgical interventions at this time. Presented to [facility] 6/8 with continued abdominal pain and diarrhea. CT A/P (6/8) wo bowel obstruction, c/f colitis on imaging. Cdiff and stool pathogen pending. No leukocytosis or infectious symptoms at this time (no fevers/chills), no indication for abx at this time. Supportive oncology consulted for further pain management (6/9), rec cont PO Dilaudid 4mg Q2 PRN for moderate to severe pain, cont IV Dilaudid 2mg Q3 for breakthrough pain, increasing Fentanyl patch change frequency to 100mcgs Q48hr, and starting Bentyl 10mg x4/day PRN for abdominal cramps. Increased Dilaudid PO dose and fentanyl patch dose (6/11) per updated Supportive Oncology updated recs (6/11). Pt tolerating full liquid diet, no nausea or vomiting, advanced to regular diet (6/11). Radiation oncology consulted as he was recently seen outpatient with plan for CT simulation for gamma knife and radiation to spine, recs pending. 6/12 BAT called d/t new right arm weakness. BAT cancelled because symptoms not consistent with stroke. Neurology consulted for new arm weakness workup. CTH showed concern for bleed of his known left temporal lobe, worsening edema around his right occipital. However, neither can explain his symptoms. CT of the neck showed two larger cervical lymphnodes on the right side at C4-5 level, likely compressing on his right upper brachial plexuses. Rad/onc paged regarding CT head showing worsening disease in brain. Surg/onc consulted d/t concern for brachial plexus injury d/t large cervical  lymph node-no plans for surgical interventions. Patient given Dex 10mg x1 dose and then 4mg q6 hours. NSGY consulted d/t concern for temporal bleed. Plan for MRI brain/C-spine w/wo contrast volumetric study-pending, and repeat head CT-pending       # Stage IV Esophageal AdenoCarcinoma (Her2 pos) w/ known mets to Lung/Liver,   # Recent Spine Emergent Decompression/Laminectomy (T8-T12 5/17/24 2/2 to Metastatic Lesion)  #Cancer Related Pain  - Follows w/ Dr. Randolph for Oncology; emailed on 6/9   - 6/8 CT A/P wo evidence of bowel obstruction, but w/ short segment of circumferential thickening of ascending colon superior to the cecum new from prior, which may represent colitis. Also, redemonstration of metastatic disease in the visualized lung bases and liver unchanged in the short-term interval since 06/05/2024; redemonstration of abdominopelvic lymphadenopathy; redemonstration of left adrenal nodule, likely also metastatic; also Small left pleural effusion unchanged in the short-term interval. Interval development of trace ascites in the dependent portion of the pelvis  - Palliative care consulted while at [facility]; recommended 2mg IV dilaudid Q3 PRN for severe pain, continued on admission (6/9- )  - EKG (6/8)    - C/w Tylenol 650mg Q6H, Capsaicin cream QID, Duloxetine 60mg daily, Lyrica 50mg BID, Fentanyl patch 100mcg, 0.5mg Lorazepam IV Q8hrs PRN anxiety  - Miralax 17g BID, Senna/Colace 2 tabs BID for opioid induced constipation   - C/w Zofran, Compazine, Phenergan PRN N/V  - Decadron 16mg Ivx1 on 6/8, c/w home prednisone 20mg daily (part of taper, through 6/14, then decreased to 10mg daily for 7 days) with PPI   - Holding home Flexeril  - supportive oncology consulted (6/9), rec cont PO Dilaudid 4mg Q2 PRN for moderate to severe pain, cont IV Dilaudid 2mg Q3 for breakthrough pain, increasing Fentanyl patch change frequency to 100mcgs Q48hr, and starting Bentyl 10mg x4/day PRN for abdominal cramps  -  supportive onc updated recs (6/11): increase PO Dilaudid from 4mg to 6mg Q3, and increase Fentanyl from 100mcg to 125mcg Q48hr  - Radiation oncology consulted 6/9; further recs pending (seen outpt on 6/4 with plan for gamma knife and radiation to spine pending CT simulation)   - 6/12 BAT called d/t new arm weakness -canceled since symptoms not likely stroke related   - CTH showed concern for bleed of known left temporal lobe, worsening edema around his right occipital.   - CT of the neck showed two larger cervical lymphnodes on the right side at C4-5 level, likely compressing on his right upper brachial plexuses  - MRI brain/C-spine ordered-pending   - Neurology consulted d/t weakness-recs appreciated   - NSGY consulted d/t concern for brain bleed. Recommending repeat CT-pending   - 6/12 Gave dex 10mg x1 and started 4mg q6 hours   - Surg/onc consulted d/t brachial plexus injury d/t large cervical lymph node-no plans for surgical interventions       # Colitis on Imaging iso Hx of Drug Induced Colitis   - Admitted in May for immunotherapy related colitis (infectious workup remained negative); on steroid taper as above   - Presented to [facility] (6/5-6/7) with abdominal pain/ diarrhea c/f CBO  - CT A/P (6/5): Mildly loops dilated loops of jejunum measuring up to 3.4 cm, early developing partial small bowel obstruction can not be excluded  - While at [facility] pt was passing gas and was having active Bms, general surgery consulted and did not recommend any surgical interventions   - CT A/P (6/8) c/f colitis   - pt wo leukocytosis or fevers   - cdiff negative (6/9)  - Stool pathogen panel negative (6/9)  - pt placed NPO on admission, increased to clear liquid diet (6/9 AM), advanced to full liquid diet (6/9 PM), advanced to regular diet (6/11)  - NS @ 75 x24 hours (6/9)   - supportive oncology consulted 6/9, rec cont PO Dilaudid 4mg Q2 PRN for moderate to severe pain, cont IV Dilaudid 2mg Q3 for breakthrough pain, increasing  Fentanyl patch change frequency to 100mcgs Q48hr, and starting Bentyl 10mg x4/day PRN for abdominal cramps     #Hx of PVT/PE (on Eliquis)   - C/w Apixaban 5mg BID     #HTN   #Third Degree AV Block s/p PM (11/9/23)  - No active BP medications      # Prophy   - eliquis, OOB as able, SCD while in bed      Dispo:   - Full code (confirmed on admission)   - Discharge home pending pain management and radiation oncology plan   - NOK: Jane (wife) [phone]        I spent 75 minutes in the professional and overall care of this patient.      ALFREDITO Abbott-CNP  Patient discussed with Dr. Guardado

## 2024-06-12 NOTE — CONSULTS
Neurology Consult     BAT activated at 8:59 AM for right arm weakness     History Of Present Illness  Massimo Garcia is a 63 year-old, right-handed male w/ h/o HTN, 3rd degree AV block s/p pacemaker (11/9/23), OA, stage IV esophageal adenocarcinoma (Her2 pos) w/ known mets to lung, liver, s/p chemo - Pembrolizumab (last dose on 4/29) held on 5/4 for immunotherapy related colitis, h/o PVT/PE (on Eliquis), and recent admission on 5/10-5/24 with diffuse T/L spine mets, R occipital met, and L temporal mets, s/p T10 transpedicular decompression and a T8-T12 fusion presenting, now admitted for intractable abdominal pain, concerning for partial SBO. BAT was activated on 6/12/24 at 8:59 AM for right arm weakness.     History was obtained from patient and wife who was at bedside.     Ms. Garcia started to notice worsening right shoulder pain on 6/10. On 6/11 morning, he was having trouble moving his right arm, but was still able to grab things with his hands. This morning, his arm weakness was worse.     Otherwise, patient denied any numbness or sensory change in his right arm. No weakness elsewhere. Denied any neck pain, and feels that his right shoulder pain is better today.     No history of radiation to his neck or upper chest. Denied any trauma, falls, or heavy lifting.     LKW: ~6/11 AM  /68     NIHSS  Level of consciousness: 0 = Alert  LOC Question: 0 = Both correct  LOC Commands: 0 = Obeys both  Best Gaze: 0 = Normal  Visual Field: 0 = No visual loss  Facial Paresis: 0 = Normal  LUE: 0 = No drift; 10 sec  RUE: 3 = No effort vs gravity  LLE: 0 = No drift; 5 sec  RLE: 0 = No drift; 5 sec  Dysarthria: 0 = Normal  Best Language: 0 = No aphasia  Limb Ataxia: 0 = Absent  Sensory: 0 = Absent  Neglect: 0 = None    NIHSS Score: 1    CTH was obtained given his history of brain mets and being on AC. CTH showed hemorrhagic conversion of the left temporal metastatic.     STAT CT neck soft tissue was also obtained, given history of  anti-coagulation use and subacute progressive weakness that is associated with shoulder pain, hematoma compressing on the right upper brachial plexuses was considered. CT neck was discussed with radiology, it was negative for hematoma, but showed large neck lymphnodes on the right side.        Past Medical History  Past Medical History:   Diagnosis Date    Essential (primary) hypertension 12/21/2013    Hypertension    Pacemaker     Peripheral neuropathy     Personal history of other diseases of the circulatory system     History of right bundle branch block (RBBB)    Pneumothorax 12/04/2023     Surgical History  Past Surgical History:   Procedure Laterality Date    CARDIAC ELECTROPHYSIOLOGY PROCEDURE N/A 11/7/2023    Procedure: Temporary Pacemaker Insertion;  Surgeon: Alexis Shook MD;  Location: [facility];  Service: Cardiovascular;  Laterality: N/A;    CARDIAC ELECTROPHYSIOLOGY PROCEDURE Left 11/9/2023    Procedure: PPM IMPLANT DUAL;  Surgeon: Elias Connolly MD;  Location: [facility];  Service: Electrophysiology;  Laterality: Left;    TOTAL KNEE ARTHROPLASTY  09/30/2016    Total Knee Replacement Left    TOTAL KNEE ARTHROPLASTY  09/30/2016    Total Knee Replacement Right     Social History  Social History     Tobacco Use    Smoking status: Former     Types: Cigarettes, Cigars     Passive exposure: Never    Smokeless tobacco: Never   Vaping Use    Vaping status: Unknown   Substance Use Topics    Alcohol use: Not Currently    Drug use: Yes     Types: Marijuana     Comment: medical marjuana card     Allergies  Bee venom protein (honey bee) and Oxaliplatin  Medications Prior to Admission   Medication Sig Dispense Refill Last Dose    acetaminophen (Tylenol) 325 mg tablet Take 2 tablets (650 mg) by mouth every 6 hours. 240 tablet 0 6/8/2024 at morning    apixaban (Eliquis) 5 mg tablet TAKE 1 TABLET BY MOUTH TWO TIMES A DAY 60 tablet 6 6/8/2024 at morning    cholecalciferol (Vitamin D-3) 50 MCG  (2000 UT) tablet Take 2 tablets (100 mcg) by mouth once daily in the morning.   2024 at morning    cyclobenzaprine (Flexeril) 10 mg tablet Take 1 tablet (10 mg) by mouth 3 times a day for 14 days. 42 tablet 0 Past Week    docusate sodium (Colace) 100 mg capsule Take 1 capsule (100 mg) by mouth 2 times a day. 60 capsule 1 2024 at morning    LORazepam (Ativan) 1 mg tablet Take 1 tablet (1 mg) by mouth every 8 hours if needed for anxiety (nausea). 90 tablet 3 2024 at night    multivitamin with minerals (multivit-min-iron fum-folic ac) tablet Take 2 tablets by mouth once daily in the morning.   2024 at morning    [] nystatin (Mycostatin) 100,000 unit/mL suspension Swish and swallow 5 mL (500,000 Units) 4 times a day for 14 days. 280 mL 0 2024    pantoprazole (ProtoNix) 40 mg EC tablet Take 1 tablet (40 mg) by mouth once daily in the morning. Take before meals. Do not crush, chew, or split. 30 tablet 3 2024    potassium chloride CR 10 mEq ER tablet Take 1 tablet (10 mEq) by mouth once daily. Do not crush, chew, or split.   2024    predniSONE (Deltasone) 10 mg tablet Take 4 tablets (40 mg) by mouth once daily for 7 days, THEN 2 tablets (20 mg) once daily for 7 days, THEN 1 tablet (10 mg) once daily for 7 days. Do not fill before May 25, 2024. 49 tablet 0 2024    pregabalin (Lyrica) 50 mg capsule Take 1 capsule (50 mg) by mouth 2 times a day. 60 capsule 3 Past Month    DULoxetine (Cymbalta) 60 mg DR capsule Take 1 capsule (60 mg) by mouth once daily. Do not crush or chew. (Patient not taking: Reported on 2024)   Not Taking    [] fentaNYL (Duragesic) 100 mcg/hr patch Place 1 patch over 72 hours on the skin every 3rd day for 9 days. 3 patch 0 2024    HYDROmorphone (Dilaudid) 2 mg tablet Take 1-2 tablets (2-4 mg) by mouth every 6 hours if needed for severe pain (7 - 10) for up to 15 days. 120 tablet 0 2024    HYDROmorphone (Dilaudid) 4 mg tablet Take 1 tablet (4 mg) by  "mouth every 4 hours if needed for moderate pain (4 - 6). 10 tablet 0     HYDROmorphone (Dilaudid) 4 mg tablet Take 2.5 tablets (10 mg) by mouth every 4 hours if needed for severe pain (7 - 10) or moderate pain (4 - 6) for up to 15 days. 225 tablet 0     HYDROmorphone (Dilaudid) 4 mg tablet Take 2.5 tablets (10 mg) by mouth every 4 hours if needed for severe pain (7 - 10) or moderate pain (4 - 6) for up to 15 days. 225 tablet 0     metoclopramide (Reglan) 10 mg tablet Take 1 tablet (10 mg) by mouth every 8 hours if needed (Hiccups alternative if Thorazine not working. Don't give with thorazine for therapy as too much D2 agonists can have bad side effects). (Patient not taking: Reported on 6/9/2024) 30 tablet 0 Not Taking    naloxone (Narcan) 0.4 mg/mL injection Infuse 0.5 mL (0.2 mg) into a venous catheter every 5 minutes if needed for respiratory depression.   Unknown    polyethylene glycol (Glycolax, Miralax) 17 gram/dose powder Take 17 g by mouth 2 times a day. 1020 g 0 6/7/2024    prochlorperazine (Compazine) 10 mg/2 mL (5 mg/mL) solution Infuse 1 mL (5 mg) into a venous catheter every 6 hours if needed for nausea or vomiting.       promethazine (Phenergan) 25 mg/mL injection Inject 0.5 mL (12.5 mg) over 15 minutes into the muscle every 6 hours if needed for nausea or vomiting. (Patient not taking: Reported on 6/9/2024)   Not Taking    sennosides-docusate sodium (Mireille-Colace) 8.6-50 mg tablet Take 2 tablets by mouth 2 times a day. (Patient not taking: Reported on 6/9/2024) 120 tablet 0 Not Taking         Physical Exam  Last Recorded Vitals  Blood pressure 135/68, pulse (!) 119, temperature 36.3 °C (97.3 °F), temperature source Temporal, resp. rate 18, height 1.727 m (5' 8\"), weight 73.5 kg (162 lb), SpO2 98%.    General Appearance:  No distress, alert, interactive and cooperative.      Mental State: Orientation was normal to time, place and person.     Language: Fluent, comprehension is intact, following commands " and answering questions appropriately,     Cranial Nerves:   Visual fields full to confrontation.   Pupils round, 4 mm in the right and 3mm in the left, both reactive to light.    Lids symmetric; no ptosis. EOMs normal alignment, full range with normal saccades. No nystagmus.   Facial sensation intact bilaterally. Normal and symmetric facial strength. Nasolabial folds symmetric.   Hearing intact to voice  Normal strength of shoulder shrug and neck turning.   Tongue midline, with normal bulk and strength; no fasciculations.     Motor:   Muscle bulk and tone were normal in both upper and lower extremities.   No fasciculations, tremor or other abnormal movements were present.     Strength     R     L  3-    5     Shoulder abduction   3      5    Elbow flexion   4+   5     Elbow extension   5      5    Wrist flexion   5      5     Wrist extension   5      5     Finger abduction   5      5     Finger adduction   5      5     Finger flexion   5      5      Finger extension      5    5   Hip flexion   5    5   Knee extension   5    5   Knee flexion   5    5   APF   5    5   ADF     Reflexes:   R    L   0    2    biceps   0    2    BR  0    2    Triceps      Sensory: In both upper and lower extremities, sensation was intact to light touch and PP.     Coordination: In both upper extremities, finger-nose-finger intact in left UE.     Gait: Deferred      Relevant Results    Results from last 72 hours   Lab Units 06/12/24  0407 06/11/24  0505 06/10/24  0547   WBC AUTO x10*3/uL 11.7* 10.8 10.3   HEMOGLOBIN g/dL 8.4* 8.3* 8.5*   HEMATOCRIT % 27.0* 26.1* 27.5*   PLATELETS AUTO x10*3/uL 111* 118* 129*        Results from last 72 hours   Lab Units 06/12/24  0407 06/11/24  0505 06/10/24  0547   SODIUM mmol/L 136 136 138   POTASSIUM mmol/L 4.5 3.8 3.6   CHLORIDE mmol/L 99 100 103   CO2 mmol/L 31 24 27   BUN mg/dL 7 8 7   CREATININE mg/dL 0.47* 0.50 0.54   MAGNESIUM mg/dL 1.79 1.71 1.75                 Assessment/Plan   Principal  Problem:    Colitis    Mr. 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 Neurology team was involved for subacute right arm weakness, associated with 3-4 days history of shoulder pain. Exam notable for right shoulder abduction, elbow flexion weakness, mild elbow extension weakness, along with diminished biceps, triceps, and BR reflexes on the right side.     CTH showed bleed of his known left temporal lobe, worsening edema around his right occipital. However, either can explain his symptoms. CT of the neck showed two larger cervical lymphnodes on the right side at C4-5 level, likely compressing on his right upper brachial plexuses.     Impression: Subacute RUE weakness, affecting shoulder abduction, elbow flexion, with diminished biceps, BR, and triceps reflexes. Findings concerning for upper brachial plexus injury, in the setting of 2 large cervical lymphnodes on the right side. At C5-6 level.     Recommendations:   - Urgent surg onc consult to consider resection given subacute weakness onset   - MRI C-spine wwo contrast to rule out compressive lesion s/p recent spinal surgery  - Consult neurosurgery for left temporal lobe bleed     Please page with any further questions or concerns.   General neurology team pager: [phone]     Tadeo Cueto MD  Neurology Resident PGY3

## 2024-06-12 NOTE — SIGNIFICANT EVENT
BAT called because pt is unable to raise his R arm. He states that it has been weakening since yesterday morning. He was seen and evaluated by the stroke team and a ct scan of his head and neck was done. They cancelled the BAT call. Further recomendations will be per their team.

## 2024-06-12 NOTE — CARE PLAN
Problem: Pain  Goal: My pain/discomfort is manageable  Outcome: Progressing     Problem: Safety  Goal: Patient will be injury free during hospitalization  Outcome: Progressing  Goal: I will remain free of falls  Outcome: Progressing     Problem: Daily Care  Goal: Daily care needs are met  Outcome: Progressing     Problem: Psychosocial Needs  Goal: Demonstrates ability to cope with hospitalization/illness  Outcome: Progressing  Goal: Collaborate with me, my family, and caregiver to identify my specific goals  Outcome: Progressing     Problem: Discharge Barriers  Goal: My discharge needs are met  Outcome: Progressing     Problem: Pain  Goal: Takes deep breaths with improved pain control throughout the shift  Outcome: Progressing  Goal: Turns in bed with improved pain control throughout the shift  Outcome: Progressing  Goal: Walks with improved pain control throughout the shift  Outcome: Progressing  Goal: Performs ADL's with improved pain control throughout shift  Outcome: Progressing  Goal: Participates in PT with improved pain control throughout the shift  Outcome: Progressing  Goal: Free from opioid side effects throughout the shift  Outcome: Progressing  Goal: Free from acute confusion related to pain meds throughout the shift  Outcome: Progressing     Problem: Skin  Goal: Decreased wound size/increased tissue granulation at next dressing change  Outcome: Progressing  Goal: Participates in plan/prevention/treatment measures  Outcome: Progressing  Goal: Prevent/manage excess moisture  Outcome: Progressing  Goal: Prevent/minimize sheer/friction injuries  Outcome: Progressing  Goal: Promote/optimize nutrition  Outcome: Progressing  Goal: Promote skin healing  Outcome: Progressing   The patient's goals for the shift include      The clinical goals for the shift include pain management

## 2024-06-13 ENCOUNTER — APPOINTMENT (OUTPATIENT)
Dept: RADIOLOGY | Facility: HOSPITAL | Age: 64
End: 2024-06-13
Payer: MEDICARE

## 2024-06-13 ENCOUNTER — TELEPHONE (OUTPATIENT)
Dept: HEMATOLOGY/ONCOLOGY | Facility: CLINIC | Age: 64
End: 2024-06-13
Payer: MEDICARE

## 2024-06-13 DIAGNOSIS — C79.31 SECONDARY MALIGNANT NEOPLASM OF BRAIN AND SPINAL CORD (MULTI): Primary | ICD-10-CM

## 2024-06-13 DIAGNOSIS — C79.49 SECONDARY MALIGNANT NEOPLASM OF BRAIN AND SPINAL CORD (MULTI): Primary | ICD-10-CM

## 2024-06-13 LAB
ALBUMIN SERPL BCP-MCNC: 3.3 G/DL (ref 3.4–5)
ALP SERPL-CCNC: 241 U/L (ref 33–136)
ALT SERPL W P-5'-P-CCNC: 24 U/L (ref 10–52)
ANION GAP SERPL CALC-SCNC: 13 MMOL/L (ref 10–20)
AST SERPL W P-5'-P-CCNC: 58 U/L (ref 9–39)
ATRIAL RATE: 340 BPM
BASOPHILS # BLD AUTO: 0.02 X10*3/UL (ref 0–0.1)
BASOPHILS NFR BLD AUTO: 0.1 %
BILIRUB SERPL-MCNC: 1.2 MG/DL (ref 0–1.2)
BUN SERPL-MCNC: 10 MG/DL (ref 6–23)
CALCIUM SERPL-MCNC: 9.2 MG/DL (ref 8.6–10.6)
CHLORIDE SERPL-SCNC: 95 MMOL/L (ref 98–107)
CO2 SERPL-SCNC: 29 MMOL/L (ref 21–32)
CREAT SERPL-MCNC: 0.47 MG/DL (ref 0.5–1.3)
EGFRCR SERPLBLD CKD-EPI 2021: >90 ML/MIN/1.73M*2
EOSINOPHIL # BLD AUTO: 0 X10*3/UL (ref 0–0.7)
EOSINOPHIL NFR BLD AUTO: 0 %
ERYTHROCYTE [DISTWIDTH] IN BLOOD BY AUTOMATED COUNT: 16.4 % (ref 11.5–14.5)
GLUCOSE SERPL-MCNC: 141 MG/DL (ref 74–99)
HCT VFR BLD AUTO: 29.7 % (ref 41–52)
HGB BLD-MCNC: 9.7 G/DL (ref 13.5–17.5)
IMM GRANULOCYTES # BLD AUTO: 0.47 X10*3/UL (ref 0–0.7)
IMM GRANULOCYTES NFR BLD AUTO: 3.1 % (ref 0–0.9)
LYMPHOCYTES # BLD AUTO: 0.39 X10*3/UL (ref 1.2–4.8)
LYMPHOCYTES NFR BLD AUTO: 2.6 %
MAGNESIUM SERPL-MCNC: 1.7 MG/DL (ref 1.6–2.4)
MCH RBC QN AUTO: 28 PG (ref 26–34)
MCHC RBC AUTO-ENTMCNC: 32.7 G/DL (ref 32–36)
MCV RBC AUTO: 86 FL (ref 80–100)
MONOCYTES # BLD AUTO: 0.31 X10*3/UL (ref 0.1–1)
MONOCYTES NFR BLD AUTO: 2.1 %
NEUTROPHILS # BLD AUTO: 13.78 X10*3/UL (ref 1.2–7.7)
NEUTROPHILS NFR BLD AUTO: 92.1 %
NRBC BLD-RTO: 0 /100 WBCS (ref 0–0)
PLATELET # BLD AUTO: 131 X10*3/UL (ref 150–450)
POTASSIUM SERPL-SCNC: 4.2 MMOL/L (ref 3.5–5.3)
PROT SERPL-MCNC: 5.8 G/DL (ref 6.4–8.2)
Q ONSET: 224 MS
QRS COUNT: 6 BEATS
QRS DURATION: 102 MS
QT INTERVAL: 372 MS
QTC CALCULATION(BAZETT): 395 MS
QTC FREDERICIA: 388 MS
R AXIS: 94 DEGREES
RBC # BLD AUTO: 3.46 X10*6/UL (ref 4.5–5.9)
SODIUM SERPL-SCNC: 133 MMOL/L (ref 136–145)
T AXIS: -85 DEGREES
T OFFSET: 410 MS
VENTRICULAR RATE: 68 BPM
WBC # BLD AUTO: 15 X10*3/UL (ref 4.4–11.3)

## 2024-06-13 PROCEDURE — 1170000001 HC PRIVATE ONCOLOGY ROOM DAILY

## 2024-06-13 PROCEDURE — 2500000001 HC RX 250 WO HCPCS SELF ADMINISTERED DRUGS (ALT 637 FOR MEDICARE OP)

## 2024-06-13 PROCEDURE — 83735 ASSAY OF MAGNESIUM: CPT

## 2024-06-13 PROCEDURE — 2500000004 HC RX 250 GENERAL PHARMACY W/ HCPCS (ALT 636 FOR OP/ED): Performed by: STUDENT IN AN ORGANIZED HEALTH CARE EDUCATION/TRAINING PROGRAM

## 2024-06-13 PROCEDURE — 2500000002 HC RX 250 W HCPCS SELF ADMINISTERED DRUGS (ALT 637 FOR MEDICARE OP, ALT 636 FOR OP/ED): Performed by: STUDENT IN AN ORGANIZED HEALTH CARE EDUCATION/TRAINING PROGRAM

## 2024-06-13 PROCEDURE — 99233 SBSQ HOSP IP/OBS HIGH 50: CPT

## 2024-06-13 PROCEDURE — 70450 CT HEAD/BRAIN W/O DYE: CPT

## 2024-06-13 PROCEDURE — 85025 COMPLETE CBC W/AUTO DIFF WBC: CPT

## 2024-06-13 PROCEDURE — C9113 INJ PANTOPRAZOLE SODIUM, VIA: HCPCS | Performed by: STUDENT IN AN ORGANIZED HEALTH CARE EDUCATION/TRAINING PROGRAM

## 2024-06-13 PROCEDURE — 70450 CT HEAD/BRAIN W/O DYE: CPT | Performed by: RADIOLOGY

## 2024-06-13 PROCEDURE — 2500000004 HC RX 250 GENERAL PHARMACY W/ HCPCS (ALT 636 FOR OP/ED)

## 2024-06-13 PROCEDURE — 2500000001 HC RX 250 WO HCPCS SELF ADMINISTERED DRUGS (ALT 637 FOR MEDICARE OP): Performed by: STUDENT IN AN ORGANIZED HEALTH CARE EDUCATION/TRAINING PROGRAM

## 2024-06-13 PROCEDURE — 2500000002 HC RX 250 W HCPCS SELF ADMINISTERED DRUGS (ALT 637 FOR MEDICARE OP, ALT 636 FOR OP/ED)

## 2024-06-13 PROCEDURE — 84075 ASSAY ALKALINE PHOSPHATASE: CPT

## 2024-06-13 RX ORDER — HYDROMORPHONE HYDROCHLORIDE 1 MG/ML
1 INJECTION, SOLUTION INTRAMUSCULAR; INTRAVENOUS; SUBCUTANEOUS
Status: DISCONTINUED | OUTPATIENT
Start: 2024-06-13 | End: 2024-06-27

## 2024-06-13 RX ORDER — HYDROMORPHONE HYDROCHLORIDE 4 MG/1
4 TABLET ORAL
Status: DISCONTINUED | OUTPATIENT
Start: 2024-06-13 | End: 2024-06-27 | Stop reason: HOSPADM

## 2024-06-13 RX ADMIN — PREGABALIN 50 MG: 25 CAPSULE ORAL at 20:05

## 2024-06-13 RX ADMIN — ACETAMINOPHEN 650 MG: 325 TABLET ORAL at 22:45

## 2024-06-13 RX ADMIN — DOCUSATE SODIUM 100 MG: 100 CAPSULE, LIQUID FILLED ORAL at 20:05

## 2024-06-13 RX ADMIN — POTASSIUM CHLORIDE 40 MEQ: 1.5 POWDER, FOR SOLUTION ORAL at 09:10

## 2024-06-13 RX ADMIN — HYDROMORPHONE HYDROCHLORIDE 6 MG: 4 TABLET ORAL at 05:29

## 2024-06-13 RX ADMIN — DEXAMETHASONE SODIUM PHOSPHATE 4 MG: 4 INJECTION, SOLUTION INTRA-ARTICULAR; INTRALESIONAL; INTRAMUSCULAR; INTRAVENOUS; SOFT TISSUE at 13:09

## 2024-06-13 RX ADMIN — DEXAMETHASONE SODIUM PHOSPHATE 4 MG: 4 INJECTION, SOLUTION INTRA-ARTICULAR; INTRALESIONAL; INTRAMUSCULAR; INTRAVENOUS; SOFT TISSUE at 22:45

## 2024-06-13 RX ADMIN — DEXAMETHASONE SODIUM PHOSPHATE 4 MG: 4 INJECTION, SOLUTION INTRA-ARTICULAR; INTRALESIONAL; INTRAMUSCULAR; INTRAVENOUS; SOFT TISSUE at 05:29

## 2024-06-13 RX ADMIN — FENTANYL 1 PATCH: 100 PATCH TRANSDERMAL at 13:09

## 2024-06-13 RX ADMIN — FENTANYL 1 PATCH: 25 PATCH TRANSDERMAL at 13:09

## 2024-06-13 RX ADMIN — SENNOSIDES AND DOCUSATE SODIUM 2 TABLET: 8.6; 5 TABLET ORAL at 09:10

## 2024-06-13 RX ADMIN — PANTOPRAZOLE SODIUM 40 MG: 40 INJECTION, POWDER, FOR SOLUTION INTRAVENOUS at 09:12

## 2024-06-13 RX ADMIN — POTASSIUM CHLORIDE 40 MEQ: 1.5 POWDER, FOR SOLUTION ORAL at 20:05

## 2024-06-13 RX ADMIN — HYDROMORPHONE HYDROCHLORIDE 4 MG: 4 TABLET ORAL at 17:01

## 2024-06-13 RX ADMIN — DEXAMETHASONE SODIUM PHOSPHATE 4 MG: 4 INJECTION, SOLUTION INTRA-ARTICULAR; INTRALESIONAL; INTRAMUSCULAR; INTRAVENOUS; SOFT TISSUE at 17:02

## 2024-06-13 RX ADMIN — ACETAMINOPHEN 650 MG: 325 TABLET ORAL at 05:29

## 2024-06-13 RX ADMIN — ACETAMINOPHEN 650 MG: 325 TABLET ORAL at 17:01

## 2024-06-13 RX ADMIN — DOCUSATE SODIUM 100 MG: 100 CAPSULE, LIQUID FILLED ORAL at 09:10

## 2024-06-13 RX ADMIN — DULOXETINE HYDROCHLORIDE 60 MG: 60 CAPSULE, DELAYED RELEASE ORAL at 09:10

## 2024-06-13 RX ADMIN — PREGABALIN 50 MG: 25 CAPSULE ORAL at 09:09

## 2024-06-13 RX ADMIN — ACETAMINOPHEN 650 MG: 325 TABLET ORAL at 09:16

## 2024-06-13 ASSESSMENT — COGNITIVE AND FUNCTIONAL STATUS - GENERAL
CLIMB 3 TO 5 STEPS WITH RAILING: A LITTLE
WALKING IN HOSPITAL ROOM: A LITTLE
MOVING TO AND FROM BED TO CHAIR: A LITTLE
DRESSING REGULAR UPPER BODY CLOTHING: A LITTLE
STANDING UP FROM CHAIR USING ARMS: A LITTLE
TOILETING: A LITTLE
HELP NEEDED FOR BATHING: A LITTLE
DRESSING REGULAR LOWER BODY CLOTHING: A LITTLE
MOBILITY SCORE: 20
DAILY ACTIVITIY SCORE: 20

## 2024-06-13 ASSESSMENT — PAIN SCALES - GENERAL
PAINLEVEL_OUTOF10: 4
PAINLEVEL_OUTOF10: 3
PAINLEVEL_OUTOF10: 0 - NO PAIN
PAINLEVEL_OUTOF10: 7
PAINLEVEL_OUTOF10: 4

## 2024-06-13 ASSESSMENT — PAIN - FUNCTIONAL ASSESSMENT
PAIN_FUNCTIONAL_ASSESSMENT: 0-10
PAIN_FUNCTIONAL_ASSESSMENT: 0-10

## 2024-06-13 NOTE — PROGRESS NOTES
SUPPORTIVE AND PALLIATIVE ONCOLOGY INPATIENT FOLLOW-UP      SERVICE DATE: 06/13/24     SUBJECTIVE:  Interval Events:      Patient was seen at bedside with his wife. He reports that he has been having difficulty with weakness that seems to be worsening daily. He is unable to lift his right arm and has very limited range of motion as well as bilateral leg weakness. He discussed that his pain in that shoulder has improved and he has been taking pain medications less often, but it is still controlled. We discussed having the option of 4mg p.o. dilaudid q3h for moderate pain that had not been ordered yet. He was reminded that he will have the option of 6mg p.o. dilaudid q3h as well, for severe pain, as well as IV dilaudid for breakthrough pain. He was agreeable to decreasing the IV dose to 1mg dilaudid q3h. He reports 2-3/10 pain in his left abdomen and that is tolerable for him. No complaints of nausea or vomiting, chills and/or sweats or fevers, sleep disturbances, appetite issues, and bowel problems. His last BM was today, 6/13. He has expressed that he has had anxiety, with improvement with ativan last night.     Rad Onc consulted yesterday: recommended:   -Dexamethasone 4mg QID with GI Ppx  -Volumetric brain MRI  -Resume systemic therapy  -Interval spine RT and GKRS, pending review of MRI    Awaiting MRI imaging of brain  Currently scheduled for outpatient chemo on 6/28; primary team to contact primary oncologist to see if possible to move teatment up sooner    Pain Assessment:  Location: Left abdomen  Duration: Constant  Characteristics:   Rating: 3   Descriptors: aching and cramping   Aggravating: Position changes   Relieving: Analgesics Dilaudid   Interference with Function: None    Opioid Use  Past 24 h prn opioid use: Hydromorphone IR 6 mg PO x 2 doses = 12 mg = 48 OME  Hydromorphone 2 mg IV x 1 doses = 2 mg = 40 OME  Fentanyl  mcg/h = 250 OME  Total 24h OME use:  338    Note: OME calculations based on  equianalgesic table below. Please note this table is based on best available evidence but conversions are still approximate. These are NOT opioid DOSES for individual patient use; this is equivalency information.  Drug Parenteral Enteral   Morphine 10 25   Oxycodone N/A 20   Hydromorphone 2 5   Fentanyl 0.15 N/A   Tramadol N/A 120   Citation: Aniya ROSS. Demystifying opioid conversion calculations: A guide for effective dosing, Second edition. [place]: [employer] of Health-System Pharmacists, 2018.    Symptom Assessment:    Anxiety somewhat  Headache a little  Lack of energy somewhat  Depression somewhat  Difficulty Sleeping none    Information obtained from: chart review, interview of patient, and interview of family  ______________________________________________________________________        OBJECTIVE:    Lab Results   Component Value Date    WBC 15.0 (H) 06/13/2024    HGB 9.7 (L) 06/13/2024    HCT 29.7 (L) 06/13/2024    MCV 86 06/13/2024     (L) 06/13/2024      Lab Results   Component Value Date    GLUCOSE 141 (H) 06/13/2024    CALCIUM 9.2 06/13/2024     (L) 06/13/2024    K 4.2 06/13/2024    CO2 29 06/13/2024    CL 95 (L) 06/13/2024    BUN 10 06/13/2024    CREATININE 0.47 (L) 06/13/2024     Lab Results   Component Value Date    ALT 24 06/13/2024    AST 58 (H) 06/13/2024    ALKPHOS 241 (H) 06/13/2024    BILITOT 1.2 06/13/2024     Estimated Creatinine Clearance: 125 mL/min (A) (by C-G formula based on SCr of 0.47 mg/dL (L)).     Scheduled medications  acetaminophen, 650 mg, oral, q6h  [Held by provider] apixaban, 5 mg, oral, BID  dexAMETHasone, 4 mg, intravenous, q6h  docusate sodium, 100 mg, oral, BID  DULoxetine, 60 mg, oral, Daily  fentaNYL, 1 patch, transdermal, q48h  fentaNYL, 1 patch, transdermal, q48h  iohexol, 90 mL, intravenous, Once in imaging  pantoprazole, 40 mg, intravenous, Daily  polyethylene glycol, 17 g, oral, BID  potassium chloride, 40 mEq, oral, BID  pregabalin, 50  mg, oral, BID  sennosides-docusate sodium, 2 tablet, oral, BID      Continuous medications     PRN medications  diclofenac sodium, 4 g, 4x daily PRN  dicyclomine, 10 mg, 4x daily PRN  HYDROmorphone, 6 mg, q3h PRN  HYDROmorphone, 2 mg, q3h PRN  LORazepam, 0.5 mg, q8h PRN  naloxone, 0.2 mg, q5 min PRN  ondansetron, 4 mg, q6h PRN  prochlorperazine, 5 mg, q6h PRN  promethazine, 12.5 mg, q6h PRN  sodium chloride 0.9%, 10 mL, PRN  sodium chloride 0.9%, 10 mL, PRN      }     PHYSICAL EXAMINATION:    Vital Signs:   Vital signs reviewed  Visit Vitals  /84   Pulse 89   Temp 36.3 °C (97.3 °F)   Resp 18        Pain score: 2-3 during assessment     Physical Exam  Constitutional:       Appearance: Normal appearance.   Eyes:      Pupils: Pupils are equal, round, and reactive to light.   Cardiovascular:      Heart sounds: Normal heart rate.   Pulmonary:      Breath sounds: Normal breath sounds.   Abdominal:      Palpations: Abdomen is soft. Tenderness noted.  Musculoskeletal:         General: Decreased ROM in right shoulder and elbow. Unable to lift and weakness noted in right bicep.   Skin:     General: Skin is warm and dry  Neurological:      Mental Status: He is alert and oriented to person, place, and time.      Gait: Abnormal, slow movement and weakness present.  Psychiatric:         Mood and Affect: Anxiety present. Flat affect noted.         ASSESSMENT/PLAN:  Massimo Garcia is a 63 y.o. 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 PMH significant for HTN, Third Degree AV Block s/p PM (11/9/23), OA. Admitted 6/8/2024 for further evaluation and management of abdominal pain. Course complicated by partial SBO with [facility] ED imagining. Supportive and Palliative Oncology is consulted for pain management.      Pain:  Abdominal pain related to Metastatic esophageal cancer   Pain is: cancer related pain  Type:  visceral, somatic, and neuropathic  Pain control: sub-optimally controlled  Home regimen:  Fentanyl TD  and Hydromorphone 4mg q3hrs PRN   fentanyl 125mcgs /48hrs   Personalized pain goal: 2-3  Total OME usage for the past 24 hours: 338mg OME   Continue Fentanyl patch 125mcgs mcgs q 48 hrs scheduled   Patient will need Fentanyl patch 25mcgs and 100mcgs before discharge   Will consider opioid rotating him to methadone in the future, however due to his concern for QTC prolongation will maximize fentanyl patch use.   Change Dilaudid to 1mg IV q3hrs PRN for breakthrough pain  Continue Voltaren Gel topical to his right shoulder.  Add dilaudid 4mg po q 3hrs PRN for moderate pain  Continue dilaudid 6mg PO q3hrs PRN for severe pain    Nausea/Reflux:  Intermittent nausea without vomiting related to opioids and constipation   Home regimen: scopolamine patch, Carafate and Protonix PRN   Improved; well controlled  Continue Compazine 5mg IV q6hr PRN  Continue promethazine 12.5mg IM q6hr PRN   Continue Zofran 4mg q6hrs PRN   Since patient is concerned about QTC prolonging drugs with his history of 3rd degree AV heart block, consider discharging him home on Bentyl 10mg 4x a day  Agreeable to discharging on small dose of Zofran 4mg po q6hrs PRN      Constipation  At risk for constipation related to opioids, currently not constipated  Usual bowel pattern: every day  Home regimen: Senna 1-2 tablets 1-2x/day, Lactulose 20 gm 4x a day PRN, Docusate 100mg BID PRN, and MOM 4x  DAY prn  LBM 6/13/2024     Continue Senna 2 tabs daily  Continue Miralax 17grams daily  Continue docusate sodium 100mg BID    Monitor BM frequency, adjust regimen as needed  Goal to have BM without straining q48-72h     Altered Mood:  Acute on chronic anxiety and depression related to health concerns   controlled with home regimen   Home regimen:  Ativan 1mg TID PRN   Continue Ativan 0.5mg IV q8hrs PRN   Recommend additional dose of Ativan 0.5 mg IV on call for MRI  (pt endorses claustrophobia)     Sleeping Difficulty:  Impaired sleep related to hospital environment  Home regimen:  none  Improved and well controlled  With better pain management and less hospital interruptions he plans to catch up on his sleep tonight.      Decreased appetite:  Related to metastatic disease, pain  Improving; tolerating regular diet  Improvement likely d/t improved pain control and Dexamethasone 4 mg IV daily (started yesterday by primary team per rad onc recommendations)  Continue dexamethasone 4 mg IV daily  Outpatient supportive oncology referred patient to Dr. Myas's office for integrative medicine     Medical Decision Making/Goals of Care/Advance Care Planning:  Patient's current clinical condition, including diagnosis, prognosis, and management plan, and goals of care were discussed.   Life limiting disease: metastatic malignancy  Family: Supportive family   Performance status: Major  limitations due to pain  Joys/meaning/strength: Family  Understanding of health: Demonstrates good understanding of disease process, understands plan for symptom management   Information:Wants full disclosure  Goals: symptom control and cancer directed therapy  Worries and fears now and future: ongoing symptoms   Code status discussion:  full code      Advance Directives  Existence of Advance Directives:No - has interest  Decision maker: HCPOA is wife   Code Status: Full code    Supportive and Palliative Oncology encounter:  Spoke with patient & wife at bedside. Reviewed the role and philosophy of Supportive and Palliative oncology in the evaluation and management of symptoms during cancer treatment.  Palliative care was reviewed as a service for patients with serious illness to help with symptoms, assist with goals of care conversations, navigate complex decision making, improve quality of life for patients, and provide support both patients and families.  Patient seemed to appreciate the extra layer of  support.   Emotional support provided  Coordination of care       Signature and billing:  Thank you for allowing us to participate in the care of this patient. Recommendations will be communicated back to the consulting service by way of shared electronic medical record or face-to-face.    Medical complexity was moderate level due to due to complexity of problems, extensive data review, and high risk of management/treatment.    I spent 50 minutes in the care of this patient which included chart review, interviewing patient/family, discussion with primary team, coordination of care, and documentation.    Data:   Diagnostic tests and information reviewed for today's visit:  Conversation with oncology NP and student, Most recent labs and imaging results, Most recent labs, Most recent imaging, Medications       Some elements copied from Bebeto Rae note on 6/12/24, the elements have been updated and all reflect current decision making from today, 06/13/24       Plan of Care discussed with: Patient, patient's wife, DAISY Patton NP (supportive oncology), and oncology NP.     Thank you for asking Supportive and Palliative Oncology to assist with care of this patient.  We will continue to follow, assess if change in pain medication has been effective and if steroids have helped improve weakness.   Please contact us for additional questions or concerns.      SIGNATURE: ANTONI DASILVA   PAGER/CONTACT:  Contact information:  Supportive and Palliative Oncology  Monday-Friday 8 AM-5 PM  Epic Secure chat or pager [phone].  After hours and weekends:  pager [phone]         TEACHING PROVIDER (Physician/PA/APRN) NOTE OF PERSONAL INVOLVEMENT IN CARE:  I have personally seen and examined the patient and performed the medical decision-making components.  I have reviewed the Advanced Practice Registered Nurse (APRN) student's documentation and verified the findings in the note as written. Edits/additions made where necessary.     Zandra STOVER  ALFREDITO Patton-CNP  PAGER/CONTACT:  Inpatient Supportive and  Palliative Oncology  [facility]  Monday-Friday 8 AM-5 PM  Epic Secure chat or pager [phone].  After hours and weekends:  pager [phone]

## 2024-06-13 NOTE — PROGRESS NOTES
Massimo Garcia is a 63 y.o. male on day 4 of admission presenting with Colitis.    Subjective   Patient seen at bedside with wife present. Patient still complaining of right-sided UE weakness that began yesterday. Patient's wife expressing concern for increased generalized weakness. Discussed findings of imaging on 6/12 negative for acute processes. Volumetric MRI of the head and MRI of C-spine ordered on 6/12 for further workup of new RUE weakness-pending. Discussed that radiation oncology will guide plan of care following interpretation of pending scans. Informed patient that new RUE weakness has unclear etiology. Possibly  in the setting of brain mets/edema vs right axillary lymph node enlargement. Plan for improvement with steroids chemo and radiation likely to assist in improvement of neuro symptoms. Wife expressed that his chemotherapy appointment is currently scheduled on 6/28 and not 6/19 as previously stated. Patient denies all other neurological deficits or infectious symptoms.      Physical Exam  Constitutional:       General: He is not in acute distress.  HENT:      Head: Normocephalic and atraumatic.      Right Ear: External ear normal.      Left Ear: External ear normal.      Nose: Nose normal.      Mouth/Throat:      Mouth: Mucous membranes are moist.   Eyes:      Conjunctiva/sclera: Conjunctivae normal.   Cardiovascular:      Rate and Rhythm: Normal rate.   Pulmonary:      Effort: Pulmonary effort is normal.   Abdominal:      Palpations: Abdomen is soft.   Musculoskeletal:         General: No swelling or deformity.      Cervical back: Neck supple.   Neurological:      Mental Status: He is alert and oriented to person, place, and time.      Sensory: Sensation is intact.      Motor: Weakness (RUE) present.      Gait: Gait is intact.   Psychiatric:         Mood and Affect: Mood normal.         Behavior: Behavior normal.       Last Recorded Vitals  Blood pressure 109/77, pulse 107, temperature 37.1 °C (98.8  "°F), temperature source Temporal, resp. rate 18, height 1.727 m (5' 8\"), weight 73.5 kg (162 lb), SpO2 94%.  Intake/Output last 3 Shifts:  I/O last 3 completed shifts:  In: 450 (6.1 mL/kg) [P.O.:450]  Out: - (0 mL/kg)   Weight: 73.5 kg     Relevant Results              This patient has a central line   Reason for the central line remaining today? Parenteral medication    Assessment/Plan   Principal Problem:    Colitis    Principal Problem:    Jennifer Garcia is a 63 y.o. 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 Patient admitted to [facility] (6/5-6/7) c/f partial SBO, resolved prior to discharge from [facility], surgery consulted at OSH and did not recommend surgical interventions at this time. [facility]8 with continued abdominal pain and diarrhea. CT A/P (6/8) wo bowel obstruction, c/f colitis on imaging. Cdiff and stool pathogen pending. No leukocytosis or infectious symptoms at this time (no fevers/chills), no indication for abx at this time. Supportive oncology consulted for further pain management (6/9), rec cont PO Dilaudid 4mg Q2 PRN for moderate to severe pain, cont IV Dilaudid 2mg Q3 for breakthrough pain, increasing Fentanyl patch change frequency to 100mcgs Q48hr, and starting Bentyl 10mg x4/day PRN for abdominal cramps. Increased Dilaudid PO dose and fentanyl patch dose (6/11) per updated Supportive Oncology updated recs (6/11). Pt tolerating full liquid diet, no nausea or vomiting, advanced to regular diet (6/11). Radiation oncology consulted as he was " recently seen outpatient with plan for CT simulation for gamma knife and radiation to spine, recs pending. 6/12 BAT called d/t new right arm weakness. BAT cancelled because symptoms not consistent with stroke. Neurology consulted for new arm weakness workup. CTH showed concern for bleed of his known left temporal lobe, worsening edema around his right occipital. However, neither can explain his symptoms. CT of the neck showed two larger cervical lymphnodes on the right side at C4-5 level, concern for compression on his right upper brachial plexuses. Rad/onc paged regarding CT head showing worsening disease in brain. Surg/onc consulted d/t concern for brachial plexus injury d/t large cervical lymph node-no plans for surgical interventions. Patient given Dex 10mg x1 dose and then 4mg q6 hours. NSGY consulted d/t concern for temporal bleed. Repeat head CT 6/12 showing same findings as previous. Plan for MRI brain/C-spine w/wo contrast volumetric study-pending    #Acute RUE weakness  -BAT activated on 6/12 at 0859 for new right arm weakness.   -CT brain attack, CT head w/wo IV contrast showing increased temporal nodule and vasogenic edema  -Neurology consulted concerned for bleed from temporal tumor. Repeat CT head stable    -NSGY consulted d/t concern for brain bleed but low suspicion for active bleed    -CT soft tissue neck w/ contrast showing abundant bilateral heterogeneously enhancing lymph nodes consistent  with ralph metastases  -Surg/onc consulted for concern for brachial plexus causing new onset weakness-no surgical interventions   -Discussed with rad/onc recommending MRI brain   -Volumetric MRI of head and MRI C-spine ordered 6/12, currently pending.   -Dexamethasone 10mg x1 then 4mg q6. Plan to discharge with 4mg BID x5 days then 2mg BID x5 days then 2mg daily x5 days. (Per rad onc)   -6/13-Denies new sensory changes to RUE.       # Stage IV Esophageal AdenoCarcinoma (Her2 pos) w/ known mets to Lung/Liver,    # Recent Spine Emergent Decompression/Laminectomy (T8-T12 5/17/24 2/2 to Metastatic Lesion)  #Cancer Related Pain  - Follows w/ Dr. Cannon-Tutu for Oncology; emailed on 6/13  - 6/8 CT A/P wo evidence of bowel obstruction, but w/ short segment of circumferential thickening of ascending colon superior to the cecum new from prior, which may represent colitis. Also, redemonstration of metastatic disease in the visualized lung bases and liver unchanged in the short-term interval since 06/05/2024; redemonstration of abdominopelvic lymphadenopathy; redemonstration of left adrenal nodule, likely also metastatic; also Small left pleural effusion unchanged in the short-term interval. Interval development of trace ascites in the dependent portion of the pelvis  - Palliative care consulted while at [facility]; recommended 2mg IV dilaudid Q3 PRN for severe pain, continued on admission (6/9- )  - EKG (6/8)    - C/w Tylenol 650mg Q6H, Capsaicin cream QID, Duloxetine 60mg daily, Lyrica 50mg BID, Fentanyl patch 100mcg, 0.5mg Lorazepam IV Q8hrs PRN anxiety  - Miralax 17g BID, Senna/Colace 2 tabs BID for opioid induced constipation   - C/w Zofran, Compazine, Phenergan PRN N/V  - Decadron 16mg Ivx1 on 6/8, c/w home prednisone 20mg daily (part of taper, through 6/14, then decreased to 10mg daily for 7 days) with PPI   - Holding home Flexeril  - supportive oncology consulted (6/9), rec cont PO Dilaudid 4mg Q2 PRN for moderate to severe pain, cont IV Dilaudid 2mg Q3 for breakthrough pain, increasing Fentanyl patch change frequency to 100mcgs Q48hr, and starting Bentyl 10mg x4/day PRN for abdominal cramps  - supportive onc updated recs (6/11): increase PO Dilaudid from 4mg to 6mg Q3, and increase Fentanyl from 100mcg to 125mcg Q48hr  - Radiation oncology consulted 6/9; further recs pending (seen outpt on 6/4 with plan for gamma knife and radiation to spine pending CT simulation)   - 6/12 BAT/new acute right arm weakness as above   -FUV  scheduled 6/28 for Infusion with INF 11 at [facility]      # Colitis on Imaging iso Hx of Drug Induced Colitis   - Admitted in May for immunotherapy related colitis (infectious workup remained negative); on steroid taper as above   - Presented to [facility] (6/5-6/7) with abdominal pain/ diarrhea c/f CBO  - CT A/P (6/5): Mildly loops dilated loops of jejunum measuring up to 3.4 cm, early developing partial small bowel obstruction can not be excluded  - While at [facility] pt was passing gas and was having active Bms, general surgery consulted and did not recommend any surgical interventions   - CT A/P (6/8) c/f colitis   - pt wo leukocytosis or fevers   - cdiff negative (6/9)  - Stool pathogen panel negative (6/9)  - pt placed NPO on admission, increased to clear liquid diet (6/9 AM), advanced to full liquid diet (6/9 PM), advanced to regular diet (6/11)  - NS @ 75 x24 hours (6/9)   - supportive oncology consulted 6/9, rec cont PO Dilaudid 4mg Q2 PRN for moderate to severe pain, cont IV Dilaudid 2mg Q3 for breakthrough pain, increasing Fentanyl patch change frequency to 100mcgs Q48hr, and starting Bentyl 10mg x4/day PRN for abdominal cramps     #Hx of PVT/PE (on Eliquis)   - C/w Apixaban 5mg BID-6/12-held d/t concern for bleed s/p new RUE weakness      #HTN   #Third Degree AV Block s/p PM (11/9/23)  - No active BP medications      # Prophy   - eliquis, OOB as able, SCD while in bed      Dispo:   - Full code (confirmed on admission)   - Discharge home pending pain management and radiation oncology plan   - NOK: Jane (wife) [phone]                  CHRISTIAN Little APRN/CNP  Patient discussed with Dr. Guardado

## 2024-06-13 NOTE — CARE PLAN
Problem: Pain  Goal: My pain/discomfort is manageable  Outcome: Progressing     Problem: Safety  Goal: Patient will be injury free during hospitalization  Outcome: Progressing  Goal: I will remain free of falls  Outcome: Progressing     Problem: Daily Care  Goal: Daily care needs are met  Outcome: Progressing     Problem: Psychosocial Needs  Goal: Demonstrates ability to cope with hospitalization/illness  Outcome: Progressing  Goal: Collaborate with me, my family, and caregiver to identify my specific goals  Outcome: Progressing     Problem: Discharge Barriers  Goal: My discharge needs are met  Outcome: Progressing     Problem: Pain  Goal: Takes deep breaths with improved pain control throughout the shift  Outcome: Progressing  Goal: Turns in bed with improved pain control throughout the shift  Outcome: Progressing  Goal: Walks with improved pain control throughout the shift  Outcome: Progressing  Goal: Performs ADL's with improved pain control throughout shift  Outcome: Progressing  Goal: Participates in PT with improved pain control throughout the shift  Outcome: Progressing  Goal: Free from opioid side effects throughout the shift  Outcome: Progressing  Goal: Free from acute confusion related to pain meds throughout the shift  Outcome: Progressing     Problem: Skin  Goal: Decreased wound size/increased tissue granulation at next dressing change  Outcome: Progressing  Goal: Participates in plan/prevention/treatment measures  Outcome: Progressing  Goal: Prevent/manage excess moisture  Outcome: Progressing  Goal: Prevent/minimize sheer/friction injuries  Outcome: Progressing  Goal: Promote/optimize nutrition  Outcome: Progressing  Goal: Promote skin healing  Outcome: Progressing     Problem: Pain - Adult  Goal: Verbalizes/displays adequate comfort level or baseline comfort level  Outcome: Progressing     Problem: Safety - Adult  Goal: Free from fall injury  Outcome: Progressing     Problem: Discharge  Planning  Goal: Discharge to home or other facility with appropriate resources  Outcome: Progressing     Problem: Chronic Conditions and Co-morbidities  Goal: Patient's chronic conditions and co-morbidity symptoms are monitored and maintained or improved  Outcome: Progressing       The clinical goals for the shift include Pt will report pain level less than 7/10 throughout shift.

## 2024-06-13 NOTE — CARE PLAN
The clinical goals for the shift include pt will remain safe and free from injury throughout shift 6/13/2024 0700      Problem: Pain  Goal: My pain/discomfort is manageable  Outcome: Progressing     Problem: Safety  Goal: Patient will be injury free during hospitalization  Outcome: Progressing  Goal: I will remain free of falls  Outcome: Progressing     Problem: Daily Care  Goal: Daily care needs are met  Outcome: Progressing     Problem: Psychosocial Needs  Goal: Demonstrates ability to cope with hospitalization/illness  Outcome: Progressing  Goal: Collaborate with me, my family, and caregiver to identify my specific goals  Outcome: Progressing     Problem: Discharge Barriers  Goal: My discharge needs are met  Outcome: Progressing     Problem: Pain  Goal: Takes deep breaths with improved pain control throughout the shift  Outcome: Progressing  Goal: Turns in bed with improved pain control throughout the shift  Outcome: Progressing  Goal: Walks with improved pain control throughout the shift  Outcome: Progressing  Goal: Performs ADL's with improved pain control throughout shift  Outcome: Progressing  Goal: Participates in PT with improved pain control throughout the shift  Outcome: Progressing  Goal: Free from opioid side effects throughout the shift  Outcome: Progressing  Goal: Free from acute confusion related to pain meds throughout the shift  Outcome: Progressing     Problem: Skin  Goal: Decreased wound size/increased tissue granulation at next dressing change  Outcome: Progressing  Goal: Participates in plan/prevention/treatment measures  Outcome: Progressing  Goal: Prevent/manage excess moisture  Outcome: Progressing  Goal: Prevent/minimize sheer/friction injuries  Outcome: Progressing  Goal: Promote/optimize nutrition  Outcome: Progressing  Goal: Promote skin healing  Outcome: Progressing  Flowsheets (Taken 6/13/2024 0631)  Promote skin healing:   Assess skin/pad under line(s)/device(s)   Rotate device  position/do not position patient on device     Problem: Pain - Adult  Goal: Verbalizes/displays adequate comfort level or baseline comfort level  Outcome: Progressing     Problem: Safety - Adult  Goal: Free from fall injury  Outcome: Progressing     Problem: Discharge Planning  Goal: Discharge to home or other facility with appropriate resources  Outcome: Progressing     Problem: Chronic Conditions and Co-morbidities  Goal: Patient's chronic conditions and co-morbidity symptoms are monitored and maintained or improved  Outcome: Progressing

## 2024-06-13 NOTE — TELEPHONE ENCOUNTER
Called back and spoke to pt's wife, Jane.  Explained that we can move up treatments, but need pt to be out of hospital prior to planning treatment.  She states she just wanted to know something can be done.  Discussed speaking more to oncology team who is working with pt.  She states she will.

## 2024-06-13 NOTE — TELEPHONE ENCOUNTER
Called and LM chirag Lara that the covering physician is happy to assist with pt's care, just to call and we will verify.

## 2024-06-14 ENCOUNTER — APPOINTMENT (OUTPATIENT)
Dept: CARDIOLOGY | Facility: HOSPITAL | Age: 64
DRG: 393 | End: 2024-06-14
Payer: MEDICARE

## 2024-06-14 ENCOUNTER — APPOINTMENT (OUTPATIENT)
Dept: NEUROSURGERY | Facility: CLINIC | Age: 64
End: 2024-06-14
Payer: MEDICARE

## 2024-06-14 ENCOUNTER — APPOINTMENT (OUTPATIENT)
Dept: RADIOLOGY | Facility: HOSPITAL | Age: 64
DRG: 393 | End: 2024-06-14
Payer: MEDICARE

## 2024-06-14 ENCOUNTER — APPOINTMENT (OUTPATIENT)
Dept: RADIOLOGY | Facility: HOSPITAL | Age: 64
End: 2024-06-14
Payer: MEDICARE

## 2024-06-14 LAB
ALBUMIN SERPL BCP-MCNC: 3.2 G/DL (ref 3.4–5)
ALP SERPL-CCNC: 288 U/L (ref 33–136)
ALT SERPL W P-5'-P-CCNC: 26 U/L (ref 10–52)
ANION GAP SERPL CALC-SCNC: 13 MMOL/L (ref 10–20)
AST SERPL W P-5'-P-CCNC: 57 U/L (ref 9–39)
BASOPHILS # BLD MANUAL: 0 X10*3/UL (ref 0–0.1)
BASOPHILS NFR BLD MANUAL: 0 %
BILIRUB SERPL-MCNC: 0.9 MG/DL (ref 0–1.2)
BODY SURFACE AREA: 1.88 M2
BODY SURFACE AREA: 1.88 M2
BUN SERPL-MCNC: 16 MG/DL (ref 6–23)
BURR CELLS BLD QL SMEAR: ABNORMAL
CALCIUM SERPL-MCNC: 9.3 MG/DL (ref 8.6–10.6)
CHLORIDE SERPL-SCNC: 96 MMOL/L (ref 98–107)
CO2 SERPL-SCNC: 27 MMOL/L (ref 21–32)
CREAT SERPL-MCNC: 0.5 MG/DL (ref 0.5–1.3)
DACRYOCYTES BLD QL SMEAR: ABNORMAL
EGFRCR SERPLBLD CKD-EPI 2021: >90 ML/MIN/1.73M*2
EOSINOPHIL # BLD MANUAL: 0 X10*3/UL (ref 0–0.7)
EOSINOPHIL NFR BLD MANUAL: 0 %
ERYTHROCYTE [DISTWIDTH] IN BLOOD BY AUTOMATED COUNT: 16.5 % (ref 11.5–14.5)
GLUCOSE SERPL-MCNC: 135 MG/DL (ref 74–99)
HCT VFR BLD AUTO: 28.5 % (ref 41–52)
HGB BLD-MCNC: 9.2 G/DL (ref 13.5–17.5)
IMM GRANULOCYTES # BLD AUTO: 1.24 X10*3/UL (ref 0–0.7)
IMM GRANULOCYTES NFR BLD AUTO: 5.5 % (ref 0–0.9)
LYMPHOCYTES # BLD MANUAL: 0.2 X10*3/UL (ref 1.2–4.8)
LYMPHOCYTES NFR BLD MANUAL: 0.9 %
MAGNESIUM SERPL-MCNC: 1.8 MG/DL (ref 1.6–2.4)
MCH RBC QN AUTO: 28.4 PG (ref 26–34)
MCHC RBC AUTO-ENTMCNC: 32.3 G/DL (ref 32–36)
MCV RBC AUTO: 88 FL (ref 80–100)
MONOCYTES # BLD MANUAL: 0.98 X10*3/UL (ref 0.1–1)
MONOCYTES NFR BLD MANUAL: 4.3 %
MYELOCYTES # BLD MANUAL: 0.2 X10*3/UL
MYELOCYTES NFR BLD MANUAL: 0.9 %
NEUTS SEG # BLD MANUAL: 21.11 X10*3/UL (ref 1.2–7)
NEUTS SEG NFR BLD MANUAL: 93 %
NRBC BLD-RTO: 0 /100 WBCS (ref 0–0)
OVALOCYTES BLD QL SMEAR: ABNORMAL
PLATELET # BLD AUTO: 160 X10*3/UL (ref 150–450)
POLYCHROMASIA BLD QL SMEAR: ABNORMAL
POTASSIUM SERPL-SCNC: 4.3 MMOL/L (ref 3.5–5.3)
PROMYELOCYTES # BLD MANUAL: 0.2 X10*3/UL
PROMYELOCYTES NFR BLD MANUAL: 0.9 %
PROT SERPL-MCNC: 5.5 G/DL (ref 6.4–8.2)
RBC # BLD AUTO: 3.24 X10*6/UL (ref 4.5–5.9)
RBC MORPH BLD: ABNORMAL
SODIUM SERPL-SCNC: 132 MMOL/L (ref 136–145)
TOTAL CELLS COUNTED BLD: 115
WBC # BLD AUTO: 22.7 X10*3/UL (ref 4.4–11.3)

## 2024-06-14 PROCEDURE — 72156 MRI NECK SPINE W/O & W/DYE: CPT

## 2024-06-14 PROCEDURE — 2500000001 HC RX 250 WO HCPCS SELF ADMINISTERED DRUGS (ALT 637 FOR MEDICARE OP): Performed by: STUDENT IN AN ORGANIZED HEALTH CARE EDUCATION/TRAINING PROGRAM

## 2024-06-14 PROCEDURE — C9113 INJ PANTOPRAZOLE SODIUM, VIA: HCPCS | Performed by: STUDENT IN AN ORGANIZED HEALTH CARE EDUCATION/TRAINING PROGRAM

## 2024-06-14 PROCEDURE — 2500000001 HC RX 250 WO HCPCS SELF ADMINISTERED DRUGS (ALT 637 FOR MEDICARE OP)

## 2024-06-14 PROCEDURE — 2500000004 HC RX 250 GENERAL PHARMACY W/ HCPCS (ALT 636 FOR OP/ED): Performed by: STUDENT IN AN ORGANIZED HEALTH CARE EDUCATION/TRAINING PROGRAM

## 2024-06-14 PROCEDURE — 2500000004 HC RX 250 GENERAL PHARMACY W/ HCPCS (ALT 636 FOR OP/ED)

## 2024-06-14 PROCEDURE — 1170000001 HC PRIVATE ONCOLOGY ROOM DAILY

## 2024-06-14 PROCEDURE — 99233 SBSQ HOSP IP/OBS HIGH 50: CPT | Performed by: STUDENT IN AN ORGANIZED HEALTH CARE EDUCATION/TRAINING PROGRAM

## 2024-06-14 PROCEDURE — 70553 MRI BRAIN STEM W/O & W/DYE: CPT | Performed by: RADIOLOGY

## 2024-06-14 PROCEDURE — 93286 PERI-PX EVAL PM/LDLS PM IP: CPT | Performed by: INTERNAL MEDICINE

## 2024-06-14 PROCEDURE — 72156 MRI NECK SPINE W/O & W/DYE: CPT | Performed by: RADIOLOGY

## 2024-06-14 PROCEDURE — 93286 PERI-PX EVAL PM/LDLS PM IP: CPT

## 2024-06-14 PROCEDURE — 2550000001 HC RX 255 CONTRASTS: Performed by: PHYSICIAN ASSISTANT

## 2024-06-14 PROCEDURE — 85027 COMPLETE CBC AUTOMATED: CPT

## 2024-06-14 PROCEDURE — 85007 BL SMEAR W/DIFF WBC COUNT: CPT

## 2024-06-14 PROCEDURE — 83735 ASSAY OF MAGNESIUM: CPT

## 2024-06-14 PROCEDURE — A9575 INJ GADOTERATE MEGLUMI 0.1ML: HCPCS | Performed by: PHYSICIAN ASSISTANT

## 2024-06-14 PROCEDURE — 70553 MRI BRAIN STEM W/O & W/DYE: CPT

## 2024-06-14 PROCEDURE — 80053 COMPREHEN METABOLIC PANEL: CPT

## 2024-06-14 PROCEDURE — 2500000002 HC RX 250 W HCPCS SELF ADMINISTERED DRUGS (ALT 637 FOR MEDICARE OP, ALT 636 FOR OP/ED): Performed by: STUDENT IN AN ORGANIZED HEALTH CARE EDUCATION/TRAINING PROGRAM

## 2024-06-14 RX ORDER — GADOTERATE MEGLUMINE 376.9 MG/ML
15 INJECTION INTRAVENOUS
Status: COMPLETED | OUTPATIENT
Start: 2024-06-14 | End: 2024-06-14

## 2024-06-14 RX ORDER — LORAZEPAM 2 MG/ML
0.5 INJECTION INTRAMUSCULAR ONCE
Status: COMPLETED | OUTPATIENT
Start: 2024-06-14 | End: 2024-06-14

## 2024-06-14 RX ADMIN — ACETAMINOPHEN 650 MG: 325 TABLET ORAL at 05:06

## 2024-06-14 RX ADMIN — ACETAMINOPHEN 650 MG: 325 TABLET ORAL at 10:16

## 2024-06-14 RX ADMIN — DEXAMETHASONE SODIUM PHOSPHATE 4 MG: 4 INJECTION, SOLUTION INTRA-ARTICULAR; INTRALESIONAL; INTRAMUSCULAR; INTRAVENOUS; SOFT TISSUE at 05:06

## 2024-06-14 RX ADMIN — ACETAMINOPHEN 650 MG: 325 TABLET ORAL at 17:04

## 2024-06-14 RX ADMIN — HYDROMORPHONE HYDROCHLORIDE 4 MG: 4 TABLET ORAL at 10:14

## 2024-06-14 RX ADMIN — DEXAMETHASONE SODIUM PHOSPHATE 4 MG: 4 INJECTION, SOLUTION INTRA-ARTICULAR; INTRALESIONAL; INTRAMUSCULAR; INTRAVENOUS; SOFT TISSUE at 11:30

## 2024-06-14 RX ADMIN — HYDROMORPHONE HYDROCHLORIDE 1 MG: 1 INJECTION, SOLUTION INTRAMUSCULAR; INTRAVENOUS; SUBCUTANEOUS at 15:06

## 2024-06-14 RX ADMIN — PREGABALIN 50 MG: 25 CAPSULE ORAL at 10:17

## 2024-06-14 RX ADMIN — DULOXETINE HYDROCHLORIDE 60 MG: 60 CAPSULE, DELAYED RELEASE ORAL at 10:17

## 2024-06-14 RX ADMIN — DEXAMETHASONE SODIUM PHOSPHATE 4 MG: 4 INJECTION, SOLUTION INTRA-ARTICULAR; INTRALESIONAL; INTRAMUSCULAR; INTRAVENOUS; SOFT TISSUE at 22:27

## 2024-06-14 RX ADMIN — PANTOPRAZOLE SODIUM 40 MG: 40 INJECTION, POWDER, FOR SOLUTION INTRAVENOUS at 10:17

## 2024-06-14 RX ADMIN — POTASSIUM CHLORIDE 40 MEQ: 1.5 POWDER, FOR SOLUTION ORAL at 10:17

## 2024-06-14 RX ADMIN — ACETAMINOPHEN 650 MG: 325 TABLET ORAL at 22:26

## 2024-06-14 RX ADMIN — DOCUSATE SODIUM 100 MG: 100 CAPSULE, LIQUID FILLED ORAL at 20:51

## 2024-06-14 RX ADMIN — PREGABALIN 50 MG: 25 CAPSULE ORAL at 20:52

## 2024-06-14 RX ADMIN — DEXAMETHASONE SODIUM PHOSPHATE 4 MG: 4 INJECTION, SOLUTION INTRA-ARTICULAR; INTRALESIONAL; INTRAMUSCULAR; INTRAVENOUS; SOFT TISSUE at 17:05

## 2024-06-14 RX ADMIN — DOCUSATE SODIUM 100 MG: 100 CAPSULE, LIQUID FILLED ORAL at 10:17

## 2024-06-14 RX ADMIN — LORAZEPAM 0.5 MG: 2 INJECTION INTRAMUSCULAR; INTRAVENOUS at 12:50

## 2024-06-14 RX ADMIN — POTASSIUM CHLORIDE 40 MEQ: 1.5 POWDER, FOR SOLUTION ORAL at 20:52

## 2024-06-14 RX ADMIN — HYDROMORPHONE HYDROCHLORIDE 6 MG: 4 TABLET ORAL at 18:07

## 2024-06-14 RX ADMIN — GADOTERATE MEGLUMINE 15 ML: 376.9 INJECTION INTRAVENOUS at 13:44

## 2024-06-14 RX ADMIN — LORAZEPAM 0.5 MG: 2 INJECTION INTRAMUSCULAR; INTRAVENOUS at 10:57

## 2024-06-14 ASSESSMENT — PAIN SCALES - GENERAL
PAINLEVEL_OUTOF10: 7
PAINLEVEL_OUTOF10: 5 - MODERATE PAIN
PAINLEVEL_OUTOF10: 10 - WORST POSSIBLE PAIN
PAINLEVEL_OUTOF10: 4

## 2024-06-14 ASSESSMENT — COGNITIVE AND FUNCTIONAL STATUS - GENERAL
WALKING IN HOSPITAL ROOM: A LITTLE
DRESSING REGULAR UPPER BODY CLOTHING: A LITTLE
DRESSING REGULAR LOWER BODY CLOTHING: A LITTLE
MOVING TO AND FROM BED TO CHAIR: A LITTLE
HELP NEEDED FOR BATHING: A LITTLE
STANDING UP FROM CHAIR USING ARMS: A LITTLE
TOILETING: A LITTLE
STANDING UP FROM CHAIR USING ARMS: A LITTLE
DRESSING REGULAR UPPER BODY CLOTHING: A LITTLE
DAILY ACTIVITIY SCORE: 20
MOBILITY SCORE: 21
DRESSING REGULAR LOWER BODY CLOTHING: A LITTLE
MOVING TO AND FROM BED TO CHAIR: A LITTLE
CLIMB 3 TO 5 STEPS WITH RAILING: A LITTLE
TOILETING: A LITTLE
HELP NEEDED FOR BATHING: A LITTLE
WALKING IN HOSPITAL ROOM: A LITTLE
MOBILITY SCORE: 20
DAILY ACTIVITIY SCORE: 20

## 2024-06-14 ASSESSMENT — PAIN DESCRIPTION - LOCATION
LOCATION: SHOULDER
LOCATION: ABDOMEN

## 2024-06-14 ASSESSMENT — PAIN DESCRIPTION - ORIENTATION: ORIENTATION: RIGHT

## 2024-06-14 NOTE — PROGRESS NOTES
Physical Therapy                 Therapy Communication Note    Patient Name: Massimo Garcia  MRN: [id number]  Today's Date: 6/14/2024     Discipline: Physical Therapy    Missed Visit Reason: Missed Visit Reason:  (pt off floor at MRI. PT checked multiple times throughout late morning/early afternoon, with pt continuing to be off floor.)    Missed Time: Bonita Garcia, PT

## 2024-06-14 NOTE — ED PROVIDER NOTES
HPI   Chief Complaint   Patient presents with    Abdominal Pain       63-year-old male here for chief complaint of severe abdominal pain that started at midnight.  He started vomiting around 2 AM.  No diarrhea or constipation.  He has extensive history of esophageal cancer with metastasis.  He denies any fever chills or night sweats.  He rates the pain at 20 out of 10.                          No data recorded                   Patient History   Past Medical History:   Diagnosis Date    Essential (primary) hypertension 12/21/2013    Hypertension    Pacemaker     Peripheral neuropathy     Personal history of other diseases of the circulatory system     History of right bundle branch block (RBBB)    Pneumothorax 12/04/2023     Past Surgical History:   Procedure Laterality Date    CARDIAC ELECTROPHYSIOLOGY PROCEDURE N/A 11/7/2023    Procedure: Temporary Pacemaker Insertion;  Surgeon: Alexis Shook MD;  Location: [facility];  Service: Cardiovascular;  Laterality: N/A;    CARDIAC ELECTROPHYSIOLOGY PROCEDURE Left 11/9/2023    Procedure: PPM IMPLANT DUAL;  Surgeon: Elias Connolly MD;  Location: [facility];  Service: Electrophysiology;  Laterality: Left;    TOTAL KNEE ARTHROPLASTY  09/30/2016    Total Knee Replacement Left    TOTAL KNEE ARTHROPLASTY  09/30/2016    Total Knee Replacement Right     Family History   Problem Relation Name Age of Onset    Cancer Father       Social History     Tobacco Use    Smoking status: Former     Types: Cigarettes, Cigars     Passive exposure: Never    Smokeless tobacco: Never   Vaping Use    Vaping status: Unknown   Substance Use Topics    Alcohol use: Not Currently    Drug use: Yes     Types: Marijuana     Comment: medical peace card       Physical Exam   ED Triage Vitals   Temp Heart Rate Resp BP   06/05/24 0801 06/05/24 0801 06/05/24 0801 06/05/24 0801   36.6 °C (97.9 °F) 62 18 (!) 132/97      SpO2 Temp Source Heart Rate Source Patient Position   06/05/24  0801 06/05/24 0801 06/05/24 0801 06/05/24 1707   99 % Temporal Monitor Lying      BP Location FiO2 (%)     06/05/24 1946 --     Left arm        Physical Exam  Vitals and nursing note reviewed.   Constitutional:       Appearance: Normal appearance. He is ill-appearing.   HENT:      Head: Normocephalic and atraumatic.      Nose: Nose normal.      Mouth/Throat:      Mouth: Mucous membranes are moist.   Eyes:      Extraocular Movements: Extraocular movements intact.      Pupils: Pupils are equal, round, and reactive to light.   Cardiovascular:      Rate and Rhythm: Normal rate and regular rhythm.   Pulmonary:      Effort: Pulmonary effort is normal.      Breath sounds: Normal breath sounds.   Abdominal:      General: Abdomen is flat. Bowel sounds are decreased. There is distension.      Palpations: Abdomen is soft.      Tenderness: There is generalized abdominal tenderness.   Musculoskeletal:         General: Normal range of motion.      Cervical back: Normal range of motion.   Skin:     General: Skin is warm and dry.      Capillary Refill: Capillary refill takes less than 2 seconds.   Neurological:      General: No focal deficit present.      Mental Status: He is alert.   Psychiatric:         Mood and Affect: Mood normal.         ED Course & MDM   Diagnoses as of 06/14/24 0840   SBO (small bowel obstruction) (Multi)       Medical Decision Making      Medical Decision Making: Patient was fully worked up lab work was reassuring except for elevated transaminitis and CT scan shows metastatic disease a small left pleural effusion hepatic masses abdominal pelvic lymphadenopathy and a developing partial small bowel obstruction.  Patient was given multiple doses of pain medicine and IV fluids were started as well.  He was given Zofran.  I spoke with downtown they will accept him after having much discussion with her general surgery down here on-call today we feel the patient would be better served [facility] where he just had a  recent surgery on his back.  Currently is having no back pain though.  [unfilled]     Differential Diagnoses Considered: Bowel obstruction sepsis NORTH    Chronic Medical Conditions Significantly Affecting Care: Metastatic cancer    External Records Reviewed: I reviewed recent and relevant outside records including: Previous labs    Social Determinants of Health Significantly Affecting Care: Lives with family    Diagnostic testing considered: Ultrasound abdomen    Ria Luna D.O.  Emergency Medicine          Procedure  Procedures     Ria Luna,   06/14/24 0820

## 2024-06-14 NOTE — SIGNIFICANT EVENT
MRI brain and C spine reviewed.  No surgical pathology on imaging, no etiology for proximal RUE weakness on imaging.  No evidence of hemorrhage on MRI brain.  Rest of work up per neurology and primary team.     Thank you for allowing us to participate in the care of this patient. Will sign off at this time. Please page with any questions or concerns.

## 2024-06-14 NOTE — NURSING NOTE
The device clinical nurse arrived checked patient's pacemaker and turned pacing back on, per device clinical nurse. AKUA

## 2024-06-14 NOTE — CARE PLAN
The clinical goals for the shift include pt VS will remain stable throughout shift 6/14/2024 0700      Problem: Pain  Goal: My pain/discomfort is manageable  Outcome: Progressing     Problem: Safety  Goal: Patient will be injury free during hospitalization  Outcome: Progressing  Goal: I will remain free of falls  Outcome: Progressing  Flowsheets (Taken 6/14/2024 0216)  Resident will remain free of falls:   Apply bed/chair alarms as appropriate   Maintain bed at position as ordered (chair height, low bed)   Visual checks per facility policy     Problem: Daily Care  Goal: Daily care needs are met  Outcome: Progressing     Problem: Psychosocial Needs  Goal: Demonstrates ability to cope with hospitalization/illness  Outcome: Progressing  Goal: Collaborate with me, my family, and caregiver to identify my specific goals  Outcome: Progressing     Problem: Discharge Barriers  Goal: My discharge needs are met  Outcome: Progressing     Problem: Pain  Goal: Takes deep breaths with improved pain control throughout the shift  Outcome: Progressing  Goal: Turns in bed with improved pain control throughout the shift  Outcome: Progressing  Goal: Walks with improved pain control throughout the shift  Outcome: Progressing  Goal: Performs ADL's with improved pain control throughout shift  Outcome: Progressing  Goal: Participates in PT with improved pain control throughout the shift  Outcome: Progressing  Goal: Free from opioid side effects throughout the shift  Outcome: Progressing  Goal: Free from acute confusion related to pain meds throughout the shift  Outcome: Progressing     Problem: Skin  Goal: Decreased wound size/increased tissue granulation at next dressing change  Outcome: Progressing  Goal: Participates in plan/prevention/treatment measures  Outcome: Progressing  Goal: Prevent/manage excess moisture  Outcome: Progressing  Goal: Prevent/minimize sheer/friction injuries  Outcome: Progressing  Goal: Promote/optimize  nutrition  Outcome: Progressing  Goal: Promote skin healing  Outcome: Progressing  Flowsheets (Taken 6/14/2024 0216)  Promote skin healing:   Assess skin/pad under line(s)/device(s)   Rotate device position/do not position patient on device     Problem: Pain - Adult  Goal: Verbalizes/displays adequate comfort level or baseline comfort level  Outcome: Progressing     Problem: Safety - Adult  Goal: Free from fall injury  Outcome: Progressing     Problem: Discharge Planning  Goal: Discharge to home or other facility with appropriate resources  Outcome: Progressing     Problem: Chronic Conditions and Co-morbidities  Goal: Patient's chronic conditions and co-morbidity symptoms are monitored and maintained or improved  Outcome: Progressing

## 2024-06-14 NOTE — NURSING NOTE
1245 Device clinic nurse arrival to interrogate patient's pacemaker; pacing turned off. 1310 Patient C/O pain to his R. shoulder; patient repositioned on MRI table for comfort. BL

## 2024-06-14 NOTE — SIGNIFICANT EVENT
"6/13/2024 1945:   Pt call light going off and this RN entered room. Pt sitting on couch with daughter. RN asked if there was anything I could get the pt. Pt states \"No but I just wanted to let you know I fell in the bathroom around 7pm\". Pt stated that he was going to sit down on the toilet and used his right arm (that has known weakness) and it slipped and he fell on his butt. RN asked if pt hit head and pt stated \"maybe, I'm not sure\". Family at bedside at time of fall. ALFREDITO Carrillo-CNP notified face to face and came to bedside to assess pt. Order for CT of head was placed. VS Temp: 36.6, HR:114, RR: 18, BP: 136/68, 02: 95%. Pt denies any lightheadedness, pain, SOB, dizziness, or vision changes.     Pt assisted back into bed by this RN and educated to not get up without assistance. Pt verbalized understanding. Bed alarm on, call light within reach, bed in lowest position.       "

## 2024-06-14 NOTE — CARE PLAN
Problem: Pain  Goal: My pain/discomfort is manageable  Outcome: Progressing     Problem: Safety  Goal: Patient will be injury free during hospitalization  Outcome: Progressing  Goal: I will remain free of falls  Outcome: Progressing     Problem: Daily Care  Goal: Daily care needs are met  Outcome: Progressing     Problem: Psychosocial Needs  Goal: Demonstrates ability to cope with hospitalization/illness  Outcome: Progressing  Goal: Collaborate with me, my family, and caregiver to identify my specific goals  Outcome: Progressing     Problem: Pain  Goal: Takes deep breaths with improved pain control throughout the shift  Outcome: Progressing  Goal: Turns in bed with improved pain control throughout the shift  Outcome: Progressing  Goal: Walks with improved pain control throughout the shift  Outcome: Progressing  Goal: Performs ADL's with improved pain control throughout shift  Outcome: Progressing  Goal: Participates in PT with improved pain control throughout the shift  Outcome: Progressing  Goal: Free from opioid side effects throughout the shift  Outcome: Progressing  Goal: Free from acute confusion related to pain meds throughout the shift  Outcome: Progressing       The clinical goals for the shift include Pt will remain safe and free from injury and falls throughout shift.

## 2024-06-15 ENCOUNTER — APPOINTMENT (OUTPATIENT)
Dept: RADIOLOGY | Facility: HOSPITAL | Age: 64
DRG: 393 | End: 2024-06-15
Payer: MEDICARE

## 2024-06-15 LAB
ALBUMIN SERPL BCP-MCNC: 3.1 G/DL (ref 3.4–5)
ALP SERPL-CCNC: 308 U/L (ref 33–136)
ALT SERPL W P-5'-P-CCNC: 29 U/L (ref 10–52)
ANION GAP SERPL CALC-SCNC: 15 MMOL/L (ref 10–20)
AST SERPL W P-5'-P-CCNC: 60 U/L (ref 9–39)
BASOPHILS # BLD MANUAL: 0 X10*3/UL (ref 0–0.1)
BASOPHILS NFR BLD MANUAL: 0 %
BILIRUB SERPL-MCNC: 1 MG/DL (ref 0–1.2)
BUN SERPL-MCNC: 16 MG/DL (ref 6–23)
CALCIUM SERPL-MCNC: 9.3 MG/DL (ref 8.6–10.6)
CHLORIDE SERPL-SCNC: 97 MMOL/L (ref 98–107)
CO2 SERPL-SCNC: 23 MMOL/L (ref 21–32)
CREAT SERPL-MCNC: 0.64 MG/DL (ref 0.5–1.3)
EGFRCR SERPLBLD CKD-EPI 2021: >90 ML/MIN/1.73M*2
EOSINOPHIL # BLD MANUAL: 0 X10*3/UL (ref 0–0.7)
EOSINOPHIL NFR BLD MANUAL: 0 %
ERYTHROCYTE [DISTWIDTH] IN BLOOD BY AUTOMATED COUNT: 16.4 % (ref 11.5–14.5)
GLUCOSE SERPL-MCNC: 124 MG/DL (ref 74–99)
HCT VFR BLD AUTO: 27.2 % (ref 41–52)
HGB BLD-MCNC: 9 G/DL (ref 13.5–17.5)
IMM GRANULOCYTES # BLD AUTO: 1.45 X10*3/UL (ref 0–0.7)
IMM GRANULOCYTES NFR BLD AUTO: 7 % (ref 0–0.9)
LYMPHOCYTES # BLD MANUAL: 0.54 X10*3/UL (ref 1.2–4.8)
LYMPHOCYTES NFR BLD MANUAL: 2.6 %
MAGNESIUM SERPL-MCNC: 1.59 MG/DL (ref 1.6–2.4)
MCH RBC QN AUTO: 27.8 PG (ref 26–34)
MCHC RBC AUTO-ENTMCNC: 33.1 G/DL (ref 32–36)
MCV RBC AUTO: 84 FL (ref 80–100)
MONOCYTES # BLD MANUAL: 1.07 X10*3/UL (ref 0.1–1)
MONOCYTES NFR BLD MANUAL: 5.1 %
MYELOCYTES # BLD MANUAL: 0.36 X10*3/UL
MYELOCYTES NFR BLD MANUAL: 1.7 %
NEUTS SEG # BLD MANUAL: 18.94 X10*3/UL (ref 1.2–7)
NEUTS SEG NFR BLD MANUAL: 90.6 %
NRBC BLD-RTO: 0 /100 WBCS (ref 0–0)
PLATELET # BLD AUTO: 130 X10*3/UL (ref 150–450)
POTASSIUM SERPL-SCNC: 4 MMOL/L (ref 3.5–5.3)
PROT SERPL-MCNC: 5.4 G/DL (ref 6.4–8.2)
RBC # BLD AUTO: 3.24 X10*6/UL (ref 4.5–5.9)
RBC MORPH BLD: ABNORMAL
SODIUM SERPL-SCNC: 131 MMOL/L (ref 136–145)
TOTAL CELLS COUNTED BLD: 117
WBC # BLD AUTO: 20.9 X10*3/UL (ref 4.4–11.3)

## 2024-06-15 PROCEDURE — 99233 SBSQ HOSP IP/OBS HIGH 50: CPT

## 2024-06-15 PROCEDURE — 99231 SBSQ HOSP IP/OBS SF/LOW 25: CPT | Performed by: STUDENT IN AN ORGANIZED HEALTH CARE EDUCATION/TRAINING PROGRAM

## 2024-06-15 PROCEDURE — 2550000001 HC RX 255 CONTRASTS: Performed by: INTERNAL MEDICINE

## 2024-06-15 PROCEDURE — 2500000002 HC RX 250 W HCPCS SELF ADMINISTERED DRUGS (ALT 637 FOR MEDICARE OP, ALT 636 FOR OP/ED): Performed by: STUDENT IN AN ORGANIZED HEALTH CARE EDUCATION/TRAINING PROGRAM

## 2024-06-15 PROCEDURE — 83735 ASSAY OF MAGNESIUM: CPT

## 2024-06-15 PROCEDURE — 2500000001 HC RX 250 WO HCPCS SELF ADMINISTERED DRUGS (ALT 637 FOR MEDICARE OP): Performed by: STUDENT IN AN ORGANIZED HEALTH CARE EDUCATION/TRAINING PROGRAM

## 2024-06-15 PROCEDURE — 85007 BL SMEAR W/DIFF WBC COUNT: CPT

## 2024-06-15 PROCEDURE — 71260 CT THORAX DX C+: CPT

## 2024-06-15 PROCEDURE — 2500000004 HC RX 250 GENERAL PHARMACY W/ HCPCS (ALT 636 FOR OP/ED)

## 2024-06-15 PROCEDURE — 2500000002 HC RX 250 W HCPCS SELF ADMINISTERED DRUGS (ALT 637 FOR MEDICARE OP, ALT 636 FOR OP/ED)

## 2024-06-15 PROCEDURE — 2500000001 HC RX 250 WO HCPCS SELF ADMINISTERED DRUGS (ALT 637 FOR MEDICARE OP)

## 2024-06-15 PROCEDURE — 71260 CT THORAX DX C+: CPT | Performed by: RADIOLOGY

## 2024-06-15 PROCEDURE — 1170000001 HC PRIVATE ONCOLOGY ROOM DAILY

## 2024-06-15 PROCEDURE — 85027 COMPLETE CBC AUTOMATED: CPT

## 2024-06-15 PROCEDURE — 2500000004 HC RX 250 GENERAL PHARMACY W/ HCPCS (ALT 636 FOR OP/ED): Performed by: STUDENT IN AN ORGANIZED HEALTH CARE EDUCATION/TRAINING PROGRAM

## 2024-06-15 PROCEDURE — C9113 INJ PANTOPRAZOLE SODIUM, VIA: HCPCS | Performed by: STUDENT IN AN ORGANIZED HEALTH CARE EDUCATION/TRAINING PROGRAM

## 2024-06-15 PROCEDURE — 80053 COMPREHEN METABOLIC PANEL: CPT

## 2024-06-15 RX ADMIN — ACETAMINOPHEN 650 MG: 325 TABLET ORAL at 10:51

## 2024-06-15 RX ADMIN — DOCUSATE SODIUM 100 MG: 100 CAPSULE, LIQUID FILLED ORAL at 08:21

## 2024-06-15 RX ADMIN — ACETAMINOPHEN 650 MG: 325 TABLET ORAL at 15:57

## 2024-06-15 RX ADMIN — DICYCLOMINE HYDROCHLORIDE 10 MG: 10 CAPSULE ORAL at 14:42

## 2024-06-15 RX ADMIN — FENTANYL 1 PATCH: 25 PATCH TRANSDERMAL at 13:11

## 2024-06-15 RX ADMIN — DEXAMETHASONE SODIUM PHOSPHATE 4 MG: 4 INJECTION, SOLUTION INTRA-ARTICULAR; INTRALESIONAL; INTRAMUSCULAR; INTRAVENOUS; SOFT TISSUE at 22:59

## 2024-06-15 RX ADMIN — IOHEXOL 75 ML: 350 INJECTION, SOLUTION INTRAVENOUS at 15:28

## 2024-06-15 RX ADMIN — LORAZEPAM 0.5 MG: 2 INJECTION INTRAMUSCULAR; INTRAVENOUS at 04:32

## 2024-06-15 RX ADMIN — PREGABALIN 50 MG: 25 CAPSULE ORAL at 08:21

## 2024-06-15 RX ADMIN — DEXAMETHASONE SODIUM PHOSPHATE 4 MG: 4 INJECTION, SOLUTION INTRA-ARTICULAR; INTRALESIONAL; INTRAMUSCULAR; INTRAVENOUS; SOFT TISSUE at 04:32

## 2024-06-15 RX ADMIN — HYDROMORPHONE HYDROCHLORIDE 6 MG: 4 TABLET ORAL at 14:42

## 2024-06-15 RX ADMIN — DEXAMETHASONE SODIUM PHOSPHATE 4 MG: 4 INJECTION, SOLUTION INTRA-ARTICULAR; INTRALESIONAL; INTRAMUSCULAR; INTRAVENOUS; SOFT TISSUE at 13:11

## 2024-06-15 RX ADMIN — PANTOPRAZOLE SODIUM 40 MG: 40 INJECTION, POWDER, FOR SOLUTION INTRAVENOUS at 08:21

## 2024-06-15 RX ADMIN — FENTANYL 1 PATCH: 100 PATCH TRANSDERMAL at 13:11

## 2024-06-15 RX ADMIN — HYDROMORPHONE HYDROCHLORIDE 1 MG: 1 INJECTION, SOLUTION INTRAMUSCULAR; INTRAVENOUS; SUBCUTANEOUS at 10:48

## 2024-06-15 RX ADMIN — ACETAMINOPHEN 650 MG: 325 TABLET ORAL at 04:04

## 2024-06-15 RX ADMIN — POTASSIUM CHLORIDE 40 MEQ: 1.5 POWDER, FOR SOLUTION ORAL at 08:21

## 2024-06-15 RX ADMIN — HYDROMORPHONE HYDROCHLORIDE 4 MG: 4 TABLET ORAL at 08:20

## 2024-06-15 RX ADMIN — DEXAMETHASONE SODIUM PHOSPHATE 4 MG: 4 INJECTION, SOLUTION INTRA-ARTICULAR; INTRALESIONAL; INTRAMUSCULAR; INTRAVENOUS; SOFT TISSUE at 18:11

## 2024-06-15 RX ADMIN — DULOXETINE HYDROCHLORIDE 60 MG: 60 CAPSULE, DELAYED RELEASE ORAL at 08:21

## 2024-06-15 RX ADMIN — DOCUSATE SODIUM 100 MG: 100 CAPSULE, LIQUID FILLED ORAL at 21:39

## 2024-06-15 RX ADMIN — HYDROMORPHONE HYDROCHLORIDE 6 MG: 4 TABLET ORAL at 04:04

## 2024-06-15 RX ADMIN — APIXABAN 5 MG: 5 TABLET, FILM COATED ORAL at 21:39

## 2024-06-15 RX ADMIN — PREGABALIN 50 MG: 25 CAPSULE ORAL at 21:39

## 2024-06-15 RX ADMIN — POTASSIUM CHLORIDE 40 MEQ: 1.5 POWDER, FOR SOLUTION ORAL at 21:39

## 2024-06-15 RX ADMIN — HYDROMORPHONE HYDROCHLORIDE 1 MG: 1 INJECTION, SOLUTION INTRAMUSCULAR; INTRAVENOUS; SUBCUTANEOUS at 15:57

## 2024-06-15 RX ADMIN — ACETAMINOPHEN 650 MG: 325 TABLET ORAL at 21:39

## 2024-06-15 ASSESSMENT — COGNITIVE AND FUNCTIONAL STATUS - GENERAL
TOILETING: A LITTLE
MOVING TO AND FROM BED TO CHAIR: A LOT
DRESSING REGULAR LOWER BODY CLOTHING: A LITTLE
DAILY ACTIVITIY SCORE: 19
MOVING TO AND FROM BED TO CHAIR: A LOT
MOBILITY SCORE: 16
HELP NEEDED FOR BATHING: A LITTLE
DAILY ACTIVITIY SCORE: 20
PERSONAL GROOMING: A LITTLE
DRESSING REGULAR LOWER BODY CLOTHING: A LITTLE
WALKING IN HOSPITAL ROOM: A LOT
STANDING UP FROM CHAIR USING ARMS: A LOT
MOBILITY SCORE: 16
TOILETING: A LITTLE
DRESSING REGULAR UPPER BODY CLOTHING: A LITTLE
HELP NEEDED FOR BATHING: A LITTLE
STANDING UP FROM CHAIR USING ARMS: A LOT
CLIMB 3 TO 5 STEPS WITH RAILING: A LOT
WALKING IN HOSPITAL ROOM: A LOT
CLIMB 3 TO 5 STEPS WITH RAILING: A LOT
DRESSING REGULAR UPPER BODY CLOTHING: A LITTLE

## 2024-06-15 ASSESSMENT — PAIN SCALES - GENERAL
PAINLEVEL_OUTOF10: 4
PAINLEVEL_OUTOF10: 7
PAINLEVEL_OUTOF10: 9
PAINLEVEL_OUTOF10: 0 - NO PAIN
PAINLEVEL_OUTOF10: 4
PAINLEVEL_OUTOF10: 9
PAINLEVEL_OUTOF10: 7
PAINLEVEL_OUTOF10: 6
PAINLEVEL_OUTOF10: 10 - WORST POSSIBLE PAIN
PAINLEVEL_OUTOF10: 4

## 2024-06-15 ASSESSMENT — PAIN DESCRIPTION - LOCATION: LOCATION: ABDOMEN

## 2024-06-15 NOTE — CARE PLAN
Problem: Pain  Goal: My pain/discomfort is manageable  Outcome: Progressing     Problem: Safety  Goal: Patient will be injury free during hospitalization  Outcome: Progressing  Goal: I will remain free of falls  Outcome: Progressing     Problem: Daily Care  Goal: Daily care needs are met  Outcome: Progressing     Problem: Psychosocial Needs  Goal: Demonstrates ability to cope with hospitalization/illness  Outcome: Progressing  Goal: Collaborate with me, my family, and caregiver to identify my specific goals  Outcome: Progressing     Problem: Discharge Barriers  Goal: My discharge needs are met  Outcome: Progressing     Problem: Pain  Goal: Takes deep breaths with improved pain control throughout the shift  Outcome: Progressing  Goal: Turns in bed with improved pain control throughout the shift  Outcome: Progressing  Goal: Walks with improved pain control throughout the shift  Outcome: Progressing  Goal: Performs ADL's with improved pain control throughout shift  Outcome: Progressing  Goal: Participates in PT with improved pain control throughout the shift  Outcome: Progressing  Goal: Free from opioid side effects throughout the shift  Outcome: Progressing  Goal: Free from acute confusion related to pain meds throughout the shift  Outcome: Progressing     Problem: Skin  Goal: Decreased wound size/increased tissue granulation at next dressing change  Outcome: Progressing  Goal: Participates in plan/prevention/treatment measures  Outcome: Progressing  Goal: Prevent/manage excess moisture  Outcome: Progressing  Goal: Prevent/minimize sheer/friction injuries  Outcome: Progressing  Goal: Promote/optimize nutrition  Outcome: Progressing  Goal: Promote skin healing  Outcome: Progressing     Problem: Pain - Adult  Goal: Verbalizes/displays adequate comfort level or baseline comfort level  Outcome: Progressing     Problem: Safety - Adult  Goal: Free from fall injury  Outcome: Progressing     Problem: Discharge  Planning  Goal: Discharge to home or other facility with appropriate resources  Outcome: Progressing     Problem: Chronic Conditions and Co-morbidities  Goal: Patient's chronic conditions and co-morbidity symptoms are monitored and maintained or improved  Outcome: Progressing       The clinical goals for the shift include Pt will remain safe and free from injury throughout shift.

## 2024-06-15 NOTE — CONSULTS
"Name: Massimo Garcia  MRN: [id number]  Encounter Date: 6/15/2024  PCP: Dayne Santamaria DO  Heme-Onc: Tomasa lBake    Reason for consult: disease progression, possible inpt treatment  Attending Provider: Dr. Guardado    Hematology/ Oncology Consult Note      History of Present Illness   Massimo Garcia is a 63 y.o. 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     History obtained from chart review:  \"Patient admitted to [place] (6/5-6/7) c/f partial SBO, resolved prior to discharge from [facility], surgery consulted at OSH and did not recommend surgical interventions at this time. Presented to [facility] 6/8 with continued abdominal pain and diarrhea. CT A/P (6/8) wo bowel obstruction, c/f colitis on imaging. Cdiff and stool pathogen pending. No leukocytosis or infectious symptoms at this time (no fevers/chills), no indication for abx at this time. Supportive oncology consulted for further pain management (6/9), rec cont PO Dilaudid 4mg Q2 PRN for moderate to severe pain, cont IV Dilaudid 2mg Q3 for breakthrough pain, increasing Fentanyl patch change frequency to 100mcgs Q48hr, and starting Bentyl 10mg x4/day PRN for abdominal cramps. Increased Dilaudid PO dose and fentanyl patch dose (6/11) per updated Supportive Oncology updated recs (6/11). Pt tolerating full liquid diet, no nausea or vomiting, advanced to regular diet (6/11). Radiation oncology consulted as he was recently seen outpatient with plan for CT simulation for gamma knife and radiation to spine, recs pending. 6/12 BAT called d/t new right arm " "weakness. BAT cancelled because symptoms not consistent with stroke. Neurology consulted for new arm weakness workup. CTH showed concern for bleed of his known left temporal lobe, worsening edema around his right occipital. However, neither can explain his symptoms. CT of the neck showed two larger cervical lymphnodes on the right side at C4-5 level, concern for compression on his right upper brachial plexuses. Rad/onc paged regarding CT head showing worsening disease in brain. Surg/onc consulted d/t concern for brachial plexus injury d/t large cervical lymph node-no plans for surgical interventions. Patient given Dex 10mg x1 dose and then 4mg q6 hours. NSGY consulted d/t concern for temporal bleed. Repeat head CT 6/12 showing same findings as previous. Eliquis restarted 6/15 given no hemmorage on MRI. MRI brain with worsening edema, MRI C-spine with worsening progression.\"    Pt reports significant pain in R shoulder and neck, has limited mobility due to pain, is working with PT to get stronger, titrating pain meds so he is not so somnolent.     Past Medical history     Past Medical History:   Diagnosis Date    Essential (primary) hypertension 12/21/2013    Hypertension    Pacemaker     Peripheral neuropathy     Personal history of other diseases of the circulatory system     History of right bundle branch block (RBBB)    Pneumothorax 12/04/2023         Past Surgical History     Past Surgical History:   Procedure Laterality Date    CARDIAC ELECTROPHYSIOLOGY PROCEDURE N/A 11/7/2023    Procedure: Temporary Pacemaker Insertion;  Surgeon: Alexis Shook MD;  Location: [email or web address];  Service: Cardiovascular;  Laterality: N/A;    CARDIAC ELECTROPHYSIOLOGY PROCEDURE Left 11/9/2023    Procedure: PPM IMPLANT DUAL;  Surgeon: Elias Connolly MD;  Location: [facility];  Service: Electrophysiology;  Laterality: Left;    TOTAL KNEE ARTHROPLASTY  09/30/2016    Total Knee Replacement Left    TOTAL KNEE " ARTHROPLASTY  09/30/2016    Total Knee Replacement Right         Family History      Family History   Problem Relation Name Age of Onset    Cancer Father           Social History     Social History     Socioeconomic History    Marital status:      Spouse name: Not on file    Number of children: Not on file    Years of education: Not on file    Highest education level: Not on file   Occupational History    Not on file   Tobacco Use    Smoking status: Former     Types: Cigarettes, Cigars     Passive exposure: Never    Smokeless tobacco: Never   Vaping Use    Vaping status: Unknown   Substance and Sexual Activity    Alcohol use: Not Currently    Drug use: Yes     Types: Marijuana     Comment: medical marjuana card    Sexual activity: Not on file   Other Topics Concern    Not on file   Social History Narrative    Not on file     Social Determinants of Health     Financial Resource Strain: Low Risk  (6/9/2024)    Overall Financial Resource Strain (CARDIA)     Difficulty of Paying Living Expenses: Not hard at all   Food Insecurity: Not on file   Transportation Needs: No Transportation Needs (6/9/2024)    PRAPARE - Transportation     Lack of Transportation (Medical): No     Lack of Transportation (Non-Medical): No   Physical Activity: Not on file   Stress: Not on file   Social Connections: Not on file   Intimate Partner Violence: Not on file   Housing Stability: Low Risk  (6/9/2024)    Housing Stability Vital Sign     Unable to Pay for Housing in the Last Year: No     Number of Places Lived in the Last Year: 1     Unstable Housing in the Last Year: No         Allergies     Allergies   Allergen Reactions    Bee Venom Protein (Honey Bee) Anaphylaxis    Oxaliplatin Other     Rigors       Medications   acetaminophen, 650 mg, q6h  apixaban, 5 mg, BID  dexAMETHasone, 4 mg, q6h  docusate sodium, 100 mg, BID  DULoxetine, 60 mg, Daily  fentaNYL, 1 patch, q48h  fentaNYL, 1 patch, q48h  iohexol, 90 mL, Once in  "imaging  pantoprazole, 40 mg, Daily  polyethylene glycol, 17 g, BID  potassium chloride, 40 mEq, BID  pregabalin, 50 mg, BID  sennosides-docusate sodium, 2 tablet, BID         diclofenac sodium, 4 g, 4x daily PRN  dicyclomine, 10 mg, 4x daily PRN  HYDROmorphone, 1 mg, q3h PRN  HYDROmorphone, 4 mg, q3h PRN  HYDROmorphone, 6 mg, q3h PRN  LORazepam, 0.5 mg, q8h PRN  naloxone, 0.2 mg, q5 min PRN  ondansetron, 4 mg, q6h PRN  prochlorperazine, 5 mg, q6h PRN  promethazine, 12.5 mg, q6h PRN  sodium chloride 0.9%, 10 mL, PRN  sodium chloride 0.9%, 10 mL, PRN        Review of Systems   Review of Systems   10 pt ROS reviewed and negative aside from above    Physical Exam   Blood pressure 158/90, pulse 94, temperature 37.1 °C (98.7 °F), temperature source Temporal, resp. rate 18, height 1.727 m (5' 8\"), weight 73.5 kg (162 lb), SpO2 93%.    ECOG: 3 but limited d/t weakness    GEN: NAD  HEENT: NC/AT, MMM  CV: RRR no m/r/g  Chest: CTAB  GI: soft, NTND  MSK: no edema  Skin: no obvious rashes  Neuro: R hand weakness, b/l LE weakness      Labs     Lab Results   Component Value Date    GLUCOSE 124 (H) 06/15/2024    CALCIUM 9.3 06/15/2024     (L) 06/15/2024    K 4.0 06/15/2024    CO2 23 06/15/2024    CL 97 (L) 06/15/2024    BUN 16 06/15/2024    CREATININE 0.64 06/15/2024       Lab Results   Component Value Date    WBC 20.9 (H) 06/15/2024    HGB 9.0 (L) 06/15/2024    HCT 27.2 (L) 06/15/2024    MCV 84 06/15/2024     (L) 06/15/2024       Lab Results   Component Value Date    ALT 29 06/15/2024    AST 60 (H) 06/15/2024    ALKPHOS 308 (H) 06/15/2024    BILITOT 1.0 06/15/2024         Oncologic History    Oncologic History      Stage IV malignant neoplasm of esophagus (Multi)   9/13/2023 Initial Diagnosis     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)      9/13/2023 -  Chemotherapy     Trastuzumab + mFOLFOX6 (Fluorouracil Continuous Infusion / Leucovorin / Oxaliplatin), 14 Day Cycles      Metastases to the liver (Multi) "   9/13/2023 Initial Diagnosis     Metastases to the liver (CMS/HCC)      9/13/2023 -  Chemotherapy     Trastuzumab + mFOLFOX6 (Fluorouracil Continuous Infusion / Leucovorin / Oxaliplatin), 14 Day Cycles       Assessment/Plan     Massimo Garcia is a 63 y.o. 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 Oncology consulted for possible inpt chemo with disease progressoin.    Pt progressed on most recent chemo, with HER2+ status next line would be Enhertu (Trastuzumab deruxtecan) which patient states was the plan per outpt oncologist Dr. Blake. Most pressing issue appears to be pain in R shoulder and neck, seen by neurology who attributed to compression of brachial plexus and neural foraminal lesion. Would discuss with Rad Onc regarding RT to provide urgent relief, already following with supportive onc. Chemo can be deferred to outpt, fastest impact on symptoms will be with radiation and supportive oncology.     #Stage IV Esophageal Adeno (HER2+)  -most recent biopsy 5/17 - email path on Monday to see if able to add HER2 to this tissue  -continue with supportive onc and pain control  -re-engage RadOn regarding starting RT while in house to relieve symptoms  -fu outpt with Dr. Blake to discuss further treatment of cancer    Thank you for this consult, we will continue to follow. Pt seen and discussed with Dr. Motta.    Rosalio Dodson DO  Hematology- Oncology Fellow, PGY-6  Hematology Consult Pager: [phone]  Oncology Consult Pager: [phone]

## 2024-06-15 NOTE — PROGRESS NOTES
"Massimo Garcia is a 63 y.o. male on day 6 of admission presenting with Colitis.    Subjective   Patient seen this morning resting in bed. Patient reports he woke up with significant pain in his abdomen. Denies any CP/SOB.     Physical Exam  Vitals reviewed.   Constitutional:       Appearance: He is ill-appearing.   HENT:      Head: Normocephalic and atraumatic.      Right Ear: External ear normal.      Left Ear: External ear normal.      Nose: Nose normal.      Mouth/Throat:      Mouth: Mucous membranes are moist.   Eyes:      Conjunctiva/sclera: Conjunctivae normal.   Cardiovascular:      Rate and Rhythm: Normal rate.      Pulses: Normal pulses.   Pulmonary:      Effort: Pulmonary effort is normal.   Abdominal:      Palpations: Abdomen is soft.      Tenderness: There is abdominal tenderness.   Musculoskeletal:      Cervical back: Neck supple.      Comments: RUE/RLE 3/5 strength, unable to lift arm/unable to lift leg    Neurological:      Mental Status: He is alert and oriented to person, place, and time.      Sensory: Sensation is intact.      Motor: Weakness (RUE) present.      Gait: Gait is intact.   Psychiatric:         Mood and Affect: Mood normal.         Behavior: Behavior normal.       Last Recorded Vitals  Blood pressure 154/83, pulse 95, temperature 36.9 °C (98.4 °F), temperature source Temporal, resp. rate 17, height 1.727 m (5' 8\"), weight 73.5 kg (162 lb), SpO2 96%.  Intake/Output last 3 Shifts:  I/O last 3 completed shifts:  In: 1200 (16.3 mL/kg) [P.O.:1200]  Out: 0 (0 mL/kg)   Weight: 73.5 kg          This patient has a central line   Reason for the central line remaining today? Parenteral medication    Assessment/Plan   Principal Problem:    Colitis    Principal Problem:    Colitis     Massimo Garcia is a 63 y.o. 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 Patient admitted to [place] (6/5-6/7) c/f partial SBO, resolved prior to discharge from [facility], surgery consulted at OSH and did not recommend surgical interventions at this time. [place]ed [place] [facility] [place] with continued abdominal pain and diarrhea. CT A/P (6/8) wo bowel obstruction, c/f colitis on imaging. Cdiff and stool pathogen pending. No leukocytosis or infectious symptoms at this time (no fevers/chills), no indication for abx at this time. Supportive oncology consulted for further pain management (6/9), rec cont PO Dilaudid 4mg Q2 PRN for moderate to severe pain, cont IV Dilaudid 2mg Q3 for breakthrough pain, increasing Fentanyl patch change frequency to 100mcgs Q48hr, and starting Bentyl 10mg x4/day PRN for abdominal cramps. Increased Dilaudid PO dose and fentanyl patch dose (6/11) per updated Supportive Oncology updated recs (6/11). Pt tolerating full liquid diet, no nausea or vomiting, advanced to regular diet (6/11). Radiation oncology consulted as he was recently seen outpatient with plan for CT simulation for gamma knife and radiation to spine, recs pending. 6/12 BAT called d/t new right arm weakness. BAT cancelled because symptoms not consistent with stroke. Neurology consulted for new arm weakness workup. CTH showed concern for bleed of his known left temporal lobe, worsening edema around his right occipital. However, neither can explain his symptoms. CT of the neck showed two larger cervical lymphnodes on the right side at C4-5 level, concern for compression on his right upper brachial plexuses. Rad/onc paged regarding CT head showing worsening disease in brain. Surg/onc consulted d/t concern for brachial plexus injury  d/t large cervical lymph node-no plans for surgical interventions. Patient given Dex 10mg x1 dose and then 4mg q6 hours. NSGY consulted d/t concern for temporal bleed. Repeat head CT 6/12 showing same findings as previous. Eliquis restarted 6/15 given no hemmorage on MRI. MRI brain with worsening edema, MRI C-spine with worsening progression. Oncology consulted, recs pending.     #Acute RUE weakness  -BAT activated on 6/12 at 0859 for new right arm weakness.   -CT brain attack, CT head w/wo IV contrast showing increased temporal nodule and vasogenic edema  -Neurology consulted concerned for bleed from temporal tumor. Repeat CT head stable    -NSGY consulted d/t concern for brain bleed but low suspicion for active bleed    -CT soft tissue neck w/ contrast showing abundant bilateral heterogeneously enhancing lymph nodes consistent  with ralph metastases  -Surg/onc consulted for concern for brachial plexus causing new onset weakness-no surgical interventions   - rad/onc note 6/12 recommending systemic therapy and interval palliative RT sequenced in between cycles; however with concern for bracial plexus causing  new onset weakness- will touch base with rad/onc again 6/17  -Dexamethasone 10mg x1 then 4mg q6. Plan to discharge with 4mg BID x5 days then 2mg BID x5 days then 2mg daily x5 days. (Per rad onc)   -6/13-Denies new sensory changes to RUE.    - MRI c-spine and MRI brain with concern for progression/ worsening edema. Neurology and neurosurgery signed off, no acute bleed, no acute intervention   - worsening hyponatremia, likely hypovolemia related; monitor on AM labs and consider workup if worsening      # Stage IV Esophageal AdenoCarcinoma (Her2 pos) w/ known mets to Lung/Liver,   # Recent Spine Emergent Decompression/Laminectomy (T8-T12 5/17/24 2/2 to Metastatic Lesion)  #Cancer Related Pain  - Follows w/ Dr. Cannon-Tutu for Oncology; emailed on 6/13  - 6/8 CT A/P wo evidence of bowel obstruction, but w/ short  segment of circumferential thickening of ascending colon superior to the cecum new from prior, which may represent colitis. Also, redemonstration of metastatic disease in the visualized lung bases and liver unchanged in the short-term interval since 06/05/2024; redemonstration of abdominopelvic lymphadenopathy; redemonstration of left adrenal nodule, likely also metastatic; also Small left pleural effusion unchanged in the short-term interval. Interval development of trace ascites in the dependent portion of the pelvis  - Palliative care consulted while at [facility]; recommended 2mg IV dilaudid Q3 PRN for severe pain, continued on admission (6/9- )  - EKG (6/8)    - C/w Tylenol 650mg Q6H, Capsaicin cream QID, Duloxetine 60mg daily, Lyrica 50mg BID, Fentanyl patch 100mcg, 0.5mg Lorazepam IV Q8hrs PRN anxiety  - Miralax 17g BID, Senna/Colace 2 tabs BID for opioid induced constipation   - C/w Zofran, Compazine, Phenergan PRN N/V  - Decadron 16mg Ivx1 on 6/8, c/w home prednisone 20mg daily (part of taper, through 6/14, then decreased to 10mg daily for 7 days) with PPI   - Holding home Flexeril  - supportive oncology consulted (6/9), rec cont PO Dilaudid 4mg Q2 PRN for moderate to severe pain, cont IV Dilaudid 2mg Q3 for breakthrough pain, increasing Fentanyl patch change frequency to 100mcgs Q48hr, and starting Bentyl 10mg x4/day PRN for abdominal cramps  - supportive onc updated recs (6/11): increase PO Dilaudid from 4mg to 6mg Q3, and increase Fentanyl from 100mcg to 125mcg Q48hr  - Radiation oncology consulted 6/9; further recs pending (seen outpt on 6/4 with plan for gamma knife and radiation to spine pending CT simulation)   - 6/12 BAT/new acute right arm weakness as above   -FUV scheduled 6/28 for Infusion with INF 11 at [facility]   - oncology consulted 6/15 given progression of disease for any consideration of inpatient treatment, recs pending. CT chest ordered for staging.      # Colitis on Imaging iso  Hx of Drug Induced Colitis   - Admitted in May for immunotherapy related colitis (infectious workup remained negative); on steroid taper as above   - Presented to [facility] (6/5-6/7) with abdominal pain/ diarrhea c/f CBO  - CT A/P (6/5): Mildly loops dilated loops of jejunum measuring up to 3.4 cm, early developing partial small bowel obstruction can not be excluded  - While at [facility] pt was passing gas and was having active Bms, general surgery consulted and did not recommend any surgical interventions   - CT A/P (6/8) c/f colitis   - cdiff/Stool pathogen panel negative (6/9)  - pt placed NPO on admission, increased to clear liquid diet (6/9 AM), advanced to full liquid diet (6/9 PM), advanced to regular diet (6/11)  - supportive oncology consulted 6/9, rec cont PO Dilaudid 4mg Q2 PRN for moderate to severe pain, cont IV Dilaudid 2mg Q3 for breakthrough pain, increasing Fentanyl patch change frequency to 100mcgs Q48hr, and starting Bentyl 10mg x4/day PRN for abdominal cramps     #Hx of PVT/PE (on Eliquis)   - C/w Apixaban 5mg BID- restarted 6/15     #HTN   #Third Degree AV Block s/p PM (11/9/23)  - No active BP medications      # Prophy   - eliquis, OOB as able, SCD while in bed      Dispo:   - Full code (confirmed on admission)   - Discharge home pending pain management and radiation/oncology plan   - NOK: Jane (wife) [phone]; updated at bedside 6/15       ALFREDITO Foley-CNP  Patient discussed with Dr. Guardado

## 2024-06-15 NOTE — CARE PLAN
Problem: Pain  Goal: My pain/discomfort is manageable  6/14/2024 2211 by Tony Vivar RN  Outcome: Progressing  6/14/2024 2211 by Tony Vivar RN  Outcome: Progressing     Problem: Safety  Goal: Patient will be injury free during hospitalization  6/14/2024 2211 by Tony Vivar RN  Outcome: Progressing  6/14/2024 2211 by Tony Vivar, RN  Outcome: Progressing  Goal: I will remain free of falls  6/14/2024 2211 by Tony Vivar, RN  Outcome: Progressing  6/14/2024 2211 by Tony Vivar RN  Outcome: Progressing     Problem: Daily Care  Goal: Daily care needs are met  6/14/2024 2211 by Tony Vivar, RN  Outcome: Progressing  6/14/2024 2211 by Tony Vivar RN  Outcome: Progressing     Problem: Psychosocial Needs  Goal: Demonstrates ability to cope with hospitalization/illness  6/14/2024 2211 by Tony Vivar RN  Outcome: Progressing  6/14/2024 2211 by Tony Vivar RN  Outcome: Progressing  Goal: Collaborate with me, my family, and caregiver to identify my specific goals  6/14/2024 2211 by Tony Vivar, RN  Outcome: Progressing  6/14/2024 2211 by Tony Vivar RN  Outcome: Progressing     Problem: Discharge Barriers  Goal: My discharge needs are met  6/14/2024 2211 by Tony Vivar RN  Outcome: Progressing  6/14/2024 2211 by Tony Vivar RN  Outcome: Progressing     Problem: Pain  Goal: Takes deep breaths with improved pain control throughout the shift  6/14/2024 2211 by Tony Vivar, RN  Outcome: Progressing  6/14/2024 2211 by Tony Vivar, RN  Outcome: Progressing  Goal: Turns in bed with improved pain control throughout the shift  6/14/2024 2211 by Tony Vivar, RN  Outcome: Progressing  6/14/2024 2211 by Tony Vivar, RN  Outcome: Progressing  Goal: Walks with improved pain control throughout the shift  6/14/2024 2211 by Tony Vivar, RN  Outcome: Progressing  6/14/2024 2211 by Tony Vivar  RN  Outcome: Progressing  Goal: Performs ADL's with improved pain control throughout shift  6/14/2024 2211 by Tony Vivar, RN  Outcome: Progressing  6/14/2024 2211 by Tony Vivar, RN  Outcome: Progressing  Goal: Participates in PT with improved pain control throughout the shift  6/14/2024 2211 by Tony Vivar, RN  Outcome: Progressing  6/14/2024 2211 by Tony Vivar, RN  Outcome: Progressing  Goal: Free from opioid side effects throughout the shift  6/14/2024 2211 by Tony Vivar, RN  Outcome: Progressing  6/14/2024 2211 by Tony Vivar, RN  Outcome: Progressing  Goal: Free from acute confusion related to pain meds throughout the shift  6/14/2024 2211 by Tony Vivar, RN  Outcome: Progressing  6/14/2024 2211 by Tony Vivar, RN  Outcome: Progressing     Problem: Skin  Goal: Decreased wound size/increased tissue granulation at next dressing change  6/14/2024 2211 by Tony Vivar, RN  Outcome: Progressing  6/14/2024 2211 by Tony Vivar, RN  Outcome: Progressing  Goal: Participates in plan/prevention/treatment measures  6/14/2024 2211 by Tony Vivar, RN  Outcome: Progressing  6/14/2024 2211 by Tony Vivar, RN  Outcome: Progressing  Goal: Prevent/manage excess moisture  6/14/2024 2211 by Tony Vivar, RN  Outcome: Progressing  6/14/2024 2211 by Tony Vivar, RN  Outcome: Progressing  Goal: Prevent/minimize sheer/friction injuries  6/14/2024 2211 by Tony Vivar, RN  Outcome: Progressing  6/14/2024 2211 by Tony Vivar, RN  Outcome: Progressing  Goal: Promote/optimize nutrition  6/14/2024 2211 by Tony Vivar, RN  Outcome: Progressing  6/14/2024 2211 by Tony Vivar, RN  Outcome: Progressing  Goal: Promote skin healing  6/14/2024 2211 by Tony Vivar, RN  Outcome: Progressing  6/14/2024 2211 by Tony Vivar, RN  Outcome: Progressing     Problem: Pain - Adult  Goal: Verbalizes/displays adequate  comfort level or baseline comfort level  6/14/2024 2211 by Tony Vivar RN  Outcome: Progressing  6/14/2024 2211 by Tony Vivar RN  Outcome: Progressing     Problem: Safety - Adult  Goal: Free from fall injury  6/14/2024 2211 by Tony Vivar RN  Outcome: Progressing  6/14/2024 2211 by Tony Vivar RN  Outcome: Progressing         The patient's goals for the shift include  Rest    The clinical goals for the shift include Pt will remain safe and free from injury and falls throughout shift.    Over the shift, the patient did make progress toward the following goals.

## 2024-06-15 NOTE — PROGRESS NOTES
"Massimo Garcia is a 63 y.o. male on day 6 of admission presenting with Colitis.    Subjective   Reviewed MRI brain and C-spine imaging.    Objective   Last Recorded Vitals  Blood pressure 158/90, pulse 94, temperature 37.1 °C (98.7 °F), temperature source Temporal, resp. rate 18, height 1.727 m (5' 8\"), weight 73.5 kg (162 lb), SpO2 93%.    Neurological Exam:  MENTAL STATUS:  General appearance: somnolent, NAD  Further exam deferred as patient fell asleep shortly prior to exam after a restless night    Relevant Results  Results for orders placed or performed during the hospital encounter of 06/08/24 (from the past 24 hour(s))   Cardiac device check - MRI   Result Value Ref Range    BSA 1.88 m2   Cardiac device check - MRI   Result Value Ref Range    BSA 1.88 m2   CBC and Auto Differential   Result Value Ref Range    WBC 20.9 (H) 4.4 - 11.3 x10*3/uL    nRBC 0.0 0.0 - 0.0 /100 WBCs    RBC 3.24 (L) 4.50 - 5.90 x10*6/uL    Hemoglobin 9.0 (L) 13.5 - 17.5 g/dL    Hematocrit 27.2 (L) 41.0 - 52.0 %    MCV 84 80 - 100 fL    MCH 27.8 26.0 - 34.0 pg    MCHC 33.1 32.0 - 36.0 g/dL    RDW 16.4 (H) 11.5 - 14.5 %    Platelets 130 (L) 150 - 450 x10*3/uL    Immature Granulocytes %, Automated 7.0 (H) 0.0 - 0.9 %    Immature Granulocytes Absolute, Automated 1.45 (H) 0.00 - 0.70 x10*3/uL   Comprehensive metabolic panel   Result Value Ref Range    Glucose 124 (H) 74 - 99 mg/dL    Sodium 131 (L) 136 - 145 mmol/L    Potassium 4.0 3.5 - 5.3 mmol/L    Chloride 97 (L) 98 - 107 mmol/L    Bicarbonate 23 21 - 32 mmol/L    Anion Gap 15 10 - 20 mmol/L    Urea Nitrogen 16 6 - 23 mg/dL    Creatinine 0.64 0.50 - 1.30 mg/dL    eGFR >90 >60 mL/min/1.73m*2    Calcium 9.3 8.6 - 10.6 mg/dL    Albumin 3.1 (L) 3.4 - 5.0 g/dL    Alkaline Phosphatase 308 (H) 33 - 136 U/L    Total Protein 5.4 (L) 6.4 - 8.2 g/dL    AST 60 (H) 9 - 39 U/L    Bilirubin, Total 1.0 0.0 - 1.2 mg/dL    ALT 29 10 - 52 U/L   Magnesium   Result Value Ref Range    Magnesium 1.59 (L) 1.60 - " 2.40 mg/dL   Manual Differential   Result Value Ref Range    Neutrophils %, Manual 90.6 40.0 - 80.0 %    Lymphocytes %, Manual 2.6 13.0 - 44.0 %    Monocytes %, Manual 5.1 2.0 - 10.0 %    Eosinophils %, Manual 0.0 0.0 - 6.0 %    Basophils %, Manual 0.0 0.0 - 2.0 %    Myelocytes %, Manual 1.7 0.0 - 0.0 %    Seg Neutrophils Absolute, Manual 18.94 (H) 1.20 - 7.00 x10*3/uL    Lymphocytes Absolute, Manual 0.54 (L) 1.20 - 4.80 x10*3/uL    Monocytes Absolute, Manual 1.07 (H) 0.10 - 1.00 x10*3/uL    Eosinophils Absolute, Manual 0.00 0.00 - 0.70 x10*3/uL    Basophils Absolute, Manual 0.00 0.00 - 0.10 x10*3/uL    Myelocytes Absolute, Manual 0.36 0.00 - 0.00 x10*3/uL    Total Cells Counted 117     RBC Morphology No significant RBC morphology present       Assessment/Plan   Mr. 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 Neurology team was involved for subacute right arm weakness, associated with 3-4 days history of shoulder pain. Initial exam notable for right shoulder abduction, elbow flexion weakness, mild elbow extension weakness, along with diminished biceps, triceps, and BR reflexes on the right side.      CTH showed bleed of his known left temporal lobe, worsening edema around his right occipital--which does not explain his symptoms. CT of the neck showed two larger cervical lymphnodes on the right side at C4-5 level, likely compressing on his right upper brachial plexuses. MRI brain demonstrated multiple new lesions concerning for metastases but do not explain his weakness. However, MRI C-spine demonstrated lesion on R neural foramina at C5-6, which may explain his RUE weakness. Overall, compression of brachial plexus and neural  foraminal lesion may both contribute to his weakness.    RECOMMENDATIONS  - management of spine lesion per Rad Onc team  - Surg Onc consult to evaluate for lymph node resection  - treatment of peripheral neuropathy per primary team (ok to increase Lyrica for pain control)    Patient seen and discussed with Neurology attending, Dr. Campos. Neurology will sign off at this time. Please page [phone] with any questions.    Susie Kowalski MD  PGY-3 Neurology

## 2024-06-16 ENCOUNTER — APPOINTMENT (OUTPATIENT)
Dept: RADIOLOGY | Facility: HOSPITAL | Age: 64
End: 2024-06-16
Payer: MEDICARE

## 2024-06-16 VITALS
TEMPERATURE: 98.6 F | WEIGHT: 162 LBS | RESPIRATION RATE: 20 BRPM | SYSTOLIC BLOOD PRESSURE: 114 MMHG | OXYGEN SATURATION: 96 % | HEIGHT: 68 IN | DIASTOLIC BLOOD PRESSURE: 75 MMHG | BODY MASS INDEX: 24.55 KG/M2 | HEART RATE: 107 BPM

## 2024-06-16 LAB
ALBUMIN SERPL BCP-MCNC: 3.5 G/DL (ref 3.4–5)
ALP SERPL-CCNC: 364 U/L (ref 33–136)
ALT SERPL W P-5'-P-CCNC: 34 U/L (ref 10–52)
ANION GAP SERPL CALC-SCNC: 19 MMOL/L (ref 10–20)
AST SERPL W P-5'-P-CCNC: 70 U/L (ref 9–39)
BASOPHILS # BLD MANUAL: 0 X10*3/UL (ref 0–0.1)
BASOPHILS NFR BLD MANUAL: 0 %
BILIRUB SERPL-MCNC: 1 MG/DL (ref 0–1.2)
BUN SERPL-MCNC: 16 MG/DL (ref 6–23)
BURR CELLS BLD QL SMEAR: ABNORMAL
CALCIUM SERPL-MCNC: 9.4 MG/DL (ref 8.6–10.6)
CHLORIDE SERPL-SCNC: 94 MMOL/L (ref 98–107)
CO2 SERPL-SCNC: 23 MMOL/L (ref 21–32)
CREAT SERPL-MCNC: 0.63 MG/DL (ref 0.5–1.3)
DACRYOCYTES BLD QL SMEAR: ABNORMAL
EGFRCR SERPLBLD CKD-EPI 2021: >90 ML/MIN/1.73M*2
EOSINOPHIL # BLD MANUAL: 0 X10*3/UL (ref 0–0.7)
EOSINOPHIL NFR BLD MANUAL: 0 %
ERYTHROCYTE [DISTWIDTH] IN BLOOD BY AUTOMATED COUNT: 16.6 % (ref 11.5–14.5)
GLUCOSE SERPL-MCNC: 136 MG/DL (ref 74–99)
HCT VFR BLD AUTO: 32 % (ref 41–52)
HGB BLD-MCNC: 10.7 G/DL (ref 13.5–17.5)
IMM GRANULOCYTES # BLD AUTO: 3.27 X10*3/UL (ref 0–0.7)
IMM GRANULOCYTES NFR BLD AUTO: 8.5 % (ref 0–0.9)
LYMPHOCYTES # BLD MANUAL: 0.66 X10*3/UL (ref 1.2–4.8)
LYMPHOCYTES NFR BLD MANUAL: 1.7 %
MAGNESIUM SERPL-MCNC: 1.76 MG/DL (ref 1.6–2.4)
MCH RBC QN AUTO: 28.2 PG (ref 26–34)
MCHC RBC AUTO-ENTMCNC: 33.4 G/DL (ref 32–36)
MCV RBC AUTO: 84 FL (ref 80–100)
METAMYELOCYTES # BLD MANUAL: 0.66 X10*3/UL
METAMYELOCYTES NFR BLD MANUAL: 1.7 %
MONOCYTES # BLD MANUAL: 0.66 X10*3/UL (ref 0.1–1)
MONOCYTES NFR BLD MANUAL: 1.7 %
MYELOCYTES # BLD MANUAL: 0.97 X10*3/UL
MYELOCYTES NFR BLD MANUAL: 2.5 %
NEUTROPHILS # BLD MANUAL: 35.36 X10*3/UL (ref 1.2–7.7)
NEUTS BAND # BLD MANUAL: 0.31 X10*3/UL (ref 0–0.7)
NEUTS BAND NFR BLD MANUAL: 0.8 %
NEUTS SEG # BLD MANUAL: 35.05 X10*3/UL (ref 1.2–7)
NEUTS SEG NFR BLD MANUAL: 90.8 %
NRBC BLD-RTO: 0.1 /100 WBCS (ref 0–0)
OVALOCYTES BLD QL SMEAR: ABNORMAL
PLATELET # BLD AUTO: 181 X10*3/UL (ref 150–450)
POTASSIUM SERPL-SCNC: 4.1 MMOL/L (ref 3.5–5.3)
PROT SERPL-MCNC: 6.1 G/DL (ref 6.4–8.2)
RBC # BLD AUTO: 3.79 X10*6/UL (ref 4.5–5.9)
RBC MORPH BLD: ABNORMAL
SCHISTOCYTES BLD QL SMEAR: ABNORMAL
SODIUM SERPL-SCNC: 132 MMOL/L (ref 136–145)
TOTAL CELLS COUNTED BLD: 119
VARIANT LYMPHS # BLD MANUAL: 0.31 X10*3/UL (ref 0–0.5)
VARIANT LYMPHS NFR BLD: 0.8 %
WBC # BLD AUTO: 38.6 X10*3/UL (ref 4.4–11.3)

## 2024-06-16 PROCEDURE — 83735 ASSAY OF MAGNESIUM: CPT

## 2024-06-16 PROCEDURE — 2500000001 HC RX 250 WO HCPCS SELF ADMINISTERED DRUGS (ALT 637 FOR MEDICARE OP)

## 2024-06-16 PROCEDURE — 72146 MRI CHEST SPINE W/O DYE: CPT | Mod: 52

## 2024-06-16 PROCEDURE — 99221 1ST HOSP IP/OBS SF/LOW 40: CPT | Performed by: ORTHOPAEDIC SURGERY

## 2024-06-16 PROCEDURE — 85007 BL SMEAR W/DIFF WBC COUNT: CPT

## 2024-06-16 PROCEDURE — 1170000001 HC PRIVATE ONCOLOGY ROOM DAILY

## 2024-06-16 PROCEDURE — 2500000002 HC RX 250 W HCPCS SELF ADMINISTERED DRUGS (ALT 637 FOR MEDICARE OP, ALT 636 FOR OP/ED)

## 2024-06-16 PROCEDURE — 99233 SBSQ HOSP IP/OBS HIGH 50: CPT

## 2024-06-16 PROCEDURE — 72148 MRI LUMBAR SPINE W/O DYE: CPT | Mod: RSC

## 2024-06-16 PROCEDURE — 84075 ASSAY ALKALINE PHOSPHATASE: CPT

## 2024-06-16 PROCEDURE — 2500000001 HC RX 250 WO HCPCS SELF ADMINISTERED DRUGS (ALT 637 FOR MEDICARE OP): Performed by: STUDENT IN AN ORGANIZED HEALTH CARE EDUCATION/TRAINING PROGRAM

## 2024-06-16 PROCEDURE — 2500000004 HC RX 250 GENERAL PHARMACY W/ HCPCS (ALT 636 FOR OP/ED)

## 2024-06-16 PROCEDURE — 2500000002 HC RX 250 W HCPCS SELF ADMINISTERED DRUGS (ALT 637 FOR MEDICARE OP, ALT 636 FOR OP/ED): Performed by: STUDENT IN AN ORGANIZED HEALTH CARE EDUCATION/TRAINING PROGRAM

## 2024-06-16 PROCEDURE — 85027 COMPLETE CBC AUTOMATED: CPT

## 2024-06-16 PROCEDURE — 2500000004 HC RX 250 GENERAL PHARMACY W/ HCPCS (ALT 636 FOR OP/ED): Performed by: STUDENT IN AN ORGANIZED HEALTH CARE EDUCATION/TRAINING PROGRAM

## 2024-06-16 PROCEDURE — C9113 INJ PANTOPRAZOLE SODIUM, VIA: HCPCS | Performed by: STUDENT IN AN ORGANIZED HEALTH CARE EDUCATION/TRAINING PROGRAM

## 2024-06-16 RX ORDER — NYSTATIN 100000 [USP'U]/ML
5 SUSPENSION ORAL 4 TIMES DAILY
Status: DISCONTINUED | OUTPATIENT
Start: 2024-06-16 | End: 2024-06-16

## 2024-06-16 RX ORDER — HYDROXYZINE HYDROCHLORIDE 25 MG/1
25 TABLET, FILM COATED ORAL 4 TIMES DAILY
Status: DISCONTINUED | OUTPATIENT
Start: 2024-06-16 | End: 2024-06-20

## 2024-06-16 RX ORDER — FENTANYL 25 UG/1
1 PATCH TRANSDERMAL
Status: DISCONTINUED | OUTPATIENT
Start: 2024-06-16 | End: 2024-06-27 | Stop reason: HOSPADM

## 2024-06-16 RX ORDER — NYSTATIN 100000 [USP'U]/ML
5 SUSPENSION ORAL 4 TIMES DAILY
Status: DISCONTINUED | OUTPATIENT
Start: 2024-06-17 | End: 2024-06-27 | Stop reason: HOSPADM

## 2024-06-16 RX ORDER — HYDROMORPHONE HYDROCHLORIDE 1 MG/ML
0.5 INJECTION, SOLUTION INTRAMUSCULAR; INTRAVENOUS; SUBCUTANEOUS ONCE
Status: COMPLETED | OUTPATIENT
Start: 2024-06-16 | End: 2024-06-16

## 2024-06-16 RX ORDER — LORAZEPAM 2 MG/ML
0.5 INJECTION INTRAMUSCULAR ONCE
Status: COMPLETED | OUTPATIENT
Start: 2024-06-16 | End: 2024-06-16

## 2024-06-16 RX ORDER — LORAZEPAM 2 MG/ML
0.5 INJECTION INTRAMUSCULAR ONCE
Status: DISCONTINUED | OUTPATIENT
Start: 2024-06-16 | End: 2024-06-17

## 2024-06-16 RX ORDER — LORAZEPAM 0.5 MG/1
0.5 TABLET ORAL EVERY 6 HOURS PRN
Status: DISCONTINUED | OUTPATIENT
Start: 2024-06-16 | End: 2024-06-17

## 2024-06-16 RX ADMIN — FENTANYL 1 PATCH: 25 PATCH TRANSDERMAL at 22:58

## 2024-06-16 RX ADMIN — DEXAMETHASONE SODIUM PHOSPHATE 4 MG: 4 INJECTION, SOLUTION INTRA-ARTICULAR; INTRALESIONAL; INTRAMUSCULAR; INTRAVENOUS; SOFT TISSUE at 04:42

## 2024-06-16 RX ADMIN — DOCUSATE SODIUM 100 MG: 100 CAPSULE, LIQUID FILLED ORAL at 08:43

## 2024-06-16 RX ADMIN — SENNOSIDES AND DOCUSATE SODIUM 2 TABLET: 8.6; 5 TABLET ORAL at 08:43

## 2024-06-16 RX ADMIN — LORAZEPAM 0.5 MG: 2 INJECTION INTRAMUSCULAR; INTRAVENOUS at 18:27

## 2024-06-16 RX ADMIN — HYDROMORPHONE HYDROCHLORIDE 6 MG: 4 TABLET ORAL at 04:41

## 2024-06-16 RX ADMIN — HYDROMORPHONE HYDROCHLORIDE 1 MG: 1 INJECTION, SOLUTION INTRAMUSCULAR; INTRAVENOUS; SUBCUTANEOUS at 10:04

## 2024-06-16 RX ADMIN — POTASSIUM CHLORIDE 40 MEQ: 1.5 POWDER, FOR SOLUTION ORAL at 21:51

## 2024-06-16 RX ADMIN — PREGABALIN 50 MG: 25 CAPSULE ORAL at 08:42

## 2024-06-16 RX ADMIN — ACETAMINOPHEN 650 MG: 325 TABLET ORAL at 21:51

## 2024-06-16 RX ADMIN — LORAZEPAM 0.5 MG: 2 INJECTION INTRAMUSCULAR; INTRAVENOUS at 10:55

## 2024-06-16 RX ADMIN — ACETAMINOPHEN 650 MG: 325 TABLET ORAL at 04:42

## 2024-06-16 RX ADMIN — APIXABAN 5 MG: 5 TABLET, FILM COATED ORAL at 21:51

## 2024-06-16 RX ADMIN — POLYETHYLENE GLYCOL 3350 17 G: 17 POWDER, FOR SOLUTION ORAL at 08:46

## 2024-06-16 RX ADMIN — HYDROMORPHONE HYDROCHLORIDE 6 MG: 4 TABLET ORAL at 08:42

## 2024-06-16 RX ADMIN — PREGABALIN 50 MG: 25 CAPSULE ORAL at 21:51

## 2024-06-16 RX ADMIN — POTASSIUM CHLORIDE 40 MEQ: 1.5 POWDER, FOR SOLUTION ORAL at 08:43

## 2024-06-16 RX ADMIN — HYDROMORPHONE HYDROCHLORIDE 1 MG: 1 INJECTION, SOLUTION INTRAMUSCULAR; INTRAVENOUS; SUBCUTANEOUS at 18:27

## 2024-06-16 RX ADMIN — PANTOPRAZOLE SODIUM 40 MG: 40 INJECTION, POWDER, FOR SOLUTION INTRAVENOUS at 08:42

## 2024-06-16 RX ADMIN — HYDROMORPHONE HYDROCHLORIDE 6 MG: 4 TABLET ORAL at 22:10

## 2024-06-16 RX ADMIN — NYSTATIN 500000 UNITS: 100000 SUSPENSION ORAL at 16:31

## 2024-06-16 RX ADMIN — DEXAMETHASONE SODIUM PHOSPHATE 4 MG: 4 INJECTION, SOLUTION INTRA-ARTICULAR; INTRALESIONAL; INTRAMUSCULAR; INTRAVENOUS; SOFT TISSUE at 16:31

## 2024-06-16 RX ADMIN — HYDROXYZINE HYDROCHLORIDE 25 MG: 25 TABLET ORAL at 21:51

## 2024-06-16 RX ADMIN — NYSTATIN 500000 UNITS: 100000 SUSPENSION ORAL at 08:43

## 2024-06-16 RX ADMIN — ACETAMINOPHEN 650 MG: 325 TABLET ORAL at 10:04

## 2024-06-16 RX ADMIN — ACETAMINOPHEN 650 MG: 325 TABLET ORAL at 16:31

## 2024-06-16 RX ADMIN — HYDROXYZINE HYDROCHLORIDE 25 MG: 25 TABLET ORAL at 16:31

## 2024-06-16 RX ADMIN — HYDROMORPHONE HYDROCHLORIDE 0.5 MG: 1 INJECTION, SOLUTION INTRAMUSCULAR; INTRAVENOUS; SUBCUTANEOUS at 21:01

## 2024-06-16 RX ADMIN — DOCUSATE SODIUM 100 MG: 100 CAPSULE, LIQUID FILLED ORAL at 21:51

## 2024-06-16 RX ADMIN — HYDROMORPHONE HYDROCHLORIDE 6 MG: 4 TABLET ORAL at 16:52

## 2024-06-16 RX ADMIN — NYSTATIN 500000 UNITS: 100000 SUSPENSION ORAL at 13:31

## 2024-06-16 RX ADMIN — DEXAMETHASONE SODIUM PHOSPHATE 4 MG: 4 INJECTION, SOLUTION INTRA-ARTICULAR; INTRALESIONAL; INTRAMUSCULAR; INTRAVENOUS; SOFT TISSUE at 10:45

## 2024-06-16 RX ADMIN — DULOXETINE HYDROCHLORIDE 60 MG: 60 CAPSULE, DELAYED RELEASE ORAL at 08:46

## 2024-06-16 RX ADMIN — NYSTATIN 500000 UNITS: 100000 SUSPENSION ORAL at 21:51

## 2024-06-16 RX ADMIN — DEXAMETHASONE SODIUM PHOSPHATE 4 MG: 4 INJECTION, SOLUTION INTRA-ARTICULAR; INTRALESIONAL; INTRAMUSCULAR; INTRAVENOUS; SOFT TISSUE at 22:57

## 2024-06-16 ASSESSMENT — COGNITIVE AND FUNCTIONAL STATUS - GENERAL
TURNING FROM BACK TO SIDE WHILE IN FLAT BAD: A LITTLE
STANDING UP FROM CHAIR USING ARMS: A LOT
WALKING IN HOSPITAL ROOM: TOTAL
STANDING UP FROM CHAIR USING ARMS: A LOT
WALKING IN HOSPITAL ROOM: TOTAL
TOILETING: A LITTLE
CLIMB 3 TO 5 STEPS WITH RAILING: TOTAL
DAILY ACTIVITIY SCORE: 19
MOVING TO AND FROM BED TO CHAIR: TOTAL
TURNING FROM BACK TO SIDE WHILE IN FLAT BAD: A LITTLE
MOBILITY SCORE: 11
DRESSING REGULAR LOWER BODY CLOTHING: A LITTLE
HELP NEEDED FOR BATHING: A LOT
MOVING FROM LYING ON BACK TO SITTING ON SIDE OF FLAT BED WITH BEDRAILS: A LITTLE
CLIMB 3 TO 5 STEPS WITH RAILING: TOTAL
DRESSING REGULAR UPPER BODY CLOTHING: A LOT
MOBILITY SCORE: 11
HELP NEEDED FOR BATHING: A LOT
MOVING TO AND FROM BED TO CHAIR: TOTAL
MOVING FROM LYING ON BACK TO SITTING ON SIDE OF FLAT BED WITH BEDRAILS: A LITTLE
DRESSING REGULAR LOWER BODY CLOTHING: A LITTLE
DAILY ACTIVITIY SCORE: 18
DRESSING REGULAR UPPER BODY CLOTHING: A LITTLE
TOILETING: A LITTLE

## 2024-06-16 ASSESSMENT — PAIN SCALES - GENERAL
PAINLEVEL_OUTOF10: 7
PAINLEVEL_OUTOF10: 7
PAINLEVEL_OUTOF10: 9
PAINLEVEL_OUTOF10: 7
PAINLEVEL_OUTOF10: 9

## 2024-06-16 ASSESSMENT — PAIN - FUNCTIONAL ASSESSMENT
PAIN_FUNCTIONAL_ASSESSMENT: 0-10

## 2024-06-16 NOTE — CONSULTS
Orthopaedic Surgery Consult Note    Injury: R C5-C6 soft tissue met, r/o L spine mets   HPI: 63M (esophageal ca w known mets to brain, T spine sp emergent T10 mass resection w T8-T12 PSIF w Dr. John 5/17/2024, PE on Eliquis last dose 6/15, 3rd deg AV block sp pacemaker) currently adm for colitis since 6/5. C/o R arm & BL LE weakness + inability to ambulate 4d ago. Previously ambulated unassisted. 0/5 shoulder abduction & elbow flexion.    Orthopaedic Problems/Injuries: As above  Other Injuries: Pending completion imaging     PMH: per above/EMR  PSH: per above/EMR  SocHx:      - Denies other drug use  FamHx:  Non-contributory to this patient's acute orthopaedic problem other than as mentioned in HPI  All: Reviewed in EMR  Meds: Reviewed in EMR    ROS      - 14 point ROS negative except as above    Physical Exam    - Constitutional: No acute distress, cooperative  - Eyes: EOM grossly intact  - Head/Neck: Trachea midline  - Respiratory/Thorax: NWOB  - Cardiovascular: RRR on peripheral palpation  - Gastrointestinal: Nondistended  - Psychological: Appropriate mood/behavior  - Skin: Warm and dry. Additional findings in musculoskeletal evaluation  - Musculoskeletal:  ---  Spine Exam:  0/5 shoulder abduction & elbow flexion. Otherwise 5/5 and SILT BL UE. No UMN signs. 3/5 BL HF 4/5 BL KE 4/5 BL PF/DF 3/5 BL EHL. SILT L1-S1. Intact perianal sensation/tone.    Results for orders placed or performed during the hospital encounter of 06/08/24 (from the past 24 hour(s))   CBC and Auto Differential   Result Value Ref Range    WBC 38.6 (H) 4.4 - 11.3 x10*3/uL    nRBC 0.1 (H) 0.0 - 0.0 /100 WBCs    RBC 3.79 (L) 4.50 - 5.90 x10*6/uL    Hemoglobin 10.7 (L) 13.5 - 17.5 g/dL    Hematocrit 32.0 (L) 41.0 - 52.0 %    MCV 84 80 - 100 fL    MCH 28.2 26.0 - 34.0 pg    MCHC 33.4 32.0 - 36.0 g/dL    RDW 16.6 (H) 11.5 - 14.5 %    Platelets 181 150 - 450 x10*3/uL    Immature Granulocytes %, Automated 8.5 (H) 0.0 - 0.9 %    Immature Granulocytes  Absolute, Automated 3.27 (H) 0.00 - 0.70 x10*3/uL   Comprehensive metabolic panel   Result Value Ref Range    Glucose 136 (H) 74 - 99 mg/dL    Sodium 132 (L) 136 - 145 mmol/L    Potassium 4.1 3.5 - 5.3 mmol/L    Chloride 94 (L) 98 - 107 mmol/L    Bicarbonate 23 21 - 32 mmol/L    Anion Gap 19 10 - 20 mmol/L    Urea Nitrogen 16 6 - 23 mg/dL    Creatinine 0.63 0.50 - 1.30 mg/dL    eGFR >90 >60 mL/min/1.73m*2    Calcium 9.4 8.6 - 10.6 mg/dL    Albumin 3.5 3.4 - 5.0 g/dL    Alkaline Phosphatase 364 (H) 33 - 136 U/L    Total Protein 6.1 (L) 6.4 - 8.2 g/dL    AST 70 (H) 9 - 39 U/L    Bilirubin, Total 1.0 0.0 - 1.2 mg/dL    ALT 34 10 - 52 U/L   Magnesium   Result Value Ref Range    Magnesium 1.76 1.60 - 2.40 mg/dL   Manual Differential   Result Value Ref Range    Neutrophils %, Manual 90.8 40.0 - 80.0 %    Bands %, Manual 0.8 0.0 - 5.0 %    Lymphocytes %, Manual 1.7 13.0 - 44.0 %    Monocytes %, Manual 1.7 2.0 - 10.0 %    Eosinophils %, Manual 0.0 0.0 - 6.0 %    Basophils %, Manual 0.0 0.0 - 2.0 %    Atypical Lymphocytes %, Manual 0.8 0.0 - 2.0 %    Metamyelocytes %, Manual 1.7 0.0 - 0.0 %    Myelocytes %, Manual 2.5 0.0 - 0.0 %    Seg Neutrophils Absolute, Manual 35.05 (H) 1.20 - 7.00 x10*3/uL    Bands Absolute, Manual 0.31 0.00 - 0.70 x10*3/uL    Lymphocytes Absolute, Manual 0.66 (L) 1.20 - 4.80 x10*3/uL    Monocytes Absolute, Manual 0.66 0.10 - 1.00 x10*3/uL    Eosinophils Absolute, Manual 0.00 0.00 - 0.70 x10*3/uL    Basophils Absolute, Manual 0.00 0.00 - 0.10 x10*3/uL    Atypical Lymphs Absolute, Manual 0.31 0.00 - 0.50 x10*3/uL    Metamyelocytes Absolute, Manual 0.66 0.00 - 0.00 x10*3/uL    Myelocytes Absolute, Manual 0.97 0.00 - 0.00 x10*3/uL    Total Cells Counted 119     Neutrophils Absolute, Manual 35.36 (H) 1.20 - 7.70 x10*3/uL    RBC Morphology See Below     RBC Fragments Few     Ovalocytes Few     Teardrop Cells Few     Melissa Cells Few        Cardiac device check - MRI         MR cervical spine w and wo IV  contrast   Final Result   There is worsened osseous metastatic disease involving the rightward   aspect of the C6 vertebral body with abnormal soft tissue components   involving the adjacent right lateral paraspinal soft tissues and   infiltrating the right C5-C6 neural foraminal region extending to the   right lateral epidural region. There is no acute pathologic   compression fracture. There is no additional abnormal cord or   leptomeningeal enhancement identified. Of note, motion artifact does   mildly degrades evaluation.        Right cervical lymphadenopathy with soft tissue findings within the   neck better addressed on recent CT neck examination.        I personally reviewed the images/study and I agree with the findings   as stated by Kalen Beard MD. This study was interpreted at   [place], [place].        MACRO:   None        Signed by: Gavin Dixon 6/14/2024 4:32 PM   Dictation workstation:   [id number]      MR brain w and wo IV contrast   Final Result   Enlargement of enhancing metastatic lesions measuring up to 1.8 cm in   diameter within the right posterior parasagittal parietal lobe with   worsening vasogenic edema. Multiple new additional enhancing   metastatic lesions with representative examples as above. Complete   characterization is limited by motion artifact. No midline shift or   herniation.        I personally reviewed the images/study and I agree with the findings   as stated by Kalen Beard MD. This study was interpreted at   [place], [place].        MACRO:   None        Signed by: Gavin Dixon 6/14/2024 4:19 PM   Dictation workstation:   [id number]      Cardiac device check - MRI         CT head wo IV contrast   Final Result   There is no hyperdense acute intracranial hemorrhage or acute   fracture.        There is again evidence of nodular lesions most compatible with brain   metastasis  within the right parietal lobe and left temporal lobe   similar in appearance when compared with the prior CT study dated   06/12/2024.        I personally reviewed the images/study and I agree with the findings   as stated by Resident Jayant Fernnadez MD. This study was interpreted   at [facility], [place], [place]. .        MACRO:   None        Signed by: Rosalio Aaron 6/14/2024 4:11 AM   Dictation workstation:   [id number]      CT head wo IV contrast   Final Result   There is no measurable change compared to the previous exam.        MACRO:   none        Signed by: Tim Angeles 6/13/2024 7:47 AM   Dictation workstation:   [id number]      CT head w and wo IV contrast   Final Result   * Increased size of the previously demonstrated enhancing nodule in   the right posterior frontal lobe consistent with metastases.   Associated increased vasogenic edema *No change in the size of the   previously demonstrated left temporal nodule. There has been slight   increased vasogenic edema at this location compared to the previous   exam.        MACRO:   none        Signed by: Tim Angeles 6/12/2024 10:42 AM   Dictation workstation:   [id number]      CT soft tissue neck w IV contrast   Final Result   * Abundant bilateral heterogeneously enhancing lymph nodes consistent   with ralph metastases *Abundant pulmonary nodules consistent with   pulmonary metastases.        MACRO:   An Orange alert message was sent to the referring physician Dr. SHIRLEY CALIX through the [employer] system at 10:47 am on 6/12/2024 by   Dr.Charles Angeles        Signed by: Tim Angeles 6/12/2024 10:47 AM   Dictation workstation:   [id number]      CT brain attack head wo IV contrast   Final Result   * Increased size of the previously demonstrated enhancing nodule in   the right posterior frontal lobe consistent with metastases.   Associated increased vasogenic edema *No change in the size of the    previously demonstrated left temporal nodule. There has been slight   increased vasogenic edema at this location compared to the previous   exam.        MACRO:   none        Signed by: Tim Angeles 6/12/2024 10:42 AM   Dictation workstation:   [id number]      XR shoulder right 2+ views   Final Result   1.  No acute fracture or malalignment of the right shoulder.   2. Postsurgical changes of short stem right shoulder hemiarthroplasty.   3. Multifocal nodular opacities throughout the visualized right lung   may be attributable to pulmonary metastatic disease or infectious   infiltrate. Further evaluation with chest radiograph could be   considered if there is concern for infection.        MACRO:   None        Signed by: Chandler Myles 6/12/2024 9:48 AM   Dictation workstation:   [id number]      CT abdomen pelvis w IV contrast   Final Result   1. No evidence of bowel obstruction. There is relatively short   segment of circumferential thickening of ascending colon superior to   the cecum new from prior, which may represent colitis.   2. Redemonstration of metastatic disease in the visualized lung bases   and liver unchanged in the short-term interval since 06/05/2024.   3. Redemonstration of abdominopelvic lymphadenopathy as described   above.   4. Redemonstration of left adrenal nodule, likely also metastatic.   5. Small left pleural effusion unchanged in the short-term interval.   Interval development of trace ascites in the dependent portion of the   pelvis.             I personally reviewed the images/study and I agree with the findings   as stated by resident physician Dr. Ken Vinson . This study   was interpreted at [facility],   [place], [place].                  MACRO:   None        Signed by: Levi Duque 6/8/2024 11:35 PM   Dictation workstation:   [id number]      MR brain w and wo IV contrast    (Results Pending)   MR cervical spine w and wo IV contrast     (Results Pending)   CT chest w IV contrast    (Results Pending)   MR lumbar spine w and wo IV contrast    (Results Pending)   MR thoracic spine w and wo IV contrast    (Results Pending)     Assessment:  Injury: R C5-C6 soft tissue met, r/o L spine mets    63M (esophageal ca w known mets to brain, T spine sp emergent T10 mass resection w T8-T12 PSIF w Dr. John 5/17/2024, PE on Eliquis last dose 6/15, 3rd deg AV block sp pacemaker) currently adm for colitis since 6/5. C/o R arm & BL LE weakness + inability to ambulate 4d ago. Previously ambulated unassisted. 0/5 shoulder abduction & elbow flexion. Otherwise 5/5 and SILT BL UE. No UMN signs. 3/5 BL HF 4/5 BL KE 4/5 BL PF/DF 3/5 BL EHL. SILT L1-S1. Intact perianal sensation/tone. MRI C spine w soft tissue lesion adjacent to R C5-C6 nerve root.       Recommendations:  - Final plan pending MRI, possible discussion with nsgy due to recent OR     D/w Dr. Stone    This consult was seen and staffed within 30 minutes.    This patient will be followed by the Orthopaedic Spine service. Please page or Epic Chat the corresponding residents below with questions or concerns.     Ortho Spine Service (Epic Chat Preferred)  First call: Denise Miller MD PGY2  Second call: Davide Howard DO PGY3    6pm-6am M-F, Holidays, and weekends page Ortho on-call @[phone] with urgent questions/concerns.     Tera Li MD  Orthopaedic Surgery PGY-2  On-call pager [phone]    Orthopedic spine attending    As noted above, the patient has widely metastatic disease including involvement in the cervical spine.  There is no pathologic fracture nor severe canal stenosis or spinal cord compression in the cervical spine.  As such would not recommend surgical intervention for the cervical spine.    Also as noted, the patient is roughly 4 weeks status post an emergent thoracic decompression and tumor debulking and fusion for metastatic disease.  He had neurologic improvement but there has been recurrent  lower extremity weakness.    Updated imaging has been obtained.  There is suggestion of recurrent tumor and spinal cord compression at the thoracic surgical site.  Axial images were not performed.    We have requested completion of the thoracic MRI when the patient is able.  Given the patient's overall prognosis with widely metastatic disease, further surgery would be a significant undertaking with some limited predictability for improvement.  However, further evaluation with an updated complete thoracic MRI would be indicated.    ** Dictated with voice recognition software and not immediately reviewed for errors in grammar and/or spelling **

## 2024-06-16 NOTE — CARE PLAN
The patient's goals for the shift include      The clinical goals for the shift include Patient will remai HDS throughout shift  Over the shift the patient had a a lot of anxiety. Patient was really having an emotional day today, RN gave PRN ativan and started atarax q4 daily. Patient is still emotional but not as anxious at the moment a lot of big changes for him at this time. MRI form completed family at bedside, waiting for patient to be called for MRI. Patient is stable and safe.     Problem: Psychosocial Needs  Goal: Demonstrates ability to cope with hospitalization/illness  Outcome: Not Progressing  Goal: Collaborate with me, my family, and caregiver to identify my specific goals  Outcome: Not Progressing

## 2024-06-16 NOTE — PROGRESS NOTES
"Massimo Garcia is a 63 y.o. male on day 7 of admission presenting with Colitis.    Subjective   Patient seen at bedside with wife present. Patient tearful during discussion. Discussed all recent imaging results including MRI showing progression of disease. Discussed plan to reengage rad/onc 6/17 to discuss inpatient radiation. Also discussed possible inpatient chemo pending further discussions tomorrow with oncology team to discuss plan moving forward to treat new onset of weakness. Patient states that his weakness has progressed and it is in his right arm, and BLE. Patient no longer able to ambulate as of yesterday. Denies SOB, CP, heart palpitations. N/V/D/C, headache dizziness or vision changes        Objective     Physical Exam  HENT:      Head: Normocephalic.      Nose: Nose normal.      Mouth/Throat:      Mouth: Mucous membranes are moist.   Eyes:      Pupils: Pupils are equal, round, and reactive to light.   Cardiovascular:      Rate and Rhythm: Normal rate.   Pulmonary:      Effort: Pulmonary effort is normal.   Abdominal:      Palpations: Abdomen is soft.   Musculoskeletal:      Right shoulder: Decreased strength.      Cervical back: Normal range of motion.      Right hip: Decreased strength.      Left hip: Decreased strength.   Skin:     General: Skin is warm.      Capillary Refill: Capillary refill takes less than 2 seconds.   Neurological:      General: No focal deficit present.      Mental Status: He is alert.   Psychiatric:         Mood and Affect: Affect is tearful.         Last Recorded Vitals  Blood pressure 120/73, pulse 95, temperature 37.3 °C (99.1 °F), temperature source Temporal, resp. rate 20, height 1.727 m (5' 8\"), weight 73.5 kg (162 lb), SpO2 95%.  Intake/Output last 3 Shifts:  I/O last 3 completed shifts:  In: 1150 (15.7 mL/kg) [P.O.:1150]  Out: 0 (0 mL/kg)   Weight: 73.5 kg     Relevant Results                             Assessment/Plan   Principal Problem:    Colitis  Massimo Garcia is a 63 y.o. 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 Patient admitted to [place] (6/5-6/7) c/f partial SBO, resolved prior to discharge from [facility], surgery consulted at OSH and did not recommend surgical interventions at this time. Presented to [facility] 6/8 with continued abdominal pain and diarrhea. CT A/P (6/8) wo bowel obstruction, c/f colitis on imaging. Cdiff and stool pathogen pending. No leukocytosis or infectious symptoms at this time (no fevers/chills), no indication for abx at this time. Supportive oncology consulted for further pain management (6/9), rec cont PO Dilaudid 4mg Q2 PRN for moderate to severe pain, cont IV Dilaudid 2mg Q3 for breakthrough pain, increasing Fentanyl patch change frequency to 100mcgs Q48hr, and starting Bentyl 10mg x4/day PRN for abdominal cramps. Increased Dilaudid PO dose and fentanyl patch dose (6/11) per updated Supportive Oncology updated recs (6/11). Pt tolerating full liquid diet, no nausea or vomiting, advanced to regular diet (6/11). Radiation oncology consulted as he was recently seen outpatient with plan for CT simulation for gamma knife and radiation to spine, recs pending. 6/12 BAT called d/t new right arm weakness. BAT cancelled because symptoms not consistent with stroke. Neurology consulted for new arm weakness workup. CTH showed concern for bleed of his known left temporal lobe, worsening edema around his right occipital. However, neither can explain his symptoms. CT of the neck showed two larger cervical lymphnodes on the right side at C4-5  level, concern for compression on his right upper brachial plexuses. Rad/onc paged regarding CT head showing worsening disease in brain. Surg/onc consulted d/t concern for brachial plexus injury d/t large cervical lymph node-no plans for surgical interventions. Patient given Dex 10mg x1 dose and then 4mg q6 hours. NSGY consulted d/t concern for temporal bleed. Repeat head CT 6/12 showing same findings as previous. Eliquis restarted 6/15 given no hemmorage on MRI. MRI brain with worsening edema, MRI C-spine with worsening progression. 6/15 weakness progressing and patient no longer able to ambulate. Oncology consulted rec re engaging rad/onc 6/17 for possible inpatient emergent radiation. 6/16 called NSGY to re engage d/t new onset on BLE weakness. After reviewing results recommendation to consult ortho/spine to r/o cord compression. MRI T-spine and L spine ordered stat-pending. Ortho consulted-recs pending      #Acute RUE weakness  -BAT activated on 6/12 at 0859 for new right arm weakness.   -CT brain attack, CT head w/wo IV contrast showing increased temporal nodule and vasogenic edema  -Neurology consulted concerned for bleed from temporal tumor. Repeat CT head stable    -NSGY consulted d/t concern for brain bleed but low suspicion for active bleed    -CT soft tissue neck w/ contrast showing abundant bilateral heterogeneously enhancing lymph nodes consistent  with ralph metastases  -Surg/onc consulted for concern for brachial plexus causing new onset weakness-no surgical interventions   - rad/onc note 6/12 recommending systemic therapy and interval palliative RT sequenced in between cycles; however with concern for bracial plexus causing  new onset weakness- will touch base with rad/onc again 6/17  -Dexamethasone 10mg x1 then 4mg q6. Plan to discharge with 4mg BID x5 days then 2mg BID x5 days then 2mg daily x5 days. (Per rad onc)   -6/13-Denies new sensory changes to RUE.    - MRI c-spine and MRI brain with  concern for progression/ worsening edema. Neurology and neurosurgery signed off, no acute bleed, no acute intervention   - worsening hyponatremia, likely hypovolemia related; monitor on AM labs and consider workup if worsening      # Stage IV Esophageal AdenoCarcinoma (Her2 pos) w/ known mets to Lung/Liver,   # Recent Spine Emergent Decompression/Laminectomy (T8-T12 5/17/24 2/2 to Metastatic Lesion)  #Cancer Related Pain  - Follows w/ Dr. Randolph for Oncology; emailed on 6/13  - 6/8 CT A/P wo evidence of bowel obstruction, but w/ short segment of circumferential thickening of ascending colon superior to the cecum new from prior, which may represent colitis. Also, redemonstration of metastatic disease in the visualized lung bases and liver unchanged in the short-term interval since 06/05/2024; redemonstration of abdominopelvic lymphadenopathy; redemonstration of left adrenal nodule, likely also metastatic; also Small left pleural effusion unchanged in the short-term interval. Interval development of trace ascites in the dependent portion of the pelvis  - Palliative care consulted while at [facility]; recommended 2mg IV dilaudid Q3 PRN for severe pain, continued on admission (6/9- )  - EKG (6/8)    - C/w Tylenol 650mg Q6H, Capsaicin cream QID, Duloxetine 60mg daily, Lyrica 50mg BID, Fentanyl patch 100mcg, 0.5mg Lorazepam IV Q8hrs PRN anxiety  - Miralax 17g BID, Senna/Colace 2 tabs BID for opioid induced constipation   - C/w Zofran, Compazine, Phenergan PRN N/V  - Decadron 16mg Ivx1 on 6/8, c/w home prednisone 20mg daily (part of taper, through 6/14, then decreased to 10mg daily for 7 days) with PPI   - Holding home Flexeril  - supportive oncology consulted (6/9), rec cont PO Dilaudid 4mg Q2 PRN for moderate to severe pain, cont IV Dilaudid 2mg Q3 for breakthrough pain, increasing Fentanyl patch change frequency to 100mcgs Q48hr, and starting Bentyl 10mg x4/day PRN for abdominal cramps  - supportive onc updated  recs (6/11): increase PO Dilaudid from 4mg to 6mg Q3, and increase Fentanyl from 100mcg to 125mcg Q48hr  - Radiation oncology consulted 6/9; further recs pending (seen outpt on 6/4 with plan for gamma knife and radiation to spine pending CT simulation)   - 6/12 BAT/new acute right arm weakness as above   -FUV scheduled 6/28 for Infusion with INF 11 at [facility]   - oncology consulted 6/15 given progression of disease for any consideration of inpatient treatment, plan to discussed possible emergent radiation with rad/onc 6/17. CT chest ordered for staging.      # Colitis on Imaging iso Hx of Drug Induced Colitis   - Admitted in May for immunotherapy related colitis (infectious workup remained negative); on steroid taper as above   - Presented to [facility] (6/5-6/7) with abdominal pain/ diarrhea c/f CBO  - CT A/P (6/5): Mildly loops dilated loops of jejunum measuring up to 3.4 cm, early developing partial small bowel obstruction can not be excluded  - While at [facility] pt was passing gas and was having active Bms, general surgery consulted and did not recommend any surgical interventions   - CT A/P (6/8) c/f colitis   - cdiff/Stool pathogen panel negative (6/9)  - pt placed NPO on admission, increased to clear liquid diet (6/9 AM), advanced to full liquid diet (6/9 PM), advanced to regular diet (6/11)  - supportive oncology consulted 6/9, rec cont PO Dilaudid 4mg Q2 PRN for moderate to severe pain, cont IV Dilaudid 2mg Q3 for breakthrough pain, increasing Fentanyl patch change frequency to 100mcgs Q48hr, and starting Bentyl 10mg x4/day PRN for abdominal cramps     #Hx of PVT/PE (on Eliquis)   - C/w Apixaban 5mg BID- restarted 6/15     #HTN   #Third Degree AV Block s/p PM (11/9/23)  - No active BP medications      # Prophy   - eliquis, OOB as able, SCD while in bed      Dispo:   - Full code (confirmed on admission)   - Discharge home pending pain management and radiation/oncology plan   - NOK: Jane (wife) [phone]  [phone]; updated at bedside 6/15         I spent 60 minutes in the professional and overall care of this patient.      ALFREDITO Abbott-CNP  Patient discussed with Dr. Guardado

## 2024-06-17 ENCOUNTER — APPOINTMENT (OUTPATIENT)
Dept: CARDIOLOGY | Facility: HOSPITAL | Age: 64
DRG: 393 | End: 2024-06-17
Payer: MEDICARE

## 2024-06-17 ENCOUNTER — APPOINTMENT (OUTPATIENT)
Dept: RADIOLOGY | Facility: HOSPITAL | Age: 64
DRG: 393 | End: 2024-06-17
Payer: MEDICARE

## 2024-06-17 ENCOUNTER — ANESTHESIA (OUTPATIENT)
Dept: RADIOLOGY | Facility: HOSPITAL | Age: 64
End: 2024-06-17
Payer: MEDICARE

## 2024-06-17 ENCOUNTER — ANESTHESIA EVENT (OUTPATIENT)
Dept: RADIOLOGY | Facility: HOSPITAL | Age: 64
End: 2024-06-17
Payer: MEDICARE

## 2024-06-17 ENCOUNTER — APPOINTMENT (OUTPATIENT)
Dept: CARDIOLOGY | Facility: HOSPITAL | Age: 64
End: 2024-06-17
Payer: MEDICARE

## 2024-06-17 LAB
ABO GROUP (TYPE) IN BLOOD: NORMAL
ALBUMIN SERPL BCP-MCNC: 3.1 G/DL (ref 3.4–5)
ALP SERPL-CCNC: 326 U/L (ref 33–136)
ALT SERPL W P-5'-P-CCNC: 32 U/L (ref 10–52)
ANION GAP SERPL CALC-SCNC: 11 MMOL/L (ref 10–20)
ANTIBODY SCREEN: NORMAL
APTT PPP: 25 SECONDS (ref 27–38)
AST SERPL W P-5'-P-CCNC: 60 U/L (ref 9–39)
BASOPHILS # BLD MANUAL: 0 X10*3/UL (ref 0–0.1)
BASOPHILS NFR BLD MANUAL: 0 %
BILIRUB SERPL-MCNC: 0.9 MG/DL (ref 0–1.2)
BUN SERPL-MCNC: 16 MG/DL (ref 6–23)
CALCIUM SERPL-MCNC: 9.1 MG/DL (ref 8.6–10.6)
CHLORIDE SERPL-SCNC: 95 MMOL/L (ref 98–107)
CHLORIDE UR-SCNC: 98 MMOL/L
CHLORIDE/CREATININE (MMOL/G) IN URINE: 151 MMOL/G CREAT (ref 23–275)
CO2 SERPL-SCNC: 27 MMOL/L (ref 21–32)
CREAT SERPL-MCNC: 0.46 MG/DL (ref 0.5–1.3)
CREAT UR-MCNC: 64.8 MG/DL (ref 20–370)
EGFRCR SERPLBLD CKD-EPI 2021: >90 ML/MIN/1.73M*2
EOSINOPHIL # BLD MANUAL: 0 X10*3/UL (ref 0–0.7)
EOSINOPHIL NFR BLD MANUAL: 0 %
ERYTHROCYTE [DISTWIDTH] IN BLOOD BY AUTOMATED COUNT: 16.6 % (ref 11.5–14.5)
GLUCOSE SERPL-MCNC: 145 MG/DL (ref 74–99)
HCT VFR BLD AUTO: 30.1 % (ref 41–52)
HGB BLD-MCNC: 9.7 G/DL (ref 13.5–17.5)
IMM GRANULOCYTES # BLD AUTO: 2.73 X10*3/UL (ref 0–0.7)
IMM GRANULOCYTES NFR BLD AUTO: 9.1 % (ref 0–0.9)
INR PPP: 1.4 (ref 0.9–1.1)
LYMPHOCYTES # BLD MANUAL: 0.24 X10*3/UL (ref 1.2–4.8)
LYMPHOCYTES NFR BLD MANUAL: 0.8 %
MAGNESIUM SERPL-MCNC: 1.82 MG/DL (ref 1.6–2.4)
MCH RBC QN AUTO: 28 PG (ref 26–34)
MCHC RBC AUTO-ENTMCNC: 32.2 G/DL (ref 32–36)
MCV RBC AUTO: 87 FL (ref 80–100)
METAMYELOCYTES # BLD MANUAL: 1.29 X10*3/UL
METAMYELOCYTES NFR BLD MANUAL: 4.3 %
MONOCYTES # BLD MANUAL: 0.27 X10*3/UL (ref 0.1–1)
MONOCYTES NFR BLD MANUAL: 0.9 %
MYELOCYTES # BLD MANUAL: 1.05 X10*3/UL
MYELOCYTES NFR BLD MANUAL: 3.5 %
NEUTROPHILS # BLD MANUAL: 26.01 X10*3/UL (ref 1.2–7.7)
NEUTS BAND # BLD MANUAL: 0.27 X10*3/UL (ref 0–0.7)
NEUTS BAND NFR BLD MANUAL: 0.9 %
NEUTS SEG # BLD MANUAL: 25.74 X10*3/UL (ref 1.2–7)
NEUTS SEG NFR BLD MANUAL: 86.1 %
NRBC BLD-RTO: 0.1 /100 WBCS (ref 0–0)
OSMOLALITY SERPL: 274 MOSM/KG (ref 280–300)
PLATELET # BLD AUTO: 116 X10*3/UL (ref 150–450)
POTASSIUM SERPL-SCNC: 4.1 MMOL/L (ref 3.5–5.3)
POTASSIUM UR-SCNC: 95 MMOL/L
POTASSIUM/CREAT UR-RTO: 147 MMOL/G CREAT
PROMYELOCYTES # BLD MANUAL: 0.78 X10*3/UL
PROMYELOCYTES NFR BLD MANUAL: 2.6 %
PROT SERPL-MCNC: 5.3 G/DL (ref 6.4–8.2)
PROTHROMBIN TIME: 15.3 SECONDS (ref 9.8–12.8)
RBC # BLD AUTO: 3.47 X10*6/UL (ref 4.5–5.9)
RBC MORPH BLD: ABNORMAL
RH FACTOR (ANTIGEN D): NORMAL
SODIUM SERPL-SCNC: 129 MMOL/L (ref 136–145)
SODIUM UR-SCNC: 63 MMOL/L
SODIUM/CREAT UR-RTO: 97 MMOL/G CREAT
TOTAL CELLS COUNTED BLD: 115
VARIANT LYMPHS # BLD MANUAL: 0.27 X10*3/UL (ref 0–0.5)
VARIANT LYMPHS NFR BLD: 0.9 %
WBC # BLD AUTO: 29.9 X10*3/UL (ref 4.4–11.3)

## 2024-06-17 PROCEDURE — 72158 MRI LUMBAR SPINE W/O & W/DYE: CPT

## 2024-06-17 PROCEDURE — C9113 INJ PANTOPRAZOLE SODIUM, VIA: HCPCS | Performed by: STUDENT IN AN ORGANIZED HEALTH CARE EDUCATION/TRAINING PROGRAM

## 2024-06-17 PROCEDURE — 86901 BLOOD TYPING SEROLOGIC RH(D): CPT

## 2024-06-17 PROCEDURE — 72158 MRI LUMBAR SPINE W/O & W/DYE: CPT | Performed by: RADIOLOGY

## 2024-06-17 PROCEDURE — 83735 ASSAY OF MAGNESIUM: CPT

## 2024-06-17 PROCEDURE — 2500000004 HC RX 250 GENERAL PHARMACY W/ HCPCS (ALT 636 FOR OP/ED): Performed by: STUDENT IN AN ORGANIZED HEALTH CARE EDUCATION/TRAINING PROGRAM

## 2024-06-17 PROCEDURE — 2500000004 HC RX 250 GENERAL PHARMACY W/ HCPCS (ALT 636 FOR OP/ED): Performed by: ANESTHESIOLOGIST ASSISTANT

## 2024-06-17 PROCEDURE — 83930 ASSAY OF BLOOD OSMOLALITY: CPT

## 2024-06-17 PROCEDURE — 2500000001 HC RX 250 WO HCPCS SELF ADMINISTERED DRUGS (ALT 637 FOR MEDICARE OP)

## 2024-06-17 PROCEDURE — 85027 COMPLETE CBC AUTOMATED: CPT

## 2024-06-17 PROCEDURE — 93286 PERI-PX EVAL PM/LDLS PM IP: CPT

## 2024-06-17 PROCEDURE — 3700000001 HC GENERAL ANESTHESIA TIME - INITIAL BASE CHARGE

## 2024-06-17 PROCEDURE — 93286 PERI-PX EVAL PM/LDLS PM IP: CPT | Performed by: INTERNAL MEDICINE

## 2024-06-17 PROCEDURE — 3700000002 HC GENERAL ANESTHESIA TIME - EACH INCREMENTAL 1 MINUTE

## 2024-06-17 PROCEDURE — 2500000002 HC RX 250 W HCPCS SELF ADMINISTERED DRUGS (ALT 637 FOR MEDICARE OP, ALT 636 FOR OP/ED): Performed by: STUDENT IN AN ORGANIZED HEALTH CARE EDUCATION/TRAINING PROGRAM

## 2024-06-17 PROCEDURE — 85007 BL SMEAR W/DIFF WBC COUNT: CPT

## 2024-06-17 PROCEDURE — 72157 MRI CHEST SPINE W/O & W/DYE: CPT | Performed by: RADIOLOGY

## 2024-06-17 PROCEDURE — 82436 ASSAY OF URINE CHLORIDE: CPT

## 2024-06-17 PROCEDURE — 85610 PROTHROMBIN TIME: CPT

## 2024-06-17 PROCEDURE — 2500000001 HC RX 250 WO HCPCS SELF ADMINISTERED DRUGS (ALT 637 FOR MEDICARE OP): Performed by: STUDENT IN AN ORGANIZED HEALTH CARE EDUCATION/TRAINING PROGRAM

## 2024-06-17 PROCEDURE — A9575 INJ GADOTERATE MEGLUMI 0.1ML: HCPCS | Performed by: INTERNAL MEDICINE

## 2024-06-17 PROCEDURE — 2500000004 HC RX 250 GENERAL PHARMACY W/ HCPCS (ALT 636 FOR OP/ED)

## 2024-06-17 PROCEDURE — 84075 ASSAY ALKALINE PHOSPHATASE: CPT

## 2024-06-17 PROCEDURE — 2500000005 HC RX 250 GENERAL PHARMACY W/O HCPCS: Performed by: ANESTHESIOLOGIST ASSISTANT

## 2024-06-17 PROCEDURE — 2500000004 HC RX 250 GENERAL PHARMACY W/ HCPCS (ALT 636 FOR OP/ED): Performed by: ANESTHESIOLOGY

## 2024-06-17 PROCEDURE — 2500000005 HC RX 250 GENERAL PHARMACY W/O HCPCS

## 2024-06-17 PROCEDURE — 99233 SBSQ HOSP IP/OBS HIGH 50: CPT

## 2024-06-17 PROCEDURE — 2500000005 HC RX 250 GENERAL PHARMACY W/O HCPCS: Performed by: ANESTHESIOLOGY

## 2024-06-17 PROCEDURE — 1170000001 HC PRIVATE ONCOLOGY ROOM DAILY

## 2024-06-17 PROCEDURE — 72157 MRI CHEST SPINE W/O & W/DYE: CPT

## 2024-06-17 PROCEDURE — 2550000001 HC RX 255 CONTRASTS: Performed by: INTERNAL MEDICINE

## 2024-06-17 RX ORDER — METHOCARBAMOL 100 MG/ML
1000 INJECTION, SOLUTION INTRAMUSCULAR; INTRAVENOUS ONCE
Status: COMPLETED | OUTPATIENT
Start: 2024-06-17 | End: 2024-06-17

## 2024-06-17 RX ORDER — DIPHENHYDRAMINE HYDROCHLORIDE 50 MG/ML
12.5 INJECTION INTRAMUSCULAR; INTRAVENOUS ONCE AS NEEDED
Status: DISCONTINUED | OUTPATIENT
Start: 2024-06-17 | End: 2024-06-17 | Stop reason: HOSPADM

## 2024-06-17 RX ORDER — LABETALOL HYDROCHLORIDE 5 MG/ML
5 INJECTION, SOLUTION INTRAVENOUS ONCE AS NEEDED
Status: DISCONTINUED | OUTPATIENT
Start: 2024-06-17 | End: 2024-06-17 | Stop reason: HOSPADM

## 2024-06-17 RX ORDER — LACTULOSE 10 G/15ML
20 SOLUTION ORAL 3 TIMES DAILY PRN
Status: DISCONTINUED | OUTPATIENT
Start: 2024-06-18 | End: 2024-06-27 | Stop reason: HOSPADM

## 2024-06-17 RX ORDER — ALBUTEROL SULFATE 0.83 MG/ML
2.5 SOLUTION RESPIRATORY (INHALATION) ONCE AS NEEDED
Status: DISCONTINUED | OUTPATIENT
Start: 2024-06-17 | End: 2024-06-17 | Stop reason: HOSPADM

## 2024-06-17 RX ORDER — LORAZEPAM 2 MG/ML
0.5 INJECTION INTRAMUSCULAR EVERY 6 HOURS PRN
Status: DISCONTINUED | OUTPATIENT
Start: 2024-06-17 | End: 2024-06-27 | Stop reason: HOSPADM

## 2024-06-17 RX ORDER — ACETAMINOPHEN 325 MG/1
650 TABLET ORAL EVERY 4 HOURS PRN
Status: DISCONTINUED | OUTPATIENT
Start: 2024-06-17 | End: 2024-06-17 | Stop reason: HOSPADM

## 2024-06-17 RX ORDER — HYDRALAZINE HYDROCHLORIDE 20 MG/ML
5 INJECTION INTRAMUSCULAR; INTRAVENOUS EVERY 30 MIN PRN
Status: DISCONTINUED | OUTPATIENT
Start: 2024-06-17 | End: 2024-06-17 | Stop reason: HOSPADM

## 2024-06-17 RX ORDER — LACTULOSE 10 G/15ML
20 SOLUTION ORAL 3 TIMES DAILY
Status: DISPENSED | OUTPATIENT
Start: 2024-06-17 | End: 2024-06-18

## 2024-06-17 RX ORDER — ROCURONIUM BROMIDE 10 MG/ML
INJECTION, SOLUTION INTRAVENOUS AS NEEDED
Status: DISCONTINUED | OUTPATIENT
Start: 2024-06-17 | End: 2024-06-17

## 2024-06-17 RX ORDER — LIDOCAINE HYDROCHLORIDE 10 MG/ML
0.1 INJECTION INFILTRATION; PERINEURAL ONCE
Status: DISCONTINUED | OUTPATIENT
Start: 2024-06-17 | End: 2024-06-17 | Stop reason: HOSPADM

## 2024-06-17 RX ORDER — OXYCODONE HYDROCHLORIDE 5 MG/1
10 TABLET ORAL EVERY 4 HOURS PRN
Status: DISCONTINUED | OUTPATIENT
Start: 2024-06-17 | End: 2024-06-17 | Stop reason: HOSPADM

## 2024-06-17 RX ORDER — HYDROMORPHONE HYDROCHLORIDE 1 MG/ML
0.5 INJECTION, SOLUTION INTRAMUSCULAR; INTRAVENOUS; SUBCUTANEOUS EVERY 5 MIN PRN
Status: DISCONTINUED | OUTPATIENT
Start: 2024-06-17 | End: 2024-06-17 | Stop reason: HOSPADM

## 2024-06-17 RX ORDER — HYDROMORPHONE HYDROCHLORIDE 1 MG/ML
0.2 INJECTION, SOLUTION INTRAMUSCULAR; INTRAVENOUS; SUBCUTANEOUS EVERY 5 MIN PRN
Status: DISCONTINUED | OUTPATIENT
Start: 2024-06-17 | End: 2024-06-17 | Stop reason: HOSPADM

## 2024-06-17 RX ORDER — PROPOFOL 10 MG/ML
INJECTION, EMULSION INTRAVENOUS AS NEEDED
Status: DISCONTINUED | OUTPATIENT
Start: 2024-06-17 | End: 2024-06-17

## 2024-06-17 RX ORDER — LIDOCAINE HYDROCHLORIDE 20 MG/ML
INJECTION, SOLUTION INFILTRATION; PERINEURAL AS NEEDED
Status: DISCONTINUED | OUTPATIENT
Start: 2024-06-17 | End: 2024-06-17

## 2024-06-17 RX ORDER — ONDANSETRON HYDROCHLORIDE 2 MG/ML
4 INJECTION, SOLUTION INTRAVENOUS ONCE AS NEEDED
Status: DISCONTINUED | OUTPATIENT
Start: 2024-06-17 | End: 2024-06-17 | Stop reason: HOSPADM

## 2024-06-17 RX ORDER — GADOTERATE MEGLUMINE 376.9 MG/ML
15 INJECTION INTRAVENOUS
Status: COMPLETED | OUTPATIENT
Start: 2024-06-17 | End: 2024-06-17

## 2024-06-17 RX ORDER — FENTANYL CITRATE 50 UG/ML
INJECTION, SOLUTION INTRAMUSCULAR; INTRAVENOUS AS NEEDED
Status: DISCONTINUED | OUTPATIENT
Start: 2024-06-17 | End: 2024-06-17

## 2024-06-17 RX ORDER — SODIUM CHLORIDE, SODIUM LACTATE, POTASSIUM CHLORIDE, CALCIUM CHLORIDE 600; 310; 30; 20 MG/100ML; MG/100ML; MG/100ML; MG/100ML
100 INJECTION, SOLUTION INTRAVENOUS CONTINUOUS
Status: DISCONTINUED | OUTPATIENT
Start: 2024-06-17 | End: 2024-06-17 | Stop reason: HOSPADM

## 2024-06-17 RX ORDER — OXYCODONE HYDROCHLORIDE 5 MG/1
5 TABLET ORAL EVERY 4 HOURS PRN
Status: DISCONTINUED | OUTPATIENT
Start: 2024-06-17 | End: 2024-06-17 | Stop reason: HOSPADM

## 2024-06-17 RX ORDER — PHENYLEPHRINE HCL IN 0.9% NACL 0.4MG/10ML
SYRINGE (ML) INTRAVENOUS AS NEEDED
Status: DISCONTINUED | OUTPATIENT
Start: 2024-06-17 | End: 2024-06-17

## 2024-06-17 RX ADMIN — FENTANYL 1 PATCH: 100 PATCH TRANSDERMAL at 18:51

## 2024-06-17 RX ADMIN — LACTULOSE 20 G: 20 SOLUTION ORAL at 18:51

## 2024-06-17 RX ADMIN — POLYETHYLENE GLYCOL 3350 17 G: 17 POWDER, FOR SOLUTION ORAL at 09:07

## 2024-06-17 RX ADMIN — Medication 2 L/MIN: at 17:00

## 2024-06-17 RX ADMIN — HYDROMORPHONE HYDROCHLORIDE 1 MG: 1 INJECTION, SOLUTION INTRAMUSCULAR; INTRAVENOUS; SUBCUTANEOUS at 11:24

## 2024-06-17 RX ADMIN — DOCUSATE SODIUM 100 MG: 100 CAPSULE, LIQUID FILLED ORAL at 20:58

## 2024-06-17 RX ADMIN — DULOXETINE HYDROCHLORIDE 60 MG: 60 CAPSULE, DELAYED RELEASE ORAL at 09:08

## 2024-06-17 RX ADMIN — PREGABALIN 50 MG: 25 CAPSULE ORAL at 20:58

## 2024-06-17 RX ADMIN — HYDROMORPHONE HYDROCHLORIDE 6 MG: 4 TABLET ORAL at 18:52

## 2024-06-17 RX ADMIN — METHOCARBAMOL 1000 MG: 100 INJECTION INTRAMUSCULAR; INTRAVENOUS at 17:45

## 2024-06-17 RX ADMIN — DEXAMETHASONE SODIUM PHOSPHATE 4 MG: 4 INJECTION, SOLUTION INTRA-ARTICULAR; INTRALESIONAL; INTRAMUSCULAR; INTRAVENOUS; SOFT TISSUE at 18:51

## 2024-06-17 RX ADMIN — HYDROMORPHONE HYDROCHLORIDE 6 MG: 4 TABLET ORAL at 09:08

## 2024-06-17 RX ADMIN — DEXAMETHASONE SODIUM PHOSPHATE 4 MG: 4 INJECTION, SOLUTION INTRA-ARTICULAR; INTRALESIONAL; INTRAMUSCULAR; INTRAVENOUS; SOFT TISSUE at 04:39

## 2024-06-17 RX ADMIN — ACETAMINOPHEN 650 MG: 325 TABLET ORAL at 18:52

## 2024-06-17 RX ADMIN — GADOTERATE MEGLUMINE 15 ML: 376.9 INJECTION INTRAVENOUS at 16:05

## 2024-06-17 RX ADMIN — HYDROXYZINE HYDROCHLORIDE 25 MG: 25 TABLET ORAL at 11:40

## 2024-06-17 RX ADMIN — HYDROMORPHONE HYDROCHLORIDE 0.5 MG: 1 INJECTION, SOLUTION INTRAMUSCULAR; INTRAVENOUS; SUBCUTANEOUS at 17:20

## 2024-06-17 RX ADMIN — PREGABALIN 50 MG: 25 CAPSULE ORAL at 09:08

## 2024-06-17 RX ADMIN — HYDROMORPHONE HYDROCHLORIDE 1 MG: 1 INJECTION, SOLUTION INTRAMUSCULAR; INTRAVENOUS; SUBCUTANEOUS at 20:58

## 2024-06-17 RX ADMIN — SENNOSIDES AND DOCUSATE SODIUM 2 TABLET: 8.6; 5 TABLET ORAL at 20:58

## 2024-06-17 RX ADMIN — DOCUSATE SODIUM 100 MG: 100 CAPSULE, LIQUID FILLED ORAL at 09:07

## 2024-06-17 RX ADMIN — HYDROXYZINE HYDROCHLORIDE 25 MG: 25 TABLET ORAL at 09:14

## 2024-06-17 RX ADMIN — HYDROXYZINE HYDROCHLORIDE 25 MG: 25 TABLET ORAL at 18:52

## 2024-06-17 RX ADMIN — NYSTATIN 500000 UNITS: 100000 SUSPENSION ORAL at 11:40

## 2024-06-17 RX ADMIN — NYSTATIN 500000 UNITS: 100000 SUSPENSION ORAL at 18:51

## 2024-06-17 RX ADMIN — DEXAMETHASONE SODIUM PHOSPHATE 4 MG: 4 INJECTION, SOLUTION INTRA-ARTICULAR; INTRALESIONAL; INTRAMUSCULAR; INTRAVENOUS; SOFT TISSUE at 11:24

## 2024-06-17 RX ADMIN — SENNOSIDES AND DOCUSATE SODIUM 2 TABLET: 8.6; 5 TABLET ORAL at 09:07

## 2024-06-17 RX ADMIN — LORAZEPAM 0.5 MG: 2 INJECTION INTRAMUSCULAR; INTRAVENOUS at 19:50

## 2024-06-17 RX ADMIN — ACETAMINOPHEN 650 MG: 325 TABLET ORAL at 09:08

## 2024-06-17 RX ADMIN — ACETAMINOPHEN 650 MG: 325 TABLET ORAL at 04:39

## 2024-06-17 RX ADMIN — PANTOPRAZOLE SODIUM 40 MG: 40 INJECTION, POWDER, FOR SOLUTION INTRAVENOUS at 09:00

## 2024-06-17 RX ADMIN — APIXABAN 5 MG: 5 TABLET, FILM COATED ORAL at 20:58

## 2024-06-17 RX ADMIN — APIXABAN 5 MG: 5 TABLET, FILM COATED ORAL at 09:08

## 2024-06-17 RX ADMIN — POTASSIUM CHLORIDE 40 MEQ: 1.5 POWDER, FOR SOLUTION ORAL at 09:07

## 2024-06-17 RX ADMIN — HYDROMORPHONE HYDROCHLORIDE 0.5 MG: 1 INJECTION, SOLUTION INTRAMUSCULAR; INTRAVENOUS; SUBCUTANEOUS at 17:25

## 2024-06-17 ASSESSMENT — COGNITIVE AND FUNCTIONAL STATUS - GENERAL
TOILETING: A LITTLE
MOVING TO AND FROM BED TO CHAIR: TOTAL
WALKING IN HOSPITAL ROOM: TOTAL
MOBILITY SCORE: 11
HELP NEEDED FOR BATHING: A LOT
TOILETING: A LITTLE
STANDING UP FROM CHAIR USING ARMS: A LOT
TURNING FROM BACK TO SIDE WHILE IN FLAT BAD: A LITTLE
DAILY ACTIVITIY SCORE: 19
CLIMB 3 TO 5 STEPS WITH RAILING: TOTAL
DRESSING REGULAR LOWER BODY CLOTHING: A LOT
MOVING FROM LYING ON BACK TO SITTING ON SIDE OF FLAT BED WITH BEDRAILS: A LITTLE
DRESSING REGULAR UPPER BODY CLOTHING: A LITTLE
MOVING TO AND FROM BED TO CHAIR: TOTAL
MOBILITY SCORE: 11
WALKING IN HOSPITAL ROOM: TOTAL
TURNING FROM BACK TO SIDE WHILE IN FLAT BAD: A LITTLE
DAILY ACTIVITIY SCORE: 18
STANDING UP FROM CHAIR USING ARMS: A LOT
MOVING FROM LYING ON BACK TO SITTING ON SIDE OF FLAT BED WITH BEDRAILS: A LITTLE
DRESSING REGULAR UPPER BODY CLOTHING: A LITTLE
DRESSING REGULAR LOWER BODY CLOTHING: A LITTLE
CLIMB 3 TO 5 STEPS WITH RAILING: TOTAL
HELP NEEDED FOR BATHING: A LOT

## 2024-06-17 ASSESSMENT — PAIN SCALES - GENERAL
PAINLEVEL_OUTOF10: 4
PAINLEVEL_OUTOF10: 7
PAINLEVEL_OUTOF10: 8
PAINLEVEL_OUTOF10: 0 - NO PAIN
PAINLEVEL_OUTOF10: 8
PAINLEVEL_OUTOF10: 7
PAINLEVEL_OUTOF10: 8
PAINLEVEL_OUTOF10: 8
PAINLEVEL_OUTOF10: 4
PAINLEVEL_OUTOF10: 5 - MODERATE PAIN

## 2024-06-17 ASSESSMENT — PAIN - FUNCTIONAL ASSESSMENT
PAIN_FUNCTIONAL_ASSESSMENT: 0-10

## 2024-06-17 ASSESSMENT — PAIN DESCRIPTION - DESCRIPTORS
DESCRIPTORS: ACHING;THROBBING
DESCRIPTORS: ACHING
DESCRIPTORS: ACHING;THROBBING
DESCRIPTORS: ACHING
DESCRIPTORS: ACHING;THROBBING
DESCRIPTORS: ACHING

## 2024-06-17 NOTE — PROGRESS NOTES
Massimo Garcia is a 63 y.o. male on day 8 of admission presenting with Colitis.      Subjective   Right arm / shoulder weakness, new b/l LE weakness       Objective     Last Recorded Vitals  /79 (BP Location: Left arm, Patient Position: Lying)   Pulse 94   Temp 37 °C (98.6 °F) (Temporal)   Resp 16   Wt 73.5 kg (162 lb)   SpO2 95%     Image Results  MRI C spine 6/12  IMPRESSION:  There is worsened osseous metastatic disease involving the rightward  aspect of the C6 vertebral body with abnormal soft tissue components  involving the adjacent right lateral paraspinal soft tissues and  infiltrating the right C5-C6 neural foraminal region extending to the  right lateral epidural region. There is no acute pathologic  compression fracture. There is no additional abnormal cord or  leptomeningeal enhancement identified. Of note, motion artifact does  mildly degrades evaluation.      Right cervical lymphadenopathy with soft tissue findings within the  neck better addressed on recent CT neck examination.    MRI brain 6/12  IMPRESSION:  Enlargement of enhancing metastatic lesions measuring up to 1.8 cm in  diameter within the right posterior parasagittal parietal lobe with  worsening vasogenic edema. Multiple new additional enhancing  metastatic lesions with representative examples as above. Complete  characterization is limited by motion artifact. No midline shift or  herniation.    Physical Exam  General: awake, alert, resting comfortably  HEENT: pupils equal and round, no scleral icterus  Pulmonary: Breathing comfortably at rest   Cardiac: regular rate  Abdomen: Some firmness in the left abdominal wall, without associated skin changes, mildly tender to palpation, no rebound, the remainder of his abdomen is benign  Neuro: A&O x 3, cranial nerves II through XII grossly intact -chronic Bell's palsy facial asymmetry, R arm, b/l LE weakness  Psych: Normal affect     Relevant Results  Lab Results   Component Value Date    WBC  29.9 (H) 06/17/2024    HGB 9.7 (L) 06/17/2024    HCT 30.1 (L) 06/17/2024    MCV 87 06/17/2024     (L) 06/17/2024     Lab Results   Component Value Date    GLUCOSE 145 (H) 06/17/2024    CALCIUM 9.1 06/17/2024     (L) 06/17/2024    K 4.1 06/17/2024    CO2 27 06/17/2024    CL 95 (L) 06/17/2024    BUN 16 06/17/2024    CREATININE 0.46 (L) 06/17/2024                Assessment/Plan   This is a 63-year-old male with a history of metastatic esophageal adenocarcinoma HER2 positive that presented to the emergency department on 5/10 with acute on chronic lower back pain.  Workup revealed metastatic disease with T9/10 epidural extension with cord compression.  MRI L-spine also showed a nodular intradural lesion at L2 abutting the equina, concerning for drop lesion.  MRI brain showed small enhancing lesion.      On 5/17 the patient underwent T10 decompression with T8-12 fusion.  The patient was seen in follow-up consultation with Dr. Hodgson on 6/4, and recommend for interval postoperative RT to T-spine, palliative RT to L spine, and gamma knife radiosurgery.     Patient presented 6/5 with increased abdominal pain.  CT imaging of the chest abdomen pelvis note interval increase in pulmonary, hepatic and adrenal, A/P lymphadenopathy, consistent with disease progression.     The patient has developed new right upper extremity weakness, with MRI C-spine noting soft tissue mass involving right C6 neural foramina.    The patient has developed new lower extremity weakness, repeat MRI T and L-spine are pending.     Plan:  The patient was discussed with my attending physician, Dr. Hodgson.     Updated MRI spine will be reviewed.  Inpatient RT to spine can be considered, pending surgical recommendations.  Given the progression of disease noted on CT imaging of chest and abdomen, the patient would additionally benefit from expedited planning for chemotherapy.  Gamma knife radiosurgery can still be considered to address  secondary brain malignancy in interval follow-up.    -Possible inpatient palliative RT to C6-7, T10, L2, pending MRI and surgical recommendations  -Interval GKRS for secondary brain malignancy           I spent ___25__ minutes with this patient. Greater than 50% of this time was spent in the counseling and/or coordination of care of this patient.              Felipe Reyes PA-C

## 2024-06-17 NOTE — ANESTHESIA PREPROCEDURE EVALUATION
Patient: Massimo Garcia    Procedure Information       Date/Time: 06/17/24 1300    Procedure: MR LUMBAR SPINE W AND WO CONTRAST    Location: [facility]        63 y.o. 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 Recent admission on 5/10-5/24 with diffuse T/L spine mets, R occipital met, and L temporal mets, s/p T10 transpedicular decompression and a T8-T12 fusion on 05/17 requiring wound vac and lumbar drain, during the admission he was also treated with high dose steroids followed by steroid taper and managed for anemia and low grade malignancy-associated DIC.     Relevant Problems   Cardiac   (+) Hypertension   (+) Ventricular tachycardia (Multi)      Pulmonary   (+) PE (pulmonary thromboembolism) (Multi)      Neuro   (+) Chemotherapy-induced neuropathy (Multi)   (+) Drug-induced polyneuropathy (Multi)   (+) Mixed anxiety depressive disorder   (+) Neuropathy due to chemical substance (Multi)   (+) Secondary malignant neoplasm of brain and spinal cord (Multi)      GI   (+) Esophageal cancer, stage IV (Multi)   (+) GERD (gastroesophageal reflux disease)   (+) Gastroesophageal reflux disease   (+) Primary malignant neoplasm of esophagus (Multi)   (+) Stage IV malignant neoplasm of esophagus (Multi)      Liver   (+) Esophageal cancer, stage IV (Multi)   (+) Malignant neoplasm metastatic to liver (Multi)   (+) Metastases to the liver (Multi)   (+) Primary malignant neoplasm of esophagus (Multi)   (+) Stage IV malignant neoplasm of esophagus (Multi)      Hematology   (+) PE (pulmonary thromboembolism) (Multi)   (+) Thrombocytopenia (CMS-HCC)      Musculoskeletal   (+) Osteoarthritis of shoulder   (+) Spinal stenosis of thoracolumbar region      ID   (+) Candidiasis of mouth     Past Surgical  History:   Procedure Laterality Date   • CARDIAC ELECTROPHYSIOLOGY PROCEDURE N/A 11/7/2023    Procedure: Temporary Pacemaker Insertion;  Surgeon: Alexis Shook MD;  Location: [facility];  Service: Cardiovascular;  Laterality: N/A;   • CARDIAC ELECTROPHYSIOLOGY PROCEDURE Left 11/9/2023    Procedure: PPM IMPLANT DUAL;  Surgeon: Elias Connolly MD;  Location: [facility];  Service: Electrophysiology;  Laterality: Left;   • TOTAL KNEE ARTHROPLASTY  09/30/2016    Total Knee Replacement Left   • TOTAL KNEE ARTHROPLASTY  09/30/2016    Total Knee Replacement Right     Social Connections: Not on file        Chemistry    Lab Results   Component Value Date/Time     (L) 06/17/2024 0444    K 4.1 06/17/2024 0444    CL 95 (L) 06/17/2024 0444    CO2 27 06/17/2024 0444    BUN 16 06/17/2024 0444    CREATININE 0.46 (L) 06/17/2024 0444    Lab Results   Component Value Date/Time    CALCIUM 9.1 06/17/2024 0444    ALKPHOS 326 (H) 06/17/2024 0444    AST 60 (H) 06/17/2024 0444    ALT 32 06/17/2024 0444    BILITOT 0.9 06/17/2024 0444          Lab Results   Component Value Date/Time    WBC 29.9 (H) 06/17/2024 0444    HGB 9.7 (L) 06/17/2024 0444    HCT 30.1 (L) 06/17/2024 0444     (L) 06/17/2024 0444     Lab Results   Component Value Date/Time    PROTIME 17.4 (H) 06/12/2024 1015    INR 1.5 (H) 06/12/2024 1015     Encounter Date: 06/08/24   ECG 12 Lead   Result Value    Ventricular Rate 47    Atrial Rate 47    VA Interval 232    QRS Duration 104    QT Interval 400    QTC Calculation(Bazett) 354    P Axis 69    R Axis 89    T Axis -87    QRS Count 8    Q Onset 223    P Onset 107    P Offset 189    T Offset 423    QTC Fredericia 368    Narrative    Sinus bradycardia with 1st degree AV block  Biatrial enlargement  Anteroseptal infarct , age undetermined  ST & T wave abnormality, consider inferolateral ischemia  Abnormal ECG  When compared with ECG of 19-MAY-2024 06:30,  Sinus rhythm has replaced Electronic  ventricular pacemaker  Vent. rate has decreased BY  54 BPM    See ED provider note for full interpretation and clinical correlation  Confirmed by Carina Ross ([id number]) on 6/8/2024 10:32:24 PM     No results found for this or any previous visit from the past 1095 days.   Clinical information reviewed:    Allergies  Meds               NPO Detail:  No data recorded     Physical Exam    Airway  Mallampati: III  TM distance: >3 FB  Neck ROM: limited     Cardiovascular    Dental - normal exam     Pulmonary    Abdominal        Anesthesia Plan    History of general anesthesia?: yes  History of complications of general anesthesia?: no    ASA 3     general     intravenous induction   Anesthetic plan and risks discussed with patient.

## 2024-06-17 NOTE — PROGRESS NOTES
"SUPPORTIVE AND PALLIATIVE ONCOLOGY INPATIENT FOLLOW-UP      SERVICE DATE: 06/17/24     SUBJECTIVE:  Interval Events:  MRI with anaesthesia today      Patient was seen at bedside with his daughters and wife. He reports that he had not had the MRI, it was scheduled for later today. He denies issues with his appetite, stating that he is eating. His pain is controlled with current regimen. He reports that he noticed the significance in his pain control with the frequent \"scans, when they have to take the fentanyl patch off, you don't realize how much it helps until you have to remove it for the scans\". Patient reports that he is doing fairly okay with current regimen and he is happy.     LBM: a couple days ago. Discussed scheduling lactulose 20grams 4x a day and then changing to PRN after BM    N/V: denies     Sleep: \"I had a good night sleep last night\".     Pain Assessment:  Location:  Right shoulder and abdomen   Duration: Intermittent  Characteristics:   Rating: 3 and Moderate   Descriptors: localized, aching, and cramping   Aggravating: movement and bending    Relieving: Analgesics dilaudid    Interference with Function: None    Opioid Use  Past 24 h prn opioid use:  Fentanyl patch 125mcgs = 250mg OME   Dilaudid 6mg po x 4 = 120mg OME  Dilaudid 1mg iv x 2 doses = 25mg OME   Dilaudid 0.5mg iv x 2 doses = 12.5mg OME   Total 24h OME use:  407.5mg OME     Note: OME calculations based on equianalgesic table below. Please note this table is based on best available evidence but conversions are still approximate. These are NOT opioid DOSES for individual patient use; this is equivalency information.  Drug Parenteral Enteral   Morphine 10 25   Oxycodone N/A 20   Hydromorphone 2 5   Fentanyl 0.15 N/A   Tramadol N/A 120   Citation: Aniya ROSS. Demystifying opioid conversion calculations: A guide for effective dosing, Second edition. [place]: [employer]System Pharmacists, 2018.    Symptom " Assessment:    Nausea none  Vomiting none  Lack of appetite none  Difficulty Sleeping none  Diarrhea none    Information obtained from: chart review, interview of patient, interview of family, discussion with RN, and discussion with primary team  ______________________________________________________________________        OBJECTIVE:    Lab Results   Component Value Date    WBC 29.9 (H) 06/17/2024    HGB 9.7 (L) 06/17/2024    HCT 30.1 (L) 06/17/2024    MCV 87 06/17/2024     (L) 06/17/2024      Lab Results   Component Value Date    GLUCOSE 145 (H) 06/17/2024    CALCIUM 9.1 06/17/2024     (L) 06/17/2024    K 4.1 06/17/2024    CO2 27 06/17/2024    CL 95 (L) 06/17/2024    BUN 16 06/17/2024    CREATININE 0.46 (L) 06/17/2024     Lab Results   Component Value Date    ALT 32 06/17/2024    AST 60 (H) 06/17/2024    ALKPHOS 326 (H) 06/17/2024    BILITOT 0.9 06/17/2024     Estimated Creatinine Clearance: 125 mL/min (A) (by C-G formula based on SCr of 0.46 mg/dL (L)).     Scheduled medications  acetaminophen, 650 mg, oral, q6h  apixaban, 5 mg, oral, BID  dexAMETHasone, 4 mg, intravenous, q6h  docusate sodium, 100 mg, oral, BID  DULoxetine, 60 mg, oral, Daily  fentaNYL, 1 patch, transdermal, q48h  fentaNYL, 1 patch, transdermal, q48h  hydrOXYzine HCL, 25 mg, oral, 4x daily  iohexol, 90 mL, intravenous, Once in imaging  LORazepam, 0.5 mg, intravenous, Once  nystatin, 5 mL, Swish & Swallow, 4x daily  pantoprazole, 40 mg, intravenous, Daily  polyethylene glycol, 17 g, oral, BID  potassium chloride, 40 mEq, oral, BID  pregabalin, 50 mg, oral, BID  sennosides-docusate sodium, 2 tablet, oral, BID      Continuous medications     PRN medications  diclofenac sodium, 4 g, 4x daily PRN  dicyclomine, 10 mg, 4x daily PRN  HYDROmorphone, 1 mg, q3h PRN  HYDROmorphone, 4 mg, q3h PRN  HYDROmorphone, 6 mg, q3h PRN  LORazepam, 0.5 mg, q6h PRN  naloxone, 0.2 mg, q5 min PRN  ondansetron, 4 mg, q6h PRN  prochlorperazine, 5 mg, q6h  PRN  promethazine, 12.5 mg, q6h PRN  sodium chloride 0.9%, 10 mL, PRN  sodium chloride 0.9%, 10 mL, PRN      }     PHYSICAL EXAMINATION:    Vital Signs:   Vital signs reviewed  Visit Vitals  /79 (BP Location: Left arm, Patient Position: Lying)   Pulse 94   Temp 37 °C (98.6 °F) (Temporal)   Resp 16        Pain Score: 8       Physical Exam  Constitutional:       Appearance: Normal appearance.   Eyes:      Pupils: Pupils are equal, round, and reactive to light.   Cardiovascular:      Heart sounds: Normal heart sounds.   Pulmonary:      Breath sounds: Normal breath sounds.   Abdominal:      Palpations: Abdomen is soft.   Musculoskeletal:         General: Normal range of motion.   Skin:     General: Skin is warm.   Neurological:      Mental Status: He is alert and oriented to person, place, and time.   Psychiatric:         Mood and Affect: Mood normal.        ASSESSMENT/PLAN:    Massimo Garcia is a 63 y.o. 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 PMH significant for HTN, Third Degree AV Block s/p PM (11/9/23), OA. Admitted 6/8/2024 for further evaluation and management of abdominal pain. Course complicated by partial SBO with [facility] ED imagining. Supportive and Palliative Oncology is consulted for pain management.         CT Abdomen pelvis w IV contrast 6/8/2024:   IMPRESSION:  1. No evidence of bowel obstruction. There is relatively short  segment of circumferential thickening of ascending colon superior to  the cecum new from prior, which may represent colitis.  2. Redemonstration of metastatic disease in the visualized lung bases  and liver unchanged in the short-term interval since 06/05/2024.  3. Redemonstration of abdominopelvic lymphadenopathy as described  above.  4. Redemonstration of left adrenal nodule, likely also metastatic.  5. Small left pleural effusion unchanged in the short-term  interval.  Interval development of trace ascites in the dependent portion of the  pelvis.        Pain:  Abdominal pain related to Metastatic esophageal cancer   Pain is: cancer related pain  Type: visceral, somatic, and neuropathic  Pain control: sub-optimally controlled  Home regimen:  Fentanyl TD  and Hydromorphone 4mg q3hrs PRN   fentanyl 100mcgs /72hrs   Personalized pain goal: 2  Total OME usage for the past 24 hours: 407.5mg OME   Continue Fentanyl patch 125mcgs mcgs q 48 hrs scheduled   Patient will need Fentanyl patch 25mcgs and 100mcgs before discharge   Will consider opioid rotating him to methadone in the future, however due to his concern for QTC prolongation will maximize fentanyl patch use.   Continue Dilaudid to 1mg IV q3hrs PRN for breakthrough pain  Continue Voltaren Gel topical to his right shoulder.  Continue dilaudid 4mg po q 3hrs PRN for moderate pain  Continue dilaudid 6mg PO q3hrs PRN for severe pain  With discharge send patient home on Dilaudid 2mg tablets with more quantity due to lower co-pay   Patient's wife has 15 day supply of Dilaudid 4mg PO which was picked up 6/07/2024, patients next supportive oncology appointment is 6/28/2024 so he will need additional medication to last him until that appointment with discharge          EKG 6/8/2024, QTC 354ms    Continue Bentyl 10mg 4x a day PRN for abdominal cramps.  Continue Lyrica 50mg BID   Continue Acetaminophen 650mg q 6hrs   Continue Duloxetine 60mg daily   Continue Dex 4mg IV q 6hrs   Continue to monitor pain scores and administer PRN medications as appropriate  Continue/initiate nonpharmacologic pain management strategies including ice/heat therapy, distraction techniques, deep breathing/relaxation techniques, calming music, and repositioning  Continue to monitor for signs of opioid efficacy (pain scores, improved functionality) and toxicity (pinpoint pupils, excess sedation/drowsiness/confusion, respiratory depression, etc.)      Nausea/Reflux:  Intermittent nausea without vomiting related to opioids and constipation   Home regimen: scopolamine patch, Carafate and Protonix PRN   Improving  Continue Compazine 5mg IV q6hr PRN  Continue promethazine 12.5mg IM q6hr PRN   Continue Zofran 4mg q6hrs PRN   Since patient is concerned about QTC prolonging drugs with his history of 3rd degree AV heart block, consider discharging him home on Bentyl 10mg 4x a day  Agreeable to discharging on small dose of Zofran 4mg po q6hrs PRN      Constipation  At risk for constipation related to opioids, currently not constipated  Usual bowel pattern: every day  Home regimen: Senna 1-2 tablets 1-2x/day, Lactulose 20 gm 4x a day PRN, Docusate 100mg BID PRN, and MOM 4x  DAY prn  LBM few days ago      Continue Senna 2 tabsd bid   Continue Miralax 17grams  bid   Add lactulose 20 grams 4 x a day and switch to PRN   Continue docusate sodium 100mg BID    Monitor BM frequency, adjust regimen as needed  Goal to have BM without straining q48-72h     Altered Mood:  Acute on chronic anxiety and depression related to health concerns   controlled with home regimen   Home regimen:  Ativan 1mg TID PRN   Continue Ativan 0.5mg q8hrs PRN      Sleeping Difficulty:  Impaired sleep related to hospital environment  Home regimen:  none  With better pain management and less hospital interruptions he plans to catch up on his sleep tonight.      Decreased appetite:  Patient is on a liquid diet   Outpatient supportive oncology referred patient to Dr. Mays's office for integrative medicine     Medical Decision Making/Goals of Care/Advance Care Planning:  Patient's current clinical condition, including diagnosis, prognosis, and management plan, and goals of care were discussed.   Life limiting disease: metastatic malignancy  Family: Supportive family   Performance status: Major  limitations due to pain  Joys/meaning/strength: Family  Understanding of health: Demonstrates good understanding of  disease process, understands plan for symptom management   Information:Wants full disclosure  Goals: symptom control and cancer directed therapy  Worries and fears now and future: ongoing symptoms   Code status discussion:  full code      Advance Directives  Existence of Advance Directives:No - has interest  Decision maker: DOUGLAS is wife   Code Status: Full code     Introduction to Supportive and Palliative Oncology:  Spoke with patient, his 2 daughters and wife at bedside  Introduced the role and philosophy of Supportive and Palliative oncology in the evaluation and management of symptoms during cancer treatment  Palliative care was introduced as a service for patients with serious illness to help with symptoms, assist with goals of care conversations, navigate complex decision making, improve quality of life for patients, and provide support both patients and families.  Patient seemed to appreciate the extra layer of support.     Supportive and Palliative Oncology encounter:  Spoke with patient at bedside  Emotional support provided  Coordination of care     Signature and billing:  Thank you for allowing us to participate in the care of this patient. Recommendations will be communicated back to the consulting service by way of shared electronic medical record or face-to-face.    Medical complexity was high level due to due to complexity of problems, extensive data review, and high risk of management/treatment.    I spent 50 minutes in the care of this patient which included chart review, interviewing patient/family, discussion with primary team, coordination of care, and documentation.    Data:   Diagnostic tests and information reviewed for today's visit:  Conversation with primary team, Most recent labs, Most recent imaging, Medications       Some elements copied from my note on 6/12/2024, the elements have been updated and all reflect current decision making from today, 06/17/24       Plan of Care discussed  with: Provider, RN, Patient and Family/Significant Other: wife was at bedside     Thank you for asking Supportive and Palliative Oncology to assist with care of this patient.  We will continue to follow  Please contact us for additional questions or concerns.      SIGNATURE: JASPER Damon   PAGER/CONTACT:  Contact information:  Supportive and Palliative Oncology  Monday-Friday 8 AM-5 PM  Epic Secure chat or pager [phone].  After hours and weekends:  pager [phone]

## 2024-06-17 NOTE — ANESTHESIA PROCEDURE NOTES
Airway  Date/Time: 6/17/2024 2:41 PM  Urgency: elective    Airway not difficult    Staffing  Performed: DALIA and attending   Authorized by: DALIA Dee    Performed by: DALIA Dee  Patient location during procedure: OR    Indications and Patient Condition  Indications for airway management: anesthesia and airway protection  Spontaneous ventilation: present  Sedation level: deep  Preoxygenated: yes  Patient position: sniffing  Mask difficulty assessment: 1 - vent by mask  No planned trial extubation    Final Airway Details  Final airway type: endotracheal airway      Successful airway: ETT  Cuffed: yes   Blade: Andrea  Blade size: #4  ETT size (mm): 7.5  Placement verified by: chest auscultation and capnometry   Measured from: teeth  ETT to teeth (cm): 20  Number of attempts at approach: 1

## 2024-06-17 NOTE — CARE PLAN
The clinical goals for the shift include Pt will remain HDS and VSS throughout shift 6/17/24 by 1900.      Problem: Pain  Goal: My pain/discomfort is manageable  Outcome: Progressing     Problem: Safety  Goal: Patient will be injury free during hospitalization  Outcome: Progressing  Goal: I will remain free of falls  Outcome: Progressing     Problem: Daily Care  Goal: Daily care needs are met  Outcome: Progressing     Problem: Psychosocial Needs  Goal: Demonstrates ability to cope with hospitalization/illness  Outcome: Progressing  Goal: Collaborate with me, my family, and caregiver to identify my specific goals  Outcome: Progressing     Problem: Discharge Barriers  Goal: My discharge needs are met  Outcome: Progressing     Problem: Pain  Goal: Takes deep breaths with improved pain control throughout the shift  Outcome: Progressing  Goal: Turns in bed with improved pain control throughout the shift  Outcome: Progressing  Goal: Walks with improved pain control throughout the shift  Outcome: Progressing  Goal: Performs ADL's with improved pain control throughout shift  Outcome: Progressing  Goal: Participates in PT with improved pain control throughout the shift  Outcome: Progressing  Goal: Free from opioid side effects throughout the shift  Outcome: Progressing  Goal: Free from acute confusion related to pain meds throughout the shift  Outcome: Progressing     Problem: Skin  Goal: Decreased wound size/increased tissue granulation at next dressing change  Outcome: Progressing  Flowsheets (Taken 6/17/2024 1019)  Decreased wound size/increased tissue granulation at next dressing change: Promote sleep for wound healing  Goal: Participates in plan/prevention/treatment measures  Outcome: Progressing  Flowsheets (Taken 6/17/2024 1019)  Participates in plan/prevention/treatment measures: Elevate heels  Goal: Prevent/manage excess moisture  Outcome: Progressing  Flowsheets (Taken 6/17/2024 1019)  Prevent/manage excess  moisture:   Cleanse incontinence/protect with barrier cream   Moisturize dry skin  Goal: Prevent/minimize sheer/friction injuries  Outcome: Progressing  Flowsheets (Taken 6/17/2024 1019)  Prevent/minimize sheer/friction injuries:   Use pull sheet   HOB 30 degrees or less  Goal: Promote/optimize nutrition  Outcome: Progressing  Flowsheets (Taken 6/17/2024 1019)  Promote/optimize nutrition:   Monitor/record intake including meals   Consume > 50% meals/supplements  Goal: Promote skin healing  Outcome: Progressing  Flowsheets (Taken 6/17/2024 1019)  Promote skin healing: Assess skin/pad under line(s)/device(s)     Problem: Pain - Adult  Goal: Verbalizes/displays adequate comfort level or baseline comfort level  Outcome: Progressing     Problem: Safety - Adult  Goal: Free from fall injury  Outcome: Progressing     Problem: Discharge Planning  Goal: Discharge to home or other facility with appropriate resources  Outcome: Progressing     Problem: Chronic Conditions and Co-morbidities  Goal: Patient's chronic conditions and co-morbidity symptoms are monitored and maintained or improved  Outcome: Progressing

## 2024-06-17 NOTE — PROGRESS NOTES
Physical Therapy                 Therapy Communication Note    Patient Name: Massimo Garcia  MRN: [id number]  Today's Date: 6/17/2024     Discipline: Physical Therapy    Missed Visit Reason: Missed Visit Reason:  (pt off floor at MRI. Will re-attempt as pt medically appropriate and schedule permits.)    Missed Time: Attempt    Shasha Garcia, PT

## 2024-06-17 NOTE — PROGRESS NOTES
"Massimo Garcia is a 63 y.o. male on day 8 of admission presenting with Colitis.    Subjective   Multiple attempts made to visit patient at bedside but patient gone to MRI all day. No subjective assessment performed since patient not in room.         Objective     Physical Exam  Patient not physically assessed since he was off the floor     Last Recorded Vitals  Blood pressure 134/79, pulse 94, temperature 37 °C (98.6 °F), temperature source Temporal, resp. rate 16, height 1.727 m (5' 8\"), weight 73.5 kg (162 lb), SpO2 95%.  Intake/Output last 3 Shifts:  I/O last 3 completed shifts:  In: 850 (11.6 mL/kg) [P.O.:850]  Out: 450 (6.1 mL/kg) [Urine:450 (0.2 mL/kg/hr)]  Weight: 73.5 kg     Relevant Results              This patient has a central line   Reason for the central line remaining today?  Chemotherapy       This patient is intubated   Reason for patient to remain intubated today? Intubation unnecessary, will extubate today             Assessment/Plan   Principal Problem:    Colitis    Massimo Garcia is a 63 y.o. 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 Patient admitted to [place] (6/5-6/7) c/f partial SBO, resolved prior to discharge from [facility], surgery consulted at OSH and did not recommend surgical interventions at this time. Presented to [facility] 6/8 with continued abdominal pain and diarrhea. CT A/P (6/8) wo bowel obstruction, c/f colitis on imaging. Cdiff and stool pathogen pending. No leukocytosis or infectious symptoms at this time (no fevers/chills), no indication for abx at this time. " Supportive oncology consulted for further pain management (6/9), rec cont PO Dilaudid 4mg Q2 PRN for moderate to severe pain, cont IV Dilaudid 2mg Q3 for breakthrough pain, increasing Fentanyl patch change frequency to 100mcgs Q48hr, and starting Bentyl 10mg x4/day PRN for abdominal cramps. Increased Dilaudid PO dose and fentanyl patch dose (6/11) per updated Supportive Oncology updated recs (6/11). Pt tolerating full liquid diet, no nausea or vomiting, advanced to regular diet (6/11). Radiation oncology consulted as he was recently seen outpatient with plan for CT simulation for gamma knife and radiation to spine, recs pending. 6/12 BAT called d/t new right arm weakness. BAT cancelled because symptoms not consistent with stroke. Neurology consulted for new arm weakness workup. CTH showed concern for bleed of his known left temporal lobe, worsening edema around his right occipital. However, neither can explain his symptoms. CT of the neck showed two larger cervical lymphnodes on the right side at C4-5 level, concern for compression on his right upper brachial plexuses. Rad/onc paged regarding CT head showing worsening disease in brain. Surg/onc consulted d/t concern for brachial plexus injury d/t large cervical lymph node-no plans for surgical interventions. Patient given Dex 10mg x1 dose and then 4mg q6 hours. NSGY consulted d/t concern for temporal bleed. Repeat head CT 6/12 showing same findings as previous. Eliquis restarted 6/15 given no hemmorage on MRI. MRI brain with worsening edema, MRI C-spine with worsening progression. 6/15 weakness progressing and patient no longer able to ambulate. 6/16 called NSGY to re engage d/t new onset on BLE weakness. After reviewing results recommendation to consult ortho/spine to r/o cord compression. 6/16 Ortho consulted. MRI T-spine and L spine under anesthesia 6/17-results pending. 6/17 discussed with rad/onc possible inpatient emergent radiation. Will plan to discuss with  rad/onc and oncology again pending MRI results.      #Acute RUE weakness  -BAT activated on 6/12 at 0859 for new right arm weakness.   -CT brain attack, CT head w/wo IV contrast showing increased temporal nodule and vasogenic edema  -Neurology consulted concerned for bleed from temporal tumor. Repeat CT head stable    -NSGY consulted d/t concern for brain bleed but low suspicion for active bleed    -CT soft tissue neck w/ contrast showing abundant bilateral heterogeneously enhancing lymph nodes consistent  with ralph metastases  -Surg/onc consulted for concern for brachial plexus causing new onset weakness-no surgical interventions   - rad/onc note 6/12 recommending systemic therapy and interval palliative RT sequenced in between cycles; however with concern for bracial plexus causing  new onset weakness- will touch base with rad/onc again 6/17  -Dexamethasone 10mg x1 then 4mg q6. Plan to discharge with 4mg BID x5 days then 2mg BID x5 days then 2mg daily x5 days. (Per rad onc)   -6/13-Denies new sensory changes to RUE.    - MRI c-spine and MRI brain with concern for progression/ worsening edema. Neurology and neurosurgery signed off, no acute bleed, no acute intervention   - worsening hyponatremia, likely hypovolemia related; monitor on AM labs and consider workup if worsening     #Acute BLE weakness   - 6/15 began experiencing mild BLE weakness   - 6/16 BLE weakness progression to point of bedbound  - Re engaged NSGY. Imaging reviewed again and low suspicion for BLE secondary to brain edema and progression of disease. More likely cord compression vs muscle atrophy  - 6/16 Ortho consulted d/t concern for cauda equina   - Stat MRI T-spine and L-spine ordered but patient patient unable to tolerate despite premeds   - 6/17 MRI T-spine and L-spine under anesthesia-results pending   - Steroids as above   - Plan for surgery vs radiation if cord compression     #Leukocytosis   - WBC uptrending to 38.6 (6/16) 29.9 (6/17)  -  Likely in setting of steroid use vs infection   - CT chest 6/15 neg for infectious process   - Patient afebrile, HR WNL, normotensive   - Close monitoring of infectious symptoms and low threshold to start abx     #Hyponatremia   - 6/17 Na 129  - Serum osmolality sent-pending  - Urine electrolytes ordered-pending   - Consider NS IVF pending further workup      # Stage IV Esophageal AdenoCarcinoma (Her2 pos) w/ known mets to Lung/Liver,   # Recent Spine Emergent Decompression/Laminectomy (T8-T12 5/17/24 2/2 to Metastatic Lesion)  #Cancer Related Pain  - Follows w/ Dr. Randolph for Oncology; spoke with him 6/14  - 6/8 CT A/P wo evidence of bowel obstruction, but w/ short segment of circumferential thickening of ascending colon superior to the cecum new from prior, which may represent colitis. Also, redemonstration of metastatic disease in the visualized lung bases and liver unchanged in the short-term interval since 06/05/2024; redemonstration of abdominopelvic lymphadenopathy; redemonstration of left adrenal nodule, likely also metastatic; also Small left pleural effusion unchanged in the short-term interval. Interval development of trace ascites in the dependent portion of the pelvis  - Palliative care consulted while at [facility]; recommended 2mg IV dilaudid Q3 PRN for severe pain, continued on admission (6/9- )  - EKG (6/8)    - C/w Tylenol 650mg Q6H, Capsaicin cream QID, Duloxetine 60mg daily, Lyrica 50mg BID, Fentanyl patch 100mcg, 0.5mg Lorazepam IV Q8hrs PRN anxiety  - Miralax 17g BID, Senna/Colace 2 tabs BID for opioid induced constipation   - C/w Zofran, Compazine, Phenergan PRN N/V  - Decadron 16mg Ivx1 on 6/8, c/w home prednisone 20mg daily (part of taper, through 6/14, then decreased to 10mg daily for 7 days) with PPI   - Holding home Flexeril  - supportive oncology consulted (6/9), rec cont PO Dilaudid 4mg Q2 PRN for moderate to severe pain, cont IV Dilaudid 2mg Q3 for breakthrough pain, increasing  Fentanyl patch change frequency to 100mcgs Q48hr, and starting Bentyl 10mg x4/day PRN for abdominal cramps  - supportive onc updated recs (6/11): increase PO Dilaudid from 4mg to 6mg Q3, and increase Fentanyl from 100mcg to 125mcg Q48hr  - Radiation oncology consulted 6/9; further recs pending (seen outpt on 6/4 with plan for gamma knife and radiation to spine pending CT simulation)   - 6/12 BAT/new acute right arm weakness as above   -FUV scheduled 6/28 for Infusion with INF 11 at [facility]   - oncology consulted 6/15 given progression of disease for any consideration of inpatient treatment  - 6/17 discussed with rad/onc possible emergent radiation per onc recs-will decide plan after MRI results      # Colitis on Imaging iso Hx of Drug Induced Colitis   - Admitted in May for immunotherapy related colitis (infectious workup remained negative); on steroid taper as above   - Presented to [facility] (6/5-6/7) with abdominal pain/ diarrhea c/f CBO  - CT A/P (6/5): Mildly loops dilated loops of jejunum measuring up to 3.4 cm, early developing partial small bowel obstruction can not be excluded  - While at [facility] pt was passing gas and was having active Bms, general surgery consulted and did not recommend any surgical interventions   - CT A/P (6/8) c/f colitis   - cdiff/Stool pathogen panel negative (6/9)  - pt placed NPO on admission, increased to clear liquid diet (6/9 AM), advanced to full liquid diet (6/9 PM), advanced to regular diet (6/11)  - supportive oncology consulted 6/9, rec cont PO Dilaudid 4mg Q2 PRN for moderate to severe pain, cont IV Dilaudid 2mg Q3 for breakthrough pain, increasing Fentanyl patch change frequency to 100mcgs Q48hr, and starting Bentyl 10mg x4/day PRN for abdominal cramps     #Hx of PVT/PE (on Eliquis)   - C/w Eliquis 5mg BID- restarted 6/15, held again 6/17     #HTN   #Third Degree AV Block s/p PM (11/9/23)  - No active BP medications      # Prophy   - Eliquis-held 6/17 pending MRI for  cord compression   - OOB as able, SCD while in bed      Dispo:   - Full code (confirmed on admission)   - Discharge home pending pain management and radiation/oncology plan   - NOK: Jane (wife) [phone]; updated at bedside 6/15         I spent 75 minutes in the professional and overall care of this patient.      ALFREDITO Abbott-CNP  Patient discussed with Dr. Guardado. Labs/vitals and imaging reviewed

## 2024-06-17 NOTE — PROGRESS NOTES
"Orthopaedic Spine Surgery Progress Note    S:  No acute events overnight. Admitted to [facility]. Pain well controlled.     O:  /79 (BP Location: Left arm, Patient Position: Lying)   Pulse 94   Temp 37 °C (98.6 °F) (Temporal)   Resp 16   Ht 1.727 m (5' 8\")   Wt 73.5 kg (162 lb)   SpO2 95%   BMI 24.63 kg/m²     Gen: arousable, NAD, appropriately conversational  Cardiac: RRR to peripheral palpation  Resp: nonlabored on RA  GI: soft, nondistended    MSK:  0/5 shoulder abduction & elbow flexion. Otherwise 5/5 and SILT BL UE. No UMN signs. 3/5 BL HF 4/5 BL KE 4/5 BL PF/DF 3/5 BL EHL. SILT L1-S1.     Labs:  Results for orders placed or performed during the hospital encounter of 06/08/24 (from the past 24 hour(s))   CBC and Auto Differential   Result Value Ref Range    WBC 29.9 (H) 4.4 - 11.3 x10*3/uL    nRBC 0.1 (H) 0.0 - 0.0 /100 WBCs    RBC 3.47 (L) 4.50 - 5.90 x10*6/uL    Hemoglobin 9.7 (L) 13.5 - 17.5 g/dL    Hematocrit 30.1 (L) 41.0 - 52.0 %    MCV 87 80 - 100 fL    MCH 28.0 26.0 - 34.0 pg    MCHC 32.2 32.0 - 36.0 g/dL    RDW 16.6 (H) 11.5 - 14.5 %    Platelets 116 (L) 150 - 450 x10*3/uL    Immature Granulocytes %, Automated 9.1 (H) 0.0 - 0.9 %    Immature Granulocytes Absolute, Automated 2.73 (H) 0.00 - 0.70 x10*3/uL   Comprehensive metabolic panel   Result Value Ref Range    Glucose 145 (H) 74 - 99 mg/dL    Sodium 129 (L) 136 - 145 mmol/L    Potassium 4.1 3.5 - 5.3 mmol/L    Chloride 95 (L) 98 - 107 mmol/L    Bicarbonate 27 21 - 32 mmol/L    Anion Gap 11 10 - 20 mmol/L    Urea Nitrogen 16 6 - 23 mg/dL    Creatinine 0.46 (L) 0.50 - 1.30 mg/dL    eGFR >90 >60 mL/min/1.73m*2    Calcium 9.1 8.6 - 10.6 mg/dL    Albumin 3.1 (L) 3.4 - 5.0 g/dL    Alkaline Phosphatase 326 (H) 33 - 136 U/L    Total Protein 5.3 (L) 6.4 - 8.2 g/dL    AST 60 (H) 9 - 39 U/L    Bilirubin, Total 0.9 0.0 - 1.2 mg/dL    ALT 32 10 - 52 U/L   Magnesium   Result Value Ref Range    Magnesium 1.82 1.60 - 2.40 mg/dL   Manual Differential "   Result Value Ref Range    Neutrophils %, Manual 86.1 40.0 - 80.0 %    Bands %, Manual 0.9 0.0 - 5.0 %    Lymphocytes %, Manual 0.8 13.0 - 44.0 %    Monocytes %, Manual 0.9 2.0 - 10.0 %    Eosinophils %, Manual 0.0 0.0 - 6.0 %    Basophils %, Manual 0.0 0.0 - 2.0 %    Atypical Lymphocytes %, Manual 0.9 0.0 - 2.0 %    Metamyelocytes %, Manual 4.3 0.0 - 0.0 %    Myelocytes %, Manual 3.5 0.0 - 0.0 %    Promyelocytes %, Manual 2.6 0.0 - 0.0 %    Seg Neutrophils Absolute, Manual 25.74 (H) 1.20 - 7.00 x10*3/uL    Bands Absolute, Manual 0.27 0.00 - 0.70 x10*3/uL    Lymphocytes Absolute, Manual 0.24 (L) 1.20 - 4.80 x10*3/uL    Monocytes Absolute, Manual 0.27 0.10 - 1.00 x10*3/uL    Eosinophils Absolute, Manual 0.00 0.00 - 0.70 x10*3/uL    Basophils Absolute, Manual 0.00 0.00 - 0.10 x10*3/uL    Atypical Lymphs Absolute, Manual 0.27 0.00 - 0.50 x10*3/uL    Metamyelocytes Absolute, Manual 1.29 0.00 - 0.00 x10*3/uL    Myelocytes Absolute, Manual 1.05 0.00 - 0.00 x10*3/uL    Promyelocytes Absolute, Manual 0.78 0.00 - 0.00 x10*3/uL    Total Cells Counted 115     Neutrophils Absolute, Manual 26.01 (H) 1.20 - 7.70 x10*3/uL    RBC Morphology See Below        A/P: Injury: R C5-C6 soft tissue met, r/o L spine mets    63M (esophageal ca w known mets to brain, T spine sp emergent T10 mass resection w T8-T12 PSIF w Dr. John 5/17/2024, PE on Eliquis last dose 6/15, 3rd deg AV block sp pacemaker) currently adm for colitis since 6/5. C/o R arm & BL LE weakness + inability to ambulate 4d ago. Previously ambulated unassisted. 0/5 shoulder abduction & elbow flexion. Otherwise 5/5 and SILT BL UE. No UMN signs. 3/5 BL HF 4/5 BL KE 4/5 BL PF/DF 3/5 BL EHL. SILT L1-S1. Intact perianal sensation/tone. MRI C spine w soft tissue lesion adjacent to R C5-C6 nerve root.     Plan:  - Weightbearing status: activity ad lorraine  - Precautions: no bending, twisting, lifting >10 lbs  - DVT prophylaxis: SCDs, ambulation; no chemoppx  - NPO pending discussion  with staff this morning     This patient will be followed by the Orthopaedic Spine service. Please page or Epic Chat the corresponding residents below with questions or concerns.      Ortho Spine Service (Epic Chat Preferred)  First call: Denise Miller MD PGY2  Second call: Davide Howard DO PGY3

## 2024-06-17 NOTE — SIGNIFICANT EVENT
Pt was brought down to MRI at ~ 1900 and was premedicated with 1x dose IV dilaudid 1mg and 1x dose IV ativan 0.5mg. Cardiology came to place pt's pacemaker on MRI mode. Provider was to stay with pt during the whole process. About 10 minutes into the MRI pt was in excruciating pain and asked for the MRI to be stopped. Provider offered additional pain meds and anxiety meds but the patient states his pain was unbearable. Pt ultimately refused the MRI. Pt was educated on the risks of not continuing the MRI as ortho was concerned for possible spine mets, cord compression, etc. Cardiology returned to take pacemaker off MRI mode. Ortho was notified that pt refused MRI and stated to make pt NPO at midnight for possibility of MRI with anesthesia in the AM 6/17.

## 2024-06-18 ENCOUNTER — DOCUMENTATION (OUTPATIENT)
Dept: ORTHOPEDIC SURGERY | Facility: HOSPITAL | Age: 64
End: 2024-06-18
Payer: MEDICARE

## 2024-06-18 LAB
ALBUMIN SERPL BCP-MCNC: 3.2 G/DL (ref 3.4–5)
ALP SERPL-CCNC: 359 U/L (ref 33–136)
ALT SERPL W P-5'-P-CCNC: 34 U/L (ref 10–52)
ANION GAP SERPL CALC-SCNC: 13 MMOL/L (ref 10–20)
AST SERPL W P-5'-P-CCNC: 67 U/L (ref 9–39)
BASOPHILS # BLD MANUAL: 0 X10*3/UL (ref 0–0.1)
BASOPHILS # BLD MANUAL: 0 X10*3/UL (ref 0–0.1)
BASOPHILS NFR BLD MANUAL: 0 %
BASOPHILS NFR BLD MANUAL: 0 %
BILIRUB SERPL-MCNC: 0.8 MG/DL (ref 0–1.2)
BUN SERPL-MCNC: 24 MG/DL (ref 6–23)
CALCIUM SERPL-MCNC: 9 MG/DL (ref 8.6–10.6)
CHLORIDE SERPL-SCNC: 98 MMOL/L (ref 98–107)
CO2 SERPL-SCNC: 28 MMOL/L (ref 21–32)
CREAT SERPL-MCNC: 0.56 MG/DL (ref 0.5–1.3)
EGFRCR SERPLBLD CKD-EPI 2021: >90 ML/MIN/1.73M*2
EOSINOPHIL # BLD MANUAL: 0 X10*3/UL (ref 0–0.7)
EOSINOPHIL # BLD MANUAL: 0 X10*3/UL (ref 0–0.7)
EOSINOPHIL NFR BLD MANUAL: 0 %
EOSINOPHIL NFR BLD MANUAL: 0 %
ERYTHROCYTE [DISTWIDTH] IN BLOOD BY AUTOMATED COUNT: 17.2 % (ref 11.5–14.5)
ERYTHROCYTE [DISTWIDTH] IN BLOOD BY AUTOMATED COUNT: 17.3 % (ref 11.5–14.5)
GLUCOSE SERPL-MCNC: 141 MG/DL (ref 74–99)
HCT VFR BLD AUTO: 31.6 % (ref 41–52)
HCT VFR BLD AUTO: 32.1 % (ref 41–52)
HGB BLD-MCNC: 10.2 G/DL (ref 13.5–17.5)
HGB BLD-MCNC: 10.5 G/DL (ref 13.5–17.5)
IMM GRANULOCYTES # BLD AUTO: 2.45 X10*3/UL (ref 0–0.7)
IMM GRANULOCYTES # BLD AUTO: 2.58 X10*3/UL (ref 0–0.7)
IMM GRANULOCYTES NFR BLD AUTO: 7.3 % (ref 0–0.9)
IMM GRANULOCYTES NFR BLD AUTO: 7.6 % (ref 0–0.9)
LYMPHOCYTES # BLD MANUAL: 0 X10*3/UL (ref 1.2–4.8)
LYMPHOCYTES # BLD MANUAL: 0.96 X10*3/UL (ref 1.2–4.8)
LYMPHOCYTES NFR BLD MANUAL: 0 %
LYMPHOCYTES NFR BLD MANUAL: 3 %
MAGNESIUM SERPL-MCNC: 2.04 MG/DL (ref 1.6–2.4)
MCH RBC QN AUTO: 28 PG (ref 26–34)
MCH RBC QN AUTO: 28.4 PG (ref 26–34)
MCHC RBC AUTO-ENTMCNC: 32.3 G/DL (ref 32–36)
MCHC RBC AUTO-ENTMCNC: 32.7 G/DL (ref 32–36)
MCV RBC AUTO: 87 FL (ref 80–100)
MCV RBC AUTO: 87 FL (ref 80–100)
MONOCYTES # BLD MANUAL: 0.6 X10*3/UL (ref 0.1–1)
MONOCYTES # BLD MANUAL: 0.96 X10*3/UL (ref 0.1–1)
MONOCYTES NFR BLD MANUAL: 1.7 %
MONOCYTES NFR BLD MANUAL: 3 %
MYELOCYTES # BLD MANUAL: 1.28 X10*3/UL
MYELOCYTES # BLD MANUAL: 1.55 X10*3/UL
MYELOCYTES NFR BLD MANUAL: 4 %
MYELOCYTES NFR BLD MANUAL: 4.4 %
NEUTS SEG # BLD MANUAL: 28.89 X10*3/UL (ref 1.2–7)
NEUTS SEG # BLD MANUAL: 33.15 X10*3/UL (ref 1.2–7)
NEUTS SEG NFR BLD MANUAL: 90 %
NEUTS SEG NFR BLD MANUAL: 93.9 %
NRBC BLD-RTO: 0 /100 WBCS (ref 0–0)
NRBC BLD-RTO: 0 /100 WBCS (ref 0–0)
OVALOCYTES BLD QL SMEAR: ABNORMAL
PLATELET # BLD AUTO: 140 X10*3/UL (ref 150–450)
PLATELET # BLD AUTO: 142 X10*3/UL (ref 150–450)
POTASSIUM SERPL-SCNC: 4.1 MMOL/L (ref 3.5–5.3)
PROT SERPL-MCNC: 5.5 G/DL (ref 6.4–8.2)
RBC # BLD AUTO: 3.64 X10*6/UL (ref 4.5–5.9)
RBC # BLD AUTO: 3.7 X10*6/UL (ref 4.5–5.9)
RBC MORPH BLD: ABNORMAL
RBC MORPH BLD: ABNORMAL
SODIUM SERPL-SCNC: 135 MMOL/L (ref 136–145)
TEST COMMENT - SURGICAL SENDOUT REQUEST: NORMAL
TOTAL CELLS COUNTED BLD: 100
TOTAL CELLS COUNTED BLD: 115
WBC # BLD AUTO: 32.1 X10*3/UL (ref 4.4–11.3)
WBC # BLD AUTO: 35.3 X10*3/UL (ref 4.4–11.3)

## 2024-06-18 PROCEDURE — 97530 THERAPEUTIC ACTIVITIES: CPT | Mod: GP

## 2024-06-18 PROCEDURE — 85027 COMPLETE CBC AUTOMATED: CPT | Mod: 91 | Performed by: NURSE PRACTITIONER

## 2024-06-18 PROCEDURE — 2500000001 HC RX 250 WO HCPCS SELF ADMINISTERED DRUGS (ALT 637 FOR MEDICARE OP): Performed by: STUDENT IN AN ORGANIZED HEALTH CARE EDUCATION/TRAINING PROGRAM

## 2024-06-18 PROCEDURE — 85007 BL SMEAR W/DIFF WBC COUNT: CPT | Mod: 91 | Performed by: NURSE PRACTITIONER

## 2024-06-18 PROCEDURE — 2500000004 HC RX 250 GENERAL PHARMACY W/ HCPCS (ALT 636 FOR OP/ED)

## 2024-06-18 PROCEDURE — 85007 BL SMEAR W/DIFF WBC COUNT: CPT

## 2024-06-18 PROCEDURE — 2500000004 HC RX 250 GENERAL PHARMACY W/ HCPCS (ALT 636 FOR OP/ED): Performed by: STUDENT IN AN ORGANIZED HEALTH CARE EDUCATION/TRAINING PROGRAM

## 2024-06-18 PROCEDURE — 2500000001 HC RX 250 WO HCPCS SELF ADMINISTERED DRUGS (ALT 637 FOR MEDICARE OP)

## 2024-06-18 PROCEDURE — 2500000002 HC RX 250 W HCPCS SELF ADMINISTERED DRUGS (ALT 637 FOR MEDICARE OP, ALT 636 FOR OP/ED): Performed by: STUDENT IN AN ORGANIZED HEALTH CARE EDUCATION/TRAINING PROGRAM

## 2024-06-18 PROCEDURE — 85027 COMPLETE CBC AUTOMATED: CPT

## 2024-06-18 PROCEDURE — C9113 INJ PANTOPRAZOLE SODIUM, VIA: HCPCS | Performed by: STUDENT IN AN ORGANIZED HEALTH CARE EDUCATION/TRAINING PROGRAM

## 2024-06-18 PROCEDURE — 2500000002 HC RX 250 W HCPCS SELF ADMINISTERED DRUGS (ALT 637 FOR MEDICARE OP, ALT 636 FOR OP/ED)

## 2024-06-18 PROCEDURE — 97162 PT EVAL MOD COMPLEX 30 MIN: CPT | Mod: GP

## 2024-06-18 PROCEDURE — 99233 SBSQ HOSP IP/OBS HIGH 50: CPT | Performed by: NURSE PRACTITIONER

## 2024-06-18 PROCEDURE — 83735 ASSAY OF MAGNESIUM: CPT

## 2024-06-18 PROCEDURE — 84075 ASSAY ALKALINE PHOSPHATASE: CPT

## 2024-06-18 PROCEDURE — 1170000001 HC PRIVATE ONCOLOGY ROOM DAILY

## 2024-06-18 RX ORDER — LORAZEPAM 0.5 MG/1
0.5 TABLET ORAL ONCE
Status: COMPLETED | OUTPATIENT
Start: 2024-06-19 | End: 2024-06-19

## 2024-06-18 RX ADMIN — HYDROXYZINE HYDROCHLORIDE 25 MG: 25 TABLET ORAL at 16:07

## 2024-06-18 RX ADMIN — HYDROXYZINE HYDROCHLORIDE 25 MG: 25 TABLET ORAL at 00:04

## 2024-06-18 RX ADMIN — ACETAMINOPHEN 650 MG: 325 TABLET ORAL at 05:26

## 2024-06-18 RX ADMIN — HYDROMORPHONE HYDROCHLORIDE 6 MG: 4 TABLET ORAL at 12:11

## 2024-06-18 RX ADMIN — HYDROMORPHONE HYDROCHLORIDE 6 MG: 4 TABLET ORAL at 05:32

## 2024-06-18 RX ADMIN — NYSTATIN 500000 UNITS: 100000 SUSPENSION ORAL at 16:07

## 2024-06-18 RX ADMIN — ACETAMINOPHEN 650 MG: 325 TABLET ORAL at 09:07

## 2024-06-18 RX ADMIN — HYDROXYZINE HYDROCHLORIDE 25 MG: 25 TABLET ORAL at 21:18

## 2024-06-18 RX ADMIN — NYSTATIN 500000 UNITS: 100000 SUSPENSION ORAL at 08:52

## 2024-06-18 RX ADMIN — HYDROMORPHONE HYDROCHLORIDE 6 MG: 4 TABLET ORAL at 20:16

## 2024-06-18 RX ADMIN — DEXAMETHASONE SODIUM PHOSPHATE 4 MG: 4 INJECTION, SOLUTION INTRA-ARTICULAR; INTRALESIONAL; INTRAMUSCULAR; INTRAVENOUS; SOFT TISSUE at 12:05

## 2024-06-18 RX ADMIN — HYDROMORPHONE HYDROCHLORIDE 6 MG: 4 TABLET ORAL at 16:08

## 2024-06-18 RX ADMIN — APIXABAN 5 MG: 5 TABLET, FILM COATED ORAL at 21:00

## 2024-06-18 RX ADMIN — HYDROMORPHONE HYDROCHLORIDE 1 MG: 1 INJECTION, SOLUTION INTRAMUSCULAR; INTRAVENOUS; SUBCUTANEOUS at 09:06

## 2024-06-18 RX ADMIN — HYDROMORPHONE HYDROCHLORIDE 1 MG: 1 INJECTION, SOLUTION INTRAMUSCULAR; INTRAVENOUS; SUBCUTANEOUS at 17:39

## 2024-06-18 RX ADMIN — HYDROMORPHONE HYDROCHLORIDE 1 MG: 1 INJECTION, SOLUTION INTRAMUSCULAR; INTRAVENOUS; SUBCUTANEOUS at 23:10

## 2024-06-18 RX ADMIN — ACETAMINOPHEN 650 MG: 325 TABLET ORAL at 16:07

## 2024-06-18 RX ADMIN — ACETAMINOPHEN 650 MG: 325 TABLET ORAL at 21:18

## 2024-06-18 RX ADMIN — APIXABAN 5 MG: 5 TABLET, FILM COATED ORAL at 08:52

## 2024-06-18 RX ADMIN — DEXAMETHASONE SODIUM PHOSPHATE 4 MG: 4 INJECTION, SOLUTION INTRA-ARTICULAR; INTRALESIONAL; INTRAMUSCULAR; INTRAVENOUS; SOFT TISSUE at 00:04

## 2024-06-18 RX ADMIN — ACETAMINOPHEN 650 MG: 325 TABLET ORAL at 00:04

## 2024-06-18 RX ADMIN — LORAZEPAM 0.5 MG: 2 INJECTION INTRAMUSCULAR; INTRAVENOUS at 21:32

## 2024-06-18 RX ADMIN — DEXAMETHASONE SODIUM PHOSPHATE 4 MG: 4 INJECTION, SOLUTION INTRA-ARTICULAR; INTRALESIONAL; INTRAMUSCULAR; INTRAVENOUS; SOFT TISSUE at 23:43

## 2024-06-18 RX ADMIN — POTASSIUM CHLORIDE 40 MEQ: 1.5 POWDER, FOR SOLUTION ORAL at 21:18

## 2024-06-18 RX ADMIN — PANTOPRAZOLE SODIUM 40 MG: 40 INJECTION, POWDER, FOR SOLUTION INTRAVENOUS at 08:52

## 2024-06-18 RX ADMIN — DEXAMETHASONE SODIUM PHOSPHATE 4 MG: 4 INJECTION, SOLUTION INTRA-ARTICULAR; INTRALESIONAL; INTRAMUSCULAR; INTRAVENOUS; SOFT TISSUE at 17:39

## 2024-06-18 RX ADMIN — DEXAMETHASONE SODIUM PHOSPHATE 4 MG: 4 INJECTION, SOLUTION INTRA-ARTICULAR; INTRALESIONAL; INTRAMUSCULAR; INTRAVENOUS; SOFT TISSUE at 05:26

## 2024-06-18 RX ADMIN — NYSTATIN 500000 UNITS: 100000 SUSPENSION ORAL at 21:19

## 2024-06-18 RX ADMIN — PREGABALIN 50 MG: 25 CAPSULE ORAL at 21:18

## 2024-06-18 RX ADMIN — PREGABALIN 50 MG: 25 CAPSULE ORAL at 08:52

## 2024-06-18 RX ADMIN — NYSTATIN 500000 UNITS: 100000 SUSPENSION ORAL at 00:04

## 2024-06-18 RX ADMIN — HYDROXYZINE HYDROCHLORIDE 25 MG: 25 TABLET ORAL at 12:05

## 2024-06-18 RX ADMIN — FENTANYL 1 PATCH: 25 PATCH TRANSDERMAL at 23:43

## 2024-06-18 RX ADMIN — DOCUSATE SODIUM 100 MG: 100 CAPSULE, LIQUID FILLED ORAL at 08:52

## 2024-06-18 RX ADMIN — SENNOSIDES AND DOCUSATE SODIUM 2 TABLET: 8.6; 5 TABLET ORAL at 08:52

## 2024-06-18 RX ADMIN — POLYETHYLENE GLYCOL 3350 17 G: 17 POWDER, FOR SOLUTION ORAL at 08:52

## 2024-06-18 RX ADMIN — DULOXETINE HYDROCHLORIDE 60 MG: 60 CAPSULE, DELAYED RELEASE ORAL at 08:52

## 2024-06-18 RX ADMIN — LACTULOSE 20 G: 20 SOLUTION ORAL at 08:51

## 2024-06-18 RX ADMIN — HYDROXYZINE HYDROCHLORIDE 25 MG: 25 TABLET ORAL at 08:52

## 2024-06-18 RX ADMIN — NYSTATIN 500000 UNITS: 100000 SUSPENSION ORAL at 12:05

## 2024-06-18 ASSESSMENT — PAIN SCALES - GENERAL
PAINLEVEL_OUTOF10: 9
PAINLEVEL_OUTOF10: 7
PAINLEVEL_OUTOF10: 8
PAINLEVEL_OUTOF10: 7

## 2024-06-18 ASSESSMENT — COGNITIVE AND FUNCTIONAL STATUS - GENERAL
STANDING UP FROM CHAIR USING ARMS: TOTAL
MOBILITY SCORE: 10
TURNING FROM BACK TO SIDE WHILE IN FLAT BAD: A LITTLE
WALKING IN HOSPITAL ROOM: TOTAL
TURNING FROM BACK TO SIDE WHILE IN FLAT BAD: A LOT
WALKING IN HOSPITAL ROOM: TOTAL
DAILY ACTIVITIY SCORE: 18
HELP NEEDED FOR BATHING: A LOT
CLIMB 3 TO 5 STEPS WITH RAILING: TOTAL
DRESSING REGULAR UPPER BODY CLOTHING: A LITTLE
MOVING FROM LYING ON BACK TO SITTING ON SIDE OF FLAT BED WITH BEDRAILS: A LOT
TOILETING: A LITTLE
MOVING FROM LYING ON BACK TO SITTING ON SIDE OF FLAT BED WITH BEDRAILS: A LITTLE
MOVING TO AND FROM BED TO CHAIR: TOTAL
CLIMB 3 TO 5 STEPS WITH RAILING: TOTAL
STANDING UP FROM CHAIR USING ARMS: TOTAL
DRESSING REGULAR LOWER BODY CLOTHING: A LOT
MOVING TO AND FROM BED TO CHAIR: TOTAL
MOBILITY SCORE: 8

## 2024-06-18 ASSESSMENT — PAIN - FUNCTIONAL ASSESSMENT
PAIN_FUNCTIONAL_ASSESSMENT: 0-10
PAIN_FUNCTIONAL_ASSESSMENT: 0-10

## 2024-06-18 ASSESSMENT — ACTIVITIES OF DAILY LIVING (ADL): ADL_ASSISTANCE: INDEPENDENT

## 2024-06-18 NOTE — CARE PLAN
The clinical goals for the shift include Pt will remain HDS and VSS throughout shift 6/18/24 by 1900.      Problem: Pain  Goal: My pain/discomfort is manageable  Outcome: Progressing     Problem: Safety  Goal: Patient will be injury free during hospitalization  Outcome: Progressing  Goal: I will remain free of falls  Outcome: Progressing     Problem: Daily Care  Goal: Daily care needs are met  Outcome: Progressing     Problem: Psychosocial Needs  Goal: Demonstrates ability to cope with hospitalization/illness  Outcome: Progressing  Goal: Collaborate with me, my family, and caregiver to identify my specific goals  Outcome: Progressing     Problem: Discharge Barriers  Goal: My discharge needs are met  Outcome: Progressing     Problem: Pain  Goal: Takes deep breaths with improved pain control throughout the shift  Outcome: Progressing  Goal: Turns in bed with improved pain control throughout the shift  Outcome: Progressing  Goal: Walks with improved pain control throughout the shift  Outcome: Progressing  Goal: Performs ADL's with improved pain control throughout shift  Outcome: Progressing  Goal: Participates in PT with improved pain control throughout the shift  Outcome: Progressing  Goal: Free from opioid side effects throughout the shift  Outcome: Progressing  Goal: Free from acute confusion related to pain meds throughout the shift  Outcome: Progressing     Problem: Skin  Goal: Decreased wound size/increased tissue granulation at next dressing change  Outcome: Progressing  Flowsheets (Taken 6/18/2024 0925)  Decreased wound size/increased tissue granulation at next dressing change: Promote sleep for wound healing  Goal: Participates in plan/prevention/treatment measures  Outcome: Progressing  Flowsheets (Taken 6/18/2024 0925)  Participates in plan/prevention/treatment measures: Elevate heels  Goal: Prevent/manage excess moisture  Outcome: Progressing  Flowsheets (Taken 6/18/2024 0925)  Prevent/manage excess  moisture: Cleanse incontinence/protect with barrier cream  Goal: Prevent/minimize sheer/friction injuries  Outcome: Progressing  Flowsheets (Taken 6/18/2024 0925)  Prevent/minimize sheer/friction injuries:   Use pull sheet   HOB 30 degrees or less  Goal: Promote/optimize nutrition  Outcome: Progressing  Flowsheets (Taken 6/18/2024 0925)  Promote/optimize nutrition:   Consume > 50% meals/supplements   Monitor/record intake including meals  Goal: Promote skin healing  Outcome: Progressing  Flowsheets (Taken 6/18/2024 0925)  Promote skin healing: Assess skin/pad under line(s)/device(s)     Problem: Pain - Adult  Goal: Verbalizes/displays adequate comfort level or baseline comfort level  Outcome: Progressing     Problem: Safety - Adult  Goal: Free from fall injury  Outcome: Progressing     Problem: Discharge Planning  Goal: Discharge to home or other facility with appropriate resources  Outcome: Progressing     Problem: Chronic Conditions and Co-morbidities  Goal: Patient's chronic conditions and co-morbidity symptoms are monitored and maintained or improved  Outcome: Progressing

## 2024-06-18 NOTE — CARE PLAN
The clinical goals for the shift include pt will remain safe and free from injury throughout shift 6/18/2024 0700      Problem: Pain  Goal: My pain/discomfort is manageable  Outcome: Progressing     Problem: Safety  Goal: Patient will be injury free during hospitalization  Outcome: Progressing  Goal: I will remain free of falls  Outcome: Progressing     Problem: Daily Care  Goal: Daily care needs are met  Outcome: Progressing     Problem: Psychosocial Needs  Goal: Demonstrates ability to cope with hospitalization/illness  Outcome: Progressing  Goal: Collaborate with me, my family, and caregiver to identify my specific goals  Outcome: Progressing     Problem: Discharge Barriers  Goal: My discharge needs are met  Outcome: Progressing     Problem: Pain  Goal: Takes deep breaths with improved pain control throughout the shift  Outcome: Progressing  Goal: Turns in bed with improved pain control throughout the shift  Outcome: Progressing  Goal: Walks with improved pain control throughout the shift  Outcome: Progressing  Goal: Performs ADL's with improved pain control throughout shift  Outcome: Progressing  Goal: Participates in PT with improved pain control throughout the shift  Outcome: Progressing  Goal: Free from opioid side effects throughout the shift  Outcome: Progressing  Goal: Free from acute confusion related to pain meds throughout the shift  Outcome: Progressing     Problem: Skin  Goal: Decreased wound size/increased tissue granulation at next dressing change  Outcome: Progressing  Goal: Participates in plan/prevention/treatment measures  Outcome: Progressing  Goal: Prevent/manage excess moisture  Outcome: Progressing  Goal: Prevent/minimize sheer/friction injuries  Outcome: Progressing  Goal: Promote/optimize nutrition  Outcome: Progressing  Goal: Promote skin healing  Outcome: Progressing  Flowsheets (Taken 6/17/2024 3051)  Promote skin healing:   Assess skin/pad under line(s)/device(s)   Rotate device  position/do not position patient on device     Problem: Pain - Adult  Goal: Verbalizes/displays adequate comfort level or baseline comfort level  Outcome: Progressing     Problem: Safety - Adult  Goal: Free from fall injury  Outcome: Progressing     Problem: Discharge Planning  Goal: Discharge to home or other facility with appropriate resources  Outcome: Progressing     Problem: Chronic Conditions and Co-morbidities  Goal: Patient's chronic conditions and co-morbidity symptoms are monitored and maintained or improved  Outcome: Progressing

## 2024-06-18 NOTE — PROGRESS NOTES
"SUPPORTIVE AND PALLIATIVE ONCOLOGY INPATIENT FOLLOW-UP      SERVICE DATE: 06/18/24     SUBJECTIVE:  Interval Events:   Patient was seen at bedside with his daughter and wife.   His wife reports that MRI finding from yesterday showed disease progression and the plan is for radiation vs surgery. Since he just had recent surgery to his lower back, that is not the most feasible option at this time, and they are waiting to hear from radiation oncology what can be done.    Wife and daughter shared their optimism given that they know what is wrong and are eager to hear our treatment plan moving forward.    LBM: a couple days ago.      N/V: denies     Sleep: \"I had a good night sleep, I woke up here and there for meds but I was able to go back to sleep\".     Pain Assessment:  Location:  Right shoulder and abdomen   Duration: Intermittent  Characteristics:   Rating: Moderate   Descriptors: localized, aching, cramping, and numb   Aggravating: movement and lying down    Relieving: Analgesics dilaudid    Interference with Function: None    Opioid Use  Past 24 h prn opioid use:  Fentanyl patch 125mcgs = 250mg OME   Dilaudid 6mg po x 2 = 60mg OME  Dilaudid 1mg iv x 2 doses = 25mg OME   Dilaudid 0.5mg iv x 2 doses = 12.5mg OME   Total 24h OME use:  347.5mg OME     Note: OME calculations based on equianalgesic table below. Please note this table is based on best available evidence but conversions are still approximate. These are NOT opioid DOSES for individual patient use; this is equivalency information.  Drug Parenteral Enteral   Morphine 10 25   Oxycodone N/A 20   Hydromorphone 2 5   Fentanyl 0.15 N/A   Tramadol N/A 120   Citation: Aniya ROSS. Demystifying opioid conversion calculations: A guide for effective dosing, Second edition. [place]: [employer]System Pharmacists, 2018.    Symptom Assessment:    Nausea none  Vomiting none  Lack of appetite none  Difficulty Sleeping none  Diarrhea " none    Information obtained from: chart review, interview of patient, interview of family, discussion with RN, and discussion with primary team  ______________________________________________________________________        OBJECTIVE:    Lab Results   Component Value Date    WBC 32.1 (H) 06/18/2024    HGB 10.2 (L) 06/18/2024    HCT 31.6 (L) 06/18/2024    MCV 87 06/18/2024     (L) 06/18/2024      Lab Results   Component Value Date    GLUCOSE 141 (H) 06/18/2024    CALCIUM 9.0 06/18/2024     (L) 06/18/2024    K 4.1 06/18/2024    CO2 28 06/18/2024    CL 98 06/18/2024    BUN 24 (H) 06/18/2024    CREATININE 0.56 06/18/2024     Lab Results   Component Value Date    ALT 34 06/18/2024    AST 67 (H) 06/18/2024    ALKPHOS 359 (H) 06/18/2024    BILITOT 0.8 06/18/2024     Estimated Creatinine Clearance: 125 mL/min (by C-G formula based on SCr of 0.56 mg/dL).     Scheduled medications  acetaminophen, 650 mg, oral, q6h  apixaban, 5 mg, oral, BID  dexAMETHasone, 4 mg, intravenous, q6h  docusate sodium, 100 mg, oral, BID  DULoxetine, 60 mg, oral, Daily  fentaNYL, 1 patch, transdermal, q48h  fentaNYL, 1 patch, transdermal, q48h  hydrOXYzine HCL, 25 mg, oral, 4x daily  iohexol, 90 mL, intravenous, Once in imaging  lactulose, 20 g, oral, TID  nystatin, 5 mL, Swish & Swallow, 4x daily  pantoprazole, 40 mg, intravenous, Daily  polyethylene glycol, 17 g, oral, BID  potassium chloride, 40 mEq, oral, BID  pregabalin, 50 mg, oral, BID  sennosides-docusate sodium, 2 tablet, oral, BID      Continuous medications     PRN medications  diclofenac sodium, 4 g, 4x daily PRN  dicyclomine, 10 mg, 4x daily PRN  HYDROmorphone, 1 mg, q3h PRN  HYDROmorphone, 4 mg, q3h PRN  HYDROmorphone, 6 mg, q3h PRN  lactulose, 20 g, TID PRN  LORazepam, 0.5 mg, q6h PRN  naloxone, 0.2 mg, q5 min PRN  ondansetron, 4 mg, q6h PRN  prochlorperazine, 5 mg, q6h PRN  promethazine, 12.5 mg, q6h PRN  sodium chloride 0.9%, 10 mL, PRN  sodium chloride 0.9%, 10 mL,  PRN      }     PHYSICAL EXAMINATION:    Vital Signs:   Vital signs reviewed  Visit Vitals  BP 99/67 (BP Location: Left arm, Patient Position: Lying)   Pulse 107   Temp 36.4 °C (97.5 °F) (Temporal)   Resp 18        Pain Score: 8       Physical Exam  Constitutional:       Appearance: Normal appearance.   Eyes:      Pupils: Pupils are equal, round, and reactive to light.   Cardiovascular:      Heart sounds: Normal heart sounds.   Pulmonary:      Breath sounds: Normal breath sounds.   Abdominal:      Palpations: Abdomen is soft.   Musculoskeletal:         General: Normal range of motion.   Skin:     General: Skin is warm.   Neurological:      Mental Status: He is alert and oriented to person, place, and time.   Psychiatric:         Mood and Affect: Mood normal.        ASSESSMENT/PLAN:    Massimo Garcia is a 63 y.o. 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 PMH significant for HTN, Third Degree AV Block s/p PM (11/9/23), OA. Admitted 6/8/2024 for further evaluation and management of abdominal pain. Course complicated by partial SBO with [facility] ED imagining. Supportive and Palliative Oncology is consulted for pain management.      MRI thoracic Spine 6/17/2024:     IMPRESSION:  1. Stable postoperative changes from prior posterior tim extending  from T8-T12 and bilateral pedicle screws at T8, T9, T11 and T12.  Postoperative changes of T9-T10 laminectomy and bilateral facetectomy.  2. Interval worsening abnormal marrow replacing lesion involving the  T9 and T10 vertebral bodies with significant epidural extension now  extending from T8-T11 greater in rostral extent compared to  05/21/2024. There is severe spinal canal stenosis at T8/T9 with  deformity of the spinal cord and subtle T2 abnormal signal which may  represent edema due to spinal cord compression although a component  of myelomalacia is also  possible. Persistent metastatic involvement  at the left paraspinal location from T9/T10 and T11/T12 with  extension into the left T9/T10, T10/T11 and T11/T12 and right T9/T10  and T10/T11 neural foramina.  3. Interval increase in abnormal enhancing lesion within the thecal  sac anterior to the caudal cranial exerting mass effect on the  adjacent nerve roots at the level of T2, raising possibility of drop  metastasis.  4. There is a new small abnormal enhancing lesion involving the  anterior aspect of the L1 vertebral body, concerning for new  metastatic focus.    Pain:  Abdominal pain related to Metastatic esophageal cancer   Left shoulder, and arm pain  Pain is: cancer related pain  Type: visceral, somatic, and neuropathic  Pain control: sub-optimally controlled  Home regimen:  Fentanyl TD  and Hydromorphone 4mg q3hrs PRN fentanyl 100mcgs /72hrs   Personalized pain goal: 2  Total OME usage for the past 24 hours: 347.5mg OME   Continue Fentanyl patch 125mcgs mcgs q 48 hrs scheduled   Patient will need Fentanyl patch 25mcgs and 100mcgs before discharge   Will consider opioid rotating him to methadone in the future, however due to his concern for QTC prolongation will maximize fentanyl patch use.   Continue Dilaudid to 1mg IV q3hrs PRN for breakthrough pain  Continue Voltaren Gel topical to his right shoulder.  Continue dilaudid 4mg po q 3hrs PRN for moderate pain  Continue dilaudid 6mg PO q3hrs PRN for severe pain  With discharge send patient home on Dilaudid 2mg tablets with more quantity due to lower co-pay   Patient's wife has 15 day supply of Dilaudid 4mg PO which was picked up 6/07/2024, patients next supportive oncology appointment is 6/28/2024 so he will need additional medication to last him until that appointment with discharge          EKG 6/8/2024, QTC 354ms    Continue Bentyl 10mg 4x a day PRN for abdominal cramps.  Continue Lyrica 50mg BID   Continue Acetaminophen 650mg q 6hrs   Continue Duloxetine 60mg  daily   Continue Dex 4mg IV q 6hrs   Continue to monitor pain scores and administer PRN medications as appropriate  Continue/initiate nonpharmacologic pain management strategies including ice/heat therapy, distraction techniques, deep breathing/relaxation techniques, calming music, and repositioning  Continue to monitor for signs of opioid efficacy (pain scores, improved functionality) and toxicity (pinpoint pupils, excess sedation/drowsiness/confusion, respiratory depression, etc.)     Nausea/Reflux:  Intermittent nausea without vomiting related to opioids and constipation   Home regimen: scopolamine patch, Carafate and Protonix PRN   Improving  Continue Compazine 5mg IV q6hr PRN  Continue promethazine 12.5mg IM q6hr PRN   Continue Zofran 4mg q6hrs PRN   Since patient is concerned about QTC prolonging drugs with his history of 3rd degree AV heart block, consider discharging him home on Bentyl 10mg 4x a day  Agreeable to discharging on small dose of Zofran 4mg po q6hrs PRN      Constipation  At risk for constipation related to opioids, currently not constipated  Usual bowel pattern: every day  Home regimen: Senna 1-2 tablets 1-2x/day, Lactulose 20 gm 4x a day PRN, Docusate 100mg BID PRN, and MOM 4x  DAY prn  LBM few days ago      Continue Senna 2 tabsd bid   Continue Miralax 17grams  bid   Continue lactulose 20 grams 4 x a day and switch to PRN after BM  Continue docusate sodium 100mg BID    Monitor BM frequency, adjust regimen as needed  Goal to have BM without straining q48-72h     Altered Mood:  Acute on chronic anxiety and depression related to health concerns   controlled with home regimen   Home regimen:  Ativan 1mg TID PRN   Continue Ativan 0.5mg q8hrs PRN      Sleeping Difficulty:  Impaired sleep related to hospital environment  Home regimen:  none  With better pain management and less hospital interruptions he plans to catch up on his sleep tonight.      Decreased appetite:  Patient is on a liquid diet    Outpatient supportive oncology referred patient to Dr. Mays's office for integrative medicine     Medical Decision Making/Goals of Care/Advance Care Planning:  Patient's current clinical condition, including diagnosis, prognosis, and management plan, and goals of care were discussed.   Life limiting disease: metastatic malignancy  Family: Supportive family   Performance status: Major  limitations due to pain  Joys/meaning/strength: Family  Understanding of health: Demonstrates good understanding of disease process, understands plan for symptom management   Information:Wants full disclosure  Goals: symptom control and cancer directed therapy  Worries and fears now and future: ongoing symptoms   Code status discussion:  full code      Advance Directives  Existence of Advance Directives:No - has interest  Decision maker: LUKEOA is wife   Code Status: Full code     Introduction to Supportive and Palliative Oncology:  Spoke with patient, his 2 daughters and wife at bedside  Introduced the role and philosophy of Supportive and Palliative oncology in the evaluation and management of symptoms during cancer treatment  Palliative care was introduced as a service for patients with serious illness to help with symptoms, assist with goals of care conversations, navigate complex decision making, improve quality of life for patients, and provide support both patients and families.  Patient seemed to appreciate the extra layer of support.     Supportive and Palliative Oncology encounter:  Spoke with patient at bedside  Emotional support provided  Coordination of care     Signature and billing:  Thank you for allowing us to participate in the care of this patient. Recommendations will be communicated back to the consulting service by way of shared electronic medical record or face-to-face.    Medical complexity was high level due to due to complexity of problems, extensive data review, and high risk of management/treatment.    I  spent 50 minutes in the care of this patient which included chart review, interviewing patient/family, discussion with primary team, coordination of care, and documentation.    Data:   Diagnostic tests and information reviewed for today's visit:  Conversation with primary team, Most recent labs, Most recent imaging, Medications       Some elements copied from my note on 6/17/2024, the elements have been updated and all reflect current decision making from today, 06/18/24       Plan of Care discussed with: Provider, RN, Patient and Family/Significant Other: wife was at bedside     Thank you for asking Supportive and Palliative Oncology to assist with care of this patient.  We will continue to follow  Please contact us for additional questions or concerns.      SIGNATURE: ALFREDITO Damon-CNP   PAGER/CONTACT:  Contact information:  Supportive and Palliative Oncology  Monday-Friday 8 AM-5 PM  Epic Secure chat or pager [phone].  After hours and weekends:  pager [phone]

## 2024-06-18 NOTE — PROGRESS NOTES
Orthopedic spine attending    No acute change in neurologic status.    Updated MRIs of the thoracic and lumbar spines have been obtained.    Neurosurgical consultation obtained, given recent history of surgical management of thoracic metastatic disease.    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  Discussion with the patient and family per neurosurgery indicates they do not wish to proceed with any additional surgical intervention.  We will defer to those decisions to the family and neurosurgical team.    The cervical spine shows evidence of vertebral body involvement and paraspinal involvement but no pathologic fracture, impending pathologic fracture, nor spinal cord compression.  As such would not recommend surgical intervention.    Will continue to follow as necessary.    ** Dictated with voice recognition software and not immediately reviewed for errors in grammar and/or spelling **

## 2024-06-18 NOTE — ANESTHESIA POSTPROCEDURE EVALUATION
Patient: Massimo Garcia    Procedure Summary       Date: 06/17/24 Room / Location: [facility]    Anesthesia Start: 1437 Anesthesia Stop: 1659    Procedure: MR LUMBAR SPINE W AND WO CONTRAST Diagnosis: (new onset of LE weakness concerning for cord compression)    Scheduled Providers:  Responsible Provider: Urszula Murray MD    Anesthesia Type: general ASA Status: 3            Anesthesia Type: general    Vitals Value Taken Time   /80 06/17/24 1800   Temp 36.5 °C (97.7 °F) 06/17/24 1800   Pulse 100 06/17/24 1800   Resp 14 06/17/24 1800   SpO2 95 % 06/17/24 1800       Anesthesia Post Evaluation    Patient location during evaluation: PACU  Patient participation: complete - patient participated  Level of consciousness: awake  Pain management: adequate  Airway patency: patent  Cardiovascular status: acceptable  Respiratory status: acceptable  Hydration status: acceptable  Postoperative Nausea and Vomiting: none        No notable events documented.

## 2024-06-18 NOTE — CONSULTS
Wound Care Consult     Visit Date: 6/18/2024      Patient Name: Massimo Garcia         MRN: [id number]           YOB: 1960     Reason for Consult: assess back incision              Pertinent Labs:   Albumin   Date Value Ref Range Status   06/18/2024 3.2 (L) 3.4 - 5.0 g/dL Final       Wound Assessment:  Wound 05/17/24 Incision Back Medial (Active)   Wound Image   06/18/24 1240   Site Assessment Yellow;Sloughing;Pink;Granulation 06/18/24 1240   Mireille-Wound Assessment Intact 06/18/24 1240   Wound Length (cm) 2.6 cm 06/18/24 1240   Wound Width (cm) 0.5 cm 06/18/24 1240   Wound Surface Area (cm^2) 1.3 cm^2 06/18/24 1240   Wound Depth (cm) 0.4 cm 06/18/24 1240   Wound Volume (cm^3) 0.52 cm^3 06/18/24 1240   Closure Steri strips 06/15/24 0805   Drainage Description Clear 06/18/24 1240   Drainage Amount Moderate 06/18/24 1240   Dressing Hydrofiber;Silicone border dressing 06/18/24 1240   Dressing Changed New 06/18/24 1240   Dressing Status Clean;Dry;Occlusive 06/12/24 1300       Wound Team Summary Assessment:   Wound location:distal incision   size: 2.6 x 0.5 x 0.4 cm                             undermining: no     tracking: no                                       Wound type: dehiscence   Wound bed: pink early granulation with yellow brown slough at wound edges   Draining: moderate amount  clear drainage   Periwound skin: intact  Therapeutic surface: [employer]ac[employer]     Recommendation: every shift   Cleanse wound with Vashe or normal saline    Apply [employer] to wound  Cover with Mepilex border dressing      Wound Team Plan: Please review recommendations      Merissa RYAN   6/18/2024  2:59 PM

## 2024-06-18 NOTE — PROGRESS NOTES
Physical Therapy    Physical Therapy Evaluation & Treatment    Patient Name: Massimo Garcia  MRN: [id number]  Today's Date: 6/18/2024   Time Calculation  Start Time: 1010  Stop Time: 1049  Time Calculation (min): 39 min    Assessment/Plan   PT Assessment  PT Assessment Results: Decreased strength, Decreased range of motion, Impaired balance, Decreased endurance, Decreased mobility, Pain, Orthopedic restrictions  Rehab Prognosis: Good  Barriers to Discharge: medical dx  Evaluation/Treatment Tolerance: Patient tolerated treatment well  Medical Staff Made Aware: Yes  Strengths: Attitude of self  Barriers to Participation: Comorbidities  End of Session Communication: Bedside nurse  Assessment Comment: 63 year old male admitted 6/11 abdominal pain, RUE weakness x2 days, CTH known right occipital lesion, new small left temporal hyperdenisty, MRI Brain and C-Spine showing progression of intracranial metastatic burden and soft tissue mass extending into C5-6 foramen on R. MRI T/L showing recurrence of ventral epidural mass from T8-T11 causing severe cord compression. Pt now with BLE weakness and s/p fall while in hospital. Pt presents with decreased strength, endurance and balance impacting functional mobility. Pt would benefit from continued PT while in hospital to improve functional mobility and return to PLOF.  End of Session Patient Position: Bed, 3 rail up, Alarm on   IP OR SWING BED PT PLAN  Inpatient or Swing Bed: Inpatient  PT Plan  Treatment/Interventions: Bed mobility, Transfer training, Gait training, Balance training, Neuromuscular re-education, Strengthening, Endurance training, Range of motion, Therapeutic exercise, Therapeutic activity, Home exercise program, Positioning, Postural re-education  PT Plan: Ongoing PT  PT Frequency: 4 times per week  PT Discharge Recommendations: High intensity level of continued care  PT Recommended Transfer Status: Total assist  PT - OK to Discharge: Yes (PT eval complete and DC rec  made)      Subjective     General Visit Information:  General  Reason for Referral: 63 year old male admitted  6/11 abdominal pain, RUE weakness x2 days, CTH known right occipital lesion, new small left temporal hyperdenisty, MRI Brain and C-Spine showing progression of intracranial metastatic burden and soft tissue mass extending into C5-6 foramen on R. MRI T/L showing recurrence of ventral epidural mass from T8-T11 causing severe cord compression. Pt now with BLE weakness and s/p fall while in hospital.  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  Family/Caregiver Present: Yes  Caregiver Feedback: pt's wife present during session and supportive  Prior to Session Communication: Bedside nurse  Patient Position Received: Bed, 3 rail up, Alarm on  General Comment: Pt supine in bed upon entry to room. Pt pleasant, cooperative and willing to work with PT. Noted spinal incision on back with dilexusence, RN aware and in room.  Home Living:  Home Living  Type of Home: House  Lives With: Spouse  Home Adaptive Equipment: Walker rolling or standard  Home Layout: One level  Home Access:  (3 JOE)  Prior Level of Function:  Prior Function Per Pt/Caregiver Report  Level of Marcus: Independent with ADLs and functional transfers, Independent with homemaking with ambulation  ADL Assistance: Independent  Homemaking Assistance: Independent  Ambulatory  Assistance: Independent  Vocational: Retired  Prior Function Comments: pt states he was independent at home from recent admission and was walking in hospital entering this admission. Pt stating functional mobility has declined signficantly and had 1 fall when here.  Precautions:  Precautions  Medical Precautions: Fall precautions  Post-Surgical Precautions: Spinal precautions  Vital Signs:       Objective   Pain:  Pain Assessment  Pain Assessment: 0-10  Pain Score: 8  Cognition:  Cognition  Overall Cognitive Status: Within Functional Limits  Orientation Level: Oriented X4    General Assessments:  Activity Tolerance  Endurance: Decreased tolerance for upright activites    Sensation  Sensation Comment: pt stating he is tingling from mid abdomen down on BLE    Static Sitting Balance  Static Sitting-Comment/Number of Minutes: cga-min with BUE and feet supported  Dynamic Sitting Balance  Dynamic Sitting-Comments: cga-min with BUE and feet supported       Functional Assessments:  Bed Mobility  Bed Mobility: Yes  Bed Mobility 1  Bed Mobility 1: Supine to sitting, Sitting to supine  Level of Assistance 1: Moderate assistance, Moderate verbal cues, Moderate tactile cues  Bed Mobility Comments 1: HOB partially elevated, use of draw sheet; pt able to partially abduct/adduct legs to EOB    Transfers  Transfer: No    Ambulation/Gait Training  Ambulation/Gait Training Performed: No  Extremity/Trunk Assessments:  RUE   RUE :  (shoulder grossly 1/5; elbow 3/5; wrist 4/5)  LUE   LUE: Within Functional Limits  RLE   RLE :  (grossly 2/5)  LLE   LLE :  (grossly 1/5)  Treatments:  Therapeutic Activity  Therapeutic Activity Performed: Yes  Therapeutic Activity 1: pt sitting EOB for ~10 minutes with cga-min assist with BUE and feet supported. AAROM to BLE and RUE, with pt and wife educated on AAROM, with wife demonstrating and verbalizing understanding.  Outcome Measures:  [facility] Basic Mobility  Turning from your back to your side while in  a flat bed without using bedrails: A lot  Moving from lying on your back to sitting on the side of a flat bed without using bedrails: A lot  Moving to and from bed to chair (including a wheelchair): Total  Standing up from a chair using your arms (e.g. wheelchair or bedside chair): Total  To walk in hospital room: Total  Climbing 3-5 steps with railing: Total  Basic Mobility - Total Score: 8    Encounter Problems       Encounter Problems (Active)       Balance       STG - Maintains dynamic sitting balance without upper extremity support with sba for >5 minutes.        Start:  06/18/24    Expected End:  07/02/24       INTERVENTIONS:  1. Practice sitting on the edge of a bed/mat with minimal support.  2. Educate patient about maintining total hip precautions while maintaining balance.  3. Educate patient about pressure relief.  4. Educate patient about use of assistive device.            Mobility       Pt will perform bed mobility with cga assist.         Start:  06/18/24    Expected End:  07/02/24            Pt will demonstrate >3+/5 BLE and RUE strength to complete therapeutic exercise and participate in functional mobility.         Start:  06/18/24    Expected End:  07/02/24               PT Transfers       Pt will perform lateral transfer with/without sliding board with cga to and from different surfaces.        Start:  06/18/24    Expected End:  07/02/24               Pain - Adult              Education Documentation  Precautions, taught by Shasha Garcia PT at 6/18/2024  4:38 PM.  Learner: Patient  Readiness: Acceptance  Method: Explanation  Response: Needs Reinforcement    Mobility Training, taught by Shasha Garcia PT at 6/18/2024  4:38 PM.  Learner: Patient  Readiness: Acceptance  Method: Explanation  Response: Needs Reinforcement    Education Comments  No comments found.    Shasha Garcia PT

## 2024-06-18 NOTE — PROGRESS NOTES
"Massimo Garcia is a 63 y.o. male on day 8 of admission presenting with Colitis.    Subjective   Patient with 2d acute BLE weakness       Objective     Physical Exam  Aox3  RUE D/B 0 T/HG/IO 5  LUE D/B/T/HG?IO 5  RLE HF2 KE3 DF 2 PF 2  LLE HF0 KE1 DF 1 PF 0  Decr sensation b/l in LE  Superficial dehiscence of inferior aspect of wound, no drainage    Last Recorded Vitals  Blood pressure 114/76, pulse 108, temperature 36.9 °C (98.4 °F), temperature source Temporal, resp. rate 16, height 1.727 m (5' 8\"), weight 73.5 kg (162 lb), SpO2 94%.  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), OA, stage IV esophageal adenocarcinoma (HER2 positive) w/ known mets to lung and liver, s/p chemo (last doses 4/29), recent admission for drug-induced colitis, 5/10 p/w 1d LBP, CT T/L spine diffuse soft tissue mets, T9-10 soft tissue mass with epidural extension, MRI neuroaxis R occipital 1.1cm met, L temporal 8mm met, T9-11 peripherally enhancing vertebral body circumferential lesion, worst at T10, L2 ventral lesion c/f drop met, 5/17 s/p T10 transpedic decompression, T8-12 fusion     6/11 p/w abdominal pain, RUE weakness x2 days, CTH known right occipital lesion, new small left temporal hyperdenisty, MRI Brain and C-Spine showing progression of intracranial metastatic burden and soft tissue mass extending into C5-6 foramen on R. MRI T/L showing recurrence of ventral epidural mass from T8-T11 causing severe cord compression.     Patient with neuro exam change and imaging correlate though after assessing all imaging and discussing with primary team " patient has had rapid disease progression in 1 month based on neuraxial imaging and systemic imaging. Discussed with family and patient that offering surgery may provide a slight functional benefit over radiation, though doing surgery would delay any systemic therapy, radiation, and also delay him taking his blood thinner which is needed for his hypercoagulable state and thrombi; which could have a significant impact on his overall life span. Patient and family stated that although he would have a functional deficit they would rather forego surgery so patient can have systemic treatment.     -no acute neurosurgical intervention  -no need to hold eliquis or keep patient NPO from a neurosurgical perspective  -would recommend wound care nurse consultation given superficial dehiscence and radiation starting tomorrow  -no need for neurosurgical follow up at this time  -please page [phone] with any questions or concerns    Plan discussed with chief resident who agrees with management           Tramaine Melvin MD

## 2024-06-18 NOTE — PROGRESS NOTES
Massimo Garcia is a 63 y.o. male on day 9 of admission presenting with Colitis.      Subjective   R arm, b/l LE weakness unchanged       Objective     Last Recorded Vitals  BP 99/67 (BP Location: Left arm, Patient Position: Lying)   Pulse 107   Temp 36.4 °C (97.5 °F) (Temporal)   Resp 18   Wt 73.5 kg (162 lb)   SpO2 93%     Image Results  MR lumbar spine w and wo IV contrast, MR thoracic spine w and wo IV contrast  Narrative: Interpreted By:  Papo Cuarda,  and Billy Gooden   STUDY:  MR LUMBAR SPINE W AND WO IV CONTRAST; MR THORACIC SPINE W AND WO IV  CONTRAST;  6/17/2024 5:08 pm      INDICATION:  Signs/Symptoms:new onset of LE weakness concerning for cord  compression.      COMPARISON:  MRI thoracic spine 06/16/2024. MRI thoracic spine 05/21/2024. MRI  thoracic and lumbar spine 05/16/2024.      ACCESSION NUMBER(S):  [id number]; [id number]      ORDERING CLINICIAN:  PRAKASH GILLESPIE      TECHNIQUE:  Sagittal T1, T2, STIR, axial T1 and T2 weighted images of the  thoracic and lumbar spine were acquired. T1 postcontrast images were  obtained.  15 mL of intravenous gadolinium was administered.      FINDINGS:      MRI THORACIC SPINE:      Alignment: Within normal limits.      Vertebrae/Intervertebral Discs: Postoperative changes of posterior  tim extending from T8-T12 and bilateral pedicle screws at T8, T9, T11  and T12. Postoperative changes of T9-T10 laminectomy and bilateral  facetectomy.      There has been interval increase in abnormal enhancing soft tissue  epidural thickening extending from the T8 through T11 levels when  compared to 05/21/2024. It measures approximally 10 mm in thickness  at the level of the T10 vertebral body, previously 7 mm. There is  increased narrowing of the thecal sac within the spinal canal  extending from T8-T11. There is increased effacement of the  subarachnoid spaces surrounding the thoracic spinal cord as well as  flattening of the margins of the thoracic spinal cord extending  from  T9 through T11. There is severe central canal stenosis at T8/T9 and  T9 with abnormal T2 hyperintense spinal cord signal at this levels.      Similar appearance of abnormal T1 marrow replacement involving the  T10 vertebral body. Worsening abnormal marrow replacement involving  the T9 vertebral body. An oval 1.3 cm T1/T2 hypointense foci within  the T7 vertebral body and a smaller lesion with similar  characteristics cyst within the T6 vertebral body, are most  compatible with intraosseous hemangiomas.      There is abnormal enhancing soft tissue within the left paraspinal  location extending from the T8-T9 level caudally to the T11/T12  levels with extension into the neural foramina at T9/T10, T10/T11 and  T11/T12 consistent with residual neoplasm/metastatic disease. There  is also extension into the right T9/T10 and T10/T11 neural foramina  which is better seen on this current examination.      T1-7: There is no significant central canal stenosis.      Other:  Multiple lesions compatible with metastatic disease involving the  liver, bilateral lungs and multiple mediastinal and hilar lymph  nodes. Bilateral pulmonary nodules, consistent with metastatic  disease. Small left pleural effusion      MRI LUMBAR SPINE:          Alignment: Mild L1-L2 and L4-L5 retrolisthesis.      Vertebrae/Intervertebral Discs: The vertebral bodies demonstrate  expected height. New abnormal T1 hypointense signal with enhancement  involving the anterior left aspect of the L1 vertebral body (series  15, image 17), compatible with a new metastatic focus. Mild  multilevel disc desiccation and disc height loss.      Conus: The conus medullaris terminates at the L1 level. Interval  increase in size of the irregular enhancing lesion within the thecal  sac anterior to the cauda equina exerting mass effect on the adjacent  nerve roots at the level of T2 concerning for drop metastasis.      T12-L1:  There is no significant central canal or  neural foraminal  stenosis.      L1-2: Mild posterior disc osteophytic complex and facet  osteoarthrosis. There is no significant central canal or neural  foraminal stenosis.      L2-3: Mild posterior disc osteophytic complex and moderate facet  osteoarthrosis resulting in mild central canal stenosis. Moderate  left neural foraminal stenosis. No right neural foraminal stenosis.      L3-4: Mild posterior disc osteophytic complex and facet  osteoarthrosis without significant central canal stenosis. No neural  foraminal stenosis.      L4-5: Mild anterolisthesis, posterior disc osteophytic complex and  moderate facet osteoarthrosis without significant central canal  stenosis. Moderate right and mild left neural foraminal stenosis.      L5-S1: Mild posterior disc bulge and moderate facet osteoarthrosis  without significant central canal stenosis. Moderate to severe right  and mild left neural foraminal stenosis.          Multiple abnormally enlarged retroperitoneal lymph nodes are seen.  Multiple bilateral T2 hyperintense nonenhancing renal simple cysts.      Impression: 1. Stable postoperative changes from prior posterior tim extending  from T8-T12 and bilateral pedicle screws at T8, T9, T11 and T12.  Postoperative changes of T9-T10 laminectomy and bilateral facetectomy.  2. Interval worsening abnormal marrow replacing lesion involving the  T9 and T10 vertebral bodies with significant epidural extension now  extending from T8-T11 greater in rostral extent compared to  05/21/2024. There is severe spinal canal stenosis at T8/T9 with  deformity of the spinal cord and subtle T2 abnormal signal which may  represent edema due to spinal cord compression although a component  of myelomalacia is also possible. Persistent metastatic involvement  at the left paraspinal location from T9/T10 and T11/T12 with  extension into the left T9/T10, T10/T11 and T11/T12 and right T9/T10  and T10/T11 neural foramina.  3. Interval increase in  abnormal enhancing lesion within the thecal  sac anterior to the caudal cranial exerting mass effect on the  adjacent nerve roots at the level of T2, raising possibility of drop  metastasis.  4. There is a new small abnormal enhancing lesion involving the  anterior aspect of the L1 vertebral body, concerning for new  metastatic focus.          I personally reviewed the images/study and I agree with the findings  as stated by Resident Berkley Cervantes. This study was interpreted at  [facility], [place], [place].      MACRO:  Berkley Cervantes discussed the significance and urgency of this critical  finding by telephone with  PRAKASH GILLESPIE on 6/17/2024 at 6:37 pm.  (**-RCF-**) Findings:  See findings.      Signed by: Papo Cuadra 6/17/2024 6:55 PM  Dictation workstation:   [id number]  MR lumbar spine wo IV contrast, MR thoracic spine wo IV contrast  Narrative: Interpreted By:  Carlos Carrillo and Ebai Jerky   STUDY:  MR LUMBAR SPINE WO IV CONTRAST; MR THORACIC SPINE WO IV CONTRAST;  6/16/2024 9:05 pm      INDICATION:  Signs/Symptoms:acute LE weakness r/o cord compression.      COMPARISON:  Cervical spine 06/14/2024. MRI thoracic spine 05/21/2024. MRI lumbar  spine 05/16/2024.      ACCESSION NUMBER(S):  [id number]; [id number]      ORDERING CLINICIAN:  PRAKASH GILLESPIE      TECHNIQUE:  Incomplete examination of the thoracic and lumbar spine. Only  localizer and sagittal T2 images of the thoracic spine were obtained.  The patient declined to continue the examination due to extreme  shoulder pain.      FINDINGS:      The alignment of the thoracic spine is within normal limits.      Postoperative changes of posterior tim extending from T8-T12 and  bilateral pedicle screws noted at T8, T9, T11 and T12. Postoperative  changes of T9 to T10 laminectomy and bilateral facetectomy.      There has been mild interval increase in abnormal T2 hypointense soft  tissue epidural thickening extending from the  T8 through T11 levels  when compared to 05/21/2024. It measures approximally 7 mm in  thickness at the level of the T10 vertebral body, previously 6 mm.  There is increased narrowing of the thecal sac within the spinal  canal extending from the T8 - T11. This abnormal soft tissue epidural  thickening, likely abuts the anterior aspect of the spinal cord at T9  and T9/T10 level resulting in at least moderate central canal  stenosis (series 6, image 14).      There is abnormal T2 marrow signal within the T9 and T10 vertebral  body compatible with residual osseous metastatic disease. There is  abnormal T2 signal involving the left paraspinal location extending  from T9/T10 to T11/T12 with extension into the adjacent left neural  foramina.      Sagittal T1  of the cervical spine demonstrates abnormal marrow  replacement of C6 vertebral body compatible with osseous metastatic  disease which is better evaluated on MRI cervical spine dated  06/14/2024.      Impression: Incomplete examination of the thoracic and lumbar spine; the patient  stopped the exam due to extreme shoulder pain.      There has been mild interval increase in abnormal T2 hyperintense  soft tissue epidural thickening extending from T8 through T11 levels  with at least moderate central canal stenosis at T9 and T9/T10  levels. There is persistent abnormal signal involving the left  paraspinal location at T9/T10 and T11/T12 with extension into the  adjacent foramina, compatible with component of metastatic disease.  Please consider repeating the examination after adequate pain control.      I personally reviewed the images/study and I agree with the findings  as stated by Resident Berkley Cervantes. This study was interpreted at  [facility], [place], [place].      MACRO:  Berkley Cervantes discussed the significance and urgency of this critical  finding by epic secure chat with  CLAUDETTE Amaya on 6/16/2024 at  9:41 pm.   (**-RCF-**) Findings:  See findings.      Signed by: Carlos Carrillo 6/17/2024 9:48 AM  Dictation workstation:   [id number]      Physical Exam  General: awake, alert, resting comfortably  HEENT: pupils equal and round, no scleral icterus  Pulmonary: Breathing comfortably at rest   Neuro: A&O x 3, cranial nerves II through XII grossly intact -chronic Bell's palsy facial asymmetry, R arm (shoulder), b/l LE (L>R) weakness  Psych: Normal affect   Relevant Results  Lab Results   Component Value Date    WBC 32.1 (H) 06/18/2024    HGB 10.2 (L) 06/18/2024    HCT 31.6 (L) 06/18/2024    MCV 87 06/18/2024     (L) 06/18/2024     Lab Results   Component Value Date    GLUCOSE 141 (H) 06/18/2024    CALCIUM 9.0 06/18/2024     (L) 06/18/2024    K 4.1 06/18/2024    CO2 28 06/18/2024    CL 98 06/18/2024    BUN 24 (H) 06/18/2024    CREATININE 0.56 06/18/2024                Assessment/Plan   This is a 63-year-old male with a history of metastatic esophageal adenocarcinoma HER2 positive that presented to the emergency department on 5/10 with acute on chronic lower back pain.    Workup revealed metastatic disease with T9/10 epidural extension with cord compression.  MRI L-spine also showed a nodular intradural lesion at L2 abutting the equina, concerning for drop lesion.  MRI brain showed small enhancing lesion.       On 5/17 the patient underwent T10 decompression with T8-12 fusion.  The patient was seen in follow-up consultation with Dr. Hodgson on 6/4, and recommend for interval postoperative RT to T-spine, palliative RT to L spine, and gamma knife radiosurgery.     Patient presented 6/5 with increased abdominal pain.  CT imaging of the chest abdomen pelvis note interval increase in pulmonary, hepatic and adrenal mets.     The patient has since developed new right upper extremity weakness, with MRI C-spine noting soft tissue mass involving right C6 neural foramina.     The patient now new lower extremity weakness, repeat MRI  T and L-spine showing recurrent tumor at T9 and T10 vertebral bodies with significant epidural extension with severe canal stenosis and cord compression. Redemonstrates previously seen intradural lesion at L2.     Plan:  The patient was discussed with my attending physician, Dr. Hodgson.     Updated MRI spine results were discussed with the patient and his family.  There are no surgical plans.  We discussed plans for urgent palliative RT, followed by expedited systemic therapy, and interval GKRS.  The associated risks and benefits of radiation therapy were discussed in full, all questions have been answered to their satisfaction.  They understand that treatment will unlikely provide immediate resolution of his on-going neurologic deficits, however, will help to mitigate progression.  They have provided informed consent to proceed.    -Urgent palliative RT to the spine, C6-7, T8-10, L2, tentatively planned for 20Gy in 5 fractions  -CT SIM on 6/19 at 8am  -We will attempt to start treatment tomorrow afternoon vs. 6/19  -Interval GKRS - possibly July 3rd         I spent __25___ minutes with this patient. Greater than 50% of this time was spent in the counseling and/or coordination of care of this patient.       Felipe Reyes PA-C

## 2024-06-18 NOTE — PROGRESS NOTES
"Massimo Garcia is a 63 y.o. male on day 9 of admission presenting with Colitis.    Subjective   Feeling ok this morning, plan for Rad Onc consult discussed with pt and wife. Pain well controlled with dilaudid. Discussed no plan for surgery with NSGY.     Denies any HA, dizziness/lightheadedness, fevers/chills, cough, congestion, rhinorrhea, sore throat, SOB, CP, palpitations, abdominal pain, n/v/d/c, melena, dysuria, urgency/frequency, hematuria, numbness/tingling, bruising/bleeding or rashes. Denies any sick contacts. ROS otherwise negative.         Objective     Physical Exam  Vitals reviewed.   Constitutional:       Comments: Chronic ill appearing   HENT:      Head: Normocephalic and atraumatic.      Nose: Nose normal.      Mouth/Throat:      Mouth: Mucous membranes are moist.      Pharynx: Oropharynx is clear.   Eyes:      Extraocular Movements: Extraocular movements intact.      Pupils: Pupils are equal, round, and reactive to light.   Cardiovascular:      Rate and Rhythm: Normal rate and regular rhythm.      Pulses: Normal pulses.      Heart sounds: Normal heart sounds.   Pulmonary:      Effort: Pulmonary effort is normal.      Breath sounds: Normal breath sounds.   Abdominal:      General: Bowel sounds are normal.      Palpations: Abdomen is soft.   Musculoskeletal:         General: Normal range of motion.   Skin:     General: Skin is warm.   Neurological:      General: No focal deficit present.      Mental Status: He is alert and oriented to person, place, and time. Mental status is at baseline.   Psychiatric:         Mood and Affect: Mood normal.         Behavior: Behavior normal.       Last Recorded Vitals  Blood pressure 99/67, pulse 107, temperature 36.4 °C (97.5 °F), temperature source Temporal, resp. rate 18, height 1.727 m (5' 8\"), weight 73.5 kg (162 lb), SpO2 93%.  Intake/Output last 3 Shifts:  I/O last 3 completed shifts:  In: 1388.8 (18.9 mL/kg) [P.O.:520; I.V.:568.8 (7.7 mL/kg); IV " Piggyback:300]  Out: 850 (11.6 mL/kg) [Urine:850 (0.3 mL/kg/hr)]  Weight: 73.5 kg         Assessment/Plan   Principal Problem:    Colitis    Massimo Garcia is a 63 y.o. 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 Patient admitted to [place] (6/5-6/7) c/f partial SBO, resolved prior to discharge from [facility], surgery consulted at OSH and did not recommend surgical interventions at this time. Presented to [facility] 6/8 with continued abdominal pain and diarrhea. CT A/P (6/8) w/o bowel obstruction, c/f colitis on imaging. Cdiff and stool pathogen neg (6/9). No leukocytosis or infectious symptoms at this time (no fevers/chills), no indication for abx at this time. Supportive oncology consulted for further pain management (6/9), rec cont PO Dilaudid 4mg Q2 PRN for moderate to severe pain, cont IV Dilaudid 2mg Q3 for breakthrough pain, increasing Fentanyl patch change frequency to 100mcgs Q48hr, and starting Bentyl 10mg x4/day PRN for abdominal cramps. Increased Dilaudid PO dose and fentanyl patch dose (6/11) per updated Supportive Oncology updated recs (6/11). Pt tolerating full liquid diet, no nausea or vomiting, advanced to regular diet (6/11). Radiation oncology consulted as he was recently seen outpatient with plan for CT simulation for gamma knife and radiation to spine, recs pending (re-engaged 6/18 after MRI spine). 6/12 BAT called d/t new right arm weakness. BAT cancelled because symptoms not consistent with stroke. Neurology consulted for new arm weakness workup. CTH showed concern for bleed  of his known left temporal lobe, worsening edema around his right occipital. However, neither can explain his symptoms. CT of the neck showed two larger cervical lymphnodes on the right side at C4-5 level, concern for compression on his right upper brachial plexuses. Rad/onc paged regarding CT head showing worsening disease in brain. Surg/onc consulted d/t concern for brachial plexus injury d/t large cervical lymph node-no plans for surgical interventions. Patient given Dex 10mg x1 dose and then 4mg q6 hours. NSGY consulted d/t concern for temporal bleed. Repeat head CT 6/12 showing same findings as previous. Eliquis restarted 6/15 given no hemmorage on MRI. MRI brain with worsening edema, MRI C-spine with worsening progression. 6/15 weakness progressing and patient no longer able to ambulate. 6/16 called NSGY to re engage d/t new onset on BLE weakness. After reviewing results recommendation to consult ortho/spine to r/o cord compression. 6/16 Ortho consulted. MRI T-spine and L spine under anesthesia 6/17 demonstrated worsening marrow replacing lesions and epidural extension with deformity of the spinal cord and edema possibly representing cord compression. 6/18 discussed with rad/onc possible inpatient emergent radiation. Onc and Rad Onc following patient.      #Acute RUE weakness/ possible spinal cord compression   -BAT activated on 6/12 at 0859 for new right arm weakness   -CT brain attack, CT head w/wo IV contrast showing increased temporal nodule and vasogenic edema  -Neurology consulted concerned for bleed from temporal tumor. Repeat CT head stable    -NSGY consulted d/t concern for brain bleed but low suspicion for active bleed    -CT soft tissue neck w/ contrast showing abundant bilateral heterogeneously enhancing lymph nodes consistent  with ralph metastases  -Surg/onc consulted for concern for brachial plexus causing new onset weakness-no surgical interventions   - rad/onc note 6/12 recommending systemic  therapy and interval palliative RT sequenced in between cycles; updated Rad Onc recs pending 6/18  -Dexamethasone 10mg x1 then 4mg q6. Plan to discharge with 4mg BID x5 days then 2mg BID x5 days then 2mg daily x5 days. (Per rad onc) - may change pending Rad Onc plan   -6/13-Denies new sensory changes to RUE.    - 6/17 MRI c-spine and MRI brain with concern for progression/ worsening edema, possible cord compression     #Acute BLE weakness   - 6/15 began experiencing mild BLE weakness   - 6/16 BLE weakness progression to point of bedbound  - Re engaged NSGY. Imaging reviewed again and low suspicion for BLE secondary to brain edema and progression of disease. More likely cord compression vs muscle atrophy  - 6/16 Ortho consulted d/t concern for cauda equina   - Stat MRI T-spine and L-spine ordered but patient patient unable to tolerate despite premeds   - 6/17 MRI T-spine and L-spine under anesthesia- demonstrated worsening marrow replacing lesions and epidural extension with deformity of the spinal cord and edema possibly representing cord compression  - Steroids as above   - NSGY 6/18 no plan for surgical intervention   - 6/18 Rad Onc re- engaged given MRI spine      #Leukocytosis   - WBC uptrending to 38.6 (6/16) 29.9 (6/17)- 32k (6/18)  - Likely in setting of steroid use vs infection   - CT chest 6/15 neg for infectious process   - Patient afebrile, HR WNL, normotensive   - Close monitoring of infectious symptoms and low threshold to start abx      #Hyponatremia - improving   - 6/17 Na 129  - Serum osmolality sent-pending  - Urine electrolytes ordered-pending   - Consider NS IVF pending further workup     # spinal wound superficial dehiscence   - wound care consulted 6/18 and recs pending      # Stage IV Esophageal AdenoCarcinoma (Her2 pos) w/ known mets to Lung/Liver,   # Recent Spine Emergent Decompression/Laminectomy (T8-T12 5/17/24 2/2 to Metastatic Lesion)  #Cancer Related Pain  - Follows w/ Dr. Randolph for  Oncology; spoke with him 6/14. He is out of office until 6/20 and will plan to re- engage him at this time   - 6/8 CT A/P wo evidence of bowel obstruction, but w/ short segment of circumferential thickening of ascending colon superior to the cecum new from prior, which may represent colitis. Also, redemonstration of metastatic disease in the visualized lung bases and liver unchanged in the short-term interval since 06/05/2024; redemonstration of abdominopelvic lymphadenopathy; redemonstration of left adrenal nodule, likely also metastatic; also Small left pleural effusion unchanged in the short-term interval. Interval development of trace ascites in the dependent portion of the pelvis  - Palliative care consulted while at [facility]; recommended 2mg IV dilaudid Q3 PRN for severe pain, continued on admission (6/9- )  - EKG (6/8)    - C/w Tylenol 650mg Q6H, Capsaicin cream QID, Duloxetine 60mg daily, Lyrica 50mg BID, Fentanyl patch 100mcg, 0.5mg Lorazepam IV Q8hrs PRN anxiety  - Miralax 17g BID, Senna/Colace 2 tabs BID for opioid induced constipation   - C/w Zofran, Compazine, Phenergan PRN N/V  - Decadron 16mg Ivx1 on 6/8, c/w home prednisone 20mg daily (part of taper, through 6/14, then decreased to 10mg daily for 7 days) with PPI   - Holding home Flexeril  - supportive oncology consulted (6/9), rec cont PO Dilaudid 4mg Q2 PRN for moderate to severe pain, cont IV Dilaudid 2mg Q3 for breakthrough pain, increasing Fentanyl patch change frequency to 100mcgs Q48hr, and starting Bentyl 10mg x4/day PRN for abdominal cramps  - supportive onc updated recs (6/11): increase PO Dilaudid from 4mg to 6mg Q3, and increase Fentanyl from 100mcg to 125mcg Q48hr  - Radiation oncology consulted 6/9; further recs pending (seen outpt on 6/4 with plan for gamma knife and radiation to spine pending CT simulation)   - 6/12 BAT/new acute right arm weakness as above   -FUV scheduled 6/28 for Infusion with INF 11 at [facility]   -  oncology consulted 6/15 given progression of disease for any consideration of inpatient treatment. Rec next line chemo is JER2 targeted enhertu which cannot be given inpt, but on discussion, there is concern that patient not strong enough to physically make it to chemo infusions. Post MRI 6/18 recs pending   - 6/18 discussed with rad/onc possible radiation given MRI spine - recs pending   - may need to consider GOC discussion pending Onc/ Rad Onc plan      # Colitis on Imaging iso Hx of Drug Induced Colitis   - Admitted in May for immunotherapy related colitis (infectious workup remained negative); on steroid taper as above   - Presented to [facility] (6/5-6/7) with abdominal pain/ diarrhea c/f CBO  - CT A/P (6/5): Mildly loops dilated loops of jejunum measuring up to 3.4 cm, early developing partial small bowel obstruction can not be excluded  - While at [facility] pt was passing gas and was having active Bms, general surgery consulted and did not recommend any surgical interventions   - CT A/P (6/8) c/f colitis   - cdiff/Stool pathogen panel negative (6/9)  - pt placed NPO on admission, increased to clear liquid diet (6/9 AM), advanced to full liquid diet (6/9 PM), advanced to regular diet (6/11)  - supportive oncology consulted 6/9, rec cont PO Dilaudid 4mg Q2 PRN for moderate to severe pain, cont IV Dilaudid 2mg Q3 for breakthrough pain, increasing Fentanyl patch change frequency to 100mcgs Q48hr, and starting Bentyl 10mg x4/day PRN for abdominal cramps     #Hx of PVT/PE (on Eliquis)   - C/w Eliquis 5mg BID     #HTN   #Third Degree AV Block s/p PM (11/9/23)  - No active BP medications      # Prophy   - home eliquis   - OOB as able, SCD while in bed      Dispo:   - Full code (confirmed on admission)   - Discharge home pending pain management and radiation/oncology plan   - NOK: Jane (wife) [phone]; updated at bedside 6/18  - FUVs TBD discharge plan       I spent > 75 minutes in the professional and overall care of  this patient.      Brianda Noyola, APRN-CNP

## 2024-06-19 ENCOUNTER — HOSPITAL ENCOUNTER (OUTPATIENT)
Dept: RADIATION ONCOLOGY | Facility: HOSPITAL | Age: 64
Setting detail: RADIATION/ONCOLOGY SERIES
Discharge: HOME | End: 2024-06-19
Payer: MEDICARE

## 2024-06-19 ENCOUNTER — HOSPITAL ENCOUNTER (OUTPATIENT)
Dept: RADIOLOGY | Facility: EXTERNAL LOCATION | Age: 64
Discharge: HOME | End: 2024-06-19

## 2024-06-19 ENCOUNTER — DOCUMENTATION (OUTPATIENT)
Dept: RADIATION ONCOLOGY | Facility: HOSPITAL | Age: 64
End: 2024-06-19
Payer: MEDICARE

## 2024-06-19 ENCOUNTER — APPOINTMENT (OUTPATIENT)
Dept: HEMATOLOGY/ONCOLOGY | Facility: CLINIC | Age: 64
End: 2024-06-19
Payer: MEDICARE

## 2024-06-19 ENCOUNTER — APPOINTMENT (OUTPATIENT)
Dept: RADIOLOGY | Facility: HOSPITAL | Age: 64
End: 2024-06-19
Payer: MEDICARE

## 2024-06-19 DIAGNOSIS — C79.51 METASTASIS TO BONE (MULTI): ICD-10-CM

## 2024-06-19 DIAGNOSIS — C79.31 SECONDARY MALIGNANT NEOPLASM OF BRAIN (MULTI): ICD-10-CM

## 2024-06-19 DIAGNOSIS — C79.51 SECONDARY MALIGNANT NEOPLASM OF BONE (MULTI): ICD-10-CM

## 2024-06-19 DIAGNOSIS — C15.9 MALIGNANT NEOPLASM OF ESOPHAGUS, UNSPECIFIED (MULTI): ICD-10-CM

## 2024-06-19 DIAGNOSIS — C79.51 METASTASIS TO BONE (MULTI): Primary | ICD-10-CM

## 2024-06-19 LAB
ALBUMIN SERPL BCP-MCNC: 3.1 G/DL (ref 3.4–5)
ALP SERPL-CCNC: 479 U/L (ref 33–136)
ALT SERPL W P-5'-P-CCNC: 56 U/L (ref 10–52)
ANION GAP SERPL CALC-SCNC: 13 MMOL/L (ref 10–20)
AST SERPL W P-5'-P-CCNC: 82 U/L (ref 9–39)
BILIRUB SERPL-MCNC: 0.8 MG/DL (ref 0–1.2)
BUN SERPL-MCNC: 25 MG/DL (ref 6–23)
CALCIUM SERPL-MCNC: 8.7 MG/DL (ref 8.6–10.6)
CHLORIDE SERPL-SCNC: 99 MMOL/L (ref 98–107)
CO2 SERPL-SCNC: 26 MMOL/L (ref 21–32)
CREAT SERPL-MCNC: 0.71 MG/DL (ref 0.5–1.3)
EGFRCR SERPLBLD CKD-EPI 2021: >90 ML/MIN/1.73M*2
GLUCOSE SERPL-MCNC: 172 MG/DL (ref 74–99)
POTASSIUM SERPL-SCNC: 4.6 MMOL/L (ref 3.5–5.3)
PROT SERPL-MCNC: 5.1 G/DL (ref 6.4–8.2)
RAD ONC MSQ ACTUAL FRACTIONS DELIVERED: 1
RAD ONC MSQ ACTUAL FRACTIONS DELIVERED: 1
RAD ONC MSQ ACTUAL SESSION DELIVERED DOSE: 400 CGRAY
RAD ONC MSQ ACTUAL SESSION DELIVERED DOSE: 400 CGRAY
RAD ONC MSQ ACTUAL TOTAL DOSE: 400 CGRAY
RAD ONC MSQ ACTUAL TOTAL DOSE: 400 CGRAY
RAD ONC MSQ ELAPSED DAYS: 0
RAD ONC MSQ ELAPSED DAYS: 0
RAD ONC MSQ LAST DATE: NORMAL
RAD ONC MSQ LAST DATE: NORMAL
RAD ONC MSQ PRESCRIBED FRACTIONAL DOSE: 400 CGRAY
RAD ONC MSQ PRESCRIBED FRACTIONAL DOSE: 400 CGRAY
RAD ONC MSQ PRESCRIBED NUMBER OF FRACTIONS: 5
RAD ONC MSQ PRESCRIBED NUMBER OF FRACTIONS: 5
RAD ONC MSQ PRESCRIBED TECHNIQUE: NORMAL
RAD ONC MSQ PRESCRIBED TECHNIQUE: NORMAL
RAD ONC MSQ PRESCRIBED TOTAL DOSE: 2000 CGRAY
RAD ONC MSQ PRESCRIBED TOTAL DOSE: 2000 CGRAY
RAD ONC MSQ PRESCRIPTION PATTERN COMMENT: NORMAL
RAD ONC MSQ PRESCRIPTION PATTERN COMMENT: NORMAL
RAD ONC MSQ START DATE: NORMAL
RAD ONC MSQ START DATE: NORMAL
RAD ONC MSQ TREATMENT COURSE NUMBER: 1
RAD ONC MSQ TREATMENT COURSE NUMBER: 1
RAD ONC MSQ TREATMENT SITE: NORMAL
RAD ONC MSQ TREATMENT SITE: NORMAL
SODIUM SERPL-SCNC: 133 MMOL/L (ref 136–145)

## 2024-06-19 PROCEDURE — 77336 RADIATION PHYSICS CONSULT: CPT | Performed by: STUDENT IN AN ORGANIZED HEALTH CARE EDUCATION/TRAINING PROGRAM

## 2024-06-19 PROCEDURE — 77290 THER RAD SIMULAJ FIELD CPLX: CPT | Performed by: STUDENT IN AN ORGANIZED HEALTH CARE EDUCATION/TRAINING PROGRAM

## 2024-06-19 PROCEDURE — 77412 RADIATION TX DELIVERY LVL 3: CPT | Performed by: STUDENT IN AN ORGANIZED HEALTH CARE EDUCATION/TRAINING PROGRAM

## 2024-06-19 PROCEDURE — 2500000004 HC RX 250 GENERAL PHARMACY W/ HCPCS (ALT 636 FOR OP/ED)

## 2024-06-19 PROCEDURE — 77280 THER RAD SIMULAJ FIELD SMPL: CPT | Mod: 59 | Performed by: STUDENT IN AN ORGANIZED HEALTH CARE EDUCATION/TRAINING PROGRAM

## 2024-06-19 PROCEDURE — 77370 RADIATION PHYSICS CONSULT: CPT | Performed by: STUDENT IN AN ORGANIZED HEALTH CARE EDUCATION/TRAINING PROGRAM

## 2024-06-19 PROCEDURE — 2500000001 HC RX 250 WO HCPCS SELF ADMINISTERED DRUGS (ALT 637 FOR MEDICARE OP)

## 2024-06-19 PROCEDURE — 77300 RADIATION THERAPY DOSE PLAN: CPT | Performed by: STUDENT IN AN ORGANIZED HEALTH CARE EDUCATION/TRAINING PROGRAM

## 2024-06-19 PROCEDURE — 1170000001 HC PRIVATE ONCOLOGY ROOM DAILY

## 2024-06-19 PROCEDURE — 99222 1ST HOSP IP/OBS MODERATE 55: CPT | Performed by: PHYSICAL MEDICINE & REHABILITATION

## 2024-06-19 PROCEDURE — 77334 RADIATION TREATMENT AID(S): CPT | Performed by: STUDENT IN AN ORGANIZED HEALTH CARE EDUCATION/TRAINING PROGRAM

## 2024-06-19 PROCEDURE — C9113 INJ PANTOPRAZOLE SODIUM, VIA: HCPCS | Performed by: STUDENT IN AN ORGANIZED HEALTH CARE EDUCATION/TRAINING PROGRAM

## 2024-06-19 PROCEDURE — 2500000002 HC RX 250 W HCPCS SELF ADMINISTERED DRUGS (ALT 637 FOR MEDICARE OP, ALT 636 FOR OP/ED): Performed by: STUDENT IN AN ORGANIZED HEALTH CARE EDUCATION/TRAINING PROGRAM

## 2024-06-19 PROCEDURE — 2500000004 HC RX 250 GENERAL PHARMACY W/ HCPCS (ALT 636 FOR OP/ED): Performed by: STUDENT IN AN ORGANIZED HEALTH CARE EDUCATION/TRAINING PROGRAM

## 2024-06-19 PROCEDURE — 2500000001 HC RX 250 WO HCPCS SELF ADMINISTERED DRUGS (ALT 637 FOR MEDICARE OP): Performed by: STUDENT IN AN ORGANIZED HEALTH CARE EDUCATION/TRAINING PROGRAM

## 2024-06-19 PROCEDURE — 84075 ASSAY ALKALINE PHOSPHATASE: CPT | Performed by: NURSE PRACTITIONER

## 2024-06-19 PROCEDURE — 77295 3-D RADIOTHERAPY PLAN: CPT | Performed by: STUDENT IN AN ORGANIZED HEALTH CARE EDUCATION/TRAINING PROGRAM

## 2024-06-19 PROCEDURE — 99233 SBSQ HOSP IP/OBS HIGH 50: CPT | Performed by: NURSE PRACTITIONER

## 2024-06-19 RX ADMIN — LORAZEPAM 0.5 MG: 2 INJECTION INTRAMUSCULAR; INTRAVENOUS at 21:07

## 2024-06-19 RX ADMIN — NYSTATIN 500000 UNITS: 100000 SUSPENSION ORAL at 12:00

## 2024-06-19 RX ADMIN — ACETAMINOPHEN 650 MG: 325 TABLET ORAL at 16:19

## 2024-06-19 RX ADMIN — ACETAMINOPHEN 650 MG: 325 TABLET ORAL at 11:59

## 2024-06-19 RX ADMIN — DULOXETINE HYDROCHLORIDE 60 MG: 60 CAPSULE, DELAYED RELEASE ORAL at 08:03

## 2024-06-19 RX ADMIN — HYDROMORPHONE HYDROCHLORIDE 4 MG: 4 TABLET ORAL at 14:54

## 2024-06-19 RX ADMIN — HYDROMORPHONE HYDROCHLORIDE 6 MG: 4 TABLET ORAL at 04:49

## 2024-06-19 RX ADMIN — HYDROXYZINE HYDROCHLORIDE 25 MG: 25 TABLET ORAL at 06:51

## 2024-06-19 RX ADMIN — HYDROMORPHONE HYDROCHLORIDE 1 MG: 1 INJECTION, SOLUTION INTRAMUSCULAR; INTRAVENOUS; SUBCUTANEOUS at 19:37

## 2024-06-19 RX ADMIN — PANTOPRAZOLE SODIUM 40 MG: 40 INJECTION, POWDER, FOR SOLUTION INTRAVENOUS at 08:04

## 2024-06-19 RX ADMIN — DEXAMETHASONE SODIUM PHOSPHATE 4 MG: 4 INJECTION, SOLUTION INTRA-ARTICULAR; INTRALESIONAL; INTRAMUSCULAR; INTRAVENOUS; SOFT TISSUE at 11:59

## 2024-06-19 RX ADMIN — ACETAMINOPHEN 650 MG: 325 TABLET ORAL at 04:45

## 2024-06-19 RX ADMIN — APIXABAN 5 MG: 5 TABLET, FILM COATED ORAL at 08:03

## 2024-06-19 RX ADMIN — NYSTATIN 500000 UNITS: 100000 SUSPENSION ORAL at 06:51

## 2024-06-19 RX ADMIN — HYDROXYZINE HYDROCHLORIDE 25 MG: 25 TABLET ORAL at 21:09

## 2024-06-19 RX ADMIN — DEXAMETHASONE SODIUM PHOSPHATE 4 MG: 4 INJECTION, SOLUTION INTRA-ARTICULAR; INTRALESIONAL; INTRAMUSCULAR; INTRAVENOUS; SOFT TISSUE at 16:30

## 2024-06-19 RX ADMIN — HYDROMORPHONE HYDROCHLORIDE 4 MG: 4 TABLET ORAL at 08:12

## 2024-06-19 RX ADMIN — APIXABAN 5 MG: 5 TABLET, FILM COATED ORAL at 21:08

## 2024-06-19 RX ADMIN — LORAZEPAM 0.5 MG: 0.5 TABLET ORAL at 06:51

## 2024-06-19 RX ADMIN — HYDROXYZINE HYDROCHLORIDE 25 MG: 25 TABLET ORAL at 12:00

## 2024-06-19 RX ADMIN — DEXAMETHASONE SODIUM PHOSPHATE 4 MG: 4 INJECTION, SOLUTION INTRA-ARTICULAR; INTRALESIONAL; INTRAMUSCULAR; INTRAVENOUS; SOFT TISSUE at 04:45

## 2024-06-19 RX ADMIN — PREGABALIN 50 MG: 25 CAPSULE ORAL at 21:09

## 2024-06-19 RX ADMIN — PROCHLORPERAZINE EDISYLATE 5 MG: 5 INJECTION INTRAMUSCULAR; INTRAVENOUS at 19:47

## 2024-06-19 RX ADMIN — NYSTATIN 500000 UNITS: 100000 SUSPENSION ORAL at 21:07

## 2024-06-19 RX ADMIN — HYDROMORPHONE HYDROCHLORIDE 6 MG: 4 TABLET ORAL at 21:08

## 2024-06-19 RX ADMIN — HYDROXYZINE HYDROCHLORIDE 25 MG: 25 TABLET ORAL at 16:19

## 2024-06-19 RX ADMIN — PREGABALIN 50 MG: 25 CAPSULE ORAL at 08:03

## 2024-06-19 RX ADMIN — NYSTATIN 500000 UNITS: 100000 SUSPENSION ORAL at 16:19

## 2024-06-19 ASSESSMENT — COGNITIVE AND FUNCTIONAL STATUS - GENERAL
WALKING IN HOSPITAL ROOM: TOTAL
HELP NEEDED FOR BATHING: A LOT
CLIMB 3 TO 5 STEPS WITH RAILING: TOTAL
DRESSING REGULAR UPPER BODY CLOTHING: A LITTLE
MOVING TO AND FROM BED TO CHAIR: TOTAL
CLIMB 3 TO 5 STEPS WITH RAILING: TOTAL
TURNING FROM BACK TO SIDE WHILE IN FLAT BAD: A LOT
WALKING IN HOSPITAL ROOM: TOTAL
DRESSING REGULAR LOWER BODY CLOTHING: A LOT
DRESSING REGULAR UPPER BODY CLOTHING: A LITTLE
TOILETING: A LITTLE
MOBILITY SCORE: 8
DRESSING REGULAR LOWER BODY CLOTHING: A LOT
MOVING FROM LYING ON BACK TO SITTING ON SIDE OF FLAT BED WITH BEDRAILS: A LOT
MOVING TO AND FROM BED TO CHAIR: TOTAL
DAILY ACTIVITIY SCORE: 18
MOBILITY SCORE: 8
TURNING FROM BACK TO SIDE WHILE IN FLAT BAD: A LOT
TOILETING: A LITTLE
MOVING FROM LYING ON BACK TO SITTING ON SIDE OF FLAT BED WITH BEDRAILS: A LOT
STANDING UP FROM CHAIR USING ARMS: TOTAL
DAILY ACTIVITIY SCORE: 18
HELP NEEDED FOR BATHING: A LOT
STANDING UP FROM CHAIR USING ARMS: TOTAL

## 2024-06-19 ASSESSMENT — PAIN SCALES - GENERAL
PAINLEVEL_OUTOF10: 9
PAINLEVEL_OUTOF10: 9
PAINLEVEL_OUTOF10: 5 - MODERATE PAIN
PAINLEVEL_OUTOF10: 9
PAINLEVEL_OUTOF10: 4
PAINLEVEL_OUTOF10: 9
PAINLEVEL_OUTOF10: 5 - MODERATE PAIN

## 2024-06-19 NOTE — CARE PLAN
Problem: Pain  Goal: My pain/discomfort is manageable  Outcome: Progressing     Problem: Safety  Goal: Patient will be injury free during hospitalization  Outcome: Progressing  Goal: I will remain free of falls  Outcome: Progressing     Problem: Daily Care  Goal: Daily care needs are met  Outcome: Progressing     Problem: Psychosocial Needs  Goal: Demonstrates ability to cope with hospitalization/illness  Outcome: Progressing  Goal: Collaborate with me, my family, and caregiver to identify my specific goals  Outcome: Progressing     Problem: Discharge Barriers  Goal: My discharge needs are met  Outcome: Progressing     Problem: Pain  Goal: Takes deep breaths with improved pain control throughout the shift  Outcome: Progressing  Goal: Turns in bed with improved pain control throughout the shift  Outcome: Progressing  Goal: Walks with improved pain control throughout the shift  Outcome: Progressing  Goal: Performs ADL's with improved pain control throughout shift  Outcome: Progressing  Goal: Participates in PT with improved pain control throughout the shift  Outcome: Progressing  Goal: Free from opioid side effects throughout the shift  Outcome: Progressing  Goal: Free from acute confusion related to pain meds throughout the shift  Outcome: Progressing     Problem: Skin  Goal: Decreased wound size/increased tissue granulation at next dressing change  Outcome: Progressing  Flowsheets (Taken 6/19/2024 0902)  Decreased wound size/increased tissue granulation at next dressing change: Promote sleep for wound healing  Goal: Participates in plan/prevention/treatment measures  Outcome: Progressing  Flowsheets (Taken 6/19/2024 0902)  Participates in plan/prevention/treatment measures: Elevate heels  Goal: Prevent/manage excess moisture  Outcome: Progressing  Flowsheets (Taken 6/19/2024 0902)  Prevent/manage excess moisture: Monitor for/manage infection if present  Goal: Prevent/minimize sheer/friction injuries  Outcome:  Progressing  Flowsheets (Taken 6/19/2024 0902)  Prevent/minimize sheer/friction injuries: Turn/reposition every 2 hours/use positioning/transfer devices  Goal: Promote/optimize nutrition  Outcome: Progressing  Flowsheets (Taken 6/19/2024 0902)  Promote/optimize nutrition: Offer water/supplements/favorite foods  Goal: Promote skin healing  Outcome: Progressing  Flowsheets (Taken 6/19/2024 0902)  Promote skin healing: Assess skin/pad under line(s)/device(s)     Problem: Pain - Adult  Goal: Verbalizes/displays adequate comfort level or baseline comfort level  Outcome: Progressing     Problem: Safety - Adult  Goal: Free from fall injury  Outcome: Progressing     Problem: Discharge Planning  Goal: Discharge to home or other facility with appropriate resources  Outcome: Progressing     Problem: Chronic Conditions and Co-morbidities  Goal: Patient's chronic conditions and co-morbidity symptoms are monitored and maintained or improved  Outcome: Progressing       The clinical goals for the shift include Pt will remain VSS and HDS throughout shift.

## 2024-06-19 NOTE — PROGRESS NOTES
"Massimo Garcia is a 63 y.o. male on day 10 of admission presenting with Colitis.    Subjective   Seen this AM at the bedside. Wife and daughter also present at bedside. Pt had CT sim this morning with plan to start RT this afternoon. He is fatigued and minimally conversive. Denies any fevers/chills, SOB, or CP.    Objective     Physical Exam  Vitals reviewed.   Constitutional:       Comments: Chronic ill appearing   HENT:      Head: Normocephalic and atraumatic.      Nose: Nose normal.      Mouth/Throat:      Mouth: Mucous membranes are moist.      Pharynx: Oropharynx is clear.   Eyes:      Extraocular Movements: Extraocular movements intact.      Pupils: Pupils are equal, round, and reactive to light.   Cardiovascular:      Rate and Rhythm: Normal rate and regular rhythm.      Pulses: Normal pulses.      Heart sounds: Normal heart sounds.   Pulmonary:      Effort: Pulmonary effort is normal.      Breath sounds: Normal breath sounds.   Abdominal:      General: Bowel sounds are normal.      Palpations: Abdomen is soft.   Musculoskeletal:         General: Normal range of motion.   Skin:     General: Skin is warm.   Neurological:      General: No focal deficit present.      Mental Status: He is alert and oriented to person, place, and time. Mental status is at baseline.   Psychiatric:         Mood and Affect: Mood normal.         Behavior: Behavior normal.       Last Recorded Vitals  Blood pressure 121/77, pulse 101, temperature 36 °C (96.8 °F), temperature source Temporal, resp. rate 18, height 1.727 m (5' 8\"), weight 73.5 kg (162 lb), SpO2 97%.  Intake/Output last 3 Shifts:  I/O last 3 completed shifts:  In: 368.8 (5 mL/kg) [P.O.:200; I.V.:168.8 (2.3 mL/kg)]  Out: 550 (7.5 mL/kg) [Urine:550 (0.2 mL/kg/hr)]  Weight: 73.5 kg     Results for orders placed or performed during the hospital encounter of 06/08/24 (from the past 24 hour(s))   CBC and Auto Differential   Result Value Ref Range    WBC 35.3 (H) 4.4 - 11.3 x10*3/uL "    nRBC 0.0 0.0 - 0.0 /100 WBCs    RBC 3.70 (L) 4.50 - 5.90 x10*6/uL    Hemoglobin 10.5 (L) 13.5 - 17.5 g/dL    Hematocrit 32.1 (L) 41.0 - 52.0 %    MCV 87 80 - 100 fL    MCH 28.4 26.0 - 34.0 pg    MCHC 32.7 32.0 - 36.0 g/dL    RDW 17.2 (H) 11.5 - 14.5 %    Platelets 142 (L) 150 - 450 x10*3/uL    Immature Granulocytes %, Automated 7.3 (H) 0.0 - 0.9 %    Immature Granulocytes Absolute, Automated 2.58 (H) 0.00 - 0.70 x10*3/uL   Manual Differential   Result Value Ref Range    Neutrophils %, Manual 93.9 40.0 - 80.0 %    Lymphocytes %, Manual 0.0 13.0 - 44.0 %    Monocytes %, Manual 1.7 2.0 - 10.0 %    Eosinophils %, Manual 0.0 0.0 - 6.0 %    Basophils %, Manual 0.0 0.0 - 2.0 %    Myelocytes %, Manual 4.4 0.0 - 0.0 %    Seg Neutrophils Absolute, Manual 33.15 (H) 1.20 - 7.00 x10*3/uL    Lymphocytes Absolute, Manual 0.00 (L) 1.20 - 4.80 x10*3/uL    Monocytes Absolute, Manual 0.60 0.10 - 1.00 x10*3/uL    Eosinophils Absolute, Manual 0.00 0.00 - 0.70 x10*3/uL    Basophils Absolute, Manual 0.00 0.00 - 0.10 x10*3/uL    Myelocytes Absolute, Manual 1.55 0.00 - 0.00 x10*3/uL    Total Cells Counted 115     RBC Morphology No significant RBC morphology present    Comprehensive Metabolic Panel   Result Value Ref Range    Glucose 172 (H) 74 - 99 mg/dL    Sodium 133 (L) 136 - 145 mmol/L    Potassium 4.6 3.5 - 5.3 mmol/L    Chloride 99 98 - 107 mmol/L    Bicarbonate 26 21 - 32 mmol/L    Anion Gap 13 10 - 20 mmol/L    Urea Nitrogen 25 (H) 6 - 23 mg/dL    Creatinine 0.71 0.50 - 1.30 mg/dL    eGFR >90 >60 mL/min/1.73m*2    Calcium 8.7 8.6 - 10.6 mg/dL    Albumin 3.1 (L) 3.4 - 5.0 g/dL    Alkaline Phosphatase 479 (H) 33 - 136 U/L    Total Protein 5.1 (L) 6.4 - 8.2 g/dL    AST 82 (H) 9 - 39 U/L    Bilirubin, Total 0.8 0.0 - 1.2 mg/dL    ALT 56 (H) 10 - 52 U/L       Scheduled medications  acetaminophen, 650 mg, oral, q6h  apixaban, 5 mg, oral, BID  dexAMETHasone, 4 mg, intravenous, q6h  docusate sodium, 100 mg, oral, BID  DULoxetine, 60 mg,  oral, Daily  fentaNYL, 1 patch, transdermal, q48h  fentaNYL, 1 patch, transdermal, q48h  hydrOXYzine HCL, 25 mg, oral, 4x daily  iohexol, 90 mL, intravenous, Once in imaging  nystatin, 5 mL, Swish & Swallow, 4x daily  pantoprazole, 40 mg, intravenous, Daily  polyethylene glycol, 17 g, oral, BID  potassium chloride, 40 mEq, oral, BID  pregabalin, 50 mg, oral, BID  sennosides-docusate sodium, 2 tablet, oral, BID      Continuous medications     PRN medications  PRN medications: diclofenac sodium, dicyclomine, HYDROmorphone, HYDROmorphone, HYDROmorphone, lactulose, LORazepam, naloxone, ondansetron, prochlorperazine, promethazine, sodium chloride 0.9%, sodium chloride 0.9%          Assessment/Plan   Principal Problem:    Jennifer Garcia is a 63 y.o. 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 Patient admitted to [place] (6/5-6/7) c/f partial SBO, resolved prior to discharge from [facility], surgery consulted at OSH and did not recommend surgical interventions at this time. [place] [facility] with continued abdominal pain and diarrhea. CT A/P (6/8) w/o bowel obstruction, c/f colitis on imaging. Cdiff and stool pathogen neg (6/9). No leukocytosis or infectious symptoms at this time (no fevers/chills), no indication for abx at this time. Supportive oncology consulted for further pain management (6/9), rec cont PO Dilaudid 4mg Q2 PRN for moderate to severe pain, cont IV Dilaudid 2mg Q3 for breakthrough pain, increasing Fentanyl patch change frequency to 100mcgs Q48hr, and starting Bentyl  10mg x4/day PRN for abdominal cramps. Increased Dilaudid PO dose and fentanyl patch dose (6/11) per updated Supportive Oncology updated recs (6/11). Pt tolerating full liquid diet, no nausea or vomiting, advanced to regular diet (6/11). Radiation oncology consulted as he was recently seen outpatient with plan for CT simulation for gamma knife and radiation to spine, recs pending (re-engaged 6/18 after MRI spine). 6/12 BAT called d/t new right arm weakness. BAT cancelled because symptoms not consistent with stroke. Neurology consulted for new arm weakness workup. CTH showed concern for bleed of his known left temporal lobe, worsening edema around his right occipital. However, neither can explain his symptoms. CT of the neck showed two larger cervical lymphnodes on the right side at C4-5 level, concern for compression on his right upper brachial plexuses. Rad/onc paged regarding CT head showing worsening disease in brain. Surg/onc consulted d/t concern for brachial plexus injury d/t large cervical lymph node-no plans for surgical interventions. Patient given Dex 10mg x1 dose and then 4mg q6 hours. NSGY consulted d/t concern for temporal bleed. Repeat head CT 6/12 showing same findings as previous. Eliquis restarted 6/15 given no hemmorage on MRI. MRI brain with worsening edema, MRI C-spine with worsening progression. 6/15 weakness progressing and patient no longer able to ambulate. 6/16 called NSGY to re engage d/t new onset on BLE weakness. After reviewing results recommendation to consult ortho/spine to r/o cord compression. 6/16 Ortho consulted. MRI T-spine and L spine under anesthesia 6/17 demonstrated worsening marrow replacing lesions and epidural extension with deformity of the spinal cord and edema possibly representing cord compression. 6/18 discussed with rad/onc possible inpatient emergent radiation. Planning for urgent palliative RT to the spine, C6-7, T8-10, L2, tentatively planned for 20Gy in 5  fractions. S/p CT sim 6/19 with plan to start 6/19 afternoon. DC plan pending further involvement with oncologist, likely to [place].      # Acute RUE weakness/ possible spinal cord compression   - BAT activated on 6/12 at 0859 for new right arm weakness   - CT brain attack, CT head w/wo IV contrast showing increased temporal nodule and vasogenic edema  - Neurology consulted concerned for bleed from temporal tumor. Repeat CT head stable    - NSGY consulted d/t concern for brain bleed but low suspicion for active bleed    - CT soft tissue neck w/ contrast showing abundant bilateral heterogeneously enhancing lymph nodes consistent  with ralph metastases  - Surg/onc consulted for concern for brachial plexus causing new onset weakness-no surgical interventions   - Rad/onc note 6/12 recommending systemic therapy and interval palliative RT sequenced in between cycles  - Dexamethasone 10mg x1 then 4mg q6. Plan to discharge with 4mg BID x5 days then 2mg BID x5 days then 2mg daily x5 days. (Per rad onc) - may change pending Rad Onc plan   - 6/13-Denies new sensory changes to RUE.    - 6/17 MRI c-spine and MRI brain with concern for progression/ worsening edema, possible cord compression  - 6/18 discussed with rad/onc possible inpatient emergent radiation  - Planning for urgent palliative RT to the spine, C6-7, T8-10, L2, tentatively planned for 20Gy in 5 fractions  - s/p CT sim 6/19 with plan to start 6/19 afternoon.      # Acute BLE weakness   - 6/15 began experiencing mild BLE weakness   - 6/16 BLE weakness progression to point of bedbound  - Re engaged NSGY. Imaging reviewed again and low suspicion for BLE secondary to brain edema and progression of disease. More likely cord compression vs muscle atrophy  - 6/16 Ortho consulted d/t concern for cauda equina   - Stat MRI T-spine and L-spine ordered but patient patient unable to tolerate despite premeds   - 6/17 MRI T-spine and L-spine under anesthesia- demonstrated worsening  marrow replacing lesions and epidural extension with deformity of the spinal cord and edema possibly representing cord compression  - Steroids as above   - NSGY 6/18 no plan for surgical intervention   - 6/18 Rad Onc re- engaged given MRI spine--> now planning for RT (see above)     #Leukocytosis   - WBC uptrending to 38.6 (6/16) 29.9 (6/17)- 32k (6/18)- 35.3k (6/19)  - Likely in setting of steroid use vs infection   - CT chest 6/15 neg for infectious process   - Patient afebrile, HR WNL, normotensive   - Close monitoring of infectious symptoms and low threshold to start abx      # Hyponatremia - improving   - 6/17 Na 129--> 133 (6/19)  - Serum osmolality sent (6/17): 274  - Urine electrolytes sent 6/17  - Consider NS IVF pending further workup     # Spinal wound superficial dehiscence   - Wound care consulted 6/18: rec cleanse wound with Vashe or NS, apply aquacell AG to wound, and cover with Mepilex border dressing     # 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 Cannon-Sidney for Oncology; spoke with him 6/14. He is out of office until 6/20 and will plan to re- engage him at this time   - 6/8 CT A/P wo evidence of bowel obstruction, but w/ short segment of circumferential thickening of ascending colon superior to the cecum new from prior, which may represent colitis. Also, redemonstration of metastatic disease in the visualized lung bases and liver unchanged in the short-term interval since 06/05/2024; redemonstration of abdominopelvic lymphadenopathy; redemonstration of left adrenal nodule, likely also metastatic; also Small left pleural effusion unchanged in the short-term interval. Interval development of trace ascites in the dependent portion of the pelvis  - Palliative care consulted while at [facility]; recommended 2mg IV dilaudid Q3 PRN for severe  pain, continued on admission (6/9- )  - EKG (6/8)    - C/w Tylenol 650mg Q6H, Capsaicin cream QID, Duloxetine 60mg daily, Lyrica 50mg BID, Fentanyl patch 100mcg, 0.5mg Lorazepam IV Q8hrs PRN anxiety  - Miralax 17g BID, Senna/Colace 2 tabs BID for opioid induced constipation   - C/w Zofran, Compazine, Phenergan PRN N/V  - Decadron 16mg Ivx1 on 6/8, c/w home prednisone 20mg daily (part of taper, through 6/14, then decreased to 10mg daily for 7 days) with PPI   - Holding home Flexeril  - supportive oncology consulted (6/9), rec cont PO Dilaudid 4mg Q2 PRN for moderate to severe pain, cont IV Dilaudid 2mg Q3 for breakthrough pain, increasing Fentanyl patch change frequency to 100mcgs Q48hr, and starting Bentyl 10mg x4/day PRN for abdominal cramps  - supportive onc updated recs (6/11): increase PO Dilaudid from 4mg to 6mg Q3, and increase Fentanyl from 100mcg to 125mcg Q48hr  - Radiation oncology consulted 6/9; further recs pending (seen outpt on 6/4 with plan for gamma knife and radiation to spine pending CT simulation)   - 6/12 BAT/new acute right arm weakness as above   -FUV scheduled 6/28 for In[facility] with INF [facility]   - Oncology consulted 6/15 given progression of disease for any consideration of inpatient treatment. Rec next line chemo is JER2 targeted enhertu which cannot be given inpt, but on discussion, there is concern that patient not strong enough to physically make it to chemo infusions. Post MRI 6/18 recs pending   - 6/18 discussed with rad/onc possible radiation given MRI spine - recs pending   - May need to consider GOC discussion after discussion with Dr. Blake     # Colitis on Imaging iso Hx of Drug Induced Colitis   - Admitted in May for immunotherapy related colitis (infectious workup remained negative); on steroid taper as above   - Presented to [facility] (6[facility]7) with abdominal pain/ diarrhea c/f CBO  - CT A/P (6/5): Mildly loops dilated loops of jejunum measuring up to 3.4 cm,  early developing partial small bowel obstruction can not be excluded  - While at [facility] pt was passing gas and was having active Bms, general surgery consulted and did not recommend any surgical interventions   - CT A/P (6/8) c/f colitis   - Cdiff/Stool pathogen panel negative (6/9)  - Pt placed NPO on admission, increased to clear liquid diet (6/9 AM), advanced to full liquid diet (6/9 PM), advanced to regular diet (6/11)  - Supportive oncology consulted 6/9, rec cont PO dilaudid 4mg Q3 PRN for moderate pain, PO dilaudid 6mg q3hrs PRN severe pain, cont IV Dilaudid 1mg Q3 for breakthrough pain, increasing Fentanyl patch change frequency to 125mcgs Q48hr, and starting Bentyl 10mg x4/day PRN for abdominal cramps     # Hx of PVT/PE (on Eliquis)   - C/w Eliquis 5mg BID     # HTN   # Third Degree AV Block s/p PM (11/9/23)  - No active BP medications      # Prophy   - Home eliquis   - OOB as able, SCD while in bed      DISPO:  - Full code (confirmed on admission)   - Discharge home pending pain management and radiation/oncology plan   - NOK: Jane (wife) [phone]; updated at bedside 6/18  - FUVs TBD discharge plan    I spent >60 minutes in the professional and overall care of this patient    Assessment and plan discussed with attending physician Dr. Emelyn Palm, APRN-CNP

## 2024-06-19 NOTE — CONSULTS
PM&R Consult Note  Patient: Massimo Garcia   Age/sex: 63 y.o.   Medical Record #: [id number]       Referring physician: Dr. Segundo  Consulting physician: Dr. Coker    Chief complaint:   Impairments and activity limitations in ADLs and mobility    HPI:   Massimo Garcia is a 63 y.o. 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  Patient was admitted for abdominal pain on 6/8, CT abdomen and pelvis showed concern for colitis.  Hospital course was complicated by right upper extremity and bilateral lower extremity weakness.  CT cervical spine showed 2 large cervical lymph nodes at the C4-5 level with concern for compression of the right upper brachial plexus.  MRI thoracic and lumbar spine on 6/17 showed worsening marrow replacing lesions and spinal cord deformity and edema likely representing cord compression.  Radiation oncology recommended emergent palliative radiation at the C6-7, T8-10, and L2 spine x 5 treatments.  Underwent first radiation therapy treatment today.  Was evaluated by therapy who recommended acute rehab.  PM&R was consulted for IPR appropriateness.    Past Medical History:   Diagnosis Date    Essential (primary) hypertension 12/21/2013    Hypertension    Pacemaker     Peripheral neuropathy     Personal history of other diseases of the circulatory system     History of right bundle branch block (RBBB)    Pneumothorax 12/04/2023        Past Surgical History:   Procedure Laterality Date    CARDIAC ELECTROPHYSIOLOGY PROCEDURE N/A 11/7/2023    Procedure: Temporary Pacemaker Insertion;  Surgeon: Alexis Shook MD;  Location: [facility];  Service: Cardiovascular;  Laterality: N/A;    CARDIAC ELECTROPHYSIOLOGY PROCEDURE Left 11/9/2023    Procedure: PPM IMPLANT DUAL;  Surgeon: Elias Connolly MD;  Location: [facility];  Service: Electrophysiology;  Laterality: Left;    TOTAL KNEE  ARTHROPLASTY  09/30/2016    Total Knee Replacement Left    TOTAL KNEE ARTHROPLASTY  09/30/2016    Total Knee Replacement Right        Family History   Problem Relation Name Age of Onset    Cancer Father          Allergies   Allergen Reactions    Bee Venom Protein (Honey Bee) Anaphylaxis    Oxaliplatin Other     Rigors        acetaminophen, 650 mg, oral, q6h  apixaban, 5 mg, oral, BID  dexAMETHasone, 4 mg, intravenous, q6h  docusate sodium, 100 mg, oral, BID  DULoxetine, 60 mg, oral, Daily  fentaNYL, 1 patch, transdermal, q48h  fentaNYL, 1 patch, transdermal, q48h  hydrOXYzine HCL, 25 mg, oral, 4x daily  iohexol, 90 mL, intravenous, Once in imaging  nystatin, 5 mL, Swish & Swallow, 4x daily  pantoprazole, 40 mg, intravenous, Daily  polyethylene glycol, 17 g, oral, BID  potassium chloride, 40 mEq, oral, BID  pregabalin, 50 mg, oral, BID  sennosides-docusate sodium, 2 tablet, oral, BID         PRN medications: diclofenac sodium, dicyclomine, HYDROmorphone, HYDROmorphone, HYDROmorphone, lactulose, LORazepam, naloxone, ondansetron, prochlorperazine, promethazine, sodium chloride 0.9%, sodium chloride 0.9%     Social History:  Social supports: Yes, 24 hour assistance may be available  Home situation: Lives in house with wife, with 4 stairs to enter and first floor setup available    Functional History:  Home DME: none   Premorbid ADL's: independent   Premorbid Mobility: independent     Present:     PT (6/18): Bed mobility mod a  Recommending high intensity    OT - none    ROS  10 point review of systems is performed and negative unless mentioned in HPI    Physical Exam  GENERAL: Awake and alert, well-developed, well-nourished, No acute distress   HEENT - NC/AT   CARDIOVASCULAR: extremities warm, well perfused  RESPIRATORY: no conversational dyspnea, comfortable on room air, symmetrical chest rise   ABDOMEN: Soft, non-tender, normal bowel sounds, no distention   MSK: No visible deformities or local swelling   SKIN: Normal  color, warm, dry, no rashes    Psych: Cooperative, affect appropriate, conversational, good-eye contact      NEURO: A&O x 4, gross motor and sensation intact bilaterally, no focal neurologic deficits   RUE elbow flexors and shoulder abductors 3/5, otherwise 4/5 of elbow extensors and finger flexors. 5/5 muscle strength of left upper extremity.   B/l LE 1/5 muscle strength with diminished light touch sensation    Imaging:  === 06/08/24 ===    XR SHOULDER 2+ VIEWS RIGHT    - Impression -  1.  No acute fracture or malalignment of the right shoulder.  2. Postsurgical changes of short stem right shoulder hemiarthroplasty.  3. Multifocal nodular opacities throughout the visualized right lung  may be attributable to pulmonary metastatic disease or infectious  infiltrate. Further evaluation with chest radiograph could be  considered if there is concern for infection.      === 06/08/24 ===    MR THORACIC SPINE W AND WO CONTRAST    - Impression -  1. Stable postoperative changes from prior posterior tim extending  from T8-T12 and bilateral pedicle screws at T8, T9, T11 and T12.  Postoperative changes of T9-T10 laminectomy and bilateral facetectomy.  2. Interval worsening abnormal marrow replacing lesion involving the  T9 and T10 vertebral bodies with significant epidural extension now  extending from T8-T11 greater in rostral extent compared to  05/21/2024. There is severe spinal canal stenosis at T8/T9 with  deformity of the spinal cord and subtle T2 abnormal signal which may  represent edema due to spinal cord compression although a component  of myelomalacia is also possible. Persistent metastatic involvement  at the left paraspinal location from T9/T10 and T11/T12 with  extension into the left T9/T10, T10/T11 and T11/T12 and right T9/T10  and T10/T11 neural foramina.  3. Interval increase in abnormal enhancing lesion within the thecal  sac anterior to the caudal cranial exerting mass effect on the  adjacent nerve roots at  the level of T2, raising possibility of drop  metastasis.  4. There is a new small abnormal enhancing lesion involving the  anterior aspect of the L1 vertebral body, concerning for new  metastatic focus.    IMPRESSION:  Massimo Garcia is a 63 y.o. 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  Patient was admitted for abdominal pain on 6/8, CT abdomen and pelvis showed concern for colitis.  Hospital course was complicated by right upper extremity and bilateral lower extremity weakness.  CT cervical spine showed 2 large cervical lymph nodes at the C4-5 level with concern for compression of the right upper brachial plexus.  MRI thoracic and lumbar spine on 6/17 showed worsening marrow replacing lesions and spinal cord deformity and edema likely representing cord compression.  Radiation oncology recommended emergent palliative radiation at the C6-7, T8-10, and L2 spine x 5 treatments.  Underwent first radiation therapy treatment today.  Was evaluated by therapy who recommended acute rehab.  PM&R was consulted for IPR appropriateness.    Recommendations:  # NTSCI  # Myelopathy  # Eosphageal Adenocarcinoma c/b spinal metastasis  - Will undergo palliative radiation therapy to the spine x5 treatments (first treatment today 6/19)    # Bowel/bladder  - Recommend daily bowel program of Miralax BID and Docusate 100mg BID  - Goal of one BM daily, at risk for constipation while on opioids for pain management    # Immobility   - Prevent secondary complications   - Skin protection: low air loss mattress, turn q 2 hours in bed, maintain bowel and bladder continence/containment  - Prevention of pulmonary complications: incentive spirometry and pulmonary toileting  - Maintain adequate nutrition and hydration  - Q shift PROM of upper and lower extremities to prevent contracture    # Impaired mobility and Impaired independence with ADLs  "and I/ADLs  - Continue PT, working on bed mobility, transfers, balance, endurance, strength, gait, eval for appropriate assistive device  - Continue OT, working on functional cognition, functional mobility, upper limb strength/coordination, balance, endurance, eval for appropriate adaptive equipment, ADLs, and I/ADLs.    # Dispo  - Patient needs definitive plan for radiation, of note we are not able to accommodate patients currently undergoing chemotherapy while at acute rehab. Plan would need to be for \"radiation/chemo holiday\" for approximately 7-10 days while at [facility].   - Needs occupational therapy evaluation, but likely would benefit from an acute inpatient rehab stay to improve functional outcome of impaired mobility, ADL's, transfers, and self-care. Patient would be an excellent candidate for acute rehabilitation once medically stable and is able and motivated to participate in 3 hours/day of therapy.   - Patient would benefit from medical follow up at least three times a week to address and monitor pain, BP, complications and other comorbidities. Patient was independent in mobility and ADLs at baseline prior to this incident. Acute rehab is appropriate to assist in returning to PLOF and living situation.  - Post rehab destination: anticipated home   - Must be off IV pain meds prior to transfer  - For questions, please contact via [employer]    Estimated length of stay is 7-10 days with discharge home at Mod I level.     We will follow along with you.  Thank you for the consult.    Sarah Whaley, DO  Physical Medicine and Rehabilitation PGY-3  Available via [employer]     Patient seen and examined with attending Dr. Coker, who agrees with assessment and plan.     NOTE: This note is not finalized until attending reviews and signs.     Supervisory note:  Seen with Dr Whaley, resident.  I saw and evaluated the patient. I personally obtained the key and critical portions of the history and physical exam. I reviewed the " resident's documentation and discussed the patient with the resident. I agree with the resident's medical decision making as documented in the resident's note.       Britni Macias MD     Division of PM&R

## 2024-06-19 NOTE — PROGRESS NOTES
Pt having his first dose of radiation today He had a pacemaker check pre/post MRI yesterday . Per radiation physics they are ok with using that pacer check for his pre radiation check due to the very low dose he will be receiving. I called the cardiology resident on call regarding his first radiation post pacer check. He stated that since the patient was in house and not going home that he was comfortable having the pacer team do a check in the morning after looking up the patient. I then called the floor to instruct them to please have a pacer check done first thing in the morning and the nurse was in agreement to pass that order along. Beth Quevedo RN

## 2024-06-19 NOTE — PROGRESS NOTES
06/11/24 1442   Discharge Planning   Living Arrangements Spouse/significant other   Support Systems Spouse/significant other;Family members;Friends/neighbors   Type of Residence Private residence   Who is requesting discharge planning? Provider   Home or Post Acute Services None   Patient expects to be discharged to: home   Does the patient need discharge transport arranged? No     6/11/24 @ 1445  [facility] Note    Plan per Medical/Surgical Team: pain control, radiation oncology consulted, CT sim planned  Status: Inpatient  Payor Source: Medicare  Discharge disposition: home  Expected date of discharge: 6/12/24  Barriers: none  Primary Oncologist: Dr. Randolph    Anticipate no needs at discharge. Patient to get CT sim to plan for outpatient radiation. Will continue to follow patient for any additional needs.   Frida Soria RN [facility]    6/19/24 @ 5706  Met with patient, wife, and daughter at bedside.  The plan is for the patient to get radiation x 5 treatments.  PT rec [facility]. Spoke with family about options and how he isn't able to get any treatment while there, so we will keep him here until radiation is complete.  JEAN Lord NP to place PM&R Consult.  [facility]. Referral sent via [facility]. Will continue to follow patient during his hospital stay. ADOD 6/27-6/28.  Frida Soria RN [facility]

## 2024-06-20 ENCOUNTER — HOSPITAL ENCOUNTER (OUTPATIENT)
Dept: RADIATION ONCOLOGY | Facility: HOSPITAL | Age: 64
Setting detail: RADIATION/ONCOLOGY SERIES
Discharge: HOME | End: 2024-06-20
Payer: MEDICARE

## 2024-06-20 DIAGNOSIS — C79.31 SECONDARY MALIGNANT NEOPLASM OF BRAIN (MULTI): ICD-10-CM

## 2024-06-20 DIAGNOSIS — C15.9 MALIGNANT NEOPLASM OF ESOPHAGUS, UNSPECIFIED (MULTI): ICD-10-CM

## 2024-06-20 DIAGNOSIS — Z51.0 ENCOUNTER FOR ANTINEOPLASTIC RADIATION THERAPY: ICD-10-CM

## 2024-06-20 DIAGNOSIS — C79.51 SECONDARY MALIGNANT NEOPLASM OF BONE (MULTI): ICD-10-CM

## 2024-06-20 LAB
ALBUMIN SERPL BCP-MCNC: 3 G/DL (ref 3.4–5)
ALP SERPL-CCNC: 420 U/L (ref 33–136)
ALT SERPL W P-5'-P-CCNC: 55 U/L (ref 10–52)
ANION GAP SERPL CALC-SCNC: 12 MMOL/L (ref 10–20)
AST SERPL W P-5'-P-CCNC: 76 U/L (ref 9–39)
BASOPHILS # BLD MANUAL: 0 X10*3/UL (ref 0–0.1)
BASOPHILS NFR BLD MANUAL: 0 %
BILIRUB SERPL-MCNC: 0.8 MG/DL (ref 0–1.2)
BUN SERPL-MCNC: 25 MG/DL (ref 6–23)
BURR CELLS BLD QL SMEAR: ABNORMAL
CALCIUM SERPL-MCNC: 8.8 MG/DL (ref 8.6–10.6)
CHLORIDE SERPL-SCNC: 102 MMOL/L (ref 98–107)
CO2 SERPL-SCNC: 27 MMOL/L (ref 21–32)
CREAT SERPL-MCNC: 0.49 MG/DL (ref 0.5–1.3)
EGFRCR SERPLBLD CKD-EPI 2021: >90 ML/MIN/1.73M*2
EOSINOPHIL # BLD MANUAL: 0 X10*3/UL (ref 0–0.7)
EOSINOPHIL NFR BLD MANUAL: 0 %
ERYTHROCYTE [DISTWIDTH] IN BLOOD BY AUTOMATED COUNT: 17.2 % (ref 11.5–14.5)
GLUCOSE SERPL-MCNC: 117 MG/DL (ref 74–99)
HCT VFR BLD AUTO: 33 % (ref 41–52)
HGB BLD-MCNC: 10.4 G/DL (ref 13.5–17.5)
IMM GRANULOCYTES # BLD AUTO: 2.32 X10*3/UL (ref 0–0.7)
IMM GRANULOCYTES NFR BLD AUTO: 7.3 % (ref 0–0.9)
LYMPHOCYTES # BLD MANUAL: 0.54 X10*3/UL (ref 1.2–4.8)
LYMPHOCYTES NFR BLD MANUAL: 1.7 %
MAGNESIUM SERPL-MCNC: 2.05 MG/DL (ref 1.6–2.4)
MCH RBC QN AUTO: 28 PG (ref 26–34)
MCHC RBC AUTO-ENTMCNC: 31.5 G/DL (ref 32–36)
MCV RBC AUTO: 89 FL (ref 80–100)
METAMYELOCYTES # BLD MANUAL: 0.82 X10*3/UL
METAMYELOCYTES NFR BLD MANUAL: 2.6 %
MONOCYTES # BLD MANUAL: 1.11 X10*3/UL (ref 0.1–1)
MONOCYTES NFR BLD MANUAL: 3.5 %
NEUTROPHILS # BLD MANUAL: 29.13 X10*3/UL (ref 1.2–7.7)
NEUTS BAND # BLD MANUAL: 0.28 X10*3/UL (ref 0–0.7)
NEUTS BAND NFR BLD MANUAL: 0.9 %
NEUTS SEG # BLD MANUAL: 28.85 X10*3/UL (ref 1.2–7)
NEUTS SEG NFR BLD MANUAL: 91.3 %
NRBC BLD-RTO: 0 /100 WBCS (ref 0–0)
OVALOCYTES BLD QL SMEAR: ABNORMAL
PLATELET # BLD AUTO: 136 X10*3/UL (ref 150–450)
POLYCHROMASIA BLD QL SMEAR: ABNORMAL
POTASSIUM SERPL-SCNC: 4.6 MMOL/L (ref 3.5–5.3)
PROT SERPL-MCNC: 5.1 G/DL (ref 6.4–8.2)
RAD ONC MSQ ACTUAL FRACTIONS DELIVERED: 2
RAD ONC MSQ ACTUAL SESSION DELIVERED DOSE: 400 CGRAY
RAD ONC MSQ ACTUAL TOTAL DOSE: 800 CGRAY
RAD ONC MSQ ELAPSED DAYS: 1
RAD ONC MSQ LAST DATE: NORMAL
RAD ONC MSQ PRESCRIBED FRACTIONAL DOSE: 400 CGRAY
RAD ONC MSQ PRESCRIBED NUMBER OF FRACTIONS: 5
RAD ONC MSQ PRESCRIBED TECHNIQUE: NORMAL
RAD ONC MSQ PRESCRIBED TOTAL DOSE: 2000 CGRAY
RAD ONC MSQ PRESCRIPTION PATTERN COMMENT: NORMAL
RAD ONC MSQ START DATE: NORMAL
RAD ONC MSQ TREATMENT COURSE NUMBER: 1
RAD ONC MSQ TREATMENT SITE: NORMAL
RBC # BLD AUTO: 3.71 X10*6/UL (ref 4.5–5.9)
RBC MORPH BLD: ABNORMAL
SODIUM SERPL-SCNC: 136 MMOL/L (ref 136–145)
TOTAL CELLS COUNTED BLD: 115
WBC # BLD AUTO: 31.6 X10*3/UL (ref 4.4–11.3)

## 2024-06-20 PROCEDURE — 77387 GUIDANCE FOR RADJ TX DLVR: CPT | Performed by: STUDENT IN AN ORGANIZED HEALTH CARE EDUCATION/TRAINING PROGRAM

## 2024-06-20 PROCEDURE — 85007 BL SMEAR W/DIFF WBC COUNT: CPT | Performed by: NURSE PRACTITIONER

## 2024-06-20 PROCEDURE — 2500000001 HC RX 250 WO HCPCS SELF ADMINISTERED DRUGS (ALT 637 FOR MEDICARE OP): Performed by: NURSE PRACTITIONER

## 2024-06-20 PROCEDURE — 97530 THERAPEUTIC ACTIVITIES: CPT | Mod: GO

## 2024-06-20 PROCEDURE — 2500000004 HC RX 250 GENERAL PHARMACY W/ HCPCS (ALT 636 FOR OP/ED)

## 2024-06-20 PROCEDURE — 2500000004 HC RX 250 GENERAL PHARMACY W/ HCPCS (ALT 636 FOR OP/ED): Performed by: NURSE PRACTITIONER

## 2024-06-20 PROCEDURE — 2500000001 HC RX 250 WO HCPCS SELF ADMINISTERED DRUGS (ALT 637 FOR MEDICARE OP): Performed by: STUDENT IN AN ORGANIZED HEALTH CARE EDUCATION/TRAINING PROGRAM

## 2024-06-20 PROCEDURE — 2500000001 HC RX 250 WO HCPCS SELF ADMINISTERED DRUGS (ALT 637 FOR MEDICARE OP)

## 2024-06-20 PROCEDURE — 97112 NEUROMUSCULAR REEDUCATION: CPT | Mod: GP

## 2024-06-20 PROCEDURE — 77412 RADIATION TX DELIVERY LVL 3: CPT | Performed by: STUDENT IN AN ORGANIZED HEALTH CARE EDUCATION/TRAINING PROGRAM

## 2024-06-20 PROCEDURE — 97165 OT EVAL LOW COMPLEX 30 MIN: CPT | Mod: GO

## 2024-06-20 PROCEDURE — 80053 COMPREHEN METABOLIC PANEL: CPT | Performed by: NURSE PRACTITIONER

## 2024-06-20 PROCEDURE — 99233 SBSQ HOSP IP/OBS HIGH 50: CPT | Performed by: NURSE PRACTITIONER

## 2024-06-20 PROCEDURE — 97530 THERAPEUTIC ACTIVITIES: CPT | Mod: GP

## 2024-06-20 PROCEDURE — 85027 COMPLETE CBC AUTOMATED: CPT | Performed by: NURSE PRACTITIONER

## 2024-06-20 PROCEDURE — 2500000002 HC RX 250 W HCPCS SELF ADMINISTERED DRUGS (ALT 637 FOR MEDICARE OP, ALT 636 FOR OP/ED): Performed by: STUDENT IN AN ORGANIZED HEALTH CARE EDUCATION/TRAINING PROGRAM

## 2024-06-20 PROCEDURE — 83735 ASSAY OF MAGNESIUM: CPT | Performed by: NURSE PRACTITIONER

## 2024-06-20 PROCEDURE — 1170000001 HC PRIVATE ONCOLOGY ROOM DAILY

## 2024-06-20 PROCEDURE — 2500000004 HC RX 250 GENERAL PHARMACY W/ HCPCS (ALT 636 FOR OP/ED): Performed by: STUDENT IN AN ORGANIZED HEALTH CARE EDUCATION/TRAINING PROGRAM

## 2024-06-20 PROCEDURE — C9113 INJ PANTOPRAZOLE SODIUM, VIA: HCPCS | Performed by: STUDENT IN AN ORGANIZED HEALTH CARE EDUCATION/TRAINING PROGRAM

## 2024-06-20 RX ORDER — LORAZEPAM 2 MG/ML
0.5 INJECTION INTRAMUSCULAR DAILY PRN
Status: DISPENSED | OUTPATIENT
Start: 2024-06-20 | End: 2024-06-26

## 2024-06-20 RX ORDER — HYDROMORPHONE HYDROCHLORIDE 1 MG/ML
1 INJECTION, SOLUTION INTRAMUSCULAR; INTRAVENOUS; SUBCUTANEOUS DAILY PRN
Status: DISPENSED | OUTPATIENT
Start: 2024-06-20 | End: 2024-06-26

## 2024-06-20 RX ORDER — HYDROXYZINE HYDROCHLORIDE 25 MG/1
25 TABLET, FILM COATED ORAL EVERY 6 HOURS PRN
Status: DISCONTINUED | OUTPATIENT
Start: 2024-06-20 | End: 2024-06-27 | Stop reason: HOSPADM

## 2024-06-20 RX ADMIN — POLYETHYLENE GLYCOL 3350 17 G: 17 POWDER, FOR SOLUTION ORAL at 09:58

## 2024-06-20 RX ADMIN — NYSTATIN 500000 UNITS: 100000 SUSPENSION ORAL at 17:32

## 2024-06-20 RX ADMIN — HYDROMORPHONE HYDROCHLORIDE 1 MG: 1 INJECTION, SOLUTION INTRAMUSCULAR; INTRAVENOUS; SUBCUTANEOUS at 11:30

## 2024-06-20 RX ADMIN — APIXABAN 5 MG: 5 TABLET, FILM COATED ORAL at 21:09

## 2024-06-20 RX ADMIN — DEXAMETHASONE SODIUM PHOSPHATE 4 MG: 4 INJECTION, SOLUTION INTRA-ARTICULAR; INTRALESIONAL; INTRAMUSCULAR; INTRAVENOUS; SOFT TISSUE at 22:58

## 2024-06-20 RX ADMIN — ACETAMINOPHEN 650 MG: 325 TABLET ORAL at 22:58

## 2024-06-20 RX ADMIN — HYDROMORPHONE HYDROCHLORIDE 4 MG: 4 TABLET ORAL at 10:24

## 2024-06-20 RX ADMIN — NYSTATIN 500000 UNITS: 100000 SUSPENSION ORAL at 21:10

## 2024-06-20 RX ADMIN — DULOXETINE HYDROCHLORIDE 60 MG: 60 CAPSULE, DELAYED RELEASE ORAL at 09:59

## 2024-06-20 RX ADMIN — DEXAMETHASONE SODIUM PHOSPHATE 4 MG: 4 INJECTION, SOLUTION INTRA-ARTICULAR; INTRALESIONAL; INTRAMUSCULAR; INTRAVENOUS; SOFT TISSUE at 10:30

## 2024-06-20 RX ADMIN — DEXAMETHASONE SODIUM PHOSPHATE 4 MG: 4 INJECTION, SOLUTION INTRA-ARTICULAR; INTRALESIONAL; INTRAMUSCULAR; INTRAVENOUS; SOFT TISSUE at 04:07

## 2024-06-20 RX ADMIN — ACETAMINOPHEN 650 MG: 325 TABLET ORAL at 09:58

## 2024-06-20 RX ADMIN — PANTOPRAZOLE SODIUM 40 MG: 40 INJECTION, POWDER, FOR SOLUTION INTRAVENOUS at 09:58

## 2024-06-20 RX ADMIN — HYDROMORPHONE HYDROCHLORIDE 1 MG: 1 INJECTION, SOLUTION INTRAMUSCULAR; INTRAVENOUS; SUBCUTANEOUS at 05:21

## 2024-06-20 RX ADMIN — HYDROMORPHONE HYDROCHLORIDE 1 MG: 1 INJECTION, SOLUTION INTRAMUSCULAR; INTRAVENOUS; SUBCUTANEOUS at 16:01

## 2024-06-20 RX ADMIN — LORAZEPAM 0.5 MG: 2 INJECTION INTRAMUSCULAR; INTRAVENOUS at 15:55

## 2024-06-20 RX ADMIN — SENNOSIDES AND DOCUSATE SODIUM 2 TABLET: 8.6; 5 TABLET ORAL at 09:59

## 2024-06-20 RX ADMIN — PREGABALIN 50 MG: 25 CAPSULE ORAL at 21:10

## 2024-06-20 RX ADMIN — POLYETHYLENE GLYCOL 3350 17 G: 17 POWDER, FOR SOLUTION ORAL at 21:10

## 2024-06-20 RX ADMIN — ACETAMINOPHEN 650 MG: 325 TABLET ORAL at 17:31

## 2024-06-20 RX ADMIN — POTASSIUM CHLORIDE 40 MEQ: 1.5 POWDER, FOR SOLUTION ORAL at 10:18

## 2024-06-20 RX ADMIN — HYDROXYZINE HYDROCHLORIDE 25 MG: 25 TABLET ORAL at 12:52

## 2024-06-20 RX ADMIN — DOCUSATE SODIUM 100 MG: 100 CAPSULE, LIQUID FILLED ORAL at 21:10

## 2024-06-20 RX ADMIN — DEXAMETHASONE SODIUM PHOSPHATE 4 MG: 4 INJECTION, SOLUTION INTRA-ARTICULAR; INTRALESIONAL; INTRAMUSCULAR; INTRAVENOUS; SOFT TISSUE at 17:32

## 2024-06-20 RX ADMIN — PROCHLORPERAZINE EDISYLATE 5 MG: 5 INJECTION INTRAMUSCULAR; INTRAVENOUS at 12:50

## 2024-06-20 RX ADMIN — HYDROMORPHONE HYDROCHLORIDE 6 MG: 4 TABLET ORAL at 21:10

## 2024-06-20 RX ADMIN — LORAZEPAM 0.5 MG: 2 INJECTION INTRAMUSCULAR; INTRAVENOUS at 22:58

## 2024-06-20 RX ADMIN — HYDROMORPHONE HYDROCHLORIDE 6 MG: 4 TABLET ORAL at 04:06

## 2024-06-20 RX ADMIN — PREGABALIN 50 MG: 25 CAPSULE ORAL at 09:58

## 2024-06-20 RX ADMIN — APIXABAN 5 MG: 5 TABLET, FILM COATED ORAL at 09:59

## 2024-06-20 RX ADMIN — DOCUSATE SODIUM 100 MG: 100 CAPSULE, LIQUID FILLED ORAL at 09:59

## 2024-06-20 RX ADMIN — DEXAMETHASONE SODIUM PHOSPHATE 4 MG: 4 INJECTION, SOLUTION INTRA-ARTICULAR; INTRALESIONAL; INTRAMUSCULAR; INTRAVENOUS; SOFT TISSUE at 00:21

## 2024-06-20 RX ADMIN — DICYCLOMINE HYDROCHLORIDE 10 MG: 10 CAPSULE ORAL at 12:50

## 2024-06-20 RX ADMIN — HYDROMORPHONE HYDROCHLORIDE 6 MG: 4 TABLET ORAL at 17:31

## 2024-06-20 RX ADMIN — ACETAMINOPHEN 650 MG: 325 TABLET ORAL at 04:06

## 2024-06-20 RX ADMIN — NYSTATIN 500000 UNITS: 100000 SUSPENSION ORAL at 09:59

## 2024-06-20 RX ADMIN — SENNOSIDES AND DOCUSATE SODIUM 2 TABLET: 8.6; 5 TABLET ORAL at 21:10

## 2024-06-20 ASSESSMENT — COGNITIVE AND FUNCTIONAL STATUS - GENERAL
DRESSING REGULAR UPPER BODY CLOTHING: A LITTLE
STANDING UP FROM CHAIR USING ARMS: TOTAL
HELP NEEDED FOR BATHING: A LOT
MOVING FROM LYING ON BACK TO SITTING ON SIDE OF FLAT BED WITH BEDRAILS: A LOT
WALKING IN HOSPITAL ROOM: TOTAL
DRESSING REGULAR LOWER BODY CLOTHING: A LOT
PERSONAL GROOMING: A LITTLE
TOILETING: A LITTLE
DAILY ACTIVITIY SCORE: 14
CLIMB 3 TO 5 STEPS WITH RAILING: TOTAL
DAILY ACTIVITIY SCORE: 18
MOBILITY SCORE: 8
HELP NEEDED FOR BATHING: A LOT
HELP NEEDED FOR BATHING: A LOT
TURNING FROM BACK TO SIDE WHILE IN FLAT BAD: A LOT
STANDING UP FROM CHAIR USING ARMS: TOTAL
DRESSING REGULAR UPPER BODY CLOTHING: A LOT
WALKING IN HOSPITAL ROOM: TOTAL
TOILETING: A LOT
MOVING TO AND FROM BED TO CHAIR: TOTAL
DRESSING REGULAR LOWER BODY CLOTHING: A LOT
TURNING FROM BACK TO SIDE WHILE IN FLAT BAD: A LOT
TOILETING: A LOT
CLIMB 3 TO 5 STEPS WITH RAILING: TOTAL
MOVING FROM LYING ON BACK TO SITTING ON SIDE OF FLAT BED WITH BEDRAILS: A LOT
STANDING UP FROM CHAIR USING ARMS: TOTAL
MOVING FROM LYING ON BACK TO SITTING ON SIDE OF FLAT BED WITH BEDRAILS: A LOT
TURNING FROM BACK TO SIDE WHILE IN FLAT BAD: A LOT
EATING MEALS: A LITTLE
DRESSING REGULAR LOWER BODY CLOTHING: A LOT
DAILY ACTIVITIY SCORE: 14
MOBILITY SCORE: 8
CLIMB 3 TO 5 STEPS WITH RAILING: TOTAL
PERSONAL GROOMING: A LITTLE
MOBILITY SCORE: 8
EATING MEALS: A LITTLE
WALKING IN HOSPITAL ROOM: TOTAL
MOVING TO AND FROM BED TO CHAIR: TOTAL
MOVING TO AND FROM BED TO CHAIR: TOTAL
DRESSING REGULAR UPPER BODY CLOTHING: A LOT

## 2024-06-20 ASSESSMENT — PAIN SCALES - GENERAL
PAINLEVEL_OUTOF10: 8
PAINLEVEL_OUTOF10: 8
PAINLEVEL_OUTOF10: 6
PAINLEVEL_OUTOF10: 6
PAINLEVEL_OUTOF10: 7
PAINLEVEL_OUTOF10: 9
PAINLEVEL_OUTOF10: 8
PAINLEVEL_OUTOF10: 9
PAINLEVEL_OUTOF10: 7

## 2024-06-20 ASSESSMENT — PAIN - FUNCTIONAL ASSESSMENT
PAIN_FUNCTIONAL_ASSESSMENT: 0-10

## 2024-06-20 ASSESSMENT — PAIN DESCRIPTION - LOCATION: LOCATION: ABDOMEN

## 2024-06-20 ASSESSMENT — ACTIVITIES OF DAILY LIVING (ADL)
ADL_ASSISTANCE: INDEPENDENT
BATHING_ASSISTANCE: MODERATE

## 2024-06-20 NOTE — PROGRESS NOTES
Physical Therapy    Physical Therapy Treatment    Patient Name: Massimo Garcia  MRN: [id number]  Today's Date: 6/20/2024  Time Calculation  Start Time: 1102  Stop Time: 1128  Time Calculation (min): 26 min    Assessment/Plan   PT Assessment  Assessment Comment: continue to recommend HIGH intensity PT after DC.  End of Session Patient Position: Bed, 3 rail up, Alarm off, not on at start of session     PT Plan  Treatment/Interventions: Bed mobility, Transfer training, Gait training, Balance training, Neuromuscular re-education, Strengthening, Endurance training, Range of motion, Therapeutic exercise, Therapeutic activity, Home exercise program, Positioning, Postural re-education  PT Plan: Ongoing PT  PT Frequency: 4 times per week  PT Discharge Recommendations: High intensity level of continued care  PT Recommended Transfer Status: Total assist  PT - OK to Discharge: Yes (PT eval complete and DC rec made)    General Visit Information:   PT  Visit  PT Received On: 06/20/24  Response to Previous Treatment: Patient with no complaints from previous session.  General  Family/Caregiver Present: Yes  Caregiver Feedback: pt's wife present during session  Co-Treatment: OT  Co-Treatment Reason: partial co-treat to safely progress functional mobility  Prior to Session Communication: Bedside nurse  Patient Position Received: Bed, 3 rail up, Alarm off, not on at start of session  General Comment: Pt supine in bed upon entry to room. Pt cooperative and willing to work with PT.    Subjective   Precautions:  Precautions  Medical Precautions: Fall precautions  Post-Surgical Precautions: Spinal precautions  Vital Signs:  Vital Signs  Heart Rate: (!) 118  SpO2: 98 %    Objective   Pain:  Pain Assessment  Pain Assessment: 0-10  0-10 (Numeric) Pain Score: 7  Pain Location: Abdomen  Cognition:  Cognition  Overall Cognitive Status: Within Functional Limits  Orientation Level: Oriented X4    Treatments:     Balance/Neuromuscular  Re-Education  Balance/Neuromuscular Re-Education Activity Performed: Yes  Balance/Neuromuscular Re-Education Activity 1: pt sitting EOB for 8 minutes with dep-mod assist, with pt able to lean forward with BUE supported by therapist with mod assist and able to maintain upright positioning for ~1 minute before returning to dependent assist.    Bed Mobility  Bed Mobility: Yes  Bed Mobility 1  Bed Mobility 1: Supine to sitting  Level of Assistance 1: Maximum assistance, Minimal verbal cues  Bed Mobility Comments 1: x2 assist; pt able to partially move BLE EOB  Bed Mobility 2  Bed Mobility  2: Sitting to supine  Level of Assistance 2: Dependent  Bed Mobility Comments 2: x2 assist  Bed Mobility 3  Bed Mobility 3: Scooting  Level of Assistance 3: Dependent    Outcome Measures:  [facility] Basic Mobility  Turning from your back to your side while in a flat bed without using bedrails: A lot  Moving from lying on your back to sitting on the side of a flat bed without using bedrails: A lot  Moving to and from bed to chair (including a wheelchair): Total  Standing up from a chair using your arms (e.g. wheelchair or bedside chair): Total  To walk in hospital room: Total  Climbing 3-5 steps with railing: Total  Basic Mobility - Total Score: 8    Education Documentation  Mobility Training, taught by Shasha Garcia PT at 6/20/2024  5:02 PM.  Learner: Patient  Readiness: Acceptance  Method: Explanation  Response: Needs Reinforcement    Education Comments  No comments found.        OP EDUCATION:       Encounter Problems       Encounter Problems (Active)       Balance       STG - Maintains dynamic sitting balance without upper extremity support with sba for >5 minutes.  (Progressing)       Start:  06/18/24    Expected End:  07/02/24       INTERVENTIONS:  1. Practice sitting on the edge of a bed/mat with minimal support.  2. Educate patient about maintining total hip precautions while maintaining balance.  3. Educate patient about  pressure relief.  4. Educate patient about use of assistive device.            Mobility       Pt will perform bed mobility with cga assist.   (Progressing)       Start:  06/18/24    Expected End:  07/02/24            Pt will demonstrate >3+/5 BLE and RUE strength to complete therapeutic exercise and participate in functional mobility.   (Progressing)       Start:  06/18/24    Expected End:  07/02/24               PT Transfers       Pt will perform lateral transfer with/without sliding board with cga to and from different surfaces.  (Progressing)       Start:  06/18/24    Expected End:  07/02/24               Pain - Adult            Shasha Garcia, PT

## 2024-06-20 NOTE — PROGRESS NOTES
"Massimo Garcia is a 63 y.o. male on day 11 of admission presenting with Colitis.    Subjective   Seen this AM at the bedside. Wife also present at the bedside. Patient appears withdrawn. He states that he slept ok over night. Wife and patient feel that he is a bit too relaxed/lethargic. We discussed changing scheduled atarax to PRN only. Additionally discussed reaching out to pt's oncologist today for formal tx plan. Pt denies any fevers/chills, SOB, or CP.    Objective     Physical Exam  Vitals reviewed.   Constitutional:       Comments: Chronic ill appearing   HENT:      Head: Normocephalic and atraumatic.      Nose: Nose normal.      Mouth/Throat:      Mouth: Mucous membranes are moist.      Pharynx: Oropharynx is clear.   Eyes:      Extraocular Movements: Extraocular movements intact.      Pupils: Pupils are equal, round, and reactive to light.   Cardiovascular:      Rate and Rhythm: Normal rate and regular rhythm.      Pulses: Normal pulses.      Heart sounds: Normal heart sounds.   Pulmonary:      Effort: Pulmonary effort is normal.      Breath sounds: Normal breath sounds.   Abdominal:      General: Bowel sounds are normal.      Palpations: Abdomen is soft.   Musculoskeletal:         General: Normal range of motion.   Skin:     General: Skin is warm.      Coloration: Skin is pale.   Neurological:      Mental Status: He is alert and oriented to person, place, and time.      Comments: BLE weakness  RUE weakness (+2/5 strength)  +5/5 strength LUE   Psychiatric:         Mood and Affect: Mood normal.         Behavior: Behavior normal.       Last Recorded Vitals  Blood pressure 100/66, pulse (!) 111, temperature 36.2 °C (97.2 °F), temperature source Temporal, resp. rate 16, height 1.727 m (5' 8\"), weight 73.5 kg (162 lb), SpO2 97%.  Intake/Output last 3 Shifts:  I/O last 3 completed shifts:  In: 550 (7.5 mL/kg) [P.O.:550]  Out: 975 (13.3 mL/kg) [Urine:975 (0.4 mL/kg/hr)]  Weight: 73.5 kg     Results for orders placed or " performed during the hospital encounter of 06/08/24 (from the past 24 hour(s))   CBC and Auto Differential   Result Value Ref Range    WBC 31.6 (H) 4.4 - 11.3 x10*3/uL    nRBC 0.0 0.0 - 0.0 /100 WBCs    RBC 3.71 (L) 4.50 - 5.90 x10*6/uL    Hemoglobin 10.4 (L) 13.5 - 17.5 g/dL    Hematocrit 33.0 (L) 41.0 - 52.0 %    MCV 89 80 - 100 fL    MCH 28.0 26.0 - 34.0 pg    MCHC 31.5 (L) 32.0 - 36.0 g/dL    RDW 17.2 (H) 11.5 - 14.5 %    Platelets 136 (L) 150 - 450 x10*3/uL    Immature Granulocytes %, Automated 7.3 (H) 0.0 - 0.9 %    Immature Granulocytes Absolute, Automated 2.32 (H) 0.00 - 0.70 x10*3/uL   Comprehensive Metabolic Panel   Result Value Ref Range    Glucose 117 (H) 74 - 99 mg/dL    Sodium 136 136 - 145 mmol/L    Potassium 4.6 3.5 - 5.3 mmol/L    Chloride 102 98 - 107 mmol/L    Bicarbonate 27 21 - 32 mmol/L    Anion Gap 12 10 - 20 mmol/L    Urea Nitrogen 25 (H) 6 - 23 mg/dL    Creatinine 0.49 (L) 0.50 - 1.30 mg/dL    eGFR >90 >60 mL/min/1.73m*2    Calcium 8.8 8.6 - 10.6 mg/dL    Albumin 3.0 (L) 3.4 - 5.0 g/dL    Alkaline Phosphatase 420 (H) 33 - 136 U/L    Total Protein 5.1 (L) 6.4 - 8.2 g/dL    AST 76 (H) 9 - 39 U/L    Bilirubin, Total 0.8 0.0 - 1.2 mg/dL    ALT 55 (H) 10 - 52 U/L   Magnesium   Result Value Ref Range    Magnesium 2.05 1.60 - 2.40 mg/dL   Manual Differential   Result Value Ref Range    Neutrophils %, Manual 91.3 40.0 - 80.0 %    Bands %, Manual 0.9 0.0 - 5.0 %    Lymphocytes %, Manual 1.7 13.0 - 44.0 %    Monocytes %, Manual 3.5 2.0 - 10.0 %    Eosinophils %, Manual 0.0 0.0 - 6.0 %    Basophils %, Manual 0.0 0.0 - 2.0 %    Metamyelocytes %, Manual 2.6 0.0 - 0.0 %    Seg Neutrophils Absolute, Manual 28.85 (H) 1.20 - 7.00 x10*3/uL    Bands Absolute, Manual 0.28 0.00 - 0.70 x10*3/uL    Lymphocytes Absolute, Manual 0.54 (L) 1.20 - 4.80 x10*3/uL    Monocytes Absolute, Manual 1.11 (H) 0.10 - 1.00 x10*3/uL    Eosinophils Absolute, Manual 0.00 0.00 - 0.70 x10*3/uL    Basophils Absolute, Manual 0.00 0.00 -  0.10 x10*3/uL    Metamyelocytes Absolute, Manual 0.82 0.00 - 0.00 x10*3/uL    Total Cells Counted 115     Neutrophils Absolute, Manual 29.13 (H) 1.20 - 7.70 x10*3/uL    RBC Morphology See Below     Polychromasia Mild     Ovalocytes Few     Dover Cells Few        Scheduled medications  acetaminophen, 650 mg, oral, q6h  apixaban, 5 mg, oral, BID  dexAMETHasone, 4 mg, intravenous, q6h  docusate sodium, 100 mg, oral, BID  DULoxetine, 60 mg, oral, Daily  fentaNYL, 1 patch, transdermal, q48h  fentaNYL, 1 patch, transdermal, q48h  iohexol, 90 mL, intravenous, Once in imaging  nystatin, 5 mL, Swish & Swallow, 4x daily  pantoprazole, 40 mg, intravenous, Daily  polyethylene glycol, 17 g, oral, BID  potassium chloride, 40 mEq, oral, BID  pregabalin, 50 mg, oral, BID  sennosides-docusate sodium, 2 tablet, oral, BID      Continuous medications     PRN medications  PRN medications: diclofenac sodium, dicyclomine, HYDROmorphone, HYDROmorphone, HYDROmorphone, HYDROmorphone, hydrOXYzine HCL, lactulose, LORazepam, LORazepam, naloxone, ondansetron, prochlorperazine, promethazine, sodium chloride 0.9%, sodium chloride 0.9%          Assessment/Plan   Principal Problem:    Jennifer Garcia is a 63 y.o. 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 Patient admitted to [facility] (6/5-6/7) c/f partial SBO, resolved prior to discharge from [facility], surgery consulted at OSH and did not recommend surgical interventions at this time. Presented to [facility] 6/8 with continued abdominal pain and diarrhea. CT A/P (6/8) w/o  bowel obstruction, c/f colitis on imaging. Cdiff and stool pathogen neg (6/9). No leukocytosis or infectious symptoms at this time (no fevers/chills), no indication for abx at this time. Supportive oncology consulted for further pain management (6/9), recs followed. Pt tolerating full liquid diet, no nausea or vomiting, advanced to regular diet (6/11). Radiation oncology consulted as he was recently seen outpatient with plan for CT simulation for gamma knife and radiation to spine, rec systemic therapy and interval palliative RT sequenced in between cycles-- re-engaged 6/18 after MRI spine. 6/12 BAT called d/t new right arm weakness. BAT cancelled because symptoms not consistent with stroke. Neurology consulted for new arm weakness workup. CTH showed concern for bleed of his known left temporal lobe, worsening edema around his right occipital. However, neither can explain his symptoms. CT of the neck showed two larger cervical lymphnodes on the right side at C4-5 level, concern for compression on his right upper brachial plexuses. Rad/onc paged regarding CT head showing worsening disease in brain. Surg/onc consulted d/t concern for brachial plexus injury d/t large cervical lymph node-no plans for surgical interventions. Patient given Dex 10mg x1 dose and then 4mg q6 hours. NSGY consulted d/t concern for temporal bleed. Repeat head CT 6/12 showing same findings as previous. Eliquis restarted 6/15 given no hemmorage on MRI. MRI brain with worsening edema, MRI C-spine with worsening progression. 6/15 weakness progressing and patient no longer able to ambulate. 6/16 called NSGY to re engage d/t new onset on BLE weakness. After reviewing results recommendation to consult ortho/spine to r/o cord compression. 6/16 Ortho consulted. MRI T-spine and L spine under anesthesia 6/17 demonstrated worsening marrow replacing lesions and epidural extension with deformity of the spinal cord and edema possibly representing cord  compression. 6/18 discussed with rad/onc possible inpatient emergent radiation. Started urgent palliative RT to the spine, C6-7, T8-10, L2, tentatively planned for 20Gy in 5 fractions. S/p CT sim 6/19 and started RT 6/19 afternoon (due 6/20, 6/21, 6/24, and 6/25). PT/OT rec [facility], PM&R consulted 6/19 and also rec [facility] (pt would not be able to receive radiation or chemo for 7-10 days while at [facility] and must be off IV pain meds). Dr Blake emailed 6/20 regarding formal tx plan. Discharge to [facility] likely 6/26, pending formal discussion with oncologist regarding tx plan, and after completion of RT.     # Acute RUE weakness/ possible spinal cord compression   - BAT activated on 6/12 at 0859 for new right arm weakness   - CT brain attack, CT head w/wo IV contrast showing increased temporal nodule and vasogenic edema  - Neurology consulted concerned for bleed from temporal tumor. Repeat CT head stable    - NSGY consulted d/t concern for brain bleed but low suspicion for active bleed    - CT soft tissue neck w/ contrast showing abundant bilateral heterogeneously enhancing lymph nodes consistent  with ralph metastases  - Surg/onc consulted for concern for brachial plexus causing new onset weakness-no surgical interventions   - Rad/onc note 6/12 recommending systemic therapy and interval palliative RT sequenced in between cycles  - Dexamethasone 10mg x1 then 4mg q6. Plan to discharge with 4mg BID x5 days then 2mg BID x5 days then 2mg daily x5 days. (Per rad onc) - may change pending Rad Onc plan   - 6/17 MRI c-spine and MRI brain with concern for progression/ worsening edema, possible cord compression  - 6/18 discussed with rad/onc possible inpatient emergent radiation  - Started urgent palliative RT to the spine, C6-7, T8-10, L2, tentatively planned for 20Gy in 5 fractions  - s/p CT sim 6/19 and started RT 6/19 afternoon (due 6/20, 6/21, 6/24, and 6/25)  - IV dilaudid 1mg x1 and IV Ativan 0.5mg x1 daily prior to each RT     # Acute BLE  weakness   - 6/15 began experiencing mild BLE weakness   - 6/16 BLE weakness progression to point of bedbound  - Re engaged NSGY. Imaging reviewed again and low suspicion for BLE secondary to brain edema and progression of disease. More likely cord compression vs muscle atrophy  - 6/16 Ortho consulted d/t concern for cauda equina   - Stat MRI T-spine and L-spine ordered but patient patient unable to tolerate despite premeds   - 6/17 MRI T-spine and L-spine under anesthesia- demonstrated worsening marrow replacing lesions and epidural extension with deformity of the spinal cord and edema possibly representing cord compression  - Steroids as above   - NSGY 6/18 no plan for surgical intervention   - 6/18 Rad Onc re- engaged given MRI spine--> started urgent RT (see above)  - PT/OT rec AR, PM&R consulted 6/19 and also rec [facility] (pt would not be able to receive radiation or chemo for 7-10 days while at [facility] and must be off IV pain meds)     # Leukocytosis   - WBC uptrending to 38.6 (6/16) 29.9 (6/17)- 32k (6/18)- 35.3k (6/19)- 31.6k (6/20)  - Likely in setting of steroid use vs infection   - CT chest 6/15 neg for infectious process   - Patient afebrile, HR WNL, normotensive   - Close monitoring of infectious symptoms and low threshold to start abx      # Hyponatremia - improving   - 6/17 Na 129--> 133 (6/19)--> 136 (6/20)  - Serum osmolality sent (6/17): 274  - Urine electrolytes sent 6/17  - Consider NS IVF pending further workup     # Spinal wound superficial dehiscence   - Wound care consulted 6/18: rec cleanse wound with Vashe or NS, apply aquacell AG to wound, and cover with Mepilex border dressing  - Pt/wife would like wound assessed daily     # 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 Randolph for Oncology; spoke with him 6/14. He is out of office  until 6/20 and will plan to re- engage him at this time   - 6/8 CT A/P wo evidence of bowel obstruction, but w/ short segment of circumferential thickening of ascending colon superior to the cecum new from prior, which may represent colitis. Also, redemonstration of metastatic disease in the visualized lung bases and liver unchanged in the short-term interval since 06/05/2024; redemonstration of abdominopelvic lymphadenopathy; redemonstration of left adrenal nodule, likely also metastatic; also Small left pleural effusion unchanged in the short-term interval. Interval development of trace ascites in the dependent portion of the pelvis  - Palliative care consulted while at [facility]; recommended 2mg IV dilaudid Q3 PRN for severe pain, continued on admission (6/9- )  - EKG (6/8)    - C/w Tylenol 650mg Q6H, Capsaicin cream QID, Duloxetine 60mg daily, Lyrica 50mg BID, Fentanyl patch 100mcg, 0.5mg Lorazepam IV Q8hrs PRN anxiety  - Miralax 17g BID, Senna/Colace 2 tabs BID for opioid induced constipation   - C/w Zofran, Compazine, Phenergan PRN N/V  - Decadron 16mg Ivx1 on 6/8, c/w home prednisone 20mg daily (part of taper, through 6/14, then decreased to 10mg daily for 7 days) with PPI   - Holding home Flexeril  - supportive oncology consulted (6/9), rec cont PO Dilaudid 4mg Q2 PRN for moderate to severe pain, cont IV Dilaudid 2mg Q3 for breakthrough pain, increasing Fentanyl patch change frequency to 100mcgs Q48hr, and starting Bentyl 10mg x4/day PRN for abdominal cramps  - supportive onc updated recs (6/11): increase PO Dilaudid from 4mg to 6mg Q3, and increase Fentanyl from 100mcg to 125mcg Q48hr  - Radiation oncology consulted 6/9; further recs pending (seen outpt on 6/4 with plan for gamma knife and radiation to spine pending CT simulation)   - 6/12 BAT/new acute right arm weakness as above   - Oncology consulted 6/15 given progression of disease for any consideration of inpatient treatment. Rec next line chemo  is JER2 targeted enhertu which cannot be given inpt, but on discussion, there is concern that patient not strong enough to physically make it to chemo infusions. Post MRI 6/18 recs pending   - HER2 added onto 5/17 biopsy (pending) per onc  - Dr Blake emailed 6/20 regarding formal tx plan; pending response     # Colitis on Imaging iso Hx of Drug Induced Colitis   - Admitted in May for immunotherapy related colitis (infectious workup remained negative); on steroid taper as above   - Presented to [facility] (6/5-6/7) with abdominal pain/ diarrhea c/f CBO  - CT A/P (6/5): Mildly loops dilated loops of jejunum measuring up to 3.4 cm, early developing partial small bowel obstruction can not be excluded  - While at [facility] pt was passing gas and was having active Bms, general surgery consulted and did not recommend any surgical interventions   - CT A/P (6/8) c/f colitis   - Cdiff/Stool pathogen panel negative (6/9)  - Pt placed NPO on admission, increased to clear liquid diet (6/9 AM), advanced to full liquid diet (6/9 PM), advanced to regular diet (6/11)  - Phenergan 12.5mg IV q6hrs PRN, Compazine 5mg q6hrs PRN, Zofran 4mg IV q6hrs PRN; caution with Qtc prolonging drugs with hx of 3rd degree AV block    # Neoplasm-related pain  - Supportive oncology consulted 6/9, recs followed  - PO dilaudid 4mg Q3 PRN for moderate pain, PO dilaudid 6mg q3hrs PRN severe pain, cont IV Dilaudid 1mg Q3 for breakthrough pain, scheduled tylenol 650mg q6hrs, Lyrica 50mg BID  - Fentanyl patch change frequency to 125mcgs Q48hr, and starting Bentyl 10mg x4/day PRN for abdominal cramps  - Voltaren gel 4g 4x/day PRN for mild pain of the R shoulder    # Anxiety  - Continue Cymbalta 60mg daily  - Ativan 0.5mg q8hrs PRN anxiety  - Atarax 25mg 4x/day scheduled ordered 6/16 d/t anxiety- however 6/20 pt very withdrawn, changed dosing ot 4x/day PRN only    # Opioid-induced constipation  - LBM 6/19  - DocuSenna 2tabs BID, Colace 100mg BID, Miralax BID,  Lactulose 20g 3x/day PRN     # Hx of PVT/PE (on Eliquis)   - Continue Eliquis 5mg BID     # HTN   # Third Degree AV Block s/p PM (11/9/23)  - No active BP medications      # Prophy   - Home eliquis   - OOB as able, SCD while in bed      DISPO:  - Full code (confirmed on admission)   - Discharge to [facility] likely 6/26, pending formal discussion with oncologist regarding tx plan, and after completion of RT  - NOK: Jane (wife) [phone]; updated at bedside 6/20  - FUVs TBD discharge plan    I spent >60 minutes in the professional and overall care of this patient    Assessment and plan discussed with attending physician Dr. Emelyn Palm, APRN-CNP

## 2024-06-20 NOTE — PROGRESS NOTES
Occupational Therapy    Evaluation and Treatment    Patient Name: Massimo Garcia  MRN: [id number]  Today's Date: 6/20/2024  Room: [address] [place] [address]  Time Calculation  Start Time: 1057  Stop Time: 1129  Time Calculation (min): 32 min    Assessment  IP OT Assessment  OT Assessment: Pt presents with decreased ADLs, functional transfers, and mobility resulting in the need for skilled OT intervention to address stated deficits and improve functional independence.  Prognosis: Good  Barriers to Discharge: None  Evaluation/Treatment Tolerance: Patient limited by pain, Patient limited by fatigue  End of Session Communication: Bedside nurse  End of Session Patient Position: Bed, 3 rail up, Alarm off, caregiver present  Plan:  Inpatient Plan  Treatment Interventions: ADL retraining, Functional transfer training, UE strengthening/ROM, Endurance training, Patient/family training, Neuromuscular reeducation, Compensatory technique education  OT Frequency: 4 times per week  OT Discharge Recommendations: High intensity level of continued care  Equipment Recommended upon Discharge:  (TBD)  OT Recommended Transfer Status: Assist of 2  OT - OK to Discharge: Yes  OT Assessment  OT Assessment Results: Decreased ADL status, Decreased upper extremity range of motion, Decreased upper extremity strength, Decreased endurance, Decreased functional mobility, Decreased gross motor control, Non-functional right upper extremity, Decreased trunk control for functional activities  Prognosis: Good  Barriers to Discharge: None  Evaluation/Treatment Tolerance: Patient limited by pain, Patient limited by fatigue  Strengths: Attitude of self, Premorbid level of function, Support of Caregivers    Subjective   Current Problem:  1. Colitis        2. Pacemaker  Cardiac device check - MRI    Cardiac device check - MRI    Cardiac device check - MRI    Cardiac device check - MRI    Cardiac device check - MRI    Cardiac device check - MRI    CANCELED: Cardiac device check -  MRI    CANCELED: Cardiac device check - MRI      3. 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 Esophageal cancer, stage IV (Multi)  ondansetron (Zofran) 4 mg tablet    dicyclomine (Bentyl) 10 mg capsule      5. Unilateral weakness  MR brain w and wo IV contrast    MR cervical spine w and wo IV contrast      6. Complete heart block (Multi)  Cardiac Device Check - Inpatient    Cardiac Device Check - Inpatient        General:  Reason for Referral: 63 year old male admitted  6/11 abdominal pain, RUE weakness x2 days, CTH known right occipital lesion, new small left temporal hyperdenisty, MRI Brain and C-Spine showing progression of intracranial metastatic burden and soft tissue mass extending into C5-6 foramen on R. MRI T/L showing recurrence of ventral epidural mass from T8-T11 causing severe cord compression. Pt now with BLE weakness and s/p fall while in hospital.  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  Co-Treatment: PT  Co-Treatment Reason: cotx with PT to maximize safety with functional mobility/transfers.  Prior to Session Communication: Bedside nurse  Patient Position Received: Bed, 3 rail up, Alarm off, caregiver present  Family/Caregiver Present: Yes  Caregiver Feedback: spouse present and supportive throughout therapy  session.  General Comment: Pt lying in bed on arrival. Pleasant and agreeable to therapy session.   Precautions:  Medical Precautions: Fall precautions  Post-Surgical Precautions: Spinal precautions  Vital Signs:  Heart Rate: (!) 118  SpO2: 98 %  Pain:  Pain Assessment  Pain Assessment: 0-10  0-10 (Numeric) Pain Score: 7  Pain Location: Abdomen  Lines/Tubes/Drains:  Implantable Port Right Chest Single lumen port (Active)   Number of days: 15       External Urinary Catheter Male (Active)   Number of days: 3         Objective   Cognition:  Overall Cognitive Status: Within Functional Limits  Orientation Level: Oriented X4  Insight: Within function limits  Processing Speed: Within funtional limits     Home Living:  Type of Home: House  Lives With: Spouse  Home Adaptive Equipment: Walker rolling or standard, Wheelchair-manual  Home Layout: One level  Home Access: Stairs to enter with rails (Pt reports handrails are too far apart to reach both at same time)  Entrance Stairs-Rails: Both  Entrance Stairs-Number of Steps: 3  Bathroom Shower/Tub: Walk-in shower  Bathroom Toilet: Handicapped height (+bidet)  Bathroom Equipment: Built-in shower seat   Prior Function:  Level of Winn: Independent with ADLs and functional transfers, Independent with homemaking with ambulation  ADL Assistance: Independent  Homemaking Assistance: Independent  Ambulatory Assistance: Independent  Vocational: Retired  Leisure: enjoys splitting wood, home projects & golfing  Hand Dominance: Right  Prior Function Comments: + driving; reports x1 fall in current hospital admission  IADL History:  Homemaking Responsibilities:  (Pt reports spouse completes laundry & meal prep)  ADL:  Eating Assistance: Stand by  Eating Deficit: Setup  Grooming Assistance: Stand by  Grooming Deficit: Setup  Bathing Assistance: Max A (anticipate)  UE Dressing Assistance: Moderate (anticipate)  LE Dressing Assistance: Maximal (anticipate)  Toileting Assistance with  Device: Maximal (anticipate)  Activity Tolerance:  Endurance: Decreased tolerance for upright activites  Balance:  Static Sitting Balance  Static Sitting-Balance Support: Bilateral upper extremity supported, Feet supported  Static Sitting-Level of Assistance:  (Sitting balance varied from Mod A-Dependent.)  Bed Mobility/Transfers: Bed Mobility/Transfers: Bed Mobility  Bed Mobility: Yes  Bed Mobility 1  Bed Mobility 1: Supine to sitting  Level of Assistance 1: Maximum assistance, Minimal verbal cues (x2)  Bed Mobility Comments 1: HOB levated, toward R side.  Bed Mobility 2  Bed Mobility  2: Sitting to supine  Level of Assistance 2: Dependent (x2)  Bed Mobility 3  Bed Mobility 3: Scooting (up in bed while supine)  Level of Assistance 3: Dependent (x2)   and Transfers  Transfer: No  IADL's:   Homemaking Responsibilities:  (Pt reports spouse completes laundry & meal prep)  Vision: Vision - Basic Assessment  Current Vision: No visual deficits   and    Sensation:  Sensation Comment: Pt reports decreased sensation from abdomen down through B LEs. When seated at EOB, Pt unaware that his feet were on floor.    Hand Function:  Hand Function  Gross Grasp: Functional  Extremities:   RUE   RUE : Within Functional Limits  RUE Strength  R Shoulder Flexion: 1/5  R Shoulder Extension: 2-/5  R Elbow Flexion: 2+/5  R Elbow Extension: 3-/5  R Forearm Pronation: 2-/5  R Forearm Supination: 2-/5, LUE   LUE: Within Functional Limits,  , and        Outcome Measures: [facility] Daily Activity  Putting on and taking off regular lower body clothing: A lot  Bathing (including washing, rinsing, drying): A lot  Putting on and taking off regular upper body clothing: A lot  Toileting, which includes using toilet, bedpan or urinal: A lot  Taking care of personal grooming such as brushing teeth: A little  Eating Meals: A little  Daily Activity - Total Score: 14  Brief Confusion Assessment Method (bCAM)  CAM Result: CAM -      ,          Education  Documentation  Precautions, taught by Montserrat Pedraza OT at 6/20/2024  4:09 PM.  Learner: Patient  Readiness: Acceptance  Method: Explanation, Demonstration  Response: Needs Reinforcement    Body Mechanics, taught by Montserrat Pedraza OT at 6/20/2024  4:09 PM.  Learner: Patient  Readiness: Acceptance  Method: Explanation, Demonstration  Response: Needs Reinforcement    Education Comments  No comments found.        Goals:   Encounter Problems       Encounter Problems (Active)       ADLs       Patient with complete upper body dressing with CGA level of assistance donning and doffing all UE clothes with PRN adaptive equipment while edge of bed  (Progressing)       Start:  06/20/24    Expected End:  07/04/24            Patient with complete lower body dressing with minimal assist  level of assistance donning and doffing all LE clothes  with PRN adaptive equipment while edge of bed  (Progressing)       Start:  06/20/24    Expected End:  07/04/24            Patient will feed self with modified independent level of assistance using PRN adaptive equipment. (Progressing)       Start:  06/20/24    Expected End:  07/04/24            Patient will complete daily grooming tasks with modified independent level of assistance and PRN adaptive equipment while edge of bed . (Progressing)       Start:  06/20/24    Expected End:  07/04/24            Patient will complete toileting including hygiene clothing management/hygiene with minimal assist  level of assistance. (Progressing)       Start:  06/20/24    Expected End:  07/04/24               BALANCE       Pt will maintain dynamic sitting balance during ADL task with contact guard assist level of assistance in order to demonstrate decreased risk of falling and improved postural control. (Progressing)       Start:  06/20/24    Expected End:  07/04/24               EXERCISE/STRENGTHENING       Pt will improve R UE to 3-/5 to improve functional use with ADLs.  (Progressing)       Start:   24    Expected End:  24               TRANSFERS       Patient will perform bed mobility Minimal assist level of assistance and bed rails in order to improve safety and independence with mobility (Progressing)       Start:  24    Expected End:  24                   Treatment Completed on Evaluation    Activities of Daily Living:       LE Dressing  LE Dressing: Yes  Sock Level of Assistance: Dependent  LE Dressing Where Assessed: Bed level    Therapy/Activity:     Therapeutic Activity  Therapeutic Activity Performed: Yes  Therapeutic Activity 1: To improve functional sitting balance and activity tolerance needed for increased participation with EOB ADLs, Pt tolerated static sitting at EOB x8 minutes with varying assist for Mod-Dep A.    Splintin/20/24 at 4:10 PM   Montserrat Pedraza, OT   Rehab Office: [phone]

## 2024-06-20 NOTE — CARE PLAN
Problem: Pain  Goal: My pain/discomfort is manageable  6/20/2024 0158 by Luis Will RN  Outcome: Progressing  6/20/2024 0157 by Luis Will, RN  Outcome: Progressing

## 2024-06-21 ENCOUNTER — HOSPITAL ENCOUNTER (OUTPATIENT)
Dept: RADIATION ONCOLOGY | Facility: HOSPITAL | Age: 64
Setting detail: RADIATION/ONCOLOGY SERIES
Discharge: HOME | End: 2024-06-21
Payer: MEDICARE

## 2024-06-21 ENCOUNTER — APPOINTMENT (OUTPATIENT)
Dept: CARDIOLOGY | Facility: CLINIC | Age: 64
DRG: 393 | End: 2024-06-21
Payer: MEDICARE

## 2024-06-21 DIAGNOSIS — C79.51 SECONDARY MALIGNANT NEOPLASM OF BONE (MULTI): ICD-10-CM

## 2024-06-21 DIAGNOSIS — Z51.0 ENCOUNTER FOR ANTINEOPLASTIC RADIATION THERAPY: ICD-10-CM

## 2024-06-21 DIAGNOSIS — C15.9 MALIGNANT NEOPLASM OF ESOPHAGUS, UNSPECIFIED (MULTI): ICD-10-CM

## 2024-06-21 DIAGNOSIS — C79.31 SECONDARY MALIGNANT NEOPLASM OF BRAIN (MULTI): ICD-10-CM

## 2024-06-21 LAB
ALBUMIN SERPL BCP-MCNC: 3.1 G/DL (ref 3.4–5)
ALP SERPL-CCNC: 434 U/L (ref 33–136)
ALT SERPL W P-5'-P-CCNC: 60 U/L (ref 10–52)
ANION GAP SERPL CALC-SCNC: 12 MMOL/L (ref 10–20)
AST SERPL W P-5'-P-CCNC: 81 U/L (ref 9–39)
BASOPHILS # BLD MANUAL: 0 X10*3/UL (ref 0–0.1)
BASOPHILS NFR BLD MANUAL: 0 %
BILIRUB SERPL-MCNC: 0.9 MG/DL (ref 0–1.2)
BODY SURFACE AREA: 1.88 M2
BUN SERPL-MCNC: 25 MG/DL (ref 6–23)
CALCIUM SERPL-MCNC: 9 MG/DL (ref 8.6–10.6)
CHLORIDE SERPL-SCNC: 102 MMOL/L (ref 98–107)
CO2 SERPL-SCNC: 26 MMOL/L (ref 21–32)
CREAT SERPL-MCNC: 0.39 MG/DL (ref 0.5–1.3)
EGFRCR SERPLBLD CKD-EPI 2021: >90 ML/MIN/1.73M*2
EOSINOPHIL # BLD MANUAL: 0 X10*3/UL (ref 0–0.7)
EOSINOPHIL NFR BLD MANUAL: 0 %
ERYTHROCYTE [DISTWIDTH] IN BLOOD BY AUTOMATED COUNT: 17.3 % (ref 11.5–14.5)
GLUCOSE BLD MANUAL STRIP-MCNC: 201 MG/DL (ref 74–99)
GLUCOSE SERPL-MCNC: 139 MG/DL (ref 74–99)
HCT VFR BLD AUTO: 34.2 % (ref 41–52)
HGB BLD-MCNC: 10.9 G/DL (ref 13.5–17.5)
IMM GRANULOCYTES # BLD AUTO: 1.67 X10*3/UL (ref 0–0.7)
IMM GRANULOCYTES NFR BLD AUTO: 5.6 % (ref 0–0.9)
LYMPHOCYTES # BLD MANUAL: 0 X10*3/UL (ref 1.2–4.8)
LYMPHOCYTES NFR BLD MANUAL: 0 %
MAGNESIUM SERPL-MCNC: 1.99 MG/DL (ref 1.6–2.4)
MCH RBC QN AUTO: 28.3 PG (ref 26–34)
MCHC RBC AUTO-ENTMCNC: 31.9 G/DL (ref 32–36)
MCV RBC AUTO: 89 FL (ref 80–100)
METAMYELOCYTES # BLD MANUAL: 0.21 X10*3/UL
METAMYELOCYTES NFR BLD MANUAL: 0.7 %
MONOCYTES # BLD MANUAL: 0.42 X10*3/UL (ref 0.1–1)
MONOCYTES NFR BLD MANUAL: 1.4 %
NEUTROPHILS # BLD MANUAL: 29.37 X10*3/UL (ref 1.2–7.7)
NEUTS BAND # BLD MANUAL: 0.6 X10*3/UL (ref 0–0.7)
NEUTS BAND NFR BLD MANUAL: 2 %
NEUTS SEG # BLD MANUAL: 28.77 X10*3/UL (ref 1.2–7)
NEUTS SEG NFR BLD MANUAL: 95.9 %
NRBC BLD-RTO: 0 /100 WBCS (ref 0–0)
PLATELET # BLD AUTO: 138 X10*3/UL (ref 150–450)
POTASSIUM SERPL-SCNC: 4.1 MMOL/L (ref 3.5–5.3)
PROT SERPL-MCNC: 5.5 G/DL (ref 6.4–8.2)
RAD ONC MSQ ACTUAL FRACTIONS DELIVERED: 3
RAD ONC MSQ ACTUAL SESSION DELIVERED DOSE: 400 CGRAY
RAD ONC MSQ ACTUAL TOTAL DOSE: 1200 CGRAY
RAD ONC MSQ ELAPSED DAYS: 2
RAD ONC MSQ LAST DATE: NORMAL
RAD ONC MSQ PRESCRIBED FRACTIONAL DOSE: 400 CGRAY
RAD ONC MSQ PRESCRIBED NUMBER OF FRACTIONS: 5
RAD ONC MSQ PRESCRIBED TECHNIQUE: NORMAL
RAD ONC MSQ PRESCRIBED TOTAL DOSE: 2000 CGRAY
RAD ONC MSQ PRESCRIPTION PATTERN COMMENT: NORMAL
RAD ONC MSQ START DATE: NORMAL
RAD ONC MSQ TREATMENT COURSE NUMBER: 1
RAD ONC MSQ TREATMENT SITE: NORMAL
RBC # BLD AUTO: 3.85 X10*6/UL (ref 4.5–5.9)
RBC MORPH BLD: ABNORMAL
SODIUM SERPL-SCNC: 136 MMOL/L (ref 136–145)
TOTAL CELLS COUNTED BLD: 147
WBC # BLD AUTO: 30 X10*3/UL (ref 4.4–11.3)

## 2024-06-21 PROCEDURE — 85007 BL SMEAR W/DIFF WBC COUNT: CPT | Performed by: NURSE PRACTITIONER

## 2024-06-21 PROCEDURE — 85027 COMPLETE CBC AUTOMATED: CPT | Performed by: NURSE PRACTITIONER

## 2024-06-21 PROCEDURE — 2500000004 HC RX 250 GENERAL PHARMACY W/ HCPCS (ALT 636 FOR OP/ED): Performed by: NURSE PRACTITIONER

## 2024-06-21 PROCEDURE — 77412 RADIATION TX DELIVERY LVL 3: CPT | Performed by: STUDENT IN AN ORGANIZED HEALTH CARE EDUCATION/TRAINING PROGRAM

## 2024-06-21 PROCEDURE — 80053 COMPREHEN METABOLIC PANEL: CPT | Performed by: NURSE PRACTITIONER

## 2024-06-21 PROCEDURE — 1170000001 HC PRIVATE ONCOLOGY ROOM DAILY

## 2024-06-21 PROCEDURE — 2500000001 HC RX 250 WO HCPCS SELF ADMINISTERED DRUGS (ALT 637 FOR MEDICARE OP)

## 2024-06-21 PROCEDURE — 2500000004 HC RX 250 GENERAL PHARMACY W/ HCPCS (ALT 636 FOR OP/ED): Performed by: STUDENT IN AN ORGANIZED HEALTH CARE EDUCATION/TRAINING PROGRAM

## 2024-06-21 PROCEDURE — 2500000001 HC RX 250 WO HCPCS SELF ADMINISTERED DRUGS (ALT 637 FOR MEDICARE OP): Performed by: STUDENT IN AN ORGANIZED HEALTH CARE EDUCATION/TRAINING PROGRAM

## 2024-06-21 PROCEDURE — 82947 ASSAY GLUCOSE BLOOD QUANT: CPT

## 2024-06-21 PROCEDURE — 83735 ASSAY OF MAGNESIUM: CPT | Performed by: NURSE PRACTITIONER

## 2024-06-21 PROCEDURE — 2500000002 HC RX 250 W HCPCS SELF ADMINISTERED DRUGS (ALT 637 FOR MEDICARE OP, ALT 636 FOR OP/ED)

## 2024-06-21 PROCEDURE — 2500000002 HC RX 250 W HCPCS SELF ADMINISTERED DRUGS (ALT 637 FOR MEDICARE OP, ALT 636 FOR OP/ED): Performed by: STUDENT IN AN ORGANIZED HEALTH CARE EDUCATION/TRAINING PROGRAM

## 2024-06-21 PROCEDURE — 99233 SBSQ HOSP IP/OBS HIGH 50: CPT

## 2024-06-21 PROCEDURE — 2500000004 HC RX 250 GENERAL PHARMACY W/ HCPCS (ALT 636 FOR OP/ED)

## 2024-06-21 PROCEDURE — C9113 INJ PANTOPRAZOLE SODIUM, VIA: HCPCS | Performed by: STUDENT IN AN ORGANIZED HEALTH CARE EDUCATION/TRAINING PROGRAM

## 2024-06-21 RX ADMIN — POTASSIUM CHLORIDE 40 MEQ: 1.5 POWDER, FOR SOLUTION ORAL at 08:40

## 2024-06-21 RX ADMIN — DOCUSATE SODIUM 100 MG: 100 CAPSULE, LIQUID FILLED ORAL at 08:40

## 2024-06-21 RX ADMIN — DULOXETINE HYDROCHLORIDE 60 MG: 60 CAPSULE, DELAYED RELEASE ORAL at 08:40

## 2024-06-21 RX ADMIN — SENNOSIDES AND DOCUSATE SODIUM 2 TABLET: 8.6; 5 TABLET ORAL at 20:14

## 2024-06-21 RX ADMIN — NYSTATIN 500000 UNITS: 100000 SUSPENSION ORAL at 14:19

## 2024-06-21 RX ADMIN — FENTANYL 1 PATCH: 100 PATCH TRANSDERMAL at 22:40

## 2024-06-21 RX ADMIN — PREGABALIN 50 MG: 25 CAPSULE ORAL at 08:40

## 2024-06-21 RX ADMIN — NYSTATIN 500000 UNITS: 100000 SUSPENSION ORAL at 20:14

## 2024-06-21 RX ADMIN — PANTOPRAZOLE SODIUM 40 MG: 40 INJECTION, POWDER, FOR SOLUTION INTRAVENOUS at 08:40

## 2024-06-21 RX ADMIN — HYDROMORPHONE HYDROCHLORIDE 1 MG: 1 INJECTION, SOLUTION INTRAMUSCULAR; INTRAVENOUS; SUBCUTANEOUS at 15:48

## 2024-06-21 RX ADMIN — SENNOSIDES AND DOCUSATE SODIUM 2 TABLET: 8.6; 5 TABLET ORAL at 08:40

## 2024-06-21 RX ADMIN — DOCUSATE SODIUM 100 MG: 100 CAPSULE, LIQUID FILLED ORAL at 20:37

## 2024-06-21 RX ADMIN — DEXAMETHASONE SODIUM PHOSPHATE 4 MG: 4 INJECTION, SOLUTION INTRA-ARTICULAR; INTRALESIONAL; INTRAMUSCULAR; INTRAVENOUS; SOFT TISSUE at 11:27

## 2024-06-21 RX ADMIN — PREGABALIN 50 MG: 25 CAPSULE ORAL at 20:14

## 2024-06-21 RX ADMIN — ACETAMINOPHEN 650 MG: 325 TABLET ORAL at 17:42

## 2024-06-21 RX ADMIN — DEXAMETHASONE SODIUM PHOSPHATE 4 MG: 4 INJECTION, SOLUTION INTRA-ARTICULAR; INTRALESIONAL; INTRAMUSCULAR; INTRAVENOUS; SOFT TISSUE at 05:22

## 2024-06-21 RX ADMIN — POTASSIUM CHLORIDE 40 MEQ: 1.5 POWDER, FOR SOLUTION ORAL at 20:14

## 2024-06-21 RX ADMIN — ONDANSETRON 4 MG: 2 INJECTION INTRAMUSCULAR; INTRAVENOUS at 14:19

## 2024-06-21 RX ADMIN — HYDROMORPHONE HYDROCHLORIDE 4 MG: 4 TABLET ORAL at 08:39

## 2024-06-21 RX ADMIN — ACETAMINOPHEN 650 MG: 325 TABLET ORAL at 11:27

## 2024-06-21 RX ADMIN — ACETAMINOPHEN 650 MG: 325 TABLET ORAL at 22:40

## 2024-06-21 RX ADMIN — HYDROMORPHONE HYDROCHLORIDE 4 MG: 4 TABLET ORAL at 20:14

## 2024-06-21 RX ADMIN — PROCHLORPERAZINE EDISYLATE 5 MG: 5 INJECTION INTRAMUSCULAR; INTRAVENOUS at 14:34

## 2024-06-21 RX ADMIN — LORAZEPAM 0.5 MG: 2 INJECTION INTRAMUSCULAR; INTRAVENOUS at 15:48

## 2024-06-21 RX ADMIN — NYSTATIN 500000 UNITS: 100000 SUSPENSION ORAL at 08:50

## 2024-06-21 RX ADMIN — FENTANYL 1 PATCH: 25 PATCH TRANSDERMAL at 22:40

## 2024-06-21 RX ADMIN — ONDANSETRON 4 MG: 2 INJECTION INTRAMUSCULAR; INTRAVENOUS at 08:39

## 2024-06-21 RX ADMIN — DEXAMETHASONE SODIUM PHOSPHATE 4 MG: 4 INJECTION, SOLUTION INTRA-ARTICULAR; INTRALESIONAL; INTRAMUSCULAR; INTRAVENOUS; SOFT TISSUE at 17:42

## 2024-06-21 RX ADMIN — POLYETHYLENE GLYCOL 3350 17 G: 17 POWDER, FOR SOLUTION ORAL at 08:39

## 2024-06-21 RX ADMIN — DEXAMETHASONE SODIUM PHOSPHATE 4 MG: 4 INJECTION, SOLUTION INTRA-ARTICULAR; INTRALESIONAL; INTRAMUSCULAR; INTRAVENOUS; SOFT TISSUE at 22:40

## 2024-06-21 RX ADMIN — NYSTATIN 500000 UNITS: 100000 SUSPENSION ORAL at 17:42

## 2024-06-21 RX ADMIN — APIXABAN 5 MG: 5 TABLET, FILM COATED ORAL at 20:14

## 2024-06-21 RX ADMIN — HYDROMORPHONE HYDROCHLORIDE 6 MG: 4 TABLET ORAL at 14:49

## 2024-06-21 RX ADMIN — ACETAMINOPHEN 650 MG: 325 TABLET ORAL at 05:22

## 2024-06-21 RX ADMIN — APIXABAN 5 MG: 5 TABLET, FILM COATED ORAL at 08:40

## 2024-06-21 RX ADMIN — HYDROMORPHONE HYDROCHLORIDE 1 MG: 1 INJECTION, SOLUTION INTRAMUSCULAR; INTRAVENOUS; SUBCUTANEOUS at 11:27

## 2024-06-21 ASSESSMENT — COGNITIVE AND FUNCTIONAL STATUS - GENERAL
CLIMB 3 TO 5 STEPS WITH RAILING: TOTAL
TURNING FROM BACK TO SIDE WHILE IN FLAT BAD: A LOT
EATING MEALS: A LOT
TOILETING: A LOT
PERSONAL GROOMING: A LOT
WALKING IN HOSPITAL ROOM: A LOT
DRESSING REGULAR UPPER BODY CLOTHING: A LOT
DAILY ACTIVITIY SCORE: 12
PERSONAL GROOMING: A LOT
MOVING FROM LYING ON BACK TO SITTING ON SIDE OF FLAT BED WITH BEDRAILS: A LOT
EATING MEALS: A LOT
MOBILITY SCORE: 12
STANDING UP FROM CHAIR USING ARMS: A LOT
WALKING IN HOSPITAL ROOM: TOTAL
DAILY ACTIVITIY SCORE: 12
TOILETING: A LOT
MOVING FROM LYING ON BACK TO SITTING ON SIDE OF FLAT BED WITH BEDRAILS: A LOT
MOVING TO AND FROM BED TO CHAIR: A LOT
MOVING TO AND FROM BED TO CHAIR: TOTAL
MOBILITY SCORE: 8
DRESSING REGULAR LOWER BODY CLOTHING: A LOT
TURNING FROM BACK TO SIDE WHILE IN FLAT BAD: A LOT
DRESSING REGULAR UPPER BODY CLOTHING: A LOT
STANDING UP FROM CHAIR USING ARMS: TOTAL
HELP NEEDED FOR BATHING: A LOT
DRESSING REGULAR LOWER BODY CLOTHING: A LOT
HELP NEEDED FOR BATHING: A LOT
CLIMB 3 TO 5 STEPS WITH RAILING: A LOT

## 2024-06-21 ASSESSMENT — PAIN - FUNCTIONAL ASSESSMENT: PAIN_FUNCTIONAL_ASSESSMENT: 0-10

## 2024-06-21 ASSESSMENT — PAIN SCALES - GENERAL
PAINLEVEL_OUTOF10: 0 - NO PAIN
PAINLEVEL_OUTOF10: 5 - MODERATE PAIN
PAINLEVEL_OUTOF10: 4
PAINLEVEL_OUTOF10: 8
PAINLEVEL_OUTOF10: 6
PAINLEVEL_OUTOF10: 8
PAINLEVEL_OUTOF10: 8

## 2024-06-21 NOTE — PROGRESS NOTES
"Massimo Garcia is a 63 y.o. male on day 12 of admission presenting with Colitis.    Subjective   Patient seen at bedside with wife present. Discussed plan to continue radiation until 6/25. Discussed plan fo AR vs SNF placement pending plan fr treatment moving forward. Patient states that he can no longer move his BLE. States that he is experiencing twitching in his right leg. Denies SOB, CP, heart palpitations, N/V/D/C, headaches, dizziness vision changes, fever/chills.        Objective     Physical Exam  HENT:      Nose: Nose normal.      Mouth/Throat:      Mouth: Mucous membranes are moist.   Eyes:      Pupils: Pupils are equal, round, and reactive to light.   Cardiovascular:      Rate and Rhythm: Normal rate.      Pulses: Normal pulses.   Pulmonary:      Breath sounds: Normal breath sounds.   Abdominal:      Palpations: Abdomen is soft.   Musculoskeletal:      Right shoulder: Decreased range of motion. Decreased strength.      Cervical back: Normal range of motion.      Right hip: Decreased range of motion. Decreased strength.      Left hip: Decreased range of motion. Decreased strength.      Right knee: Decreased range of motion.      Left knee: Decreased range of motion.      Comments: BL legs flaccid    Skin:     General: Skin is warm.      Capillary Refill: Capillary refill takes less than 2 seconds.   Neurological:      Mental Status: He is alert and oriented to person, place, and time.      Motor: Weakness present.   Psychiatric:         Mood and Affect: Mood normal.         Last Recorded Vitals  Blood pressure 121/79, pulse (!) 113, temperature 36.9 °C (98.4 °F), resp. rate 16, height 1.727 m (5' 8\"), weight 73.5 kg (162 lb), SpO2 96%.  Intake/Output last 3 Shifts:  I/O last 3 completed shifts:  In: 1160 (15.8 mL/kg) [P.O.:1160]  Out: 900 (12.2 mL/kg) [Urine:900 (0.3 mL/kg/hr)]  Weight: 73.5 kg     Relevant Results              This patient has a central line   Reason for the central line remaining today?  " Chemotherapy                  Assessment/Plan   Principal Problem:    Colitis  Massimo Garcia is a 63 y.o. 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 Patient admitted to [facility] (6/5-6/7) c/f partial SBO, resolved prior to discharge from [facility], surgery consulted at OSH and did not recommend surgical interventions at this time. Presented to [facility] 6/8 with continued abdominal pain and diarrhea. CT A/P (6/8) w/o bowel obstruction, c/f colitis on imaging. Cdiff and stool pathogen neg (6/9). No leukocytosis or infectious symptoms at this time (no fevers/chills), no indication for abx at this time. Supportive oncology consulted for further pain management (6/9), recs followed. Pt tolerating full liquid diet, no nausea or vomiting, advanced to regular diet (6/11). Radiation oncology consulted as he was recently seen outpatient with plan for CT simulation for gamma knife and radiation to spine, rec systemic therapy and interval palliative RT sequenced in between cycles-- re-engaged 6/18 after MRI spine. 6/12 BAT called d/t new right arm weakness. BAT cancelled because symptoms not consistent with stroke. Neurology consulted for new arm weakness workup. CTH showed concern for bleed of his known left temporal lobe, worsening edema around his right occipital. However, neither can explain his symptoms. CT of the neck showed two larger cervical lymphnodes on the right side at C4-5 level, concern for compression on his right upper brachial plexuses. Rad/onc paged regarding CT head showing worsening  disease in brain. Surg/onc consulted d/t concern for brachial plexus injury d/t large cervical lymph node-no plans for surgical interventions. Patient given Dex 10mg x1 dose and then 4mg q6 hours. NSGY consulted d/t concern for temporal bleed. Repeat head CT 6/12 showing same findings as previous. Eliquis restarted 6/15 given no hemmorage on MRI. MRI brain with worsening edema, MRI C-spine with worsening progression. 6/15 weakness progressing and patient no longer able to ambulate. 6/16 called NSGY to re engage d/t new onset on BLE weakness. After reviewing results recommendation to consult ortho/spine to r/o cord compression. 6/16 Ortho consulted. MRI T-spine and L spine under anesthesia 6/17 demonstrated worsening marrow replacing lesions and epidural extension with deformity of the spinal cord and edema possibly representing cord compression. 6/18 discussed with rad/onc possible inpatient emergent radiation. Started urgent palliative RT to the spine, C6-7, T8-10, L2, tentatively planned for 20Gy in 5 fractions. S/p CT sim 6/19 and started RT 6/19 afternoon (due 6/20, 6/21, 6/24, and 6/25). PT/OT rec AR, PM&R consulted 6/19 and also rec AR (pt would not be able to receive radiation or chemo for 7-10 days while at AR and must be off IV pain meds). Dr Blake emailed 6/20 regarding formal tx plan. Discharge to AR likely 6/26, pending formal discussion with oncologist regarding tx plan, and after completion of RT. Patient planned for outpatient chemo 6/28 so may be more appropriate for discharge to SNF if patient still able to receive chemo 6/28     # Acute RUE weakness/ possible spinal cord compression   - BAT activated on 6/12 at 0859 for new right arm weakness   - CT brain attack, CT head w/wo IV contrast showing increased temporal nodule and vasogenic edema  - Neurology consulted concerned for bleed from temporal tumor. Repeat CT head stable    - NSGY consulted d/t concern for brain bleed but low suspicion for  active bleed    - CT soft tissue neck w/ contrast showing abundant bilateral heterogeneously enhancing lymph nodes consistent  with ralph metastases  - Surg/onc consulted for concern for brachial plexus causing new onset weakness-no surgical interventions   - Rad/onc note 6/12 recommending systemic therapy and interval palliative RT sequenced in between cycles  - Dexamethasone 10mg x1 then 4mg q6. Plan to discharge with 4mg BID x5 days then 2mg BID x5 days then 2mg daily x5 days. (Per rad onc) - may change pending Rad Onc plan   - 6/17 MRI c-spine and MRI brain with concern for progression/ worsening edema, possible cord compression  - 6/18 discussed with rad/onc possible inpatient emergent radiation  - Started urgent palliative RT to the spine, C6-7, T8-10, L2, tentatively planned for 20Gy in 5 fractions  - s/p CT sim 6/19 and started RT 6/19 afternoon (due 6/20, 6/21, 6/24, and 6/25)  - IV dilaudid 1mg x1 and IV Ativan 0.5mg x1 daily prior to each RT     # Acute BLE weakness   - 6/15 began experiencing mild BLE weakness   - 6/16 BLE weakness progression to point of bedbound  - Re engaged NSGY. Imaging reviewed again and low suspicion for BLE secondary to brain edema and progression of disease. More likely cord compression vs muscle atrophy  - 6/16 Ortho consulted d/t concern for cauda equina   - Stat MRI T-spine and L-spine ordered but patient patient unable to tolerate despite premeds   - 6/17 MRI T-spine and L-spine under anesthesia- demonstrated worsening marrow replacing lesions and epidural extension with deformity of the spinal cord and edema possibly representing cord compression  - Steroids as above   - NSGY 6/18 no plan for surgical intervention   - 6/18 Rad Onc re- engaged given MRI spine--> started urgent RT (see above)  - PT/OT rec AR, PM&R consulted 6/19 and also rec AR (pt would not be able to receive radiation or chemo for 7-10 days while at AR and must be off IV pain meds)     # Leukocytosis   -  WBC uptrending to 38.6 (6/16) 29.9 (6/17)- 32k (6/18)- 35.3k (6/19)- 31.6k (6/20)  - Likely in setting of steroid use vs infection   - CT chest 6/15 neg for infectious process   - Patient afebrile, HR WNL, normotensive   - Close monitoring of infectious symptoms and low threshold to start abx      # Hyponatremia - improving   - 6/17 Na 129--> 133 (6/19)--> 136 (6/20)  - Serum osmolality sent (6/17): 274  - Urine electrolytes sent 6/17  - Consider NS IVF pending further workup      # Spinal wound superficial dehiscence   - Wound care consulted 6/18: rec cleanse wound with Vashe or NS, apply aquacell AG to wound, and cover with Mepilex border dressing  - Pt/wife would like wound assessed daily     # 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 Tomasa Blake for Oncology; spoke with him 6/14. Emailed again 6/20-waiting for response   - 6/8 CT A/P wo evidence of bowel obstruction, but w/ short segment of circumferential thickening of ascending colon superior to the cecum new from prior, which may represent colitis. Also, redemonstration of metastatic disease in the visualized lung bases and liver unchanged in the short-term interval since 06/05/2024; redemonstration of abdominopelvic lymphadenopathy; redemonstration of left adrenal nodule, likely also metastatic; also Small left pleural effusion unchanged in the short-term interval. Interval development of trace ascites in the dependent portion of the pelvis  - Palliative care consulted while at [facility]; recommended 2mg IV dilaudid Q3 PRN for severe pain, continued on admission (6/9- )  - EKG (6/8)    - C/w Tylenol 650mg Q6H, Capsaicin cream QID, Duloxetine 60mg daily, Lyrica 50mg BID, Fentanyl patch 100mcg, 0.5mg Lorazepam IV Q8hrs PRN anxiety  - Miralax 17g BID, Senna/Colace 2 tabs BID for opioid  induced constipation   - C/w Zofran, Compazine, Phenergan PRN N/V  - Decadron 16mg Ivx1 on 6/8, c/w home prednisone 20mg daily (part of taper, through 6/14, then decreased to 10mg daily for 7 days) with PPI   - Holding home Flexeril  - supportive oncology consulted (6/9), rec cont PO Dilaudid 4mg Q2 PRN for moderate to severe pain, cont IV Dilaudid 2mg Q3 for breakthrough pain, increasing Fentanyl patch change frequency to 100mcgs Q48hr, and starting Bentyl 10mg x4/day PRN for abdominal cramps  - supportive onc updated recs (6/11): increase PO Dilaudid from 4mg to 6mg Q3, and increase Fentanyl from 100mcg to 125mcg Q48hr  - Radiation oncology consulted 6/9; further recs pending (seen outpt on 6/4 with plan for gamma knife and radiation to spine pending CT simulation)   - 6/12 BAT/new acute right arm weakness as above   - Oncology consulted 6/15 given progression of disease for any consideration of inpatient treatment. Rec next line chemo is JER2 targeted enhertu which cannot be given inpt, but on discussion, there is concern that patient not strong enough to physically make it to chemo infusions. Post MRI 6/18 recs pending   - HER2 added onto 5/17 biopsy (pending) per onc  - Dr Blake emailed 6/20 regarding formal tx plan; pending response     # Colitis on Imaging iso Hx of Drug Induced Colitis   - Admitted in May for immunotherapy related colitis (infectious workup remained negative); on steroid taper as above   - Presented to [facility] (6/5-6/7) with abdominal pain/ diarrhea c/f CBO  - CT A/P (6/5): Mildly loops dilated loops of jejunum measuring up to 3.4 cm, early developing partial small bowel obstruction can not be excluded  - While at [facility] pt was passing gas and was having active Bms, general surgery consulted and did not recommend any surgical interventions   - CT A/P (6/8) c/f colitis   - Cdiff/Stool pathogen panel negative (6/9)  - Pt placed NPO on admission, increased to clear liquid diet (6/9 AM), advanced  to full liquid diet (6/9 PM), advanced to regular diet (6/11)  - Phenergan 12.5mg IV q6hrs PRN, Compazine 5mg q6hrs PRN, Zofran 4mg IV q6hrs PRN; caution with Qtc prolonging drugs with hx of 3rd degree AV block     # Neoplasm-related pain  - Supportive oncology consulted 6/9, recs followed  - PO dilaudid 4mg Q3 PRN for moderate pain, PO dilaudid 6mg q3hrs PRN severe pain, cont IV Dilaudid 1mg Q3 for breakthrough pain, scheduled tylenol 650mg q6hrs, Lyrica 50mg BID  - Fentanyl patch change frequency to 125mcgs Q48hr, and starting Bentyl 10mg x4/day PRN for abdominal cramps  - Voltaren gel 4g 4x/day PRN for mild pain of the R shoulder     # Anxiety  - Continue Cymbalta 60mg daily  - Ativan 0.5mg q8hrs PRN anxiety  - Atarax 25mg 4x/day scheduled ordered 6/16 d/t anxiety- however 6/20 pt very withdrawn, changed dosing ot 4x/day PRN only     # Opioid-induced constipation  - LBM 6/19  - DocuSenna 2tabs BID, Colace 100mg BID, Miralax BID, Lactulose 20g 3x/day PRN     # Hx of PVT/PE (on Eliquis)   - Continue Eliquis 5mg BID     # HTN   # Third Degree AV Block s/p PM (11/9/23)  - No active BP medications      # Prophy   - Home eliquis   - OOB as able, SCD while in bed      DISPO:  - Full code (confirmed on admission)   - Discharge to AR likely 6/26, pending formal discussion with oncologist regarding tx plan, and after completion of RT  - NOK: Jane (wife) ; updated at bedside 6/20  - FUVs TBD discharge plan         I spent 60 minutes in the professional and overall care of this patient.      Abbey Little, APRN-CNP  Patient discussed with Dr. Jean

## 2024-06-21 NOTE — CARE PLAN
The patient's goals for the shift include      The clinical goals for the shift include pt will remain safe and free from injury throughout shift 6/21/2024 0700      Problem: Skin  Goal: Prevent/minimize sheer/friction injuries  Flowsheets (Taken 6/21/2024 0710)  Prevent/minimize sheer/friction injuries: Turn/reposition every 2 hours/use positioning/transfer devices

## 2024-06-21 NOTE — CARE PLAN
Problem: Safety  Goal: Patient will be injury free during hospitalization  Outcome: Progressing  Goal: I will remain free of falls  Outcome: Progressing     Problem: Skin  Goal: Decreased wound size/increased tissue granulation at next dressing change  Outcome: Progressing  Flowsheets (Taken 6/21/2024 1922)  Decreased wound size/increased tissue granulation at next dressing change: Promote sleep for wound healing  Goal: Participates in plan/prevention/treatment measures  Outcome: Progressing  Flowsheets (Taken 6/21/2024 1922)  Participates in plan/prevention/treatment measures: Elevate heels  Goal: Prevent/manage excess moisture  Outcome: Progressing  Flowsheets (Taken 6/21/2024 1922)  Prevent/manage excess moisture: Follow provider orders for dressing changes  Goal: Prevent/minimize sheer/friction injuries  Outcome: Progressing  Flowsheets (Taken 6/21/2024 1922)  Prevent/minimize sheer/friction injuries: Use pull sheet  Goal: Promote/optimize nutrition  Outcome: Progressing  Goal: Promote skin healing  Outcome: Progressing   The patient's goals for the shift include      The clinical goals for the shift include pt will remain safe and free of injury through 6/22 at 0700    Pt remained safe and free of injury. VSS. Continues to get tylenol and dilaudid for pain management.

## 2024-06-21 NOTE — CARE PLAN
The clinical goals for the shift include pt will remain safe and free from injury throughout shift 6/21/2024 0700      Problem: Pain  Goal: My pain/discomfort is manageable  Outcome: Progressing     Problem: Safety  Goal: Patient will be injury free during hospitalization  Outcome: Progressing  Goal: I will remain free of falls  Outcome: Progressing     Problem: Daily Care  Goal: Daily care needs are met  Outcome: Progressing     Problem: Psychosocial Needs  Goal: Demonstrates ability to cope with hospitalization/illness  Outcome: Progressing  Goal: Collaborate with me, my family, and caregiver to identify my specific goals  Outcome: Progressing     Problem: Discharge Barriers  Goal: My discharge needs are met  Outcome: Progressing     Problem: Pain  Goal: Takes deep breaths with improved pain control throughout the shift  Outcome: Progressing  Goal: Turns in bed with improved pain control throughout the shift  Outcome: Progressing  Goal: Walks with improved pain control throughout the shift  Outcome: Progressing  Goal: Performs ADL's with improved pain control throughout shift  Outcome: Progressing  Goal: Participates in PT with improved pain control throughout the shift  Outcome: Progressing  Goal: Free from opioid side effects throughout the shift  Outcome: Progressing  Goal: Free from acute confusion related to pain meds throughout the shift  Outcome: Progressing     Problem: Skin  Goal: Decreased wound size/increased tissue granulation at next dressing change  Outcome: Progressing  Goal: Participates in plan/prevention/treatment measures  Outcome: Progressing  Goal: Prevent/manage excess moisture  Outcome: Progressing  Goal: Prevent/minimize sheer/friction injuries  Outcome: Progressing  Goal: Promote/optimize nutrition  Outcome: Progressing  Goal: Promote skin healing  Outcome: Progressing  Flowsheets (Taken 6/21/2024 0025)  Promote skin healing:   Assess skin/pad under line(s)/device(s)   Rotate device  position/do not position patient on device     Problem: Pain - Adult  Goal: Verbalizes/displays adequate comfort level or baseline comfort level  Outcome: Progressing     Problem: Safety - Adult  Goal: Free from fall injury  Outcome: Progressing     Problem: Discharge Planning  Goal: Discharge to home or other facility with appropriate resources  Outcome: Progressing     Problem: Chronic Conditions and Co-morbidities  Goal: Patient's chronic conditions and co-morbidity symptoms are monitored and maintained or improved  Outcome: Progressing

## 2024-06-22 LAB
ALBUMIN SERPL BCP-MCNC: 3.2 G/DL (ref 3.4–5)
ALP SERPL-CCNC: 415 U/L (ref 33–136)
ALT SERPL W P-5'-P-CCNC: 65 U/L (ref 10–52)
ANION GAP SERPL CALC-SCNC: 14 MMOL/L (ref 10–20)
AST SERPL W P-5'-P-CCNC: 72 U/L (ref 9–39)
BASOPHILS # BLD AUTO: 0.11 X10*3/UL (ref 0–0.1)
BASOPHILS NFR BLD AUTO: 0.3 %
BILIRUB SERPL-MCNC: 0.8 MG/DL (ref 0–1.2)
BUN SERPL-MCNC: 31 MG/DL (ref 6–23)
CALCIUM SERPL-MCNC: 9.3 MG/DL (ref 8.6–10.6)
CHLORIDE SERPL-SCNC: 102 MMOL/L (ref 98–107)
CO2 SERPL-SCNC: 26 MMOL/L (ref 21–32)
CREAT SERPL-MCNC: 0.46 MG/DL (ref 0.5–1.3)
EGFRCR SERPLBLD CKD-EPI 2021: >90 ML/MIN/1.73M*2
EOSINOPHIL # BLD AUTO: 0 X10*3/UL (ref 0–0.7)
EOSINOPHIL NFR BLD AUTO: 0 %
ERYTHROCYTE [DISTWIDTH] IN BLOOD BY AUTOMATED COUNT: 17.5 % (ref 11.5–14.5)
GLUCOSE SERPL-MCNC: 129 MG/DL (ref 74–99)
HCT VFR BLD AUTO: 35 % (ref 41–52)
HGB BLD-MCNC: 11 G/DL (ref 13.5–17.5)
IMM GRANULOCYTES # BLD AUTO: 1.45 X10*3/UL (ref 0–0.7)
IMM GRANULOCYTES NFR BLD AUTO: 4.2 % (ref 0–0.9)
LYMPHOCYTES # BLD AUTO: 0.43 X10*3/UL (ref 1.2–4.8)
LYMPHOCYTES NFR BLD AUTO: 1.2 %
MAGNESIUM SERPL-MCNC: 2.03 MG/DL (ref 1.6–2.4)
MCH RBC QN AUTO: 28.3 PG (ref 26–34)
MCHC RBC AUTO-ENTMCNC: 31.4 G/DL (ref 32–36)
MCV RBC AUTO: 90 FL (ref 80–100)
MONOCYTES # BLD AUTO: 1.35 X10*3/UL (ref 0.1–1)
MONOCYTES NFR BLD AUTO: 3.9 %
NEUTROPHILS # BLD AUTO: 31.51 X10*3/UL (ref 1.2–7.7)
NEUTROPHILS NFR BLD AUTO: 90.4 %
NRBC BLD-RTO: 0 /100 WBCS (ref 0–0)
PLATELET # BLD AUTO: 164 X10*3/UL (ref 150–450)
POTASSIUM SERPL-SCNC: 4.4 MMOL/L (ref 3.5–5.3)
PROT SERPL-MCNC: 5.2 G/DL (ref 6.4–8.2)
RBC # BLD AUTO: 3.89 X10*6/UL (ref 4.5–5.9)
SODIUM SERPL-SCNC: 138 MMOL/L (ref 136–145)
WBC # BLD AUTO: 34.9 X10*3/UL (ref 4.4–11.3)

## 2024-06-22 PROCEDURE — 83735 ASSAY OF MAGNESIUM: CPT | Performed by: NURSE PRACTITIONER

## 2024-06-22 PROCEDURE — 2500000001 HC RX 250 WO HCPCS SELF ADMINISTERED DRUGS (ALT 637 FOR MEDICARE OP): Performed by: STUDENT IN AN ORGANIZED HEALTH CARE EDUCATION/TRAINING PROGRAM

## 2024-06-22 PROCEDURE — 99233 SBSQ HOSP IP/OBS HIGH 50: CPT

## 2024-06-22 PROCEDURE — 2500000004 HC RX 250 GENERAL PHARMACY W/ HCPCS (ALT 636 FOR OP/ED): Performed by: STUDENT IN AN ORGANIZED HEALTH CARE EDUCATION/TRAINING PROGRAM

## 2024-06-22 PROCEDURE — 80053 COMPREHEN METABOLIC PANEL: CPT | Performed by: NURSE PRACTITIONER

## 2024-06-22 PROCEDURE — 2500000001 HC RX 250 WO HCPCS SELF ADMINISTERED DRUGS (ALT 637 FOR MEDICARE OP)

## 2024-06-22 PROCEDURE — 1170000001 HC PRIVATE ONCOLOGY ROOM DAILY

## 2024-06-22 PROCEDURE — C9113 INJ PANTOPRAZOLE SODIUM, VIA: HCPCS | Performed by: STUDENT IN AN ORGANIZED HEALTH CARE EDUCATION/TRAINING PROGRAM

## 2024-06-22 PROCEDURE — 2500000004 HC RX 250 GENERAL PHARMACY W/ HCPCS (ALT 636 FOR OP/ED)

## 2024-06-22 PROCEDURE — 85025 COMPLETE CBC W/AUTO DIFF WBC: CPT | Performed by: NURSE PRACTITIONER

## 2024-06-22 PROCEDURE — 2500000002 HC RX 250 W HCPCS SELF ADMINISTERED DRUGS (ALT 637 FOR MEDICARE OP, ALT 636 FOR OP/ED): Performed by: STUDENT IN AN ORGANIZED HEALTH CARE EDUCATION/TRAINING PROGRAM

## 2024-06-22 RX ADMIN — NYSTATIN 500000 UNITS: 100000 SUSPENSION ORAL at 08:52

## 2024-06-22 RX ADMIN — HYDROMORPHONE HYDROCHLORIDE 4 MG: 4 TABLET ORAL at 05:05

## 2024-06-22 RX ADMIN — DEXAMETHASONE SODIUM PHOSPHATE 4 MG: 4 INJECTION, SOLUTION INTRA-ARTICULAR; INTRALESIONAL; INTRAMUSCULAR; INTRAVENOUS; SOFT TISSUE at 11:36

## 2024-06-22 RX ADMIN — HYDROMORPHONE HYDROCHLORIDE 6 MG: 4 TABLET ORAL at 22:50

## 2024-06-22 RX ADMIN — ACETAMINOPHEN 650 MG: 325 TABLET ORAL at 16:17

## 2024-06-22 RX ADMIN — LORAZEPAM 0.5 MG: 2 INJECTION INTRAMUSCULAR; INTRAVENOUS at 23:08

## 2024-06-22 RX ADMIN — DOCUSATE SODIUM 100 MG: 100 CAPSULE, LIQUID FILLED ORAL at 08:53

## 2024-06-22 RX ADMIN — SENNOSIDES AND DOCUSATE SODIUM 2 TABLET: 8.6; 5 TABLET ORAL at 20:28

## 2024-06-22 RX ADMIN — DULOXETINE HYDROCHLORIDE 60 MG: 60 CAPSULE, DELAYED RELEASE ORAL at 08:52

## 2024-06-22 RX ADMIN — NYSTATIN 500000 UNITS: 100000 SUSPENSION ORAL at 20:28

## 2024-06-22 RX ADMIN — PANTOPRAZOLE SODIUM 40 MG: 40 INJECTION, POWDER, FOR SOLUTION INTRAVENOUS at 08:53

## 2024-06-22 RX ADMIN — ACETAMINOPHEN 650 MG: 325 TABLET ORAL at 22:23

## 2024-06-22 RX ADMIN — PREGABALIN 50 MG: 25 CAPSULE ORAL at 20:28

## 2024-06-22 RX ADMIN — HYDROMORPHONE HYDROCHLORIDE 4 MG: 4 TABLET ORAL at 12:00

## 2024-06-22 RX ADMIN — DEXAMETHASONE SODIUM PHOSPHATE 4 MG: 4 INJECTION, SOLUTION INTRA-ARTICULAR; INTRALESIONAL; INTRAMUSCULAR; INTRAVENOUS; SOFT TISSUE at 22:51

## 2024-06-22 RX ADMIN — DEXAMETHASONE SODIUM PHOSPHATE 4 MG: 4 INJECTION, SOLUTION INTRA-ARTICULAR; INTRALESIONAL; INTRAMUSCULAR; INTRAVENOUS; SOFT TISSUE at 16:39

## 2024-06-22 RX ADMIN — PREGABALIN 50 MG: 25 CAPSULE ORAL at 08:52

## 2024-06-22 RX ADMIN — HYDROMORPHONE HYDROCHLORIDE 6 MG: 4 TABLET ORAL at 16:23

## 2024-06-22 RX ADMIN — APIXABAN 5 MG: 5 TABLET, FILM COATED ORAL at 20:28

## 2024-06-22 RX ADMIN — NYSTATIN 500000 UNITS: 100000 SUSPENSION ORAL at 16:18

## 2024-06-22 RX ADMIN — ACETAMINOPHEN 650 MG: 325 TABLET ORAL at 05:06

## 2024-06-22 RX ADMIN — ACETAMINOPHEN 650 MG: 325 TABLET ORAL at 11:37

## 2024-06-22 RX ADMIN — HYDROMORPHONE HYDROCHLORIDE 4 MG: 4 TABLET ORAL at 08:52

## 2024-06-22 RX ADMIN — HYDROMORPHONE HYDROCHLORIDE 4 MG: 4 TABLET ORAL at 01:04

## 2024-06-22 RX ADMIN — APIXABAN 5 MG: 5 TABLET, FILM COATED ORAL at 08:53

## 2024-06-22 RX ADMIN — SENNOSIDES AND DOCUSATE SODIUM 2 TABLET: 8.6; 5 TABLET ORAL at 08:52

## 2024-06-22 RX ADMIN — POTASSIUM CHLORIDE 40 MEQ: 1.5 POWDER, FOR SOLUTION ORAL at 20:28

## 2024-06-22 RX ADMIN — DEXAMETHASONE SODIUM PHOSPHATE 4 MG: 4 INJECTION, SOLUTION INTRA-ARTICULAR; INTRALESIONAL; INTRAMUSCULAR; INTRAVENOUS; SOFT TISSUE at 05:06

## 2024-06-22 RX ADMIN — DOCUSATE SODIUM 100 MG: 100 CAPSULE, LIQUID FILLED ORAL at 20:28

## 2024-06-22 ASSESSMENT — COGNITIVE AND FUNCTIONAL STATUS - GENERAL
STANDING UP FROM CHAIR USING ARMS: TOTAL
WALKING IN HOSPITAL ROOM: TOTAL
MOVING TO AND FROM BED TO CHAIR: TOTAL
MOVING TO AND FROM BED TO CHAIR: TOTAL
DAILY ACTIVITIY SCORE: 12
WALKING IN HOSPITAL ROOM: TOTAL
DAILY ACTIVITIY SCORE: 12
MOBILITY SCORE: 8
HELP NEEDED FOR BATHING: A LOT
TURNING FROM BACK TO SIDE WHILE IN FLAT BAD: A LOT
TOILETING: A LOT
DRESSING REGULAR LOWER BODY CLOTHING: A LOT
HELP NEEDED FOR BATHING: A LOT
PERSONAL GROOMING: A LOT
DRESSING REGULAR UPPER BODY CLOTHING: A LOT
DRESSING REGULAR LOWER BODY CLOTHING: A LOT
TOILETING: A LOT
DRESSING REGULAR UPPER BODY CLOTHING: A LOT
MOBILITY SCORE: 8
TURNING FROM BACK TO SIDE WHILE IN FLAT BAD: A LOT
PERSONAL GROOMING: A LOT
EATING MEALS: A LOT
CLIMB 3 TO 5 STEPS WITH RAILING: TOTAL
CLIMB 3 TO 5 STEPS WITH RAILING: TOTAL
MOVING FROM LYING ON BACK TO SITTING ON SIDE OF FLAT BED WITH BEDRAILS: A LOT
MOVING FROM LYING ON BACK TO SITTING ON SIDE OF FLAT BED WITH BEDRAILS: A LOT
STANDING UP FROM CHAIR USING ARMS: TOTAL
EATING MEALS: A LOT

## 2024-06-22 ASSESSMENT — PAIN SCALES - GENERAL
PAINLEVEL_OUTOF10: 5 - MODERATE PAIN
PAINLEVEL_OUTOF10: 5 - MODERATE PAIN
PAINLEVEL_OUTOF10: 7
PAINLEVEL_OUTOF10: 6
PAINLEVEL_OUTOF10: 6
PAINLEVEL_OUTOF10: 5 - MODERATE PAIN
PAINLEVEL_OUTOF10: 7
PAINLEVEL_OUTOF10: 5 - MODERATE PAIN
PAINLEVEL_OUTOF10: 4

## 2024-06-22 ASSESSMENT — PAIN - FUNCTIONAL ASSESSMENT
PAIN_FUNCTIONAL_ASSESSMENT: 0-10

## 2024-06-22 NOTE — CARE PLAN
Problem: Pain  Goal: My pain/discomfort is manageable  Outcome: Progressing     Problem: Safety  Goal: Patient will be injury free during hospitalization  Outcome: Progressing  Goal: I will remain free of falls  Outcome: Progressing     Problem: Daily Care  Goal: Daily care needs are met  Outcome: Progressing     Problem: Psychosocial Needs  Goal: Demonstrates ability to cope with hospitalization/illness  Outcome: Progressing  Goal: Collaborate with me, my family, and caregiver to identify my specific goals  Outcome: Progressing     Problem: Discharge Barriers  Goal: My discharge needs are met  Outcome: Progressing     Problem: Pain  Goal: Takes deep breaths with improved pain control throughout the shift  Outcome: Progressing  Goal: Turns in bed with improved pain control throughout the shift  Outcome: Progressing  Goal: Walks with improved pain control throughout the shift  Outcome: Progressing  Goal: Performs ADL's with improved pain control throughout shift  Outcome: Progressing  Goal: Participates in PT with improved pain control throughout the shift  Outcome: Progressing  Goal: Free from opioid side effects throughout the shift  Outcome: Progressing  Goal: Free from acute confusion related to pain meds throughout the shift  Outcome: Progressing     Problem: Skin  Goal: Decreased wound size/increased tissue granulation at next dressing change  Outcome: Progressing  Flowsheets (Taken 6/22/2024 1109)  Decreased wound size/increased tissue granulation at next dressing change: Promote sleep for wound healing  Goal: Participates in plan/prevention/treatment measures  Outcome: Progressing  Flowsheets (Taken 6/22/2024 1109)  Participates in plan/prevention/treatment measures: Elevate heels  Goal: Prevent/manage excess moisture  Outcome: Progressing  Flowsheets (Taken 6/22/2024 1109)  Prevent/manage excess moisture: Follow provider orders for dressing changes  Goal: Prevent/minimize sheer/friction injuries  Outcome:  Progressing  Flowsheets (Taken 6/22/2024 1109)  Prevent/minimize sheer/friction injuries: Use pull sheet  Goal: Promote/optimize nutrition  Outcome: Progressing  Flowsheets (Taken 6/22/2024 1109)  Promote/optimize nutrition: Offer water/supplements/favorite foods  Goal: Promote skin healing  Outcome: Progressing  Flowsheets (Taken 6/22/2024 1109)  Promote skin healing: Rotate device position/do not position patient on device     Problem: Pain - Adult  Goal: Verbalizes/displays adequate comfort level or baseline comfort level  Outcome: Progressing     Problem: Safety - Adult  Goal: Free from fall injury  Outcome: Progressing     Problem: Discharge Planning  Goal: Discharge to home or other facility with appropriate resources  Outcome: Progressing     Problem: Chronic Conditions and Co-morbidities  Goal: Patient's chronic conditions and co-morbidity symptoms are monitored and maintained or improved  Outcome: Progressing     Problem: Fall/Injury  Goal: Not fall by end of shift  Outcome: Progressing  Goal: Be free from injury by end of the shift  Outcome: Progressing  Goal: Verbalize understanding of personal risk factors for fall in the hospital  Outcome: Progressing  Goal: Verbalize understanding of risk factor reduction measures to prevent injury from fall in the home  Outcome: Progressing  Goal: Use assistive devices by end of the shift  Outcome: Progressing  Goal: Pace activities to prevent fatigue by end of the shift  Outcome: Progressing       The clinical goals for the shift include Pt will remain safe and free from injury and falls throughout shift.

## 2024-06-22 NOTE — CARE PLAN
Problem: Pain  Goal: My pain/discomfort is manageable  Outcome: Progressing     Problem: Safety  Goal: Patient will be injury free during hospitalization  Outcome: Progressing  Goal: I will remain free of falls  Outcome: Progressing    Problem: Skin  Goal: Decreased wound size/increased tissue granulation at next dressing change  Outcome: Progressing  Flowsheets (Taken 6/22/2024 1912)  Decreased wound size/increased tissue granulation at next dressing change: Promote sleep for wound healing  Goal: Participates in plan/prevention/treatment measures  Outcome: Progressing  Flowsheets (Taken 6/22/2024 1912)  Participates in plan/prevention/treatment measures: Discuss with provider PT/OT consult  Goal: Prevent/manage excess moisture  Outcome: Progressing  Flowsheets (Taken 6/22/2024 1912)  Prevent/manage excess moisture: Cleanse incontinence/protect with barrier cream  Goal: Prevent/minimize sheer/friction injuries  Outcome: Progressing  Goal: Promote/optimize nutrition  Outcome: Progressing  Goal: Promote skin healing  Outcome: Progressing      The patient's goals for the shift include      The clinical goals for the shift include pt will remain HDS through 6/23 at 0700    Pt remained safe and free of injury. VSS. Got one dose of dilaudid for pain. Back wound dressing changed.

## 2024-06-22 NOTE — PROGRESS NOTES
"Massimo Garcia is a 63 y.o. male on day 13 of admission presenting with Colitis.    Subjective   Seen this AM at bedside. PT endorsing moderate abdominal pain, stating it has been slightly better overnight. Patient still unable to move BL legs, some sensation present. Endorses no urinary issues or constipation, last BM 6/21. Discussed with pt and wife plan to continue with radiation on Monday, and discussed plan to await for direction from Dr. Mejía regarding next steps in care. Denies SOB, CP, heart palpitations, N/V/D/C, headaches, dizziness vision changes, fever/chills.      Objective     Physical Exam  HENT:      Nose: Nose normal.      Mouth/Throat:      Mouth: Mucous membranes are moist.   Eyes:      Pupils: Pupils are equal, round, and reactive to light.   Cardiovascular:      Rate and Rhythm: Normal rate.      Pulses: Normal pulses.   Pulmonary:      Breath sounds: Normal breath sounds.   Abdominal:      Palpations: Abdomen is soft.   Musculoskeletal:      Right shoulder: Decreased range of motion. Decreased strength.      Cervical back: Normal range of motion.      Right hip: Decreased range of motion. Decreased strength.      Left hip: Decreased range of motion. Decreased strength.      Right knee: Decreased range of motion.      Left knee: Decreased range of motion.      Comments: BL legs flaccid    Skin:     General: Skin is warm.      Capillary Refill: Capillary refill takes less than 2 seconds.   Neurological:      Mental Status: He is alert and oriented to person, place, and time.      Motor: Weakness present.   Psychiatric:         Mood and Affect: Mood normal.         Last Recorded Vitals  Blood pressure 110/64, pulse 99, temperature 36.2 °C (97.2 °F), temperature source Temporal, resp. rate 18, height 1.727 m (5' 8\"), weight 73.5 kg (162 lb), SpO2 95%.  Intake/Output last 3 Shifts:  I/O last 3 completed shifts:  In: 200 (2.7 mL/kg) [P.O.:200]  Out: 400 (5.4 mL/kg) [Urine:400 (0.2 mL/kg/hr)]  Weight: " 73.5 kg     Relevant Results  Scheduled medications  acetaminophen, 650 mg, oral, q6h  apixaban, 5 mg, oral, BID  dexAMETHasone, 4 mg, intravenous, q6h  docusate sodium, 100 mg, oral, BID  DULoxetine, 60 mg, oral, Daily  fentaNYL, 1 patch, transdermal, q48h  fentaNYL, 1 patch, transdermal, q48h  iohexol, 90 mL, intravenous, Once in imaging  nystatin, 5 mL, Swish & Swallow, 4x daily  pantoprazole, 40 mg, intravenous, Daily  polyethylene glycol, 17 g, oral, BID  potassium chloride, 40 mEq, oral, BID  pregabalin, 50 mg, oral, BID  sennosides-docusate sodium, 2 tablet, oral, BID      Continuous medications     PRN medications  PRN medications: diclofenac sodium, dicyclomine, HYDROmorphone, HYDROmorphone, HYDROmorphone, HYDROmorphone, hydrOXYzine HCL, lactulose, LORazepam, LORazepam, naloxone, ondansetron, prochlorperazine, promethazine, sodium chloride 0.9%, sodium chloride 0.9%   This patient has a central line   Reason for the central line remaining today?  Chemotherapy       Assessment/Plan   Principal Problem:    Jennifer Garcia is a 63 y.o. 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 Patient admitted to [facility] (6/5-6/7) c/f partial SBO, resolved prior to discharge from [facility], surgery consulted at OSH and did not recommend surgical interventions at this time. [place] [facility] [place] with continued abdominal pain and diarrhea. CT A/P (6/8) w/o bowel obstruction, c/f colitis on imaging. Cdiff and stool pathogen neg (6/9). No leukocytosis or infectious symptoms at this time (no  fevers/chills), no indication for abx at this time. Supportive oncology consulted for further pain management (6/9), recs followed. Pt tolerating full liquid diet, no nausea or vomiting, advanced to regular diet (6/11). Radiation oncology consulted as he was recently seen outpatient with plan for CT simulation for gamma knife and radiation to spine, rec systemic therapy and interval palliative RT sequenced in between cycles-- re-engaged 6/18 after MRI spine. 6/12 BAT called d/t new right arm weakness. BAT cancelled because symptoms not consistent with stroke. Neurology consulted for new arm weakness workup. CTH showed concern for bleed of his known left temporal lobe, worsening edema around his right occipital. However, neither can explain his symptoms. CT of the neck showed two larger cervical lymphnodes on the right side at C4-5 level, concern for compression on his right upper brachial plexuses. Rad/onc paged regarding CT head showing worsening disease in brain. Surg/onc consulted d/t concern for brachial plexus injury d/t large cervical lymph node-no plans for surgical interventions. Patient given Dex 10mg x1 dose and then 4mg q6 hours. NSGY consulted d/t concern for temporal bleed. Repeat head CT 6/12 showing same findings as previous. Eliquis restarted 6/15 given no hemmorage on MRI. MRI brain with worsening edema, MRI C-spine with worsening progression. 6/15 weakness progressing and patient no longer able to ambulate. 6/16 called NSGY to re engage d/t new onset on BLE weakness. After reviewing results recommendation to consult ortho/spine to r/o cord compression. 6/16 Ortho consulted. MRI T-spine and L spine under anesthesia 6/17 demonstrated worsening marrow replacing lesions and epidural extension with deformity of the spinal cord and edema possibly representing cord compression. 6/18 discussed with rad/onc possible inpatient emergent radiation. Started urgent palliative RT to the spine, C6-7, T8-10, L2,  tentatively planned for 20Gy in 5 fractions. S/p CT sim 6/19 and started RT 6/19 afternoon (due 6/20, 6/21, 6/24, and 6/25). PT/OT rec [facility], PM&R consulted 6/19 and also rec [facility] (pt would not be able to receive radiation or chemo for 7-10 days while at [facility] and must be off IV pain meds). Dr Blake emailed 6/20 regarding formal tx plan. Discharge to [facility] likely 6/26, pending formal discussion with oncologist regarding tx plan, and after completion of RT. Patient planned for outpatient chemo 6/28 so may be more appropriate for discharge to SNF if patient still able to receive chemo 6/28.     # Acute RUE weakness/ possible spinal cord compression   - BAT activated on 6/12 at 0859 for new right arm weakness   - CT brain attack, CT head w/wo IV contrast showing increased temporal nodule and vasogenic edema  - Neurology consulted concerned for bleed from temporal tumor. Repeat CT head stable    - NSGY consulted d/t concern for brain bleed but low suspicion for active bleed    - CT soft tissue neck w/ contrast showing abundant bilateral heterogeneously enhancing lymph nodes consistent  with ralph metastases  - Surg/onc consulted for concern for brachial plexus causing new onset weakness-no surgical interventions   - Rad/onc note 6/12 recommending systemic therapy and interval palliative RT sequenced in between cycles  - Dexamethasone 10mg x1 then 4mg q6. Plan to discharge with 4mg BID x5 days then 2mg BID x5 days then 2mg daily x5 days. (Per rad onc) - may change pending Rad Onc plan   - 6/17 MRI c-spine and MRI brain with concern for progression/ worsening edema, possible cord compression  - 6/18 discussed with rad/onc possible inpatient emergent radiation  - Started urgent palliative RT to the spine, C6-7, T8-10, L2, tentatively planned for 20Gy in 5 fractions  - s/p CT sim 6/19 and started RT 6/19 afternoon (due 6/20, 6/21, 6/24, and 6/25)  - IV dilaudid 1mg x1 and IV Ativan 0.5mg x1 daily prior to each RT     # Acute BLE  weakness   - 6/15 began experiencing mild BLE weakness   - 6/16 BLE weakness progression to point of bedbound  - Re engaged NSGY. Imaging reviewed again and low suspicion for BLE secondary to brain edema and progression of disease. More likely cord compression vs muscle atrophy  - 6/16 Ortho consulted d/t concern for cauda equina   - Stat MRI T-spine and L-spine ordered but patient patient unable to tolerate despite premeds   - 6/17 MRI T-spine and L-spine under anesthesia- demonstrated worsening marrow replacing lesions and epidural extension with deformity of the spinal cord and edema possibly representing cord compression  - Steroids as above   - NSGY 6/18 no plan for surgical intervention   - 6/18 Rad Onc re- engaged given MRI spine--> started urgent RT (see above)  - PT/OT rec [facility], PM&R consulted 6/19 and also rec [facility] (pt would not be able to receive radiation or chemo for 7-10 days while at [facility] and must be off IV pain meds)     # Leukocytosis   - WBC uptrending to 38.6 (6/16) 29.9 (6/17)- 32k (6/18)- 35.3k (6/19)- 31.6k (6/20)  - Likely in setting of steroid use vs infection   - CT chest 6/15 neg for infectious process   - Patient afebrile, HR WNL, normotensive   - Close monitoring of infectious symptoms and low threshold to start abx      # Hyponatremia - improving   - 6/17 Na 129--> 133 (6/19)--> 136 (6/20)  - Serum osmolality sent (6/17): 274  - Urine electrolytes sent 6/17  - Consider NS IVF pending further workup      # Spinal wound superficial dehiscence   - Wound care consulted 6/18: rec cleanse wound with Vashe or NS, apply aquacell AG to wound, and cover with Mepilex border dressing  - Pt/wife would like wound assessed daily     # 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 Tomasa Blake for Oncology; spoke with him 6/14. Emailed again  6/20-waiting for response   - 6/8 CT A/P wo evidence of bowel obstruction, but w/ short segment of circumferential thickening of ascending colon superior to the cecum new from prior, which may represent colitis. Also, redemonstration of metastatic disease in the visualized lung bases and liver unchanged in the short-term interval since 06/05/2024; redemonstration of abdominopelvic lymphadenopathy; redemonstration of left adrenal nodule, likely also metastatic; also Small left pleural effusion unchanged in the short-term interval. Interval development of trace ascites in the dependent portion of the pelvis  - Palliative care consulted while at [facility]; recommended 2mg IV dilaudid Q3 PRN for severe pain, continued on admission (6/9- )  - EKG (6/8)    - C/w Tylenol 650mg Q6H, Capsaicin cream QID, Duloxetine 60mg daily, Lyrica 50mg BID, Fentanyl patch 100mcg, 0.5mg Lorazepam IV Q8hrs PRN anxiety  - Miralax 17g BID, Senna/Colace 2 tabs BID for opioid induced constipation   - C/w Zofran, Compazine, Phenergan PRN N/V  - Decadron 16mg Ivx1 on 6/8, c/w home prednisone 20mg daily (part of taper, through 6/14, then decreased to 10mg daily for 7 days) with PPI   - Holding home Flexeril  - supportive oncology consulted (6/9), rec cont PO Dilaudid 4mg Q2 PRN for moderate to severe pain, cont IV Dilaudid 2mg Q3 for breakthrough pain, increasing Fentanyl patch change frequency to 100mcgs Q48hr, and starting Bentyl 10mg x4/day PRN for abdominal cramps  - supportive onc updated recs (6/11): increase PO Dilaudid from 4mg to 6mg Q3, and increase Fentanyl from 100mcg to 125mcg Q48hr  - Radiation oncology consulted 6/9; further recs pending (seen outpt on 6/4 with plan for gamma knife and radiation to spine pending CT simulation)   - 6/12 BAT/new acute right arm weakness as above   - Oncology consulted 6/15 given progression of disease for any consideration of inpatient treatment. Rec next line chemo is JER2 targeted enhertu which  cannot be given inpt, but on discussion, there is concern that patient not strong enough to physically make it to chemo infusions. Post MRI 6/18 recs pending   - HER2 added onto 5/17 biopsy (pending) per onc  - Dr Blake emailed 6/20 regarding formal tx plan; pending response     # Colitis on Imaging iso Hx of Drug Induced Colitis   - Admitted in May for immunotherapy related colitis (infectious workup remained negative); on steroid taper as above   - Presented to [facility] (6/5-6/7) with abdominal pain/ diarrhea c/f CBO  - CT A/P (6/5): Mildly loops dilated loops of jejunum measuring up to 3.4 cm, early developing partial small bowel obstruction can not be excluded  - While at [facility] pt was passing gas and was having active Bms, general surgery consulted and did not recommend any surgical interventions   - CT A/P (6/8) c/f colitis   - Cdiff/Stool pathogen panel negative (6/9)  - Pt placed NPO on admission, increased to clear liquid diet (6/9 AM), advanced to full liquid diet (6/9 PM), advanced to regular diet (6/11)  - Phenergan 12.5mg IV q6hrs PRN, Compazine 5mg q6hrs PRN, Zofran 4mg IV q6hrs PRN; caution with Qtc prolonging drugs with hx of 3rd degree AV block     # Neoplasm-related pain  - Supportive oncology consulted 6/9, recs followed  - PO dilaudid 4mg Q3 PRN for moderate pain, PO dilaudid 6mg q3hrs PRN severe pain, cont IV Dilaudid 1mg Q3 for breakthrough pain, scheduled tylenol 650mg q6hrs, Lyrica 50mg BID  - Fentanyl patch change frequency to 125mcgs Q48hr, and starting Bentyl 10mg x4/day PRN for abdominal cramps  - Voltaren gel 4g 4x/day PRN for mild pain of the R shoulder     # Anxiety  - Continue Cymbalta 60mg daily  - Ativan 0.5mg q8hrs PRN anxiety  - Atarax 25mg 4x/day scheduled ordered 6/16 d/t anxiety- however 6/20 pt very withdrawn, changed dosing ot 4x/day PRN only     # Opioid-induced constipation  - LBM 6/19  - DocuSenna 2tabs BID, Colace 100mg BID, Miralax BID, Lactulose 20g 3x/day PRN     # Hx of  PVT/PE (on Eliquis)   - Continue Eliquis 5mg BID     # HTN   # Third Degree AV Block s/p PM (11/9/23)  - No active BP medications      # Prophy   - Home eliquis   - OOB as able, SCD while in bed      DISPO:  - Full code (confirmed on admission)   - Discharge to [facility] likely 6/26, pending formal discussion with oncologist regarding tx plan, and after completion of RT  - NOK: Jane (wife) [phone]; updated at bedside 6/22  - FUVs TBD discharge plan    I spent 60 minutes in the professional and overall care of this patient.    Assessment and plan discussed with attending physician Dr. Jean.       Anastasia Escalante, ALFREDITO-CNP

## 2024-06-23 ENCOUNTER — APPOINTMENT (OUTPATIENT)
Dept: RADIOLOGY | Facility: HOSPITAL | Age: 64
DRG: 393 | End: 2024-06-23
Payer: MEDICARE

## 2024-06-23 VITALS
SYSTOLIC BLOOD PRESSURE: 115 MMHG | BODY MASS INDEX: 24.55 KG/M2 | TEMPERATURE: 98.8 F | DIASTOLIC BLOOD PRESSURE: 62 MMHG | OXYGEN SATURATION: 94 % | WEIGHT: 162 LBS | HEIGHT: 68 IN | HEART RATE: 59 BPM | RESPIRATION RATE: 16 BRPM

## 2024-06-23 LAB
ALBUMIN SERPL BCP-MCNC: 3 G/DL (ref 3.4–5)
ALP SERPL-CCNC: 409 U/L (ref 33–136)
ALT SERPL W P-5'-P-CCNC: 66 U/L (ref 10–52)
ANION GAP SERPL CALC-SCNC: 12 MMOL/L (ref 10–20)
AST SERPL W P-5'-P-CCNC: 82 U/L (ref 9–39)
BASOPHILS # BLD AUTO: 0.07 X10*3/UL (ref 0–0.1)
BASOPHILS NFR BLD AUTO: 0.2 %
BILIRUB SERPL-MCNC: 0.9 MG/DL (ref 0–1.2)
BUN SERPL-MCNC: 29 MG/DL (ref 6–23)
CALCIUM SERPL-MCNC: 9.1 MG/DL (ref 8.6–10.6)
CHLORIDE SERPL-SCNC: 103 MMOL/L (ref 98–107)
CO2 SERPL-SCNC: 27 MMOL/L (ref 21–32)
CREAT SERPL-MCNC: 0.57 MG/DL (ref 0.5–1.3)
EGFRCR SERPLBLD CKD-EPI 2021: >90 ML/MIN/1.73M*2
EOSINOPHIL # BLD AUTO: 0 X10*3/UL (ref 0–0.7)
EOSINOPHIL NFR BLD AUTO: 0 %
ERYTHROCYTE [DISTWIDTH] IN BLOOD BY AUTOMATED COUNT: 17.6 % (ref 11.5–14.5)
GLUCOSE SERPL-MCNC: 129 MG/DL (ref 74–99)
HCT VFR BLD AUTO: 34.3 % (ref 41–52)
HGB BLD-MCNC: 10.8 G/DL (ref 13.5–17.5)
IMM GRANULOCYTES # BLD AUTO: 1.27 X10*3/UL (ref 0–0.7)
IMM GRANULOCYTES NFR BLD AUTO: 3.6 % (ref 0–0.9)
LYMPHOCYTES # BLD AUTO: 0.29 X10*3/UL (ref 1.2–4.8)
LYMPHOCYTES NFR BLD AUTO: 0.8 %
MAGNESIUM SERPL-MCNC: 2.05 MG/DL (ref 1.6–2.4)
MCH RBC QN AUTO: 28.4 PG (ref 26–34)
MCHC RBC AUTO-ENTMCNC: 31.5 G/DL (ref 32–36)
MCV RBC AUTO: 90 FL (ref 80–100)
MONOCYTES # BLD AUTO: 1.34 X10*3/UL (ref 0.1–1)
MONOCYTES NFR BLD AUTO: 3.8 %
NEUTROPHILS # BLD AUTO: 32.45 X10*3/UL (ref 1.2–7.7)
NEUTROPHILS NFR BLD AUTO: 91.6 %
NRBC BLD-RTO: 0.1 /100 WBCS (ref 0–0)
PLATELET # BLD AUTO: 146 X10*3/UL (ref 150–450)
POTASSIUM SERPL-SCNC: 4.4 MMOL/L (ref 3.5–5.3)
PROT SERPL-MCNC: 5.1 G/DL (ref 6.4–8.2)
RBC # BLD AUTO: 3.8 X10*6/UL (ref 4.5–5.9)
SODIUM SERPL-SCNC: 138 MMOL/L (ref 136–145)
WBC # BLD AUTO: 35.4 X10*3/UL (ref 4.4–11.3)

## 2024-06-23 PROCEDURE — 2500000004 HC RX 250 GENERAL PHARMACY W/ HCPCS (ALT 636 FOR OP/ED)

## 2024-06-23 PROCEDURE — 99233 SBSQ HOSP IP/OBS HIGH 50: CPT

## 2024-06-23 PROCEDURE — 2500000002 HC RX 250 W HCPCS SELF ADMINISTERED DRUGS (ALT 637 FOR MEDICARE OP, ALT 636 FOR OP/ED)

## 2024-06-23 PROCEDURE — 2500000001 HC RX 250 WO HCPCS SELF ADMINISTERED DRUGS (ALT 637 FOR MEDICARE OP)

## 2024-06-23 PROCEDURE — 1170000001 HC PRIVATE ONCOLOGY ROOM DAILY

## 2024-06-23 PROCEDURE — 83735 ASSAY OF MAGNESIUM: CPT | Performed by: NURSE PRACTITIONER

## 2024-06-23 PROCEDURE — 2500000004 HC RX 250 GENERAL PHARMACY W/ HCPCS (ALT 636 FOR OP/ED): Performed by: STUDENT IN AN ORGANIZED HEALTH CARE EDUCATION/TRAINING PROGRAM

## 2024-06-23 PROCEDURE — 93925 LOWER EXTREMITY STUDY: CPT

## 2024-06-23 PROCEDURE — C9113 INJ PANTOPRAZOLE SODIUM, VIA: HCPCS | Performed by: STUDENT IN AN ORGANIZED HEALTH CARE EDUCATION/TRAINING PROGRAM

## 2024-06-23 PROCEDURE — 85025 COMPLETE CBC W/AUTO DIFF WBC: CPT | Performed by: NURSE PRACTITIONER

## 2024-06-23 PROCEDURE — 80053 COMPREHEN METABOLIC PANEL: CPT | Performed by: NURSE PRACTITIONER

## 2024-06-23 PROCEDURE — 2500000001 HC RX 250 WO HCPCS SELF ADMINISTERED DRUGS (ALT 637 FOR MEDICARE OP): Performed by: STUDENT IN AN ORGANIZED HEALTH CARE EDUCATION/TRAINING PROGRAM

## 2024-06-23 PROCEDURE — 2500000002 HC RX 250 W HCPCS SELF ADMINISTERED DRUGS (ALT 637 FOR MEDICARE OP, ALT 636 FOR OP/ED): Performed by: STUDENT IN AN ORGANIZED HEALTH CARE EDUCATION/TRAINING PROGRAM

## 2024-06-23 PROCEDURE — 2500000001 HC RX 250 WO HCPCS SELF ADMINISTERED DRUGS (ALT 637 FOR MEDICARE OP): Performed by: NURSE PRACTITIONER

## 2024-06-23 PROCEDURE — 93925 LOWER EXTREMITY STUDY: CPT | Performed by: RADIOLOGY

## 2024-06-23 RX ADMIN — DEXAMETHASONE SODIUM PHOSPHATE 4 MG: 4 INJECTION, SOLUTION INTRA-ARTICULAR; INTRALESIONAL; INTRAMUSCULAR; INTRAVENOUS; SOFT TISSUE at 06:11

## 2024-06-23 RX ADMIN — PREGABALIN 50 MG: 25 CAPSULE ORAL at 08:45

## 2024-06-23 RX ADMIN — PANTOPRAZOLE SODIUM 40 MG: 40 INJECTION, POWDER, FOR SOLUTION INTRAVENOUS at 08:46

## 2024-06-23 RX ADMIN — APIXABAN 5 MG: 5 TABLET, FILM COATED ORAL at 08:45

## 2024-06-23 RX ADMIN — DEXAMETHASONE SODIUM PHOSPHATE 4 MG: 4 INJECTION, SOLUTION INTRA-ARTICULAR; INTRALESIONAL; INTRAMUSCULAR; INTRAVENOUS; SOFT TISSUE at 22:10

## 2024-06-23 RX ADMIN — FENTANYL 1 PATCH: 25 PATCH TRANSDERMAL at 22:10

## 2024-06-23 RX ADMIN — HYDROMORPHONE HYDROCHLORIDE 4 MG: 4 TABLET ORAL at 16:57

## 2024-06-23 RX ADMIN — ACETAMINOPHEN 650 MG: 325 TABLET ORAL at 16:54

## 2024-06-23 RX ADMIN — DEXAMETHASONE SODIUM PHOSPHATE 4 MG: 4 INJECTION, SOLUTION INTRA-ARTICULAR; INTRALESIONAL; INTRAMUSCULAR; INTRAVENOUS; SOFT TISSUE at 11:16

## 2024-06-23 RX ADMIN — POTASSIUM CHLORIDE 40 MEQ: 1.5 POWDER, FOR SOLUTION ORAL at 22:03

## 2024-06-23 RX ADMIN — LORAZEPAM 0.5 MG: 2 INJECTION INTRAMUSCULAR; INTRAVENOUS at 22:04

## 2024-06-23 RX ADMIN — ACETAMINOPHEN 650 MG: 325 TABLET ORAL at 22:04

## 2024-06-23 RX ADMIN — HYDROMORPHONE HYDROCHLORIDE 4 MG: 4 TABLET ORAL at 11:34

## 2024-06-23 RX ADMIN — DOCUSATE SODIUM 100 MG: 100 CAPSULE, LIQUID FILLED ORAL at 08:46

## 2024-06-23 RX ADMIN — DULOXETINE HYDROCHLORIDE 60 MG: 60 CAPSULE, DELAYED RELEASE ORAL at 08:46

## 2024-06-23 RX ADMIN — NYSTATIN 500000 UNITS: 100000 SUSPENSION ORAL at 16:54

## 2024-06-23 RX ADMIN — NYSTATIN 500000 UNITS: 100000 SUSPENSION ORAL at 08:45

## 2024-06-23 RX ADMIN — APIXABAN 5 MG: 5 TABLET, FILM COATED ORAL at 22:03

## 2024-06-23 RX ADMIN — HYDROXYZINE HYDROCHLORIDE 25 MG: 25 TABLET ORAL at 11:34

## 2024-06-23 RX ADMIN — ACETAMINOPHEN 650 MG: 325 TABLET ORAL at 06:11

## 2024-06-23 RX ADMIN — HYDROMORPHONE HYDROCHLORIDE 4 MG: 4 TABLET ORAL at 22:03

## 2024-06-23 RX ADMIN — FENTANYL 1 PATCH: 100 PATCH TRANSDERMAL at 22:10

## 2024-06-23 RX ADMIN — ACETAMINOPHEN 650 MG: 325 TABLET ORAL at 11:16

## 2024-06-23 RX ADMIN — NYSTATIN 500000 UNITS: 100000 SUSPENSION ORAL at 22:03

## 2024-06-23 RX ADMIN — PREGABALIN 50 MG: 25 CAPSULE ORAL at 22:03

## 2024-06-23 ASSESSMENT — COGNITIVE AND FUNCTIONAL STATUS - GENERAL
MOBILITY SCORE: 8
DRESSING REGULAR LOWER BODY CLOTHING: A LOT
MOVING FROM LYING ON BACK TO SITTING ON SIDE OF FLAT BED WITH BEDRAILS: A LOT
PERSONAL GROOMING: A LOT
STANDING UP FROM CHAIR USING ARMS: TOTAL
WALKING IN HOSPITAL ROOM: TOTAL
DAILY ACTIVITIY SCORE: 12
DRESSING REGULAR UPPER BODY CLOTHING: A LOT
TOILETING: A LOT
HELP NEEDED FOR BATHING: A LOT
STANDING UP FROM CHAIR USING ARMS: TOTAL
WALKING IN HOSPITAL ROOM: TOTAL
MOVING TO AND FROM BED TO CHAIR: TOTAL
PERSONAL GROOMING: A LOT
HELP NEEDED FOR BATHING: A LOT
EATING MEALS: A LOT
DAILY ACTIVITIY SCORE: 12
TURNING FROM BACK TO SIDE WHILE IN FLAT BAD: A LOT
MOVING FROM LYING ON BACK TO SITTING ON SIDE OF FLAT BED WITH BEDRAILS: A LOT
TURNING FROM BACK TO SIDE WHILE IN FLAT BAD: A LOT
MOVING TO AND FROM BED TO CHAIR: TOTAL
EATING MEALS: A LOT
DRESSING REGULAR LOWER BODY CLOTHING: A LOT
CLIMB 3 TO 5 STEPS WITH RAILING: TOTAL
CLIMB 3 TO 5 STEPS WITH RAILING: TOTAL
DRESSING REGULAR UPPER BODY CLOTHING: A LOT
TOILETING: A LOT
MOBILITY SCORE: 8

## 2024-06-23 ASSESSMENT — PAIN SCALES - GENERAL
PAINLEVEL_OUTOF10: 5 - MODERATE PAIN
PAINLEVEL_OUTOF10: 5 - MODERATE PAIN
PAINLEVEL_OUTOF10: 4
PAINLEVEL_OUTOF10: 5 - MODERATE PAIN
PAINLEVEL_OUTOF10: 4
PAINLEVEL_OUTOF10: 4

## 2024-06-23 ASSESSMENT — PAIN - FUNCTIONAL ASSESSMENT: PAIN_FUNCTIONAL_ASSESSMENT: 0-10

## 2024-06-23 NOTE — PROGRESS NOTES
"Massimo Garcia is a 63 y.o. male on day 14 of admission presenting with Colitis.    Subjective   Patient seen at bedside with wife present. Discussed plan to finish radiation and discharge to facility. Also discussed plan to follow up with Dr. Blake and continue with chemotherapy. Patient states that his BLE have been completely flaccid and he is losing feeling in both legs. Patient states that his right leg is colder than left leg (pulses present). Patient denies fever/chills, N/V/D/C, headache dizziness or vision changes.         Objective     Physical Exam  HENT:      Head: Normocephalic.      Nose: Nose normal.      Mouth/Throat:      Mouth: Mucous membranes are moist.   Eyes:      Pupils: Pupils are equal, round, and reactive to light.   Cardiovascular:      Rate and Rhythm: Normal rate.   Pulmonary:      Effort: Pulmonary effort is normal.   Abdominal:      Palpations: Abdomen is soft.   Musculoskeletal:      Cervical back: Normal range of motion.      Comments: Flaccid BLE  Right arm weakness. Unable to lift right arm. Left arm 4/5 strength    Skin:     Comments: Surgical wound on spine dehisced    Neurological:      Mental Status: He is alert.      Motor: Weakness present.   Psychiatric:         Mood and Affect: Mood is depressed.       Last Recorded Vitals  Blood pressure 110/71, pulse 67, temperature 36.2 °C (97.2 °F), temperature source Temporal, resp. rate 18, height 1.727 m (5' 8\"), weight 73.5 kg (162 lb), SpO2 96%.  Intake/Output last 3 Shifts:  I/O last 3 completed shifts:  In: 550 (7.5 mL/kg) [P.O.:550]  Out: 1750 (23.8 mL/kg) [Urine:1750 (0.7 mL/kg/hr)]  Weight: 73.5 kg     Relevant Results                             Assessment/Plan   Principal Problem:    Colitis    Massimo Garcia is a 63 y.o. 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 Patient admitted to [facility] (6/5-6/7) c/f partial SBO, resolved prior to discharge from [facility], surgery consulted at OSH and did not recommend surgical interventions at this time. Presented to [facility] 6[place]8 with continued abdominal pain and diarrhea. CT A/P (6/8) w/o bowel obstruction, c/f colitis on imaging. Cdiff and stool pathogen neg (6/9). No leukocytosis or infectious symptoms at this time (no fevers/chills), no indication for abx at this time. Supportive oncology consulted for further pain management (6/9), recs followed. Pt tolerating full liquid diet, no nausea or vomiting, advanced to regular diet (6/11). Radiation oncology consulted as he was recently seen outpatient with plan for CT simulation for gamma knife and radiation to spine, rec systemic therapy and interval palliative RT sequenced in between cycles-- re-engaged 6/18 after MRI spine. 6/12 BAT called d/t new right arm weakness. BAT cancelled because symptoms not consistent with stroke. Neurology consulted for new arm weakness workup. CTH showed concern for bleed of his known left temporal lobe, worsening edema around his right occipital. However, neither can explain his symptoms. CT of the neck showed two larger cervical lymphnodes on the right side at C4-5 level, concern for compression on his right upper brachial plexuses. Rad/onc paged regarding CT head showing worsening disease in brain. Surg/onc consulted d/t concern for brachial plexus injury d/t large cervical lymph node-no plans for surgical interventions. Patient given Dex 10mg x1 dose and then 4mg q6 hours. NSGY consulted d/t concern for temporal bleed. Repeat head CT 6/12 showing same findings as previous. Eliquis restarted 6/15  given no hemmorage on MRI. MRI brain with worsening edema, MRI C-spine with worsening progression. 6/15 weakness progressing and patient no longer able to ambulate. 6/16 called NSGY to re engage d/t new onset on BLE weakness. After reviewing results recommendation to consult ortho/spine to r/o cord compression. 6/16 Ortho consulted. MRI T-spine and L spine under anesthesia 6/17 demonstrated worsening marrow replacing lesions and epidural extension with deformity of the spinal cord and edema possibly representing cord compression. 6/18 discussed with rad/onc possible inpatient emergent radiation. Started urgent palliative RT to the spine, C6-7, T8-10, L2, tentatively planned for 20Gy in 5 fractions. S/p CT sim 6/19 and started RT 6/19 afternoon (due 6/20, 6/21, 6/24, and 6/25). PT/OT rec [facility], PM&R consulted 6/19 and also rec [facility] (pt would not be able to receive radiation or chemo for 7-10 days while at [facility] and must be off IV pain meds). Dr Blake emailed 6/20 regarding formal tx plan. Initially planned to DC to [facility] but spoke to Dr. Blake 6/23 and still planning to continue treatment 6/28 so will need to DC to SNF so he can continue chemo.      # Acute RUE weakness/ possible spinal cord compression   - BAT activated on 6/12 at 0859 for new right arm weakness   - CT brain attack, CT head w/wo IV contrast showing increased temporal nodule and vasogenic edema  - Neurology consulted concerned for bleed from temporal tumor. Repeat CT head stable    - NSGY consulted d/t concern for brain bleed but low suspicion for active bleed    - CT soft tissue neck w/ contrast showing abundant bilateral heterogeneously enhancing lymph nodes consistent  with ralph metastases  - Surg/onc consulted for concern for brachial plexus causing new onset weakness-no surgical interventions   - Rad/onc note 6/12 recommending systemic therapy and interval palliative RT sequenced in between cycles  - Dexamethasone 10mg x1 then 4mg q6. Plan to discharge  with 4mg BID x5 days then 2mg BID x5 days then 2mg daily x5 days. (Per rad onc). 6/23 decreased to 4mg q12   - 6/17 MRI c-spine and MRI brain with concern for progression/ worsening edema, possible cord compression  - 6/18 discussed with rad/onc possible inpatient emergent radiation  - Started urgent palliative RT to the spine, C6-7, T8-10, L2, tentatively planned for 20Gy in 5 fractions  - s/p CT sim 6/19 and started RT 6/19 afternoon (due 6/20, 6/21, 6/24, and 6/25)  - IV dilaudid 1mg x1 and IV Ativan 0.5mg x1 daily prior to each RT     # Acute BLE weakness   - 6/15 began experiencing mild BLE weakness   - 6/16 BLE weakness progression to point of bedbound  - Re engaged NSGY. Imaging reviewed again and low suspicion for BLE secondary to brain edema and progression of disease. More likely cord compression vs muscle atrophy  - 6/16 Ortho consulted d/t concern for cauda equina   - Stat MRI T-spine and L-spine ordered but patient patient unable to tolerate despite premeds   - 6/17 MRI T-spine and L-spine under anesthesia- demonstrated worsening marrow replacing lesions and epidural extension with deformity of the spinal cord and edema possibly representing cord compression  - Steroids as above   - NSGY 6/18 no plan for surgical intervention   - 6/18 Rad Onc re- engaged given MRI spine--> started urgent RT (see above)  - PT/OT rec [facility], PM&R consulted 6/19 and also rec [facility] (pt would not be able to receive radiation or chemo for 7-10 days while at AR and must be off IV pain meds). Now plan to DC to SNF so he can get treatment      # Leukocytosis   - WBC uptrending to 38.6 (6/16) 29.9 (6/17)- 32k (6/18)- 35.3k (6/19)- 31.6k (6/20)  - Likely in setting of steroid use vs infection   - CT chest 6/15 neg for infectious process   - Patient afebrile, HR WNL, normotensive   - Close monitoring of infectious symptoms and low threshold to start abx      # Hyponatremia - improving   - 6/17 Na 129--> 133 (6/19)--> 136 (6/20)  - Serum  osmolality sent (6/17): 274  - Urine electrolytes sent 6/17  - Consider NS IVF pending further workup      # Spinal wound superficial dehiscence   - Wound care consulted 6/18: rec cleanse wound with Vashe or NS, apply aquacell AG to wound, and cover with Mepilex border dressing  - Pt/wife would like wound assessed daily     # 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 Toamsa Blake for Oncology; spoke with him 6/14. Emailed again 6/20-waiting for response   - 6/8 CT A/P wo evidence of bowel obstruction, but w/ short segment of circumferential thickening of ascending colon superior to the cecum new from prior, which may represent colitis. Also, redemonstration of metastatic disease in the visualized lung bases and liver unchanged in the short-term interval since 06/05/2024; redemonstration of abdominopelvic lymphadenopathy; redemonstration of left adrenal nodule, likely also metastatic; also Small left pleural effusion unchanged in the short-term interval. Interval development of trace ascites in the dependent portion of the pelvis  - Palliative care consulted while at [facility]; recommended 2mg IV dilaudid Q3 PRN for severe pain, continued on admission (6/9- )  - EKG (6/8)    - C/w Tylenol 650mg Q6H, Capsaicin cream QID, Duloxetine 60mg daily, Lyrica 50mg BID, Fentanyl patch 100mcg, 0.5mg Lorazepam IV Q8hrs PRN anxiety  - Miralax 17g BID, Senna/Colace 2 tabs BID for opioid induced constipation   - C/w Zofran, Compazine, Phenergan PRN N/V  - Decadron 16mg Ivx1 on 6/8, c/w home prednisone 20mg daily (part of taper, through 6/14, then decreased to 10mg daily for 7 days) with PPI   - Holding home Flexeril  - supportive oncology consulted (6/9), rec cont PO Dilaudid 4mg Q2 PRN for moderate to severe pain, cont IV Dilaudid 2mg Q3 for breakthrough pain,  increasing Fentanyl patch change frequency to 100mcgs Q48hr, and starting Bentyl 10mg x4/day PRN for abdominal cramps  - supportive onc updated recs (6/11): increase PO Dilaudid from 4mg to 6mg Q3, and increase Fentanyl from 100mcg to 125mcg Q48hr  - Radiation oncology consulted 6/9; further recs pending (seen outpt on 6/4 with plan for gamma knife and radiation to spine pending CT simulation)   - 6/12 BAT/new acute right arm weakness as above   - Oncology consulted 6/15 given progression of disease for any consideration of inpatient treatment. Rec next line chemo is JER2 targeted enhertu which cannot be given inpt, but on discussion, there is concern that patient not strong enough to physically make it to chemo infusions.  - HER2 added onto 5/17 biopsy (pending) per onc  - Spoke with Dr. Blake 6/23 and he said he will likely still give him treatment 6/28      # Colitis on Imaging iso Hx of Drug Induced Colitis   - Admitted in May for immunotherapy related colitis (infectious workup remained negative); on steroid taper as above   - Presented to [facility] (6/5-6/7) with abdominal pain/ diarrhea c/f CBO  - CT A/P (6/5): Mildly loops dilated loops of jejunum measuring up to 3.4 cm, early developing partial small bowel obstruction can not be excluded  - While at [facility] pt was passing gas and was having active Bms, general surgery consulted and did not recommend any surgical interventions   - CT A/P (6/8) c/f colitis   - Cdiff/Stool pathogen panel negative (6/9)  - Pt placed NPO on admission, increased to clear liquid diet (6/9 AM), advanced to full liquid diet (6/9 PM), advanced to regular diet (6/11)  - Phenergan 12.5mg IV q6hrs PRN, Compazine 5mg q6hrs PRN, Zofran 4mg IV q6hrs PRN; caution with Qtc prolonging drugs with hx of 3rd degree AV block     # Neoplasm-related pain  - Supportive oncology consulted 6/9, recs followed  - PO dilaudid 4mg Q3 PRN for moderate pain, PO dilaudid 6mg q3hrs PRN severe pain, cont IV  Dilaudid 1mg Q3 for breakthrough pain, scheduled tylenol 650mg q6hrs, Lyrica 50mg BID  - Fentanyl patch change frequency to 125mcgs Q48hr, and starting Bentyl 10mg x4/day PRN for abdominal cramps  - Voltaren gel 4g 4x/day PRN for mild pain of the R shoulder     # Anxiety  - Continue Cymbalta 60mg daily  - Ativan 0.5mg q8hrs PRN anxiety  - Atarax 25mg 4x/day scheduled ordered 6/16 d/t anxiety- however 6/20 pt very withdrawn, changed dosing ot 4x/day PRN only     # Opioid-induced constipation  - LBM 6/19  - DocuSenna 2tabs BID, Colace 100mg BID, Miralax BID, Lactulose 20g 3x/day PRN     # Hx of PVT/PE (on Eliquis)   - Continue Eliquis 5mg BID     # HTN   # Third Degree AV Block s/p PM (11/9/23)  - No active BP medications      # Prophy   - Home eliquis   - OOB as able, SCD while in bed      DISPO:  - Full code (confirmed on admission)   - Discharge to SNF after completion of RT  - NOK: Jane (wife) ; updated at bedside 6/23  - FUVs TBD discharge plan       I spent 60 minutes in the professional and overall care of this patient.      Abbey Little, ALFREDITO-CNP  Patient discussed with Dr. Jean

## 2024-06-23 NOTE — CARE PLAN
Problem: Safety  Goal: Patient will be injury free during hospitalization  Outcome: Progressing  Goal: I will remain free of falls  Outcome: Progressing     Problem: Pain  Goal: My pain/discomfort is manageable  Outcome: Progressing   The patient's goals for the shift include      The clinical goals for the shift include pt will remain HDS through 6/24 at 0700    VSS. Wound dressing changed last night. Per patient, he feels like his pain is improving. Has been able to sleep most of the night. Remained safe and free of injury.

## 2024-06-23 NOTE — CARE PLAN
Problem: Pain  Goal: My pain/discomfort is manageable  Outcome: Progressing     Problem: Safety  Goal: Patient will be injury free during hospitalization  Outcome: Progressing  Goal: I will remain free of falls  Outcome: Progressing     Problem: Daily Care  Goal: Daily care needs are met  Outcome: Progressing     Problem: Psychosocial Needs  Goal: Demonstrates ability to cope with hospitalization/illness  Outcome: Progressing  Goal: Collaborate with me, my family, and caregiver to identify my specific goals  Outcome: Progressing     Problem: Discharge Barriers  Goal: My discharge needs are met  Outcome: Progressing     Problem: Pain  Goal: Takes deep breaths with improved pain control throughout the shift  Outcome: Progressing  Goal: Turns in bed with improved pain control throughout the shift  Outcome: Progressing  Goal: Walks with improved pain control throughout the shift  Outcome: Progressing  Goal: Performs ADL's with improved pain control throughout shift  Outcome: Progressing  Goal: Participates in PT with improved pain control throughout the shift  Outcome: Progressing  Goal: Free from opioid side effects throughout the shift  Outcome: Progressing  Goal: Free from acute confusion related to pain meds throughout the shift  Outcome: Progressing     Problem: Skin  Goal: Decreased wound size/increased tissue granulation at next dressing change  Outcome: Progressing  Flowsheets (Taken 6/23/2024 1052)  Decreased wound size/increased tissue granulation at next dressing change:   Promote sleep for wound healing   Protective dressings over bony prominences  Goal: Participates in plan/prevention/treatment measures  Outcome: Progressing  Flowsheets (Taken 6/23/2024 1052)  Participates in plan/prevention/treatment measures:   Discuss with provider PT/OT consult   Elevate heels  Goal: Prevent/manage excess moisture  Outcome: Progressing  Flowsheets (Taken 6/23/2024 1052)  Prevent/manage excess moisture:   Cleanse  incontinence/protect with barrier cream   Monitor for/manage infection if present   Follow provider orders for dressing changes   Moisturize dry skin  Goal: Prevent/minimize sheer/friction injuries  Outcome: Progressing  Flowsheets (Taken 6/23/2024 1052)  Prevent/minimize sheer/friction injuries:   Complete micro-shifts as needed if patient unable. Adjust patient position to relieve pressure points, not a full turn   Use pull sheet   Turn/reposition every 2 hours/use positioning/transfer devices  Goal: Promote/optimize nutrition  Outcome: Progressing  Flowsheets (Taken 6/23/2024 1052)  Promote/optimize nutrition:   Consume > 50% meals/supplements   Offer water/supplements/favorite foods   Monitor/record intake including meals  Goal: Promote skin healing  Outcome: Progressing  Flowsheets (Taken 6/23/2024 1052)  Promote skin healing:   Assess skin/pad under line(s)/device(s)   Protective dressings over bony prominences   Turn/reposition every 2 hours/use positioning/transfer devices   Rotate device position/do not position patient on device     Problem: Pain - Adult  Goal: Verbalizes/displays adequate comfort level or baseline comfort level  Outcome: Progressing     Problem: Safety - Adult  Goal: Free from fall injury  Outcome: Progressing     Problem: Discharge Planning  Goal: Discharge to home or other facility with appropriate resources  Outcome: Progressing     Problem: Chronic Conditions and Co-morbidities  Goal: Patient's chronic conditions and co-morbidity symptoms are monitored and maintained or improved  Outcome: Progressing     Problem: Fall/Injury  Goal: Not fall by end of shift  Outcome: Progressing  Goal: Be free from injury by end of the shift  Outcome: Progressing  Goal: Verbalize understanding of personal risk factors for fall in the hospital  Outcome: Progressing  Goal: Verbalize understanding of risk factor reduction measures to prevent injury from fall in the home  Outcome: Progressing  Goal: Use  assistive devices by end of the shift  Outcome: Progressing  Goal: Pace activities to prevent fatigue by end of the shift  Outcome: Progressing       The clinical goals for the shift include Pt will remain safe and free from injury throughout shift.

## 2024-06-24 ENCOUNTER — HOSPITAL ENCOUNTER (OUTPATIENT)
Dept: RADIATION ONCOLOGY | Facility: HOSPITAL | Age: 64
Setting detail: RADIATION/ONCOLOGY SERIES
Discharge: HOME | End: 2024-06-24
Payer: MEDICARE

## 2024-06-24 ENCOUNTER — APPOINTMENT (OUTPATIENT)
Dept: HEMATOLOGY/ONCOLOGY | Facility: CLINIC | Age: 64
End: 2024-06-24
Payer: MEDICARE

## 2024-06-24 DIAGNOSIS — C15.9 MALIGNANT NEOPLASM OF ESOPHAGUS, UNSPECIFIED (MULTI): ICD-10-CM

## 2024-06-24 DIAGNOSIS — C79.31 SECONDARY MALIGNANT NEOPLASM OF BRAIN (MULTI): ICD-10-CM

## 2024-06-24 DIAGNOSIS — C79.51 SECONDARY MALIGNANT NEOPLASM OF BONE (MULTI): ICD-10-CM

## 2024-06-24 DIAGNOSIS — Z51.0 ENCOUNTER FOR ANTINEOPLASTIC RADIATION THERAPY: ICD-10-CM

## 2024-06-24 LAB
RAD ONC MSQ ACTUAL FRACTIONS DELIVERED: 4
RAD ONC MSQ ACTUAL SESSION DELIVERED DOSE: 400 CGRAY
RAD ONC MSQ ACTUAL TOTAL DOSE: 1600 CGRAY
RAD ONC MSQ ELAPSED DAYS: 5
RAD ONC MSQ LAST DATE: NORMAL
RAD ONC MSQ PRESCRIBED FRACTIONAL DOSE: 400 CGRAY
RAD ONC MSQ PRESCRIBED NUMBER OF FRACTIONS: 5
RAD ONC MSQ PRESCRIBED TECHNIQUE: NORMAL
RAD ONC MSQ PRESCRIBED TOTAL DOSE: 2000 CGRAY
RAD ONC MSQ PRESCRIPTION PATTERN COMMENT: NORMAL
RAD ONC MSQ START DATE: NORMAL
RAD ONC MSQ TREATMENT COURSE NUMBER: 1
RAD ONC MSQ TREATMENT SITE: NORMAL

## 2024-06-24 PROCEDURE — 2500000001 HC RX 250 WO HCPCS SELF ADMINISTERED DRUGS (ALT 637 FOR MEDICARE OP)

## 2024-06-24 PROCEDURE — 2500000002 HC RX 250 W HCPCS SELF ADMINISTERED DRUGS (ALT 637 FOR MEDICARE OP, ALT 636 FOR OP/ED): Performed by: STUDENT IN AN ORGANIZED HEALTH CARE EDUCATION/TRAINING PROGRAM

## 2024-06-24 PROCEDURE — 2500000004 HC RX 250 GENERAL PHARMACY W/ HCPCS (ALT 636 FOR OP/ED): Performed by: NURSE PRACTITIONER

## 2024-06-24 PROCEDURE — 1170000001 HC PRIVATE ONCOLOGY ROOM DAILY

## 2024-06-24 PROCEDURE — 2500000004 HC RX 250 GENERAL PHARMACY W/ HCPCS (ALT 636 FOR OP/ED): Performed by: STUDENT IN AN ORGANIZED HEALTH CARE EDUCATION/TRAINING PROGRAM

## 2024-06-24 PROCEDURE — 99233 SBSQ HOSP IP/OBS HIGH 50: CPT

## 2024-06-24 PROCEDURE — 2500000001 HC RX 250 WO HCPCS SELF ADMINISTERED DRUGS (ALT 637 FOR MEDICARE OP): Performed by: STUDENT IN AN ORGANIZED HEALTH CARE EDUCATION/TRAINING PROGRAM

## 2024-06-24 PROCEDURE — 77412 RADIATION TX DELIVERY LVL 3: CPT | Performed by: STUDENT IN AN ORGANIZED HEALTH CARE EDUCATION/TRAINING PROGRAM

## 2024-06-24 PROCEDURE — 2500000004 HC RX 250 GENERAL PHARMACY W/ HCPCS (ALT 636 FOR OP/ED)

## 2024-06-24 PROCEDURE — 2500000001 HC RX 250 WO HCPCS SELF ADMINISTERED DRUGS (ALT 637 FOR MEDICARE OP): Performed by: NURSE PRACTITIONER

## 2024-06-24 PROCEDURE — C9113 INJ PANTOPRAZOLE SODIUM, VIA: HCPCS | Performed by: STUDENT IN AN ORGANIZED HEALTH CARE EDUCATION/TRAINING PROGRAM

## 2024-06-24 PROCEDURE — 77387 GUIDANCE FOR RADJ TX DLVR: CPT | Performed by: STUDENT IN AN ORGANIZED HEALTH CARE EDUCATION/TRAINING PROGRAM

## 2024-06-24 RX ADMIN — ACETAMINOPHEN 650 MG: 325 TABLET ORAL at 05:38

## 2024-06-24 RX ADMIN — DOCUSATE SODIUM 100 MG: 100 CAPSULE, LIQUID FILLED ORAL at 20:19

## 2024-06-24 RX ADMIN — APIXABAN 5 MG: 5 TABLET, FILM COATED ORAL at 08:54

## 2024-06-24 RX ADMIN — HYDROXYZINE HYDROCHLORIDE 25 MG: 25 TABLET ORAL at 08:54

## 2024-06-24 RX ADMIN — PREGABALIN 50 MG: 25 CAPSULE ORAL at 20:19

## 2024-06-24 RX ADMIN — HYDROMORPHONE HYDROCHLORIDE 4 MG: 4 TABLET ORAL at 17:28

## 2024-06-24 RX ADMIN — NYSTATIN 500000 UNITS: 100000 SUSPENSION ORAL at 17:28

## 2024-06-24 RX ADMIN — POTASSIUM CHLORIDE 40 MEQ: 1.5 POWDER, FOR SOLUTION ORAL at 20:19

## 2024-06-24 RX ADMIN — DEXAMETHASONE SODIUM PHOSPHATE 4 MG: 4 INJECTION, SOLUTION INTRA-ARTICULAR; INTRALESIONAL; INTRAMUSCULAR; INTRAVENOUS; SOFT TISSUE at 22:44

## 2024-06-24 RX ADMIN — ACETAMINOPHEN 650 MG: 325 TABLET ORAL at 12:23

## 2024-06-24 RX ADMIN — DULOXETINE HYDROCHLORIDE 60 MG: 60 CAPSULE, DELAYED RELEASE ORAL at 08:54

## 2024-06-24 RX ADMIN — ACETAMINOPHEN 650 MG: 325 TABLET ORAL at 17:28

## 2024-06-24 RX ADMIN — PREGABALIN 50 MG: 25 CAPSULE ORAL at 08:54

## 2024-06-24 RX ADMIN — APIXABAN 5 MG: 5 TABLET, FILM COATED ORAL at 20:19

## 2024-06-24 RX ADMIN — PANTOPRAZOLE SODIUM 40 MG: 40 INJECTION, POWDER, FOR SOLUTION INTRAVENOUS at 08:54

## 2024-06-24 RX ADMIN — LORAZEPAM 0.5 MG: 2 INJECTION INTRAMUSCULAR; INTRAVENOUS at 16:06

## 2024-06-24 RX ADMIN — NYSTATIN 500000 UNITS: 100000 SUSPENSION ORAL at 12:23

## 2024-06-24 RX ADMIN — HYDROMORPHONE HYDROCHLORIDE 4 MG: 4 TABLET ORAL at 05:37

## 2024-06-24 RX ADMIN — DEXAMETHASONE SODIUM PHOSPHATE 4 MG: 4 INJECTION, SOLUTION INTRA-ARTICULAR; INTRALESIONAL; INTRAMUSCULAR; INTRAVENOUS; SOFT TISSUE at 12:23

## 2024-06-24 ASSESSMENT — COGNITIVE AND FUNCTIONAL STATUS - GENERAL
MOVING FROM LYING ON BACK TO SITTING ON SIDE OF FLAT BED WITH BEDRAILS: A LOT
DRESSING REGULAR UPPER BODY CLOTHING: A LOT
DRESSING REGULAR LOWER BODY CLOTHING: A LOT
DRESSING REGULAR UPPER BODY CLOTHING: A LOT
DAILY ACTIVITIY SCORE: 12
HELP NEEDED FOR BATHING: A LOT
HELP NEEDED FOR BATHING: A LOT
CLIMB 3 TO 5 STEPS WITH RAILING: TOTAL
MOVING TO AND FROM BED TO CHAIR: TOTAL
TURNING FROM BACK TO SIDE WHILE IN FLAT BAD: A LOT
EATING MEALS: A LOT
STANDING UP FROM CHAIR USING ARMS: TOTAL
WALKING IN HOSPITAL ROOM: TOTAL
MOVING TO AND FROM BED TO CHAIR: TOTAL
PERSONAL GROOMING: A LOT
WALKING IN HOSPITAL ROOM: TOTAL
MOBILITY SCORE: 8
TOILETING: A LOT
TURNING FROM BACK TO SIDE WHILE IN FLAT BAD: A LOT
PERSONAL GROOMING: A LOT
CLIMB 3 TO 5 STEPS WITH RAILING: TOTAL
MOBILITY SCORE: 8
STANDING UP FROM CHAIR USING ARMS: TOTAL
TOILETING: A LOT
MOVING FROM LYING ON BACK TO SITTING ON SIDE OF FLAT BED WITH BEDRAILS: A LOT
DAILY ACTIVITIY SCORE: 12
DRESSING REGULAR LOWER BODY CLOTHING: A LOT
EATING MEALS: A LOT

## 2024-06-24 ASSESSMENT — PAIN - FUNCTIONAL ASSESSMENT
PAIN_FUNCTIONAL_ASSESSMENT: 0-10
PAIN_FUNCTIONAL_ASSESSMENT: 0-10

## 2024-06-24 ASSESSMENT — PAIN SCALES - GENERAL
PAINLEVEL_OUTOF10: 5 - MODERATE PAIN
PAINLEVEL_OUTOF10: 4
PAINLEVEL_OUTOF10: 0 - NO PAIN
PAINLEVEL_OUTOF10: 6
PAINLEVEL_OUTOF10: 4

## 2024-06-24 NOTE — PROGRESS NOTES
Patient will complete his radiation therapy on 6/25 and will be receiving chemotherapy as an out-patient beginning on 6/28/24. Patient was going to transition to [facility] but will now need SNF. [facility]E provided the patient and family with SNF list and [facility] is the [facility]. Referral was placed in [facility] to determine if they have availability. Patient and family will review the SNF list and provide additional SNF choices if [facility] can not accept. Will continue to follow with planning for the pt.'s care at discharge and for support.  EDOUARD Forbes  6/24/24@13:15   Addendum: [facility] can not accept since they do not have beds and patient and family have been updated. Pt. And family provided additional SNF choices and referrals sent in [facility] to [facility] and [place] to determine bed availability.

## 2024-06-24 NOTE — PROGRESS NOTES
SUPPORTIVE AND PALLIATIVE ONCOLOGY INPATIENT FOLLOW-UP      SERVICE DATE: 06/24/24     SUBJECTIVE:  Interval Events:  Patient was seen at bedside with his wife. He reports that he had Okay sleep last night. His pain is well controlled rating it from 2-4/10. States that his LBM was last night and he was waiting to have the nurse assist him in cleaning up.     The tentative plan is for him to get his final radiation tomorrow.     Pain Assessment:  Location:  Right shoulder and abdomen   Duration: Intermittent  Characteristics:   Rating: Moderate   Descriptors: localized, aching, cramping, and numb   Aggravating: movement and lying down    Relieving: Analgesics dilaudid    Interference with Function: None    Opioid Use  Past 24 h prn opioid use:  Fentanyl patch 125mcgs = 250mg OME   Dilaudid 4mg po x 3 = 60mg OME  Total 24h OME use: 310 mg OME     Note: OME calculations based on equianalgesic table below. Please note this table is based on best available evidence but conversions are still approximate. These are NOT opioid DOSES for individual patient use; this is equivalency information.  Drug Parenteral Enteral   Morphine 10 25   Oxycodone N/A 20   Hydromorphone 2 5   Fentanyl 0.15 N/A   Tramadol N/A 120   Citation: Aniya ROSS. Demystifying opioid conversion calculations: A guide for effective dosing, Second edition. [place]: [employer]System Pharmac[employer], 2018.    Symptom Assessment:    Nausea none  Vomiting none  Lack of appetite none  Difficulty Sleeping none  Diarrhea none    Information obtained from: chart review, interview of patient, interview of family, discussion with RN, and discussion with primary team  ______________________________________________________________________        OBJECTIVE:    Lab Results   Component Value Date    WBC 35.4 (H) 06/23/2024    HGB 10.8 (L) 06/23/2024    HCT 34.3 (L) 06/23/2024    MCV 90 06/23/2024     (L) 06/23/2024      Lab Results   Component  Value Date    GLUCOSE 129 (H) 06/23/2024    CALCIUM 9.1 06/23/2024     06/23/2024    K 4.4 06/23/2024    CO2 27 06/23/2024     06/23/2024    BUN 29 (H) 06/23/2024    CREATININE 0.57 06/23/2024     Lab Results   Component Value Date    ALT 66 (H) 06/23/2024    AST 82 (H) 06/23/2024    ALKPHOS 409 (H) 06/23/2024    BILITOT 0.9 06/23/2024     Estimated Creatinine Clearance: 125 mL/min (by C-G formula based on SCr of 0.57 mg/dL).     Scheduled medications  acetaminophen, 650 mg, oral, q6h  apixaban, 5 mg, oral, BID  dexAMETHasone, 4 mg, intravenous, q12h  docusate sodium, 100 mg, oral, BID  DULoxetine, 60 mg, oral, Daily  fentaNYL, 1 patch, transdermal, q48h  fentaNYL, 1 patch, transdermal, q48h  iohexol, 90 mL, intravenous, Once in imaging  nystatin, 5 mL, Swish & Swallow, 4x daily  pantoprazole, 40 mg, intravenous, Daily  polyethylene glycol, 17 g, oral, BID  potassium chloride, 40 mEq, oral, BID  pregabalin, 50 mg, oral, BID  sennosides-docusate sodium, 2 tablet, oral, BID      Continuous medications     PRN medications  diclofenac sodium, 4 g, 4x daily PRN  dicyclomine, 10 mg, 4x daily PRN  HYDROmorphone, 1 mg, q3h PRN  HYDROmorphone, 1 mg, Daily PRN  HYDROmorphone, 4 mg, q3h PRN  HYDROmorphone, 6 mg, q3h PRN  hydrOXYzine HCL, 25 mg, q6h PRN  lactulose, 20 g, TID PRN  LORazepam, 0.5 mg, q6h PRN  LORazepam, 0.5 mg, Daily PRN  naloxone, 0.2 mg, q5 min PRN  ondansetron, 4 mg, q6h PRN  prochlorperazine, 5 mg, q6h PRN  promethazine, 12.5 mg, q6h PRN  sodium chloride 0.9%, 10 mL, PRN  sodium chloride 0.9%, 10 mL, PRN      }     PHYSICAL EXAMINATION:    Vital Signs:   Vital signs reviewed  Visit Vitals  /70 (Patient Position: Lying)   Pulse 86   Temp 36.6 °C (97.9 °F)   Resp 16        0-10 (Numeric) Pain Score: 4       Physical Exam  Constitutional:       Appearance: Normal appearance.   Eyes:      Pupils: Pupils are equal, round, and reactive to light.   Cardiovascular:      Heart sounds: Normal heart  sounds.   Pulmonary:      Breath sounds: Normal breath sounds.   Abdominal:      Palpations: Abdomen is soft.   Musculoskeletal:         General: Normal range of motion.   Skin:     General: Skin is warm.   Neurological:      Mental Status: He is alert and oriented to person, place, and time.   Psychiatric:         Mood and Affect: Mood normal.        ASSESSMENT/PLAN:    Massimo Garcia is a 63 y.o. 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 PMH significant for HTN, Third Degree AV Block s/p PM (11/9/23), OA. Admitted 6/8/2024 for further evaluation and management of abdominal pain. Course complicated by partial SBO with [facility] ED imagining. Supportive and Palliative Oncology is consulted for pain management.      MRI thoracic Spine 6/17/2024:     IMPRESSION:  1. Stable postoperative changes from prior posterior tim extending  from T8-T12 and bilateral pedicle screws at T8, T9, T11 and T12.  Postoperative changes of T9-T10 laminectomy and bilateral facetectomy.  2. Interval worsening abnormal marrow replacing lesion involving the  T9 and T10 vertebral bodies with significant epidural extension now  extending from T8-T11 greater in rostral extent compared to  05/21/2024. There is severe spinal canal stenosis at T8/T9 with  deformity of the spinal cord and subtle T2 abnormal signal which may  represent edema due to spinal cord compression although a component  of myelomalacia is also possible. Persistent metastatic involvement  at the left paraspinal location from T9/T10 and T11/T12 with  extension into the left T9/T10, T10/T11 and T11/T12 and right T9/T10  and T10/T11 neural foramina.  3. Interval increase in abnormal enhancing lesion within the thecal  sac anterior to the caudal cranial exerting mass effect on the  adjacent nerve roots at the level of T2, raising possibility of drop  metastasis.  4.  There is a new small abnormal enhancing lesion involving the  anterior aspect of the L1 vertebral body, concerning for new  metastatic focus.    Pain:  Abdominal pain related to Metastatic esophageal cancer   Left shoulder, and arm pain  Pain is: cancer related pain  Type: visceral, somatic, and neuropathic  Pain control: sub-optimally controlled  Home regimen:  Fentanyl TD  and Hydromorphone 4mg q3hrs PRN fentanyl 100mcgs /72hrs   Personalized pain goal: 2  Total OME usage for the past 24 hours: mg OME   Continue Fentanyl patch 125mcgs mcgs q 48 hrs scheduled   Patient will need Fentanyl patch 25mcgs and 100mcgs before discharge   Will consider opioid rotating him to methadone in the future, however due to his concern for QTC prolongation will maximize fentanyl patch use.   Continue Dilaudid to 1mg IV q3hrs PRN for breakthrough pain  Continue Voltaren Gel topical to his right shoulder.  Continue dilaudid 4mg po q 3hrs PRN for moderate pain  Continue dilaudid 6mg PO q3hrs PRN for severe pain  With discharge send patient home on Dilaudid 2mg tablets with more quantity due to lower co-pay   Patient's wife has 15 day supply of Dilaudid 4mg PO which was picked up 6/07/2024, patients next supportive oncology appointment is 6/28/2024 so he will need additional medication to last him until that appointment with discharge          EKG 6/8/2024, QTC 354ms    Continue Bentyl 10mg 4x a day PRN for abdominal cramps.  Continue Lyrica 50mg BID   Continue Acetaminophen 650mg q 6hrs   Continue Duloxetine 60mg daily   Continue Dex 4mg IV q 6hrs   Continue to monitor pain scores and administer PRN medications as appropriate  Continue/initiate nonpharmacologic pain management strategies including ice/heat therapy, distraction techniques, deep breathing/relaxation techniques, calming music, and repositioning  Continue to monitor for signs of opioid efficacy (pain scores, improved functionality) and toxicity (pinpoint pupils, excess  sedation/drowsiness/confusion, respiratory depression, etc.)     Nausea/Reflux:  Intermittent nausea without vomiting related to opioids and constipation   Home regimen: scopolamine patch, Carafate and Protonix PRN   Improving  Continue Compazine 5mg IV q6hr PRN  Continue promethazine 12.5mg IM q6hr PRN   Continue Zofran 4mg q6hrs PRN   Since patient is concerned about QTC prolonging drugs with his history of 3rd degree AV heart block, consider discharging him home on Bentyl 10mg 4x a day  Agreeable to discharging on small dose of Zofran 4mg po q6hrs PRN      Constipation  At risk for constipation related to opioids, currently not constipated  Usual bowel pattern: every day  Home regimen: Senna 1-2 tablets 1-2x/day, Lactulose 20 gm 4x a day PRN, Docusate 100mg BID PRN, and MOM 4x  DAY prn  LBM few days ago      Continue Senna 2 tabsd bid   Continue Miralax 17grams  bid   Continue lactulose 20 grams 4 x a day and switch to PRN after BM  Continue docusate sodium 100mg BID    Monitor BM frequency, adjust regimen as needed  Goal to have BM without straining q48-72h     Altered Mood:  Acute on chronic anxiety and depression related to health concerns   controlled with home regimen   Home regimen:  Ativan 1mg TID PRN   Continue Ativan 0.5mg q8hrs PRN      Sleeping Difficulty:  Impaired sleep related to hospital environment  Home regimen:  none  With better pain management and less hospital interruptions he plans to catch up on his sleep tonight.      Decreased appetite:  Patient is on a liquid diet   Outpatient supportive oncology referred patient to Dr. Mays's office for integrative medicine     Medical Decision Making/Goals of Care/Advance Care Planning:  Patient's current clinical condition, including diagnosis, prognosis, and management plan, and goals of care were discussed.   Life limiting disease: metastatic malignancy  Family: Supportive family   Performance status: Major  limitations due to  pain  Joys/meaning/strength: Family  Understanding of health: Demonstrates good understanding of disease process, understands plan for symptom management   Information:Wants full disclosure  Goals: symptom control and cancer directed therapy  Worries and fears now and future: ongoing symptoms   Code status discussion:  full code      Advance Directives  Existence of Advance Directives:No - has interest  Decision maker: DOUGLAS is wife   Code Status: Full code     Introduction to Supportive and Palliative Oncology:  Spoke with patient, his 2 daughters and wife at bedside  Introduced the role and philosophy of Supportive and Palliative oncology in the evaluation and management of symptoms during cancer treatment  Palliative care was introduced as a service for patients with serious illness to help with symptoms, assist with goals of care conversations, navigate complex decision making, improve quality of life for patients, and provide support both patients and families.  Patient seemed to appreciate the extra layer of support.     Supportive and Palliative Oncology encounter:  Spoke with patient at bedside  Emotional support provided  Coordination of care     Signature and billing:  Thank you for allowing us to participate in the care of this patient. Recommendations will be communicated back to the consulting service by way of shared electronic medical record or face-to-face.    Medical complexity was high level due to due to complexity of problems, extensive data review, and high risk of management/treatment.    I spent 50 minutes in the care of this patient which included chart review, interviewing patient/family, discussion with primary team, coordination of care, and documentation.    Data:   Diagnostic tests and information reviewed for today's visit:  Conversation with primary team, Most recent labs, Most recent imaging, Medications       Some elements copied from my note on 6/18/2024, the elements have been  updated and all reflect current decision making from today, 06/24/24       Plan of Care discussed with: Provider, RN, Patient and Family/Significant Other: wife was at bedside     Thank you for asking Supportive and Palliative Oncology to assist with care of this patient.  We will continue to follow  Please contact us for additional questions or concerns.      SIGNATURE: ALFREDITO Damon-CNP   PAGER/CONTACT:  Contact information:  Supportive and Palliative Oncology  Monday-Friday 8 AM-5 PM  Epic Secure chat or pager [phone].  After hours and weekends:  pager [phone]

## 2024-06-24 NOTE — CARE PLAN
Problem: Pain  Goal: My pain/discomfort is manageable  Outcome: Progressing     Problem: Safety  Goal: Patient will be injury free during hospitalization  Outcome: Progressing  Goal: I will remain free of falls  Outcome: Progressing     Problem: Daily Care  Goal: Daily care needs are met  Outcome: Progressing     Problem: Psychosocial Needs  Goal: Demonstrates ability to cope with hospitalization/illness  Outcome: Progressing  Goal: Collaborate with me, my family, and caregiver to identify my specific goals  Outcome: Progressing     Problem: Discharge Barriers  Goal: My discharge needs are met  Outcome: Progressing     Problem: Pain  Goal: Takes deep breaths with improved pain control throughout the shift  Outcome: Progressing  Goal: Turns in bed with improved pain control throughout the shift  Outcome: Progressing  Goal: Walks with improved pain control throughout the shift  Outcome: Progressing  Goal: Performs ADL's with improved pain control throughout shift  Outcome: Progressing  Goal: Participates in PT with improved pain control throughout the shift  Outcome: Progressing  Goal: Free from opioid side effects throughout the shift  Outcome: Progressing  Goal: Free from acute confusion related to pain meds throughout the shift  Outcome: Progressing     Problem: Skin  Goal: Decreased wound size/increased tissue granulation at next dressing change  Outcome: Progressing  Flowsheets (Taken 6/24/2024 1133)  Decreased wound size/increased tissue granulation at next dressing change:   Promote sleep for wound healing   Protective dressings over bony prominences  Goal: Participates in plan/prevention/treatment measures  Outcome: Progressing  Flowsheets (Taken 6/24/2024 1133)  Participates in plan/prevention/treatment measures: Elevate heels  Goal: Prevent/manage excess moisture  Outcome: Progressing  Flowsheets (Taken 6/24/2024 1133)  Prevent/manage excess moisture:   Cleanse incontinence/protect with barrier cream    Monitor for/manage infection if present   Follow provider orders for dressing changes   Moisturize dry skin  Goal: Prevent/minimize sheer/friction injuries  Outcome: Progressing  Flowsheets (Taken 6/24/2024 1133)  Prevent/minimize sheer/friction injuries:   Complete micro-shifts as needed if patient unable. Adjust patient position to relieve pressure points, not a full turn   Use pull sheet   Turn/reposition every 2 hours/use positioning/transfer devices  Goal: Promote/optimize nutrition  Outcome: Progressing  Flowsheets (Taken 6/24/2024 1133)  Promote/optimize nutrition:   Consume > 50% meals/supplements   Offer water/supplements/favorite foods   Monitor/record intake including meals  Goal: Promote skin healing  Outcome: Progressing  Flowsheets (Taken 6/24/2024 1133)  Promote skin healing:   Assess skin/pad under line(s)/device(s)   Protective dressings over bony prominences   Turn/reposition every 2 hours/use positioning/transfer devices   Rotate device position/do not position patient on device     Problem: Pain - Adult  Goal: Verbalizes/displays adequate comfort level or baseline comfort level  Outcome: Progressing     Problem: Safety - Adult  Goal: Free from fall injury  Outcome: Progressing     Problem: Discharge Planning  Goal: Discharge to home or other facility with appropriate resources  Outcome: Progressing     Problem: Chronic Conditions and Co-morbidities  Goal: Patient's chronic conditions and co-morbidity symptoms are monitored and maintained or improved  Outcome: Progressing     Problem: Fall/Injury  Goal: Not fall by end of shift  Outcome: Progressing  Goal: Be free from injury by end of the shift  Outcome: Progressing  Goal: Verbalize understanding of personal risk factors for fall in the hospital  Outcome: Progressing  Goal: Verbalize understanding of risk factor reduction measures to prevent injury from fall in the home  Outcome: Progressing  Goal: Use assistive devices by end of the  shift  Outcome: Progressing  Goal: Pace activities to prevent fatigue by end of the shift  Outcome: Progressing     The clinical goals for the shift include Pt will remain VSS and HDS throughout shift.

## 2024-06-24 NOTE — PROGRESS NOTES
"Massimo Garcia is a 63 y.o. male on day 15 of admission presenting with Colitis.    Subjective   Patient seen this AM at bedside. Wife present. Pt states he is feeling average, pain is mild to moderate and located in his abdomen and right shoulder. Medication providing relief. Discussed plan to finish radiation and discharge to SNF as soon as tomorrow if possible, and that today they will be provided with a list of SNF selections they are interested in. Pt states that both legs remain flaccid, but states that he still has some R toe twitching present. He also endorses some sensation in BL legs, but that sensation feeling is like a \"ghost feeling\" when touched on both legs. Pt endorses little issues with voiding, and states sometimes he has decreased sensation to void. Last BM 6/23. Patient denies fever/chills, N/V/D/C, headache, dizziness or vision changes.         Objective     Physical Exam  HENT:      Head: Normocephalic.      Nose: Nose normal.      Mouth/Throat:      Mouth: Mucous membranes are moist.   Eyes:      Pupils: Pupils are equal, round, and reactive to light.   Cardiovascular:      Rate and Rhythm: Normal rate.   Pulmonary:      Effort: Pulmonary effort is normal.   Abdominal:      Palpations: Abdomen is soft.   Musculoskeletal:      Cervical back: Normal range of motion.      Comments: Flaccid BLE  Right arm weakness. Unable to lift right arm. Left arm 4/5 strength    Skin:     Comments: Surgical wound on spine dehisced    Neurological:      Mental Status: He is alert.      Motor: Weakness present.   Psychiatric:         Mood and Affect: Mood is depressed.         Last Recorded Vitals  Blood pressure 114/70, pulse 63, temperature 36.1 °C (97 °F), temperature source Temporal, resp. rate 16, height 1.727 m (5' 8\"), weight 73.5 kg (162 lb), SpO2 95%.  Intake/Output last 3 Shifts:  I/O last 3 completed shifts:  In: 550 (7.5 mL/kg) [P.O.:550]  Out: 1180 (16.1 mL/kg) [Urine:1180 (0.4 mL/kg/hr)]  Weight: 73.5 kg "     Relevant Results  Scheduled medications  acetaminophen, 650 mg, oral, q6h  apixaban, 5 mg, oral, BID  dexAMETHasone, 4 mg, intravenous, q12h  docusate sodium, 100 mg, oral, BID  DULoxetine, 60 mg, oral, Daily  fentaNYL, 1 patch, transdermal, q48h  fentaNYL, 1 patch, transdermal, q48h  iohexol, 90 mL, intravenous, Once in imaging  nystatin, 5 mL, Swish & Swallow, 4x daily  pantoprazole, 40 mg, intravenous, Daily  polyethylene glycol, 17 g, oral, BID  potassium chloride, 40 mEq, oral, BID  pregabalin, 50 mg, oral, BID  sennosides-docusate sodium, 2 tablet, oral, BID      Continuous medications     PRN medications  PRN medications: diclofenac sodium, dicyclomine, HYDROmorphone, HYDROmorphone, HYDROmorphone, HYDROmorphone, hydrOXYzine HCL, lactulose, LORazepam, LORazepam, naloxone, ondansetron, prochlorperazine, promethazine, sodium chloride 0.9%, sodium chloride 0.9%     Assessment/Plan   Principal Problem:    Jennifer Garcia is a 63 y.o. 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 Patient admitted to [facility] (6/5-6/7) c/f partial SBO, resolved prior to discharge from [facility], surgery consulted at OSH and did not recommend surgical interventions at this time. Presented to [facility] 6[place]8 with continued abdominal pain and diarrhea. CT A/P (6/8) w/o bowel obstruction, c/f colitis on imaging. Cdiff and stool pathogen neg (6/9). No leukocytosis or infectious symptoms at this time (no fevers/chills), no indication for abx at this time. Supportive oncology consulted for further pain management  (6/9), recs followed. Pt tolerating full liquid diet, no nausea or vomiting, advanced to regular diet (6/11). Radiation oncology consulted as he was recently seen outpatient with plan for CT simulation for gamma knife and radiation to spine, rec systemic therapy and interval palliative RT sequenced in between cycles-- re-engaged 6/18 after MRI spine. 6/12 BAT called d/t new right arm weakness. BAT cancelled because symptoms not consistent with stroke. Neurology consulted for new arm weakness workup. CTH showed concern for bleed of his known left temporal lobe, worsening edema around his right occipital. However, neither can explain his symptoms. CT of the neck showed two larger cervical lymphnodes on the right side at C4-5 level, concern for compression on his right upper brachial plexuses. Rad/onc paged regarding CT head showing worsening disease in brain. Surg/onc consulted d/t concern for brachial plexus injury d/t large cervical lymph node-no plans for surgical interventions. Patient given Dex 10mg x1 dose and then 4mg q6 hours. NSGY consulted d/t concern for temporal bleed. Repeat head CT 6/12 showing same findings as previous. Eliquis restarted 6/15 given no hemmorage on MRI. MRI brain with worsening edema, MRI C-spine with worsening progression. 6/15 weakness progressing and patient no longer able to ambulate. 6/16 called NSGY to re engage d/t new onset on BLE weakness. After reviewing results recommendation to consult ortho/spine to r/o cord compression. 6/16 Ortho consulted. MRI T-spine and L spine under anesthesia 6/17 demonstrated worsening marrow replacing lesions and epidural extension with deformity of the spinal cord and edema possibly representing cord compression. 6/18 discussed with rad/onc possible inpatient emergent radiation. Started urgent palliative RT to the spine, C6-7, T8-10, L2, tentatively planned for 20Gy in 5 fractions. S/p CT sim 6/19 and started RT 6/19 afternoon (due 6/20, 6/21,  6/24, and 6/25). PT/OT rec AR, PM&R consulted 6/19 and also rec AR (pt would not be able to receive radiation or chemo for 7-10 days while at [facility] and must be off IV pain meds). Dr Blake emailed 6/20 regarding formal tx plan. Initially planned to DC to [facility] but spoke to Dr. Blake 6/23 and still planning to continue treatment 6/28 so will need to DC to SNF to continue chemotherapy. DC pending radiation completion on 6/25 and SNF placement.      # Acute RUE weakness/ possible spinal cord compression   - BAT activated on 6/12 at 0859 for new right arm weakness   - CT brain attack, CT head w/wo IV contrast showing increased temporal nodule and vasogenic edema  - Neurology consulted concerned for bleed from temporal tumor. Repeat CT head stable    - NSGY consulted d/t concern for brain bleed but low suspicion for active bleed    - CT soft tissue neck w/ contrast showing abundant bilateral heterogeneously enhancing lymph nodes consistent  with ralph metastases  - Surg/onc consulted for concern for brachial plexus causing new onset weakness-no surgical interventions   - Rad/onc note 6/12 recommending systemic therapy and interval palliative RT sequenced in between cycles  - Dexamethasone 10mg x1 then 4mg q6. Plan to discharge with 4mg BID x5 days then 2mg BID x5 days then 2mg daily x5 days. (Per rad onc) 6/23 decreased to 4mg q12   - 6/17 MRI c-spine and MRI brain with concern for progression/ worsening edema, possible cord compression  - 6/18 discussed with rad/onc possible inpatient emergent radiation  - Started urgent palliative RT to the spine, C6-7, T8-10, L2, tentatively planned for 20Gy in 5 fractions  - s/p CT sim 6/19 and started RT 6/19 afternoon (due 6/20, 6/21, 6/24, and 6/25)  - IV dilaudid 1mg x1 and IV Ativan 0.5mg x1 daily prior to each RT     # Acute BLE weakness   - 6/15 began experiencing mild BLE weakness   - 6/16 BLE weakness progression to point of bedbound  - Re engaged NSGY. Imaging reviewed again and  low suspicion for BLE secondary to brain edema and progression of disease. More likely cord compression vs muscle atrophy  - 6/16 Ortho consulted d/t concern for cauda equina   - Stat MRI T-spine and L-spine ordered but patient patient unable to tolerate despite premeds   - 6/17 MRI T-spine and L-spine under anesthesia- demonstrated worsening marrow replacing lesions and epidural extension with deformity of the spinal cord and edema possibly representing cord compression  - Steroids as above   - NSGY 6/18 no plan for surgical intervention   - 6/18 Rad Onc re- engaged given MRI spine--> started urgent RT (see above)  - PT/OT rec AR, PM&R consulted 6/19 and also rec AR (pt would not be able to receive radiation or chemo for 7-10 days while at [facility] and must be off IV pain meds). Now plan to DC to SNF so he can get treatment      # Leukocytosis   - WBC uptrending to 38.6 (6/16) 29.9 (6/17)- 32k (6/18)- 35.3k (6/19)- 31.6k (6/20)- 35.4 (6/24)  - Likely in setting of steroid use vs infection   - CT chest 6/15 neg for infectious process   - Patient afebrile, HR WNL, normotensive   - Close monitoring of infectious symptoms and low threshold to start abx      # Hyponatremia - improving   - 6/17 Na 129--> 133 (6/19)--> 136 (6/20)  - Serum osmolality sent (6/17): 274  - Urine electrolytes sent 6/17  - Consider NS IVF pending further workup      # Spinal wound superficial dehiscence   - Wound care consulted 6/18: rec cleanse wound with Vashe or NS, apply aquacell AG to wound, and cover with Mepilex border dressing  - Pt/wife would like wound assessed daily     # 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 Tomasa Blake for Oncology; spoke with him 6/14. Emailed again 6/20-waiting for response   - 6/8 CT A/P wo evidence of bowel obstruction, but w/ short segment of  circumferential thickening of ascending colon superior to the cecum new from prior, which may represent colitis. Also, redemonstration of metastatic disease in the visualized lung bases and liver unchanged in the short-term interval since 06/05/2024; redemonstration of abdominopelvic lymphadenopathy; redemonstration of left adrenal nodule, likely also metastatic; also Small left pleural effusion unchanged in the short-term interval. Interval development of trace ascites in the dependent portion of the pelvis  - Palliative care consulted while at [facility]; recommended 2mg IV dilaudid Q3 PRN for severe pain, continued on admission (6/9- )  - EKG (6/8)    - C/w Tylenol 650mg Q6H, Capsaicin cream QID, Duloxetine 60mg daily, Lyrica 50mg BID, Fentanyl patch 100mcg, 0.5mg Lorazepam IV Q8hrs PRN anxiety  - Miralax 17g BID, Senna/Colace 2 tabs BID for opioid induced constipation   - C/w Zofran, Compazine, Phenergan PRN N/V  - Decadron 16mg Ivx1 on 6/8, c/w home prednisone 20mg daily (part of taper, through 6/14, then decreased to 10mg daily for 7 days) with PPI   - Holding home Flexeril  - supportive oncology consulted (6/9), rec cont PO Dilaudid 4mg Q2 PRN for moderate to severe pain, cont IV Dilaudid 2mg Q3 for breakthrough pain, increasing Fentanyl patch change frequency to 100mcgs Q48hr, and starting Bentyl 10mg x4/day PRN for abdominal cramps  - supportive onc updated recs (6/11): increase PO Dilaudid from 4mg to 6mg Q3, and increase Fentanyl from 100mcg to 125mcg Q48hr  - Radiation oncology consulted 6/9; further recs pending (seen outpt on 6/4 with plan for gamma knife and radiation to spine pending CT simulation)   - 6/12 BAT/new acute right arm weakness as above   - Oncology consulted 6/15 given progression of disease for any consideration of inpatient treatment. Rec next line chemo is JER2 targeted enhertu which cannot be given inpt, but on discussion, there is concern that patient not strong enough to  physically make it to chemo infusions.  - HER2 added onto 5/17 biopsy (pending) per onc  - Spoke with Dr. Blake 6/23 and he said he will likely still give him treatment 6/28      # Colitis on Imaging iso Hx of Drug Induced Colitis   - Admitted in May for immunotherapy related colitis (infectious workup remained negative); on steroid taper as above   - Presented to [facility] (6/5-6/7) with abdominal pain/ diarrhea c/f CBO  - CT A/P (6/5): Mildly loops dilated loops of jejunum measuring up to 3.4 cm, early developing partial small bowel obstruction can not be excluded  - While at [facility] pt was passing gas and was having active Bms, general surgery consulted and did not recommend any surgical interventions   - CT A/P (6/8) c/f colitis   - Cdiff/Stool pathogen panel negative (6/9)  - Pt placed NPO on admission, increased to clear liquid diet (6/9 AM), advanced to full liquid diet (6/9 PM), advanced to regular diet (6/11)  - Phenergan 12.5mg IV q6hrs PRN, Compazine 5mg q6hrs PRN, Zofran 4mg IV q6hrs PRN; caution with Qtc prolonging drugs with hx of 3rd degree AV block     # Neoplasm-related pain  - Supportive oncology consulted 6/9, recs followed  - PO dilaudid 4mg Q3 PRN for moderate pain, PO dilaudid 6mg q3hrs PRN severe pain, cont IV Dilaudid 1mg Q3 for breakthrough pain, scheduled tylenol 650mg q6hrs, Lyrica 50mg BID  - Fentanyl patch change frequency to 125mcgs Q48hr, and starting Bentyl 10mg x4/day PRN for abdominal cramps  - Voltaren gel 4g 4x/day PRN for mild pain of the R shoulder     # Anxiety  - Continue Cymbalta 60mg daily  - Ativan 0.5mg q8hrs PRN anxiety  - Atarax 25mg 4x/day scheduled ordered 6/16 d/t anxiety- however 6/20 pt very withdrawn, changed dosing ot 4x/day PRN only     # Opioid-induced constipation  - LBM 6/19  - DocuSenna 2tabs BID, Colace 100mg BID, Miralax BID, Lactulose 20g 3x/day PRN     # Hx of PVT/PE (on Eliquis)   - Continue Eliquis 5mg BID     # HTN   # Third Degree AV Block s/p PM  (11/9/23)  - No active BP medications      # Prophy   - Home eliquis   - OOB as able, SCD while in bed      DISPO:  - Full code (confirmed on admission)   - Discharge to SNF after completion of RT  - NOK: Jane (wife) [phone]; updated at bedside 6/24  - FUVs: 6/28 w/ heme onc, 6/28 w/ palliative, 8/19 w/ neurosurgery    I spent 60 minutes in the professional and overall care of this patient.    Assessment and plan discussed with attending physician Dr. Jean.     Anastasia Escalante, APRN-CNP

## 2024-06-25 ENCOUNTER — DOCUMENTATION (OUTPATIENT)
Dept: RADIATION ONCOLOGY | Facility: HOSPITAL | Age: 64
End: 2024-06-25
Payer: MEDICARE

## 2024-06-25 ENCOUNTER — RADIATION ONCOLOGY OTV (OUTPATIENT)
Dept: RADIATION ONCOLOGY | Facility: HOSPITAL | Age: 64
End: 2024-06-25
Payer: MEDICARE

## 2024-06-25 ENCOUNTER — APPOINTMENT (OUTPATIENT)
Dept: CARDIOLOGY | Facility: HOSPITAL | Age: 64
DRG: 393 | End: 2024-06-25
Payer: MEDICARE

## 2024-06-25 ENCOUNTER — HOSPITAL ENCOUNTER (OUTPATIENT)
Dept: RADIATION ONCOLOGY | Facility: HOSPITAL | Age: 64
Setting detail: RADIATION/ONCOLOGY SERIES
Discharge: HOME | End: 2024-06-25
Payer: MEDICARE

## 2024-06-25 DIAGNOSIS — C15.9 MALIGNANT NEOPLASM OF ESOPHAGUS, UNSPECIFIED (MULTI): ICD-10-CM

## 2024-06-25 DIAGNOSIS — C79.31 SECONDARY MALIGNANT NEOPLASM OF BRAIN (MULTI): ICD-10-CM

## 2024-06-25 DIAGNOSIS — C79.51 SECONDARY MALIGNANT NEOPLASM OF BONE (MULTI): ICD-10-CM

## 2024-06-25 DIAGNOSIS — Z51.0 ENCOUNTER FOR ANTINEOPLASTIC RADIATION THERAPY: ICD-10-CM

## 2024-06-25 LAB
ALBUMIN SERPL BCP-MCNC: 3.1 G/DL (ref 3.4–5)
ALP SERPL-CCNC: 408 U/L (ref 33–136)
ALT SERPL W P-5'-P-CCNC: 75 U/L (ref 10–52)
ANION GAP SERPL CALC-SCNC: 13 MMOL/L (ref 10–20)
AST SERPL W P-5'-P-CCNC: 92 U/L (ref 9–39)
BASOPHILS # BLD AUTO: 0.1 X10*3/UL (ref 0–0.1)
BASOPHILS NFR BLD AUTO: 0.4 %
BILIRUB SERPL-MCNC: 1 MG/DL (ref 0–1.2)
BODY SURFACE AREA: 1.88 M2
BUN SERPL-MCNC: 29 MG/DL (ref 6–23)
CALCIUM SERPL-MCNC: 9.3 MG/DL (ref 8.6–10.6)
CHLORIDE SERPL-SCNC: 104 MMOL/L (ref 98–107)
CO2 SERPL-SCNC: 26 MMOL/L (ref 21–32)
CREAT SERPL-MCNC: 0.39 MG/DL (ref 0.5–1.3)
EGFRCR SERPLBLD CKD-EPI 2021: >90 ML/MIN/1.73M*2
EOSINOPHIL # BLD AUTO: 0 X10*3/UL (ref 0–0.7)
EOSINOPHIL NFR BLD AUTO: 0 %
ERYTHROCYTE [DISTWIDTH] IN BLOOD BY AUTOMATED COUNT: 18.3 % (ref 11.5–14.5)
GLUCOSE SERPL-MCNC: 150 MG/DL (ref 74–99)
HCT VFR BLD AUTO: 34 % (ref 41–52)
HGB BLD-MCNC: 10.6 G/DL (ref 13.5–17.5)
IMM GRANULOCYTES # BLD AUTO: 0.9 X10*3/UL (ref 0–0.7)
IMM GRANULOCYTES NFR BLD AUTO: 3.2 % (ref 0–0.9)
LYMPHOCYTES # BLD AUTO: 0.2 X10*3/UL (ref 1.2–4.8)
LYMPHOCYTES NFR BLD AUTO: 0.7 %
MCH RBC QN AUTO: 28.4 PG (ref 26–34)
MCHC RBC AUTO-ENTMCNC: 31.2 G/DL (ref 32–36)
MCV RBC AUTO: 91 FL (ref 80–100)
MONOCYTES # BLD AUTO: 0.84 X10*3/UL (ref 0.1–1)
MONOCYTES NFR BLD AUTO: 3 %
NEUTROPHILS # BLD AUTO: 26.23 X10*3/UL (ref 1.2–7.7)
NEUTROPHILS NFR BLD AUTO: 92.7 %
NRBC BLD-RTO: 0 /100 WBCS (ref 0–0)
PLATELET # BLD AUTO: 108 X10*3/UL (ref 150–450)
POTASSIUM SERPL-SCNC: 4 MMOL/L (ref 3.5–5.3)
PROT SERPL-MCNC: 5.3 G/DL (ref 6.4–8.2)
RAD ONC MSQ ACTUAL FRACTIONS DELIVERED: 5
RAD ONC MSQ ACTUAL SESSION DELIVERED DOSE: 400 CGRAY
RAD ONC MSQ ACTUAL TOTAL DOSE: 2000 CGRAY
RAD ONC MSQ ELAPSED DAYS: 6
RAD ONC MSQ LAST DATE: NORMAL
RAD ONC MSQ PRESCRIBED FRACTIONAL DOSE: 400 CGRAY
RAD ONC MSQ PRESCRIBED NUMBER OF FRACTIONS: 5
RAD ONC MSQ PRESCRIBED TECHNIQUE: NORMAL
RAD ONC MSQ PRESCRIBED TOTAL DOSE: 2000 CGRAY
RAD ONC MSQ PRESCRIPTION PATTERN COMMENT: NORMAL
RAD ONC MSQ START DATE: NORMAL
RAD ONC MSQ TREATMENT COURSE NUMBER: 1
RAD ONC MSQ TREATMENT SITE: NORMAL
RBC # BLD AUTO: 3.73 X10*6/UL (ref 4.5–5.9)
SODIUM SERPL-SCNC: 139 MMOL/L (ref 136–145)
WBC # BLD AUTO: 28.3 X10*3/UL (ref 4.4–11.3)

## 2024-06-25 PROCEDURE — 85025 COMPLETE CBC W/AUTO DIFF WBC: CPT

## 2024-06-25 PROCEDURE — 77412 RADIATION TX DELIVERY LVL 3: CPT | Performed by: STUDENT IN AN ORGANIZED HEALTH CARE EDUCATION/TRAINING PROGRAM

## 2024-06-25 PROCEDURE — 2500000001 HC RX 250 WO HCPCS SELF ADMINISTERED DRUGS (ALT 637 FOR MEDICARE OP)

## 2024-06-25 PROCEDURE — 80053 COMPREHEN METABOLIC PANEL: CPT

## 2024-06-25 PROCEDURE — 2500000002 HC RX 250 W HCPCS SELF ADMINISTERED DRUGS (ALT 637 FOR MEDICARE OP, ALT 636 FOR OP/ED): Performed by: STUDENT IN AN ORGANIZED HEALTH CARE EDUCATION/TRAINING PROGRAM

## 2024-06-25 PROCEDURE — 2500000001 HC RX 250 WO HCPCS SELF ADMINISTERED DRUGS (ALT 637 FOR MEDICARE OP): Performed by: STUDENT IN AN ORGANIZED HEALTH CARE EDUCATION/TRAINING PROGRAM

## 2024-06-25 PROCEDURE — 97530 THERAPEUTIC ACTIVITIES: CPT | Mod: GP,CQ

## 2024-06-25 PROCEDURE — 93286 PERI-PX EVAL PM/LDLS PM IP: CPT

## 2024-06-25 PROCEDURE — C9113 INJ PANTOPRAZOLE SODIUM, VIA: HCPCS | Performed by: STUDENT IN AN ORGANIZED HEALTH CARE EDUCATION/TRAINING PROGRAM

## 2024-06-25 PROCEDURE — 97110 THERAPEUTIC EXERCISES: CPT | Mod: GP,CQ

## 2024-06-25 PROCEDURE — 2500000004 HC RX 250 GENERAL PHARMACY W/ HCPCS (ALT 636 FOR OP/ED)

## 2024-06-25 PROCEDURE — 2500000004 HC RX 250 GENERAL PHARMACY W/ HCPCS (ALT 636 FOR OP/ED): Performed by: STUDENT IN AN ORGANIZED HEALTH CARE EDUCATION/TRAINING PROGRAM

## 2024-06-25 PROCEDURE — 99233 SBSQ HOSP IP/OBS HIGH 50: CPT

## 2024-06-25 PROCEDURE — 2500000004 HC RX 250 GENERAL PHARMACY W/ HCPCS (ALT 636 FOR OP/ED): Performed by: NURSE PRACTITIONER

## 2024-06-25 PROCEDURE — 2500000002 HC RX 250 W HCPCS SELF ADMINISTERED DRUGS (ALT 637 FOR MEDICARE OP, ALT 636 FOR OP/ED)

## 2024-06-25 PROCEDURE — 1170000001 HC PRIVATE ONCOLOGY ROOM DAILY

## 2024-06-25 PROCEDURE — 77387 GUIDANCE FOR RADJ TX DLVR: CPT | Performed by: STUDENT IN AN ORGANIZED HEALTH CARE EDUCATION/TRAINING PROGRAM

## 2024-06-25 RX ORDER — CHLORPROMAZINE HYDROCHLORIDE 25 MG/1
25 TABLET, FILM COATED ORAL EVERY 8 HOURS PRN
Status: DISCONTINUED | OUTPATIENT
Start: 2024-06-25 | End: 2024-06-27 | Stop reason: HOSPADM

## 2024-06-25 RX ORDER — FLUCONAZOLE 150 MG/1
150 TABLET ORAL DAILY
Start: 2024-06-26 | End: 2024-07-07 | Stop reason: HOSPADM

## 2024-06-25 RX ORDER — DEXAMETHASONE 2 MG/1
TABLET ORAL 2 TIMES DAILY
Start: 2024-06-25 | End: 2024-07-07 | Stop reason: HOSPADM

## 2024-06-25 RX ORDER — FLUCONAZOLE 150 MG/1
150 TABLET ORAL DAILY
Status: DISCONTINUED | OUTPATIENT
Start: 2024-06-25 | End: 2024-06-27 | Stop reason: HOSPADM

## 2024-06-25 RX ORDER — LACTULOSE 10 G/15ML
20 SOLUTION ORAL 3 TIMES DAILY PRN
Start: 2024-06-25 | End: 2024-07-07 | Stop reason: HOSPADM

## 2024-06-25 RX ORDER — POTASSIUM CHLORIDE 20 MEQ/1
20 TABLET, EXTENDED RELEASE ORAL DAILY
Status: DISCONTINUED | OUTPATIENT
Start: 2024-06-25 | End: 2024-06-27 | Stop reason: HOSPADM

## 2024-06-25 RX ORDER — HYDROXYZINE HYDROCHLORIDE 25 MG/1
25 TABLET, FILM COATED ORAL EVERY 6 HOURS PRN
Status: ON HOLD
Start: 2024-06-25 | End: 2024-07-05 | Stop reason: ALTCHOICE

## 2024-06-25 RX ADMIN — LORAZEPAM 0.5 MG: 2 INJECTION INTRAMUSCULAR; INTRAVENOUS at 23:09

## 2024-06-25 RX ADMIN — LORAZEPAM 0.5 MG: 2 INJECTION INTRAMUSCULAR; INTRAVENOUS at 09:14

## 2024-06-25 RX ADMIN — DOCUSATE SODIUM 100 MG: 100 CAPSULE, LIQUID FILLED ORAL at 20:23

## 2024-06-25 RX ADMIN — DICYCLOMINE HYDROCHLORIDE 10 MG: 10 CAPSULE ORAL at 20:32

## 2024-06-25 RX ADMIN — PREGABALIN 50 MG: 25 CAPSULE ORAL at 20:23

## 2024-06-25 RX ADMIN — APIXABAN 5 MG: 5 TABLET, FILM COATED ORAL at 09:12

## 2024-06-25 RX ADMIN — ACETAMINOPHEN 650 MG: 325 TABLET ORAL at 04:54

## 2024-06-25 RX ADMIN — HYDROMORPHONE HYDROCHLORIDE 4 MG: 4 TABLET ORAL at 23:43

## 2024-06-25 RX ADMIN — ACETAMINOPHEN 650 MG: 325 TABLET ORAL at 10:38

## 2024-06-25 RX ADMIN — DULOXETINE HYDROCHLORIDE 60 MG: 60 CAPSULE, DELAYED RELEASE ORAL at 09:13

## 2024-06-25 RX ADMIN — FENTANYL 1 PATCH: 25 PATCH TRANSDERMAL at 23:09

## 2024-06-25 RX ADMIN — POLYETHYLENE GLYCOL 3350 17 G: 17 POWDER, FOR SOLUTION ORAL at 20:23

## 2024-06-25 RX ADMIN — PREGABALIN 50 MG: 25 CAPSULE ORAL at 09:11

## 2024-06-25 RX ADMIN — NYSTATIN 500000 UNITS: 100000 SUSPENSION ORAL at 17:09

## 2024-06-25 RX ADMIN — NYSTATIN 500000 UNITS: 100000 SUSPENSION ORAL at 12:47

## 2024-06-25 RX ADMIN — PANTOPRAZOLE SODIUM 40 MG: 40 INJECTION, POWDER, FOR SOLUTION INTRAVENOUS at 09:00

## 2024-06-25 RX ADMIN — DICYCLOMINE HYDROCHLORIDE 10 MG: 10 CAPSULE ORAL at 13:12

## 2024-06-25 RX ADMIN — NYSTATIN 500000 UNITS: 100000 SUSPENSION ORAL at 20:23

## 2024-06-25 RX ADMIN — NYSTATIN 500000 UNITS: 100000 SUSPENSION ORAL at 08:00

## 2024-06-25 RX ADMIN — HYDROMORPHONE HYDROCHLORIDE 1 MG: 1 INJECTION, SOLUTION INTRAMUSCULAR; INTRAVENOUS; SUBCUTANEOUS at 09:14

## 2024-06-25 RX ADMIN — APIXABAN 5 MG: 5 TABLET, FILM COATED ORAL at 20:23

## 2024-06-25 RX ADMIN — DEXAMETHASONE SODIUM PHOSPHATE 4 MG: 4 INJECTION, SOLUTION INTRA-ARTICULAR; INTRALESIONAL; INTRAMUSCULAR; INTRAVENOUS; SOFT TISSUE at 23:09

## 2024-06-25 RX ADMIN — FENTANYL 1 PATCH: 100 PATCH TRANSDERMAL at 23:09

## 2024-06-25 RX ADMIN — FLUCONAZOLE 150 MG: 150 TABLET ORAL at 10:38

## 2024-06-25 RX ADMIN — ACETAMINOPHEN 650 MG: 325 TABLET ORAL at 23:09

## 2024-06-25 RX ADMIN — DEXAMETHASONE SODIUM PHOSPHATE 4 MG: 4 INJECTION, SOLUTION INTRA-ARTICULAR; INTRALESIONAL; INTRAMUSCULAR; INTRAVENOUS; SOFT TISSUE at 10:39

## 2024-06-25 RX ADMIN — ACETAMINOPHEN 650 MG: 325 TABLET ORAL at 17:09

## 2024-06-25 RX ADMIN — SENNOSIDES AND DOCUSATE SODIUM 2 TABLET: 8.6; 5 TABLET ORAL at 20:23

## 2024-06-25 RX ADMIN — POTASSIUM CHLORIDE 20 MEQ: 1500 TABLET, EXTENDED RELEASE ORAL at 10:39

## 2024-06-25 ASSESSMENT — PAIN DESCRIPTION - DESCRIPTORS: DESCRIPTORS: ACHING;SHARP

## 2024-06-25 ASSESSMENT — COGNITIVE AND FUNCTIONAL STATUS - GENERAL
STANDING UP FROM CHAIR USING ARMS: TOTAL
MOBILITY SCORE: 8
MOVING TO AND FROM BED TO CHAIR: TOTAL
TOILETING: A LOT
MOVING FROM LYING ON BACK TO SITTING ON SIDE OF FLAT BED WITH BEDRAILS: A LOT
MOVING FROM LYING ON BACK TO SITTING ON SIDE OF FLAT BED WITH BEDRAILS: TOTAL
TURNING FROM BACK TO SIDE WHILE IN FLAT BAD: A LOT
MOVING FROM LYING ON BACK TO SITTING ON SIDE OF FLAT BED WITH BEDRAILS: TOTAL
DAILY ACTIVITIY SCORE: 13
STANDING UP FROM CHAIR USING ARMS: TOTAL
PERSONAL GROOMING: A LOT
MOBILITY SCORE: 7
HELP NEEDED FOR BATHING: A LOT
WALKING IN HOSPITAL ROOM: TOTAL
DRESSING REGULAR UPPER BODY CLOTHING: A LOT
STANDING UP FROM CHAIR USING ARMS: TOTAL
EATING MEALS: A LITTLE
DRESSING REGULAR LOWER BODY CLOTHING: A LOT
DRESSING REGULAR UPPER BODY CLOTHING: A LOT
DRESSING REGULAR LOWER BODY CLOTHING: A LOT
MOVING TO AND FROM BED TO CHAIR: TOTAL
TURNING FROM BACK TO SIDE WHILE IN FLAT BAD: A LOT
DAILY ACTIVITIY SCORE: 13
WALKING IN HOSPITAL ROOM: TOTAL
MOBILITY SCORE: 6
MOVING TO AND FROM BED TO CHAIR: TOTAL
EATING MEALS: A LITTLE
TURNING FROM BACK TO SIDE WHILE IN FLAT BAD: TOTAL
CLIMB 3 TO 5 STEPS WITH RAILING: TOTAL
TOILETING: A LOT
CLIMB 3 TO 5 STEPS WITH RAILING: TOTAL
CLIMB 3 TO 5 STEPS WITH RAILING: TOTAL
PERSONAL GROOMING: A LOT
HELP NEEDED FOR BATHING: A LOT
WALKING IN HOSPITAL ROOM: TOTAL

## 2024-06-25 ASSESSMENT — PAIN - FUNCTIONAL ASSESSMENT
PAIN_FUNCTIONAL_ASSESSMENT: 0-10

## 2024-06-25 ASSESSMENT — PAIN SCALES - GENERAL
PAINLEVEL_OUTOF10: 2
PAINLEVEL_OUTOF10: 6
PAINLEVEL_OUTOF10: 6

## 2024-06-25 ASSESSMENT — PAIN SCALES - PAIN ASSESSMENT IN ADVANCED DEMENTIA (PAINAD): TOTALSCORE: MEDICATION (SEE MAR)

## 2024-06-25 NOTE — PROGRESS NOTES
Physical Therapy    Physical Therapy Treatment    Patient Name: Massimo Garcia  MRN: [id number]  Today's Date: 6/25/2024  Time Calculation  Start Time: 1238  Stop Time: 1324  Time Calculation (min): 46 min    Assessment/Plan   PT Assessment  Evaluation/Treatment Tolerance: Patient tolerated treatment well  End of Session Communication: Bedside nurse  Assessment Comment: Pt. states that he felt better this session vs past. Pt. tolerated treatment well - Continue to recommend high intense therapy post acute.  End of Session Patient Position: Bed, 3 rail up, Alarm off, not on at start of session  PT Plan  Inpatient/Swing Bed or Outpatient: Inpatient  PT Plan  Treatment/Interventions: Bed mobility, Transfer training, Gait training, Balance training, Neuromuscular re-education, Strengthening, Endurance training, Range of motion, Therapeutic exercise, Therapeutic activity, Home exercise program, Positioning, Postural re-education  PT Plan: Ongoing PT  PT Frequency: 4 times per week  PT Discharge Recommendations: High intensity level of continued care  PT Recommended Transfer Status: Total assist  PT - OK to Discharge: Yes (PT eval complete and DC rec made)      General Visit Information:   PT  Visit  PT Received On: 06/25/24  Response to Previous Treatment: Patient with no complaints from previous session.  General  Reason for Referral: 63 year old male admitted  6/11 abdominal pain, RUE weakness x2 days, CTH known right occipital lesion, new small left temporal hyperdenisty, MRI Brain and C-Spine showing progression of intracranial metastatic burden and soft tissue mass extending into C5-6 foramen on R. MRI T/L showing recurrence of ventral epidural mass from T8-T11 causing severe cord compression. Pt now with BLE weakness and s/p fall while in hospital.  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  Missed Visit: No  Family/Caregiver Present: Yes  Caregiver Feedback: Pt.'s wife in room is very supportive  Prior to Session Communication: Bedside nurse  Patient Position Received: Bed, 3 rail up  General Comment: Pt. supine in bed upon arrival. Pt.'s wife at bedside. Pt. willing to work on ther ex and eventual sitting EOB. Pt. reports discomfort in general unable to specify.    Subjective   Precautions:  Precautions  Medical Precautions: Fall precautions  Post-Surgical Precautions: Spinal precautions  Vital Signs:  Vital Signs  Heart Rate: (!) 120  Heart Rate Source: Monitor  SpO2:  (95%)    Objective   Pain:  Pain Assessment  Pain Assessment: 0-10  0-10 (Numeric) Pain Score:  (Unable to specify.)  Cognition:  Cognition  Overall Cognitive Status: Within Functional Limits  Coordination:     Postural Control:  Postural Control  Postural Control:  (Due to heaviness/decreased strength pt. requires reminders to look ahead while on the EOB)  Trunk Control: Kyphotic although can adjust temporarily with cues/reminders.  Static Sitting Balance  Static Sitting-Comment/Number of Minutes: Bilat ue's for support - feet did not touch the ground. Pt. required mod initially however after proper positioning pt. able to maintain upright with light cga/close sba.  Extremity/Trunk Assessments:                      Activity Tolerance:  Activity Tolerance  Endurance:  (Pt. became fatigued while seated EOB requesting to return to bed.)  Treatments:  Therapeutic  Exercise  Therapeutic Exercise Performed: Yes  Therapeutic Exercise Activity 1: Supine bilat le ex AP'S, QS, SAQ'S, HS, AND ABD X 15 reps Mostly passive although pt. puts in effort.    Therapeutic Activity  Therapeutic Activity Performed: Yes  Therapeutic Activity 1: Pt. sat EOB for approx 12 min with mod-light cga/close sba. Pt. grasping with bilat hands.    Bed Mobility  Bed Mobility: Yes  Bed Mobility 1  Bed Mobility 1: Supine to sitting  Level of Assistance 1: Maximum assistance, Minimal verbal cues  Bed Mobility 2  Bed Mobility  2: Sitting to supine  Level of Assistance 2: Dependent  Bed Mobility Comments 2: x2  Bed Mobility 3  Bed Mobility 3: Scooting  Level of Assistance 3: Dependent         Outcome Measures:  [facility] Basic Mobility  Turning from your back to your side while in a flat bed without using bedrails: Total  Moving from lying on your back to sitting on the side of a flat bed without using bedrails: Total  Moving to and from bed to chair (including a wheelchair): Total  Standing up from a chair using your arms (e.g. wheelchair or bedside chair): Total  To walk in hospital room: Total  Climbing 3-5 steps with railing: Total  Basic Mobility - Total Score: 6    Education Documentation  No documentation found.  Education Comments  No comments found.        OP EDUCATION:       Encounter Problems       Encounter Problems (Active)       Balance       STG - Maintains dynamic sitting balance without upper extremity support with sba for >5 minutes.  (Progressing)       Start:  06/18/24    Expected End:  07/02/24       INTERVENTIONS:  1. Practice sitting on the edge of a bed/mat with minimal support.  2. Educate patient about maintining total hip precautions while maintaining balance.  3. Educate patient about pressure relief.  4. Educate patient about use of assistive device.            Mobility       Pt will perform bed mobility with cga assist.   (Progressing)       Start:  06/18/24    Expected End:  07/02/24             Pt will demonstrate >3+/5 BLE and RUE strength to complete therapeutic exercise and participate in functional mobility.   (Progressing)       Start:  06/18/24    Expected End:  07/02/24               PT Transfers       Pt will perform lateral transfer with/without sliding board with cga to and from different surfaces.  (Not Progressing)       Start:  06/18/24    Expected End:  07/02/24               Pain - Adult

## 2024-06-25 NOTE — PROGRESS NOTES
"Massimo Garcia is a 63 y.o. male on day 16 of admission presenting with Colitis.    Subjective   Patient seen this AM at bedside. Wife present. Discussed last day of radiation with plans to discharge to facility most likely tomorrow. Wife has question regaurging SNF and was informed that SW will come by later this afternoon to answer more questions. Patient states that pain is about the same in RUE and abdomen. Pain medications are helping when needed. LBM 6/24 and was formed. Patient complained of mouth pain. Discussed using nystain more and ordering fluconazole, biotene and magic mouth wash for meals. Pt states that both legs remain flaccid, but states that he still has some R toe twitching present. He also endorses some sensation in BL legs, but that sensation feeling is like a \"ghost feeling\" when touched on both legs. We discussed Duplex BL LE ultrasound negative from 6/23. Patient denies fever/chills, N/V/D/C, headache, dizziness or vision changes.         Objective     Physical Exam  HENT:      Head: Normocephalic.      Nose: Nose normal.      Mouth/Throat:      Mouth: Mucous membranes are moist.   Eyes:      Pupils: Pupils are equal, round, and reactive to light.   Cardiovascular:      Rate and Rhythm: Normal rate.   Pulmonary:      Effort: Pulmonary effort is normal.   Abdominal:      Palpations: Abdomen is soft.   Musculoskeletal:      Cervical back: Normal range of motion.      Comments: Flaccid BLE  Right arm weakness. Unable to lift right arm. Left arm 4/5 strength    Skin:     Comments: Surgical wound on spine dehisced    Neurological:      Mental Status: He is alert.      Motor: Weakness present.   Psychiatric:         Mood and Affect: Mood is depressed.       Last Recorded Vitals  Blood pressure 122/74, pulse 95, temperature 36.1 °C (97 °F), temperature source Temporal, resp. rate 18, height 1.727 m (5' 8\"), weight 73.5 kg (162 lb), SpO2 93%.  Intake/Output last 3 Shifts:  I/O last 3 completed " shifts:  In: 200 (2.7 mL/kg) [P.O.:200]  Out: 475 (6.5 mL/kg) [Urine:475 (0.2 mL/kg/hr)]  Weight: 73.5 kg     Relevant Results  Scheduled medications  acetaminophen, 650 mg, oral, q6h  apixaban, 5 mg, oral, BID  dexAMETHasone, 4 mg, intravenous, q12h  docusate sodium, 100 mg, oral, BID  DULoxetine, 60 mg, oral, Daily  fentaNYL, 1 patch, transdermal, q48h  fentaNYL, 1 patch, transdermal, q48h  fluconazole, 150 mg, oral, Daily  iohexol, 90 mL, intravenous, Once in imaging  nystatin, 5 mL, Swish & Swallow, 4x daily  pantoprazole, 40 mg, intravenous, Daily  polyethylene glycol, 17 g, oral, BID  potassium chloride CR, 20 mEq, oral, Daily  pregabalin, 50 mg, oral, BID  sennosides-docusate sodium, 2 tablet, oral, BID    Continuous medications     PRN medications  PRN medications: diclofenac sodium, dicyclomine, HYDROmorphone, HYDROmorphone, HYDROmorphone, HYDROmorphone, hydrOXYzine HCL, lactulose, LORazepam, LORazepam, naloxone, ondansetron, prochlorperazine, promethazine, sodium chloride 0.9%, sodium chloride 0.9%     Assessment/Plan   Principal Problem:    Jennifer Garcia is a 63 y.o. 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 Patient admitted to [facility] (6/5-6/7) c/f partial SBO, resolved prior to discharge from [facility], surgery consulted at OSH and did not recommend surgical interventions at this time. Presented to [facility] 6/8 with continued abdominal pain and diarrhea. CT A/P (6/8) w/o bowel obstruction, c/f colitis on imaging. Cdiff and stool pathogen neg (6/9). No leukocytosis or  infectious symptoms at this time (no fevers/chills), no indication for abx at this time. Supportive oncology consulted for further pain management (6/9), recs followed. Pt tolerating full liquid diet, no nausea or vomiting, advanced to regular diet (6/11). Radiation oncology consulted as he was recently seen outpatient with plan for CT simulation for gamma knife and radiation to spine, rec systemic therapy and interval palliative RT sequenced in between cycles-- re-engaged 6/18 after MRI spine. 6/12 BAT called d/t new right arm weakness. BAT cancelled because symptoms not consistent with stroke. Neurology consulted for new arm weakness workup. CTH showed concern for bleed of his known left temporal lobe, worsening edema around his right occipital. However, neither can explain his symptoms. CT of the neck showed two larger cervical lymphnodes on the right side at C4-5 level, concern for compression on his right upper brachial plexuses. Rad/onc paged regarding CT head showing worsening disease in brain. Surg/onc consulted d/t concern for brachial plexus injury d/t large cervical lymph node-no plans for surgical interventions. Patient given Dex 10mg x1 dose and then 4mg q6 hours. NSGY consulted d/t concern for temporal bleed. Repeat head CT 6/12 showing same findings as previous. Eliquis restarted 6/15 given no hemmorage on MRI. MRI brain with worsening edema, MRI C-spine with worsening progression. 6/15 weakness progressing and patient no longer able to ambulate. 6/16 called NSGY to re engage d/t new onset on BLE weakness. After reviewing results recommendation to consult ortho/spine to r/o cord compression. 6/16 Ortho consulted. MRI T-spine and L spine under anesthesia 6/17 demonstrated worsening marrow replacing lesions and epidural extension with deformity of the spinal cord and edema possibly representing cord compression. 6/18 discussed with rad/onc possible inpatient emergent radiation. Started urgent palliative  RT to the spine, C6-7, T8-10, L2, tentatively planned for 20Gy in 5 fractions. S/p CT sim 6/19 and started RT 6/19 afternoon (due 6/20, 6/21, 6/24, and 6/25). PT/OT rec [facility], PM&R consulted 6/19 and also rec [facility] (pt would not be able to receive radiation or chemo for 7-10 days while at AR and must be off IV pain meds). Dr Blake emailed 6/20 regarding formal tx plan. Initially planned to DC to [facility] but spoke to Dr. Blake 6/23 and still planning to continue treatment 6/28 so will need to DC to SNF to continue chemotherapy. DC pending radiation completion on 6/25 and SNF placement. Discharge most likely 6/26.      # Acute RUE weakness/ possible spinal cord compression   - BAT activated on 6/12 at 0859 for new right arm weakness   - CT brain attack, CT head w/wo IV contrast showing increased temporal nodule and vasogenic edema  - Neurology consulted concerned for bleed from temporal tumor. Repeat CT head stable    - NSGY consulted d/t concern for brain bleed but low suspicion for active bleed    - CT soft tissue neck w/ contrast showing abundant bilateral heterogeneously enhancing lymph nodes consistent  with ralph metastases  - Surg/onc consulted for concern for brachial plexus causing new onset weakness-no surgical interventions   - Rad/onc note 6/12 recommending systemic therapy and interval palliative RT sequenced in between cycles  - Dexamethasone 10mg x1 then 4mg q6. Plan to discharge with 4mg BID x5 days then 2mg BID x5 days then 2mg daily x5 days. (Per rad onc) 6/23 decreased to 4mg q12 (6/23-6/27)   - 6/17 MRI c-spine and MRI brain with concern for progression/ worsening edema, possible cord compression  - 6/18 discussed with rad/onc possible inpatient emergent radiation  - Started urgent palliative RT to the spine, C6-7, T8-10, L2, tentatively planned for 20Gy in 5 fractions  - s/p CT sim 6/19 and started RT 6/19 afternoon (due 6/20, 6/21, 6/24, and 6/25)  - IV dilaudid 1mg x1 and IV Ativan 0.5mg x1 daily prior to  each RT  - Duplex BL LE ultrasound negative (6/23) (R leg cool to touch on Sunday)     # Acute BLE weakness   - 6/15 began experiencing mild BLE weakness   - 6/16 BLE weakness progression to point of bedbound  - Re engaged NSGY. Imaging reviewed again and low suspicion for BLE secondary to brain edema and progression of disease. More likely cord compression vs muscle atrophy  - 6/16 Ortho consulted d/t concern for cauda equina   - Stat MRI T-spine and L-spine ordered but patient patient unable to tolerate despite premeds   - 6/17 MRI T-spine and L-spine under anesthesia- demonstrated worsening marrow replacing lesions and epidural extension with deformity of the spinal cord and edema possibly representing cord compression  - Steroids as above   - NSGY 6/18 no plan for surgical intervention   - 6/18 Rad Onc re- engaged given MRI spine--> started urgent RT (see above)  - PT/OT rec AR, PM&R consulted 6/19 and also rec AR (pt would not be able to receive radiation or chemo for 7-10 days while at AR and must be off IV pain meds). Now plan to DC to SNF so he can get treatment      # Leukocytosis   - WBC uptrending to 38.6 (6/16) --> 28.3 (6/25)   - Likely in setting of steroid use vs infection   - CT chest 6/15 neg for infectious process   - Patient afebrile, HR WNL, normotensive   - Close monitoring of infectious symptoms and low threshold to start abx       # Spinal wound superficial dehiscence   - Wound care consulted 6/18: rec cleanse wound with Vashe or NS, apply aquacell AG to wound, and cover with Mepilex border dressing  - Pt/wife would like wound assessed daily    # Thrush   - Started on nystain 4 x daily on admission   - Complained of worsening mouth pain without difficulty swallowing on 6/25   - Started fluconazole (6/25) x 7 days  - Started BMX Q6 hrs PRN and biotene TID PRN      # Stage IV Esophageal AdenoCarcinoma (Her2 pos) w/ known mets to Lung/Liver,   # Recent Spine Emergent Decompression/Laminectomy  (T8-T12 5/17/24 2/2 to Metastatic Lesion)  # Cancer Related Pain  - Follows w/ Dr. Tomasa Blake for Oncology; spoke with him 6/14. Emailed again 6/20-waiting for response   - 6/8 CT A/P wo evidence of bowel obstruction, but w/ short segment of circumferential thickening of ascending colon superior to the cecum new from prior, which may represent colitis. Also, redemonstration of metastatic disease in the visualized lung bases and liver unchanged in the short-term interval since 06/05/2024; redemonstration of abdominopelvic lymphadenopathy; redemonstration of left adrenal nodule, likely also metastatic; also Small left pleural effusion unchanged in the short-term interval. Interval development of trace ascites in the dependent portion of the pelvis  - Palliative care consulted while at [facility]; recommended 2mg IV dilaudid Q3 PRN for severe pain, continued on admission (6/9- )  - EKG (6/8)    - C/w Tylenol 650mg Q6H, Capsaicin cream QID, Duloxetine 60mg daily, Lyrica 50mg BID, Fentanyl patch 100mcg, 0.5mg Lorazepam IV Q8hrs PRN anxiety  - Miralax 17g BID, Senna/Colace 2 tabs BID for opioid induced constipation   - C/w Zofran, Compazine, Phenergan PRN N/V  - Decadron 16mg Ivx1 on 6/8, c/w home prednisone 20mg daily (part of taper, through 6/14, then decreased to 10mg daily for 7 days) with PPI   - Holding home Flexeril  - supportive oncology consulted (6/9), rec cont PO Dilaudid 4mg Q2 PRN for moderate to severe pain, cont IV Dilaudid 2mg Q3 for breakthrough pain, increasing Fentanyl patch change frequency to 100mcgs Q48hr, and starting Bentyl 10mg x4/day PRN for abdominal cramps  - supportive onc updated recs (6/11): increase PO Dilaudid from 4mg to 6mg Q3, and increase Fentanyl from 100mcg to 125mcg Q48hr  - Radiation oncology consulted 6/9; further recs pending (seen outpt on 6/4 with plan for gamma knife and radiation to spine pending CT simulation)   - 6/12 BAT/new acute right arm weakness as above   -  Oncology consulted 6/15 given progression of disease for any consideration of inpatient treatment. Rec next line chemo is JER2 targeted enhertu which cannot be given inpt, but on discussion, there is concern that patient not strong enough to physically make it to chemo infusions.  - HER2 added onto 5/17 biopsy (pending) per onc  - Spoke with Dr. Blake 6/23 and he said he will likely still give him treatment 6/28      # Colitis on Imaging iso Hx of Drug Induced Colitis   - Admitted in May for immunotherapy related colitis (infectious workup remained negative); on steroid taper as above   - Presented to [facility] (6/5-6/7) with abdominal pain/ diarrhea c/f CBO  - CT A/P (6/5): Mildly loops dilated loops of jejunum measuring up to 3.4 cm, early developing partial small bowel obstruction can not be excluded  - While at [facility] pt was passing gas and was having active Bms, general surgery consulted and did not recommend any surgical interventions   - CT A/P (6/8) c/f colitis   - Cdiff/Stool pathogen panel negative (6/9)  - Pt placed NPO on admission, increased to clear liquid diet (6/9 AM), advanced to full liquid diet (6/9 PM), advanced to regular diet (6/11)  - Phenergan 12.5mg IV q6hrs PRN, Compazine 5mg q6hrs PRN, Zofran 4mg IV q6hrs PRN; caution with Qtc prolonging drugs with hx of 3rd degree AV block     # Neoplasm-related pain  - Supportive oncology consulted 6/9, recs followed  - PO dilaudid 4mg Q3 PRN for moderate pain, PO dilaudid 6mg q3hrs PRN severe pain, cont IV Dilaudid 1mg Q3 for breakthrough pain, scheduled tylenol 650mg q6hrs, Lyrica 50mg BID  - Fentanyl patch change frequency to 125mcgs Q48hr, and starting Bentyl 10mg x4/day PRN for abdominal cramps  - Voltaren gel 4g 4x/day PRN for mild pain of the R shoulder    # Hyponatremia - resolved   - 6/17 Na 129--> 133 (6/19)--> 136 (6/20)  - Serum osmolality sent (6/17): 274  - Urine electrolytes sent 6/17  - Consider NS IVF pending further workup      #  Anxiety  - Continue Cymbalta 60mg daily  - Ativan 0.5mg q8hrs PRN anxiety  - Atarax 25mg 4x/day scheduled ordered 6/16 d/t anxiety- however 6/20 pt very withdrawn, changed dosing ot 4x/day PRN only     # Opioid-induced constipation  - LBM 6/24  - DocuSenna 2tabs BID, Colace 100mg BID, Miralax BID, Lactulose 20g 3x/day PRN     # Hx of PVT/PE (on Eliquis)   - Continue Eliquis 5mg BID     # HTN   # Third Degree AV Block s/p PM (11/9/23)  - No active BP medications      # Prophy   - Home eliquis   - OOB as able, SCD while in bed      DISPO:  - Full code (confirmed on admission)   - Discharge to SNF after completion of RT; most likely 6/26  - NOK: Jane (wife) [phone]; updated at bedside 6/25  - FUVs: 6/28 w/ heme onc, 6/28 w/ palliative, 8/19 w/ neurosurgery        I spent 60 minutes in the professional and overall care of this patient.    Assessment and plan discussed with attending physician Dr. Jean.     Analy Martínez, APRN-CNP

## 2024-06-25 NOTE — CARE PLAN
The clinical goals for the shift include pt will remain safe and free from injury throughout shift 6/25/2024 0700        Problem: Pain  Goal: My pain/discomfort is manageable  Outcome: Progressing     Problem: Safety  Goal: Patient will be injury free during hospitalization  Outcome: Progressing  Goal: I will remain free of falls  Outcome: Progressing     Problem: Daily Care  Goal: Daily care needs are met  Outcome: Progressing     Problem: Psychosocial Needs  Goal: Demonstrates ability to cope with hospitalization/illness  Outcome: Progressing  Goal: Collaborate with me, my family, and caregiver to identify my specific goals  Outcome: Progressing     Problem: Discharge Barriers  Goal: My discharge needs are met  Outcome: Progressing     Problem: Pain  Goal: Takes deep breaths with improved pain control throughout the shift  Outcome: Progressing  Goal: Turns in bed with improved pain control throughout the shift  Outcome: Progressing  Goal: Walks with improved pain control throughout the shift  Outcome: Progressing  Goal: Performs ADL's with improved pain control throughout shift  Outcome: Progressing  Goal: Participates in PT with improved pain control throughout the shift  Outcome: Progressing  Goal: Free from opioid side effects throughout the shift  Outcome: Progressing  Goal: Free from acute confusion related to pain meds throughout the shift  Outcome: Progressing     Problem: Skin  Goal: Decreased wound size/increased tissue granulation at next dressing change  Outcome: Progressing  Goal: Participates in plan/prevention/treatment measures  Outcome: Progressing  Goal: Prevent/manage excess moisture  Outcome: Progressing  Goal: Prevent/minimize sheer/friction injuries  Outcome: Progressing  Goal: Promote/optimize nutrition  Outcome: Progressing  Goal: Promote skin healing  Outcome: Progressing  Flowsheets (Taken 6/25/2024 0358)  Promote skin healing:   Assess skin/pad under line(s)/device(s)   Rotate device  position/do not position patient on device     Problem: Pain - Adult  Goal: Verbalizes/displays adequate comfort level or baseline comfort level  Outcome: Progressing     Problem: Safety - Adult  Goal: Free from fall injury  Outcome: Progressing     Problem: Discharge Planning  Goal: Discharge to home or other facility with appropriate resources  Outcome: Progressing     Problem: Chronic Conditions and Co-morbidities  Goal: Patient's chronic conditions and co-morbidity symptoms are monitored and maintained or improved  Outcome: Progressing     Problem: Fall/Injury  Goal: Not fall by end of shift  Outcome: Progressing  Goal: Be free from injury by end of the shift  Outcome: Progressing  Goal: Verbalize understanding of personal risk factors for fall in the hospital  Outcome: Progressing  Goal: Verbalize understanding of risk factor reduction measures to prevent injury from fall in the home  Outcome: Progressing  Goal: Use assistive devices by end of the shift  Outcome: Progressing  Goal: Pace activities to prevent fatigue by end of the shift  Outcome: Progressing

## 2024-06-25 NOTE — PROGRESS NOTES
[facility] can accept and the patient and his wife are in agreement with the plan. Gabo Santos will set up transport for the patient to return on Friday for his chemotherapy and SW sent the SNF the  with all of his appointments. Patient and wife understand that the transport provider will submit to the patient's insurance for payment for the ambulance to his out-patient chemotherapy but they were informed it may not be covered. Patient will be having a Echo tomorrow and will set up discharge for Thursday. Will continue to follow for support and planning for the patient's care at discharge.  EDOUARD Forbes  6/25/24@15:15

## 2024-06-25 NOTE — CARE PLAN
Problem: Pain  Goal: My pain/discomfort is manageable  Outcome: Progressing   The patient's goals for the shift include tolerate final radiatio procedure; remainsafe and free from injury     The clinical goals for the shift include pt will remain safe and free from injury throughout shift 6/25/2024 0700    Over the shift, the patient did  make progress toward the following goals. Barriers to progression include under reporting pain; weakness,immobility. Recommendations to address these barriers include premedicate prior to radation, regular safety checks  .

## 2024-06-26 ENCOUNTER — APPOINTMENT (OUTPATIENT)
Dept: HEMATOLOGY/ONCOLOGY | Facility: CLINIC | Age: 64
End: 2024-06-26
Payer: MEDICARE

## 2024-06-26 ENCOUNTER — APPOINTMENT (OUTPATIENT)
Dept: RADIOLOGY | Facility: HOSPITAL | Age: 64
DRG: 393 | End: 2024-06-26
Payer: MEDICARE

## 2024-06-26 ENCOUNTER — APPOINTMENT (OUTPATIENT)
Dept: CARDIOLOGY | Facility: HOSPITAL | Age: 64
DRG: 393 | End: 2024-06-26
Payer: MEDICARE

## 2024-06-26 VITALS
RESPIRATION RATE: 18 BRPM | TEMPERATURE: 96.8 F | SYSTOLIC BLOOD PRESSURE: 118 MMHG | HEART RATE: 71 BPM | DIASTOLIC BLOOD PRESSURE: 73 MMHG | OXYGEN SATURATION: 96 %

## 2024-06-26 LAB
ALBUMIN SERPL BCP-MCNC: 3.1 G/DL (ref 3.4–5)
ALP SERPL-CCNC: 430 U/L (ref 33–136)
ALT SERPL W P-5'-P-CCNC: 78 U/L (ref 10–52)
ANION GAP SERPL CALC-SCNC: 13 MMOL/L (ref 10–20)
AST SERPL W P-5'-P-CCNC: 88 U/L (ref 9–39)
BASOPHILS # BLD AUTO: 0.1 X10*3/UL (ref 0–0.1)
BASOPHILS NFR BLD AUTO: 0.4 %
BILIRUB SERPL-MCNC: 1 MG/DL (ref 0–1.2)
BUN SERPL-MCNC: 27 MG/DL (ref 6–23)
CALCIUM SERPL-MCNC: 9.2 MG/DL (ref 8.6–10.6)
CHLORIDE SERPL-SCNC: 102 MMOL/L (ref 98–107)
CO2 SERPL-SCNC: 25 MMOL/L (ref 21–32)
CREAT SERPL-MCNC: 0.36 MG/DL (ref 0.5–1.3)
EGFRCR SERPLBLD CKD-EPI 2021: >90 ML/MIN/1.73M*2
EJECTION FRACTION: 53 %
EOSINOPHIL # BLD AUTO: 0 X10*3/UL (ref 0–0.7)
EOSINOPHIL NFR BLD AUTO: 0 %
ERYTHROCYTE [DISTWIDTH] IN BLOOD BY AUTOMATED COUNT: 18.2 % (ref 11.5–14.5)
GLUCOSE SERPL-MCNC: 169 MG/DL (ref 74–99)
HCT VFR BLD AUTO: 34.4 % (ref 41–52)
HGB BLD-MCNC: 10.9 G/DL (ref 13.5–17.5)
IMM GRANULOCYTES # BLD AUTO: 1.01 X10*3/UL (ref 0–0.7)
IMM GRANULOCYTES NFR BLD AUTO: 3.7 % (ref 0–0.9)
LEFT VENTRICLE INTERNAL DIMENSION DIASTOLE: 4.5 CM (ref 3.5–6)
LEFT VENTRICULAR OUTFLOW TRACT DIAMETER: 2 CM
LYMPHOCYTES # BLD AUTO: 0.15 X10*3/UL (ref 1.2–4.8)
LYMPHOCYTES NFR BLD AUTO: 0.6 %
MCH RBC QN AUTO: 28.9 PG (ref 26–34)
MCHC RBC AUTO-ENTMCNC: 31.7 G/DL (ref 32–36)
MCV RBC AUTO: 91 FL (ref 80–100)
MONOCYTES # BLD AUTO: 0.65 X10*3/UL (ref 0.1–1)
MONOCYTES NFR BLD AUTO: 2.4 %
NEUTROPHILS # BLD AUTO: 25.12 X10*3/UL (ref 1.2–7.7)
NEUTROPHILS NFR BLD AUTO: 92.9 %
NRBC BLD-RTO: 0 /100 WBCS (ref 0–0)
PLATELET # BLD AUTO: 110 X10*3/UL (ref 150–450)
POTASSIUM SERPL-SCNC: 4.3 MMOL/L (ref 3.5–5.3)
PROT SERPL-MCNC: 5.4 G/DL (ref 6.4–8.2)
RBC # BLD AUTO: 3.77 X10*6/UL (ref 4.5–5.9)
SODIUM SERPL-SCNC: 136 MMOL/L (ref 136–145)
WBC # BLD AUTO: 27 X10*3/UL (ref 4.4–11.3)

## 2024-06-26 PROCEDURE — 2500000004 HC RX 250 GENERAL PHARMACY W/ HCPCS (ALT 636 FOR OP/ED)

## 2024-06-26 PROCEDURE — 2500000001 HC RX 250 WO HCPCS SELF ADMINISTERED DRUGS (ALT 637 FOR MEDICARE OP)

## 2024-06-26 PROCEDURE — 2500000001 HC RX 250 WO HCPCS SELF ADMINISTERED DRUGS (ALT 637 FOR MEDICARE OP): Performed by: STUDENT IN AN ORGANIZED HEALTH CARE EDUCATION/TRAINING PROGRAM

## 2024-06-26 PROCEDURE — 2500000002 HC RX 250 W HCPCS SELF ADMINISTERED DRUGS (ALT 637 FOR MEDICARE OP, ALT 636 FOR OP/ED)

## 2024-06-26 PROCEDURE — 85025 COMPLETE CBC W/AUTO DIFF WBC: CPT

## 2024-06-26 PROCEDURE — 71045 X-RAY EXAM CHEST 1 VIEW: CPT | Performed by: RADIOLOGY

## 2024-06-26 PROCEDURE — 93321 DOPPLER ECHO F-UP/LMTD STD: CPT | Performed by: INTERNAL MEDICINE

## 2024-06-26 PROCEDURE — 84155 ASSAY OF PROTEIN SERUM: CPT

## 2024-06-26 PROCEDURE — 93308 TTE F-UP OR LMTD: CPT | Performed by: INTERNAL MEDICINE

## 2024-06-26 PROCEDURE — 71045 X-RAY EXAM CHEST 1 VIEW: CPT

## 2024-06-26 PROCEDURE — 99233 SBSQ HOSP IP/OBS HIGH 50: CPT

## 2024-06-26 PROCEDURE — 93325 DOPPLER ECHO COLOR FLOW MAPG: CPT | Performed by: INTERNAL MEDICINE

## 2024-06-26 PROCEDURE — 1170000001 HC PRIVATE ONCOLOGY ROOM DAILY

## 2024-06-26 PROCEDURE — C9113 INJ PANTOPRAZOLE SODIUM, VIA: HCPCS | Performed by: STUDENT IN AN ORGANIZED HEALTH CARE EDUCATION/TRAINING PROGRAM

## 2024-06-26 PROCEDURE — 2500000002 HC RX 250 W HCPCS SELF ADMINISTERED DRUGS (ALT 637 FOR MEDICARE OP, ALT 636 FOR OP/ED): Performed by: STUDENT IN AN ORGANIZED HEALTH CARE EDUCATION/TRAINING PROGRAM

## 2024-06-26 PROCEDURE — 2500000004 HC RX 250 GENERAL PHARMACY W/ HCPCS (ALT 636 FOR OP/ED): Performed by: STUDENT IN AN ORGANIZED HEALTH CARE EDUCATION/TRAINING PROGRAM

## 2024-06-26 PROCEDURE — 93321 DOPPLER ECHO F-UP/LMTD STD: CPT

## 2024-06-26 RX ORDER — LEVOFLOXACIN 750 MG/1
750 TABLET ORAL
Qty: 7 TABLET | Refills: 0 | Status: SHIPPED | OUTPATIENT
Start: 2024-06-26 | End: 2024-07-07 | Stop reason: HOSPADM

## 2024-06-26 RX ORDER — LEVOFLOXACIN 750 MG/1
750 TABLET ORAL
Status: DISCONTINUED | OUTPATIENT
Start: 2024-06-26 | End: 2024-06-27 | Stop reason: HOSPADM

## 2024-06-26 RX ADMIN — SENNOSIDES AND DOCUSATE SODIUM 2 TABLET: 8.6; 5 TABLET ORAL at 08:48

## 2024-06-26 RX ADMIN — POLYETHYLENE GLYCOL 3350 17 G: 17 POWDER, FOR SOLUTION ORAL at 08:47

## 2024-06-26 RX ADMIN — ACETAMINOPHEN 650 MG: 325 TABLET ORAL at 05:06

## 2024-06-26 RX ADMIN — LORAZEPAM 0.5 MG: 2 INJECTION INTRAMUSCULAR; INTRAVENOUS at 23:42

## 2024-06-26 RX ADMIN — ACETAMINOPHEN 650 MG: 325 TABLET ORAL at 22:34

## 2024-06-26 RX ADMIN — FLUCONAZOLE 150 MG: 150 TABLET ORAL at 08:48

## 2024-06-26 RX ADMIN — DULOXETINE HYDROCHLORIDE 60 MG: 60 CAPSULE, DELAYED RELEASE ORAL at 08:48

## 2024-06-26 RX ADMIN — PREGABALIN 50 MG: 25 CAPSULE ORAL at 21:05

## 2024-06-26 RX ADMIN — CHLORPROMAZINE HYDROCHLORIDE 25 MG: 25 TABLET, FILM COATED ORAL at 08:59

## 2024-06-26 RX ADMIN — APIXABAN 5 MG: 5 TABLET, FILM COATED ORAL at 21:05

## 2024-06-26 RX ADMIN — POTASSIUM CHLORIDE 20 MEQ: 1500 TABLET, EXTENDED RELEASE ORAL at 08:48

## 2024-06-26 RX ADMIN — DEXAMETHASONE SODIUM PHOSPHATE 4 MG: 4 INJECTION, SOLUTION INTRA-ARTICULAR; INTRALESIONAL; INTRAMUSCULAR; INTRAVENOUS; SOFT TISSUE at 12:33

## 2024-06-26 RX ADMIN — NYSTATIN 500000 UNITS: 100000 SUSPENSION ORAL at 16:45

## 2024-06-26 RX ADMIN — NYSTATIN 500000 UNITS: 100000 SUSPENSION ORAL at 07:12

## 2024-06-26 RX ADMIN — DOCUSATE SODIUM 100 MG: 100 CAPSULE, LIQUID FILLED ORAL at 08:48

## 2024-06-26 RX ADMIN — PANTOPRAZOLE SODIUM 40 MG: 40 INJECTION, POWDER, FOR SOLUTION INTRAVENOUS at 08:48

## 2024-06-26 RX ADMIN — NYSTATIN 500000 UNITS: 100000 SUSPENSION ORAL at 21:05

## 2024-06-26 RX ADMIN — PREGABALIN 50 MG: 25 CAPSULE ORAL at 08:48

## 2024-06-26 RX ADMIN — DOCUSATE SODIUM 100 MG: 100 CAPSULE, LIQUID FILLED ORAL at 21:05

## 2024-06-26 RX ADMIN — APIXABAN 5 MG: 5 TABLET, FILM COATED ORAL at 08:48

## 2024-06-26 RX ADMIN — PERFLUTREN 2.5 ML OF DILUTION: 6.52 INJECTION, SUSPENSION INTRAVENOUS at 14:05

## 2024-06-26 RX ADMIN — NYSTATIN 500000 UNITS: 100000 SUSPENSION ORAL at 12:33

## 2024-06-26 RX ADMIN — ACETAMINOPHEN 650 MG: 325 TABLET ORAL at 12:33

## 2024-06-26 RX ADMIN — DEXAMETHASONE SODIUM PHOSPHATE 4 MG: 4 INJECTION, SOLUTION INTRA-ARTICULAR; INTRALESIONAL; INTRAMUSCULAR; INTRAVENOUS; SOFT TISSUE at 22:34

## 2024-06-26 RX ADMIN — LEVOFLOXACIN 750 MG: 750 TABLET, FILM COATED ORAL at 16:45

## 2024-06-26 ASSESSMENT — COGNITIVE AND FUNCTIONAL STATUS - GENERAL
DAILY ACTIVITIY SCORE: 13
TURNING FROM BACK TO SIDE WHILE IN FLAT BAD: A LOT
MOBILITY SCORE: 8
TOILETING: A LOT
MOVING TO AND FROM BED TO CHAIR: TOTAL
WALKING IN HOSPITAL ROOM: TOTAL
CLIMB 3 TO 5 STEPS WITH RAILING: TOTAL
MOVING FROM LYING ON BACK TO SITTING ON SIDE OF FLAT BED WITH BEDRAILS: A LOT
STANDING UP FROM CHAIR USING ARMS: TOTAL
EATING MEALS: A LITTLE
PERSONAL GROOMING: A LOT
HELP NEEDED FOR BATHING: A LOT
DRESSING REGULAR UPPER BODY CLOTHING: A LOT
DRESSING REGULAR LOWER BODY CLOTHING: A LOT

## 2024-06-26 ASSESSMENT — PAIN - FUNCTIONAL ASSESSMENT
PAIN_FUNCTIONAL_ASSESSMENT: 0-10
PAIN_FUNCTIONAL_ASSESSMENT: 0-10

## 2024-06-26 ASSESSMENT — PAIN SCALES - GENERAL
PAINLEVEL: 2
PAINLEVEL_OUTOF10: 3
PAINLEVEL_OUTOF10: 4

## 2024-06-26 NOTE — PROGRESS NOTES
06/26/24 1320   Discharge Planning   Who is requesting discharge planning? Provider   Home or Post Acute Services Post acute facilities (Rehab/SNF/etc)   Type of Post Acute Facility Services Skilled nursing   Patient expects to be discharged to: [facility]   Does the patient need discharge transport arranged? Yes   RoundTrip coordination needed? Yes   Has discharge transport been arranged? Yes   What day is the transport expected? 06/27/24   What time is the transport expected? 1000     6/26/24 @ 1320  Patient will discharge to [facility] [place] tomorrow, 6/27. Patient and spouse made aware of transport time. RAYSHAWN Little NP updated. Report # will be- [phone]. JOEY Benton RN made aware as well.   The S Vehicle you requested for Massimo ROJAS in unit/room SCC [id number] on 06/27/2024 is scheduled to arrive at 10:00am EDT! [facility] is handling this ride and you can contact them at [phone].    UPDATE 1533: [id number] completed in HENS.  Frida Soria RN TCC

## 2024-06-26 NOTE — PROGRESS NOTES
Radiation Oncology Treatment Summary    Patient Name:  Massimo Garcia  MRN:  [id number]  :  1960    Referring Provider: No ref. provider found  Primary Care Provider: Dayne Santamaria DO    Brief History: Massimo Garcia is a 63 y.o. male with No matching staging information was found for the patient..  The patient completed radiotherapy as outlined below.    Radiation Treatment Summary:    SRS: Not Applicable Brain    Treatment Period Technique Fraction Dose Fractions Total Dose   Course 1 2024-2024  (days elapsed: 6)         C5-C7 2024-2024 AP/ / 400 cGy 5 / 5 2000 / 2,000 cGy         L2 2024-2024 3-Field 400 / 400 cGy 5 / 5 2000 / 2,000 cGy         T8-T11 2024-2024 3-Field 400 / 400 cGy 5 / 5 2000 / 2,000 cGy       Please see the patient's [facility] chart for further details regarding the radiation plan, including beam energy.    Concurrent Chemotherapy:  Treatment Plans       Name Type Hold Status Plan Dates Plan Provider       Active    Fam-trastuzumab deruxtecan (6.4 mg/kg), 21 Day Cycles  Oncology Treatment On Automatic Hold  2024 - Present Tomasa Blake MD                   CTCAE Toxicity Overview:     Patient Disposition: Patient will follow-up in clinic in 3 months

## 2024-06-26 NOTE — PROGRESS NOTES
RADIATION ONCOLOGY ON-TREATMENT VISIT NOTE  Patient Name:  Massimo Garcia  MRN:  [id number]  :  1960    Radiation Oncologist: Babatunde Hodgson MD, MS  Referring Provider: No ref. provider found  Primary Care Provider: Dayne Santamaria DO  Care Team: Patient Care Team:  Dayne Santamaria DO as PCP - General  Tomasa Blake MD as Referring Physician (Hematology and Oncology)  ALFREDITO Ferguson-CNP as Nurse Practitioner (Palliative Medicine)  Chidi Mays MD as Consulting Physician (Hematology and Oncology)  Bhavin Frankel MD as Medical Oncologist (Hematology and Oncology)  Bam Serrano MD as Consulting Physician (Hematology and Oncology)  Blue John MD as Surgeon (Neurosurgery)  Vania Menchaca MD as Consulting Physician (Hospitalist)  Ronnie Guardado MD as Medical Oncologist (Hospitalist)  Emelyn Jean MD as Consulting Physician (Hospitalist)    Date of Service: 2024     Massimo Garcia is a 63 y.o.-year-old with:  No matching staging information was found for the patient.    Specialty Problems          Radiation Oncology Problems    Primary malignant neoplasm of esophagus (Multi)        Malignant neoplasm metastatic to liver (Multi)        Metastases to the liver (Multi)        Stage IV malignant neoplasm of esophagus (Multi)        Esophageal cancer, stage IV (Multi)        Secondary malignant neoplasm of brain and spinal cord (Multi)           Chemotherapy Summary (if applicable):   fluorouracil (Adrucil) IV syringe 400 mg/m2 (Treatment Plan) = 750 mg 15 mL, 400 mg/m2, intravenous, Once, 16 of 20 cycles    Administration: 750 mg (10/2/2023), 750 mg (10/16/2023), 750 mg (10/30/2023), 750 mg (2023), 750 mg (2023), 750 mg (2023), 750 mg (2024), 750 mg (1/15/2024), 750 mg (2024), 750 mg (2024), 750 mg (3/4/2024), 750 mg (2024), 750 mg (4/15/2024), 750 mg (2024)        fluorouracil (Adrucil) 2,400 mg/m2 = 4,500 mg in sodium chloride 0.9% 138 mL IV  via Home Infusion, 2,400 mg/m2, intravenous, Once, 4 of 8 cycles    Administration: 4,500 mg (4/1/2024), 4,500 mg (4/15/2024), 4,500 mg (4/29/2024)        leucovorin 750 mg in dextrose 5% 150 mL IV, 400 mg/m2, intravenous, Once, 16 of 20 cycles    Administration: 748 mg (10/2/2023), 750 mg (10/16/2023), 740 mg (10/30/2023), 748 mg (11/20/2023), 740 mg (12/4/2023), 740 mg (12/18/2023), 740 mg (1/2/2024), 740 mg (1/15/2024), 740 mg (1/29/2024), 740 mg (2/12/2024), 740 mg (3/4/2024), 740 mg (3/18/2024), 740 mg (4/1/2024), 740 mg (4/15/2024), 740 mg (4/29/2024)        palonosetron (Aloxi) injection 250 mcg, 0.25 mg, intravenous, Once, 16 of 20 cycles    Administration: 250 mcg (10/2/2023), 250 mcg (10/16/2023), 250 mcg (10/30/2023), 250 mcg (11/20/2023), 250 mcg (12/4/2023), 250 mcg (12/18/2023), 250 mcg (1/2/2024), 250 mcg (1/15/2024), 250 mcg (1/29/2024), 250 mcg (2/12/2024), 250 mcg (3/4/2024), 250 mcg (3/18/2024), 250 mcg (4/1/2024), 250 mcg (4/15/2024), 250 mcg (4/29/2024)        OXALIplatin (Eloxatin) 160 mg in dextrose 5 % in water (D5W) 532 mL IV, 85 mg/m2 = 160 mg (100 % of original dose 85 mg/m2), intravenous, Once, 5 of 5 cycles    Dose modification: 85 mg/m2 (original dose 85 mg/m2, Cycle 9), 68 mg/m2 (original dose 85 mg/m2, Cycle 12)    Administration: 160 mg (1/15/2024), 160 mg (1/29/2024), 160 mg (2/12/2024), 125 mg (3/4/2024), 160 mg (3/18/2024)        pembrolizumab (Keytruda) 400 mg in sodium chloride 0.9% 116 mL IV, 400 mg (100 % of original dose 400 mg), intravenous, Once, 2 of 3 cycles    Dose modification: 400 mg (original dose 400 mg, Cycle 12)    Administration: 400 mg (3/4/2024), 400 mg (4/15/2024)        trastuzumab-anns (Kanjinti) 450 mg in sodium chloride 0.9% 250 mL IV, 6 mg/kg, intravenous, Once, 16 of 20 cycles    Administration: 300 mg (10/2/2023), 300 mg (10/16/2023), 300.3 mg (10/30/2023), 300.3 mg (11/20/2023), 300 mg (12/4/2023), 300 mg (12/18/2023), 300 mg (1/2/2024), 300 mg  (1/15/2024), 300 mg (1/29/2024), 300 mg (2/12/2024), 300.3 mg (3/4/2024), 300.3 mg (3/18/2024), 300.3 mg (4/1/2024), 300.3 mg (4/15/2024), 300.3 mg (4/29/2024)        LORazepam (Ativan) injection 1 mg, 1 mg, intravenous, As needed, 16 of 20 cycles    fosaprepitant (Emend) 150 mg in sodium chloride 0.9% 250 mL IV, 150 mg, intravenous, Once, 0 of 17 cycles        palonosetron (Aloxi) injection 250 mcg, 250 mcg, intravenous, Once, 0 of 17 cycles        fam-trastuzumab deruxtecan-nxki (Enhertu) 500 mg in dextrose 5% 125 mL IV, 6.4 mg/kg = 500 mg, intravenous, Once, 0 of 17 cycles       Treatment Summary:  Radiation Treatments       No active radiation treatments to show.            SUBJECTIVE: Patient was seen and examined. Currently endorsing ongoing weakness in his lower extremities, but also extremely fatigued today likely in combination with his ongoing RT treatment and concomitant medications he is taking at this time. Denies diarrhea, nausea or vomiting, pain is better but still ongoing.      OBJECTIVE:   Vital Signs:  /73   Pulse 71   Temp 36 °C (96.8 °F)   Resp 18   SpO2 96%    Pain Scale: 3 /10.    Vitals WNL, labs reviewed, no radiation dermatitis, ongoing weakness in lower extremities    Toxicity Assessment          6/26/2024    16:20   Toxicity Assessment   Treatment Site General   Dehydration Grade 0   Depression Grade 0   Dermatitis Radiation Grade 0   Diarrhea Grade 0   Fatigue Grade 2   Fracture Grade 0   Nausea Grade 0   Pain Grade 1   Vomiting Grade 0        ASSESSMENT/PLAN:  The patient is tolerating radiation therapy as anticipated. Patient is planned for systemic therapy this upcoming week. We will plan for gamma knife after he recovers from his inpatient stay and is on systemic therapy, sometime in July. Continue RT as planned.     Gurmeet Gongora MD  PGY-3 Radiation Oncology

## 2024-06-26 NOTE — SIGNIFICANT EVENT
7000 Physician Certification      As the individual's attending physician , I certify that the above-named patient:  Is being discharged to a nursing facility directly from a hospital after receiving acute patient care at the hospital, and  Requires nursing facility services for the condition for which he/she received care in the hospital, and  Requires fewer than 30 days of nursing facility services, no later than the date of discharge.    I certify that inpatient care is required at the level recommended above. To the best of my knowledge, all information provided about the individual is a true and accurate reflection of the individual's condition.

## 2024-06-26 NOTE — CARE PLAN
The patient's goals for the shift include      The clinical goals for the shift include remain safe and free from injury    Problem: Pain  Goal: My pain/discomfort is manageable  Outcome: Progressing     Problem: Safety  Goal: Patient will be injury free during hospitalization  Outcome: Progressing  Goal: I will remain free of falls  Outcome: Progressing     Problem: Daily Care  Goal: Daily care needs are met  Outcome: Progressing     Problem: Psychosocial Needs  Goal: Demonstrates ability to cope with hospitalization/illness  Outcome: Progressing  Goal: Collaborate with me, my family, and caregiver to identify my specific goals  Outcome: Progressing     Problem: Discharge Barriers  Goal: My discharge needs are met  Outcome: Progressing     Problem: Pain  Goal: Takes deep breaths with improved pain control throughout the shift  Outcome: Progressing  Goal: Turns in bed with improved pain control throughout the shift  Outcome: Progressing  Goal: Walks with improved pain control throughout the shift  Outcome: Progressing  Goal: Performs ADL's with improved pain control throughout shift  Outcome: Progressing  Goal: Participates in PT with improved pain control throughout the shift  Outcome: Progressing  Goal: Free from opioid side effects throughout the shift  Outcome: Progressing  Goal: Free from acute confusion related to pain meds throughout the shift  Outcome: Progressing     Problem: Skin  Goal: Decreased wound size/increased tissue granulation at next dressing change  Outcome: Progressing  Flowsheets (Taken 6/26/2024 0151)  Decreased wound size/increased tissue granulation at next dressing change: Promote sleep for wound healing  Goal: Participates in plan/prevention/treatment measures  Outcome: Progressing  Flowsheets (Taken 6/26/2024 0151)  Participates in plan/prevention/treatment measures: Elevate heels  Goal: Prevent/manage excess moisture  Outcome: Progressing  Flowsheets (Taken 6/26/2024  0151)  Prevent/manage excess moisture: Follow provider orders for dressing changes  Goal: Prevent/minimize sheer/friction injuries  Outcome: Progressing  Flowsheets (Taken 6/26/2024 0151)  Prevent/minimize sheer/friction injuries: HOB 30 degrees or less  Goal: Promote/optimize nutrition  Outcome: Progressing  Flowsheets (Taken 6/26/2024 0151)  Promote/optimize nutrition: Consume > 50% meals/supplements  Goal: Promote skin healing  Outcome: Progressing  Flowsheets (Taken 6/26/2024 0151)  Promote skin healing: Protective dressings over bony prominences     Problem: Pain - Adult  Goal: Verbalizes/displays adequate comfort level or baseline comfort level  Outcome: Progressing     Problem: Safety - Adult  Goal: Free from fall injury  Outcome: Progressing     Problem: Discharge Planning  Goal: Discharge to home or other facility with appropriate resources  Outcome: Progressing     Problem: Chronic Conditions and Co-morbidities  Goal: Patient's chronic conditions and co-morbidity symptoms are monitored and maintained or improved  Outcome: Progressing     Problem: Fall/Injury  Goal: Not fall by end of shift  Outcome: Progressing  Goal: Be free from injury by end of the shift  Outcome: Progressing  Goal: Verbalize understanding of personal risk factors for fall in the hospital  Outcome: Progressing  Goal: Verbalize understanding of risk factor reduction measures to prevent injury from fall in the home  Outcome: Progressing  Goal: Use assistive devices by end of the shift  Outcome: Progressing  Goal: Pace activities to prevent fatigue by end of the shift  Outcome: Progressing

## 2024-06-26 NOTE — NURSING NOTE
Pt is AxOx4 on room air with c/o hiccups and sore throat.  PRN added, pt unable to receive prn thorazine due to rest achieved after prn pain management.  Pt remained free from falls and able to obtain rest through the night.

## 2024-06-26 NOTE — PROGRESS NOTES
"Massimo Garcia is a 63 y.o. male on day 17 of admission presenting with Colitis.    Subjective   Patient seen at bedside with his wife present. Discussed plan for echo today and then discharge to SNF tomorrow. Patients wife states that she feels that he is coughing up more sputum and is more SOB. Discussed plan for CXR. Patient states that he no longer has any strength in his BLE. States that there is minimal feeling in his legs. States that he still has  strength in his right arm and full ROM in his left. Denies fever/chills, N/V/D/C, headache dizziness or vision changes        Objective     Physical Exam  HENT:      Head: Normocephalic.      Nose: Nose normal.      Mouth/Throat:      Mouth: Mucous membranes are moist.   Eyes:      Pupils: Pupils are equal, round, and reactive to light.   Cardiovascular:      Rate and Rhythm: Normal rate.   Pulmonary:      Breath sounds: Normal breath sounds.   Abdominal:      Palpations: Abdomen is soft.   Musculoskeletal:      Left upper arm: Edema present.      Right elbow: Decreased range of motion.      Cervical back: Normal range of motion.      Right hip: Decreased range of motion. Normal strength.      Left hip: Decreased range of motion. Decreased strength.      Right knee: Decreased range of motion.      Left knee: Decreased range of motion.      Comments: 0/5 strength in BLE. 1/5 strength in RUE. 4/5 strength in LUE   Skin:     Comments: Spinal surgical wound    Neurological:      Mental Status: He is alert.      Motor: Weakness and atrophy present.   Psychiatric:         Mood and Affect: Mood is depressed.         Last Recorded Vitals  Blood pressure 113/60, pulse 91, temperature 36.2 °C (97.2 °F), temperature source Temporal, resp. rate 18, height 1.727 m (5' 8\"), weight 73.5 kg (162 lb), SpO2 95%.  Intake/Output last 3 Shifts:  I/O last 3 completed shifts:  In: 200 (2.7 mL/kg) [P.O.:200]  Out: 450 (6.1 mL/kg) [Urine:450 (0.2 mL/kg/hr)]  Weight: 73.5 kg     Relevant " Results              This patient has a central line   Reason for the central line remaining today?  Chemotherapy                  Assessment/Plan   Principal Problem:    Jennifer Garcia is a 63 y.o. 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 Patient admitted to [facility] (6/5-6/7) c/f partial SBO, resolved prior to discharge from [facility], surgery consulted at OSH and did not recommend surgical interventions at this time. Presented to [facility] 6/8 with continued abdominal pain and diarrhea. CT A/P (6/8) w/o bowel obstruction, c/f colitis on imaging. Cdiff and stool pathogen neg (6/9). No leukocytosis or infectious symptoms at this time (no fevers/chills), no indication for abx at this time. Supportive oncology consulted for further pain management (6/9), recs followed. Pt tolerating full liquid diet, no nausea or vomiting, advanced to regular diet (6/11). Radiation oncology consulted as he was recently seen outpatient with plan for CT simulation for gamma knife and radiation to spine, rec systemic therapy and interval palliative RT sequenced in between cycles-- re-engaged 6/18 after MRI spine. 6/12 BAT called d/t new right arm weakness. BAT cancelled because symptoms not consistent with stroke. Neurology consulted for new arm weakness workup. CTH showed concern for bleed of his known left temporal lobe, worsening edema around his right occipital. However, neither can explain his symptoms. CT of the neck showed two larger cervical lymphnodes on the right side at C4-5 level, concern for  compression on his right upper brachial plexuses. Rad/onc paged regarding CT head showing worsening disease in brain. Surg/onc consulted d/t concern for brachial plexus injury d/t large cervical lymph node-no plans for surgical interventions. Patient given Dex 10mg x1 dose and then 4mg q6 hours. NSGY consulted d/t concern for temporal bleed. Repeat head CT 6/12 showing same findings as previous. Eliquis restarted 6/15 given no hemmorage on MRI. MRI brain with worsening edema, MRI C-spine with worsening progression. 6/15 weakness progressing and patient no longer able to ambulate. 6/16 called NSGY to re engage d/t new onset on BLE weakness. After reviewing results recommendation to consult ortho/spine to r/o cord compression. 6/16 Ortho consulted. MRI T-spine and L spine under anesthesia 6/17 demonstrated worsening marrow replacing lesions and epidural extension with deformity of the spinal cord and edema possibly representing cord compression. 6/18 discussed with rad/onc possible inpatient emergent radiation. Started urgent palliative RT to the spine, C6-7, T8-10, L2, tentatively planned for 20Gy in 5 fractions. S/p CT sim 6/19 and started RT 6/19 afternoon (due 6/20, 6/21, 6/24, and 6/25). PT/OT rec [facility], PM&R consulted 6/19 and also rec [facility] (pt would not be able to receive radiation or chemo for 7-10 days while at [facility] and must be off IV pain meds). Dr Blake emailed 6/20 regarding formal tx plan. Initially planned to DC to [facility] but spoke to Dr. Blake 6/23 and still planning to continue treatment 6/28 so will need to DC to SNF to continue chemotherapy. DC pending radiation completion on 6/25 and SNF placement. Discharge most likely 6/27.      # Acute RUE weakness/ possible spinal cord compression   - BAT activated on 6/12 at 0859 for new right arm weakness   - CT brain attack, CT head w/wo IV contrast showing increased temporal nodule and vasogenic edema  - Neurology consulted concerned for bleed from temporal tumor.  Repeat CT head stable    - NSGY consulted d/t concern for brain bleed but low suspicion for active bleed    - CT soft tissue neck w/ contrast showing abundant bilateral heterogeneously enhancing lymph nodes consistent  with ralph metastases  - Surg/onc consulted for concern for brachial plexus causing new onset weakness-no surgical interventions   - Rad/onc note 6/12 recommending systemic therapy and interval palliative RT sequenced in between cycles  - Dexamethasone 10mg x1 then 4mg q6. Plan to discharge with 4mg BID x5 days then 2mg BID x5 days then 2mg daily x5 days. (Per rad onc) 6/23 decreased to 4mg q12 (6/23-6/27)   - 6/17 MRI c-spine and MRI brain with concern for progression/ worsening edema, possible cord compression  - 6/18 discussed with rad/onc possible inpatient emergent radiation  - Started urgent palliative RT to the spine, C6-7, T8-10, L2, tentatively planned for 20Gy in 5 fractions  - s/p CT sim 6/19 and started RT 6/19 afternoon (due 6/20, 6/21, 6/24, and 6/25)  - IV dilaudid 1mg x1 and IV Ativan 0.5mg x1 daily prior to each RT  - Duplex BL LE ultrasound negative (6/23) (R leg cool to touch on Sunday)     # Acute BLE weakness   - 6/15 began experiencing mild BLE weakness   - 6/16 BLE weakness progression to point of bedbound  - Re engaged NSGY. Imaging reviewed again and low suspicion for BLE secondary to brain edema and progression of disease. More likely cord compression vs muscle atrophy  - 6/16 Ortho consulted d/t concern for cauda equina   - Stat MRI T-spine and L-spine ordered but patient patient unable to tolerate despite premeds   - 6/17 MRI T-spine and L-spine under anesthesia- demonstrated worsening marrow replacing lesions and epidural extension with deformity of the spinal cord and edema possibly representing cord compression  - Steroids as above   - NSGY 6/18 no plan for surgical intervention   - 6/18 Rad Onc re- engaged given MRI spine--> started urgent RT (see above)  - PT/OT rec  [facility], PM&R consulted 6/19 and also rec [facility] (pt would not be able to receive radiation or chemo for 7-10 days while at [facility] and must be off IV pain meds). Now plan to DC to SNF so he can get treatment      # Leukocytosis   - WBC uptrending to 38.6 (6/16) --> 28.3 (6/25)   - Likely in setting of steroid use vs infection   - CT chest 6/15 neg for infectious process   - Patient afebrile, HR WNL, normotensive   - Close monitoring of infectious symptoms and low threshold to start abx       # Spinal wound superficial dehiscence   - Wound care consulted 6/18: rec cleanse wound with Vashe or NS, apply aquacell AG to wound, and cover with Mepilex border dressing  - Pt/wife would like wound assessed daily     # Thrush   - Started on nystain 4 x daily on admission   - Complained of worsening mouth pain without difficulty swallowing on 6/25   - Started fluconazole (6/25) x 7 days  - Started BMX Q6 hrs PRN and biotene TID PRN      # 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 Tomasa Blake for Oncology; spoke with him 6/14. Emailed again 6/20-waiting for response   - 6/8 CT A/P wo evidence of bowel obstruction, but w/ short segment of circumferential thickening of ascending colon superior to the cecum new from prior, which may represent colitis. Also, redemonstration of metastatic disease in the visualized lung bases and liver unchanged in the short-term interval since 06/05/2024; redemonstration of abdominopelvic lymphadenopathy; redemonstration of left adrenal nodule, likely also metastatic; also Small left pleural effusion unchanged in the short-term interval. Interval development of trace ascites in the dependent portion of the pelvis  - Palliative care consulted while at [facility]; recommended 2mg IV dilaudid Q3 PRN for severe pain, continued on admission (6/9- )  -  EKG (6/8)    - C/w Tylenol 650mg Q6H, Capsaicin cream QID, Duloxetine 60mg daily, Lyrica 50mg BID, Fentanyl patch 100mcg, 0.5mg Lorazepam IV Q8hrs PRN anxiety  - Miralax 17g BID, Senna/Colace 2 tabs BID for opioid induced constipation   - C/w Zofran, Compazine, Phenergan PRN N/V  - Decadron 16mg Ivx1 on 6/8, c/w home prednisone 20mg daily (part of taper, through 6/14, then decreased to 10mg daily for 7 days) with PPI   - Holding home Flexeril  - supportive oncology consulted (6/9), rec cont PO Dilaudid 4mg Q2 PRN for moderate to severe pain, cont IV Dilaudid 2mg Q3 for breakthrough pain, increasing Fentanyl patch change frequency to 100mcgs Q48hr, and starting Bentyl 10mg x4/day PRN for abdominal cramps  - supportive onc updated recs (6/11): increase PO Dilaudid from 4mg to 6mg Q3, and increase Fentanyl from 100mcg to 125mcg Q48hr  - Radiation oncology consulted 6/9; further recs pending (seen outpt on 6/4 with plan for gamma knife and radiation to spine pending CT simulation)   - 6/12 BAT/new acute right arm weakness as above   - Oncology consulted 6/15 given progression of disease for any consideration of inpatient treatment. Rec next line chemo is JER2 targeted enhertu which cannot be given inpt, but on discussion, there is concern that patient not strong enough to physically make it to chemo infusions.  - HER2 added onto 5/17 biopsy (pending) per onc  - Spoke with Dr. Blake 6/23 and he said he will likely still give him treatment 6/28      # Colitis on Imaging iso Hx of Drug Induced Colitis   - Admitted in May for immunotherapy related colitis (infectious workup remained negative); on steroid taper as above   - Presented to [facility] (6/5-6/7) with abdominal pain/ diarrhea c/f CBO  - CT A/P (6/5): Mildly loops dilated loops of jejunum measuring up to 3.4 cm, early developing partial small bowel obstruction can not be excluded  - While at [facility] pt was passing gas and was having active Bms, general surgery  consulted and did not recommend any surgical interventions   - CT A/P (6/8) c/f colitis   - Cdiff/Stool pathogen panel negative (6/9)  - Pt placed NPO on admission, increased to clear liquid diet (6/9 AM), advanced to full liquid diet (6/9 PM), advanced to regular diet (6/11)  - Phenergan 12.5mg IV q6hrs PRN, Compazine 5mg q6hrs PRN, Zofran 4mg IV q6hrs PRN; caution with Qtc prolonging drugs with hx of 3rd degree AV block     # Neoplasm-related pain  - Supportive oncology consulted 6/9, recs followed  - PO dilaudid 4mg Q3 PRN for moderate pain, PO dilaudid 6mg q3hrs PRN severe pain, cont IV Dilaudid 1mg Q3 for breakthrough pain, scheduled tylenol 650mg q6hrs, Lyrica 50mg BID  - Fentanyl patch change frequency to 125mcgs Q48hr, and starting Bentyl 10mg x4/day PRN for abdominal cramps  - Voltaren gel 4g 4x/day PRN for mild pain of the R shoulder     # Hyponatremia - resolved   - 6/17 Na 129--> 133 (6/19)--> 136 (6/20)  - Serum osmolality sent (6/17): 274  - Urine electrolytes sent 6/17  - Consider NS IVF pending further workup      # Anxiety  - Continue Cymbalta 60mg daily  - Ativan 0.5mg q8hrs PRN anxiety  - Atarax 25mg 4x/day scheduled ordered 6/16 d/t anxiety- however 6/20 pt very withdrawn, changed dosing ot 4x/day PRN only     # Opioid-induced constipation  - LBM 6/24  - DocuSenna 2tabs BID, Colace 100mg BID, Miralax BID, Lactulose 20g 3x/day PRN     # Hx of PVT/PE (on Eliquis)   - Continue Eliquis 5mg BID     # HTN   # Third Degree AV Block s/p PM (11/9/23)  - No active BP medications      # Prophy   - Home eliquis   - OOB as able, SCD while in bed      DISPO:  - Full code (confirmed on admission)   - Discharge to SNF after completion of RT; most likely 6/26  - NOK: Jane (wife) [phone]; updated at bedside 6/25  - FUVs: 6/28 w/ heme onc, 6/28 w/ palliative, 8/19 w/ neurosurgery         I spent 60 minutes in the professional and overall care of this patient.      Abbey Little, ALFREDITO-CNP  Patient  discussed with Dr. Guardado

## 2024-06-27 ENCOUNTER — APPOINTMENT (OUTPATIENT)
Dept: CARDIOLOGY | Facility: HOSPITAL | Age: 64
End: 2024-06-27
Payer: MEDICARE

## 2024-06-27 VITALS
RESPIRATION RATE: 18 BRPM | HEART RATE: 108 BPM | WEIGHT: 162 LBS | SYSTOLIC BLOOD PRESSURE: 121 MMHG | BODY MASS INDEX: 24.55 KG/M2 | DIASTOLIC BLOOD PRESSURE: 73 MMHG | OXYGEN SATURATION: 96 % | TEMPERATURE: 97.2 F | HEIGHT: 68 IN

## 2024-06-27 PROCEDURE — 2500000002 HC RX 250 W HCPCS SELF ADMINISTERED DRUGS (ALT 637 FOR MEDICARE OP, ALT 636 FOR OP/ED)

## 2024-06-27 PROCEDURE — 2500000002 HC RX 250 W HCPCS SELF ADMINISTERED DRUGS (ALT 637 FOR MEDICARE OP, ALT 636 FOR OP/ED): Performed by: STUDENT IN AN ORGANIZED HEALTH CARE EDUCATION/TRAINING PROGRAM

## 2024-06-27 PROCEDURE — 2500000001 HC RX 250 WO HCPCS SELF ADMINISTERED DRUGS (ALT 637 FOR MEDICARE OP)

## 2024-06-27 PROCEDURE — 2500000001 HC RX 250 WO HCPCS SELF ADMINISTERED DRUGS (ALT 637 FOR MEDICARE OP): Performed by: STUDENT IN AN ORGANIZED HEALTH CARE EDUCATION/TRAINING PROGRAM

## 2024-06-27 PROCEDURE — 2500000004 HC RX 250 GENERAL PHARMACY W/ HCPCS (ALT 636 FOR OP/ED)

## 2024-06-27 PROCEDURE — 99239 HOSP IP/OBS DSCHRG MGMT >30: CPT

## 2024-06-27 RX ORDER — HYDROMORPHONE HYDROCHLORIDE 4 MG/1
4 TABLET ORAL EVERY 4 HOURS PRN
Qty: 42 TABLET | Refills: 0 | Status: SHIPPED | OUTPATIENT
Start: 2024-06-27 | End: 2024-07-07 | Stop reason: HOSPADM

## 2024-06-27 RX ORDER — LORAZEPAM 0.5 MG/1
0.5 TABLET ORAL ONCE
Status: COMPLETED | OUTPATIENT
Start: 2024-06-27 | End: 2024-06-27

## 2024-06-27 RX ORDER — HEPARIN 100 UNIT/ML
5 SYRINGE INTRAVENOUS ONCE
Status: COMPLETED | OUTPATIENT
Start: 2024-06-27 | End: 2024-06-27

## 2024-06-27 RX ORDER — LORAZEPAM 1 MG/1
0.5 TABLET ORAL EVERY 8 HOURS PRN
Qty: 21 TABLET | Refills: 0 | Status: SHIPPED | OUTPATIENT
Start: 2024-06-27 | End: 2024-07-07 | Stop reason: HOSPADM

## 2024-06-27 RX ADMIN — FENTANYL 1 PATCH: 25 PATCH TRANSDERMAL at 10:51

## 2024-06-27 RX ADMIN — HEPARIN 500 UNITS: 100 SYRINGE at 10:48

## 2024-06-27 RX ADMIN — HYDROMORPHONE HYDROCHLORIDE 4 MG: 4 TABLET ORAL at 10:49

## 2024-06-27 RX ADMIN — FENTANYL 1 PATCH: 100 PATCH TRANSDERMAL at 10:51

## 2024-06-27 RX ADMIN — APIXABAN 5 MG: 5 TABLET, FILM COATED ORAL at 09:33

## 2024-06-27 RX ADMIN — FLUCONAZOLE 150 MG: 150 TABLET ORAL at 09:31

## 2024-06-27 RX ADMIN — POTASSIUM CHLORIDE 20 MEQ: 1500 TABLET, EXTENDED RELEASE ORAL at 09:33

## 2024-06-27 RX ADMIN — PREGABALIN 50 MG: 25 CAPSULE ORAL at 09:32

## 2024-06-27 RX ADMIN — ACETAMINOPHEN 650 MG: 325 TABLET ORAL at 10:48

## 2024-06-27 RX ADMIN — LEVOFLOXACIN 750 MG: 750 TABLET, FILM COATED ORAL at 09:31

## 2024-06-27 RX ADMIN — LORAZEPAM 0.5 MG: 0.5 TABLET ORAL at 11:09

## 2024-06-27 RX ADMIN — DULOXETINE HYDROCHLORIDE 60 MG: 60 CAPSULE, DELAYED RELEASE ORAL at 09:32

## 2024-06-27 ASSESSMENT — COGNITIVE AND FUNCTIONAL STATUS - GENERAL
DRESSING REGULAR UPPER BODY CLOTHING: A LOT
STANDING UP FROM CHAIR USING ARMS: TOTAL
MOVING TO AND FROM BED TO CHAIR: TOTAL
TOILETING: A LOT
TURNING FROM BACK TO SIDE WHILE IN FLAT BAD: A LOT
DAILY ACTIVITIY SCORE: 13
PERSONAL GROOMING: A LOT
DRESSING REGULAR LOWER BODY CLOTHING: A LOT
MOVING FROM LYING ON BACK TO SITTING ON SIDE OF FLAT BED WITH BEDRAILS: A LOT
MOBILITY SCORE: 8
HELP NEEDED FOR BATHING: A LOT
CLIMB 3 TO 5 STEPS WITH RAILING: TOTAL
EATING MEALS: A LITTLE
WALKING IN HOSPITAL ROOM: TOTAL

## 2024-06-27 ASSESSMENT — PAIN - FUNCTIONAL ASSESSMENT: PAIN_FUNCTIONAL_ASSESSMENT: 0-10

## 2024-06-27 ASSESSMENT — PAIN SCALES - GENERAL: PAINLEVEL_OUTOF10: 6

## 2024-06-27 NOTE — DISCHARGE SUMMARY
Discharge Diagnosis  Colitis    Issues Requiring Follow-Up  Esophageal cancer     Test Results Pending At Discharge  Pending Labs       No current pending labs.            Hospital Course  Massimo Garcia is a 63 y.o. 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 Patient admitted to [place] (6/5-6/7) c/f partial SBO, resolved prior to discharge from [facility], surgery consulted at OSH and did not recommend surgical interventions at this time. Presented to [facility] 6/8 with continued abdominal pain and diarrhea. CT A/P (6/8) w/o bowel obstruction, c/f colitis on imaging. Cdiff and stool pathogen neg (6/9). Supportive oncology consulted for further pain management (6/9), recs followed. Pt tolerating full liquid diet, no nausea or vomiting, advanced to regular diet (6/11). Radiation oncology consulted as he was recently seen outpatient with plan for CT simulation for gamma knife and radiation to spine, rec systemic therapy and interval palliative RT sequenced in between cycles-- re-engaged 6/18 after MRI spine. 6/12 BAT called d/t new right arm weakness. BAT cancelled because symptoms not consistent with stroke. Neurology consulted for new arm weakness workup. CTH showed concern for bleed of his known left temporal lobe, worsening edema around his right occipital. However, neither can explain his symptoms. CT of the neck showed two larger cervical lymphnodes on the right side at C4-5 level, concern for compression on his right upper brachial plexuses. Rad/onc paged regarding CT head showing worsening  disease in brain. Surg/onc consulted d/t concern for brachial plexus injury d/t large cervical lymph node-no plans for surgical interventions. Patient given Dex 10mg x1 dose and then 4mg q6 hours. NSGY consulted d/t concern for temporal bleed. Repeat head CT 6/12 showing same findings as previous. Eliquis restarted 6/15 given no hemmorage on MRI. MRI brain with worsening edema, MRI C-spine with worsening progression. 6/15 weakness progressing and patient no longer able to ambulate. 6/16 called NSGY to re engage d/t new onset on BLE weakness. After reviewing results recommendation to consult ortho/spine to r/o cord compression. 6/16 Ortho consulted. MRI T-spine and L spine under anesthesia 6/17 demonstrated worsening marrow replacing lesions and epidural extension with deformity of the spinal cord and edema possibly representing cord compression. Ortho discussed possible surgical interventions but this would delay further systemic treatment, and patient not good candidate for surgery. Patient and his family decided to decline surgical interventions. 6/18 discussed with rad/onc possible inpatient emergent radiation. Started urgent palliative RT to the spine, C6-7, T8-10, L2, for 20Gy in 5 fractions. S/p CT sim 6/19 and RT 6/19, 6/20, 6/21, 6/24, and 6/25. PT/OT rec AR, PM&R consulted 6/19 and also rec AR (pt would not be able to receive radiation or chemo for 7-10 days while at AR and must be off IV pain meds). Team spoke with Dr. Blake 6/20 regarding formal tx plan. Will likely proceed with systemic treatment 6/28 so will need to DC to SNF to continue treatment. 6/26 CXR d/t worsening SOB showing progression of malignancy and could not exclude infection. Patient started on levofloxacin 750mg daily planned for (6/26-7/3) and will complete at SNF. 6/26 outpatient echo planned for 6/26 concerted to inpatient with no acute findings. 6/27 patient discharged to SNF with paper Rx for dilaudid 4mg PO x7 days and Ativan 0.5mg  x7 days. Patient to follow up with Dr. Blake 6/28 to continue treatment.   On the day of discharge, the patient reported feeling well and pain was controlled. Vitals and labs were stable. On exam:  Constitutional: Awake, NAD  ENMT: mucous membranes moist, no apparent injury, no lesions seen  Head/Neck: NCAT  Respiratory/Thorax: CTAB, no wheezing  Cardiovascular: RRR, S1+S2, no murmur  Gastrointestinal: Soft, NT, nondistended, +BS  Extremities: No LE edema  Psychological: Appropriate mood and behavior  Skin: no rash noted  Attending has reviewed all labs and vitals, and discussed and agreed with the discharge plan prior to patient discharge.  Patient discharged in stable condition. > 30 minutes spent on discharge planning      Pertinent Physical Exam At Time of Discharge  Physical Exam  HENT:      Head: Normocephalic.      Nose: Nose normal.      Mouth/Throat:      Mouth: Mucous membranes are moist.   Eyes:      Pupils: Pupils are equal, round, and reactive to light.   Cardiovascular:      Rate and Rhythm: Normal rate.   Pulmonary:      Breath sounds: Normal breath sounds.   Abdominal:      Palpations: Abdomen is soft.   Musculoskeletal:      Cervical back: Normal range of motion.      Comments: BLE flaccid  RUE 1/5   LUE 4/5   Skin:     Comments: Surgical spinal wound    Neurological:      Mental Status: He is alert.      Motor: Weakness present.   Psychiatric:         Mood and Affect: Mood is depressed.         Home Medications     Medication List      START taking these medications     dexAMETHasone 2 mg tablet; Commonly known as: Decadron; Take 2 tablets   (4 mg) by mouth 2 times a day for 3 days, THEN 1 tablet (2 mg) 2 times a   day for 5 days, THEN 1 tablet (2 mg) once daily for 5 days.; Start taking   on: June 25, 2024   dicyclomine 10 mg capsule; Commonly known as: Bentyl; Take 1 capsule (10   mg) by mouth 4 times a day before meals.   fluconazole 150 mg tablet; Commonly known as: Diflucan; Take 1 tablet    (150 mg) by mouth once daily for 6 doses.   hydrOXYzine HCL 25 mg tablet; Commonly known as: Atarax; Take 1 tablet   (25 mg) by mouth every 6 hours if needed for anxiety.   lactulose 20 gram/30 mL oral solution; Take 30 mL (20 g) by mouth 3   times a day as needed (take if no BM x48 hours).   levoFLOXacin 750 mg tablet; Commonly known as: Levaquin; Take 1 tablet   (750 mg) by mouth once every 24 hours for 7 doses.   moisturizing mouth solution; Commonly known as: Biotene Dry Mouth; Swish   and spit 15 mL 3 times a day as needed (dry mouth).   ondansetron 4 mg tablet; Commonly known as: Zofran; Take 1 tablet (4 mg)   by mouth every 6 hours if needed for nausea or vomiting.     CHANGE how you take these medications     HYDROmorphone 4 mg tablet; Commonly known as: Dilaudid; Take 1 tablet (4   mg) by mouth every 4 hours if needed for moderate pain (4 - 6) or severe   pain (7 - 10) for up to 7 days.; What changed: reasons to take this,   Another medication with the same name was removed. Continue taking this   medication, and follow the directions you see here.   LORazepam 1 mg tablet; Commonly known as: Ativan; Take 0.5 tablets (0.5   mg) by mouth every 8 hours if needed for anxiety (nausea) for up to 14   days.; What changed: how much to take     CONTINUE taking these medications     apixaban 5 mg tablet; Commonly known as: Eliquis; TAKE 1 TABLET BY MOUTH   TWO TIMES A DAY   cholecalciferol 50 MCG (2000 UT) tablet; Commonly known as: Vitamin D-3   cyclobenzaprine 10 mg tablet; Commonly known as: Flexeril; Take 1 tablet   (10 mg) by mouth 3 times a day for 14 days.   docusate sodium 100 mg capsule; Commonly known as: Colace; Take 1   capsule (100 mg) by mouth 2 times a day.   multivitamin with minerals tablet   naloxone 0.4 mg/mL injection; Commonly known as: Narcan; Infuse 0.5 mL   (0.2 mg) into a venous catheter every 5 minutes if needed for respiratory   depression.   pantoprazole 40 mg EC tablet; Commonly known  as: ProtoNix; Take 1 tablet   (40 mg) by mouth once daily in the morning. Take before meals. Do not   crush, chew, or split.   polyethylene glycol 17 gram/dose powder; Commonly known as: Glycolax,   Miralax; Take 17 g by mouth 2 times a day.   potassium chloride CR 10 mEq ER tablet; Commonly known as: Klor-Con   pregabalin 50 mg capsule; Commonly known as: Lyrica; Take 1 capsule (50   mg) by mouth 2 times a day.   prochlorperazine 10 mg/2 mL (5 mg/mL) solution; Commonly known as:   Compazine; Infuse 1 mL (5 mg) into a venous catheter every 6 hours if   needed for nausea or vomiting.     ASK your doctor about these medications     acetaminophen 325 mg tablet; Commonly known as: Tylenol; Take 2 tablets   (650 mg) by mouth every 6 hours.; Ask about: Should I take this   medication?   DULoxetine 60 mg DR capsule; Commonly known as: Cymbalta; Take 1 capsule   (60 mg) by mouth once daily. Do not crush or chew.   fentaNYL 100 mcg/hr patch; Commonly known as: Duragesic; Place 1 patch   over 72 hours on the skin every 3rd day for 9 days.; Ask about: Should I   take this medication?   metoclopramide 10 mg tablet; Commonly known as: Reglan; Take 1 tablet   (10 mg) by mouth every 8 hours if needed (Hiccups alternative if Thorazine   not working. Don't give with thorazine for therapy as too much D2 agonists   can have bad side effects).   nystatin 100,000 unit/mL suspension; Commonly known as: Mycostatin;   Swish and swallow 5 mL (500,000 Units) 4 times a day for 14 days.; Ask   about: Should I take this medication?   predniSONE 10 mg tablet; Commonly known as: Deltasone; Take 4 tablets   (40 mg) by mouth once daily for 7 days, THEN 2 tablets (20 mg) once daily   for 7 days, THEN 1 tablet (10 mg) once daily for 7 days. Do not fill   before May 25, 2024.; Start taking on: May 25, 2024; Ask about: Should I   take this medication?   Senexon-S 8.6-50 mg tablet; Generic drug: sennosides-docusate sodium;   Take 2 tablets by mouth 2  times a day.       Outpatient Follow-Up  Future Appointments   Date Time Provider Department [place]   6/28/2024  9:00 AM Tomasa Blake MD [id number] [facility]   6[facility]28/2024 10:00 [facility]   7/19/2024  9:30 AM Tomasa Blake MD [id number] [facility]   7/19/2024 10:00 [facility]   8/19/2024 10:20 AM Blue John MD [id number] [place]       Abbey Little, APRN-CNP

## 2024-06-27 NOTE — PROGRESS NOTES
06/26/24 1320   Discharge Planning   Who is requesting discharge planning? Provider   Home or Post Acute Services Post acute facilities (Rehab/SNF/etc)   Type of Post Acute Facility Services Skilled nursing   Patient expects to be discharged to: [facility]   Does the patient need discharge transport arranged? Yes   RoundTrip coordination needed? Yes   Has discharge transport been arranged? Yes   What day is the transport expected? 06/27/24   What time is the transport expected? 1000     6/26/24 @ 1320  Patient will discharge to [facility] [place] tomorrow, 6/27. Patient and spouse made aware of transport time. RAYSHAWN Little NP updated. Report # will be- [phone]. JOEY Benton RN made aware as well.   The S Vehicle you requested for Massimo ROJAS in unit/room SCC [id number] on 06/27/2024 is scheduled to arrive at 10:00am EDT! [facility] handling this ride and you can contact them at [phone].    UPDATE 1530: [id number] completed in HENS.  Frida Soria RN [facility]    6/27/24 @ 0900  Patient to discharge to [facility] [place] at 10am. DAISY Ornelas RN made aware and given report number.   Frida Soria RN [facility]

## 2024-06-28 ENCOUNTER — APPOINTMENT (OUTPATIENT)
Dept: PALLIATIVE MEDICINE | Facility: CLINIC | Age: 64
End: 2024-06-28
Payer: MEDICARE

## 2024-06-28 ENCOUNTER — INFUSION (OUTPATIENT)
Dept: HEMATOLOGY/ONCOLOGY | Facility: CLINIC | Age: 64
End: 2024-06-28
Payer: MEDICARE

## 2024-06-28 ENCOUNTER — OFFICE VISIT (OUTPATIENT)
Dept: HEMATOLOGY/ONCOLOGY | Facility: CLINIC | Age: 64
End: 2024-06-28
Payer: MEDICARE

## 2024-06-28 VITALS
WEIGHT: 153 LBS | BODY MASS INDEX: 23.26 KG/M2 | TEMPERATURE: 97.7 F | RESPIRATION RATE: 16 BRPM | DIASTOLIC BLOOD PRESSURE: 67 MMHG | SYSTOLIC BLOOD PRESSURE: 106 MMHG | OXYGEN SATURATION: 95 % | HEART RATE: 63 BPM

## 2024-06-28 DIAGNOSIS — C78.7 METASTASES TO THE LIVER (MULTI): ICD-10-CM

## 2024-06-28 DIAGNOSIS — C15.9 STAGE IV MALIGNANT NEOPLASM OF ESOPHAGUS (MULTI): Primary | ICD-10-CM

## 2024-06-28 DIAGNOSIS — C15.9 STAGE IV MALIGNANT NEOPLASM OF ESOPHAGUS (MULTI): ICD-10-CM

## 2024-06-28 LAB
ALBUMIN SERPL BCP-MCNC: 3 G/DL (ref 3.4–5)
ALP SERPL-CCNC: 394 U/L (ref 33–136)
ALT SERPL W P-5'-P-CCNC: 69 U/L (ref 10–52)
ANION GAP SERPL CALC-SCNC: 13 MMOL/L (ref 10–20)
AST SERPL W P-5'-P-CCNC: 87 U/L (ref 9–39)
BASOPHILS # BLD AUTO: 0.05 X10*3/UL (ref 0–0.1)
BASOPHILS NFR BLD AUTO: 0.2 %
BILIRUB SERPL-MCNC: 0.9 MG/DL (ref 0–1.2)
BUN SERPL-MCNC: 37 MG/DL (ref 6–23)
CALCIUM SERPL-MCNC: 9.1 MG/DL (ref 8.6–10.3)
CHLORIDE SERPL-SCNC: 100 MMOL/L (ref 98–107)
CO2 SERPL-SCNC: 24 MMOL/L (ref 21–32)
CREAT SERPL-MCNC: 0.55 MG/DL (ref 0.5–1.3)
DACRYOCYTES BLD QL SMEAR: NORMAL
EGFRCR SERPLBLD CKD-EPI 2021: >90 ML/MIN/1.73M*2
EOSINOPHIL # BLD AUTO: 0.01 X10*3/UL (ref 0–0.7)
EOSINOPHIL NFR BLD AUTO: 0 %
ERYTHROCYTE [DISTWIDTH] IN BLOOD BY AUTOMATED COUNT: 18.4 % (ref 11.5–14.5)
GLUCOSE SERPL-MCNC: 207 MG/DL (ref 74–99)
HBV CORE AB SER QL: NONREACTIVE
HBV SURFACE AB SER-ACNC: <3.1 MIU/ML
HBV SURFACE AG SERPL QL IA: NONREACTIVE
HCT VFR BLD AUTO: 33 % (ref 41–52)
HGB BLD-MCNC: 10.5 G/DL (ref 13.5–17.5)
IMM GRANULOCYTES # BLD AUTO: 0.48 X10*3/UL (ref 0–0.7)
IMM GRANULOCYTES NFR BLD AUTO: 2.3 % (ref 0–0.9)
LYMPHOCYTES # BLD AUTO: 0.12 X10*3/UL (ref 1.2–4.8)
LYMPHOCYTES NFR BLD AUTO: 0.6 %
MCH RBC QN AUTO: 29 PG (ref 26–34)
MCHC RBC AUTO-ENTMCNC: 31.8 G/DL (ref 32–36)
MCV RBC AUTO: 91 FL (ref 80–100)
MONOCYTES # BLD AUTO: 0.77 X10*3/UL (ref 0.1–1)
MONOCYTES NFR BLD AUTO: 3.6 %
NEUTROPHILS # BLD AUTO: 19.83 X10*3/UL (ref 1.2–7.7)
NEUTROPHILS NFR BLD AUTO: 93.3 %
NRBC BLD-RTO: ABNORMAL /100{WBCS}
PLATELET # BLD AUTO: 93 X10*3/UL (ref 150–450)
POLYCHROMASIA BLD QL SMEAR: NORMAL
POTASSIUM SERPL-SCNC: 4.5 MMOL/L (ref 3.5–5.3)
PROT SERPL-MCNC: 5.2 G/DL (ref 6.4–8.2)
RBC # BLD AUTO: 3.62 X10*6/UL (ref 4.5–5.9)
RBC MORPH BLD: NORMAL
SCHISTOCYTES BLD QL SMEAR: NORMAL
SODIUM SERPL-SCNC: 132 MMOL/L (ref 136–145)
WBC # BLD AUTO: 21.3 X10*3/UL (ref 4.4–11.3)

## 2024-06-28 PROCEDURE — 3074F SYST BP LT 130 MM HG: CPT | Performed by: INTERNAL MEDICINE

## 2024-06-28 PROCEDURE — 2500000004 HC RX 250 GENERAL PHARMACY W/ HCPCS (ALT 636 FOR OP/ED): Performed by: INTERNAL MEDICINE

## 2024-06-28 PROCEDURE — 1126F AMNT PAIN NOTED NONE PRSNT: CPT | Performed by: INTERNAL MEDICINE

## 2024-06-28 PROCEDURE — 99215 OFFICE O/P EST HI 40 MIN: CPT | Performed by: INTERNAL MEDICINE

## 2024-06-28 PROCEDURE — 85025 COMPLETE CBC W/AUTO DIFF WBC: CPT | Performed by: INTERNAL MEDICINE

## 2024-06-28 PROCEDURE — 96367 TX/PROPH/DG ADDL SEQ IV INF: CPT

## 2024-06-28 PROCEDURE — 96413 CHEMO IV INFUSION 1 HR: CPT

## 2024-06-28 PROCEDURE — 3078F DIAST BP <80 MM HG: CPT | Performed by: INTERNAL MEDICINE

## 2024-06-28 PROCEDURE — 86706 HEP B SURFACE ANTIBODY: CPT | Mod: GEALAB | Performed by: INTERNAL MEDICINE

## 2024-06-28 PROCEDURE — 87340 HEPATITIS B SURFACE AG IA: CPT | Mod: GEALAB | Performed by: INTERNAL MEDICINE

## 2024-06-28 PROCEDURE — 96375 TX/PRO/DX INJ NEW DRUG ADDON: CPT | Mod: INF

## 2024-06-28 PROCEDURE — 1159F MED LIST DOCD IN RCRD: CPT | Performed by: INTERNAL MEDICINE

## 2024-06-28 PROCEDURE — 86704 HEP B CORE ANTIBODY TOTAL: CPT | Mod: GEALAB | Performed by: INTERNAL MEDICINE

## 2024-06-28 PROCEDURE — 84075 ASSAY ALKALINE PHOSPHATASE: CPT | Performed by: INTERNAL MEDICINE

## 2024-06-28 PROCEDURE — 1111F DSCHRG MED/CURRENT MED MERGE: CPT | Performed by: INTERNAL MEDICINE

## 2024-06-28 RX ORDER — HEPARIN SODIUM,PORCINE/PF 10 UNIT/ML
50 SYRINGE (ML) INTRAVENOUS AS NEEDED
OUTPATIENT
Start: 2024-06-28

## 2024-06-28 RX ORDER — EPINEPHRINE 0.3 MG/.3ML
0.3 INJECTION SUBCUTANEOUS EVERY 5 MIN PRN
OUTPATIENT
Start: 2024-08-10

## 2024-06-28 RX ORDER — ALBUTEROL SULFATE 0.83 MG/ML
3 SOLUTION RESPIRATORY (INHALATION) AS NEEDED
Status: DISCONTINUED | OUTPATIENT
Start: 2024-06-28 | End: 2024-06-28 | Stop reason: HOSPADM

## 2024-06-28 RX ORDER — PROCHLORPERAZINE MALEATE 5 MG
10 TABLET ORAL EVERY 6 HOURS PRN
OUTPATIENT
Start: 2024-08-31

## 2024-06-28 RX ORDER — LOPERAMIDE HYDROCHLORIDE 2 MG/1
CAPSULE ORAL
Qty: 100 CAPSULE | Refills: 2 | Status: SHIPPED | OUTPATIENT
Start: 2024-06-28

## 2024-06-28 RX ORDER — PROCHLORPERAZINE EDISYLATE 5 MG/ML
10 INJECTION INTRAMUSCULAR; INTRAVENOUS EVERY 6 HOURS PRN
OUTPATIENT
Start: 2024-09-21

## 2024-06-28 RX ORDER — HEPARIN 100 UNIT/ML
500 SYRINGE INTRAVENOUS AS NEEDED
OUTPATIENT
Start: 2024-06-28

## 2024-06-28 RX ORDER — PALONOSETRON 0.05 MG/ML
0.25 INJECTION, SOLUTION INTRAVENOUS ONCE
OUTPATIENT
Start: 2024-08-31

## 2024-06-28 RX ORDER — EPINEPHRINE 0.3 MG/.3ML
0.3 INJECTION SUBCUTANEOUS EVERY 5 MIN PRN
OUTPATIENT
Start: 2024-08-31

## 2024-06-28 RX ORDER — DIPHENHYDRAMINE HYDROCHLORIDE 50 MG/ML
50 INJECTION INTRAMUSCULAR; INTRAVENOUS AS NEEDED
Status: DISCONTINUED | OUTPATIENT
Start: 2024-06-28 | End: 2024-06-28 | Stop reason: HOSPADM

## 2024-06-28 RX ORDER — EPINEPHRINE 0.3 MG/.3ML
0.3 INJECTION SUBCUTANEOUS EVERY 5 MIN PRN
Status: DISCONTINUED | OUTPATIENT
Start: 2024-06-28 | End: 2024-06-28 | Stop reason: HOSPADM

## 2024-06-28 RX ORDER — PROCHLORPERAZINE MALEATE 5 MG
10 TABLET ORAL EVERY 6 HOURS PRN
OUTPATIENT
Start: 2024-08-10

## 2024-06-28 RX ORDER — PALONOSETRON 0.05 MG/ML
0.25 INJECTION, SOLUTION INTRAVENOUS ONCE
Status: COMPLETED | OUTPATIENT
Start: 2024-06-28 | End: 2024-06-28

## 2024-06-28 RX ORDER — PROCHLORPERAZINE MALEATE 10 MG
10 TABLET ORAL EVERY 6 HOURS PRN
Qty: 30 TABLET | Refills: 5 | Status: SHIPPED | OUTPATIENT
Start: 2024-06-28

## 2024-06-28 RX ORDER — ALBUTEROL SULFATE 0.83 MG/ML
3 SOLUTION RESPIRATORY (INHALATION) AS NEEDED
OUTPATIENT
Start: 2024-09-21

## 2024-06-28 RX ORDER — ONDANSETRON HYDROCHLORIDE 8 MG/1
8 TABLET, FILM COATED ORAL EVERY 8 HOURS PRN
Qty: 30 TABLET | Refills: 5 | Status: SHIPPED | OUTPATIENT
Start: 2024-06-28

## 2024-06-28 RX ORDER — ALBUTEROL SULFATE 0.83 MG/ML
3 SOLUTION RESPIRATORY (INHALATION) AS NEEDED
OUTPATIENT
Start: 2024-08-31

## 2024-06-28 RX ORDER — PALONOSETRON 0.05 MG/ML
0.25 INJECTION, SOLUTION INTRAVENOUS ONCE
OUTPATIENT
Start: 2024-09-21

## 2024-06-28 RX ORDER — FAMOTIDINE 10 MG/ML
20 INJECTION INTRAVENOUS ONCE AS NEEDED
OUTPATIENT
Start: 2024-08-10

## 2024-06-28 RX ORDER — FAMOTIDINE 10 MG/ML
20 INJECTION INTRAVENOUS ONCE AS NEEDED
OUTPATIENT
Start: 2024-08-31

## 2024-06-28 RX ORDER — PROCHLORPERAZINE EDISYLATE 5 MG/ML
10 INJECTION INTRAMUSCULAR; INTRAVENOUS EVERY 6 HOURS PRN
OUTPATIENT
Start: 2024-08-10

## 2024-06-28 RX ORDER — PROCHLORPERAZINE MALEATE 10 MG
10 TABLET ORAL EVERY 6 HOURS PRN
Status: DISCONTINUED | OUTPATIENT
Start: 2024-06-28 | End: 2024-06-28 | Stop reason: HOSPADM

## 2024-06-28 RX ORDER — DIPHENHYDRAMINE HYDROCHLORIDE 50 MG/ML
50 INJECTION INTRAMUSCULAR; INTRAVENOUS AS NEEDED
OUTPATIENT
Start: 2024-08-31

## 2024-06-28 RX ORDER — PROCHLORPERAZINE MALEATE 5 MG
10 TABLET ORAL EVERY 6 HOURS PRN
OUTPATIENT
Start: 2024-09-21

## 2024-06-28 RX ORDER — DIPHENHYDRAMINE HYDROCHLORIDE 50 MG/ML
50 INJECTION INTRAMUSCULAR; INTRAVENOUS AS NEEDED
OUTPATIENT
Start: 2024-08-10

## 2024-06-28 RX ORDER — FAMOTIDINE 10 MG/ML
20 INJECTION INTRAVENOUS ONCE AS NEEDED
Status: DISCONTINUED | OUTPATIENT
Start: 2024-06-28 | End: 2024-06-28 | Stop reason: HOSPADM

## 2024-06-28 RX ORDER — PALONOSETRON 0.05 MG/ML
0.25 INJECTION, SOLUTION INTRAVENOUS ONCE
OUTPATIENT
Start: 2024-08-10

## 2024-06-28 RX ORDER — EPINEPHRINE 0.3 MG/.3ML
0.3 INJECTION SUBCUTANEOUS EVERY 5 MIN PRN
OUTPATIENT
Start: 2024-09-21

## 2024-06-28 RX ORDER — HEPARIN 100 UNIT/ML
500 SYRINGE INTRAVENOUS AS NEEDED
Status: DISCONTINUED | OUTPATIENT
Start: 2024-06-28 | End: 2024-06-28 | Stop reason: HOSPADM

## 2024-06-28 RX ORDER — DIPHENHYDRAMINE HYDROCHLORIDE 50 MG/ML
50 INJECTION INTRAMUSCULAR; INTRAVENOUS AS NEEDED
OUTPATIENT
Start: 2024-09-21

## 2024-06-28 RX ORDER — HEPARIN SODIUM,PORCINE/PF 10 UNIT/ML
50 SYRINGE (ML) INTRAVENOUS AS NEEDED
Status: DISCONTINUED | OUTPATIENT
Start: 2024-06-28 | End: 2024-06-28 | Stop reason: HOSPADM

## 2024-06-28 RX ORDER — FAMOTIDINE 10 MG/ML
20 INJECTION INTRAVENOUS ONCE AS NEEDED
OUTPATIENT
Start: 2024-09-21

## 2024-06-28 RX ORDER — PROCHLORPERAZINE EDISYLATE 5 MG/ML
10 INJECTION INTRAMUSCULAR; INTRAVENOUS EVERY 6 HOURS PRN
OUTPATIENT
Start: 2024-08-31

## 2024-06-28 RX ORDER — OLANZAPINE 5 MG/1
5 TABLET ORAL NIGHTLY
Qty: 4 TABLET | Refills: 5 | Status: SHIPPED | OUTPATIENT
Start: 2024-06-28

## 2024-06-28 RX ORDER — ALBUTEROL SULFATE 0.83 MG/ML
3 SOLUTION RESPIRATORY (INHALATION) AS NEEDED
OUTPATIENT
Start: 2024-08-10

## 2024-06-28 RX ORDER — PROCHLORPERAZINE EDISYLATE 5 MG/ML
10 INJECTION INTRAMUSCULAR; INTRAVENOUS EVERY 6 HOURS PRN
Status: DISCONTINUED | OUTPATIENT
Start: 2024-06-28 | End: 2024-06-28 | Stop reason: HOSPADM

## 2024-06-28 RX ORDER — DEXAMETHASONE 4 MG/1
8 TABLET ORAL DAILY
Qty: 6 TABLET | Refills: 5 | Status: SHIPPED | OUTPATIENT
Start: 2024-06-28

## 2024-06-28 ASSESSMENT — PAIN SCALES - GENERAL: PAINLEVEL: 0-NO PAIN

## 2024-06-28 NOTE — SIGNIFICANT EVENT
06/28/24 1042   Prechemo Checklist   Has the patient been in the hospital, ED, or urgent care since last date of service Yes  (MD saw patient)   Chemo/Immuno Consent Completed and Signed Yes   Protocol/Indications Verified Yes   Confirmed to previous date/time of medication Yes   Compared to previous dose Yes   All medications are dated accurately Yes   Pregnancy Test Negative Not applicable   Parameters Met Yes   Provider Notified Yes   Is Patient Proceeding With Treatment? Yes   BSA/Weight-Height Verified Yes   Dose Calculations Verified (current, total, cumulative) Yes

## 2024-06-28 NOTE — PATIENT INSTRUCTIONS
Today you visited with your Oncologist.  Your recent concerns were discussed and questions were answered.  Your current treatment regimen was discussed and the plan going forward was also discussed.  Scheduling orders were placed to reflect this conversation.  Please call our office for any questions that may arise after leaving today.   [phone]    Review your schedule upon leaving every infusion visit.  Remember you will no longer see lab visits noted separately on your schedule.      You will see Dr. Blake with your next appt in 3 weeks.     We discussed your eating which is less, but you did not want to see Dietician at this time as you are working on improving your eating.  You can always call and have an appointment with her when you come or if you want a phone appointment.  Just let us know!

## 2024-06-28 NOTE — PROGRESS NOTES
Patient ID: Massimo Garcia is a 63 y.o. male.  Referring Physician: Tomasa Blake MD  [id number] [place] [address]  Primary Care Provider: Dayne Santamaria DO  Visit Type:  Follow Up     Subjective    HPI    63-year-old male with a history of metastatic esophageal adenocarcinoma HER2 positive that presents to the emergency department on 5/10 with acute on chronic lower back pain. Workup reveals T9/10 epidural extension with cord compression. MRI L-spine also showed a nodular lesion at L2 abutting the equina, concerning for drop lesion. MRI brain showed small enhancing lesions in the right posterior parasagittal parietal lobe and left temporal lobe, consistent with metastatic disease. On 5/17 the patient underwent T10 decompression with T8-12 fusion.     Patient is status post radiation treatments and when presented today 06/28/2024 patient had paraparesis.  He presented [facility] with [place] metastatic stage IV esophageal cancer HER2 positive and did well on trastuzumab FOLFOX.  Because of toxicity he continued on trastuzumab plus 5-FU and after 12 months approximately patient had evidence of progressive disease.  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     Seen today and fam trastuzumab deruxtecan and treatment  cycle 1 day 1 06/28/2024.      Review of Systems   Constitutional:  Positive for fatigue.   HENT:  Negative.      Eyes: Negative.    Respiratory: Negative.     Cardiovascular: Negative.    Gastrointestinal: Negative.    Musculoskeletal:  Positive for gait problem.   Neurological:  Positive for gait problem.        Paraparesis.  Status post spinal decompression.        Objective   BSA: 1.82 meters squared  /67 (BP Location: Left arm, Patient Position: Sitting, BP Cuff Size: Adult)   Pulse 63   Temp 36.5 °C (97.7 °F) (Temporal)   Resp 16   Wt 69.4 kg (153 lb) Comment: Stated wt.  SpO2 95%   BMI 23.26 kg/m²      has a past medical history of Essential (primary) hypertension (12/21/2013), Pacemaker, Peripheral neuropathy, Personal history of other diseases of the circulatory system, and Pneumothorax (12/04/2023).   has a past surgical history that includes Total knee arthroplasty (09/30/2016); Total knee arthroplasty (09/30/2016); Cardiac electrophysiology procedure (N/A, 11/7/2023); and Cardiac electrophysiology procedure (Left, 11/9/2023).  Family History   Problem Relation Name Age of Onset    Cancer Father       Oncology History   Stage IV malignant neoplasm of esophagus (Multi)   9/13/2023 Initial Diagnosis    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)     9/13/2023 - 5/1/2024 Chemotherapy    Trastuzumab + mFOLFOX6 (Fluorouracil Continuous Infusion / Leucovorin / Oxaliplatin), 14 Day Cycles     6/29/2024 -  Chemotherapy    Fam-trastuzumab deruxtecan (6.4 mg/kg), 21 Day Cycles      Metastases to the liver (Multi)   9/13/2023 Initial Diagnosis    Metastases to the liver (CMS/HCC)     9/13/2023 - 5/1/2024 Chemotherapy    Trastuzumab + mFOLFOX6 (Fluorouracil Continuous Infusion / Leucovorin / Oxaliplatin), 14 Day Cycles     6/29/2024 -  Chemotherapy    Fam-trastuzumab deruxtecan (6.4 mg/kg), 21 Day Cycles          Massimo Garcia  reports that he has quit smoking. His smoking use included cigarettes and cigars. He has never been exposed to tobacco smoke. He has never used smokeless tobacco.  He  reports that he  does not currently use alcohol.  He  reports current drug use. Drug: Marijuana.    Physical Exam  Constitutional:       Appearance: Normal appearance. He is ill-appearing.   HENT:      Head: Atraumatic.   Eyes:      Extraocular Movements: Extraocular movements intact.      Pupils: Pupils are equal, round, and reactive to light.   Abdominal:      General: Abdomen is flat.      Palpations: Abdomen is soft.   Neurological:      Mental Status: He is alert.      Deep Tendon Reflexes: Reflexes abnormal.      Comments: Paraparesis bilateral.  I am unable to ascertain whether he has control of his bowels or urine at this time.         WBC   Date/Time Value Ref Range Status   06/26/2024 05:06 AM 27.0 (H) 4.4 - 11.3 x10*3/uL Final   06/25/2024 04:59 AM 28.3 (H) 4.4 - 11.3 x10*3/uL Final   06/23/2024 06:18 AM 35.4 (H) 4.4 - 11.3 x10*3/uL Final     nRBC   Date Value Ref Range Status   06/26/2024 0.0 0.0 - 0.0 /100 WBCs Final   06/25/2024 0.0 0.0 - 0.0 /100 WBCs Final   06/23/2024 0.1 (H) 0.0 - 0.0 /100 WBCs Final     RBC   Date Value Ref Range Status   06/26/2024 3.77 (L) 4.50 - 5.90 x10*6/uL Final   06/25/2024 3.73 (L) 4.50 - 5.90 x10*6/uL Final   06/23/2024 3.80 (L) 4.50 - 5.90 x10*6/uL Final     Hemoglobin   Date Value Ref Range Status   06/26/2024 10.9 (L) 13.5 - 17.5 g/dL Final   06/25/2024 10.6 (L) 13.5 - 17.5 g/dL Final   06/23/2024 10.8 (L) 13.5 - 17.5 g/dL Final     Hematocrit   Date Value Ref Range Status   06/26/2024 34.4 (L) 41.0 - 52.0 % Final   06/25/2024 34.0 (L) 41.0 - 52.0 % Final   06/23/2024 34.3 (L) 41.0 - 52.0 % Final     MCV   Date/Time Value Ref Range Status   06/26/2024 05:06 AM 91 80 - 100 fL Final   06/25/2024 04:59 AM 91 80 - 100 fL Final   06/23/2024 06:18 AM 90 80 - 100 fL Final     MCH   Date/Time Value Ref Range Status   06/26/2024 05:06 AM 28.9 26.0 - 34.0 pg Final   06/25/2024 04:59 AM 28.4 26.0 - 34.0 pg Final   06/23/2024 06:18 AM 28.4 26.0 - 34.0 pg Final     MCHC   Date/Time Value Ref Range  Status   06/26/2024 05:06 AM 31.7 (L) 32.0 - 36.0 g/dL Final   06/25/2024 04:59 AM 31.2 (L) 32.0 - 36.0 g/dL Final   06/23/2024 06:18 AM 31.5 (L) 32.0 - 36.0 g/dL Final     RDW   Date/Time Value Ref Range Status   06/26/2024 05:06 AM 18.2 (H) 11.5 - 14.5 % Final   06/25/2024 04:59 AM 18.3 (H) 11.5 - 14.5 % Final   06/23/2024 06:18 AM 17.6 (H) 11.5 - 14.5 % Final     Platelets   Date/Time Value Ref Range Status   06/26/2024 05:06  (L) 150 - 450 x10*3/uL Final   06/25/2024 04:59  (L) 150 - 450 x10*3/uL Final   06/23/2024 06:18  (L) 150 - 450 x10*3/uL Final     MPV   Date/Time Value Ref Range Status   10/30/2023 09:13 AM 10.3 7.5 - 11.5 fL Final   10/16/2023 08:34 AM 10.7 7.5 - 11.5 fL Final   10/02/2023 09:08 AM 10.0 7.5 - 11.5 fL Final     Neutrophils %   Date/Time Value Ref Range Status   06/26/2024 05:06 AM 92.9 40.0 - 80.0 % Final   06/25/2024 04:59 AM 92.7 40.0 - 80.0 % Final   06/23/2024 06:18 AM 91.6 40.0 - 80.0 % Final     Immature Granulocytes %, Automated   Date/Time Value Ref Range Status   06/26/2024 05:06 AM 3.7 (H) 0.0 - 0.9 % Final     Comment:     Immature Granulocyte Count (IG) includes promyelocytes, myelocytes and metamyelocytes but does not include bands. Percent differential counts (%) should be interpreted in the context of the absolute cell counts (cells/UL).   06/25/2024 04:59 AM 3.2 (H) 0.0 - 0.9 % Final     Comment:     Immature Granulocyte Count (IG) includes promyelocytes, myelocytes and metamyelocytes but does not include bands. Percent differential counts (%) should be interpreted in the context of the absolute cell counts (cells/UL).   06/23/2024 06:18 AM 3.6 (H) 0.0 - 0.9 % Final     Comment:     Immature Granulocyte Count (IG) includes promyelocytes, myelocytes and metamyelocytes but does not include bands. Percent differential counts (%) should be interpreted in the context of the absolute cell counts (cells/UL).     Lymphocytes %, Manual   Date/Time Value Ref Range  Status   06/21/2024 05:30 AM 0.0 13.0 - 44.0 % Final   06/20/2024 05:24 AM 1.7 13.0 - 44.0 % Final   06/18/2024 02:20 PM 0.0 13.0 - 44.0 % Final     Lymphocytes %   Date/Time Value Ref Range Status   06/26/2024 05:06 AM 0.6 13.0 - 44.0 % Final   06/25/2024 04:59 AM 0.7 13.0 - 44.0 % Final   06/23/2024 06:18 AM 0.8 13.0 - 44.0 % Final     Monocytes %, Manual   Date/Time Value Ref Range Status   06/21/2024 05:30 AM 1.4 2.0 - 10.0 % Final   06/20/2024 05:24 AM 3.5 2.0 - 10.0 % Final   06/18/2024 02:20 PM 1.7 2.0 - 10.0 % Final     Monocytes %   Date/Time Value Ref Range Status   06/26/2024 05:06 AM 2.4 2.0 - 10.0 % Final   06/25/2024 04:59 AM 3.0 2.0 - 10.0 % Final   06/23/2024 06:18 AM 3.8 2.0 - 10.0 % Final     Eosinophils %, Manual   Date/Time Value Ref Range Status   06/21/2024 05:30 AM 0.0 0.0 - 6.0 % Final   06/20/2024 05:24 AM 0.0 0.0 - 6.0 % Final   06/18/2024 02:20 PM 0.0 0.0 - 6.0 % Final     Eosinophils %   Date/Time Value Ref Range Status   06/26/2024 05:06 AM 0.0 0.0 - 6.0 % Final   06/25/2024 04:59 AM 0.0 0.0 - 6.0 % Final   06/23/2024 06:18 AM 0.0 0.0 - 6.0 % Final     Basophils %, Manual   Date/Time Value Ref Range Status   06/21/2024 05:30 AM 0.0 0.0 - 2.0 % Final   06/20/2024 05:24 AM 0.0 0.0 - 2.0 % Final   06/18/2024 02:20 PM 0.0 0.0 - 2.0 % Final     Basophils %   Date/Time Value Ref Range Status   06/26/2024 05:06 AM 0.4 0.0 - 2.0 % Final   06/25/2024 04:59 AM 0.4 0.0 - 2.0 % Final   06/23/2024 06:18 AM 0.2 0.0 - 2.0 % Final     Neutrophils Absolute   Date/Time Value Ref Range Status   06/26/2024 05:06 AM 25.12 (H) 1.20 - 7.70 x10*3/uL Final     Comment:     Percent differential counts (%) should be interpreted in the context of the absolute cell counts (cells/uL).   06/25/2024 04:59 AM 26.23 (H) 1.20 - 7.70 x10*3/uL Final     Comment:     Percent differential counts (%) should be interpreted in the context of the absolute cell counts (cells/uL).   06/23/2024 06:18 AM 32.45 (H) 1.20 - 7.70  "x10*3/uL Final     Comment:     Percent differential counts (%) should be interpreted in the context of the absolute cell counts (cells/uL).     Immature Granulocytes Absolute, Automated   Date/Time Value Ref Range Status   06/26/2024 05:06 AM 1.01 (H) 0.00 - 0.70 x10*3/uL Final   06/25/2024 04:59 AM 0.90 (H) 0.00 - 0.70 x10*3/uL Final   06/23/2024 06:18 AM 1.27 (H) 0.00 - 0.70 x10*3/uL Final     Lymphocytes Absolute   Date/Time Value Ref Range Status   06/26/2024 05:06 AM 0.15 (L) 1.20 - 4.80 x10*3/uL Final   06/25/2024 04:59 AM 0.20 (L) 1.20 - 4.80 x10*3/uL Final   06/23/2024 06:18 AM 0.29 (L) 1.20 - 4.80 x10*3/uL Final     Monocytes Absolute   Date/Time Value Ref Range Status   06/26/2024 05:06 AM 0.65 0.10 - 1.00 x10*3/uL Final   06/25/2024 04:59 AM 0.84 0.10 - 1.00 x10*3/uL Final   06/23/2024 06:18 AM 1.34 (H) 0.10 - 1.00 x10*3/uL Final     Eosinophils Absolute   Date/Time Value Ref Range Status   06/26/2024 05:06 AM 0.00 0.00 - 0.70 x10*3/uL Final   06/25/2024 04:59 AM 0.00 0.00 - 0.70 x10*3/uL Final   06/23/2024 06:18 AM 0.00 0.00 - 0.70 x10*3/uL Final     Eosinophils Absolute, Manual   Date/Time Value Ref Range Status   06/21/2024 05:30 AM 0.00 0.00 - 0.70 x10*3/uL Final   06/20/2024 05:24 AM 0.00 0.00 - 0.70 x10*3/uL Final   06/18/2024 02:20 PM 0.00 0.00 - 0.70 x10*3/uL Final     Basophils Absolute   Date/Time Value Ref Range Status   06/26/2024 05:06 AM 0.10 0.00 - 0.10 x10*3/uL Final   06/25/2024 04:59 AM 0.10 0.00 - 0.10 x10*3/uL Final   06/23/2024 06:18 AM 0.07 0.00 - 0.10 x10*3/uL Final     Basophils Absolute, Manual   Date/Time Value Ref Range Status   06/21/2024 05:30 AM 0.00 0.00 - 0.10 x10*3/uL Final   06/20/2024 05:24 AM 0.00 0.00 - 0.10 x10*3/uL Final   06/18/2024 02:20 PM 0.00 0.00 - 0.10 x10*3/uL Final       No components found for: \"PT\"  aPTT   Date/Time Value Ref Range Status   06/17/2024 06:57 PM 25 (L) 27 - 38 seconds Final   06/12/2024 10:15 AM 29 27 - 38 seconds Final   05/17/2024 12:45 " PM 25 (L) 27 - 38 seconds Final       Assessment/Plan      63-year-old male with metastatic esophageal cancer.  Has been treated with trastuzumab FOLFOX with maintenance trastuzumab based chemotherapy.  Developed metastatic disease in the spine and was treated with spinal decompression.  Noted on MRI evaluation to have CNS disease as well which portends a poor prognosis but has been treated with radiation.  Currently patient opts to try palliative from trastuzumab directs to can in the palliative setting.  Cycle 1 given 06/28/2024.  Patient is paraplegic at this time and will continue to follow to see whether there is some resolution or response to treatment with this targeted agent.     Diagnoses and all orders for this visit:  Stage IV malignant neoplasm of esophagus (Multi)  -     Clinic Appointment Request  -     Clinic Appointment Request; Future  -     Infusion Appointment Request; Future  -     CBC and Auto Differential; Future  -     Comprehensive metabolic panel; Future  -     Clinic Appointment Request; Future  -     Infusion Appointment Request; Future  -     CBC and Auto Differential; Future  -     Comprehensive metabolic panel; Future  -     Clinic Appointment Request; Future  -     Infusion Appointment Request; Future  -     CBC and Auto Differential; Future  -     Comprehensive metabolic panel; Future  -     Clinic Appointment Request Follow up; Future  Metastases to the liver (Multi)  -     Clinic Appointment Request  -     Clinic Appointment Request; Future  -     Infusion Appointment Request; Future  -     CBC and Auto Differential; Future  -     Comprehensive metabolic panel; Future  -     Clinic Appointment Request; Future  -     Infusion Appointment Request; Future  -     CBC and Auto Differential; Future  -     Comprehensive metabolic panel; Future  -     Clinic Appointment Request; Future  -     Infusion Appointment Request; Future  -     CBC and Auto Differential; Future  -     Comprehensive  metabolic panel; Future  -     Clinic Appointment Request Follow up; Future  Other orders  -     fam-trastuzumab deruxtecan-nxki (Enhertu) 440 mg in dextrose 5% 122 mL IV  -     dexAMETHasone (Decadron) 12 mg in dextrose 5% 50 mL IV  -     palonosetron (Aloxi) injection 250 mcg  -     prochlorperazine (Compazine) tablet 10 mg  -     prochlorperazine (Compazine) injection 10 mg  -     fosaprepitant (Emend) 150 mg in sodium chloride 0.9% 250 mL IV  -     fam-trastuzumab deruxtecan-nxki (Enhertu) 440 mg in dextrose 5% 122 mL IV  -     sodium chloride 0.9 % bolus 500 mL  -     dextrose 5 % in water (D5W) bolus  -     diphenhydrAMINE (BENADryl) injection 50 mg  -     methylPREDNISolone sod succinate (SOLU-Medrol) 40 mg/mL injection 40 mg  -     famotidine PF (Pepcid) injection 20 mg  -     EPINEPHrine (Epipen) injection syringe 0.3 mg  -     albuterol 2.5 mg /3 mL (0.083 %) nebulizer solution 3 mL  -     dexAMETHasone (Decadron) 12 mg in dextrose 5% 50 mL IV  -     palonosetron (Aloxi) injection 250 mcg  -     prochlorperazine (Compazine) tablet 10 mg  -     prochlorperazine (Compazine) injection 10 mg  -     fosaprepitant (Emend) 150 mg in sodium chloride 0.9% 250 mL IV  -     fam-trastuzumab deruxtecan-nxki (Enhertu) 440 mg in dextrose 5% 122 mL IV  -     sodium chloride 0.9 % bolus 500 mL  -     dextrose 5 % in water (D5W) bolus  -     diphenhydrAMINE (BENADryl) injection 50 mg  -     methylPREDNISolone sod succinate (SOLU-Medrol) 40 mg/mL injection 40 mg  -     famotidine PF (Pepcid) injection 20 mg  -     EPINEPHrine (Epipen) injection syringe 0.3 mg  -     albuterol 2.5 mg /3 mL (0.083 %) nebulizer solution 3 mL  -     dexAMETHasone (Decadron) 12 mg in dextrose 5% 50 mL IV  -     palonosetron (Aloxi) injection 250 mcg  -     prochlorperazine (Compazine) tablet 10 mg  -     prochlorperazine (Compazine) injection 10 mg  -     fosaprepitant (Emend) 150 mg in sodium chloride 0.9% 250 mL IV  -     fam-trastuzumab  deruxtecan-nxki (Enhertu) 440 mg in dextrose 5% 122 mL IV  -     sodium chloride 0.9 % bolus 500 mL  -     dextrose 5 % in water (D5W) bolus  -     diphenhydrAMINE (BENADryl) injection 50 mg  -     methylPREDNISolone sod succinate (SOLU-Medrol) 40 mg/mL injection 40 mg  -     famotidine PF (Pepcid) injection 20 mg  -     EPINEPHrine (Epipen) injection syringe 0.3 mg  -     albuterol 2.5 mg /3 mL (0.083 %) nebulizer solution 3 mL           Tomasa Blake MD

## 2024-06-30 ASSESSMENT — ENCOUNTER SYMPTOMS
GASTROINTESTINAL NEGATIVE: 1
EYES NEGATIVE: 1
CARDIOVASCULAR NEGATIVE: 1
FATIGUE: 1
RESPIRATORY NEGATIVE: 1

## 2024-07-01 ENCOUNTER — APPOINTMENT (OUTPATIENT)
Dept: PAIN MEDICINE | Facility: CLINIC | Age: 64
End: 2024-07-01
Payer: MEDICARE

## 2024-07-01 NOTE — LETTER
Patient: Massimo Garcia  : 1960    Encounter Date: 2024    Massimo Garcia is a 63 y.o. male with Chief Complaint of SNF ([facility]) H&P.    Resident seen 24 -- HAMILTON    CC: SNF ([facility]) H&P    : 1960  SNF H&P done 24  DC summary dated 24 reviewed 24  Allergy: Oxaliplatin, Bee venom  FULL CODE (per discussion with pt and wife 24)    S: 62 yo man with HTN, 3rd Deg AVB s/p PPM, metastatic espophageal cancer causing spinal stenosis at T10 and cervical spine, s/p T10 decompression and T8-T12 fusion, progressive weakness complicated by possible CAP and new facial weakness. No CP/SOB. Med List & problem list reviewed.    O: VSS AFEB 158# Awake, alert, right facial droop. Right hemiparesis. Chest cta. Heart rrr, PPM. Ext no c/c/e.    LAB (24) Na 132, Alb 3, K 4.5, Cr 0.55, Hgb 10.5    A/P:  # Weakness/Right hemiparesis/Bell's palsy d/t brain and spine mets: SNF PT/OT.  # Metastatic esophageal ca: follow up with Dr. Blake for further chemo/XRT.  # Malignant pain: duragesic patch 100 mcg/h. Dilaudid 4 mg q4h prn is effective. Hold lyrica due to excess sedation. Dexamethasone 2 mg bid x 5 days the daily x 5 days.  # MDD/Anxiety: prn lorazepam, duloxetine  # FEN: KCl  # DVT/PE: indefinite eliquis 5 mg bid  # opioid induced constipaiton: senna, lactulose  # Wt Loss, low albumin c/w PCM: push po protein.      Past Surgical History:   Procedure Laterality Date   • CARDIAC ELECTROPHYSIOLOGY PROCEDURE N/A 2023    Procedure: Temporary Pacemaker Insertion;  Surgeon: Alexis Shook MD;  Location: [facility] Cardiac Cath Lab;  Service: Cardiovascular;  Laterality: N/A;   • CARDIAC ELECTROPHYSIOLOGY PROCEDURE Left 2023    Procedure: PPM IMPLANT DUAL;  Surgeon: Elias Connolly MD;  Location: [facility] Cardiac Cath Lab;  Service: Electrophysiology;  Laterality: Left;   • TOTAL KNEE ARTHROPLASTY  2016    Total Knee Replacement Left   • TOTAL KNEE ARTHROPLASTY  2016    Total Knee Replacement  Right      Social History     Socioeconomic History   • Marital status:      Spouse name: Not on file   • Number of children: Not on file   • Years of education: Not on file   • Highest education level: Not on file   Occupational History   • Not on file   Tobacco Use   • Smoking status: Former     Types: Cigarettes, Cigars     Passive exposure: Never   • Smokeless tobacco: Never   Vaping Use   • Vaping status: Unknown   Substance and Sexual Activity   • Alcohol use: Not Currently   • Drug use: Yes     Types: Marijuana     Comment: medical marjuana card   • Sexual activity: Not on file   Other Topics Concern   • Not on file   Social History Narrative   • Not on file     Social Determinants of Health     Financial Resource Strain: Low Risk  (7/5/2024)    Overall Financial Resource Strain (CARDIA)    • Difficulty of Paying Living Expenses: Not hard at all   Food Insecurity: Not on file   Transportation Needs: No Transportation Needs (7/5/2024)    PRAPARE - Transportation    • Lack of Transportation (Medical): No    • Lack of Transportation (Non-Medical): No   Physical Activity: Not on file   Stress: Not on file   Social Connections: Not on file   Intimate Partner Violence: Not on file   Housing Stability: Low Risk  (7/5/2024)    Housing Stability Vital Sign    • Unable to Pay for Housing in the Last Year: No    • Number of Places Lived in the Last Year: 1    • Unstable Housing in the Last Year: No     Past Medical History:   Diagnosis Date   • Essential (primary) hypertension 12/21/2013    Hypertension   • Pacemaker    • Peripheral neuropathy    • Personal history of other diseases of the circulatory system     History of right bundle branch block (RBBB)   • Pneumothorax 12/04/2023      Family History   Problem Relation Name Age of Onset   • Cancer Father          Review of Systems   Constitutional:  Negative for chills, fatigue and fever.   HENT:  Negative for rhinorrhea and sore throat.    Eyes:  Negative for  pain and redness.   Respiratory:  Negative for cough and shortness of breath.    Cardiovascular:  Negative for chest pain and palpitations.   Gastrointestinal:  Negative for abdominal pain and blood in stool.   Endocrine: Negative for polydipsia and polyuria.   Genitourinary:  Negative for dysuria and hematuria.   Musculoskeletal:  Negative for back pain and neck stiffness.   Skin:  Negative for rash and wound.   Allergic/Immunologic: Negative for environmental allergies and food allergies.   Neurological:  Positive for weakness. Negative for headaches.   Hematological:  Negative for adenopathy. Does not bruise/bleed easily.   Psychiatric/Behavioral:  Negative for hallucinations and suicidal ideas.       There were no vitals taken for this visit.  Physical Exam  Vitals reviewed.   Constitutional:       General: He is not in acute distress.     Appearance: He is not ill-appearing.   HENT:      Head: Normocephalic and atraumatic.      Right Ear: Tympanic membrane normal.      Left Ear: Tympanic membrane normal.      Nose: No congestion or rhinorrhea.      Mouth/Throat:      Pharynx: No oropharyngeal exudate or posterior oropharyngeal erythema.   Eyes:      Extraocular Movements: Extraocular movements intact.      Conjunctiva/sclera: Conjunctivae normal.      Pupils: Pupils are equal, round, and reactive to light.   Cardiovascular:      Rate and Rhythm: Normal rate and regular rhythm.      Heart sounds: No murmur heard.     No friction rub. No gallop.      Comments: PPM  Pulmonary:      Effort: Pulmonary effort is normal.      Breath sounds: Normal breath sounds. No wheezing, rhonchi or rales.   Abdominal:      General: There is no distension.      Palpations: Abdomen is soft.      Tenderness: There is no abdominal tenderness. There is no guarding or rebound.   Musculoskeletal:         General: No swelling or deformity.      Cervical back: Normal range of motion and neck supple.      Right lower leg: No edema.       "Left lower leg: No edema.   Skin:     Capillary Refill: Capillary refill takes less than 2 seconds.      Coloration: Skin is not jaundiced.      Findings: No rash.   Neurological:      General: No focal deficit present.      Mental Status: He is alert.      Motor: Weakness present.   Psychiatric:         Mood and Affect: Mood normal.         Behavior: Behavior normal.       Lab Results   Component Value Date    WBC 0.1 (LL) 07/06/2024    HGB 7.3 (L) 07/06/2024    HCT 23.2 (L) 07/06/2024    MCV 89 07/06/2024    PLT 13 (LL) 07/06/2024     Lab Results   Component Value Date    CHOL 145 12/27/2023     Lab Results   Component Value Date    HDL 49.9 12/27/2023     Lab Results   Component Value Date    LDLCALC 81 12/27/2023     Lab Results   Component Value Date    TRIG 70 12/27/2023     No components found for: \"CHOLHDL\"  Lab Results   Component Value Date    HGBA1C 4.9 11/05/2023       Assessment/Plan  Problem List Items Addressed This Visit       Primary malignant neoplasm of esophagus (Multi)    Spinal stenosis of thoracolumbar region    Protein-calorie malnutrition (Multi)       Electronically Signed By: Serge Jansen MD   7/22/24 10:23 PM  "

## 2024-07-01 NOTE — PROGRESS NOTES
Radiation Oncology Treatment Summary    Patient Name:  Massimo Garcia  MRN:  [id number]  :  1960    Referring Provider: No ref. provider found  Primary Care Provider: Dayne Santamaria DO    Brief History: Massimo Garcia is a 63 y.o. male with No matching staging information was found for the patient..  The patient completed radiotherapy as outlined below.    Radiation Treatment Summary:    SRS: Not Applicable Brain    Treatment Period Technique Fraction Dose Fractions Total Dose   Course 1 2024-2024  (days elapsed: 6)         C5-C7 2024-2024 AP/ / 400 cGy 5 / 5 2000 / 2,000 cGy         L2 2024-2024 3-Field 400 / 400 cGy 5 /  2000 / 2,000 cGy         T8-T11 2024-2024 3-Field 400 / 400 cGy 5 / 5 2000 / 2,000 cGy       Please see the patient's [facility] chart for further details regarding the radiation plan, including beam energy.    Concurrent Chemotherapy:  Treatment Plans       Name Type Plan Dates Plan Provider         Active    Fam-trastuzumab deruxtecan (6.4 mg/kg), 21 Day Cycles  Oncology Treatment  2024 - Present Davis-Sidney Blake MD                    CTCAE Toxicity Overview:   Toxicity Assessment          2024    16:20   Toxicity Assessment   Treatment Site General   Dehydration Grade 0   Depression Grade 0   Dermatitis Radiation Grade 0   Diarrhea Grade 0   Fatigue Grade 2   Fracture Grade 0   Nausea Grade 0   Pain Grade 1   Vomiting Grade 0     Patient Disposition: Patient tolerated treatment well with mild to moderate fatigue and mild pain.  Patient will follow-up in clinic following scans in 3mo.

## 2024-07-02 ENCOUNTER — APPOINTMENT (OUTPATIENT)
Dept: PAIN MEDICINE | Facility: CLINIC | Age: 64
End: 2024-07-02
Payer: MEDICARE

## 2024-07-03 ENCOUNTER — HOSPITAL ENCOUNTER (OUTPATIENT)
Dept: RADIOLOGY | Facility: HOSPITAL | Age: 64
End: 2024-07-03

## 2024-07-03 ENCOUNTER — APPOINTMENT (OUTPATIENT)
Dept: PAIN MEDICINE | Facility: CLINIC | Age: 64
End: 2024-07-03
Payer: MEDICARE

## 2024-07-04 PROBLEM — R06.02 SHORTNESS OF BREATH: Status: ACTIVE | Noted: 2024-01-01

## 2024-07-04 NOTE — PROGRESS NOTES
The patient was seen by the midlevel/resident.  I have personally saw the patient and made/approved the management plan and take responsibility for the patient management.  I reviewed the EKG's (when done) and agree with the interpretation.  I have seen and examined the patient; agree with the workup, evaluation, MDM, and diagnosis.  The care plan has been discussed with the midlevel/resident; I have reviewed the note and agree with the documented findings.     Worked up in the emergency room for being more sleepy and out of it today.  His wife says he has been out of it for for 5 days nursing home suggested it was short period time.  He did receive some medication this morning including fentanyl and Ativan.  He has history of cancer that is metastatic is getting treatment for it.  His paperwork the nursing home confirm he is a full code as does his wife.  I had a discussion with them about reconsidering his CODE STATUS.  Patient is unable to participate in the conversation today due to his cognitive abilities right now.  Plan is workup with CT x-ray and labs and hospitalization.    Patient was given some pain medication and will be hospitalized.  Reviewed the results with the radiologist and the patient family.  When he received the pain medicine he became more comfortable but also more out of it.  His pain did return and he had low blood pressure we had to hold off until he can give some fluids.  ED Course as of 07/05/24 0312   Thu Jul 04, 2024 1937 EKG completed at 1918 shows on my interpretation dual paced rhythm with a ventricular rate of 65 bpm.  No ectopy.  QTc 506 ms [RS]   2126 Patient is more awake but uncomfortable complaining of severe pain in his feet.  He is able to answer questions but is requesting pain medicine. [RZ]      ED Course User Index  [RS] Gavin Castro PA-C  [RZ] Gavin Batres MD         Diagnoses as of 07/05/24 0312   Shortness of breath   Stage IV malignant neoplasm of  esophagus (Multi)     Gavin Batres MD

## 2024-07-04 NOTE — ED PROVIDER NOTES
HPI   No chief complaint on file.      Is a 63-year-old female coming in from nursing facility for lethargy as well as apparent shortness of breath.  He is having periods of apnea.  He is a full code.  He does have a history of esophageal cancer with metastatic disease to the lungs.  Patient is on multiple narcotic medications including fentanyl, Percocet and Ativan.  He is also on Eliquis.  He is alert and oriented x 2 however normally alert and oriented x 3 per nursing facility.  They were concerned that he was dehydrated so was given 1 L of fluids yesterday via port.  Today has had increased edema as well as the appearance of difficulty breathing.  Patient does complain of pain however this is chronic.  They report he has not had any vomiting or diarrhea.  He is not eating and drinking as much as he usually does.  Patient's oncologist is Dr. Blake.  Family can give more history and states that for the last week he has had severe hiccups.  They believe that intermittently he is gasping for air and then has a hiccup and feels better.      History provided by:  Patient, EMS personnel, nursing home and relative  History limited by:  Mental status change                      No data recorded                   Patient History   Past Medical History:   Diagnosis Date    Essential (primary) hypertension 12/21/2013    Hypertension    Pacemaker     Peripheral neuropathy     Personal history of other diseases of the circulatory system     History of right bundle branch block (RBBB)    Pneumothorax 12/04/2023     Past Surgical History:   Procedure Laterality Date    CARDIAC ELECTROPHYSIOLOGY PROCEDURE N/A 11/7/2023    Procedure: Temporary Pacemaker Insertion;  Surgeon: Alexis Shook MD;  Location: [facility];  Service: Cardiovascular;  Laterality: N/A;    CARDIAC ELECTROPHYSIOLOGY PROCEDURE Left 11/9/2023    Procedure: PPM IMPLANT DUAL;  Surgeon: Elias Connolly MD;  Location: [facility];  Service:  Electrophysiology;  Laterality: Left;    TOTAL KNEE ARTHROPLASTY  09/30/2016    Total Knee Replacement Left    TOTAL KNEE ARTHROPLASTY  09/30/2016    Total Knee Replacement Right     Family History   Problem Relation Name Age of Onset    Cancer Father       Social History     Tobacco Use    Smoking status: Former     Types: Cigarettes, Cigars     Passive exposure: Never    Smokeless tobacco: Never   Vaping Use    Vaping status: Unknown   Substance Use Topics    Alcohol use: Not Currently    Drug use: Yes     Types: Marijuana     [facility]       Physical Exam   ED Triage Vitals   Temp Pulse Resp BP   -- -- -- --      SpO2 Temp src Heart Rate Source Patient Position   -- -- -- --      BP Location FiO2 (%)     -- --       Physical Exam  Vitals and nursing note reviewed.   Constitutional:       General: He is not in acute distress.     Appearance: Normal appearance. He is ill-appearing. He is not toxic-appearing.   HENT:      Head: Normocephalic and atraumatic.      Nose: Nose normal.      Mouth/Throat:      Mouth: Mucous membranes are moist.      Pharynx: Oropharynx is clear.   Eyes:      Conjunctiva/sclera: Conjunctivae normal.      Pupils: Pupils are equal, round, and reactive to light.   Cardiovascular:      Rate and Rhythm: Normal rate and regular rhythm.      Pulses: Normal pulses.      Heart sounds: Normal heart sounds.      Comments: Decreased pulses to lower extremities with some mottling of the lower extremities.  Pulmonary:      Effort: Pulmonary effort is normal. No respiratory distress.      Breath sounds: Examination of the right-middle field reveals rales. Examination of the left-middle field reveals rales. Examination of the right-lower field reveals rales. Examination of the left-lower field reveals rales. Rales present.   Chest:      Comments: Port to the right upper chest accessed.  Abdominal:      General: Abdomen is flat. Bowel sounds are normal.      Palpations: Abdomen is  soft.      Tenderness: There is no abdominal tenderness.   Musculoskeletal:         General: Normal range of motion.      Cervical back: Neck supple.      Right lower le+ Pitting Edema present.      Left lower le+ Pitting Edema present.   Skin:     General: Skin is cool and dry.      Capillary Refill: Capillary refill takes more than 3 seconds.      Coloration: Skin is mottled. Skin is not jaundiced or pale.      Findings: No bruising.      Comments: Pitting edema to bilateral upper and lower extremities.   Neurological:      General: No focal deficit present.      Mental Status: He is oriented to person, place, and time. Mental status is at baseline. He is lethargic.   Psychiatric:         Mood and Affect: Mood normal.         Behavior: Behavior normal.         ED Course & MDM   ED Course as of 24   Thu  EKG completed at 1918 shows on my interpretation dual paced rhythm with a ventricular rate of 65 bpm.  No ectopy.  QTc 506 ms [RS]    Patient is more awake but uncomfortable complaining of severe pain in his feet.  He is able to answer questions but is requesting pain medicine. [RZ]      ED Course User Index  [RS] Gavin Castro PA-C  [RZ] Gavin Batres MD         Diagnoses as of 24   Shortness of breath       Medical Decision Making  Summary:  Medical Decision Making:   Patient presented as described in HPI. Patient case including ROS, PE, and treatment and plan discussed with ED attending if attached as cosigner. Results from labs and or imaging included below if completed. Massimo Garcia  is a 63 y.o. coming in for No chief complaint on file.  .  Patient is a full code as of 2024 on the signed order.  Patient is on Eliquis.  No new medications.  Patient has been lethargic than usual.  He does appear to have anasarca with edema to bilateral upper and lower extremities.  He is given 20 mg of IV Lasix.  Workup was completed.  Patient has a troponin that is  stable x 2.  Improved compared to prior.  Further lab work shows leukopenia.  He was recently receiving chemotherapy.  He has not had a fever.  Patient has thrombocytopenia which also has been present prior.  No electrolyte abnormality of concern.  Labs otherwise are similar compared to prior with liver functions and organ assessment.  Chest x-ray does show metastatic like appearance with no acute concerning abnormalities.  CT of the head shows metastatic disease and similar appearance compared to prior.  Patient started to improve and was talking more while here in the emergency room.  He is requesting pain medication.  He is given Dilaudid by ER attending.  Patient states that he does feel improved.  He does state that he feels short of breath.  He has been taking his anticoagulation medication.  Patient will be hospitalized here for further assessment and management of his shortness of breath.  He is requiring oxygen however has an order for oxygen at nursing facility.      Shared decision making was completed for required hospital stay. I also explained the plan and treatment course. Patient/guardian is in agreement with plan, treatment course, and state that they will comply.    Labs Reviewed  CBC WITH AUTO DIFFERENTIAL - Abnormal     WBC                           1.2 (*)                nRBC                          0.0                    RBC                           3.15 (*)               Hemoglobin                    9.1 (*)                Hematocrit                    28.1 (*)               MCV                           89                     MCH                           28.9                   MCHC                          32.4                   RDW                           17.9 (*)               Platelets                     26 (*)                 Neutrophils %                 92.7                   Immature Granulocytes %, Automated   0.8                    Lymphocytes %                 4.9                     Monocytes %                   0.8                    Eosinophils %                 0.8                    Basophils %                   0.0                    Neutrophils Absolute          1.13 (*)               Immature Granulocytes Absolute, Au*   0.01                   Lymphocytes Absolute          0.06 (*)               Monocytes Absolute            0.01 (*)               Eosinophils Absolute          0.01                   Basophils Absolute            0.00                COMPREHENSIVE METABOLIC PANEL - Abnormal     Glucose                       168 (*)                Sodium                        134 (*)                Potassium                     3.9                    Chloride                      102                    Bicarbonate                   20 (*)                 Anion Gap                     16                     Urea Nitrogen                 53 (*)                 Creatinine                    0.57                   eGFR                          >90                    Calcium                       8.3 (*)                Albumin                       2.5 (*)                Alkaline Phosphatase          250 (*)                Total Protein                 4.7 (*)                AST                           66 (*)                 Bilirubin, Total              1.3 (*)                ALT                           35                  LACTATE - Abnormal     Lactate                       3.3 (*)                  Narrative: Venipuncture immediately after or during the administration of Metamizole may lead to falsely low results. Testing should be performed immediately                prior to Metamizole dosing.  PROTIME-INR - Abnormal     Protime                       20.0 (*)               INR                           1.8 (*)             BLOOD GAS VENOUS FULL PANEL - Abnormal     POCT pH, Venous               7.52 (*)               POCT pCO2, Venous             25 (*)                 POCT pO2,  Venous              35                     POCT SO2, Venous              54                     POCT Oxy Hemoglobin, Venous   52.0                   POCT Hematocrit Calculated, Venous   29.0 (*)               POCT Sodium, Venous           133 (*)                POCT Potassium, Venous        4.0                    POCT Chloride, Venous         101                    POCT Ionized Calicum, Venous   1.25                   POCT Glucose, Venous          170 (*)                POCT Lactate, Venous          3.3 (*)                POCT Base Excess, Venous      -1.5                   POCT HCO3 Calculated, Venous   20.4 (*)               POCT Hemoglobin, Venous       9.8 (*)                POCT Anion Gap, Venous        16.0                   Patient Temperature                                  FiO2                          0                   URINALYSIS WITH REFLEX CULTURE AND MICROSCOPIC - Abnormal     Color, Urine                  Yellow                 Appearance, Urine             Clear                  Specific Gravity, Urine       1.026                  pH, Urine                     5.5                    Protein, Urine                20 (TRACE)                Glucose, Urine                Normal                 Blood, Urine                  0.06 (1+) (*)               Ketones, Urine                NEGATIVE                Bilirubin, Urine              NEGATIVE                Urobilinogen, Urine           Normal                 Nitrite, Urine                NEGATIVE                Leukocyte Esterase, Urine     NEGATIVE             SERIAL TROPONIN-INITIAL - Abnormal     Troponin I, High Sensitivity   42 (*)                   Narrative: Less than 99th percentile of normal range cutoff-                Female and children under 18 years old <14 ng/L; Male <21 ng/L: Negative                Repeat testing should be performed if clinically indicated.                                 Female and children under 18 years old 14-50  ng/L; Male 21-50 ng/L:                Consistent with possible cardiac damage and possible increased clinical                 risk. Serial measurements may help to assess extent of myocardial damage.                                 >50 ng/L: Consistent with cardiac damage, increased clinical risk and                myocardial infarction. Serial measurements may help assess extent of                 myocardial damage.                                  NOTE: Children less than 1 year old may have higher baseline troponin                 levels and results should be interpreted in conjunction with the overall                 clinical context.                                 NOTE: Troponin I testing is performed using a different                 testing methodology at [facility] than at other                 [facility]. Direct result comparisons should only                 be made within the same method.  SERIAL TROPONIN, 1 HOUR - Abnormal     Troponin I, High Sensitivity   45 (*)                   Narrative: Less than 99th percentile of normal range cutoff-                Female and children under 18 years old <14 ng/L; Male <21 ng/L: Negative                Repeat testing should be performed if clinically indicated.                                 Female and children under 18 years old 14-50 ng/L; Male 21-50 ng/L:                Consistent with possible cardiac damage and possible increased clinical                 risk. Serial measurements may help to assess extent of myocardial damage.                                 >50 ng/L: Consistent with cardiac damage, increased clinical risk and                myocardial infarction. Serial measurements may help assess extent of                 myocardial damage.                                  NOTE: Children less than 1 year old may have higher baseline troponin                 levels and results should be interpreted in conjunction with the overall                  clinical context.                                 NOTE: Troponin I testing is performed using a different                 testing methodology at [facility] than at other                 [facility]. Direct result comparisons should only                 be made within the same method.  LACTATE - Abnormal     Lactate                       2.1 (*)                  Narrative: Venipuncture immediately after or during the administration of Metamizole may lead to falsely low results. Testing should be performed immediately                prior to Metamizole dosing.  MAGNESIUM - Normal     Magnesium                     1.94                B-TYPE NATRIURETIC PEPTIDE - Normal     BNP                           46                       Narrative:    <100 pg/mL - Heart failure unlikely                100-299 pg/mL - Intermediate probability of acute heart                                failure exacerbation. Correlate with clinical                                context and patient history.                  >=300 pg/mL - Heart Failure likely. Correlate with clinical                                context and patient history.                                BNP testing is performed using different testing methodology at [facility] than at other [facility] hospitals. Direct result comparisons should only be made within the same method.                   TROPONIN SERIES- (INITIAL, 1 HR)       Narrative: The following orders were created for panel order Troponin I Series, High Sensitivity (0, 1 HR).                Procedure                               Abnormality         Status                                   ---------                               -----------         ------                                   Troponin I, High Sensiti...[[id number]]  Abnormal            Final result                             Troponin, High Sensitivi...[[id number]]  Abnormal            Final result                                              Please view results for these tests on the individual orders.  URINALYSIS WITH REFLEX CULTURE AND MICROSCOPIC       Narrative: The following orders were created for panel order Urinalysis with Reflex Culture and Microscopic.                Procedure                               Abnormality         Status                                   ---------                               -----------         ------                                   Urinalysis with Reflex C...[[id number]]  Abnormal            Final result                             Extra Urine Gray Tube[[id number]]                            In process                                               Please view results for these tests on the individual orders.  EXTRA URINE GRAY TUBE  MORPHOLOGY     RBC Morphology                                    URINALYSIS MICROSCOPIC WITH REFLEX CULTURE   CT head wo IV contrast   Final Result    Multiple hyperdense parenchymal lesions  are again identified, better    delineated on prior MRI, 06/14/2024. Dominant lesion in the right    parietal lobe appears more prominent since prior CT imaging by direct    compares and a similar distribution of adjacent vasogenic edema. A    hyperdense right paracentral parietal lesion better delineated on    prior MRI however more conspicuous since prior CT imaging measuring    up to 9 mm. No significant new mass effect or midline shift    identified.                MACRO:    None.          Signed by: Ashley Sevilla 7/4/2024 8:13 PM    Dictation workstation:   [id number]     XR chest 1 view   Final Result    1.  Bilateral extensive masslike airspace opacities compatible with    extensive pulmonary malignancy/metastasis. Findings are grossly    similar to recent imaging.                      MACRO:    None          Signed by: Ashley Sevilla 7/4/2024 8:17 PM    Dictation workstation:   [id number]                            Tests/Medications/Escalations of Care  considered but not given: As in MDM    Patient care discussed with: N/A  Social Determinants affecting care: N/A    Final diagnosis and disposition as documented          Shared decision making was completed and determined that patient will be hospitalized. I discussed the differential; results and admit plan with the patient and/or family/friend/caregiver if present.  I emphasized the importance of hospitalization need for re-evaluation/continued monitoring/interventions. They agreed that if they feel their condition is worsening or if they have any other concern they should alert staff immediately for further assistance. I gave the patient an opportunity to ask all questions they had and answered all of them accordingly. The patient and/or family/friend/caregiver expressed understanding verbally and that they would comply.    Disposition:  Hospitalize to tele floor under Dr. Main per their orders. Discussed findings and treatment done here in ED with admitting physician. It would be a risk to discharge the patient in their condition due to possibility of deterioration in their condition and the need for urgent interventions.    This note has been transcribed using voice recognition and may contain grammatical errors, misplaced words, incorrect words, incorrect phrases or other errors.         Procedure  Critical Care    Performed by: Gavin Castro PA-C  Authorized by: Gavin Batres MD    Critical care provider statement:     Critical care time (minutes):  35    Critical care time was exclusive of:  Separately billable procedures and treating other patients and teaching time    Critical care was necessary to treat or prevent imminent or life-threatening deterioration of the following conditions:  Respiratory failure and CNS failure or compromise    Critical care was time spent personally by me on the following activities:  Development of treatment plan with patient or surrogate, evaluation of patient's  response to treatment, examination of patient, obtaining history from patient or surrogate, ordering and performing treatments and interventions, ordering and review of laboratory studies, ordering and review of radiographic studies, pulse oximetry, re-evaluation of patient's condition and review of old charts    Care discussed with: admitting provider         Gavin Castro PA-C  07/04/24 1835       Gavin Castro PA-C  07/04/24 [id number]

## 2024-07-05 NOTE — NURSING NOTE
Have rounded several times, discussed status with patient's RN, Consuelo. Patient resting comfortably per family and understandably do not want him disturbed.  Morphine is ordered but family wants it held for now until more family arrive.  He is on hospice plan of care and attempting to turn him for skin check is not consistent with the overall goal of comfort at this time.  Will continue to follow along.

## 2024-07-05 NOTE — SIGNIFICANT EVENT
Met with power of  (wife) for changing CODE STATUS.  Wife wanted to transition patient to hospice.  She agreed it was in the patient's best interest to focus on comfort and withdraw care.  Patient was converted to DNR comfort care.  Wife was agreeable to withdraw care and starting end-of-life care, comfort meds.  Hospice was consulted

## 2024-07-05 NOTE — PROGRESS NOTES
Pharmacy Medication History Review    Massimo Garcia is a 63 y.o. male admitted for Shortness of breath. Pharmacy reviewed the patient's [id number] medications and allergies for accuracy.    The list below reflectives the updated PTA list. Please review each medication in order reconciliation for additional clarification and justification.  Medications Prior to Admission   Medication Sig Dispense Refill Last Dose    apixaban (Eliquis) 5 mg tablet TAKE 1 TABLET BY MOUTH TWO TIMES A DAY 60 tablet 6 7/4/2024    cholecalciferol (Vitamin D-3) 50 MCG (2000 UT) tablet Take 2 tablets (100 mcg) by mouth once daily in the morning.   7/4/2024    dexAMETHasone (Decadron) 2 mg tablet Take 2 tablets (4 mg) by mouth 2 times a day for 3 days, THEN 1 tablet (2 mg) 2 times a day for 5 days, THEN 1 tablet (2 mg) once daily for 5 days.   7/5/2024    dicyclomine (Bentyl) 10 mg capsule Take 1 capsule (10 mg) by mouth 4 times a day before meals. 28 capsule 0 7/5/2024    docusate sodium (Colace) 100 mg capsule Take 1 capsule (100 mg) by mouth 2 times a day. 60 capsule 1 7/4/2024    DULoxetine (Cymbalta) 60 mg DR capsule Take 1 capsule (60 mg) by mouth once daily. Do not crush or chew.   7/4/2024    LORazepam (Ativan) 1 mg tablet Take 0.5 tablets (0.5 mg) by mouth every 8 hours if needed for anxiety (nausea) for up to 14 days. 21 tablet 0 7/4/2024    moisturizing mouth (Biotene Dry Mouth) solution Swish and spit 15 mL 3 times a day as needed (dry mouth).   7/4/2024    ondansetron (Zofran) 4 mg tablet Take 1 tablet (4 mg) by mouth every 6 hours if needed for nausea or vomiting. 56 tablet 0 7/4/2024    ondansetron (Zofran) 8 mg tablet Take 1 tablet (8 mg) by mouth every 8 hours if needed for nausea or vomiting. 30 tablet 5 7/4/2024    acetaminophen (Tylenol) 325 mg tablet Take 2 tablets (650 mg) by mouth every 4 hours if needed for mild pain (1 - 3) or fever (temp greater than 38.0 C).       bisacodyl (Dulcolax) 10 mg suppository Insert 1  suppository (10 mg) into the rectum once daily as needed for constipation (On day 4 of no BM).       cyclobenzaprine (Flexeril) 10 mg tablet Take 1 tablet (10 mg) by mouth 3 times a day for 14 days. 42 tablet 0     dexAMETHasone (Decadron) 4 mg tablet Take 2 tablets (8 mg) by mouth once daily. For 3 days starting the day after treatment 6 tablet 5     fentaNYL (Duragesic) 100 mcg/hr patch Place 1 patch on the skin every 3rd day.       [] fluconazole (Diflucan) 150 mg tablet Take 1 tablet (150 mg) by mouth once daily for 6 doses.       [] HYDROmorphone (Dilaudid) 4 mg tablet Take 1 tablet (4 mg) by mouth every 4 hours if needed for moderate pain (4 - 6) or severe pain (7 - 10) for up to 7 days. 42 tablet 0     hydrOXYzine HCL (Atarax) 25 mg tablet Take 1 tablet (25 mg) by mouth every 6 hours if needed for anxiety.       lactulose 20 gram/30 mL oral solution Take 30 mL (20 g) by mouth 3 times a day as needed (take if no BM x48 hours).   Unknown    [] levoFLOXacin (Levaquin) 750 mg tablet Take 1 tablet (750 mg) by mouth once every 24 hours for 7 doses. 7 tablet 0     loperamide (Imodium) 2 mg capsule Take 2 capsules (4 mg) by mouth with the first episode of diarrhea and 1 capsule (2 mg) by mouth with any additional episodes. Maximum 8 capsules (16 mg) per day. 100 capsule 2 Unknown    magnesium hydroxide (Milk of Magnesia) 400 mg/5 mL suspension Take 30 mL by mouth once daily as needed for constipation (On day 3 of no BM).       metoclopramide (Reglan) 10 mg tablet Take 1 tablet (10 mg) by mouth every 8 hours if needed (Hiccups alternative if Thorazine not working. Don't give with thorazine for therapy as too much D2 agonists can have bad side effects). (Patient not taking: Reported on 2024) 30 tablet 0     multivitamin with minerals (multivit-min-iron fum-folic ac) tablet Take 2 tablets by mouth once daily in the morning.   7/3/2024    naloxone (Narcan) 0.4 mg/mL injection Infuse 0.5 mL (0.2  mg) into a venous catheter every 5 minutes if needed for respiratory depression.   Unknown    OLANZapine (ZyPREXA) 5 mg tablet Take 1 tablet (5 mg) by mouth once daily at bedtime. For 4 days starting the evening of treatment 4 tablet 5 Unknown    pantoprazole (ProtoNix) 40 mg EC tablet Take 1 tablet (40 mg) by mouth once daily in the morning. Take before meals. Do not crush, chew, or split. 30 tablet 3 7/3/2024    polyethylene glycol (Glycolax, Miralax) 17 gram/dose powder Take 17 g by mouth 2 times a day. 1020 g 0 Unknown    potassium chloride CR 10 mEq ER tablet Take 1 tablet (10 mEq) by mouth once daily. Do not crush, chew, or split.   7/3/2024    pregabalin (Lyrica) 50 mg capsule Take 1 capsule (50 mg) by mouth 2 times a day. 60 capsule 3     prochlorperazine (Compazine) 10 mg tablet Take 1 tablet (10 mg) by mouth every 6 hours if needed for nausea or vomiting. 30 tablet 5 Unknown    prochlorperazine (Compazine) 10 mg/2 mL (5 mg/mL) solution Infuse 1 mL (5 mg) into a venous catheter every 6 hours if needed for nausea or vomiting.   Unknown    sennosides-docusate sodium (Mireille-Colace) 8.6-50 mg tablet Take 2 tablets by mouth 2 times a day.       sodium phosphates (Fleet, Pediatric,) 9.5-3.5 gram/59 mL enema Insert 1 enema into the rectum once daily as needed for constipation (On day 5 of no BM).           The list below reflectives the updated allergy list. Please review each documented allergy for additional clarification and justification.  Allergies  Reviewed by Bam Sanchez DO on 7/5/2024        Severity Reactions Comments    Bee Venom Protein (honey Bee) High Anaphylaxis     Oxaliplatin Medium Other Rigors            Below are additional concerns with the patient's PTA list.  Made changes including adding/removing PTA medications - please see the notes section in list.    Dee Cuevas, VishD

## 2024-07-05 NOTE — PROGRESS NOTES
07/05/24 1023   Discharge Planning   Living Arrangements Other (Comment)  (from [facility])   Support Systems Spouse/significant other;Children;Family members   Assistance Needed assistance with ADLs   Type of Residence Skilled nursing facility   Home or Post Acute Services Post acute facilities (Rehab/SNF/etc)   Patient expects to be discharged to: TBD, from [facility]. Palliative met with patient's spouse. Hospice referral placed.   Does the patient need discharge transport arranged? Yes   RoundTrip coordination needed? Yes   Has discharge transport been arranged? No

## 2024-07-05 NOTE — CONSULTS
Consults    PALLIATIVE CARE SOCIAL WORK CONSULT:  Laura NUNN  CURRENT ATTENDING PROVIDER: Bam Sanchez DO     Reason for Consult    GOC    Introduction to Palliative Care  Met with pt and spouse at bedside  Pt was unable to participate in visit.  Staff present:  Laura Cohn  Palliative care was introduced as a service for patients with serious illness to help with symptoms, assist with goals of care conversations, navigate complex decision making, improve quality of life for patients, and provide support to patients and families.  Support and empathy was provided throughout the encounter.    History of Present Illness  Massimo Garcia is a 63 y.o. male with past medical history of esophageal adenocarcinoma stage IV. Pt is presenting with lethargy and shortness of breath    Caregiving/Caregiver Support  Does the patient require assistance in some or all components of his care, including coordination of medical care?  yes  If Yes, which person serves that role?   Pt has been in and out of hospitals, NH.     Medical History  Per EMR    Personal History  Pt has been  30 years.  Pt and spouse have one dtr, age 26.  Pt has a dtr from previous marriage, age 36.  Spouse has a son from a previous marriage.  Spouse's sister recently  from cancer.     Depression   not assessed     Anxiety    not assessed       Advance Directives  Surrogate Health Care Decision Maker:  spouse  HCPOA  yes  Living Will   yes  copies requested    Code Status                    FULL CODE.  Spouse states pt has always wanted to be FULL CODE.  Reviewed risks of CPR, spouse in agreement to DNR DNI pending hospice and DNRCC but wants to first discuss with her dtrs.     Serious Illness Conversation        Life Limiting Disease:  end stage cancer  Disease Specific Information Provided:  pt is end stage, role of nutrition  What is your understanding now of where you are with your illness:  spouse understands that pt cannot tolerate any  treatment  What are your fears, worries, or concerns about the future:  being able to care for pt  What are you hoping for:  peaceful death  What brings you michael:  family  How much does your family know about your priorities and wishes:  spouse feels pt would agree to comfort care if he was able to communicate at this time    Other distressing Symptoms        Pt has mouth sores, sore throat.     Plan and Social Considerations  Reviewed hospice, spouse in agreement.  Reviewed options for hospice, spouse would like [facility].  Reviewed locations for hospice.  Spouse considering brining pt home.  Reviewed private pay in NH.   Referral sent to [facility] via [employer].   As of 1600, [facility] unable to reach spouse to schedule meeting.  Team updated.     Plan of Care discussed with: team    Thank you for asking Palliative Care to assist with care of this patient.  We will continue to follow  Please contact us for additional questions or concerns.    EDOUARD Whaley  Palliative Care   Contact Via Epic Secure chat

## 2024-07-05 NOTE — PROGRESS NOTES
Massimo Garcia is a 63 y.o. male on day 1 of admission presenting with Shortness of breath.      Subjective   Patient seen at bedside, was poorly arousable.   Family at bedside, they noticed gradual decline.  Agreeable to update from oncology and palliative care. Discussed goals of care      Objective     Last Recorded Vitals  BP 95/52 (BP Location: Left arm, Patient Position: Lying)   Pulse 70   Temp 36.5 °C (97.7 °F) (Temporal)   Resp 10   Wt 73.5 kg (162 lb)   SpO2 97%   Intake/Output last 3 Shifts:    Intake/Output Summary (Last 24 hours) at 7/5/2024 1151  Last data filed at 7/5/2024 0533  Gross per 24 hour   Intake 446.67 ml   Output 250 ml   Net 196.67 ml       Admission Weight  Weight: 73.3 kg (161 lb 9.6 oz) (07/04/24 1928)    Daily Weight  07/05/24 : 73.5 kg (162 lb)    Image Results  XR chest 1 view  Narrative: Interpreted By:  Ashley Sevilla,   STUDY:  XR CHEST 1 VIEW;  7/4/2024 7:29 pm      INDICATION:  Signs/Symptoms:sob.      COMPARISON:  06/26/2024, CT 06/15/2024      ACCESSION NUMBER(S):  [id number]      ORDERING CLINICIAN:  GLORIA OLSON      FINDINGS:  AP radiograph of the chest was provided.      Status post bilateral shoulder arthroplasty. Left subclavian dual  lead pacemaker noted. Right chest wall MediPort with tip partially  obscured by dish in all hardware however likely within the region of  the distal SVC.      CARDIOMEDIASTINAL SILHOUETTE:  Cardiomediastinal silhouette is normal in size and configuration.      LUNGS:  Extensive bilateral nodular and masslike airspace opacities. No  sizable pleural effusion or pneumothorax.      ABDOMEN:  No remarkable upper abdominal findings.      BONES:  No acute osseous changes. Thoracolumbar hardware.      Impression: 1.  Bilateral extensive masslike airspace opacities compatible with  extensive pulmonary malignancy/metastasis. Findings are grossly  similar to recent imaging.              MACRO:  None      Signed by: Ashley Sevilla 7/4/2024 8:17  PM  Dictation workstation:   [id number]  CT head wo IV contrast  Narrative: Interpreted By:  Ashley Sevilla,   STUDY:  CT HEAD WO IV CONTRAST;  7/4/2024 7:55 pm      INDICATION:  Signs/Symptoms:ams.      COMPARISON:  06/13/2024, MRI 06/14/2024      ACCESSION NUMBER(S):  [id number]      ORDERING CLINICIAN:  GLORIA OLSON      TECHNIQUE:  Axial noncontrast CT images of the head.      FINDINGS:  BRAIN PARENCHYMA: Redemonstrated high parietal right cortical based  mass with diffusely increased attenuation which measures up to 1.8  cm, previously 1.3 cm on 06/13/2024. Adjacent region of decreased  attenuation suggestive of vasogenic edema extends through the right  parietal lobe. There is local sulcal mass effect without midline  shift identified. The extent of parenchymal edema is similar to prior  imaging. Hypodense region in right paracentral frontal lobe likely  related to vasogenic edema better seen on prior MRI. Additional  nonspecific white matter changes M parenchymal volume loss.      There is a hyperdense focus noted in the right parietal lobe along  the right superior sagittal sinus (series 2 to come image 13), new  from prior CT imaging however present on prior MRI. There is a  redemonstrated 9 mm hyperdense lesion in the anterior left temporal  lobe, similar to prior imaging. A 7 mm hyperdense lesion in the left  parieto-occipital lobe similar prior MRI. Additional foci including  within the right cerebellum better identified on prior imaging.      VENTRICLES and EXTRA-AXIAL SPACES: The ventricles, sulci and basal  cisterns enlarged, concordant with parenchymal volume loss.  EXTRACRANIAL SOFT TISSUES: Within normal limits. PARANASAL  SINUSES/MASTOIDS: The visualized paranasal sinuses and mastoid air  cells are aerated. CALVARIUM: No depressed skull fracture. No  destructive osseous lesion.      OTHER FINDINGS: None.      Impression: Multiple hyperdense parenchymal lesions  are again identified,  better  delineated on prior MRI, 06/14/2024. Dominant lesion in the right  parietal lobe appears more prominent since prior CT imaging by direct  compares and a similar distribution of adjacent vasogenic edema. A  hyperdense right paracentral parietal lesion better delineated on  prior MRI however more conspicuous since prior CT imaging measuring  up to 9 mm. No significant new mass effect or midline shift  identified.          MACRO:  None.      Signed by: Ashley Sevilla 7/4/2024 8:13 PM  Dictation workstation:   [id number]      Physical Exam  Constitutional:       Appearance: He is ill-appearing.      Comments: Somnolent, unresponsive   Cardiovascular:      Rate and Rhythm: Normal rate and regular rhythm.      Heart sounds: No murmur heard.     No friction rub. No gallop.   Pulmonary:      Comments: tachypnea  Musculoskeletal:         General: Swelling present.      Right lower leg: Edema present.      Left lower leg: Edema present.      Comments: 4 extremity 2+ pitting edema   Skin:     General: Skin is warm.         Assessment/Plan      Massimo Garcia is a 63 y.o. male on day 1 of admission. Patient was barely arousable, family noticed gradual decline. Discussed goals of care:  -Palliative care conversation   -Revisited family code changed to DNR   -Hospice comfort care    # Acute encephalopathy - Known brain mets.   # Cancer related pain/anxiety   # Metastatic cancer - Oncology consult to provide input to family/patient.   # Pancytopenia - Chemotherapy  # Fluid overloaded state/Anasarca  # Elevated Lactate            Principal Problem:    Shortness of breath    Rafael Clark DO, PGY-1  Internal Medicine   7/5/24 at 2:33 PM.       Disclaimer: Documentation completed with the information available at the time of input. The times in the chart may not be reflective of actual patient care times, interventions, or procedures. Documentation occurs after the physical care of the patient.

## 2024-07-05 NOTE — PROGRESS NOTES
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 / IBS / Cancer pain - flexeril 10mg TID, dexamethasone taper until 7/12, dicyclomine 10mg AC/HS, zofran 4mg q6h PRN, pantoprazole 40mg daily, miralax 17grams BID, dilaudid 4mg q4h PRN, senna-S 8.6/50mg 2 tabs BID, acetaminphen 650mg q6h PRN, fentanyl 100mcg patch q72h, colace 100mg BID,  lactulose 20grams/30mL q8h PRN - add daily dose; f/u w/ oncology  #A-fib / PE / Portal vein thrombosis / Complete heart block - s/p PM; eliquis 5mg BID (addendum: hold x48h), KCl 10mEq daily  #Spinal wound - Cleanse spinal wound with Vash, then apply aquacel AG, then cover with foam dressing  #Yeast infection - diflucan 150mg daily until 7/4  #Anxiety / Depression - hydroxyzine 25mg q6h PRN, lorazepam 1mg q8h PRN, cymbalta 60mg daily  #Vitamin deficiency - vitamin D 4000units daily, multivitamin daily  #ACP - Full Code  Follow up as needed      *Chief Complaint*     esophageal cancer    *History of Present Illness*    Patient is a 64 y/o male w/ PMH as below who presented to [facility] for intractable abdominal pain. He was admitted to [place] (6/5-6/7) w/ partial SBO, resolved prior to discharge from [facility], surgery consulted at [facility] and did not recommend surgical interventions at that time. He presented to [facility] 6/8 with continued abdominal pain and diarrhea. CT A/P (6/8) w/o bowel obstruction, concerning for colitis on imaging. Cdiff and stool pathogen neg (6/9). Supportive oncology consulted for further pain management on 6/9. During admission he was tolerating full liquid diet, no nausea or vomiting, advanced to regular diet on 6/11. Radiation oncology consulted as he was recently seen outpatient with plan for CT  simulation for gamma knife and radiation to spine; they recommended systemic therapy and interval palliative RT sequenced in between cycles. On 6/12 [facility] called d/t new right arm weakness, but cancelled because symptoms not consistent with stroke. Neurology consulted for new arm weakness workup. CTH showed concern for bleed of his known left temporal lobe, worsening edema around his right occipital. However, neither can explain his symptoms. CT of the neck showed two larger cervical lymphnodes on the right side at C4-5 level, concerning for compression on his right upper brachial plexuses. Rad/onc paged regarding CT head showing worsening disease in brain. Surg/onc consulted d/t concern for brachial plexus injury d/t large cervical lymph node, w/ no plans for surgical interventions. He was given dexamethasone 10mg x1 dose and then 4mg q6 hours. NSGY consulted d/t concern for temporal bleed. Repeat head CT 6/12 showing same findings as previous. Eliquis restarted 6/15 given no hemmorage on MRI. MRI brain showed worsening edema, MRI C-spine showed worsening disease progression. On 6/15 his weakness progressed and he was no longer able to ambulate. On 6/16 neurosurgery was consulted d/t new onset on BLE weakness. After reviewing results they recommended to consult ortho/spine to r/o cord compression. On 6/16 Ortho was consulted. MRI T-spine and L spine under anesthesia 6/17 demonstrated worsening marrow replacing lesions and epidural extension with deformity of the spinal cord and edema possibly representing cord compression. Ortho discussed possible surgical interventions but this would delay further systemic treatment, and it was determined that he was not good candidate for surgery. Patient and his family decided to decline surgical interventions. On 6/18 rad/onc was reconsulted for possible inpatient emergent radiation and he started urgent palliative RT to the spine, C6-7, T8-10, L2, for 20Gy in 5 fractions. S/p CT  sim 6/19 and RT 6/19, 6/20, 6/21, 6/24, and 6/25. He was seen by PT/OT who recommended acute rehab. PM&R consulted 6/19 and also recommended acute rehab. However, he would not be able to receive radiation or chemo for 7-10 days while at acute rehab and must be off IV pain meds. Team spoke with Dr. Blake 6/20 w/ plan to likely proceed with systemic treatment 6/28 so will need to discharged to SNF to continue treatment. On 6/26 CXR d/t worsening SOB showing progression of malignancy and could not exclude infection. He was started on levofloxacin 750mg daily (6/26-7/3). On 6/26 echocardiogram showed no acute findings. He was determined to be stable and was d/c to [facility] on 6/27.    His labs appreciated as below w/ elevated lactate improved from 3.5 to 2.8 after 1L NS, but critical platelets. He was placed on oxygen for complaints of SOB sating at 97% but continued to complain so placed on 2L. His code status was discussed with him and family by Dr. Jansen and MAEVE Vo and he elected to remain FULL CODE. He is seen today sitting up in his room visiting with his wife and daughter. He is somewhat more lethargic, but in a lot of pain, relentless hiccups, dry cough and he did c/o SOB last night, but improved, no n/v, or any other new c/o presently. History is limited as he is somnolent during conversation.    D/w Oncology Dr. Blake to update on his condition and labs. He confirms transfusion threshold of PLT<10 or Hgb<7 but otherwise in agreement with another liter of NS, CXR, UA c&s and repeat labs on 7/5 as lab n/a on 7/4.     (Addendum: 7/4 per nursing staff he was agitated and in pain this am, jerking more, got dilaudid and ativan, has been more lethargic, hiccuping, and per nusing staff also had some apnea w/ RR down to as low as 8. Suspect co administration of dilaudid and increased dose of ativan plus administration of new fentanyl patch may have contributed. Per his wife, he was fine all night, was  talking a little bit this morning, and then appeared to have some severe abdominal cramping, which prompted administration of PRNs. She reports he had slight bleeding gums, no bleeding anywhere else.  Upon reassessment the periods of apnea resolved w/ RR improved to 14 after he had a bowel movement and with timing otherwise consistent w/ waning of the effects of the ativan he received. So wife in agreement w/ cutting the dose of ativan and monitoring him for continued improvement. Called later by nursing staff who report that he again developed worsening apnea and lethargy, so ordered to have him sent to the ED)    Allergies - Oxaliplatin, Bee venom   PMH - HTN, Vtach, PE, thrombocytopenia, GERD, Constipation, portal vein thrombosis, SBO, colitis, esophageal cancer w/ mets to lungs/liver, OA, spinal stenosis,   PSH - Pacemaker, R TKR, L TKR, T10 transpedicular decompression and a T8-T12 fusion   FH - Father had esophageal cancer  SocHx - Former smoker, No EtOH    *Review of Systems*  All other systems reviewed are negative except as noted in the HPI     *Vital Signs*   Date: 7/4/24  - T: 97.6  P: 62  R: 14  BP: 118/72  SpO2: 96% on RA     *Results / Data*  CBC - Date: 7/3/24  WBC: 3.2  HGB: 9.8  HCT: 31.2  PLT: 36  ;   BMP - Date: 7/3/24  Na: 135  K: 4.3  Cl: 101  CO2: 26  BUN: 56  Cr: 0.53  Glu: 148  Ca: 8.2  ;   LFT - Date: 7/2/24  AST: 63  ALT: 48  ALP: 298  Tbili: 1.0  ;   Coags - Date:   INR:   PT:  Other - Date: 7/3/24  Lactate: 2.8    *Physical Exam*  Gen: (+) intermittent grimacing, (+) ill-appearing  HEENT: (+) normocephalic, (+) MMM  Neck: (+) supple  Lungs: (-) CTAB, (-) wheezes, (-) rales, (+) rhonchi  Heart: (+) irreg irreg, (-) murmurs  Pulses: (+) palpable  Abd: (+) soft, (+) diffusely tender, (+) palpable mass, (+) BS+  Ext: (+) trace edema, (-) deformity  MSK: (-) joint swelling  Skin: (+) warm, (+) dry, (-) rash  Neuro: (+) follows commands, (-) tremor, (+) somnolent

## 2024-07-05 NOTE — H&P
"Subjective   Subjective:   HPI:  Massimo Garcia is a 63 y.o. 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 History was obtained from nursing home and family members as patient was unable to provide a detailed history on admission. Over the past few days the patient has been experiencing worsening hiccups and increased pain. He has also been \"twitching\" and has had trouble swallowing. He seemed to be \"catching his breath\". At SNF, the patient had elevated lactate (3.5 --> 2.8) and low platelets. The patient has had recent gum bleeding. He appeared to have shortness of breath and was started on 2L O2 and given 1L NS. During this event he became bradycardic to the 40s but blood pressure remained stable and O2 saturation was >89%. He also received ativan and dilaudid at that time. He has been taking dilaudid 4 mg oral once daily and also has a fentanyl patch.  Of note, the patient was recently on levofloxacin (6/26-7/3) because of concern for pneumonia. Blood cultures from 7/2 show NGTD. Patient was recently restarted on trastuzumab (6/28). The family feels that symptoms worsened after chemotherapy treatment.     The patient was recently hospitalized for colitis (6/8-6/27). At that time there was also concern for intracranial bleed but no evidence was found on imaging. MRI revealed worsening metastases in the spinal cord. Surgical intervention was declined because it would have delayed chemo and the patient and family wanted to continue with chemo. He did get palliative radiation to the spine.     12 point Review of Systems negative unless stated above     ED COURSE:  Vitals:   - /59 , HR 62 , RR 17 , SpO2 98%, T 36.7C  Labs:  - WBC 1.2, HGB 9.1, PLT 26  - Na " "134, Cl 102, K 3.9, bicarb 20, BUN 53, Cr 0.57  - UA: negative for UTI  - Lactate 2.1  - Trops 42 --> 45  Imaging:  - CXR: extensive pulmonary metastases, similar to recent imaging  - CT head: multiple lesions, dominant lesion in right parietal lobe more prominent since previous imaging, no significant new mass effect or midline shift   Interventions:   - Lasix 20 mg for edema --> hypotension with SBPs in 70s --> 500cc NS   - Zofran for nausea  - Dilaudid once for pain  - Start 2L O2      PMH: per HPI  PSH: T8-T10 spinal decompression  FHX: unknown  SHX: lives at [facility] ([phone])  Allergies: adverse reaction to oxaliplatin     Code Status: Full code per family     Review Of Systems:  Respiratory: Positive for shortness of breath. Negative for cough.   Cardiovascular:  Negative for chest pain.   Gastrointestinal: Positive for nausea, hiccups, constipation, bleeding gums. Negative for diarrhea, vomiting.   Genitourinary: Negative for dysuria.  Neurological: Positive for diffuse pain (especially prominent in abdomen, right arm), paralysis of bilateral lower extremities    Objective:     /75 (BP Location: Right arm, Patient Position: Lying)   Pulse 69   Temp 36.4 °C (97.5 °F) (Temporal)   Resp 26   Ht 1.727 m (5' 8\")   Wt 73.3 kg (161 lb 9.6 oz)   SpO2 95%   BMI 24.57 kg/m²     Physical Exam  General: Appears very uncomfortable. Irregular breathing. Repeatedly expressed pain and need to urinate.   HENT: Normocephalic, atraumatic  Cardiovascular: Regular rate and rhythm. No murmurs, rubs, gallops.   Pulmonary:  Clear to auscultation.   Abdominal: Patient did not want abdominal exam due to severe pain.   Musculoskeletal: No deformities. Edema in all 4 extremities.   Skin: Warm and dry.   Neurological: Difficult to assess due to patient distress. Sensation preserved in all 4 extremities.     Assessment & Plan:     Massimo Garcia is a 63 y.o. 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 Admitted for management of metabolic encephalopathy, acute hypoxic respiratory failure, thrombocytopenia, and anasarca.     ACUTE MEDICAL ISSUES:  #Metabolic encephalopathy 2/2 neoplasm related pain vs. opioid use vs. chemotherapy-induced vs. stroke vs. worsening brain metastases:   - Per family symptoms began after chemotherapy on 6/28  - Sepsis less likely due to negative blood cultures (7/2); patient did not present with tachycardia, tachypnea, fever or hypothermia; did have leukopenia (WBC 1.9) perhaps 2/2 chemotherapy  - Unlikely to be opioid-induced as patient has not had recent changes to his pain regimen  - CT head revealed extensive brain mets with no significant mass effect or midline shift   PLAN  - Pain management: continue home pain regimen - dilaudid 4mg oral q4hr PRN, fentanyl patch q3 days  - MRI head to rule out stroke, edema from brain mets    #Acute hypoxic respiratory failure 2/2 chemo-induced pneumonitis vs. Dyspnea from pain vs. Increased cancer burden vs. Sepsis:   - New 2L oxygen requirement, room air at home   - CXR: extensive pulmonary metastasis, similar to previous imaging  PLAN  - Consider CT lung   - Wean oxygen as tolerated     #Thrombocytopenia with bleeding likely 2/2 chemotherapy:   - Platelets 26  - Has had bleeding gums recently   PLAN  - S/p platelets 1 unit  - Goal platelets >30,000, consider repeat transfusion if goal not met    #Anasarca 2/2 suspected chemo-induced heart failure  - New onset edema in all 4 extremities   - In ED received Lasix 20 mg, but patient became hypotensive and was then given 500cc NS  PLAN  - Continue steroid taper initiated at nursing home (dexamethasone 2mg BID daily for 2 more days (7/5-7/6), dexamethasone 2mg daily for 5 days (7/7-7/11)  - TTE to evaluate for  cardiomyopathy  - Strict Is/Os, fluid restriction 1500ml    #Physical deconditioning/paraparesis likely 2/2 spinal mets/weakness  - Inability to move lower extremities for past 2 weeks  - Known diffuse lumbar/thoracic spine mets   PLAN  - PT/OT  - Consult speech once patient is more alert and able to perform evaluation     #Elevated troponins likely 2/2 demand ischemia  - Trops 42 --> 45  PLAN  - Continue to trend until downtrending    #Stage IV esophageal cancer with mets to lung/liver/bone/abdomen  - Trastuzumab + FOLFOX initially (September 2023)  - Switched to trastuzumab without oxalplatin due to intolerance   - Then treated with  trastuzumab and FOLFOX plus pembrolizumab due to recurrent disease  - Stopped pembrolizumab due to drug induced colitis   - Recently started trastuzumab deruxtecan (6/29-)  - S/p radiation  - S/p spinal decompression T8-T12 (May 2024)  - Follows with Dr. Blake  PLAN  - Consider oncology, palliative consults    #Stage 1-2 pressure wounds  PLAN  - Wound care consult     CHRONIC MEDICAL ISSUES:  #Nausea - continue Zofran as needed  #Constipation - continue doc-senna + miralax scheduled; milk of magnesia PRN  #Hx PVT/PE - hold Eliquis due to symptomatic thrombocytopenia  #Muscle spasms - continue cyclobenzaprine  #GERD - continue pantoprazole and dicyclomine   # Anxiety - continue Ativan PRN  # Depression - continue duloxetine     Fluid: Replete PRN  Electrolytes: Replete PRN  Nutrition: regular diet, fluid restriction 1500ml  GI ppx: home PPI  DVT/PE ppx: none due to thrombocytopenia  Abx: none  IV Lines: PIV  O2: 2L    Dispo: Admitted for acute hypoxic respiratory failure, anasarca, thrombocytopenia, and metabolic encephalopathy workup. Discharge to SNF once medically cleared. Estimated length of stay >2 days.    Robert Acuna MD  PGY-1, Transitional Year

## 2024-07-06 NOTE — NURSING NOTE
Pt medicated for pain per orders.  Pt continued to be uncomfortable in bed.  Pt groaning, facial grimacing, grabbing wife's hand.  Dr made aware and orders for morphine drip received.  Wife involved in plan of care and requesting that pt continues to be comfortable.

## 2024-07-06 NOTE — PROGRESS NOTES
Patient ID: Massimo Garcia is a 63 y.o. male.  Referring Physician: No referring provider defined for this encounter.  Primary Care Provider: Dayne Santamaria DO  Visit Type:      Subjective    HPI  63-year-old male with a history of metastatic esophageal adenocarcinoma HER2 positive that presents to the emergency department on 5/10 with acute on chronic lower back pain. Workup reveals T9/10 epidural extension with cord compression. MRI L-spine also showed a nodular lesion at L2 abutting the equina, concerning for drop lesion. MRI brain showed small enhancing lesions in the right posterior parasagittal parietal lobe and left temporal lobe, consistent with metastatic disease. On 5/17 the patient underwent T10 decompression with T8-12 fusion.      Patient is status post radiation treatments and when presented today 06/28/2024 patient had paraparesis.  He presented [place] with [place] metastatic stage IV esophageal cancer HER2 positive and did well on trastuzumab FOLFOX.  Because of toxicity he continued on trastuzumab plus 5-FU and after 12 months approximately patient had evidence of progressive disease.  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      Seen today and fam trastuzumab deruxtecan and treatment  cycle 1 day 1 06/28/2024.    Status post cycle 1 patient has been readmitted.  Patient has gone downhill since cycle 1 with CNS obtundation.  Discussed with  wife and I recommend in conjunction with primary care recommendations hospice.  She concurs.    Review of Systems - Oncology     Objective   BSA: There is no height or weight on file to calculate BSA.  There were no vitals taken for this visit.     has a past medical history of Essential (primary) hypertension (12/21/2013), Pacemaker, Peripheral neuropathy, Personal history of other diseases of the circulatory system, and Pneumothorax (12/04/2023).   has a past surgical history that includes Total knee arthroplasty (09/30/2016); Total knee arthroplasty (09/30/2016); Cardiac electrophysiology procedure (N/A, 11/7/2023); and Cardiac electrophysiology procedure (Left, 11/9/2023).  Family History   Problem Relation Name Age of Onset    Cancer Father       Oncology History   Stage IV malignant neoplasm of esophagus (Multi)   9/13/2023 Initial Diagnosis    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)     9/13/2023 - 5/1/2024 Chemotherapy    Trastuzumab + mFOLFOX6 (Fluorouracil Continuous Infusion / Leucovorin / Oxaliplatin), 14 Day Cycles     6/28/2024 -  Chemotherapy    Fam-trastuzumab deruxtecan (6.4 mg/kg), 21 Day Cycles      Metastases to the liver (Multi)   9/13/2023 Initial Diagnosis    Metastases to the liver (CMS/HCC)     9/13/2023 - 5/1/2024 Chemotherapy    Trastuzumab + mFOLFOX6 (Fluorouracil Continuous Infusion / Leucovorin / Oxaliplatin), 14 Day Cycles     6/28/2024 -  Chemotherapy    Fam-trastuzumab deruxtecan (6.4 mg/kg), 21 Day Cycles          Massimo Garcia  reports that he has quit smoking. His smoking use included cigarettes and cigars. He has never been exposed to tobacco smoke. He has never used smokeless tobacco.  He  reports that he does not currently use alcohol.  He  reports current drug use. Drug: Marijuana.    Physical Exam    WBC   Date/Time Value Ref Range Status   07/06/2024 06:21 AM 0.1 (LL) 4.4 - 11.3 x10*3/uL Final   07/04/2024 07:21 PM 1.2 (L) 4.4 - 11.3 x10*3/uL Final   07/03/2024 05:41  AM 3.2 (L) 4.4 - 11.3 x10*3/uL Final     nRBC   Date Value Ref Range Status   07/06/2024 0.0 0.0 - 0.0 /100 WBCs Final   07/04/2024 0.0 0.0 - 0.0 /100 WBCs Final   07/03/2024 0.0 0.0 - 0.0 /100 WBCs Final     RBC   Date Value Ref Range Status   07/06/2024 2.60 (L) 4.50 - 5.90 x10*6/uL Final   07/04/2024 3.15 (L) 4.50 - 5.90 x10*6/uL Final   07/03/2024 3.48 (L) 4.50 - 5.90 x10*6/uL Final     Hemoglobin   Date Value Ref Range Status   07/06/2024 7.3 (L) 13.5 - 17.5 g/dL Final   07/04/2024 9.1 (L) 13.5 - 17.5 g/dL Final   07/03/2024 9.8 (L) 13.5 - 17.5 g/dL Final     Hematocrit   Date Value Ref Range Status   07/06/2024 23.2 (L) 41.0 - 52.0 % Final   07/04/2024 28.1 (L) 41.0 - 52.0 % Final   07/03/2024 31.2 (L) 41.0 - 52.0 % Final     MCV   Date/Time Value Ref Range Status   07/06/2024 06:21 AM 89 80 - 100 fL Final   07/04/2024 07:21 PM 89 80 - 100 fL Final   07/03/2024 05:41 AM 90 80 - 100 fL Final     MCH   Date/Time Value Ref Range Status   07/06/2024 06:21 AM 28.1 26.0 - 34.0 pg Final   07/04/2024 07:21 PM 28.9 26.0 - 34.0 pg Final   07/03/2024 05:41 AM 28.2 26.0 - 34.0 pg Final     MCHC   Date/Time Value Ref Range Status   07/06/2024 06:21 AM 31.5 (L) 32.0 - 36.0 g/dL Final   07/04/2024 07:21 PM 32.4 32.0 - 36.0 g/dL Final   07/03/2024 05:41 AM 31.4 (L) 32.0 - 36.0 g/dL Final     RDW   Date/Time Value Ref Range Status   07/06/2024 06:21 AM 17.2 (H) 11.5 - 14.5 % Final   07/04/2024 07:21 PM 17.9 (H) 11.5 - 14.5 % Final   07/03/2024 05:41 AM 17.4 (H) 11.5 - 14.5 % Final     Platelets   Date/Time Value Ref Range Status   07/06/2024 06:21 AM 13 (LL) 150 - 450 x10*3/uL Final   07/04/2024 07:21 PM 26 (LL) 150 - 450 x10*3/uL Final     Comment:     Platelet count verified by smear review.   07/03/2024 05:41 AM 36 (LL) 150 - 450 x10*3/uL Final     Comment:     Platelet count verified by smear review.     MPV   Date/Time Value Ref Range Status   10/30/2023 09:13 AM 10.3 7.5 - 11.5 fL Final   10/16/2023 08:34 AM 10.7 7.5 -  11.5 fL Final   10/02/2023 09:08 AM 10.0 7.5 - 11.5 fL Final     Neutrophils %   Date/Time Value Ref Range Status   07/04/2024 07:21 PM 92.7 40.0 - 80.0 % Final   07/02/2024 12:48 PM 98.0 40.0 - 80.0 % Final   06/28/2024 09:06 AM 93.3 40.0 - 80.0 % Final     Immature Granulocytes %, Automated   Date/Time Value Ref Range Status   07/04/2024 07:21 PM 0.8 0.0 - 0.9 % Final     Comment:     Immature Granulocyte Count (IG) includes promyelocytes, myelocytes and metamyelocytes but does not include bands. Percent differential counts (%) should be interpreted in the context of the absolute cell counts (cells/UL).   07/02/2024 12:48 PM 1.0 (H) 0.0 - 0.9 % Final     Comment:     Immature Granulocyte Count (IG) includes promyelocytes, myelocytes and metamyelocytes but does not include bands. Percent differential counts (%) should be interpreted in the context of the absolute cell counts (cells/UL).   06/28/2024 09:06 AM 2.3 (H) 0.0 - 0.9 % Final     Comment:     Immature Granulocyte Count (IG) includes promyelocytes, myelocytes and metamyelocytes but does not include bands. Percent differential counts (%) should be interpreted in the context of the absolute cell counts (cells/UL).     Lymphocytes %, Manual   Date/Time Value Ref Range Status   06/21/2024 05:30 AM 0.0 13.0 - 44.0 % Final   06/20/2024 05:24 AM 1.7 13.0 - 44.0 % Final   06/18/2024 02:20 PM 0.0 13.0 - 44.0 % Final     Lymphocytes %   Date/Time Value Ref Range Status   07/04/2024 07:21 PM 4.9 13.0 - 44.0 % Final   07/02/2024 12:48 PM 0.5 13.0 - 44.0 % Final   06/28/2024 09:06 AM 0.6 13.0 - 44.0 % Final     Monocytes %, Manual   Date/Time Value Ref Range Status   06/21/2024 05:30 AM 1.4 2.0 - 10.0 % Final   06/20/2024 05:24 AM 3.5 2.0 - 10.0 % Final   06/18/2024 02:20 PM 1.7 2.0 - 10.0 % Final     Monocytes %   Date/Time Value Ref Range Status   07/04/2024 07:21 PM 0.8 2.0 - 10.0 % Final   07/02/2024 12:48 PM 0.2 2.0 - 10.0 % Final   06/28/2024 09:06 AM 3.6 2.0 -  10.0 % Final     Eosinophils %, Manual   Date/Time Value Ref Range Status   06/21/2024 05:30 AM 0.0 0.0 - 6.0 % Final   06/20/2024 05:24 AM 0.0 0.0 - 6.0 % Final   06/18/2024 02:20 PM 0.0 0.0 - 6.0 % Final     Eosinophils %   Date/Time Value Ref Range Status   07/04/2024 07:21 PM 0.8 0.0 - 6.0 % Final   07/02/2024 12:48 PM 0.0 0.0 - 6.0 % Final   06/28/2024 09:06 AM 0.0 0.0 - 6.0 % Final     Basophils %, Manual   Date/Time Value Ref Range Status   06/21/2024 05:30 AM 0.0 0.0 - 2.0 % Final   06/20/2024 05:24 AM 0.0 0.0 - 2.0 % Final   06/18/2024 02:20 PM 0.0 0.0 - 2.0 % Final     Basophils %   Date/Time Value Ref Range Status   07/04/2024 07:21 PM 0.0 0.0 - 2.0 % Final   07/02/2024 12:48 PM 0.3 0.0 - 2.0 % Final   06/28/2024 09:06 AM 0.2 0.0 - 2.0 % Final     Neutrophils Absolute   Date/Time Value Ref Range Status   07/04/2024 07:21 PM 1.13 (L) 1.20 - 7.70 x10*3/uL Final     Comment:     Percent differential counts (%) should be interpreted in the context of the absolute cell counts (cells/uL).   07/02/2024 12:48 PM 9.80 (H) 1.20 - 7.70 x10*3/uL Final     Comment:     Percent differential counts (%) should be interpreted in the context of the absolute cell counts (cells/uL).   06/28/2024 09:06 AM 19.83 (H) 1.20 - 7.70 x10*3/uL Final     Comment:     Percent differential counts (%) should be interpreted in the context of the absolute cell counts (cells/uL).     Immature Granulocytes Absolute, Automated   Date/Time Value Ref Range Status   07/04/2024 07:21 PM 0.01 0.00 - 0.70 x10*3/uL Final   07/02/2024 12:48 PM 0.10 0.00 - 0.70 x10*3/uL Final   06/28/2024 09:06 AM 0.48 0.00 - 0.70 x10*3/uL Final     Lymphocytes Absolute   Date/Time Value Ref Range Status   07/04/2024 07:21 PM 0.06 (L) 1.20 - 4.80 x10*3/uL Final   07/02/2024 12:48 PM 0.05 (L) 1.20 - 4.80 x10*3/uL Final   06/28/2024 09:06 AM 0.12 (L) 1.20 - 4.80 x10*3/uL Final     Monocytes Absolute   Date/Time Value Ref Range Status   07/04/2024 07:21 PM 0.01 (L) 0.10  "- 1.00 x10*3/uL Final   07/02/2024 12:48 PM 0.02 (L) 0.10 - 1.00 x10*3/uL Final   06/28/2024 09:06 AM 0.77 0.10 - 1.00 x10*3/uL Final     Eosinophils Absolute   Date/Time Value Ref Range Status   07/04/2024 07:21 PM 0.01 0.00 - 0.70 x10*3/uL Final     Comment:     Automated WBC differential has been confirmed by manual smear.   07/02/2024 12:48 PM 0.00 0.00 - 0.70 x10*3/uL Final   06/28/2024 09:06 AM 0.01 0.00 - 0.70 x10*3/uL Final     Eosinophils Absolute, Manual   Date/Time Value Ref Range Status   06/21/2024 05:30 AM 0.00 0.00 - 0.70 x10*3/uL Final   06/20/2024 05:24 AM 0.00 0.00 - 0.70 x10*3/uL Final   06/18/2024 02:20 PM 0.00 0.00 - 0.70 x10*3/uL Final     Basophils Absolute   Date/Time Value Ref Range Status   07/04/2024 07:21 PM 0.00 0.00 - 0.10 x10*3/uL Final   07/02/2024 12:48 PM 0.03 0.00 - 0.10 x10*3/uL Final   06/28/2024 09:06 AM 0.05 0.00 - 0.10 x10*3/uL Final     Comment:     Automated WBC differential has been confirmed by manual smear.     Basophils Absolute, Manual   Date/Time Value Ref Range Status   06/21/2024 05:30 AM 0.00 0.00 - 0.10 x10*3/uL Final   06/20/2024 05:24 AM 0.00 0.00 - 0.10 x10*3/uL Final   06/18/2024 02:20 PM 0.00 0.00 - 0.10 x10*3/uL Final       No components found for: \"PT\"  aPTT   Date/Time Value Ref Range Status   06/17/2024 06:57 PM 25 (L) 27 - 38 seconds Final   06/12/2024 10:15 AM 29 27 - 38 seconds Final   05/17/2024 12:45 PM 25 (L) 27 - 38 seconds Final       Assessment/Plan      63-year-old male with metastatic esophageal cancer. Has been treated with trastuzumab FOLFOX with maintenance trastuzumab based chemotherapy. Developed metastatic disease in the spine and was treated with spinal decompression. Noted on MRI evaluation to have CNS disease as well which portends a poor prognosis but has been treated with radiation. Currently patient opts to try palliative from trastuzumab directs to can in the palliative setting. Cycle 1 given 06/28/2024. Patient is " paraplegic.    Patient has worsening CNS symptomatology.  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  Hospice is recommended and wife concurs.            Tomasa Blake MD

## 2024-07-06 NOTE — PROGRESS NOTES
07/06/24 1022   Discharge Planning   Patient expects to be discharged to: [facility] states that they are not able to meet with family until 7/7 at 1:30. SW met with pt daughter to see if they would prefer a referral to another hospice agency that has availability to meet with family sooner. Daughter states that she believes her mom is fine with tomorrow meeting and will message SW if that changes.

## 2024-07-06 NOTE — CARE PLAN
The patient's goals for the shift include comfort    The clinical goals for the shift include work with hospice      Problem: Pain - Adult  Goal: Verbalizes/displays adequate comfort level or baseline comfort level  Outcome: Progressing     Problem: Discharge Planning  Goal: Discharge to home or other facility with appropriate resources  Outcome: Progressing     Problem: Chronic Conditions and Co-morbidities  Goal: Patient's chronic conditions and co-morbidity symptoms are monitored and maintained or improved  Outcome: Progressing     Problem: Pain  Goal: Takes deep breaths with improved pain control throughout the shift  Outcome: Progressing  Goal: Turns in bed with improved pain control throughout the shift  Outcome: Progressing  Goal: Walks with improved pain control throughout the shift  Outcome: Progressing  Goal: Performs ADL's with improved pain control throughout shift  Outcome: Progressing  Goal: Participates in PT with improved pain control throughout the shift  Outcome: Progressing  Goal: Free from opioid side effects throughout the shift  Outcome: Progressing  Goal: Free from acute confusion related to pain meds throughout the shift  Outcome: Progressing

## 2024-07-06 NOTE — PROGRESS NOTES
Massimo Garcia is a 63 y.o. male on day 2 of admission presenting with Shortness of breath.      Subjective   No new changes. Daughter and spouse state that patient seems comfortable. Did wake up a little bit this morning. Said he complained of pain as leaving room so requesting medication.       Objective     Last Recorded Vitals  /84   Pulse 109   Temp 37.2 °C (99 °F)   Resp 13   Wt 71.4 kg (157 lb 6.5 oz)   SpO2 91%   Intake/Output last 3 Shifts:    Intake/Output Summary (Last 24 hours) at 7/6/2024 1034  Last data filed at 7/5/2024 1745  Gross per 24 hour   Intake --   Output 200 ml   Net -200 ml       Admission Weight  Weight: 73.3 kg (161 lb 9.6 oz) (07/04/24 1928)    Daily Weight  07/06/24 : 71.4 kg (157 lb 6.5 oz)    Image Results  XR chest 1 view  Narrative: Interpreted By:  Ashley Sevilla,   STUDY:  XR CHEST 1 VIEW;  7/4/2024 7:29 pm      INDICATION:  Signs/Symptoms:sob.      COMPARISON:  06/26/2024, CT 06/15/2024      ACCESSION NUMBER(S):  [id number]      ORDERING CLINICIAN:  GLORIA OLSON      FINDINGS:  AP radiograph of the chest was provided.      Status post bilateral shoulder arthroplasty. Left subclavian dual  lead pacemaker noted. Right chest wall MediPort with tip partially  obscured by dish in all hardware however likely within the region of  the distal SVC.      CARDIOMEDIASTINAL SILHOUETTE:  Cardiomediastinal silhouette is normal in size and configuration.      LUNGS:  Extensive bilateral nodular and masslike airspace opacities. No  sizable pleural effusion or pneumothorax.      ABDOMEN:  No remarkable upper abdominal findings.      BONES:  No acute osseous changes. Thoracolumbar hardware.      Impression: 1.  Bilateral extensive masslike airspace opacities compatible with  extensive pulmonary malignancy/metastasis. Findings are grossly  similar to recent imaging.              MACRO:  None      Signed by: Ashley Sevilla 7/4/2024 8:17 PM  Dictation workstation:   [id number]  CT head wo  IV contrast  Narrative: Interpreted By:  Ashley Sevilla,   STUDY:  CT HEAD WO IV CONTRAST;  7/4/2024 7:55 pm      INDICATION:  Signs/Symptoms:ams.      COMPARISON:  06/13/2024, MRI 06/14/2024      ACCESSION NUMBER(S):  [id number]      ORDERING CLINICIAN:  GLORIA OLSON      TECHNIQUE:  Axial noncontrast CT images of the head.      FINDINGS:  BRAIN PARENCHYMA: Redemonstrated high parietal right cortical based  mass with diffusely increased attenuation which measures up to 1.8  cm, previously 1.3 cm on 06/13/2024. Adjacent region of decreased  attenuation suggestive of vasogenic edema extends through the right  parietal lobe. There is local sulcal mass effect without midline  shift identified. The extent of parenchymal edema is similar to prior  imaging. Hypodense region in right paracentral frontal lobe likely  related to vasogenic edema better seen on prior MRI. Additional  nonspecific white matter changes M parenchymal volume loss.      There is a hyperdense focus noted in the right parietal lobe along  the right superior sagittal sinus (series 2 to come image 13), new  from prior CT imaging however present on prior MRI. There is a  redemonstrated 9 mm hyperdense lesion in the anterior left temporal  lobe, similar to prior imaging. A 7 mm hyperdense lesion in the left  parieto-occipital lobe similar prior MRI. Additional foci including  within the right cerebellum better identified on prior imaging.      VENTRICLES and EXTRA-AXIAL SPACES: The ventricles, sulci and basal  cisterns enlarged, concordant with parenchymal volume loss.  EXTRACRANIAL SOFT TISSUES: Within normal limits. PARANASAL  SINUSES/MASTOIDS: The visualized paranasal sinuses and mastoid air  cells are aerated. CALVARIUM: No depressed skull fracture. No  destructive osseous lesion.      OTHER FINDINGS: None.      Impression: Multiple hyperdense parenchymal lesions  are again identified, better  delineated on prior MRI, 06/14/2024. Dominant lesion in  the right  parietal lobe appears more prominent since prior CT imaging by direct  compares and a similar distribution of adjacent vasogenic edema. A  hyperdense right paracentral parietal lesion better delineated on  prior MRI however more conspicuous since prior CT imaging measuring  up to 9 mm. No significant new mass effect or midline shift  identified.          MACRO:  None.      Signed by: Ashley Sevilla 7/4/2024 8:13 PM  Dictation workstation:   [id number]      Physical Exam  Constitutional:       Appearance: He is ill-appearing.      Comments: Somnolent, unresponsive   Cardiovascular:      Rate and Rhythm: Normal rate and regular rhythm.      Heart sounds: No murmur heard.     No friction rub. No gallop.   Pulmonary:      Comments: tachypnea  Musculoskeletal:         General: Swelling present.      Right lower leg: Edema present.      Left lower leg: Edema present.      Comments: 4 extremity 2+ pitting edema   Skin:     General: Skin is warm.         Assessment/Plan      Massimo Garcia is a 63 y.o. male on day 1 of admission. Patient was barely arousable, family noticed gradual decline. Discussed goals of care:  -Palliative care conversation   -Revisited family code changed to DNR   -Hospice comfort care    UPDATES 7/6  - Hospice meeting tomorrow    # Acute encephalopathy - Known brain mets.   # Cancer related pain/anxiety   # Metastatic cancer - Oncology consult to provide input to family/patient.   # Pancytopenia - Chemotherapy  # Fluid overloaded state/Anasarca  # Elevated Lactate    Maurizio Partida MD  PGY-2

## 2024-07-06 NOTE — NURSING NOTE
Status discussed with patient's RN, Capri who reports patient remains barely arousable, protective sacral Mepilex is in place and incontinence care provided but as goal of care is entirely for his comfort wound was not assessed.  She does not feel he would tolerate this well but agrees to notify me should this change.

## 2024-07-07 NOTE — CARE PLAN
The patient's goals for the shift include remain safe    The clinical goals for the shift include remain comfortable      Problem: Nutrition  Goal: Less than 5 days NPO/clear liquids  Outcome: Progressing     Problem: Nutrition  Goal: Oral intake greater than 50%  Outcome: Progressing     Problem: Nutrition  Goal: Oral intake greater 75%  Outcome: Progressing     Problem: Nutrition  Goal: Consume prescribed supplement  Outcome: Progressing     Problem: Nutrition  Goal: Adequate PO fluid intake  Outcome: Progressing     Problem: Nutrition  Goal: Nutrition support goals are met within 48 hrs  Outcome: Progressing     Problem: Skin  Goal: Decreased wound size/increased tissue granulation at next dressing change  Outcome: Progressing     Problem: Skin  Goal: Participates in plan/prevention/treatment measures  Outcome: Progressing  Flowsheets (Taken 7/6/2024 2341)  Participates in plan/prevention/treatment measures:   Discuss with provider PT/OT consult   Elevate heels     Problem: Skin  Goal: Prevent/manage excess moisture  Outcome: Progressing  Flowsheets (Taken 7/6/2024 2341)  Prevent/manage excess moisture: Cleanse incontinence/protect with barrier cream

## 2024-07-07 NOTE — SIGNIFICANT EVENT
I was called to patient’s bedside to pronounce that patient Massimo Garcia has . No spontaneous movements were present. There was not response to verbal or tactile stimuli. Pupils were mid-dilated and fixed. No breath sounds were appreciated over either lung field. No carotid pulses were palpable. No heart sounds were auscultator over entire precordium. Patient pronounced dead on 24 at 06:16.  Family and attending physician were notified. Pt was DNRCC. Patient’s major medical illness was  Metastatic cancer with pancytopenia s/p chemotherapy. Time of death was 24 at 06:16 , confirmed and witnessed by Nurse Desi Cannon.

## 2024-07-07 NOTE — DISCHARGE SUMMARY
"Discharge Diagnosis  Cardiopulmonary arrest  Goals of care discussion --> Hospice encounter  Acute metabolic encephalopathy  Cancer-related pain  Metastatic cancer to brain and spine  Pancytopenia  Anasarca    Hospital Course  From H&P:   Massimo Garcia is a 63 y.o. 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 History was obtained from nursing home and family members as patient was unable to provide a detailed history on admission. Over the past few days the patient has been experiencing worsening hiccups and increased pain. He has also been \"twitching\" and has had trouble swallowing. He seemed to be \"catching his breath\". At SNF, the patient had elevated lactate (3.5 --> 2.8) and low platelets. The patient has had recent gum bleeding. He appeared to have shortness of breath and was started on 2L O2 and given 1L NS. During this event he became bradycardic to the 40s but blood pressure remained stable and O2 saturation was >89%. He also received ativan and dilaudid at that time. He has been taking dilaudid 4 mg oral once daily and also has a fentanyl patch.  Of note, the patient was recently on levofloxacin (6/26-7/3) because of concern for pneumonia. Blood cultures from 7/2 show NGTD. Patient was recently restarted on trastuzumab (6/28). The family feels that symptoms worsened after chemotherapy treatment.     The patient was recently hospitalized for colitis (6/8-6/27). At that time there was also concern for intracranial bleed but no evidence was found on imaging. MRI revealed worsening metastases in the spinal cord. Surgical intervention was declined because it would have delayed chemo and the patient and family wanted to continue with chemo. He did get " palliative radiation to the spine.     Patient evaluated and bedside and given review of his acute as well as chronic issues, patient unfortunately had a very poor prognosis. Known metastatic cancer to spine and brain. Unable to manage pain without worsening mentation. Pancytopenic. Fluid overloaded and anasarca likely due to third spacing from hypoalbuminemia from poor PO intake as well as potentially side effect of his chemotherapy.     Had goals of care discussion with patient's spouse at bedside. Palliative care was consulted. Code status ultimately changed to DNR-comfort care measures only focusing on his comfort level. His oncologist also reviewed chart and patient and also recommended hospice care at this time. Hospice was consulted but unfortunately not able to meet with patient prior to patient's passing. He did have comfort care measures in place while waiting and family was aware of this, present in hospital and assisted with monitoring patient's symptoms and to alert staff if having symptoms to help medicate him to help  him be comfortable. The morning of 2024 at 0616 patient was pronounced .    Pertinent Physical Exam At Time of Discharge  Physical Exam  Patient unresponsive to verbal, tactile and painful stimuli.  Pupils dilated.  No spontaneous breathing or heart sounds were auscultated.  No gag reflex was noted or corneal reflex.    Outpatient Follow-Up  Future Appointments   Date Time Provider Department Center   2024  9:30 AM Tomasa Blake MD [email or web address]   2024 10:00 AM INF 11 GEAUGA [facility]   2024 10:30 AM Tomasa Blake MD [email or web address]   2024 11:30 AM INF 10 GEAUGA [facility]   2024 10:20 AM Blue John MD [id number] [place]   2024  9:00 AM Tomasa Blake MD [facility]   2024  9:30 AM INF 19 GEAUGA [facility]   2024 10:30 AM Tomasa Blake MD [facility]   2024 11:30 AM INF 10 GEAUGA [facility]          Bam Sanchez, DO

## 2024-07-08 ENCOUNTER — APPOINTMENT (OUTPATIENT)
Dept: PAIN MEDICINE | Facility: CLINIC | Age: 64
End: 2024-07-08
Payer: MEDICARE

## 2024-07-09 ENCOUNTER — APPOINTMENT (OUTPATIENT)
Dept: PAIN MEDICINE | Facility: CLINIC | Age: 64
End: 2024-07-09
Payer: MEDICARE

## 2024-07-10 ENCOUNTER — APPOINTMENT (OUTPATIENT)
Dept: PAIN MEDICINE | Facility: CLINIC | Age: 64
End: 2024-07-10
Payer: MEDICARE

## 2024-07-11 ENCOUNTER — APPOINTMENT (OUTPATIENT)
Dept: PAIN MEDICINE | Facility: CLINIC | Age: 64
End: 2024-07-11
Payer: MEDICARE

## 2024-07-13 LAB
ATRIAL RATE: 65 BPM
P AXIS: 9 DEGREES
P OFFSET: 109 MS
P ONSET: 48 MS
PR INTERVAL: 296 MS
Q ONSET: 195 MS
QRS COUNT: 11 BEATS
QRS DURATION: 168 MS
QT INTERVAL: 506 MS
QTC CALCULATION(BAZETT): 526 MS
QTC FREDERICIA: 519 MS
R AXIS: -13 DEGREES
T AXIS: 88 DEGREES
T OFFSET: 448 MS
VENTRICULAR RATE: 65 BPM

## 2024-07-15 ENCOUNTER — APPOINTMENT (OUTPATIENT)
Dept: PAIN MEDICINE | Facility: CLINIC | Age: 64
End: 2024-07-15
Payer: MEDICARE

## 2024-07-15 PROBLEM — E46 PROTEIN-CALORIE MALNUTRITION (MULTI): Status: ACTIVE | Noted: 2024-07-15

## 2024-07-19 ENCOUNTER — APPOINTMENT (OUTPATIENT)
Dept: HEMATOLOGY/ONCOLOGY | Facility: CLINIC | Age: 64
End: 2024-07-19
Payer: MEDICARE

## 2024-07-22 ASSESSMENT — ENCOUNTER SYMPTOMS
FATIGUE: 0
HEADACHES: 0
HALLUCINATIONS: 0
EYE PAIN: 0
POLYDIPSIA: 0
ABDOMINAL PAIN: 0
WOUND: 0
SORE THROAT: 0
BRUISES/BLEEDS EASILY: 0
CHILLS: 0
BLOOD IN STOOL: 0
PALPITATIONS: 0
SHORTNESS OF BREATH: 0
HEMATURIA: 0
FEVER: 0
EYE REDNESS: 0
WEAKNESS: 1
COUGH: 0
ADENOPATHY: 0
RHINORRHEA: 0
NECK STIFFNESS: 0
DYSURIA: 0
BACK PAIN: 0

## 2024-08-09 ENCOUNTER — APPOINTMENT (OUTPATIENT)
Dept: HEMATOLOGY/ONCOLOGY | Facility: CLINIC | Age: 64
End: 2024-08-09
Payer: MEDICARE

## 2024-08-19 ENCOUNTER — APPOINTMENT (OUTPATIENT)
Dept: NEUROSURGERY | Facility: CLINIC | Age: 64
End: 2024-08-19
Payer: MEDICARE

## 2024-08-30 ENCOUNTER — APPOINTMENT (OUTPATIENT)
Dept: HEMATOLOGY/ONCOLOGY | Facility: CLINIC | Age: 64
End: 2024-08-30
Payer: MEDICARE

## 2024-09-20 ENCOUNTER — APPOINTMENT (OUTPATIENT)
Dept: HEMATOLOGY/ONCOLOGY | Facility: CLINIC | Age: 64
End: 2024-09-20
Payer: MEDICARE

## (undated) DEVICE — PAD, GROUNDING, ELECTROSURGICAL, W/9 FT CABLE, POLYHESIVE II, ADULT, LF

## (undated) DEVICE — TUBING, SMOKE EVAC, 3/8 X 10 FT

## (undated) DEVICE — ACCESS KIT, S-MAK MINI, 5FR 10CM 0.018IN 40CM, NT/PT, ECHO ENHANCE NEEDLE

## (undated) DEVICE — SEALANT, HEMOSTATIC, FLOSEAL, 10 ML

## (undated) DEVICE — KIT, PATIENT CARE, JACKSON TABLE W/PRONE-SAFE HEADREST

## (undated) DEVICE — DRESSING, PREVENA, PEEL AND PLACE, 20CM

## (undated) DEVICE — INTRODUCER, SAFESHEATH II, 7FR X 13CM, PEEL-AWAY,  W/ SIDE PORT

## (undated) DEVICE — ELECTRODE, GROUND PLATE

## (undated) DEVICE — SUTURE, VICRYL, 4-0, 18 IN, UNDYED BR PS-2

## (undated) DEVICE — NEEDLE, ELECTRODE, SUBDERMAL, PAIRED, 2.0 LEAD, DISP

## (undated) DEVICE — BUR, 3MM X 3.8MM, PRECISION, NEURO DRILL

## (undated) DEVICE — COVER, CART, 45 X 27 X 48 IN, CLEAR

## (undated) DEVICE — BONE, MILL, MIDAS REX, DUAL BLADE, ELECTRIC

## (undated) DEVICE — Device

## (undated) DEVICE — MARKER, SKIN, RULER AND LABEL PACK, CUSTOM

## (undated) DEVICE — ELECTRODE, CORKSCREW NEEDLE 1.5M LENGTH

## (undated) DEVICE — SPONGE, HEMOSTATIC, GELATIN, SURGIFOAM, 8 X 12.5 CM X 10 MM

## (undated) DEVICE — CATHETER, PACING, PACEL, FLOW DIRECTED, 5 FR X 110 CM

## (undated) DEVICE — APPLICATOR, CHLORAPREP, W/ORANGE TINT, 26ML

## (undated) DEVICE — SUTURE, VICRYL, 0, 18 IN, CT-1, UNDYED

## (undated) DEVICE — INTRODUCER, SHEATH, FAST-CATH, 6.5FR X 12CM, C-LOCK

## (undated) DEVICE — MANIFOLD, 4 PORT NEPTUNE STANDARD

## (undated) DEVICE — ENVELOPE, ANTIBACTERIAL, AIGIS RX TYRX, ABSORBABLE, LRG

## (undated) DEVICE — DRAPE, MICROSCOPE, FOR ZEISS 65MM, VARI-LENS II, 52 X 150

## (undated) DEVICE — GUIDEWIRE, INQWIRE, AMPLATZ, 0.035IN,75CM, 4.0CM STRAIGHT SHORT TIP

## (undated) DEVICE — WOUND SYSTEM, DEBRIDEMENT & CLEANING, O.R DUOPAK

## (undated) DEVICE — ACCESS KIT, MINI MAK, 5FR X 10CM, 0.018 X 40CM, SS/SS, STANDARD NEEDLE

## (undated) DEVICE — GOWN, SURGICAL, SMARTGOWN, XLARGE, STERILE

## (undated) DEVICE — CATHETER TRAY, SURESTEP, 16FR, URINE METER W/STATLOCK

## (undated) DEVICE — DRAIN, WOUND, ROUND, W/TROCAR, HOLE PATTERN, 10 IN, MEDIUM, 1/8 X 49 IN